# Patient Record
Sex: MALE | Race: WHITE | NOT HISPANIC OR LATINO | Employment: OTHER | URBAN - NONMETROPOLITAN AREA
[De-identification: names, ages, dates, MRNs, and addresses within clinical notes are randomized per-mention and may not be internally consistent; named-entity substitution may affect disease eponyms.]

---

## 2017-01-18 ENCOUNTER — OFFICE VISIT (OUTPATIENT)
Dept: OTOLARYNGOLOGY | Facility: CLINIC | Age: 62
End: 2017-01-18

## 2017-01-18 VITALS
WEIGHT: 236 LBS | HEART RATE: 63 BPM | HEIGHT: 71 IN | SYSTOLIC BLOOD PRESSURE: 141 MMHG | DIASTOLIC BLOOD PRESSURE: 80 MMHG | TEMPERATURE: 98.2 F | BODY MASS INDEX: 33.04 KG/M2

## 2017-01-18 DIAGNOSIS — K21.9 GASTROESOPHAGEAL REFLUX DISEASE WITHOUT ESOPHAGITIS: ICD-10-CM

## 2017-01-18 DIAGNOSIS — E04.2 MULTINODULAR THYROID: Primary | ICD-10-CM

## 2017-01-18 PROCEDURE — 99203 OFFICE O/P NEW LOW 30 MIN: CPT | Performed by: NURSE PRACTITIONER

## 2017-01-18 RX ORDER — LISINOPRIL 40 MG/1
40 TABLET ORAL DAILY
COMMUNITY
Start: 2016-02-10 | End: 2021-01-21

## 2017-01-18 RX ORDER — HYDRALAZINE HYDROCHLORIDE 50 MG/1
50 TABLET, FILM COATED ORAL 3 TIMES DAILY
COMMUNITY
End: 2018-07-25

## 2017-01-18 RX ORDER — MINOXIDIL 2.5 MG/1
2.5 TABLET ORAL 2 TIMES DAILY
COMMUNITY

## 2017-01-18 RX ORDER — POTASSIUM CHLORIDE 750 MG/1
TABLET, EXTENDED RELEASE ORAL
Status: ON HOLD | COMMUNITY
Start: 2014-04-15 | End: 2021-04-30

## 2017-01-18 RX ORDER — CLOPIDOGREL BISULFATE 75 MG/1
TABLET ORAL
COMMUNITY
Start: 2013-12-09 | End: 2021-05-01 | Stop reason: HOSPADM

## 2017-01-18 RX ORDER — ROSUVASTATIN CALCIUM 20 MG/1
20 TABLET, COATED ORAL DAILY
COMMUNITY
End: 2020-12-04 | Stop reason: SDUPTHER

## 2017-01-18 RX ORDER — PANTOPRAZOLE SODIUM 40 MG/1
TABLET, DELAYED RELEASE ORAL
COMMUNITY
Start: 2014-05-06 | End: 2019-01-25

## 2017-01-18 RX ORDER — GABAPENTIN 100 MG/1
CAPSULE ORAL
COMMUNITY
Start: 2016-01-11 | End: 2019-01-25

## 2017-01-18 RX ORDER — FUROSEMIDE 80 MG
TABLET ORAL
COMMUNITY
Start: 2016-02-10 | End: 2021-04-20

## 2017-01-18 RX ORDER — CARVEDILOL 25 MG/1
TABLET ORAL
COMMUNITY
Start: 2016-02-16 | End: 2020-12-04 | Stop reason: SDUPTHER

## 2017-01-18 RX ORDER — ISOSORBIDE MONONITRATE 60 MG/1
TABLET, EXTENDED RELEASE ORAL
COMMUNITY

## 2017-01-18 RX ORDER — AMLODIPINE BESYLATE 10 MG/1
TABLET ORAL
COMMUNITY
Start: 2016-01-11 | End: 2021-01-21

## 2017-01-18 NOTE — PROGRESS NOTES
PRIMARY CARE PROVIDER: Cecille Baumann MD  REFERRING PROVIDER: No ref. provider found    Chief Complaint   Patient presents with   • Thyroid Problem     Thyroid Nodules       Subjective   History of Present Illness:  Fortino Llanes is a  61 y.o.  male who complains of thyroid nodules. The symptoms are not localized to a particular location. The patient has had moderate symptoms. The symptoms have been relatively constant for the last several years . The symptoms are aggravated by  no identifiable factors . The symptoms are improved by no identifieable factors. The patient has been evaluated for these nodules in the past with an ultrasound guided fine needle biopsy with benign results. The patient has not had follow-up in over 3 years and recently was sent by his PCP for re-evaluation of his thyroid. He complains of mild dysphagia and acid reflux symptoms. He states he drinks very little water per day and a large amount of caffeine intake.     Review of Systems:  Review of Systems   Constitutional: Negative for chills and fever.   HENT:        See HPI   Eyes: Negative.    Cardiovascular: Negative for palpitations.   Gastrointestinal: Negative for nausea and vomiting.   Allergic/Immunologic: Negative.    Neurological: Negative for dizziness and headaches.   Psychiatric/Behavioral: Negative.        Past History:  Past Medical History   Diagnosis Date   • Diabetes mellitus    • GERD (gastroesophageal reflux disease)    • Hyperlipidemia    • Hypertension    • Myocardial infarction    • Neuropathy      Past Surgical History   Procedure Laterality Date   • Carotid endarterectomy     • Cardiac catheterization     • Cholecystectomy     • Renal artery stent     • Excision lesion       Face     Family History   Problem Relation Age of Onset   • Diabetes Mother    • Cancer Mother    • Hypertension Mother    • Osteoporosis Mother    • Cancer Father    • Diabetes Father      Social History   Substance Use Topics   • Smoking  status: Former Smoker     Quit date: 1999   • Smokeless tobacco: Never Used   • Alcohol use Yes      Comment: Rare       Current Outpatient Prescriptions:   •  amLODIPine (NORVASC) 10 MG tablet, Take 1 tablet by mouth daily, Disp: , Rfl:   •  aspirin 81 MG tablet, Take 81 mg by mouth daily., Disp: , Rfl:   •  carvedilol (COREG) 25 MG tablet, Take 25 mg by mouth 2 times daily , Disp: , Rfl:   •  clopidogrel (PLAVIX) 75 MG tablet, Take 1 tablet by mouth daily., Disp: , Rfl:   •  furosemide (LASIX) 80 MG tablet, Take 80 mg by mouth daily, Disp: , Rfl:   •  gabapentin (NEURONTIN) 100 MG capsule, Take 1 capsule by mouth 3 times daily, Disp: , Rfl:   •  hydrALAZINE (APRESOLINE) 50 MG tablet, Take 50 mg by mouth 3 (Three) Times a Day., Disp: , Rfl:   •  Insulin Glargine (LANTUS SOLOSTAR) 100 UNIT/ML injection pen, INJECT 30 UNITS IN THE MORNING AND 20 UNITS IN THE EVENING DAILY, Disp: , Rfl:   •  isosorbide mononitrate (IMDUR) 60 MG 24 hr tablet, Take 60 mg by mouth 2 times daily, Disp: , Rfl:   •  lisinopril (PRINIVIL,ZESTRIL) 40 MG tablet, Take 40 mg by mouth 2 times daily, Disp: , Rfl:   •  minoxidil (LONITEN) 2.5 MG tablet, Take 2.5 mg by mouth daily, Disp: , Rfl:   •  pantoprazole (PROTONIX) 40 MG EC tablet, TAKE ONE TABLET BY MOUTH EVERY DAY, Disp: , Rfl:   •  potassium chloride (K-DUR,KLOR-CON) 10 MEQ CR tablet, TAKE ONE TABLET BY MOUTH EVERY DAY, Disp: , Rfl:   •  rosuvastatin (CRESTOR) 20 MG tablet, Take 20 mg by mouth Daily., Disp: , Rfl:   Allergies:  Morphine and related    Objective     Vital Signs:  Temp:  [98.2 °F (36.8 °C)] 98.2 °F (36.8 °C)  Heart Rate:  [63] 63  BP: (141)/(80) 141/80    Physical Exam:  CONSTITUTIONAL: well nourished, well-developed, alert, oriented, in no acute distress   COMMUNICATION AND VOICE: able to communicate normally, normal voice quality  HEAD: normocephalic, no lesions, atraumatic, no tenderness, no masses   FACE: appearance normal, no lesions, no tenderness, no deformities,  facial motion symmetric  SALIVARY GLANDS: parotid glands with no tenderness, no swelling, no masses, submandibular glands with normal size, nontender  EYES: ocular motility normal, eyelids normal, orbits normal, no proptosis, conjunctiva normal , pupils equal, round   EARS:  Hearing: response to conversational voice normal bilaterally   External Ears: auricles without lesions  Otoscopic: tympanic membrane appearance normal, no lesions, no perforation, normal mobility, no fluid  NOSE:  External Nose: structure normal, no tenderness on palpation, no nasal discharge, no lesions, no evidence of trauma, nostrils patent   Intranasal Exam: nasal mucosa normal, vestibule within normal limits, inferior turbinate normal, nasal septum midline   Nasopharynx: nasopharyngeal mucosa, adenoids within normal limits  ORAL:  Lips: upper and lower lips without lesion   Teeth: dentition within normal limits for age   Gums: gingivae healthy   Oral Mucosa: oral mucosa normal, no mucosal lesions   Floor of Mouth: Warthin’s duct patent, mucosa normal  Tongue: lingual mucosa normal without lesions, normal tongue mobility   Palate: soft and hard palates with normal mucosa and structure  Oropharynx: oropharyngeal mucosa normal, tonsils normal in appearance   HYPOPHARYNX: hypopharyngeal mucosa normal  LARYNX: epiglottis and arytenoid cartilage within normal limits, vocal cord mucosa normal with normal mobility   NECK: neck appearance normal, no masses or tenderness  THYROID: mild right-sided thyromegaly, no tenderness, nodules or mass present on palpation, position midline   LYMPH NODES: no lymphadenopathy  CHEST/RESPIRATORY: respiratory effort normal, normal breath sounds   CARDIOVASCULAR: rate and rhythm normal, extremities without cyanosis or edema    NEUROLOGIC/PSYCHIATRIC: oriented to time, place and person, mood normal, affect appropriate, CN II-XII intact grossly    Results Review:   Thyroid ultrasound reviewed: multiple nodules seen;  right- 3.3cm nodule; left- 2cm nodule. This is minimally changed from ultrasound     Assessment   1. Multinodular thyroid        Plan   Continue current management plan.     -------MEDICATIONS:-------  Continue previous medication as prescribed     --------TESTING:--------  TSH (66859)  ultrasound  of the thyroid     -----INSTRUCTIONS-----  Sleep with the head of bed on an incline. No large meals 1-2 hrs prior to sleep. The patient was instructed to use PPI's 30 minutes prior to the last meal of the day. The patient was advised to avoid fatty meals and caffine or alcohol prior to sleep. Increase water/ non caffeinated drink intake to at least 6-8 glasses of  a day. Decrease caffeine intake. Plain Mucinex over the counter as needed to thin out secretions.         -----FOLLOW UP-----  Return in about 1 year (around 1/18/2018) for Recheck thyroid w/ US.        Brina Maki, YARELI  01/18/17  3:06 PM

## 2017-01-18 NOTE — LETTER
January 18, 2017     Cecille Baumann MD  [address] [place] [id number]    Patient: Fortino Llanes   YOB: 1955   Date of Visit: 1/18/2017       Dear Dr. Pastor MD:    Thank you for referring Fortino Llanes to me for evaluation. Below are the relevant portions of my assessment and plan of care.    If you have questions, please do not hesitate to call me. I look forward to following Fortino along with you.         Sincerely,        Gonzalo Hester MD        CC: No Recipients  Brina Maki, APRN  1/18/2017  3:08 PM  Signed  PRIMARY CARE PROVIDER: Cecille Baumann MD  REFERRING PROVIDER: No ref. provider found    Chief Complaint   Patient presents with   • Thyroid Problem     Thyroid Nodules       Subjective   History of Present Illness:  Fortino Llanes is a  61 y.o.  male who complains of thyroid nodules. The symptoms are not localized to a particular location. The patient has had moderate symptoms. The symptoms have been relatively constant for the last several years . The symptoms are aggravated by  no identifiable factors . The symptoms are improved by no identifieable factors. The patient has been evaluated for these nodules in the past with an ultrasound guided fine needle biopsy with benign results. The patient has not had follow-up in over 3 years and recently was sent by his PCP for re-evaluation of his thyroid. He complains of mild dysphagia and acid reflux symptoms. He states he drinks very little water per day and a large amount of caffeine intake.     Review of Systems:  Review of Systems   Constitutional: Negative for chills and fever.   HENT:        See HPI   Eyes: Negative.    Cardiovascular: Negative for palpitations.   Gastrointestinal: Negative for nausea and vomiting.   Allergic/Immunologic: Negative.    Neurological: Negative for dizziness and headaches.   Psychiatric/Behavioral: Negative.        Past History:  Past Medical History   Diagnosis Date   • Diabetes mellitus    •  GERD (gastroesophageal reflux disease)    • Hyperlipidemia    • Hypertension    • Myocardial infarction    • Neuropathy      Past Surgical History   Procedure Laterality Date   • Carotid endarterectomy     • Cardiac catheterization     • Cholecystectomy     • Renal artery stent     • Excision lesion       Face     Family History   Problem Relation Age of Onset   • Diabetes Mother    • Cancer Mother    • Hypertension Mother    • Osteoporosis Mother    • Cancer Father    • Diabetes Father      Social History   Substance Use Topics   • Smoking status: Former Smoker     Quit date: 1999   • Smokeless tobacco: Never Used   • Alcohol use Yes      Comment: Rare       Current Outpatient Prescriptions:   •  amLODIPine (NORVASC) 10 MG tablet, Take 1 tablet by mouth daily, Disp: , Rfl:   •  aspirin 81 MG tablet, Take 81 mg by mouth daily., Disp: , Rfl:   •  carvedilol (COREG) 25 MG tablet, Take 25 mg by mouth 2 times daily , Disp: , Rfl:   •  clopidogrel (PLAVIX) 75 MG tablet, Take 1 tablet by mouth daily., Disp: , Rfl:   •  furosemide (LASIX) 80 MG tablet, Take 80 mg by mouth daily, Disp: , Rfl:   •  gabapentin (NEURONTIN) 100 MG capsule, Take 1 capsule by mouth 3 times daily, Disp: , Rfl:   •  hydrALAZINE (APRESOLINE) 50 MG tablet, Take 50 mg by mouth 3 (Three) Times a Day., Disp: , Rfl:   •  Insulin Glargine (LANTUS SOLOSTAR) 100 UNIT/ML injection pen, INJECT 30 UNITS IN THE MORNING AND 20 UNITS IN THE EVENING DAILY, Disp: , Rfl:   •  isosorbide mononitrate (IMDUR) 60 MG 24 hr tablet, Take 60 mg by mouth 2 times daily, Disp: , Rfl:   •  lisinopril (PRINIVIL,ZESTRIL) 40 MG tablet, Take 40 mg by mouth 2 times daily, Disp: , Rfl:   •  minoxidil (LONITEN) 2.5 MG tablet, Take 2.5 mg by mouth daily, Disp: , Rfl:   •  pantoprazole (PROTONIX) 40 MG EC tablet, TAKE ONE TABLET BY MOUTH EVERY DAY, Disp: , Rfl:   •  potassium chloride (K-DUR,KLOR-CON) 10 MEQ CR tablet, TAKE ONE TABLET BY MOUTH EVERY DAY, Disp: , Rfl:   •  rosuvastatin  (CRESTOR) 20 MG tablet, Take 20 mg by mouth Daily., Disp: , Rfl:   Allergies:  Morphine and related    Objective     Vital Signs:  Temp:  [98.2 °F (36.8 °C)] 98.2 °F (36.8 °C)  Heart Rate:  [63] 63  BP: (141)/(80) 141/80    Physical Exam:  CONSTITUTIONAL: well nourished, well-developed, alert, oriented, in no acute distress   COMMUNICATION AND VOICE: able to communicate normally, normal voice quality  HEAD: normocephalic, no lesions, atraumatic, no tenderness, no masses   FACE: appearance normal, no lesions, no tenderness, no deformities, facial motion symmetric  SALIVARY GLANDS: parotid glands with no tenderness, no swelling, no masses, submandibular glands with normal size, nontender  EYES: ocular motility normal, eyelids normal, orbits normal, no proptosis, conjunctiva normal , pupils equal, round   EARS:  Hearing: response to conversational voice normal bilaterally   External Ears: auricles without lesions  Otoscopic: tympanic membrane appearance normal, no lesions, no perforation, normal mobility, no fluid  NOSE:  External Nose: structure normal, no tenderness on palpation, no nasal discharge, no lesions, no evidence of trauma, nostrils patent   Intranasal Exam: nasal mucosa normal, vestibule within normal limits, inferior turbinate normal, nasal septum midline   Nasopharynx: nasopharyngeal mucosa, adenoids within normal limits  ORAL:  Lips: upper and lower lips without lesion   Teeth: dentition within normal limits for age   Gums: gingivae healthy   Oral Mucosa: oral mucosa normal, no mucosal lesions   Floor of Mouth: Warthin’s duct patent, mucosa normal  Tongue: lingual mucosa normal without lesions, normal tongue mobility   Palate: soft and hard palates with normal mucosa and structure  Oropharynx: oropharyngeal mucosa normal, tonsils normal in appearance   HYPOPHARYNX: hypopharyngeal mucosa normal  LARYNX: epiglottis and arytenoid cartilage within normal limits, vocal cord mucosa normal with normal mobility    NECK: neck appearance normal, no masses or tenderness  THYROID: mild right-sided thyromegaly, no tenderness, nodules or mass present on palpation, position midline   LYMPH NODES: no lymphadenopathy  CHEST/RESPIRATORY: respiratory effort normal, normal breath sounds   CARDIOVASCULAR: rate and rhythm normal, extremities without cyanosis or edema    NEUROLOGIC/PSYCHIATRIC: oriented to time, place and person, mood normal, affect appropriate, CN II-XII intact grossly    Results Review:   Thyroid ultrasound reviewed: multiple nodules seen; right- 3.3cm nodule; left- 2cm nodule. This is minimally changed from ultrasound     Assessment   1. Multinodular thyroid        Plan   Continue current management plan.     -------MEDICATIONS:-------  Continue previous medication as prescribed     --------TESTING:--------  TSH (75944)  ultrasound  of the thyroid     -----INSTRUCTIONS-----  Sleep with the head of bed on an incline. No large meals 1-2 hrs prior to sleep. The patient was instructed to use PPI's 30 minutes prior to the last meal of the day. The patient was advised to avoid fatty meals and caffine or alcohol prior to sleep. Increase water/ non caffeinated drink intake to at least 6-8 glasses of  a day. Decrease caffeine intake. Plain Mucinex over the counter as needed to thin out secretions.         -----FOLLOW UP-----  Return in about 1 year (around 1/18/2018) for Recheck thyroid w/ US.        Brina Maki, YARELI  01/18/17  3:06 PM

## 2017-01-18 NOTE — MR AVS SNAPSHOT
Fortino Shraddha   1/18/2017 1:45 PM   Office Visit    Dept Phone:  [phone]   Encounter #:  [id number]    Provider:  Gonzalo Hester MD   Department:  [facility]                Your Full Care Plan              Your Updated Medication List          This list is accurate as of: 1/18/17  3:12 PM.  Always use your most recent med list.                amLODIPine 10 MG tablet   Commonly known as:  NORVASC       aspirin 81 MG tablet       carvedilol 25 MG tablet   Commonly known as:  COREG       clopidogrel 75 MG tablet   Commonly known as:  PLAVIX       furosemide 80 MG tablet   Commonly known as:  LASIX       gabapentin 100 MG capsule   Commonly known as:  NEURONTIN       hydrALAZINE 50 MG tablet   Commonly known as:  APRESOLINE       isosorbide mononitrate 60 MG 24 hr tablet   Commonly known as:  IMDUR       LANTUS SOLOSTAR 100 UNIT/ML injection pen   Generic drug:  Insulin Glargine       lisinopril 40 MG tablet   Commonly known as:  PRINIVIL,ZESTRIL       minoxidil 2.5 MG tablet   Commonly known as:  LONITEN       pantoprazole 40 MG EC tablet   Commonly known as:  PROTONIX       potassium chloride 10 MEQ CR tablet   Commonly known as:  K-DUR,KLOR-CON       rosuvastatin 20 MG tablet   Commonly known as:  CRESTOR               You Were Diagnosed With        Codes Comments    Multinodular thyroid    -  Primary ICD-10-CM: E04.2  ICD-9-CM: 241.1     Gastroesophageal reflux disease without esophagitis     ICD-10-CM: K21.9  ICD-9-CM: 530.81       Instructions     None    Patient Instructions History      Upcoming Appointments     Visit Type Date Time Department    FOLLOW UP 1/18/2017  1:45 PM [facility] Signup     Our records indicate that [facility] have [facility]. If you would like to sign up for [employer], please email [email or web address] or call [phone] to obtain an activation code.             Other Info from Your Visit    "          Your Appointments     2018  1:15 PM CST   RADIOLOGY with IMAGING CT/US ENT [facility] (--)    [address] [place] [address] Av   [address]  Merged with [facility] [id number] [phone]            2018  1:30 PM CST   Follow Up with YARELI Resendez   [facility] (--)    [address] [place] [address] Av   [address]  [facility] with [facility] [id number] [phone]           Arrive 15 minutes prior to appointment.              Allergies     Morphine And Related        Reason for Visit     Thyroid Problem Thyroid Nodules      Vital Signs     Blood Pressure Pulse Temperature Height Weight Body Mass Index    141/80 63 98.2 °F (36.8 °C) 71\" (180.3 cm) 236 lb (107 kg) 32.92 kg/m2    Smoking Status                   Former Smoker           Problems and Diagnoses Noted     Acid reflux disease    Multinodular thyroid        "

## 2017-06-14 ENCOUNTER — APPOINTMENT (OUTPATIENT)
Dept: GENERAL RADIOLOGY | Age: 62
End: 2017-06-14
Payer: COMMERCIAL

## 2017-06-14 ENCOUNTER — HOSPITAL ENCOUNTER (EMERGENCY)
Age: 62
Discharge: HOME | End: 2017-06-14
Attending: FAMILY MEDICINE
Payer: COMMERCIAL

## 2017-06-14 ENCOUNTER — APPOINTMENT (OUTPATIENT)
Dept: CT IMAGING | Age: 62
End: 2017-06-14
Payer: COMMERCIAL

## 2017-06-14 VITALS
DIASTOLIC BLOOD PRESSURE: 72 MMHG | OXYGEN SATURATION: 96 % | TEMPERATURE: 97.4 F | RESPIRATION RATE: 18 BRPM | HEART RATE: 70 BPM | SYSTOLIC BLOOD PRESSURE: 179 MMHG

## 2017-06-14 DIAGNOSIS — G62.9 SENSORY NEUROPATHY: ICD-10-CM

## 2017-06-14 DIAGNOSIS — S93.422A SPRAIN OF DELTOID LIGAMENT OF LEFT ANKLE, INITIAL ENCOUNTER: Primary | ICD-10-CM

## 2017-06-14 LAB
ALBUMIN SERPL-MCNC: 4 G/DL (ref 3.5–5.2)
ALP BLD-CCNC: 73 U/L (ref 40–130)
ALT SERPL-CCNC: 26 U/L (ref 5–41)
AMPHETAMINE SCREEN, URINE: NEGATIVE
ANION GAP SERPL CALCULATED.3IONS-SCNC: 14 MMOL/L (ref 7–19)
AST SERPL-CCNC: 20 U/L (ref 5–40)
BACTERIA: NEGATIVE /HPF
BARBITURATE SCREEN URINE: NEGATIVE
BASOPHILS ABSOLUTE: 0 K/UL (ref 0–0.2)
BASOPHILS RELATIVE PERCENT: 0.4 % (ref 0–1)
BENZODIAZEPINE SCREEN, URINE: NEGATIVE
BILIRUB SERPL-MCNC: 0.8 MG/DL (ref 0.2–1.2)
BILIRUBIN URINE: NEGATIVE
BLOOD, URINE: ABNORMAL
BUN BLDV-MCNC: 20 MG/DL (ref 8–23)
CALCIUM SERPL-MCNC: 9.1 MG/DL (ref 8.8–10.2)
CANNABINOID SCREEN URINE: NEGATIVE
CHLORIDE BLD-SCNC: 98 MMOL/L (ref 98–111)
CLARITY: CLEAR
CO2: 28 MMOL/L (ref 22–29)
COCAINE METABOLITE SCREEN URINE: NEGATIVE
COLOR: YELLOW
CREAT SERPL-MCNC: 1.3 MG/DL (ref 0.5–1.2)
EOSINOPHILS ABSOLUTE: 0.2 K/UL (ref 0–0.6)
EOSINOPHILS RELATIVE PERCENT: 1.9 % (ref 0–5)
EPITHELIAL CELLS, UA: 0 /HPF (ref 0–5)
GFR NON-AFRICAN AMERICAN: 56
GLUCOSE BLD-MCNC: 167 MG/DL (ref 74–109)
GLUCOSE URINE: NEGATIVE MG/DL
HBA1C MFR BLD: 9.7 %
HCT VFR BLD CALC: 49.1 % (ref 42–52)
HEMOGLOBIN: 16.9 G/DL (ref 14–18)
HYALINE CASTS: 3 /HPF (ref 0–8)
KETONES, URINE: NEGATIVE MG/DL
LEUKOCYTE ESTERASE, URINE: NEGATIVE
LYMPHOCYTES ABSOLUTE: 2.1 K/UL (ref 1.1–4.5)
LYMPHOCYTES RELATIVE PERCENT: 21.7 % (ref 20–40)
Lab: NORMAL
MCH RBC QN AUTO: 32.4 PG (ref 27–31)
MCHC RBC AUTO-ENTMCNC: 34.4 G/DL (ref 33–37)
MCV RBC AUTO: 94.2 FL (ref 80–94)
MONOCYTES ABSOLUTE: 0.7 K/UL (ref 0–0.9)
MONOCYTES RELATIVE PERCENT: 7.3 % (ref 0–10)
NEUTROPHILS ABSOLUTE: 6.7 K/UL (ref 1.5–7.5)
NEUTROPHILS RELATIVE PERCENT: 68.3 % (ref 50–65)
NITRITE, URINE: NEGATIVE
OPIATE SCREEN URINE: NEGATIVE
PDW BLD-RTO: 12.5 % (ref 11.5–14.5)
PH UA: 5
PLATELET # BLD: 146 K/UL (ref 130–400)
PMV BLD AUTO: 11.1 FL (ref 9.4–12.4)
POTASSIUM SERPL-SCNC: 3.5 MMOL/L (ref 3.5–5)
PRO-BNP: 553 PG/ML (ref 0–900)
PROTEIN UA: 30 MG/DL
RBC # BLD: 5.21 M/UL (ref 4.7–6.1)
RBC UA: 0 /HPF (ref 0–4)
SODIUM BLD-SCNC: 140 MMOL/L (ref 136–145)
SPECIFIC GRAVITY UA: 1.01
TOTAL PROTEIN: 7.1 G/DL (ref 6.6–8.7)
TROPONIN: 0.01 NG/ML (ref 0–0.03)
UROBILINOGEN, URINE: 0.2 E.U./DL
WBC # BLD: 9.9 K/UL (ref 4.8–10.8)
WBC UA: 0 /HPF (ref 0–5)

## 2017-06-14 PROCEDURE — 73610 X-RAY EXAM OF ANKLE: CPT

## 2017-06-14 PROCEDURE — 80307 DRUG TEST PRSMV CHEM ANLYZR: CPT

## 2017-06-14 PROCEDURE — 93005 ELECTROCARDIOGRAM TRACING: CPT

## 2017-06-14 PROCEDURE — 80053 COMPREHEN METABOLIC PANEL: CPT

## 2017-06-14 PROCEDURE — 83036 HEMOGLOBIN GLYCOSYLATED A1C: CPT

## 2017-06-14 PROCEDURE — 71010 XR CHEST PORTABLE: CPT

## 2017-06-14 PROCEDURE — 99282 EMERGENCY DEPT VISIT SF MDM: CPT | Performed by: FAMILY MEDICINE

## 2017-06-14 PROCEDURE — 84484 ASSAY OF TROPONIN QUANT: CPT

## 2017-06-14 PROCEDURE — 70450 CT HEAD/BRAIN W/O DYE: CPT

## 2017-06-14 PROCEDURE — 83880 ASSAY OF NATRIURETIC PEPTIDE: CPT

## 2017-06-14 PROCEDURE — 93880 EXTRACRANIAL BILAT STUDY: CPT

## 2017-06-14 PROCEDURE — 85025 COMPLETE CBC W/AUTO DIFF WBC: CPT

## 2017-06-14 PROCEDURE — 99284 EMERGENCY DEPT VISIT MOD MDM: CPT

## 2017-06-14 PROCEDURE — 36415 COLL VENOUS BLD VENIPUNCTURE: CPT

## 2017-06-14 RX ORDER — ROSUVASTATIN CALCIUM 20 MG/1
20 TABLET, COATED ORAL DAILY
Status: ON HOLD | COMMUNITY
End: 2020-10-20 | Stop reason: HOSPADM

## 2017-06-14 ASSESSMENT — ENCOUNTER SYMPTOMS
DIFFICULTY BREATHING: 0
VISUAL CHANGE: 0
RECTAL BLEEDING: 0
NAUSEA: 0
SHORTNESS OF BREATH: 0
VOMITING: 0

## 2017-06-16 LAB
EKG P AXIS: 55 DEGREES
EKG P-R INTERVAL: 172 MS
EKG Q-T INTERVAL: 440 MS
EKG QRS DURATION: 86 MS
EKG QTC CALCULATION (BAZETT): 437 MS
EKG T AXIS: 77 DEGREES

## 2017-09-12 DIAGNOSIS — I65.23 BILATERAL CAROTID ARTERY STENOSIS: Primary | ICD-10-CM

## 2017-09-12 DIAGNOSIS — I73.9 PERIPHERAL VASCULAR DISEASE (HCC): ICD-10-CM

## 2018-01-01 ENCOUNTER — APPOINTMENT (OUTPATIENT)
Dept: GENERAL RADIOLOGY | Age: 63
End: 2018-01-01
Payer: COMMERCIAL

## 2018-01-01 ENCOUNTER — HOSPITAL ENCOUNTER (EMERGENCY)
Age: 63
Discharge: HOME | End: 2018-01-01
Attending: FAMILY MEDICINE
Payer: COMMERCIAL

## 2018-01-01 VITALS
SYSTOLIC BLOOD PRESSURE: 156 MMHG | HEIGHT: 71 IN | DIASTOLIC BLOOD PRESSURE: 78 MMHG | TEMPERATURE: 98.7 F | OXYGEN SATURATION: 94 % | BODY MASS INDEX: 32.9 KG/M2 | RESPIRATION RATE: 18 BRPM | HEART RATE: 71 BPM | WEIGHT: 235 LBS

## 2018-01-01 DIAGNOSIS — R05.9 COUGH: ICD-10-CM

## 2018-01-01 DIAGNOSIS — J40 BRONCHITIS: Primary | ICD-10-CM

## 2018-01-01 LAB
ALBUMIN SERPL-MCNC: 3.9 G/DL (ref 3.5–5.2)
ALP BLD-CCNC: 85 U/L (ref 40–130)
ALT SERPL-CCNC: 17 U/L (ref 5–41)
ANION GAP SERPL CALCULATED.3IONS-SCNC: 11 MMOL/L (ref 7–19)
AST SERPL-CCNC: 18 U/L (ref 5–40)
BASOPHILS ABSOLUTE: 0.1 K/UL (ref 0–0.2)
BASOPHILS RELATIVE PERCENT: 0.5 % (ref 0–1)
BILIRUB SERPL-MCNC: 0.8 MG/DL (ref 0.2–1.2)
BUN BLDV-MCNC: 13 MG/DL (ref 8–23)
CALCIUM SERPL-MCNC: 9 MG/DL (ref 8.8–10.2)
CHLORIDE BLD-SCNC: 98 MMOL/L (ref 98–111)
CO2: 29 MMOL/L (ref 22–29)
CREAT SERPL-MCNC: 1.4 MG/DL (ref 0.5–1.2)
EOSINOPHILS ABSOLUTE: 0.5 K/UL (ref 0–0.6)
EOSINOPHILS RELATIVE PERCENT: 4.3 % (ref 0–5)
GFR NON-AFRICAN AMERICAN: 51
GLUCOSE BLD-MCNC: 335 MG/DL (ref 74–109)
HCT VFR BLD CALC: 48.8 % (ref 42–52)
HEMOGLOBIN: 16.1 G/DL (ref 14–18)
LYMPHOCYTES ABSOLUTE: 1.7 K/UL (ref 1.1–4.5)
LYMPHOCYTES RELATIVE PERCENT: 15.7 % (ref 20–40)
MCH RBC QN AUTO: 30.9 PG (ref 27–31)
MCHC RBC AUTO-ENTMCNC: 33 G/DL (ref 33–37)
MCV RBC AUTO: 93.7 FL (ref 80–94)
MONOCYTES ABSOLUTE: 0.8 K/UL (ref 0–0.9)
MONOCYTES RELATIVE PERCENT: 7.1 % (ref 0–10)
NEUTROPHILS ABSOLUTE: 7.7 K/UL (ref 1.5–7.5)
NEUTROPHILS RELATIVE PERCENT: 72.2 % (ref 50–65)
PDW BLD-RTO: 11.8 % (ref 11.5–14.5)
PERFORMED ON: NORMAL
PLATELET # BLD: 150 K/UL (ref 130–400)
PMV BLD AUTO: 11.3 FL (ref 9.4–12.4)
POC TROPONIN I: 0.03 NG/ML (ref 0–0.08)
POTASSIUM SERPL-SCNC: 3.6 MMOL/L (ref 3.5–5)
PRO-BNP: 771 PG/ML (ref 0–900)
RAPID INFLUENZA  B AGN: NEGATIVE
RAPID INFLUENZA A AGN: NEGATIVE
RBC # BLD: 5.21 M/UL (ref 4.7–6.1)
SODIUM BLD-SCNC: 138 MMOL/L (ref 136–145)
TOTAL PROTEIN: 6.7 G/DL (ref 6.6–8.7)
WBC # BLD: 10.7 K/UL (ref 4.8–10.8)

## 2018-01-01 PROCEDURE — 85025 COMPLETE CBC W/AUTO DIFF WBC: CPT

## 2018-01-01 PROCEDURE — 94640 AIRWAY INHALATION TREATMENT: CPT

## 2018-01-01 PROCEDURE — 71045 X-RAY EXAM CHEST 1 VIEW: CPT

## 2018-01-01 PROCEDURE — 2580000003 HC RX 258: Performed by: FAMILY MEDICINE

## 2018-01-01 PROCEDURE — 93005 ELECTROCARDIOGRAM TRACING: CPT

## 2018-01-01 PROCEDURE — 6360000002 HC RX W HCPCS: Performed by: FAMILY MEDICINE

## 2018-01-01 PROCEDURE — 80053 COMPREHEN METABOLIC PANEL: CPT

## 2018-01-01 PROCEDURE — 99283 EMERGENCY DEPT VISIT LOW MDM: CPT

## 2018-01-01 PROCEDURE — 84484 ASSAY OF TROPONIN QUANT: CPT

## 2018-01-01 PROCEDURE — 83880 ASSAY OF NATRIURETIC PEPTIDE: CPT

## 2018-01-01 PROCEDURE — 87804 INFLUENZA ASSAY W/OPTIC: CPT

## 2018-01-01 PROCEDURE — 36415 COLL VENOUS BLD VENIPUNCTURE: CPT

## 2018-01-01 PROCEDURE — 99283 EMERGENCY DEPT VISIT LOW MDM: CPT | Performed by: FAMILY MEDICINE

## 2018-01-01 RX ORDER — BENZONATATE 200 MG/1
200 CAPSULE ORAL 3 TIMES DAILY PRN
Qty: 21 CAPSULE | Refills: 0 | Status: SHIPPED | OUTPATIENT
Start: 2018-01-01 | End: 2018-01-08

## 2018-01-01 RX ORDER — AZITHROMYCIN 250 MG/1
TABLET, FILM COATED ORAL
Qty: 1 PACKET | Refills: 0 | Status: ON HOLD | OUTPATIENT
Start: 2018-01-01 | End: 2020-02-13 | Stop reason: ALTCHOICE

## 2018-01-01 RX ORDER — ALBUTEROL SULFATE 90 UG/1
2 AEROSOL, METERED RESPIRATORY (INHALATION) EVERY 6 HOURS PRN
Qty: 1 INHALER | Refills: 3 | Status: ON HOLD | OUTPATIENT
Start: 2018-01-01 | End: 2021-05-01

## 2018-01-01 RX ORDER — ESOMEPRAZOLE MAGNESIUM 40 MG/1
40 FOR SUSPENSION ORAL 2 TIMES DAILY
Status: ON HOLD | COMMUNITY
End: 2021-05-14 | Stop reason: HOSPADM

## 2018-01-01 RX ORDER — ALBUTEROL SULFATE 2.5 MG/3ML
2.5 SOLUTION RESPIRATORY (INHALATION) EVERY 4 HOURS PRN
Status: DISCONTINUED | OUTPATIENT
Start: 2018-01-01 | End: 2018-01-01 | Stop reason: HOSPADM

## 2018-01-01 RX ORDER — SODIUM CHLORIDE 9 MG/ML
INJECTION, SOLUTION INTRAVENOUS CONTINUOUS
Status: DISCONTINUED | OUTPATIENT
Start: 2018-01-01 | End: 2018-01-01 | Stop reason: HOSPADM

## 2018-01-01 RX ADMIN — ALBUTEROL SULFATE 2.5 MG: 2.5 SOLUTION RESPIRATORY (INHALATION) at 09:37

## 2018-01-01 RX ADMIN — IPRATROPIUM BROMIDE 0.5 MG: 0.5 SOLUTION RESPIRATORY (INHALATION) at 09:37

## 2018-01-01 RX ADMIN — SODIUM CHLORIDE: 9 INJECTION, SOLUTION INTRAVENOUS at 09:43

## 2018-01-01 ASSESSMENT — ENCOUNTER SYMPTOMS
ABDOMINAL PAIN: 0
DIARRHEA: 0
PHOTOPHOBIA: 0
CONSTIPATION: 0
ABDOMINAL DISTENTION: 0
NAUSEA: 0
COUGH: 1
BACK PAIN: 0
SORE THROAT: 0
WHEEZING: 1
SHORTNESS OF BREATH: 1

## 2018-01-01 NOTE — ED PROVIDER NOTES
PAST MEDICAL HISTORY     Past Medical History:   Diagnosis Date    Acute kidney failure, unspecified     Anxiety state, unspecified     Atrial septal aneurysm 1/9/2016    Body mass index 30.0-30.9, adult     CAD (coronary artery disease) 1/10/2016    1/9/16  lexiscan  Positive for apical MI + myocardial ischemia, EF 42%, intermediate risk findings, AUC indication 16, AUC score 7 1/10/16  Cath  3 lesions in the LAD:  Mid: 70% 2.25x23, mid 90% 2.25 x 28, distal 100% 2.0x28, anterior lateral apical hypokinesis, EF 45%    Calculus of kidney     Carotid artery stenosis 6/26/2013    Cellulitis and abscess of hand, except fingers and thumb     Chronic airway obstruction, not elsewhere classified     Chronic kidney disease, unspecified     Congestive heart failure, unspecified     Diabetes mellitus type II     Diverticulosis of colon (without mention of hemorrhage)     Encounter for long-term (current) use of insulin (HCC)     Esophageal reflux     Family history of ischemic heart disease     Gastric ulcer, unspecified as acute or chronic, without mention of hemorrhage, perforation, or obstruction     Hyperlipemia     Hypertension     Internal hemorrhoids without mention of complication     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, or unspecified(403.00)     Obesity, unspecified     Other specified cardiac dysrhythmias(427.89)     Peripheral vascular disease ([address]     Solitary pulmonary nodule     Surgical or other procedure not carried out because of contraindication     Thoracic aneurysm without mention of rupture     Tobacco use disorder     Type II or unspecified type diabetes mellitus without mention of complication, not stated as uncontrolled          SURGICAL HISTORY       Past Surgical History:   Procedure Laterality Date    CARDIAC CATHETERIZATION      CHOLECYSTECTOMY      CORONARY ANGIOPLASTY WITH STENT PLACEMENT  1-16

## 2018-01-03 LAB
EKG P AXIS: 53 DEGREES
EKG P-R INTERVAL: 166 MS
EKG Q-T INTERVAL: 422 MS
EKG QRS DURATION: 88 MS
EKG QTC CALCULATION (BAZETT): 427 MS
EKG T AXIS: 80 DEGREES

## 2018-01-23 ENCOUNTER — LAB (OUTPATIENT)
Dept: LAB | Facility: HOSPITAL | Age: 63
End: 2018-01-23

## 2018-01-23 ENCOUNTER — OFFICE VISIT (OUTPATIENT)
Dept: OTOLARYNGOLOGY | Facility: CLINIC | Age: 63
End: 2018-01-23

## 2018-01-23 VITALS
WEIGHT: 241 LBS | DIASTOLIC BLOOD PRESSURE: 88 MMHG | SYSTOLIC BLOOD PRESSURE: 160 MMHG | BODY MASS INDEX: 33.74 KG/M2 | HEIGHT: 71 IN | TEMPERATURE: 97.8 F | HEART RATE: 89 BPM

## 2018-01-23 DIAGNOSIS — E04.1 THYROID NODULE: Primary | ICD-10-CM

## 2018-01-23 DIAGNOSIS — E04.1 THYROID NODULE: ICD-10-CM

## 2018-01-23 DIAGNOSIS — E04.2 MULTINODULAR THYROID: ICD-10-CM

## 2018-01-23 DIAGNOSIS — K21.9 GASTROESOPHAGEAL REFLUX DISEASE WITHOUT ESOPHAGITIS: ICD-10-CM

## 2018-01-23 LAB — TSH SERPL DL<=0.05 MIU/L-ACNC: 1.11 MIU/ML (ref 0.47–4.68)

## 2018-01-23 PROCEDURE — 84443 ASSAY THYROID STIM HORMONE: CPT | Performed by: NURSE PRACTITIONER

## 2018-01-23 PROCEDURE — 99213 OFFICE O/P EST LOW 20 MIN: CPT | Performed by: NURSE PRACTITIONER

## 2018-01-23 PROCEDURE — 36415 COLL VENOUS BLD VENIPUNCTURE: CPT

## 2018-01-23 RX ORDER — ESOMEPRAZOLE MAGNESIUM 40 MG/1
40 CAPSULE, DELAYED RELEASE ORAL 2 TIMES DAILY
Refills: 4 | COMMUNITY
Start: 2018-01-08 | End: 2021-07-12 | Stop reason: SDUPTHER

## 2018-01-23 NOTE — PATIENT INSTRUCTIONS
Medical vs surgical options discussed    The patient has a thyroid nodule, which is relatively small, and studies do not suggest a malignancy. I have recommended observation with follow-up with me for repeat ultrasound. I explained the pathology of thyroid nodules including the risks of cancer. The options of surgery were discussed, but we will take an observational course for now. The patient wishes to continue to defer surgery at this time.     Call for problems or worsening symptoms

## 2018-01-23 NOTE — PROGRESS NOTES
YOB: 1955  Location: [facility]  Location Address: [address] [place] [address],  3, [address] [place] [address]  Location Phone: [phone]    Chief Complaint   Patient presents with   • Thyroid Problem       History of Present Illness  Jeison Garcia is a 62 y.o. male.  Jeison Garcia is here for follow up of ENT complaints. The patient has had problems with thyroid nodules  The symptoms are not localized to a particular location. The patient has had insignificant symptoms. The symptoms have been present for the last several years The symptoms are aggravated by  no identifiable factors. The symptoms are improved by no identifiable factors.  He denies worsening symptoms.  He has had slight increase in the size of the nodules.          PRIOR US review from Dr. Hester 17  Results Review:   Thyroid ultrasound reviewed: multiple nodules seen; right- 3.3cm nodule; left- 2cm nodule. This is minimally changed from ultrasound       2013  US Thyroid2013  [place], [place]  Result Narrative                                             Name: JEISON GARCIA                    [facility]                          Phys: LIZBETH CABELLO                    [address] [facility]                        : 1955 Age: 58      Sex: [place] [id number]                         Acct: [id number]      Loc: CLINICAL                                                 Exam Date: 2013 Status: REG REF  [phone]                             Radiology No:                                                        Unit No: [id number]              EXAM#     TYPE/EXAM                       RESULT                                 [id number] US/THYROID                                                                    THYROID                 Aug 8, 2013 9:05:00 AM                History: Thyroid nodules.               Ultrasound evaluation shows overall thyroid lobe measurements of        4.9 x 2.3 x 2.8 cm on the right and         4.9 x 1.8 x 2.1 cm on the left.               There is a single nodule within each lobe.        A nodule in the upper pole of the right lobe measures 3.0 x 1.7 x 2.4       cm.       A nodule within the upper pole of the left lobe measures 1.7 x 0.9 x       1.6 cm.               On comparison with a chest CT from 07/03/2009 low-density foci       compatible with nodules are noted within both thyroid lobes.               Technically the thyroid nodules are indeterminate and since these       represent relatively dominant lesions ultrasound guided fine needle       biopsy should be considered.                                      ** REPORT ELECTRONICALLY SIGNED 08/08/2013 (09:16:00) **                      Reported By: MELBA MOURA MD     TSH [[id number]] (Order [id number])   Lab   Date: 1/23/2018 Department: [facility] [place] Released By: Erin Aguilar Authorizing: YARELI Cortes       TSH   Order: [id number]   Status:  Final result   Visible to patient:  No (Not Released) Dx:  Thyroid nodule      Ref Range & Units 3:27 PM     TSH 0.470 - 4.680 mIU/mL 1.110   Resulting Agency  [facility]                  Past Medical History:   Diagnosis Date   • Diabetes mellitus    • GERD (gastroesophageal reflux disease)    • Hyperlipidemia    • Hypertension    • Multinodular thyroid    • Myocardial infarction    • Neuropathy        Past Surgical History:   Procedure Laterality Date   • CARDIAC CATHETERIZATION     • CAROTID ENDARTERECTOMY     • CHOLECYSTECTOMY     • EXCISION LESION      Face   • RENAL ARTERY STENT           Current Outpatient Prescriptions:   •  amLODIPine (NORVASC) 10 MG tablet, Take 1 tablet by mouth daily, Disp: , Rfl:   •  aspirin 81 MG tablet, Take 81 mg by mouth daily., Disp: , Rfl:   •  carvedilol (COREG) 25 MG tablet, Take 25 mg by mouth 2 times daily , Disp: , Rfl:   •  clopidogrel (PLAVIX) 75 MG tablet, Take 1 tablet by mouth daily., Disp: , Rfl:   •  esomeprazole (nexIUM) 40 MG  capsule, , Disp: , Rfl: 4  •  furosemide (LASIX) 80 MG tablet, Take 80 mg by mouth daily, Disp: , Rfl:   •  gabapentin (NEURONTIN) 100 MG capsule, Take 1 capsule by mouth 3 times daily, Disp: , Rfl:   •  hydrALAZINE (APRESOLINE) 50 MG tablet, Take 50 mg by mouth 3 (Three) Times a Day., Disp: , Rfl:   •  Insulin Glargine (LANTUS SOLOSTAR) 100 UNIT/ML injection pen, INJECT 30 UNITS IN THE MORNING AND 20 UNITS IN THE EVENING DAILY, Disp: , Rfl:   •  isosorbide mononitrate (IMDUR) 60 MG 24 hr tablet, Take 60 mg by mouth 2 times daily, Disp: , Rfl:   •  lisinopril (PRINIVIL,ZESTRIL) 40 MG tablet, Take 40 mg by mouth 2 times daily, Disp: , Rfl:   •  minoxidil (LONITEN) 2.5 MG tablet, Take 2.5 mg by mouth daily, Disp: , Rfl:   •  potassium chloride (K-DUR,KLOR-CON) 10 MEQ CR tablet, TAKE ONE TABLET BY MOUTH EVERY DAY, Disp: , Rfl:   •  rosuvastatin (CRESTOR) 20 MG tablet, Take 20 mg by mouth Daily., Disp: , Rfl:   •  pantoprazole (PROTONIX) 40 MG EC tablet, TAKE ONE TABLET BY MOUTH EVERY DAY, Disp: , Rfl:     Hydralazine; Morphine; and Morphine and related    Family History   Problem Relation Age of Onset   • Diabetes Mother    • Cancer Mother    • Hypertension Mother    • Osteoporosis Mother    • Cancer Father    • Diabetes Father        Social History     Social History   • Marital status:      Spouse name: N/A   • Number of children: N/A   • Years of education: N/A     Occupational History   • Not on file.     Social History Main Topics   • Smoking status: Former Smoker     Quit date: 1999   • Smokeless tobacco: Never Used   • Alcohol use Yes      Comment: Rare   • Drug use: Not on file   • Sexual activity: Not on file     Other Topics Concern   • Not on file     Social History Narrative       Review of Systems   Constitutional: Negative.    HENT:        SEE HPI   Eyes: Negative.    Respiratory: Negative.    Cardiovascular: Negative.    Gastrointestinal: Negative.    Endocrine: Negative.    Genitourinary:  Negative.    Musculoskeletal: Negative.    Skin: Negative.    Allergic/Immunologic: Negative.    Neurological: Negative.    Hematological: Negative.    Psychiatric/Behavioral: Negative.        Vitals:    01/23/18 1338   BP: 160/88   Pulse: 89   Temp: 97.8 °F (36.6 °C)       Objective     Physical Exam  CONSTITUTIONAL: well nourished, alert, oriented, in no acute distress     COMMUNICATION AND VOICE: able to communicate normally, normal voice quality    HEAD: normocephalic, no lesions, atraumatic, no tenderness, no masses     FACE: appearance normal, no lesions, no tenderness, no deformities, facial motion symmetric    SALIVARY GLANDS: parotid glands with no tenderness, no swelling, no masses, submandibular glands with normal size, nontender    EYES: ocular motility normal, eyelids normal, orbits normal, no proptosis, conjunctiva normal , pupils equal, round     EARS:  Hearing: response to conversational voice normal bilaterally   External Ears: auricles without lesions  Otoscopic: tympanic membrane appearance normal, no lesions, no perforation, normal mobility, no fluid    NOSE:  External Nose: structure normal, no tenderness on palpation, no nasal discharge, no lesions, no evidence of trauma, nostrils patent   Intranasal Exam: nasal mucosa normal, vestibule within normal limits, inferior turbinate normal, nasal septum midline     ORAL:  Lips: upper and lower lips without lesion   Teeth: dentition within normal limits for age   Gums: gingivae healthy   Oral Mucosa: oral mucosa normal, no mucosal lesions   Floor of Mouth: Warthin’s duct patent, mucosa normal  Tongue: lingual mucosa normal without lesions, normal tongue mobility   Palate: soft and hard palates with normal mucosa and structure  Oropharynx: oropharyngeal mucosa normal    NECK: neck appearance normal, no mass,  noted without erythema or tenderness    THYROID: bilateral thyroid nodules    LYMPH NODES: no lymphadenopathy    CHEST/RESPIRATORY: respiratory  effort normal    CARDIOVASCULAR:  extremities without cyanosis or edema      NEUROLOGIC/PSYCHIATRIC: oriented to time, place and person, mood normal, affect appropriate, CN II-XII intact grossly    Assessment/Plan   Fortino was seen today for thyroid problem.    Diagnoses and all orders for this visit:    Thyroid nodule  -     TSH; Future  -     US Thyroid; Future    Multinodular thyroid    Gastroesophageal reflux disease without esophagitis      * Surgery not found *  Orders Placed This Encounter   Procedures   • US Thyroid     Standing Status:   Future     Standing Expiration Date:   1/23/2020     Order Specific Question:   Reason for Exam:     Answer:   thyroid nodules   • TSH     Standing Status:   Future     Number of Occurrences:   1     Standing Expiration Date:   1/23/2019     Return in about 6 months (around 7/23/2018).       Patient Instructions   Medical vs surgical options discussed    The patient has a thyroid nodule, which is relatively small, and studies do not suggest a malignancy. I have recommended observation with follow-up with me for repeat ultrasound. I explained the pathology of thyroid nodules including the risks of cancer. The options of surgery were discussed, but we will take an observational course for now. The patient wishes to continue to defer surgery at this time.     Call for problems or worsening symptoms

## 2018-07-25 ENCOUNTER — CLINICAL SUPPORT (OUTPATIENT)
Dept: OTOLARYNGOLOGY | Facility: CLINIC | Age: 63
End: 2018-07-25

## 2018-07-25 ENCOUNTER — OFFICE VISIT (OUTPATIENT)
Dept: OTOLARYNGOLOGY | Facility: CLINIC | Age: 63
End: 2018-07-25

## 2018-07-25 VITALS
HEIGHT: 71 IN | BODY MASS INDEX: 32.06 KG/M2 | HEART RATE: 76 BPM | DIASTOLIC BLOOD PRESSURE: 89 MMHG | SYSTOLIC BLOOD PRESSURE: 144 MMHG | WEIGHT: 229 LBS | TEMPERATURE: 98.1 F

## 2018-07-25 DIAGNOSIS — J04.0 ACUTE LARYNGITIS: ICD-10-CM

## 2018-07-25 DIAGNOSIS — E04.1 THYROID NODULE: ICD-10-CM

## 2018-07-25 DIAGNOSIS — K21.9 GASTROESOPHAGEAL REFLUX DISEASE WITHOUT ESOPHAGITIS: Primary | ICD-10-CM

## 2018-07-25 DIAGNOSIS — E04.2 MULTINODULAR THYROID: ICD-10-CM

## 2018-07-25 PROCEDURE — 31575 DIAGNOSTIC LARYNGOSCOPY: CPT | Performed by: NURSE PRACTITIONER

## 2018-07-25 PROCEDURE — 99214 OFFICE O/P EST MOD 30 MIN: CPT | Performed by: NURSE PRACTITIONER

## 2018-07-25 PROCEDURE — 76536 US EXAM OF HEAD AND NECK: CPT | Performed by: NURSE PRACTITIONER

## 2018-07-25 RX ORDER — ALBUTEROL SULFATE 90 UG/1
1-2 AEROSOL, METERED RESPIRATORY (INHALATION) EVERY 6 HOURS PRN
Qty: 3.7 G | Refills: 3 | Status: SHIPPED | OUTPATIENT
Start: 2018-07-25 | End: 2021-04-20

## 2018-07-25 NOTE — PROGRESS NOTES
OPERATIVE NOTE:    PROCEDURE:   Flexible Fiberoptic Laryngoscopy    ANESTHESIA:  None    REASON FOR PROCEDURE:  Procedure was recommend for suspicious clinical behavior unresponsive to medical management.  Risks, benefits and alternatives were discussed.      DETAILS of OPERATION:  The patient was seated in the exam chair.  A flexible fiberoptic laryngoscopy was performed through the oral cavity.  The scope was introduced into the oral cavity and directed to the level of the glottis, examining the structures of the oropharynx, base of tongue, vallecula, supraglottic larynx, glottic larynx, and hypopharynx.      FINDINGS:  Mucosal surfaces:   The mucosal surfaces demonstrated normal mucosa surfaces with inflammation.    Base of tongue:  The base of tongue was found to have no mass or lesion.    Epiglottis:  The epiglottis was found to have no mass or lesion.    Aryepligottic fold:  The AE folds were found to have no mass or lesion.    False Vocal Fold:  The false cords were found to have no mass or lesion.    True Vocal Cord:  The true vocal cords were found to have no mass or lesion.    Arytenoid:   The arytenoids were found to have erythema    Hypopharynx:  The hypopharynx was found to have no mass or lesion.    The patient tolerated procedure well.

## 2018-07-25 NOTE — PATIENT INSTRUCTIONS
The patient has a thyroid nodule, which is relatively small, and studies do not suggest a malignancy. I have recommended observation with follow-up with me for repeat ultrasound. I explained the pathology of thyroid nodules including the risks of cancer. The options of surgery were discussed, but we will take an observational course for now.     Continue voice rest, increase water intake.     Reflux precautions      Gastroesophageal Reflux Disease, Adult  Normally, food travels down the esophagus and stays in the stomach to be digested. However, when a person has gastroesophageal reflux disease (GERD), food and stomach acid move back up into the esophagus. When this happens, the esophagus becomes sore and inflamed. Over time, GERD can create small holes (ulcers) in the lining of the esophagus.  What are the causes?  This condition is caused by a problem with the muscle between the esophagus and the stomach (lower esophageal sphincter, or LES). Normally, the LES muscle closes after food passes through the esophagus to the stomach. When the LES is weakened or abnormal, it does not close properly, and that allows food and stomach acid to go back up into the esophagus. The LES can be weakened by certain dietary substances, medicines, and medical conditions, including:  · Tobacco use.  · Pregnancy.  · Having a hiatal hernia.  · Heavy alcohol use.  · Certain foods and beverages, such as coffee, chocolate, onions, and peppermint.    What increases the risk?  This condition is more likely to develop in:  · People who have an increased body weight.  · People who have connective tissue disorders.  · People who use NSAID medicines.    What are the signs or symptoms?  Symptoms of this condition include:  · Heartburn.  · Difficult or painful swallowing.  · The feeling of having a lump in the throat.  · A bitter taste in the mouth.  · Bad breath.  · Having a large amount of saliva.  · Having an upset or bloated  stomach.  · Belching.  · Chest pain.  · Shortness of breath or wheezing.  · Ongoing (chronic) cough or a night-time cough.  · Wearing away of tooth enamel.  · Weight loss.    Different conditions can cause chest pain. Make sure to see your health care provider if you experience chest pain.  How is this diagnosed?  Your health care provider will take a medical history and perform a physical exam. To determine if you have mild or severe GERD, your health care provider may also monitor how you respond to treatment. You may also have other tests, including:  · An endoscopy to examine your stomach and esophagus with a small camera.  · A test that measures the acidity level in your esophagus.  · A test that measures how much pressure is on your esophagus.  · A barium swallow or modified barium swallow to show the shape, size, and functioning of your esophagus.    How is this treated?  The goal of treatment is to help relieve your symptoms and to prevent complications. Treatment for this condition may vary depending on how severe your symptoms are. Your health care provider may recommend:  · Changes to your diet.  · Medicine.  · Surgery.    Follow these instructions at home:  Diet  · Follow a diet as recommended by your health care provider. This may involve avoiding foods and drinks such as:  ? Coffee and tea (with or without caffeine).  ? Drinks that contain alcohol.  ? Energy drinks and sports drinks.  ? Carbonated drinks or sodas.  ? Chocolate and cocoa.  ? Peppermint and mint flavorings.  ? Garlic and onions.  ? Horseradish.  ? Spicy and acidic foods, including peppers, chili powder, masterson powder, vinegar, hot sauces, and barbecue sauce.  ? Citrus fruit juices and citrus fruits, such as oranges, ligia, and limes.  ? Tomato-based foods, such as red sauce, chili, salsa, and pizza with red sauce.  ? Fried and fatty foods, such as donuts, french fries, potato chips, and high-fat dressings.  ? High-fat meats, such as hot  dogs and fatty cuts of red and white meats, such as rib eye steak, sausage, ham, and rick.  ? High-fat dairy items, such as whole milk, butter, and cream cheese.  · Eat small, frequent meals instead of large meals.  · Avoid drinking large amounts of liquid with your meals.  · Avoid eating meals during the 2-3 hours before bedtime.  · Avoid lying down right after you eat.  · Do not exercise right after you eat.  General instructions  · Pay attention to any changes in your symptoms.  · Take over-the-counter and prescription medicines only as told by your health care provider. Do not take aspirin, ibuprofen, or other NSAIDs unless your health care provider told you to do so.  · Do not use any tobacco products, including cigarettes, chewing tobacco, and e-cigarettes. If you need help quitting, ask your health care provider.  · Wear loose-fitting clothing. Do not wear anything tight around your waist that causes pressure on your abdomen.  · Raise (elevate) the head of your bed 6 inches (15cm).  · Try to reduce your stress, such as with yoga or meditation. If you need help reducing stress, ask your health care provider.  · If you are overweight, reduce your weight to an amount that is healthy for you. Ask your health care provider for guidance about a safe weight loss goal.  · Keep all follow-up visits as told by your health care provider. This is important.  Contact a health care provider if:  · You have new symptoms.  · You have unexplained weight loss.  · You have difficulty swallowing, or it hurts to swallow.  · You have wheezing or a persistent cough.  · Your symptoms do not improve with treatment.  · You have a hoarse voice.  Get help right away if:  · You have pain in your arms, neck, jaw, teeth, or back.  · You feel sweaty, dizzy, or light-headed.  · You have chest pain or shortness of breath.  · You vomit and your vomit looks like blood or coffee grounds.  · You faint.  · Your stool is bloody or black.  · You  cannot swallow, drink, or eat.  This information is not intended to replace advice given to you by your health care provider. Make sure you discuss any questions you have with your health care provider.  Document Released: 09/27/2006 Document Revised: 05/17/2017 Document Reviewed: 04/13/2016  [employer] Interactive Patient Education © 2018 [employer]

## 2018-07-25 NOTE — PROGRESS NOTES
YOB: 1955  Location: [facility]  Location Address: [address] [place] [address], [facility], [address] [place] [address]  Location Phone: [phone]    Chief Complaint   Patient presents with   • Thyroid Problem       History of Present Illness  Fortino Llanes is a 62 y.o. male.  Fortino Llanes is here for evaluation of ENT complaints. The patient has had problems with hoarseness  The symptoms are not localized to a particular location. The patient has had severe symptoms. The symptoms have been present for the last several weeks The symptoms are aggravated by  coughing. The symptoms are improved by no identifiable factors.  He denies ETOH or smoking.  He has been taking PPI BID.  He has had some coughing which is better, but hoarseness still present.           Past Medical History:   Diagnosis Date   • Diabetes mellitus (CMS/HCC)    • GERD (gastroesophageal reflux disease)    • Hyperlipidemia    • Hypertension    • Multinodular thyroid    • Myocardial infarction    • Neuropathy        Past Surgical History:   Procedure Laterality Date   • CARDIAC CATHETERIZATION     • CAROTID ENDARTERECTOMY     • CHOLECYSTECTOMY     • EXCISION LESION      Face   • RENAL ARTERY STENT         Outpatient Prescriptions Marked as Taking for the 18 encounter (Office Visit) with YARELI Cortes   Medication Sig Dispense Refill   • amLODIPine (NORVASC) 10 MG tablet Take 1 tablet by mouth daily     • aspirin 81 MG tablet Take 81 mg by mouth daily.     • carvedilol (COREG) 25 MG tablet Take 25 mg by mouth 2 times daily      • clopidogrel (PLAVIX) 75 MG tablet Take 1 tablet by mouth daily.     • esomeprazole (nexIUM) 40 MG capsule   4   • furosemide (LASIX) 80 MG tablet Take 80 mg by mouth daily     • gabapentin (NEURONTIN) 100 MG capsule Take 1 capsule by mouth 3 times daily     • Insulin Glargine (LANTUS SOLOSTAR) 100 UNIT/ML injection pen INJECT 30 UNITS IN THE MORNING AND 20 UNITS IN THE EVENING DAILY     • isosorbide mononitrate  (IMDUR) 60 MG 24 hr tablet Take 60 mg by mouth 2 times daily     • lisinopril (PRINIVIL,ZESTRIL) 40 MG tablet Take 40 mg by mouth 2 times daily     • minoxidil (LONITEN) 2.5 MG tablet Take 2.5 mg by mouth daily     • pantoprazole (PROTONIX) 40 MG EC tablet TAKE ONE TABLET BY MOUTH EVERY DAY     • potassium chloride (K-DUR,KLOR-CON) 10 MEQ CR tablet TAKE ONE TABLET BY MOUTH EVERY DAY     • rosuvastatin (CRESTOR) 20 MG tablet Take 20 mg by mouth Daily.         Hydralazine; Morphine; and Morphine and related    Family History   Problem Relation Age of Onset   • Diabetes Mother    • Cancer Mother    • Hypertension Mother    • Osteoporosis Mother    • Cancer Father    • Diabetes Father        Social History     Social History   • Marital status:      Spouse name: N/A   • Number of children: N/A   • Years of education: N/A     Occupational History   • Not on file.     Social History Main Topics   • Smoking status: Former Smoker     Quit date: 1999   • Smokeless tobacco: Never Used   • Alcohol use Yes      Comment: Rare   • Drug use: Unknown   • Sexual activity: Not on file     Other Topics Concern   • Not on file     Social History Narrative   • No narrative on file       Review of Systems   Constitutional: Negative.    HENT:        SEE HPI   Eyes: Negative.    Respiratory: Negative.    Cardiovascular: Negative.    Gastrointestinal: Negative.    Endocrine: Negative.    Genitourinary: Negative.    Musculoskeletal: Negative.    Skin: Negative.    Allergic/Immunologic: Negative.    Neurological: Negative.    Hematological: Negative.    Psychiatric/Behavioral: Negative.        Vitals:    07/25/18 1026   BP: 144/89   Pulse: 76   Temp: 98.1 °F (36.7 °C)       Body mass index is 31.94 kg/m².    Objective     Physical Exam  CONSTITUTIONAL: well nourished, alert, oriented, in no acute distress     COMMUNICATION AND VOICE: able to communicate normally,  voice quality coarse    HEAD: normocephalic, no lesions, atraumatic, no  tenderness, no masses     FACE: appearance normal, no lesions, no tenderness, no deformities, facial motion symmetric    SALIVARY GLANDS: parotid glands with no tenderness, no swelling, no masses, submandibular glands with normal size, nontender    EYES: ocular motility normal, eyelids normal, orbits normal, no proptosis, conjunctiva normal , pupils equal, round     EARS:  Hearing: response to conversational voice normal bilaterally   External Ears: auricles without lesions  Otoscopic: tympanic membrane appearance normal, no lesions, no perforation, normal mobility, no fluid    NOSE:  External Nose: structure normal, no tenderness on palpation, no nasal discharge, no lesions, no evidence of trauma, nostrils patent   Intranasal Exam: nasal mucosa normal, vestibule within normal limits, inferior turbinate normal, nasal septum midline     ORAL:  Lips: upper and lower lips without lesion   Teeth: dentition within normal limits for age   Gums: gingivae healthy   Oral Mucosa: oral mucosa normal, no mucosal lesions   Floor of Mouth: Warthin’s duct patent, mucosa normal  Tongue: lingual mucosa normal without lesions, normal tongue mobility   Palate: soft and hard palates with normal mucosa and structure  Oropharynx: oropharyngeal mucosa normal    HYPOPHARYNX:   LARYNX: see scope    NECK: right neck incision healed noted    THYROID: bilateral thyroid nodules    LYMPH NODES: no lymphadenopathy    CHEST/RESPIRATORY: respiratory effort normal    CARDIOVASCULAR:  extremities without cyanosis or edema      NEUROLOGIC/PSYCHIATRIC: oriented to time, place and person, mood normal, affect appropriate, CN II-XII intact grossly    Assessment/Plan   Fortino was seen today for thyroid problem.    Diagnoses and all orders for this visit:    Gastroesophageal reflux disease without esophagitis    Multinodular thyroid    Acute laryngitis    Other orders  -     albuterol (PROVENTIL HFA;VENTOLIN HFA) 108 (90 Base) MCG/ACT inhaler; Inhale 1-2  puffs Every 6 (Six) Hours As Needed for Wheezing.      * Surgery not found *  No orders of the defined types were placed in this encounter.    Return in about 6 months (around 1/25/2019).       Patient Instructions   The patient has a thyroid nodule, which is relatively small, and studies do not suggest a malignancy. I have recommended observation with follow-up with me for repeat ultrasound. I explained the pathology of thyroid nodules including the risks of cancer. The options of surgery were discussed, but we will take an observational course for now.     Continue voice rest, increase water intake.     Reflux precautions      Gastroesophageal Reflux Disease, Adult  Normally, food travels down the esophagus and stays in the stomach to be digested. However, when a person has gastroesophageal reflux disease (GERD), food and stomach acid move back up into the esophagus. When this happens, the esophagus becomes sore and inflamed. Over time, GERD can create small holes (ulcers) in the lining of the esophagus.  What are the causes?  This condition is caused by a problem with the muscle between the esophagus and the stomach (lower esophageal sphincter, or LES). Normally, the LES muscle closes after food passes through the esophagus to the stomach. When the LES is weakened or abnormal, it does not close properly, and that allows food and stomach acid to go back up into the esophagus. The LES can be weakened by certain dietary substances, medicines, and medical conditions, including:  · Tobacco use.  · Pregnancy.  · Having a hiatal hernia.  · Heavy alcohol use.  · Certain foods and beverages, such as coffee, chocolate, onions, and peppermint.    What increases the risk?  This condition is more likely to develop in:  · People who have an increased body weight.  · People who have connective tissue disorders.  · People who use NSAID medicines.    What are the signs or symptoms?  Symptoms of this condition  include:  · Heartburn.  · Difficult or painful swallowing.  · The feeling of having a lump in the throat.  · A bitter taste in the mouth.  · Bad breath.  · Having a large amount of saliva.  · Having an upset or bloated stomach.  · Belching.  · Chest pain.  · Shortness of breath or wheezing.  · Ongoing (chronic) cough or a night-time cough.  · Wearing away of tooth enamel.  · Weight loss.    Different conditions can cause chest pain. Make sure to see your health care provider if you experience chest pain.  How is this diagnosed?  Your health care provider will take a medical history and perform a physical exam. To determine if you have mild or severe GERD, your health care provider may also monitor how you respond to treatment. You may also have other tests, including:  · An endoscopy to examine your stomach and esophagus with a small camera.  · A test that measures the acidity level in your esophagus.  · A test that measures how much pressure is on your esophagus.  · A barium swallow or modified barium swallow to show the shape, size, and functioning of your esophagus.    How is this treated?  The goal of treatment is to help relieve your symptoms and to prevent complications. Treatment for this condition may vary depending on how severe your symptoms are. Your health care provider may recommend:  · Changes to your diet.  · Medicine.  · Surgery.    Follow these instructions at home:  Diet  · Follow a diet as recommended by your health care provider. This may involve avoiding foods and drinks such as:  ? Coffee and tea (with or without caffeine).  ? Drinks that contain alcohol.  ? Energy drinks and sports drinks.  ? Carbonated drinks or sodas.  ? Chocolate and cocoa.  ? Peppermint and mint flavorings.  ? Garlic and onions.  ? Horseradish.  ? Spicy and acidic foods, including peppers, chili powder, masterson powder, vinegar, hot sauces, and barbecue sauce.  ? Citrus fruit juices and citrus fruits, such as oranges, ligia,  and limes.  ? Tomato-based foods, such as red sauce, chili, salsa, and pizza with red sauce.  ? Fried and fatty foods, such as donuts, french fries, potato chips, and high-fat dressings.  ? High-fat meats, such as hot dogs and fatty cuts of red and white meats, such as rib eye steak, sausage, ham, and rick.  ? High-fat dairy items, such as whole milk, butter, and cream cheese.  · Eat small, frequent meals instead of large meals.  · Avoid drinking large amounts of liquid with your meals.  · Avoid eating meals during the 2-3 hours before bedtime.  · Avoid lying down right after you eat.  · Do not exercise right after you eat.  General instructions  · Pay attention to any changes in your symptoms.  · Take over-the-counter and prescription medicines only as told by your health care provider. Do not take aspirin, ibuprofen, or other NSAIDs unless your health care provider told you to do so.  · Do not use any tobacco products, including cigarettes, chewing tobacco, and e-cigarettes. If you need help quitting, ask your health care provider.  · Wear loose-fitting clothing. Do not wear anything tight around your waist that causes pressure on your abdomen.  · Raise (elevate) the head of your bed 6 inches (15cm).  · Try to reduce your stress, such as with yoga or meditation. If you need help reducing stress, ask your health care provider.  · If you are overweight, reduce your weight to an amount that is healthy for you. Ask your health care provider for guidance about a safe weight loss goal.  · Keep all follow-up visits as told by your health care provider. This is important.  Contact a health care provider if:  · You have new symptoms.  · You have unexplained weight loss.  · You have difficulty swallowing, or it hurts to swallow.  · You have wheezing or a persistent cough.  · Your symptoms do not improve with treatment.  · You have a hoarse voice.  Get help right away if:  · You have pain in your arms, neck, jaw, teeth, or  back.  · You feel sweaty, dizzy, or light-headed.  · You have chest pain or shortness of breath.  · You vomit and your vomit looks like blood or coffee grounds.  · You faint.  · Your stool is bloody or black.  · You cannot swallow, drink, or eat.  This information is not intended to replace advice given to you by your health care provider. Make sure you discuss any questions you have with your health care provider.  Document Released: 09/27/2006 Document Revised: 05/17/2017 Document Reviewed: 04/13/2016  [employer] Interactive Patient Education © 2018 [employer]

## 2019-01-25 ENCOUNTER — OFFICE VISIT (OUTPATIENT)
Dept: OTOLARYNGOLOGY | Facility: CLINIC | Age: 64
End: 2019-01-25

## 2019-01-25 VITALS
WEIGHT: 240.6 LBS | BODY MASS INDEX: 33.68 KG/M2 | HEIGHT: 71 IN | TEMPERATURE: 98.1 F | SYSTOLIC BLOOD PRESSURE: 132 MMHG | DIASTOLIC BLOOD PRESSURE: 86 MMHG

## 2019-01-25 DIAGNOSIS — E04.2 MULTINODULAR THYROID: Primary | ICD-10-CM

## 2019-01-25 PROCEDURE — 99213 OFFICE O/P EST LOW 20 MIN: CPT | Performed by: OTOLARYNGOLOGY

## 2019-01-25 NOTE — PROGRESS NOTES
Carly Jimenez MA   Patient Intake Note    Review of Systems  Review of Systems   Constitutional: Negative for fatigue and fever.   HENT:        See hpi     Respiratory: Positive for choking. Negative for cough, shortness of breath and wheezing.    Gastrointestinal: Negative for nausea and vomiting.   Neurological: Negative for dizziness and headaches.   Hematological: Bruises/bleeds easily.   Psychiatric/Behavioral: Positive for sleep disturbance.   All other systems reviewed and are negative.      QUALITY MEASURES    Body Mass Index Screening and Follow-Up Plan  Body mass index is 33.56 kg/m².  Patient's Body mass index is 33.56 kg/m². BMI is above normal parameters. Recommendations include: referral to primary care.    Tobacco Use: Screening and Cessation Intervention  Social History    Tobacco Use      Smoking status: Former Smoker        Quit date:         Years since quittin.0      Smokeless tobacco: Never Used        Carly Jimenez MA  2019  9:11 AM

## 2019-01-25 NOTE — PROGRESS NOTES
Gonzalo Hester MD   Chief complaint : Follow up thyroid problems    Fortino Llanes is a 63 y.o. male who presents for follow up of thyroid problems. He has had no current complaints.  The patient has not had complaints of: dysphagia, neck tightness, a visable thyroid enlargement or a visable thyroid nodule. He has had a negative bilateral needle biopsy 4-5 years ago. The last ultrasound showed stable nodules 6 months ago.    Review of Systems  Reviewed as per patient intake note.    Past History:  Past medical and surgical history, family history and social history reviewed and updated when appropriate.  Current medications and allergies reviewed and updated when appropriate.  Allergies:  Hydralazine; Morphine; and Morphine and related    Vital Signs  Temp:  [98.1 °F (36.7 °C)] 98.1 °F (36.7 °C)  BP: (132)/(86) 132/86    Physical Exam:  CONSTITUTIONAL: well nourished, well-developed, alert, oriented, in no acute distress   COMMUNICATION AND VOICE: able to communicate normally, normal voice quality  HEAD: normocephalic, no lesions, atraumatic, no tenderness, no masses   FACE: appearance normal, no lesions, no tenderness, no deformities, facial motion symmetric  EYES: ocular motility normal, eyelids normal, orbits normal, no proptosis, conjunctiva normal , pupils equal, round   EARS:  Hearing: hearing to conversational voice intact bilaterally   External Ears: normal bilaterally, no lesions  NOSE:  External Nose: external nasal structure normal, no tenderness on palpation, no nasal discharge, no lesions, no evidence of trauma, nostrils patent   ORAL:  Lips: upper and lower lips without lesion   NECK:  Inspection and Palpation: neck appearance normal, no masses or tenderness  THYROID: non-tender, no mass, no thyromegaly, bilateral nodular texture present,  CHEST/RESPIRATORY: normal respiratory effort   CARDIOVASCULAR: no cyanosis or edema   NEUROLOGICAL/PSYCHIATRIC: oriented to time, place and person, mood normal,  affect appropriate, CN II-XII intact grossly        Assessment   1. Multinodular thyroid        Plan   Medical and surgical options were discussed including observation vs ultrasound guided needle biopsy vs thyroidectomy. Risks, benefits and alternatives were discussed and questions were answered. After considering the options, the patient decided to proceed continued observation.     --------TESTING:--------  TSH (52385) in 6 months  Repeat ultrasound of the thyroid in 6 months    ----INSTRUCTIONS----  Call for sooner evaluation if increasing size of the thyroid or nodules, increasing dysphagia, lymphadenopathy, neck tightness or other thyroid problems.     Return in about 6 months (around 7/25/2019) for follow up with ultrasound.      Gonzalo Hester MD  01/25/19  9:23 AM

## 2020-02-12 ENCOUNTER — APPOINTMENT (OUTPATIENT)
Dept: NUCLEAR MEDICINE | Age: 65
DRG: 308 | End: 2020-02-12
Payer: COMMERCIAL

## 2020-02-12 ENCOUNTER — APPOINTMENT (OUTPATIENT)
Dept: GENERAL RADIOLOGY | Age: 65
DRG: 308 | End: 2020-02-12
Payer: COMMERCIAL

## 2020-02-12 ENCOUNTER — HOSPITAL ENCOUNTER (INPATIENT)
Age: 65
LOS: 7 days | Discharge: HOME | DRG: 308 | End: 2020-02-19
Attending: EMERGENCY MEDICINE | Admitting: INTERNAL MEDICINE
Payer: COMMERCIAL

## 2020-02-12 PROBLEM — I48.0 PAF (PAROXYSMAL ATRIAL FIBRILLATION) (HCC): Status: ACTIVE | Noted: 2020-02-12

## 2020-02-12 LAB
ALBUMIN SERPL-MCNC: 3.3 G/DL (ref 3.5–5.2)
ALP BLD-CCNC: 86 U/L (ref 40–130)
ALT SERPL-CCNC: 16 U/L (ref 5–41)
ANION GAP SERPL CALCULATED.3IONS-SCNC: 11 MMOL/L (ref 7–19)
APTT: 32.6 SEC (ref 26–36.2)
AST SERPL-CCNC: 16 U/L (ref 5–40)
BASE EXCESS ARTERIAL: 3.4 MMOL/L (ref -2–2)
BASOPHILS ABSOLUTE: 0.1 K/UL (ref 0–0.2)
BASOPHILS RELATIVE PERCENT: 0.5 % (ref 0–1)
BILIRUB SERPL-MCNC: 0.4 MG/DL (ref 0.2–1.2)
BUN BLDV-MCNC: 22 MG/DL (ref 8–23)
CALCIUM SERPL-MCNC: 8.6 MG/DL (ref 8.8–10.2)
CARBOXYHEMOGLOBIN ARTERIAL: 1.4 % (ref 0–5)
CHLORIDE BLD-SCNC: 102 MMOL/L (ref 98–111)
CO2: 29 MMOL/L (ref 22–29)
CREAT SERPL-MCNC: 1.7 MG/DL (ref 0.5–1.2)
D DIMER: 1.16 UG/ML FEU (ref 0–0.48)
EOSINOPHILS ABSOLUTE: 0.4 K/UL (ref 0–0.6)
EOSINOPHILS RELATIVE PERCENT: 4.1 % (ref 0–5)
GFR NON-AFRICAN AMERICAN: 41
GLUCOSE BLD-MCNC: 205 MG/DL (ref 70–99)
GLUCOSE BLD-MCNC: 84 MG/DL (ref 74–109)
HBA1C MFR BLD: 7.9 % (ref 4–6)
HCO3 ARTERIAL: 28.5 MMOL/L (ref 22–26)
HCT VFR BLD CALC: 47.8 % (ref 42–52)
HEMOGLOBIN, ART, EXTENDED: 15.7 G/DL (ref 14–18)
HEMOGLOBIN: 15 G/DL (ref 14–18)
IMMATURE GRANULOCYTES #: 0 K/UL
INR BLD: 1.18 (ref 0.88–1.18)
LYMPHOCYTES ABSOLUTE: 1.9 K/UL (ref 1.1–4.5)
LYMPHOCYTES RELATIVE PERCENT: 17.5 % (ref 20–40)
MCH RBC QN AUTO: 30.4 PG (ref 27–31)
MCHC RBC AUTO-ENTMCNC: 31.4 G/DL (ref 33–37)
MCV RBC AUTO: 97 FL (ref 80–94)
METHEMOGLOBIN ARTERIAL: 1 %
MONOCYTES ABSOLUTE: 1 K/UL (ref 0–0.9)
MONOCYTES RELATIVE PERCENT: 9.6 % (ref 0–10)
NEUTROPHILS ABSOLUTE: 7.3 K/UL (ref 1.5–7.5)
NEUTROPHILS RELATIVE PERCENT: 68 % (ref 50–65)
O2 CONTENT ARTERIAL: 19.9 ML/DL
O2 SAT, ARTERIAL: 90.5 %
O2 THERAPY: ABNORMAL
PCO2 ARTERIAL: 44 MMHG (ref 35–45)
PDW BLD-RTO: 13.4 % (ref 11.5–14.5)
PERFORMED ON: ABNORMAL
PH ARTERIAL: 7.42 (ref 7.35–7.45)
PLATELET # BLD: 169 K/UL (ref 130–400)
PMV BLD AUTO: 11.3 FL (ref 9.4–12.4)
PO2 ARTERIAL: 57 MMHG (ref 80–100)
POTASSIUM SERPL-SCNC: 3.9 MMOL/L (ref 3.5–5)
POTASSIUM, WHOLE BLOOD: 3.5
PRO-BNP: 4214 PG/ML (ref 0–900)
PROTHROMBIN TIME: 14.4 SEC (ref 12–14.6)
RBC # BLD: 4.93 M/UL (ref 4.7–6.1)
SODIUM BLD-SCNC: 142 MMOL/L (ref 136–145)
TOTAL PROTEIN: 6.3 G/DL (ref 6.6–8.7)
TROPONIN: 0.03 NG/ML (ref 0–0.03)
TROPONIN: 0.03 NG/ML (ref 0–0.03)
WBC # BLD: 10.7 K/UL (ref 4.8–10.8)

## 2020-02-12 PROCEDURE — 6360000002 HC RX W HCPCS: Performed by: INTERNAL MEDICINE

## 2020-02-12 PROCEDURE — 85730 THROMBOPLASTIN TIME PARTIAL: CPT

## 2020-02-12 PROCEDURE — 85379 FIBRIN DEGRADATION QUANT: CPT

## 2020-02-12 PROCEDURE — 6370000000 HC RX 637 (ALT 250 FOR IP): Performed by: INTERNAL MEDICINE

## 2020-02-12 PROCEDURE — 2580000003 HC RX 258: Performed by: EMERGENCY MEDICINE

## 2020-02-12 PROCEDURE — A9558 XE133 XENON 10MCI: HCPCS | Performed by: EMERGENCY MEDICINE

## 2020-02-12 PROCEDURE — 2580000003 HC RX 258: Performed by: INTERNAL MEDICINE

## 2020-02-12 PROCEDURE — 36600 WITHDRAWAL OF ARTERIAL BLOOD: CPT

## 2020-02-12 PROCEDURE — 93005 ELECTROCARDIOGRAM TRACING: CPT | Performed by: EMERGENCY MEDICINE

## 2020-02-12 PROCEDURE — A9500 TC99M SESTAMIBI: HCPCS | Performed by: EMERGENCY MEDICINE

## 2020-02-12 PROCEDURE — 2500000003 HC RX 250 WO HCPCS: Performed by: EMERGENCY MEDICINE

## 2020-02-12 PROCEDURE — 36415 COLL VENOUS BLD VENIPUNCTURE: CPT

## 2020-02-12 PROCEDURE — 83036 HEMOGLOBIN GLYCOSYLATED A1C: CPT

## 2020-02-12 PROCEDURE — 6360000002 HC RX W HCPCS: Performed by: EMERGENCY MEDICINE

## 2020-02-12 PROCEDURE — 82948 REAGENT STRIP/BLOOD GLUCOSE: CPT

## 2020-02-12 PROCEDURE — A9540 TC99M MAA: HCPCS | Performed by: EMERGENCY MEDICINE

## 2020-02-12 PROCEDURE — 84132 ASSAY OF SERUM POTASSIUM: CPT

## 2020-02-12 PROCEDURE — 82803 BLOOD GASES ANY COMBINATION: CPT

## 2020-02-12 PROCEDURE — 3430000000 HC RX DIAGNOSTIC RADIOPHARMACEUTICAL: Performed by: EMERGENCY MEDICINE

## 2020-02-12 PROCEDURE — 99285 EMERGENCY DEPT VISIT HI MDM: CPT

## 2020-02-12 PROCEDURE — 80053 COMPREHEN METABOLIC PANEL: CPT

## 2020-02-12 PROCEDURE — 78582 LUNG VENTILAT&PERFUS IMAGING: CPT

## 2020-02-12 PROCEDURE — 85610 PROTHROMBIN TIME: CPT

## 2020-02-12 PROCEDURE — 2140000000 HC CCU INTERMEDIATE R&B

## 2020-02-12 PROCEDURE — 71045 X-RAY EXAM CHEST 1 VIEW: CPT

## 2020-02-12 PROCEDURE — 84484 ASSAY OF TROPONIN QUANT: CPT

## 2020-02-12 PROCEDURE — 96374 THER/PROPH/DIAG INJ IV PUSH: CPT

## 2020-02-12 PROCEDURE — 83880 ASSAY OF NATRIURETIC PEPTIDE: CPT

## 2020-02-12 PROCEDURE — 99255 IP/OBS CONSLTJ NEW/EST HI 80: CPT | Performed by: INTERNAL MEDICINE

## 2020-02-12 PROCEDURE — 85025 COMPLETE CBC W/AUTO DIFF WBC: CPT

## 2020-02-12 RX ORDER — DILTIAZEM HYDROCHLORIDE 5 MG/ML
10 INJECTION INTRAVENOUS ONCE
Status: COMPLETED | OUTPATIENT
Start: 2020-02-12 | End: 2020-02-12

## 2020-02-12 RX ORDER — ISOSORBIDE MONONITRATE 60 MG/1
60 TABLET, EXTENDED RELEASE ORAL 2 TIMES DAILY
Status: DISCONTINUED | OUTPATIENT
Start: 2020-02-12 | End: 2020-02-19 | Stop reason: HOSPADM

## 2020-02-12 RX ORDER — ACETAMINOPHEN 325 MG/1
650 TABLET ORAL EVERY 6 HOURS
Status: DISCONTINUED | OUTPATIENT
Start: 2020-02-12 | End: 2020-02-13

## 2020-02-12 RX ORDER — CLOPIDOGREL BISULFATE 75 MG/1
75 TABLET ORAL DAILY
Status: DISCONTINUED | OUTPATIENT
Start: 2020-02-13 | End: 2020-02-19 | Stop reason: HOSPADM

## 2020-02-12 RX ORDER — DEXTROSE MONOHYDRATE 50 MG/ML
100 INJECTION, SOLUTION INTRAVENOUS PRN
Status: DISCONTINUED | OUTPATIENT
Start: 2020-02-12 | End: 2020-02-19 | Stop reason: HOSPADM

## 2020-02-12 RX ORDER — GUAIFENESIN/DEXTROMETHORPHAN 100-10MG/5
5 SYRUP ORAL EVERY 6 HOURS PRN
Status: DISCONTINUED | OUTPATIENT
Start: 2020-02-12 | End: 2020-02-19 | Stop reason: HOSPADM

## 2020-02-12 RX ORDER — GUAIFENESIN 600 MG/1
600 TABLET, EXTENDED RELEASE ORAL 2 TIMES DAILY
Status: DISCONTINUED | OUTPATIENT
Start: 2020-02-12 | End: 2020-02-19 | Stop reason: HOSPADM

## 2020-02-12 RX ORDER — SODIUM CHLORIDE 0.9 % (FLUSH) 0.9 %
10 SYRINGE (ML) INJECTION EVERY 12 HOURS SCHEDULED
Status: DISCONTINUED | OUTPATIENT
Start: 2020-02-12 | End: 2020-02-13 | Stop reason: SDUPTHER

## 2020-02-12 RX ORDER — INSULIN GLARGINE 100 [IU]/ML
25 INJECTION, SOLUTION SUBCUTANEOUS NIGHTLY
Status: DISCONTINUED | OUTPATIENT
Start: 2020-02-12 | End: 2020-02-19 | Stop reason: HOSPADM

## 2020-02-12 RX ORDER — AMLODIPINE BESYLATE 10 MG/1
10 TABLET ORAL DAILY
Status: DISCONTINUED | OUTPATIENT
Start: 2020-02-12 | End: 2020-02-19 | Stop reason: HOSPADM

## 2020-02-12 RX ORDER — ROSUVASTATIN CALCIUM 10 MG/1
20 TABLET, COATED ORAL DAILY
Status: DISCONTINUED | OUTPATIENT
Start: 2020-02-12 | End: 2020-02-19 | Stop reason: HOSPADM

## 2020-02-12 RX ORDER — FUROSEMIDE 10 MG/ML
60 INJECTION INTRAMUSCULAR; INTRAVENOUS ONCE
Status: COMPLETED | OUTPATIENT
Start: 2020-02-12 | End: 2020-02-12

## 2020-02-12 RX ORDER — DEXTROSE MONOHYDRATE 25 G/50ML
12.5 INJECTION, SOLUTION INTRAVENOUS PRN
Status: DISCONTINUED | OUTPATIENT
Start: 2020-02-12 | End: 2020-02-19 | Stop reason: HOSPADM

## 2020-02-12 RX ORDER — CARVEDILOL 25 MG/1
25 TABLET ORAL 2 TIMES DAILY
Status: DISCONTINUED | OUTPATIENT
Start: 2020-02-12 | End: 2020-02-13

## 2020-02-12 RX ORDER — ASPIRIN 81 MG/1
81 TABLET, CHEWABLE ORAL DAILY
Status: DISCONTINUED | OUTPATIENT
Start: 2020-02-13 | End: 2020-02-19 | Stop reason: HOSPADM

## 2020-02-12 RX ORDER — SODIUM CHLORIDE 0.9 % (FLUSH) 0.9 %
10 SYRINGE (ML) INJECTION PRN
Status: DISCONTINUED | OUTPATIENT
Start: 2020-02-12 | End: 2020-02-13 | Stop reason: SDUPTHER

## 2020-02-12 RX ORDER — NICOTINE POLACRILEX 4 MG
15 LOZENGE BUCCAL PRN
Status: DISCONTINUED | OUTPATIENT
Start: 2020-02-12 | End: 2020-02-19 | Stop reason: HOSPADM

## 2020-02-12 RX ORDER — XENON XE-133 10 MCI/1
10 GAS RESPIRATORY (INHALATION)
Status: COMPLETED | OUTPATIENT
Start: 2020-02-12 | End: 2020-02-12

## 2020-02-12 RX ORDER — FUROSEMIDE 10 MG/ML
40 INJECTION INTRAMUSCULAR; INTRAVENOUS 2 TIMES DAILY
Status: DISCONTINUED | OUTPATIENT
Start: 2020-02-12 | End: 2020-02-15

## 2020-02-12 RX ORDER — ONDANSETRON 2 MG/ML
4 INJECTION INTRAMUSCULAR; INTRAVENOUS EVERY 6 HOURS PRN
Status: DISCONTINUED | OUTPATIENT
Start: 2020-02-12 | End: 2020-02-19 | Stop reason: HOSPADM

## 2020-02-12 RX ORDER — LISINOPRIL 20 MG/1
40 TABLET ORAL DAILY
Status: DISCONTINUED | OUTPATIENT
Start: 2020-02-13 | End: 2020-02-19 | Stop reason: HOSPADM

## 2020-02-12 RX ADMIN — DILTIAZEM HYDROCHLORIDE 5 MG/HR: 5 INJECTION INTRAVENOUS at 10:36

## 2020-02-12 RX ADMIN — GUAIFENESIN 600 MG: 600 TABLET, EXTENDED RELEASE ORAL at 20:14

## 2020-02-12 RX ADMIN — SODIUM CHLORIDE, PRESERVATIVE FREE 10 ML: 5 INJECTION INTRAVENOUS at 20:15

## 2020-02-12 RX ADMIN — Medication 5 MILLICURIE: at 12:53

## 2020-02-12 RX ADMIN — APIXABAN 5 MG: 5 TABLET, FILM COATED ORAL at 15:26

## 2020-02-12 RX ADMIN — INSULIN LISPRO 1 UNITS: 100 INJECTION, SOLUTION INTRAVENOUS; SUBCUTANEOUS at 21:52

## 2020-02-12 RX ADMIN — XENON XE-133 10 MILLICURIE: 10 GAS RESPIRATORY (INHALATION) at 12:53

## 2020-02-12 RX ADMIN — GUAIFENESIN AND DEXTROMETHORPHAN 5 ML: 100; 10 SYRUP ORAL at 21:51

## 2020-02-12 RX ADMIN — ACETAMINOPHEN 650 MG: 325 TABLET ORAL at 20:14

## 2020-02-12 RX ADMIN — DILTIAZEM HYDROCHLORIDE 10 MG: 5 INJECTION INTRAVENOUS at 10:34

## 2020-02-12 RX ADMIN — FUROSEMIDE 60 MG: 10 INJECTION, SOLUTION INTRAMUSCULAR; INTRAVENOUS at 10:34

## 2020-02-12 RX ADMIN — CARVEDILOL 25 MG: 25 TABLET, FILM COATED ORAL at 20:13

## 2020-02-12 RX ADMIN — APIXABAN 5 MG: 5 TABLET, FILM COATED ORAL at 20:14

## 2020-02-12 RX ADMIN — ISOSORBIDE MONONITRATE 60 MG: 60 TABLET, EXTENDED RELEASE ORAL at 20:13

## 2020-02-12 RX ADMIN — FUROSEMIDE 40 MG: 10 INJECTION, SOLUTION INTRAMUSCULAR; INTRAVENOUS at 20:15

## 2020-02-12 RX ADMIN — Medication 25 UNITS: at 21:52

## 2020-02-12 ASSESSMENT — ENCOUNTER SYMPTOMS
DIARRHEA: 0
ABDOMINAL PAIN: 0
NAUSEA: 0
RHINORRHEA: 0
BACK PAIN: 0
SHORTNESS OF BREATH: 1
VOMITING: 0
SORE THROAT: 0

## 2020-02-12 ASSESSMENT — PAIN SCALES - GENERAL
PAINLEVEL_OUTOF10: 5
PAINLEVEL_OUTOF10: 2

## 2020-02-12 ASSESSMENT — PAIN DESCRIPTION - LOCATION: LOCATION: HEAD

## 2020-02-12 ASSESSMENT — PAIN DESCRIPTION - PAIN TYPE: TYPE: ACUTE PAIN

## 2020-02-12 NOTE — ED TRIAGE NOTES
Pt sent to ER by PCP PAULO Tipton for SOS and to r/o chf/afib. Pt states he does not have history of AFIB but does have recent diagnosis of pneumonia. Pt was administered breathing treatment en route per EMS.

## 2020-02-12 NOTE — CONSULTS
[facility]       Cardiology Consultation          I personally saw the patient and rounded with:  Miguel Bonilla, on  2/12/20      The observations documented in this note, including the assessment and plan are mine              Date of Admission:  2/12/2020  9:25 AM    Date of Initially Being Seen / Consultation:  2/12/20    Cardiologist:  Chandrakant Desir MD     Cardiology Attending: [facility] Attending: Hospitalist Service      PCP:  Marco Felix MD    Reason for Admission / Chief Complaint or Consultation      1. Reason for Hospital Admission: Atrial flutter        2. Reason for Cardiology Consultation: Atrial flutter       SUBJECTIVE AND HISTORY OF PRESENT ILLNESS:    Source of the history:  Patient, family, previous inpatient and outpatient records in [address] [place] [address] is a 62 y.o. male who presents to [facility] ED # [id number] (admitted on 2/12/2020  9:25 AM) with symptoms / signs / problem or diagnosis of newly discovered atrial fibrillation. He is normal mood and affect, alert and orientated x 3, judgement and insight appear appropriate. Family present:  Yes: wife   At the beginning of the interview he was lying on a stretcher      CONTEXT OF THIS HISTORY OF PRESENT ILLNESS INCLUDE: (IF APPLICABLE):    Presentation:  He  presented with weight gain and feeling poorly. This began about 2 - 3 days ago. Cardiology was asked to see him regarding these symptoms and findings and to consider those in relationship to possible optimal diagnosis and treatment options. FEATURES OF THIS HISTORY OF PRESENT ILLNESS (SYMPTOMS AND FINDINGS) INCLUDE:       1. Location: The weight gain and PND was located in the central to left chest without radiation to the back, throat and left shoulder. 2. Duration: The weight gain and PND began Jun night and lasted since then   3.  Quality: The shortness of air has not been associated with symptoms of chest discomfort, pain with Medical History:    Past Medical History:   Diagnosis Date    Acute kidney failure, unspecified ([address]     Anxiety state, unspecified     Atrial septal aneurysm 1/9/2016    Body mass index 30.0-30.9, adult     CAD (coronary artery disease) 1/10/2016    1/9/16  lexiscan  Positive for apical MI + myocardial ischemia, EF 42%, intermediate risk findings, AUC indication 16, AUC score 7 1/10/16  Cath  3 lesions in the LAD:  Mid: 70% 2.25x23, mid 90% 2.25 x 28, distal 100% 2.0x28, anterior lateral apical hypokinesis, EF 45%    Calculus of kidney     Carotid artery stenosis 6/26/2013    Cellulitis and abscess of hand, except fingers and thumb     Chronic airway obstruction, not elsewhere classified     Chronic kidney disease, unspecified     Congestive heart failure, unspecified     Diabetes mellitus type II     Diverticulosis of colon (without mention of hemorrhage)     Encounter for long-term (current) use of insulin (HCC)     Esophageal reflux     Family history of ischemic heart disease     Gastric ulcer, unspecified as acute or chronic, without mention of hemorrhage, perforation, or obstruction     Hyperlipemia     Hypertension     Internal hemorrhoids without mention of complication     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, or unspecified(403.00)     Obesity, unspecified     Other specified cardiac dysrhythmias(427.89)     Peripheral vascular disease ([address]     Solitary pulmonary nodule     Surgical or other procedure not carried out because of contraindication     Thoracic aneurysm without mention of rupture     Tobacco use disorder     Type II or unspecified type diabetes mellitus without mention of complication, not stated as uncontrolled          Past Surgical History:    Past Surgical History:   Procedure Laterality Date    CARDIAC CATHETERIZATION      CHOLECYSTECTOMY      CORONARY ANGIOPLASTY WITH STENT PLACEMENT  1-16    2 JACQUELINE myocardial necrosis ( 0.03); the creatinine is abnormal 1.7    CBC:   Recent Labs     02/12/20  1010   WBC 10.7   HGB 15.0   HCT 47.8   MCV 97.0*        BMP:   Recent Labs     02/12/20  0943 02/12/20  1010   NA  --  142   K 3.5 3.9   CL  --  102   CO2  --  29   BUN  --  22   CREATININE  --  1.7*     Cardiac Enzymes:   Recent Labs     02/12/20  1010 02/12/20  1235   TROPONINI 0.03 0.03     PT/INR:   Recent Labs     02/12/20  1010   PROTIME 14.4   INR 1.18     APTT:   Recent Labs     02/12/20  1010   APTT 32.6     Liver Profile:  Lab Results   Component Value Date    AST 16 02/12/2020    ALT 16 02/12/2020    BILITOT 0.4 02/12/2020    ALKPHOS 86 02/12/2020     Lab Results   Component Value Date    CHOL 220 06/12/2013    HDL 40 06/12/2013    TRIG 119 06/12/2013     TSH:  Lab Results   Component Value Date    TSH 0.96 05/28/2015     UA:   Lab Results   Component Value Date    COLORU YELLOW 06/14/2017    PHUR 5.0 06/14/2017    WBCUA 0 06/14/2017    RBCUA 0 06/14/2017    BACTERIA NEGATIVE 06/14/2017    CLARITYU Clear 06/14/2017    SPECGRAV 1.009 06/14/2017    LEUKOCYTESUR Negative 06/14/2017    UROBILINOGEN 0.2 06/14/2017    BILIRUBINUR Negative 06/14/2017    BLOODU TRACE 06/14/2017    GLUCOSEU Negative 06/14/2017             ALL THE CARDIOLOGY PROBLEMS ARE LISTED ABOVE; HOWEVER, THE FOLLOWING SPECIFIC CARDIAC PROBLEMS WERE ADDRESSED AND  TREATED DURING THE HOSPITAL     MEDICAL DECISION MAKING               Cardiac Specific Problem / Diagnosis  Discussion and Data Reviewed Diagnostic or Therapeutic Procedures Ordered Management Options Selected           1.  Presenting problem / symptom    Paroxymal atrial fibrillation  are worsening   Has been going on for the last 2 - 3 days and associated with PND and weight gain of about 20 pounds Yes: EVELYN / DCCV Continue current medications:     Yes:            2. CAD Initial presentation during this evaluation   Review and summation of old records:    1/9/16  lexiscan  Positive for apical MI + myocardial ischemia, EF 42%, intermediate risk findings, AUC indication 16, AUC score 7  1/10/16  Cath  3 lesions in the LAD:  Mid: 70% 2.25x23, mid 90% 2.25 x 28, distal 100% 2.0x28, anterior lateral apical hypokinesis   No Continue current medications:    Yes:            3. Concern regarding systemic blood pressure Initial presentation during this evaluation Systolic (61UDB), EPO:095 , Min:122 , KOP:610    Diastolic (91UFR), PPE:69, Min:79, Max:105   No Continue current medications:       yes         DISCUSSION AND PLAN:      1. I had a detailed discussion with the patient and / or family regarding the historical points, physical examination findings, and any diagnostic results supporting the admission / consultation diagnosis. The patient was educated on care and need for admission. Questions were invited and answered. The patient and / or family shows understanding of admission / consultation information and agrees to follow. 2. Continue present medications except for changes as noted above    3. Continue to monitor rhythm    4. Further orders per clinical course. Date of the Proposed Procedure:s   2/12/2020     Proposed Procedures:  Transesophageal Echocardiography (EVELYN) Guided - Direct Current Cardioversion (DDCV)    :  ROBERTO Nielesn MD    Indications:  Paroxymal Atrial Fibrillation    American Society of Anesthesiologists (ASA) Classification III    Plan of Sedation:  Moderate Sedation    Mallampati Classification:  II    I have examined this patient and find no interval changes since the original History and Physical / Consult note written by myself, on 02/12/20     With the constellation of symptoms and these findings, I recommend Transesophageal Echocardiography (EVELYN) Guided - Direct Current Cardioversion (DCCV). I discussed with him  in detail  the risks and benefits of these procedure, the first to be performed is the Transesophageal Echocardiography (EVELYN).   The

## 2020-02-12 NOTE — H&P
[facility]      History & Physical    [id number]  PCP: Petre Gardiner MD    Date of Admission:2/12/2020    Patient:  Juliet Chirinos  MRN: [id number]    Date of Service: Pt seen/examined on 2/12/2020 and Admitted to Inpatient with expected LOS greater than two midnights due to medical therapy. CHIEF COMPLAINT:     Chief Complaint   Patient presents with    Shortness of Breath       History Obtained From:  patient, electronic medical record  Primary Care Physician: Peter Gardiner MD    HISTORY OF PRESENT ILLNESS:    The patient is a 62 y.o. male with a history of history of CAD, DMT2, malignant HTN, CHF, presenting to the ED on account of 3-4 day history of progressively worsening moderate to severe shortness of breath, which worsens with minimal exertion. Associated symptoms reportedly include dizziness and lightheadedness, as well as intermittent cough. Patient also reports worsening bilateral lower extremity edema. Denies any fever or chills. Initial work-up significant for;  -Hypoxemic, with PaO2 of 57 mmHg on initial ABG. - Elevated ProBNP level 4,214   - Troponin level 0.03  Elevated d-dimer however VQ scan with low suspicion for PE  Patient was noted to have a new onset A. fib with RVR. Cardiology consulted from ED. Patient started on diltiazem ggt  Patient is being admitted for further work-up and management.      PAST MEDICAL & SURGICAL HISTORY    Past Medical History:      Diagnosis Date    Acute kidney failure, unspecified ([address]     Anxiety state, unspecified     Atrial septal aneurysm 1/9/2016    Body mass index 30.0-30.9, adult     CAD (coronary artery disease) 1/10/2016    1/9/16  lexiscan  Positive for apical MI + myocardial ischemia, EF 42%, intermediate risk findings, AUC indication 16, AUC score 7 1/10/16  Cath  3 lesions in the LAD:  Mid: 70% 2.25x23, mid 90% 2.25 x 28, distal 100% 2.0x28, anterior lateral apical hypokinesis, EF 45%    Calculus of kidney     Carotid stent.. Completion suprarenal abdominal aortogram and selective right remal arteriogram. Mynx closure, right common femoral artery punctur          SOCIAL & FAMILY HISTORY:    Social History:   TOBACCO:   reports that he has quit smoking. He smoked 0.25 packs per day. He has never used smokeless tobacco.  ETOH:   reports no history of alcohol use. Family History:       Problem Relation Age of Onset    High Blood Pressure Father     Cancer Father     High Blood Pressure Mother     High Blood Pressure Brother         MEDICATIONS:    Medications Prior to Admission:    Prior to Admission medications    Medication Sig Start Date End Date Taking?  Authorizing Provider   esomeprazole Magnesium (NEXIUM) 40 MG PACK Take 40 mg by mouth daily    Historical Provider, MD   azithromycin (ZITHROMAX) 250 MG tablet Z-Bulmaro as directed 1/1/18   Jill Cortez MD   albuterol sulfate HFA (VENTOLIN HFA) 108 (90 Base) MCG/ACT inhaler Inhale 2 puffs into the lungs every 6 hours as needed for Wheezing 1/1/18   Jill Cortez MD   rosuvastatin (CRESTOR) 20 MG tablet Take 20 mg by mouth daily    Historical Provider, MD   lisinopril (PRINIVIL;ZESTRIL) 40 MG tablet Take 40 mg by mouth 2 times daily 2/10/16   Historical Provider, MD   furosemide (LASIX) 80 MG tablet Take 80 mg by mouth daily 2/10/16   Historical Provider, MD   carvedilol (COREG) 25 MG tablet Take 25 mg by mouth 2 times daily  2/16/16   Historical Provider, MD   amLODIPine (NORVASC) 10 MG tablet Take 1 tablet by mouth daily 1/11/16   Stefanie MondayDWAYNE   isosorbide mononitrate (IMDUR) 60 MG CR tablet Take 60 mg by mouth 2 times daily    Historical Provider, MD   minoxidil (LONITEN) 2.5 MG tablet Take 2.5 mg by mouth daily    Historical Provider, MD   ULTICARE MINI PEN NEEDLES 31G X 6 MM MISC FOR USE WITH LANTUS TWO TIMES A DAY 7/11/14   DWAYNE Mcdowell - CNP   LANTUS SOLOSTAR 100 UNIT/ML injection pen INJECT 30 UNITS IN THE MORNING AND 20 UNITS IN THE EVENING DAILY 02/12/2020     INR:   Recent Labs     02/12/20  1010   INR 1.18     TSH:   Lab Results   Component Value Date    TSH 0.96 05/28/2015     URINALYSIS:No results for input(s): NITRITE, COLORU, PHUR, LABCAST, WBCUA, RBCUA, MUCUS, TRICHOMONAS, YEAST, BACTERIA, CLARITYU, SPECGRAV, LEUKOCYTESUR, UROBILINOGEN, BILIRUBINUR, BLOODU, GLUCOSEU, AMORPHOUS in the last 72 hours. Invalid input(s): Edna Riley / IMAGING REPORTS:     Nm Lung Vent/perfusion (vq)    Result Date: 2/12/2020  PROCEDURE: PULMONARY VENTILATION AND PERFUSION IMAGING (V/Q SCAN) (CPT-4 CODE 61038)  INDICATION: Shortness of breath COMPARISON: Chest x-ray dated 2/12/2020 RADIOPHARMACEUTICAL AND TECHNIQUE: 9.6 mCi of 133-Xenon gas was administered via closed system ventilator. Posterior planar images of the lung were obtained during wash-in, equilibrium, and during wash-out. Subsequently 4.8 mCi of Tc-99m MAA (particulate) were administered intravenously and images of regional pulmonary perfusion were obtained in the anterior, posterior, posterior oblique and lateral projections. Images were evaluated for the presence of segmental perfusion abnormalities and pleural-based perfusion defects that had either matched or unmatched ventilation abnormalities. The concurrent chest radiograph demonstrated cardiomegaly with pulmonary congestion and developing pulmonary edema. FINDINGS:  Pulmonary arterial perfusion appears homogeneous, without discrete peripheral wedge-shaped defect. Similarly, the ventilation images appear homogeneous. Low suspicion for pulmonary embolus. Signed by Dr Jay Dalton on 2/12/2020 12:58 PM    Xr Chest Portable    Result Date: 2/12/2020  EXAMINATION: XR CHEST PORTABLE 2/12/2020 9:52 AM HISTORY: Shortness of breath COMPARISON: 1/1/2018 FINDINGS: The heart is enlarged. The aorta is mildly tortuous. The pulmonary vasculature is indistinct. Diffuse increased interstitial prominence is noted.  There is no appreciable pneumothorax or definite pleural effusion. Atherosclerotic calcifications are seen in the aorta. Cardiomegaly with findings suggestive of pulmonary vascular congestion and developing pulmonary edema. Signed by Dr Jeremy Jeffries on 2/12/2020 9:54 AM          ECHOCARDIOGRAM:    Pending        PATIENT SUMMARY      ASSESSMENT / IMPRESSION & PLAN:          Hospital Problems           Last Modified POA    * (Principal) PAF (paroxysmal atrial fibrillation) ([facility] 2/12/2020 Yes    Overview Signed 2/12/2020  2:31 PM by Angela Martinez MD     2/12/20  Newly discovered AF         CAD (coronary artery disease) 2/12/2020 Yes    Overview Addendum 2/12/2020  2:38 PM by Angela Martinez MD     1/9/16  lexiscan  Positive for apical MI + myocardial ischemia, EF 42%, intermediate risk findings, AUC indication 16, AUC score 7  1/10/16  Cath  3 lesions in the LAD:  Mid: 70% 2.25x23, mid 90% 2.25 x 28, distal 100% 2.0x28, anterior lateral apical hypokinesis           Acute on chronic diastolic CHF (congestive heart failure) (HCC) (Chronic) 2/12/2020 Yes    HTN (hypertension) 2/12/2020 Yes    DM (diabetes mellitus) ([facility] 2/12/2020 Yes          Principal Problem:    PAF (paroxysmal atrial fibrillation) ([facility])  Active Problems:    CAD (coronary artery disease)    Acute on chronic diastolic CHF (congestive heart failure) ([facility])    HTN (hypertension)    DM (diabetes mellitus) ([facility]  Resolved Problems:    * No resolved hospital problems. *      New onset Paroxysmal Atrial fibrillation/flutter with RVR  · Continue diltiazem ggt  · Cardiology consulted from ED  · Pending Transesophageal Echocardiogram, Guided Direct Current Cardioversion. · Pending diagnostic cardiac cath  · Apixaban 5 mg p.o. twice daily      HFrEF ,  with acute exacerbation (Acute on chronic)  · -Hypoxemic, with PaO2 of 57 mmHg on initial ABG.   · Elevated d-dimer however VQ scan with low suspicion for PE  · Chest x-ray showing \"Cardiomegaly with findings suggestive of pulmonary vascular congestion and developing pulmonary edema\"  · - Elevated ProBNP level 4,214   · - Troponin level 0.03  · - Furosemide 60 mg IV ×1 dose given in the ED  · - Continue diureses;   · --> Furosemide  40 mg IV twice a day  · - Fluid goal of -1.5-2 L  · - Strict I's and O  · - Fluid restriction  · - Continue optimized medical management. # History of Type 2 Diabetes Mellitus   · - Uncontrolled  · Inpatient Regimens to include;  · - Insulin Glargine (Lantus) 25 units subcu nightly  · - Insulin Lispro (Humalog) 7 units subcu pre-meal 3 times a day  · - Insulin Lispro (Humalog) on a Low dose sliding scale  · - Monitor POC glucose, and adjusts insulin regimen accordingly based on daily insulin requirement. · - Hemoglobin A1C     # History of Essential Hypertension   Uncontrolled   Continue documented home regimen:   Continue amlodipine 10 mg p.o. daily   Continue isosorbide mononitrate (Imdur) 60 mg p.o. twice daily  · Continue carvedilol 25 mg p.o. twice daily  · Lisinopril 40 mg p.o. twice daily (consider holding or decreasing dosage, , given worsening renal function)   Hydralazine 10 mg IV Q6H/PRN  For SBP> 180 and/or DP> 110    Continue to monitor               Continue management of other chronic medical conditions - Please see orders above         CONSULTS:    IP CONSULT TO CARDIOLOGY  IP CONSULT TO HEART FAILURE NURSE/COORDINATOR  IP CONSULT TO DIETITIAN        INPATIENT CHECKLIST:      Nutrition: DIET CARDIAC; Carb Control: 4 carb choices (60 gms)/meal    Prophylaxis Orders:   VTE -apixaban    CODE STATUS: FULL  ISOLATION:       DISCHARGE PLAN: tbd     Total face-to-face time spent with this patient, time spent reviewing medical records, and in coordination of care with the emergency department physician, nursing staff, in the examination, evaluation/assessment, counseling, review of medications and plan, was more than 70 mins .      Electronically signed by   Adalberto Longoria MD, MPH  Internal Medicine Hospitalist   2/12/2020 5:48 PM      EMR Dragon/Transcription disclaimer:   Much of this encounter note is an electronic transcription/translation of spoken language to printed text.  The electronic translation of spoken language may permit erroneous, or at times, nonsensical words or phrases to be inadvertently transcribed; although attempts have made to review the note for such errors, some may still exist.

## 2020-02-12 NOTE — PROGRESS NOTES
Contains abnormal data BLOOD GAS, ARTERIAL   Status:  Final result    Ref Range & Units 02/12/20 0943   pH, Arterial 7.350 - 7.450 7.420    pCO2, Arterial 35.0 - 45.0 mmHg 44.0    pO2, Arterial 80.0 - 100.0 mmHg 57. 0Low     HCO3, Arterial 22.0 - 26.0 mmol/L 28.5High     Base Excess, Arterial -2.0 - 2.0 mmol/L 3.4High     Hemoglobin, Art, Extended 14.0 - 18.0 g/dL 15.7    O2 Sat, Arterial >92 % 90.5    Carboxyhgb, Arterial 0.0 - 5.0 % 1.4    Comment:      0.0-1.5   (Smokers 1.5-5.0)    Methemoglobin, Arterial <1.5 % 1.0    O2 Content, Arterial Not Established mL/dL 19.9    O2 Therapy  Unknown    Resulting Agency  [facility]        Specimen Collected: 02/12/20 0943 Last Resulted: 02/12/20 0943 View Other Order Details        Pt on room air, RR 22 site RR at+

## 2020-02-12 NOTE — ED PROVIDER NOTES
History:   Diagnosis Date    Acute kidney failure, unspecified ([address]     Anxiety state, unspecified     Atrial septal aneurysm 1/9/2016    Body mass index 30.0-30.9, adult     CAD (coronary artery disease) 1/10/2016    1/9/16  lexiscan  Positive for apical MI + myocardial ischemia, EF 42%, intermediate risk findings, AUC indication 16, AUC score 7 1/10/16  Cath  3 lesions in the LAD:  Mid: 70% 2.25x23, mid 90% 2.25 x 28, distal 100% 2.0x28, anterior lateral apical hypokinesis, EF 45%    Calculus of kidney     Carotid artery stenosis 6/26/2013    Cellulitis and abscess of hand, except fingers and thumb     Chronic airway obstruction, not elsewhere classified     Chronic kidney disease, unspecified     Congestive heart failure, unspecified     Diabetes mellitus type II     Diverticulosis of colon (without mention of hemorrhage)     Encounter for long-term (current) use of insulin (HCC)     Esophageal reflux     Family history of ischemic heart disease     Gastric ulcer, unspecified as acute or chronic, without mention of hemorrhage, perforation, or obstruction     Hyperlipemia     Hypertension     Internal hemorrhoids without mention of complication     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, or unspecified(403.00)     Obesity, unspecified     Other specified cardiac dysrhythmias(427.89)     Peripheral vascular disease ([address]     Solitary pulmonary nodule     Surgical or other procedure not carried out because of contraindication     Thoracic aneurysm without mention of rupture     Tobacco use disorder     Type II or unspecified type diabetes mellitus without mention of complication, not stated as uncontrolled          SURGICAL HISTORY       Past Surgical History:   Procedure Laterality Date    CARDIAC CATHETERIZATION      CHOLECYSTECTOMY      CORONARY ANGIOPLASTY WITH STENT PLACEMENT  1-16    2 JACQUELINE to LAD    URETER STENT PLACEMENT reviewed. Constitutional:       General: He is not in acute distress. Appearance: He is well-developed. He is not diaphoretic. HENT:      Head: Normocephalic and atraumatic. Eyes:      Pupils: Pupils are equal, round, and reactive to light. Neck:      Musculoskeletal: Normal range of motion and neck supple. Cardiovascular:      Rate and Rhythm: Tachycardia present. Rhythm irregular. Heart sounds: Normal heart sounds. Pulmonary:      Effort: Pulmonary effort is normal. No respiratory distress. Breath sounds: Rales present. Abdominal:      General: Bowel sounds are normal. There is no distension. Palpations: Abdomen is soft. Tenderness: There is no abdominal tenderness. Musculoskeletal: Normal range of motion. Right lower leg: Edema present. Left lower leg: Edema present. Skin:     General: Skin is warm and dry. Findings: No rash. Neurological:      Mental Status: He is alert and oriented to person, place, and time. Cranial Nerves: No cranial nerve deficit. Motor: No abnormal muscle tone. Coordination: Coordination normal.   Psychiatric:         Behavior: Behavior normal.         DIAGNOSTIC RESULTS     EKG: All EKG's are interpreted by the Emergency Department Physician who either signs or Co-signs this chart in the absence of a cardiologist.    Atrial flutter rate 126 anterior and inferior q waves nonspecific ST waves  Atrial flutter rate 71anterior and inferior q waves     RADIOLOGY:   Non-plain film images such as CT, Ultrasound and MRI are read by the radiologist. Jose Angel Quick images are visualized and preliminarily interpreted by the emergency physician with the below findings:        Interpretation per the Radiologist below, if available at the time of this note:    NM LUNG VENT/PERFUSION (VQ)   Final Result   Low suspicion for pulmonary embolus.    Signed by Dr Humphrey Fountain on 2/12/2020 12:58 PM      XR CHEST PORTABLE   Final Result Cardiomegaly with findings suggestive of pulmonary   vascular congestion and developing pulmonary edema. Signed by Dr Coral Quispe on 2/12/2020 9:54 AM            ED BEDSIDE ULTRASOUND:   Performed by ED Physician - none    LABS:  Labs Reviewed   BRAIN NATRIURETIC PEPTIDE - Abnormal; Notable for the following components:       Result Value    Pro-BNP 4,214 (*)     All other components within normal limits   CBC WITH AUTO DIFFERENTIAL - Abnormal; Notable for the following components:    MCV 97.0 (*)     MCHC 31.4 (*)     Neutrophils % 68.0 (*)     Lymphocytes % 17.5 (*)     Monocytes Absolute 1.00 (*)     All other components within normal limits   COMPREHENSIVE METABOLIC PANEL - Abnormal; Notable for the following components:    CREATININE 1.7 (*)     GFR Non-African American 41 (*)     Calcium 8.6 (*)     Total Protein 6.3 (*)     Alb 3.3 (*)     All other components within normal limits   BLOOD GAS, ARTERIAL - Abnormal; Notable for the following components:    pO2, Arterial 57.0 (*)     HCO3, Arterial 28.5 (*)     Base Excess, Arterial 3.4 (*)     All other components within normal limits   D-DIMER, QUANTITATIVE - Abnormal; Notable for the following components:    D-Dimer, Quant 1.16 (*)     All other components within normal limits   TROPONIN   PROTIME-INR   APTT   POTASSIUM, WHOLE BLOOD   TROPONIN       All other labs were within normal range or not returned as of this dictation. EMERGENCY DEPARTMENT COURSE and DIFFERENTIALDIAGNOSIS/MDM:   Vitals:    Vitals:    02/12/20 1032 02/12/20 1107 02/12/20 1132 02/12/20 1236   BP: (!) 122/90 131/81 (!) 131/96 (!) 130/105   Pulse: 124 102 101 115   Resp: 28 24 20 15   Temp:       SpO2: 92% 95% 94% 94%   Weight:       Height:           MDM  D/w Dr Puja Gage. Will come see in ED. Dr Puja Gage has seen pt in ED, requested hospitalist admit.      D/w Dr Rome Hendrickson for admission    CONSULTS:  [address]:  Unless otherwise notedbelow, none

## 2020-02-13 LAB
ANION GAP SERPL CALCULATED.3IONS-SCNC: 14 MMOL/L (ref 7–19)
BUN BLDV-MCNC: 22 MG/DL (ref 8–23)
CALCIUM SERPL-MCNC: 8.8 MG/DL (ref 8.8–10.2)
CHLORIDE BLD-SCNC: 102 MMOL/L (ref 98–111)
CHOLESTEROL, TOTAL: 96 MG/DL (ref 160–199)
CO2: 27 MMOL/L (ref 22–29)
CREAT SERPL-MCNC: 1.8 MG/DL (ref 0.5–1.2)
EKG P AXIS: 70 DEGREES
EKG P AXIS: NORMAL DEGREES
EKG P AXIS: NORMAL DEGREES
EKG P-R INTERVAL: 196 MS
EKG P-R INTERVAL: NORMAL MS
EKG P-R INTERVAL: NORMAL MS
EKG Q-T INTERVAL: 330 MS
EKG Q-T INTERVAL: 366 MS
EKG Q-T INTERVAL: 368 MS
EKG QRS DURATION: 90 MS
EKG QRS DURATION: 90 MS
EKG QRS DURATION: 96 MS
EKG QTC CALCULATION (BAZETT): 434 MS
EKG QTC CALCULATION (BAZETT): 438 MS
EKG QTC CALCULATION (BAZETT): 441 MS
EKG T AXIS: 69 DEGREES
EKG T AXIS: 76 DEGREES
EKG T AXIS: 86 DEGREES
GFR NON-AFRICAN AMERICAN: 38
GLUCOSE BLD-MCNC: 101 MG/DL (ref 70–99)
GLUCOSE BLD-MCNC: 104 MG/DL (ref 70–99)
GLUCOSE BLD-MCNC: 114 MG/DL (ref 70–99)
GLUCOSE BLD-MCNC: 141 MG/DL (ref 70–99)
GLUCOSE BLD-MCNC: 146 MG/DL (ref 74–109)
HDLC SERPL-MCNC: 35 MG/DL (ref 55–121)
LDL CHOLESTEROL CALCULATED: 41 MG/DL
MAGNESIUM: 2.2 MG/DL (ref 1.6–2.4)
PERFORMED ON: ABNORMAL
POTASSIUM SERPL-SCNC: 3.5 MMOL/L (ref 3.5–5)
SODIUM BLD-SCNC: 143 MMOL/L (ref 136–145)
TRIGL SERPL-MCNC: 99 MG/DL (ref 0–149)

## 2020-02-13 PROCEDURE — 2140000000 HC CCU INTERMEDIATE R&B

## 2020-02-13 PROCEDURE — 36415 COLL VENOUS BLD VENIPUNCTURE: CPT

## 2020-02-13 PROCEDURE — 6360000002 HC RX W HCPCS

## 2020-02-13 PROCEDURE — 93312 ECHO TRANSESOPHAGEAL: CPT | Performed by: INTERNAL MEDICINE

## 2020-02-13 PROCEDURE — 5A2204Z RESTORATION OF CARDIAC RHYTHM, SINGLE: ICD-10-PCS | Performed by: INTERNAL MEDICINE

## 2020-02-13 PROCEDURE — 93005 ELECTROCARDIOGRAM TRACING: CPT | Performed by: INTERNAL MEDICINE

## 2020-02-13 PROCEDURE — 93325 DOPPLER ECHO COLOR FLOW MAPG: CPT

## 2020-02-13 PROCEDURE — 99152 MOD SED SAME PHYS/QHP 5/>YRS: CPT | Performed by: INTERNAL MEDICINE

## 2020-02-13 PROCEDURE — 93321 DOPPLER ECHO F-UP/LMTD STD: CPT

## 2020-02-13 PROCEDURE — 6360000002 HC RX W HCPCS: Performed by: INTERNAL MEDICINE

## 2020-02-13 PROCEDURE — 80061 LIPID PANEL: CPT

## 2020-02-13 PROCEDURE — 92960 CARDIOVERSION ELECTRIC EXT: CPT | Performed by: INTERNAL MEDICINE

## 2020-02-13 PROCEDURE — 93325 DOPPLER ECHO COLOR FLOW MAPG: CPT | Performed by: INTERNAL MEDICINE

## 2020-02-13 PROCEDURE — 6370000000 HC RX 637 (ALT 250 FOR IP)

## 2020-02-13 PROCEDURE — 83735 ASSAY OF MAGNESIUM: CPT

## 2020-02-13 PROCEDURE — 6370000000 HC RX 637 (ALT 250 FOR IP): Performed by: INTERNAL MEDICINE

## 2020-02-13 PROCEDURE — 82948 REAGENT STRIP/BLOOD GLUCOSE: CPT

## 2020-02-13 PROCEDURE — 2580000003 HC RX 258: Performed by: INTERNAL MEDICINE

## 2020-02-13 PROCEDURE — 93320 DOPPLER ECHO COMPLETE: CPT | Performed by: INTERNAL MEDICINE

## 2020-02-13 PROCEDURE — 80048 BASIC METABOLIC PNL TOTAL CA: CPT

## 2020-02-13 RX ORDER — CARVEDILOL 6.25 MG/1
6.25 TABLET ORAL 2 TIMES DAILY
Status: DISCONTINUED | OUTPATIENT
Start: 2020-02-13 | End: 2020-02-19 | Stop reason: HOSPADM

## 2020-02-13 RX ORDER — SODIUM CHLORIDE 0.9 % (FLUSH) 0.9 %
10 SYRINGE (ML) INJECTION EVERY 12 HOURS SCHEDULED
Status: DISCONTINUED | OUTPATIENT
Start: 2020-02-13 | End: 2020-02-19 | Stop reason: HOSPADM

## 2020-02-13 RX ORDER — SODIUM CHLORIDE 0.9 % (FLUSH) 0.9 %
10 SYRINGE (ML) INJECTION PRN
Status: DISCONTINUED | OUTPATIENT
Start: 2020-02-13 | End: 2020-02-19 | Stop reason: HOSPADM

## 2020-02-13 RX ORDER — SODIUM CHLORIDE 0.9 % (FLUSH) 0.9 %
10 SYRINGE (ML) INJECTION EVERY 12 HOURS SCHEDULED
Status: DISCONTINUED | OUTPATIENT
Start: 2020-02-13 | End: 2020-02-13 | Stop reason: SDUPTHER

## 2020-02-13 RX ORDER — ACETAMINOPHEN 325 MG/1
650 TABLET ORAL EVERY 6 HOURS PRN
Status: DISCONTINUED | OUTPATIENT
Start: 2020-02-13 | End: 2020-02-19 | Stop reason: HOSPADM

## 2020-02-13 RX ORDER — SOTALOL HYDROCHLORIDE 80 MG/1
80 TABLET ORAL 2 TIMES DAILY
Status: DISCONTINUED | OUTPATIENT
Start: 2020-02-13 | End: 2020-02-17

## 2020-02-13 RX ORDER — MONTELUKAST SODIUM 10 MG/1
10 TABLET ORAL DAILY
COMMUNITY

## 2020-02-13 RX ORDER — SODIUM CHLORIDE 0.9 % (FLUSH) 0.9 %
10 SYRINGE (ML) INJECTION PRN
Status: DISCONTINUED | OUTPATIENT
Start: 2020-02-13 | End: 2020-02-13 | Stop reason: SDUPTHER

## 2020-02-13 RX ORDER — LEVOCETIRIZINE DIHYDROCHLORIDE 5 MG/1
5 TABLET, FILM COATED ORAL NIGHTLY
COMMUNITY

## 2020-02-13 RX ADMIN — ASPIRIN 81 MG 81 MG: 81 TABLET ORAL at 10:19

## 2020-02-13 RX ADMIN — APIXABAN 5 MG: 5 TABLET, FILM COATED ORAL at 20:07

## 2020-02-13 RX ADMIN — FUROSEMIDE 40 MG: 10 INJECTION, SOLUTION INTRAMUSCULAR; INTRAVENOUS at 08:16

## 2020-02-13 RX ADMIN — ISOSORBIDE MONONITRATE 60 MG: 60 TABLET, EXTENDED RELEASE ORAL at 08:15

## 2020-02-13 RX ADMIN — FUROSEMIDE 40 MG: 10 INJECTION, SOLUTION INTRAMUSCULAR; INTRAVENOUS at 18:06

## 2020-02-13 RX ADMIN — Medication 25 UNITS: at 20:32

## 2020-02-13 RX ADMIN — AMLODIPINE BESYLATE 10 MG: 10 TABLET ORAL at 08:14

## 2020-02-13 RX ADMIN — SODIUM CHLORIDE, PRESERVATIVE FREE 10 ML: 5 INJECTION INTRAVENOUS at 18:07

## 2020-02-13 RX ADMIN — Medication 10 ML: at 20:08

## 2020-02-13 RX ADMIN — SOTALOL HYDROCHLORIDE 80 MG: 80 TABLET ORAL at 20:07

## 2020-02-13 RX ADMIN — GUAIFENESIN AND DEXTROMETHORPHAN 5 ML: 100; 10 SYRUP ORAL at 20:07

## 2020-02-13 RX ADMIN — GUAIFENESIN 600 MG: 600 TABLET, EXTENDED RELEASE ORAL at 20:07

## 2020-02-13 RX ADMIN — CLOPIDOGREL BISULFATE 75 MG: 75 TABLET, FILM COATED ORAL at 10:19

## 2020-02-13 RX ADMIN — ROSUVASTATIN CALCIUM 20 MG: 10 TABLET, FILM COATED ORAL at 08:21

## 2020-02-13 RX ADMIN — ISOSORBIDE MONONITRATE 60 MG: 60 TABLET, EXTENDED RELEASE ORAL at 20:07

## 2020-02-13 RX ADMIN — CARVEDILOL 25 MG: 25 TABLET, FILM COATED ORAL at 08:15

## 2020-02-13 RX ADMIN — LISINOPRIL 40 MG: 20 TABLET ORAL at 08:15

## 2020-02-13 RX ADMIN — SODIUM CHLORIDE, PRESERVATIVE FREE 10 ML: 5 INJECTION INTRAVENOUS at 08:15

## 2020-02-13 RX ADMIN — CARVEDILOL 6.25 MG: 6.25 TABLET, FILM COATED ORAL at 20:07

## 2020-02-13 RX ADMIN — SOTALOL HYDROCHLORIDE 80 MG: 80 TABLET ORAL at 10:19

## 2020-02-13 RX ADMIN — APIXABAN 5 MG: 5 TABLET, FILM COATED ORAL at 10:19

## 2020-02-13 RX ADMIN — GUAIFENESIN 600 MG: 600 TABLET, EXTENDED RELEASE ORAL at 08:14

## 2020-02-13 ASSESSMENT — PAIN SCALES - GENERAL
PAINLEVEL_OUTOF10: 0

## 2020-02-13 NOTE — CONSULTS
Nurse met with patient and wife re: referral to CHF clinic. Discussed with patient diet, activity, medications, definition of heart failure and s/s, daily wt monitoring with reporting of wt gain of >2-3 lbs in 24 hours or > 5 lbs in one week, BP/HR and activity monitoring with use of daily log, f/u appointments with PCP, CHF clinic and cardiologist. Talked about need for routine cardiology visits for follow up especially since pt has not been seen in office since May of 2016. Wife states he was to have a visit but cancelled it and never made a new one. Discussed vaccine use, testing, monitoring of BS. HF packet given. wife stated he does have a wt scale and BP monitor at home. He  is agreeable to participating in the HF program for follow up visit if appropriate. He is having issues with Afib and wife states he was cardioverted this morning and will be having a heart cath today also. He is not aware when he is out of rhythm until he is SOA. Will set up f/u appointments for CHF if appropriate when we know a discharge date. Time spent with patient 30 minutes.

## 2020-02-13 NOTE — PROGRESS NOTES
[facility]    Patient:  Oscar Mo  YOB: 1955  Date of Service: 2/13/2020  MRN: [id number]   Acct: [de-identified]   Primary Care Physician: Jorge De La Torre MD  Advance Directive: Full Code  Admit Date: 2/12/2020       Hospital Day: 1      CHIEF COMPLAINT:     Chief Complaint   Patient presents with    Shortness of Breath       2/13/2020 9:11 AM  Subjective / Interval History:   Patient seen and examined this a.m. No acute overnight event reported. Patient laying comfortably in bed in no acute distress. Appears anxious about pending cardioversion. Reports diaphoresis this a.m. Family at bedside. Denies any other acute complaints or distress at this time. Brief History:   The patient is a 62 y.o. male with a history of history of CAD, DMT2, malignant HTN, CHF, presenting to the ED on account of 3-4 day history of progressively worsening moderate to severe shortness of breath, which worsens with minimal exertion. Associated symptoms reportedly include dizziness and lightheadedness, as well as intermittent cough. Patient also reports worsening bilateral lower extremity edema.     Denies any fever or chills.      Initial work-up significant for;  -Hypoxemic, with PaO2 of 57 mmHg on initial ABG. - Elevated ProBNP level 4,214   - Troponin level 0.03  Elevated d-dimer however VQ scan with low suspicion for PE  Patient was noted to have a new onset A. fib with RVR. Cardiology consulted from ED. Patient started on diltiazem ggt  Patient admitted for further work-up and management. Review of Systems:   Review of Systems  ROS: 14 point review of systems is negative except as specifically addressed above.     Diet NPO, After Midnight    Intake/Output Summary (Last 24 hours) at 2/13/2020 0911  Last data filed at 2/13/2020 0814  Gross per 24 hour   Intake 344.33 ml   Output 1350 ml   Net -1005.67 ml       Medications:   diltiazem (CARDIZEM) 125 mg in dextrose 5% 125 mL infusion 5 mg/hr (02/12/20 1036)    dextrose       Current Facility-Administered Medications   Medication Dose Route Frequency Provider Last Rate Last Dose    acetaminophen (TYLENOL) tablet 650 mg  650 mg Oral Q6H PRN Anjum Arboleda MD        diltiazem 125 mg in dextrose 5 % 125 mL infusion  5 mg/hr Intravenous Continuous Anjum Arboleda MD 5 mL/hr at 02/12/20 1036 5 mg/hr at 02/12/20 1036    technetium sestamibi (CARDIOLITE) injection 10 millicurie  10 millicurie Intravenous ONCE PRN Anjum Arboleda MD        technetium sestamibi (CARDIOLITE) injection 30 millicurie  30 millicurie Intravenous ONCE PRN Anjum Arboleda MD        apixaban (ELIQUIS) tablet 5 mg  5 mg Oral BID Shavon Becerra MD   5 mg at 02/12/20 2014    amLODIPine (NORVASC) tablet 10 mg  10 mg Oral Daily Anjum Arboleda MD   10 mg at 02/13/20 0814    lisinopril (PRINIVIL;ZESTRIL) tablet 40 mg  40 mg Oral Daily Anjum Arboleda MD   40 mg at 02/13/20 0815    sodium chloride flush 0.9 % injection 10 mL  10 mL Intravenous 2 times per day Anjum Arboleda MD   10 mL at 02/13/20 0815    sodium chloride flush 0.9 % injection 10 mL  10 mL Intravenous PRN Anjum Arboleda MD        magnesium hydroxide (MILK OF MAGNESIA) 400 MG/5ML suspension 30 mL  30 mL Oral Daily PRN Anjum Arboleda MD        ondansetron [facility]) injection 4 mg  4 mg Intravenous Q6H PRN Anjum Arboleda MD        glucose (GLUTOSE) 40 % oral gel 15 g  15 g Oral PRN Anjum Arboleda MD        dextrose 50 % IV solution  12.5 g Intravenous PRN Anjum Arboleda MD        glucagon (rDNA) injection 1 mg  1 mg Intramuscular PRN Anjum Arboleda MD        dextrose 5 % solution  100 mL/hr Intravenous PRN Anjum Arboleda MD        insulin glargine (LANTUS) injection vial 25 Units  25 Units Subcutaneous Nightly Anjum Arboleda MD   25 Units at 02/12/20 2152    insulin lispro (HUMALOG) injection vial 7 Units  7 Units Subcutaneous TID  Graciela Herrera MD        insulin lispro (HUMALOG) injection vial 0-6 Units  0-6 Units Subcutaneous TID  Graciela Herrera MD        insulin lispro (HUMALOG) injection vial 0-3 Units  0-3 Units Subcutaneous Nightly Graciela Herrera MD   1 Units at 02/12/20 2152    furosemide (LASIX) injection 40 mg  40 mg Intravenous BID Graciela Herrera MD   40 mg at 02/13/20 9889    aspirin chewable tablet 81 mg  81 mg Oral Daily Graciela Herrera MD        clopidogrel (PLAVIX) tablet 75 mg  75 mg Oral Daily Graciela Herrera MD        isosorbide mononitrate (IMDUR) extended release tablet 60 mg  60 mg Oral BID Graciela Herrera MD   60 mg at 02/13/20 0815    rosuvastatin (CRESTOR) tablet 20 mg  20 mg Oral Daily Graciela Herrera MD   20 mg at 02/13/20 9947    carvedilol (COREG) tablet 25 mg  25 mg Oral BID Graciela Herrera MD   25 mg at 02/13/20 0815    guaiFENesin [facility]) extended release tablet 600 mg  600 mg Oral BID Graciela Herrera MD   600 mg at 02/13/20 0814    guaiFENesin-dextromethorphan (ROBITUSSIN DM) 100-10 MG/5ML syrup 5 mL  5 mL Oral Q6H PRN Graciela Herrera MD   5 mL at 02/12/20 2151         diltiazem (CARDIZEM) 125 mg in dextrose 5% 125 mL infusion 5 mg/hr (02/12/20 1036)    dextrose        apixaban  5 mg Oral BID    amLODIPine  10 mg Oral Daily    lisinopril  40 mg Oral Daily    sodium chloride flush  10 mL Intravenous 2 times per day    insulin glargine  25 Units Subcutaneous Nightly    insulin lispro  7 Units Subcutaneous TID     insulin lispro  0-6 Units Subcutaneous TID     insulin lispro  0-3 Units Subcutaneous Nightly    furosemide  40 mg Intravenous BID    aspirin  81 mg Oral Daily    clopidogrel  75 mg Oral Daily    isosorbide mononitrate  60 mg Oral BID    rosuvastatin  20 mg Oral Daily    carvedilol  25 mg Oral BID    guaiFENesin  600 mg Oral BID     acetaminophen, technetium sestamibi, technetium sestamibi, sodium chloride flush, magnesium hydroxide, ondansetron, glucose, dextrose, glucagon (rDNA), dextrose, guaiFENesin-dextromethorphan  Diet NPO, After Midnight       Labs:   CBC with DIFF:  Recent Labs     02/12/20  1010   WBC 10.7   RBC 4.93   HGB 15.0   HCT 47.8   MCV 97.0*   MCH 30.4   MCHC 31.4*   RDW 13.4      MPV 11.3   NEUTOPHILPCT 68.0*   LYMPHOPCT 17.5*   MONOPCT 9.6   EOSRELPCT 4.1   BASOPCT 0.5   NEUTROABS 7.3   LYMPHSABS 1.9   MONOSABS 1.00*   EOSABS 0.40   BASOSABS 0.10       CMP/BMP:  Recent Labs     02/12/20  0943 02/12/20  1010 02/13/20  0309   NA  --  142 143   K 3.5 3.9 3.5   CL  --  102 102   CO2  --  29 27   ANIONGAP  --  11 14   GLUCOSE  --  84 146*   BUN  --  22 22   CREATININE  --  1.7* 1.8*   LABGLOM  --  41* 38*   CALCIUM  --  8.6* 8.8   PROT  --  6.3*  --    LABALBU  --  3.3*  --    BILITOT  --  0.4  --    ALKPHOS  --  86  --    ALT  --  16  --    AST  --  16  --          CRP:  No results for input(s): CRP in the last 72 hours. Sed Rate:  No results for input(s): SEDRATE in the last 72 hours. HgBA1c:  No components found for: HGBA1C  FLP:    Lab Results   Component Value Date    TRIG 99 02/13/2020    HDL 35 02/13/2020    LDLCALC 41 02/13/2020    LDLDIRECT 153 06/12/2013     TSH:    Lab Results   Component Value Date    TSH 0.96 05/28/2015     Troponin T:   Recent Labs     02/12/20  1010 02/12/20  1235   TROPONINI 0.03 0.03     Pro-BNP: No results for input(s): BNP in the last 72 hours. INR:   Recent Labs     02/12/20  1010   INR 1.18     ABGs:   Lab Results   Component Value Date    PHART 7.420 02/12/2020    PO2ART 57.0 02/12/2020    ITN4BOR 44.0 02/12/2020     UA:No results for input(s): NITRITE, COLORU, PHUR, LABCAST, WBCUA, RBCUA, MUCUS, TRICHOMONAS, YEAST, BACTERIA, CLARITYU, SPECGRAV, LEUKOCYTESUR, UROBILINOGEN, BILIRUBINUR, BLOODU, GLUCOSEU, AMORPHOUS in the last 72 hours. Invalid input(s): Arlyce Glory      Culture Results:    No results for input(s): CXSURG in the last 720 hours.     Blood Culture Recent: No results for input(s): BC in the last 720 hours. Cultures:   No results for input(s): CULTURE in the last 72 hours. No results for input(s): BC, Dillon Clap in the last 72 hours. No results for input(s): CXSURG in the last 72 hours. Recent Labs     02/13/20  0309   MG 2.2     Recent Labs     02/12/20  1010   AST 16   ALT 16   BILITOT 0.4   ALKPHOS 86         RAD:   Nm Lung Vent/perfusion (vq)    Result Date: 2/12/2020  PROCEDURE: PULMONARY VENTILATION AND PERFUSION IMAGING (V/Q SCAN) (CPT-4 CODE 95569)  INDICATION: Shortness of breath COMPARISON: Chest x-ray dated 2/12/2020 RADIOPHARMACEUTICAL AND TECHNIQUE: 9.6 mCi of 133-Xenon gas was administered via closed system ventilator. Posterior planar images of the lung were obtained during wash-in, equilibrium, and during wash-out. Subsequently 4.8 mCi of Tc-99m MAA (particulate) were administered intravenously and images of regional pulmonary perfusion were obtained in the anterior, posterior, posterior oblique and lateral projections. Images were evaluated for the presence of segmental perfusion abnormalities and pleural-based perfusion defects that had either matched or unmatched ventilation abnormalities. The concurrent chest radiograph demonstrated cardiomegaly with pulmonary congestion and developing pulmonary edema. FINDINGS:  Pulmonary arterial perfusion appears homogeneous, without discrete peripheral wedge-shaped defect. Similarly, the ventilation images appear homogeneous. Low suspicion for pulmonary embolus. Signed by Dr Kisha Elena on 2/12/2020 12:58 PM    Xr Chest Portable    Result Date: 2/12/2020  EXAMINATION: XR CHEST PORTABLE 2/12/2020 9:52 AM HISTORY: Shortness of breath COMPARISON: 1/1/2018 FINDINGS: The heart is enlarged. The aorta is mildly tortuous. The pulmonary vasculature is indistinct. Diffuse increased interstitial prominence is noted. There is no appreciable pneumothorax or definite pleural effusion. Atherosclerotic calcifications are seen in the aorta. Cardiomegaly with findings suggestive of pulmonary vascular congestion and developing pulmonary edema. Signed by Dr Faustina Casas on 2/12/2020 9:54 AM      Echo:   Pending      Objective:   Vitals: BP (!) 156/84 Comment: reported to day shift rn manual bp done   Pulse 104   Temp 97 °F (36.1 °C) (Temporal)   Resp 16   Ht 5' 11\" (1.803 m)   Wt 250 lb 9.6 oz (113.7 kg)   SpO2 93%   BMI 34.95 kg/m²   24HR INTAKE/OUTPUT:      Intake/Output Summary (Last 24 hours) at 2/13/2020 0911  Last data filed at 2/13/2020 0814  Gross per 24 hour   Intake 344.33 ml   Output 1350 ml   Net -1005.67 ml       Physical Exam  Vitals signs and nursing note reviewed. Constitutional:       General: He is not in acute distress. Appearance: Normal appearance. He is obese. He is not ill-appearing, toxic-appearing or diaphoretic. HENT:      Head: Normocephalic and atraumatic. Right Ear: External ear normal.      Left Ear: External ear normal.      Nose: No congestion or rhinorrhea. Mouth/Throat:      Mouth: Mucous membranes are moist.      Pharynx: No oropharyngeal exudate or posterior oropharyngeal erythema. Eyes:      General: No scleral icterus. Right eye: No discharge. Left eye: No discharge. Extraocular Movements: Extraocular movements intact. Conjunctiva/sclera: Conjunctivae normal.      Pupils: Pupils are equal, round, and reactive to light. Neck:      Musculoskeletal: Normal range of motion and neck supple. No neck rigidity. Cardiovascular:      Rate and Rhythm: Normal rate. Rhythm irregular. Pulses: Normal pulses. Heart sounds: Normal heart sounds. No murmur. No friction rub. No gallop. Pulmonary:      Effort: Pulmonary effort is normal. No respiratory distress. Breath sounds: No stridor. Rhonchi present. No wheezing or rales. Chest:      Chest wall: No tenderness.    Abdominal:      General: Bowel sounds orders. Advance Directive: Full Code    Diet NPO, After Midnight     DVT prophylaxis:     Consults Made:   IP CONSULT TO CARDIOLOGY  IP CONSULT TO HEART FAILURE NURSE/COORDINATOR  IP CONSULT TO DIETITIAN    Discharge planning: tbd    Time Spent is 25 mins in the examination, evaluation, counseling and review of medications, assessment and plan.      Electronically signed by   Keily Wong MD, MPH,   Internal Medicine Hospitalist   2/13/2020 9:11 AM

## 2020-02-14 ENCOUNTER — APPOINTMENT (OUTPATIENT)
Dept: NUCLEAR MEDICINE | Age: 65
DRG: 308 | End: 2020-02-14
Payer: COMMERCIAL

## 2020-02-14 LAB
ANION GAP SERPL CALCULATED.3IONS-SCNC: 14 MMOL/L (ref 7–19)
BUN BLDV-MCNC: 25 MG/DL (ref 8–23)
CALCIUM SERPL-MCNC: 8.8 MG/DL (ref 8.8–10.2)
CHLORIDE BLD-SCNC: 99 MMOL/L (ref 98–111)
CO2: 27 MMOL/L (ref 22–29)
CREAT SERPL-MCNC: 1.8 MG/DL (ref 0.5–1.2)
GFR NON-AFRICAN AMERICAN: 38
GLUCOSE BLD-MCNC: 131 MG/DL (ref 70–99)
GLUCOSE BLD-MCNC: 178 MG/DL (ref 74–109)
GLUCOSE BLD-MCNC: 205 MG/DL (ref 70–99)
GLUCOSE BLD-MCNC: 210 MG/DL (ref 70–99)
LV EF: 45 %
LVEF MODALITY: NORMAL
MAGNESIUM: 2.2 MG/DL (ref 1.6–2.4)
PERFORMED ON: ABNORMAL
POTASSIUM SERPL-SCNC: 3.4 MMOL/L (ref 3.5–5)
SODIUM BLD-SCNC: 140 MMOL/L (ref 136–145)

## 2020-02-14 PROCEDURE — 36415 COLL VENOUS BLD VENIPUNCTURE: CPT

## 2020-02-14 PROCEDURE — 6370000000 HC RX 637 (ALT 250 FOR IP): Performed by: HOSPITALIST

## 2020-02-14 PROCEDURE — 3430000000 HC RX DIAGNOSTIC RADIOPHARMACEUTICAL: Performed by: INTERNAL MEDICINE

## 2020-02-14 PROCEDURE — 6370000000 HC RX 637 (ALT 250 FOR IP): Performed by: INTERNAL MEDICINE

## 2020-02-14 PROCEDURE — 2140000000 HC CCU INTERMEDIATE R&B

## 2020-02-14 PROCEDURE — A9500 TC99M SESTAMIBI: HCPCS | Performed by: INTERNAL MEDICINE

## 2020-02-14 PROCEDURE — 94640 AIRWAY INHALATION TREATMENT: CPT

## 2020-02-14 PROCEDURE — 6360000002 HC RX W HCPCS: Performed by: INTERNAL MEDICINE

## 2020-02-14 PROCEDURE — 83735 ASSAY OF MAGNESIUM: CPT

## 2020-02-14 PROCEDURE — 93017 CV STRESS TEST TRACING ONLY: CPT

## 2020-02-14 PROCEDURE — 80048 BASIC METABOLIC PNL TOTAL CA: CPT

## 2020-02-14 PROCEDURE — 82948 REAGENT STRIP/BLOOD GLUCOSE: CPT

## 2020-02-14 PROCEDURE — 2580000003 HC RX 258: Performed by: INTERNAL MEDICINE

## 2020-02-14 PROCEDURE — 99233 SBSQ HOSP IP/OBS HIGH 50: CPT | Performed by: INTERNAL MEDICINE

## 2020-02-14 PROCEDURE — 93306 TTE W/DOPPLER COMPLETE: CPT

## 2020-02-14 RX ORDER — IPRATROPIUM BROMIDE AND ALBUTEROL SULFATE 2.5; .5 MG/3ML; MG/3ML
1 SOLUTION RESPIRATORY (INHALATION)
Status: DISCONTINUED | OUTPATIENT
Start: 2020-02-14 | End: 2020-02-19 | Stop reason: HOSPADM

## 2020-02-14 RX ORDER — PANTOPRAZOLE SODIUM 40 MG/1
40 TABLET, DELAYED RELEASE ORAL
Status: DISCONTINUED | OUTPATIENT
Start: 2020-02-14 | End: 2020-02-19 | Stop reason: HOSPADM

## 2020-02-14 RX ADMIN — TETRAKIS(2-METHOXYISOBUTYLISOCYANIDE)COPPER(I) TETRAFLUOROBORATE 30 MILLICURIE: 1 INJECTION, POWDER, LYOPHILIZED, FOR SOLUTION INTRAVENOUS at 09:54

## 2020-02-14 RX ADMIN — INSULIN LISPRO 2 UNITS: 100 INJECTION, SOLUTION INTRAVENOUS; SUBCUTANEOUS at 18:11

## 2020-02-14 RX ADMIN — CARVEDILOL 6.25 MG: 6.25 TABLET, FILM COATED ORAL at 21:57

## 2020-02-14 RX ADMIN — IPRATROPIUM BROMIDE AND ALBUTEROL SULFATE 1 AMPULE: .5; 3 SOLUTION RESPIRATORY (INHALATION) at 18:14

## 2020-02-14 RX ADMIN — Medication 10 ML: at 10:10

## 2020-02-14 RX ADMIN — LISINOPRIL 40 MG: 20 TABLET ORAL at 10:08

## 2020-02-14 RX ADMIN — AMLODIPINE BESYLATE 10 MG: 10 TABLET ORAL at 10:09

## 2020-02-14 RX ADMIN — INSULIN LISPRO 7 UNITS: 100 INJECTION, SOLUTION INTRAVENOUS; SUBCUTANEOUS at 18:15

## 2020-02-14 RX ADMIN — ACETAMINOPHEN 650 MG: 325 TABLET ORAL at 21:57

## 2020-02-14 RX ADMIN — ASPIRIN 81 MG 81 MG: 81 TABLET ORAL at 10:09

## 2020-02-14 RX ADMIN — ROSUVASTATIN CALCIUM 20 MG: 10 TABLET, FILM COATED ORAL at 10:09

## 2020-02-14 RX ADMIN — APIXABAN 5 MG: 5 TABLET, FILM COATED ORAL at 21:57

## 2020-02-14 RX ADMIN — ONDANSETRON 4 MG: 2 INJECTION INTRAMUSCULAR; INTRAVENOUS at 06:28

## 2020-02-14 RX ADMIN — CARVEDILOL 6.25 MG: 6.25 TABLET, FILM COATED ORAL at 10:09

## 2020-02-14 RX ADMIN — GUAIFENESIN 600 MG: 600 TABLET, EXTENDED RELEASE ORAL at 10:08

## 2020-02-14 RX ADMIN — IPRATROPIUM BROMIDE AND ALBUTEROL SULFATE 1 AMPULE: .5; 3 SOLUTION RESPIRATORY (INHALATION) at 06:48

## 2020-02-14 RX ADMIN — IPRATROPIUM BROMIDE AND ALBUTEROL SULFATE 1 AMPULE: .5; 3 SOLUTION RESPIRATORY (INHALATION) at 01:48

## 2020-02-14 RX ADMIN — PANTOPRAZOLE SODIUM 40 MG: 40 TABLET, DELAYED RELEASE ORAL at 01:42

## 2020-02-14 RX ADMIN — GUAIFENESIN 600 MG: 600 TABLET, EXTENDED RELEASE ORAL at 21:57

## 2020-02-14 RX ADMIN — FUROSEMIDE 40 MG: 10 INJECTION, SOLUTION INTRAMUSCULAR; INTRAVENOUS at 10:09

## 2020-02-14 RX ADMIN — Medication 25 UNITS: at 21:58

## 2020-02-14 RX ADMIN — TETRAKIS(2-METHOXYISOBUTYLISOCYANIDE)COPPER(I) TETRAFLUOROBORATE 10 MILLICURIE: 1 INJECTION, POWDER, LYOPHILIZED, FOR SOLUTION INTRAVENOUS at 09:54

## 2020-02-14 RX ADMIN — SODIUM CHLORIDE, PRESERVATIVE FREE 10 ML: 5 INJECTION INTRAVENOUS at 06:28

## 2020-02-14 RX ADMIN — SOTALOL HYDROCHLORIDE 80 MG: 80 TABLET ORAL at 21:57

## 2020-02-14 RX ADMIN — Medication 10 ML: at 21:57

## 2020-02-14 RX ADMIN — ISOSORBIDE MONONITRATE 60 MG: 60 TABLET, EXTENDED RELEASE ORAL at 21:57

## 2020-02-14 RX ADMIN — ISOSORBIDE MONONITRATE 60 MG: 60 TABLET, EXTENDED RELEASE ORAL at 10:08

## 2020-02-14 RX ADMIN — IPRATROPIUM BROMIDE AND ALBUTEROL SULFATE 1 AMPULE: .5; 3 SOLUTION RESPIRATORY (INHALATION) at 10:54

## 2020-02-14 RX ADMIN — APIXABAN 5 MG: 5 TABLET, FILM COATED ORAL at 10:09

## 2020-02-14 RX ADMIN — PANTOPRAZOLE SODIUM 40 MG: 40 TABLET, DELAYED RELEASE ORAL at 18:11

## 2020-02-14 RX ADMIN — CLOPIDOGREL BISULFATE 75 MG: 75 TABLET, FILM COATED ORAL at 10:08

## 2020-02-14 RX ADMIN — FUROSEMIDE 40 MG: 10 INJECTION, SOLUTION INTRAMUSCULAR; INTRAVENOUS at 18:11

## 2020-02-14 RX ADMIN — IPRATROPIUM BROMIDE AND ALBUTEROL SULFATE 1 AMPULE: .5; 3 SOLUTION RESPIRATORY (INHALATION) at 14:57

## 2020-02-14 RX ADMIN — REGADENOSON 0.4 MG: 0.08 INJECTION, SOLUTION INTRAVENOUS at 10:35

## 2020-02-14 RX ADMIN — INSULIN LISPRO 1 UNITS: 100 INJECTION, SOLUTION INTRAVENOUS; SUBCUTANEOUS at 22:01

## 2020-02-14 RX ADMIN — GUAIFENESIN AND DEXTROMETHORPHAN 5 ML: 100; 10 SYRUP ORAL at 01:42

## 2020-02-14 RX ADMIN — GUAIFENESIN AND DEXTROMETHORPHAN 5 ML: 100; 10 SYRUP ORAL at 21:57

## 2020-02-14 RX ADMIN — SOTALOL HYDROCHLORIDE 80 MG: 80 TABLET ORAL at 10:09

## 2020-02-14 ASSESSMENT — PAIN DESCRIPTION - ORIENTATION: ORIENTATION: LEFT

## 2020-02-14 ASSESSMENT — PAIN DESCRIPTION - LOCATION: LOCATION: HEAD

## 2020-02-14 ASSESSMENT — PAIN SCALES - GENERAL
PAINLEVEL_OUTOF10: 0
PAINLEVEL_OUTOF10: 4
PAINLEVEL_OUTOF10: 0
PAINLEVEL_OUTOF10: 3

## 2020-02-14 ASSESSMENT — PAIN DESCRIPTION - PAIN TYPE: TYPE: ACUTE PAIN

## 2020-02-14 NOTE — PROGRESS NOTES
Stopped to check on Mr Kanika Sherwood. His wife reports he has had his testing this morning but they have not heard the results yet. She said he will not be able to return home yet. I discussed depending on results possible CHF clinic, which they are open too.

## 2020-02-14 NOTE — PROGRESS NOTES
[facility] Progress Note    Patient:  Vidal Arroyo  YOB: 1955  Date of Service: 2/14/2020  MRN: [id number]   Acct: [de-identified]   Primary Care Physician: Shima Anaya MD  Advance Directive: Full Code  Admit Date: 2/12/2020       Hospital Day: 2      CHIEF COMPLAINT:     Chief Complaint   Patient presents with    Shortness of Breath       2/14/2020 7:21 AM  Subjective / Interval History:   02/14/2020  No acute overnight event reported. Shortness of breath improved. Status post EVELYN with cardioversion yesterday (02/13/2020). Pending 2-D echo and Lexiscan. Patient endorses overall improvement. Denies any acute complaints or distress at this time. 02/13/2020  Patient seen and examined this a.m. No acute overnight event reported. Patient laying comfortably in bed in no acute distress. Appears anxious about pending cardioversion. Reports diaphoresis this a.m. Family at bedside. Denies any other acute complaints or distress at this time. Brief History:   The patient is a 62 y.o. male with a history of history of CAD, DMT2, malignant HTN, CHF, presenting to the ED on account of 3-4 day history of progressively worsening moderate to severe shortness of breath, which worsens with minimal exertion. Associated symptoms reportedly include dizziness and lightheadedness, as well as intermittent cough. Patient also reports worsening bilateral lower extremity edema.     Denies any fever or chills.      Initial work-up significant for;  -Hypoxemic, with PaO2 of 57 mmHg on initial ABG. - Elevated ProBNP level 4,214   - Troponin level 0.03  Elevated d-dimer however VQ scan with low suspicion for PE  Patient was noted to have a new onset A. fib with RVR. Cardiology consulted from ED. Patient started on diltiazem ggt  Patient admitted for further work-up and management.       Review of Systems:   Review of Systems  ROS: 14 point review of systems is negative except as specifically addressed Ayah Tse MD   4 mg at 02/14/20 0628    glucose (GLUTOSE) 40 % oral gel 15 g  15 g Oral PRN Ayah Tse MD        dextrose 50 % IV solution  12.5 g Intravenous PRN Ayah Tse MD        glucagon (rDNA) injection 1 mg  1 mg Intramuscular PRN Ayah Tse MD        dextrose 5 % solution  100 mL/hr Intravenous PRN Ayah Tse MD        insulin glargine (LANTUS) injection vial 25 Units  25 Units Subcutaneous Nightly Ayah Tse MD   25 Units at 02/13/20 2032    insulin lispro (HUMALOG) injection vial 7 Units  7 Units Subcutaneous TID  Ayah Tse MD        insulin lispro (HUMALOG) injection vial 0-6 Units  0-6 Units Subcutaneous TID  Ayah Tse MD        insulin lispro (HUMALOG) injection vial 0-3 Units  0-3 Units Subcutaneous Nightly Ayah Tse MD   1 Units at 02/12/20 2152    furosemide (LASIX) injection 40 mg  40 mg Intravenous BID Ayah Tse MD   40 mg at 02/13/20 1806    aspirin chewable tablet 81 mg  81 mg Oral Daily Ayah Tse MD   81 mg at 02/13/20 1019    clopidogrel (PLAVIX) tablet 75 mg  75 mg Oral Daily Ayah Tse MD   75 mg at 02/13/20 1019    isosorbide mononitrate (IMDUR) extended release tablet 60 mg  60 mg Oral BID Ayah Tse MD   60 mg at 02/13/20 2007    rosuvastatin (CRESTOR) tablet 20 mg  20 mg Oral Daily Ayah Tse MD   20 mg at 02/13/20 6871    guaiFENesin (MUCINEX) extended release tablet 600 mg  600 mg Oral BID Ayah Tse MD   600 mg at 02/13/20 2007    guaiFENesin-dextromethorphan (ROBITUSSIN DM) 100-10 MG/5ML syrup 5 mL  5 mL Oral Q6H PRN Ayah Tse MD   5 mL at 02/14/20 0142         dextrose        pantoprazole  40 mg Oral BID AC    ipratropium-albuterol  1 ampule Inhalation Q4H WA    sotalol  80 mg Oral BID    carvedilol  6.25 mg Oral BID    sodium chloride flush  10 mL Intravenous 2 times per day    regadenoson  0.4 mg 0. 03 0.03     Pro-BNP: No results for input(s): BNP in the last 72 hours. INR:   Recent Labs     02/12/20  1010   INR 1.18     ABGs:   Lab Results   Component Value Date    PHART 7.420 02/12/2020    PO2ART 57.0 02/12/2020    UZL8IFO 44.0 02/12/2020     UA:No results for input(s): NITRITE, COLORU, PHUR, LABCAST, WBCUA, RBCUA, MUCUS, TRICHOMONAS, YEAST, BACTERIA, CLARITYU, SPECGRAV, LEUKOCYTESUR, UROBILINOGEN, BILIRUBINUR, BLOODU, GLUCOSEU, AMORPHOUS in the last 72 hours. Invalid input(s): Mart Puff      Culture Results:    No results for input(s): CXSURG in the last 720 hours. Blood Culture Recent: No results for input(s): BC in the last 720 hours. Cultures:   No results for input(s): CULTURE in the last 72 hours. No results for input(s): BC, Juanis Post in the last 72 hours. No results for input(s): CXSURG in the last 72 hours. Recent Labs     02/13/20  0309 02/14/20  0442   MG 2.2 2.2     Recent Labs     02/12/20  1010   AST 16   ALT 16   BILITOT 0.4   ALKPHOS 86         RAD:   Nm Lung Vent/perfusion (vq)    Result Date: 2/12/2020  PROCEDURE: PULMONARY VENTILATION AND PERFUSION IMAGING (V/Q SCAN) (CPT-4 CODE 00403)  INDICATION: Shortness of breath COMPARISON: Chest x-ray dated 2/12/2020 RADIOPHARMACEUTICAL AND TECHNIQUE: 9.6 mCi of 133-Xenon gas was administered via closed system ventilator. Posterior planar images of the lung were obtained during wash-in, equilibrium, and during wash-out. Subsequently 4.8 mCi of Tc-99m MAA (particulate) were administered intravenously and images of regional pulmonary perfusion were obtained in the anterior, posterior, posterior oblique and lateral projections. Images were evaluated for the presence of segmental perfusion abnormalities and pleural-based perfusion defects that had either matched or unmatched ventilation abnormalities. The concurrent chest radiograph demonstrated cardiomegaly with pulmonary congestion and developing pulmonary edema.  FINDINGS: Pulmonary arterial perfusion appears homogeneous, without discrete peripheral wedge-shaped defect. Similarly, the ventilation images appear homogeneous. Low suspicion for pulmonary embolus. Signed by Dr Bruce Rodrigues on 2/12/2020 12:58 PM    Xr Chest Portable    Result Date: 2/12/2020  EXAMINATION: XR CHEST PORTABLE 2/12/2020 9:52 AM HISTORY: Shortness of breath COMPARISON: 1/1/2018 FINDINGS: The heart is enlarged. The aorta is mildly tortuous. The pulmonary vasculature is indistinct. Diffuse increased interstitial prominence is noted. There is no appreciable pneumothorax or definite pleural effusion. Atherosclerotic calcifications are seen in the aorta. Cardiomegaly with findings suggestive of pulmonary vascular congestion and developing pulmonary edema. Signed by Dr Markos Caro on 2/12/2020 9:54 AM      Echo: 02/14/2020  Findings   Mitral Valve   Mitral valve leaflets are mildly thickened with preserved leaflet   mobility. Trace mitral regurgitation. Aortic Valve   Mildly thickened aortic valve leaflets with preserved leaflet mobility. Aortic valve appears to be tricuspid. No evidence of aortic stenosis; no evidence of aortic regurgitation. Tricuspid Valve   Trace tricuspid regurgitation . Pulmonic Valve   The pulmonic valve was not well visualized . Left Atrium   Mildly dilated left atrium. Left Ventricle   Left ventricular size is normal .   Moderate concentric left ventricular hypertrophy. Mild to moderately decreased ejection fraction. Left ventricular ejection fraction is estimated at 45%. Right Atrium   Mildly enlarged right atrium size. Right Ventricle   Normal right ventricular size with preserved RV function. Pericardial Effusion   No evidence of significant pericardial effusion is noted. Pleural Effusion   No evidence of pleural effusion. Miscellaneous   Aortic root dimension within normal limits. IVC dilated.      Allergies    - 2.25 x 28, distal 100% 2.0x28, anterior lateral apical hypokinesis           Acute on chronic diastolic CHF (congestive heart failure) ([facility]) (Chronic) 2/12/2020 Yes    HTN (hypertension) 2/12/2020 Yes    DM (diabetes mellitus) ([facility] [address] 2/12/2020 Yes          Principal Problem:    PAF (paroxysmal atrial fibrillation) ([facility])  Active Problems:    CAD (coronary artery disease)    Acute on chronic diastolic CHF (congestive heart failure) ([facility])    HTN (hypertension)    DM (diabetes mellitus) ([facility] [address]  Resolved Problems:    * No resolved hospital problems. *        New onset Paroxysmal Atrial fibrillation/flutter with RVR  · Continue diltiazem ggt as needed to keep HR < 110  · Cardiology following -appreciate recommendations  · S/p Transesophageal Echocardiogram, Guided Direct Current Cardioversion (02/13/2020). · Continue Apixaban 5 mg p.o. twice daily  · NM myocardia regadenoson pharmacologic stress tests (02/14/2020)  Impression: \"There is apical MI with minimal if any  ischemia, with a calculated ejection fraction of 36 % with a -1 % of ischemic burden. Suggest: clinical correlation\"  · Further management as per cardiology           HFrEF ,  with acute exacerbation (Acute on chronic)  · -Hypoxemic, with PaO2 of 57 mmHg on initial ABG.   · Elevated d-dimer however VQ scan with low suspicion for PE  · Chest x-ray showing \"Cardiomegaly with findings suggestive of pulmonary vascular congestion and developing pulmonary edema\"  · - Elevated ProBNP level 4,214   · - Troponin level 0.03 --> 0.03  · - Furosemide 60 mg IV ×1 dose given in the ED  · - Continue diureses;   · --> Furosemide  40 mg IV twice a day  · - Fluid goal of -1.5-2 L  · - Strict I's and O  · - Fluid restriction  · - Continue optimized medical management.        History of Type 2 Diabetes Mellitus   · - Uncontrolled  · - Hemoglobin A1C: 7.9% (02/12/2020)  · Inpatient Regimens to include;  · - Insulin Glargine (Lantus) 25 units subcu nightly  · - Insulin Lispro

## 2020-02-14 NOTE — PROGRESS NOTES
Nutrition Assessment    Type and Reason for Visit: Reassess    Nutrition Recommendations: follow for diet hx and CHF education    Nutrition Assessment: Pt appears  adequately nourished AEB subcutaneous fat and muscle mass. Pt remains NPO today for lexiscan. Will hold on CHF education until diet is advanced. Malnutrition Assessment:  · Malnutrition Status: No malnutrition  · Context: Acute illness or injury  · Findings of the 6 clinical characteristics of malnutrition (Minimum of 2 out of 6 clinical characteristics is required to make the diagnosis of moderate or severe Protein Calorie Malnutrition based on AND/ASPEN Guidelines):  1. Energy Intake- , Unable to assess    2. Weight Loss-No significant weight loss,    3. Fat Loss-No significant subcutaneous fat loss,    4. Muscle Loss-No significant muscle mass loss,    5. Fluid Accumulation-Mild fluid accumulation, Extremities  6.  Strength-Not measured    Nutrition Risk Level: Low    Nutrition Diagnosis:   · Problem: Altered nutrition-related lab values  · Etiology: related to Cardiac dysfunction     Signs and symptoms:  as evidenced by Lab values    Objective Information:  · Nutrition-Focused Physical Findings:  well nourished  · Wound Type: None  · Current Nutrition Therapies:  · Oral Diet Orders: NPO   · Oral Diet intake: NPO, 1-25%  · Oral Nutrition Supplement (ONS) Orders: None  · ONS intake: NPO     · Anthropometric Measures:  · Ht: 5' 11\" (180.3 cm)   · Current Body Wt: 253 lb 3 oz (114.8 kg)  · Admission Body Wt: 260 lb (117.9 kg)  · Usual Body Wt: 229 lb (103.9 kg)(7/2019)  · % Weight Change:  ,  10.6% weight increase in 7 months  · Ideal Body Wt: 172 lb (78 kg), % Ideal Body 147.2%  · BMI Classification: BMI 35.0 - 39.9 Obese Class II    Nutrition Interventions:   Continue NPO  Continued Inpatient Monitoring, Education Needed    Nutrition Evaluation:   · Evaluation: Goals set   · Goals: meet nutritional needs through po intake.   pro-BNP ecreased prior to discharge    · Monitoring: Nutrition Progression, Meal Intake, Skin Integrity, Weight, Pertinent Labs, Patient/Family Education      Electronically signed by Checo Carvajal MS, RD, LD on 2/14/20 at 7:51 AM    Contact Number: [phone]

## 2020-02-14 NOTE — PROGRESS NOTES
Pt's wife called earlier in the shift concerned about pt's cough, decreased urine output and distended abdomen. After looking up his output for the day, I explained to her that he has had approx 1400mL output since 0200, but that I would keep an eye on it tonight and bladder scan him if needed. Pt has had another 650 mL output since that phone call. Pt states that he has had this cough for a little while and his belly has been swollen for a couple months. I have given him the robitussin for his cough and it seems to have calmed down a little. Will continue to monitor his output closely.  Electronically signed by Suzie Rodrigues RN on 2/14/2020 at 12:39 AM

## 2020-02-14 NOTE — PROGRESS NOTES
Renee Goodman MD   5 mg at 02/13/20 1019    amLODIPine (NORVASC) tablet 10 mg  10 mg Oral Daily Keily Wong MD   10 mg at 02/13/20 0814    lisinopril (PRINIVIL;ZESTRIL) tablet 40 mg  40 mg Oral Daily Keily Wong MD   40 mg at 02/13/20 0815    magnesium hydroxide (MILK OF MAGNESIA) 400 MG/5ML suspension 30 mL  30 mL Oral Daily PRN Keily Wong MD        ondansetron [facility]) injection 4 mg  4 mg Intravenous Q6H PRN Keily Wong MD        glucose (GLUTOSE) 40 % oral gel 15 g  15 g Oral PRN Keily Wong MD        dextrose 50 % IV solution  12.5 g Intravenous PRN Keily Wong MD        glucagon (rDNA) injection 1 mg  1 mg Intramuscular PRN Keily Wong MD        dextrose 5 % solution  100 mL/hr Intravenous PRN Keily Wong MD        insulin glargine (LANTUS) injection vial 25 Units  25 Units Subcutaneous Nightly Keily Wong MD   25 Units at 02/12/20 2152    insulin lispro (HUMALOG) injection vial 7 Units  7 Units Subcutaneous TID  Keily Wong MD        insulin lispro (HUMALOG) injection vial 0-6 Units  0-6 Units Subcutaneous TID  Keily Wong MD        insulin lispro (HUMALOG) injection vial 0-3 Units  0-3 Units Subcutaneous Nightly Keily Wong MD   1 Units at 02/12/20 2152    furosemide (LASIX) injection 40 mg  40 mg Intravenous BID Keily Wong MD   40 mg at 02/13/20 1806    aspirin chewable tablet 81 mg  81 mg Oral Daily Keily Wong MD   81 mg at 02/13/20 1019    clopidogrel (PLAVIX) tablet 75 mg  75 mg Oral Daily Keily Wong MD   75 mg at 02/13/20 1019    isosorbide mononitrate (IMDUR) extended release tablet 60 mg  60 mg Oral BID Keily Wong MD   60 mg at 02/13/20 0815    rosuvastatin (CRESTOR) tablet 20 mg  20 mg Oral Daily Keily Wong MD   20 mg at 02/13/20 6914    guaiFENesin (MUCINEX) extended release tablet 600 mg  600 mg Oral BID Keily Wong MD   600 mg at unspecified type diabetes mellitus without mention of complication, not stated as uncontrolled      Past Surgical History:   Procedure Laterality Date    CARDIAC CATHETERIZATION      CHOLECYSTECTOMY      CORONARY ANGIOPLASTY WITH STENT PLACEMENT  1-16    2 JACQUELINE to LAD    URETER STENT PLACEMENT      VASCULAR SURGERY  03- [facility]    Aortogram with bilateral selective renal artery injections    VASCULAR SURGERY  6/14/13 [facility]    Right carotid endarterectomy with Vascu-Guard patch repair. Right cervical carotid arteriograms after endarterectomy.  VASCULAR SURGERY  7/6/15 [facility]    Percutaneous cannulation, right common femoral artery, with a5 Nigerian sheath and later 6 Nigerian [facility] sheath. Suprarenal abdomianl aortagram.  Selective right renal arteriogram.  Right renal artery balloon angioplasty;/stent (Express 6 mm x 18mmballoon-expandable stent. . Completion suprarenal abdominal aortogram and selective right remal arteriogram. Mynx closure, right common femoral artery punctur     Family History   Problem Relation Age of Onset    High Blood Pressure Father     Cancer Father     High Blood Pressure Mother     High Blood Pressure Brother      Social History     Tobacco Use    Smoking status: Former Smoker     Packs/day: 0.25    Smokeless tobacco: Never Used   Substance Use Topics    Alcohol use: No          Review of Systems:    General:      Complaint / Symptom Yes / No / Description if Yes       Fatigue Yes:  chronic   Weight gain NA   Insomnia NA       Respiratory:        Complaint / Symptom Yes / No / Description if Yes       Cough No   Horseness NA       Cardiovascular:    Complaint / Symptom Yes / No / Description if Yes       Chest Pain No   Shortness of Air / Orthopnea Yes: chronic and stable   Presyncope / Syncope No   Palpitations No         Objective:    /86   Pulse 70   Temp 97.6 °F (36.4 °C) (Temporal)   Resp 20   Ht 5' 11\" (1.803 m)   Wt 250 lb 9.6 oz (113.7 kg)   SpO2 92%   BMI 34.95

## 2020-02-14 NOTE — PROGRESS NOTES
Pt concerned about not getting his meds for reflux and breathing txs that he does at home. Notified hospitalist. Telephone order given to restart and duonebs q4 and prn.  Electronically signed by Juan A Solano RN on 2/14/2020 at 1:10 AM

## 2020-02-14 NOTE — PROGRESS NOTES
[address]    Daily HOSPITAL Progress Note                            Date:  2/14/20  Patient: Carlo Mcconnell  Admission:  2/12/2020  9:25 AM  Admit DX: Atrial fibrillation with RVR [facility]) [I48.91]  Age:  62 y.o., 1955        Date of Admission 2/12/2020  9:25 AM   Hospital Length of Stay  LOS: 2 days            I personally saw the patient and rounded with:  Alivia Pérez RN on 2/14/20      The observations documented in this note, including the assessment and plan are mine         Reason for initial evaluation or the patient's initial complaint    1. Reason for Hospital Admission: Atrial flutter        2. Reason for Cardiology Consultation: Atrial flutter         SUBJECTIVE:      Chief Complaint / Reason for the Visit   Follow up of:  Atrial flutter and vol overloaded and systemic arterial hypertension    Family present and in room during examination:  Yes: wife      Specialty Problems        Cardiology Problems    CAD (coronary artery disease)        * (Principal) PAF (paroxysmal atrial fibrillation) (HCC)        HTN (hypertension)        Aneurysm of thoracic aorta ([address]        Renal artery stenosis ([address]        Carotid artery stenosis        Peripheral vascular disease ([address]        Atrial septal aneurysm        Malignant hypertension        Apical myocardial infarction ([address]        Acute on chronic diastolic CHF (congestive heart failure) ([address]        Congestive heart failure ([address]              Current Status Today According to the patient:  \"ok\"    Subjective:  Mr. Carlo Mcconnell is generally feeling unchanged. better    Mr. Carlo Mcconnell has the following cardiac complaints / symptoms today:    1. Atrial flutter, now NsR    2. CAD, lexiscan + for apical MI, not a lot of ischemia but did have stents placed in the LAD in the past (2016)    3.  Hypertension    The blood pressure for the lastr 36 hours has been:  Systolic (97FNV), EIS:452 , Min:96 , VQJ:545    Diastolic (15EIM), INH:11, Min:65, Max:90        Oscar Mo is a 62 y.o. male with the following history as recorded in [facility]:    Patient Active Problem List    Diagnosis Date Noted    PAF (paroxysmal atrial fibrillation) ([facility] 02/12/2020     Priority: High    CAD (coronary artery disease) 01/10/2016     Priority: High    Acute on chronic diastolic CHF (congest[facility]) [facility] 01/11/2016     Priority: Low    Congestive heart failure (HCC)      Priority: Low    Apical myocardial infarction ([facility]      Priority: Low    Atrial septal aneurysm 01/09/2016     Priority: Low    Malignant hypertension      Priority: Low    Abnormal EKG      Priority: Low    Diabetes mellitus type I ([facility] 01/07/2016     Priority: Low    Peripheral vascular disease ([facility]      Priority: Low    Carotid artery stenosis 06/26/2013     Priority: Low    Renal artery stenosis (HCC) 03/08/2013     Priority: Low    Aneurysm of thoracic aorta ([facility] 02/21/2013     Priority: Low    HTN (hypertension) 02/04/2013     Priority: Low    DM (diabetes mellitus) ([facility] 02/04/2013     Priority: Low    GERD (gastroesophageal reflux disease) 02/04/2013     Priority: Low     Current Facility-Administered Medications   Medication Dose Route Frequency Provider Last Rate Last Dose    pantoprazole (PROTONIX) tablet 40 mg  40 mg Oral BID AC Tito Guerrero MD   40 mg at 02/14/20 0142    ipratropium-albuterol (DUONEB) nebulizer solution 1 ampule  1 ampule Inhalation Q4H WA Tito Guerrero MD   1 ampule at 02/14/20 1054    acetaminophen (TYLENOL) tablet 650 mg  650 mg Oral Q6H PRN Keily Wong MD        sotalol (BETAPACE) tablet 80 mg  80 mg Oral BID Lydia Diallo MD   80 mg at 02/14/20 1009    carvedilol (COREG) tablet 6.25 mg  6.25 mg Oral BID Lydia Diallo MD   6.25 mg at 02/14/20 1009    sodium chloride flush 0.9 % injection 10 mL  10 mL Intravenous 2 times per day Lydia Diallo MD   10 mL at 02/14/20 1010    sodium chloride flush 0.9 % injection 10 mL  10 mL Intravenous PRN Rona Dozier MD   10 mL at 02/14/20 2328    regadenoson (LEXISCAN) injection 0.4 mg  0.4 mg Intravenous Once Rona Dozier MD        apixaban [facility]) tablet 5 mg  5 mg Oral BID Rona Dozier MD   5 mg at 02/14/20 1009    amLODIPine (NORVASC) tablet 10 mg  10 mg Oral Daily Liliana Wesley MD   10 mg at 02/14/20 1009    lisinopril (PRINIVIL;ZESTRIL) tablet 40 mg  40 mg Oral Daily Liliana Wesley MD   40 mg at 02/14/20 1008    magnesium hydroxide (MILK OF MAGNESIA) 400 MG/5ML suspension 30 mL  30 mL Oral Daily PRN Liliana Wesley MD        [facility]) injection 4 mg  4 mg Intravenous Q6H PRN Liliana Wesley MD   4 mg at 02/14/20 0628    glucose (GLUTOSE) 40 % oral gel 15 g  15 g Oral PRN Liliana Wesley MD        dextrose 50 % IV solution  12.5 g Intravenous PRN Liliana Wesley MD        glucagon (rDNA) injection 1 mg  1 mg Intramuscular PRN Liliana Wesley MD        dextrose 5 % solution  100 mL/hr Intravenous PRN Liliana Wesley MD        insulin glargine (LANTUS) injection vial 25 Units  25 Units Subcutaneous Nightly Liliana Wesley MD   25 Units at 02/13/20 2032    insulin lispro (HUMALOG) injection vial 7 Units  7 Units Subcutaneous TID JUD Wesley MD        insulin lispro (HUMALOG) injection vial 0-6 Units  0-6 Units Subcutaneous TID JUD Wesley MD        insulin lispro (HUMALOG) injection vial 0-3 Units  0-3 Units Subcutaneous Nightly Liliana Wesley MD   1 Units at 02/12/20 2152    furosemide (LASIX) injection 40 mg  40 mg Intravenous BID Liliana Wesley MD   40 mg at 02/14/20 1009    aspirin chewable tablet 81 mg  81 mg Oral Daily Liliana Wesley MD   81 mg at 02/14/20 1009    clopidogrel (PLAVIX) tablet 75 mg  75 mg Oral Daily Liliana Wesley MD   75 mg at 02/14/20 1008    isosorbide mononitrate (IMDUR) extended release tablet 60 mg  60 mg Oral BID Liliana Wesley MD   60 mg at 02/14/20 1008    rosuvastatin (CRESTOR) tablet 20 mg  20 mg Oral Daily Rosalino Gutierrez MD   20 mg at 02/14/20 1009    guaiFENesin [facility]) extended release tablet 600 mg  600 mg Oral BID Rosalino Gutierrez MD   600 mg at 02/14/20 1008    guaiFENesin-dextromethorphan (ROBITUSSIN DM) 100-10 MG/5ML syrup 5 mL  5 mL Oral Q6H PRN Rosalino Gutierrez MD   5 mL at 02/14/20 0142     Allergies: Hydralazine and Morphine  Past Medical History:   Diagnosis Date    Acute [facility] failure, unspecified ([facility]     Anxiety state, unspecified     Atrial septal aneurysm 1/9/2016    Blood circulation, collateral     Body mass index 30.0-30.9, adult     CAD (coronary artery disease) 1/10/2016    1/9/16  lexiscan  Positive for apical MI + myocardial ischemia, EF 42%, intermediate risk findings, AUC indication 16, AUC score 7 1/10/16  Cath  3 lesions in the LAD:  Mid: 70% 2.25x23, mid 90% 2.25 x 28, distal 100% 2.0x28, anterior lateral apical hypokinesis, EF 45%    Calculus of kidney     Carotid artery stenosis 6/26/2013    Cellulitis and abscess of hand, except fingers and thumb     Cerebral artery occlusion with cerebral infarction ([facility]     15 years ago    Chronic airway obstruction, not elsewhere classified     Chronic kidney disease, unspecified     Congestive heart failure, unspecified     Diabetes mellitus type II     Diverticulosis of colon (without mention of hemorrhage)     Encounter for long-term (current) use of insulin (HCC)     Esophageal reflux     Family history of ischemic heart disease     Gastric ulcer, unspecified as acute or chronic, without mention of hemorrhage, perforation, or obstruction     Hyperlipemia     Hypertension     Internal hemorrhoids without mention of complication     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, or unspecified(403.00)     Obesity, unspecified     Other specified cardiac dysrhythmias(427.89)     Peripheral improved   Presyncope / Syncope No   Palpitations No         Objective:    BP (!) 145/76   Pulse 60   Temp 97.7 °F (36.5 °C) (Temporal)   Resp 16   Ht 5' 11\" (1.803 m)   Wt 253 lb 3.2 oz (114.9 kg)   SpO2 97%   BMI 35.31 kg/m² ,     Intake/Output Summary (Last 24 hours) at 2/14/2020 1208  Last data filed at 2/14/2020 0032  Gross per 24 hour   Intake 1480 ml   Output 650 ml   Net 830 ml       GENERAL - well developed and well nourished, is an active participant in this examination  HEENT -  PERRLA, Hearing appears normal, conjunctiva and lids are normal, ears and nose appear normal  NECK - no thyromegaly, no JVD, trachea is in the midline  CARDIOVASCULAR - PMI is in the left mid line clavicular position, Normal S1 and S2 with a grade 1/6 systolic murmur. No S3 or S4    PULMONARY - No respiratory distress. scattered wheezes and rales. Breath sounds in both  lung fields are Decreased  ABDOMEN  - soft, non tender, no rebound, no hepatomegaly or splenomegaly  MUSCULOSKELETAL  - Prone/Supine, digitals and nails are without clubbing or cyanosis  EXTREMITIES - 1+ edema  NEUROLOGIC - cranial nerves, II-XII, are normal  SKIN - turgor is normal, no rash  PSYCHIATRIC - normal mood and affect, alert and orientated x 3, judgement and insight appear appropriate      ASSESSMENT:    ALL THE CARDIOLOGY PROBLEMS ARE LISTED ABOVE; HOWEVER, THE FOLLOWING SPECIFIC CARDIAC PROBLEMS / CONDITIONS WERE ADDRESSED AND TREATED DURING THE OFFICE VISIT TODAY:                                                                                            MEDICAL DECISION MAKING                   Cardiac Specific Problem / Diagnosis   Discussion and Data Reviewed Diagnostic or Therapeutic Procedures Ordered Management Options Selected                 1.  Presenting problem / symptom     Paroxymal atrial fibrillation  are worsening    Has been going on for the last 2 - 3 days and associated with PND and weight gain of about 20 pounds       Yes: EVELYN / DCCV Continue current medications:      Yes:                  2. CAD Initial presentation during this evaluation    Review and summation of old records:     1/9/16  lexiscan  Positive for apical MI + myocardial ischemia, EF 42%, intermediate risk findings, AUC indication 16, AUC score 7  1/10/16  Cath  3 lesions in the LAD:  Mid: 70% 2.25x23, mid 90% 2.25 x 28, distal 100% 2.0x28, anterior lateral apical hypokinesis    No Continue current medications:     Yes:                  3. Concern regarding systemic blood pressure Initial presentation during this evaluation The blood pressure for the lastr 36 hours has been:  Systolic (50TND), PZJ:225 , Min:96 , NXJ:092    Diastolic (74JFK), ZYI:22, Min:65, Max:90          No Continue current medications:        yes         CONSIDERATIONS, THOUGHTS, AND PLANS:    1. Continue present medications except for changes as noted above  2. Continue to monitor rhythm  3. Further orders per clinical course. 4. Can diuresis as an outpatient  5. Outpatient cath arranged  6. Echo pending           Discussed with patient and spouse and nursing.     Electronically signed by Chandrakant Desir MD on 2/14/20        [facility] [place]

## 2020-02-15 LAB
ANION GAP SERPL CALCULATED.3IONS-SCNC: 12 MMOL/L (ref 7–19)
ANION GAP SERPL CALCULATED.3IONS-SCNC: 15 MMOL/L (ref 7–19)
BUN BLDV-MCNC: 29 MG/DL (ref 8–23)
BUN BLDV-MCNC: 29 MG/DL (ref 8–23)
CALCIUM SERPL-MCNC: 9 MG/DL (ref 8.8–10.2)
CALCIUM SERPL-MCNC: 9.1 MG/DL (ref 8.8–10.2)
CHLORIDE BLD-SCNC: 97 MMOL/L (ref 98–111)
CHLORIDE BLD-SCNC: 98 MMOL/L (ref 98–111)
CO2: 27 MMOL/L (ref 22–29)
CO2: 30 MMOL/L (ref 22–29)
CREAT SERPL-MCNC: 2 MG/DL (ref 0.5–1.2)
CREAT SERPL-MCNC: 2.1 MG/DL (ref 0.5–1.2)
GFR NON-AFRICAN AMERICAN: 32
GFR NON-AFRICAN AMERICAN: 34
GLUCOSE BLD-MCNC: 117 MG/DL (ref 70–99)
GLUCOSE BLD-MCNC: 124 MG/DL (ref 70–99)
GLUCOSE BLD-MCNC: 134 MG/DL (ref 74–109)
GLUCOSE BLD-MCNC: 147 MG/DL (ref 70–99)
GLUCOSE BLD-MCNC: 84 MG/DL (ref 70–99)
GLUCOSE BLD-MCNC: 93 MG/DL (ref 74–109)
MAGNESIUM: 2.2 MG/DL (ref 1.6–2.4)
PERFORMED ON: ABNORMAL
PERFORMED ON: NORMAL
POTASSIUM SERPL-SCNC: 3.6 MMOL/L (ref 3.5–5)
POTASSIUM SERPL-SCNC: 4.1 MMOL/L (ref 3.5–5)
PRO-BNP: 910 PG/ML (ref 0–900)
SODIUM BLD-SCNC: 139 MMOL/L (ref 136–145)
SODIUM BLD-SCNC: 140 MMOL/L (ref 136–145)

## 2020-02-15 PROCEDURE — 6370000000 HC RX 637 (ALT 250 FOR IP): Performed by: INTERNAL MEDICINE

## 2020-02-15 PROCEDURE — 6370000000 HC RX 637 (ALT 250 FOR IP): Performed by: HOSPITALIST

## 2020-02-15 PROCEDURE — 2580000003 HC RX 258: Performed by: INTERNAL MEDICINE

## 2020-02-15 PROCEDURE — 36415 COLL VENOUS BLD VENIPUNCTURE: CPT

## 2020-02-15 PROCEDURE — 2500000003 HC RX 250 WO HCPCS: Performed by: INTERNAL MEDICINE

## 2020-02-15 PROCEDURE — 83735 ASSAY OF MAGNESIUM: CPT

## 2020-02-15 PROCEDURE — 2700000000 HC OXYGEN THERAPY PER DAY

## 2020-02-15 PROCEDURE — 94640 AIRWAY INHALATION TREATMENT: CPT

## 2020-02-15 PROCEDURE — 83880 ASSAY OF NATRIURETIC PEPTIDE: CPT

## 2020-02-15 PROCEDURE — 2140000000 HC CCU INTERMEDIATE R&B

## 2020-02-15 PROCEDURE — 80048 BASIC METABOLIC PNL TOTAL CA: CPT

## 2020-02-15 PROCEDURE — 82948 REAGENT STRIP/BLOOD GLUCOSE: CPT

## 2020-02-15 RX ORDER — BUMETANIDE 0.25 MG/ML
1 INJECTION, SOLUTION INTRAMUSCULAR; INTRAVENOUS 2 TIMES DAILY
Status: DISCONTINUED | OUTPATIENT
Start: 2020-02-15 | End: 2020-02-18

## 2020-02-15 RX ADMIN — BUMETANIDE 1 MG: 0.25 INJECTION INTRAMUSCULAR; INTRAVENOUS at 12:51

## 2020-02-15 RX ADMIN — CARVEDILOL 6.25 MG: 6.25 TABLET, FILM COATED ORAL at 21:20

## 2020-02-15 RX ADMIN — BUMETANIDE 1 MG: 0.25 INJECTION INTRAMUSCULAR; INTRAVENOUS at 22:31

## 2020-02-15 RX ADMIN — ASPIRIN 81 MG 81 MG: 81 TABLET ORAL at 08:26

## 2020-02-15 RX ADMIN — GUAIFENESIN 600 MG: 600 TABLET, EXTENDED RELEASE ORAL at 22:30

## 2020-02-15 RX ADMIN — APIXABAN 5 MG: 5 TABLET, FILM COATED ORAL at 22:30

## 2020-02-15 RX ADMIN — INSULIN LISPRO 7 UNITS: 100 INJECTION, SOLUTION INTRAVENOUS; SUBCUTANEOUS at 08:37

## 2020-02-15 RX ADMIN — IPRATROPIUM BROMIDE AND ALBUTEROL SULFATE 1 AMPULE: .5; 3 SOLUTION RESPIRATORY (INHALATION) at 07:37

## 2020-02-15 RX ADMIN — ROSUVASTATIN CALCIUM 20 MG: 10 TABLET, FILM COATED ORAL at 08:27

## 2020-02-15 RX ADMIN — IPRATROPIUM BROMIDE AND ALBUTEROL SULFATE 1 AMPULE: .5; 3 SOLUTION RESPIRATORY (INHALATION) at 11:39

## 2020-02-15 RX ADMIN — PANTOPRAZOLE SODIUM 40 MG: 40 TABLET, DELAYED RELEASE ORAL at 17:00

## 2020-02-15 RX ADMIN — APIXABAN 5 MG: 5 TABLET, FILM COATED ORAL at 08:30

## 2020-02-15 RX ADMIN — IPRATROPIUM BROMIDE AND ALBUTEROL SULFATE 1 AMPULE: .5; 3 SOLUTION RESPIRATORY (INHALATION) at 18:35

## 2020-02-15 RX ADMIN — GUAIFENESIN AND DEXTROMETHORPHAN 5 ML: 100; 10 SYRUP ORAL at 07:40

## 2020-02-15 RX ADMIN — AMLODIPINE BESYLATE 10 MG: 10 TABLET ORAL at 08:26

## 2020-02-15 RX ADMIN — ISOSORBIDE MONONITRATE 60 MG: 60 TABLET, EXTENDED RELEASE ORAL at 22:30

## 2020-02-15 RX ADMIN — ISOSORBIDE MONONITRATE 60 MG: 60 TABLET, EXTENDED RELEASE ORAL at 08:27

## 2020-02-15 RX ADMIN — LISINOPRIL 40 MG: 20 TABLET ORAL at 08:27

## 2020-02-15 RX ADMIN — INSULIN LISPRO 7 UNITS: 100 INJECTION, SOLUTION INTRAVENOUS; SUBCUTANEOUS at 12:52

## 2020-02-15 RX ADMIN — Medication 25 UNITS: at 22:32

## 2020-02-15 RX ADMIN — SOTALOL HYDROCHLORIDE 80 MG: 80 TABLET ORAL at 08:27

## 2020-02-15 RX ADMIN — PANTOPRAZOLE SODIUM 40 MG: 40 TABLET, DELAYED RELEASE ORAL at 07:40

## 2020-02-15 RX ADMIN — CARVEDILOL 6.25 MG: 6.25 TABLET, FILM COATED ORAL at 08:26

## 2020-02-15 RX ADMIN — CLOPIDOGREL BISULFATE 75 MG: 75 TABLET, FILM COATED ORAL at 08:27

## 2020-02-15 RX ADMIN — GUAIFENESIN AND DEXTROMETHORPHAN 5 ML: 100; 10 SYRUP ORAL at 18:13

## 2020-02-15 RX ADMIN — GUAIFENESIN 600 MG: 600 TABLET, EXTENDED RELEASE ORAL at 08:27

## 2020-02-15 RX ADMIN — INSULIN LISPRO 7 UNITS: 100 INJECTION, SOLUTION INTRAVENOUS; SUBCUTANEOUS at 17:30

## 2020-02-15 RX ADMIN — Medication 10 ML: at 08:27

## 2020-02-15 RX ADMIN — SOTALOL HYDROCHLORIDE 80 MG: 80 TABLET ORAL at 22:30

## 2020-02-15 RX ADMIN — Medication 10 ML: at 21:20

## 2020-02-15 RX ADMIN — IPRATROPIUM BROMIDE AND ALBUTEROL SULFATE 1 AMPULE: .5; 3 SOLUTION RESPIRATORY (INHALATION) at 15:52

## 2020-02-15 ASSESSMENT — PAIN SCALES - GENERAL
PAINLEVEL_OUTOF10: 0

## 2020-02-15 NOTE — PROGRESS NOTES
RVR.  Cardiology consulted from ED. Patient started on diltiazem ggt  Patient admitted for further work-up and management. Review of Systems:   Review of Systems  ROS: 14 point review of systems is negative except as specifically addressed above.     DIET GENERAL; Low Sodium (2 GM)    Intake/Output Summary (Last 24 hours) at 2/15/2020 1019  Last data filed at 2/15/2020 4838  Gross per 24 hour   Intake 860 ml   Output 975 ml   Net -115 ml       Medications:   dextrose       Current Facility-Administered Medications   Medication Dose Route Frequency Provider Last Rate Last Dose    bumetanide (BUMEX) injection 1 mg  1 mg Intravenous BID Pilar Cruz MD        pantoprazole (PROTONIX) tablet 40 mg  40 mg Oral BID AC Jerrell Kerr MD   40 mg at 02/15/20 0740    ipratropium-albuterol (DUONEB) nebulizer solution 1 ampule  1 ampule Inhalation Q4H WA Jerrell Kerr MD   1 ampule at 02/15/20 0737    acetaminophen (TYLENOL) tablet 650 mg  650 mg Oral Q6H PRN Pilar Cruz MD   650 mg at 02/14/20 2157    sotalol (BETAPACE) tablet 80 mg  80 mg Oral BID Gurpreet French MD   80 mg at 02/15/20 0827    carvedilol (COREG) tablet 6.25 mg  6.25 mg Oral BID Gurpreet French MD   6.25 mg at 02/15/20 1747    sodium chloride flush 0.9 % injection 10 mL  10 mL Intravenous 2 times per day Gurpreet French MD   10 mL at 02/15/20 0827    sodium chloride flush 0.9 % injection 10 mL  10 mL Intravenous PRN Gurpreet French MD   10 mL at 02/14/20 7405    regadenoson (LEXISCAN) injection 0.4 mg  0.4 mg Intravenous Once Gurpreet French MD        apixaban (ELIQUIS) tablet 5 mg  5 mg Oral BID Gurpreet French MD   5 mg at 02/15/20 0830    amLODIPine (NORVASC) tablet 10 mg  10 mg Oral Daily Pilar Cruz MD   10 mg at 02/15/20 0826    lisinopril (PRINIVIL;ZESTRIL) tablet 40 mg  40 mg Oral Daily Pilar Cruz MD   40 mg at 02/15/20 0827    magnesium hydroxide (MILK OF MAGNESIA) 400 MG/5ML suspension 30 mL  30 mL Oral Daily PRN Anatoly Carrillo MD        [facility]dansetron [facility]) injection 4 mg  4 mg Intravenous Q6H PRN Anatoly Carrillo MD   4 mg at 02/14/20 0628    glucose (GLUTOSE) 40 % oral gel 15 g  15 g Oral PRN Anatoly Carrillo MD        dextrose 50 % IV solution  12.5 g Intravenous PRN Anatoly Carrillo MD        glucagon (rDNA) injection 1 mg  1 mg Intramuscular PRN Anatoly Carrillo MD        dextrose 5 % solution  100 mL/hr Intravenous PRN Anatoly Carrillo MD        insulin glargine (LANTUS) injection vial 25 Units  25 Units Subcutaneous Nightly Anatoly Carrillo MD   25 Units at 02/14/20 2158    insulin lispro (HUMALOG) injection vial 7 Units  7 Units Subcutaneous TID  Anatoly Carrillo MD   7 Units at 02/15/20 0837    insulin lispro (HUMALOG) injection vial 0-6 Units  0-6 Units Subcutaneous TID  Anatoly Carrillo MD   2 Units at 02/14/20 1811    insulin lispro (HUMALOG) injection vial 0-3 Units  0-3 Units Subcutaneous Nightly Anatoly Carrillo MD   1 Units at 02/14/20 2201    aspirin chewable tablet 81 mg  81 mg Oral Daily Anatoly Carrillo MD   81 mg at 02/15/20 7972    clopidogrel (PLAVIX) tablet 75 mg  75 mg Oral Daily Anatoly Carrillo MD   75 mg at 02/15/20 0827    isosorbide mononitrate (IMDUR) extended release tablet 60 mg  60 mg Oral BID Anatoly Carrillo MD   60 mg at 02/15/20 0827    rosuvastatin (CRESTOR) tablet 20 mg  20 mg Oral Daily Anatoly Carrillo MD   20 mg at 02/15/20 0827    guaiFENesin (MUCINEX) extended release tablet 600 mg  600 mg Oral BID Anatoly Carrillo MD   600 mg at 02/15/20 0827    guaiFENesin-dextromethorphan (ROBITUSSIN DM) 100-10 MG/5ML syrup 5 mL  5 mL Oral Q6H PRN Anatoly Carrillo MD   5 mL at 02/15/20 0740         dextrose        bumetanide  1 mg Intravenous BID    pantoprazole  40 mg Oral BID AC    ipratropium-albuterol  1 ampule Inhalation Q4H WA    sotalol  80 mg Oral BID    carvedilol  6.25 mg Oral BID    sodium chloride flush  10 mL Intravenous 2 times per day    regadenoson  0.4 mg Intravenous Once    apixaban  5 mg Oral BID    amLODIPine  10 mg Oral Daily    lisinopril  40 mg Oral Daily    insulin glargine  25 Units Subcutaneous Nightly    insulin lispro  7 Units Subcutaneous TID WC    insulin lispro  0-6 Units Subcutaneous TID WC    insulin lispro  0-3 Units Subcutaneous Nightly    aspirin  81 mg Oral Daily    clopidogrel  75 mg Oral Daily    isosorbide mononitrate  60 mg Oral BID    rosuvastatin  20 mg Oral Daily    guaiFENesin  600 mg Oral BID     acetaminophen, sodium chloride flush, magnesium hydroxide, ondansetron, glucose, dextrose, glucagon (rDNA), dextrose, guaiFENesin-dextromethorphan  DIET GENERAL; Low Sodium (2 GM)       Labs:   CBC with DIFF:  No results for input(s): WBC, RBC, HGB, HCT, MCV, MCH, MCHC, RDW, PLT, MPV, NEUTOPHILPCT, LYMPHOPCT, MONOPCT, EOSRELPCT, BASOPCT, NEUTROABS, LYMPHSABS, MONOSABS, EOSABS, BASOSABS in the last 72 hours. CMP/BMP:  Recent Labs     02/13/20  0309 02/14/20  0442 02/15/20  0459    140 140   K 3.5 3.4* 4.1    99 98   CO2 27 27 30*   ANIONGAP 14 14 12   GLUCOSE 146* 178* 93   BUN 22 25* 29*   CREATININE 1.8* 1.8* 2.1*   LABGLOM 38* 38* 32*   CALCIUM 8.8 8.8 9.0         CRP:  No results for input(s): CRP in the last 72 hours. Sed Rate:  No results for input(s): SEDRATE in the last 72 hours. HgBA1c:  No components found for: HGBA1C  FLP:    Lab Results   Component Value Date    TRIG 99 02/13/2020    HDL 35 02/13/2020    LDLCALC 41 02/13/2020    LDLDIRECT 153 06/12/2013     TSH:    Lab Results   Component Value Date    TSH 0.96 05/28/2015     Troponin T:   Recent Labs     02/12/20  1235   TROPONINI 0.03     Pro-BNP: No results for input(s): BNP in the last 72 hours. INR:   No results for input(s): INR in the last 72 hours.   ABGs:   Lab Results   Component Value Date    PHART 7.420 02/12/2020    PO2ART 57.0 02/12/2020    YWQ7CCZ 44.0 02/12/2020     UA:No results for input(s): NITRITE, COLORU, PHUR, LABCAST, WBCUA, RBCUA, MUCUS, TRICHOMONAS, YEAST, BACTERIA, CLARITYU, SPECGRAV, LEUKOCYTESUR, UROBILINOGEN, BILIRUBINUR, BLOODU, GLUCOSEU, AMORPHOUS in the last 72 hours. Invalid input(s): Jeison Ross      Culture Results:    No results for input(s): CXSURG in the last 720 hours. Blood Culture Recent: No results for input(s): BC in the last 720 hours. Cultures:   No results for input(s): CULTURE in the last 72 hours. No results for input(s): BC, Kristyn Ego in the last 72 hours. No results for input(s): CXSURG in the last 72 hours. Recent Labs     02/13/20  0309 02/14/20  0442 02/15/20  0459   MG 2.2 2.2 2.2     No results for input(s): AST, ALT, ALB, BILITOT, ALKPHOS, ALB in the last 72 hours. RAD:   Nm Lung Vent/perfusion (vq)    Result Date: 2/12/2020  PROCEDURE: PULMONARY VENTILATION AND PERFUSION IMAGING (V/Q SCAN) (CPT-4 CODE 11518)  INDICATION: Shortness of breath COMPARISON: Chest x-ray dated 2/12/2020 RADIOPHARMACEUTICAL AND TECHNIQUE: 9.6 mCi of 133-Xenon gas was administered via closed system ventilator. Posterior planar images of the lung were obtained during wash-in, equilibrium, and during wash-out. Subsequently 4.8 mCi of Tc-99m MAA (particulate) were administered intravenously and images of regional pulmonary perfusion were obtained in the anterior, posterior, posterior oblique and lateral projections. Images were evaluated for the presence of segmental perfusion abnormalities and pleural-based perfusion defects that had either matched or unmatched ventilation abnormalities. The concurrent chest radiograph demonstrated cardiomegaly with pulmonary congestion and developing pulmonary edema. FINDINGS:  Pulmonary arterial perfusion appears homogeneous, without discrete peripheral wedge-shaped defect. Similarly, the ventilation images appear homogeneous. Low suspicion for pulmonary embolus.  Signed by Dr Verner Crow on 2/12/2020 12:58 PM    Xr Chest Portable    Result Date: 2/12/2020  EXAMINATION: XR CHEST PORTABLE 2/12/2020 9:52 AM HISTORY: Shortness of breath COMPARISON: 1/1/2018 FINDINGS: The heart is enlarged. The aorta is mildly tortuous. The pulmonary vasculature is indistinct. Diffuse increased interstitial prominence is noted. There is no appreciable pneumothorax or definite pleural effusion. Atherosclerotic calcifications are seen in the aorta. Cardiomegaly with findings suggestive of pulmonary vascular congestion and developing pulmonary edema. Signed by Dr Coral Quispe on 2/12/2020 9:54 AM      Echo: 02/14/2020  Findings   Mitral Valve   Mitral valve leaflets are mildly thickened with preserved leaflet   mobility. Trace mitral regurgitation. Aortic Valve   Mildly thickened aortic valve leaflets with preserved leaflet mobility. Aortic valve appears to be tricuspid. No evidence of aortic stenosis; no evidence of aortic regurgitation. Tricuspid Valve   Trace tricuspid regurgitation . Pulmonic Valve   The pulmonic valve was not well visualized . Left Atrium   Mildly dilated left atrium. Left Ventricle   Left ventricular size is normal .   Moderate concentric left ventricular hypertrophy. Mild to moderately decreased ejection fraction. Left ventricular ejection fraction is estimated at 45%. Right Atrium   Mildly enlarged right atrium size. Right Ventricle   Normal right ventricular size with preserved RV function. Pericardial Effusion   No evidence of significant pericardial effusion is noted. Pleural Effusion   No evidence of pleural effusion. Miscellaneous   Aortic root dimension within normal limits. IVC dilated. Allergies    - Hydralazine.     - Morphine.     M-Mode Measurements (cm)      LVIDd: 5.3 cm                          LVIDs: 4.3 cm   IVSd: 1.4 cm   LVPWd: 1.4 cm                          AO Root Dimension: 3 cm   % Ejection Fraction: 35 %              LA: 3.9 Regimens to include;  · - Insulin Glargine (Lantus) 25 units subcu nightly  · - Insulin Lispro (Humalog) 7 units subcu pre-meal 3 times a day  · - Insulin Lispro (Humalog) on a Low dose sliding scale  · - Monitor POC glucose, and adjusts insulin regimen accordingly based on daily insulin requirement.       # History of Essential Hypertension  · Uncontrolled  · Continue documented home regimen:  · Continue Amlodipine 10 mg p.o. daily  · Continue Isosorbide mononitrate (Imdur) 60 mg p.o. twice daily  · Continue Carvedilol 25 mg p.o. twice daily  · Lisinopril 40 mg p.o. twice daily  · Hydralazine 10 mg IV Q6H/PRN  For SBP> 180 and/or DP> 110   · Continue to monitor     Hx of CKD stage 3  · Baseline creatinine of 1.4 -1.5  · Creatinine level on presentation 1.7  · Right knee now slightly trending up  · Creatinine of 2.1 (02/15/2020)  · Likely from aggressive diuresis  · Switch furosemide, to bumetanide for now. · Continue to closely monitor BMP  · Avoid hypotension and nephrotoxins      Continue management of other chronic medical conditions - see above and orders. Advance Directive: Full Code    DIET GENERAL; Low Sodium (2 GM)     DVT prophylaxis:     Consults Made:   IP CONSULT TO CARDIOLOGY  IP CONSULT TO HEART FAILURE NURSE/COORDINATOR  IP CONSULT TO DIETITIAN  IP CONSULT TO SOCIAL WORK    Discharge planning: tbd    Time Spent is 35 mins in the examination, evaluation, counseling and review of medications, assessment and plan.      Electronically signed by   Ayah Tse MD, MPH,   Internal Medicine Hospitalist   2/15/2020 10:19 AM

## 2020-02-16 LAB
ANION GAP SERPL CALCULATED.3IONS-SCNC: 16 MMOL/L (ref 7–19)
BUN BLDV-MCNC: 25 MG/DL (ref 8–23)
CALCIUM SERPL-MCNC: 9.2 MG/DL (ref 8.8–10.2)
CHLORIDE BLD-SCNC: 96 MMOL/L (ref 98–111)
CO2: 28 MMOL/L (ref 22–29)
CREAT SERPL-MCNC: 1.8 MG/DL (ref 0.5–1.2)
GFR NON-AFRICAN AMERICAN: 38
GLUCOSE BLD-MCNC: 159 MG/DL (ref 70–99)
GLUCOSE BLD-MCNC: 165 MG/DL (ref 70–99)
GLUCOSE BLD-MCNC: 168 MG/DL (ref 70–99)
GLUCOSE BLD-MCNC: 202 MG/DL (ref 70–99)
GLUCOSE BLD-MCNC: 207 MG/DL (ref 74–109)
MAGNESIUM: 2.1 MG/DL (ref 1.6–2.4)
PERFORMED ON: ABNORMAL
POTASSIUM SERPL-SCNC: 3.6 MMOL/L (ref 3.5–5)
SODIUM BLD-SCNC: 140 MMOL/L (ref 136–145)

## 2020-02-16 PROCEDURE — 2140000000 HC CCU INTERMEDIATE R&B

## 2020-02-16 PROCEDURE — 2500000003 HC RX 250 WO HCPCS: Performed by: INTERNAL MEDICINE

## 2020-02-16 PROCEDURE — 80048 BASIC METABOLIC PNL TOTAL CA: CPT

## 2020-02-16 PROCEDURE — 6370000000 HC RX 637 (ALT 250 FOR IP): Performed by: INTERNAL MEDICINE

## 2020-02-16 PROCEDURE — 6370000000 HC RX 637 (ALT 250 FOR IP): Performed by: HOSPITALIST

## 2020-02-16 PROCEDURE — 82948 REAGENT STRIP/BLOOD GLUCOSE: CPT

## 2020-02-16 PROCEDURE — 6360000002 HC RX W HCPCS: Performed by: INTERNAL MEDICINE

## 2020-02-16 PROCEDURE — 94640 AIRWAY INHALATION TREATMENT: CPT

## 2020-02-16 PROCEDURE — 2580000003 HC RX 258: Performed by: INTERNAL MEDICINE

## 2020-02-16 PROCEDURE — 36415 COLL VENOUS BLD VENIPUNCTURE: CPT

## 2020-02-16 PROCEDURE — 83735 ASSAY OF MAGNESIUM: CPT

## 2020-02-16 RX ADMIN — INSULIN LISPRO 7 UNITS: 100 INJECTION, SOLUTION INTRAVENOUS; SUBCUTANEOUS at 12:15

## 2020-02-16 RX ADMIN — LISINOPRIL 40 MG: 20 TABLET ORAL at 09:03

## 2020-02-16 RX ADMIN — PANTOPRAZOLE SODIUM 40 MG: 40 TABLET, DELAYED RELEASE ORAL at 16:29

## 2020-02-16 RX ADMIN — APIXABAN 5 MG: 5 TABLET, FILM COATED ORAL at 21:20

## 2020-02-16 RX ADMIN — ASPIRIN 81 MG 81 MG: 81 TABLET ORAL at 09:04

## 2020-02-16 RX ADMIN — ONDANSETRON 4 MG: 2 INJECTION INTRAMUSCULAR; INTRAVENOUS at 10:40

## 2020-02-16 RX ADMIN — GUAIFENESIN 600 MG: 600 TABLET, EXTENDED RELEASE ORAL at 21:20

## 2020-02-16 RX ADMIN — Medication 10 ML: at 09:04

## 2020-02-16 RX ADMIN — SOTALOL HYDROCHLORIDE 80 MG: 80 TABLET ORAL at 21:20

## 2020-02-16 RX ADMIN — IPRATROPIUM BROMIDE AND ALBUTEROL SULFATE 1 AMPULE: .5; 3 SOLUTION RESPIRATORY (INHALATION) at 19:00

## 2020-02-16 RX ADMIN — INSULIN LISPRO 2 UNITS: 100 INJECTION, SOLUTION INTRAVENOUS; SUBCUTANEOUS at 09:07

## 2020-02-16 RX ADMIN — Medication 25 UNITS: at 21:20

## 2020-02-16 RX ADMIN — GUAIFENESIN AND DEXTROMETHORPHAN 5 ML: 100; 10 SYRUP ORAL at 09:16

## 2020-02-16 RX ADMIN — CARVEDILOL 6.25 MG: 6.25 TABLET, FILM COATED ORAL at 09:03

## 2020-02-16 RX ADMIN — IPRATROPIUM BROMIDE AND ALBUTEROL SULFATE 1 AMPULE: .5; 3 SOLUTION RESPIRATORY (INHALATION) at 06:55

## 2020-02-16 RX ADMIN — SOTALOL HYDROCHLORIDE 80 MG: 80 TABLET ORAL at 09:04

## 2020-02-16 RX ADMIN — GUAIFENESIN AND DEXTROMETHORPHAN 5 ML: 100; 10 SYRUP ORAL at 16:30

## 2020-02-16 RX ADMIN — INSULIN LISPRO 1 UNITS: 100 INJECTION, SOLUTION INTRAVENOUS; SUBCUTANEOUS at 12:17

## 2020-02-16 RX ADMIN — CLOPIDOGREL BISULFATE 75 MG: 75 TABLET, FILM COATED ORAL at 09:03

## 2020-02-16 RX ADMIN — APIXABAN 5 MG: 5 TABLET, FILM COATED ORAL at 09:04

## 2020-02-16 RX ADMIN — BUMETANIDE 1 MG: 0.25 INJECTION INTRAMUSCULAR; INTRAVENOUS at 09:03

## 2020-02-16 RX ADMIN — PANTOPRAZOLE SODIUM 40 MG: 40 TABLET, DELAYED RELEASE ORAL at 07:01

## 2020-02-16 RX ADMIN — INSULIN LISPRO 7 UNITS: 100 INJECTION, SOLUTION INTRAVENOUS; SUBCUTANEOUS at 09:13

## 2020-02-16 RX ADMIN — INSULIN LISPRO 1 UNITS: 100 INJECTION, SOLUTION INTRAVENOUS; SUBCUTANEOUS at 17:05

## 2020-02-16 RX ADMIN — ISOSORBIDE MONONITRATE 60 MG: 60 TABLET, EXTENDED RELEASE ORAL at 21:20

## 2020-02-16 RX ADMIN — ROSUVASTATIN CALCIUM 20 MG: 10 TABLET, FILM COATED ORAL at 09:03

## 2020-02-16 RX ADMIN — BUMETANIDE 1 MG: 0.25 INJECTION INTRAMUSCULAR; INTRAVENOUS at 21:37

## 2020-02-16 RX ADMIN — IPRATROPIUM BROMIDE AND ALBUTEROL SULFATE 1 AMPULE: .5; 3 SOLUTION RESPIRATORY (INHALATION) at 15:11

## 2020-02-16 RX ADMIN — GUAIFENESIN 600 MG: 600 TABLET, EXTENDED RELEASE ORAL at 09:03

## 2020-02-16 RX ADMIN — ISOSORBIDE MONONITRATE 60 MG: 60 TABLET, EXTENDED RELEASE ORAL at 09:03

## 2020-02-16 RX ADMIN — INSULIN LISPRO 7 UNITS: 100 INJECTION, SOLUTION INTRAVENOUS; SUBCUTANEOUS at 17:44

## 2020-02-16 RX ADMIN — Medication 10 ML: at 21:24

## 2020-02-16 RX ADMIN — AMLODIPINE BESYLATE 10 MG: 10 TABLET ORAL at 09:03

## 2020-02-16 RX ADMIN — CARVEDILOL 6.25 MG: 6.25 TABLET, FILM COATED ORAL at 21:20

## 2020-02-16 RX ADMIN — IPRATROPIUM BROMIDE AND ALBUTEROL SULFATE 1 AMPULE: .5; 3 SOLUTION RESPIRATORY (INHALATION) at 10:46

## 2020-02-16 ASSESSMENT — PAIN SCALES - GENERAL
PAINLEVEL_OUTOF10: 0

## 2020-02-16 NOTE — PROGRESS NOTES
Initial work-up significant for;  -Hypoxemic, with PaO2 of 57 mmHg on initial ABG. - Elevated ProBNP level 4,214   - Troponin level 0.03  Elevated d-dimer however VQ scan with low suspicion for PE  Patient was noted to have a new onset A. fib with RVR. Cardiology consulted from ED. Patient started on diltiazem ggt  Patient admitted for further work-up and management. Review of Systems:   Review of Systems  ROS: 14 point review of systems is negative except as specifically addressed above.     DIET CARB CONTROL; Low Sodium (2 GM)    Intake/Output Summary (Last 24 hours) at 2/16/2020 3363  Last data filed at 2/16/2020 0630  Gross per 24 hour   Intake 780 ml   Output 2065 ml   Net -1285 ml       Medications:   dextrose       Current Facility-Administered Medications   Medication Dose Route Frequency Provider Last Rate Last Dose    bumetanide (BUMEX) injection 1 mg  1 mg Intravenous BID Alex Barclay MD   1 mg at 02/15/20 2231    pantoprazole (PROTONIX) tablet 40 mg  40 mg Oral BID AC Erica Mendoza MD   40 mg at 02/15/20 1700    ipratropium-albuterol (DUONEB) nebulizer solution 1 ampule  1 ampule Inhalation Q4H WA Erica Mendoza MD   1 ampule at 02/15/20 1835    acetaminophen (TYLENOL) tablet 650 mg  650 mg Oral Q6H PRN Alex Barclay MD   650 mg at 02/14/20 2157    sotalol (BETAPACE) tablet 80 mg  80 mg Oral BID Brianna Kennedy MD   80 mg at 02/15/20 2230    carvedilol (COREG) tablet 6.25 mg  6.25 mg Oral BID Brianna Kennedy MD   6.25 mg at 02/15/20 2120    sodium chloride flush 0.9 % injection 10 mL  10 mL Intravenous 2 times per day Brianna Kennedy MD   10 mL at 02/15/20 2120    sodium chloride flush 0.9 % injection 10 mL  10 mL Intravenous PRN Brianna Kennedy MD   10 mL at 02/14/20 3445    regadenoson (LEXISCAN) injection 0.4 mg  0.4 mg Intravenous Once Brianna Kennedy MD        apixaban May[facility] Crease) tablet 5 mg  5 mg Oral BID Brianna Kennedy MD   5 mg at 02/15/20 2230    amLODIPine (NORVASC) tablet 10 mg  10 mg Oral Daily Liliana Wesley MD   10 mg at 02/15/20 0826    lisinopril (PRINIVIL;ZESTRIL) tablet 40 mg  40 mg Oral Daily Liliana Wesley MD   40 mg at 02/15/20 0827    magnesium hydroxide (MILK OF MAGNESIA) 400 MG/5ML suspension 30 mL  30 mL Oral Daily PRN Liliana Wesley MD        ondansetron [facility]) injection 4 mg  4 mg Intravenous Q6H PRN Liliana Wesley MD   4 mg at 02/14/20 0628    glucose (GLUTOSE) 40 % oral gel 15 g  15 g Oral PRN Liliana Wesley MD        dextrose 50 % IV solution  12.5 g Intravenous PRN Liliana Wesley MD        glucagon (rDNA) injection 1 mg  1 mg Intramuscular PRN Liliana Wesley MD        dextrose 5 % solution  100 mL/hr Intravenous PRN Liliana Wesley MD        insulin glargine (LANTUS) injection vial 25 Units  25 Units Subcutaneous Nightly Liliana Wesley MD   25 Units at 02/15/20 2232    insulin lispro (HUMALOG) injection vial 7 Units  7 Units Subcutaneous TID JUD Wesley MD   7 Units at 02/15/20 1730    insulin lispro (HUMALOG) injection vial 0-6 Units  0-6 Units Subcutaneous TID JUD Wesley MD   2 Units at 02/14/20 1811    insulin lispro (HUMALOG) injection vial 0-3 Units  0-3 Units Subcutaneous Nightly Liliana Wesley MD   1 Units at 02/14/20 2201    aspirin chewable tablet 81 mg  81 mg Oral Daily Liliana Wesley MD   81 mg at 02/15/20 0634    clopidogrel (PLAVIX) tablet 75 mg  75 mg Oral Daily Liliana Wesley MD   75 mg at 02/15/20 0827    isosorbide mononitrate (IMDUR) extended release tablet 60 mg  60 mg Oral BID Liliana Wesley MD   60 mg at 02/15/20 2230    rosuvastatin (CRESTOR) tablet 20 mg  20 mg Oral Daily Liliana Wesley MD   20 mg at 02/15/20 0827    guaiFENesin (MUCINEX) extended release tablet 600 mg  600 mg Oral BID Liliana Wesley MD   600 mg at 02/15/20 2230    guaiFENesin-dextromethorphan (ROBITUSSIN DM) 100-10 MG/5ML syrup 5 mL  5 mL Oral Q6H PRN Farhat Hayes MD   5 mL at 02/15/20 1813         dextrose        bumetanide  1 mg Intravenous BID    pantoprazole  40 mg Oral BID AC    ipratropium-albuterol  1 ampule Inhalation Q4H WA    sotalol  80 mg Oral BID    carvedilol  6.25 mg Oral BID    sodium chloride flush  10 mL Intravenous 2 times per day    regadenoson  0.4 mg Intravenous Once    apixaban  5 mg Oral BID    amLODIPine  10 mg Oral Daily    lisinopril  40 mg Oral Daily    insulin glargine  25 Units Subcutaneous Nightly    insulin lispro  7 Units Subcutaneous TID WC    insulin lispro  0-6 Units Subcutaneous TID WC    insulin lispro  0-3 Units Subcutaneous Nightly    aspirin  81 mg Oral Daily    clopidogrel  75 mg Oral Daily    isosorbide mononitrate  60 mg Oral BID    rosuvastatin  20 mg Oral Daily    guaiFENesin  600 mg Oral BID     acetaminophen, sodium chloride flush, magnesium hydroxide, ondansetron, glucose, dextrose, glucagon (rDNA), dextrose, guaiFENesin-dextromethorphan  DIET CARB CONTROL; Low Sodium (2 GM)       Labs:   CBC with DIFF:  No results for input(s): WBC, RBC, HGB, HCT, MCV, MCH, MCHC, RDW, PLT, MPV, NEUTOPHILPCT, LYMPHOPCT, MONOPCT, EOSRELPCT, BASOPCT, NEUTROABS, LYMPHSABS, MONOSABS, EOSABS, BASOSABS in the last 72 hours. CMP/BMP:  Recent Labs     02/14/20  0442 02/15/20  0459 02/15/20  1510    140 139   K 3.4* 4.1 3.6   CL 99 98 97*   CO2 27 30* 27   ANIONGAP 14 12 15   GLUCOSE 178* 93 134*   BUN 25* 29* 29*   CREATININE 1.8* 2.1* 2.0*   LABGLOM 38* 32* 34*   CALCIUM 8.8 9.0 9.1         CRP:  No results for input(s): CRP in the last 72 hours. Sed Rate:  No results for input(s): SEDRATE in the last 72 hours.       HgBA1c:  No components found for: HGBA1C  FLP:    Lab Results   Component Value Date    TRIG 99 02/13/2020    HDL 35 02/13/2020    LDLCALC 41 02/13/2020    LDLDIRECT 153 06/12/2013     TSH:    Lab Results   Component Value Date    TSH 0.96 05/28/2015     Troponin T:   No results for input(s): TROPONINI in the last 72 hours. Pro-BNP: No results for input(s): BNP in the last 72 hours. INR:   No results for input(s): INR in the last 72 hours. ABGs:   Lab Results   Component Value Date    PHART 7.420 02/12/2020    PO2ART 57.0 02/12/2020    CKY0FCT 44.0 02/12/2020     UA:No results for input(s): NITRITE, COLORU, PHUR, LABCAST, WBCUA, RBCUA, MUCUS, TRICHOMONAS, YEAST, BACTERIA, CLARITYU, SPECGRAV, LEUKOCYTESUR, UROBILINOGEN, BILIRUBINUR, BLOODU, GLUCOSEU, AMORPHOUS in the last 72 hours. Invalid input(s): Patrizia Cooper      Culture Results:    No results for input(s): CXSURG in the last 720 hours. Blood Culture Recent: No results for input(s): BC in the last 720 hours. Cultures:   No results for input(s): CULTURE in the last 72 hours. No results for input(s): BC, [place] in the last 72 hours. No results for input(s): CXSURG in the last 72 hours. Recent Labs     02/14/20  0442 02/15/20  0459   MG 2.2 2.2     No results for input(s): AST, ALT, ALB, BILITOT, ALKPHOS, ALB in the last 72 hours. RAD:   Nm Lung Vent/perfusion (vq)    Result Date: 2/12/2020  PROCEDURE: PULMONARY VENTILATION AND PERFUSION IMAGING (V/Q SCAN) (CPT-4 CODE 40545)  INDICATION: Shortness of breath COMPARISON: Chest x-ray dated 2/12/2020 RADIOPHARMACEUTICAL AND TECHNIQUE: 9.6 mCi of 133-Xenon gas was administered via closed system ventilator. Posterior planar images of the lung were obtained during wash-in, equilibrium, and during wash-out. Subsequently 4.8 mCi of Tc-99m MAA (particulate) were administered intravenously and images of regional pulmonary perfusion were obtained in the anterior, posterior, posterior oblique and lateral projections. Images were evaluated for the presence of segmental perfusion abnormalities and pleural-based perfusion defects that had either matched or unmatched ventilation abnormalities.   The concurrent chest radiograph demonstrated cardiomegaly with pulmonary congestion and developing pulmonary edema. FINDINGS:  Pulmonary arterial perfusion appears homogeneous, without discrete peripheral wedge-shaped defect. Similarly, the ventilation images appear homogeneous. Low suspicion for pulmonary embolus. Signed by Dr Aidan Root on 2/12/2020 12:58 PM    Xr Chest Portable    Result Date: 2/12/2020  EXAMINATION: XR CHEST PORTABLE 2/12/2020 9:52 AM HISTORY: Shortness of breath COMPARISON: 1/1/2018 FINDINGS: The heart is enlarged. The aorta is mildly tortuous. The pulmonary vasculature is indistinct. Diffuse increased interstitial prominence is noted. There is no appreciable pneumothorax or definite pleural effusion. Atherosclerotic calcifications are seen in the aorta. Cardiomegaly with findings suggestive of pulmonary vascular congestion and developing pulmonary edema. Signed by Dr Alice Haley on 2/12/2020 9:54 AM      Echo: 02/14/2020  Findings   Mitral Valve   Mitral valve leaflets are mildly thickened with preserved leaflet   mobility. Trace mitral regurgitation. Aortic Valve   Mildly thickened aortic valve leaflets with preserved leaflet mobility. Aortic valve appears to be tricuspid. No evidence of aortic stenosis; no evidence of aortic regurgitation. Tricuspid Valve   Trace tricuspid regurgitation . Pulmonic Valve   The pulmonic valve was not well visualized . Left Atrium   Mildly dilated left atrium. Left Ventricle   Left ventricular size is normal .   Moderate concentric left ventricular hypertrophy. Mild to moderately decreased ejection fraction. Left ventricular ejection fraction is estimated at 45%. Right Atrium   Mildly enlarged right atrium size. Right Ventricle   Normal right ventricular size with preserved RV function. Pericardial Effusion   No evidence of significant pericardial effusion is noted. Pleural Effusion   No evidence of pleural effusion. Miscellaneous   Aortic root dimension within normal limits. motion and neck supple. No neck rigidity. Cardiovascular:      Rate and Rhythm: Normal rate and regular rhythm. Pulses: Normal pulses. Heart sounds: Normal heart sounds. No murmur. No friction rub. No gallop. Pulmonary:      Effort: Pulmonary effort is normal. No respiratory distress. Breath sounds: Normal breath sounds. No stridor. No wheezing, rhonchi or rales. Chest:      Chest wall: No tenderness. Abdominal:      General: Bowel sounds are normal. There is no distension. Palpations: Abdomen is soft. Tenderness: There is no abdominal tenderness. Musculoskeletal: Normal range of motion. General: No swelling, tenderness, deformity or signs of injury. Comments: Trace bilateral lower extremity edema   Skin:     General: Skin is warm and dry. Capillary Refill: Capillary refill takes less than 2 seconds. Coloration: Skin is not jaundiced or pale. Findings: No bruising, erythema, lesion or rash. Neurological:      General: No focal deficit present. Mental Status: He is alert and oriented to person, place, and time. Cranial Nerves: No cranial nerve deficit. Sensory: No sensory deficit. Motor: No weakness. Coordination: Coordination normal.   Psychiatric:         Mood and Affect: Mood normal.         Behavior: Behavior normal.         Thought Content:  Thought content normal.         Judgment: Judgment normal.         Assessment/plan:         Hospital Problems           Last Modified POA    * (Principal) PAF (paroxysmal atrial fibrillation) ([facility] [address] 2/12/2020 Yes    Overview Signed 2/12/2020  2:31 PM by Harpreet Medel MD     2/12/20  Newly discovered AF         CAD (coronary artery disease) 2/14/2020 Yes    Overview Addendum 2/14/2020 11:58 AM by Harpreet Medel MD     1/9/16  lexiscan  Positive for apical MI + myocardial ischemia, EF 42%, intermediate risk findings, AUC indication 16, AUC score 7  1/10/16  Cath  3 lesions in the LAD: Mid: 70% 2.25x23, mid 90% 2.25 x 28, distal 100% 2.0x28, anterior lateral apical hypokinesis  2/14/20  lexiscan Positive for apical MI with minimal  myocardial ischemia, EF 36%, -1% ischemic myocardium on stress         Acute on chronic diastolic CHF (congestive heart failure) ([facility]) (Chronic) 2/12/2020 Yes    HTN (hypertension) 2/12/2020 Yes    DM (diabetes mellitus) ([facility] [address] 2/12/2020 Yes    Chronic kidney disease, unspecified 2/14/2020 Yes          Principal Problem:    PAF (paroxysmal atrial fibrillation) ([facility])  Active Problems:    CAD (coronary artery disease)    Acute on chronic diastolic CHF (congestive heart failure) ([facility])    HTN (hypertension)    DM (diabetes mellitus) ([facility] [address]    Chronic kidney disease, unspecified  Resolved Problems:    * No resolved hospital problems. *        New onset Paroxysmal Atrial fibrillation/flutter with RVR  · Continue diltiazem ggt as needed to keep HR < 110  · Cardiology following -appreciate recommendations  · S/p Transesophageal Echocardiogram, Guided Direct Current Cardioversion (02/13/2020). · Continue Apixaban 5 mg p.o. twice daily  · NM myocardia regadenoson pharmacologic stress tests (02/14/2020)  Impression: \"There is apical MI with minimal if any  ischemia, with a calculated ejection fraction of 36 % with a -1 % of ischemic burden. Suggest: clinical correlation\"  · Further management as per cardiology       HFrEF ,  with acute exacerbation (Acute on chronic)  · -Hypoxemic, with PaO2 of 57 mmHg on initial ABG.   · Elevated d-dimer however VQ scan with low suspicion for PE  · Chest x-ray showing \"Cardiomegaly with findings suggestive of pulmonary vascular congestion and developing pulmonary edema\"  · - Elevated ProBNP level 4,214 (02/12/2020)  · Repeat proBNP of 910 (02/15/2020)  · - Troponin level 0.03 --> 0.03  · - Furosemide 60 mg IV ×1 dose given in the ED  · - Continue diureses;   · -->  Initially started on Furosemide  40 mg IV twice a day  · Switch to Bumetanide 1 mg IV twice daily  · - Fluid goal of -1.5-2 L  · Not meeting fluid goal  · - Strict I's and O  · - Fluid restriction  · - Continue optimized medical management.        History of Type 2 Diabetes Mellitus   · - Uncontrolled  · - Hemoglobin A1C: 7.9% (02/12/2020)  · Inpatient Regimens to include;  · - Insulin Glargine (Lantus) 25 units subcu nightly  · - Insulin Lispro (Humalog) 7 units subcu pre-meal 3 times a day  · - Insulin Lispro (Humalog) on a Low dose sliding scale  · - Monitor POC glucose, and adjusts insulin regimen accordingly based on daily insulin requirement.       # History of Essential Hypertension  · Uncontrolled  · Continue documented home regimen:  · Continue Amlodipine 10 mg p.o. daily  · Continue Isosorbide mononitrate (Imdur) 60 mg p.o. twice daily  · Continue Carvedilol 25 mg p.o. twice daily  · Lisinopril 40 mg p.o. twice daily  · Hydralazine 10 mg IV Q6H/PRN  For SBP> 180 and/or DP> 110   · Continue to monitor     Hx of CKD stage 3  · Baseline creatinine of 1.4 -1.5  · Creatinine level on presentation 1.7  · Creatinine of 1.8 -->2.1 (02/15/2020)   · Creatinine trending down > 1.8 (02/16/2020)  · Likely from aggressive diuresis  · Switched furosemide, to bumetanide  · Continue to closely monitor BMP  · Avoid hypotension and nephrotoxins      Continue management of other chronic medical conditions - see above and orders. Advance Directive: Full Code    DIET CARB CONTROL; Low Sodium (2 GM)     DVT prophylaxis:     Consults Made:   IP CONSULT TO CARDIOLOGY  IP CONSULT TO HEART FAILURE NURSE/COORDINATOR  IP CONSULT TO DIETITIAN  IP CONSULT TO SOCIAL WORK    Discharge planning: tbd    Time Spent is 25 mins in the examination, evaluation, counseling and review of medications, assessment and plan.      Electronically signed by   Adalberto Longoria MD, MPH,   Internal Medicine Hospitalist   2/16/2020 6:33 AM

## 2020-02-16 NOTE — PLAN OF CARE
Problem: Infection:  Goal: Will remain free from infection  Outcome: Ongoing     Problem: Falls - Risk of:  Goal: Will remain free from falls  Outcome: Ongoing  Goal: Absence of physical injury  Outcome: Ongoing

## 2020-02-17 ENCOUNTER — APPOINTMENT (OUTPATIENT)
Dept: GENERAL RADIOLOGY | Age: 65
DRG: 308 | End: 2020-02-17
Payer: COMMERCIAL

## 2020-02-17 LAB
ANION GAP SERPL CALCULATED.3IONS-SCNC: 15 MMOL/L (ref 7–19)
BUN BLDV-MCNC: 23 MG/DL (ref 8–23)
CALCIUM SERPL-MCNC: 9 MG/DL (ref 8.8–10.2)
CHLORIDE BLD-SCNC: 96 MMOL/L (ref 98–111)
CO2: 30 MMOL/L (ref 22–29)
CREAT SERPL-MCNC: 1.9 MG/DL (ref 0.5–1.2)
GFR NON-AFRICAN AMERICAN: 36
GLUCOSE BLD-MCNC: 101 MG/DL (ref 70–99)
GLUCOSE BLD-MCNC: 119 MG/DL (ref 70–99)
GLUCOSE BLD-MCNC: 120 MG/DL (ref 70–99)
GLUCOSE BLD-MCNC: 131 MG/DL (ref 74–109)
GLUCOSE BLD-MCNC: 178 MG/DL (ref 70–99)
MAGNESIUM: 2.1 MG/DL (ref 1.6–2.4)
PERFORMED ON: ABNORMAL
POTASSIUM SERPL-SCNC: 3.5 MMOL/L (ref 3.5–5)
SODIUM BLD-SCNC: 141 MMOL/L (ref 136–145)

## 2020-02-17 PROCEDURE — 6370000000 HC RX 637 (ALT 250 FOR IP): Performed by: INTERNAL MEDICINE

## 2020-02-17 PROCEDURE — 99233 SBSQ HOSP IP/OBS HIGH 50: CPT | Performed by: INTERNAL MEDICINE

## 2020-02-17 PROCEDURE — 74018 RADEX ABDOMEN 1 VIEW: CPT

## 2020-02-17 PROCEDURE — 82948 REAGENT STRIP/BLOOD GLUCOSE: CPT

## 2020-02-17 PROCEDURE — 94640 AIRWAY INHALATION TREATMENT: CPT

## 2020-02-17 PROCEDURE — 36415 COLL VENOUS BLD VENIPUNCTURE: CPT

## 2020-02-17 PROCEDURE — 6370000000 HC RX 637 (ALT 250 FOR IP): Performed by: HOSPITALIST

## 2020-02-17 PROCEDURE — 2700000000 HC OXYGEN THERAPY PER DAY

## 2020-02-17 PROCEDURE — 80048 BASIC METABOLIC PNL TOTAL CA: CPT

## 2020-02-17 PROCEDURE — 2500000003 HC RX 250 WO HCPCS: Performed by: INTERNAL MEDICINE

## 2020-02-17 PROCEDURE — 83735 ASSAY OF MAGNESIUM: CPT

## 2020-02-17 PROCEDURE — 2580000003 HC RX 258: Performed by: INTERNAL MEDICINE

## 2020-02-17 PROCEDURE — 2140000000 HC CCU INTERMEDIATE R&B

## 2020-02-17 RX ORDER — AMIODARONE HYDROCHLORIDE 200 MG/1
200 TABLET ORAL 2 TIMES DAILY
Status: DISCONTINUED | OUTPATIENT
Start: 2020-02-17 | End: 2020-02-18

## 2020-02-17 RX ADMIN — Medication 10 ML: at 21:57

## 2020-02-17 RX ADMIN — GUAIFENESIN 600 MG: 600 TABLET, EXTENDED RELEASE ORAL at 08:35

## 2020-02-17 RX ADMIN — ASPIRIN 81 MG 81 MG: 81 TABLET ORAL at 08:36

## 2020-02-17 RX ADMIN — GUAIFENESIN 600 MG: 600 TABLET, EXTENDED RELEASE ORAL at 21:56

## 2020-02-17 RX ADMIN — IPRATROPIUM BROMIDE AND ALBUTEROL SULFATE 1 AMPULE: .5; 3 SOLUTION RESPIRATORY (INHALATION) at 10:44

## 2020-02-17 RX ADMIN — BUMETANIDE 1 MG: 0.25 INJECTION INTRAMUSCULAR; INTRAVENOUS at 08:37

## 2020-02-17 RX ADMIN — LISINOPRIL 40 MG: 20 TABLET ORAL at 08:35

## 2020-02-17 RX ADMIN — AMIODARONE HYDROCHLORIDE 200 MG: 200 TABLET ORAL at 12:10

## 2020-02-17 RX ADMIN — INSULIN LISPRO 7 UNITS: 100 INJECTION, SOLUTION INTRAVENOUS; SUBCUTANEOUS at 12:10

## 2020-02-17 RX ADMIN — PANTOPRAZOLE SODIUM 40 MG: 40 TABLET, DELAYED RELEASE ORAL at 05:58

## 2020-02-17 RX ADMIN — SOTALOL HYDROCHLORIDE 80 MG: 80 TABLET ORAL at 08:35

## 2020-02-17 RX ADMIN — AMLODIPINE BESYLATE 10 MG: 10 TABLET ORAL at 08:35

## 2020-02-17 RX ADMIN — APIXABAN 5 MG: 5 TABLET, FILM COATED ORAL at 21:56

## 2020-02-17 RX ADMIN — AMIODARONE HYDROCHLORIDE 200 MG: 200 TABLET ORAL at 21:56

## 2020-02-17 RX ADMIN — BUMETANIDE 1 MG: 0.25 INJECTION INTRAMUSCULAR; INTRAVENOUS at 21:56

## 2020-02-17 RX ADMIN — CLOPIDOGREL BISULFATE 75 MG: 75 TABLET, FILM COATED ORAL at 08:36

## 2020-02-17 RX ADMIN — INSULIN LISPRO 7 UNITS: 100 INJECTION, SOLUTION INTRAVENOUS; SUBCUTANEOUS at 08:40

## 2020-02-17 RX ADMIN — ISOSORBIDE MONONITRATE 60 MG: 60 TABLET, EXTENDED RELEASE ORAL at 21:56

## 2020-02-17 RX ADMIN — ISOSORBIDE MONONITRATE 60 MG: 60 TABLET, EXTENDED RELEASE ORAL at 08:35

## 2020-02-17 RX ADMIN — Medication 25 UNITS: at 21:00

## 2020-02-17 RX ADMIN — INSULIN LISPRO 7 UNITS: 100 INJECTION, SOLUTION INTRAVENOUS; SUBCUTANEOUS at 17:53

## 2020-02-17 RX ADMIN — IPRATROPIUM BROMIDE AND ALBUTEROL SULFATE 1 AMPULE: .5; 3 SOLUTION RESPIRATORY (INHALATION) at 14:52

## 2020-02-17 RX ADMIN — INSULIN LISPRO 1 UNITS: 100 INJECTION, SOLUTION INTRAVENOUS; SUBCUTANEOUS at 12:10

## 2020-02-17 RX ADMIN — IPRATROPIUM BROMIDE AND ALBUTEROL SULFATE 1 AMPULE: .5; 3 SOLUTION RESPIRATORY (INHALATION) at 07:17

## 2020-02-17 RX ADMIN — IPRATROPIUM BROMIDE AND ALBUTEROL SULFATE 1 AMPULE: .5; 3 SOLUTION RESPIRATORY (INHALATION) at 18:47

## 2020-02-17 RX ADMIN — Medication 10 ML: at 08:36

## 2020-02-17 RX ADMIN — APIXABAN 5 MG: 5 TABLET, FILM COATED ORAL at 08:39

## 2020-02-17 RX ADMIN — ROSUVASTATIN CALCIUM 20 MG: 10 TABLET, FILM COATED ORAL at 08:35

## 2020-02-17 RX ADMIN — CARVEDILOL 6.25 MG: 6.25 TABLET, FILM COATED ORAL at 08:36

## 2020-02-17 RX ADMIN — CARVEDILOL 6.25 MG: 6.25 TABLET, FILM COATED ORAL at 21:56

## 2020-02-17 ASSESSMENT — PAIN SCALES - GENERAL
PAINLEVEL_OUTOF10: 0

## 2020-02-17 NOTE — PROGRESS NOTES
Pt. Is doing better today. Nurse met with patient re: referral to CHF clinic. Discussed with patient diet, activity, medications, definition of heart failure and s/s, daily wt monitoring with reporting of wt gain of >2-3 lbs in 24 hours or > 5 lbs in one week, BP/HR and activity monitoring with use of daily log, f/u appointments with PCP, CHF clinic and cardiologist. Discussed vaccine use, testing, monitoring of BS. HF packet given. Patient  does have a wt scale and BP monitor at home. He  is agreeable to participating in the HF program when we know he is discharging. Will set up f/u appointments. Time spent with patient 30 minutes.

## 2020-02-17 NOTE — PROGRESS NOTES
chloride flush 0.9 % injection 10 mL  10 mL Intravenous 2 times per day Lazaro Alford MD   10 mL at 02/17/20 0836    sodium chloride flush 0.9 % injection 10 mL  10 mL Intravenous PRN Lazaro Alford MD   10 mL at 02/14/20 0584    regadenoson (LEXISCAN) injection 0.4 mg  0.4 mg Intravenous Once Lazaro Alford MD        apixaban [facility]) tablet 5 mg  5 mg Oral BID Lazaro Alford MD   5 mg at 02/17/20 0839    amLODIPine (NORVASC) tablet 10 mg  10 mg Oral Daily Melissa Sanchez MD   10 mg at 02/17/20 0835    lisinopril (PRINIVIL;ZESTRIL) tablet 40 mg  40 mg Oral Daily Melissa Sanchez MD   40 mg at 02/17/20 0835    magnesium hydroxide (MILK OF MAGNESIA) 400 MG/5ML suspension 30 mL  30 mL Oral Daily PRN Germanma MD Daniel        [facility]) injection 4 mg  4 mg Intravenous Q6H PRN Melissa Sanchez MD   4 mg at 02/16/20 1040    glucose (GLUTOSE) 40 % oral gel 15 g  15 g Oral PRN Melissa Sanchez MD        dextrose 50 % IV solution  12.5 g Intravenous PRN Melissa Sanchez MD        glucagon (rDNA) injection 1 mg  1 mg Intramuscular PRN Melissa Sanchez MD        dextrose 5 % solution  100 mL/hr Intravenous PRN Melissa Sanchez MD        insulin glargine (LANTUS) injection vial 25 Units  25 Units Subcutaneous Nightly Melissa Sanchez MD   25 Units at 02/16/20 2120    insulin lispro (HUMALOG) injection vial 7 Units  7 Units Subcutaneous TID JUD Sanchez MD   7 Units at 02/17/20 0840    insulin lispro (HUMALOG) injection vial 0-6 Units  0-6 Units Subcutaneous TID JUD Sanchez MD   1 Units at 02/16/20 1705    insulin lispro (HUMALOG) injection vial 0-3 Units  0-3 Units Subcutaneous Nightly Melissa Sanchez MD   1 Units at 02/14/20 2201    aspirin chewable tablet 81 mg  81 mg Oral Daily Melissa Sanchez MD   81 mg at 02/17/20 0836    clopidogrel (PLAVIX) tablet 75 mg  75 mg Oral Daily Melissa Sanchez MD   75 mg at 02/17/20 0836    isosorbide unspecified(403.00)     Obesity, unspecified     Other specified cardiac dysrhythmias(427.89)     Peripheral vascular disease ([address]     Solitary pulmonary nodule     Surgical or other procedure not carried out because of contraindication     Thoracic aneurysm without mention of rupture     Tobacco use disorder     Type II or unspecified type diabetes mellitus without mention of complication, not stated as uncontrolled      Past Surgical History:   Procedure Laterality Date    CARDIAC CATHETERIZATION      CHOLECYSTECTOMY      CORONARY ANGIOPLASTY WITH STENT PLACEMENT  1-16    2 JACQUELINE to LAD    URETER STENT PLACEMENT      VASCULAR SURGERY  03- [facility]    Aortogram with bilateral selective renal artery injections    VASCULAR SURGERY  6/14/13 [facility]    Right carotid endarterectomy with Vascu-Guard patch repair. Right cervical carotid arteriograms after endarterectomy.  VASCULAR SURGERY  7/6/15 [facility]    Percutaneous cannulation, right common femoral artery, with a5 Thai sheath and later 6 Thai [facility] sheath. Suprarenal abdomianl aortagram.  Selective right renal arteriogram.  Right renal artery balloon angioplasty;/stent (Express 6 mm x 18mmballoon-expandable stent. . Completion suprarenal abdominal aortogram and selective right remal arteriogram. Mynx closure, right common femoral artery punctur     Family History   Problem Relation Age of Onset    High Blood Pressure Father     Cancer Father     High Blood Pressure Mother     High Blood Pressure Brother      Social History     Tobacco Use    Smoking status: Former Smoker     Packs/day: 0.25    Smokeless tobacco: Never Used   Substance Use Topics    Alcohol use: No          Review of Systems:    General:      Complaint / Symptom Yes / No / Description if Yes       Fatigue Yes:  chronic   Weight gain NA   Insomnia NA       Respiratory:        Complaint / Symptom Yes / No / Description if Yes       Cough No   Horseness NA Selected                 1. Presenting problem / symptom     Paroxymal atrial fibrillation  are worsening    Now NSR, change to amiodarone       Yes: EVELYN / DCCV Continue current medications:      Yes:                  2. CAD Initial presentation during this evaluation    Review and summation of old records:     1/9/16  lexiscan  Positive for apical MI + myocardial ischemia, EF 42%, intermediate risk findings, AUC indication 16, AUC score 7  1/10/16  Cath  3 lesions in the LAD:  Mid: 70% 2.25x23, mid 90% 2.25 x 28, distal 100% 2.0x28, anterior lateral apical hypokinesis    No Continue current medications:     Yes:                  3. Concern regarding systemic blood pressure Initial presentation during this evaluation The blood pressure for the lastr 36 hours has been:  Systolic (46QOX), YSQ:061 , Min:127 , OAM:683    Diastolic (83ZLQ), ELZA:98, Min:66, Max:94             No Continue current medications:        yes         CONSIDERATIONS, THOUGHTS, AND PLANS:    1. Continue present medications except for changes as noted above  2. Continue to monitor rhythm  3. Further orders per clinical course. 4. Change betapace to amiodarone           Discussed with patient and spouse and nursing.     Electronically signed by Doris Bond MD on 2/17/20        [facility] [place]

## 2020-02-17 NOTE — PROGRESS NOTES
worsens with minimal exertion. Associated symptoms reportedly include dizziness and lightheadedness, as well as intermittent cough. Patient also reports worsening bilateral lower extremity edema.     Denies any fever or chills.      Initial work-up significant for;  -Hypoxemic, with PaO2 of 57 mmHg on initial ABG. - Elevated ProBNP level 4,214   - Troponin level 0.03  Elevated d-dimer however VQ scan with low suspicion for PE  Patient was noted to have a new onset A. fib with RVR. Cardiology consulted from ED. Patient started on diltiazem ggt  Patient admitted for further work-up and management. Review of Systems:   Review of Systems  ROS: 14 point review of systems is negative except as specifically addressed above.     DIET CARB CONTROL; Low Sodium (2 GM)    Intake/Output Summary (Last 24 hours) at 2/17/2020 1212  Last data filed at 2/17/2020 1045  Gross per 24 hour   Intake 1000 ml   Output 1850 ml   Net -850 ml       Medications:   dextrose       Current Facility-Administered Medications   Medication Dose Route Frequency Provider Last Rate Last Dose    amiodarone (CORDARONE) tablet 200 mg  200 mg Oral BID Bandar Patel MD   200 mg at 02/17/20 1210    bumetanide (BUMEX) injection 1 mg  1 mg Intravenous BID Anatoly Carrillo MD   1 mg at 02/17/20 0837    pantoprazole (PROTONIX) tablet 40 mg  40 mg Oral BID AC Cherelle Faye MD   40 mg at 02/17/20 0558    ipratropium-albuterol (DUONEB) nebulizer solution 1 ampule  1 ampule Inhalation Q4H WA Cherelle Faye MD   1 ampule at 02/17/20 1044    acetaminophen (TYLENOL) tablet 650 mg  650 mg Oral Q6H PRN Anatoly Carrillo MD   650 mg at 02/14/20 2157    carvedilol (COREG) tablet 6.25 mg  6.25 mg Oral BID Bandar Patel MD   6.25 mg at 02/17/20 0836    sodium chloride flush 0.9 % injection 10 mL  10 mL Intravenous 2 times per day Bandar Patel MD   10 mL at 02/17/20 0836    sodium chloride flush 0.9 % injection 10 mL  10 mL Intravenous PRN Adalberto Kaiser MD   20 mg at 02/17/20 0835    guaiFENesin (MUCINEX) extended release tablet 600 mg  600 mg Oral BID Humaira Jesus MD   600 mg at 02/17/20 0835    guaiFENesin-dextromethorphan (ROBITUSSIN DM) 100-10 MG/5ML syrup 5 mL  5 mL Oral Q6H PRN Humaira Jesus MD   5 mL at 02/16/20 1630         dextrose        amiodarone  200 mg Oral BID    bumetanide  1 mg Intravenous BID    pantoprazole  40 mg Oral BID AC    ipratropium-albuterol  1 ampule Inhalation Q4H WA    carvedilol  6.25 mg Oral BID    sodium chloride flush  10 mL Intravenous 2 times per day    regadenoson  0.4 mg Intravenous Once    apixaban  5 mg Oral BID    amLODIPine  10 mg Oral Daily    lisinopril  40 mg Oral Daily    insulin glargine  25 Units Subcutaneous Nightly    insulin lispro  7 Units Subcutaneous TID WC    insulin lispro  0-6 Units Subcutaneous TID WC    insulin lispro  0-3 Units Subcutaneous Nightly    aspirin  81 mg Oral Daily    clopidogrel  75 mg Oral Daily    isosorbide mononitrate  60 mg Oral BID    rosuvastatin  20 mg Oral Daily    guaiFENesin  600 mg Oral BID     acetaminophen, sodium chloride flush, magnesium hydroxide, ondansetron, glucose, dextrose, glucagon (rDNA), dextrose, guaiFENesin-dextromethorphan  DIET CARB CONTROL; Low Sodium (2 GM)       Labs:   CBC with DIFF:  No results for input(s): WBC, RBC, HGB, HCT, MCV, MCH, MCHC, RDW, PLT, MPV, NEUTOPHILPCT, LYMPHOPCT, MONOPCT, EOSRELPCT, BASOPCT, NEUTROABS, LYMPHSABS, MONOSABS, EOSABS, BASOSABS in the last 72 hours. CMP/BMP:  Recent Labs     02/15/20  1510 02/16/20  0935 02/17/20  0549    140 141   K 3.6 3.6 3.5   CL 97* 96* 96*   CO2 27 28 30*   ANIONGAP 15 16 15   GLUCOSE 134* 207* 131*   BUN 29* 25* 23   CREATININE 2.0* 1.8* 1.9*   LABGLOM 34* 38* 36*   CALCIUM 9.1 9.2 9.0         CRP:  No results for input(s): CRP in the last 72 hours. Sed Rate:  No results for input(s): SEDRATE in the last 72 hours.       HgBA1c:  No components found for: Images were evaluated for the presence of segmental perfusion abnormalities and pleural-based perfusion defects that had either matched or unmatched ventilation abnormalities. The concurrent chest radiograph demonstrated cardiomegaly with pulmonary congestion and developing pulmonary edema. FINDINGS:  Pulmonary arterial perfusion appears homogeneous, without discrete peripheral wedge-shaped defect. Similarly, the ventilation images appear homogeneous. Low suspicion for pulmonary embolus. Signed by Dr Taylor Pandey on 2/12/2020 12:58 PM    Xr Chest Portable    Result Date: 2/12/2020  EXAMINATION: XR CHEST PORTABLE 2/12/2020 9:52 AM HISTORY: Shortness of breath COMPARISON: 1/1/2018 FINDINGS: The heart is enlarged. The aorta is mildly tortuous. The pulmonary vasculature is indistinct. Diffuse increased interstitial prominence is noted. There is no appreciable pneumothorax or definite pleural effusion. Atherosclerotic calcifications are seen in the aorta. Cardiomegaly with findings suggestive of pulmonary vascular congestion and developing pulmonary edema. Signed by Dr Yeni Pope on 2/12/2020 9:54 AM      Echo: 02/14/2020  Findings   Mitral Valve   Mitral valve leaflets are mildly thickened with preserved leaflet   mobility. Trace mitral regurgitation. Aortic Valve   Mildly thickened aortic valve leaflets with preserved leaflet mobility. Aortic valve appears to be tricuspid. No evidence of aortic stenosis; no evidence of aortic regurgitation. Tricuspid Valve   Trace tricuspid regurgitation . Pulmonic Valve   The pulmonic valve was not well visualized . Left Atrium   Mildly dilated left atrium. Left Ventricle   Left ventricular size is normal .   Moderate concentric left ventricular hypertrophy. Mild to moderately decreased ejection fraction. Left ventricular ejection fraction is estimated at 45%. Right Atrium   Mildly enlarged right atrium size.       Right Ventricle disease) 2/17/2020 Yes    Overview Addendum 2/17/2020 10:38 AM by No Sanchez MD     1/9/16  lexiscan  Positive for apical MI + myocardial ischemia, EF 42%, intermediate risk findings, AUC indication 16, AUC score 7  1/10/16  Cath  3 lesions in the LAD:  Mid: 70% 2.25x23, mid 90% 2.25 x 28, distal 100% 2.0x28, anterior lateral apical hypokinesis  2/14/20  lexiscan Positive for apical MI with minimal  myocardial ischemia, EF 36%, -1% ischemic myocardium on stress  2/14/2020  Echo  EF 45%         Acute on chronic diastolic CHF (congestive heart failure) (HCC) (Chronic) 2/12/2020 Yes    HTN (hypertension) 2/12/2020 Yes    DM (diabetes mellitus) ([facility] 2/12/2020 Yes    Chronic kidney disease, unspecified 2/14/2020 Yes          Principal Problem:    PAF (paroxysmal atrial fibrillation) ([facility]  Active Problems:    CAD (coronary artery disease)    Acute on chronic diastolic CHF (congestive heart failure) (HCC)    HTN (hypertension)    DM (diabetes mellitus) ([facility]    Chronic kidney disease, unspecified  Resolved Problems:    * No resolved hospital problems. *        New onset Paroxysmal Atrial fibrillation/flutter with RVR  · Continue diltiazem ggt as needed to keep HR < 110  · Cardiology following -appreciate recommendations  · S/p Transesophageal Echocardiogram, Guided Direct Current Cardioversion (02/13/2020). · Continue Apixaban 5 mg p.o. twice daily  · NM myocardia regadenoson pharmacologic stress tests (02/14/2020)  Impression: \"There is apical MI with minimal if any  ischemia, with a calculated ejection fraction of 36 % with a -1 % of ischemic burden. Suggest: clinical correlation\"  · Further management as per cardiology    Addendum  · Seen by cardiology  · Sotalol changed to amiodarone  · Continue amiodarone 200 mg p.o. twice daily      HFrEF ,  with acute exacerbation (Acute on chronic)  · -Hypoxemic, with PaO2 of 57 mmHg on initial ABG.   · Elevated d-dimer however VQ scan with low suspicion for PE  · Chest x-ray showing \"Cardiomegaly with findings suggestive of pulmonary vascular congestion and developing pulmonary edema\"  · - Elevated ProBNP level 4,214 (02/12/2020)  · Repeat proBNP of 910 (02/15/2020)  · - Troponin level 0.03 --> 0.03  · - Furosemide 60 mg IV ×1 dose given in the ED  · - Continue diureses;   · -->  Initially started on Furosemide  40 mg IV twice a day  · Switch to Bumetanide 1 mg IV twice daily  · - Fluid goal of -1.5-2 L  · Not meeting fluid goal  · - Strict I's and O  · - Fluid restriction  · - Continue optimized medical management.        History of Type 2 Diabetes Mellitus   · - Uncontrolled  · - Hemoglobin A1C: 7.9% (02/12/2020)  · Inpatient Regimens to include;  · - Insulin Glargine (Lantus) 25 units subcu nightly  · - Insulin Lispro (Humalog) 7 units subcu pre-meal 3 times a day  · - Insulin Lispro (Humalog) on a Low dose sliding scale  · - Monitor POC glucose, and adjusts insulin regimen accordingly based on daily insulin requirement.       # History of Essential Hypertension  · Uncontrolled  · Continue documented home regimen:  · Continue Amlodipine 10 mg p.o. daily  · Continue Isosorbide mononitrate (Imdur) 60 mg p.o. twice daily  · Continue Carvedilol 25 mg p.o. twice daily  · Lisinopril 40 mg p.o. twice daily  · Hydralazine 10 mg IV Q6H/PRN  For SBP> 180 and/or DP> 110   · Continue to monitor     Hx of CKD stage 3  · Baseline creatinine of 1.4 -1.5  · Creatinine level on presentation 1.7  · Creatinine of 1.8 -->2.1 (02/15/2020)   · Creatinine now stable  · Likely from aggressive diuresis  · Switched furosemide, to bumetanide  · Continue to closely monitor BMP  · Avoid hypotension and nephrotoxins  · Consider nephrology consult, if no improvement. Continue management of other chronic medical conditions - see above and orders.             Advance Directive: Full Code    DIET CARB CONTROL; Low Sodium (2 GM)     DVT prophylaxis: Apixaban    Consults Made:   IP CONSULT TO CARDIOLOGY  IP

## 2020-02-18 LAB
ANION GAP SERPL CALCULATED.3IONS-SCNC: 16 MMOL/L (ref 7–19)
BASOPHILS ABSOLUTE: 0.1 K/UL (ref 0–0.2)
BASOPHILS RELATIVE PERCENT: 0.6 % (ref 0–1)
BUN BLDV-MCNC: 21 MG/DL (ref 8–23)
CALCIUM SERPL-MCNC: 9.1 MG/DL (ref 8.8–10.2)
CHLORIDE BLD-SCNC: 95 MMOL/L (ref 98–111)
CO2: 29 MMOL/L (ref 22–29)
CREAT SERPL-MCNC: 1.9 MG/DL (ref 0.5–1.2)
EOSINOPHILS ABSOLUTE: 0.5 K/UL (ref 0–0.6)
EOSINOPHILS RELATIVE PERCENT: 4.6 % (ref 0–5)
GFR NON-AFRICAN AMERICAN: 36
GLUCOSE BLD-MCNC: 133 MG/DL (ref 70–99)
GLUCOSE BLD-MCNC: 136 MG/DL (ref 70–99)
GLUCOSE BLD-MCNC: 180 MG/DL (ref 74–109)
GLUCOSE BLD-MCNC: 184 MG/DL (ref 70–99)
GLUCOSE BLD-MCNC: 221 MG/DL (ref 70–99)
HCT VFR BLD CALC: 48.4 % (ref 42–52)
HEMOGLOBIN: 15.6 G/DL (ref 14–18)
IMMATURE GRANULOCYTES #: 0.1 K/UL
LV EF: 36 %
LVEF MODALITY: NORMAL
LYMPHOCYTES ABSOLUTE: 1.9 K/UL (ref 1.1–4.5)
LYMPHOCYTES RELATIVE PERCENT: 19.1 % (ref 20–40)
MAGNESIUM: 2 MG/DL (ref 1.6–2.4)
MCH RBC QN AUTO: 30.4 PG (ref 27–31)
MCHC RBC AUTO-ENTMCNC: 32.2 G/DL (ref 33–37)
MCV RBC AUTO: 94.2 FL (ref 80–94)
MONOCYTES ABSOLUTE: 0.8 K/UL (ref 0–0.9)
MONOCYTES RELATIVE PERCENT: 7.5 % (ref 0–10)
NEUTROPHILS ABSOLUTE: 6.9 K/UL (ref 1.5–7.5)
NEUTROPHILS RELATIVE PERCENT: 67.7 % (ref 50–65)
PDW BLD-RTO: 12.8 % (ref 11.5–14.5)
PERFORMED ON: ABNORMAL
PHOSPHORUS: 2.9 MG/DL (ref 2.5–4.5)
PLATELET # BLD: 216 K/UL (ref 130–400)
PMV BLD AUTO: 10.6 FL (ref 9.4–12.4)
POTASSIUM REFLEX MAGNESIUM: 3.3 MMOL/L (ref 3.5–5)
PRO-BNP: 1238 PG/ML (ref 0–900)
RBC # BLD: 5.14 M/UL (ref 4.7–6.1)
SODIUM BLD-SCNC: 140 MMOL/L (ref 136–145)
WBC # BLD: 10.2 K/UL (ref 4.8–10.8)

## 2020-02-18 PROCEDURE — 83880 ASSAY OF NATRIURETIC PEPTIDE: CPT

## 2020-02-18 PROCEDURE — 6370000000 HC RX 637 (ALT 250 FOR IP): Performed by: INTERNAL MEDICINE

## 2020-02-18 PROCEDURE — 94640 AIRWAY INHALATION TREATMENT: CPT

## 2020-02-18 PROCEDURE — 36415 COLL VENOUS BLD VENIPUNCTURE: CPT

## 2020-02-18 PROCEDURE — 83735 ASSAY OF MAGNESIUM: CPT

## 2020-02-18 PROCEDURE — 2580000003 HC RX 258: Performed by: INTERNAL MEDICINE

## 2020-02-18 PROCEDURE — 80048 BASIC METABOLIC PNL TOTAL CA: CPT

## 2020-02-18 PROCEDURE — 6370000000 HC RX 637 (ALT 250 FOR IP): Performed by: HOSPITALIST

## 2020-02-18 PROCEDURE — 82948 REAGENT STRIP/BLOOD GLUCOSE: CPT

## 2020-02-18 PROCEDURE — 2700000000 HC OXYGEN THERAPY PER DAY

## 2020-02-18 PROCEDURE — 2140000000 HC CCU INTERMEDIATE R&B

## 2020-02-18 PROCEDURE — 2500000003 HC RX 250 WO HCPCS: Performed by: INTERNAL MEDICINE

## 2020-02-18 PROCEDURE — 84100 ASSAY OF PHOSPHORUS: CPT

## 2020-02-18 PROCEDURE — 85025 COMPLETE CBC W/AUTO DIFF WBC: CPT

## 2020-02-18 PROCEDURE — 99233 SBSQ HOSP IP/OBS HIGH 50: CPT | Performed by: INTERNAL MEDICINE

## 2020-02-18 RX ORDER — FUROSEMIDE 40 MG/1
40 TABLET ORAL 2 TIMES DAILY
Status: DISCONTINUED | OUTPATIENT
Start: 2020-02-18 | End: 2020-02-19 | Stop reason: HOSPADM

## 2020-02-18 RX ORDER — AMIODARONE HYDROCHLORIDE 200 MG/1
200 TABLET ORAL DAILY
Status: DISCONTINUED | OUTPATIENT
Start: 2020-02-19 | End: 2020-02-19 | Stop reason: HOSPADM

## 2020-02-18 RX ADMIN — AMIODARONE HYDROCHLORIDE 200 MG: 200 TABLET ORAL at 08:49

## 2020-02-18 RX ADMIN — IPRATROPIUM BROMIDE AND ALBUTEROL SULFATE 1 AMPULE: .5; 3 SOLUTION RESPIRATORY (INHALATION) at 10:54

## 2020-02-18 RX ADMIN — ASPIRIN 81 MG 81 MG: 81 TABLET ORAL at 08:49

## 2020-02-18 RX ADMIN — CARVEDILOL 6.25 MG: 6.25 TABLET, FILM COATED ORAL at 08:49

## 2020-02-18 RX ADMIN — ISOSORBIDE MONONITRATE 60 MG: 60 TABLET, EXTENDED RELEASE ORAL at 20:24

## 2020-02-18 RX ADMIN — CARVEDILOL 6.25 MG: 6.25 TABLET, FILM COATED ORAL at 20:24

## 2020-02-18 RX ADMIN — IPRATROPIUM BROMIDE AND ALBUTEROL SULFATE 1 AMPULE: .5; 3 SOLUTION RESPIRATORY (INHALATION) at 15:44

## 2020-02-18 RX ADMIN — GUAIFENESIN AND DEXTROMETHORPHAN 5 ML: 100; 10 SYRUP ORAL at 20:27

## 2020-02-18 RX ADMIN — GUAIFENESIN 600 MG: 600 TABLET, EXTENDED RELEASE ORAL at 20:24

## 2020-02-18 RX ADMIN — AMLODIPINE BESYLATE 10 MG: 10 TABLET ORAL at 08:49

## 2020-02-18 RX ADMIN — INSULIN LISPRO 7 UNITS: 100 INJECTION, SOLUTION INTRAVENOUS; SUBCUTANEOUS at 08:56

## 2020-02-18 RX ADMIN — Medication 10 ML: at 08:49

## 2020-02-18 RX ADMIN — PANTOPRAZOLE SODIUM 40 MG: 40 TABLET, DELAYED RELEASE ORAL at 17:59

## 2020-02-18 RX ADMIN — ISOSORBIDE MONONITRATE 60 MG: 60 TABLET, EXTENDED RELEASE ORAL at 08:49

## 2020-02-18 RX ADMIN — INSULIN LISPRO 7 UNITS: 100 INJECTION, SOLUTION INTRAVENOUS; SUBCUTANEOUS at 12:44

## 2020-02-18 RX ADMIN — GUAIFENESIN 600 MG: 600 TABLET, EXTENDED RELEASE ORAL at 08:49

## 2020-02-18 RX ADMIN — IPRATROPIUM BROMIDE AND ALBUTEROL SULFATE 1 AMPULE: .5; 3 SOLUTION RESPIRATORY (INHALATION) at 06:49

## 2020-02-18 RX ADMIN — IPRATROPIUM BROMIDE AND ALBUTEROL SULFATE 1 AMPULE: .5; 3 SOLUTION RESPIRATORY (INHALATION) at 18:30

## 2020-02-18 RX ADMIN — INSULIN LISPRO 7 UNITS: 100 INJECTION, SOLUTION INTRAVENOUS; SUBCUTANEOUS at 18:00

## 2020-02-18 RX ADMIN — APIXABAN 5 MG: 5 TABLET, FILM COATED ORAL at 20:24

## 2020-02-18 RX ADMIN — ROSUVASTATIN CALCIUM 20 MG: 10 TABLET, FILM COATED ORAL at 08:49

## 2020-02-18 RX ADMIN — BUMETANIDE 1 MG: 0.25 INJECTION INTRAMUSCULAR; INTRAVENOUS at 08:48

## 2020-02-18 RX ADMIN — FUROSEMIDE 40 MG: 40 TABLET ORAL at 17:58

## 2020-02-18 RX ADMIN — LISINOPRIL 40 MG: 20 TABLET ORAL at 08:49

## 2020-02-18 RX ADMIN — INSULIN LISPRO 1 UNITS: 100 INJECTION, SOLUTION INTRAVENOUS; SUBCUTANEOUS at 17:59

## 2020-02-18 RX ADMIN — Medication 25 UNITS: at 20:29

## 2020-02-18 RX ADMIN — Medication 10 ML: at 20:25

## 2020-02-18 RX ADMIN — APIXABAN 5 MG: 5 TABLET, FILM COATED ORAL at 08:49

## 2020-02-18 RX ADMIN — CLOPIDOGREL BISULFATE 75 MG: 75 TABLET, FILM COATED ORAL at 08:49

## 2020-02-18 RX ADMIN — PANTOPRAZOLE SODIUM 40 MG: 40 TABLET, DELAYED RELEASE ORAL at 05:56

## 2020-02-18 RX ADMIN — INSULIN LISPRO 1 UNITS: 100 INJECTION, SOLUTION INTRAVENOUS; SUBCUTANEOUS at 20:30

## 2020-02-18 ASSESSMENT — PAIN SCALES - GENERAL
PAINLEVEL_OUTOF10: 0

## 2020-02-18 NOTE — PROGRESS NOTES
Denies any other acute complaints or distress at this time. Brief History:   The patient is a 62 y.o. male with a history of history of CAD, DMT2, malignant HTN, CHF, presenting to the ED on account of 3-4 day history of progressively worsening moderate to severe shortness of breath, which worsens with minimal exertion. Associated symptoms reportedly include dizziness and lightheadedness, as well as intermittent cough. Patient also reports worsening bilateral lower extremity edema.     Denies any fever or chills.      Initial work-up significant for;  -Hypoxemic, with PaO2 of 57 mmHg on initial ABG. - Elevated ProBNP level 4,214   - Troponin level 0.03  Elevated d-dimer however VQ scan with low suspicion for PE  Patient was noted to have a new onset A. fib with RVR. Cardiology consulted from ED. Patient started on diltiazem ggt  Patient admitted for further work-up and management. Review of Systems:   Review of Systems  ROS: 14 point review of systems is negative except as specifically addressed above.     DIET CARB CONTROL; Low Sodium (2 GM)    Intake/Output Summary (Last 24 hours) at 2/18/2020 1013  Last data filed at 2/18/2020 0557  Gross per 24 hour   Intake 980 ml   Output 2300 ml   Net -1320 ml       Medications:   dextrose       Current Facility-Administered Medications   Medication Dose Route Frequency Provider Last Rate Last Dose    amiodarone (CORDARONE) tablet 200 mg  200 mg Oral BID Suzanne Ramirez MD   200 mg at 02/18/20 0849    bumetanide (BUMEX) injection 1 mg  1 mg Intravenous BID Elisa Ordonez MD   1 mg at 02/18/20 0848    pantoprazole (PROTONIX) tablet 40 mg  40 mg Oral BID AC Rosa Denton MD   40 mg at 02/18/20 0556    ipratropium-albuterol (DUONEB) nebulizer solution 1 ampule  1 ampule Inhalation Q4H WA Rosa Denton MD   1 ampule at 02/18/20 0649    acetaminophen (TYLENOL) tablet 650 mg  650 mg Oral Q6H PRN Elisa Ordonez MD   650 mg at 02/14/20 3162    carvedilol (COREG) tablet 6.25 mg  6.25 mg Oral BID Gladystine MD Kiki   6.25 mg at 02/18/20 0849    sodium chloride flush 0.9 % injection 10 mL  10 mL Intravenous 2 times per day Glaqasimstine MD Kiki   10 mL at 02/18/20 0849    sodium chloride flush 0.9 % injection 10 mL  10 mL Intravenous PRN Noris Swanson MD   10 mL at 02/14/20 0601    regadenoson (LEXISCAN) injection 0.4 mg  0.4 mg Intravenous Once Noris Swanson MD        apixaban Jason Rosa) tablet 5 mg  5 mg Oral BID Glaqasimstine MD Kiki   5 mg at 02/18/20 0849    amLODIPine (NORVASC) tablet 10 mg  10 mg Oral Daily Carley Grier MD   10 mg at 02/18/20 0849    lisinopril (PRINIVIL;ZESTRIL) tablet 40 mg  40 mg Oral Daily Carley Grier MD   40 mg at 02/18/20 0849    magnesium hydroxide (MILK OF MAGNESIA) 400 MG/5ML suspension 30 mL  30 mL Oral Daily PRN Carley Grier MD        ondansetron [facility]) injection 4 mg  4 mg Intravenous Q6H PRN Carley Grier MD   4 mg at 02/16/20 1040    glucose (GLUTOSE) 40 % oral gel 15 g  15 g Oral PRN Carley Grier MD        dextrose 50 % IV solution  12.5 g Intravenous PRN Carley Grier MD        glucagon (rDNA) injection 1 mg  1 mg Intramuscular PRN Carley Grier MD        dextrose 5 % solution  100 mL/hr Intravenous PRN Carley Grier MD        insulin glargine (LANTUS) injection vial 25 Units  25 Units Subcutaneous Nightly Carley Grier MD   25 Units at 02/17/20 2100    insulin lispro (HUMALOG) injection vial 7 Units  7 Units Subcutaneous TID  Carley Grier MD   7 Units at 02/18/20 0856    insulin lispro (HUMALOG) injection vial 0-6 Units  0-6 Units Subcutaneous TID  Carley Grier MD   1 Units at 02/17/20 1210    insulin lispro (HUMALOG) injection vial 0-3 Units  0-3 Units Subcutaneous Nightly Carley Grier MD   1 Units at 02/14/20 4559    aspirin chewable tablet 81 mg  81 mg Oral Daily Carley Grier MD   81 mg at 02/18/20 0806    clopidogrel (PLAVIX) tablet 75 mg  75 mg Oral Daily Irma Anaya MD   75 mg at 02/18/20 0849    isosorbide mononitrate (IMDUR) extended release tablet 60 mg  60 mg Oral BID Irma Anaya MD   60 mg at 02/18/20 0849    rosuvastatin (CRESTOR) tablet 20 mg  20 mg Oral Daily Irma Anaya MD   20 mg at 02/18/20 0849    guaiFENesin (MUCINEX) extended release tablet 600 mg  600 mg Oral BID Irma Anaya MD   600 mg at 02/18/20 0849    guaiFENesin-dextromethorphan (ROBITUSSIN DM) 100-10 MG/5ML syrup 5 mL  5 mL Oral Q6H PRN Irma Anaya MD   5 mL at 02/16/20 1630         dextrose        amiodarone  200 mg Oral BID    bumetanide  1 mg Intravenous BID    pantoprazole  40 mg Oral BID AC    ipratropium-albuterol  1 ampule Inhalation Q4H WA    carvedilol  6.25 mg Oral BID    sodium chloride flush  10 mL Intravenous 2 times per day    regadenoson  0.4 mg Intravenous Once    apixaban  5 mg Oral BID    amLODIPine  10 mg Oral Daily    lisinopril  40 mg Oral Daily    insulin glargine  25 Units Subcutaneous Nightly    insulin lispro  7 Units Subcutaneous TID WC    insulin lispro  0-6 Units Subcutaneous TID WC    insulin lispro  0-3 Units Subcutaneous Nightly    aspirin  81 mg Oral Daily    clopidogrel  75 mg Oral Daily    isosorbide mononitrate  60 mg Oral BID    rosuvastatin  20 mg Oral Daily    guaiFENesin  600 mg Oral BID     acetaminophen, sodium chloride flush, magnesium hydroxide, ondansetron, glucose, dextrose, glucagon (rDNA), dextrose, guaiFENesin-dextromethorphan  DIET CARB CONTROL; Low Sodium (2 GM)       Labs:   CBC with DIFF:  No results for input(s): WBC, RBC, HGB, HCT, MCV, MCH, MCHC, RDW, PLT, MPV, NEUTOPHILPCT, LYMPHOPCT, MONOPCT, EOSRELPCT, BASOPCT, NEUTROABS, LYMPHSABS, MONOSABS, EOSABS, BASOSABS in the last 72 hours.     CMP/BMP:  Recent Labs     02/15/20  1510 02/16/20  0935 02/17/20  0549    140 141   K 3.6 3.6 3.5   CL 97* 96* 96*   CO2 27 28 30* ANIONGAP 15 16 15   GLUCOSE 134* 207* 131*   BUN 29* 25* 23   CREATININE 2.0* 1.8* 1.9*   LABGLOM 34* 38* 36*   CALCIUM 9.1 9.2 9.0         CRP:  No results for input(s): CRP in the last 72 hours. Sed Rate:  No results for input(s): SEDRATE in the last 72 hours. HgBA1c:  No components found for: HGBA1C  FLP:    Lab Results   Component Value Date    TRIG 99 02/13/2020    HDL 35 02/13/2020    LDLCALC 41 02/13/2020    LDLDIRECT 153 06/12/2013     TSH:    Lab Results   Component Value Date    TSH 0.96 05/28/2015     Troponin T:   No results for input(s): TROPONINI in the last 72 hours. Pro-BNP: No results for input(s): BNP in the last 72 hours. INR:   No results for input(s): INR in the last 72 hours. ABGs:   Lab Results   Component Value Date    PHART 7.420 02/12/2020    PO2ART 57.0 02/12/2020    XQE6WAU 44.0 02/12/2020     UA:No results for input(s): NITRITE, COLORU, PHUR, LABCAST, WBCUA, RBCUA, MUCUS, TRICHOMONAS, YEAST, BACTERIA, CLARITYU, SPECGRAV, LEUKOCYTESUR, UROBILINOGEN, BILIRUBINUR, BLOODU, GLUCOSEU, AMORPHOUS in the last 72 hours. Invalid input(s): Alexis Weems      Culture Results:    No results for input(s): CXSURG in the last 720 hours. Blood Culture Recent: No results for input(s): BC in the last 720 hours. Cultures:   No results for input(s): CULTURE in the last 72 hours. No results for input(s): BC, Yan Fillers in the last 72 hours. No results for input(s): CXSURG in the last 72 hours. Recent Labs     02/16/20  0935 02/17/20  0549   MG 2.1 2.1     No results for input(s): AST, ALT, ALB, BILITOT, ALKPHOS, ALB in the last 72 hours. RAD:   Nm Lung Vent/perfusion (vq)    Result Date: 2/12/2020  PROCEDURE: PULMONARY VENTILATION AND PERFUSION IMAGING (V/Q SCAN) (CPT-4 CODE 63134)  INDICATION: Shortness of breath COMPARISON: Chest x-ray dated 2/12/2020 RADIOPHARMACEUTICAL AND TECHNIQUE: 9.6 mCi of 133-Xenon gas was administered via closed system ventilator.   Posterior planar images normal.      Left Ear: External ear normal.      Nose: No congestion or rhinorrhea. Mouth/Throat:      Mouth: Mucous membranes are moist.      Pharynx: No oropharyngeal exudate or posterior oropharyngeal erythema. Eyes:      General: No scleral icterus. Right eye: No discharge. Left eye: No discharge. Extraocular Movements: Extraocular movements intact. Conjunctiva/sclera: Conjunctivae normal.      Pupils: Pupils are equal, round, and reactive to light. Neck:      Musculoskeletal: Normal range of motion and neck supple. No neck rigidity. Cardiovascular:      Rate and Rhythm: Normal rate and regular rhythm. Pulses: Normal pulses. Heart sounds: Normal heart sounds. No murmur. No friction rub. No gallop. Pulmonary:      Effort: Pulmonary effort is normal. No respiratory distress. Breath sounds: Normal breath sounds. No stridor. No wheezing, rhonchi or rales. Chest:      Chest wall: No tenderness. Abdominal:      General: Bowel sounds are normal. There is no distension. Palpations: Abdomen is soft. Tenderness: There is no abdominal tenderness. Musculoskeletal: Normal range of motion. General: No swelling, tenderness, deformity or signs of injury. Comments: Trace bilateral lower extremity edema   Skin:     General: Skin is warm and dry. Capillary Refill: Capillary refill takes less than 2 seconds. Coloration: Skin is not jaundiced or pale. Findings: No bruising, erythema, lesion or rash. Neurological:      General: No focal deficit present. Mental Status: He is alert and oriented to person, place, and time. Cranial Nerves: No cranial nerve deficit. Sensory: No sensory deficit. Motor: No weakness. Coordination: Coordination normal.   Psychiatric:         Mood and Affect: Mood normal.         Behavior: Behavior normal.         Thought Content:  Thought content normal.         Judgment: Judgment normal.         Assessment/plan:           Principal Problem:    PAF (paroxysmal atrial fibrillation) ([facility])  Active Problems:    CAD (coronary artery disease)    Acute on chronic diastolic CHF (congestive heart failure) ([facility])    HTN (hypertension)    DM (diabetes mellitus) ([facility]    Chronic kidney disease, unspecified  Resolved Problems:    * No resolved hospital problems. *        New onset Paroxysmal Atrial fibrillation/flutter with RVR  · Continue diltiazem ggt as needed to keep HR < 110  · Cardiology following -appreciate recommendations  · S/p Transesophageal Echocardiogram, DCCV (02/13/2020). · Continue Apixaban 5 mg p.o. twice daily  · NM myocardia regadenoson pharmacologic stress tests (02/14/2020)  Impression: \"There is apical MI with minimal if any  ischemia, with a calculated ejection fraction of 36 % with a -1 % of ischemic burden. Suggest: clinical correlation\"  · Further management as per cardiology  · Seen by cardiology  · Sotalol changed to amiodarone  · Continue amiodarone 200 mg p.o. twice daily      HFrEF ,  with acute exacerbation (Acute on chronic)  · -Hypoxemic, with PaO2 of 57 mmHg on initial ABG.   · Elevated d-dimer however VQ scan with low suspicion for PE  · Chest x-ray showing \"Cardiomegaly with findings suggestive of pulmonary vascular congestion and developing pulmonary edema\"  · - Elevated ProBNP level 4,214 (02/12/2020)  · Repeat proBNP of 910 (02/18/2020): 1,238  · - Troponin level 0.03 --> 0.03  · - Furosemide 60 mg IV ×1 dose given in the ED  · - Continue diureses;   · - Fluid goal of -1.5-2 L  · - Strict I's and O  · - Fluid restriction  · - Continue optimized medical management.        History of Type 2 Diabetes Mellitus   · - Uncontrolled  · - Hemoglobin A1C: 7.9% (02/12/2020)  · Inpatient Regimens to include;  · - Insulin Glargine (Lantus) 25 units subcu nightly  · - Insulin Lispro (Humalog) 7 units subcu pre-meal 3 times a day  · - Insulin Lispro (Humalog) on a Low dose

## 2020-02-19 VITALS
TEMPERATURE: 97.1 F | HEART RATE: 56 BPM | WEIGHT: 245.13 LBS | RESPIRATION RATE: 18 BRPM | DIASTOLIC BLOOD PRESSURE: 78 MMHG | HEIGHT: 71 IN | SYSTOLIC BLOOD PRESSURE: 152 MMHG | BODY MASS INDEX: 34.32 KG/M2 | OXYGEN SATURATION: 88 %

## 2020-02-19 LAB
ANION GAP SERPL CALCULATED.3IONS-SCNC: 12 MMOL/L (ref 7–19)
BASOPHILS ABSOLUTE: 0.1 K/UL (ref 0–0.2)
BASOPHILS RELATIVE PERCENT: 0.6 % (ref 0–1)
BUN BLDV-MCNC: 18 MG/DL (ref 8–23)
CALCIUM SERPL-MCNC: 9.3 MG/DL (ref 8.8–10.2)
CHLORIDE BLD-SCNC: 94 MMOL/L (ref 98–111)
CO2: 34 MMOL/L (ref 22–29)
CREAT SERPL-MCNC: 1.7 MG/DL (ref 0.5–1.2)
EOSINOPHILS ABSOLUTE: 0.5 K/UL (ref 0–0.6)
EOSINOPHILS RELATIVE PERCENT: 4.6 % (ref 0–5)
GFR NON-AFRICAN AMERICAN: 41
GLUCOSE BLD-MCNC: 166 MG/DL (ref 70–99)
GLUCOSE BLD-MCNC: 182 MG/DL (ref 70–99)
GLUCOSE BLD-MCNC: 212 MG/DL (ref 74–109)
HCT VFR BLD CALC: 50.7 % (ref 42–52)
HEMOGLOBIN: 15.9 G/DL (ref 14–18)
IMMATURE GRANULOCYTES #: 0.1 K/UL
LYMPHOCYTES ABSOLUTE: 1.9 K/UL (ref 1.1–4.5)
LYMPHOCYTES RELATIVE PERCENT: 18.7 % (ref 20–40)
MCH RBC QN AUTO: 30.7 PG (ref 27–31)
MCHC RBC AUTO-ENTMCNC: 31.4 G/DL (ref 33–37)
MCV RBC AUTO: 97.9 FL (ref 80–94)
MONOCYTES ABSOLUTE: 0.8 K/UL (ref 0–0.9)
MONOCYTES RELATIVE PERCENT: 7.6 % (ref 0–10)
NEUTROPHILS ABSOLUTE: 6.7 K/UL (ref 1.5–7.5)
NEUTROPHILS RELATIVE PERCENT: 67.9 % (ref 50–65)
PDW BLD-RTO: 12.9 % (ref 11.5–14.5)
PERFORMED ON: ABNORMAL
PERFORMED ON: ABNORMAL
PLATELET # BLD: 233 K/UL (ref 130–400)
PMV BLD AUTO: 10.4 FL (ref 9.4–12.4)
POTASSIUM REFLEX MAGNESIUM: 3.6 MMOL/L (ref 3.5–5)
RBC # BLD: 5.18 M/UL (ref 4.7–6.1)
SODIUM BLD-SCNC: 140 MMOL/L (ref 136–145)
WBC # BLD: 9.9 K/UL (ref 4.8–10.8)

## 2020-02-19 PROCEDURE — 6370000000 HC RX 637 (ALT 250 FOR IP): Performed by: HOSPITALIST

## 2020-02-19 PROCEDURE — 80048 BASIC METABOLIC PNL TOTAL CA: CPT

## 2020-02-19 PROCEDURE — 82948 REAGENT STRIP/BLOOD GLUCOSE: CPT

## 2020-02-19 PROCEDURE — 6370000000 HC RX 637 (ALT 250 FOR IP): Performed by: INTERNAL MEDICINE

## 2020-02-19 PROCEDURE — 2700000000 HC OXYGEN THERAPY PER DAY

## 2020-02-19 PROCEDURE — 36415 COLL VENOUS BLD VENIPUNCTURE: CPT

## 2020-02-19 PROCEDURE — 2580000003 HC RX 258: Performed by: INTERNAL MEDICINE

## 2020-02-19 PROCEDURE — 99232 SBSQ HOSP IP/OBS MODERATE 35: CPT | Performed by: INTERNAL MEDICINE

## 2020-02-19 PROCEDURE — 94640 AIRWAY INHALATION TREATMENT: CPT

## 2020-02-19 PROCEDURE — 85025 COMPLETE CBC W/AUTO DIFF WBC: CPT

## 2020-02-19 RX ORDER — LISINOPRIL 40 MG/1
40 TABLET ORAL DAILY
Qty: 30 TABLET | Refills: 0 | Status: ON HOLD | OUTPATIENT
Start: 2020-02-20 | End: 2020-12-23

## 2020-02-19 RX ORDER — FUROSEMIDE 40 MG/1
40 TABLET ORAL 2 TIMES DAILY
Qty: 60 TABLET | Refills: 0 | Status: SHIPPED | OUTPATIENT
Start: 2020-02-19 | End: 2020-07-09 | Stop reason: ALTCHOICE

## 2020-02-19 RX ORDER — CARVEDILOL 6.25 MG/1
6.25 TABLET ORAL 2 TIMES DAILY
Qty: 60 TABLET | Refills: 0 | Status: ON HOLD | OUTPATIENT
Start: 2020-02-19 | End: 2020-10-20 | Stop reason: HOSPADM

## 2020-02-19 RX ORDER — AMIODARONE HYDROCHLORIDE 200 MG/1
200 TABLET ORAL DAILY
Qty: 30 TABLET | Refills: 0 | Status: ON HOLD | OUTPATIENT
Start: 2020-02-20 | End: 2021-05-14

## 2020-02-19 RX ADMIN — GUAIFENESIN 600 MG: 600 TABLET, EXTENDED RELEASE ORAL at 07:37

## 2020-02-19 RX ADMIN — ACETAMINOPHEN 650 MG: 325 TABLET ORAL at 07:37

## 2020-02-19 RX ADMIN — FUROSEMIDE 40 MG: 40 TABLET ORAL at 17:36

## 2020-02-19 RX ADMIN — Medication 10 ML: at 07:41

## 2020-02-19 RX ADMIN — APIXABAN 5 MG: 5 TABLET, FILM COATED ORAL at 07:40

## 2020-02-19 RX ADMIN — AMIODARONE HYDROCHLORIDE 200 MG: 200 TABLET ORAL at 07:37

## 2020-02-19 RX ADMIN — PANTOPRAZOLE SODIUM 40 MG: 40 TABLET, DELAYED RELEASE ORAL at 06:07

## 2020-02-19 RX ADMIN — ISOSORBIDE MONONITRATE 60 MG: 60 TABLET, EXTENDED RELEASE ORAL at 07:37

## 2020-02-19 RX ADMIN — GUAIFENESIN AND DEXTROMETHORPHAN 5 ML: 100; 10 SYRUP ORAL at 11:28

## 2020-02-19 RX ADMIN — CARVEDILOL 6.25 MG: 6.25 TABLET, FILM COATED ORAL at 07:37

## 2020-02-19 RX ADMIN — CLOPIDOGREL BISULFATE 75 MG: 75 TABLET, FILM COATED ORAL at 07:37

## 2020-02-19 RX ADMIN — LISINOPRIL 40 MG: 20 TABLET ORAL at 07:40

## 2020-02-19 RX ADMIN — PANTOPRAZOLE SODIUM 40 MG: 40 TABLET, DELAYED RELEASE ORAL at 17:36

## 2020-02-19 RX ADMIN — ROSUVASTATIN CALCIUM 20 MG: 10 TABLET, FILM COATED ORAL at 07:40

## 2020-02-19 RX ADMIN — INSULIN LISPRO 7 UNITS: 100 INJECTION, SOLUTION INTRAVENOUS; SUBCUTANEOUS at 07:45

## 2020-02-19 RX ADMIN — FUROSEMIDE 40 MG: 40 TABLET ORAL at 07:37

## 2020-02-19 RX ADMIN — INSULIN LISPRO 1 UNITS: 100 INJECTION, SOLUTION INTRAVENOUS; SUBCUTANEOUS at 07:46

## 2020-02-19 RX ADMIN — AMLODIPINE BESYLATE 10 MG: 10 TABLET ORAL at 07:37

## 2020-02-19 RX ADMIN — IPRATROPIUM BROMIDE AND ALBUTEROL SULFATE 1 AMPULE: .5; 3 SOLUTION RESPIRATORY (INHALATION) at 14:39

## 2020-02-19 RX ADMIN — INSULIN LISPRO 7 UNITS: 100 INJECTION, SOLUTION INTRAVENOUS; SUBCUTANEOUS at 12:33

## 2020-02-19 RX ADMIN — INSULIN LISPRO 1 UNITS: 100 INJECTION, SOLUTION INTRAVENOUS; SUBCUTANEOUS at 12:33

## 2020-02-19 RX ADMIN — IPRATROPIUM BROMIDE AND ALBUTEROL SULFATE 1 AMPULE: .5; 3 SOLUTION RESPIRATORY (INHALATION) at 10:40

## 2020-02-19 RX ADMIN — ASPIRIN 81 MG 81 MG: 81 TABLET ORAL at 07:40

## 2020-02-19 RX ADMIN — IPRATROPIUM BROMIDE AND ALBUTEROL SULFATE 1 AMPULE: .5; 3 SOLUTION RESPIRATORY (INHALATION) at 06:41

## 2020-02-19 ASSESSMENT — PAIN SCALES - GENERAL
PAINLEVEL_OUTOF10: 3
PAINLEVEL_OUTOF10: 6

## 2020-02-19 ASSESSMENT — PAIN DESCRIPTION - LOCATION
LOCATION: HEAD;NECK
LOCATION: HEAD

## 2020-02-19 ASSESSMENT — PAIN DESCRIPTION - PAIN TYPE
TYPE: ACUTE PAIN
TYPE: ACUTE PAIN

## 2020-02-19 ASSESSMENT — PAIN DESCRIPTION - DESCRIPTORS: DESCRIPTORS: THROBBING

## 2020-02-19 ASSESSMENT — PAIN DESCRIPTION - ORIENTATION: ORIENTATION: LEFT

## 2020-02-19 NOTE — PLAN OF CARE
Problem: Infection:  Goal: Will remain free from infection  Description  Will remain free from infection  Outcome: Ongoing     Problem: Falls - Risk of:  Goal: Will remain free from falls  Description  Will remain free from falls  Outcome: Ongoing  Goal: Absence of physical injury  Description  Absence of physical injury  Outcome: Ongoing

## 2020-02-19 NOTE — DISCHARGE SUMMARY
Maritza Odonnell  :  1955  MRN:  [id number]    Admit date:  2020  Discharge date:  2020       Admitting Physician:  Zay Man MD    Advance Directive: Full Code    Consults Made:   IP CONSULT TO CARDIOLOGY  IP CONSULT TO HEART FAILURE NURSE/COORDINATOR  IP CONSULT TO DIETITIAN  IP CONSULT TO SOCIAL WORK      Primary Care Physician:  Archana Martin MD    Discharge Diagnoses:  Principal Problem:    PAF (paroxysmal atrial fibrillation) ([facility]  Active Problems:    CAD (coronary artery disease)    Acute on chronic diastolic CHF (congestive heart failure) ([facility]    HTN (hypertension)    DM (diabetes mellitus) ([facility]    Chronic kidney disease, unspecified  Resolved Problems:    * No resolved hospital problems. *            Pertinent Labs:  CBC with DIFF:  Recent Labs     20  18020  0239   WBC 10.2 9.9   RBC 5.14 5.18   HGB 15.6 15.9   HCT 48.4 50.7   MCV 94.2* 97.9*   MCH 30.4 30.7   MCHC 32.2* 31.4*   RDW 12.8 12.9    233   MPV 10.6 10.4   NEUTOPHILPCT 67.7* 67.9*   LYMPHOPCT 19.1* 18.7*   MONOPCT 7.5 7.6   EOSRELPCT 4.6 4.6   BASOPCT 0.6 0.6   NEUTROABS 6.9 6.7   LYMPHSABS 1.9 1.9   MONOSABS 0.80 0.80   EOSABS 0.50 0.50   BASOSABS 0.10 0.10       CMP/BMP:  Recent Labs     20  0549 20  1806 20  0239    140 140   K 3.5 3.3* 3.6   CL 96* 95* 94*   CO2 30* 29 34*   ANIONGAP 15 16 12   GLUCOSE 131* 180* 212*   BUN 23 21 18   CREATININE 1.9* 1.9* 1.7*   LABGLOM 36* 36* 41*   CALCIUM 9.0 9.1 9.3         CRP:  No results for input(s): CRP in the last 72 hours. Sed Rate:  No results for input(s): SEDRATE in the last 72 hours. HgBA1c:  No components found for: HGBA1C  FLP:    Lab Results   Component Value Date    TRIG 99 2020    HDL 35 2020    LDLCALC 41 2020    LDLDIRECT 153 2013     TSH:    Lab Results   Component Value Date    TSH 0.96 2015     Troponin T: No results for input(s): TROPONINI in the last 72 hours.   Pro-BNP: No results 1.7  · Creatinine of 1.8 -->2.1 (02/15/2020), Likely from aggressive diuresis  · Creatinine now stable of 1.7 (02/09/2020)  · Switched furosemide, to bumetanide  · Continued to closely monitor BMP  · Avoided hypotension and nephrotoxins  · Patient to follow-up with PCP within 1 week, for repeat BMP and monitoring of kidney function.          Continued management of other chronic medical conditions    Physical Exam:  Vital Signs: BP (!) 152/78 Comment: reported to day shift rn   Pulse 56   Temp 97.1 °F (36.2 °C) (Temporal)   Resp 18   Ht 5' 11\" (1.803 m)   Wt 245 lb 2 oz (111.2 kg)   SpO2 (!) 88%   BMI 34.19 kg/m²   Physical Exam  General appearance: alert, appears stated age and cooperative  HEENT: Head: Normocephalic, no lesions, without obvious abnormality. Neck: no carotid bruit, no JVD and supple, symmetrical, trachea midline  Lungs: Patient in no acute respiratory distress, No increased work of breathing clear to auscultation bilaterally  Heart: regular rate and rhythm, S1, S2 normal, no murmur, click, rub or gallop  Abdomen: soft, non-tender; bowel sounds normal; no masses,  no organomegaly  Extremities: extremities normal, atraumatic, no cyanosis or edema   2+ pulses in bilateral lower extremities  Neurologic: Mental status: Alert, oriented, thought content appropriate  Skin: Skin Warm, dry with , color, texture, turgor normal. No rashes or lesions or nodules.      Discharge Medications:       Charly Leobardo   Home Medication Instructions UAK:[id number]    Printed on:02/19/20 151   Medication Information                      albuterol sulfate HFA (VENTOLIN HFA) 108 (90 Base) MCG/ACT inhaler  Inhale 2 puffs into the lungs every 6 hours as needed for Wheezing             amiodarone (CORDARONE) 200 MG tablet  Take 1 tablet by mouth daily             amLODIPine (NORVASC) 10 MG tablet  Take 1 tablet by mouth daily             apixaban (ELIQUIS) 5 MG TABS tablet  Take 1 tablet by mouth 2 times daily aspirin 81 MG tablet  Take 81 mg by mouth daily. carvedilol (COREG) 6.25 MG tablet  Take 1 tablet by mouth 2 times daily             clopidogrel (PLAVIX) 75 MG tablet  Take 1 tablet by mouth daily. esomeprazole Magnesium (NEXIUM) 40 MG PACK  Take 40 mg by mouth 2 times daily              furosemide (LASIX) 40 MG tablet  Take 1 tablet by mouth 2 times daily             insulin aspart (NOVOLOG) 100 UNIT/ML injection vial  Inject 5 Units into the skin 3 times daily (before meals) Inject 5 units before each meal             isosorbide mononitrate (IMDUR) 60 MG CR tablet  Take 60 mg by mouth 2 times daily             LANTUS SOLOSTAR 100 UNIT/ML injection pen  INJECT 30 UNITS IN THE MORNING AND 20 UNITS IN THE EVENING DAILY             levocetirizine (XYZAL) 5 MG tablet  Take 5 mg by mouth nightly             lisinopril (PRINIVIL;ZESTRIL) 40 MG tablet  Take 1 tablet by mouth daily             minoxidil (LONITEN) 2.5 MG tablet  Take 2.5 mg by mouth daily             montelukast (SINGULAIR) 10 MG tablet  Take 10 mg by mouth daily             potassium chloride SA (K-DUR;KLOR-CON M) 10 MEQ tablet  TAKE ONE TABLET BY MOUTH EVERY DAY             rosuvastatin (CRESTOR) 20 MG tablet  Take 20 mg by mouth daily             ULTICARE MINI PEN NEEDLES 31G X 6 MM MISC  FOR USE WITH LANTUS TWO TIMES A DAY                 Discharge Instructions: Follow up with Brianda Rizo MD in 7 days. Take medications as directed. Resume activity as tolerated.     Diet: DIET CARB CONTROL; Low Sodium (2 GM)        DISCHARGE STATUS:    Condition: Good  Disposition: Patient is medically stable and will be discharged home    Extended Emergency Contact Information  Primary Emergency Contact: Perri Espana  Address: [address], [address] [address] Phone: [phone]  Mobile Phone: [phone]  Relation: Spouse  Secondary Emergency Contact: Raymond Espana  Address: [place] [address]

## 2020-02-19 NOTE — PROGRESS NOTES
mg at 02/18/20 0849    isosorbide mononitrate (IMDUR) extended release tablet 60 mg  60 mg Oral BID Humaira Jesus MD   60 mg at 02/18/20 9792    rosuvastatin (CRESTOR) tablet 20 mg  20 mg Oral Daily Humaira Jesus MD   20 mg at 02/18/20 3446    guaiFENesin (MUCINEX) extended release tablet 600 mg  600 mg Oral BID Humaira Jesus MD   600 mg at 02/18/20 0849    guaiFENesin-dextromethorphan (ROBITUSSIN DM) 100-10 MG/5ML syrup 5 mL  5 mL Oral Q6H PRN Humaira Jesus MD   5 mL at 02/16/20 1630     Allergies: Hydralazine and Morphine  Past Medical History:   Diagnosis Date    Acute kidney failure, unspecified ([address]     Anxiety state, unspecified     Atrial septal aneurysm 1/9/2016    Blood circulation, collateral     Body mass index 30.0-30.9, adult     CAD (coronary artery disease) 1/10/2016    1/9/16  lexiscan  Positive for apical MI + myocardial ischemia, EF 42%, intermediate risk findings, AUC indication 16, AUC score 7 1/10/16  Cath  3 lesions in the LAD:  Mid: 70% 2.25x23, mid 90% 2.25 x 28, distal 100% 2.0x28, anterior lateral apical hypokinesis, EF 45%    Calculus of kidney     Carotid artery stenosis 6/26/2013    Cellulitis and abscess of hand, except fingers and thumb     Cerebral artery occlusion with cerebral infarction ([address]     15 years ago    Chronic airway obstruction, not elsewhere classified     Chronic kidney disease, unspecified     Congestive heart failure, unspecified     Diabetes mellitus type II     Diverticulosis of colon (without mention of hemorrhage)     Encounter for long-term (current) use of insulin (HCC)     Esophageal reflux     Family history of ischemic heart disease     Gastric ulcer, unspecified as acute or chronic, without mention of hemorrhage, perforation, or obstruction     Hyperlipemia     Hypertension     Internal hemorrhoids without mention of complication     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, or unspecified(403.00)     Obesity, unspecified     Other specified cardiac dysrhythmias(427.89)     Peripheral vascular disease (HCC)     Solitary pulmonary nodule     Surgical or other procedure not carried out because of contraindication     Thoracic aneurysm without mention of rupture     Tobacco use disorder     Type II or unspecified type diabetes mellitus without mention of complication, not stated as uncontrolled      Past Surgical History:   Procedure Laterality Date    CARDIAC CATHETERIZATION      CHOLECYSTECTOMY      CORONARY ANGIOPLASTY WITH STENT PLACEMENT  1-16    2 JACQUELINE to LAD    URETER STENT PLACEMENT      VASCULAR SURGERY  03- [facility]    Aortogram with bilateral selective renal artery injections    VASCULAR SURGERY  6/14/13 [facility]    Right carotid endarterectomy with Vascu-Guard patch repair. Right cervical carotid arteriograms after endarterectomy.  VASCULAR SURGERY  7/6/15 S    Percutaneous cannulation, right common femoral artery, with a5 Filipino sheath and later 6 Filipino [facility] sheath. Suprarenal abdomianl aortagram.  Selective right renal arteriogram.  Right renal artery balloon angioplasty;/stent (Express 6 mm x 18mmballoon-expandable stent. . Completion suprarenal abdominal aortogram and selective right remal arteriogram. Mynx closure, right common femoral artery punctur     Family History   Problem Relation Age of Onset    High Blood Pressure Father     Cancer Father     High Blood Pressure Mother     High Blood Pressure Brother      Social History     Tobacco Use    Smoking status: Former Smoker     Packs/day: 0.25    Smokeless tobacco: Never Used   Substance Use Topics    Alcohol use: No          Review of Systems:    General:      Complaint / Symptom Yes / No / Description if Yes       Fatigue Yes:  chronic   Weight gain NA   Insomnia NA       Respiratory:        Complaint / Symptom Yes / No / Description if Yes       Cough No   Horseness NA Cardiovascular:    Complaint / Symptom Yes / No / Description if Yes       Chest Pain No   Shortness of Air / Orthopnea Yes: chronic and stable   Presyncope / Syncope No   Palpitations No         Objective:    BP (!) 181/85   Pulse 63   Temp 97.6 °F (36.4 °C) (Temporal)   Resp 18   Ht 5' 11\" (1.803 m)   Wt 251 lb 8 oz (114.1 kg)   SpO2 91%   BMI 35.08 kg/m² ,     Intake/Output Summary (Last 24 hours) at 2/18/2020 1775  Last data filed at 2/18/2020 1439  Gross per 24 hour   Intake 210 ml   Output 2900 ml   Net -2690 ml       GENERAL - well developed and well nourished, is an active participant in this examination  HEENT -  PERRLA, Hearing appears normal, conjunctiva and lids are normal, ears and nose appear normal  NECK - no thyromegaly, no JVD, trachea is in the midline  CARDIOVASCULAR - PMI is in the left mid line clavicular position, Normal S1 and S2 with a grade 1/6 systolic murmur. No S3 or S4    PULMONARY - No respiratory distress. scattered wheezes and rales. Breath sounds in both  lung fields are Decreased  ABDOMEN  - soft, non tender, no rebound, no hepatomegaly or splenomegaly  MUSCULOSKELETAL  - Prone/Supine, digitals and nails are without clubbing or cyanosis  EXTREMITIES - trace edema  NEUROLOGIC - cranial nerves, II-XII, are normal  SKIN - turgor is normal, no rash  PSYCHIATRIC - normal mood and affect, alert and orientated x 3, judgement and insight appear appropriate      ASSESSMENT:    ALL THE CARDIOLOGY PROBLEMS ARE LISTED ABOVE; HOWEVER, THE FOLLOWING SPECIFIC CARDIAC PROBLEMS / CONDITIONS WERE ADDRESSED AND TREATED DURING THE OFFICE VISIT TODAY:                                                                                            MEDICAL DECISION MAKING                   Cardiac Specific Problem / Diagnosis   Discussion and Data Reviewed Diagnostic or Therapeutic Procedures Ordered Management Options Selected                 1.  Presenting problem / symptom     Paroxymal atrial

## 2020-02-20 NOTE — PROGRESS NOTES
[facility]:    Patient Active Problem List    Diagnosis Date Noted    PAF (paroxysmal atrial fibrillation) ([facility] 02/12/2020     Priority: High    CAD (coronary artery disease) 01/10/2016     Priority: High    Chronic kidney disease, unspecified      Priority: Low    Acute on chronic diastolic CHF (congestive heart failure) ([facility] 01/11/2016     Priority: Low    Congestive heart failure (HCC)      Priority: Low    Apical myocardial infarction ([facility]      Priority: Low    Atrial septal aneurysm 01/09/2016     Priority: Low    Malignant hypertension      Priority: Low    Abnormal EKG      Priority: Low    Diabetes mellitus type I ([facility] 01/07/2016     Priority: Low    Peripheral vascular disease ([facility]      Priority: Low    Carotid artery stenosis 06/26/2013     Priority: Low    Renal artery stenosis (HCC) 03/08/2013     Priority: Low    Aneurysm of thoracic aorta ([facility] 02/21/2013     Priority: Low    HTN (hypertension) 02/04/2013     Priority: Low    DM (diabetes mellitus) ([facility] 02/04/2013     Priority: Low    GERD (gastroesophageal reflux disease) 02/04/2013     Priority: Low     Current Facility-Administered Medications   Medication Dose Route Frequency Provider Last Rate Last Dose    amiodarone (CORDARONE) tablet 200 mg  200 mg Oral Daily Herminio Roca MD   200 mg at 02/19/20 0737    furosemide (LASIX) tablet 40 mg  40 mg Oral BID Herminio Roca MD   40 mg at 02/19/20 1736    pantoprazole (PROTONIX) tablet 40 mg  40 mg Oral BID AC Wojciech Bustamante MD   40 mg at 02/19/20 1736    ipratropium-albuterol (DUONEB) nebulizer solution 1 ampule  1 ampule Inhalation Q4H WA Wojciech Bustamante MD   1 ampule at 02/19/20 1439    acetaminophen (TYLENOL) tablet 650 mg  650 mg Oral Q6H PRN Melvin Chavez MD   650 mg at 02/19/20 0737    carvedilol (COREG) tablet 6.25 mg  6.25 mg Oral BID Herminio Roca MD   6.25 mg at 02/19/20 0737    sodium chloride flush 0.9 % injection 10 mL  10 mL Intravenous 2 times per day amiodarone       Yes: EVELYN / DCCV Continue current medications:      Yes:                  2. CAD Initial presentation during this evaluation    Review and summation of old records:     1/9/16  lexiscan  Positive for apical MI + myocardial ischemia, EF 42%, intermediate risk findings, AUC indication 16, AUC score 7  1/10/16  Cath  3 lesions in the LAD:  Mid: 70% 2.25x23, mid 90% 2.25 x 28, distal 100% 2.0x28, anterior lateral apical hypokinesis    No Continue current medications:     Yes:                  3. Concern regarding systemic blood pressure Initial presentation during this evaluation The blood pressure for the lastr 36 hours has been:  Systolic (33XZP), LFY:452 , Min:144 , PQW:714    Diastolic (79VDX), IGM:43, Min:78, Max:85                   No Continue current medications:        yes         CONSIDERATIONS, THOUGHTS, AND PLANS:    1. Continue present medications except for changes as noted above  2. Continue to monitor rhythm  3. Further orders per clinical course. 4. Ok to DC, to be brought back in for outpt cath next week           Discussed with patient and nursing.     Electronically signed by Skyla Dyer MD on 2/19/20        [facility] [place]

## 2020-02-21 ENCOUNTER — TELEPHONE (OUTPATIENT)
Dept: CARDIOLOGY | Age: 65
End: 2020-02-21

## 2020-02-21 NOTE — TELEPHONE ENCOUNTER
Attempted to contact patient for post hosp call for CHF status check. Call went directly to voicemail. Left message to have patient call back.

## 2020-02-24 ENCOUNTER — HOSPITAL ENCOUNTER (OUTPATIENT)
Dept: CARDIAC CATH/INVASIVE PROCEDURES | Age: 65
Discharge: HOME | End: 2020-02-24
Attending: INTERNAL MEDICINE | Admitting: INTERNAL MEDICINE
Payer: COMMERCIAL

## 2020-02-24 VITALS
SYSTOLIC BLOOD PRESSURE: 169 MMHG | BODY MASS INDEX: 34.3 KG/M2 | TEMPERATURE: 98.4 F | DIASTOLIC BLOOD PRESSURE: 89 MMHG | RESPIRATION RATE: 18 BRPM | WEIGHT: 245 LBS | HEART RATE: 67 BPM | OXYGEN SATURATION: 93 % | HEIGHT: 71 IN

## 2020-02-24 LAB
ANION GAP SERPL CALCULATED.3IONS-SCNC: 13 MMOL/L (ref 7–19)
BUN BLDV-MCNC: 15 MG/DL (ref 8–23)
CALCIUM SERPL-MCNC: 9.4 MG/DL (ref 8.8–10.2)
CHLORIDE BLD-SCNC: 99 MMOL/L (ref 98–111)
CO2: 30 MMOL/L (ref 22–29)
CREAT SERPL-MCNC: 1.4 MG/DL (ref 0.5–1.2)
GFR NON-AFRICAN AMERICAN: 51
GLUCOSE BLD-MCNC: 147 MG/DL (ref 74–109)
MAGNESIUM: 2.1 MG/DL (ref 1.6–2.4)
POTASSIUM REFLEX MAGNESIUM: 3.4 MMOL/L (ref 3.5–5)
REASON FOR REJECTION: NORMAL
REJECTED TEST: NORMAL
SODIUM BLD-SCNC: 142 MMOL/L (ref 136–145)

## 2020-02-24 PROCEDURE — 80048 BASIC METABOLIC PNL TOTAL CA: CPT

## 2020-02-24 PROCEDURE — 2709999900 HC NON-CHARGEABLE SUPPLY

## 2020-02-24 PROCEDURE — 6360000002 HC RX W HCPCS

## 2020-02-24 PROCEDURE — 99999 PR OFFICE/OUTPT VISIT,PROCEDURE ONLY: CPT | Performed by: INTERNAL MEDICINE

## 2020-02-24 PROCEDURE — 99152 MOD SED SAME PHYS/QHP 5/>YRS: CPT | Performed by: INTERNAL MEDICINE

## 2020-02-24 PROCEDURE — 93458 L HRT ARTERY/VENTRICLE ANGIO: CPT | Performed by: INTERNAL MEDICINE

## 2020-02-24 PROCEDURE — C1760 CLOSURE DEV, VASC: HCPCS

## 2020-02-24 PROCEDURE — 99024 POSTOP FOLLOW-UP VISIT: CPT | Performed by: INTERNAL MEDICINE

## 2020-02-24 PROCEDURE — C1894 INTRO/SHEATH, NON-LASER: HCPCS

## 2020-02-24 PROCEDURE — 2580000003 HC RX 258: Performed by: INTERNAL MEDICINE

## 2020-02-24 PROCEDURE — 6360000004 HC RX CONTRAST MEDICATION: Performed by: INTERNAL MEDICINE

## 2020-02-24 PROCEDURE — 36415 COLL VENOUS BLD VENIPUNCTURE: CPT

## 2020-02-24 PROCEDURE — 83735 ASSAY OF MAGNESIUM: CPT

## 2020-02-24 RX ORDER — SODIUM CHLORIDE 9 MG/ML
INJECTION, SOLUTION INTRAVENOUS CONTINUOUS
Status: DISCONTINUED | OUTPATIENT
Start: 2020-02-24 | End: 2020-02-24 | Stop reason: HOSPADM

## 2020-02-24 RX ADMIN — SODIUM CHLORIDE: 9 INJECTION, SOLUTION INTRAVENOUS at 11:38

## 2020-02-24 RX ADMIN — IOPAMIDOL 106 ML: 612 INJECTION, SOLUTION INTRAVENOUS at 14:33

## 2020-02-24 NOTE — H&P
discharged with a creatinine of 1.7. He is now admitted for cardiac cath due to the apical MI and ischemia and the depressed LVFX ranging from 36 to 45% by objective evaluation. Cardiology was asked to see him regarding these symptoms and findings and to consider those in relationship to possible optimal diagnosis and treatment options. FEATURES OF THIS HISTORY OF PRESENT ILLNESS (SYMPTOMS AND FINDINGS) INCLUDE:       1. Location: The dyspnea was located in the central to left chest without radiation to the back, throat and left shoulder. 2. Duration: The dyspnea began gradually and lasted days and is now improved   3. Quality: The shortness of air has not been associated with symptoms of chest discomfort, pain with breathing, dizzness, fainting, cough, wheezing, hemoptysis, neck pain, chest injury, fever or sputum production. 4. Severity: It was described as mild but progessive but now after discharge is imporved   5. Timing The symptoms began gradually. 6. Modifying Factors: Modifying features included:   diuresis   7. Associated Signs and Symptoms: There was not  associated manifestations such as nausea, vomiting, diaphoresis, presyncope, syncope, dizzyness, weakness, cold sweats, or shortness of air. 8. Context: As noted above in the context of the presentation. It  was not  suggestive of myocardial ischemia. When being examined this afternoon, in [address] [facility] [address] with Keith Rogel, there was on going or continuous symptoms of exertional chest discomfort, unusual or change in shortness of air, presyncope or syncope.          CARDIAC RISK PROFILE:      Risk Factor Yes / No / Unknown         Gender Male   Cigarette Use No, but did use in the past    Family History of Cardiovascular Disease Yes: high blood pressure   Diabetes Mellitus yes   Hypercholesteremia yes   Hypertension yes        Cardiac Specific Problems:    Specialty Problems        Cardiology Problems    CAD (coronary artery disease)        PAF (paroxysmal atrial fibrillation) (HCC)        HTN (hypertension)        Aneurysm of thoracic aorta (HCC)        Renal artery stenosis (HCC)        Carotid artery stenosis        Peripheral vascular disease (HCC)        Atrial septal aneurysm        Malignant hypertension        Apical myocardial infarction (HCC)        Acute on chronic diastolic CHF (congestive heart failure) (HCC)        Congestive heart failure (HCC)                PRIOR CARDIAC PROBLEM LIST  (IF APPLICABLE):    6/4/75  lexiscan  Positive for apical MI + myocardial ischemia, EF 42%, intermediate risk findings, AUC indication 16, AUC score 7  1/10/16  Cath  3 lesions in the LAD:  Mid: 70% 2.25x23, mid 90% 2.25 x 28, distal 100% 2.0x28, anterior lateral apical hypokinesis  2/14/20  lexiscan Positive for apical MI with minimal  myocardial ischemia, EF 36%, -1% ischemic myocardium on stress  2/14/2020  Echo  EF 45%      Past Medical History:    Past Medical History:   Diagnosis Date    Acute kidney failure, unspecified ([address]     Anxiety state, unspecified     Atrial septal aneurysm 1/9/2016    Blood circulation, collateral     Body mass index 30.0-30.9, adult     CAD (coronary artery disease) 1/10/2016    1/9/16  lexiscan  Positive for apical MI + myocardial ischemia, EF 42%, intermediate risk findings, AUC indication 16, AUC score 7 1/10/16  Cath  3 lesions in the LAD:  Mid: 70% 2.25x23, mid 90% 2.25 x 28, distal 100% 2.0x28, anterior lateral apical hypokinesis, EF 45%    Calculus of kidney     Carotid artery stenosis 6/26/2013    Cellulitis and abscess of hand, except fingers and thumb     Cerebral artery occlusion with cerebral infarction ([address]     15 years ago    Chronic airway obstruction, not elsewhere classified     Chronic kidney disease, unspecified     Congestive heart failure, unspecified     Diabetes mellitus type II     Diverticulosis of colon (without mention of hemorrhage)     Encounter for long-term APRN - CNP   potassium chloride SA (K-DUR;KLOR-CON M) 10 MEQ tablet TAKE ONE TABLET BY MOUTH EVERY DAY  Patient taking differently: TAKE ONE TABLET BY MOUTH TWICE A  DAY 4/15/14  Yes Brianda Rizo MD   clopidogrel (PLAVIX) 75 MG tablet Take 1 tablet by mouth daily. 12/9/13  Yes Brianda Rizo MD   aspirin 81 MG tablet Take 81 mg by mouth daily. Yes Historical Provider, MD        Facility Administered Medications:      Allergies:  Hydralazine and Morphine     Social History:       Social History     Socioeconomic History    Marital status:      Spouse name: Not on file    Number of children: Not on file    Years of education: Not on file    Highest education level: Not on file   Occupational History    Not on file   Social Needs    Financial resource strain: Not on file    Food insecurity:     Worry: Not on file     Inability: Not on file    Transportation needs:     Medical: Not on file     Non-medical: Not on file   Tobacco Use    Smoking status: Former Smoker     Packs/day: 0.25    Smokeless tobacco: Never Used   Substance and Sexual Activity    Alcohol use: No    Drug use: No    Sexual activity: Yes   Lifestyle    Physical activity:     Days per week: Not on file     Minutes per session: Not on file    Stress: Not on file   Relationships    Social connections:     Talks on phone: Not on file     Gets together: Not on file     Attends Yarsanism service: Not on file     Active member of club or organization: Not on file     Attends meetings of clubs or organizations: Not on file     Relationship status: Not on file    Intimate partner violence:     Fear of current or ex partner: Not on file     Emotionally abused: Not on file     Physically abused: Not on file     Forced sexual activity: Not on file   Other Topics Concern    Not on file   Social History Narrative    Not on file       Family History:     Family History   Problem Relation Age of Onset    High Blood Pressure Father     for emergency open heart surgery <1, and death <1% . He appeared to understand, had no questions, and agreed to proceed with this plan. Additionally, there are risks associated with moderate sedation which includes transient drop in blood pressure and need for assisted ventilation. This procedure is scheduled for 02/24/20 .     Shavon Becerra MD

## 2020-02-24 NOTE — DISCHARGE SUMMARY
[facility]    Discharge Summary          I personally saw the patient and rounded with:  Denise Heller, on  2/24/20      The observations documented in this note, including the assessment and plan are mine              Patient ID: Hillary Sanders      Patient's PCP: Kyara Locke MD    Admit Date: 2/24/2020     Discharge Date:  02/24/20     Admitting Physician:  Liliana Ortiz MD       Discharge Physician: Liliana Ortiz MD     Discharge Diagnoses:    1. Coronary artery disease    1/9/16  lexiscan  Positive for apical MI + myocardial ischemia, EF 42%, intermediate risk findings, AUC indication 16, AUC score 7  1/10/16  Cath  3 lesions in the LAD:  Mid: 70% 2.25x23, mid 90% 2.25 x 28, distal 100% 2.0x28, anterior lateral apical hypokinesis  2/14/20  lexiscan Positive for apical MI with minimal  myocardial ischemia, EF 36%, -1% ischemic myocardium on stress, AUC indication 21, AUC score 7   2/14/2020  Echo  EF 45%  2/24/20  Cath  100% distal LAD at the stent, o/w luminals, slight apical and mild diaphragmatic hypokinesis, EF 45%    2. Systemic arterial hypertension  3. Hypercholesteremia  4. Diabetes mellitus  5.  Sinoatrial node dysfunction    Specific details regarding the atrial fibrillation / atrial flutter (if known):             TYPE DURATION NONVALVULAR    VALVULAR PRIOR TREATMENT ANTI-  COAGULATION            Paroxymal     Currently in NSR    days   Nonvalvular       Betapace changed to amiodarone     eliquis for anticoagulation   Eliquis        Cardiac Specific Diagnoses:    Specialty Problems        Cardiology Problems    CAD (coronary artery disease)        PAF (paroxysmal atrial fibrillation) ([facility])        HTN (hypertension)        Aneurysm of thoracic aorta (HCC)        Renal artery stenosis (HCC)        Carotid artery stenosis        Peripheral vascular disease (HCC)        Atrial septal aneurysm        Malignant hypertension        Apical myocardial infarction ([address]        Acute on chronic diastolic CHF (congestive heart failure) ([facility]        Congestive heart failure ([facility]                The patient was seen and examined on day of discharge and this discharge summary is in conjunction with any daily progress note from day of discharge. History of Present Illness:        Anahi Mcmullen is a 62 y.o. male who presents to [address] [place] [address] 3 (admitted on 2/24/2020 10:09 AM) with symptoms / signs / problem or diagnosis of need for cardiac catheterization.      He is normal mood and affect, alert and orientated x 3, judgement and insight appear appropriate.      Family present:  Yes: wife     At the beginning of the interview he was getting ready for cath.        CONTEXT OF THIS HISTORY OF PRESENT ILLNESS INCLUDE: (IF APPLICABLE):     Presentation:  He  presented with need for cardiac catheterization.     He was hospitalized from 2/12/20020 to 2/19/2020 with systemic vol overload and atrial fibrillation.      During that time, on 2/13/2020 he underwent EVELYN / DCCV to normal sinus rhythm. He was begun on amiodarone.     The following day, he underwent the following objective evaluation:     2/14/20  lexiscan Positive for apical MI with minimal  myocardial ischemia, EF 36%, -1% ischemic myocardium on stress  2/14/2020  Echo  EF 45%     He was subsequently diuresed and his weight went from approximately 260 to 245.     The admitting creatinine went from 1.7 to 2.1 and then he was discharged with a creatinine of 1.7.     He is now admitted for cardiac cath due to the apical MI and ischemia and the depressed LVFX ranging from 36 to 45% by objective evaluation.     Cardiology was asked to see him regarding these symptoms and findings and to consider those in relationship to possible optimal diagnosis and treatment options.        FEATURES OF THIS HISTORY OF PRESENT ILLNESS (SYMPTOMS AND FINDINGS) INCLUDE:         1.  Location: The dyspnea was located in the central to left chest stent in the distal LAD  7. Hypertensive blood pressure response      There were no apparent complications. In view of the stable coronary anatomy, I felt that an attempt of intense risk factor modification and medical therapy would be the initial course of therapy. The rest of the patient's hospitalization was uneventful. He was discharged home on medical therapy with outpatient follow up. Consults:     None      Significant Diagnostic Studies:    1. Selective left heart and coronary arteriography (femoral approach), 02/24/20        Significant Therapeutic Endeavors:      1. none      Activity: activity as directed per discharge instructions. Diet:  Cardiac diet    Labs:  For convenience and continuity at follow-up the following most recent labs are provided:    CBC:    Lab Results   Component Value Date    WBC 9.9 02/19/2020    HGB 15.9 02/19/2020    HCT 50.7 02/19/2020     02/19/2020       Renal:    Lab Results   Component Value Date     02/24/2020    K 3.4 02/24/2020    CL 99 02/24/2020    CO2 30 02/24/2020    BUN 15 02/24/2020    CREATININE 1.4 02/24/2020    CALCIUM 9.4 02/24/2020    PHOS 2.9 02/18/2020         Discharge Medications:     Current Discharge Medication List           Details   amiodarone (CORDARONE) 200 MG tablet Take 1 tablet by mouth daily  Qty: 30 tablet, Refills: 0      apixaban (ELIQUIS) 5 MG TABS tablet Take 1 tablet by mouth 2 times daily  Qty: 60 tablet, Refills: 0      lisinopril (PRINIVIL;ZESTRIL) 40 MG tablet Take 1 tablet by mouth daily  Qty: 30 tablet, Refills: 0      carvedilol (COREG) 6.25 MG tablet Take 1 tablet by mouth 2 times daily  Qty: 60 tablet, Refills: 0      furosemide (LASIX) 40 MG tablet Take 1 tablet by mouth 2 times daily  Qty: 60 tablet, Refills: 0      montelukast (SINGULAIR) 10 MG tablet Take 10 mg by mouth daily      levocetirizine (XYZAL) 5 MG tablet Take 5 mg by mouth nightly      insulin aspart (NOVOLOG) 100 UNIT/ML injection against the possibility of cardio embolic events. According to the [facility] [address] of Medicine (2016; [id number]), the combination of a NOAC and a P2Y12 inhibitor reduced the rates of clinically significant bleeding. This is without the use of aspirin. According, I will place the patient on a NOAC 15 mg orally once a day and contnue Plavix 75 mg orally once a day. Again, for coding, this is acceptable practice without the use of aspirin.            Electronically signed by Angela Martinez MD on 2/24/20

## 2020-02-24 NOTE — PROCEDURES
the left circumflex. There is mild diffuse disease throughout the entire length of the vessel. Left Anterior Coronary Artery:  The LAD is a moderate sized vessel with several diagonal branches. There is mild diffuse disease throughout the entire length of the vessel. The stents in the mid LAD are patent, the stent in the distal LAD is 100% occluded. Left Circumflex Coronary Artery:  The LCx is a moderate sized vessel with several marginal branches. There is mild diffuse disease throughout the entire length of the vessel. Right Coronary Artery:  The RCA is a moderate sized dominant vessel which arises from the right sinus of Valsalva. There is mild diffuse disease throughout the entire length of the vessel. Left Ventriculogram:  The left ventriculogram is obtained in the right oblique projection. All regional wall segments move appropriately. The estimated visual ejection fraction is > 60%. There is no mitral regurgitation nor is there a pull back gradient seen across the aortic valve. Summary:      1. Successful femoral artery ultrasound  2. Successful femoral artery arteriogram  3. Supervision of the administration of moderate conscious sedation  4. Mild coronary artery disease involving the circumflex and RCA  5. Left ventricular function is impaired in the apical and diaphragmatic segments with a visually estimated ejection fraction of 45%  6. Patent stents in the mid LAD with 100% occlusion of the stent in the distal LAD      RECOMMENDATIONS:    1.  Reassurance  2. Risk factor modification  3. Evaluation of etiologies of non cardiac chest discomfort should symptoms persist or progress  4.   Follow up with Primary Care Provider as arranged        Electronically signed by Harpreet Medel MD on 2/24/20

## 2020-03-05 ENCOUNTER — OFFICE VISIT (OUTPATIENT)
Dept: CARDIOLOGY | Age: 65
End: 2020-03-05
Payer: COMMERCIAL

## 2020-03-05 VITALS
WEIGHT: 240 LBS | HEART RATE: 67 BPM | HEIGHT: 71 IN | DIASTOLIC BLOOD PRESSURE: 82 MMHG | BODY MASS INDEX: 33.6 KG/M2 | SYSTOLIC BLOOD PRESSURE: 138 MMHG

## 2020-03-05 PROCEDURE — 99214 OFFICE O/P EST MOD 30 MIN: CPT | Performed by: CLINICAL NURSE SPECIALIST

## 2020-03-05 PROCEDURE — 3051F HG A1C>EQUAL 7.0%<8.0%: CPT | Performed by: CLINICAL NURSE SPECIALIST

## 2020-03-05 NOTE — PROGRESS NOTES
states just has labs and HgbA1c 7.0  .   Nancy Sarmiento has the following history as recorded in [facility]:    Patient Active Problem List    Diagnosis Date Noted    Abnormal nuclear cardiac imaging test      Priority: High    CAD (coronary artery disease) 01/10/2016     Priority: High    Chronic kidney disease, unspecified     PAF ([facility]oxysmal atrial fibrillation) [facility] 02/12/2020    Acute on chronic diastolic CHF (congestive heart failure) ([address] 01/11/2016    Congestive heart failure (HCC)     Apical myocardial infarction ([address]     Atrial septal aneurysm 01/09/2016    Malignant hypertension     Abnormal EKG     Diabetes mellitus type I ([address] 01/07/2016    Peripheral vascular disease ([facility]     Carotid artery stenosis 06/26/2013    Renal artery stenosis ([facility] 03/08/2013    Aneurysm of thoracic aorta ([facility] 02/21/2013    HTN (hypertension) 02/04/2013    DM (diabetes mellitus) ([facility] 02/04/2013    GERD (gastroesophageal reflux disease) 02/04/2013     Past Medical History:   Diagnosis Date    Acute kidney failure, unspecified ([address]     Anxiety state, unspecified     Atrial septal aneurysm 1/9/2016    Blood circulation, collateral     Body mass index 30.0-30.9, adult     CAD (coronary artery disease) 1/10/2016    1/9/16  lexiscan  Positive for apical MI + myocardial ischemia, EF 42%, intermediate risk findings, AUC indication 16, AUC score 7 1/10/16  Cath  3 lesions in the LAD:  Mid: 70% 2.25x23, mid 90% 2.25 x 28, distal 100% 2.0x28, anterior lateral apical hypokinesis, EF 45%    Calculus of kidney     Carotid artery stenosis 6/26/2013    Cellulitis and abscess of hand, except fingers and thumb     Cerebral artery occlusion with cerebral infarction ([address]     15 years ago    Chronic airway obstruction, not elsewhere classified     Chronic kidney disease, unspecified     Congestive heart failure, unspecified     Diabetes mellitus type II     Diverticulosis of colon (without mention of hemorrhage)     Encounter for long-term (current) use of insulin (HCC)     Esophageal reflux     Family history of ischemic heart disease     Gastric ulcer, unspecified as acute or chronic, without mention of hemorrhage, perforation, or obstruction     Hyperlipemia     Hypertension     Internal hemorrhoids without mention of complication     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, or unspecified(403.00)     Obesity, unspecified     Other specified cardiac dysrhythmias(427.89)     Paroxysmal atrial fibrillation (HCC)     Peripheral vascular disease ([address]     Solitary pulmonary nodule     Surgical or other procedure not carried out because of contraindication     Thoracic aneurysm without mention of rupture     Tobacco use disorder     Type II or unspecified type diabetes mellitus without mention of complication, not stated as uncontrolled      Past Surgical History:   Procedure Laterality Date    CARDIAC CATHETERIZATION      CHOLECYSTECTOMY      CORONARY ANGIOPLASTY WITH STENT PLACEMENT  1-16    2 JACQUELINE to LAD    URETER STENT PLACEMENT      VASCULAR SURGERY  03- [facility]    Aortogram with bilateral selective renal artery injections    VASCULAR SURGERY  6/14/13 [facility]    Right carotid endarterectomy with Vascu-Guard patch repair. Right cervical carotid arteriograms after endarterectomy.  VASCULAR SURGERY  7/6/15 [facility]    Percutaneous cannulation, right common femoral artery, with a5 Samoan sheath and later 6 Samoan [facility] sheath. Suprarenal abdomianl aortagram.  Selective right renal arteriogram.  Right renal artery balloon angioplasty;/stent (Express 6 mm x 18mmballoon-expandable stent. . Completion suprarenal abdominal aortogram and selective right remal arteriogram. Mynx closure, right common femoral artery punctur     Family History   Problem Relation Age of Onset    High Blood Pressure Father     Cancer Father     High Blood Pressure Mother  High Blood Pressure Brother      Social History     Tobacco Use    Smoking status: Former Smoker     Packs/day: 0.25    Smokeless tobacco: Never Used   Substance Use Topics    Alcohol use: No      Current Outpatient Medications   Medication Sig Dispense Refill    amiodarone (CORDARONE) 200 MG tablet Take 1 tablet by mouth daily 30 tablet 0    apixaban (ELIQUIS) 5 MG TABS tablet Take 1 tablet by mouth 2 times daily 60 tablet 0    lisinopril (PRINIVIL;ZESTRIL) 40 MG tablet Take 1 tablet by mouth daily 30 tablet 0    carvedilol (COREG) 6.25 MG tablet Take 1 tablet by mouth 2 times daily 60 tablet 0    furosemide (LASIX) 40 MG tablet Take 1 tablet by mouth 2 times daily 60 tablet 0    montelukast (SINGULAIR) 10 MG tablet Take 10 mg by mouth daily      levocetirizine (XYZAL) 5 MG tablet Take 5 mg by mouth nightly      insulin aspart (NOVOLOG) 100 UNIT/ML injection vial Inject 5 Units into the skin 3 times daily (before meals) Inject 5 units before each meal      esomeprazole Magnesium (NEXIUM) 40 MG PACK Take 40 mg by mouth 2 times daily       albuterol sulfate HFA (VENTOLIN HFA) 108 (90 Base) MCG/ACT inhaler Inhale 2 puffs into the lungs every 6 hours as needed for Wheezing 1 Inhaler 3    rosuvastatin (CRESTOR) 20 MG tablet Take 20 mg by mouth daily      amLODIPine (NORVASC) 10 MG tablet Take 1 tablet by mouth daily 30 tablet 3    isosorbide mononitrate (IMDUR) 60 MG CR tablet Take 60 mg by mouth 2 times daily      minoxidil (LONITEN) 2.5 MG tablet Take 2.5 mg by mouth daily      LANTUS SOLOSTAR 100 UNIT/ML injection pen INJECT 30 UNITS IN THE MORNING AND 20 UNITS IN THE EVENING DAILY (Patient taking differently: 2 times daily Takes 30 units in AM and 20 units in PM) 15 mL 5    potassium chloride SA (K-DUR;KLOR-CON M) 10 MEQ tablet TAKE ONE TABLET BY MOUTH EVERY DAY (Patient taking differently: TAKE ONE TABLET BY MOUTH TWICE A  DAY) 30 tablet 5    clopidogrel (PLAVIX) 75 MG tablet Take 1 tablet by mouth daily. 30 tablet 5    ULTICARE MINI PEN NEEDLES 31G X 6 MM MISC FOR USE WITH LANTUS TWO TIMES A DAY 50 each 5     No current facility-administered medications for this visit. Allergies: Hydralazine and Morphine    Review of Systems  Constitutional - Activity tolerance has improved. Appetite Good  No fever, chills or diaphoresis. denies fatigue. HEENT - no significant rhinorrhea or epistaxis. No tinnitus or significant hearing loss. Eyes - no sudden vision change or amaurosis. Respiratory - no significant wheezing, stridor, apnea or cough. denies dyspnea on exertion  denies  Resting shortness of breath. Cardiovascular - no exertional chest pain  denies orthopnea or PND. No sensation of arrhythmia or slow heart rate. No claudication or leg edema. Gastrointestinal - no abdominal swelling or pain. No blood in stool. No severe constipation, diarrhea, nausea, or vomiting. Genitourinary - no difficulty urinating, dysuria, frequency, or urgency. No flank pain or hematuria. Musculoskeletal - no back pain, gait disturbance, or myalgia. Skin - no color change or rash. No pallor. No new surgical incision. Neurologic - no speech difficulty, facial asymmetry or lateralizing weakness. No seizures, presyncope, syncope, or significant dizziness. Hematologic - no easy bruising or excessive bleeding. Psychiatric - no severe anxiety or insomnia. No confusion. All other review of systems are negative. Objective  Vital Signs - /82   Pulse 67   Ht 5' 11\" (1.803 m)   Wt 240 lb (108.9 kg)   BMI 33.47 kg/m²   General - Edgar Draft is alert, cooperative, and pleasant. Well groomed. No acute distress. Body habitus is overweight. HEENT - The head is normocephalic. No circumoral cyanosis. Dentition is normal.   EYES -  No Xanthelasma, no arcus senilis, no conjunctival hemorrhages or discharge. Neck - Supple, without increased jugular venous pressures. No carotid bruits. No mass.

## 2020-04-06 ENCOUNTER — TELEPHONE (OUTPATIENT)
Dept: CARDIOLOGY | Age: 65
End: 2020-04-06

## 2020-04-06 NOTE — TELEPHONE ENCOUNTER
Pt called wanting to know if his paperwork for [employer] has been filled out. Pt is coming on next week for apt and wants to make sure to get paperwork.

## 2020-04-09 ENCOUNTER — OFFICE VISIT (OUTPATIENT)
Dept: CARDIOLOGY | Age: 65
End: 2020-04-09
Payer: COMMERCIAL

## 2020-04-09 VITALS
DIASTOLIC BLOOD PRESSURE: 82 MMHG | WEIGHT: 246 LBS | HEIGHT: 71 IN | BODY MASS INDEX: 34.44 KG/M2 | SYSTOLIC BLOOD PRESSURE: 130 MMHG | HEART RATE: 63 BPM

## 2020-04-09 PROCEDURE — 99214 OFFICE O/P EST MOD 30 MIN: CPT | Performed by: CLINICAL NURSE SPECIALIST

## 2020-04-09 NOTE — PATIENT INSTRUCTIONS
Care Instructions. \"     If you do not have an account, please click on the \"Sign Up Now\" link. Current as of: December 15, 2019Content Version: 12.4  © 0544-7218 [employer]. Care instructions adapted under license by [facility] ([facility]). If you have questions about a medical condition or this instruction, always ask your healthcare professional. [address] any warranty or liability for your use of this information.

## 2020-04-09 NOTE — PROGRESS NOTES
[facility]  [facility], [address] [address]  Phone: [phone]  Fax: [phone]    OFFICE VISIT:  2020    Jefferson Verduzco - : 1955    Reason For Visit:  Jamil Calzada is a 62 y.o. male who is here for Follow-up (Patient has soa); Coronary Artery Disease; Congestive Heart Failure; and Atrial Fibrillation  History of type 2 diabetes, hypertension, heart failure and coronary disease with heart catheterization by Dr. Osiris Roy in 2016 -With drug-eluting stents. EF 45%. He was admitted to the hospital 2020 with complaints of shortness of breath. Found to have onset of atrial fibrillation  Underwent EVELYN followed by cardioversion. He was started on amiodarone and anticoagulated on Eliquis 5 mg twice a day.     2D echo 2020 showed moderate concentric LVH with moderately depressed LV function, EF 45%. Mildly dilated atria. Trace MR.   He underwent nuclear stress test showing apical MI with minimal ischemia. EF approximately 35%  Was discharged readmitted for elective heart catheterization 2020  Mild coronary disease of the circumflex and RCA. LV function showing EF 45%. Patent stents in mid LAD with 100% occlusion of the stent in the distal LAD. Continue medical management. At office visit last month rhythm was controlled on amiodarone. He did notice blood pressure increased in the mornings. Suggested taking amlodipine at nighttime  He was doing well on guideline directed medical therapy. He returns today in follow-up    He is riding his stationary bike  Unable to go as long as he used to but still doing it 10 minutes twice a day. Gained a few lbs in the last 2 days- ate some pretzels. Overall he is trying to get some stamina back. He has occasional PND but denies orthopnea. Jamil Calzada denies exertional chest pain,syncope, presyncope, arrhythmia, edema and fatigue.   The patient denies numbness or weakness to suggest cerebrovascular accident or transient ischemic management. Overall he is doing well  States taking medications as prescribed  Stable cardiovascular status. No evidence of overt heart failure, angina or dysrhythmia. Plan  If you gain 2-4 lbs over night then take extra lasix for a day or 2 until your weight is back to normal  Follow up in 3 mos With Dr. Eliezer Saleh   Call with any questions or concerns  Follow up with Tonya Melara MD for non cardiac problems  Report any new problems  Cardiovascular Fitness-Exercise as tolerated. Strive for 30 minutes of exercise most days of the week. Cardiac / Healthy Diet- low salt   Continue current medications as directed  Continue plan of treatment  It is always recommended that you bring your medications bottles with you to each visit - this is for your safety! DWAYNE Colvin dragon/transcription disclaimer: Much of this encounter note is electronic transcription/translation of spoken language to printed tach. Electronic translation of spoken language may be erroneous, or at times, nonsensical words or phrases may be inadvertently transcribed.  Although, I have reviewed the note for such errors, some may still exist.

## 2020-04-15 ENCOUNTER — TELEPHONE (OUTPATIENT)
Dept: CARDIOTHORACIC SURGERY | Age: 65
End: 2020-04-15

## 2020-04-15 NOTE — TELEPHONE ENCOUNTER
Spoke with Rohini Box to check on CHF status. He reports he is doing well. Continues to do daily wts. Todays wt was 245. Discussion r/t foods and salt. Pt recently had a 5 lb wt gain over night due to sugar dropping low and eating crackers and peanut butter. Luckily he had a visit with cardio that morning and they were able to get the fluid off him before it created more issues. He did verbalize now understanding how quickly salt can create an issue for him. Reviewed wt. Gain to be concerned about, and to call cardio for issues or questions.

## 2020-05-08 ENCOUNTER — APPOINTMENT (OUTPATIENT)
Dept: GENERAL RADIOLOGY | Age: 65
End: 2020-05-08
Payer: COMMERCIAL

## 2020-05-08 ENCOUNTER — HOSPITAL ENCOUNTER (EMERGENCY)
Age: 65
Discharge: HOME | End: 2020-05-08
Payer: COMMERCIAL

## 2020-05-08 ENCOUNTER — APPOINTMENT (OUTPATIENT)
Dept: CT IMAGING | Age: 65
End: 2020-05-08
Payer: COMMERCIAL

## 2020-05-08 VITALS
BODY MASS INDEX: 33.88 KG/M2 | RESPIRATION RATE: 17 BRPM | HEART RATE: 71 BPM | WEIGHT: 242 LBS | SYSTOLIC BLOOD PRESSURE: 188 MMHG | HEIGHT: 71 IN | TEMPERATURE: 98.7 F | DIASTOLIC BLOOD PRESSURE: 81 MMHG | OXYGEN SATURATION: 94 %

## 2020-05-08 PROCEDURE — 71045 X-RAY EXAM CHEST 1 VIEW: CPT

## 2020-05-08 PROCEDURE — 90715 TDAP VACCINE 7 YRS/> IM: CPT | Performed by: NURSE PRACTITIONER

## 2020-05-08 PROCEDURE — 73110 X-RAY EXAM OF WRIST: CPT

## 2020-05-08 PROCEDURE — 70450 CT HEAD/BRAIN W/O DYE: CPT

## 2020-05-08 PROCEDURE — 93005 ELECTROCARDIOGRAM TRACING: CPT | Performed by: NURSE PRACTITIONER

## 2020-05-08 PROCEDURE — 6360000002 HC RX W HCPCS: Performed by: NURSE PRACTITIONER

## 2020-05-08 PROCEDURE — 29125 APPL SHORT ARM SPLINT STATIC: CPT

## 2020-05-08 PROCEDURE — 90471 IMMUNIZATION ADMIN: CPT | Performed by: NURSE PRACTITIONER

## 2020-05-08 PROCEDURE — 99284 EMERGENCY DEPT VISIT MOD MDM: CPT

## 2020-05-08 RX ADMIN — TETANUS TOXOID, REDUCED DIPHTHERIA TOXOID AND ACELLULAR PERTUSSIS VACCINE, ADSORBED 0.5 ML: 5; 2.5; 8; 8; 2.5 SUSPENSION INTRAMUSCULAR at 19:58

## 2020-05-09 ENCOUNTER — CARE COORDINATION (OUTPATIENT)
Dept: CARE COORDINATION | Age: 65
End: 2020-05-09

## 2020-05-09 NOTE — ED NOTES
Splinted by BECCA on L arm/wrist (Volar short arm; Cotton padding, Ortho-Glass, elastic bandage and sling)     Emi Fernandez RN  05/08/20 2167

## 2020-05-09 NOTE — ED PROVIDER NOTES
[facility]  eMERGENCY dEPARTMENT eNCOUnter      Pt Name: Devonna Alpers  MRN: [id number]  [place] 1955  Date of evaluation: 5/8/2020  Provider: Suellen Lanes, APRN    CHIEF COMPLAINT       Chief Complaint   Patient presents with    Arm Injury     left; fell into firepit     Fall     pt states hit head when he fell but didn't have any loc         HISTORY OF PRESENT ILLNESS   (Location/Symptom, Timing/Onset,Context/Setting, Quality, Duration, Modifying Factors, Severity)  Note limiting factors. Dianna Dunham a 62 y.o. male who presents to the emergency department for evaluation of fall. Pt tells me that he fell from ground level today striking his head and injuring his left wrist. He tells me that he fell due to chronic problem with left knee reporting left knee giving way tonight. He denies loss of consciousness. He tells me that he has had no chest or abdominal pain. He relates that he has had no shortness of breath today but relates that he has had some increased swelling of lower extremities. He denies left shoulder and left arm pain other than some discomfort to left wrist. He has abrasion to left distal forearm and top of left hand. He denies other extremity injury. He is anticoagulated having history of intermittent afib. HPI    Nursing Notes were reviewed. REVIEW OF SYSTEMS    (2-9 systems for level 4, 10 or more for level 5)     Review of Systems   Constitutional: Negative. Cardiovascular: Negative for chest pain. Musculoskeletal: Positive for arthralgias (left wrist). Skin: Positive for wound (left distal dorsal forearm/left dorsal hand). A complete review of systems was performed and is negative except as noted above in the HPI.        PAST MEDICAL HISTORY     Past Medical History:   Diagnosis Date    Acute kidney failure, unspecified ([facility] [address]     Anxiety state, unspecified     Atrial septal aneurysm 1/9/2016    Blood circulation, collateral     Body mass index 30.0-30.9, adult     CAD (coronary artery disease) 1/10/2016    1/9/16  lexiscan  Positive for apical MI + myocardial ischemia, EF 42%, intermediate risk findings, AUC indication 16, AUC score 7 1/10/16  Cath  3 lesions in the LAD:  Mid: 70% 2.25x23, mid 90% 2.25 x 28, distal 100% 2.0x28, anterior lateral apical hypokinesis, EF 45%    Calculus of kidney     Carotid artery stenosis 6/26/2013    Cellulitis and abscess of hand, except fingers and thumb     Cerebral artery occlusion with cerebral infarction ([address]     15 years ago    Chronic airway obstruction, not elsewhere classified     Chronic kidney disease, unspecified     Congestive heart failure, unspecified     Diabetes mellitus type II     Diverticulosis of colon (without mention of hemorrhage)     Encounter for long-term (current) use of insulin (HCC)     Esophageal reflux     Family history of ischemic heart disease     Gastric ulcer, unspecified as acute or chronic, without mention of hemorrhage, perforation, or obstruction     Hyperlipemia     Hypertension     Internal hemorrhoids without mention of complication     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, or unspecified(403.00)     Obesity, unspecified     Other specified cardiac dysrhythmias(427.89)     Paroxysmal atrial fibrillation (HCC)     Peripheral vascular disease ([address]     Solitary pulmonary nodule     Surgical or other procedure not carried out because of contraindication     Thoracic aneurysm without mention of rupture     Tobacco use disorder     Type II or unspecified type diabetes mellitus without mention of complication, not stated as uncontrolled          SURGICAL HISTORY       Past Surgical History:   Procedure Laterality Date    CARDIAC CATHETERIZATION      CHOLECYSTECTOMY      CORONARY ANGIOPLASTY WITH STENT PLACEMENT  1-16    2 JACQUELINE to LAD    DIAGNOSTIC CARDIAC CATH LAB PROCEDURE      URETER STENT PLACEMENT      mouth daily    MONTELUKAST (SINGULAIR) 10 MG TABLET    Take 10 mg by mouth daily    POTASSIUM CHLORIDE SA (K-DUR;KLOR-CON M) 10 MEQ TABLET    TAKE ONE TABLET BY MOUTH EVERY DAY    ROSUVASTATIN (CRESTOR) 20 MG TABLET    Take 20 mg by mouth daily    ULTICARE MINI PEN NEEDLES 31G X 6 MM MISC    FOR USE WITH LANTUS TWO TIMES A DAY       ALLERGIES     Hydralazine and Morphine    FAMILY HISTORY       Family History   Problem Relation Age of Onset    High Blood Pressure Father     Cancer Father     High Blood Pressure Mother     High Blood Pressure Brother           SOCIAL HISTORY       Social History     Socioeconomic History    Marital status:      Spouse name: None    Number of children: None    Years of education: None    Highest education level: None   Occupational History    None   Social Needs    Financial resource strain: None    Food insecurity     Worry: None     Inability: None    Transportation needs     Medical: None     Non-medical: None   Tobacco Use    Smoking status: Former Smoker     Packs/day: 0.25    Smokeless tobacco: Never Used   Substance and Sexual Activity    Alcohol use: No    Drug use: No    Sexual activity: Yes   Lifestyle    Physical activity     Days per week: None     Minutes per session: None    Stress: None   Relationships    Social connections     Talks on phone: None     Gets together: None     Attends Quaker service: None     Active member of club or organization: None     Attends meetings of clubs or organizations: None     Relationship status: None    Intimate partner violence     Fear of current or ex partner: None     Emotionally abused: None     Physically abused: None     Forced sexual activity: None   Other Topics Concern    None   Social History Narrative    None       SCREENINGS    [place] Coma Scale  Eye Opening: Spontaneous  Best Verbal Response: Oriented  Best Motor Response: Obeys commands  [place] Coma Scale Score: 15        PHYSICAL EXAM findings:        Interpretation per the Radiologist below, if available at the time of this note:    CT Head WO Contrast   Final Result   1. No acute intracranial findings. 2. Global cerebral volume loss and presumed chronic microvascular   ischemic white matter change. 3. LEFT mastoid effusion. 4. Suspected tiny 3 mm colloid cyst at the foramen of [place]. Signed by Dr Abigail Gauthier on 5/8/2020 8:23 PM      XR CHEST PORTABLE   Final Result   No acute findings. Signed by Dr Abigail Gauthier on 5/8/2020 8:25 PM      XR WRIST LEFT (MIN 3 VIEWS)   Final Result   No definite acute fracture. There is a small ossific fragment along   the lateral aspect of the trapezium bone which could represent a small   displaced fracture fragment although no donor site is definitely   visible. This also could represent sequela of a remote injury. Signed by Dr Abigail Gauthier on 5/8/2020 8:15 PM            ED BEDSIDE ULTRASOUND:   Performed by ED Physician - none    LABS:  Labs Reviewed - No data to display    All other labs were within normal range or not returned as of this dictation. RE-ASSESSMENT     Pt declined need for prescription pain medication.        EMERGENCY DEPARTMENT COURSE and DIFFERENTIALDIAGNOSIS/MDM:   Vitals:    Vitals:    05/08/20 1924 05/08/20 1935 05/08/20 2003   BP: (!) 193/93 (!) 166/84 (!) 189/87   Pulse: 71     Resp: 17     Temp: 98.7 °F (37.1 °C)     SpO2: 94%     Weight: 242 lb (109.8 kg)     Height: 5' 11\" (1.803 m)         MDM      CONSULTS:  None    PROCEDURES:  Unless otherwise notedbelow, none     Splint Application  Date/Time: 5/8/2020 9:41 PM  Performed by: DWAYNE Stone  Authorized by: DWAYNE Stone     Consent:     Consent obtained:  Verbal    Consent given by:  Patient    Risks discussed:  Pain    Alternatives discussed:  No treatment  Pre-procedure details:     Sensation:  Normal  Procedure details:     Laterality:  Left    Location:  Wrist    Wrist:  L wrist    Cast type:

## 2020-05-09 NOTE — ED NOTES
Arm dressed with ashley and abx ointment and non adherent dressing     Mali Zendejas RN  05/08/20 [id number]

## 2020-05-11 LAB
EKG P AXIS: 48 DEGREES
EKG P-R INTERVAL: 180 MS
EKG Q-T INTERVAL: 432 MS
EKG QRS DURATION: 102 MS
EKG QTC CALCULATION (BAZETT): 449 MS
EKG T AXIS: 50 DEGREES

## 2020-05-11 PROCEDURE — 93010 ELECTROCARDIOGRAM REPORT: CPT | Performed by: INTERNAL MEDICINE

## 2020-07-09 ENCOUNTER — HOSPITAL ENCOUNTER (OUTPATIENT)
Dept: INFUSION THERAPY | Age: 65
Setting detail: INFUSION SERIES
Discharge: HOME | End: 2020-07-09
Payer: COMMERCIAL

## 2020-07-09 ENCOUNTER — OFFICE VISIT (OUTPATIENT)
Dept: CARDIOLOGY | Age: 65
End: 2020-07-09
Payer: COMMERCIAL

## 2020-07-09 VITALS
SYSTOLIC BLOOD PRESSURE: 120 MMHG | WEIGHT: 251.2 LBS | OXYGEN SATURATION: 91 % | HEIGHT: 71 IN | HEART RATE: 60 BPM | DIASTOLIC BLOOD PRESSURE: 72 MMHG | BODY MASS INDEX: 35.17 KG/M2

## 2020-07-09 DIAGNOSIS — I25.10 CORONARY ARTERY DISEASE INVOLVING NATIVE CORONARY ARTERY OF NATIVE HEART WITHOUT ANGINA PECTORIS: ICD-10-CM

## 2020-07-09 DIAGNOSIS — I10 ESSENTIAL HYPERTENSION: ICD-10-CM

## 2020-07-09 DIAGNOSIS — I48.0 PAF (PAROXYSMAL ATRIAL FIBRILLATION) (HCC): ICD-10-CM

## 2020-07-09 PROBLEM — R40.0 DAYTIME SOMNOLENCE: Status: ACTIVE | Noted: 2020-07-09

## 2020-07-09 PROBLEM — E78.2 MIXED HYPERLIPIDEMIA: Status: ACTIVE | Noted: 2020-07-09

## 2020-07-09 PROBLEM — I51.9 LEFT VENTRICULAR DYSFUNCTION: Status: ACTIVE | Noted: 2020-07-09

## 2020-07-09 PROBLEM — R60.9 EDEMA: Status: ACTIVE | Noted: 2020-07-09

## 2020-07-09 LAB
ANION GAP SERPL CALCULATED.3IONS-SCNC: 17 MMOL/L (ref 7–19)
BUN BLDV-MCNC: 19 MG/DL (ref 8–23)
CALCIUM SERPL-MCNC: 9.3 MG/DL (ref 8.8–10.2)
CHLORIDE BLD-SCNC: 102 MMOL/L (ref 98–111)
CO2: 25 MMOL/L (ref 22–29)
CREAT SERPL-MCNC: 1.9 MG/DL (ref 0.5–1.2)
GFR NON-AFRICAN AMERICAN: 36
GLUCOSE BLD-MCNC: 121 MG/DL (ref 74–109)
HCT VFR BLD CALC: 47.4 % (ref 42–52)
HEMOGLOBIN: 15.4 G/DL (ref 14–18)
MCH RBC QN AUTO: 31.3 PG (ref 27–31)
MCHC RBC AUTO-ENTMCNC: 32.5 G/DL (ref 33–37)
MCV RBC AUTO: 96.3 FL (ref 80–94)
PDW BLD-RTO: 13.2 % (ref 11.5–14.5)
PLATELET # BLD: 169 K/UL (ref 130–400)
PMV BLD AUTO: 11.5 FL (ref 9.4–12.4)
POTASSIUM SERPL-SCNC: 3.7 MMOL/L (ref 3.5–5)
PRO-BNP: 718 PG/ML (ref 0–900)
RBC # BLD: 4.92 M/UL (ref 4.7–6.1)
SODIUM BLD-SCNC: 144 MMOL/L (ref 136–145)
T4 FREE: 1.55 NG/DL (ref 0.93–1.7)
TSH SERPL DL<=0.05 MIU/L-ACNC: 1.63 UIU/ML (ref 0.27–4.2)
WBC # BLD: 7.9 K/UL (ref 4.8–10.8)

## 2020-07-09 PROCEDURE — 93000 ELECTROCARDIOGRAM COMPLETE: CPT | Performed by: NURSE PRACTITIONER

## 2020-07-09 PROCEDURE — 6360000002 HC RX W HCPCS: Performed by: NURSE PRACTITIONER

## 2020-07-09 PROCEDURE — 99214 OFFICE O/P EST MOD 30 MIN: CPT | Performed by: NURSE PRACTITIONER

## 2020-07-09 PROCEDURE — 96374 THER/PROPH/DIAG INJ IV PUSH: CPT

## 2020-07-09 RX ORDER — FUROSEMIDE 10 MG/ML
80 INJECTION INTRAMUSCULAR; INTRAVENOUS ONCE
Status: COMPLETED | OUTPATIENT
Start: 2020-07-09 | End: 2020-07-09

## 2020-07-09 RX ORDER — BUMETANIDE 1 MG/1
1 TABLET ORAL 2 TIMES DAILY
Qty: 60 TABLET | Refills: 5 | Status: SHIPPED | OUTPATIENT
Start: 2020-07-09 | End: 2020-08-24

## 2020-07-09 RX ORDER — INSULIN ASPART 100 [IU]/ML
5 INJECTION, SOLUTION INTRAVENOUS; SUBCUTANEOUS
COMMUNITY
Start: 2020-06-01

## 2020-07-09 RX ADMIN — FUROSEMIDE 80 MG: 10 INJECTION, SOLUTION INTRAMUSCULAR; INTRAVENOUS at 11:09

## 2020-07-09 NOTE — PROGRESS NOTES
[facility], [place]. [address], [address] [facility]  [phone] office  [phone] fax      OFFICE VISIT:  2020    Kristy Appl - : 1955  Reason For Visit:  Juwan Guevara is a 62 y.o. male who is here for 3 Month Follow-Up (pt is having sob, double vision, acid reflux, swelling of feet and hands ); Coronary Artery Disease; and Atrial Fibrillation    History:   Diagnosis Orders   1. Coronary artery disease involving native coronary artery of native heart without angina pectoris  Basic Metabolic Panel    Brain Natriuretic Peptide    CBC    TSH without Reflex    T4, Free   2. PAF (paroxysmal atrial fibrillation) ([facility])  EKG 12 lead    Basic Metabolic Panel    Brain Natriuretic Peptide    CBC    TSH without Reflex    T4, Free    [place], [place], Neurology, [facility]   3. Essential hypertension  Basic Metabolic Panel    Brain Natriuretic Peptide    CBC    TSH without Reflex    T4, Free   4. Mixed hyperlipidemia     5. Daytime somnolence  [place]ar[place] Ross[place], [place], Neurology, [facility]   6. Left ventricular dysfunction      EF 45%   7. Edema, unspecified type       The patient presents today for cardiology follow up. Pain mid-sternal region - feels tight. Worse when lying down. Belching helps some. On Nexium BID. No chest pain during the day. Last endo - 5 or more years ago. Very sedentary. Report increasing fatigue with decreased exercise tolerance and daytime somnolence. He feels as if he is retaining fluid and is more SOA. No acute decline in respiratory function on amiodarone. He had increased dietary sodium last weekend. Weight gain from 242 to 255 pounds. No report of AF. BP is well controlled on current regimen. The patient's PCP monitors cholesterol and DM - Dr. Brandon Dugan. HgA1c 6.8. Has hx of renal insufficiency.     Subjective  Juwan Guevara denies exertional chest pain,  orthopnea, paroxysmal nocturnal dyspnea, syncope, presyncope and sensed arrhythmia. The patient denies numbness or weakness to suggest cerebrovascular accident or transient ischemic attack. + fatigue. SOA. + edema.     Urban Crump has the following history as recorded in [facility]:  Patient Active Problem List   Diagnosis Code    HTN (hypertension) I10    DM (diabetes mellitus) ([facility] [address] E11.9    GERD (gastroesophageal reflux disease) K21.9    Aneurysm of thoracic aorta ([facility]) I71.2    Renal artery stenosis ([facility]) I70.1    Carotid artery stenosis I65.29    Peripheral vascular disease ([facility]) I73.9    Diabetes mellitus type I ([facility]) E10.9    Malignant hypertension I10    Atrial septal aneurysm I25.3    Abnormal EKG R94.31    Apical myocardial infarction ([facility]) I21.29    CAD (coronary artery disease) I25.10    Acute on chronic diastolic CHF (congestive heart failure) ([facility]) I50.33    Congestive heart failure ([facility]) I50.9    PAF (paroxysmal atrial fibrillation) ([facility]) I48.0    Chronic kidney disease, unspecified N18.9    Abnormal nuclear cardiac imaging test R93.1    Mixed hyperlipidemia E78.2     Past Medical History:   Diagnosis Date    Acute kidney failure, unspecified ([facility])     Anxiety state, unspecified     Atrial septal aneurysm 1/9/2016    Blood circulation, collateral     Body mass index 30.0-30.9, adult     CAD (coronary artery disease) 1/10/2016    1/9/16  lexiscan  Positive for apical MI + myocardial ischemia, EF 42%, intermediate risk findings, AUC indication 16, AUC score 7 1/10/16  Cath  3 lesions in the LAD:  Mid: 70% 2.25x23, mid 90% 2.25 x 28, distal 100% 2.0x28, anterior lateral apical hypokinesis, EF 45%    Calculus of kidney     Carotid artery stenosis 6/26/2013    Cellulitis and abscess of hand, except fingers and thumb     Cerebral artery occlusion with cerebral infarction ([facility] [address]     15 years ago    Chronic airway obstruction, not elsewhere classified     Chronic kidney disease, unspecified     Congestive heart failure, unspecified     Diabetes mellitus type II     Diverticulosis of colon (without mention of hemorrhage)     Encounter for long-term (current) use of insulin (HCC)     Esophageal reflux     Family history of ischemic heart disease     Gastric ulcer, unspecified as acute or chronic, without mention of hemorrhage, perforation, or obstruction     Hyperlipemia     Hypertension     Internal hemorrhoids without mention of complication     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, or unspecified(403.00)     Obesity, unspecified     Other specified cardiac dysrhythmias(427.89)     Paroxysmal atrial fibrillation (HCC)     Peripheral vascular disease ([address]     Solitary pulmonary nodule     Surgical or other procedure not carried out because of contraindication     Thoracic aneurysm without mention of rupture     Tobacco use disorder     Type II or unspecified type diabetes mellitus without mention of complication, not stated as uncontrolled      Past Surgical History:   Procedure Laterality Date    CARDIAC CATHETERIZATION      CHOLECYSTECTOMY      CORONARY ANGIOPLASTY WITH STENT PLACEMENT  1-16    2 JACQUELINE to LAD    DIAGNOSTIC CARDIAC CATH LAB PROCEDURE      URETER STENT PLACEMENT      VASCULAR SURGERY  03- [facility]    Aortogram with bilateral selective renal artery injections    VASCULAR SURGERY  6/14/13 [facility]    Right carotid endarterectomy with Vascu-Guard patch repair. Right cervical carotid arteriograms after endarterectomy.  VASCULAR SURGERY  7/6/15 [facility]    Percutaneous cannulation, right common femoral artery, with a5 [place]e sheath and later 6 [place]e [facility] sheath. Suprarenal abdomianl aortagram.  Selective right renal arteriogram.  Right renal artery balloon angioplasty;/stent (Express 6 mm x 18mmballoon-expandable stent. . Completion suprarenal abdominal aortogram and selective right remal arteriogram. Mynx closure, right common femoral artery 1 tablet by mouth daily. 30 tablet 5    NOVOLOG FLEXPEN 100 UNIT/ML injection pen       amiodarone (CORDARONE) 200 MG tablet Take 1 tablet by mouth daily 30 tablet 0    carvedilol (COREG) 6.25 MG tablet Take 1 tablet by mouth 2 times daily 60 tablet 0    furosemide (LASIX) 40 MG tablet Take 1 tablet by mouth 2 times daily 60 tablet 0     No current facility-administered medications for this visit. Allergies: Hydralazine and Morphine    Review of Systems  Constitutional - no appetite change, or unexpected weight change. No fever, chills or diaphoresis. + fatigue. HEENT - no significant rhinorrhea or epistaxis. No tinnitus or significant hearing loss. Eyes - no sudden vision change or amaurosis. No corneal arcus, xantholasma, subconjunctival hemorrhage or discharge. + patched left eye due to issues with EOM. Respiratory - no significant wheezing, stridor, apnea or cough. + SOA. Cardiovascular - no exertional chest pain to suggest myocardial ischemia. No orthopnea or PND. No sensation of sustained arrythmia. No occurrence of slow heart rate. No palpitations. No claudication. Gastrointestinal - no abdominal swelling or pain. No blood in stool. No severe constipation, diarrhea, nausea, or vomiting. Genitourinary - no dysuria, frequency, or urgency. No flank pain or hematuria. Musculoskeletal - no back pain or myalgia. Transported via wheelchair. Extremities - no clubbing or cyanosis. + bilateral lower extremity edema. Skin - no color change or rash. No pallor. No new surgical incision. Neurologic - no speech difficulty, facial asymmetry or lateralizing weakness. No seizures, presyncope or syncope. No significant dizziness. Hematologic - no easy bruising or excessive bleeding. Psychiatric - no severe anxiety or insomnia. No confusion. All other review of systems are negative.     Objective  Vital Signs - /72   Pulse 60   Ht 5' 11\" (1.803 m)   Wt 251 lb 3.2 oz (113.9 kg) SpO2 91%   BMI 35.04 kg/m²   General - Praveena Erazo is alert, cooperative, and pleasant. Well groomed. No acute distress. Body habitus - Body mass index is 35.04 kg/m². HEENT - Head is normocephalic. No circumoral cyanosis. Dentition is normal.  EYES -   Lids normal without ptosis. No discharge, edema or subconjunctival hemorrhage. Neck - Symmetrical without apparent mass or lymphadenopathy. Respiratory - Normal respiratory effort without use of accessory muscles. Ausculatation reveals vesicular breath sounds without crackles, wheezes, rub or rhonchi. Cardiovascular - No jugular venous distention. Auscultation reveals regular rate and rhythm. No audible clicks, gallop or rub. No murmur. No lower extremity varicosities. No carotid bruits. Abdominal -  No visible distention, mass or pulsations. Extremities - No clubbing or cyanosis. No statis dermatitis or ulcers. 2+ pretibial pitting edema - bilaterlo. Musculoskeletal -   No Osler's nodes. No kyphosis or scoliosis. Transported via wheelchair. Skin -  Warm and dry; no rash or pallor. No new surgical wound. Neurological - No focal neurological deficits. Thought processes coherent. No apparent tremor. Oriented to person, place and time. Psychiatric -  Appropriate affect and mood. Assessment:     Diagnosis Orders   1. Coronary artery disease involving native coronary artery of native heart without angina pectoris     2. PAF (paroxysmal atrial fibrillation) ([facility])  EKG 12 lead   3. Essential hypertension     4.  Mixed hyperlipidemia         Data reviewed:  Coronary artery disease   1/9/16  lexiscan  Positive for apical MI + myocardial ischemia, EF 42%, intermediate risk findings, AUC indication 16, AUC score 7  1/10/16  Cath  3 lesions in the LAD:  Mid: 70% 2.25x23, mid 90% 2.25 x 28, distal 100% 2.0x28, anterior lateral apical hypokinesis  2/14/20  lexiscan Positive for apical MI with minimal  myocardial ischemia, EF 36%, -1% ischemic myocardium on stress, AUC indication 21, AUC score 7   2/14/2020  Echo  EF 45%  2/24/20  Cath  100% distal LAD at the stent, o/w luminals, slight apical and mild diaphragmatic hypokinesis, EF 45%    Lab Results   Component Value Date     07/09/2020    K 3.7 07/09/2020     07/09/2020    CO2 25 07/09/2020    BUN 19 07/09/2020    CREATININE 1.9 (H) 07/09/2020    GLUCOSE 121 (H) 07/09/2020    CALCIUM 9.3 07/09/2020    PROT 6.3 (L) 02/12/2020    LABALBU 3.3 (L) 02/12/2020    BILITOT 0.4 02/12/2020    ALKPHOS 86 02/12/2020    AST 16 02/12/2020    ALT 16 02/12/2020    LABGLOM 36 (A) 07/09/2020    GLOB 3.5 01/07/2016     Lab Results   Component Value Date    WBC 7.9 07/09/2020    HGB 15.4 07/09/2020    HCT 47.4 07/09/2020    MCV 96.3 (H) 07/09/2020     07/09/2020     Lab Results   Component Value Date    TSH 1.630 07/09/2020     Lab Results   Component Value Date    CHOL 96 (L) 02/13/2020    CHOL 220 06/12/2013     Lab Results   Component Value Date    TRIG 99 02/13/2020    TRIG 119 06/12/2013     Lab Results   Component Value Date    HDL 35 (L) 02/13/2020    HDL 40 06/12/2013     Lab Results   Component Value Date    LDLCALC 41 02/13/2020    LDLCALC 149 05/06/2014    LDLCALC 161 (A) 12/12/2013     CAD - stable on current medical management. LVD - EF 45% with 13 pound weight gain. Increased dietary sodium lat week. Lasix 80 mg IVP. Stop Lasix and start Bumex 1 mg BID. Check labs today - BMP and BNP. Leg edema in part due to amlodipine and loniten. Advised low sodium diet and daily weight log. PAF - no recurrent AF on amiodarone. No bleeding issue on Eliquis. Fatigue and daytime somnolence - check CBC, TSH and free T4. Refer for sleep apnea testing. HTN - normotensive on current regimen. Hyperlipidemia - on Crestor LDL 41. Patient is compliant with medication regimen.     BP Readings from Last 3 Encounters:   07/09/20 120/72   05/08/20 (!) 188/81   04/09/20 130/82

## 2020-07-09 NOTE — PROGRESS NOTES
20mg IV lasix given 09:55-09:53 60mg unable to be given due to vein blowing. Pt to go to outpt infusion.   Rubin Garza 47 [id number]

## 2020-07-09 NOTE — PATIENT INSTRUCTIONS
New instructions for today:  A referral has been made for sleep apnea testing. [facility] ([facility]) Neurology will be contacting you for an appointment. Go to first floor outpatient lab today for lab work. Stop Lasix now. Tomorrow, start new water pill - Bumex 1 mg (1) tab twice daily. Take second tab around 2 pm.    Talk with Dr. Kristen Lau about the acid reflux and possible change in medication and endoscopy. Weigh yourself daily without clothing at the same time each day. Record a daily weight log. Call the office if you gain 3 pounds or more in 2 to 3 days or 5 pounds in one week. A sudden weight gain may mean that your heart failure is getting worse. Sodium causes your body to hold on to extra water. This may cause your heart failure symptoms to get worse. Limit sodium to 2,000 milligrams (mg) a day or less. That is less than 1 teaspoon of salt a day, including all the salt you eat in cooking or in packaged foods. Fluid restriction of 1500ml per day (about 6 cups of fluid per day). Eliquis can increase your risk of bleeding. If you notice blood in urine or stool, bleeding gums, excessive bruising or cough productive of bloody sputum, notify the office. Information on this blood thinner has been included in your after visit summary. Patient Instructions:  Continue current medications as prescribed. Always keep a current medication list. Bring your medications to every office visit. Continue to follow up with primary care provider for non cardiac medical problems. Call the office with any problems, questions or concerns at [phone]. If you have been asked to keep a blood pressure log, do so for 2 weeks. Call the office to report readings to the triage nurse at [phone]. Follow up with cardiologist as scheduled. The following educational material has been included in this after visit summary for your review: Life simple 7. Avoid heart failure triggers. Eliquis. Amiodarone. Life simple 7  1) Manage blood pressure - high blood pressure is a major risk factor for heart disease and stroke. Keeping blood pressure in health range reduces strain on your heart, arteries and kidneys. Blood pressure goal is less than 130/80. 2) Control cholesterol - contributes to plaque, which can clog arteries and lead to heart disease and stroke. When you control your cholesterol you are giving your arteries their best chance to remain clear. It is recommended that you get cholesterol lab work done once a year. 3) Reduce blood sugar - most of the food we eat is turning into glucose or blood sugar that our body uses for energy. Over time, high levels of blood sugar can damage your heart, kidneys, eyes and nerves. 4) Get active - living an active life is one of the most rewarding gifts you can give yourself and those you love. Simply put, daily physical activity increases your length and quality of life. Strive to exercise 15 minutes most days of the week. 5)  Eat better - A healthy diet is one of your best weapons for fighting cardiovascular disease. When you eat a heart healthy diet, you improve your chances for feeling good and staying healthy for life. 6)  Lose weight - when you shed extra fat an unnecessary pounds, you reduce the burden on your hear, lungs, blood vessels and skeleton. You give yourself the gift of active living, you lower your blood pressure and help yourself feel better. 7) Stop smoking - cigarette smokers have a higher risk of developing cardiovascular disease. If  You smoke, quitting is the best thing you can do for your health. Check American Heart Association on line for more information on Life's Simple 7 and tips for healthy living. Patient Education        Avoiding Triggers With Heart Failure: Care Instructions  Your Care Instructions     Triggers are anything that make your heart failure flare up.  A flare-up is also called \"sudden heart citrate, and sodium bicarbonate (baking soda). MSG is often added to Asian food. You can sometimes ask for food without MSG or salt. · Slowly reducing salt will help you adjust to the taste. Take the salt shaker off the table. · Flavor your food with garlic, lemon juice, onion, vinegar, herbs, and spices instead of salt. Do not use soy sauce, steak sauce, onion salt, garlic salt, mustard, or ketchup on your food, unless it is labeled \"low-sodium\" or \"low-salt. \"  · Make your own salad dressings, sauces, and ketchup without adding salt. · Use fresh or frozen ingredients, instead of canned ones, whenever you can. Choose low-sodium canned goods. · Eat less processed food and food from restaurants, including fast food. Exercise as directed  Moderate, regular exercise is very good for your heart. It improves your blood flow and helps control your weight. But too much exercise can stress your heart and cause a heart failure flare-up. · Check with your doctor before you start an exercise program.  · Walking is an easy way to get exercise. Start out slowly. Gradually increase the length and pace of your walk. Swimming, riding a bike, and using a treadmill are also good forms of exercise. · When you exercise, watch for signs that your heart is working too hard. You are pushing yourself too hard if you cannot talk while you are exercising. If you become short of breath or dizzy or have chest pain, stop, sit down, and rest.  · Do not exercise when you do not feel well. Take medicines correctly  · Take your medicines exactly as prescribed. Call your doctor if you think you are having a problem with your medicine. · Make a list of all the medicines you take. Include those prescribed to you by other doctors and any over-the-counter medicines, vitamins, or supplements you take. Take this list with you when you go to any doctor. · Take your medicines at the same time every day.  It may help you to post a list of all the medicines you take every day and what time of day you take them. · Make taking your medicine as simple as you can. Plan times to take your medicines when you are doing other things, such as eating a meal or getting ready for bed. This will make it easier to remember to take your medicines. · Get organized. Use helpful tools, such as daily or weekly pill containers. When should you call for help? Call 911 if you have symptoms of sudden heart failure such as:  · You have severe trouble breathing. · You cough up pink, foamy mucus. · You have a new irregular or rapid heartbeat. Call your doctor now or seek immediate medical care if:  · You have new or increased shortness of breath. · You are dizzy or lightheaded, or you feel like you may faint. · You have sudden weight gain, such as more than 2 to 3 pounds in a day or 5 pounds in a week. (Your doctor may suggest a different range of weight gain.)  · You have increased swelling in your legs, ankles, or feet. · You are suddenly so tired or weak that you cannot do your usual activities. Watch closely for changes in your health, and be sure to contact your doctor if you develop new symptoms. Where can you learn more? Go to https://[employer]."[employer]. org and sign in to your [employer] account. Enter P138 in the [employer] box to learn more about \"Avoiding Triggers With Heart Failure: Care Instructions. \"     If you do not have an account, please click on the \"Sign Up Now\" link. Current as of: December 16, 2019               Content Version: 12.5  © 3038-5451 Healthwise, Incorporated. Care instructions adapted under license by [facility] [employer] [facility] ([facility]). If you have questions about a medical condition or this instruction, always ask your healthcare professional. Genevieve Purl any warranty or liability for your use of this information.          Patient Education        apixaban  Pronunciation:  a PIX a ban  Brand:  Eliquis  What is the most important information I should know about apixaban? Apixaban increases your risk of severe or fatal bleeding, especially if you take certain medicines at the same time (including some over-the-counter medicines). It is very important to tell your doctor about all medicines you have recently used. Call your doctor at once if you have signs of bleeding such as: swelling, pain, feeling very weak or dizzy, bleeding gums, nosebleeds, heavy menstrual periods or abnormal vaginal bleeding, blood in your urine, bloody or tarry stools, coughing up blood or vomit that looks like coffee grounds, or any bleeding that will not stop. Apixaban can cause a very serious blood clot around your spinal cord if you undergo a spinal tap or receive spinal anesthesia (epidural), especially if you have a genetic spinal defect, if you have a spinal catheter in place, if you have a history of spinal surgery or repeated spinal taps, or if you are also using other drugs that can affect blood clotting. This type of blood clot can lead to long-term or permanent paralysis. Get emergency medical help if you have symptoms of a spinal cord blood clot such as back pain, numbness or muscle weakness in your lower body, or loss of bladder or bowel control. Do not stop taking apixaban unless your doctor tells you to. Stopping suddenly can increase your risk of blood clot or stroke. What is apixaban? Apixaban is used to lower the risk of stroke caused by a blood clot in people with a heart rhythm disorder called atrial fibrillation. Apixaban is also used after hip or knee replacement surgery to prevent a type of blood clot called deep vein thrombosis (DVT), which can lead to blood clots in the lungs (pulmonary embolism). Apixaban is also used to treat DVT or pulmonary embolism (PE), and to lower your risk of having a repeat DVT or PE. Apixaban may also be used for purposes not listed in this medication guide.   What should I discuss with my healthcare provider before taking apixaban? You should not take apixaban if you are allergic to it, or if you have active bleeding from a surgery, injury, or other cause. Apixaban may cause you to bleed more easily, especially if you have a bleeding disorder that is inherited or caused by disease. Tell your doctor if you have an artificial heart valve, or if you have ever had:  · liver or kidney disease;  · if you are older than 80; or  · if you weigh less than 132 pounds (60 kilograms). Apixaban can cause a very serious blood clot around your spinal cord if you undergo a spinal tap or receive spinal anesthesia (epidural). This type of blood clot could cause long-term paralysis, and may be more likely to occur if:    · you have a spinal catheter in place or if a catheter has been recently removed;  · you have a history of spinal surgery or repeated spinal taps;  · you have recently had a spinal tap or epidural anesthesia;  · you are taking an NSAID (nonsteroidal anti-inflammatory drug)--aspirin, ibuprofen (Advil, Motrin), naproxen (Aleve), diclofenac, indomethacin, meloxicam, and others; or  · you are using other medicines to treat or prevent blood clots. Taking apixaban may increase the risk of bleeding while you are pregnant or during your delivery. Tell your doctor if you are pregnant or plan to become pregnant. You should not breast-feed while using this medicine. How should I take apixaban? Follow all directions on your prescription label and read all medication guides or instruction sheets. Your doctor may occasionally change your dose. Use the medicine exactly as directed. You may take apixaban with or without food. If you cannot swallow a tablet whole, crush and mix it with water, apple juice, or a spoonful of applesauce. Swallow the mixture right away without chewing. Do not save it for later use. A crushed tablet mixture may also be given through a nasogastric (NG) feeding tube.  Read and carefully follow any Instructions for Use provided with your medicine. Ask your doctor or pharmacist if you do not understand these instructions. Apixaban can make it easier for you to bleed, even from a minor injury. Seek medical attention if you have bleeding that will not stop. If you need surgery or dental work, tell the doctor or dentist ahead of time if you have taken apixaban within the past 24 hours. You may need to stop taking apixaban for a short time. Do not stop taking apixaban unless your doctor tells you to. Stopping suddenly can increase your risk of blood clot or stroke. If you stop taking apixaban for any reason, your doctor may prescribe another medication to prevent blood clots until you start taking apixaban again. Store at room temperature away from moisture and heat. What happens if I miss a dose? Take the missed dose on the same day you remember it. Take your next dose at the regular time and stay on your twice-daily schedule. Do not take two doses at one time. Get your prescription refilled before you run out of medicine completely. What happens if I overdose? Seek emergency medical attention or call the Poison Help line at 1-273.533.1719. What should I avoid while taking apixaban? Avoid activities that may increase your risk of bleeding or injury. Use extra care to prevent bleeding while shaving or brushing your teeth. What are the possible side effects of apixaban? Get emergency medical help if you have signs of an allergic reaction: hives; chest pain, wheezing, difficult breathing; feeling light-headed; swelling of your face, lips, tongue, or throat. Also seek emergency medical attention if you have symptoms of a spinal blood clot: back pain, numbness or muscle weakness in your lower body, or loss of bladder or bowel control.   Call your doctor at once if you have:  · easy bruising, unusual bleeding (nose, mouth, vagina, or rectum), bleeding from wounds or needle injections, any bleeding that will not stop;  · heavy menstrual periods;  · headache, dizziness, weakness, feeling like you might pass out;  · urine that looks red, pink, or brown; or  · black or bloody stools, coughing up blood or vomit that looks like coffee grounds. This is not a complete list of side effects and others may occur. Call your doctor for medical advice about side effects. You may report side effects to FDA at 5-909-PCX-5909. What other drugs will affect apixaban? Sometimes it is not safe to use certain medications at the same time. Some drugs can affect your blood levels of other drugs you take, which may increase side effects or make the medications less effective. Many other drugs (including some over-the-counter medicines) can increase your risk of bleeding or blood clots, or your risk of developing blood clots around the brain or spinal cord during a spinal tap or epidural. It is very important to tell your doctor about all medicines you have recently used, especially:  · any other medicines to treat or prevent blood clots;  · a blood thinner such as heparin or warfarin (Coumadin, Jantoven);  · an antidepressant; or  · an NSAID (nonsteroidal anti-inflammatory drug) used long term. This list is not complete and many other drugs may affect apixaban. This includes prescription and over-the-counter medicines, vitamins, and herbal products. Not all possible drug interactions are listed here. Where can I get more information? Your pharmacist can provide more information about apixaban. Remember, keep this and all other medicines out of the reach of children, never share your medicines with others, and use this medication only for the indication prescribed. Every effort has been made to ensure that the information provided by [address]ila Driver [address] is accurate, up-to-date, and complete, but no guarantee is made to that effect. Drug information contained herein may be time sensitive. [employer] information has been compiled for use by healthcare practitioners and consumers in the [place] and therefore [facility] does not warrant that uses outside of the [place] are appropriate, unless specifically indicated otherwise. [facility]'s drug information does not endorse drugs, diagnose patients or recommend therapy. [facility] drug information is an informational resource designed to assist licensed healthcare practitioners in caring for their patients and/or to serve consumers viewing this service as a supplement to, and not a substitute for, the expertise, skill, knowledge and judgment of healthcare practitioners. The absence of a warning for a given drug or drug combination in no way should be construed to indicate that the drug or drug combination is safe, effective or appropriate for any given patient. [facility] does not assume any responsibility for any aspect of healthcare administered with the aid of information [facility] provides. The information contained herein is not intended to cover all possible uses, directions, precautions, warnings, drug interactions, allergic reactions, or adverse effects. If you have questions about the drugs you are taking, check with your doctor, nurse or pharmacist.  Copyright 4350-3705 [address] [place] [address] Dr GOLDSMITH. Version: 4.01. Revision date: 6/21/2019. Care instructions adapted under license by [facility] ([facility]). If you have questions about a medical condition or this instruction, always ask your healthcare professional. [address] any warranty or liability for your use of this information. Amiodarone (Cordarone or Pacerone)  You were prescribed this medication for a heart rhythm problem. It is important that you take the medication exactly as prescribed. Like all medications, amiodarone can have side effects. The most common are fatigue, nausea, loss of appetite and constipation. It can affect the eyes, thyroid, liver and lungs.   Because of the

## 2020-07-09 NOTE — PROGRESS NOTES
[facility] called to schedule patient for IV lasix. Patient arrived with order for Lasix 80mg IV.  22g IV placed in R FA. Awaiting order verification from Pharmacy.  Electronically signed by Lele Rodriguez RN on 7/9/2020 at 11:08 AM

## 2020-07-10 ENCOUNTER — TELEPHONE (OUTPATIENT)
Dept: NEUROLOGY | Age: 65
End: 2020-07-10

## 2020-07-10 NOTE — TELEPHONE ENCOUNTER
Called patient about an appointment with Roman Hanks, talked with wife first, wife wanted me to call , could not reach  on phone, left message on his voice mail about the appointment with Roman Hanks.

## 2020-07-23 ENCOUNTER — OFFICE VISIT (OUTPATIENT)
Dept: CARDIOLOGY | Age: 65
End: 2020-07-23
Payer: COMMERCIAL

## 2020-07-23 VITALS
HEIGHT: 71 IN | DIASTOLIC BLOOD PRESSURE: 84 MMHG | HEART RATE: 61 BPM | WEIGHT: 246 LBS | OXYGEN SATURATION: 98 % | SYSTOLIC BLOOD PRESSURE: 134 MMHG | BODY MASS INDEX: 34.44 KG/M2

## 2020-07-23 DIAGNOSIS — R79.89 ELEVATED SERUM CREATININE: ICD-10-CM

## 2020-07-23 LAB
ANION GAP SERPL CALCULATED.3IONS-SCNC: 21 MMOL/L (ref 7–19)
BUN BLDV-MCNC: 18 MG/DL (ref 8–23)
CALCIUM SERPL-MCNC: 10 MG/DL (ref 8.8–10.2)
CHLORIDE BLD-SCNC: 103 MMOL/L (ref 98–111)
CO2: 23 MMOL/L (ref 22–29)
CREAT SERPL-MCNC: 1.8 MG/DL (ref 0.5–1.2)
GFR AFRICAN AMERICAN: 46
GFR NON-AFRICAN AMERICAN: 38
GLUCOSE BLD-MCNC: 133 MG/DL (ref 74–109)
POTASSIUM SERPL-SCNC: 4 MMOL/L (ref 3.5–5)
SODIUM BLD-SCNC: 147 MMOL/L (ref 136–145)

## 2020-07-23 PROCEDURE — 99214 OFFICE O/P EST MOD 30 MIN: CPT | Performed by: NURSE PRACTITIONER

## 2020-07-23 NOTE — PATIENT INSTRUCTIONS
New instructions for today:    Stop by the lab today on first floor. Weigh yourself daily without clothing at the same time each day. Record a daily weight log. Call the office if you gain 3 pounds or more in 2 to 3 days or 5 pounds in one week. A sudden weight gain may mean that your heart failure is getting worse. Sodium causes your body to hold on to extra water. This may cause your heart failure symptoms to get worse. Limit sodium to 2,000 milligrams (mg) a day or less. That is less than 1 teaspoon of salt a day, including all the salt you eat in cooking or in packaged foods. Fluid restriction of 1500ml per day (about 6 cups of fluid per day). Patient Instructions:  Continue current medications as prescribed. Always keep a current medication list. Bring your medications to every office visit. Continue to follow up with primary care provider for non cardiac medical problems. Call the office with any problems, questions or concerns at [phone]. If you have been asked to keep a blood pressure log, do so for 2 weeks. Call the office to report readings to the triage nurse at [phone]. Follow up with cardiologist as scheduled. The following educational material has been included in this after visit summary for your review: Life simple 7. Heart health. Avoid heart failure triggers. Life simple 7  1) Manage blood pressure - high blood pressure is a major risk factor for heart disease and stroke. Keeping blood pressure in health range reduces strain on your heart, arteries and kidneys. Blood pressure goal is less than 130/80. 2) Control cholesterol - contributes to plaque, which can clog arteries and lead to heart disease and stroke. When you control your cholesterol you are giving your arteries their best chance to remain clear. It is recommended that you get cholesterol lab work done once a year.   3) Reduce blood sugar - most of the food we eat is turning into glucose or blood sugar that our body uses for energy. Over time, high levels of blood sugar can damage your heart, kidneys, eyes and nerves. 4) Get active - living an active life is one of the most rewarding gifts you can give yourself and those you love. Simply put, daily physical activity increases your length and quality of life. Strive to exercise 15 minutes most days of the week. 5)  Eat better - A healthy diet is one of your best weapons for fighting cardiovascular disease. When you eat a heart healthy diet, you improve your chances for feeling good and staying healthy for life. 6)  Lose weight - when you shed extra fat an unnecessary pounds, you reduce the burden on your hear, lungs, blood vessels and skeleton. You give yourself the gift of active living, you lower your blood pressure and help yourself feel better. 7) Stop smoking - cigarette smokers have a higher risk of developing cardiovascular disease. If  You smoke, quitting is the best thing you can do for your health. Check American Heart Association on line for more information on Life's Simple 7 and tips for healthy living. A Healthy Heart: Care Instructions  Your Care Instructions     Coronary artery disease, also called heart disease, occurs when a substance called plaque builds up in the vessels that supply oxygen-rich blood to your heart muscle. This can narrow the blood vessels and reduce blood flow. A heart attack happens when blood flow is completely blocked. A high-fat diet, smoking, and other factors increase the risk of heart disease. Your doctor has found that you have a chance of having heart disease. You can do lots of things to keep your heart healthy. It may not be easy, but you can change your diet, exercise more, and quit smoking. These steps really work to lower your chance of heart disease. Follow-up care is a key part of your treatment and safety.  Be sure to make and go to all appointments, and call your doctor if you are having problems. It's also a good idea to know your test results and keep a list of the medicines you take. How can you care for yourself at home? Diet  · Use less salt when you cook and eat. This helps lower your blood pressure. Taste food before salting. Add only a little salt when you think you need it. With time, your taste buds will adjust to less salt. · Eat fewer snack items, fast foods, canned soups, and other high-salt, high-fat, processed foods. · Read food labels and try to avoid saturated and trans fats. They increase your risk of heart disease by raising cholesterol levels. · Limit the amount of solid fat-butter, margarine, and shortening-you eat. Use olive, peanut, or canola oil when you cook. Bake, broil, and steam foods instead of frying them. · Eat a variety of fruit and vegetables every day. Dark green, deep orange, red, or yellow fruits and vegetables are especially good for you. Examples include spinach, carrots, peaches, and berries. · Foods high in fiber can reduce your cholesterol and provide important vitamins and minerals. High-fiber foods include whole-grain cereals and breads, oatmeal, beans, brown rice, citrus fruits, and apples. · Eat lean proteins. Heart-healthy proteins include seafood, lean meats and poultry, eggs, beans, peas, nuts, seeds, and soy products. · Limit drinks and foods with added sugar. These include candy, desserts, and soda pop. Lifestyle changes  · If your doctor recommends it, get more exercise. Walking is a good choice. Bit by bit, increase the amount you walk every day. Try for at least 30 minutes on most days of the week. You also may want to swim, bike, or do other activities. · Do not smoke. If you need help quitting, talk to your doctor about stop-smoking programs and medicines. These can increase your chances of quitting for good. Quitting smoking may be the most important step you can take to protect your heart.  It is never too late to quit.  · Limit alcohol to 2 drinks a day for men and 1 drink a day for women. Too much alcohol can cause health problems. · Manage other health problems such as diabetes, high blood pressure, and high cholesterol. If you think you may have a problem with alcohol or drug use, talk to your doctor. Medicines  · Take your medicines exactly as prescribed. Call your doctor if you think you are having a problem with your medicine. · If your doctor recommends aspirin, take the amount directed each day. Make sure you take aspirin and not another kind of pain reliever, such as acetaminophen (Tylenol). When should you call for help? [id number] if you have symptoms of a heart attack. These may include:  · Chest pain or pressure, or a strange feeling in the chest.  · Sweating. · Shortness of breath. · Pain, pressure, or a strange feeling in the back, neck, jaw, or upper belly or in one or both shoulders or arms. · Lightheadedness or sudden weakness. · A fast or irregular heartbeat. After you call 911, the  may tell you to chew 1 adult-strength or 2 to 4 low-dose aspirin. Wait for an ambulance. Do not try to drive yourself. Watch closely for changes in your health, and be sure to contact your doctor if you have any problems. Where can you learn more? Go to https://Psydex.[employer] Income Systems. org and sign in to your [employer] account. Enter P482 in the [facility] box to learn more about \"A Healthy Heart: Care Instructions. \"     If you do not have an account, please click on the \"Sign Up Now\" link. Current as of: December 16, 2019               Content Version: 12.5  © 9113-0008 Healthwise, Incorporated. Care instructions adapted under license by [facility] ([facility]). If you have questions about a medical condition or this instruction, always ask your healthcare professional. [address]  any warranty or liability for your use of this information.       Patient Education Avoiding Triggers With Heart Failure: Care Instructions  Your Care Instructions     Triggers are anything that make your heart failure flare up. A flare-up is also called \"sudden heart failure\" or \"acute heart failure. \" When you have a flare-up, fluid builds up in your lungs, and you have problems breathing. You might need to go to the hospital. By watching for changes in your condition and avoiding triggers, you can prevent heart failure flare-ups. Follow-up care is a key part of your treatment and safety. Be sure to make and go to all appointments, and call your doctor if you are having problems. It's also a good idea to know your test results and keep a list of the medicines you take. How can you care for yourself at home? Watch for changes in your weight and condition  · Weigh yourself without clothing at the same time each day. Record your weight. Call your doctor if you have sudden weight gain, such as more than 2 to 3 pounds in a day or 5 pounds in a week. (Your doctor may suggest a different range of weight gain.) A sudden weight gain may mean that your heart failure is getting worse. · Keep a daily record of your symptoms. Write down any changes in how you feel, such as new shortness of breath, cough, or problems eating. Also record if your ankles are more swollen than usual and if you feel more tired than usual. Note anything that you ate or did that could have triggered these changes. Limit sodium  Sodium causes your body to hold on to extra water. This may cause your heart failure symptoms to get worse. People get most of their sodium from processed foods. Fast food and restaurant meals also tend to be very high in sodium. · Your doctor may suggest that you limit sodium. Your doctor can tell you how much sodium is right for you. This includes limiting sodium in cooked and packaged foods. · Read food labels on cans and food packages. They tell you how much sodium you get in one serving.  Check the serving size. If you eat more than one serving, you are getting more sodium. · Be aware that sodium can come in forms other than salt, including monosodium glutamate (MSG), sodium citrate, and sodium bicarbonate (baking soda). MSG is often added to Asian food. You can sometimes ask for food without MSG or salt. · Slowly reducing salt will help you adjust to the taste. Take the salt shaker off the table. · Flavor your food with garlic, lemon juice, onion, vinegar, herbs, and spices instead of salt. Do not use soy sauce, steak sauce, onion salt, garlic salt, mustard, or ketchup on your food, unless it is labeled \"low-sodium\" or \"low-salt. \"  · Make your own salad dressings, sauces, and ketchup without adding salt. · Use fresh or frozen ingredients, instead of canned ones, whenever you can. Choose low-sodium canned goods. · Eat less processed food and food from restaurants, including fast food. Exercise as directed  Moderate, regular exercise is very good for your heart. It improves your blood flow and helps control your weight. But too much exercise can stress your heart and cause a heart failure flare-up. · Check with your doctor before you start an exercise program.  · Walking is an easy way to get exercise. Start out slowly. Gradually increase the length and pace of your walk. Swimming, riding a bike, and using a treadmill are also good forms of exercise. · When you exercise, watch for signs that your heart is working too hard. You are pushing yourself too hard if you cannot talk while you are exercising. If you become short of breath or dizzy or have chest pain, stop, sit down, and rest.  · Do not exercise when you do not feel well. Take medicines correctly  · Take your medicines exactly as prescribed. Call your doctor if you think you are having a problem with your medicine. · Make a list of all the medicines you take.  Include those prescribed to you by other doctors and any over-the-counter medicines, vitamins, or supplements you take. Take this list with you when you go to any doctor. · Take your medicines at the same time every day. It may help you to post a list of all the medicines you take every day and what time of day you take them. · Make taking your medicine as simple as you can. Plan times to take your medicines when you are doing other things, such as eating a meal or getting ready for bed. This will make it easier to remember to take your medicines. · Get organized. Use helpful tools, such as daily or weekly pill containers. When should you call for help? Call 911 if you have symptoms of sudden heart failure such as:  · You have severe trouble breathing. · You cough up pink, foamy mucus. · You have a new irregular or rapid heartbeat. Call your doctor now or seek immediate medical care if:  · You have new or increased shortness of breath. · You are dizzy or lightheaded, or you feel like you may faint. · You have sudden weight gain, such as more than 2 to 3 pounds in a day or 5 pounds in a week. (Your doctor may suggest a different range of weight gain.)  · You have increased swelling in your legs, ankles, or feet. · You are suddenly so tired or weak that you cannot do your usual activities. Watch closely for changes in your health, and be sure to contact your doctor if you develop new symptoms. Where can you learn more? Go to https://Hello Chairpe[employer]urep Inc..PhotoRocket. org and sign in to your [employer] account. Enter L745 in the [facility] box to learn more about \"Avoiding Triggers With Heart Failure: Care Instructions. \"     If you do not have an account, please click on the \"Sign Up Now\" link. Current as of: December 16, 2019               Content Version: 12.5  © 2488-2966 Healthwise, Incorporated. Care instructions adapted under license by [facility] ([facility]).  If you have questions about a medical condition or this instruction, always ask your healthcare professional. Asya Scott

## 2020-07-23 NOTE — PROGRESS NOTES
[facility], [place]. [address], [facility] [address] [facility]  [phone] office  [phone] fax      OFFICE VISIT:  2020    Sushma Mcdonald - : 1955  Reason For Visit:  Jadon Poole is a 62 y.o. male who is here for Follow-up (No Cardiac Symptoms )    History:   Diagnosis Orders   1. Chronic combined systolic and diastolic congestive heart failure ([address]     2. Coronary artery disease involving native coronary artery of native heart without angina pectoris     3. PAF (paroxysmal atrial fibrillation) ([address]     4. Essential hypertension     5. Left ventricular dysfunction     6. Mixed hyperlipidemia     7. Edema, unspecified type       The patient presents today for cardiology follow up. He was seen in the office on 20 for SOA and edema. He had gained weight from 242 to 255 pounds. Patient was diuresed with Lasix 80 mg IVP and diuretic was changed to Bumex. A referral has been made already for sleep apnea testing as well as referral to nephrology as creatinine 1.9 - also 1.9 this past February. The patient reports weight is down from 251 to 238 pounds and he feels great. He reports \"I feel so good I even golfed and mowed my yard. \" He eating healthier and maintaining a low sodium diet. The patient weighs daily. He reports no angina. BP is wel controlled on current regimen. The patient's PCP monitors cholesterol. Subjective  Jadon Poole denies exertional chest pain,  orthopnea, paroxysmal nocturnal dyspnea, syncope, presyncope, sensed arrhythmia, edema and fatigue. The patient denies numbness or weakness to suggest cerebrovascular accident or transient ischemic attack. + mild CORTEZ.     Sushma Mcdonald has the following history as recorded in [facility]:  Patient Active Problem List   Diagnosis Code    HTN (hypertension) I10    DM (diabetes mellitus) ([address] E11.9    GERD (gastroesophageal reflux disease) K21.9    Aneurysm of thoracic obstruction     Hyperlipemia     Hypertension     Internal hemorrhoids without mention of complication     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, or unspecified(403.00)     Obesity, unspecified     Other specified cardiac dysrhythmias(427.89)     Paroxysmal atrial fibrillation (HCC)     Peripheral vascular disease (HCC)     Solitary pulmonary nodule     Surgical or other procedure not carried out because of contraindication     Thoracic aneurysm without mention of rupture     Tobacco use disorder     Type II or unspecified type diabetes mellitus without mention of complication, not stated as uncontrolled      Past Surgical History:   Procedure Laterality Date    CARDIAC CATHETERIZATION      CHOLECYSTECTOMY      CORONARY ANGIOPLASTY WITH STENT PLACEMENT  1-16    2 JACQUELINE to LAD    DIAGNOSTIC CARDIAC CATH LAB PROCEDURE      URETER STENT PLACEMENT      VASCULAR SURGERY  03- [facility]    Aortogram with bilateral selective renal artery injections    VASCULAR SURGERY  6/14/13 S    Right carotid endarterectomy with Vascu-Guard patch repair. Right cervical carotid arteriograms after endarterectomy.  VASCULAR SURGERY  7/6/15 [facility]    Percutaneous cannulation, right common femoral artery, with a5 Pashto sheath and later 6 Pashto [facility] sheath. Suprarenal abdomianl aortagram.  Selective right renal arteriogram.  Right renal artery balloon angioplasty;/stent (Express 6 mm x 18mmballoon-expandable stent. . Completion suprarenal abdominal aortogram and selective right remal arteriogram. Mynx closure, right common femoral artery punctur     Family History   Problem Relation Age of Onset    High Blood Pressure Father     Cancer Father     High Blood Pressure Mother     High Blood Pressure Brother      Social History     Tobacco Use    Smoking status: Former Smoker     Packs/day: 0.25    Smokeless tobacco: Never Used   Substance Use Topics    Alcohol use: No      Current Outpatient Medications   Medication Sig Dispense Refill    NOVOLOG FLEXPEN 100 UNIT/ML injection pen       bumetanide (BUMEX) 1 MG tablet Take 1 tablet by mouth 2 times daily 60 tablet 5    apixaban (ELIQUIS) 5 MG TABS tablet Take by mouth 2 times daily      montelukast (SINGULAIR) 10 MG tablet Take 10 mg by mouth daily      levocetirizine (XYZAL) 5 MG tablet Take 5 mg by mouth nightly      insulin aspart (NOVOLOG) 100 UNIT/ML injection vial Inject 5 Units into the skin 3 times daily (before meals) Inject 5 units before each meal      esomeprazole Magnesium (NEXIUM) 40 MG PACK Take 40 mg by mouth 2 times daily       albuterol sulfate HFA (VENTOLIN HFA) 108 (90 Base) MCG/ACT inhaler Inhale 2 puffs into the lungs every 6 hours as needed for Wheezing 1 Inhaler 3    rosuvastatin (CRESTOR) 20 MG tablet Take 20 mg by mouth daily      amLODIPine (NORVASC) 10 MG tablet Take 1 tablet by mouth daily 30 tablet 3    isosorbide mononitrate (IMDUR) 60 MG CR tablet Take 60 mg by mouth 2 times daily      minoxidil (LONITEN) 2.5 MG tablet Take 2.5 mg by mouth daily      ULTICARE MINI PEN NEEDLES 31G X 6 MM MISC FOR USE WITH LANTUS TWO TIMES A DAY 50 each 5    LANTUS SOLOSTAR 100 UNIT/ML injection pen INJECT 30 UNITS IN THE MORNING AND 20 UNITS IN THE EVENING DAILY (Patient taking differently: 2 times daily Takes 30 units in AM and 20 units in PM) 15 mL 5    potassium chloride SA (K-DUR;KLOR-CON M) 10 MEQ tablet TAKE ONE TABLET BY MOUTH EVERY DAY (Patient taking differently: 10 mEq 3 times daily ) 30 tablet 5    clopidogrel (PLAVIX) 75 MG tablet Take 1 tablet by mouth daily.  30 tablet 5    amiodarone (CORDARONE) 200 MG tablet Take 1 tablet by mouth daily 30 tablet 0    lisinopril (PRINIVIL;ZESTRIL) 40 MG tablet Take 1 tablet by mouth daily (Patient taking differently: Take 40 mg by mouth 2 times daily ) 30 tablet 0    carvedilol (COREG) 6.25 MG tablet Take 1 tablet by mouth 2 times daily 60 tablet 0 Symmetrical without apparent mass or lymphadenopathy. Respiratory - Normal respiratory effort without use of accessory muscles. Ausculatation reveals vesicular breath sounds without crackles, wheezes, rub or rhonchi. Cardiovascular - No jugular venous distention. Auscultation reveals regular rate and rhythm. No audible clicks, gallop or rub. No murmur. No lower extremity varicosities. No carotid bruits. Abdominal -  No visible distention, mass or pulsations. Extremities - No clubbing or cyanosis. No statis dermatitis or ulcers. Trace bilateral lower extremity edema. Musculoskeletal -   No Osler's nodes. No kyphosis or scoliosis. Gait is even and regular without limp or shuffle. Ambulates without assistance. Skin -  Warm and dry; no rash or pallor. No new surgical wound. Neurological - No focal neurological deficits. Thought processes coherent. No apparent tremor. Oriented to person, place and time. Psychiatric -  Appropriate affect and mood. Assessment:     Diagnosis Orders   1. Chronic combined systolic and diastolic congestive heart failure [address]     2. Coronary artery disease involving native coronary artery of native heart without angina pectoris     3. PAF (paroxysmal atrial fibrillation) ([facility]     4. Essential hypertension     5. Left ventricular dysfunction     6. Mixed hyperlipidemia     7.  Edema, unspecified type       Data reviewed:  Coronary artery disease   1/9/16  lexiscan  Positive for apical MI + myocardial ischemia, EF 42%, intermediate risk findings, AUC indication 16, AUC score 7  1/10/16  Cath  3 lesions in the LAD:  Mid: 70% 2.25x23, mid 90% 2.25 x 28, distal 100% 2.0x28, anterior lateral apical hypokinesis  2/14/20  lexiscan Positive for apical MI with minimal  myocardial ischemia, EF 36%, -1% ischemic myocardium on stress, AUC indication 21, AUC score 7   2/14/2020  Echo  EF 45%  2/24/20  Cath  100% distal LAD at the stent, o/w luminals, slight apical and mild diaphragmatic hypokinesis, EF 45%    Lab Results   Component Value Date     07/09/2020    K 3.7 07/09/2020    K 3.4 02/24/2020     07/09/2020    CO2 25 07/09/2020    BUN 19 07/09/2020    CREATININE 1.9 07/09/2020    GLUCOSE 121 07/09/2020    CALCIUM 9.3 07/09/2020      Lab Results   Component Value Date    WBC 7.9 07/09/2020    HGB 15.4 07/09/2020    HCT 47.4 07/09/2020    MCV 96.3 (H) 07/09/2020     07/09/2020     Lab Results   Component Value Date    TSH 1.630 07/09/2020 7/9/20 Pro-  (normal range 0-900). Chronic systolic and diastolic heart failure - NYHA class II stage C  GDMT includes Coreg, Lisinopril and Bumex. Home weight down from 251 to 238 pounds after wells from Lasix to Bumex and intravenous Lasix in office last week. Patient is maintaining low sodium diet and daily weight log. Recheck BMP today. Patient has been scheduled for nephrology consult - last creatinine 1.9. Also, referral has been scheduled for sleep apnea evaluation. PAF - no recurrent AF on amiodarone. No bleeding issue on Eliquis. CAD - no recurrent angina. Stable on current medical management. HTN - normotensive on current regimen. Patient is compliant with medication regimen. BP Readings from Last 3 Encounters:   07/23/20 134/84   07/09/20 120/72   05/08/20 (!) 188/81    Pulse Readings from Last 3 Encounters:   07/23/20 61   07/09/20 60   05/08/20 71        Wt Readings from Last 3 Encounters:   07/23/20 246 lb (111.6 kg)   07/09/20 251 lb 3.2 oz (113.9 kg)   05/08/20 242 lb (109.8 kg)     Plan  Previous cardiac history and records reviewed. Continue current medical management. Recheck BMP today. Continue low sodium diet and daily weight log. Appointments have already been scheduled for nephrology and sleep studies. Continue other current medications as directed. Continue to follow up with primary care provider for non cardiac medical problems.   Call the office with any problems, questions or concerns at [phone]. Cardiology follow up: one month. Educational included in patient instructions. Heart health. Avoid heart failure triggers.      DWAYNE Simms

## 2020-08-13 ENCOUNTER — OFFICE VISIT (OUTPATIENT)
Dept: FAMILY MEDICINE CLINIC | Facility: CLINIC | Age: 65
End: 2020-08-13

## 2020-08-13 ENCOUNTER — HOSPITAL ENCOUNTER (OUTPATIENT)
Dept: ULTRASOUND IMAGING | Facility: HOSPITAL | Age: 65
End: 2020-08-13

## 2020-08-13 ENCOUNTER — HOSPITAL ENCOUNTER (OUTPATIENT)
Dept: ULTRASOUND IMAGING | Facility: HOSPITAL | Age: 65
Discharge: HOME | End: 2020-08-13
Admitting: FAMILY MEDICINE

## 2020-08-13 VITALS
SYSTOLIC BLOOD PRESSURE: 138 MMHG | TEMPERATURE: 98.4 F | BODY MASS INDEX: 47.43 KG/M2 | DIASTOLIC BLOOD PRESSURE: 82 MMHG | HEIGHT: 60 IN | OXYGEN SATURATION: 98 % | HEART RATE: 57 BPM | WEIGHT: 241.6 LBS

## 2020-08-13 DIAGNOSIS — L03.114 CELLULITIS OF LEFT UPPER EXTREMITY: Primary | ICD-10-CM

## 2020-08-13 DIAGNOSIS — M79.89 SWELLING OF LEFT UPPER EXTREMITY: ICD-10-CM

## 2020-08-13 PROCEDURE — 99203 OFFICE O/P NEW LOW 30 MIN: CPT | Performed by: FAMILY MEDICINE

## 2020-08-13 PROCEDURE — 93971 EXTREMITY STUDY: CPT

## 2020-08-13 PROCEDURE — 96372 THER/PROPH/DIAG INJ SC/IM: CPT | Performed by: FAMILY MEDICINE

## 2020-08-13 RX ORDER — CEPHALEXIN 500 MG/1
500 CAPSULE ORAL 2 TIMES DAILY
COMMUNITY
End: 2020-08-13

## 2020-08-13 RX ORDER — CEFTRIAXONE 1 G/1
1 INJECTION, POWDER, FOR SOLUTION INTRAMUSCULAR; INTRAVENOUS ONCE
Status: COMPLETED | OUTPATIENT
Start: 2020-08-13 | End: 2020-08-13

## 2020-08-13 RX ORDER — LEVOCETIRIZINE DIHYDROCHLORIDE 5 MG/1
5 TABLET, FILM COATED ORAL EVERY EVENING
COMMUNITY

## 2020-08-13 RX ORDER — AMIODARONE HYDROCHLORIDE 200 MG/1
200 TABLET ORAL DAILY
COMMUNITY

## 2020-08-13 RX ORDER — CLINDAMYCIN HYDROCHLORIDE 300 MG/1
300 CAPSULE ORAL 3 TIMES DAILY
Qty: 21 CAPSULE | Refills: 0 | Status: SHIPPED | OUTPATIENT
Start: 2020-08-13 | End: 2020-08-20

## 2020-08-13 RX ORDER — MONTELUKAST SODIUM 10 MG/1
10 TABLET ORAL DAILY
COMMUNITY

## 2020-08-13 RX ORDER — MOMETASONE FUROATE 50 UG/1
2 SPRAY, METERED NASAL DAILY PRN
COMMUNITY
End: 2021-04-20

## 2020-08-13 RX ADMIN — CEFTRIAXONE 1 G: 1 INJECTION, POWDER, FOR SOLUTION INTRAMUSCULAR; INTRAVENOUS at 10:52

## 2020-08-18 ENCOUNTER — OFFICE VISIT (OUTPATIENT)
Dept: NEUROLOGY | Age: 65
End: 2020-08-18
Payer: COMMERCIAL

## 2020-08-18 VITALS
WEIGHT: 242 LBS | HEIGHT: 68 IN | SYSTOLIC BLOOD PRESSURE: 166 MMHG | DIASTOLIC BLOOD PRESSURE: 81 MMHG | OXYGEN SATURATION: 96 % | BODY MASS INDEX: 36.68 KG/M2 | HEART RATE: 62 BPM

## 2020-08-18 PROBLEM — Z91.89 AT RISK FOR OBSTRUCTIVE SLEEP APNEA: Status: ACTIVE | Noted: 2020-08-18

## 2020-08-18 PROCEDURE — 99203 OFFICE O/P NEW LOW 30 MIN: CPT | Performed by: PHYSICIAN ASSISTANT

## 2020-08-18 RX ORDER — CARVEDILOL 6.25 MG/1
6.25 TABLET ORAL 2 TIMES DAILY WITH MEALS
Status: ON HOLD | COMMUNITY
End: 2020-10-20 | Stop reason: HOSPADM

## 2020-08-18 RX ORDER — AMIODARONE HYDROCHLORIDE 200 MG/1
200 TABLET ORAL DAILY
Status: ON HOLD | COMMUNITY
End: 2020-10-20 | Stop reason: HOSPADM

## 2020-08-18 RX ORDER — LISINOPRIL 40 MG/1
20 TABLET ORAL DAILY
Status: ON HOLD | COMMUNITY
End: 2020-10-20 | Stop reason: HOSPADM

## 2020-08-18 NOTE — PATIENT INSTRUCTIONS
Patient education: Sleep apnea in adults       INTRODUCTION -- Normally during sleep, air moves through the throat and in and out of the lungs at a regular rhythm. In a person with sleep apnea, air movement is periodically diminished or stopped. There are two types of sleep apnea: obstructive sleep apnea and central sleep apnea. In obstructive sleep apnea, breathing is abnormal because of narrowing or closure of the throat. In central sleep apnea, breathing is abnormal because of a change in the breathing control and rhythm. Sleep apnea is a serious condition that can affect a person's ability to safely perform normal daily activities and can affect long term health. Approximately 25 percent of adults are at risk for sleep apnea of some degree. Men are more commonly affected than women. Other risk factors include middle and older age, being overweight or obese, and having a small mouth and throat. This topic review focuses on the most common type of sleep apnea in adults, obstructive sleep apnea (SAMMY). HOW SLEEP APNEA OCCURS -- The throat is surrounded by muscles that control the airway for speaking, swallowing, and breathing. During sleep, these muscles are less active, and this causes the throat to narrow. In most people, this narrowing does not affect breathing. In others, it can cause snoring, sometimes with reduced or completely blocked airflow. A completely blocked airway without airflow is called an obstructive apnea. Partial obstruction with diminished airflow is called a hypopnea. A person may have apnea and hypopnea during sleep. Insufficient breathing due to apnea or hypopnea causes oxygen levels to fall and carbon dioxide to rise. Because the airway is blocked, breathing faster or harder does not help to improve oxygen levels until the airway is reopened. Typically, the obstruction requires the person to awaken to activate the upper airway muscles.  Once the airway is opened, the person then takes several deep breaths to catch up on breathing. As the person awakens, he or she may move briefly, snort or snore, and take a deep breath. Less frequently, a person may awaken completely with a sensation of gasping, smothering, or choking. If the person falls back to sleep quickly, he or she will not remember the event. Many people with sleep apnea are unaware of their abnormal breathing in sleep, and all patients underestimate how often their sleep is interrupted. Awakening from sleep causes sleep to be unrefreshing and causes fatigue and daytime sleepiness. Anatomic causes of obstructive sleep apnea --  Most patients have SAMMY because of a small upper airway. As the bones of the face and skull develop, some people develop a small lower face, a small mouth, and a tongue that seems too large for the mouth. These features are genetically determined, which explains why SAMMY tends to cluster in families. Obesity is another major factor. Tonsil enlargement can be an important cause, especially in children. SLEEP APNEA SYMPTOMS -- The main symptoms of SAMMY are loud snoring, fatigue, and daytime sleepiness. However, some people have no symptoms. For example, if the person does not have a bed partner, he or she may not be aware of the snoring. Fatigue and sleepiness have many causes and are often attributed to overwork and increasing age. As a result, a person may be slow to recognize that they have a problem. A bed partner or spouse often prompts the patient to seek medical care. Other symptoms may include one or more of the following:  ?Restless sleep  ? Awakening with choking, gasping, or smothering  ? Morning headaches, dry mouth, or sore throat  ? Waking frequently to urinate  ? Awakening unrested, groggy  ? Low energy, difficulty concentrating, memory impairment    Risk factors -- Certain factors increase the risk of sleep apnea.   ?Increasing age - SAMMY occurs at all ages, but it is more common in middle and older age adults. ?Male sex - SAMMY is two times more common in men, especially in middle age. ?Obesity - The more obese a person is, the more likely he or she is to have SAMMY. ? Sedation from medication or alcohol - This interferes with the ability to awaken from sleep and can lengthen periods of apnea (no breathing), with potentially dangerous consequences. ? Abnormality of the airway. SLEEP APNEA CONSEQUENCES -- Complications of sleep apnea can include daytime sleepiness and difficulty concentrating. The consequence of this is an increased risk of accidents and errors in daily activities. Studies have shown that people with severe SAMMY are more than twice as likely to be involved in a motor vehicle accident as people without these conditions. People with SAMMY are encouraged to discuss options for driving, working, and performing other high-risk tasks with a healthcare provider. In addition, people with untreated SAMMY may have an increased risk of cardiovascular problems such as high blood pressure, heart attack, abnormal heart rhythms, or stroke. This risk may be due to changes in the heart rate and blood pressure that occur during sleep. SLEEP APNEA DIAGNOSIS -- The diagnosis of SAMMY is best made by a knowledgeable sleep medicine specialist who has an understanding of the individual's health issues. The diagnosis is usually based upon the person's medical history, physical examination, and testing, including:  ? A complaint of snoring and ineffective sleep  ? Neck size (greater than 16 inches in men or 14 inches in women) is associated with an increased risk of sleep apnea  ? A small upper airway: difficulty seeing the throat because of a tongue that is large for the mouth  ? High blood pressure, especially if it is resistant to treatment  ? If a bed partner has observed the patient during episodes of stopped breathing (apnea), choking, or gasping during sleep, there is a strong possibility of sleep apnea. Testing is usually performed in a sleep laboratory. A full sleep study is called a polysomnogram. The polysomnogram measures the breathing effort and airflow, blood oxygen level, heart rate and rhythm, duration of the various stages of sleep, body position, and movement of the arms/legs. Home monitoring devices are available that can perform a sleep study. This is a reasonable alternative to conventional testing in a sleep laboratory if the clinician strongly suspects moderate or severe sleep apnea and the patient does not have other illnesses or sleep disorders that may interfere with the results. SLEEP APNEA TREATMENT -- Sleep apnea is best treated by a knowledgeable sleep medicine specialist. The goal of treatment is to maintain an open airway during sleep. Effective treatment will eliminate the symptoms of sleep disturbance; long-term health consequences are also reduced. Most treatments require nightly use. The challenge for the clinician and the patient is to select an effective therapy that is appropriate for the patient's problem and that is acceptable for long term use. Auto-titrating CPAP delivers an amount of PAP that varies during the night. The variation is dependent on event detection software algorithms, which will increase the pressure gradually in response to flow changes until adequate patency is detected. After a period of sustained upper airway patency, the delivered level of pressure gradually decreases until the algorithm identifies recurrent upper airway obstruction, at which point the delivered pressure again increases. The result is that the delivered pressure varies throughout the night, in an effort to provide the lowest pressure that is necessary to maintain upper airway patency. Continuous positive airway pressure (CPAP) -- The most effective treatment for sleep apnea uses air pressure from a mechanical device to keep the upper airway open during sleep.  A CPAP (continuous positive airway pressure)  device uses an air-tight attachment to the nose, typically a mask, connected to a tube and a blower which generates the pressure. Devices that fit comfortably into the nasal opening, rather than over the nose, are also available. CPAP should be used any time the person sleeps (day or night). The CPAP device is usually used for the first time in the sleep lab, where a technician can adjust the pressure and select the best equipment to keep the airway open. Alternatively, an auto device with a self-adjusting pressure feature, provided with proper education and training, can get treatment started without another sleep test. While the treatment may seem uncomfortable, noisy, or bulky at first, most people accept the treatment after experiencing better sleep. However, difficulty with mask comfort and nasal congestion prevent up to 50 percent of people from using the treatment on a regular basis. Continued follow up with a healthcare provider helps to ensure that the treatment is effective and comfortable. Information from the CPAP machine is often used by physicians, therapists, and insurers to track the success of treatment. CPAP can be delivered with different features to improve comfort and solve problems that may come up during treatment. Changes in treatment may be needed if symptoms do not improve or if the persons condition changes, such as a gain or loss of weight. Adjust sleep position -- Adjusting sleep position (to stay off the back) may help improve sleep quality in people who have SAMMY when sleeping on the back. However, this is difficult to maintain throughout the night and is rarely an adequate solution. Weight loss -- Weight loss may be helpful for obese or overweight patients. Weight loss may be accomplished with dietary changes, exercise, and/or surgical treatment.  However, it can be difficult to maintain weight loss; the five-year success of non-surgical weight loss is only 5 percent, meaning that 95 percent of people regain lost weight. Avoid alcohol and other sedatives -- Alcohol can worsen sleepiness, potentially increasing the risk of accidents or injury. People with SAMMY are often counseled to drink little to no alcohol, even during the daytime. Similarly, people who take anti-anxiety medications or sedatives to sleep should speak with their healthcare provider about the safety of these medications. People with SAMMY must notify all healthcare providers, including surgeons, about their condition and the potential risks of being sedated. People with SAMMY who are given anesthesia and/or pain medications require special management and close monitoring to reduce the risk of a blocked airway. Dental devices -- A dental device, called an oral appliance or mandibular advancement device, can reposition the jaw (mandible), bringing the tongue and soft palate forward as well. This may relieve obstruction in some people. This treatment is excellent for reducing snoring, although the effect on SAMMY is sometimes more limited. As a result, dental devices are best used for mild cases of SAMMY when relief of snoring is the main goal. Failure to tolerate and accept CPAP is another indication for dental devices. While dental devices are not as effective as CPAP for SAMMY, some patients prefer a dental device to CPAP. Side effects of dental devices are generally minor but may include changes to the bite with prolonged use. Surgical treatment -- Surgery is an alternative therapy for patients who cannot tolerate or do not improve with nonsurgical treatments such as CPAP or oral devices. Surgery can also be used in combination with other nonsurgical treatments. Surgical procedures reshape structures in the upper airways or surgically reposition bone or soft tissue. Uvulopalatopharyngoplasty (UPPP) removes the uvula and excessive tissue in the throat, including the tonsils, if present. Other procedures, such as maxillomandibular advancement (MMA), address both the upper and lower pharyngeal airway more globally. UPPP alone has limited success rates (less than 50 percent) and people can relapse (when SAMMY symptoms return after surgery). As a result, this surgery is only recommended in a minority of people and should be considered with caution. MMA may have a higher success rate, particularly in people with abnormal jaw (maxilla and mandible) anatomy, but it is the most complicated procedure. A newer surgical approach, nerve stimulation to protrude the tongue, has promising success rates in very selected people. Tracheostomy creates a permanent opening in the neck. It is reserved for people with severe disease in whom less drastic measures have failed or are inappropriate. Although it is always successful in eliminating obstructive sleep apnea, tracheostomy requires significant lifestyle changes and carries some serious risks (eg, infection, bleeding, blockage). All surgical treatments require discussions about the goals of treatment, the expected outcomes, and potential complications. Hypoglossal nerve stimulator- \"Inspire\" device    PAP treatment failure:  Possible causes of treatment failure include nonadherence or suboptimal adherence, weight gain, an inappropriate level of prescribed positive pressure, or an additional disorder causing sleepiness (eg, narcolepsy) that may require alterations in the therapeutic regimen. A review of medications should also be undertaken since many drugs may lead to sleepiness. Inadequate sleep time may also negate the expected effects from treatment of SAMMY. Also, pt's can have persistent hypersomnolence associated with sleep apnea even in the presence of adequate therapy and at those times Provigil or Nuvigil or other stimulants may be indicated.     Once the patient's positive airway pressure therapy has been optimized and symptoms resolved, a regimen of long-term follow-up should be established. Annual visits are reasonable, with more frequent visits in between if new issues arise. The purpose of long-term follow-up is to assess usage and monitor for recurrent SAMMY, new side effects, air leakage, and fluctuations in body weight. WHERE TO GET MORE INFORMATION -- Your healthcare provider is the best source of information for questions and concerns related to your medical problem. Organizations  American [employer]nea Association  Provides information about sleep apnea to the public, publishes a newsletter, and serves as an advocate for people with the disorder. [place], [address], [address] [place] [address]   [email or web address]. org   AdminParking.. org   Tel: [phone]   Fax: [facility] that works to [employer] and safety by promoting public understanding of sleep and sleep disorders. Supports sleep-related education, research, and advocacy; produces and distributes educational materials to the public and healthcare professionals; and offers postdoctoral fellowships and grants for sleep researchers. [address]   [email or web address]@[email or web address]. [email or web address]   [email or web address]. org   Tel: [phone]   Fax: [phone]    Important information:  Medicare/private insurance CPAP/BiPAP/APAP requirements:  Medicare/private insurance has specific requirements for PAP compliance that must be met during the first 90 days of use to continue coverage for CPAP/BiPAP/APAP  from day 91 and beyond. The policy requires that patients use a PAP device 4 hours per 24 hour period, at least 70% of the time over a 30 day period. This data must be downloaded as a report direct from the PAP devices. This is called a compliance download. Your PAP supplier will assist you in this matter.      Reminder:  Please bring a copy of the compliance download to your next office visit or have your supplier fax it to our office prior to your office visit. Note:  Where applicable, we will utilize PAP device efficiency reports, additional testing, and face-to-face  clinical evaluation subsequent to any treatment, changes in treatment, and continued treatment. Caution:  Please abstain from driving or engaging in other activities which may be hazardous in the presence of diminished alertness or daytime drowsiness. And avoid the use of sedatives or alcohol, which can worsen sleep apnea and daytime drowsiness. Mask suggestions:  -     Resmed Airfit N20 (Nasal) or F20 (Full face mask). They conform to your face, thus decreasing the potential for mask leakage. You might like the [employer] (full face mask). It has a \"memory foam\" like cushion. The AirFit F30 is a smaller style full face mask designed to sit low on and cover less of your face for fewer facial marks. AirFit N30i has a top of the head tube with a nasal mask. AirFit P10 reported to be the most comfortable nasal pillow mask. Resmed Mirage FX reported to be the most comfortable nasal mask. Resmed Mirage [place] reported to be the most comfortable hybrid mask. Respironics: You might also like to try a nasal mask called a Dreamwear nasal mask or the Dreamwear nasal pillow. Another suggestion is the [facility], it is a minimal contact full face mask. The Taylor Primrose incredible under the nose design makes it the only full face mask that won't cause red marks on the bridge of your nose when compared to other full face masks. The Dreamwear full face mask has a  soft feel, unique in-frame air-flow, and innovative air tube connection at the top of the head for the ultimate in sleep comfort. Comfort Gel Blue. Dreamwear gel pillows. Geovanny & Shahid: Brevida nasal pillow mask and Simplus FFM    The use of a memory foam CPAP pillow supports the head and neck throughout the night.         Patient Education Sleep Studies: About This Test  What is it? Sleep studies are tests that watch what happens to your body during sleep. These studies usually are done in a sleep lab. Sleep labs are often located in hospitals. Sleep studies you do at home can be done with portable equipment. But they may not give the same results as a sleep lab. Why is this test done? Sleep studies are done for people who say that sleep isn't restful or that they are tired all day. These studies can help find sleep problems, such as:  · Sleep apnea. This means that an adult regularly stops breathing during sleep for 10 seconds or longer. · Excessive snoring. · Problems staying awake, such as narcolepsy. · Problems with nighttime behaviors. These include sleepwalking, night terrors, bed-wetting, and REM behavior disorders (RBD). · Conditions such as periodic limb movement disorder. This is repeated muscle twitching of the feet, arms, or legs during sleep. · Seizures that occur at night (nocturnal seizures). How do you prepare for the test?  · You may be asked to keep a sleep diary for 1 to 2 weeks before your sleep study. · Don't take any naps for 2 to 3 days before your test.  · You may be asked to avoid food or drinks with caffeine for a day or two before your test.  · Take a shower or bath before your test, but don't use sprays, oils, or gels on your hair. Don't wear makeup, fingernail polish, or fake nails. · Pack and take along a small overnight bag with personal items, such as a toothbrush, a comb, favorite pillows or blankets, and a book. You can wear your own nightclothes. · If you will have portable sleep monitoring, your doctor will explain how to use the equipment at home. How is the test done? · In the sleep lab, you will be in a private room, much like a hotel room. · Small pads or patches called electrodes will be placed on your head and body with a small amount of glue and tape.  These will record things like brain activity, eye movement, oxygen levels, and snoring. · Soft elastic belts will be placed around your chest and belly to measure your breathing. · Your blood oxygen levels will be checked by a small clip (oximeter) placed either on the tip of your index finger or on your earlobe. · If you have sleep apnea, you may wear a mask that is connected to a continuous positive airway pressure (CPAP) machine. · Depending on the type of test, you will be allowed to sleep through the night or you'll be awakened periodically and asked to stay awake for a while. · If you use portable sleep monitoring, follow the instructions your doctor gave you. How long does the test take? · You will stay in the sleep lab overnight. For some tests, you will also stay part of the next day. What happens after the test?  · You will be able to go home right away. · You may not sleep well during the test and may be tired the next day. · You can go back to your usual activities right away. · After your sleep problem has been identified, you may need a second study if your doctor orders treatment such as CPAP. Follow-up care is a key part of your treatment and safety. Be sure to make and go to all appointments, and call your doctor if you are having problems. It's also a good idea to keep a list of the medicines you take. Ask your doctor when you can expect to have your test results. Where can you learn more? Go to [email or web address] [email or web address] Digital [email or web address] [email or web address] org and sign in to your [employer] account. Enter O450 in the Edgeio box to learn more about \"Sleep Studies: About This Test.\"     If you do not have an account, please click on the \"Sign Up Now\" link. Current as of: February 24, 2020               Content Version: 12.5  © 3790-9686 Healthwise, Incorporated. Care instructions adapted under license by [facility] ([facility]).  If you have questions about a medical condition or this instruction, always ask your healthcare professional. [employer], [facility] disclaims any warranty or liability for your use of this information. Patient Education        Learning About CPAP for Sleep Apnea  What is CPAP? CPAP is a small machine that you use at home every night while you sleep. It increases air pressure in your throat to keep your airway open. When you have sleep apnea, this can help you sleep better so you feel much better. CPAP stands for \"continuous positive airway pressure. \"  The CPAP machine will have one of the following:  · A mask that covers your nose and mouth  · Prongs that fit into your nose  · A mask that covers your nose only, the most common type. This type is called NCPAP. The N stands for \"nasal.\"  Why is it done? CPAP is usually the best treatment for obstructive sleep apnea. It is the first treatment choice and the most widely used. Your doctor may suggest CPAP if you have:  · Moderate to severe sleep apnea. · Sleep apnea and coronary artery disease (CAD). · Sleep apnea and heart failure. How does it help? · CPAP can help you have more normal sleep, so you feel less sleepy and more alert during the daytime. · CPAP may help keep heart failure or other heart problems from getting worse. · CPAP may help lower your blood pressure. · If you use CPAP, your bed partner may also sleep better because you are not snoring or restless. What are the side effects? Some people who use CPAP have:  · A dry or stuffy nose and a sore throat. · Irritated skin on the face. · Sore eyes. · Bloating. If you have any of these problems, work with your doctor to fix them. Here are some things you can try:  · Be sure the mask or nasal prongs fit well. · See if your doctor can adjust the pressure of your CPAP. · If your nose is dry, try a humidifier. · If your nose is runny or stuffy, try decongestant medicine or a steroid nasal spray. Be safe with medicines. Read and follow all instructions on the label.  Do not use the medicine longer than the label says. If these things do not help, you might try a different type of machine. Some machines have air pressure that adjusts on its own. Others have air pressures that are different when you breathe in than when you breathe out. This may reduce discomfort caused by too much pressure in your nose. Where can you learn more? Go to https://NeuMoDx Molecularpekaleeweb.[email or web address]. org and sign in to your Ribbon account. Enter R633 in the Track box to learn more about \"Learning About CPAP for Sleep Apnea. \"     If you do not have an account, please click on the \"Sign Up Now\" link. Current as of: February 24, 2020               Content Version: 12.5  © 2006-2020 [employer]. Care instructions adapted under license by [employer]. If you have questions about a medical condition or this instruction, always ask your healthcare professional. [address] any warranty or liability for your use of this information.

## 2020-08-18 NOTE — PROGRESS NOTES
[facility]  [address] [place] [address] Drive, [address]  [place], [address]  Phone [phone]  Fax [phone] SLEEP    Patient: Bailey Ayala  :  1955  Age:  60 y.o. MRN:  [id number]  Today:             20    Provider:  Renetta العراقي PA-C    Chief Complaint:  Chief Complaint   Patient presents with    New Patient    Daytime Sleepiness     pt states he does have some sleepiness but most times he is ok       History Source: History obtained from chart review and the patient. PCP: Leisa Ross MD     Referring Provider: [facility], [address]  [place], [address]    HISTORY OF PRESENT ILLNESS:   Bailey Ayala is a 60y.o. year old male with multiple health problems and daytime somnolence who was referred for a sleep evaluation. About 1 month ago, he c/o excessive daytime somnolence. He was not sleeping well. Due to the dyspnea he was sleeping upright. He saw DWAYNE Aguilar and she addressed the fluid overload. He reports that he was started on Bumex and had IV Lasix. He reports that evening he slept supine and did not have dyspnea. He had c/o excessive daytime somnolence previously but he reports that he no longer has daytime somnolence. He denies snoring and witnessed apneas. He sleeps 8.5 hours. He denies sleep disturbance. There is no difficulty initiating or maintaining sleep. His sleep is restorative. He has nocturia. He denies night sweats. He does not fall asleep when sedentary. About 1 week ago he woke up with edema and ecchymosis of the LUE. He has been evaluated for a DVT in his LUE. He has seen his PCP and Orthopedics. He had a negative ultrasound of the LUE.      PAST MEDICAL HITORY:    Medical History:  Past Medical History:   Diagnosis Date    Acute kidney failure, unspecified ([address]     Anxiety state, unspecified     Atrial septal aneurysm 2016    Blood circulation, collateral     Body mass index 30.0-30.9, adult     CAD (coronary artery disease) 1/10/2016    1/9/16  lexiscan  Positive for apical MI + myocardial ischemia, EF 42%, intermediate risk findings, AUC indication 16, AUC score 7 1/10/16  Cath  3 lesions in the LAD:  Mid: 70% 2.25x23, mid 90% 2.25 x 28, distal 100% 2.0x28, anterior lateral apical hypokinesis, EF 45%    Calculus of kidney     Carotid artery stenosis 6/26/2013    Cellulitis and abscess of hand, except fingers and thumb     Cerebral artery occlusion with cerebral infarction ([address]     15 years ago    Chronic airway obstruction, not elsewhere classified     Chronic kidney disease, unspecified     Congestive heart failure, unspecified     Diabetes mellitus type II     Diverticulosis of colon (without mention of hemorrhage)     Encounter for long-term (current) use of insulin (HCC)     Esophageal reflux     Family history of ischemic heart disease     Gastric ulcer, unspecified as acute or chronic, without mention of hemorrhage, perforation, or obstruction     Hyperlipemia     Hypertension     Internal hemorrhoids without mention of complication     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, or unspecified(403.00)     Obesity, unspecified     Other specified cardiac dysrhythmias(427.89)     Paroxysmal atrial fibrillation (HCC)     Peripheral vascular disease ([address]     Solitary pulmonary nodule     Surgical or other procedure not carried out because of contraindication     Thoracic aneurysm without mention of rupture     Tobacco use disorder     Type II or unspecified type diabetes mellitus without mention of complication, not stated as uncontrolled        Surgical History:  Past Surgical History:   Procedure Laterality Date    CARDIAC CATHETERIZATION      CHOLECYSTECTOMY      CORONARY ANGIOPLASTY WITH STENT PLACEMENT  1-16    2 JACQUELINE to LAD    DIAGNOSTIC CARDIAC CATH LAB PROCEDURE      URETER STENT PLACEMENT      VASCULAR SURGERY 03- [facility]    Aortogram with bilateral selective renal artery injections    VASCULAR SURGERY  6/14/13 [facility]    Right carotid endarterectomy with Vascu-Guard patch repair. Right cervical carotid arteriograms after endarterectomy.  VASCULAR SURGERY  7/6/15 [facility]    Percutaneous cannulation, right common femoral artery, with a5 french sheath and later 6 french [facility] sheath. Suprarenal abdomianl aortagram.  Selective right renal arteriogram.  Right renal artery balloon angioplasty;/stent (Express 6 mm x 18mmballoon-expandable stent. . Completion suprarenal abdominal aortogram and selective right remal arteriogram. Mynx closure, right common femoral artery punctur       Current Medications:  Current Outpatient Medications   Medication Sig Dispense Refill    amiodarone (CORDARONE) 200 MG tablet Take 200 mg by mouth daily      lisinopril (PRINIVIL;ZESTRIL) 40 MG tablet Take 40 mg by mouth daily      carvedilol (COREG) 6.25 MG tablet Take 6.25 mg by mouth 2 times daily (with meals)      NOVOLOG FLEXPEN 100 UNIT/ML injection pen       bumetanide (BUMEX) 1 MG tablet Take 1 tablet by mouth 2 times daily 60 tablet 5    apixaban (ELIQUIS) 5 MG TABS tablet Take by mouth 2 times daily      montelukast (SINGULAIR) 10 MG tablet Take 10 mg by mouth daily      levocetirizine (XYZAL) 5 MG tablet Take 5 mg by mouth nightly      insulin aspart (NOVOLOG) 100 UNIT/ML injection vial Inject 5 Units into the skin 3 times daily (before meals) Inject 5 units before each meal      esomeprazole Magnesium (NEXIUM) 40 MG PACK Take 40 mg by mouth 2 times daily       albuterol sulfate HFA (VENTOLIN HFA) 108 (90 Base) MCG/ACT inhaler Inhale 2 puffs into the lungs every 6 hours as needed for Wheezing 1 Inhaler 3    rosuvastatin (CRESTOR) 20 MG tablet Take 20 mg by mouth daily      amLODIPine (NORVASC) 10 MG tablet Take 1 tablet by mouth daily 30 tablet 3    isosorbide mononitrate (IMDUR) 60 MG CR tablet Take 60 mg by mouth 2 times daily  minoxidil (LONITEN) 2.5 MG tablet Take 2.5 mg by mouth daily      ULTICARE MINI PEN NEEDLES 31G X 6 MM MISC FOR USE WITH LANTUS TWO TIMES A DAY 50 each 5    LANTUS SOLOSTAR 100 UNIT/ML injection pen INJECT 30 UNITS IN THE MORNING AND 20 UNITS IN THE EVENING DAILY (Patient taking differently: 2 times daily Takes 30 units in AM and 20 units in PM) 15 mL 5    potassium chloride SA (K-DUR;KLOR-CON M) 10 MEQ tablet TAKE ONE TABLET BY MOUTH EVERY DAY (Patient taking differently: 10 mEq 3 times daily ) 30 tablet 5    clopidogrel (PLAVIX) 75 MG tablet Take 1 tablet by mouth daily. 30 tablet 5    amiodarone (CORDARONE) 200 MG tablet Take 1 tablet by mouth daily 30 tablet 0    lisinopril (PRINIVIL;ZESTRIL) 40 MG tablet Take 1 tablet by mouth daily (Patient taking differently: Take 40 mg by mouth 2 times daily ) 30 tablet 0    carvedilol (COREG) 6.25 MG tablet Take 1 tablet by mouth 2 times daily 60 tablet 0     No current facility-administered medications for this visit. Allergies:  Hydralazine and Morphine    SOCIAL HISTORY:   Social History     Substance and Sexual Activity   Alcohol Use No     Social History     Substance and Sexual Activity   Drug Use No     Social History     Tobacco Use   Smoking Status Former Smoker    Packs/day: 0.25   Smokeless Tobacco Never Used       FAMILY HISTORY:   Family History   Problem Relation Age of Onset    High Blood Pressure Father     Cancer Father     High Blood Pressure Mother     High Blood Pressure Brother        REVIEW OF SYSTEMS     Constitutional: []? Fever []? Sweats []? Chills []? Recent Injury   [x]? Denies all unless marked  HENT:[]? Headache  []? Head Injury  []? Sore Throat  []? Ear Pain  []? Dizziness []? Hearing Loss   [x]? Denies all unless marked  Spine:  []? Neck pain  []? Back pain  []? Sciaticia  [x]? Denies all unless marked  Cardiovascular:[]? Chest Pain []? Palpitations []? Heart Disease  [x]? Denies all unless marked  Pulmonary: []? Shortness of Breath []? Cough   [x]? Denies all unless marked  Gastrointestinal:  []? Abdominal Pain  []? Blood in Stool  []? Diarrhea []? Constipation []? Nausea  []? Vomiting  [x]? Denies all unless marked  Genitourinary:  []? Dysuria []? Frequency  []? Incontinence []? Urgency   [x]? Denies all unless marked  Musculoskeletal: []? Arthralgia  []? Myalgias []? Muscle cramps  []? Muscle twitches   [x]? Denies all unless marked   Extremities:   []? Pain   []? Swelling   [x]? Denies all unless marked  Skin:[]? Rash  []? Color Change  [x]? Denies all unless marked  Neurological:[]? Visual Disturbance []? Double Vision []? Slurred Speech []? Trouble swallowing  []? Vertigo []? Tingling []? Numbness []? Weakness []? Loss of Balance   []? Loss of Consciousness []? Memory Loss []? Seizures  [x]? Denies all unless marked  Psychiatric/Behavioral:[]? Depression []? Anxiety  [x]? Denies all unless marked  Sleep: []? Insomnia []? Sleep Disturbance []? Snoring []? Restless Legs [x]? Daytime Sleepiness []? Sleep Apnea  [x]? Denies all unless marked      The MA has completed the ROS with the patient. I have reviewed it in its' entirety with the patient and agree with the documentation. PHYSICAL EXAM  BP (!) 166/81   Pulse 62   Ht 5' 8\" (1.727 m)   Wt 242 lb (109.8 kg)   SpO2 96%   BMI 36.80 kg/m²    Neck circumference:  18.75 inches  Mallampati: Type 4   Constitutional -  Alert in NAD, well developed, pleasant and cooperative with exam; body habitus obese as indicated by BMI  HEENT- Conjunctiva normal.  No scars, masses, or lesions over external nose or ears, hearing intact, no neck masses noted, no jugular vein distension, no bruit  Cardiac- Regular rate and rhythm  Pulmonary- Clear to auscultation, good expansion, normal effort without use of accessory muscles  Musculoskeletal - No significant wasting of muscles noted, no bony deformities  Extremities - No clubbing, cyanosis or edema  Skin - Warm, dry, and intact.   No rash, erythema, or pallor  Psychiatric - Mood, affect, and behavior appear normal      NEUROLOGIC EXAMINATION:  Extraocular movements are intact without nystagmus. PERRL. Visual fields are full to confrontation. Facial movements are symmetrical and normal.  Speech is precise. Extremity strength is normal in both uppers and lowers. Deep tendon reflexes are intact and symmetrical.  Rapid alternating movements are unimpaired. Finger-to-nose testing is performed well, without dysmetria. Gait is normal.    I reviewed the following studies:       [x]  :  Clinical laboratory test results     []  :  Radiology reports                    [x]  :  Review and summarization of medical records and/or obtain medical records        []  :  Previous/recent polysomnogram report(s)     [x]  :  [place] Sleepiness Scale: 3      [place] Sleepiness Scale    0 = would never doze  1 = slight chance of dozing  2 = moderate chance of dozing  3 = high chance of dozing    Situation Chance of Dozing (0-3)    Sitting and reading       1    Watching TV        0    Sitting, inactive in a public place (e.g. a theatre or a meeting) 0    As a passenger in a car for an hour without a break   2    Lying down to rest in the afternoon when circumstances permit 0    Sitting and talking to someone     0    Sitting quietly after a lunch without alcohol    0    In a car, while stopped for a few minutes in the traffic  0    Total         3      IMPRESSION:    ICD-10-CM    1. At risk for obstructive sleep apnea  Z91.89 Baseline Diagnostic Sleep Study     [facility]   2. Somnolence, daytime  R40.0 Baseline Diagnostic Sleep Study     [facility]   3. Chronic congestive heart failure, unspecified heart failure type ([facility]  I50.9 Baseline Diagnostic Sleep Study     [facility]   4.  Paroxysmal atrial fibrillation (HCC)  I48.0 Baseline Diagnostic Sleep Study     [facility]      He denies SAMMY symptoms but he does have examination findings of a Type 4 airway, neck circumference >16 inches, and obesity. He has associated conditions of coronary artery disease, atrial fibrillation, and congested heart failure amongst other conditions putting him at risk for SAMMY. PLAN:  1. Orders Placed This Encounter   Procedures   [address] [facility] [place] [address]    Baseline Diagnostic Sleep Study     2. Patient advised of the etiology,  pathophysiology, diagnosis, treatment options, and risks of untreated SAMMY. Risks may include, but are not limited to  hypertension, coronary artery disease, diabetes, stroke, weight gain, impaired cognition, daytime somnolence,  and motor vehicle accidents. Advised to abstain from driving or operating heavy machinery when drowsy and the use of respiratory suppressants. He denies   3. We had a discussion about the clinical issues and went over the various important aspects to consider. Treatment plan discussed. All questions were answered. Pt voiced understanding and agrees with treatment plan. 4. The following educational material has been included in this visit after visit summary for your review:  SAMMY/PAP guidelines/sleep studies/CPAP-discussed with pt.  5.  Will evaluate for clinical benefit and and compliance during a 30 day period within the preceding 90 days. 6.  Order-PSG  7. Monitor B/P and follow up with PMD if it persists to be elevated. 8.  Follow up per protocol    Note:  A total of >50% (>16 minutes) of 30 minutes was spent discussing the pathophysiology and treatment and/or coordination of care of the above diagnoses.

## 2020-08-18 NOTE — PROGRESS NOTES
REVIEW OF SYSTEMS    Constitutional: []Fever []Sweats []Chills [] Recent Injury   [x] Denies all unless marked  HENT:[]Headache  [] Head Injury  [] Sore Throat  [] Ear Pain  [] Dizziness [] Hearing Loss   [x] Denies all unless marked  Spine:  [] Neck pain  [] Back pain  [] Sciaticia  [x] Denies all unless marked  Cardiovascular:[]Chest Pain []Palpitations [] Heart Disease  [x] Denies all unless marked  Pulmonary: []Shortness of Breath []Cough   [x] Denies all unless marked  Gastrointestinal:  []Abdominal Pain  []Blood in Stool  []Diarrhea []Constipation []Nausea  []Vomiting  [x] Denies all unless marked  Genitourinary:  [] Dysuria [] Frequency  [] Incontinence [] Urgency   [x] Denies all unless marked  Musculoskeletal: [] Arthralgia  [] Myalgias [] Muscle cramps  [] Muscle twitches   [x] Denies all unless marked   Extremities:   [] Pain   [] Swelling   [x] Denies all unless marked  Skin:[] Rash  [] Color Change  [x] Denies all unless marked  Neurological:[] Visual Disturbance [] Double Vision [] Slurred Speech [] Trouble swallowing  [] Vertigo [] Tingling [] Numbness [] Weakness [] Loss of Balance   [] Loss of Consciousness [] Memory Loss [] Seizures  [x] Denies all unless marked  Psychiatric/Behavioral:[] Depression [] Anxiety  [x] Denies all unless marked  Sleep: []  Insomnia [] Sleep Disturbance [] Snoring [] Restless Legs [x] Daytime Sleepiness [] Sleep Apnea  [x] Denies all unless marked

## 2020-08-19 NOTE — TELEPHONE ENCOUNTER
Patient called to report that he needs a refill on test strips. Patient reports that he currently has Easy Max strips.

## 2020-08-24 ENCOUNTER — OFFICE VISIT (OUTPATIENT)
Dept: CARDIOLOGY | Age: 65
End: 2020-08-24
Payer: COMMERCIAL

## 2020-08-24 VITALS
BODY MASS INDEX: 33.74 KG/M2 | SYSTOLIC BLOOD PRESSURE: 198 MMHG | HEIGHT: 71 IN | DIASTOLIC BLOOD PRESSURE: 100 MMHG | WEIGHT: 241 LBS | HEART RATE: 58 BPM

## 2020-08-24 PROBLEM — I50.42 CHRONIC COMBINED SYSTOLIC AND DIASTOLIC CONGESTIVE HEART FAILURE (HCC): Status: ACTIVE | Noted: 2020-08-24

## 2020-08-24 PROBLEM — Z79.899 ON AMIODARONE THERAPY: Status: ACTIVE | Noted: 2020-08-24

## 2020-08-24 PROCEDURE — 99214 OFFICE O/P EST MOD 30 MIN: CPT | Performed by: NURSE PRACTITIONER

## 2020-08-24 RX ORDER — MINOXIDIL 2.5 MG/1
2.5 TABLET ORAL 2 TIMES DAILY
Qty: 60 TABLET | Refills: 5 | Status: ON HOLD
Start: 2020-08-24 | End: 2020-12-23 | Stop reason: HOSPADM

## 2020-08-24 RX ORDER — BUMETANIDE 1 MG/1
1 TABLET ORAL DAILY
Status: ON HOLD | COMMUNITY
End: 2020-10-20 | Stop reason: SDUPTHER

## 2020-08-24 NOTE — PATIENT INSTRUCTIONS
New instructions for today:  Start taking Loniten 2.5 mg (1) tab twice daily. Call back blood pressure log in one week. [phone]. Weigh yourself daily without clothing at the same time each day. Record a daily weight log. Call the office if you gain 3 pounds or more in 2 to 3 days or 5 pounds in one week. A sudden weight gain may mean that your heart failure is getting worse. Sodium causes your body to hold on to extra water. This may cause your heart failure symptoms to get worse. Limit sodium to 2,000 milligrams (mg) a day or less. That is less than 1 teaspoon of salt a day, including all the salt you eat in cooking or in packaged foods. Fluid restriction of 1500ml per day (about 6 cups of fluid per day). Eliquis increase your risk of bleeding. If you notice blood in urine or stool, bleeding gums, excessive bruising or cough productive of bloody sputum, notify the office. Information on this blood thinner has been included in your after visit summary. Patient Instructions:  Continue current medications as prescribed. Always keep a current medication list. Bring your medications to every office visit. Continue to follow up with primary care provider for non cardiac medical problems. Call the office with any problems, questions or concerns at [phone]. If you have been asked to keep a blood pressure log, do so for 2 weeks. Call the office to report readings to the triage nurse at [phone]. Follow up with cardiologist as scheduled. The following educational material has been included in this after visit summary for your review: Life simple 7. Heart health. Avoid heart failure triggers. Life simple 7  1) Manage blood pressure - high blood pressure is a major risk factor for heart disease and stroke. Keeping blood pressure in health range reduces strain on your heart, arteries and kidneys. Blood pressure goal is less than 130/80.    2) Control cholesterol - contributes to plaque, which can clog arteries and lead to heart disease and stroke. When you control your cholesterol you are giving your arteries their best chance to remain clear. It is recommended that you get cholesterol lab work done once a year. 3) Reduce blood sugar - most of the food we eat is turning into glucose or blood sugar that our body uses for energy. Over time, high levels of blood sugar can damage your heart, kidneys, eyes and nerves. 4) Get active - living an active life is one of the most rewarding gifts you can give yourself and those you love. Simply put, daily physical activity increases your length and quality of life. Strive to exercise 15 minutes most days of the week. 5)  Eat better - A healthy diet is one of your best weapons for fighting cardiovascular disease. When you eat a heart healthy diet, you improve your chances for feeling good and staying healthy for life. 6)  Lose weight - when you shed extra fat an unnecessary pounds, you reduce the burden on your hear, lungs, blood vessels and skeleton. You give yourself the gift of active living, you lower your blood pressure and help yourself feel better. 7) Stop smoking - cigarette smokers have a higher risk of developing cardiovascular disease. If  You smoke, quitting is the best thing you can do for your health. Check American Heart Association on line for more information on Life's Simple 7 and tips for healthy living. A Healthy Heart: Care Instructions  Your Care Instructions     Coronary artery disease, also called heart disease, occurs when a substance called plaque builds up in the vessels that supply oxygen-rich blood to your heart muscle. This can narrow the blood vessels and reduce blood flow. A heart attack happens when blood flow is completely blocked. A high-fat diet, smoking, and other factors increase the risk of heart disease. Your doctor has found that you have a chance of having heart disease.  You can do lots of things to keep your heart healthy. It may not be easy, but you can change your diet, exercise more, and quit smoking. These steps really work to lower your chance of heart disease. Follow-up care is a key part of your treatment and safety. Be sure to make and go to all appointments, and call your doctor if you are having problems. It's also a good idea to know your test results and keep a list of the medicines you take. How can you care for yourself at home? Diet  · Use less salt when you cook and eat. This helps lower your blood pressure. Taste food before salting. Add only a little salt when you think you need it. With time, your taste buds will adjust to less salt. · Eat fewer snack items, fast foods, canned soups, and other high-salt, high-fat, processed foods. · Read food labels and try to avoid saturated and trans fats. They increase your risk of heart disease by raising cholesterol levels. · Limit the amount of solid fat-butter, margarine, and shortening-you eat. Use olive, peanut, or canola oil when you cook. Bake, broil, and steam foods instead of frying them. · Eat a variety of fruit and vegetables every day. Dark green, deep orange, red, or yellow fruits and vegetables are especially good for you. Examples include spinach, carrots, peaches, and berries. · Foods high in fiber can reduce your cholesterol and provide important vitamins and minerals. High-fiber foods include whole-grain cereals and breads, oatmeal, beans, brown rice, citrus fruits, and apples. · Eat lean proteins. Heart-healthy proteins include seafood, lean meats and poultry, eggs, beans, peas, nuts, seeds, and soy products. · Limit drinks and foods with added sugar. These include candy, desserts, and soda pop. Lifestyle changes  · If your doctor recommends it, get more exercise. Walking is a good choice. Bit by bit, increase the amount you walk every day. Try for at least 30 minutes on most days of the week.  You also may want to swim, bike, or do other activities. · Do not smoke. If you need help quitting, talk to your doctor about stop-smoking programs and medicines. These can increase your chances of quitting for good. Quitting smoking may be the most important step you can take to protect your heart. It is never too late to quit. · Limit alcohol to 2 drinks a day for men and 1 drink a day for women. Too much alcohol can cause health problems. · Manage other health problems such as diabetes, high blood pressure, and high cholesterol. If you think you may have a problem with alcohol or drug use, talk to your doctor. Medicines  · Take your medicines exactly as prescribed. Call your doctor if you think you are having a problem with your medicine. · If your doctor recommends aspirin, take the amount directed each day. Make sure you take aspirin and not another kind of pain reliever, such as acetaminophen (Tylenol). When should you call for help? [id number] if you have symptoms of a heart attack. These may include:  · Chest pain or pressure, or a strange feeling in the chest.  · Sweating. · Shortness of breath. · Pain, pressure, or a strange feeling in the back, neck, jaw, or upper belly or in one or both shoulders or arms. · Lightheadedness or sudden weakness. · A fast or irregular heartbeat. After you call 911, the  may tell you to chew 1 adult-strength or 2 to 4 low-dose aspirin. Wait for an ambulance. Do not try to drive yourself. Watch closely for changes in your health, and be sure to contact your doctor if you have any problems. Where can you learn more? Go to https://[employer]. org and sign in to your [employer] account. Enter H303 in the [employer] box to learn more about \"A Healthy Heart: Care Instructions. \"     If you do not have an account, please click on the \"Sign Up Now\" link.   Current as of: December 16, 2019               Content Version: 12.5  © 8744-7180 [employer], [employer]. Care instructions adapted under license by [facility] [place] ([facility]). If you have questions about a medical condition or this instruction, always ask your healthcare professional. [address] any warranty or liability for your use of this information. Patient Education        Avoiding Triggers With Heart Failure: Care Instructions  Your Care Instructions     Triggers are anything that make your heart failure flare up. A flare-up is also called \"sudden heart failure\" or \"acute heart failure. \" When you have a flare-up, fluid builds up in your lungs, and you have problems breathing. You might need to go to the hospital. By watching for changes in your condition and avoiding triggers, you can prevent heart failure flare-ups. Follow-up care is a key part of your treatment and safety. Be sure to make and go to all appointments, and call your doctor if you are having problems. It's also a good idea to know your test results and keep a list of the medicines you take. How can you care for yourself at home? Watch for changes in your weight and condition  · Weigh yourself without clothing at the same time each day. Record your weight. Call your doctor if you have sudden weight gain, such as more than 2 to 3 pounds in a day or 5 pounds in a week. (Your doctor may suggest a different range of weight gain.) A sudden weight gain may mean that your heart failure is getting worse. · Keep a daily record of your symptoms. Write down any changes in how you feel, such as new shortness of breath, cough, or problems eating. Also record if your ankles are more swollen than usual and if you feel more tired than usual. Note anything that you ate or did that could have triggered these changes. Limit sodium  Sodium causes your body to hold on to extra water. This may cause your heart failure symptoms to get worse. People get most of their sodium from processed foods.  Fast food and restaurant meals also tend to be very high in sodium. · Your doctor may suggest that you limit sodium. Your doctor can tell you how much sodium is right for you. This includes limiting sodium in cooked and packaged foods. · Read food labels on cans and food packages. They tell you how much sodium you get in one serving. Check the serving size. If you eat more than one serving, you are getting more sodium. · Be aware that sodium can come in forms other than salt, including monosodium glutamate (MSG), sodium citrate, and sodium bicarbonate (baking soda). MSG is often added to Asian food. You can sometimes ask for food without MSG or salt. · Slowly reducing salt will help you adjust to the taste. Take the salt shaker off the table. · Flavor your food with garlic, lemon juice, onion, vinegar, herbs, and spices instead of salt. Do not use soy sauce, steak sauce, onion salt, garlic salt, mustard, or ketchup on your food, unless it is labeled \"low-sodium\" or \"low-salt. \"  · Make your own salad dressings, sauces, and ketchup without adding salt. · Use fresh or frozen ingredients, instead of canned ones, whenever you can. Choose low-sodium canned goods. · Eat less processed food and food from restaurants, including fast food. Exercise as directed  Moderate, regular exercise is very good for your heart. It improves your blood flow and helps control your weight. But too much exercise can stress your heart and cause a heart failure flare-up. · Check with your doctor before you start an exercise program.  · Walking is an easy way to get exercise. Start out slowly. Gradually increase the length and pace of your walk. Swimming, riding a bike, and using a treadmill are also good forms of exercise. · When you exercise, watch for signs that your heart is working too hard. You are pushing yourself too hard if you cannot talk while you are exercising.  If you become short of breath or dizzy or have chest pain, stop, sit down, and rest.  · Do not exercise when you do not feel well. Take medicines correctly  · Take your medicines exactly as prescribed. Call your doctor if you think you are having a problem with your medicine. · Make a list of all the medicines you take. Include those prescribed to you by other doctors and any over-the-counter medicines, vitamins, or supplements you take. Take this list with you when you go to any doctor. · Take your medicines at the same time every day. It may help you to post a list of all the medicines you take every day and what time of day you take them. · Make taking your medicine as simple as you can. Plan times to take your medicines when you are doing other things, such as eating a meal or getting ready for bed. This will make it easier to remember to take your medicines. · Get organized. Use helpful tools, such as daily or weekly pill containers. When should you call for help? Call 911 if you have symptoms of sudden heart failure such as:  · You have severe trouble breathing. · You cough up pink, foamy mucus. · You have a new irregular or rapid heartbeat. Call your doctor now or seek immediate medical care if:  · You have new or increased shortness of breath. · You are dizzy or lightheaded, or you feel like you may faint. · You have sudden weight gain, such as more than 2 to 3 pounds in a day or 5 pounds in a week. (Your doctor may suggest a different range of weight gain.)  · You have increased swelling in your legs, ankles, or feet. · You are suddenly so tired or weak that you cannot do your usual activities. Watch closely for changes in your health, and be sure to contact your doctor if you develop new symptoms. Where can you learn more? Go to https://[employer]. org and sign in to your [employer] account. Enter L031 in the [employer] box to learn more about \"Avoiding Triggers With Heart Failure: Care Instructions. \"     If you do not have an account,

## 2020-08-24 NOTE — PROGRESS NOTES
[facility], [place]. [address] [facility]  [phone] office  [phone] fax      OFFICE VISIT:  2020    Porter Diallo - : 1955  Reason For Visit:  Sandee Acosta is a 60 y.o. male who is here for 1 Month Follow-Up (Follow up on med change. BP has been elevated at times. ); Coronary Artery Disease; and Congestive Heart Failure    History:   Diagnosis Orders   1. Coronary artery disease involving native coronary artery of native heart without angina pectoris     2. PAF (paroxysmal atrial fibrillation) ([address]     3. Essential hypertension     4. Mixed hyperlipidemia     5. Chronic combined systolic and diastolic congestive heart failure ([address]     6. On amiodarone therapy       The patient presents today for cardiology follow up. Weight down 4 pounds with home monitoring. Patient is limiting sodium. No arrhythmia. No chest pain. No dyspnea. No blood in urine or stool. Continues on Eliquis. The patient denies symptoms to suggest myocardial ischemia, heart failure or arrhythmia. BP is elevated today. The patient's PCP monitors cholesterol. Patient has established with nephrology and initial evaluation for sleep apnea. Appointment scheduled for sleep apnea study. Subjective  Sandee Acosta denies exertional chest pain, shortness of breath, orthopnea, paroxysmal nocturnal dyspnea, syncope, presyncope, sensed arrhythmia, edema and fatigue. The patient denies numbness or weakness to suggest cerebrovascular accident or transient ischemic attack.       Porter Diallo has the following history as recorded in [facility]:  Patient Active Problem List   Diagnosis Code    HTN (hypertension) I10    DM (diabetes mellitus) ([address] E11.9    GERD (gastroesophageal reflux disease) K21.9    Aneurysm of thoracic aorta (HCC) I71.2    Renal artery stenosis (HCC) I70.1    Carotid artery stenosis I65.29    Peripheral vascular disease ([address] obstruction     Hyperlipemia     Hypertension     Internal hemorrhoids without mention of complication     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, or unspecified(403.00)     Obesity, unspecified     Other specified cardiac dysrhythmias(427.89)     Paroxysmal atrial fibrillation (HCC)     Peripheral vascular disease (HCC)     Solitary pulmonary nodule     Surgical or other procedure not carried out because of contraindication     Thoracic aneurysm without mention of rupture     Tobacco use disorder     Type II or unspecified type diabetes mellitus without mention of complication, not stated as uncontrolled      Past Surgical History:   Procedure Laterality Date    CARDIAC CATHETERIZATION      CHOLECYSTECTOMY      CORONARY ANGIOPLASTY WITH STENT PLACEMENT  1-16    2 JACQUELINE to LAD    DIAGNOSTIC CARDIAC CATH LAB PROCEDURE      URETER STENT PLACEMENT      VASCULAR SURGERY  03- [facility]    Aortogram with bilateral selective renal artery injections    VASCULAR SURGERY  6/14/13 S    Right carotid endarterectomy with Vascu-Guard patch repair. Right cervical carotid arteriograms after endarterectomy.  VASCULAR SURGERY  7/6/15 [facility]    Percutaneous cannulation, right common femoral artery, with a5 German sheath and later 6 German [facility] sheath. Suprarenal abdomianl aortagram.  Selective right renal arteriogram.  Right renal artery balloon angioplasty;/stent (Express 6 mm x 18mmballoon-expandable stent. . Completion suprarenal abdominal aortogram and selective right remal arteriogram. Mynx closure, right common femoral artery punctur     Family History   Problem Relation Age of Onset    High Blood Pressure Father     Cancer Father     High Blood Pressure Mother     High Blood Pressure Brother      Social History     Tobacco Use    Smoking status: Former Smoker     Packs/day: 0.25    Smokeless tobacco: Never Used   Substance Use Topics    Alcohol use: No      Current Outpatient Medications   Medication Sig Dispense Refill    bumetanide (BUMEX) 1 MG tablet Take 1 mg by mouth daily      amiodarone (CORDARONE) 200 MG tablet Take 200 mg by mouth daily      lisinopril (PRINIVIL;ZESTRIL) 40 MG tablet Take 40 mg by mouth daily      carvedilol (COREG) 6.25 MG tablet Take 6.25 mg by mouth 2 times daily (with meals)      NOVOLOG FLEXPEN 100 UNIT/ML injection pen       apixaban (ELIQUIS) 5 MG TABS tablet Take by mouth 2 times daily      montelukast (SINGULAIR) 10 MG tablet Take 10 mg by mouth daily      levocetirizine (XYZAL) 5 MG tablet Take 5 mg by mouth nightly      esomeprazole Magnesium (NEXIUM) 40 MG PACK Take 40 mg by mouth 2 times daily       albuterol sulfate HFA (VENTOLIN HFA) 108 (90 Base) MCG/ACT inhaler Inhale 2 puffs into the lungs every 6 hours as needed for Wheezing 1 Inhaler 3    rosuvastatin (CRESTOR) 20 MG tablet Take 20 mg by mouth daily      amLODIPine (NORVASC) 10 MG tablet Take 1 tablet by mouth daily 30 tablet 3    isosorbide mononitrate (IMDUR) 60 MG CR tablet Take 60 mg by mouth 2 times daily      minoxidil (LONITEN) 2.5 MG tablet Take 2.5 mg by mouth daily      ULTICARE MINI PEN NEEDLES 31G X 6 MM MISC FOR USE WITH LANTUS TWO TIMES A DAY 50 each 5    LANTUS SOLOSTAR 100 UNIT/ML injection pen INJECT 30 UNITS IN THE MORNING AND 20 UNITS IN THE EVENING DAILY (Patient taking differently: 2 times daily Takes 30 units in AM and 20 units in PM) 15 mL 5    potassium chloride SA (K-DUR;KLOR-CON M) 10 MEQ tablet TAKE ONE TABLET BY MOUTH EVERY DAY (Patient taking differently: 10 mEq 3 times daily ) 30 tablet 5    clopidogrel (PLAVIX) 75 MG tablet Take 1 tablet by mouth daily.  30 tablet 5    amiodarone (CORDARONE) 200 MG tablet Take 1 tablet by mouth daily 30 tablet 0    lisinopril (PRINIVIL;ZESTRIL) 40 MG tablet Take 1 tablet by mouth daily (Patient taking differently: Take 40 mg by mouth 2 times daily ) 30 tablet 0    carvedilol (COREG) 6.25 MG tablet Take 1 tablet by mouth 2 times daily 60 tablet 0     No current facility-administered medications for this visit. Allergies: Hydralazine and Morphine    Review of Systems  Constitutional - no appetite change, or unexpected weight change. No fever, chills or diaphoresis. No significant change in activity level or new onset of fatigue. HEENT - no significant rhinorrhea or epistaxis. No tinnitus or significant hearing loss. Eyes - no sudden vision change or amaurosis. No corneal arcus, xantholasma, subconjunctival hemorrhage or discharge. Respiratory - no significant wheezing, stridor, apnea or cough. No dyspnea on exertion or shortness of air. Cardiovascular - no exertional chest pain to suggest myocardial ischemia. No orthopnea or PND. No sensation of sustained arrythmia. No occurrence of slow heart rate. No palpitations. No claudication. Gastrointestinal - no abdominal swelling or pain. No blood in stool. No severe constipation, diarrhea, nausea, or vomiting. Genitourinary - no dysuria, frequency, or urgency. No flank pain or hematuria. Musculoskeletal - no back pain or myalgia. No problems with gait. Extremities - no clubbing, cyanosis or extremity edema. Skin - no color change or rash. No pallor. No new surgical incision. Neurologic - no speech difficulty, facial asymmetry or lateralizing weakness. No seizures, presyncope or syncope. No significant dizziness. Hematologic - no easy bruising or excessive bleeding. Psychiatric - no severe anxiety or insomnia. No confusion. All other review of systems are negative. Objective  Vital Signs - BP (!) 190/100   Pulse 58   Ht 5' 11\" (1.803 m)   Wt 241 lb (109.3 kg)   BMI 33.61 kg/m²   General - Ochoa Martin is alert, cooperative, and pleasant. Well groomed. No acute distress. Body habitus - Body mass index is 33.61 kg/m². HEENT - Head is normocephalic. No circumoral cyanosis.   Dentition is normal.  EYES -   Lids normal without ptosis. No discharge, edema or subconjunctival hemorrhage. Neck - Symmetrical without apparent mass or lymphadenopathy. Respiratory - Normal respiratory effort without use of accessory muscles. Ausculatation reveals vesicular breath sounds without crackles, wheezes, rub or rhonchi. Cardiovascular - No jugular venous distention. Auscultation reveals regular rate and rhythm. No audible clicks, gallop or rub. No murmur. No lower extremity varicosities. No carotid bruits. Abdominal -  No visible distention, mass or pulsations. Extremities - No clubbing or cyanosis. No statis dermatitis or ulcers. No edema. Musculoskeletal -   No Osler's nodes. No kyphosis or scoliosis. Gait is even and regular without limp or shuffle. Ambulates without assistance. Skin -  Warm and dry; no rash or pallor. No new surgical wound. Neurological - No focal neurological deficits. Thought processes coherent. No apparent tremor. Oriented to person, place and time. Psychiatric -  Appropriate affect and mood. Assessment:     Diagnosis Orders   1. Coronary artery disease involving native coronary artery of native heart without angina pectoris     2. PAF (paroxysmal atrial fibrillation) ([facility]     3. Essential hypertension     4. Mixed hyperlipidemia     5. Chronic combined systolic and diastolic congestive heart failure ([address]     6.  On amiodarone therapy       Data reviewed:  Coronary artery disease   1/9/16  lexiscan  Positive for apical MI + myocardial ischemia, EF 42%, intermediate risk findings, AUC indication 16, AUC score 7  1/10/16  Cath  3 lesions in the LAD:  Mid: 70% 2.25x23, mid 90% 2.25 x 28, distal 100% 2.0x28, anterior lateral apical hypokinesis  2/14/20  lexiscan Positive for apical MI with minimal  myocardial ischemia, EF 36%, -1% ischemic myocardium on stress, AUC indication 21, AUC score 7   2/14/2020  Echo  EF 45%  2/24/20  Cath  100% distal LAD at the stent, o/w luminals, slight apical and mild diaphragmatic hypokinesis, EF 45%    7/9/20 Pro-  (normal range 0-900). Lab Results   Component Value Date     07/23/2020    K 4.0 07/23/2020    K 3.4 02/24/2020     07/23/2020    CO2 23 07/23/2020    BUN 18 07/23/2020    CREATININE 1.8 07/23/2020    GLUCOSE 133 07/23/2020    CALCIUM 10.0 07/23/2020      Lab Results   Component Value Date    CHOL 96 (L) 02/13/2020    CHOL 220 06/12/2013     Lab Results   Component Value Date    TRIG 99 02/13/2020    TRIG 119 06/12/2013     Lab Results   Component Value Date    HDL 35 (L) 02/13/2020    HDL 40 06/12/2013     Lab Results   Component Value Date    LDLCALC 41 02/13/2020    LDLCALC 149 05/06/2014    LDLCALC 161 (A) 12/12/2013     Chronic systolic and diastolic heart failure - NYHA class II stage C  GDMT includes Coreg, Lisinopril and Bumex. Home weights are stable. Down one pound from last OV. Patient is maintaining low sodium diet and daily weight log. Patient has seen nephrology since last OV. Sleep apnea testing has been scheduled.     PAF - no recurrent AF on amiodarone. No bleeding issue on Eliquis.     CAD - no recurrent angina. Stable on current medical management.     HTN -  BP elevated. Increase Loniten to 2.5 mg BID. Patient will call back BP log in one week.     Patient is compliant with medication regimen. BP Readings from Last 3 Encounters:   08/24/20 (!) 190/100   08/18/20 (!) 166/81   07/23/20 134/84    Pulse Readings from Last 3 Encounters:   08/24/20 58   08/18/20 62   07/23/20 61        Wt Readings from Last 3 Encounters:   08/24/20 241 lb (109.3 kg)   08/18/20 242 lb (109.8 kg)   07/23/20 246 lb (111.6 kg)     Plan  Previous cardiac history and records reviewed. Continue current medical management. Increase Loniten to 2.5 mg BID. Call back BP log in one week. Continue other current medications as directed.   Continue to follow up with primary care provider for non cardiac medical problems. Call the office with any problems, questions or concerns at [phone]. Cardiology follow up: 3 months Dr. Bea Cuevas. Educational included in patient instructions. Heart health. Avoid heart failure triggers.      Long Chauhan, APRN

## 2020-08-25 NOTE — PROGRESS NOTES
"Subjective cc: left arm swelling   Fortino Llanes is a 65 y.o. male with multiple medical problems presents with complaint of left arm swelling. He reports on Sunday he hit golf balls in his garage - nothing strenous but this is not something he would normally do activity wise. On Monday he noted increase in pain, swelling and bruising of his left arm - he went to PCP and was started on oral abx. He is concerned because it does not seem to be improving. He is currently on blood thinners. He does have an ortho dr that he has seen in the past.      History of Present Illness     The following portions of the patient's history were reviewed and updated as appropriate: allergies, current medications, past family history, past medical history, past social history, past surgical history and problem list.        Review of Systems   Constitutional: Negative for activity change, appetite change, chills and fever.   Musculoskeletal: Positive for arthralgias, joint swelling and myalgias.   Skin: Positive for color change. Negative for wound.   Hematological: Bruises/bleeds easily.   All other systems reviewed and are negative.      Objective   Blood pressure 138/82, pulse 57, temperature 98.4 °F (36.9 °C), temperature source Temporal, height 68.2 cm (26.87\"), weight 110 kg (241 lb 9.6 oz), SpO2 98 %.  Physical Exam   Constitutional: He is oriented to person, place, and time. He appears well-developed and well-nourished. No distress.   HENT:   Head: Normocephalic and atraumatic.   Right Ear: External ear normal.   Left Ear: External ear normal.   Eyes: Conjunctivae and EOM are normal.   Cardiovascular: Normal rate and intact distal pulses.   Pulmonary/Chest: Effort normal and breath sounds normal. No stridor. No respiratory distress.   Musculoskeletal: He exhibits edema and tenderness.        Arms:  Neurological: He is alert and oriented to person, place, and time.   Skin: Skin is warm and dry. He is not diaphoretic. There is " erythema.   Psychiatric: He has a normal mood and affect. His behavior is normal. Judgment and thought content normal.   Nursing note and vitals reviewed.      Assessment/Plan   Problems Addressed this Visit     None      Visit Diagnoses     Cellulitis of left upper extremity    -  Primary    Relevant Medications    cefTRIAXone (ROCEPHIN) injection 1 g (Completed)    Swelling of left upper extremity            PLAN:     #1 left upper ext edema: new, will give rocephin IM and change oral abx to clindamycin to cover for MRSA, will also order a duplex study of his left upper arm to r/o DVT - feel this is unlikely since on blood thinner but will check, will also eval for hematoma, advised to contact his ortho for eval asap to make sure this is not septic arthritis (I do not think it is based on presentation). Return if not improving           This document has been electronically signed by Clara Calzada MD on August 24, 2020 23:19

## 2020-08-26 ENCOUNTER — OFFICE VISIT (OUTPATIENT)
Dept: FAMILY MEDICINE CLINIC | Facility: CLINIC | Age: 65
End: 2020-08-26

## 2020-08-26 VITALS
HEIGHT: 71 IN | SYSTOLIC BLOOD PRESSURE: 156 MMHG | BODY MASS INDEX: 33.8 KG/M2 | RESPIRATION RATE: 18 BRPM | OXYGEN SATURATION: 97 % | HEART RATE: 57 BPM | WEIGHT: 241.4 LBS | DIASTOLIC BLOOD PRESSURE: 80 MMHG | TEMPERATURE: 99.1 F

## 2020-08-26 DIAGNOSIS — N18.4 STAGE 4 CHRONIC KIDNEY DISEASE (HCC): ICD-10-CM

## 2020-08-26 DIAGNOSIS — Z79.01 CHRONIC ANTICOAGULATION: ICD-10-CM

## 2020-08-26 DIAGNOSIS — E11.43 TYPE 2 DIABETES MELLITUS WITH DIABETIC AUTONOMIC NEUROPATHY, WITH LONG-TERM CURRENT USE OF INSULIN (HCC): Primary | ICD-10-CM

## 2020-08-26 DIAGNOSIS — E78.2 MIXED HYPERLIPIDEMIA: ICD-10-CM

## 2020-08-26 DIAGNOSIS — I10 ESSENTIAL HYPERTENSION: ICD-10-CM

## 2020-08-26 DIAGNOSIS — E66.09 CLASS 1 OBESITY DUE TO EXCESS CALORIES WITH SERIOUS COMORBIDITY AND BODY MASS INDEX (BMI) OF 33.0 TO 33.9 IN ADULT: ICD-10-CM

## 2020-08-26 DIAGNOSIS — Z79.4 TYPE 2 DIABETES MELLITUS WITH DIABETIC AUTONOMIC NEUROPATHY, WITH LONG-TERM CURRENT USE OF INSULIN (HCC): Primary | ICD-10-CM

## 2020-08-26 PROCEDURE — 99214 OFFICE O/P EST MOD 30 MIN: CPT | Performed by: FAMILY MEDICINE

## 2020-08-27 LAB
ALBUMIN SERPL-MCNC: 4.5 G/DL (ref 3.5–5.2)
ALBUMIN/GLOB SERPL: 2 G/DL
ALP SERPL-CCNC: 75 U/L (ref 39–117)
ALT SERPL-CCNC: 26 U/L (ref 1–41)
AST SERPL-CCNC: 17 U/L (ref 1–40)
BILIRUB SERPL-MCNC: 0.6 MG/DL (ref 0–1.2)
BUN SERPL-MCNC: 33 MG/DL (ref 8–23)
BUN/CREAT SERPL: 18.8 (ref 7–25)
CALCIUM SERPL-MCNC: 9.2 MG/DL (ref 8.6–10.5)
CHLORIDE SERPL-SCNC: 103 MMOL/L (ref 98–107)
CO2 SERPL-SCNC: 24.5 MMOL/L (ref 22–29)
CREAT SERPL-MCNC: 1.76 MG/DL (ref 0.76–1.27)
ERYTHROCYTE [DISTWIDTH] IN BLOOD BY AUTOMATED COUNT: 12.9 % (ref 12.3–15.4)
GLOBULIN SER CALC-MCNC: 2.2 GM/DL
GLUCOSE SERPL-MCNC: 122 MG/DL (ref 65–99)
HBA1C MFR BLD: 6.9 % (ref 4.8–5.6)
HCT VFR BLD AUTO: 50.6 % (ref 37.5–51)
HGB BLD-MCNC: 15.9 G/DL (ref 13–17.7)
MCH RBC QN AUTO: 30.4 PG (ref 26.6–33)
MCHC RBC AUTO-ENTMCNC: 31.4 G/DL (ref 31.5–35.7)
MCV RBC AUTO: 96.7 FL (ref 79–97)
PLATELET # BLD AUTO: 196 10*3/MM3 (ref 140–450)
POTASSIUM SERPL-SCNC: 4.6 MMOL/L (ref 3.5–5.2)
PROT SERPL-MCNC: 6.7 G/DL (ref 6–8.5)
RBC # BLD AUTO: 5.23 10*6/MM3 (ref 4.14–5.8)
SODIUM SERPL-SCNC: 139 MMOL/L (ref 136–145)
WBC # BLD AUTO: 9.82 10*3/MM3 (ref 3.4–10.8)

## 2020-09-10 PROBLEM — E11.43 TYPE 2 DIABETES MELLITUS WITH DIABETIC AUTONOMIC NEUROPATHY, WITH LONG-TERM CURRENT USE OF INSULIN (HCC): Status: ACTIVE | Noted: 2020-09-10

## 2020-09-10 PROBLEM — I10 ESSENTIAL HYPERTENSION: Status: ACTIVE | Noted: 2020-09-10

## 2020-09-10 PROBLEM — N18.4 STAGE 4 CHRONIC KIDNEY DISEASE: Status: ACTIVE | Noted: 2020-09-10

## 2020-09-10 PROBLEM — E66.811 CLASS 1 OBESITY DUE TO EXCESS CALORIES WITH SERIOUS COMORBIDITY AND BODY MASS INDEX (BMI) OF 33.0 TO 33.9 IN ADULT: Status: ACTIVE | Noted: 2020-09-10

## 2020-09-10 PROBLEM — E66.09 CLASS 1 OBESITY DUE TO EXCESS CALORIES WITH SERIOUS COMORBIDITY AND BODY MASS INDEX (BMI) OF 33.0 TO 33.9 IN ADULT: Status: ACTIVE | Noted: 2020-09-10

## 2020-09-10 PROBLEM — Z79.4 TYPE 2 DIABETES MELLITUS WITH DIABETIC AUTONOMIC NEUROPATHY, WITH LONG-TERM CURRENT USE OF INSULIN: Status: ACTIVE | Noted: 2020-09-10

## 2020-09-10 PROBLEM — Z79.01 CHRONIC ANTICOAGULATION: Status: ACTIVE | Noted: 2020-09-10

## 2020-09-10 PROBLEM — E78.2 MIXED HYPERLIPIDEMIA: Status: ACTIVE | Noted: 2020-09-10

## 2020-10-07 ENCOUNTER — HOSPITAL ENCOUNTER (OUTPATIENT)
Dept: ULTRASOUND IMAGING | Age: 65
Discharge: HOME | End: 2020-10-07
Payer: COMMERCIAL

## 2020-10-07 PROCEDURE — 93975 VASCULAR STUDY: CPT

## 2020-10-18 ENCOUNTER — HOSPITAL ENCOUNTER (INPATIENT)
Age: 65
LOS: 2 days | Discharge: HOME | DRG: 291 | End: 2020-10-20
Attending: EMERGENCY MEDICINE | Admitting: FAMILY MEDICINE
Payer: COMMERCIAL

## 2020-10-18 ENCOUNTER — APPOINTMENT (OUTPATIENT)
Dept: GENERAL RADIOLOGY | Age: 65
DRG: 291 | End: 2020-10-18
Payer: COMMERCIAL

## 2020-10-18 PROBLEM — I50.43 SYSTOLIC AND DIASTOLIC CHF, ACUTE ON CHRONIC (HCC): Status: ACTIVE | Noted: 2020-10-18

## 2020-10-18 PROBLEM — E66.9 OBESITY (BMI 30-39.9): Status: ACTIVE | Noted: 2020-10-18

## 2020-10-18 LAB
ADENOVIRUS BY PCR: NOT DETECTED
ALBUMIN SERPL-MCNC: 4.1 G/DL (ref 3.5–5.2)
ALP BLD-CCNC: 76 U/L (ref 40–130)
ALT SERPL-CCNC: 13 U/L (ref 5–41)
ANION GAP SERPL CALCULATED.3IONS-SCNC: 9 MMOL/L (ref 7–19)
AST SERPL-CCNC: 11 U/L (ref 5–40)
BASE EXCESS ARTERIAL: 4.8 MMOL/L (ref -2–2)
BASOPHILS ABSOLUTE: 0.1 K/UL (ref 0–0.2)
BASOPHILS RELATIVE PERCENT: 0.7 % (ref 0–1)
BILIRUB SERPL-MCNC: 0.7 MG/DL (ref 0.2–1.2)
BORDETELLA PARAPERTUSSIS BY PCR: NOT DETECTED
BORDETELLA PERTUSSIS BY PCR: NOT DETECTED
BUN BLDV-MCNC: 25 MG/DL (ref 8–23)
CALCIUM SERPL-MCNC: 9.1 MG/DL (ref 8.8–10.2)
CARBOXYHEMOGLOBIN ARTERIAL: 2.4 % (ref 0–5)
CHLAMYDOPHILIA PNEUMONIAE BY PCR: NOT DETECTED
CHLORIDE BLD-SCNC: 106 MMOL/L (ref 98–111)
CHOLESTEROL, TOTAL: 104 MG/DL (ref 160–199)
CO2: 29 MMOL/L (ref 22–29)
CORONAVIRUS 229E BY PCR: NOT DETECTED
CORONAVIRUS HKU1 BY PCR: NOT DETECTED
CORONAVIRUS NL63 BY PCR: NOT DETECTED
CORONAVIRUS OC43 BY PCR: NOT DETECTED
CREAT SERPL-MCNC: 1.9 MG/DL (ref 0.5–1.2)
EOSINOPHILS ABSOLUTE: 0.4 K/UL (ref 0–0.6)
EOSINOPHILS RELATIVE PERCENT: 4.6 % (ref 0–5)
FERRITIN: 82.7 NG/ML (ref 30–400)
GFR AFRICAN AMERICAN: 43
GFR NON-AFRICAN AMERICAN: 36
GLUCOSE BLD-MCNC: 117 MG/DL (ref 70–99)
GLUCOSE BLD-MCNC: 120 MG/DL (ref 70–99)
GLUCOSE BLD-MCNC: 138 MG/DL (ref 70–99)
GLUCOSE BLD-MCNC: 147 MG/DL (ref 74–109)
HBA1C MFR BLD: 6.8 % (ref 4–6)
HCO3 ARTERIAL: 29.9 MMOL/L (ref 22–26)
HCT VFR BLD CALC: 45.8 % (ref 42–52)
HDLC SERPL-MCNC: 44 MG/DL (ref 55–121)
HEMOGLOBIN, ART, EXTENDED: 14.3 G/DL (ref 14–18)
HEMOGLOBIN: 14.4 G/DL (ref 14–18)
HUMAN METAPNEUMOVIRUS BY PCR: NOT DETECTED
HUMAN RHINOVIRUS/ENTEROVIRUS BY PCR: NOT DETECTED
IMMATURE GRANULOCYTES #: 0 K/UL
INFLUENZA A BY PCR: NOT DETECTED
INFLUENZA B BY PCR: NOT DETECTED
IRON SATURATION: 19 % (ref 14–50)
IRON: 67 UG/DL (ref 59–158)
LDL CHOLESTEROL CALCULATED: 46 MG/DL
LV EF: 45 %
LVEF MODALITY: NORMAL
LYMPHOCYTES ABSOLUTE: 1.3 K/UL (ref 1.1–4.5)
LYMPHOCYTES RELATIVE PERCENT: 15.2 % (ref 20–40)
MAGNESIUM: 2.3 MG/DL (ref 1.6–2.4)
MCH RBC QN AUTO: 29.4 PG (ref 27–31)
MCHC RBC AUTO-ENTMCNC: 31.4 G/DL (ref 33–37)
MCV RBC AUTO: 93.7 FL (ref 80–94)
METHEMOGLOBIN ARTERIAL: 1 %
MONOCYTES ABSOLUTE: 0.7 K/UL (ref 0–0.9)
MONOCYTES RELATIVE PERCENT: 8.6 % (ref 0–10)
MYCOPLASMA PNEUMONIAE BY PCR: NOT DETECTED
NEUTROPHILS ABSOLUTE: 6 K/UL (ref 1.5–7.5)
NEUTROPHILS RELATIVE PERCENT: 70.5 % (ref 50–65)
O2 CONTENT ARTERIAL: 17.7 ML/DL
O2 SAT, ARTERIAL: 88 %
O2 THERAPY: ABNORMAL
PARAINFLUENZA VIRUS 1 BY PCR: NOT DETECTED
PARAINFLUENZA VIRUS 2 BY PCR: NOT DETECTED
PARAINFLUENZA VIRUS 3 BY PCR: NOT DETECTED
PARAINFLUENZA VIRUS 4 BY PCR: NOT DETECTED
PCO2 ARTERIAL: 45 MMHG (ref 35–45)
PDW BLD-RTO: 14 % (ref 11.5–14.5)
PERFORMED ON: ABNORMAL
PH ARTERIAL: 7.43 (ref 7.35–7.45)
PHOSPHORUS: 3.8 MG/DL (ref 2.5–4.5)
PLATELET # BLD: 170 K/UL (ref 130–400)
PMV BLD AUTO: 11.4 FL (ref 9.4–12.4)
PO2 ARTERIAL: 52 MMHG (ref 80–100)
POTASSIUM REFLEX MAGNESIUM: 4.1 MMOL/L (ref 3.5–5)
POTASSIUM, WHOLE BLOOD: 3.6
PRO-BNP: 1333 PG/ML (ref 0–900)
RBC # BLD: 4.89 M/UL (ref 4.7–6.1)
RESPIRATORY SYNCYTIAL VIRUS BY PCR: NOT DETECTED
SARS-COV-2, PCR: NOT DETECTED
SODIUM BLD-SCNC: 144 MMOL/L (ref 136–145)
TOTAL IRON BINDING CAPACITY: 348 UG/DL (ref 250–400)
TOTAL PROTEIN: 6.9 G/DL (ref 6.6–8.7)
TRIGL SERPL-MCNC: 69 MG/DL (ref 0–149)
TROPONIN: 0.02 NG/ML (ref 0–0.03)
TSH REFLEX FT4: 1.83 UIU/ML (ref 0.35–5.5)
WBC # BLD: 8.5 K/UL (ref 4.8–10.8)

## 2020-10-18 PROCEDURE — 84443 ASSAY THYROID STIM HORMONE: CPT

## 2020-10-18 PROCEDURE — 6370000000 HC RX 637 (ALT 250 FOR IP): Performed by: INTERNAL MEDICINE

## 2020-10-18 PROCEDURE — C8923 2D TTE W OR W/O FOL W/CON,CO: HCPCS

## 2020-10-18 PROCEDURE — 99999 PR OFFICE/OUTPT VISIT,PROCEDURE ONLY: CPT | Performed by: EMERGENCY MEDICINE

## 2020-10-18 PROCEDURE — 80053 COMPREHEN METABOLIC PANEL: CPT

## 2020-10-18 PROCEDURE — 83880 ASSAY OF NATRIURETIC PEPTIDE: CPT

## 2020-10-18 PROCEDURE — 84100 ASSAY OF PHOSPHORUS: CPT

## 2020-10-18 PROCEDURE — 2500000003 HC RX 250 WO HCPCS: Performed by: HOSPITALIST

## 2020-10-18 PROCEDURE — 82803 BLOOD GASES ANY COMBINATION: CPT

## 2020-10-18 PROCEDURE — 2580000003 HC RX 258: Performed by: HOSPITALIST

## 2020-10-18 PROCEDURE — 6360000004 HC RX CONTRAST MEDICATION: Performed by: HOSPITALIST

## 2020-10-18 PROCEDURE — 85025 COMPLETE CBC W/AUTO DIFF WBC: CPT

## 2020-10-18 PROCEDURE — 93005 ELECTROCARDIOGRAM TRACING: CPT | Performed by: EMERGENCY MEDICINE

## 2020-10-18 PROCEDURE — 2140000000 HC CCU INTERMEDIATE R&B

## 2020-10-18 PROCEDURE — 83540 ASSAY OF IRON: CPT

## 2020-10-18 PROCEDURE — 80061 LIPID PANEL: CPT

## 2020-10-18 PROCEDURE — 36600 WITHDRAWAL OF ARTERIAL BLOOD: CPT

## 2020-10-18 PROCEDURE — 83036 HEMOGLOBIN GLYCOSYLATED A1C: CPT

## 2020-10-18 PROCEDURE — 83735 ASSAY OF MAGNESIUM: CPT

## 2020-10-18 PROCEDURE — 99283 EMERGENCY DEPT VISIT LOW MDM: CPT

## 2020-10-18 PROCEDURE — 83550 IRON BINDING TEST: CPT

## 2020-10-18 PROCEDURE — 71045 X-RAY EXAM CHEST 1 VIEW: CPT

## 2020-10-18 PROCEDURE — 96374 THER/PROPH/DIAG INJ IV PUSH: CPT

## 2020-10-18 PROCEDURE — 6370000000 HC RX 637 (ALT 250 FOR IP): Performed by: HOSPITALIST

## 2020-10-18 PROCEDURE — 0202U NFCT DS 22 TRGT SARS-COV-2: CPT

## 2020-10-18 PROCEDURE — 82947 ASSAY GLUCOSE BLOOD QUANT: CPT

## 2020-10-18 PROCEDURE — 36415 COLL VENOUS BLD VENIPUNCTURE: CPT

## 2020-10-18 PROCEDURE — 82728 ASSAY OF FERRITIN: CPT

## 2020-10-18 PROCEDURE — 84132 ASSAY OF SERUM POTASSIUM: CPT

## 2020-10-18 PROCEDURE — 2500000003 HC RX 250 WO HCPCS: Performed by: EMERGENCY MEDICINE

## 2020-10-18 PROCEDURE — 84484 ASSAY OF TROPONIN QUANT: CPT

## 2020-10-18 RX ORDER — ROSUVASTATIN CALCIUM 20 MG/1
40 TABLET, COATED ORAL NIGHTLY
Status: DISCONTINUED | OUTPATIENT
Start: 2020-10-18 | End: 2020-10-20 | Stop reason: HOSPADM

## 2020-10-18 RX ORDER — AMLODIPINE BESYLATE 5 MG/1
10 TABLET ORAL DAILY
Status: DISCONTINUED | OUTPATIENT
Start: 2020-10-18 | End: 2020-10-20 | Stop reason: HOSPADM

## 2020-10-18 RX ORDER — ONDANSETRON 4 MG/1
4 TABLET, ORALLY DISINTEGRATING ORAL EVERY 8 HOURS PRN
Status: DISCONTINUED | OUTPATIENT
Start: 2020-10-18 | End: 2020-10-20 | Stop reason: HOSPADM

## 2020-10-18 RX ORDER — AMIODARONE HYDROCHLORIDE 200 MG/1
200 TABLET ORAL DAILY
Status: DISCONTINUED | OUTPATIENT
Start: 2020-10-18 | End: 2020-10-20 | Stop reason: HOSPADM

## 2020-10-18 RX ORDER — ALBUTEROL SULFATE 2.5 MG/3ML
2.5 SOLUTION RESPIRATORY (INHALATION) EVERY 4 HOURS PRN
Status: DISCONTINUED | OUTPATIENT
Start: 2020-10-18 | End: 2020-10-20 | Stop reason: HOSPADM

## 2020-10-18 RX ORDER — ESOMEPRAZOLE MAGNESIUM 40 MG/1
40 FOR SUSPENSION ORAL 2 TIMES DAILY
Status: DISCONTINUED | OUTPATIENT
Start: 2020-10-18 | End: 2020-10-18 | Stop reason: CLARIF

## 2020-10-18 RX ORDER — MINOXIDIL 2.5 MG/1
2.5 TABLET ORAL 2 TIMES DAILY
Status: DISCONTINUED | OUTPATIENT
Start: 2020-10-18 | End: 2020-10-20 | Stop reason: HOSPADM

## 2020-10-18 RX ORDER — INSULIN GLARGINE 100 [IU]/ML
30 INJECTION, SOLUTION SUBCUTANEOUS EVERY MORNING
Status: DISCONTINUED | OUTPATIENT
Start: 2020-10-18 | End: 2020-10-20 | Stop reason: HOSPADM

## 2020-10-18 RX ORDER — NICOTINE POLACRILEX 4 MG
15 LOZENGE BUCCAL PRN
Status: DISCONTINUED | OUTPATIENT
Start: 2020-10-18 | End: 2020-10-20 | Stop reason: HOSPADM

## 2020-10-18 RX ORDER — SODIUM CHLORIDE 0.9 % (FLUSH) 0.9 %
10 SYRINGE (ML) INJECTION EVERY 12 HOURS SCHEDULED
Status: DISCONTINUED | OUTPATIENT
Start: 2020-10-18 | End: 2020-10-20 | Stop reason: HOSPADM

## 2020-10-18 RX ORDER — LISINOPRIL 10 MG/1
40 TABLET ORAL DAILY
Status: DISCONTINUED | OUTPATIENT
Start: 2020-10-18 | End: 2020-10-20 | Stop reason: HOSPADM

## 2020-10-18 RX ORDER — SODIUM CHLORIDE 0.9 % (FLUSH) 0.9 %
10 SYRINGE (ML) INJECTION PRN
Status: DISCONTINUED | OUTPATIENT
Start: 2020-10-18 | End: 2020-10-20 | Stop reason: HOSPADM

## 2020-10-18 RX ORDER — CARVEDILOL 25 MG/1
25 TABLET ORAL 2 TIMES DAILY
Status: DISCONTINUED | OUTPATIENT
Start: 2020-10-18 | End: 2020-10-20 | Stop reason: HOSPADM

## 2020-10-18 RX ORDER — NITROGLYCERIN 0.4 MG/1
0.4 TABLET SUBLINGUAL EVERY 5 MIN PRN
Status: DISCONTINUED | OUTPATIENT
Start: 2020-10-18 | End: 2020-10-20 | Stop reason: HOSPADM

## 2020-10-18 RX ORDER — CARVEDILOL 6.25 MG/1
6.25 TABLET ORAL 2 TIMES DAILY
Status: DISCONTINUED | OUTPATIENT
Start: 2020-10-18 | End: 2020-10-18

## 2020-10-18 RX ORDER — MONTELUKAST SODIUM 10 MG/1
10 TABLET ORAL NIGHTLY
Status: DISCONTINUED | OUTPATIENT
Start: 2020-10-18 | End: 2020-10-20 | Stop reason: HOSPADM

## 2020-10-18 RX ORDER — PANTOPRAZOLE SODIUM 40 MG/1
40 TABLET, DELAYED RELEASE ORAL EVERY 12 HOURS
Status: DISCONTINUED | OUTPATIENT
Start: 2020-10-18 | End: 2020-10-20 | Stop reason: HOSPADM

## 2020-10-18 RX ORDER — ACETAMINOPHEN 650 MG/1
650 SUPPOSITORY RECTAL EVERY 6 HOURS PRN
Status: DISCONTINUED | OUTPATIENT
Start: 2020-10-18 | End: 2020-10-20 | Stop reason: HOSPADM

## 2020-10-18 RX ORDER — BUMETANIDE 0.25 MG/ML
2 INJECTION, SOLUTION INTRAMUSCULAR; INTRAVENOUS ONCE
Status: COMPLETED | OUTPATIENT
Start: 2020-10-18 | End: 2020-10-18

## 2020-10-18 RX ORDER — INSULIN GLARGINE 100 [IU]/ML
20 INJECTION, SOLUTION SUBCUTANEOUS NIGHTLY
Status: DISCONTINUED | OUTPATIENT
Start: 2020-10-18 | End: 2020-10-20 | Stop reason: HOSPADM

## 2020-10-18 RX ORDER — CLOPIDOGREL BISULFATE 75 MG/1
75 TABLET ORAL DAILY
Status: DISCONTINUED | OUTPATIENT
Start: 2020-10-18 | End: 2020-10-20 | Stop reason: HOSPADM

## 2020-10-18 RX ORDER — ACETAMINOPHEN 325 MG/1
650 TABLET ORAL EVERY 6 HOURS PRN
Status: DISCONTINUED | OUTPATIENT
Start: 2020-10-18 | End: 2020-10-20 | Stop reason: HOSPADM

## 2020-10-18 RX ORDER — BUMETANIDE 0.25 MG/ML
1 INJECTION, SOLUTION INTRAMUSCULAR; INTRAVENOUS 2 TIMES DAILY
Status: DISCONTINUED | OUTPATIENT
Start: 2020-10-18 | End: 2020-10-20 | Stop reason: HOSPADM

## 2020-10-18 RX ORDER — ONDANSETRON 2 MG/ML
4 INJECTION INTRAMUSCULAR; INTRAVENOUS EVERY 6 HOURS PRN
Status: DISCONTINUED | OUTPATIENT
Start: 2020-10-18 | End: 2020-10-20 | Stop reason: HOSPADM

## 2020-10-18 RX ORDER — ISOSORBIDE MONONITRATE 60 MG/1
60 TABLET, EXTENDED RELEASE ORAL 2 TIMES DAILY
Status: DISCONTINUED | OUTPATIENT
Start: 2020-10-18 | End: 2020-10-20 | Stop reason: HOSPADM

## 2020-10-18 RX ORDER — AMIODARONE HYDROCHLORIDE 200 MG/1
200 TABLET ORAL DAILY
Status: DISCONTINUED | OUTPATIENT
Start: 2020-10-18 | End: 2020-10-18 | Stop reason: SDUPTHER

## 2020-10-18 RX ADMIN — SODIUM CHLORIDE, PRESERVATIVE FREE 10 ML: 5 INJECTION INTRAVENOUS at 21:52

## 2020-10-18 RX ADMIN — PANTOPRAZOLE SODIUM 40 MG: 40 TABLET, DELAYED RELEASE ORAL at 21:52

## 2020-10-18 RX ADMIN — ISOSORBIDE MONONITRATE 60 MG: 60 TABLET, EXTENDED RELEASE ORAL at 21:51

## 2020-10-18 RX ADMIN — AMLODIPINE BESYLATE 10 MG: 5 TABLET ORAL at 11:31

## 2020-10-18 RX ADMIN — MONTELUKAST SODIUM 10 MG: 10 TABLET, FILM COATED ORAL at 21:51

## 2020-10-18 RX ADMIN — PERFLUTREN 1.65 MG: 6.52 INJECTION, SUSPENSION INTRAVENOUS at 13:00

## 2020-10-18 RX ADMIN — AMIODARONE HYDROCHLORIDE 200 MG: 200 TABLET ORAL at 11:31

## 2020-10-18 RX ADMIN — BUMETANIDE 1 MG: 0.25 INJECTION INTRAMUSCULAR; INTRAVENOUS at 21:51

## 2020-10-18 RX ADMIN — APIXABAN 5 MG: 5 TABLET, FILM COATED ORAL at 21:51

## 2020-10-18 RX ADMIN — ROSUVASTATIN CALCIUM 40 MG: 20 TABLET, FILM COATED ORAL at 21:51

## 2020-10-18 RX ADMIN — SODIUM CHLORIDE, PRESERVATIVE FREE 10 ML: 5 INJECTION INTRAVENOUS at 11:32

## 2020-10-18 RX ADMIN — CARVEDILOL 25 MG: 25 TABLET, FILM COATED ORAL at 21:51

## 2020-10-18 RX ADMIN — BUMETANIDE 2 MG: 0.25 INJECTION, SOLUTION INTRAMUSCULAR; INTRAVENOUS at 06:34

## 2020-10-18 RX ADMIN — PANTOPRAZOLE SODIUM 40 MG: 40 TABLET, DELAYED RELEASE ORAL at 11:32

## 2020-10-18 RX ADMIN — MINOXIDIL 2.5 MG: 2.5 TABLET ORAL at 21:52

## 2020-10-18 RX ADMIN — INSULIN GLARGINE 30 UNITS: 100 INJECTION, SOLUTION SUBCUTANEOUS at 11:31

## 2020-10-18 ASSESSMENT — ENCOUNTER SYMPTOMS
SHORTNESS OF BREATH: 1
EYES NEGATIVE: 1
ALLERGIC/IMMUNOLOGIC NEGATIVE: 1
GASTROINTESTINAL NEGATIVE: 1
SORE THROAT: 0
WHEEZING: 0
COUGH: 1
VOMITING: 0

## 2020-10-18 ASSESSMENT — PAIN SCALES - GENERAL
PAINLEVEL_OUTOF10: 0

## 2020-10-18 NOTE — ED PROVIDER NOTES
[facility] CARE  eMERGENCY dEPARTMENT eNCOUnter      Pt Name: Lelo Herron  MRN: [id number]  [place] 1955  Date of evaluation: 10/18/2020  Provider: Reese Velasquez MD    CHIEF COMPLAINT       Chief Complaint   Patient presents with    Shortness of Breath    Other     pt c/o fluid overload, states water pills were recently changed         HISTORY OF PRESENT ILLNESS   (Location/Symptom, Timing/Onset,Context/Setting, Quality, Duration, Modifying Factors, Severity)  Note limiting factors. Lelo Herron is a 60 y.o. male who presents to the emergency department      The history is provided by the patient. Shortness of Breath   Severity:  Moderate  Onset quality:  Gradual  Duration:  3 days  Timing:  Constant  Progression:  Worsening  Chronicity:  Recurrent (h/o CHF)  Relieved by:  Rest (partially)  Worsened by: Activity  Associated symptoms: cough    Associated symptoms: no chest pain, no diaphoresis, no fever, no headaches, no sore throat, no vomiting and no wheezing    Risk factors comment:  CAD, CHF, DM, HTN, Hyperlipidemia      NursingNotes were reviewed. REVIEW OF SYSTEMS    (2-9 systems for level 4, 10 or more for level 5)     Review of Systems   Constitutional: Negative for diaphoresis and fever. HENT: Negative for sore throat. Respiratory: Positive for cough and shortness of breath. Negative for wheezing. Cardiovascular: Negative for chest pain. Gastrointestinal: Negative for vomiting. Neurological: Negative for headaches. All other systems reviewed and are negative. Except as noted above the remainder of the review of systems was reviewed and negative.        PAST MEDICAL HISTORY     Past Medical History:   Diagnosis Date    Acute kidney failure, unspecified ([address]     Anxiety state, unspecified     Atrial septal aneurysm 1/9/2016    Blood circulation, collateral     Body mass index 30.0-30.9, adult     CAD (coronary artery disease) 1/10/2016    1/9/16  lexiscan Positive for apical MI + myocardial ischemia, EF 42%, intermediate risk findings, AUC indication 16, AUC score 7 1/10/16  Cath  3 lesions in the LAD:  Mid: 70% 2.25x23, mid 90% 2.25 x 28, distal 100% 2.0x28, anterior lateral apical hypokinesis, EF 45%    Calculus of kidney     Carotid artery stenosis 6/26/2013    Cellulitis and abscess of hand, except fingers and thumb     Cerebral artery occlusion with cerebral infarction ([address]     15 years ago    Chronic airway obstruction, not elsewhere classified     Chronic kidney disease, unspecified     Congestive heart failure, unspecified     Diabetes mellitus type II     Diverticulosis of colon (without mention of hemorrhage)     Encounter for long-term (current) use of insulin (HCC)     Esophageal reflux     Family history of ischemic heart disease     Gastric ulcer, unspecified as acute or chronic, without mention of hemorrhage, perforation, or obstruction     Hyperlipemia     Hypertension     Internal hemorrhoids without mention of complication     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, or unspecified(403.00)     Obesity, unspecified     Other specified cardiac dysrhythmias(427.89)     Paroxysmal atrial fibrillation (HCC)     Peripheral vascular disease ([address]     Solitary pulmonary nodule     Surgical or other procedure not carried out because of contraindication     Thoracic aneurysm without mention of rupture     Tobacco use disorder     Type II or unspecified type diabetes mellitus without mention of complication, not stated as uncontrolled          SURGICALHISTORY       Past Surgical History:   Procedure Laterality Date    CARDIAC CATHETERIZATION      CHOLECYSTECTOMY      CORONARY ANGIOPLASTY WITH STENT PLACEMENT  1-16    2 JACQUELINE to LAD    DIAGNOSTIC CARDIAC CATH LAB PROCEDURE      URETER STENT PLACEMENT      VASCULAR SURGERY  03- [facility]    Aortogram with bilateral selective renal artery injections    VASCULAR SURGERY  6/14/13 [facility]    Right carotid endarterectomy with Vascu-Guard patch repair. Right cervical carotid arteriograms after endarterectomy.  VASCULAR SURGERY  7/6/15 [facility]    Percutaneous cannulation, right common femoral artery, with a5 french sheath and later 6 french [facility] sheath. Suprarenal abdomianl aortagram.  Selective right renal arteriogram.  Right renal artery balloon angioplasty;/stent (Express 6 mm x 18mmballoon-expandable stent. . Completion suprarenal abdominal aortogram and selective right remal arteriogram. Mynx closure, right common femoral artery punctur         CURRENT MEDICATIONS       Current Discharge Medication List      CONTINUE these medications which have NOT CHANGED    Details   bumetanide (BUMEX) 1 MG tablet Take 1 mg by mouth daily      minoxidil (LONITEN) 2.5 MG tablet Take 1 tablet by mouth 2 times daily  Qty: 60 tablet, Refills: 5    Comments: Dose increase      amiodarone (CORDARONE) 200 MG tablet Take 200 mg by mouth daily      lisinopril (PRINIVIL;ZESTRIL) 40 MG tablet Take 20 mg by mouth daily       carvedilol (COREG) 6.25 MG tablet Take 6.25 mg by mouth 2 times daily (with meals)      apixaban (ELIQUIS) 5 MG TABS tablet Take by mouth 2 times daily      montelukast (SINGULAIR) 10 MG tablet Take 10 mg by mouth daily      levocetirizine (XYZAL) 5 MG tablet Take 5 mg by mouth nightly      esomeprazole Magnesium (NEXIUM) 40 MG PACK Take 40 mg by mouth 2 times daily       albuterol sulfate HFA (VENTOLIN HFA) 108 (90 Base) MCG/ACT inhaler Inhale 2 puffs into the lungs every 6 hours as needed for Wheezing  Qty: 1 Inhaler, Refills: 3      rosuvastatin (CRESTOR) 20 MG tablet Take 20 mg by mouth daily      amLODIPine (NORVASC) 10 MG tablet Take 1 tablet by mouth daily  Qty: 30 tablet, Refills: 3      isosorbide mononitrate (IMDUR) 60 MG CR tablet Take 60 mg by mouth 2 times daily      LANTUS SOLOSTAR 100 UNIT/ML injection pen INJECT 30 UNITS IN THE MORNING AND 20 UNITS IN THE EVENING DAILY  Qty: 15 mL, Refills: 5      potassium chloride SA (K-DUR;KLOR-CON M) 10 MEQ tablet TAKE ONE TABLET BY MOUTH EVERY DAY  Qty: 30 tablet, Refills: 5      clopidogrel (PLAVIX) 75 MG tablet Take 1 tablet by mouth daily.   Qty: 30 tablet, Refills: 5      NOVOLOG FLEXPEN 100 UNIT/ML injection pen       ULTIC[employer] MINI PEN NEEDLES 31G X 6 MM MISC FOR USE WITH LANTUS TWO TIMES A DAY  Qty: 50 each, Refills: 5             ALLERGIES     Hydralazine and Morphine    FAMILY HISTORY       Family History   Problem Relation Age of Onset    High Blood Pressure Father     Cancer Father     High Blood Pressure Mother     High Blood Pressure Brother           SOCIAL HISTORY       Social History     Socioeconomic History    Marital status:      Spouse name: None    Number of children: None    Years of education: None    Highest education level: None   Occupational History    None   Social Needs    Financial resource strain: None    Food insecurity     Worry: None     Inability: None    Transportation needs     Medical: None     Non-medical: None   Tobacco Use    Smoking status: Former Smoker     Packs/day: 0.25    Smokeless tobacco: Never Used   Substance and Sexual Activity    Alcohol use: No    Drug use: No    Sexual activity: Yes   Lifestyle    Physical activity     Days per week: None     Minutes per session: None    Stress: None   Relationships    Social connections     Talks on phone: None     Gets together: None     Attends Cheondo[employer] service: None     Active member of club or organization: None     Attends meetings of clubs or organizations: None     Relationship status: None    Intimate partner violence     Fear of current or ex partner: None     Emotionally abused: None     Physically abused: None     Forced sexual activity: None   Other Topics Concern    None   Social History Narrative    None       SCREENINGS    Mariam Coma Scale  Eye Opening: Spontaneous  Best Verbal Response: Oriented  Best Motor Response: Obeys commands  [place] Coma Scale Score: 15 @FLOW([id number])@      PHYSICAL EXAM    (up to 7 for level 4, 8 or more for level 5)     ED Triage Vitals [10/18/20 0513]   BP Temp Temp src Pulse Resp SpO2 Height Weight   (!) 153/68 98.7 °F (37.1 °C) -- 61 21 90 % 5' 11\" (1.803 m) 242 lb (109.8 kg)       Physical Exam  Vitals signs and nursing note reviewed. Constitutional:       General: He is in acute distress (min). Appearance: He is obese. He is not ill-appearing, toxic-appearing or diaphoretic. HENT:      Mouth/Throat:      Mouth: Mucous membranes are moist.      Pharynx: Oropharynx is clear. Neck:      Musculoskeletal: Normal range of motion and neck supple. Cardiovascular:      Rate and Rhythm: Normal rate and regular rhythm. Heart sounds: Normal heart sounds. Pulmonary:      Effort: Accessory muscle usage (min) present. Breath sounds: Examination of the right-upper field reveals decreased breath sounds. Examination of the left-upper field reveals decreased breath sounds. Examination of the right-middle field reveals decreased breath sounds. Examination of the left-middle field reveals decreased breath sounds. Examination of the right-lower field reveals decreased breath sounds. Examination of the left-lower field reveals decreased breath sounds. Decreased breath sounds present. No wheezing, rhonchi or rales. Musculoskeletal:      Right lower leg: Edema present. Left lower leg: Edema present. Skin:     General: Skin is warm and dry. Capillary Refill: Capillary refill takes less than 2 seconds. Neurological:      General: No focal deficit present. Mental Status: He is alert and oriented to person, place, and time.    Psychiatric:         Mood and Affect: Mood normal.         DIAGNOSTIC RESULTS     EKG: All EKG's are interpreted by the Emergency Department Physician who either signs or Co-signsthis chart in the absence of a cardiologist.    EKG: sinus, VR 59, 1st degree AV block, ? Old anterior, no change from 7/9/20    RADIOLOGY:   Non-plain filmimages such as CT, Ultrasound and MRI are read by the radiologist. Plain radiographic images are visualized and preliminarily interpreted by the emergency physician with the below findings:        Interpretation per the Radiologist below, if available at the time ofthis note:    XR CHEST PORTABLE   Final Result   Cardiomegaly with findings suggestive of pulmonary   vascular congestion and pulmonary edema. A preliminary report was provided by stat rad. Signed by Dr Ramone Tirado on 10/18/2020 6:46 AM            ED BEDSIDE ULTRASOUND:   Performed by ED Physician - none    LABS:  Labs Reviewed   CBC WITH AUTO DIFFERENTIAL - Abnormal; Notable for the following components:       Result Value    MCHC 31.4 (*)     Neutrophils % 70.5 (*)     Lymphocytes % 15.2 (*)     All other components within normal limits   COMPREHENSIVE METABOLIC PANEL W/ REFLEX TO MG FOR LOW K - Abnormal; Notable for the following components:    Glucose 147 (*)     BUN 25 (*)     CREATININE 1.9 (*)     GFR Non- 36 (*)     GFR  43 (*)     All other components within normal limits   BRAIN NATRIURETIC PEPTIDE - Abnormal; Notable for the following components:    Pro-BNP 1,333 (*)     All other components within normal limits   BLOOD GAS, ARTERIAL - Abnormal; Notable for the following components:    pO2, Arterial 52.0 (*)     HCO3, Arterial 29.9 (*)     Base Excess, Arterial 4.8 (*)     O2 Sat, Arterial 88.0 (*)     All other components within normal limits    Narrative:     CALL  [facility] tel. ,  Dr. Ofelia Baker.  , 10/18/2020 06:02, by Tamia Crook   LIPID PANEL - Abnormal; Notable for the following components:    Cholesterol, Total 104 (*)     HDL 44 (*)     All other components within normal limits   HEMOGLOBIN A1C - Abnormal; Notable for the following components:    Hemoglobin A1C 6.8 (*)     All other components within normal limits   POCT GLUCOSE - Abnormal; Notable for the following components:    POC Glucose 138 (*)     All other components within normal limits   POCT GLUCOSE - Abnormal; Notable for the following components:    POC Glucose 120 (*)     All other components within normal limits   POCT GLUCOSE - Abnormal; Notable for the following components:    POC Glucose 117 (*)     All other components within normal limits   RESPIRATORY PANEL, MOLECULAR, WITH COVID-19   TROPONIN   POTASSIUM, WHOLE BLOOD   MAGNESIUM   PHOSPHORUS   TSH WITH REFLEX TO FT4   IRON AND TIBC    Narrative:     Collection has been rescheduled by Amilcar Molina at 10/18/2020 09:18 Reason:   Collected   FERRITIN    Narrative:     Collection has been rescheduled by Amilcar Molina at 10/18/2020 09:18 Reason:   Collected   BASIC METABOLIC PANEL   MAGNESIUM   CBC WITH AUTO DIFFERENTIAL   POCT GLUCOSE   POCT GLUCOSE   POCT GLUCOSE   POCT GLUCOSE       All other labs were within normal range or not returned as of this dictation. EMERGENCY DEPARTMENT COURSE and DIFFERENTIAL DIAGNOSIS/MDM:   Vitals:    Vitals:    10/18/20 0845 10/18/20 0857 10/18/20 1450 10/18/20 1740   BP: (!) 170/88  (!) 160/84 (!) 176/86   Pulse: 55  60 65   Resp: 18  18 16   Temp: 97.8 °F (36.6 °C)  97.5 °F (36.4 °C) 98.4 °F (36.9 °C)   TempSrc: Temporal  Temporal Temporal   SpO2: 95%  91% 90%   Weight:  240 lb 3.2 oz (109 kg)     Height:  5' 11\" (1.803 m)             MDM  Number of Diagnoses or Management Options  Acute congestive heart failure, unspecified heart failure type [facility]): Hypoxia:   Diagnosis management comments: Discussed with Dr. Miguel Angel Garcia- admit. CRITICAL CARE TIME   Total Critical Care time was 0 minutes, excluding separately reportable procedures. There was a high probability of clinically significant/lifethreatening deterioration in the patient's condition which required my urgent intervention.       CONSULTS:  IP CONSULT TO HEART FAILURE NURSE/COORDINATOR  IP CONSULT TO DIETITIAN  IP CONSULT TO CARDIOLOGY    PROCEDURES:  Unless otherwise noted below, none     Procedures    FINAL IMPRESSION      1. Acute congestive heart failure, unspecified heart failure type ([address]    2. Hypoxia          DISPOSITION/PLAN   DISPOSITION        PATIENT REFERRED TO:  No follow-up provider specified.     DISCHARGE MEDICATIONS:  Current Discharge Medication List             (Please note that portions of this note were completed with a voice recognition program.  Efforts were made to edit the dictations but occasionally words are mis-transcribed.)    Rojelio Jernigan MD (electronically signed)  Attending Emergency Physician          Rojelio Jernigan MD  10/18/20 2368

## 2020-10-18 NOTE — PROGRESS NOTES
pH, Arterial  7.430  7.350 - 7.450  Final  10/18/2020  6:02 AM  [facility]    pCO2, Arterial  45.0  35.0 - 45.0 mmHg  Final  10/18/2020  6:02 AM  [facility]    pO2, Arterial  52. 0Low    80.0 - 100.0 mmHg  Final  10/18/2020  6:02 AM  [facility]    HCO3, Arterial  29. 9High    22.0 - 26.0 mmol/L  Final  10/18/2020  6:02 AM  [facility] Excess, Arterial  4.8High    -2.0 - 2.0 mmol/L  Final  10/18/2020  6:02 AM  [facility]    Hemoglobin, Art, Extended  14.3  14.0 - 18.0 g/dL  Final  10/18/2020  6:02 AM  [facility]    O2 Sat, Arterial  88. 0Low Panic    >92 %  Final  10/18/2020  6:02 AM  [facility]    Carboxyhgb, Arterial  2.4  0.0 - 5.0 %  Final  10/18/2020  6:02 AM  [facility]         0.0-1.5   (Smokers 1.5-5.0)    Methemoglobin, Arterial  1.0  <1.5 %  Final  10/18/2020  6:02 AM  [facility]    O2 Content, Arterial  17.7  Not Established mL/dL  Final  10/18/2020  6:02 AM  [facility]      Patient on room air. ABG's drawn from RR, AT+.

## 2020-10-18 NOTE — PLAN OF CARE
Problem: Pain:  Description: Pain management should include both nonpharmacologic and pharmacologic interventions.   Goal: Pain level will decrease  Outcome: Ongoing  Goal: Control of acute pain  Outcome: Ongoing  Goal: Control of chronic pain  Outcome: Ongoing  Goal: Patient's pain/discomfort is manageable  Outcome: Ongoing     Problem: Falls - Risk of:  Goal: Will remain free from falls  Outcome: Ongoing  Goal: Absence of physical injury  Outcome: Ongoing     Problem: Infection:  Goal: Will remain free from infection  Outcome: Ongoing     Problem: Safety:  Goal: Free from accidental physical injury  Outcome: Ongoing  Goal: Free from intentional harm  Outcome: Ongoing     Problem: Daily Care:  Goal: Daily care needs are met  Outcome: Ongoing     Problem: Skin Integrity:  Goal: Skin integrity will stabilize  Outcome: Ongoing     Problem: Discharge Planning:  Goal: Patients continuum of care needs are met  Outcome: Ongoing     Problem: Fluid Volume - Imbalance:  Goal: Absence of imbalanced fluid volume signs and symptoms  Outcome: Ongoing

## 2020-10-18 NOTE — PROGRESS NOTES
Sarahi Minaya arrived to room # [phone]. Presented with: CHF  Mental Status: Patient is oriented, alert, coherent, logical, thought processes intact and able to concentrate and follow conversation. Vitals:    10/18/20 0845   BP: (!) 170/88   Pulse: 55   Resp: 18   Temp: 97.8 °F (36.6 °C)   SpO2: 95%     Patient safety contract and falls prevention contract reviewed with patient Yes. Oriented Patient to room. Call light within reach. Yes.   Needs, issues or concerns expressed at this time: no.      Electronically signed by Erin Quinones RN on 10/18/2020 at 12:31 PM

## 2020-10-18 NOTE — H&P
Hospital Medicine  History and Physical    Patient:  Jacob Barron  MRN: [id number]    CHIEF COMPLAINT:  orthopnea    History Obtained From: patient, chart    PCP: Katie Marquis MD    HISTORY OF PRESENT ILLNESS:  60year old white male presents to the emergency department with complaint of orthopnea and dyspnea on exertion progressing for a few weeks since his bumetanide dose was decreased from 1 mg BID to 1 mg qAM due to his chronic kidney disease. He was instructed to check his weight and seek care if he developed edema or gained more than 5 pounds. He recently discovered a 10 pound weight gain and noticed edema of the lower extremities. He chose to wait to come to the hospital as yesterday was his anniversary. He complains of decreased exercise intolerance due to dyspnea on exertion, unable to walk a block without resting. He took an extra bumetanide tablet on the advice of his PCP, no other treatment prior to presentation. No exacerbating factors. Denies chest pain, palpitations, fever, chills, cough, or sick contacts. He was given bumetanide in the ED and has urinated a large amount but remains symptomatic. REVIEW OF SYSTEMS:  14 systems reviewed and negative except as noted above.     Past Medical History:      Diagnosis Date    Acute kidney failure, unspecified ([address]     Anxiety state, unspecified     Atrial septal aneurysm 1/9/2016    Blood circulation, collateral     Body mass index 30.0-30.9, adult     CAD (coronary artery disease) 1/10/2016    1/9/16  lexiscan  Positive for apical MI + myocardial ischemia, EF 42%, intermediate risk findings, AUC indication 16, AUC score 7 1/10/16  Cath  3 lesions in the LAD:  Mid: 70% 2.25x23, mid 90% 2.25 x 28, distal 100% 2.0x28, anterior lateral apical hypokinesis, EF 45%    Calculus of kidney     Carotid artery stenosis 6/26/2013    Cellulitis and abscess of hand, except fingers and thumb     Cerebral artery occlusion with cerebral infarction ([address] 15 years ago    Chronic airway obstruction, not elsewhere classified     Chronic kidney disease, unspecified     Congestive heart failure, unspecified     Diabetes mellitus type II     Diverticulosis of colon (without mention of hemorrhage)     Encounter for long-term (current) use of insulin (HCC)     Esophageal reflux     Family history of ischemic heart disease     Gastric ulcer, unspecified as acute or chronic, without mention of hemorrhage, perforation, or obstruction     Hyperlipemia     Hypertension     Internal hemorrhoids without mention of complication     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, or unspecified(403.00)     Obesity, unspecified     Other specified cardiac dysrhythmias(427.89)     Paroxysmal atrial fibrillation (HCC)     Peripheral vascular disease ([facility]     Solitary pulmonary nodule     Surgical or other procedure not carried out because of contraindication     Thoracic aneurysm without mention of rupture     Tobacco use disorder     Type II or unspecified type diabetes mellitus without mention of complication, not stated as uncontrolled        Past Surgical History:      Procedure Laterality Date    CARDIAC CATHETERIZATION      CHOLECYSTECTOMY      CORONARY ANGIOPLASTY WITH STENT PLACEMENT  1-16    2 JACQUELINE to LAD    DIAGNOSTIC CARDIAC CATH LAB PROCEDURE      URETER STENT PLACEMENT      VASCULAR SURGERY  03- [facility]    Aortogram with bilateral selective renal artery injections    VASCULAR SURGERY  6/14/13 [facility]    Right carotid endarterectomy with Vascu-Guard patch repair. Right cervical carotid arteriograms after endarterectomy.  VASCULAR SURGERY  7/6/15 [facility]    Percutaneous cannulation, right common femoral artery, with a5 Armenian sheath and later 6 Armenian [facility] sheath.  Suprarenal abdomianl aortagram.  Selective right renal arteriogram.  Right renal artery balloon angioplasty;/stent (Express 6 mm x 18mmballoon-expandable stent. . Completion suprarenal abdominal aortogram and selective right remal arteriogram. Mynx closure, right common femoral artery punctur       Medications Prior to Admission:    Prior to Admission medications    Medication Sig Start Date End Date Taking?  Authorizing Provider   bumetanide (BUMEX) 1 MG tablet Take 1 mg by mouth daily   Yes Historical Provider, MD   minoxidil (LONITEN) 2.5 MG tablet Take 1 tablet by mouth 2 times daily 8/24/20  Yes DWAYNE Parker   amiodarone (CORDARONE) 200 MG tablet Take 200 mg by mouth daily   Yes Historical Provider, MD   lisinopril (PRINIVIL;ZESTRIL) 40 MG tablet Take 20 mg by mouth daily    Yes Historical Provider, MD   carvedilol (COREG) 6.25 MG tablet Take 6.25 mg by mouth 2 times daily (with meals)   Yes Historical Provider, MD   apixaban (ELIQUIS) 5 MG TABS tablet Take by mouth 2 times daily   Yes Historical Provider, MD   montelukast (SINGULAIR) 10 MG tablet Take 10 mg by mouth daily   Yes Historical Provider, MD   levocetirizine (XYZAL) 5 MG tablet Take 5 mg by mouth nightly   Yes Historical Provider, MD   esomeprazole Magnesium (NEXIUM) 40 MG PACK Take 40 mg by mouth 2 times daily    Yes Historical Provider, MD   albuterol sulfate HFA (VENTOLIN HFA) 108 (90 Base) MCG/ACT inhaler Inhale 2 puffs into the lungs every 6 hours as needed for Wheezing 1/1/18  Yes Compa Rebolledo MD   rosuvastatin (CRESTOR) 20 MG tablet Take 20 mg by mouth daily   Yes Historical Provider, MD   amLODIPine (NORVASC) 10 MG tablet Take 1 tablet by mouth daily 1/11/16  Yes DWAYNE Walsh   isosorbide mononitrate (IMDUR) 60 MG CR tablet Take 60 mg by mouth 2 times daily   Yes Historical Provider, MD   LANTUS SOLOSTAR 100 UNIT/ML injection pen INJECT 30 UNITS IN THE MORNING AND 20 UNITS IN THE EVENING DAILY  Patient taking differently: 2 times daily Takes 30 units in AM and 20 units in PM 7/1/14  Yes DWAYNE Bishop - CNP   potassium chloride SA (K-DUR;KLOR-CON M) 10 MEQ tablet TAKE ONE TABLET BY MOUTH EVERY DAY  Patient taking differently: 10 mEq 3 times daily  4/15/14  Yes Renata Smith MD   clopidogrel (PLAVIX) 75 MG tablet Take 1 tablet by mouth daily. 12/9/13  Yes Renata Smith MD   NOVOLOG FLEXPEN 100 UNIT/ML injection pen  6/1/20   Historical Provider, MD   amiodarone (CORDARONE) 200 MG tablet Take 1 tablet by mouth daily 2/20/20 5/8/20  Eddy eVga MD   lisinopril (PRINIVIL;ZESTRIL) 40 MG tablet Take 1 tablet by mouth daily  Patient taking differently: Take 40 mg by mouth 2 times daily  2/20/20 7/10/20  Eddy Vega MD   carvedilol (COREG) 6.25 MG tablet Take 1 tablet by mouth 2 times daily 2/19/20 5/8/20  Eddy Vega MD   ULTICARE MINI PEN NEEDLES 31G X 6 MM MISC FOR USE WITH LANTUS TWO TIMES A DAY 7/11/14   DWAYNE Andrea - CNP       Allergies:  Hydralazine and Morphine    Social History:   TOBACCO:   reports that he has quit smoking. He smoked 0.25 packs per day. He has never used smokeless tobacco.  ETOH:   reports no history of alcohol use.     Family History:       Problem Relation Age of Onset    High Blood Pressure Father     Cancer Father     High Blood Pressure Mother     High Blood Pressure Brother            Physical Exam:    Vitals: BP (!) 170/88   Pulse 55   Temp 97.8 °F (36.6 °C) (Temporal)   Resp 18   Ht 5' 11\" (1.803 m)   Wt 240 lb 3.2 oz (109 kg)   SpO2 95%   BMI 33.50 kg/m²   24HR INTAKE/OUTPUT:  No intake or output data in the 24 hours ending 10/18/20 0930  General appearance: alert and cooperative with exam  HEENT: atraumatic, eyes with clear conjunctiva and normal lids, pupils and irises normal, external ears and nose are normal, lips normal. Neck without masses, lympadenopathy, bruit, thyroid normal  Lungs: no increased work of breathing, diminished breath sounds bibasilar  Heart: regular rate and rhythm, S1, S2 normal, no murmur, click, rub or gallop  Abdomen: soft, non-tender; bowel sounds normal; no masses,  no organomegaly and obese  Extremities: edema 1+ bilaterally, modified Dona's negative  Neurologic: No focal neurologic deficits, normal sensation, alert and oriented, affect and mood appropriate. Skin: no rashes, nodules. CBC:   Recent Labs     10/18/20  0516   WBC 8.5   HGB 14.4        BMP:    Recent Labs     10/18/20  0516 10/18/20  0602     --    K 4.1 3.6     --    CO2 29  --    BUN 25*  --    CREATININE 1.9*  --    GLUCOSE 147*  --      Hepatic:   Recent Labs     10/18/20  0516   AST 11   ALT 13   BILITOT 0.7   ALKPHOS 76     Troponin T:   Recent Labs     10/18/20  0516   TROPONINI 0.02     ABGs: No results for input(s): PH, PCO2, PO2, HCO3, BE, O2SAT in the last 72 hours. INR: No results for input(s): INR in the last 72 hours. Lactic Acid: No results for input(s): LACTA in the last 72 hours. -----------------------------------------------------------------  PA/lat CXR: pulmonary vascular congestion/edema    EKG: NSR    Assessment and Plan   Principal Problem:    Systolic and diastolic CHF, acute on chronic (HCC)  Active Problems:    CAD (coronary artery disease)    DM (diabetes mellitus) ([facility])    Stage 3 chronic kidney disease    Obesity (BMI 30-39. 9)  Resolved Problems:    * No resolved hospital problems. *      Admit to PCU. Bumetanide 1 mg IV BID. Cardiology consult, appreciate assistance. Follow renal function closely but it seems to have been stable for some time. Home medications reconciled. Insulin per scale.     Romi November, DO  Admitting Hospitalist

## 2020-10-18 NOTE — CONSULTS
[facility]  Cardiology Consult      Requesting MD:  Lashell Parkinson MD   Admit Status:  Inpatient [101]       History obtained from:   [] Patient  [] Other (specify):     Patient:  Freddie Rojo  [id number]     Chief Complaint:   Chief Complaint   Patient presents with    Shortness of Breath    Other     pt c/o fluid overload, states water pills were recently changed         HPI:   60year old white male presents to the emergency department with complaint of orthopnea and dyspnea on exertion progressing for a few weeks since his bumetanide dose was decreased from 1 mg BID to 1 mg qAM due to his chronic kidney disease. He was instructed to check his weight and seek care if he developed edema or gained more than 5 pounds. He recently discovered a 10 pound weight gain and noticed edema of the lower extremities. LAST EF 45%  Pt seen by dr Cindy Jasso in past.      PRIOR CARDIAC WORK UP:    1. Coronary artery disease     1/9/16  lexiscan  Positive for apical MI + myocardial ischemia, EF 42%, intermediate risk findings, AUC indication 16, AUC score 7  1/10/16  Cath  3 lesions in the LAD:  Mid: 70% 2.25x23, mid 90% 2.25 x 28, distal 100% 2.0x28, anterior lateral apical hypokinesis  2/14/20  lexiscan Positive for apical MI with minimal  myocardial ischemia, EF 36%, -1% ischemic myocardium on stress, AUC indication 21, AUC score 7   2/14/2020  Echo  EF 45%  2/24/20  Cath  100% distal LAD at the stent, o/w luminals, slight apical and mild diaphragmatic hypokinesis, EF 45%     2. Systemic arterial hypertension  3. Hypercholesteremia  4. Diabetes mellitus  5. Sinoatrial node dysfunction    Review of Systems:  Review of Systems   Constitutional: Negative. HENT: Negative. Eyes: Negative. Respiratory: Positive for shortness of breath. Cardiovascular: Positive for leg swelling. Gastrointestinal: Negative. Endocrine: Negative. Genitourinary: Negative. Musculoskeletal: Negative. Allergic/Immunologic: Negative. Neurological: Negative. Hematological: Negative. Psychiatric/Behavioral: Negative. All other systems reviewed and are negative.       Cardiac Specific Data:  Specialty Problems        Cardiology Problems    CAD (coronary artery disease)        HTN (hypertension)        Aneurysm of thoracic aorta (HCC)        Renal artery stenosis (HCC)        Carotid artery stenosis        Peripheral vascular disease (HCC)        Atrial septal aneurysm        Malignant hypertension        Apical myocardial infarction (HCC)        Acute on chronic diastolic CHF (congestive heart failure) (HCC)        Chronic congestive heart failure (HCC)        PAF (paroxysmal atrial fibrillation) ([facility])        Left ventricular dysfunction        Mixed hyperlipidemia        Chronic combined systolic and diastolic congestive heart failure (HCC)        * (Principal) Systolic and diastolic CHF, acute on chronic (HCC)              Past Medical History:  Past Medical History:   Diagnosis Date    Acute kidney failure, unspecified (HCC)     Anxiety state, unspecified     Atrial septal aneurysm 1/9/2016    Blood circulation, collateral     Body mass index 30.0-30.9, adult     CAD (coronary artery disease) 1/10/2016    1/9/16  lexiscan  Positive for apical MI + myocardial ischemia, EF 42%, intermediate risk findings, AUC indication 16, AUC score 7 1/10/16  Cath  3 lesions in the LAD:  Mid: 70% 2.25x23, mid 90% 2.25 x 28, distal 100% 2.0x28, anterior lateral apical hypokinesis, EF 45%    Calculus of kidney     Carotid artery stenosis 6/26/2013    Cellulitis and abscess of hand, except fingers and thumb     Cerebral artery occlusion with cerebral infarction ([facility] [address]     15 years ago    Chronic airway obstruction, not elsewhere classified     Chronic kidney disease, unspecified     Congestive heart failure, unspecified     Diabetes mellitus type II     Diverticulosis of colon (without mention of hemorrhage)     Encounter for long-term (current) use of insulin ([facility] [address]     Esophageal reflux     Family history of ischemic heart disease     Gastric ulcer, unspecified as acute or chronic, without mention of hemorrhage, perforation, or obstruction     Hyperlipemia     Hypertension     Internal hemorrhoids without mention of complication     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, or unspecified(403.00)     Obesity, unspecified     Other specified cardiac dysrhythmias(427.89)     Paroxysmal atrial fibrillation (HCC)     Peripheral vascular disease ([address]     Solitary pulmonary nodule     Surgical or other procedure not carried out because of contraindication     Thoracic aneurysm without mention of rupture     Tobacco use disorder     Type II or unspecified type diabetes mellitus without mention of complication, not stated as uncontrolled         Past Surgical History:  Past Surgical History:   Procedure Laterality Date    CARDIAC CATHETERIZATION      CHOLECYSTECTOMY      CORONARY ANGIOPLASTY WITH STENT PLACEMENT  1-16    2 JACQUELINE to LAD    DIAGNOSTIC CARDIAC CATH LAB PROCEDURE      URETER STENT PLACEMENT      VASCULAR SURGERY  03- [facility]    Aortogram with bilateral selective renal artery injections    VASCULAR SURGERY  6/14/13 [facility]    Right carotid endarterectomy with Vascu-Guard patch repair. Right cervical carotid arteriograms after endarterectomy.  VASCULAR SURGERY  7/6/15 [facility]    Percutaneous cannulation, right common femoral artery, with a5 Vietnamese sheath and later 6 Vietnamese [facility] sheath. Suprarenal abdomianl aortagram.  Selective right renal arteriogram.  Right renal artery balloon angioplasty;/stent (Express 6 mm x 18mmballoon-expandable stent. . Completion suprarenal abdominal aortogram and selective right remal arteriogram. Mynx closure, right common femoral artery punctur       Past Family History:  Family History Problem Relation Age of Onset    High Blood Pressure Father     Cancer Father     High Blood Pressure Mother     High Blood Pressure Brother        Past Social History:  Social History     Socioeconomic History    Marital status:      Spouse name: Not on file    Number of children: Not on file    Years of education: Not on file    Highest education level: Not on file   Occupational History    Not on file   Social Needs    Financial resource strain: Not on file    Food insecurity     Worry: Not on file     Inability: Not on file    Transportation needs     Medical: Not on file     Non-medical: Not on file   Tobacco Use    Smoking status: Former Smoker     Packs/day: 0.25    Smokeless tobacco: Never Used   Substance and Sexual Activity    Alcohol use: No    Drug use: No    Sexual activity: Yes   Lifestyle    Physical activity     Days per week: Not on file     Minutes per session: Not on file    Stress: Not on file   Relationships    Social connections     Talks on phone: Not on file     Gets together: Not on file     Attends Latter day service: Not on file     Active member of club or organization: Not on file     Attends meetings of clubs or organizations: Not on file     Relationship status: Not on file    Intimate partner violence     Fear of current or ex partner: Not on file     Emotionally abused: Not on file     Physically abused: Not on file     Forced sexual activity: Not on file   Other Topics Concern    Not on file   Social History Narrative    Not on file       Allergies: Allergies   Allergen Reactions    Hydralazine     Morphine        Home Meds:  Prior to Admission medications    Medication Sig Start Date End Date Taking?  Authorizing Provider   bumetanide (BUMEX) 1 MG tablet Take 1 mg by mouth daily   Yes Historical Provider, MD   minoxidil (LONITEN) 2.5 MG tablet Take 1 tablet by mouth 2 times daily 8/24/20  Yes DWAYNE Leach   amiodarone (CORDARONE) 200 MG tablet Take 200 mg by mouth daily   Yes Historical Provider, MD   lisinopril (PRINIVIL;ZESTRIL) 40 MG tablet Take 20 mg by mouth daily    Yes Historical Provider, MD   carvedilol (COREG) 6.25 MG tablet Take 6.25 mg by mouth 2 times daily (with meals)   Yes Historical Provider, MD   apixaban (ELIQUIS) 5 MG TABS tablet Take by mouth 2 times daily   Yes Historical Provider, MD   montelukast (SINGULAIR) 10 MG tablet Take 10 mg by mouth daily   Yes Historical Provider, MD   levocetirizine (XYZAL) 5 MG tablet Take 5 mg by mouth nightly   Yes Historical Provider, MD   esomeprazole Magnesium (NEXIUM) 40 MG PACK Take 40 mg by mouth 2 times daily    Yes Historical Provider, MD   albuterol sulfate HFA (VENTOLIN HFA) 108 (90 Base) MCG/ACT inhaler Inhale 2 puffs into the lungs every 6 hours as needed for Wheezing 1/1/18  Yes Brent Duong MD   rosuvastatin (CRESTOR) 20 MG tablet Take 20 mg by mouth daily   Yes Historical Provider, MD   amLODIPine (NORVASC) 10 MG tablet Take 1 tablet by mouth daily 1/11/16  Yes Annetta Hodgkin, APRN   isosorbide mononitrate (IMDUR) 60 MG CR tablet Take 60 mg by mouth 2 times daily   Yes Historical Provider, MD   LANTUS SOLOSTAR 100 UNIT/ML injection pen INJECT 30 UNITS IN THE MORNING AND 20 UNITS IN THE EVENING DAILY  Patient taking differently: 2 times daily Takes 30 units in AM and 20 units in PM 7/1/14  Yes DWAYNE Krishnan - CNP   potassium chloride SA (K-DUR;KLOR-CON M) 10 MEQ tablet TAKE ONE TABLET BY MOUTH EVERY DAY  Patient taking differently: 10 mEq 3 times daily  4/15/14  Yes Silvana Morgan MD   clopidogrel (PLAVIX) 75 MG tablet Take 1 tablet by mouth daily.  12/9/13  Yes Silvana Morgan MD   NOVOLOG FLEXPEN 100 UNIT/ML injection pen  6/1/20   Historical Provider, MD   amiodarone (CORDARONE) 200 MG tablet Take 1 tablet by mouth daily 2/20/20 5/8/20  Drinda Romberg, MD   lisinopril (PRINIVIL;ZESTRIL) 40 MG tablet Take 1 tablet by mouth daily  Patient taking differently: Take 40 mg by mouth 2 times daily  2/20/20 7/10/20  Castro Alcantar MD   carvedilol (COREG) 6.25 MG tablet Take 1 tablet by mouth 2 times daily 2/19/20 5/8/20  Castro Alcantar MD   ULTICARE MINI PEN NEEDLES 31G X 6 MM MISC FOR USE WITH LANTUS TWO TIMES A DAY 7/11/14   Lizette Batres, APRN - CNP       Current Meds:   amiodarone  200 mg Oral Daily    amLODIPine  10 mg Oral Daily    apixaban  5 mg Oral BID    bumetanide  1 mg Intravenous BID    clopidogrel  75 mg Oral Daily    isosorbide mononitrate  60 mg Oral BID    insulin glargine  30 Units Subcutaneous QAM    insulin glargine  20 Units Subcutaneous Nightly    sodium chloride flush  10 mL Intravenous 2 times per day    lisinopril  40 mg Oral Daily    minoxidil  2.5 mg Oral BID    montelukast  10 mg Oral Nightly    rosuvastatin  40 mg Oral Nightly    insulin lispro  0-12 Units Subcutaneous TID WC    insulin lispro  0-6 Units Subcutaneous Nightly    pantoprazole  40 mg Oral Q12H    carvedilol  25 mg Oral BID       Current Infused Meds:      Physical Exam:  Vitals:    10/18/20 0845   BP: (!) 170/88   Pulse: 55   Resp: 18   Temp: 97.8 °F (36.6 °C)   SpO2: 95%     No intake or output data in the 24 hours ending 10/18/20 1036  Estimated body mass index is 33.5 kg/m² as calculated from the following:    Height as of this encounter: 5' 11\" (1.803 m). Weight as of this encounter: 240 lb 3.2 oz (109 kg). Physical Exam  Vitals signs reviewed. HENT:      Head: Normocephalic. Right Ear: Tympanic membrane normal.      Nose: Nose normal.   Eyes:      Pupils: Pupils are equal, round, and reactive to light. Neck:      Musculoskeletal: Normal range of motion. Cardiovascular:      Rate and Rhythm: Normal rate and regular rhythm. Pulses: Normal pulses. Heart sounds: Normal heart sounds. Pulmonary:      Effort: Pulmonary effort is normal.      Breath sounds: Normal breath sounds. Abdominal:      General: Abdomen is flat. Musculoskeletal: Normal range of motion. Skin:     General: Skin is warm. Capillary Refill: Capillary refill takes less than 2 seconds. Neurological:      General: No focal deficit present. Mental Status: He is alert. Labs:  Recent Labs     10/18/20  0516   WBC 8.5   HGB 14.4          Recent Labs     10/18/20  0333 10/18/20  0516 10/18/20  0602   NA  --  144  --    K  --  4.1 3.6   CL  --  106  --    CO2  --  29  --    BUN  --  25*  --    CREATININE  --  1.9*  --    LABGLOM  --  36*  --    MG 2.3  --   --    CALCIUM  --  9.1  --    PHOS 3.8  --   --        CK, CKMB, Troponin: @LABRCNT (CKTOTAL:3, CKMB:3, TROPONINI:3)@    Last 3 BNP:  No results for input(s): BNP in the last 72 hours. IMAGING:  Xr Chest Portable    Result Date: 10/18/2020  EXAMINATION: XR CHEST PORTABLE 10/18/2020 6:44 AM HISTORY: Dyspnea and cough COMPARISON: 5/8/2020 FINDINGS: The heart is felt to be mildly enlarged. Aorta is tortuous with atherosclerotic calcifications. The pulmonary vasculature is indistinct. Diffuse increased interstitial pulmonary opacities are noted bilaterally. There is no focal consolidation. No pleural effusion is identified. Cardiomegaly with findings suggestive of pulmonary vascular congestion and pulmonary edema. A preliminary report was provided by stat rad. Signed by Dr Doni Jesus on 10/18/2020 6:46 AM    Us Renal Arteries Duplex    Result Date: 10/7/2020  EXAM: US RENAL ARTERIES DUPLEX -- 10/7/2020 7:14 AM HISTORY: 60 years, Male, N18.30, stage III chronic kidney disease COMPARISON: No existing relevant imaging studies available TECHNIQUE: Grayscale, spectral and color Doppler ultrasound images obtained. Representative images saved to PACS. FINDINGS: Aorta: Peak systolic velocity: 69.0 cm/sec. No aneurysm demonstrated. RIGHT KIDNEY: Measures 10.5 x 4.8 x 4.1 cm. Increased renal cortical echogenicity. Normal cortical thickness. No hydronephrosis demonstrated.  RIGHT RENAL DOPPLER: Right renal artery maximum peak systolic velocity: 22.9 cm/sec at the origin, RI 0.70. Right Renal aortic ratio: 1.21 Right arcuate/intrarenal artery: 0.20 cm/sec, RI 0.72. No tardus parvus waveform. LEFT KIDNEY: Measures 11.8 x 5.3 x 4.8 cm. There is an indeterminate 1.5 cm exophytic cortical lesion, not definitely a cyst. A corollary lesion is not definitely appreciated on noncontrast CT 10/5/2015. LEFT RENAL DOPPLER: Left renal artery maximum Peak systolic velocity: 00.5 cm/sec, RI 0.71. Left Renal aortic ratio: 0.72 Left arcuate/intrarenal artery: 16.2 cm/sec, RI 0.73. No tardus parvus waveform. Urinary Bladder: Within normal limits. 1. Left kidney with a 1.7 cm exophytic hypoechoic lesion, not definitely a cyst. Renal cell carcinoma not excluded. If patient's renal function permits, recommend CT abdomen without and with contrast (renal protocol) for further evaluation. If renal function, follow-up ultrasound could be considered or comparison with outside prior to determine chronicity. 2. Increased renal echogenicity bilaterally, most commonly due to medical renal disease. 3. Patent renal vasculature without evidence of stenosis. Result and recommendation communicated by Epic message to DWAYNE Beck at 2:17 PM on 10/7/2020. Signed by Dr Sole Chan on 10/7/2020 2:19 PM      Assessment:  1. A/c on c/c CHF  2. Recent decrease in bumex. 3. CAD occluded distal LAD 2020 cath. 4. DM  5. C/c renal insufficiency  6. NO renal artery stenosis by doppler        Recommendations:    Extra iv bumex todat  Fu cardiology   SAMMY work up. Weight loss      D/w family    Pt to see dr Alphonza Phalen as Op.

## 2020-10-18 NOTE — ED NOTES
Patient placed on cardiac monitor, continuous pulse oximeter, and NIBP monitor. Monitor alarms on.        Karan Carrion RN  10/18/20 9219

## 2020-10-18 NOTE — PROGRESS NOTES
4 Eyes Skin Assessment    Malu Slade is being assessed upon: Admission    I agree that Fide Michael, along with *** (either 2 RN's or 1 LPN and 1 RN) have performed a thorough Head to Toe Skin Assessment on the patient. ALL assessment sites listed below have been assessed. Areas assessed by both nurses:     [x]   Head, Face, and Ears   [x]   Shoulders, Back, and Chest  [x]   Arms, Elbows, and Hands   [x]   Coccyx, Sacrum, and Ischium  [x]   Legs, Feet, and Heels    Does the Patient have Skin Breakdown?  No    Jose C Prevention initiated: No  Wound Care Orders initiated: NA    [facility] nurse consulted for Pressure Injury (Stage 3,4, Unstageable, DTI, NWPT, and Complex wounds) and New or Established Ostomies: NA        Primary Nurse eSignature: Jadon Velazquez RN on 10/18/2020 at 12:32 PM      Co-Signer eSignature: {Esignature:[id number]}

## 2020-10-18 NOTE — SIGNIFICANT EVENT
EP Sign Out:  Shortness of breath , progressive x 3 days  Cardiomegaly with no effusion  vascular congestion, no pulmonary edema  Lowest SpO2 was 88% in ED, PO2 55  97% on 2LPM  -1,333  Not on home O2  No crackles in lungs      Preliminary Assessments & Plans: (ordered)    CHF Exacerbation so needs ACS R/O as well as one possible cause:  Tele  Admit to cardiac walker as OBSERVATION status patient  Cardio consult in AM  Trend TnI  Mag, Phos, TSH, Lipid Panel, HbA1c - will run as add ons to ED labs if able  CBC and BMP with Reflex for following AM  ASA as per protocol (324-325mg chewed STAT unless on 81ASA daily in which case 162mg chewed STAT if not yet given, THEN from following AM 81mg Daily.)  Statin as per protocol (High intensity Statin, if already on high intensity Stain of Simvasttain 80 continue it, if Lipitor 40+ then Lipitor 80 (if less than 40 just double so long as >=40), if Rosuvastatin 20mg+ then Rosuvastatin 40mg PO QHS (if less than 20 just double so long as >=20mg)  NG SL PRN CP  TTE in AM to be deferred to cardio  Bumex 2mg push IV in ED, 1mg IV BID  From after  Intakes and Outputs  Lisinopril 40mg PO QDay as per home  Continue plavix as per home    Chronic Medical Problems:  Continue current Regimen as indicated  Will use ISS in house along with LA insulins    DVT PPx: on [facility] with NOAC

## 2020-10-19 LAB
ANION GAP SERPL CALCULATED.3IONS-SCNC: 9 MMOL/L (ref 7–19)
BASOPHILS ABSOLUTE: 0.1 K/UL (ref 0–0.2)
BASOPHILS RELATIVE PERCENT: 0.7 % (ref 0–1)
BUN BLDV-MCNC: 20 MG/DL (ref 8–23)
CALCIUM SERPL-MCNC: 9 MG/DL (ref 8.8–10.2)
CHLORIDE BLD-SCNC: 102 MMOL/L (ref 98–111)
CO2: 30 MMOL/L (ref 22–29)
CREAT SERPL-MCNC: 1.9 MG/DL (ref 0.5–1.2)
EOSINOPHILS ABSOLUTE: 0.4 K/UL (ref 0–0.6)
EOSINOPHILS RELATIVE PERCENT: 5.3 % (ref 0–5)
GFR AFRICAN AMERICAN: 43
GFR NON-AFRICAN AMERICAN: 36
GLUCOSE BLD-MCNC: 110 MG/DL (ref 74–109)
GLUCOSE BLD-MCNC: 116 MG/DL (ref 70–99)
GLUCOSE BLD-MCNC: 146 MG/DL (ref 70–99)
GLUCOSE BLD-MCNC: 162 MG/DL (ref 70–99)
GLUCOSE BLD-MCNC: 229 MG/DL (ref 70–99)
HCT VFR BLD CALC: 43.2 % (ref 42–52)
HEMOGLOBIN: 13.4 G/DL (ref 14–18)
IMMATURE GRANULOCYTES #: 0 K/UL
LYMPHOCYTES ABSOLUTE: 1.5 K/UL (ref 1.1–4.5)
LYMPHOCYTES RELATIVE PERCENT: 19.8 % (ref 20–40)
MAGNESIUM: 2.1 MG/DL (ref 1.6–2.4)
MCH RBC QN AUTO: 29.5 PG (ref 27–31)
MCHC RBC AUTO-ENTMCNC: 31 G/DL (ref 33–37)
MCV RBC AUTO: 95.2 FL (ref 80–94)
MONOCYTES ABSOLUTE: 0.7 K/UL (ref 0–0.9)
MONOCYTES RELATIVE PERCENT: 9 % (ref 0–10)
NEUTROPHILS ABSOLUTE: 4.9 K/UL (ref 1.5–7.5)
NEUTROPHILS RELATIVE PERCENT: 65.1 % (ref 50–65)
PDW BLD-RTO: 13.8 % (ref 11.5–14.5)
PERFORMED ON: ABNORMAL
PLATELET # BLD: 160 K/UL (ref 130–400)
PMV BLD AUTO: 11.3 FL (ref 9.4–12.4)
POTASSIUM SERPL-SCNC: 3.2 MMOL/L (ref 3.5–5)
RBC # BLD: 4.54 M/UL (ref 4.7–6.1)
SODIUM BLD-SCNC: 141 MMOL/L (ref 136–145)
WBC # BLD: 7.6 K/UL (ref 4.8–10.8)

## 2020-10-19 PROCEDURE — 85025 COMPLETE CBC W/AUTO DIFF WBC: CPT

## 2020-10-19 PROCEDURE — 2500000003 HC RX 250 WO HCPCS: Performed by: HOSPITALIST

## 2020-10-19 PROCEDURE — 80048 BASIC METABOLIC PNL TOTAL CA: CPT

## 2020-10-19 PROCEDURE — 6370000000 HC RX 637 (ALT 250 FOR IP): Performed by: HOSPITALIST

## 2020-10-19 PROCEDURE — 99232 SBSQ HOSP IP/OBS MODERATE 35: CPT | Performed by: INTERNAL MEDICINE

## 2020-10-19 PROCEDURE — 2140000000 HC CCU INTERMEDIATE R&B

## 2020-10-19 PROCEDURE — 83735 ASSAY OF MAGNESIUM: CPT

## 2020-10-19 PROCEDURE — 36415 COLL VENOUS BLD VENIPUNCTURE: CPT

## 2020-10-19 PROCEDURE — 94761 N-INVAS EAR/PLS OXIMETRY MLT: CPT

## 2020-10-19 PROCEDURE — 82947 ASSAY GLUCOSE BLOOD QUANT: CPT

## 2020-10-19 PROCEDURE — 6370000000 HC RX 637 (ALT 250 FOR IP): Performed by: INTERNAL MEDICINE

## 2020-10-19 PROCEDURE — 2580000003 HC RX 258: Performed by: HOSPITALIST

## 2020-10-19 RX ORDER — POTASSIUM CHLORIDE 7.45 MG/ML
10 INJECTION INTRAVENOUS PRN
Status: DISCONTINUED | OUTPATIENT
Start: 2020-10-19 | End: 2020-10-20 | Stop reason: HOSPADM

## 2020-10-19 RX ORDER — POTASSIUM CHLORIDE 20 MEQ/1
40 TABLET, EXTENDED RELEASE ORAL PRN
Status: DISCONTINUED | OUTPATIENT
Start: 2020-10-19 | End: 2020-10-20 | Stop reason: HOSPADM

## 2020-10-19 RX ORDER — MAGNESIUM SULFATE 1 G/100ML
1 INJECTION INTRAVENOUS PRN
Status: DISCONTINUED | OUTPATIENT
Start: 2020-10-19 | End: 2020-10-20 | Stop reason: HOSPADM

## 2020-10-19 RX ORDER — SODIUM CHLORIDE 0.9 % (FLUSH) 0.9 %
10 SYRINGE (ML) INJECTION PRN
Status: ACTIVE | OUTPATIENT
Start: 2020-10-19 | End: 2020-10-20

## 2020-10-19 RX ADMIN — MONTELUKAST SODIUM 10 MG: 10 TABLET, FILM COATED ORAL at 20:13

## 2020-10-19 RX ADMIN — CARVEDILOL 25 MG: 25 TABLET, FILM COATED ORAL at 20:13

## 2020-10-19 RX ADMIN — MINOXIDIL 2.5 MG: 2.5 TABLET ORAL at 20:13

## 2020-10-19 RX ADMIN — PANTOPRAZOLE SODIUM 40 MG: 40 TABLET, DELAYED RELEASE ORAL at 08:30

## 2020-10-19 RX ADMIN — SODIUM CHLORIDE, PRESERVATIVE FREE 10 ML: 5 INJECTION INTRAVENOUS at 08:30

## 2020-10-19 RX ADMIN — AMIODARONE HYDROCHLORIDE 200 MG: 200 TABLET ORAL at 08:30

## 2020-10-19 RX ADMIN — ISOSORBIDE MONONITRATE 60 MG: 60 TABLET, EXTENDED RELEASE ORAL at 08:29

## 2020-10-19 RX ADMIN — ISOSORBIDE MONONITRATE 60 MG: 60 TABLET, EXTENDED RELEASE ORAL at 20:13

## 2020-10-19 RX ADMIN — ROSUVASTATIN CALCIUM 40 MG: 20 TABLET, FILM COATED ORAL at 20:13

## 2020-10-19 RX ADMIN — PANTOPRAZOLE SODIUM 40 MG: 40 TABLET, DELAYED RELEASE ORAL at 20:13

## 2020-10-19 RX ADMIN — MINOXIDIL 2.5 MG: 2.5 TABLET ORAL at 08:30

## 2020-10-19 RX ADMIN — Medication 20 UNITS: at 20:13

## 2020-10-19 RX ADMIN — APIXABAN 5 MG: 5 TABLET, FILM COATED ORAL at 20:13

## 2020-10-19 RX ADMIN — CLOPIDOGREL BISULFATE 75 MG: 75 TABLET ORAL at 08:29

## 2020-10-19 RX ADMIN — BUMETANIDE 1 MG: 0.25 INJECTION INTRAMUSCULAR; INTRAVENOUS at 20:23

## 2020-10-19 RX ADMIN — SODIUM CHLORIDE, PRESERVATIVE FREE 10 ML: 5 INJECTION INTRAVENOUS at 20:13

## 2020-10-19 RX ADMIN — AMLODIPINE BESYLATE 10 MG: 5 TABLET ORAL at 08:29

## 2020-10-19 RX ADMIN — BUMETANIDE 1 MG: 0.25 INJECTION INTRAMUSCULAR; INTRAVENOUS at 10:22

## 2020-10-19 RX ADMIN — INSULIN LISPRO 4 UNITS: 100 INJECTION, SOLUTION INTRAVENOUS; SUBCUTANEOUS at 17:58

## 2020-10-19 RX ADMIN — CARVEDILOL 25 MG: 25 TABLET, FILM COATED ORAL at 08:30

## 2020-10-19 RX ADMIN — LISINOPRIL 40 MG: 10 TABLET ORAL at 08:30

## 2020-10-19 RX ADMIN — APIXABAN 5 MG: 5 TABLET, FILM COATED ORAL at 08:29

## 2020-10-19 ASSESSMENT — PAIN SCALES - GENERAL
PAINLEVEL_OUTOF10: 0
PAINLEVEL_OUTOF10: 0

## 2020-10-19 NOTE — PROGRESS NOTES
Cardiology Daily Note Rich Shannon MD      Patient:  Juju Israel  [birth year]    Patient Active Problem List    Diagnosis Date Noted    Abnormal nuclear cardiac imaging test      Priority: High    CAD (coronary artery disease) 01/10/2016     Priority: High    Systolic and diastolic CHF, acute on chronic ([facility] 10/18/2020     Priority: Low    Obesity (BMI 30-39.9) 10/18/2020     Priority: Low    Chronic combined systolic and diastolic congestive heart failure ([facility] 08/24/2020     Priority: Low    On amiodarone therapy 08/24/2020     Priority: Low    At risk for obstructive sleep apnea 08/18/2020     Priority: Low    Mixed hyperlipidemia 07/09/2020     Priority: Low    Left ventricular dysfunction 07/09/2020     Priority: Low    Somnolence, daytime 07/09/2020     Priority: Low    Edema 07/09/2020     Priority: Low    Stage 3 chronic kidney disease      Priority: Low    PAF (paroxysmal atrial fibrillation) ([facility] [address] 02/12/2020     Priority: Low    Acute on chronic diastolic CHF (congestive heart failure) ([facility] [address] 01/11/2016     Priority: Low    Chronic congestive heart failure (HCC)      Priority: Low    Apical myocardial infarction ([facility] [address]      Priority: Low    Atrial septal aneurysm 01/09/2016     Priority: Low    Malignant hypertension      Priority: Low    Abnormal EKG      Priority: Low    Diabetes mellitus type I ([facility] [address] 01/07/2016     Priority: Low    Acute respiratory failure with hypoxia (HCC)      Priority: Low    Peripheral vascular disease (HCC)      Priority: Low    Carotid artery stenosis 06/26/2013     Priority: Low    Renal artery stenosis ([facility] [address] 03/08/2013     Priority: Low    [facility]neur[facility] 02/21/2013     Priority: Low    HTN (hypertension) 02/04/2013     Priority: Low    DM (diabetes mellitus) ([facility] 02/04/2013     Priority: Low    GERD (gastroesophageal reflux disease) 02/04/2013     Priority: Low       Admit Date:  10/18/2020    Admission Problem List: Present on DWAYNE Franklin   amiodarone (CORDARONE) 200 MG tablet Take 200 mg by mouth daily   Yes Historical Provider, MD   lisinopril (PRINIVIL;ZESTRIL) 40 MG tablet Take 20 mg by mouth daily    Yes Historical Provider, MD   carvedilol (COREG) 6.25 MG tablet Take 6.25 mg by mouth 2 times daily (with meals)   Yes Historical Provider, MD   apixaban (ELIQUIS) 5 MG TABS tablet Take by mouth 2 times daily   Yes Historical Provider, MD   montelukast (SINGULAIR) 10 MG tablet Take 10 mg by mouth daily   Yes Historical Provider, MD   levocetirizine (XYZAL) 5 MG tablet Take 5 mg by mouth nightly   Yes Historical Provider, MD   esomeprazole Magnesium (NEXIUM) 40 MG PACK Take 40 mg by mouth 2 times daily    Yes Historical Provider, MD   albuterol sulfate HFA (VENTOLIN HFA) 108 (90 Base) MCG/ACT inhaler Inhale 2 puffs into the lungs every 6 hours as needed for Wheezing 1/1/18  Yes Star Watson MD   rosuvastatin (CRESTOR) 20 MG tablet Take 20 mg by mouth daily   Yes Historical Provider, MD   amLODIPine (NORVASC) 10 MG tablet Take 1 tablet by mouth daily 1/11/16  Yes DWAYNE Chandler   isosorbide mononitrate (IMDUR) 60 MG CR tablet Take 60 mg by mouth 2 times daily   Yes Historical Provider, MD   LANTUS SOLOSTAR 100 UNIT/ML injection pen INJECT 30 UNITS IN THE MORNING AND 20 UNITS IN THE EVENING DAILY  Patient taking differently: 2 times daily Takes 30 units in AM and 20 units in PM 7/1/14  Yes DWAYNE Stanford - CNP   potassium chloride SA (K-DUR;KLOR-CON M) 10 MEQ tablet TAKE ONE TABLET BY MOUTH EVERY DAY  Patient taking differently: 10 mEq 3 times daily  4/15/14  Yes Attila Briones MD   clopidogrel (PLAVIX) 75 MG tablet Take 1 tablet by mouth daily.  12/9/13  Yes Attila Briones MD   NOVOLOG FLEXPEN 100 UNIT/ML injection pen  6/1/20   Historical Provider, MD   amiodarone (CORDARONE) 200 MG tablet Take 1 tablet by mouth daily 2/20/20 5/8/20  Niki Ingram MD   lisinopril (PRINIVIL;ZESTRIL) 40 MG tablet Take 1 tablet by mouth daily  Patient taking differently: Take 40 mg by mouth 2 times daily  2/20/20 7/10/20  Manuela Shine MD   carvedilol (COREG) 6.25 MG tablet Take 1 tablet by mouth 2 times daily 2/19/20 5/8/20  Manuela Shine MD   ULTICARE MINI PEN NEEDLES 31G X 6 MM MISC FOR USE WITH LANTUS TWO TIMES A DAY 7/11/14   DWAYNE Michele - JAMES        amiodarone  200 mg Oral Daily    amLODIPine  10 mg Oral Daily    apixaban  5 mg Oral BID    bumetanide  1 mg Intravenous BID    clopidogrel  75 mg Oral Daily    isosorbide mononitrate  60 mg Oral BID    insulin glargine  30 Units Subcutaneous QAM    insulin glargine  20 Units Subcutaneous Nightly    sodium chloride flush  10 mL Intravenous 2 times per day    lisinopril  40 mg Oral Daily    minoxidil  2.5 mg Oral BID    montelukast  10 mg Oral Nightly    rosuvastatin  40 mg Oral Nightly    insulin lispro  0-12 Units Subcutaneous TID WC    insulin lispro  0-6 Units Subcutaneous Nightly    pantoprazole  40 mg Oral Q12H    carvedilol  25 mg Oral BID       TELEMETRY: Sinus     Physical Exam:      Physical Exam      General:  Awake, alert, NAD  Skin:  Warm and dry  Neck:  no jvd , no carotid bruits  Chest:  Clear to auscultation, no wheezing or rales  Cardiovascular:  RRR [id number] no murmurs, clicks, gallups, or rubs  Abdomen:  Soft nontender, nondistended, bowel sounds present  Extremities:  Edema: Trace       Lab Data:  CBC:   Recent Labs     10/18/20  0516 10/19/20  0149   WBC 8.5 7.6   HGB 14.4 13.4*   HCT 45.8 43.2   MCV 93.7 95.2*    160     BMP:   Recent Labs     10/18/20  0333 10/18/20  0516 10/18/20  0602 10/19/20  0149   NA  --  144  --  141   K  --  4.1 3.6 3.2*   CL  --  106  --  102   CO2  --  29  --  30*   PHOS 3.8  --   --   --    BUN  --  25*  --  20   CREATININE  --  1.9*  --  1.9*     LIVER PROFILE:   Recent Labs     10/18/20  0516   AST 11   ALT 13   BILITOT 0.7   ALKPHOS 76     PT/INR: No results for input(s): PROTIME, INR in the last 72 hours. APTT: No results for input(s): APTT in the last 72 hours. BNP:  No results for input(s): BNP in the last 72 hours. CK, CKMB, Troponin: @LABRCNT (CKTOTAL:3, CKMB:3, TROPONINI:3)@    IMAGING:  Xr Chest Portable    Result Date: 10/18/2020  EXAMINATION: XR CHEST PORTABLE 10/18/2020 6:44 AM HISTORY: Dyspnea and cough COMPARISON: 5/8/2020 FINDINGS: The heart is felt to be mildly enlarged. Aorta is tortuous with atherosclerotic calcifications. The pulmonary vasculature is indistinct. Diffuse increased interstitial pulmonary opacities are noted bilaterally. There is no focal consolidation. No pleural effusion is identified. Cardiomegaly with findings suggestive of pulmonary vascular congestion and pulmonary edema. A preliminary report was provided by stat rad. Signed by Dr Clyde Underwood on 10/18/2020 6:46 AM    Us Renal Arteries Duplex    Result Date: 10/7/2020  EXAM: US RENAL ARTERIES DUPLEX -- 10/7/2020 7:14 AM HISTORY: 60 years, Male, N18.30, stage III chronic kidney disease COMPARISON: No existing relevant imaging studies available TECHNIQUE: Grayscale, spectral and color Doppler ultrasound images obtained. Representative images saved to PACS. FINDINGS: Aorta: Peak systolic velocity: 61.4 cm/sec. No aneurysm demonstrated. RIGHT KIDNEY: Measures 10.5 x 4.8 x 4.1 cm. Increased renal cortical echogenicity. Normal cortical thickness. No hydronephrosis demonstrated. RIGHT RENAL DOPPLER: Right renal artery maximum peak systolic velocity: 25.9 cm/sec at the origin, RI 0.70. Right Renal aortic ratio: 1.21 Right arcuate/intrarenal artery: 0.20 cm/sec, RI 0.72. No tardus parvus waveform. LEFT KIDNEY: Measures 11.8 x 5.3 x 4.8 cm. There is an indeterminate 1.5 cm exophytic cortical lesion, not definitely a cyst. A corollary lesion is not definitely appreciated on noncontrast CT 10/5/2015. LEFT RENAL DOPPLER: Left renal artery maximum Peak systolic velocity: 21.0 cm/sec, RI 0.71.  Left Renal aortic ratio: 0.72 fraction 45% no significant valvular issues       Plan:  1.  Continue current medical therapy reassess tomorrow further comments to follow    Sharon Malcolm MD 10/19/2020 4:48 PM

## 2020-10-19 NOTE — CONSULTS
Pt 2D Echo showing   EF of 45% and not having any diastolic dysfunction. Not appropriate for CHF clinc/teaching.

## 2020-10-19 NOTE — CARE COORDINATION
Faxed order to [facility]     [phone]  Fa: [phone]

## 2020-10-19 NOTE — PLAN OF CARE
Problem: Pain:  Goal: Pain level will decrease  Description: Pain level will decrease  10/19/2020 1151 by Almaz Arroyo RN  Outcome: Ongoing     Problem: Pain:  Goal: Control of acute pain  Description: Control of acute pain  10/19/2020 1151 by Almaz Arroyo RN  Outcome: Ongoing     Problem: Pain:  Goal: Control of chronic pain  Description: Control of chronic pain  10/19/2020 1151 by Almaz Arroyo RN  Outcome: Ongoing     Problem: Pain:  Goal: Patient's pain/discomfort is manageable  Description: Patient's pain/discomfort is manageable  10/19/2020 1151 by Almaz Arroyo RN  Outcome: Ongoing     Problem: Falls - Risk of:  Goal: Will remain free from falls  Description: Will remain free from falls  10/19/2020 1151 by Almaz Arroyo RN  Outcome: Ongoing     Problem: Falls - Risk of:  Goal: Absence of physical injury  Description: Absence of physical injury  10/19/2020 1151 by Almaz Arroyo RN  Outcome: Ongoing     Problem: Infection:  Goal: Will remain free from infection  Description: Will remain free from infection  10/19/2020 1151 by Almaz Arroyo RN  Outcome: Ongoing     Problem: Safety:  Goal: Free from accidental physical injury  Description: Free from accidental physical injury  10/19/2020 1151 by Almaz Arroyo RN  Outcome: Ongoing     Problem: Safety:  Goal: Free from intentional harm  Description: Free from intentional harm  10/19/2020 1151 by Almaz Arroyo RN  Outcome: Ongoing     Problem: Daily Care:  Goal: Daily care needs are met  Description: Daily care needs are met  10/19/2020 1151 by Almaz Arroyo RN  Outcome: Ongoing     Problem: Skin Integrity:  Goal: Skin integrity will stabilize  Description: Skin integrity will stabilize  10/19/2020 1151 by Almaz Arroyo RN  Outcome: Ongoing     Problem: Discharge Planning:  Goal: Patients continuum of care needs are met  Description: Patients continuum of care needs are met  10/19/2020 1151 by John Baldwin RN  Outcome: Ongoing     Problem: Fluid Volume - Imbalance:  Goal: Absence of imbalanced fluid volume signs and symptoms  Description: Absence of imbalanced fluid volume signs and symptoms  10/19/2020 1151 by John Baldwin RN  Outcome: Ongoing

## 2020-10-19 NOTE — PROGRESS NOTES
8636 P.O. on R/A @ Rest Sat was 88%  0856 P.O. on NC @ 1 LPM @ Rest Sat was 91%  0858 P. O. on NC @ 2 LPM Sat was 90% pt walk about 150 ft

## 2020-10-19 NOTE — CARE COORDINATION
Completed dc assessment with pt who reports resides at home with his spouse and intends to dc to the same location with family supports. Pt denies any in home services pta. Pt denies any use/need of DME pta and currently qualifies for home O2 needs upon dc. Pt is agreeable to home O2 and denies a supplier preference. Pt is agreeable to using [facility] as the supplier and SW faxed the referral. Still AWAITING home O2 order from attending Dr Gustabo Kearns and expected dc date. [facility] Ph: [phone]  Fa: [phone]    Pt reports having commercial insurance and affordable copays for services and meds. Pt denies further dc needs at this time. SW will continue to follow and assist with further dc needs as identified.

## 2020-10-20 VITALS
OXYGEN SATURATION: 91 % | RESPIRATION RATE: 18 BRPM | DIASTOLIC BLOOD PRESSURE: 78 MMHG | TEMPERATURE: 97.5 F | HEIGHT: 71 IN | WEIGHT: 233.4 LBS | HEART RATE: 50 BPM | SYSTOLIC BLOOD PRESSURE: 148 MMHG | BODY MASS INDEX: 32.68 KG/M2

## 2020-10-20 LAB
ANION GAP SERPL CALCULATED.3IONS-SCNC: 14 MMOL/L (ref 7–19)
BUN BLDV-MCNC: 20 MG/DL (ref 8–23)
CALCIUM SERPL-MCNC: 8.8 MG/DL (ref 8.8–10.2)
CHLORIDE BLD-SCNC: 103 MMOL/L (ref 98–111)
CO2: 27 MMOL/L (ref 22–29)
CREAT SERPL-MCNC: 2.1 MG/DL (ref 0.5–1.2)
EKG P AXIS: 51 DEGREES
EKG P-R INTERVAL: 260 MS
EKG Q-T INTERVAL: 474 MS
EKG QRS DURATION: 100 MS
EKG QTC CALCULATION (BAZETT): 473 MS
EKG T AXIS: 34 DEGREES
GFR AFRICAN AMERICAN: 39
GFR NON-AFRICAN AMERICAN: 32
GLUCOSE BLD-MCNC: 128 MG/DL (ref 70–99)
GLUCOSE BLD-MCNC: 148 MG/DL (ref 74–109)
GLUCOSE BLD-MCNC: 177 MG/DL (ref 70–99)
MAGNESIUM: 2.2 MG/DL (ref 1.6–2.4)
PERFORMED ON: ABNORMAL
PERFORMED ON: ABNORMAL
POTASSIUM SERPL-SCNC: 3.6 MMOL/L (ref 3.5–5)
SODIUM BLD-SCNC: 144 MMOL/L (ref 136–145)

## 2020-10-20 PROCEDURE — 80048 BASIC METABOLIC PNL TOTAL CA: CPT

## 2020-10-20 PROCEDURE — 93010 ELECTROCARDIOGRAM REPORT: CPT | Performed by: INTERNAL MEDICINE

## 2020-10-20 PROCEDURE — 94761 N-INVAS EAR/PLS OXIMETRY MLT: CPT

## 2020-10-20 PROCEDURE — 83735 ASSAY OF MAGNESIUM: CPT

## 2020-10-20 PROCEDURE — 99231 SBSQ HOSP IP/OBS SF/LOW 25: CPT | Performed by: INTERNAL MEDICINE

## 2020-10-20 PROCEDURE — 6370000000 HC RX 637 (ALT 250 FOR IP): Performed by: INTERNAL MEDICINE

## 2020-10-20 PROCEDURE — 2500000003 HC RX 250 WO HCPCS: Performed by: HOSPITALIST

## 2020-10-20 PROCEDURE — 2580000003 HC RX 258: Performed by: HOSPITALIST

## 2020-10-20 PROCEDURE — 82947 ASSAY GLUCOSE BLOOD QUANT: CPT

## 2020-10-20 PROCEDURE — 36415 COLL VENOUS BLD VENIPUNCTURE: CPT

## 2020-10-20 PROCEDURE — 6370000000 HC RX 637 (ALT 250 FOR IP): Performed by: HOSPITALIST

## 2020-10-20 RX ORDER — ROSUVASTATIN CALCIUM 40 MG/1
40 TABLET, COATED ORAL NIGHTLY
Qty: 30 TABLET | Refills: 0 | Status: ON HOLD | OUTPATIENT
Start: 2020-10-20 | End: 2021-05-14 | Stop reason: SDUPTHER

## 2020-10-20 RX ORDER — BUMETANIDE 1 MG/1
1 TABLET ORAL 2 TIMES DAILY
Qty: 60 TABLET | Refills: 0 | Status: SHIPPED | OUTPATIENT
Start: 2020-10-20 | End: 2021-02-01 | Stop reason: SDUPTHER

## 2020-10-20 RX ORDER — NITROGLYCERIN 0.4 MG/1
TABLET SUBLINGUAL
Qty: 25 TABLET | Refills: 0 | Status: SHIPPED | OUTPATIENT
Start: 2020-10-20

## 2020-10-20 RX ORDER — CARVEDILOL 25 MG/1
25 TABLET ORAL 2 TIMES DAILY
Qty: 60 TABLET | Refills: 0 | Status: ON HOLD
Start: 2020-10-20 | End: 2021-05-14 | Stop reason: HOSPADM

## 2020-10-20 RX ADMIN — MINOXIDIL 2.5 MG: 2.5 TABLET ORAL at 08:53

## 2020-10-20 RX ADMIN — APIXABAN 5 MG: 5 TABLET, FILM COATED ORAL at 08:53

## 2020-10-20 RX ADMIN — AMIODARONE HYDROCHLORIDE 200 MG: 200 TABLET ORAL at 08:53

## 2020-10-20 RX ADMIN — CARVEDILOL 25 MG: 25 TABLET, FILM COATED ORAL at 08:53

## 2020-10-20 RX ADMIN — BUMETANIDE 1 MG: 0.25 INJECTION INTRAMUSCULAR; INTRAVENOUS at 11:28

## 2020-10-20 RX ADMIN — INSULIN GLARGINE 30 UNITS: 100 INJECTION, SOLUTION SUBCUTANEOUS at 08:55

## 2020-10-20 RX ADMIN — PANTOPRAZOLE SODIUM 40 MG: 40 TABLET, DELAYED RELEASE ORAL at 08:53

## 2020-10-20 RX ADMIN — LISINOPRIL 40 MG: 10 TABLET ORAL at 08:53

## 2020-10-20 RX ADMIN — CLOPIDOGREL BISULFATE 75 MG: 75 TABLET ORAL at 08:53

## 2020-10-20 RX ADMIN — SODIUM CHLORIDE, PRESERVATIVE FREE 10 ML: 5 INJECTION INTRAVENOUS at 08:54

## 2020-10-20 RX ADMIN — ISOSORBIDE MONONITRATE 60 MG: 60 TABLET, EXTENDED RELEASE ORAL at 08:53

## 2020-10-20 RX ADMIN — AMLODIPINE BESYLATE 10 MG: 5 TABLET ORAL at 08:52

## 2020-10-20 ASSESSMENT — PAIN SCALES - GENERAL
PAINLEVEL_OUTOF10: 0
PAINLEVEL_OUTOF10: 0

## 2020-10-20 NOTE — DISCHARGE SUMMARY
Santi Ricardo  :  1955  MRN:  [id number]    Admit date:  10/18/2020  Discharge date:      Admitting Physician:  [facility], DO    Advance Directive: Full Code    Consults: cardiology    Primary Care Physician:  Maryam Harry MD    Discharge Diagnoses:  Principal Problem:    Systolic and diastolic CHF, acute on chronic [facility])  Active Problems:    CAD (coronary artery disease)    DM (diabetes mellitus) ([address]    Stage 3 chronic kidney disease    Obesity (BMI 30-39. 9)  Resolved Problems:    Acute respiratory failure with hypoxia (HCC)      Significant Diagnostic Studies:   Xr Chest Portable    Result Date: 10/18/2020  EXAMINATION: XR CHEST PORTABLE 10/18/2020 6:44 AM HISTORY: Dyspnea and cough COMPARISON: 2020 FINDINGS: The heart is felt to be mildly enlarged. Aorta is tortuous with atherosclerotic calcifications. The pulmonary vasculature is indistinct. Diffuse increased interstitial pulmonary opacities are noted bilaterally. There is no focal consolidation. No pleural effusion is identified. Cardiomegaly with findings suggestive of pulmonary vascular congestion and pulmonary edema. A preliminary report was provided by stat rad. Signed by Dr Elisha Landry on 10/18/2020 6:46 AM      Pertinent Labs:   CBC:   Recent Labs     10/18/20  0516 10/19/20  0149   WBC 8.5 7.6   HGB 14.4 13.4*    160     BMP:    Recent Labs     10/18/20  0516 10/18/20  0602 10/19/20  0149 10/20/20  0134     --  141 144   K 4.1 3.6 3.2* 3.6     --  102 103   CO2 29  --  30* 27   BUN 25*  --  20 20   CREATININE 1.9*  --  1.9* 2.1*   GLUCOSE 147*  --  110* 148*     INR: No results for input(s): INR in the last 72 hours. ABGs:No results for input(s): PH, PO2, PCO2, HCO3, BE, O2SAT in the last 72 hours. Lactic Acid:No results for input(s): LACTA in the last 72 hours. Procedures: None performed.     Hospital Course:   10-18: Presents to ED with orthopnea and dyspnea on exertion for a few weeks with 10 pound weight gain. History of CHF. Bumetanide decreased by PCP to 1 mg daily from BID due to CKD. Fluid overloaded on imaging, admitted to PCU on IV bumetanide with cardiology consult. Echo ordered but canceled by cardiology. 10-19: Diuresing acceptably, feeling much better. Still with some exertional dyspnea. Continue diuresis today, possibly home tomorrow. 10-20: Patient is feeling well. Weight down nearly 9 pounds from admission and he ambulated without desaturation. He qualified for home supplemental oxygen yesterday but no longer requires it. Discharge to home on bumetanide 1 mg BID and other medications as adjusted by cardiology.     Physical Exam:  Vitals: BP (!) 148/78 Comment: REPORTED TO DAY SHIFT RN  Pulse 50   Temp 97.5 °F (36.4 °C) (Temporal)   Resp 18   Ht 5' 11\" (1.803 m)   Wt 233 lb 6.4 oz (105.9 kg)   SpO2 91%   BMI 32.55 kg/m²   24HR INTAKE/OUTPUT:      Intake/Output Summary (Last 24 hours) at 10/20/2020 1251  Last data filed at 10/20/2020 2001  Gross per 24 hour   Intake 670 ml   Output 1775 ml   Net -1105 ml     General appearance: alert and cooperative with exam  HEENT: atraumatic, eyes with clear conjunctiva and normal lids, pupils and irises normal, external ears and nose are normal, lips normal. Neck without masses, lympadenopathy, bruit, thyroid normal  Lungs: no increased work of breathing, clear to auscultation bilaterally without rales, rhonchi or wheezes  Heart: regular rate and rhythm, S1, S2 normal, no murmur, click, rub or gallop  Abdomen: soft, non-tender; bowel sounds normal; no masses,  no organomegaly and obese  Extremities: edema trace bilaterally, modified Dona's negative  Neurologic: no focal neurologic deficits, normal sensation, alert and oriented, affect and mood appropriate  Skin: no jaundice, rashes, or nodules    Discharge Medications:       Maverick Lambert   Home Medication Instructions [id number]    Printed on:10/20/20 1251   Medication Information albuterol sulfate HFA (VENTOLIN HFA) 108 (90 Base) MCG/ACT inhaler  Inhale 2 puffs into the lungs every 6 hours as needed for Wheezing             amiodarone (CORDARONE) 200 MG tablet  Take 1 tablet by mouth daily             amLODIPine (NORVASC) 10 MG tablet  Take 1 tablet by mouth daily             apixaban (ELIQUIS) 5 MG TABS tablet  Take by mouth 2 times daily             bumetanide (BUMEX) 1 MG tablet  Take 1 tablet by mouth 2 times daily             carvedilol (COREG) 25 MG tablet  Take 1 tablet by mouth 2 times daily             clopidogrel (PLAVIX) 75 MG tablet  Take 1 tablet by mouth daily. esomeprazole Magnesium (NEXIUM) 40 MG PACK  Take 40 mg by mouth 2 times daily              isosorbide mononitrate (IMDUR) 60 MG CR tablet  Take 60 mg by mouth 2 times daily             LANTUS SOLOSTAR 100 UNIT/ML injection pen  INJECT 30 UNITS IN THE MORNING AND 20 UNITS IN THE EVENING DAILY             levocetirizine (XYZAL) 5 MG tablet  Take 5 mg by mouth nightly             lisinopril (PRINIVIL;ZESTRIL) 40 MG tablet  Take 1 tablet by mouth daily             minoxidil (LONITEN) 2.5 MG tablet  Take 1 tablet by mouth 2 times daily             montelukast (SINGULAIR) 10 MG tablet  Take 10 mg by mouth daily             nitroGLYCERIN (NITROSTAT) 0.4 MG SL tablet  up to max of 3 total doses. If no relief after 1 dose, call 911. NOVOLOG FLEXPEN 100 UNIT/ML injection pen               potassium chloride SA (K-DUR;KLOR-CON M) 10 MEQ tablet  TAKE ONE TABLET BY MOUTH EVERY DAY             rosuvastatin (CRESTOR) 40 MG tablet  Take 1 tablet by mouth nightly             ULTICARE MINI PEN NEEDLES 31G X 6 MM MISC  FOR USE WITH LANTUS TWO TIMES A DAY                 Discharge Instructions: Follow up with Nyla Tiwari MD in 7 days. Take medications as directed. Resume activity as tolerated. Diet: DIET CARDIAC; Carb Control: 4 carb choices (60 gms)/meal; Low Sodium (2 GM);  Daily Fluid Restriction: 1500 ml; Low Cholesterol, No Caffeine     Disposition: Patient is medically stable and will be discharged Home. Time spent on discharge less than 30 minutes.     Signed:  Tracy Valladares DO

## 2020-10-20 NOTE — PLAN OF CARE
Problem: Pain:  Goal: Pain level will decrease  Description: Pain level will decrease  10/20/2020 0001 by Nick Pierson RN  Outcome: Ongoing  10/19/2020 1151 by Shiela Martin RN  Outcome: Ongoing  Goal: Control of acute pain  Description: Control of acute pain  10/20/2020 0001 by Nick Pierson RN  Outcome: Ongoing  10/19/2020 1151 by Shiela Martin RN  Outcome: Ongoing  Goal: Control of chronic pain  Description: Control of chronic pain  10/20/2020 0001 by Nick Pierson RN  Outcome: Ongoing  10/19/2020 1151 by Shiela Martin RN  Outcome: Ongoing  Goal: Patient's pain/discomfort is manageable  Description: Patient's pain/discomfort is manageable  10/20/2020 0001 by Nick Pierson RN  Outcome: Ongoing  10/19/2020 1151 by Shiela Martin RN  Outcome: Ongoing     Problem: Pain:  Goal: Control of acute pain  Description: Control of acute pain  10/19/2020 1151 by Shiela Martin RN  Outcome: Ongoing     Problem: Pain:  Goal: Control of acute pain  Description: Control of acute pain  10/20/2020 0001 by Nick Pierson RN  Outcome: Ongoing     Problem: Pain:  Goal: Control of chronic pain  Description: Control of chronic pain  10/20/2020 0001 by Nick Pierson RN  Outcome: Ongoing     Problem: Pain:  Goal: Patient's pain/discomfort is manageable  Description: Patient's pain/discomfort is manageable  10/20/2020 0001 by Nick Pierson RN  Outcome: Ongoing     Problem: Falls - Risk of:  Goal: Will remain free from falls  Description: Will remain free from falls  10/20/2020 0001 by Nick Pierson RN  Outcome: Ongoing

## 2020-10-20 NOTE — CONSULTS
Comprehensive Nutrition Assessment    Type and Reason for Visit:  Initial, Consult    Nutrition Recommendations/Plan: continue encouragement to adhere to salt recommendations    Nutrition Assessment:  Pt is well nourished AEB fat and muscle mass. REceived consult for CHF education. \"I know what I did wrong. I don't use the salt shaker, but we had butter/salted popcorn last week, a chili dog and I've been drinking electrolyte water. \"  Pt very aware of what foods are high in salt. Education not needed at this time. Malnutrition Assessment:  Malnutrition Status:  No malnutrition    Context:  Acute Illness     Findings of the 6 clinical characteristics of malnutrition:  Energy Intake:  No significant decrease in energy intake  Weight Loss:  1 - 5% over 1 month     Body Fat Loss:  No significant body fat loss     Muscle Mass Loss:  No significant muscle mass loss    Fluid Accumulation:  No significant fluid accumulation Extremities   Strength:  Not Performed    Nutrition Related Findings:  well nourished      Wounds:  None       Current Nutrition Therapies:    DIET CARDIAC; Carb Control: 4 carb choices (60 gms)/meal; Low Sodium (2 GM); Daily Fluid Restriction: 1500 ml; Low Cholesterol, No Caffeine    Anthropometric Measures:  · Height: 5' 11\" (180.3 cm)  · Current Body Weight: 233 lb 6 oz (105.9 kg)   · Admission Body Weight: 242 lb (109.8 kg)(stated)    · Usual Body Weight: 246 lb (111.6 kg)(7/2020)     · Ideal Body Weight: 172 lbs; % Ideal Body Weight 135.7 %   · BMI: 32.6  · Adjusted Body Weight:  ; No Adjustment   · BMI Categories: Obese Class 1 (BMI 30.0-34. 9)       Nutrition Diagnosis:   · Altered nutrition-related lab values related to cardiac dysfunction as evidenced by lab values      Nutrition Interventions:   Food and/or Nutrient Delivery:  Continue Current Diet  Nutrition Education/Counseling:  No recommendation at this time, Education not indicated   Coordination of Nutrition Care:  Continued Inpatient Monitoring    Goals:  po intake 75% or greater.   proBNP continues to decrease       Nutrition Monitoring and Evaluation:   Behavioral-Environmental Outcomes:      Food/Nutrient Intake Outcomes:  Food and Nutrient Intake  Physical Signs/Symptoms Outcomes:  Biochemical Data, Skin, Weight, Nutrition Focused Physical Findings     Discharge Planning:    Continue current diet     Electronically signed by Kumar Hillman MS, RD, LD on 10/20/20 at 2:46 PM CDT    Contact: [phone]

## 2020-10-20 NOTE — PROGRESS NOTES
Cardiology Daily Note Von Goetz MD      Patient:  Herb Hernández  [id number]    Patient Active Problem List    Diagnosis Date Noted    Abnormal nuclear cardiac imaging test      Priority: High    CAD (coronary artery disease) 01/10/2016     Priority: High    Systolic and diastolic CHF, acute on chronic ([facility] 10/18/2020     Priority: Low    Obesity (BMI 30-39.9) 10/18/2020     Priority: Low    Chronic combined systolic and diastolic congestive heart failure ([facility] 08/24/2020     Priority: Low    On amiodarone therapy 08/24/2020     Priority: Low    At risk for obstructive sleep apnea 08/18/2020     Priority: Low    Mixed hyperlipidemia 07/09/2020     Priority: Low    Left ventricular dysfunction 07/09/2020     Priority: Low    Somnolence, daytime 07/09/2020     Priority: Low    Edema 07/09/2020     Priority: Low    Stage 3 chronic kidney disease      Priority: Low    PAF (paroxysmal atrial fibrillation) ([facility] 02/12/2020     Priority: Low    Acute on chronic diastolic CHF (congestive heart failure) ([facility] 01/11/2016     Priority: Low    Chronic congestive heart failure (HCC)      Priority: Low    Apical myocardial infarction ([facility]      Priority: Low    Atrial septal aneurysm 01/09/2016     Priority: Low    Malignant hypertension      Priority: Low    Abnormal EKG      Priority: Low    Diabetes mellitus type I ([facility] 01/07/2016     Priority: Low    Peripheral vascular disease (HCC)      Priority: Low    Carotid artery stenosis 06/26/2013     Priority: Low    Renal artery stenosis ([facility] 03/08/2013     Priority: Low    Aneurysm of thoracic aorta ([facility] 02/21/2013     Priority: Low    HTN (hypertension) 02/04/2013     Priority: Low    DM (diabetes mellitus) ([facility] 02/04/2013     Priority: Low    GERD (gastroesophageal reflux disease) 02/04/2013     Priority: Low       Admit Date:  10/18/2020    Admission Problem List: Present on Admission:   Systolic and diastolic CHF, acute on chronic ([facility]   CAD (coronary artery disease)   DM (diabetes mellitus) (HCC)   Stage 3 chronic kidney disease   Obesity (BMI 30-39. 9)   (Resolved) Acute respiratory failure with hypoxia [facility])      Cardiac Specific Data:  Specialty Problems        Cardiology Problems    CAD (coronary artery disease)        HTN (hypertension)        Aneurysm of thoracic aorta (HCC)        Renal artery stenosis (HCC)        Carotid artery stenosis        Peripheral vascular disease (HCC)        Atrial septal aneurysm        Malignant hypertension        Apical myocardial infarction (HCC)        Acute on chronic diastolic CHF (congestive heart failure) (HCC)        Chronic congestive heart failure (HCC)        PAF (paroxysmal atrial fibrillation) (HCC)        Left ventricular dysfunction        Mixed hyperlipidemia        Chronic combined systolic and diastolic congestive heart failure (HCC)        * (Principal) Systolic and diastolic CHF, acute on chronic (HCC)              Subjective:  Mr. Lavern Apley seen today dyspnea stable back to normal denies chest pain no other complaints blood pressure 148 or 78 heart rate 50. Objective:   BP (!) 148/78 Comment: REPORTED TO DAY SHIFT RN  Pulse 50   Temp 97.5 °F (36.4 °C) (Temporal)   Resp 18   Ht 5' 11\" (1.803 m)   Wt 233 lb 6.4 oz (105.9 kg)   SpO2 91%   BMI 32.55 kg/m²       Intake/Output Summary (Last 24 hours) at 10/20/2020 1347  Last data filed at 10/20/2020 1323  Gross per 24 hour   Intake 790 ml   Output 1775 ml   Net -985 ml       Prior to Admission medications    Medication Sig Start Date End Date Taking? Authorizing Provider   nitroGLYCERIN (NITROSTAT) 0.4 MG SL tablet up to max of 3 total doses.  If no relief after 1 dose, call 911. 10/20/20  Yes [facility], DO   rosuvastatin (CRESTOR) 40 MG tablet Take 1 tablet by mouth nightly 10/20/20  Yes [facility], DO   carvedilol (COREG) 25 MG tablet Take 1 tablet by mouth 2 times daily 10/20/20  Yes [facility], DO   bumetanide (BUMEX) 1 MG tablet Take 1 tablet by mouth 2 times daily 10/20/20  Yes Girish Patrizia, DO   minoxidil (LONITEN) 2.5 MG tablet Take 1 tablet by mouth 2 times daily 8/24/20  Yes DWAYNE Machado   apixaban (ELIQUIS) 5 MG TABS tablet Take by mouth 2 times daily   Yes Historical Provider, MD   montelukast (SINGULAIR) 10 MG tablet Take 10 mg by mouth daily   Yes Historical Provider, MD   levocetirizine (XYZAL) 5 MG tablet Take 5 mg by mouth nightly   Yes Historical Provider, MD   esomeprazole Magnesium (NEXIUM) 40 MG PACK Take 40 mg by mouth 2 times daily    Yes Historical Provider, MD   albuterol sulfate HFA (VENTOLIN HFA) 108 (90 Base) MCG/ACT inhaler Inhale 2 puffs into the lungs every 6 hours as needed for Wheezing 1/1/18  Yes Karn Landau, MD   amLODIPine (NORVASC) 10 MG tablet Take 1 tablet by mouth daily 1/11/16  Yes DWAYNE Valdes   isosorbide mononitrate (IMDUR) 60 MG CR tablet Take 60 mg by mouth 2 times daily   Yes Historical Provider, MD   LANTUS SOLOSTAR 100 UNIT/ML injection pen INJECT 30 UNITS IN THE MORNING AND 20 UNITS IN THE EVENING DAILY  Patient taking differently: 2 times daily Takes 30 units in AM and 20 units in PM 7/1/14  Yes DWAYNE Liu - CNP   potassium chloride SA (K-DUR;KLOR-CON M) 10 MEQ tablet [facility]  Patient taking differently: 10 mEq 3 times daily  4/15/14  Yes Sergio Hancock MD   clopidogrel (PLAVIX) 75 MG tablet Take 1 tablet by mouth daily.  12/9/13  Yes Sergio Hancock MD   NOVOLOG FLEXPEN 100 UNIT/ML injection pen  6/1/20   Historical Provider, MD   amiodarone (CORDARONE) 200 MG tablet Take 1 tablet by mouth daily 2/20/20 5/8/20  Trudy Ray MD   lisinopril (PRINIVIL;ZESTRIL) 40 MG tablet Take 1 tablet by mouth daily  Patient taking differently: Take 40 mg by mouth 2 times daily  2/20/20 7/10/20  Trudy Ray MD   ULTICARE MINI PEN NEEDLES 31G X 6 MM MISC FOR USE WITH LANTUS TWO TIMES A DAY 7/11/14   Blayne Carey, APRN - CNP        amiodarone  200 COMPARISON: 5/8/2020 FINDINGS: The heart is felt to be mildly enlarged. Aorta is tortuous with atherosclerotic calcifications. The pulmonary vasculature is indistinct. Diffuse increased interstitial pulmonary opacities are noted bilaterally. There is no focal consolidation. No pleural effusion is identified. Cardiomegaly with findings suggestive of pulmonary vascular congestion and pulmonary edema. A preliminary report was provided by stat rad. Signed by Dr Jesse Escobedo on 10/18/2020 6:46 AM    Us Renal Arteries Duplex    Result Date: 10/7/2020  EXAM: US RENAL ARTERIES DUPLEX -- 10/7/2020 7:14 AM HISTORY: 60 years, Male, N18.30, stage III chronic kidney disease COMPARISON: No existing relevant imaging studies available TECHNIQUE: Grayscale, spectral and color Doppler ultrasound images obtained. Representative images saved to PACS. FINDINGS: Aorta: Peak systolic velocity: 82.3 cm/sec. No aneurysm demonstrated. RIGHT KIDNEY: Measures 10.5 x 4.8 x 4.1 cm. Increased renal cortical echogenicity. Normal cortical thickness. No hydronephrosis demonstrated. RIGHT RENAL DOPPLER: Right renal artery maximum peak systolic velocity: 85.7 cm/sec at the origin, RI 0.70. Right Renal aortic ratio: 1.21 Right arcuate/intrarenal artery: 0.20 cm/sec, RI 0.72. No tardus parvus waveform. LEFT KIDNEY: Measures 11.8 x 5.3 x 4.8 cm. There is an indeterminate 1.5 cm exophytic cortical lesion, not definitely a cyst. A corollary lesion is not definitely appreciated on noncontrast CT 10/5/2015. LEFT RENAL DOPPLER: Left renal artery maximum Peak systolic velocity: 67.6 cm/sec, RI 0.71. Left Renal aortic ratio: 0.72 Left arcuate/intrarenal artery: 16.2 cm/sec, RI 0.73. No tardus parvus waveform. Urinary Bladder: Within normal limits. 1. Left kidney with a 1.7 cm exophytic hypoechoic lesion, not definitely a cyst. Renal cell carcinoma not excluded.  If patient's renal function permits, recommend CT abdomen without and with contrast (renal protocol) for further evaluation. If renal function, follow-up ultrasound could be considered or comparison with outside prior to determine chronicity. 2. Increased renal echogenicity bilaterally, most commonly due to medical renal disease. 3. Patent renal vasculature without evidence of stenosis. Result and recommendation communicated by Epic message to DWAYNE Portillo at 2:17 PM on 10/7/2020. Signed by Dr Frantz Stafford on 10/7/2020 2:19 PM        Assessment:  1. Complaints of orthopnea and dyspnea on exertion for several weeks since Bumex decreased from 1 mg twice daily to 1 mg every morning due to chronic kidney disease proBNP 1333  2. Chronic kidney disease  3. Coronary artery disease  4. Diabetes  5. Acute respiratory failure with hypoxia  6. Obesity  7. Hypertension  8. Gastroesophageal reflux disease  9. Thoracic aortic aneurysm  10. Renal artery stenosis  11. Carotid artery stenosis  12. Peripheral arterial disease  13. Atrial septal aneurysm  14. History of apical myocardial infarction  15. Paroxysmal atrial fibrillation  16. Hyperlipidemia  17. Chronic systolic and diastolic congestive heart failure  18. CT of the head 5/8/2020 no acute findings global cerebral volume loss and presumed chronic microvascular ischemic white matter changes left mastoid effusion suspected tiny 3 mm colloid cyst at the foramen of Monro  19. Lexiscan stress test 2/14/2020 apical scar minimal ischemia EF 36%  20. Troponins x3 are negative  21. Chronic anticoagulation therapy with apixaban  22. Long-term antiarrhythmic usage with amiodarone  23. Echocardiogram 10/18/2020 left ventricular perjury ejection fraction 45% no significant valvular issues    Plan:  1.  Continue current therapy ambulate could be potentially discharged follow-up in the office    Justyna Mosley MD 10/20/2020 1:47 PM

## 2020-10-20 NOTE — CARE COORDINATION
Upon dc home O2 eval the pt no longer required O2. SW notified [facility] to p/u the portable tank delivered previously.    [facility] Ph: [phone]  Fa: [phone]

## 2020-10-21 ENCOUNTER — TRANSITIONAL CARE MANAGEMENT TELEPHONE ENCOUNTER (OUTPATIENT)
Dept: FAMILY MEDICINE CLINIC | Facility: CLINIC | Age: 65
End: 2020-10-21

## 2020-10-21 ENCOUNTER — CARE COORDINATION (OUTPATIENT)
Dept: CASE MANAGEMENT | Age: 65
End: 2020-10-21

## 2020-10-21 NOTE — CARE COORDINATION
[facility]    Call within 2 business days of discharge: Yes    Patient: Roman Hermosillo Patient : 1955   MRN: [id number]  Reason for Admission: CHF, CAD, DM, CKD, Acute Respiratory Failure with Hypoxia  Discharge Date: 10/20/20 RARS: Readmission Risk Score: 19      Last Discharge [employer]       Complaint Diagnosis Description Type Department Provider    10/18/20 Shortness of Breath; Other Acute congestive heart failure, unspecified heart failure type ([facility] . .. ED to Hosp-Admission (Discharged) (ADMITTED) L CHAY Bales DO; Jakob Davey. .. Spoke with: N/A    Facility: [address] [place] [address]    Non-face-to-face services provided:  Reviewed encounter information for continuity of care prior to follow up Care Transitions phone call - chart notes, consults, progress notes, test results, med list, appointments, AVS, other information. Care Transitions 24 Hour Call    Care Transitions Interventions       Attempted to make contact with patient for an initial follow up call post discharge without success. Unable to leave a message regarding intent of call and call back information. CTN to try again at a later time.      Follow Up  Future Appointments   Date Time Provider Lizy Walton   10/28/2020  9:00 PM [facility] SLEEP BED 4 [facility] SLEEP [facility]   2020  2:00 PM Shantelle Aldridge MD [facility] Cardio [id number] Carranza, RN

## 2020-10-22 ENCOUNTER — CARE COORDINATION (OUTPATIENT)
Dept: CASE MANAGEMENT | Age: 65
End: 2020-10-22

## 2020-10-22 NOTE — CARE COORDINATION
[facility] 45 Transitions Initial Follow Up Call    Call within 2 business days of discharge: Yes    Patient: Brenda Gonzalez Patient : 1955   MRN: [id number]  Reason for Admission: Acute Respiratory Failure, CHF, CKD  Discharge Date: 10/20/20 RARS: Readmission Risk Score: 19      Last Discharge [facility]       Complaint Diagnosis Description Type Department Provider    10/18/20 Shortness of Breath; Other Acute congestive heart failure, unspecified heart failure type ([facility] . .. ED to Hosp-Admission (Discharged) (ADMITTED) ROSSANA Grajeda DO; Evangelist Tran. .. Spoke with: N/A    Facility: [address] [place] [address]    Non-face-to-face services provided:  Reviewed encounter information for continuity of care prior to follow up Care Transitions phone call - chart notes, consults, progress notes, test results, med list, appointments, AVS, other information. Care Transitions 24 Hour Call    Care Transitions Interventions       Attempted to make contact with patient for an initial follow up call post discharge without success. Unable to leave a message regarding intent of call and call back information as call went to an inidentifiable voice mail. As this repeated attempt to make contact was unsuccessful, will disengage at this time.        Follow Up  Future Appointments   Date Time Provider Lizy Walton   10/28/2020  9:00 PM [facility] SLEEP BED 4 [facility] SLEEP [facility]   2020  2:00 PM Johny Cartwright MD [facility] Cardio [id number] Santo RN

## 2020-10-22 NOTE — CARE COORDINATION
Placed a call to the number listed as primary for patient, [phone], and a woman answered and said [facility] was not available at this number. She gave me [phone] and said I should be able to reach him at that number. Will call it.

## 2020-10-27 ENCOUNTER — OFFICE VISIT (OUTPATIENT)
Dept: FAMILY MEDICINE CLINIC | Facility: CLINIC | Age: 65
End: 2020-10-27

## 2020-10-27 VITALS
RESPIRATION RATE: 20 BRPM | HEIGHT: 71 IN | DIASTOLIC BLOOD PRESSURE: 84 MMHG | TEMPERATURE: 97.7 F | WEIGHT: 241 LBS | HEART RATE: 50 BPM | BODY MASS INDEX: 33.74 KG/M2 | SYSTOLIC BLOOD PRESSURE: 156 MMHG | OXYGEN SATURATION: 92 %

## 2020-10-27 DIAGNOSIS — N18.4 STAGE 4 CHRONIC KIDNEY DISEASE (HCC): ICD-10-CM

## 2020-10-27 DIAGNOSIS — E66.09 CLASS 1 OBESITY DUE TO EXCESS CALORIES WITH SERIOUS COMORBIDITY AND BODY MASS INDEX (BMI) OF 33.0 TO 33.9 IN ADULT: ICD-10-CM

## 2020-10-27 DIAGNOSIS — Z23 FLU VACCINE NEED: ICD-10-CM

## 2020-10-27 DIAGNOSIS — Z79.01 CHRONIC ANTICOAGULATION: ICD-10-CM

## 2020-10-27 DIAGNOSIS — I50.42 CHRONIC COMBINED SYSTOLIC AND DIASTOLIC CONGESTIVE HEART FAILURE (HCC): Primary | ICD-10-CM

## 2020-10-27 PROCEDURE — 99214 OFFICE O/P EST MOD 30 MIN: CPT | Performed by: FAMILY MEDICINE

## 2020-10-27 PROCEDURE — 90686 IIV4 VACC NO PRSV 0.5 ML IM: CPT | Performed by: FAMILY MEDICINE

## 2020-10-27 PROCEDURE — 90471 IMMUNIZATION ADMIN: CPT | Performed by: FAMILY MEDICINE

## 2020-10-27 RX ORDER — NITROGLYCERIN 0.4 MG/1
TABLET SUBLINGUAL AS NEEDED
COMMUNITY
Start: 2020-10-20

## 2020-10-27 RX ORDER — ERGOCALCIFEROL 1.25 MG/1
CAPSULE ORAL WEEKLY
COMMUNITY
Start: 2020-09-21

## 2020-10-27 RX ORDER — BUMETANIDE 1 MG/1
TABLET ORAL 3 TIMES DAILY
COMMUNITY
Start: 2020-10-26 | End: 2020-12-04 | Stop reason: SDUPTHER

## 2020-10-28 LAB
ALBUMIN SERPL-MCNC: 4.1 G/DL (ref 3.5–5.2)
ALBUMIN/GLOB SERPL: 2 G/DL
ALP SERPL-CCNC: 80 U/L (ref 39–117)
ALT SERPL-CCNC: 13 U/L (ref 1–41)
AST SERPL-CCNC: 12 U/L (ref 1–40)
BILIRUB SERPL-MCNC: 0.7 MG/DL (ref 0–1.2)
BUN SERPL-MCNC: 18 MG/DL (ref 8–23)
BUN/CREAT SERPL: 8.7 (ref 7–25)
CALCIUM SERPL-MCNC: 9 MG/DL (ref 8.6–10.5)
CHLORIDE SERPL-SCNC: 104 MMOL/L (ref 98–107)
CO2 SERPL-SCNC: 29.3 MMOL/L (ref 22–29)
CREAT SERPL-MCNC: 2.07 MG/DL (ref 0.76–1.27)
GLOBULIN SER CALC-MCNC: 2.1 GM/DL
GLUCOSE SERPL-MCNC: 154 MG/DL (ref 65–99)
POTASSIUM SERPL-SCNC: 4.1 MMOL/L (ref 3.5–5.2)
PROT SERPL-MCNC: 6.2 G/DL (ref 6–8.5)
SODIUM SERPL-SCNC: 144 MMOL/L (ref 136–145)

## 2020-11-16 ENCOUNTER — OFFICE VISIT (OUTPATIENT)
Dept: CARDIOLOGY | Age: 65
End: 2020-11-16
Payer: COMMERCIAL

## 2020-11-16 VITALS
HEART RATE: 55 BPM | SYSTOLIC BLOOD PRESSURE: 130 MMHG | WEIGHT: 243 LBS | HEIGHT: 71 IN | BODY MASS INDEX: 34.02 KG/M2 | DIASTOLIC BLOOD PRESSURE: 74 MMHG

## 2020-11-16 DIAGNOSIS — E78.2 MIXED HYPERLIPIDEMIA: ICD-10-CM

## 2020-11-16 DIAGNOSIS — E66.9 OBESITY (BMI 30-39.9): ICD-10-CM

## 2020-11-16 DIAGNOSIS — I25.10 CORONARY ARTERY DISEASE INVOLVING NATIVE CORONARY ARTERY OF NATIVE HEART WITHOUT ANGINA PECTORIS: ICD-10-CM

## 2020-11-16 DIAGNOSIS — I50.42 CHRONIC COMBINED SYSTOLIC AND DIASTOLIC CONGESTIVE HEART FAILURE (HCC): ICD-10-CM

## 2020-11-16 DIAGNOSIS — I48.0 PAF (PAROXYSMAL ATRIAL FIBRILLATION) (HCC): ICD-10-CM

## 2020-11-16 LAB
ANION GAP SERPL CALCULATED.3IONS-SCNC: 12 MMOL/L (ref 7–19)
BUN BLDV-MCNC: 20 MG/DL (ref 8–23)
CALCIUM SERPL-MCNC: 9.4 MG/DL (ref 8.8–10.2)
CHLORIDE BLD-SCNC: 105 MMOL/L (ref 98–111)
CO2: 26 MMOL/L (ref 22–29)
CREAT SERPL-MCNC: 2.1 MG/DL (ref 0.5–1.2)
GFR AFRICAN AMERICAN: 39
GFR NON-AFRICAN AMERICAN: 32
GLUCOSE BLD-MCNC: 99 MG/DL (ref 74–109)
POTASSIUM SERPL-SCNC: 4 MMOL/L (ref 3.5–5)
SODIUM BLD-SCNC: 143 MMOL/L (ref 136–145)

## 2020-11-16 PROCEDURE — 99214 OFFICE O/P EST MOD 30 MIN: CPT | Performed by: INTERNAL MEDICINE

## 2020-11-16 ASSESSMENT — ENCOUNTER SYMPTOMS
SHORTNESS OF BREATH: 0
EYES NEGATIVE: 1
RESPIRATORY NEGATIVE: 1
VOMITING: 0
DIARRHEA: 0
NAUSEA: 0
GASTROINTESTINAL NEGATIVE: 1

## 2020-11-16 NOTE — PROGRESS NOTES
CHOLECYSTECTOMY      CORONARY ANGIOPLASTY WITH STENT PLACEMENT  1-16    2 JACQUELINE to LAD    DIAGNOSTIC CARDIAC CATH LAB PROCEDURE      URETER STENT PLACEMENT      VASCULAR SURGERY  03- [facility]    Aortogram with bilateral selective renal artery injections    VASCULAR SURGERY  6/14/13 [facility]    Right carotid endarterectomy with Vascu-Guard patch repair. Right cervical carotid arteriograms after endarterectomy.  VASCULAR SURGERY  7/6/15 [facility] [place]    Percutaneous cannulation, right common femoral artery, with a5 Thai sheath and later 6 Thai [facility] sheath. Suprarenal abdomianl aortagram.  Selective right renal arteriogram.  Right renal artery balloon angioplasty;/stent (Express 6 mm x 18mmballoon-expandable stent. . Completion suprarenal abdominal aortogram and selective right remal arteriogram. Mynx closure, right common femoral artery punctur     Family History   Problem Relation Age of Onset    High Blood Pressure Father     Cancer Father     High Blood Pressure Mother     High Blood Pressure Brother      Allergies   Allergen Reactions    Hydralazine     Morphine      Current Outpatient Medications   Medication Sig Dispense Refill    nitroGLYCERIN (NITROSTAT) 0.4 MG SL tablet up to max of 3 total doses.  If no relief after 1 dose, call 911. 25 tablet 0    rosuvastatin (CRESTOR) 40 MG tablet Take 1 tablet by mouth nightly 30 tablet 0    carvedilol (COREG) 25 MG tablet Take 1 tablet by mouth 2 times daily 60 tablet 0    bumetanide (BUMEX) 1 MG tablet Take 1 tablet by mouth 2 times daily 60 tablet 0    minoxidil (LONITEN) 2.5 MG tablet Take 1 tablet by mouth 2 times daily 60 tablet 5    NOVOLOG FLEXPEN 100 UNIT/ML injection pen       apixaban (ELIQUIS) 5 MG TABS tablet Take by mouth 2 times daily      montelukast (SINGULAIR) 10 MG tablet Take 10 mg by mouth daily      levocetirizine (XYZAL) 5 MG tablet Take 5 mg by mouth nightly      esomeprazole Magnesium (NEXIUM) 40 MG PACK Take 40 mg by mouth 2 times daily       albuterol sulfate HFA (VENTOLIN HFA) 108 (90 Base) MCG/ACT inhaler Inhale 2 puffs into the lungs every 6 hours as needed for Wheezing 1 Inhaler 3    amLODIPine (NORVASC) 10 MG tablet Take 1 tablet by mouth daily 30 tablet 3    isosorbide mononitrate (IMDUR) 60 MG CR tablet Take 60 mg by mouth 2 times daily      ULTICARE MINI PEN NEEDLES 31G X 6 MM MISC FOR USE WITH LANTUS TWO TIMES A DAY 50 each 5    LANTUS SOLOSTAR 100 UNIT/ML injection pen INJECT 30 UNITS IN THE MORNING AND 20 UNITS IN THE EVENING DAILY (Patient taking differently: 2 times daily Takes 30 units in AM and 20 units in PM) 15 mL 5    potassium chloride SA (K-DUR;KLOR-CON M) 10 MEQ tablet TAKE ONE TABLET BY MOUTH EVERY DAY (Patient taking differently: 10 mEq 3 times daily ) 30 tablet 5    clopidogrel (PLAVIX) 75 MG tablet Take 1 tablet by mouth daily. 30 tablet 5    amiodarone (CORDARONE) 200 MG tablet Take 1 tablet by mouth daily 30 tablet 0    lisinopril (PRINIVIL;ZESTRIL) 40 MG tablet Take 1 tablet by mouth daily (Patient taking differently: Take 40 mg by mouth 2 times daily ) 30 tablet 0     No current facility-administered medications for this visit.       Social History     Socioeconomic History    Marital status:      Spouse name: Not on file    Number of children: Not on file    Years of education: Not on file    Highest education level: Not on file   Occupational History    Not on file   Social Needs    Financial resource strain: Not on file    Food insecurity     Worry: Not on file     Inability: Not on file    Transportation needs     Medical: Not on file     Non-medical: Not on file   Tobacco Use    Smoking status: Former Smoker     Packs/day: 0.25    Smokeless tobacco: Never Used   Substance and Sexual Activity    Alcohol use: No    Drug use: No    Sexual activity: Yes   Lifestyle    Physical activity     Days per week: Not on file     Minutes per session: Not on file    Stress: Not on file Relationships    Social connections     Talks on phone: Not on file     Gets together: Not on file     Attends Voodoo service: Not on file     Active member of club or organization: Not on file     Attends meetings of clubs or organizations: Not on file     Relationship status: Not on file    Intimate partner violence     Fear of current or ex partner: Not on file     Emotionally abused: Not on file     Physically abused: Not on file     Forced sexual activity: Not on file   Other Topics Concern    Not on file   Social History Narrative    Not on file       Physical Examination:  /74   Pulse 55   Ht 5' 11\" (1.803 m)   Wt 243 lb (110.2 kg)   BMI 33.89 kg/m²   Physical Exam  Vitals signs reviewed. Constitutional:       Appearance: He is well-developed. Neck:      Vascular: No carotid bruit or JVD. Cardiovascular:      Rate and Rhythm: Normal rate and regular rhythm. Heart sounds: Normal heart sounds. No murmur. No friction rub. No gallop. Pulmonary:      Effort: Pulmonary effort is normal. No respiratory distress. Breath sounds: Normal breath sounds. No wheezing or rales. Abdominal:      General: There is no distension. Tenderness: There is no abdominal tenderness. Lymphadenopathy:      Cervical: No cervical adenopathy. Skin:     General: Skin is warm and dry. ASSESSMENT:     Diagnosis Orders   1. Coronary artery disease involving native coronary artery of native heart without angina pectoris  Basic Metabolic Panel   2. Chronic combined systolic and diastolic congestive heart failure (HCC)  Basic Metabolic Panel   3. Obesity (BMI 30-39. 9)  Basic Metabolic Panel   4. Mixed hyperlipidemia  Basic Metabolic Panel   5. PAF (paroxysmal atrial fibrillation) (HCC)  Basic Metabolic Panel       PLAN:  Orders Placed This Encounter   Procedures    Basic Metabolic Panel     No orders of the defined types were placed in this encounter.         1. Continue present medications  2. May take an extra Bumex tablet when he has weight gain as discussed  3. Recommend follow-up assessment in 3 months    Return in about 4 weeks (around 12/14/2020) for return to Dr. Danny Viramontes only. Luna Shirley MD 11/16/2020 2:02 PM [facility]      Thisdictation was generated by voice recognition computer software. Although all attempts are made to edit the dictation for accuracy, there may be errors in the transcription that are not intended.

## 2020-12-01 ENCOUNTER — OFFICE VISIT (OUTPATIENT)
Dept: FAMILY MEDICINE CLINIC | Facility: CLINIC | Age: 65
End: 2020-12-01

## 2020-12-01 VITALS
BODY MASS INDEX: 34.44 KG/M2 | SYSTOLIC BLOOD PRESSURE: 148 MMHG | OXYGEN SATURATION: 95 % | TEMPERATURE: 98.2 F | RESPIRATION RATE: 16 BRPM | WEIGHT: 246 LBS | HEART RATE: 47 BPM | DIASTOLIC BLOOD PRESSURE: 91 MMHG | HEIGHT: 71 IN

## 2020-12-01 DIAGNOSIS — I50.42 CHRONIC COMBINED SYSTOLIC AND DIASTOLIC CONGESTIVE HEART FAILURE (HCC): Primary | ICD-10-CM

## 2020-12-01 DIAGNOSIS — Z79.4 TYPE 2 DIABETES MELLITUS WITH DIABETIC AUTONOMIC NEUROPATHY, WITH LONG-TERM CURRENT USE OF INSULIN (HCC): ICD-10-CM

## 2020-12-01 DIAGNOSIS — I10 ESSENTIAL HYPERTENSION: ICD-10-CM

## 2020-12-01 DIAGNOSIS — N18.4 STAGE 4 CHRONIC KIDNEY DISEASE (HCC): ICD-10-CM

## 2020-12-01 DIAGNOSIS — E11.43 TYPE 2 DIABETES MELLITUS WITH DIABETIC AUTONOMIC NEUROPATHY, WITH LONG-TERM CURRENT USE OF INSULIN (HCC): ICD-10-CM

## 2020-12-01 PROCEDURE — 99214 OFFICE O/P EST MOD 30 MIN: CPT | Performed by: FAMILY MEDICINE

## 2020-12-01 NOTE — PROGRESS NOTES
Subjective cc: CHF   Fortino Llanes is a 65 y.o. male with CHF, HTN, HLD, obesity, CKD who presents for follow up.  He has noted weight gain since thanksgiving - he ate regular food that day instead of his normal low sodium diet.    He has been taking 3 bumex daily.   He followed up with cardio - follows up again in 2 weeks - advised he may take him off of higher dose of diuretics.   He follows with nephrology on Friday. Will get labs today   He is not drinking any soda, drinking juice in AM, water in day, no added salt, restricted Na intake to 2500 mg daily   He had been losing weight but back up today.   He noticed weight increase after thanksgiving. About 3 lbs every day.   He has noted some swelling in his abd and legs.   He is having some increased SOA.   He did not sleep last night because of his breathing.     Congestive Heart Failure  Presents for follow-up visit. Associated symptoms include edema, shortness of breath and unexpected weight change. Pertinent negatives include no abdominal pain, chest pain, chest pressure, claudication, fatigue, muscle weakness, near-syncope, nocturia, orthopnea, palpitations or paroxysmal nocturnal dyspnea. The symptoms have been worsening. Compliance with total regimen is %. Compliance problems include adherence to diet.  Compliance with diet is 51-75%. Compliance with exercise is 51-75%. Compliance with medications is %. Treatment side effects: renal         The following portions of the patient's history were reviewed and updated as appropriate: allergies, current medications, past family history, past medical history, past social history, past surgical history and problem list.        Review of Systems   Constitutional: Positive for unexpected weight change. Negative for activity change, appetite change and fatigue.   Respiratory: Positive for cough and shortness of breath.    Cardiovascular: Negative for chest pain, palpitations, claudication and near-syncope.  "  Gastrointestinal: Negative for abdominal pain.   Genitourinary: Negative for nocturia.   Musculoskeletal: Negative for muscle weakness.   Psychiatric/Behavioral: Positive for sleep disturbance.   All other systems reviewed and are negative.      Objective   Blood pressure 148/91, pulse (!) 47, temperature 98.2 °F (36.8 °C), resp. rate 16, height 180.3 cm (71\"), weight 112 kg (246 lb), SpO2 95 %.  Physical Exam  Vitals signs and nursing note reviewed.   Constitutional:       General: He is not in acute distress.     Appearance: Normal appearance. He is obese. He is not toxic-appearing.   HENT:      Head: Normocephalic and atraumatic.      Right Ear: External ear normal.      Left Ear: External ear normal.   Eyes:      General:         Right eye: No discharge.         Left eye: No discharge.      Extraocular Movements: Extraocular movements intact.   Neck:      Musculoskeletal: Normal range of motion. No neck rigidity.   Cardiovascular:      Rate and Rhythm: Regular rhythm. Bradycardia present.      Pulses: Normal pulses.      Heart sounds: Normal heart sounds.   Pulmonary:      Effort: Pulmonary effort is normal.      Breath sounds: Normal breath sounds. No wheezing or rales.   Abdominal:      General: There is no distension.      Palpations: Abdomen is soft.      Tenderness: There is no abdominal tenderness.   Musculoskeletal:      Right lower le+ Pitting Edema present.      Left lower le+ Pitting Edema present.   Lymphadenopathy:      Cervical: No cervical adenopathy.   Skin:     General: Skin is warm and dry.   Neurological:      Mental Status: He is alert and oriented to person, place, and time. Mental status is at baseline.   Psychiatric:         Mood and Affect: Mood normal.         Behavior: Behavior normal.         Thought Content: Thought content normal.         Judgment: Judgment normal.         Assessment/Plan   Problems Addressed this Visit        Cardiovascular and Mediastinum    Essential " hypertension       Endocrine    Type 2 diabetes mellitus with diabetic autonomic neuropathy, with long-term current use of insulin (CMS/[facility])       Genitourinary    Stage 4 chronic kidney disease (CMS/[facility])      Other Visit Diagnoses     Chronic combined systolic and diastolic congestive heart failure (CMS/[facility])    -  Primary      Diagnoses       Codes Comments    Chronic combined systolic and diastolic congestive heart failure (CMS/[facility])    -  Primary ICD-10-CM: I50.42  ICD-9-CM: 428.42, 428.0     Essential hypertension     ICD-10-CM: I10  ICD-9-CM: 401.9     Stage 4 chronic kidney disease (CMS/[facility])     ICD-10-CM: N18.4  ICD-9-CM: 585.4     Type 2 diabetes mellitus with diabetic autonomic neuropathy, with long-term current use of insulin (CMS/[facility])     ICD-10-CM: E11.43, Z79.4  ICD-9-CM: 250.60, 337.1, V58.67           PLAN:     #1 CHF: chronic, uncontrolled, pt noting weight gain, edema and increased WOB, he is on higher dose of PO diuretics currently, likely an acute flare from higher sodium foods on thanksgiving. Recommend lowering daily Na intake to 2000 mg daily and fluid restriction for a few days until weight is trending down. Advised if weight continues to increase over the next few days he should contact cardio for further diuresis to try to avoid admission, continue on current medications     #2 HTN: chronic, borderline control, low sodium diet, continue on current medications     #3 DM: chronic, controlled, reviewed labs, continue on current medications     #4 CKD: chronic, worsening, labs today, appt with nephro on Friday, advised on nephrotoxic medications           This document has been electronically signed by Clara Calzada MD on December 1, 2020 08:32 CST

## 2020-12-07 RX ORDER — ROSUVASTATIN CALCIUM 20 MG/1
20 TABLET, COATED ORAL DAILY
Qty: 90 TABLET | Refills: 1 | Status: SHIPPED | OUTPATIENT
Start: 2020-12-07 | End: 2021-07-08

## 2020-12-07 RX ORDER — CARVEDILOL 25 MG/1
25 TABLET ORAL 2 TIMES DAILY WITH MEALS
Qty: 180 TABLET | Refills: 1 | Status: SHIPPED | OUTPATIENT
Start: 2020-12-07 | End: 2021-01-21

## 2020-12-07 RX ORDER — BUMETANIDE 1 MG/1
1 TABLET ORAL 3 TIMES DAILY
Qty: 270 TABLET | Refills: 1 | Status: ON HOLD | OUTPATIENT
Start: 2020-12-07 | End: 2021-04-30

## 2020-12-18 ENCOUNTER — HOSPITAL ENCOUNTER (INPATIENT)
Age: 65
LOS: 5 days | Discharge: HOME | DRG: 177 | End: 2020-12-23
Attending: INTERNAL MEDICINE | Admitting: HOSPITALIST
Payer: COMMERCIAL

## 2020-12-18 PROBLEM — I21.4 NSTEMI (NON-ST ELEVATED MYOCARDIAL INFARCTION) (HCC): Status: ACTIVE | Noted: 2020-12-18

## 2020-12-18 LAB
GLUCOSE BLD-MCNC: 202 MG/DL (ref 70–99)
PERFORMED ON: ABNORMAL

## 2020-12-18 PROCEDURE — 1210000000 HC MED SURG R&B

## 2020-12-18 PROCEDURE — 82947 ASSAY GLUCOSE BLOOD QUANT: CPT

## 2020-12-18 RX ORDER — ONDANSETRON 2 MG/ML
4 INJECTION INTRAMUSCULAR; INTRAVENOUS EVERY 6 HOURS PRN
Status: DISCONTINUED | OUTPATIENT
Start: 2020-12-18 | End: 2020-12-23 | Stop reason: HOSPADM

## 2020-12-18 RX ORDER — ACETAMINOPHEN 650 MG/1
650 SUPPOSITORY RECTAL EVERY 6 HOURS PRN
Status: DISCONTINUED | OUTPATIENT
Start: 2020-12-18 | End: 2020-12-23 | Stop reason: HOSPADM

## 2020-12-18 RX ORDER — PROMETHAZINE HYDROCHLORIDE 12.5 MG/1
12.5 TABLET ORAL EVERY 6 HOURS PRN
Status: DISCONTINUED | OUTPATIENT
Start: 2020-12-18 | End: 2020-12-23 | Stop reason: HOSPADM

## 2020-12-18 RX ORDER — SODIUM CHLORIDE 0.9 % (FLUSH) 0.9 %
10 SYRINGE (ML) INJECTION PRN
Status: DISCONTINUED | OUTPATIENT
Start: 2020-12-18 | End: 2020-12-23 | Stop reason: HOSPADM

## 2020-12-18 RX ORDER — ZINC SULFATE 50(220)MG
50 CAPSULE ORAL DAILY
Status: DISCONTINUED | OUTPATIENT
Start: 2020-12-19 | End: 2020-12-23 | Stop reason: HOSPADM

## 2020-12-18 RX ORDER — NITROGLYCERIN 0.4 MG/1
0.4 TABLET SUBLINGUAL EVERY 5 MIN PRN
Status: DISCONTINUED | OUTPATIENT
Start: 2020-12-18 | End: 2020-12-23 | Stop reason: HOSPADM

## 2020-12-18 RX ORDER — ASPIRIN 81 MG/1
81 TABLET, CHEWABLE ORAL DAILY
Status: DISCONTINUED | OUTPATIENT
Start: 2020-12-19 | End: 2020-12-23 | Stop reason: HOSPADM

## 2020-12-18 RX ORDER — VITAMIN B COMPLEX
2000 TABLET ORAL DAILY
Status: DISCONTINUED | OUTPATIENT
Start: 2020-12-19 | End: 2020-12-23 | Stop reason: HOSPADM

## 2020-12-18 RX ORDER — METOLAZONE 5 MG/1
5 TABLET ORAL
Status: ON HOLD | COMMUNITY
End: 2021-11-07 | Stop reason: SDUPTHER

## 2020-12-18 RX ORDER — SODIUM CHLORIDE 0.9 % (FLUSH) 0.9 %
10 SYRINGE (ML) INJECTION EVERY 12 HOURS SCHEDULED
Status: DISCONTINUED | OUTPATIENT
Start: 2020-12-18 | End: 2020-12-23 | Stop reason: HOSPADM

## 2020-12-18 RX ORDER — POLYETHYLENE GLYCOL 3350 17 G/17G
17 POWDER, FOR SOLUTION ORAL DAILY PRN
Status: DISCONTINUED | OUTPATIENT
Start: 2020-12-18 | End: 2020-12-23 | Stop reason: HOSPADM

## 2020-12-18 RX ORDER — ASCORBIC ACID 500 MG
1000 TABLET ORAL DAILY
Status: DISCONTINUED | OUTPATIENT
Start: 2020-12-19 | End: 2020-12-23 | Stop reason: HOSPADM

## 2020-12-18 RX ORDER — BUDESONIDE AND FORMOTEROL FUMARATE DIHYDRATE 80; 4.5 UG/1; UG/1
2 AEROSOL RESPIRATORY (INHALATION) 2 TIMES DAILY
Status: DISCONTINUED | OUTPATIENT
Start: 2020-12-18 | End: 2020-12-23 | Stop reason: HOSPADM

## 2020-12-18 RX ORDER — ACETAMINOPHEN 325 MG/1
650 TABLET ORAL EVERY 6 HOURS PRN
Status: DISCONTINUED | OUTPATIENT
Start: 2020-12-18 | End: 2020-12-23 | Stop reason: HOSPADM

## 2020-12-18 RX ORDER — ERGOCALCIFEROL 1.25 MG/1
50000 CAPSULE ORAL WEEKLY
COMMUNITY
Start: 2020-12-21 | End: 2022-10-21 | Stop reason: DRUGHIGH

## 2020-12-18 RX ORDER — ATORVASTATIN CALCIUM 40 MG/1
40 TABLET, FILM COATED ORAL NIGHTLY
Status: DISCONTINUED | OUTPATIENT
Start: 2020-12-18 | End: 2020-12-23 | Stop reason: HOSPADM

## 2020-12-19 ENCOUNTER — APPOINTMENT (OUTPATIENT)
Dept: GENERAL RADIOLOGY | Age: 65
DRG: 177 | End: 2020-12-19
Attending: INTERNAL MEDICINE
Payer: COMMERCIAL

## 2020-12-19 ENCOUNTER — APPOINTMENT (OUTPATIENT)
Dept: ULTRASOUND IMAGING | Age: 65
DRG: 177 | End: 2020-12-19
Attending: INTERNAL MEDICINE
Payer: COMMERCIAL

## 2020-12-19 PROBLEM — U07.1 PNEUMONIA DUE TO COVID-19 VIRUS: Status: ACTIVE | Noted: 2020-12-19

## 2020-12-19 PROBLEM — J12.82 PNEUMONIA DUE TO COVID-19 VIRUS: Status: ACTIVE | Noted: 2020-12-19

## 2020-12-19 LAB
ABO/RH: NORMAL
ALBUMIN SERPL-MCNC: 4.3 G/DL (ref 3.5–5.2)
ALP BLD-CCNC: 85 U/L (ref 40–130)
ALT SERPL-CCNC: 22 U/L (ref 5–41)
ANION GAP SERPL CALCULATED.3IONS-SCNC: 13 MMOL/L (ref 7–19)
ANTIBODY SCREEN: NORMAL
APTT: 34.8 SEC (ref 26–36.2)
AST SERPL-CCNC: 29 U/L (ref 5–40)
BACTERIA: NEGATIVE /HPF
BASE EXCESS ARTERIAL: 22.8 MMOL/L (ref -2–2)
BASOPHILS ABSOLUTE: 0.1 K/UL (ref 0–0.2)
BASOPHILS RELATIVE PERCENT: 0.7 % (ref 0–1)
BILIRUB SERPL-MCNC: 1 MG/DL (ref 0.2–1.2)
BILIRUBIN URINE: NEGATIVE
BLOOD BANK DISPENSE STATUS: NORMAL
BLOOD BANK PRODUCT CODE: NORMAL
BLOOD, URINE: ABNORMAL
BPU ID: NORMAL
BUN BLDV-MCNC: 36 MG/DL (ref 8–23)
C-REACTIVE PROTEIN: 0.27 MG/DL (ref 0–0.5)
CALCIUM SERPL-MCNC: 9.5 MG/DL (ref 8.8–10.2)
CARBOXYHEMOGLOBIN ARTERIAL: 2.6 % (ref 0–5)
CHLORIDE BLD-SCNC: 87 MMOL/L (ref 98–111)
CHOLESTEROL, TOTAL: 103 MG/DL (ref 160–199)
CLARITY: CLEAR
CO2: 42 MMOL/L (ref 22–29)
COLOR: YELLOW
CREAT SERPL-MCNC: 2.9 MG/DL (ref 0.5–1.2)
CREATININE URINE: 71.7 MG/DL (ref 4.2–622)
CREATININE URINE: 72.2 MG/DL (ref 4.2–622)
CRYSTALS, UA: ABNORMAL /HPF
D DIMER: 0.37 UG/ML FEU (ref 0–0.48)
DESCRIPTION BLOOD BANK: NORMAL
EOSINOPHIL,URINE: NORMAL
EOSINOPHILS ABSOLUTE: 0.1 K/UL (ref 0–0.6)
EOSINOPHILS RELATIVE PERCENT: 0.8 % (ref 0–5)
EPITHELIAL CELLS, UA: 1 /HPF (ref 0–5)
FIBRINOGEN: 363 MG/DL (ref 238–505)
GFR AFRICAN AMERICAN: 27
GFR NON-AFRICAN AMERICAN: 22
GLUCOSE BLD-MCNC: 201 MG/DL (ref 70–99)
GLUCOSE BLD-MCNC: 220 MG/DL (ref 74–109)
GLUCOSE BLD-MCNC: 233 MG/DL (ref 70–99)
GLUCOSE BLD-MCNC: 237 MG/DL (ref 70–99)
GLUCOSE BLD-MCNC: 238 MG/DL (ref 70–99)
GLUCOSE URINE: NEGATIVE MG/DL
HBA1C MFR BLD: 6.7 % (ref 4–6)
HCO3 ARTERIAL: 50.7 MMOL/L (ref 22–26)
HCT VFR BLD CALC: 46.5 % (ref 42–52)
HDLC SERPL-MCNC: 32 MG/DL (ref 55–121)
HEMOGLOBIN, ART, EXTENDED: 14.9 G/DL (ref 14–18)
HEMOGLOBIN: 14.3 G/DL (ref 14–18)
HYALINE CASTS: 1 /HPF (ref 0–8)
IMMATURE GRANULOCYTES #: 0 K/UL
INR BLD: 1.42 (ref 0.88–1.18)
KETONES, URINE: NEGATIVE MG/DL
L. PNEUMOPHILA SEROGP 1 UR AG: NORMAL
LDL CHOLESTEROL CALCULATED: 43 MG/DL
LEUKOCYTE ESTERASE, URINE: NEGATIVE
LYMPHOCYTES ABSOLUTE: 1.1 K/UL (ref 1.1–4.5)
LYMPHOCYTES RELATIVE PERCENT: 15.4 % (ref 20–40)
MAGNESIUM: 1.9 MG/DL (ref 1.6–2.4)
MAGNESIUM: 1.9 MG/DL (ref 1.6–2.4)
MCH RBC QN AUTO: 29.5 PG (ref 27–31)
MCHC RBC AUTO-ENTMCNC: 30.8 G/DL (ref 33–37)
MCV RBC AUTO: 95.9 FL (ref 80–94)
METHEMOGLOBIN ARTERIAL: 1.1 %
MICROALBUMIN UR-MCNC: 86.2 MG/DL (ref 0–19)
MICROALBUMIN/CREAT UR-RTO: 1202.2 MG/G
MONOCYTES ABSOLUTE: 1 K/UL (ref 0–0.9)
MONOCYTES RELATIVE PERCENT: 14.2 % (ref 0–10)
NEUTROPHILS ABSOLUTE: 5.1 K/UL (ref 1.5–7.5)
NEUTROPHILS RELATIVE PERCENT: 68.8 % (ref 50–65)
NITRITE, URINE: NEGATIVE
O2 CONTENT ARTERIAL: 18.2 ML/DL
O2 SAT, ARTERIAL: 87 %
O2 THERAPY: ABNORMAL
OSMOLALITY URINE: 395 MOSM/KG (ref 250–1200)
PARATHYROID HORMONE INTACT: 115.7 PG/ML (ref 15–65)
PCO2 ARTERIAL: 65 MMHG (ref 35–45)
PDW BLD-RTO: 14.4 % (ref 11.5–14.5)
PERFORMED ON: ABNORMAL
PH ARTERIAL: 7.5 (ref 7.35–7.45)
PH UA: 7.5 (ref 5–8)
PHOSPHORUS: 3.5 MG/DL (ref 2.5–4.5)
PLATELET # BLD: 149 K/UL (ref 130–400)
PMV BLD AUTO: 11.6 FL (ref 9.4–12.4)
PO2 ARTERIAL: 51 MMHG (ref 80–100)
POTASSIUM REFLEX MAGNESIUM: 2.5 MMOL/L (ref 3.5–5)
POTASSIUM, WHOLE BLOOD: 2.2
PRO-BNP: 4061 PG/ML (ref 0–900)
PROCALCITONIN: 0.07 NG/ML (ref 0–0.09)
PROTEIN UA: 300 MG/DL
PROTHROMBIN TIME: 17.5 SEC (ref 12–14.6)
RBC # BLD: 4.85 M/UL (ref 4.7–6.1)
RBC UA: 1 /HPF (ref 0–4)
SODIUM BLD-SCNC: 142 MMOL/L (ref 136–145)
SODIUM URINE: 113 MMOL/L
SPECIFIC GRAVITY UA: 1.01 (ref 1–1.03)
STREP PNEUMONIAE ANTIGEN, URINE: NORMAL
TOTAL PROTEIN: 6.8 G/DL (ref 6.6–8.7)
TRIGL SERPL-MCNC: 142 MG/DL (ref 0–149)
TROPONIN: 0.07 NG/ML (ref 0–0.03)
TSH REFLEX FT4: 0.55 UIU/ML (ref 0.35–5.5)
UROBILINOGEN, URINE: 1 E.U./DL
VITAMIN D 25-HYDROXY: 32.2 NG/ML
WBC # BLD: 7.3 K/UL (ref 4.8–10.8)
WBC UA: 0 /HPF (ref 0–5)

## 2020-12-19 PROCEDURE — 6370000000 HC RX 637 (ALT 250 FOR IP): Performed by: INTERNAL MEDICINE

## 2020-12-19 PROCEDURE — 84100 ASSAY OF PHOSPHORUS: CPT

## 2020-12-19 PROCEDURE — 6360000002 HC RX W HCPCS: Performed by: INTERNAL MEDICINE

## 2020-12-19 PROCEDURE — 86900 BLOOD TYPING SEROLOGIC ABO: CPT

## 2020-12-19 PROCEDURE — 2580000003 HC RX 258: Performed by: INTERNAL MEDICINE

## 2020-12-19 PROCEDURE — 2500000003 HC RX 250 WO HCPCS: Performed by: INTERNAL MEDICINE

## 2020-12-19 PROCEDURE — 86901 BLOOD TYPING SEROLOGIC RH(D): CPT

## 2020-12-19 PROCEDURE — 83880 ASSAY OF NATRIURETIC PEPTIDE: CPT

## 2020-12-19 PROCEDURE — 1210000000 HC MED SURG R&B

## 2020-12-19 PROCEDURE — 76770 US EXAM ABDO BACK WALL COMP: CPT

## 2020-12-19 PROCEDURE — 85610 PROTHROMBIN TIME: CPT

## 2020-12-19 PROCEDURE — 84484 ASSAY OF TROPONIN QUANT: CPT

## 2020-12-19 PROCEDURE — 82570 ASSAY OF URINE CREATININE: CPT

## 2020-12-19 PROCEDURE — 82306 VITAMIN D 25 HYDROXY: CPT

## 2020-12-19 PROCEDURE — 71045 X-RAY EXAM CHEST 1 VIEW: CPT

## 2020-12-19 PROCEDURE — 82947 ASSAY GLUCOSE BLOOD QUANT: CPT

## 2020-12-19 PROCEDURE — 83935 ASSAY OF URINE OSMOLALITY: CPT

## 2020-12-19 PROCEDURE — 85384 FIBRINOGEN ACTIVITY: CPT

## 2020-12-19 PROCEDURE — 81001 URINALYSIS AUTO W/SCOPE: CPT

## 2020-12-19 PROCEDURE — 87205 SMEAR GRAM STAIN: CPT

## 2020-12-19 PROCEDURE — 80053 COMPREHEN METABOLIC PANEL: CPT

## 2020-12-19 PROCEDURE — XW033E5 INTRODUCTION OF REMDESIVIR ANTI-INFECTIVE INTO PERIPHERAL VEIN, PERCUTANEOUS APPROACH, NEW TECHNOLOGY GROUP 5: ICD-10-PCS | Performed by: INTERNAL MEDICINE

## 2020-12-19 PROCEDURE — 85730 THROMBOPLASTIN TIME PARTIAL: CPT

## 2020-12-19 PROCEDURE — 83735 ASSAY OF MAGNESIUM: CPT

## 2020-12-19 PROCEDURE — 84145 PROCALCITONIN (PCT): CPT

## 2020-12-19 PROCEDURE — 36600 WITHDRAWAL OF ARTERIAL BLOOD: CPT

## 2020-12-19 PROCEDURE — 82803 BLOOD GASES ANY COMBINATION: CPT

## 2020-12-19 PROCEDURE — 86140 C-REACTIVE PROTEIN: CPT

## 2020-12-19 PROCEDURE — 80061 LIPID PANEL: CPT

## 2020-12-19 PROCEDURE — 85025 COMPLETE CBC W/AUTO DIFF WBC: CPT

## 2020-12-19 PROCEDURE — 83970 ASSAY OF PARATHORMONE: CPT

## 2020-12-19 PROCEDURE — 85379 FIBRIN DEGRADATION QUANT: CPT

## 2020-12-19 PROCEDURE — 84443 ASSAY THYROID STIM HORMONE: CPT

## 2020-12-19 PROCEDURE — 87449 NOS EACH ORGANISM AG IA: CPT

## 2020-12-19 PROCEDURE — 2700000000 HC OXYGEN THERAPY PER DAY

## 2020-12-19 PROCEDURE — XW13325 TRANSFUSION OF CONVALESCENT PLASMA (NONAUTOLOGOUS) INTO PERIPHERAL VEIN, PERCUTANEOUS APPROACH, NEW TECHNOLOGY GROUP 5: ICD-10-PCS | Performed by: INTERNAL MEDICINE

## 2020-12-19 PROCEDURE — 82043 UR ALBUMIN QUANTITATIVE: CPT

## 2020-12-19 PROCEDURE — 86850 RBC ANTIBODY SCREEN: CPT

## 2020-12-19 PROCEDURE — 84132 ASSAY OF SERUM POTASSIUM: CPT

## 2020-12-19 PROCEDURE — 83036 HEMOGLOBIN GLYCOSYLATED A1C: CPT

## 2020-12-19 PROCEDURE — 84300 ASSAY OF URINE SODIUM: CPT

## 2020-12-19 RX ORDER — INSULIN GLARGINE 100 [IU]/ML
15 INJECTION, SOLUTION SUBCUTANEOUS 2 TIMES DAILY
Status: DISCONTINUED | OUTPATIENT
Start: 2020-12-19 | End: 2020-12-20

## 2020-12-19 RX ORDER — POTASSIUM CHLORIDE 20 MEQ/1
40 TABLET, EXTENDED RELEASE ORAL PRN
Status: DISCONTINUED | OUTPATIENT
Start: 2020-12-19 | End: 2020-12-23 | Stop reason: HOSPADM

## 2020-12-19 RX ORDER — DEXTROSE MONOHYDRATE 25 G/50ML
12.5 INJECTION, SOLUTION INTRAVENOUS PRN
Status: DISCONTINUED | OUTPATIENT
Start: 2020-12-19 | End: 2020-12-19 | Stop reason: SDUPTHER

## 2020-12-19 RX ORDER — ALBUTEROL SULFATE 90 UG/1
2 AEROSOL, METERED RESPIRATORY (INHALATION) EVERY 4 HOURS PRN
Status: DISCONTINUED | OUTPATIENT
Start: 2020-12-19 | End: 2020-12-21

## 2020-12-19 RX ORDER — DEXTROSE MONOHYDRATE 50 MG/ML
100 INJECTION, SOLUTION INTRAVENOUS PRN
Status: DISCONTINUED | OUTPATIENT
Start: 2020-12-19 | End: 2020-12-19 | Stop reason: SDUPTHER

## 2020-12-19 RX ORDER — CARVEDILOL 25 MG/1
25 TABLET ORAL 2 TIMES DAILY
Status: DISCONTINUED | OUTPATIENT
Start: 2020-12-19 | End: 2020-12-23 | Stop reason: HOSPADM

## 2020-12-19 RX ORDER — HEPARIN SODIUM 10000 [USP'U]/100ML
9.79 INJECTION, SOLUTION INTRAVENOUS CONTINUOUS
Status: DISCONTINUED | OUTPATIENT
Start: 2020-12-19 | End: 2020-12-19

## 2020-12-19 RX ORDER — POTASSIUM CHLORIDE 7.45 MG/ML
10 INJECTION INTRAVENOUS PRN
Status: DISCONTINUED | OUTPATIENT
Start: 2020-12-19 | End: 2020-12-23 | Stop reason: HOSPADM

## 2020-12-19 RX ORDER — ESOMEPRAZOLE MAGNESIUM 40 MG/1
40 FOR SUSPENSION ORAL 2 TIMES DAILY
Status: DISCONTINUED | OUTPATIENT
Start: 2020-12-19 | End: 2020-12-19 | Stop reason: CLARIF

## 2020-12-19 RX ORDER — 0.9 % SODIUM CHLORIDE 0.9 %
30 INTRAVENOUS SOLUTION INTRAVENOUS PRN
Status: DISCONTINUED | OUTPATIENT
Start: 2020-12-19 | End: 2020-12-23 | Stop reason: HOSPADM

## 2020-12-19 RX ORDER — HEPARIN SODIUM 1000 [USP'U]/ML
2000 INJECTION, SOLUTION INTRAVENOUS; SUBCUTANEOUS PRN
Status: DISCONTINUED | OUTPATIENT
Start: 2020-12-19 | End: 2020-12-19

## 2020-12-19 RX ORDER — AMLODIPINE BESYLATE 10 MG/1
10 TABLET ORAL DAILY
Status: DISCONTINUED | OUTPATIENT
Start: 2020-12-19 | End: 2020-12-23 | Stop reason: HOSPADM

## 2020-12-19 RX ORDER — DEXTROSE MONOHYDRATE 25 G/50ML
12.5 INJECTION, SOLUTION INTRAVENOUS PRN
Status: DISCONTINUED | OUTPATIENT
Start: 2020-12-19 | End: 2020-12-23 | Stop reason: HOSPADM

## 2020-12-19 RX ORDER — ISOSORBIDE MONONITRATE 60 MG/1
60 TABLET, EXTENDED RELEASE ORAL 2 TIMES DAILY
Status: DISCONTINUED | OUTPATIENT
Start: 2020-12-19 | End: 2020-12-23 | Stop reason: HOSPADM

## 2020-12-19 RX ORDER — NICOTINE POLACRILEX 4 MG
15 LOZENGE BUCCAL PRN
Status: DISCONTINUED | OUTPATIENT
Start: 2020-12-19 | End: 2020-12-23 | Stop reason: HOSPADM

## 2020-12-19 RX ORDER — HEPARIN SODIUM 1000 [USP'U]/ML
4000 INJECTION, SOLUTION INTRAVENOUS; SUBCUTANEOUS ONCE
Status: COMPLETED | OUTPATIENT
Start: 2020-12-19 | End: 2020-12-19

## 2020-12-19 RX ORDER — 0.9 % SODIUM CHLORIDE 0.9 %
20 INTRAVENOUS SOLUTION INTRAVENOUS ONCE
Status: COMPLETED | OUTPATIENT
Start: 2020-12-19 | End: 2020-12-19

## 2020-12-19 RX ORDER — AMIODARONE HYDROCHLORIDE 200 MG/1
200 TABLET ORAL DAILY
Status: DISCONTINUED | OUTPATIENT
Start: 2020-12-19 | End: 2020-12-23 | Stop reason: HOSPADM

## 2020-12-19 RX ORDER — PANTOPRAZOLE SODIUM 40 MG/1
40 TABLET, DELAYED RELEASE ORAL 2 TIMES DAILY
Status: DISCONTINUED | OUTPATIENT
Start: 2020-12-19 | End: 2020-12-23 | Stop reason: HOSPADM

## 2020-12-19 RX ORDER — CLOPIDOGREL BISULFATE 75 MG/1
75 TABLET ORAL DAILY
Status: DISCONTINUED | OUTPATIENT
Start: 2020-12-19 | End: 2020-12-23 | Stop reason: HOSPADM

## 2020-12-19 RX ORDER — HEPARIN SODIUM 1000 [USP'U]/ML
4000 INJECTION, SOLUTION INTRAVENOUS; SUBCUTANEOUS PRN
Status: DISCONTINUED | OUTPATIENT
Start: 2020-12-19 | End: 2020-12-19

## 2020-12-19 RX ORDER — NICOTINE POLACRILEX 4 MG
15 LOZENGE BUCCAL PRN
Status: DISCONTINUED | OUTPATIENT
Start: 2020-12-19 | End: 2020-12-19 | Stop reason: SDUPTHER

## 2020-12-19 RX ORDER — DEXTROSE MONOHYDRATE 50 MG/ML
100 INJECTION, SOLUTION INTRAVENOUS PRN
Status: DISCONTINUED | OUTPATIENT
Start: 2020-12-19 | End: 2020-12-23 | Stop reason: HOSPADM

## 2020-12-19 RX ADMIN — PANTOPRAZOLE SODIUM 40 MG: 40 TABLET, DELAYED RELEASE ORAL at 20:52

## 2020-12-19 RX ADMIN — AMLODIPINE BESYLATE 10 MG: 10 TABLET ORAL at 14:55

## 2020-12-19 RX ADMIN — FAMOTIDINE 20 MG: 10 INJECTION INTRAVENOUS at 20:52

## 2020-12-19 RX ADMIN — CHOLECALCIFEROL (VITAMIN D3) 25 MCG (1,000 UNIT) TABLET 2000 UNITS: TABLET at 15:08

## 2020-12-19 RX ADMIN — FAMOTIDINE 20 MG: 10 INJECTION INTRAVENOUS at 14:55

## 2020-12-19 RX ADMIN — HEPARIN SODIUM 4000 UNITS: 1000 INJECTION INTRAVENOUS; SUBCUTANEOUS at 14:54

## 2020-12-19 RX ADMIN — CLOPIDOGREL BISULFATE 75 MG: 75 TABLET ORAL at 14:54

## 2020-12-19 RX ADMIN — ZINC SULFATE 220 MG (50 MG) CAPSULE 50 MG: CAPSULE at 14:54

## 2020-12-19 RX ADMIN — INSULIN LISPRO 4 UNITS: 100 INJECTION, SOLUTION INTRAVENOUS; SUBCUTANEOUS at 12:00

## 2020-12-19 RX ADMIN — Medication 15 UNITS: at 14:55

## 2020-12-19 RX ADMIN — Medication 15 UNITS: at 20:55

## 2020-12-19 RX ADMIN — DEXAMETHASONE 6 MG: 2 TABLET ORAL at 15:07

## 2020-12-19 RX ADMIN — CARVEDILOL 25 MG: 25 TABLET, FILM COATED ORAL at 15:00

## 2020-12-19 RX ADMIN — SODIUM CHLORIDE AND POTASSIUM CHLORIDE: 9; 1.49 INJECTION, SOLUTION INTRAVENOUS at 14:53

## 2020-12-19 RX ADMIN — BUDESONIDE AND FORMOTEROL FUMARATE DIHYDRATE 2 PUFF: 80; 4.5 AEROSOL RESPIRATORY (INHALATION) at 14:53

## 2020-12-19 RX ADMIN — CARVEDILOL 25 MG: 25 TABLET, FILM COATED ORAL at 20:55

## 2020-12-19 RX ADMIN — AMIODARONE HYDROCHLORIDE 200 MG: 200 TABLET ORAL at 15:00

## 2020-12-19 RX ADMIN — ATORVASTATIN CALCIUM 40 MG: 40 TABLET, FILM COATED ORAL at 20:52

## 2020-12-19 RX ADMIN — BUDESONIDE AND FORMOTEROL FUMARATE DIHYDRATE 2 PUFF: 80; 4.5 AEROSOL RESPIRATORY (INHALATION) at 20:52

## 2020-12-19 RX ADMIN — ASPIRIN 81 MG: 81 TABLET, CHEWABLE ORAL at 15:08

## 2020-12-19 RX ADMIN — SODIUM CHLORIDE 20 ML: 9 INJECTION, SOLUTION INTRAVENOUS at 09:45

## 2020-12-19 RX ADMIN — HEPARIN SODIUM 9.79 UNITS/KG/HR: 10000 INJECTION, SOLUTION INTRAVENOUS at 12:29

## 2020-12-19 RX ADMIN — ISOSORBIDE MONONITRATE 60 MG: 60 TABLET, EXTENDED RELEASE ORAL at 20:52

## 2020-12-19 RX ADMIN — SODIUM CHLORIDE, PRESERVATIVE FREE 10 ML: 5 INJECTION INTRAVENOUS at 15:04

## 2020-12-19 RX ADMIN — REMDESIVIR 200 MG: 100 INJECTION, POWDER, LYOPHILIZED, FOR SOLUTION INTRAVENOUS at 15:00

## 2020-12-19 RX ADMIN — OXYCODONE HYDROCHLORIDE AND ACETAMINOPHEN 1000 MG: 500 TABLET ORAL at 14:55

## 2020-12-19 RX ADMIN — ALBUTEROL SULFATE 2 PUFF: 90 AEROSOL, METERED RESPIRATORY (INHALATION) at 14:54

## 2020-12-19 RX ADMIN — ISOSORBIDE MONONITRATE 60 MG: 60 TABLET, EXTENDED RELEASE ORAL at 15:00

## 2020-12-19 RX ADMIN — PANTOPRAZOLE SODIUM 40 MG: 40 TABLET, DELAYED RELEASE ORAL at 15:00

## 2020-12-19 ASSESSMENT — ENCOUNTER SYMPTOMS: SHORTNESS OF BREATH: 1

## 2020-12-19 NOTE — PROGRESS NOTES
(DECADRON) tablet 6 mg  6 mg Oral Daily Marya Osorio MD        0.9 % sodium chloride bolus  20 mL Intravenous Once Marya Osorio MD        sodium chloride flush 0.9 % injection 10 mL  10 mL Intravenous 2 times per day Drinda Romberg, MD        sodium chloride flush 0.9 % injection 10 mL  10 mL Intravenous PRN Drinda Romberg, MD        famotidine (PEPCID) injection 20 mg  20 mg Intravenous BID Drinda Romberg, MD        promethazine (PHENERGAN) tablet 12.5 mg  12.5 mg Oral Q6H PRN Drinda Romberg, MD        Or    ondansetron [facility]) injection 4 mg  4 mg Intravenous Q6H PRN Drinda Romberg, MD        acetaminophen (TYLENOL) tablet 650 mg  650 mg Oral Q6H PRN Drinda Romberg, MD        Or    acetaminophen (TYLENOL) suppository 650 mg  650 mg Rectal Q6H PRN Drinda Romberg, MD        polyethylene glycol (GLYCOLAX) packet 17 g  17 g Oral Daily PRN Drinda Romberg, MD        aspirin chewable tablet 81 mg  81 mg Oral Daily Drinda Romberg, MD        atorvastatin (LIPITOR) tablet 40 mg  40 mg Oral Nightly Drinda Romberg, MD        nitroGLYCERIN (NITROSTAT) SL tablet 0.4 mg  0.4 mg Sublingual Q5 Min PRN Drinda Romberg, MD        Vitamin D (CHOLECALCIFEROL) tablet 2,000 Units  2,000 Units Oral Daily Drinda Romberg, MD        zinc sulfate (ZINCATE) capsule 50 mg  50 mg Oral Daily Drinda Romberg, MD        ascorbic acid (VITAMIN C) tablet 1,000 mg  1,000 mg Oral Daily Drinda Romberg, MD        budesonide-formoterol (SYMBICORT) 80-4.5 MCG/ACT inhaler 2 puff  2 puff Inhalation BID Drinda Romberg, MD            Labs:     Recent Labs     12/19/20  0815   WBC 7.3   RBC 4.85   HGB 14.3   HCT 46.5   MCV 95.9*   MCH 29.5   MCHC 30.8*        No results for input(s): NA, K, ANIONGAP, CL, CO2, BUN, CREATININE, GLUCOSE, CALCIUM, GFR in the last 72 hours. No results for input(s): MG, PHOS in the last 72 hours.   No results for input(s): AST, ALT, ALB, BILITOT, heparin gtt. HLD  HTN  Obesity - BMI 34    Advance Directive: Full Code    DVT prophylaxis: heparin     Discharge planning: TBD    >35 spent in direct patient care including bedside evaluation and discussion with other providers        Signed:   Genaro Puga MD 12/19/2020 9:26 AM  Hospitalist

## 2020-12-19 NOTE — PROGRESS NOTES
pH, Arterial 7.500High   7.350 - 7.450 Final 12/19/2020 11:34 AM [facility]   pCO2, Arterial 65.0High   35.0 - 45.0 mmHg Final 12/19/2020 11:34 AM [facility]   pO2, Arterial 51. 0Low   80.0 - 100.0 mmHg Final 12/19/2020 11:34 AM [facility]   HCO3, Arterial 50. 7High   22.0 - 26.0 mmol/L Final 12/19/2020 11:34 AM [facility] Excess, Arterial 22. 8High   -2.0 - 2.0 mmol/L Final 12/19/2020 11:34 AM [facility]   Hemoglobin, Art, Extended 14.9  14.0 - 18.0 g/dL Final 12/19/2020 11:34 AM [facility]   O2 Sat, Arterial 87. 0Low Panic   >92 % Final 12/19/2020 11:34 AM [facility]   Carboxyhgb, Arterial 2.6  0.0 - 5.0 % Final 12/19/2020 11:34 AM [facility]        0.0-1.5   (Smokers 1.5-5.0)    Methemoglobin, Arterial 1.1  <1.5 % Final 12/19/2020 11:34 AM [facility]   O2 Content, Arterial 18.2  Not Established mL/dL Final 12/19/2020 11:34 AM [facility]     Patient on 4 l/m nasal cannula. ABG's drawn from RR, AT+.

## 2020-12-19 NOTE — CONSULTS
Nephrology ([facility]) Consult Note      Patient:  Vignesh Quinones  YOB: 1955  Date of Service: 12/19/2020  MRN: [id number]   Acct: [de-identified]   Primary Care Physician: Herminio Lafleur MD  Advance Directive: Full Code  Admit Date: 12/18/2020       Hospital Day: 1  Referring Provider: Tessie Duenas MD    Patient independently seen and examined, Chart, Consults, Notes, Operative notes, Labs, Cardiology, and Radiology studies reviewed as available. Chief complaint: Abnormal labs. Subjective:  Vignesh Quinones is a 60 y.o. male  whom we were consulted for acute kidney injury/chronic kidney disease. Patient has stage IV chronic kidney disease baseline and has seen Dr. Pato Leroy recently on December 11, his serum creatinine at that time was 2.3 mg with estimated GFR of 29 mL. He was clinically volume overload at that time and Dr. Pato Leroy has suggested to start Zaroxolyn 2.5 mg p.o. daily as he was already on Bumex. Presented to outside hospital with non-STEMI. Patient was also tested positive for Covid 19 with pneumonia. He is now admitted to [address] [place] [address] for further work-up. Incidental finding in the lab shows serum creatinine of 2.9 mg. This morning he is more alert and awake, denies any shortness of breath or chest pain. He also denies any swelling of legs. He had one episode of vomiting but no further vomiting at this time.     Allergies:  Hydralazine and Morphine    Medicines:  Current Facility-Administered Medications   Medication Dose Route Frequency Provider Last Rate Last Admin    heparin (porcine) injection 4,000 Units  4,000 Units Intravenous Once Tessie Duenas MD        heparin (porcine) injection 4,000 Units  4,000 Units Intravenous PRN Tessie Duenas MD        heparin (porcine) injection 2,000 Units  2,000 Units Intravenous PRN Tessie Duenas MD        heparin 25,000 units in dextrose 5% 250 mL infusion  9.79 Units/kg/hr Intravenous Continuous Stevenson Hernandez MD        dexamethasone (DECADRON) tablet 6 mg  6 mg Oral Daily Stevenson Hernandez MD        0.9 % sodium chloride bolus  20 mL Intravenous Once Stevenson Hernandez MD        amiodarone (CORDARONE) tablet 200 mg  200 mg Oral Daily Stevenson Hernandez MD        albuterol sulfate  (90 Base) MCG/ACT inhaler 2 puff  2 puff Inhalation Q4H PRN Stevenson Hernandez MD        carvedilol (COREG) tablet 25 mg  25 mg Oral BID Stevenson Hernandez MD        clopidogrel (PLAVIX) tablet 75 mg  75 mg Oral Daily Stevenson Hernandez MD        isosorbide mononitrate (IMDUR) extended release tablet 60 mg  60 mg Oral BID Stevenson Hernandez MD        amLODIPine (NORVASC) tablet 10 mg  10 mg Oral Daily Stevenson Hernandez MD        insulin glargine (LANTUS) injection vial 15 Units  15 Units Subcutaneous BID Stevenson Hernandez MD        glucose (GLUTOSE) 40 % oral gel 15 g  15 g Oral PRN Stevenson Hernandez MD        dextrose 50 % IV solution  12.5 g Intravenous PRN Stevenson Hernandez MD        glucagon (rDNA) injection 1 mg  1 mg Intramuscular PRN Stevenson Hernandez MD        dextrose 5 % solution  100 mL/hr Intravenous PRN Stveenson Hernandez MD        insulin lispro (HUMALOG) injection vial 0-12 Units  0-12 Units Subcutaneous TID WC Stevenson Hernandez MD        insulin lispro (HUMALOG) injection vial 0-6 Units  0-6 Units Subcutaneous Nightly Stevenson Hernandez MD        potassium chloride (KLOR-CON M) extended release tablet 40 mEq  40 mEq Oral PRN Stevenson Hernandez MD        Or    potassium bicarb-citric acid (EFFER-K) effervescent tablet 40 mEq  40 mEq Oral PRN Stevenson Hernandez MD        Or    potassium chloride 10 mEq/100 mL IVPB (Peripheral Line)  10 mEq Intravenous PRN Stevenson Hernandez MD        pantoprazole (PROTONIX) tablet 40 mg  40 mg Oral BID Stevenson Hernandez MD        remdesivir 200 mg in sodium chloride 0.9 % 250 mL IVPB  200 mg Intravenous Once Stevenson Hernandez MD        Followed by   Chaya Henry ON 12/20/2020] remdesivir 100 mg in sodium chloride 0.9 % 250 mL IVPB  100 mg Intravenous Q24H Codie Hendrickson MD        0.9 % sodium chloride bolus  30 mL Intravenous PRN Codie Hendrickson MD        sodium chloride flush 0.9 % injection 10 mL  10 mL Intravenous 2 times per day Niki Ingram MD        sodium chloride flush 0.9 % injection 10 mL  10 mL Intravenous PRN Niki Ingram MD        famotidine (PEPCID) injection 20 mg  20 mg Intravenous BID Niki Ingram MD        promethazine (PHENERGAN) tablet 12.5 mg  12.5 mg Oral Q6H PRN Niki Ingram MD        Or    ondansetron [facility]) injection 4 mg  4 mg Intravenous Q6H PRN Niki Ingram MD        acetaminophen (TYLENOL) tablet 650 mg  650 mg Oral Q6H PRN Niki Ingram MD        Or    acetaminophen (TYLENOL) suppository 650 mg  650 mg Rectal Q6H PRN Niki Ingram MD        polyethylene glycol (GLYCOLAX) packet 17 g  17 g Oral Daily PRN Niki Ingram MD        aspirin chewable tablet 81 mg  81 mg Oral Daily Niki Ingram MD        atorvastatin (LIPITOR) tablet 40 mg  40 mg Oral Nightly Niki Ingram MD        nitroGLYCERIN (NITROSTAT) SL tablet 0.4 mg  0.4 mg Sublingual Q5 Min PRN Niki Ingram MD        Vitamin D (CHOLECALCIFEROL) tablet 2,000 Units  2,000 Units Oral Daily Niki Ingram MD        zinc sulfate (ZINCATE) capsule 50 mg  50 mg Oral Daily Niki Ingram MD        ascorbic acid (VITAMIN C) tablet 1,000 mg  1,000 mg Oral Daily Niki Ingram MD        budesonide-formoterol (SYMBICORT) 80-4.5 MCG/ACT inhaler 2 puff  2 puff Inhalation BID Niki Ingram MD           Past Medical History:  Past Medical History:   Diagnosis Date    Acute kidney failure, unspecified ([facility]     Anxiety state, unspecified     Atrial septal aneurysm 1/9/2016    Blood circulation, collateral     Body mass index 30.0-30.9, adult     CAD (coronary artery disease) 1/10/2016    1/9/16  lexiscan Positive for apical MI + myocardial ischemia, EF 42%, intermediate risk findings, AUC indication 16, AUC score 7 1/10/16  Cath  3 lesions in the LAD:  Mid: 70% 2.25x23, mid 90% 2.25 x 28, distal 100% 2.0x28, anterior lateral apical hypokinesis, EF 45%    Calculus of kidney     Carotid artery stenosis 6/26/2013    Cellulitis and abscess of hand, except fingers and thumb     Cerebral artery occlusion with cerebral infarction ([address]     15 years ago    Chronic airway obstruction, not elsewhere classified     Chronic kidney disease, unspecified     Congestive heart failure, unspecified     Diabetes mellitus type II     Diverticulosis of colon (without mention of hemorrhage)     Encounter for long-term (current) use of insulin (HCC)     Esophageal reflux     Family history of ischemic heart disease     Gastric ulcer, unspecified as acute or chronic, without mention of hemorrhage, perforation, or obstruction     Hyperlipemia     Hypertension     Internal hemorrhoids without mention of complication     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, or unspecified(403.00)     Obesity, unspecified     Other specified cardiac dysrhythmias(427.89)     Paroxysmal atrial fibrillation (HCC)     Peripheral vascular disease ([address]     Solitary pulmonary nodule     Surgical or other procedure not carried out because of contraindication     Thoracic aneurysm without mention of rupture     Tobacco use disorder     Type II or unspecified type diabetes mellitus without mention of complication, not stated as uncontrolled        Past Surgical History:  Past Surgical History:   Procedure Laterality Date    CARDIAC CATHETERIZATION      CHOLECYSTECTOMY      CORONARY ANGIOPLASTY WITH STENT PLACEMENT  1-16    2 JACQUELINE to LAD    DIAGNOSTIC CARDIAC CATH LAB PROCEDURE      URETER STENT PLACEMENT      VASCULAR SURGERY  03- [facility]    Aortogram with bilateral selective renal artery injections    VASCULAR SURGERY  6/14/13 [facility]    Right carotid endarterectomy with Vascu-Guard patch repair. Right cervical carotid arteriograms after endarterectomy.  VASCULAR SURGERY  7/6/15 [facility]    Percutaneous cannulation, right common femoral artery, with a5 [place] sheath and later 6 [place] [facility] sheath. Suprarenal abdomianl aortagram.  Selective right renal arteriogram.  Right renal artery balloon angioplasty;/stent (Express 6 mm x 18mmballoon-expandable stent. . Completion suprarenal abdominal aortogram and selective right remal arteriogram. Mynx closure, right common femoral artery punctur       Family History  Family History   Problem Relation Age of Onset    High Blood Pressure Father     Cancer Father     High Blood Pressure Mother     High Blood Pressure Brother        Social History  Social History     Socioeconomic History    Marital status:      Spouse name: Not on file    Number of children: Not on file    Years of education: Not on file    Highest education level: Not on file   Occupational History    Not on file   Social Needs    Financial resource strain: Not on file    Food insecurity     Worry: Not on file     Inability: Not on file    Transportation needs     Medical: Not on file     Non-medical: Not on file   Tobacco Use    Smoking status: Former Smoker     Packs/day: 0.25    Smokeless tobacco: Never Used   Substance and Sexual Activity    Alcohol use: No    Drug use: No    Sexual activity: Yes   Lifestyle    Physical activity     Days per week: Not on file     Minutes per session: Not on file    Stress: Not on file   Relationships    Social connections     Talks on phone: Not on file     Gets together: Not on file     Attends Christianity service: Not on file     Active member of club or organization: Not on file     Attends meetings of clubs or organizations: Not on file     Relationship status: Not on file    Intimate partner violence     Fear of current or ex partner: Not on file     Emotionally abused: Not on file     Physically abused: Not on file     Forced sexual activity: Not on file   Other Topics Concern    Not on file   Social History Narrative    Not on file         Review of Systems:  History obtained from chart review and the patient  General ROS: No fever or chills  Respiratory ROS: No cough, shortness of breath, wheezing  Cardiovascular ROS: positive for - chest pain  Gastrointestinal ROS: No abdominal pain or melena  Genito-Urinary ROS: No dysuria or hematuria  Musculoskeletal ROS: No joint pain or swelling   14 point ROS reviewed with the patient and negative except as noted above and in the HPI unless unable to obtain. Objective:  Patient Vitals for the past 24 hrs:   BP Temp Temp src Pulse Resp SpO2 Weight   12/19/20 0915 -- -- -- -- -- -- 225 lb (102.1 kg)   12/19/20 0802 -- -- -- 60 -- -- --   12/19/20 0802 (!) 144/70 99.7 °F (37.6 °C) Temporal 60 22 90 % --   12/19/20 0138 136/70 98.8 °F (37.1 °C) -- 60 18 96 % --   12/18/20 2119 (!) 142/75 99.1 °F (37.3 °C) -- 61 18 95 % --       Intake/Output Summary (Last 24 hours) at 12/19/2020 1115  Last data filed at 12/19/2020 1001  Gross per 24 hour   Intake --   Output 1000 ml   Net -1000 ml   In person clinical exam was not done as he is in isolation with COVID-19 pneumonia. This was done to preserve PPE and unnecessary exposure to COVID-19 infection. However patient was interviewed at the door. General: awake/alert  x3  HEENT: Normocephalic atraumatic head. Chest:  Normal chest wall movements. CVS: Regular rate. Abdominal: No abdominal distention seen. Extremities: No visible pedal edema.         Labs:  BMP:   Recent Labs     12/19/20  0815      K 2.5*   CL 87*   CO2 42*   PHOS 3.5   BUN 36*   CREATININE 2.9*   CALCIUM 9.5     CBC:   Recent Labs     12/19/20 0815   WBC 7.3   HGB 14.3   HCT 46.5   MCV 95.9*        LIVER PROFILE:   Recent Labs     12/19/20  0815   AST 29   ALT 22 BILITOT 1.0   ALKPHOS 85     PT/INR:   Recent Labs     12/19/20 0815   PROTIME 17.5*   INR 1.42*     APTT:   Recent Labs     12/19/20 0815   APTT 34.8     BNP:  No results for input(s): BNP in the last 72 hours. Ionized Calcium:No results for input(s): IONCA in the last 72 hours. Magnesium:No results for input(s): MG in the last 72 hours. Phosphorus:  Recent Labs     12/19/20 0815   PHOS 3.5     HgbA1C:   Recent Labs     12/19/20  0815   LABA1C 6.7*     Hepatic:   Recent Labs     12/19/20 0815   ALKPHOS 85   ALT 22   AST 29   PROT 6.8   BILITOT 1.0   LABALBU 4.3     Lactic Acid: No results for input(s): LACTA in the last 72 hours. Troponin: No results for input(s): CKTOTAL, CKMB, TROPONINT in the last 72 hours. ABGs: No results for input(s): PH, PCO2, PO2, HCO3, O2SAT in the last 72 hours. CRP:    Recent Labs     12/19/20 0815   CRP 0.27     Sed Rate:  No results for input(s): SEDRATE in the last 72 hours. Cultures:   No results for input(s): CULTURE in the last 72 hours. No results for input(s): BC, Sharonda Reading in the last 72 hours. No results for input(s): CXSURG in the last 72 hours. Radiology reports as per the Radiologist  Radiology: No results found. Assessment   1. Acute kidney injury/stage II. 2.  Intravascular volume depletion. 3.  Stage IV chronic kidney disease baseline. 4.  Hypokalemia. 5.  Type II diabetic nephropathy. 6.  Non-STEMI. 7.  Metabolic alkalosis/contraction alkalosis  8. COVID-19 with bilateral pneumonia. Plan:  1. IV fluid with potassium chloride. 2.  Hold Zaroxolyn. 3.  Urinary electrolyte. 4.  Monitor renal recovery. 5.  Agree with IV steroids/remdesivir. Thank you for the consult, we appreciate the opportunity to provide care to your patients. Feel free to contact me if I can be of any further assistance.       William Liu MD  12/19/20  11:15 AM

## 2020-12-19 NOTE — H&P
and got it rechecked the next day and was positive. He states that he has been feeling fine and is not dyspneic at all (when seen he had the NC on the side of his face.) He states that they put O2 on him when he came in as he had a saturation of 88%. He was seen and assessed in the ED, and had a elevated TnI, and a positive COVID test. The remainder of his workup was negative. He was transferred to us from OSH as he needs cardiac evaluation following them having reached out to cardiology as per the patient's elevated TnI. He had not yet had his labs drawn when first checked in on. When wrapping things up it was seen that Dr. Rhiannon Whipple already had a note penned prior to my signing, and the labs that had somehow still not been collected have since been reordered and reportedly collected this time. The Heparin GGT that was ordered by Dr Eliza Fontanez as per Cardio was not started, and as I  the room here at about 11 am there is still no Heparin GGT, we will ensure that this is begun ASAP. The convalescent plasma was hung at 11:15, and the Heparin at 11:55am was being started. He staunchly denies any chest pain. The TnI increased minimally to 0.07 this AM. He has had some dry heaves. He has not had any associated: chest pressure, confusion, near syncope, nausea, heart burn worse than normal, palpitations, dyspnea, pleuritic pain, and diaphoresis. He does endorses weakness and fatigue for the last 5 months and unchanged over that interim.      (following subjective sections as per chart review)    Past Medical History:   Diagnosis Date    Acute kidney failure, unspecified ([address]     Anxiety state, unspecified     Atrial septal aneurysm 1/9/2016    Blood circulation, collateral     Body mass index 30.0-30.9, adult     CAD (coronary artery disease) 1/10/2016    1/9/16  lexiscan  Positive for apical MI + myocardial ischemia, EF 42%, intermediate risk findings, AUC indication 16, AUC score 7 1/10/16  Cath  3 lesions in cervical carotid arteriograms after endarterectomy.  VASCULAR SURGERY  7/6/15 [facility]    Percutaneous cannulation, right common femoral artery, with a5 [place] sheath and later 6 [place] [facility] sheath. Suprarenal abdomianl aortagram.  Selective right renal arteriogram.  Right renal artery balloon angioplasty;/stent (Express 6 mm x 18mmballoon-expandable stent. . Completion suprarenal abdominal aortogram and selective right remal arteriogram. Mynx closure, right common femoral artery punctur     Social History     Tobacco History     Smoking Status  Former Smoker Smoking Frequency  0.25 packs/day    Smokeless Tobacco Use  Never Used          Alcohol History     Alcohol Use Status  No          Drug Use     Drug Use Status  No          Sexual Activity     Sexually Active  Yes           Family History   Problem Relation Age of Onset    High Blood Pressure Father     Cancer Father     High Blood Pressure Mother     High Blood Pressure Brother      Allergies   Allergen Reactions    Hydralazine     Morphine      Current Facility-Administered Medications   Medication Dose Route Frequency Provider Last Rate Last Admin    heparin (porcine) injection 4,000 Units  4,000 Units Intravenous Once Harriet Lynch MD        heparin (porcine) injection 4,000 Units  4,000 Units Intravenous PRN Harriet Lynch MD        heparin (porcine) injection 2,000 Units  2,000 Units Intravenous PRN Harriet Lynch MD        heparin 25,000 units in dextrose 5% 250 mL infusion  9.79 Units/kg/hr Intravenous Continuous Harriet Lynch MD        dexamethasone (DECADRON) tablet 6 mg  6 mg Oral Daily Harriet Lynch MD        0.9 % sodium chloride bolus  20 mL Intravenous Once Harriet Lynch MD        amiodarone (CORDARONE) tablet 200 mg  200 mg Oral Daily Harriet Lynch MD        albuterol sulfate  (90 Base) MCG/ACT inhaler 2 puff  2 puff Inhalation Q4H PRN Harriet Lynch MD        carvedilol (COREG) tablet 25 mg  25 mg Oral BID [place] Veto Thomas MD        clopidogrel (PLAVIX) tablet 75 mg  75 mg Oral Daily Dominique Hancock MD        isosorbide mononitrate (IMDUR) extended release tablet 60 mg  60 mg Oral BID Dominique Hancock MD        amLODIPine (NORVASC) tablet 10 mg  10 mg Oral Daily Dominique Hancock MD        insulin glargine (LANTUS) injection vial 15 Units  15 Units Subcutaneous BID Dominique Hancock MD        glucose (GLUTOSE) 40 % oral gel 15 g  15 g Oral PRN Dominique Hancock MD        dextrose 50 % IV solution  12.5 g Intravenous PRN Dominique Hancock MD        glucagon (rDNA) injection 1 mg  1 mg Intramuscular PRN Dominique Hancock MD        dextrose 5 % solution  100 mL/hr Intravenous PRN Dominique Hancock MD        insulin lispro (HUMALOG) injection vial 0-12 Units  0-12 Units Subcutaneous TID WC Dominique Hancock MD        insulin lispro (HUMALOG) injection vial 0-6 Units  0-6 Units Subcutaneous Nightly Dominique Hancock MD        potassium chloride (KLOR-CON M) extended release tablet 40 mEq  40 mEq Oral PRN Dominique Hancock MD        Or    potassium bicarb-citric acid (EFFER-K) effervescent tablet 40 mEq  40 mEq Oral PRN Dominique Hancock MD        Or    potassium chloride 10 mEq/100 mL IVPB (Peripheral Line)  10 mEq Intravenous PRN Dominique Hancock MD        pantoprazole (PROTONIX) tablet 40 mg  40 mg Oral BID Dominique Hancock MD        sodium chloride flush 0.9 % injection 10 mL  10 mL Intravenous 2 times per day Ciro Isbell MD        sodium chloride flush 0.9 % injection 10 mL  10 mL Intravenous PRN Ciro Isbell MD        famotidine (PEPCID) injection 20 mg  20 mg Intravenous BID Ciro Isbell MD        promethazine (PHENERGAN) tablet 12.5 mg  12.5 mg Oral Q6H PRN Ciro Isbell MD        Or    ondansetron [facility]) injection 4 mg  4 mg Intravenous Q6H PRN Ciro Isbell MD        acetaminophen (TYLENOL) tablet 650 mg  650 mg Oral Q6H PRN Ciro Isbell MD        Or    acetaminophen (TYLENOL) suppository 650 mg  650 mg Rectal Q6H PRN Pearl Castro MD        polyethylene glycol (GLYCOLAX) packet 17 g  17 g Oral Daily PRN Pearl Castro MD        aspirin chewable tablet 81 mg  81 mg Oral Daily Pearl Castro MD        atorvastatin (LIPITOR) tablet 40 mg  40 mg Oral Nightly Pearl Castro MD        nitroGLYCERIN (NITROSTAT) SL tablet 0.4 mg  0.4 mg Sublingual Q5 Min PRN Pearl Castro MD        Vitamin D (CHOLECALCIFEROL) tablet 2,000 Units  2,000 Units Oral Daily Pearl Castro MD        zinc sulfate (ZINCATE) capsule 50 mg  50 mg Oral Daily Pearl Castro MD        ascorbic acid (VITAMIN C) tablet 1,000 mg  1,000 mg Oral Daily Pearl Castro MD        budesonide-formoterol (SYMBICORT) 80-4.5 MCG/ACT inhaler 2 puff  2 puff Inhalation BID Pearl Castro MD         Home Medications:  Prior to Admission medications    Medication Sig Start Date End Date Taking? Authorizing Provider   vitamin D (ERGOCALCIFEROL) 1.25 MG (60455 UT) CAPS capsule Take 50,000 Units by mouth once a week 12/21/20  Yes Historical Provider, MD   metOLazone (ZAROXOLYN) 5 MG tablet Take 5 mg by mouth three times a week Monday wed and fri   Yes Historical Provider, MD   fluticasone (VERAMYST) 27.5 MCG/SPRAY nasal spray 2 sprays by Each Nostril route daily   Yes Historical Provider, MD   nitroGLYCERIN (NITROSTAT) 0.4 MG SL tablet up to max of 3 total doses.  If no relief after 1 dose, call 911. 10/20/20   Leta Or, DO   rosuvastatin (CRESTOR) 40 MG tablet Take 1 tablet by mouth nightly 10/20/20   Leta Or, DO   carvedilol (COREG) 25 MG tablet Take 1 tablet by mouth 2 times daily 10/20/20   [facility], DO   bumetanide (BUMEX) 1 MG tablet Take 1 tablet by mouth 2 times daily 10/20/20   [facility], DO   minoxidil (LONITEN) 2.5 MG tablet Take 1 tablet by mouth 2 times daily 8/24/20   DWAYNE Shin   NOVOLOG FLEXPEN 100 UNIT/ML injection pen  6/1/20   Historical Provider, MD   apixaban (ELIQUIS) 5 MG TABS tablet Take by mouth 2 times daily    Historical Provider, MD   amiodarone (CORDARONE) 200 MG tablet Take 1 tablet by mouth daily 2/20/20 5/8/20  Sheree Steven MD   lisinopril (PRINIVIL;ZESTRIL) 40 MG tablet Take 1 tablet by mouth daily  Patient taking differently: Take 40 mg by mouth 2 times daily  2/20/20 7/10/20  Sheree Steven MD   montelukast (SINGULAIR) 10 MG tablet Take 10 mg by mouth daily    Historical Provider, MD   levocetirizine (XYZAL) 5 MG tablet Take 5 mg by mouth nightly    Historical Provider, MD   esomeprazole Magnesium (NEXIUM) 40 MG PACK Take 40 mg by mouth 2 times daily     Historical Provider, MD   albuterol sulfate HFA (VENTOLIN HFA) 108 (90 Base) MCG/ACT inhaler Inhale 2 puffs into the lungs every 6 hours as needed for Wheezing 1/1/18   Marjan Vaca MD   amLODIPine (NORVASC) 10 MG tablet Take 1 tablet by mouth daily 1/11/16   DWAYNE Albert   isosorbide mononitrate (IMDUR) 60 MG CR tablet Take 60 mg by mouth 2 times daily    Historical Provider, MD   ULTICARE MINI PEN NEEDLES 31G X 6 MM MISC FOR USE WITH LANTUS TWO TIMES A DAY 7/11/14   DWAYNE Bennett CNP   LANTUS SOLOSTAR 100 UNIT/ML injection pen INJECT 30 UNITS IN THE MORNING AND 20 UNITS IN THE EVENING DAILY  Patient taking differently: 2 times daily Takes 30 units in AM and 20 units in PM 7/1/14   DWAYNE Bennett CNP   potassium chloride SA (K-DUR;KLOR-CON M) 10 MEQ tablet TAKE ONE TABLET BY MOUTH EVERY DAY  Patient taking differently: 10 mEq 3 times daily  4/15/14   Daniella Acevedo MD   clopidogrel (PLAVIX) 75 MG tablet Take 1 tablet by mouth daily.  12/9/13   Daniella Acevedo MD         OBJECTIVE:    Vitals:    12/19/20 0802   BP:    Pulse: 60   Resp:    Temp:    SpO2:    breathing on RA with NC on cheek and then on NC    BP (!) 144/70   Pulse 60   Temp 99.7 °F (37.6 °C) (Temporal)   Resp 22   Wt 225 lb (102.1 kg)   SpO2 90%   BMI 31.38 kg/m²       Intake/Output Summary (Last 24 hours) at 12/19/2020 1049  Last data filed at 12/19/2020 1001  Gross per 24 hour   Intake --   Output 1000 ml   Net -1000 ml     Physical Exam  Vitals signs reviewed. Exam conducted with a chaperone present. Constitutional:       General: He is not in acute distress. Appearance: Normal appearance. He is obese. He is not ill-appearing or toxic-appearing. HENT:      Head: Normocephalic and atraumatic. Nose: No congestion or rhinorrhea. Eyes:      General:         Right eye: No discharge. Left eye: No discharge. Neck:      Musculoskeletal: Neck supple. Comments: trachea appears midline  Cardiovascular:      Rate and Rhythm: Normal rate and regular rhythm. Heart sounds: No murmur. No friction rub. No gallop. Pulmonary:      Effort: Pulmonary effort is normal. No respiratory distress. Breath sounds: No stridor. No wheezing, rhonchi or rales. Chest:      Chest wall: No tenderness. Abdominal:      General: Bowel sounds are normal.      Palpations: Abdomen is soft. Tenderness: There is no abdominal tenderness. There is no guarding or rebound. Musculoskeletal:      Right lower leg: No edema. Left lower leg: No edema. Skin:     General: Skin is warm. Comments: nondiaphoretic   Neurological:      Mental Status: He is alert. Cranial Nerves: No dysarthria. Motor: No tremor or seizure activity.    Psychiatric:         Mood and Affect: Mood normal.         Behavior: Behavior normal.         LABORATORY DATA:    CBC:   Recent Labs     12/19/20  0815   WBC 7.3   HGB 14.3   HCT 46.5        BMP:   Recent Labs     12/19/20  0815      K 2.5*   CL 87*   CO2 42*   BUN 36*   CREATININE 2.9*   CALCIUM 9.5   PHOS 3.5     Hepatic Profile:   Recent Labs     12/19/20  0815   AST 29   ALT 22   BILITOT 1.0   ALKPHOS 85     Coag Panel:   Recent Labs     12/19/20  0815   INR 1.42*   PROTIME 17.5*   APTT 34.8     Cardiac Enzymes:   Recent Labs 12/19/20  0815   TROPONINI 0.07*     Pro-BNP:   Recent Labs     12/19/20  0815   PROBNP 4,061*     A1C:   Recent Labs     12/19/20  0815   LABA1C 6.7*     TSH: Invalid input(s): LABTS    Lipid Panel: pending    Urinalysis:   Lab Results   Component Value Date    NITRU Negative 06/14/2017    WBCUA 0 06/14/2017    BACTERIA NEGATIVE 06/14/2017    RBCUA 0 06/14/2017    BLOODU TRACE 06/14/2017    SPECGRAV 1.009 06/14/2017    GLUCOSEU Negative 06/14/2017       IMAGING:  No results found.         ASESSMENTS & PLANS:    COVID causing Acute Hypoxemic Respiratory Failure:  Admit to the medical COVID walker / unit under hopsitalist team  Labs as per COVID protocols  Decadron 6mg PO QDay as per COVID proptocol - first dose in the ED, will get ten total doses  \"PPE Instructions\" and \"Droplet +\" Precautions as per COVID protocol  AC with Heparin GGT as per below  Will provide Famotidine  Oxygen as per protocol    NSTEMI likely due to global perfusion demand ischemia secondary to: COVID Hypoxemic Resp Failure (above)  Tele  Cardio consult in AM  Trend TnI  Mag, Phos, TSH, Lipid Panel, HbA1c - will run as add ons to ED labs if able  CBC and BMP with Reflex for following AM  ASA as per protocol (324-325mg chewed STAT unless on 81ASA daily in which case 162mg chewed STAT if not yet given, THEN from following AM 81mg Daily.)  Statin as per protocol (High intensity Statin, if already on high intensity Stain of Simvasttain 80 continue it, if Lipitor 40+ then Lipitor 80 (if less than 40 just double so long as >=40), if Rosuvastatin 20mg+ then Rosuvastatin 40mg PO QHS (if less than 20 just double so long as >=20mg)  NG SL PRN CP  TTE in AM  Heparin GGT    Chronic Medical Problems:  Continue home regimen as indicated  Puffers will NOT be subbed to NEBS, rather the opposite if indicated as per COVID protocols    Supoportive and Prophylactic Txx:  DVT Prophylaxis: heparin GGT  GI (PUD) Ppx: not indicated  PT consult for evalutation and Txx as indicated: not indicated at this time as per the chest pain  Diet NPO, After Midnight Exceptions are: Sips with Meds  heparin (porcine), heparin (porcine), albuterol sulfate HFA, glucose, dextrose, glucagon (rDNA), dextrose, potassium chloride **OR** potassium alternative oral replacement **OR** potassium chloride, sodium chloride flush, promethazine **OR** ondansetron, acetaminophen **OR** acetaminophen, polyethylene glycol, nitroGLYCERIN    ANGELO noted by Dr. Tiffanie Gallagher when labs finally obtained and Tx for same begun by her already    Care time of >45 minutes  Pt seen/examined and admitted to inpatient status. Inpatient status is used for patients with an expected LOS extending past two midnights due to medical therapy and or critical care needs, otherwise patients are placed to OBServation status. Signed:  Electronically signed by Marin Houston MD on 12/19/20 at 11:55 AM CST.

## 2020-12-19 NOTE — CONSULTS
[id number] [facility]  Cardiology Consult      Requesting MD:  Jessica Menezes MD   Admit Status:  Inpatient [101]       History obtained from:   [] Patient  [] Other (specify):     Patient:  Lelo Herron  [id number]     Chief Complaint: No chief complaint on file. HPI:   Presented to outside hospital last night with NSTEMI with chest pain and troponin 0.12 along with acute hypoxic respiratory failure secondary to Covid pneumonia. Patient is not currently having any chest pain. Sat 90% on NC. He says that he had last had chest pain prior to presentation at the outside hospital.  He had no idea he was Covid positive, but his wife was recently positive as well. He has been coughing and has shortness of breath. Chart reviewed. Discharge Diagnoses:     1. Coronary artery disease     1/9/16  lexiscan  Positive for apical MI + myocardial ischemia, EF 42%, intermediate risk findings, AUC indication 16, AUC score 7  1/10/16  Cath  3 lesions in the LAD:  Mid: 70% 2.25x23, mid 90% 2.25 x 28, distal 100% 2.0x28, anterior lateral apical hypokinesis  2/14/20  lexiscan Positive for apical MI with minimal  myocardial ischemia, EF 36%, -1% ischemic myocardium on stress, AUC indication 21, AUC score 7   2/14/2020  Echo  EF 45%  2/24/20  Cath  100% distal LAD at the stent, o/w luminals, slight apical and mild diaphragmatic hypokinesis, EF 45%     2. Systemic arterial hypertension  3. Hypercholesteremia  4. Diabetes mellitus  5. Sinoatrial node dysfunction    Review of Systems:  Review of Systems   Respiratory: Positive for shortness of breath.         Cardiac Specific Data:  Specialty Problems        Cardiology Problems    CAD (coronary artery disease)        Aneurysm of thoracic aorta [facility])        Renal artery stenosis (HCC)        Carotid artery stenosis        Peripheral vascular disease (HCC)        Atrial septal aneurysm        Malignant hypertension Apical myocardial infarction ([address]        Acute on chronic diastolic CHF (congestive heart failure) (HCC)        Chronic congestive heart failure (HCC)        PAF (paroxysmal atrial fibrillation) (HCC)        Left ventricular dysfunction        Mixed hyperlipidemia        Chronic combined systolic and diastolic congestive heart failure (HCC)        Systolic and diastolic CHF, acute on chronic (HCC)        * (Principal) NSTEMI (non-ST elevated myocardial infarction) [facility])            Physical exam limited due to covid isolation    Chart reviewed.   Past Medical History:  Past Medical History:   Diagnosis Date    Acute kidney failure, unspecified ([address]     Anxiety state, unspecified     Atrial septal aneurysm 1/9/2016    Blood circulation, collateral     Body mass index 30.0-30.9, adult     CAD (coronary artery disease) 1/10/2016    1/9/16  lexiscan  Positive for apical MI + myocardial ischemia, EF 42%, intermediate risk findings, AUC indication 16, AUC score 7 1/10/16  Cath  3 lesions in the LAD:  Mid: 70% 2.25x23, mid 90% 2.25 x 28, distal 100% 2.0x28, anterior lateral apical hypokinesis, EF 45%    Calculus of kidney     Carotid artery stenosis 6/26/2013    Cellulitis and abscess of hand, except fingers and thumb     Cerebral artery occlusion with cerebral infarction ([address]     15 years ago    Chronic airway obstruction, not elsewhere classified     Chronic kidney disease, unspecified     Congestive heart failure, unspecified     Diabetes mellitus type II     Diverticulosis of colon (without mention of hemorrhage)     Encounter for long-term (current) use of insulin (HCC)     Esophageal reflux     Family history of ischemic heart disease     Gastric ulcer, unspecified as acute or chronic, without mention of hemorrhage, perforation, or obstruction     Hyperlipemia     Hypertension     Internal hemorrhoids without mention of complication     Malignant hypertensive kidney disease with chronic kidney Social Needs    Financial resource strain: Not on file    Food insecurity     Worry: Not on file     Inability: Not on file    Transportation needs     Medical: Not on file     Non-medical: Not on file   Tobacco Use    Smoking status: Former Smoker     Packs/day: 0.25    Smokeless tobacco: Never Used   Substance and Sexual Activity    Alcohol use: No    Drug use: No    Sexual activity: Yes   Lifestyle    Physical activity     Days per week: Not on file     Minutes per session: Not on file    Stress: Not on file   Relationships    Social connections     Talks on phone: Not on file     Gets together: Not on file     Attends Denominational service: Not on file     Active member of club or organization: Not on file     Attends meetings of clubs or organizations: Not on file     Relationship status: Not on file    Intimate partner violence     Fear of current or ex partner: Not on file     Emotionally abused: Not on file     Physically abused: Not on file     Forced sexual activity: Not on file   Other Topics Concern    Not on file   Social History Narrative    Not on file       Allergies: Allergies   Allergen Reactions    Hydralazine     Morphine        Home Meds:  Prior to Admission medications    Medication Sig Start Date End Date Taking? Authorizing Provider   vitamin D (ERGOCALCIFEROL) 1.25 MG (71201 UT) CAPS capsule Take 50,000 Units by mouth once a week 12/21/20  Yes Historical Provider, MD   metOLazone (ZAROXOLYN) 5 MG tablet Take 5 mg by mouth three times a week Monday wed and fri   Yes Historical Provider, MD   fluticasone (VERAMYST) 27.5 MCG/SPRAY nasal spray 2 sprays by Each Nostril route daily   Yes Historical Provider, MD   nitroGLYCERIN (NITROSTAT) 0.4 MG SL tablet up to max of 3 total doses.  If no relief after 1 dose, call 911. 10/20/20   Marco A Rivero, DO   rosuvastatin (CRESTOR) 40 MG tablet Take 1 tablet by mouth nightly 10/20/20   Geanie Old Kemp, DO   carvedilol (COREG) 25 MG tablet Take 1 tablet by mouth 2 times daily 10/20/20   Tres Grajeda,    bumetanide (BUMEX) 1 MG tablet Take 1 tablet by mouth 2 times daily 10/20/20   Tres Grajeda DO   minoxidil (LONITEN) 2.5 MG tablet Take 1 tablet by mouth 2 times daily 8/24/20   DWAYNE Toure   NOVOLOG FLEXPEN 100 UNIT/ML injection pen  6/1/20   Historical Provider, MD   apixaban (ELIQUIS) 5 MG TABS tablet Take by mouth 2 times daily    Historical Provider, MD   amiodarone (CORDARONE) 200 MG tablet Take 1 tablet by mouth daily 2/20/20 5/8/20  Manuela Shine MD   lisinopril (PRINIVIL;ZESTRIL) 40 MG tablet Take 1 tablet by mouth daily  Patient taking differently: Take 40 mg by mouth 2 times daily  2/20/20 7/10/20  Manuela Shine MD   montelukast (SINGULAIR) 10 MG tablet Take 10 mg by mouth daily    Historical Provider, MD   levocetirizine (XYZAL) 5 MG tablet Take 5 mg by mouth nightly    Historical Provider, MD   esomeprazole Magnesium (NEXIUM) 40 MG PACK Take 40 mg by mouth 2 times daily     Historical Provider, MD   albuterol sulfate HFA (VENTOLIN HFA) 108 (90 Base) MCG/ACT inhaler Inhale 2 puffs into the lungs every 6 hours as needed for Wheezing 1/1/18   Leilani Wallace MD   amLODIPine (NORVASC) 10 MG tablet Take 1 tablet by mouth daily 1/11/16   DWAYNE Foster   isosorbide mononitrate (IMDUR) 60 MG CR tablet Take 60 mg by mouth 2 times daily    Historical Provider, MD   ULTICARE MINI PEN NEEDLES 31G X 6 MM MISC FOR USE WITH LANTUS TWO TIMES A DAY 7/11/14   DWAYNE Michele CNP   LANTUS SOLOSTAR 100 UNIT/ML injection pen INJECT 30 UNITS IN THE MORNING AND 20 UNITS IN THE EVENING DAILY  Patient taking differently: 2 times daily Takes 30 units in AM and 20 units in PM 7/1/14   DWAYNE Michele CNP   potassium chloride SA (K-DUR;KLOR-CON M) 10 MEQ tablet TAKE ONE TABLET BY MOUTH EVERY DAY  Patient taking differently: 10 mEq 3 times daily  4/15/14   Ayad Wallace MD   clopidogrel (PLAVIX) 75 MG tablet Take 1 tablet by mouth daily. 12/9/13   Melissa Gracia MD       Current Meds:   heparin (porcine)  4,000 Units Intravenous Once    dexamethasone  6 mg Oral Daily    amiodarone  200 mg Oral Daily    carvedilol  25 mg Oral BID    clopidogrel  75 mg Oral Daily    isosorbide mononitrate  60 mg Oral BID    amLODIPine  10 mg Oral Daily    insulin glargine  15 Units Subcutaneous BID    insulin lispro  0-12 Units Subcutaneous TID WC    insulin lispro  0-6 Units Subcutaneous Nightly    pantoprazole  40 mg Oral BID    remdesivir IVPB  200 mg Intravenous Once    Followed by   Alexander Estrada ON 12/20/2020] remdesivir IVPB  100 mg Intravenous Q24H    sodium chloride flush  10 mL Intravenous 2 times per day    famotidine (PEPCID) injection  20 mg Intravenous BID    aspirin  81 mg Oral Daily    atorvastatin  40 mg Oral Nightly    Vitamin D  2,000 Units Oral Daily    zinc sulfate  50 mg Oral Daily    vitamin C  1,000 mg Oral Daily    budesonide-formoterol  2 puff Inhalation BID       Current Infused Meds:   heparin (PORCINE) Infusion 9.79 Units/kg/hr (12/19/20 1229)    dextrose      IV infusion builder         Physical Exam:  Vitals:    12/19/20 1123   BP: (!) 142/68   Pulse: 60   Resp: 18   Temp: 99.7 °F (37.6 °C)   SpO2:        Intake/Output Summary (Last 24 hours) at 12/19/2020 1238  Last data filed at 12/19/2020 1001  Gross per 24 hour   Intake --   Output 1000 ml   Net -1000 ml     Estimated body mass index is 31.38 kg/m² as calculated from the following:    Height as of 11/16/20: 5' 11\" (1.803 m). Weight as of this encounter: 225 lb (102.1 kg).         Physical Exam    Labs:  Recent Labs     12/19/20  0815   WBC 7.3   HGB 14.3          Recent Labs     12/19/20  0815 12/19/20  1134     --    K 2.5* 2.2   CL 87*  --    CO2 42*  --    BUN 36*  --    CREATININE 2.9*  --    LABGLOM 22*  --    MG 1.9  --    CALCIUM 9.5  --    PHOS 3.5  --        CK, CKMB, Troponin: @LABRCNT (CKTOTAL:3, CKMB:3, TROPONINI:3)@    Last 3 BNP:  No results for input(s): BNP in the last 72 hours. IMAGING:  No results found. Assessment:  1. covid pneumonia  2. NSTEMI  3. H/o CAD stents  4. Acute on c/c renal failure      Recommendations:  Medical mgmt of nstemi  No indication of cath leesa due to covid and risk of dialysis with cath  Ischemia eval once covid resolved. Pt had cath 2/20. Cath  100% distal LAD at the stent, o/w luminals, slight apical and mild diaphragmatic hypokinesis, EF 45%   medical mgmt . D/w primary team.  Needs cardiology fu.

## 2020-12-20 LAB
ALBUMIN SERPL-MCNC: 3.6 G/DL (ref 3.5–5.2)
ALP BLD-CCNC: 71 U/L (ref 40–130)
ALT SERPL-CCNC: 21 U/L (ref 5–41)
ANION GAP SERPL CALCULATED.3IONS-SCNC: 12 MMOL/L (ref 7–19)
APTT: 35.7 SEC (ref 26–36.2)
AST SERPL-CCNC: 33 U/L (ref 5–40)
BASOPHILS ABSOLUTE: 0 K/UL (ref 0–0.2)
BASOPHILS RELATIVE PERCENT: 0.3 % (ref 0–1)
BILIRUB SERPL-MCNC: 0.9 MG/DL (ref 0.2–1.2)
BUN BLDV-MCNC: 34 MG/DL (ref 8–23)
C-REACTIVE PROTEIN: 1.17 MG/DL (ref 0–0.5)
CALCIUM SERPL-MCNC: 8.5 MG/DL (ref 8.8–10.2)
CHLORIDE BLD-SCNC: 91 MMOL/L (ref 98–111)
CO2: 39 MMOL/L (ref 22–29)
CREAT SERPL-MCNC: 2.7 MG/DL (ref 0.5–1.2)
D DIMER: 1.04 UG/ML FEU (ref 0–0.48)
EOSINOPHILS ABSOLUTE: 0 K/UL (ref 0–0.6)
EOSINOPHILS RELATIVE PERCENT: 0 % (ref 0–5)
FERRITIN: 112.7 NG/ML (ref 30–400)
GFR AFRICAN AMERICAN: 29
GFR NON-AFRICAN AMERICAN: 24
GLUCOSE BLD-MCNC: 215 MG/DL (ref 74–109)
GLUCOSE BLD-MCNC: 242 MG/DL (ref 70–99)
GLUCOSE BLD-MCNC: 249 MG/DL (ref 70–99)
GLUCOSE BLD-MCNC: 382 MG/DL (ref 70–99)
GLUCOSE BLD-MCNC: 408 MG/DL (ref 70–99)
HCT VFR BLD CALC: 47.2 % (ref 42–52)
HEMOGLOBIN: 14 G/DL (ref 14–18)
IMMATURE GRANULOCYTES #: 0 K/UL
INR BLD: 1.31 (ref 0.88–1.18)
LYMPHOCYTES ABSOLUTE: 0.7 K/UL (ref 1.1–4.5)
LYMPHOCYTES RELATIVE PERCENT: 10.5 % (ref 20–40)
MAGNESIUM: 2 MG/DL (ref 1.6–2.4)
MCH RBC QN AUTO: 29.1 PG (ref 27–31)
MCHC RBC AUTO-ENTMCNC: 29.7 G/DL (ref 33–37)
MCV RBC AUTO: 98.1 FL (ref 80–94)
MONOCYTES ABSOLUTE: 0.1 K/UL (ref 0–0.9)
MONOCYTES RELATIVE PERCENT: 1.7 % (ref 0–10)
NEUTROPHILS ABSOLUTE: 5.5 K/UL (ref 1.5–7.5)
NEUTROPHILS RELATIVE PERCENT: 87.3 % (ref 50–65)
PDW BLD-RTO: 14.2 % (ref 11.5–14.5)
PERFORMED ON: ABNORMAL
PLATELET # BLD: 134 K/UL (ref 130–400)
PMV BLD AUTO: 12.2 FL (ref 9.4–12.4)
POTASSIUM REFLEX MAGNESIUM: 3.4 MMOL/L (ref 3.5–5)
PROTHROMBIN TIME: 16.3 SEC (ref 12–14.6)
RBC # BLD: 4.81 M/UL (ref 4.7–6.1)
SODIUM BLD-SCNC: 142 MMOL/L (ref 136–145)
TOTAL PROTEIN: 6.4 G/DL (ref 6.6–8.7)
WBC # BLD: 6.3 K/UL (ref 4.8–10.8)

## 2020-12-20 PROCEDURE — 82728 ASSAY OF FERRITIN: CPT

## 2020-12-20 PROCEDURE — 85025 COMPLETE CBC W/AUTO DIFF WBC: CPT

## 2020-12-20 PROCEDURE — 83735 ASSAY OF MAGNESIUM: CPT

## 2020-12-20 PROCEDURE — 6370000000 HC RX 637 (ALT 250 FOR IP): Performed by: INTERNAL MEDICINE

## 2020-12-20 PROCEDURE — 2500000003 HC RX 250 WO HCPCS: Performed by: INTERNAL MEDICINE

## 2020-12-20 PROCEDURE — 85730 THROMBOPLASTIN TIME PARTIAL: CPT

## 2020-12-20 PROCEDURE — 1210000000 HC MED SURG R&B

## 2020-12-20 PROCEDURE — 80053 COMPREHEN METABOLIC PANEL: CPT

## 2020-12-20 PROCEDURE — 2580000003 HC RX 258: Performed by: INTERNAL MEDICINE

## 2020-12-20 PROCEDURE — 86140 C-REACTIVE PROTEIN: CPT

## 2020-12-20 PROCEDURE — 85610 PROTHROMBIN TIME: CPT

## 2020-12-20 PROCEDURE — 2700000000 HC OXYGEN THERAPY PER DAY

## 2020-12-20 PROCEDURE — 6360000002 HC RX W HCPCS: Performed by: INTERNAL MEDICINE

## 2020-12-20 PROCEDURE — 85379 FIBRIN DEGRADATION QUANT: CPT

## 2020-12-20 PROCEDURE — 82947 ASSAY GLUCOSE BLOOD QUANT: CPT

## 2020-12-20 RX ORDER — POTASSIUM CHLORIDE 20 MEQ/1
20 TABLET, EXTENDED RELEASE ORAL 2 TIMES DAILY
Status: DISCONTINUED | OUTPATIENT
Start: 2020-12-20 | End: 2020-12-23 | Stop reason: HOSPADM

## 2020-12-20 RX ORDER — INSULIN GLARGINE 100 [IU]/ML
20 INJECTION, SOLUTION SUBCUTANEOUS 2 TIMES DAILY
Status: DISCONTINUED | OUTPATIENT
Start: 2020-12-20 | End: 2020-12-21

## 2020-12-20 RX ADMIN — FAMOTIDINE 20 MG: 10 INJECTION INTRAVENOUS at 09:47

## 2020-12-20 RX ADMIN — ISOSORBIDE MONONITRATE 60 MG: 60 TABLET, EXTENDED RELEASE ORAL at 09:48

## 2020-12-20 RX ADMIN — CLOPIDOGREL BISULFATE 75 MG: 75 TABLET ORAL at 09:48

## 2020-12-20 RX ADMIN — OXYCODONE HYDROCHLORIDE AND ACETAMINOPHEN 1000 MG: 500 TABLET ORAL at 09:48

## 2020-12-20 RX ADMIN — CARVEDILOL 25 MG: 25 TABLET, FILM COATED ORAL at 21:05

## 2020-12-20 RX ADMIN — ZINC SULFATE 220 MG (50 MG) CAPSULE 50 MG: CAPSULE at 09:48

## 2020-12-20 RX ADMIN — SODIUM CHLORIDE, PRESERVATIVE FREE 10 ML: 5 INJECTION INTRAVENOUS at 21:05

## 2020-12-20 RX ADMIN — PANTOPRAZOLE SODIUM 40 MG: 40 TABLET, DELAYED RELEASE ORAL at 21:05

## 2020-12-20 RX ADMIN — CHOLECALCIFEROL (VITAMIN D3) 25 MCG (1,000 UNIT) TABLET 2000 UNITS: TABLET at 09:48

## 2020-12-20 RX ADMIN — ATORVASTATIN CALCIUM 40 MG: 40 TABLET, FILM COATED ORAL at 21:04

## 2020-12-20 RX ADMIN — AMIODARONE HYDROCHLORIDE 200 MG: 200 TABLET ORAL at 09:47

## 2020-12-20 RX ADMIN — ASPIRIN 81 MG: 81 TABLET, CHEWABLE ORAL at 09:48

## 2020-12-20 RX ADMIN — SODIUM CHLORIDE AND POTASSIUM CHLORIDE: 9; 1.49 INJECTION, SOLUTION INTRAVENOUS at 06:07

## 2020-12-20 RX ADMIN — BUDESONIDE AND FORMOTEROL FUMARATE DIHYDRATE 2 PUFF: 80; 4.5 AEROSOL RESPIRATORY (INHALATION) at 21:06

## 2020-12-20 RX ADMIN — FAMOTIDINE 20 MG: 10 INJECTION INTRAVENOUS at 21:05

## 2020-12-20 RX ADMIN — POTASSIUM CHLORIDE 20 MEQ: 1500 TABLET, EXTENDED RELEASE ORAL at 21:05

## 2020-12-20 RX ADMIN — POTASSIUM CHLORIDE 20 MEQ: 1500 TABLET, EXTENDED RELEASE ORAL at 12:47

## 2020-12-20 RX ADMIN — CARVEDILOL 25 MG: 25 TABLET, FILM COATED ORAL at 09:47

## 2020-12-20 RX ADMIN — Medication 20 UNITS: at 21:24

## 2020-12-20 RX ADMIN — DEXAMETHASONE 6 MG: 2 TABLET ORAL at 09:48

## 2020-12-20 RX ADMIN — REMDESIVIR 100 MG: 100 INJECTION, POWDER, LYOPHILIZED, FOR SOLUTION INTRAVENOUS at 12:42

## 2020-12-20 RX ADMIN — INSULIN LISPRO 6 UNITS: 100 INJECTION, SOLUTION INTRAVENOUS; SUBCUTANEOUS at 21:24

## 2020-12-20 RX ADMIN — BUDESONIDE AND FORMOTEROL FUMARATE DIHYDRATE 2 PUFF: 80; 4.5 AEROSOL RESPIRATORY (INHALATION) at 08:00

## 2020-12-20 RX ADMIN — AMLODIPINE BESYLATE 10 MG: 10 TABLET ORAL at 09:47

## 2020-12-20 RX ADMIN — APIXABAN 5 MG: 5 TABLET, FILM COATED ORAL at 21:04

## 2020-12-20 RX ADMIN — Medication 15 UNITS: at 09:51

## 2020-12-20 RX ADMIN — SODIUM CHLORIDE AND POTASSIUM CHLORIDE: 9; 1.49 INJECTION, SOLUTION INTRAVENOUS at 12:46

## 2020-12-20 RX ADMIN — INSULIN LISPRO 10 UNITS: 100 INJECTION, SOLUTION INTRAVENOUS; SUBCUTANEOUS at 17:39

## 2020-12-20 RX ADMIN — PANTOPRAZOLE SODIUM 40 MG: 40 TABLET, DELAYED RELEASE ORAL at 09:47

## 2020-12-20 RX ADMIN — ISOSORBIDE MONONITRATE 60 MG: 60 TABLET, EXTENDED RELEASE ORAL at 21:04

## 2020-12-20 NOTE — PROGRESS NOTES
Nephrology ([facility]) progress Note      Patient:  Vignesh Quinones  YOB: 1955  Date of Service: 12/20/2020  MRN: [id number]   Acct: [de-identified]   Primary Care Physician: Herminio Lafleur MD  Advance Directive: Full Code  Admit Date: 12/18/2020       Hospital Day: 2  Referring Provider: Tessie Duenas MD    Patient independently seen and examined, Chart, Consults, Notes, Operative notes, Labs, Cardiology, and Radiology studies reviewed as available. Chief complaint: Abnormal labs. Subjective:  Vignesh Quinones is a 60 y.o. male  whom we were consulted for acute kidney injury/chronic kidney disease. Patient has stage IV chronic kidney disease baseline and has seen Dr. Pato Leroy recently on December 11, his serum creatinine at that time was 2.3 mg with estimated GFR of 29 mL. He was clinically volume overload at that time and Dr. Pato Leroy has suggested to start Zaroxolyn 2.5 mg p.o. daily as he was already on Bumex. Presented to outside hospital with non-STEMI. Patient was also tested positive for Covid 19 with pneumonia. He is now admitted to [address] [place] [address] for further work-up. Incidental finding in the lab shows serum creatinine of 2.9 mg. On arrival here he denies any chest pain or shortness of breath. He also denies any swelling of legs. He had one episode of vomiting but no further vomiting at this time. Patient is currently receiving IV fluid and renal function has improved. This morning he feels much better.     Allergies:  Hydralazine and Morphine    Medicines:  Current Facility-Administered Medications   Medication Dose Route Frequency Provider Last Rate Last Admin    dexamethasone (DECADRON) tablet 6 mg  6 mg Oral Daily Tessie Duenas MD   6 mg at 12/20/20 6171    amiodarone (CORDARONE) tablet 200 mg  200 mg Oral Daily Tessie Duenas MD   200 mg at 12/20/20 0993    albuterol sulfate  (90 Base) MCG/ACT inhaler 2 puff  2 puff Inhalation Q4H PRN Dominique Hancock MD   2 puff at 12/19/20 1454    carvedilol (COREG) tablet 25 mg  25 mg Oral BID Dominique Hancock MD   25 mg at 12/20/20 0759    clopidogrel (PLAVIX) tablet 75 mg  75 mg Oral Daily Dominique Hancock MD   75 mg at 12/20/20 6929    isosorbide mononitrate (IMDUR) extended release tablet 60 mg  60 mg Oral BID Dominique Hancock MD   60 mg at 12/20/20 0948    amLODIPine (NORVASC) tablet 10 mg  10 mg Oral Daily Dominique Hancock MD   10 mg at 12/20/20 0947    insulin glargine (LANTUS) injection vial 15 Units  15 Units Subcutaneous BID Dominique Hancock MD   15 Units at 12/20/20 0951    glucose (GLUTOSE) 40 % oral gel 15 g  15 g Oral PRN Dominique Hancock MD        dextrose 50 % IV solution  12.5 g Intravenous PRN Dominique Hancock MD        glucagon (rDNA) injection 1 mg  1 mg Intramuscular PRN Dominique Hancock MD        dextrose 5 % solution  100 mL/hr Intravenous PRN Dominique Hancock MD        insulin lispro (HUMALOG) injection vial 0-12 Units  0-12 Units Subcutaneous TID WC Dominique Hancock MD   4 Units at 12/19/20 1200    insulin lispro (HUMALOG) injection vial 0-6 Units  0-6 Units Subcutaneous Nightly Dominique Hancock MD        potassium chloride (KLOR-CON M) extended release tablet 40 mEq  40 mEq Oral PRN Dominique Hacnock MD        Or    potassium bicarb-citric acid (EFFER-K) effervescent tablet 40 mEq  40 mEq Oral PRN Dominique Hancock MD        Or    potassium chloride 10 mEq/100 mL IVPB (Peripheral Line)  10 mEq Intravenous PRN Dominique Hancock MD        pantoprazole (PROTONIX) tablet 40 mg  40 mg Oral BID Dominique Hancock MD   40 mg at 12/20/20 0947    remdesivir 100 mg in sodium chloride 0.9 % 250 mL IVPB  100 mg Intravenous Q24H Dominique Hancock MD        0.9 % sodium chloride bolus  30 mL Intravenous PRN Dominique Hancock MD        0.9% NaCl with KCl 20 mEq 1,000 mL infusion   Intravenous Continuous Fito Martin  mL/hr at 12/20/20 0607 New Bag at 12/20/20 0607    sodium partner: Not on file     Emotionally abused: Not on file     Physically abused: Not on file     Forced sexual activity: Not on file   Other Topics Concern    Not on file   Social History Narrative    Not on file         Review of Systems:  History obtained from chart review and the patient  General ROS: No fever or chills  Respiratory ROS: No cough, shortness of breath, wheezing  Cardiovascular ROS: positive for - chest pain  Gastrointestinal ROS: No abdominal pain or melena  Genito-Urinary ROS: No dysuria or hematuria  Musculoskeletal ROS: No joint pain or swelling   14 point ROS reviewed with the patient and negative except as noted above and in the HPI unless unable to obtain. Objective:  Patient Vitals for the past 24 hrs:   BP Temp Temp src Pulse Resp SpO2 Weight   12/20/20 1022 118/60 98 °F (36.7 °C) -- 56 18 91 % --   12/20/20 0129 123/61 98.4 °F (36.9 °C) Temporal 55 20 90 % --   12/19/20 2103 -- -- -- 53 -- -- --   12/19/20 1927 137/68 99.4 °F (37.4 °C) Temporal 56 14 92 % 224 lb (101.6 kg)   12/19/20 1700 -- -- -- -- -- 96 % --   12/19/20 1615 (!) 177/78 99.7 °F (37.6 °C) -- 66 18 91 % --   12/19/20 1345 (!) 153/69 98.4 °F (36.9 °C) Temporal 58 22 94 % --       Intake/Output Summary (Last 24 hours) at 12/20/2020 1151  Last data filed at 12/20/2020 0127  Gross per 24 hour   Intake --   Output 250 ml   Net -250 ml   In person clinical exam was not done as he is in isolation with COVID-19 pneumonia. This was done to preserve PPE and unnecessary exposure to COVID-19 infection. However patient was interviewed at the door. General: awake/alert  x3  HEENT: Normocephalic atraumatic head. Chest:  Normal chest wall movements. CVS: Regular rate. Abdominal: No abdominal distention seen. Extremities: No visible pedal edema.         Labs:  BMP:   Recent Labs     12/19/20  0815 12/19/20  1134 12/20/20  0152     --  142   K 2.5* 2.2 3.4*   CL 87*  --  91*   CO2 42*  --  39*   PHOS 3.5  --   --    BUN 36* fluid with potassium chloride. 2.  Potassium supplement. 4.  Monitor renal recovery. 5.  Agree with IV steroids/remdesivir. 6.  May need cardiac cath once stable.     Althea Stratton MD  12/20/20  11:51 AM

## 2020-12-20 NOTE — FLOWSHEET NOTE
12/19/20 2012   Encounter Summary   Services provided to: Family  (talked with pt's wife on the phone)   Referral/Consult From: [address] Spouse   Complexity of Encounter Moderate   Length of Encounter 15 minutes   Advance Care Planning Yes   Spiritual/Worship   Type Spiritual support   Assessment Calm; Approachable; Anxious   Intervention Active listening;Explored feelings, thoughts, concerns   Outcome Expressed gratitude     Attempted to establish ACP, called the pt's without success. Talked with pt's wife on the phone and identified her a the primary decision. There is no other decision maker at this time. Wife deferred making decisions on care preferences till discussed with pt at this time.

## 2020-12-20 NOTE — FLOWSHEET NOTE
12/19/20 2012   Encounter Summary   Services provided to: Family  (talked with pt's wife on the phone)   Referral/Consult From: [address] Spouse   Complexity of Encounter Moderate   Length of Encounter 15 minutes   Advance Care Planning Yes   Spiritual/Baptism   Type Spiritual support   Assessment Calm; Approachable; Anxious   Intervention Active listening;Explored feelings, thoughts, concerns   Outcome Expressed gratitude       Attempted to establish ACP. Called the pt without success. Talked with pt's wife and identified her primary decision maker and the only decision maker at this time. Wife defer making care preferences till she discussed with pt.    Electronically signed by Mirna Valerio on 12/19/2020 at 8:33 PM

## 2020-12-20 NOTE — PROGRESS NOTES
Hospitalist Progress Note  12/20/2020 7:47 AM  Subjective:   Admit Date: 12/18/2020  PCP: Ly Orozco MD    Chief Complaint: chest pain    Subjective: Possible transfusion reaction about 15 minutes after convalescent plasma infusion yesterday with rash and nausea. This has resolved and proper work-up was sent to blood bank. 02 needs have improved, sat low 90s on 2 L NC. He tells me he feels better overall today. No chest pain or dyspnea. No GI symptoms. ROS: Six point review of systems is negative except as specifically addressed above.     Diet NPO, After Midnight Exceptions are: Sips with Meds    Intake/Output Summary (Last 24 hours) at 12/20/2020 0747  Last data filed at 12/20/2020 0127  Gross per 24 hour   Intake --   Output 1250 ml   Net -1250 ml     Medications:   dextrose      IV infusion builder 100 mL/hr at 12/20/20 2195     Current Facility-Administered Medications   Medication Dose Route Frequency Provider Last Rate Last Admin    dexamethasone (DECADRON) tablet 6 mg  6 mg Oral Daily Gisel Rasmussen MD   6 mg at 12/19/20 1507    amiodarone (CORDARONE) tablet 200 mg  200 mg Oral Daily Gisel Rasmussen MD   200 mg at 12/19/20 1500    albuterol sulfate  (90 Base) MCG/ACT inhaler 2 puff  2 puff Inhalation Q4H PRN Gisel Rasmussen MD   2 puff at 12/19/20 1454    carvedilol (COREG) tablet 25 mg  25 mg Oral BID Gisel Rasmussen MD   25 mg at 12/19/20 2055    clopidogrel (PLAVIX) tablet 75 mg  75 mg Oral Daily Gisel Rasmussen MD   75 mg at 12/19/20 1454    isosorbide mononitrate (IMDUR) extended release tablet 60 mg  60 mg Oral BID Gisel Rasmussen MD   60 mg at 12/19/20 2052    amLODIPine (NORVASC) tablet 10 mg  10 mg Oral Daily Gisel Rasmussen MD   10 mg at 12/19/20 1455    insulin glargine (LANTUS) injection vial 15 Units  15 Units Subcutaneous BID Gisle Rasmussen MD   15 Units at 12/19/20 2055    glucose (GLUTOSE) 40 % oral gel 15 g  15 g Oral PRN Gisel Rasmussen MD       [facility] dextrose 50 % IV solution  12.5 g Intravenous PRN Narendra Baker MD        glucagon (rDNA) injection 1 mg  1 mg Intramuscular PRN Narendra Baker MD        dextrose 5 % solution  100 mL/hr Intravenous PRN Narendra Baker MD        insulin lispro (HUMALOG) injection vial 0-12 Units  0-12 Units Subcutaneous TID WC Narendra Baker MD   4 Units at 12/19/20 1200    insulin lispro (HUMALOG) injection vial 0-6 Units  0-6 Units Subcutaneous Nightly Narendra Baker MD        potassium chloride (KLOR-CON M) extended release tablet 40 mEq  40 mEq Oral PRN Narendra Baker MD        Or    potassium bicarb-citric acid (EFFER-K) effervescent tablet 40 mEq  40 mEq Oral PRN Narendra Baker MD        Or    potassium chloride 10 mEq/100 mL IVPB (Peripheral Line)  10 mEq Intravenous PRN Narendra Baker MD        pantoprazole (PROTONIX) tablet 40 mg  40 mg Oral BID Narendra Baker MD   40 mg at 12/19/20 2052    remdesivir 100 mg in sodium chloride 0.9 % 250 mL IVPB  100 mg Intravenous Q24H Narendra Baker MD        0.9 % sodium chloride bolus  30 mL Intravenous PRN Narendra Baker MD        0.9% NaCl with KCl 20 mEq 1,000 mL infusion   Intravenous Continuous Opal Angel  mL/hr at 12/20/20 0607 New Bag at 12/20/20 0607    sodium chloride flush 0.9 % injection 10 mL  10 mL Intravenous 2 times per day Sheree Steven MD   10 mL at 12/19/20 1504    sodium chloride flush 0.9 % injection 10 mL  10 mL Intravenous PRN Sheree Steven MD        famotidine (PEPCID) injection 20 mg  20 mg Intravenous BID Sheree Steven MD   20 mg at 12/19/20 2052    promethazine (PHENERGAN) tablet 12.5 mg  12.5 mg Oral Q6H PRN Sheree Steven MD        Or    ondansetron [facility]) injection 4 mg  4 mg Intravenous Q6H PRN Sheree Steven MD        acetaminophen (TYLENOL) tablet 650 mg  650 mg Oral Q6H PRN Sheree Steven MD        Or    acetaminophen (TYLENOL) suppository 650 mg  650 mg Rectal Q6H PRN Deandre Turner Golria Maravilla MD        polyethylene glycol (GLYCOLAX) packet 17 g  17 g Oral Daily PRN Khurram Eddy MD        aspirin chewable tablet 81 mg  81 mg Oral Daily Khurram Eddy MD   81 mg at 12/19/20 1508    atorvastatin (LIPITOR) tablet 40 mg  40 mg Oral Nightly Khurram Eddy MD   40 mg at 12/19/20 2052    nitroGLYCERIN (NITROSTAT) SL tablet 0.4 mg  0.4 mg Sublingual Q5 Min PRN Khurram Eddy MD        Vitamin D (CHOLECALCIFEROL) tablet 2,000 Units  2,000 Units Oral Daily Khurram Eddy MD   2,000 Units at 12/19/20 1508    zinc sulfate (ZINCATE) capsule 50 mg  50 mg Oral Daily Khurram Eddy MD   50 mg at 12/19/20 1454    ascorbic acid (VITAMIN C) tablet 1,000 mg  1,000 mg Oral Daily Khurram Eddy MD   1,000 mg at 12/19/20 1455    budesonide-formoterol (SYMBICORT) 80-4.5 MCG/ACT inhaler 2 puff  2 puff Inhalation BID Khurram Eddy MD   2 puff at 12/19/20 2052        Labs:     Recent Labs     12/19/20  0815 12/20/20  0152   WBC 7.3 6.3   RBC 4.85 4.81   HGB 14.3 14.0   HCT 46.5 47.2   MCV 95.9* 98.1*   MCH 29.5 29.1   MCHC 30.8* 29.7*    134     Recent Labs     12/19/20  0815 12/19/20  1134     --    K 2.5* 2.2   ANIONGAP 13  --    CL 87*  --    CO2 42*  --    BUN 36*  --    CREATININE 2.9*  --    GLUCOSE 220*  --    CALCIUM 9.5  --      Recent Labs     12/19/20  0815 12/19/20  1600   MG 1.9 1.9   PHOS 3.5  --      Recent Labs     12/19/20  0815   AST 29   ALT 22   BILITOT 1.0   ALKPHOS 85     ABGs:No results for input(s): PH, PO2, PCO2, HCO3, BE, O2SAT in the last 72 hours. Troponin T:   Recent Labs     12/19/20 0815   TROPONINI 0.07*     INR:   Recent Labs     12/19/20  0815   INR 1.42*     Lactic Acid: No results for input(s): LACTA in the last 72 hours.     Objective:   Vitals: /61   Pulse 55   Temp 98.4 °F (36.9 °C) (Temporal)   Resp 20   Wt 224 lb (101.6 kg)   SpO2 90%   BMI 31.24 kg/m²   24HR INTAKE/OUTPUT:      Intake/Output Summary (Last 24 hours) at 12/20/2020 0747  Last data filed at 12/20/2020 0127  Gross per 24 hour   Intake --   Output 1250 ml   Net -1250 ml     General appearance: laying flat in bed on phone in NAD. Head: NC/AT   Eyes: normal sclera  Lungs: non labored  Heart: RRR  Abdomen: non distended  Neurologic:  alert and oriented  Psych: affect and mood appropriate  Skin: no visible rashes    Assessment and Plan:   Principal Problem:    NSTEMI (non-ST elevated myocardial infarction) ([facility] [address]  Active Problems:    Acute respiratory failure with hypoxia ([facility] [address]    Pneumonia due to COVID-19 virus  Resolved Problems:    * No resolved hospital problems. *    NSTEMI  - No recurrence of chest pain and trops have trended down  - Cardiology consulted, rec medical management and ischemic eval once covid resolved. Okay to stop heparin gtt  - Telemetry  - aspirin, statin, plavix and other cardiac meds     Acute hypoxic respiratory failure secondary to Covid pneumonia  Saturating 90% on nasal cannula  - dexamethasone 6 mg daily (day 2/10)  - remdesivir (day 2/5)  - s/p Convalescent plasma  - trend inflammatory markers   - wean 02 as above  - AC with Eliquis    Type I DM  Home lantus is 30 units QAM and 20 units QPM  - Higher blood sugars overall, especially in setting of steroids. Will increase twice daily Lantus to 20 units. Chronic problems, continue home meds where appropriate:  PVD  Carotid artery stenosis  Thoracic aortic aneurysm  Chronic systolic and diastolic HF, EF 66% - check BNP  CAD  GERD  CKD III  pAF - on chronic AC with Eliquis. HLD  HTN  Obesity - BMI 34    Advance Directive: Full Code    DVT prophylaxis: Eliquis    Discharge planning: TBD          Signed:   Eddie Green MD 12/20/2020 7:47 AM  Hospitalist

## 2020-12-20 NOTE — FLOWSHEET NOTE
12/19/20 2012   Encounter Summary   Services provided to: Family  (talked with pt's wife on the phone)   Referral/Consult From: [facility] Spouse   Complexity of Encounter Moderate   Length of Encounter 15 minutes   Advance Care Planning Yes   Spiritual/Protestant   Type Spiritual support   Assessment Calm; Approachable; Anxious   Intervention Active listening;Explored feelings, thoughts, concerns   Outcome Expressed gratitude   Electronically signed by Kvng Avitia on 12/19/2020 at 8:31 PM

## 2020-12-21 PROBLEM — Z51.5 PALLIATIVE CARE PATIENT: Status: ACTIVE | Noted: 2020-12-21

## 2020-12-21 LAB
ALBUMIN SERPL-MCNC: 3.9 G/DL (ref 3.5–5.2)
ALP BLD-CCNC: 70 U/L (ref 40–130)
ALT SERPL-CCNC: 24 U/L (ref 5–41)
ANION GAP SERPL CALCULATED.3IONS-SCNC: 11 MMOL/L (ref 7–19)
APTT: 34.6 SEC (ref 26–36.2)
AST SERPL-CCNC: 41 U/L (ref 5–40)
BASOPHILS ABSOLUTE: 0 K/UL (ref 0–0.2)
BASOPHILS RELATIVE PERCENT: 0 % (ref 0–1)
BILIRUB SERPL-MCNC: 0.7 MG/DL (ref 0.2–1.2)
BUN BLDV-MCNC: 47 MG/DL (ref 8–23)
C-REACTIVE PROTEIN: 1.64 MG/DL (ref 0–0.5)
CALCIUM SERPL-MCNC: 9 MG/DL (ref 8.8–10.2)
CHLORIDE BLD-SCNC: 92 MMOL/L (ref 98–111)
CO2: 36 MMOL/L (ref 22–29)
CREAT SERPL-MCNC: 2.6 MG/DL (ref 0.5–1.2)
EOSINOPHILS ABSOLUTE: 0 K/UL (ref 0–0.6)
EOSINOPHILS RELATIVE PERCENT: 0 % (ref 0–5)
FERRITIN: 152.3 NG/ML (ref 30–400)
GFR AFRICAN AMERICAN: 30
GFR NON-AFRICAN AMERICAN: 25
GLUCOSE BLD-MCNC: 266 MG/DL (ref 70–99)
GLUCOSE BLD-MCNC: 292 MG/DL (ref 74–109)
GLUCOSE BLD-MCNC: 310 MG/DL (ref 70–99)
GLUCOSE BLD-MCNC: 328 MG/DL (ref 70–99)
GLUCOSE BLD-MCNC: 345 MG/DL (ref 70–99)
GLUCOSE BLD-MCNC: 492 MG/DL (ref 70–99)
HCT VFR BLD CALC: 43.9 % (ref 42–52)
HEMOGLOBIN: 13.9 G/DL (ref 14–18)
IMMATURE GRANULOCYTES #: 0 K/UL
INR BLD: 1.45 (ref 0.88–1.18)
LYMPHOCYTES ABSOLUTE: 0.7 K/UL (ref 1.1–4.5)
LYMPHOCYTES RELATIVE PERCENT: 8.9 % (ref 20–40)
MAGNESIUM: 2.1 MG/DL (ref 1.6–2.4)
MCH RBC QN AUTO: 29.8 PG (ref 27–31)
MCHC RBC AUTO-ENTMCNC: 31.7 G/DL (ref 33–37)
MCV RBC AUTO: 94 FL (ref 80–94)
MONOCYTES ABSOLUTE: 0.3 K/UL (ref 0–0.9)
MONOCYTES RELATIVE PERCENT: 3.5 % (ref 0–10)
NEUTROPHILS ABSOLUTE: 7.3 K/UL (ref 1.5–7.5)
NEUTROPHILS RELATIVE PERCENT: 87.2 % (ref 50–65)
PATHOLOGIST REPORT, TRANSFUSION REACTION: NORMAL
PDW BLD-RTO: 14.3 % (ref 11.5–14.5)
PERFORMED ON: ABNORMAL
PLATELET # BLD: 133 K/UL (ref 130–400)
PMV BLD AUTO: 12 FL (ref 9.4–12.4)
POTASSIUM REFLEX MAGNESIUM: 3.4 MMOL/L (ref 3.5–5)
PRO-BNP: 513 PG/ML (ref 0–900)
PROTHROMBIN TIME: 17.7 SEC (ref 12–14.6)
RBC # BLD: 4.67 M/UL (ref 4.7–6.1)
SODIUM BLD-SCNC: 139 MMOL/L (ref 136–145)
TOTAL PROTEIN: 6.6 G/DL (ref 6.6–8.7)
TROPONIN: 0.02 NG/ML (ref 0–0.03)
WBC # BLD: 8.3 K/UL (ref 4.8–10.8)

## 2020-12-21 PROCEDURE — 82728 ASSAY OF FERRITIN: CPT

## 2020-12-21 PROCEDURE — 85730 THROMBOPLASTIN TIME PARTIAL: CPT

## 2020-12-21 PROCEDURE — 6370000000 HC RX 637 (ALT 250 FOR IP): Performed by: INTERNAL MEDICINE

## 2020-12-21 PROCEDURE — 85610 PROTHROMBIN TIME: CPT

## 2020-12-21 PROCEDURE — 2580000003 HC RX 258: Performed by: INTERNAL MEDICINE

## 2020-12-21 PROCEDURE — 2500000003 HC RX 250 WO HCPCS: Performed by: INTERNAL MEDICINE

## 2020-12-21 PROCEDURE — 85025 COMPLETE CBC W/AUTO DIFF WBC: CPT

## 2020-12-21 PROCEDURE — 86140 C-REACTIVE PROTEIN: CPT

## 2020-12-21 PROCEDURE — 1210000000 HC MED SURG R&B

## 2020-12-21 PROCEDURE — 6360000002 HC RX W HCPCS: Performed by: INTERNAL MEDICINE

## 2020-12-21 PROCEDURE — 84484 ASSAY OF TROPONIN QUANT: CPT

## 2020-12-21 PROCEDURE — 99232 SBSQ HOSP IP/OBS MODERATE 35: CPT | Performed by: INTERNAL MEDICINE

## 2020-12-21 PROCEDURE — 82947 ASSAY GLUCOSE BLOOD QUANT: CPT

## 2020-12-21 PROCEDURE — 80053 COMPREHEN METABOLIC PANEL: CPT

## 2020-12-21 PROCEDURE — 93005 ELECTROCARDIOGRAM TRACING: CPT | Performed by: INTERNAL MEDICINE

## 2020-12-21 PROCEDURE — 2700000000 HC OXYGEN THERAPY PER DAY

## 2020-12-21 PROCEDURE — 83880 ASSAY OF NATRIURETIC PEPTIDE: CPT

## 2020-12-21 PROCEDURE — 83735 ASSAY OF MAGNESIUM: CPT

## 2020-12-21 RX ORDER — ALBUTEROL SULFATE 90 UG/1
2 AEROSOL, METERED RESPIRATORY (INHALATION) 4 TIMES DAILY
Status: DISCONTINUED | OUTPATIENT
Start: 2020-12-21 | End: 2020-12-23 | Stop reason: HOSPADM

## 2020-12-21 RX ORDER — CALCITRIOL 0.25 UG/1
0.25 CAPSULE, LIQUID FILLED ORAL
Status: DISCONTINUED | OUTPATIENT
Start: 2020-12-21 | End: 2020-12-23 | Stop reason: HOSPADM

## 2020-12-21 RX ORDER — INSULIN GLARGINE 100 [IU]/ML
30 INJECTION, SOLUTION SUBCUTANEOUS 2 TIMES DAILY
Status: DISCONTINUED | OUTPATIENT
Start: 2020-12-21 | End: 2020-12-22

## 2020-12-21 RX ORDER — SODIUM CHLORIDE AND POTASSIUM CHLORIDE .9; .15 G/100ML; G/100ML
SOLUTION INTRAVENOUS CONTINUOUS
Status: DISCONTINUED | OUTPATIENT
Start: 2020-12-21 | End: 2020-12-23 | Stop reason: HOSPADM

## 2020-12-21 RX ADMIN — OXYCODONE HYDROCHLORIDE AND ACETAMINOPHEN 1000 MG: 500 TABLET ORAL at 08:47

## 2020-12-21 RX ADMIN — INSULIN LISPRO 6 UNITS: 100 INJECTION, SOLUTION INTRAVENOUS; SUBCUTANEOUS at 08:48

## 2020-12-21 RX ADMIN — SODIUM CHLORIDE AND POTASSIUM CHLORIDE: 9; 1.49 INJECTION, SOLUTION INTRAVENOUS at 04:10

## 2020-12-21 RX ADMIN — SODIUM CHLORIDE, PRESERVATIVE FREE 10 ML: 5 INJECTION INTRAVENOUS at 21:00

## 2020-12-21 RX ADMIN — DEXAMETHASONE 6 MG: 2 TABLET ORAL at 08:48

## 2020-12-21 RX ADMIN — POTASSIUM CHLORIDE 20 MEQ: 1500 TABLET, EXTENDED RELEASE ORAL at 21:00

## 2020-12-21 RX ADMIN — REMDESIVIR 100 MG: 100 INJECTION, POWDER, LYOPHILIZED, FOR SOLUTION INTRAVENOUS at 12:37

## 2020-12-21 RX ADMIN — CARVEDILOL 25 MG: 25 TABLET, FILM COATED ORAL at 08:48

## 2020-12-21 RX ADMIN — ALBUTEROL SULFATE 2 PUFF: 90 AEROSOL, METERED RESPIRATORY (INHALATION) at 08:50

## 2020-12-21 RX ADMIN — POTASSIUM CHLORIDE AND SODIUM CHLORIDE: 900; 150 INJECTION, SOLUTION INTRAVENOUS at 15:54

## 2020-12-21 RX ADMIN — APIXABAN 5 MG: 5 TABLET, FILM COATED ORAL at 08:48

## 2020-12-21 RX ADMIN — ISOSORBIDE MONONITRATE 60 MG: 60 TABLET, EXTENDED RELEASE ORAL at 08:48

## 2020-12-21 RX ADMIN — Medication 30 UNITS: at 21:03

## 2020-12-21 RX ADMIN — ASPIRIN 81 MG: 81 TABLET, CHEWABLE ORAL at 08:48

## 2020-12-21 RX ADMIN — CALCITRIOL CAPSULES 0.25 MCG 0.25 MCG: 0.25 CAPSULE ORAL at 13:47

## 2020-12-21 RX ADMIN — CLOPIDOGREL BISULFATE 75 MG: 75 TABLET ORAL at 08:48

## 2020-12-21 RX ADMIN — ISOSORBIDE MONONITRATE 60 MG: 60 TABLET, EXTENDED RELEASE ORAL at 20:59

## 2020-12-21 RX ADMIN — CHOLECALCIFEROL (VITAMIN D3) 25 MCG (1,000 UNIT) TABLET 2000 UNITS: TABLET at 08:48

## 2020-12-21 RX ADMIN — CARVEDILOL 25 MG: 25 TABLET, FILM COATED ORAL at 20:59

## 2020-12-21 RX ADMIN — PANTOPRAZOLE SODIUM 40 MG: 40 TABLET, DELAYED RELEASE ORAL at 08:48

## 2020-12-21 RX ADMIN — AMIODARONE HYDROCHLORIDE 200 MG: 200 TABLET ORAL at 08:48

## 2020-12-21 RX ADMIN — APIXABAN 5 MG: 5 TABLET, FILM COATED ORAL at 20:59

## 2020-12-21 RX ADMIN — FAMOTIDINE 20 MG: 10 INJECTION INTRAVENOUS at 08:49

## 2020-12-21 RX ADMIN — PANTOPRAZOLE SODIUM 40 MG: 40 TABLET, DELAYED RELEASE ORAL at 21:00

## 2020-12-21 RX ADMIN — AMLODIPINE BESYLATE 10 MG: 10 TABLET ORAL at 08:49

## 2020-12-21 RX ADMIN — ATORVASTATIN CALCIUM 40 MG: 40 TABLET, FILM COATED ORAL at 20:59

## 2020-12-21 RX ADMIN — POTASSIUM CHLORIDE 20 MEQ: 1500 TABLET, EXTENDED RELEASE ORAL at 08:48

## 2020-12-21 RX ADMIN — ZINC SULFATE 220 MG (50 MG) CAPSULE 50 MG: CAPSULE at 08:49

## 2020-12-21 RX ADMIN — INSULIN LISPRO 8 UNITS: 100 INJECTION, SOLUTION INTRAVENOUS; SUBCUTANEOUS at 18:14

## 2020-12-21 RX ADMIN — BUDESONIDE AND FORMOTEROL FUMARATE DIHYDRATE 2 PUFF: 80; 4.5 AEROSOL RESPIRATORY (INHALATION) at 08:50

## 2020-12-21 RX ADMIN — Medication 20 UNITS: at 08:45

## 2020-12-21 RX ADMIN — INSULIN LISPRO 12 UNITS: 100 INJECTION, SOLUTION INTRAVENOUS; SUBCUTANEOUS at 12:50

## 2020-12-21 NOTE — PROGRESS NOTES
Hospitalist Progress Note  12/21/2020 7:54 AM  Subjective:   Admit Date: 12/18/2020  PCP: Michael Torres MD    Chief Complaint: chest pain    Subjective: Says he is feeling pretty well today. Was walking around his room yesterday cleaning up spilled water on the floor without dyspnea. No chest pain or palpitations. Hospital course: Presented to [facility] from outside hospital on 12/19 with NSTEMI with chest pain and elevated troponin 0.12 along with acute hypoxic respiratory failure secondary to Covid pneumonia. Cardiology was consulted prior to transfer and recommended that patient be started on heparin drip. He was started on treatment for pneumonia including dexamethasone, remdesivir, convalescent plasma. He may have had an allergic reaction to convalescent plasma, which resolved rapidly. His oxygen saturation is stable at 2 L nasal cannula. Cardiology evaluated him and felt comfortable stopping heparin drip. They are recommending medical management for NSTEMI with outpatient evaluation (stress test vs cath) once COVID has resolved. Nephrology consulted for ANGELO on CKD IV, which is improving with IVFs. ROS: Six point review of systems is negative except as specifically addressed above.     DIET CARDIAC; No Caffeine    Intake/Output Summary (Last 24 hours) at 12/21/2020 0754  Last data filed at 12/21/2020 0139  Gross per 24 hour   Intake 2543 ml   Output 800 ml   Net 1743 ml     Medications:   dextrose      IV infusion builder 100 mL/hr at 12/21/20 0410     Current Facility-Administered Medications   Medication Dose Route Frequency Provider Last Rate Last Admin    albuterol sulfate  (90 Base) MCG/ACT inhaler 2 puff  2 puff Inhalation 4x daily Sonia Moon MD        potassium chloride (KLOR-CON M) extended release tablet 20 mEq  20 mEq Oral BID Patricio Peters MD   20 mEq at 12/20/20 7488    insulin glargine (LANTUS) injection vial 20 Units  20 Units Subcutaneous BID [place] Demarco Gonzales MD   20 Units at 12/20/20 2124    apixaban (ELIQUIS) tablet 5 mg  5 mg Oral BID Florencia Griffiths MD   5 mg at 12/20/20 2104    dexamethasone (DECADRON) tablet 6 mg  6 mg Oral Daily Florencia Griffiths MD   6 mg at 12/20/20 4121    amiodarone (CORDARONE) tablet 200 mg  200 mg Oral Daily Florencia Griffiths MD   200 mg at 12/20/20 2090    carvedilol (COREG) tablet 25 mg  25 mg Oral BID Florencia Griffiths MD   25 mg at 12/20/20 2105    clopidogrel (PLAVIX) tablet 75 mg  75 mg Oral Daily Florencia Griffiths MD   75 mg at 12/20/20 9496    isosorbide mononitrate (IMDUR) extended release tablet 60 mg  60 mg Oral BID Florencia Griffiths MD   60 mg at 12/20/20 2104    amLODIPine (NORVASC) tablet 10 mg  10 mg Oral Daily Florencia Griffiths MD   10 mg at 12/20/20 0947    glucose (GLUTOSE) 40 % oral gel 15 g  15 g Oral PRN Florencia Griffiths MD        dextrose 50 % IV solution  12.5 g Intravenous PRN Florencia Griffiths MD        glucagon (rDNA) injection 1 mg  1 mg Intramuscular PRN Florencia Griffiths MD        dextrose 5 % solution  100 mL/hr Intravenous PRN Florencia Griffiths MD        insulin lispro (HUMALOG) injection vial 0-12 Units  0-12 Units Subcutaneous TID WC Florencia Griffiths MD   10 Units at 12/20/20 1739    insulin lispro (HUMALOG) injection vial 0-6 Units  0-6 Units Subcutaneous Nightly Florencia Griffiths MD   6 Units at 12/20/20 2124    potassium chloride (KLOR-CON M) extended release tablet 40 mEq  40 mEq Oral PRN Florencia Griffiths MD        Or    potassium bicarb-citric acid (EFFER-K) effervescent tablet 40 mEq  40 mEq Oral PRN Florencia Griffiths MD        Or    potassium chloride 10 mEq/100 mL IVPB (Peripheral Line)  10 mEq Intravenous PRN Florencia Griffiths MD        pantoprazole (PROTONIX) tablet 40 mg  40 mg Oral BID Florencia Griffiths MD   40 mg at 12/20/20 2105    remdesivir 100 mg in sodium chloride 0.9 % 250 mL IVPB  100 mg Intravenous Q24H Florencia Griffiths MD   Stopped at 12/20/20 1400    0.9 % sodium chloride bolus  30 mL Intravenous PRN Fredy Rodrigues MD        0.9% NaCl with KCl 20 mEq 1,000 mL infusion   Intravenous Continuous Marguerite Roper  mL/hr at 12/21/20 0410 New Bag at 12/21/20 0410    sodium chloride flush 0.9 % injection 10 mL  10 mL Intravenous 2 times per day Bladimir Grimaldo MD   10 mL at 12/20/20 2105    sodium chloride flush 0.9 % injection 10 mL  10 mL Intravenous PRN Bladimir Grimaldo MD        famotidine (PEPCID) injection 20 mg  20 mg Intravenous BID Bladimir Grimaldo MD   20 mg at 12/20/20 2105    promethazine (PHENERGAN) tablet 12.5 mg  12.5 mg Oral Q6H PRN Bladimir Grimaldo MD        Or    [facility]dansetron [facility]) injection 4 mg  4 mg Intravenous Q6H PRN Bladimir Grimaldo MD        acetaminophen (TYLENOL) tablet 650 mg  650 mg Oral Q6H PRN Bladimir Grimaldo MD        Or    acetaminophen (TYLENOL) suppository 650 mg  650 mg Rectal Q6H PRN Bladimir Grimaldo MD        polyethylene glycol (GLYCOLAX) packet 17 g  17 g Oral Daily PRN Bladimir Grimaldo MD        aspirin chewable tablet 81 mg  81 mg Oral Daily Bladimir Grimaldo MD   81 mg at 12/20/20 0948    atorvastatin (LIPITOR) tablet 40 mg  40 mg Oral Nightly Bladimir Grimaldo MD   40 mg at 12/20/20 2104    nitroGLYCERIN (NITROSTAT) SL tablet 0.4 mg  0.4 mg Sublingual Q5 Min PRN Bladimir Grimaldo MD        Vitamin D (CHOLECALCIFEROL) tablet 2,000 Units  2,000 Units Oral Daily Bladimir Grimaldo MD   2,000 Units at 12/20/20 0948    zinc sulfate (ZINCATE) capsule 50 mg  50 mg Oral Daily Bladimir Grimaldo MD   50 mg at 12/20/20 1826    ascorbic acid (VITAMIN C) tablet 1,000 mg  1,000 mg Oral Daily Bladimir Grimaldo MD   1,000 mg at 12/20/20 0948    budesonide-formoterol (SYMBICORT) 80-4.5 MCG/ACT inhaler 2 puff  2 puff Inhalation BID Bladimir Grimaldo MD   2 puff at 12/20/20 2106        Labs:     Recent Labs     12/19/20  0815 12/20/20  0152 12/21/20  0415   WBC 7.3 6.3 8.3   RBC 4.85 4.81 4.67*   HGB 14.3 14.0 13.9* HCT 46.5 47.2 43.9   MCV 95.9* 98.1* 94.0   MCH 29.5 29.1 29.8   MCHC 30.8* 29.7* 31.7*    134 133     Recent Labs     12/19/20  0815 12/19/20  1134 12/20/20  0152 12/21/20  0415     --  142 139   K 2.5* 2.2 3.4* 3.4*   ANIONGAP 13  --  12 11   CL 87*  --  91* 92*   CO2 42*  --  39* 36*   BUN 36*  --  34* 47*   CREATININE 2.9*  --  2.7* 2.6*   GLUCOSE 220*  --  215* 292*   CALCIUM 9.5  --  8.5* 9.0     Recent Labs     12/19/20  0815 12/19/20  1600 12/20/20  0152 12/21/20  0415   MG 1.9 1.9 2.0 2.1   PHOS 3.5  --   --   --      Recent Labs     12/19/20  0815 12/20/20  0152 12/21/20  0415   AST 29 33 41*   ALT 22 21 24   BILITOT 1.0 0.9 0.7   ALKPHOS 85 71 70     ABGs:No results for input(s): PH, PO2, PCO2, HCO3, BE, O2SAT in the last 72 hours. Troponin T:   Recent Labs     12/19/20  0815   TROPONINI 0.07*     INR:   Recent Labs     12/19/20  0815 12/20/20  0152 12/21/20  0415   INR 1.42* 1.31* 1.45*     Lactic Acid: No results for input(s): LACTA in the last 72 hours. Objective:   Vitals: BP (!) 147/75   Pulse 50   Temp 97 °F (36.1 °C) (Temporal)   Resp 18   Wt 232 lb 6.4 oz (105.4 kg)   SpO2 91%   BMI 32.41 kg/m²   24HR INTAKE/OUTPUT:      Intake/Output Summary (Last 24 hours) at 12/21/2020 0750  Last data filed at 12/21/2020 0139  Gross per 24 hour   Intake 2543 ml   Output 800 ml   Net 1743 ml     General appearance: Sitting up in bed. Acute distress and able to have quite a long conversation with me without any dyspnea. Head: NC/AT   Eyes: normal sclera  Lungs: non labored  Heart: bradycardic   Abdomen: non distended  Neurologic:  alert and oriented  Psych: affect and mood appropriate  Skin: no visible rashes    Assessment and Plan:   Principal Problem:    NSTEMI (non-ST elevated myocardial infarction) ([address]  Active Problems:    Acute respiratory failure with hypoxia ([address]    Pneumonia due to COVID-19 virus  Resolved Problems:    * No resolved hospital problems.  *    NSTEMI  - No recurrence of chest pain and trops have trended down  - Cardiology consulted, rec medical management and ischemic eval once covid resolved. Okay to stop heparin gtt  - Telemetry  - aspirin, statin, plavix and other cardiac meds     Acute hypoxic respiratory failure secondary to Covid pneumonia  Saturating 90% on nasal cannula  - dexamethasone 6 mg daily (day 3/10)  - remdesivir (day 3/5)  - s/p Convalescent plasma  - trend inflammatory markers   - wean 02 as above  - AC with Eliquis    ANGELO on CKD IV  - appreciate nephrology assistance, defer IVFs to them     Type II DM w/ hyperglycemia   Home lantus is 30 units QAM and 20 units QPM  - Higher blood sugars overall, especially in setting of steroids. Will increase twice daily Lantus to 30 units and add mealtime dosing. Chronic problems, continue home meds where appropriate:  PVD  Carotid artery stenosis  Thoracic aortic aneurysm  Chronic systolic and diastolic HF, EF 91% - check BNP  CAD  GERD  CKD III  pAF - on chronic AC with Eliquis. HLD  HTN  Obesity - BMI 34    Advance Directive: Full Code    DVT prophylaxis: Eliquis    Discharge planning: TBD. Hopefully home in next day or so pending clinical course. May need home 02, but I see per SW note that he is refusing. Signed:   Lai Estrada MD 12/21/2020 7:54 AM  Hospitalist

## 2020-12-21 NOTE — PROGRESS NOTES
Cardiology Daily Note Vlad Cervantes MD      Patient:  Jed Dean  [id number]    Patient Active Problem List    Diagnosis Date Noted    Abnormal nuclear cardiac imaging test      Priority: High    CAD (coronary artery disease) 01/10/2016     Priority: High    Palliative care patient 12/21/2020     Priority: Low    Pneumonia due to COVID-19 virus 12/19/2020     Priority: Low    NSTEMI (non-ST elevated myocardial infarction) ([facility] 12/18/2020     Priority: Low    Systolic and diastolic CHF, acute on chronic ([facility] 10/18/2020     Priority: Low    Obesity (BMI 30-39.9) 10/18/2020     Priority: Low    Chronic combined systolic and diastolic congestive heart failure ([facility] 08/24/2020     Priority: Low    On amiodarone therapy 08/24/2020     Priority: Low    At risk for obstructive sleep apnea 08/18/2020     Priority: Low    Mixed hyperlipidemia 07/09/2020     Priority: Low    Left ventricular dysfunction 07/09/2020     Priority: Low    Somnolence, daytime 07/09/2020     Priority: Low    Edema 07/09/2020     Priority: Low    Stage 3 chronic kidney disease      Priority: Low    PAF (paroxysmal atrial fibrillation) ([facility] 02/12/2020     Priority: Low    Acute on chronic diastolic CHF (congestive heart failure) ([facility] 01/11/2016     Priority: Low    Chronic congestive heart failure (HCC)      Priority: Low    Apical myocardial infarction ([facility]      Priority: Low    Atrial septal aneurysm 01/09/2016     Priority: Low    Malignant hypertension      Priority: Low    Abnormal EKG      Priority: Low    Diabetes mellitus type I ([facility] 01/07/2016     Priority: Low    Acute respiratory failure with hypoxia (HCC)      Priority: Low    Peripheral vascular disease (HCC)      Priority: Low    Carotid artery stenosis 06/26/2013     Priority: Low    Renal artery stenosis ([facility] 03/08/2013     Priority: Low    Aneurysm of thoracic aorta ([facility] 02/21/2013     Priority: Low    DM (diabetes mellitus) ([facility] 02/04/2013 Priority: Low    GERD (gastroesophageal reflux disease) 02/04/2013     Priority: Low       Admit Date:  12/18/2020    Admission Problem List: Present on Admission:   NSTEMI (non-ST elevated myocardial infarction) [facility]   Acute respiratory failure with hypoxia ([facility]   Pneumonia due to COVID-19 virus   Palliative care patient      Cardiac Specific Data:  Specialty Problems        Cardiology Problems    CAD (coronary artery disease)        Aneurysm of thoracic aorta (HCC)        Renal artery stenosis (HCC)        Carotid artery stenosis        Peripheral vascular disease (HCC)        Atrial septal aneurysm        Malignant hypertension        Apical myocardial infarction (HCC)        Acute on chronic diastolic CHF (congestive heart failure) (HCC)        Chronic congestive heart failure ([facility])        PAF (paroxysmal atrial fibrillation) ([facility]        Left ventricular dysfunction        Mixed hyperlipidemia        Chronic combined systolic and diastolic congestive heart failure (HCC)        Systolic and diastolic CHF, acute on chronic ([facility])        * (Principal) NSTEMI (non-ST elevated myocardial infarction) ([facility]              Subjective:  Mr. Boston Jacobs history of coronary artery disease several stents previously placed admitted 12/18/2020 wife reportedly has positive Covid status he had complained of some nausea vomiting mild diarrhea went to be tested admitted. proBNP level elevated 4061 troponins minimally elevated 0.07. The patient denied any chest discomfort whatsoever either with activities or not. He has been more dyspneic recently in the past few weeks denied fever chills or cough at home. Chest x-ray showed no significant interval change since previous exam 10/18/2020 similar coarsening of the interstitium.     Objective:   BP (!) 174/84   Pulse 50   Temp 97 °F (36.1 °C) (Temporal)   Resp 18   Wt 232 lb 6.4 oz (105.4 kg)   SpO2 91%   BMI 32.41 kg/m²       Intake/Output Summary (Last 24 hours) at 12/21/2020 1038  Last data filed at 12/21/2020 0139  Gross per 24 hour   Intake 2543 ml   Output 800 ml   Net 1743 ml       Prior to Admission medications    Medication Sig Start Date End Date Taking? Authorizing Provider   vitamin D (ERGOCALCIFEROL) 1.25 MG (70279 UT) CAPS capsule Take 50,000 Units by mouth once a week 12/21/20  Yes Historical Provider, MD   metOLazone (ZAROXOLYN) 5 MG tablet Take 5 mg by mouth three times a week Monday wed and fri   Yes Historical Provider, MD   fluticasone (VERAMYST) 27.5 MCG/SPRAY nasal spray 2 sprays by Each Nostril route daily   Yes Historical Provider, MD   nitroGLYCERIN (NITROSTAT) 0.4 MG SL tablet up to max of 3 total doses.  If no relief after 1 dose, call 911. 10/20/20   Tres Grajeda, DO   rosuvastatin (CRESTOR) 40 MG tablet Take 1 tablet by mouth nightly 10/20/20   Tres Grajeda, DO   carvedilol (COREG) 25 MG tablet Take 1 tablet by mouth 2 times daily 10/20/20   Scot Batres Kemp, DO   bumetanide (BUMEX) 1 MG tablet Take 1 tablet by mouth 2 times daily 10/20/20   Tres Grajeda, DO   minoxidil (LONITEN) 2.5 MG tablet Take 1 tablet by mouth 2 times daily 8/24/20   [facility]   NOVOLOG FLEXPEN 100 UNIT/ML injection pen  6/1/20   Historical Provider, MD   apixaban (ELIQUIS) 5 MG TABS tablet Take by mouth 2 times daily    Historical Provider, MD   amiodarone (CORDARONE) 200 MG tablet Take 1 tablet by mouth daily 2/20/20 5/8/20  Manuela Shine MD   lisinopril (PRINIVIL;ZESTRIL) 40 MG tablet Take 1 tablet by mouth daily  Patient taking differently: Take 40 mg by mouth 2 times daily  2/20/20 7/10/20  Manuela Shine MD   montelukast (SINGULAIR) 10 MG tablet Take 10 mg by mouth daily    Historical Provider, MD   levocetirizine (XYZAL) 5 MG tablet Take 5 mg by mouth nightly    Historical Provider, MD   esomeprazole Magnesium (NEXIUM) 40 MG PACK Take 40 mg by mouth 2 times daily     Historical Provider, MD   albuterol sulfate HFA (VENTOLIN HFA) 108 (90 Base) MCG/ACT inhaler Inhale 2 echogenic stone. Bladder: The bladder is unremarkable without evidence of mass or internal debris. 1.  1.6 cm incompletely characterized mass in the lower pole of the left kidney. MRI or CT abdomen pelvis with and without contrast renal mass protocol recommended. 2.  Bilateral renal cysts. Signed by Dr Saleem Mcdonald on 12/19/2020 1:26 PM    Xr Chest Portable    Result Date: 12/19/2020  Frontal portable upright radiograph of the chest 12/19/2020 11:03 AM History: Chest x-ray dated October 18, 2020 Comparison: Chest x-ray dated October 18, 2020 Findings: Similar coarsening of the interstitium. . No new opacities or pneumothoraces are visualized in the chest. The cardiomediastinal silhouette and pulmonary vascularity are unchanged. No acute osseous or soft tissue abnormality is noted. Impression: 1. No significant interval change since previous exam. Signed by Dr Saleem Mcdonald on 12/19/2020 1:34 PM        Assessment:  1. Covid-19+ status  2. Elevated D-dimer 1.04  3. Elevated proBNP 4061  4. Minimally elevated troponin level 0.07  5. Coronary artery disease  6. History of multiple stents in the past last about 3 years ago  7. Denies chest discomfort  8. Echocardiogram 10/18/2020 ejection fraction 45% no significant valvular disease  9. Lexiscan stress test 2/12/2020 apical scar minimal or none ischemia ejection fraction 36%  10. Echocardiogram 2/12/2020 ejection fraction 45% enlarged right atrium enlarged left atrium moderate concentric LVH trace TR  11. Chronic anticoagulation with apixaban  12. Long-term antiarrhythmic therapy with amiodarone  13. Cardiac catheterization 2/2020 findings as previously noted 100% distal LAD otherwise luminal irregularities    Plan:  1. Continue current therapy  2.  Continue treatment as needed for Covid-19 status we will arrange office follow-up and consideration for outpatient stress test at a later date unless he develops interval cardiac symptoms in the meantime    [facility] Vito Coles MD 12/21/2020 10:38 AM

## 2020-12-21 NOTE — CARE COORDINATION
Called patient to discuss discharge needs. Current Readmission Risk score is 28%. Patient lives at home with his spouse. She is also Covid-19 positive. Patient's PCP, Dr. Verena Davidson, lives next door to him and gave him an Pulse Ox meter. He is not interested in home oxygen and does not believe he will need any at discharge. He is also not interested in [address] at discharge. This CM recommended both HH and home oxygen, but he continued to decline. Patient reports that he is able to afford his medications at this time. At discharge, another neighbor will provide transportation. He states that the [employer] informed his spouse that she is not allowed to drive anywhere while she is quarantined. CM will continue to follow.   Electronically signed by Gabriel Calvo RN on 12/21/2020 at 1:55 PM

## 2020-12-21 NOTE — ACP (ADVANCE CARE PLANNING)
Advance Care Planning     Advance Care Planning Activator (Inpatient)  Conversation Note      Date of ACP Conversation: 12/21/20    [employer] with: Pt with decision making capacity. ACP Activator: [id number] [place] Decision Maker:     Current Designated Health Care Decision Maker:   Primary Decision Maker: [place] - [phone]                Care Preferences  Ventilation: \"If you were in your present state of health and suddenly became very ill and were unable to breathe on your own, what would your preference be about the use of a ventilator (breathing machine) if it were available to you? \"      Would the patient desire the use of ventilator (breathing machine)?: No        Resuscitation  \"In the event your heart stopped as a result of an underlying serious health condition, would you want attempts to be made to restart your heart (answer \"yes\" for attempt to resuscitate) or would you prefer a natural death (answer \"no\" for do not attempt to resuscitate)? \" No        Conversation Outcomes:  [x] ACP discussion completed  [] Existing advance directive reviewed with patient; no changes to patient's previously recorded wishes  [] New Advance Directive completed  [] Portable Do Not Rescitate prepared for Provider review and signature  [] POLST/POST/MOLST/MOST prepared for Provider review and signature           Palliative care will provide pt with paperwork to complete his AD.     Electronically signed by Estuardo Iraheta RN on 12/21/2020 at 11:42 AM

## 2020-12-21 NOTE — CONSULTS
Palliative Care:   Pt transferred to [facility] from [facility] for higher level of care. Pt spouse was COVID pos. Pt decided to get tested and found to have some heart concerns, thus transferred.       Past Medical History:        Past Medical History:   Diagnosis Date    Acute kidney failure, unspecified ([address]     Anxiety state, unspecified     Atrial septal aneurysm 01/09/2016    Blood circulation, collateral     Body mass index 30.0-30.9, adult     CAD (coronary artery disease) 01/10/2016    1/9/16  lexiscan  Positive for apical MI + myocardial ischemia, EF 42%, intermediate risk findings, AUC indication 16, AUC score 7 1/10/16  Cath  3 lesions in the LAD:  Mid: 70% 2.25x23, mid 90% 2.25 x 28, distal 100% 2.0x28, anterior lateral apical hypokinesis, EF 45%    Calculus of kidney     Carotid artery stenosis 06/26/2013    Cellulitis and abscess of hand, except fingers and thumb     Cerebral artery occlusion with cerebral infarction ([address]     15 years ago    Chronic airway obstruction, not elsewhere classified     Chronic kidney disease, unspecified     Congestive heart failure, unspecified     Diabetes mellitus type II     Diverticulosis of colon (without mention of hemorrhage)     Encounter for long-term (current) use of insulin (HCC)     Esophageal reflux     Family history of ischemic heart disease     Gastric ulcer, unspecified as acute or chronic, without mention of hemorrhage, perforation, or obstruction     Hyperlipemia     Hypertension     Internal hemorrhoids without mention of complication     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, or unspecified(403.00)     Obesity, unspecified     Other specified cardiac dysrhythmias(427.89)     Palliative care patient 12/21/2020    Paroxysmal atrial fibrillation (HCC)     Peripheral vascular disease ([address]     Solitary pulmonary nodule     Surgical or other procedure not carried out because of contraindication     Thoracic aneurysm without mention of rupture     Tobacco use disorder     Type II or unspecified type diabetes mellitus without mention of complication, not stated as uncontrolled        Advance Directives:  No AD on file. Will provide paperwork per pt request.  Pt asked to be DNR-CC. See ACP note. Pain/Other Symptoms:   Denies     Activity:    As eva         Psychological/Spiritual: Pt states he is non denomination with good spiritual support. Lives in the home with his spouse. Plan:  Cardiology consult(seen and plan for future stress test and cath per pt). Supplemental O2. Med management    Patient/family discussion r/t goals:  Pt reviews his health hx with me. He tells me that his wife tested pos. He decided to get tested and was found to have elevated enzymes. Pt also had c/o nausea, diarrhea. He tells me he feels better today. States Dr. Ute Osei has talked with him today and discussed a plan for testing after he is over [facility]. Palliative will follow for support.     Electronically signed by Estuardo Iraheta RN on 12/21/2020 at 11:52 AM                  Electronically signed by Estuardo Iraheta RN on 12/21/2020 at 11:43 AM

## 2020-12-21 NOTE — CARE COORDINATION
Eliquis has been provided for pt at [facility].    Electronically signed by Nilton Coon on 12/21/2020 at 11:54 AM

## 2020-12-21 NOTE — PROGRESS NOTES
Gave AD/LW paperwork to [id number] [facility] for pt's nurse Monica Sellers to give to him. Pt had requested the paperwork during a conversation with Ketty Cox. This  informed Alba Jacinto to have the pt call me tomorrow if he needs assistance in completing and I would help him over the phone.     Electronically signed by Jay Quispe on 12/21/2020 at 4:22 PM

## 2020-12-21 NOTE — PROGRESS NOTES
Nephrology ([facility]) progress Note      Patient:  Patt Davis  YOB: 1955  Date of Service: 12/21/2020  MRN: [id number]   Acct: [de-identified]   Primary Care Physician: Darryl Cárdenas MD  Advance Directive: [facility]  Admit Date: 12/18/2020       Hospital Day: 3  Referring Provider: Dano Ordonez MD    Patient independently seen and examined, Chart, Consults, Notes, Operative notes, Labs, Cardiology, and Radiology studies reviewed as available. Chief complaint: Abnormal labs. Subjective:  Patt Davis is a 60 y.o. male  whom we were consulted for acute kidney injury/chronic kidney disease. Patient has stage IV chronic kidney disease baseline and was recently on December 11, his serum creatinine at that time was 2.3 mg with estimated GFR of 29 mL. He was clinically volume overload at that time and we have started Zaroxolyn 2.5 mg p.o. daily as he was already on Bumex. Presented is now a transfer from an outside hospital with non-STEMI. Patient was also tested positive for Covid 19 with pneumonia. He is now admitted to [address] [place] [address] for further work-up. Incidental finding in the lab shows serum creatinine of 2.9 mg. On arrival here he denies any chest pain or shortness of breath. He also denies any swelling of legs. He had one episode of vomiting but no further vomiting at this time. Patient was given IV fluids and renal function has improved. This morning he was doing better.     Allergies:  Hydralazine and Morphine    Medicines:  Current Facility-Administered Medications   Medication Dose Route Frequency Provider Last Rate Last Admin    albuterol sulfate  (90 Base) MCG/ACT inhaler 2 puff  2 puff Inhalation 4x daily Juan M Gonzalez MD   2 puff at 12/21/20 0850    [START ON 12/22/2020] famotidine (PEPCID) injection 20 mg  20 mg Intravenous Daily Gena Presley MD        potassium chloride (KLOR-CON M) extended release tablet 20 mEq  20 mEq Oral BID Delores Dela Cruz MD   20 mEq at 12/21/20 0848    insulin glargine (LANTUS) injection vial 20 Units  20 Units Subcutaneous BID Brenda Beauchamp MD   20 Units at 12/21/20 0845    apixaban (ELIQUIS) tablet 5 mg  5 mg Oral BID Brenda Beauchamp MD   5 mg at 12/21/20 0848    dexamethasone (DECADRON) tablet 6 mg  6 mg Oral Daily Brenda Beauchamp MD   6 mg at 12/21/20 0848    amiodarone (CORDARONE) tablet 200 mg  200 mg Oral Daily Brenda Beauchamp MD   200 mg at 12/21/20 0848    carvedilol (COREG) tablet 25 mg  25 mg Oral BID Brenda Beauchamp MD   25 mg at 12/21/20 0848    clopidogrel (PLAVIX) tablet 75 mg  75 mg Oral Daily Brenda Beauchamp MD   75 mg at 12/21/20 0848    isosorbide mononitrate (IMDUR) extended release tablet 60 mg  60 mg Oral BID Brenda Beauchamp MD   60 mg at 12/21/20 0848    amLODIPine (NORVASC) tablet 10 mg  10 mg Oral Daily Brenda Beauchamp MD   10 mg at 12/21/20 0849    glucose (GLUTOSE) 40 % oral gel 15 g  15 g Oral PRN Brenda Beauchamp MD        dextrose 50 % IV solution  12.5 g Intravenous PRN Brenda Beauchamp MD        glucagon (rDNA) injection 1 mg  1 mg Intramuscular PRN Brenda Beauchamp MD        dextrose 5 % solution  100 mL/hr Intravenous PRN Brenda Beauchamp MD        insulin lispro (HUMALOG) injection vial 0-12 Units  0-12 Units Subcutaneous TID WC Brenda Beauchamp MD   6 Units at 12/21/20 0848    insulin lispro (HUMALOG) injection vial 0-6 Units  0-6 Units Subcutaneous Nightly Brenda Beauchamp MD   6 Units at 12/20/20 2124    potassium chloride (KLOR-CON M) extended release tablet 40 mEq  40 mEq Oral PRN Brenda Beauchamp MD        Or    potassium bicarb-citric acid (EFFER-K) effervescent tablet 40 mEq  40 mEq Oral PRN Brenda Beauchamp MD        Or    potassium chloride 10 mEq/100 mL IVPB (Peripheral Line)  10 mEq Intravenous PRN Brenda Beauchamp MD        pantoprazole (PROTONIX) tablet 40 mg  40 mg Oral BID Brenda Beauchamp MD   40 mg at 12/21/20 0898    remdesivir 100 mg in sodium chloride 0.9 % 250 mL IVPB  100 mg Intravenous Q24H Sukhi Ham MD   Stopped at 12/20/20 1400    0.9 % sodium chloride bolus  30 mL Intravenous PRN Sukhi Ham MD        0.9% NaCl with KCl 20 mEq 1,000 mL infusion   Intravenous Continuous Mendel Provost,  mL/hr at 12/21/20 0410 New Bag at 12/21/20 0410    sodium chloride flush 0.9 % injection 10 mL  10 mL Intravenous 2 times per day Marilee Yanez MD   10 mL at 12/20/20 2105    sodium chloride flush 0.9 % injection 10 mL  10 mL Intravenous PRN Marilee Yanez MD        promethazine (PHENERGAN) tablet 12.5 mg  12.5 mg Oral Q6H PRN Marilee Yanez MD        Or    ondansetron [facility]) injection 4 mg  4 mg Intravenous Q6H PRN Marilee Yanez MD        acetaminophen (TYLENOL) tablet 650 mg  650 mg Oral Q6H PRN Marilee Yanez MD        Or    acetaminophen (TYLENOL) suppository 650 mg  650 mg Rectal Q6H PRN Marilee Yanez MD        polyethylene glycol (GLYCOLAX) packet 17 g  17 g Oral Daily PRN Marilee Yanez MD        aspirin chewable tablet 81 mg  81 mg Oral Daily Marilee Yanez MD   81 mg at 12/21/20 0848    atorvastatin (LIPITOR) tablet 40 mg  40 mg Oral Nightly Marilee Yanez MD   40 mg at 12/20/20 2104    nitroGLYCERIN (NITROSTAT) SL tablet 0.4 mg  0.4 mg Sublingual Q5 Min PRN Marilee Yanez MD        Vitamin D (CHOLECALCIFEROL) tablet 2,000 Units  2,000 Units Oral Daily Marilee Yanez MD   2,000 Units at 12/21/20 0848    zinc sulfate (ZINCATE) capsule 50 mg  50 mg Oral Daily Marilee Yanez MD   50 mg at 12/21/20 0849    ascorbic acid (VITAMIN C) tablet 1,000 mg  1,000 mg Oral Daily Marilee Yanez MD   1,000 mg at 12/21/20 0847    budesonide-formoterol (SYMBICORT) 80-4.5 MCG/ACT inhaler 2 puff  2 puff Inhalation BID Marilee Yanez MD   2 puff at 12/21/20 3870       Past Medical History:  Past Medical History:   Diagnosis Date    Acute kidney failure, unspecified ([facility]     Anxiety state, unspecified     Atrial septal aneurysm 01/09/2016    Blood circulation, collateral     Body mass index 30.0-30.9, adult     CAD (coronary artery disease) 01/10/2016    1/9/16  lexiscan  Positive for apical MI + myocardial ischemia, EF 42%, intermediate risk findings, AUC indication 16, AUC score 7 1/10/16  Cath  3 lesions in the LAD:  Mid: 70% 2.25x23, mid 90% 2.25 x 28, distal 100% 2.0x28, anterior lateral apical hypokinesis, EF 45%    Calculus of kidney     Carotid artery stenosis 06/26/2013    Cellulitis and abscess of hand, except fingers and thumb     Cerebral artery occlusion with cerebral infarction ([address]     15 years ago    Chronic airway obstruction, not elsewhere classified     Chronic kidney disease, unspecified     Congestive heart failure, unspecified     Diabetes mellitus type II     Diverticulosis of colon (without mention of hemorrhage)     Encounter for long-term (current) use of insulin (HCC)     Esophageal reflux     Family history of ischemic heart disease     Gastric ulcer, unspecified as acute or chronic, without mention of hemorrhage, perforation, or obstruction     Hyperlipemia     Hypertension     Internal hemorrhoids without mention of complication     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, or unspecified(403.00)     Obesity, unspecified     Other specified cardiac dysrhythmias(427.89)     Palliative care patient 12/21/2020    Paroxysmal atrial fibrillation (HCC)     Peripheral vascular disease ([facility] [address]     Solitary pulmonary nodule     Surgical or other procedure not carried out because of contraindication     Thoracic aneurysm without mention of rupture     Tobacco use disorder     Type II or unspecified type diabetes mellitus without mention of complication, not stated as uncontrolled        Past Surgical History:  Past Surgical History:   Procedure Laterality Gets together: Not on file     Attends Voodoo service: Not on file     Active member of club or organization: Not on file     Attends meetings of clubs or organizations: Not on file     Relationship status: Not on file    Intimate partner violence     Fear of current or ex partner: Not on file     Emotionally abused: Not on file     Physically abused: Not on file     Forced sexual activity: Not on file   Other Topics Concern    Not on file   Social History Narrative    Not on file         Review of Systems:  History obtained from chart review and the patient  General ROS: No fever or chills  Respiratory ROS: No cough, shortness of breath, wheezing  Cardiovascular ROS: no chest pain, no palpitations  Gastrointestinal ROS: No abdominal pain or melena  Genito-Urinary ROS: No dysuria or hematuria  Musculoskeletal ROS: No joint pain or swelling   14 point ROS reviewed with the patient and negative except as noted above and in the HPI unless unable to obtain. Objective:  Patient Vitals for the past 24 hrs:   BP Temp Temp src Pulse Resp SpO2 Weight   12/21/20 1131 (!) 147/72 96.5 °F (35.8 °C) Temporal (!) 47 22 91 % --   12/21/20 0845 (!) 174/84 -- -- -- -- -- --   12/21/20 0737 (!) 147/75 97 °F (36.1 °C) Temporal 50 18 91 % --   12/21/20 0137 (!) 114/56 97.4 °F (36.3 °C) Temporal (!) 48 16 90 % --   12/20/20 1930 (!) 142/75 98.2 °F (36.8 °C) Temporal 53 16 92 % 232 lb 6.4 oz (105.4 kg)       Intake/Output Summary (Last 24 hours) at 12/21/2020 1148  Last data filed at 12/21/2020 0139  Gross per 24 hour   Intake 2543 ml   Output 800 ml   Net 1743 ml   In person clinical exam was not done as he is in isolation with COVID-19 pneumonia. This was done to preserve PPE and unnecessary exposure to COVID-19 infection. However assessment was obaitned with nurse's assistance  General: awake,alert  HEENT: Normocephalic atraumatic head. Chest:  Normal chest wall movements. CVS: Regular rate.   Abdominal: No abdominal distention seen. Extremities: No visible pedal edema. Labs:  BMP:   Recent Labs     12/19/20  0815 12/19/20  1134 12/20/20  0152 12/21/20  0415     --  142 139   K 2.5* 2.2 3.4* 3.4*   CL 87*  --  91* 92*   CO2 42*  --  39* 36*   PHOS 3.5  --   --   --    BUN 36*  --  34* 47*   CREATININE 2.9*  --  2.7* 2.6*   CALCIUM 9.5  --  8.5* 9.0     CBC:   Recent Labs     12/19/20  0815 12/20/20  0152 12/21/20  0415   WBC 7.3 6.3 8.3   HGB 14.3 14.0 13.9*   HCT 46.5 47.2 43.9   MCV 95.9* 98.1* 94.0    134 133     LIVER PROFILE:   Recent Labs     12/19/20  0815 12/20/20  0152 12/21/20  0415   AST 29 33 41*   ALT 22 21 24   BILITOT 1.0 0.9 0.7   ALKPHOS 85 71 70     PT/INR:   Recent Labs     12/19/20  0815 12/20/20  0152 12/21/20  0415   PROTIME 17.5* 16.3* 17.7*   INR 1.42* 1.31* 1.45*     APTT:   Recent Labs     12/19/20  0815 12/20/20  0152 12/21/20  0415   APTT 34.8 35.7 34.6     BNP:  No results for input(s): BNP in the last 72 hours. Ionized Calcium:No results for input(s): IONCA in the last 72 hours. Magnesium:  Recent Labs     12/19/20  1600 12/20/20  0152 12/21/20  0415   MG 1.9 2.0 2.1     Phosphorus:  Recent Labs     12/19/20  0815   PHOS 3.5     HgbA1C:   Recent Labs     12/19/20  0815   LABA1C 6.7*     Hepatic:   Recent Labs     12/19/20  0815 12/20/20  0152 12/21/20  0415   ALKPHOS 85 71 70   ALT 22 21 24   AST 29 33 41*   PROT 6.8 6.4* 6.6   BILITOT 1.0 0.9 0.7   LABALBU 4.3 3.6 3.9     Lactic Acid: No results for input(s): LACTA in the last 72 hours. Troponin: No results for input(s): CKTOTAL, CKMB, TROPONINT in the last 72 hours. ABGs: No results for input(s): PH, PCO2, PO2, HCO3, O2SAT in the last 72 hours. CRP:    Recent Labs     12/19/20  0815 12/20/20  0152 12/21/20  0415   CRP 0.27 1.17* 1.64*     Sed Rate:  No results for input(s): SEDRATE in the last 72 hours. Cultures:   No results for input(s): CULTURE in the last 72 hours.   No results for input(s): Abelardo CHACKO in the last 72 hours. No results for input(s): CXSURG in the last 72 hours. Radiology reports as per the Radiologist  Radiology: No results found. Assessment   1. Acute kidney injury/stage II/improved. .  2.  Intravascular volume depletion. 3.  Stage IV chronic kidney disease baseline. 4.  Hypokalemia. 5.  Type II diabetes with nephropathy. 6.  Non-STEMI. 7.  Metabolic alkalosis  8. COVID-19 with bilateral pneumonia. Plan:  1. Continue IV fluids for now  2. Potassium supplement. 3.  Monitor renal function  4. May need cardiac cath once stable.     Bronson Favre, MD  12/21/20  11:48 AM

## 2020-12-22 ENCOUNTER — READMISSION MANAGEMENT (OUTPATIENT)
Dept: CALL CENTER | Facility: HOSPITAL | Age: 65
End: 2020-12-22

## 2020-12-22 ENCOUNTER — APPOINTMENT (OUTPATIENT)
Dept: CT IMAGING | Age: 65
DRG: 177 | End: 2020-12-22
Attending: INTERNAL MEDICINE
Payer: COMMERCIAL

## 2020-12-22 ENCOUNTER — TELEPHONE (OUTPATIENT)
Dept: FAMILY MEDICINE CLINIC | Facility: CLINIC | Age: 65
End: 2020-12-22

## 2020-12-22 LAB
ALBUMIN SERPL-MCNC: 3.9 G/DL (ref 3.5–5.2)
ALP BLD-CCNC: 67 U/L (ref 40–130)
ALT SERPL-CCNC: 27 U/L (ref 5–41)
ANION GAP SERPL CALCULATED.3IONS-SCNC: 12 MMOL/L (ref 7–19)
APTT: 32.4 SEC (ref 26–36.2)
AST SERPL-CCNC: 37 U/L (ref 5–40)
BASOPHILS ABSOLUTE: 0 K/UL (ref 0–0.2)
BASOPHILS RELATIVE PERCENT: 0.1 % (ref 0–1)
BILIRUB SERPL-MCNC: 0.5 MG/DL (ref 0.2–1.2)
BUN BLDV-MCNC: 48 MG/DL (ref 8–23)
C-REACTIVE PROTEIN: 0.96 MG/DL (ref 0–0.5)
CALCIUM SERPL-MCNC: 9.5 MG/DL (ref 8.8–10.2)
CHLORIDE BLD-SCNC: 95 MMOL/L (ref 98–111)
CO2: 33 MMOL/L (ref 22–29)
CREAT SERPL-MCNC: 2.3 MG/DL (ref 0.5–1.2)
EKG P AXIS: 12 DEGREES
EKG P AXIS: 56 DEGREES
EKG P-R INTERVAL: 174 MS
EKG P-R INTERVAL: 186 MS
EKG Q-T INTERVAL: 564 MS
EKG Q-T INTERVAL: 574 MS
EKG QRS DURATION: 112 MS
EKG QRS DURATION: 114 MS
EKG QTC CALCULATION (BAZETT): 533 MS
EKG QTC CALCULATION (BAZETT): 548 MS
EKG T AXIS: 52 DEGREES
EKG T AXIS: 53 DEGREES
EOSINOPHILS ABSOLUTE: 0 K/UL (ref 0–0.6)
EOSINOPHILS RELATIVE PERCENT: 0 % (ref 0–5)
FERRITIN: 162.6 NG/ML (ref 30–400)
GFR AFRICAN AMERICAN: 35
GFR NON-AFRICAN AMERICAN: 29
GLUCOSE BLD-MCNC: 256 MG/DL (ref 70–99)
GLUCOSE BLD-MCNC: 268 MG/DL (ref 70–99)
GLUCOSE BLD-MCNC: 283 MG/DL (ref 70–99)
GLUCOSE BLD-MCNC: 286 MG/DL (ref 74–109)
GLUCOSE BLD-MCNC: 307 MG/DL (ref 70–99)
HCT VFR BLD CALC: 44 % (ref 42–52)
HEMOGLOBIN: 13.7 G/DL (ref 14–18)
IMMATURE GRANULOCYTES #: 0.1 K/UL
LYMPHOCYTES ABSOLUTE: 0.7 K/UL (ref 1.1–4.5)
LYMPHOCYTES RELATIVE PERCENT: 7.1 % (ref 20–40)
MAGNESIUM: 2.1 MG/DL (ref 1.6–2.4)
MCH RBC QN AUTO: 29.5 PG (ref 27–31)
MCHC RBC AUTO-ENTMCNC: 31.1 G/DL (ref 33–37)
MCV RBC AUTO: 94.6 FL (ref 80–94)
MONOCYTES ABSOLUTE: 0.4 K/UL (ref 0–0.9)
MONOCYTES RELATIVE PERCENT: 3.4 % (ref 0–10)
NEUTROPHILS ABSOLUTE: 9.1 K/UL (ref 1.5–7.5)
NEUTROPHILS RELATIVE PERCENT: 88.9 % (ref 50–65)
PDW BLD-RTO: 14.4 % (ref 11.5–14.5)
PERFORMED ON: ABNORMAL
PLATELET # BLD: 127 K/UL (ref 130–400)
PMV BLD AUTO: 12 FL (ref 9.4–12.4)
POTASSIUM REFLEX MAGNESIUM: 3.5 MMOL/L (ref 3.5–5)
RBC # BLD: 4.65 M/UL (ref 4.7–6.1)
SODIUM BLD-SCNC: 140 MMOL/L (ref 136–145)
TOTAL PROTEIN: 6.8 G/DL (ref 6.6–8.7)
TROPONIN: <0.01 NG/ML (ref 0–0.03)
TROPONIN: <0.01 NG/ML (ref 0–0.03)
URIC ACID, SERUM: 9.9 MG/DL (ref 3.4–7)
WBC # BLD: 10.3 K/UL (ref 4.8–10.8)

## 2020-12-22 PROCEDURE — 6370000000 HC RX 637 (ALT 250 FOR IP): Performed by: INTERNAL MEDICINE

## 2020-12-22 PROCEDURE — 85025 COMPLETE CBC W/AUTO DIFF WBC: CPT

## 2020-12-22 PROCEDURE — 6360000004 HC RX CONTRAST MEDICATION: Performed by: INTERNAL MEDICINE

## 2020-12-22 PROCEDURE — 82947 ASSAY GLUCOSE BLOOD QUANT: CPT

## 2020-12-22 PROCEDURE — 84550 ASSAY OF BLOOD/URIC ACID: CPT

## 2020-12-22 PROCEDURE — 2500000003 HC RX 250 WO HCPCS: Performed by: INTERNAL MEDICINE

## 2020-12-22 PROCEDURE — 1210000000 HC MED SURG R&B

## 2020-12-22 PROCEDURE — 83735 ASSAY OF MAGNESIUM: CPT

## 2020-12-22 PROCEDURE — 93010 ELECTROCARDIOGRAM REPORT: CPT | Performed by: INTERNAL MEDICINE

## 2020-12-22 PROCEDURE — 84484 ASSAY OF TROPONIN QUANT: CPT

## 2020-12-22 PROCEDURE — 2580000003 HC RX 258: Performed by: INTERNAL MEDICINE

## 2020-12-22 PROCEDURE — 2700000000 HC OXYGEN THERAPY PER DAY

## 2020-12-22 PROCEDURE — 93005 ELECTROCARDIOGRAM TRACING: CPT | Performed by: INTERNAL MEDICINE

## 2020-12-22 PROCEDURE — 80053 COMPREHEN METABOLIC PANEL: CPT

## 2020-12-22 PROCEDURE — 74170 CT ABD WO CNTRST FLWD CNTRST: CPT

## 2020-12-22 PROCEDURE — 85730 THROMBOPLASTIN TIME PARTIAL: CPT

## 2020-12-22 PROCEDURE — 86140 C-REACTIVE PROTEIN: CPT

## 2020-12-22 PROCEDURE — 6360000002 HC RX W HCPCS: Performed by: INTERNAL MEDICINE

## 2020-12-22 PROCEDURE — 82728 ASSAY OF FERRITIN: CPT

## 2020-12-22 RX ORDER — INSULIN GLARGINE 100 [IU]/ML
36 INJECTION, SOLUTION SUBCUTANEOUS 2 TIMES DAILY
Status: DISCONTINUED | OUTPATIENT
Start: 2020-12-22 | End: 2020-12-23 | Stop reason: HOSPADM

## 2020-12-22 RX ADMIN — ISOSORBIDE MONONITRATE 60 MG: 60 TABLET, EXTENDED RELEASE ORAL at 08:59

## 2020-12-22 RX ADMIN — CHOLECALCIFEROL (VITAMIN D3) 25 MCG (1,000 UNIT) TABLET 2000 UNITS: TABLET at 08:58

## 2020-12-22 RX ADMIN — POTASSIUM CHLORIDE 20 MEQ: 1500 TABLET, EXTENDED RELEASE ORAL at 21:02

## 2020-12-22 RX ADMIN — INSULIN LISPRO 8 UNITS: 100 INJECTION, SOLUTION INTRAVENOUS; SUBCUTANEOUS at 12:30

## 2020-12-22 RX ADMIN — AMIODARONE HYDROCHLORIDE 200 MG: 200 TABLET ORAL at 08:59

## 2020-12-22 RX ADMIN — INSULIN LISPRO 8 UNITS: 100 INJECTION, SOLUTION INTRAVENOUS; SUBCUTANEOUS at 17:53

## 2020-12-22 RX ADMIN — ZINC SULFATE 220 MG (50 MG) CAPSULE 50 MG: CAPSULE at 08:58

## 2020-12-22 RX ADMIN — OXYCODONE HYDROCHLORIDE AND ACETAMINOPHEN 1000 MG: 500 TABLET ORAL at 08:58

## 2020-12-22 RX ADMIN — ISOSORBIDE MONONITRATE 60 MG: 60 TABLET, EXTENDED RELEASE ORAL at 21:02

## 2020-12-22 RX ADMIN — ATORVASTATIN CALCIUM 40 MG: 40 TABLET, FILM COATED ORAL at 21:02

## 2020-12-22 RX ADMIN — CLOPIDOGREL BISULFATE 75 MG: 75 TABLET ORAL at 08:59

## 2020-12-22 RX ADMIN — PANTOPRAZOLE SODIUM 40 MG: 40 TABLET, DELAYED RELEASE ORAL at 21:02

## 2020-12-22 RX ADMIN — POTASSIUM CHLORIDE 20 MEQ: 1500 TABLET, EXTENDED RELEASE ORAL at 08:58

## 2020-12-22 RX ADMIN — PANTOPRAZOLE SODIUM 40 MG: 40 TABLET, DELAYED RELEASE ORAL at 08:59

## 2020-12-22 RX ADMIN — APIXABAN 5 MG: 5 TABLET, FILM COATED ORAL at 21:02

## 2020-12-22 RX ADMIN — CARVEDILOL 25 MG: 25 TABLET, FILM COATED ORAL at 21:02

## 2020-12-22 RX ADMIN — REMDESIVIR 100 MG: 100 INJECTION, POWDER, LYOPHILIZED, FOR SOLUTION INTRAVENOUS at 12:24

## 2020-12-22 RX ADMIN — CARVEDILOL 25 MG: 25 TABLET, FILM COATED ORAL at 08:59

## 2020-12-22 RX ADMIN — Medication 30 UNITS: at 08:59

## 2020-12-22 RX ADMIN — SODIUM CHLORIDE, PRESERVATIVE FREE 10 ML: 5 INJECTION INTRAVENOUS at 21:01

## 2020-12-22 RX ADMIN — INSULIN LISPRO 8 UNITS: 100 INJECTION, SOLUTION INTRAVENOUS; SUBCUTANEOUS at 08:59

## 2020-12-22 RX ADMIN — Medication 36 UNITS: at 21:05

## 2020-12-22 RX ADMIN — APIXABAN 5 MG: 5 TABLET, FILM COATED ORAL at 08:59

## 2020-12-22 RX ADMIN — FAMOTIDINE 20 MG: 10 INJECTION, SOLUTION INTRAVENOUS at 08:59

## 2020-12-22 RX ADMIN — AMLODIPINE BESYLATE 10 MG: 10 TABLET ORAL at 08:59

## 2020-12-22 RX ADMIN — ASPIRIN 81 MG: 81 TABLET, CHEWABLE ORAL at 08:58

## 2020-12-22 RX ADMIN — IOPAMIDOL 90 ML: 755 INJECTION, SOLUTION INTRAVENOUS at 14:15

## 2020-12-22 RX ADMIN — DEXAMETHASONE 6 MG: 2 TABLET ORAL at 08:59

## 2020-12-22 NOTE — PROGRESS NOTES
Spoke with pt on the phone about AD/LW paperwork. This  dropped off paperwork for pt to review. Pt had completed the document. Since visitors are not allowed in the pt's room, this  made an appointment with the pt to face time in the morning and watch him sign the document. The documents will go into a plastic sleeve for 24 hours and then I will complete by notarizing.      Electronically signed by Enrrique Jones on 12/22/2020 at 3:13 PM

## 2020-12-22 NOTE — OUTREACH NOTE
Prep Survey      Responses   [facility] facility patient discharged from?  Non-BH   Is LACE score < 7 ?  Non-BH Discharge   Emergency Room discharge w/ pulse ox?  No   Eligibility  [facility]   Date of Discharge  12/22/20   Discharge diagnosis  heart issues   Does the patient have one of the following disease processes/diagnoses(primary or secondary)?  Other   Prep survey completed?  Yes          Jess Murguia RN

## 2020-12-22 NOTE — PROGRESS NOTES
Update given to patient's wife, Sherly Ruiz.   Electronically signed by Elizabeth Florian RN on 12/22/2020 at 2:31 PM

## 2020-12-22 NOTE — PROGRESS NOTES
12/21/20 0850    famotidine (PEPCID) injection 20 mg  20 mg Intravenous Daily Gena Presley MD   20 mg at 12/22/20 2011    calcitRIOL (ROCALTROL) capsule 0.25 mcg  0.25 mcg Oral Q MWF Mariana Michele MD   0.25 mcg at 12/21/20 1347    0.9% NaCl with KCl 20 mEq infusion   Intravenous Continuous Mariana Michele MD 75 mL/hr at 12/22/20 1047 Rate Change at 12/22/20 1047    insulin glargine (LANTUS) injection vial 30 Units  30 Units Subcutaneous BID Dano Ordonez MD   30 Units at 12/22/20 0859    insulin lispro (HUMALOG) injection vial 8 Units  0.08 Units/kg Subcutaneous TID WC Dano Ordonez MD   8 Units at 12/22/20 1230    potassium chloride (KLOR-CON M) extended release tablet 20 mEq  20 mEq Oral BID Breonna Jiang MD   20 mEq at 12/22/20 0858    apixaban (ELIQUIS) tablet 5 mg  5 mg Oral BID Dano Ordonez MD   5 mg at 12/22/20 0077    dexamethasone (DECADRON) tablet 6 mg  6 mg Oral Daily Dano Ordonez MD   6 mg at 12/22/20 1083    amiodarone (CORDARONE) tablet 200 mg  200 mg Oral Daily Dano Ordonez MD   200 mg at 12/22/20 0859    carvedilol (COREG) tablet 25 mg  25 mg Oral BID Dano Ordonez MD   25 mg at 12/22/20 0859    clopidogrel (PLAVIX) tablet 75 mg  75 mg Oral Daily Dano Ordonez MD   75 mg at 12/22/20 6462    isosorbide mononitrate (IMDUR) extended release tablet 60 mg  60 mg Oral BID Dano Ordonez MD   60 mg at 12/22/20 0859    amLODIPine (NORVASC) tablet 10 mg  10 mg Oral Daily Dano Ordonez MD   10 mg at 12/22/20 0859    glucose (GLUTOSE) 40 % oral gel 15 g  15 g Oral PRN Dano Ordonez MD        dextrose 50 % IV solution  12.5 g Intravenous PRN Dano Ordonez MD        glucagon (rDNA) injection 1 mg  1 mg Intramuscular PRN Dano Ordonez MD        dextrose 5 % solution  100 mL/hr Intravenous PRN Dano Ordonez MD        potassium chloride (KLOR-CON M) extended release tablet 40 mEq  40 mEq Oral PRN Dano Orodnez MD        Or    potassium bicarb-citric acid (EFFER-K) effervescent tablet 40 mEq  40 mEq Oral PRN Inga Castillo MD        Or    potassium chloride 10 mEq/100 mL IVPB (Peripheral Line)  10 mEq Intravenous PRN Inga Castillo MD        pantoprazole (PROTONIX) tablet 40 mg  40 mg Oral BID Inga Castillo MD   40 mg at 12/22/20 0859    remdesivir 100 mg in sodium chloride 0.9 % 250 mL IVPB  100 mg Intravenous Q24H Inga Castillo MD   Stopped at 12/22/20 1254    0.9 % sodium chloride bolus  30 mL Intravenous PRN Inga Castillo MD        sodium chloride flush 0.9 % injection 10 mL  10 mL Intravenous 2 times per day Apolinar Tavera MD   10 mL at 12/21/20 2100    sodium chloride flush 0.9 % injection 10 mL  10 mL Intravenous PRN Apolinar Tavera MD        promethazine (PHENERGAN) tablet 12.5 mg  12.5 mg Oral Q6H PRN Apolinar Tavera MD        Or    [facility]ron [facility] 4 mg  4 mg Intravenous Q6H PRN Apolinar Tavera MD        acetaminophen (TYLENOL) tablet 650 mg  650 mg Oral Q6H PRN Apolinar Tavera MD        Or    acetaminophen (TYLENOL) suppository 650 mg  650 mg Rectal Q6H PRN Apolinar Tavera MD        polyethylene glycol (GLYCOLAX) packet 17 g  17 g Oral Daily PRN Apolinar Tavera MD        aspirin chewable tablet 81 mg  81 mg Oral Daily Apolinar Tavera MD   81 mg at 12/22/20 0858    atorvastatin (LIPITOR) tablet 40 mg  40 mg Oral Nightly Apolinar Tavera MD   40 mg at 12/21/20 2059    nitroGLYCERIN (NITROSTAT) SL tablet 0.4 mg  0.4 mg Sublingual Q5 Min PRN Apolinar Tavera MD        Vitamin D (CHOLECALCIFEROL) tablet 2,000 Units  2,000 Units Oral Daily Apolinar Tavera MD   2,000 Units at 12/22/20 0858    zinc sulfate (ZINCATE) capsule 50 mg  50 mg Oral Daily Apolinar Tavera MD   50 mg at 12/22/20 0858    ascorbic acid (VITAMIN C) tablet 1,000 mg  1,000 mg Oral Daily Apolinar Taevra MD   1,000 mg at 12/22/20 0858    budesonide-formoterol (SYMBICORT) 80-4.5 MCG/ACT inhaler 2 puff  2 puff Inhalation BID Malik Chowdhury MD   2 puff at 12/21/20 0850        Labs:     Recent Labs     12/20/20  0152 12/21/20  0415 12/22/20  0440   WBC 6.3 8.3 10.3   RBC 4.81 4.67* 4.65*   HGB 14.0 13.9* 13.7*   HCT 47.2 43.9 44.0   MCV 98.1* 94.0 94.6*   MCH 29.1 29.8 29.5   MCHC 29.7* 31.7* 31.1*    133 127*     Recent Labs     12/20/20  0152 12/21/20  0415 12/22/20  0440    139 140   K 3.4* 3.4* 3.5   ANIONGAP 12 11 12   CL 91* 92* 95*   CO2 39* 36* 33*   BUN 34* 47* 48*   CREATININE 2.7* 2.6* 2.3*   GLUCOSE 215* 292* 286*   CALCIUM 8.5* 9.0 9.5     Recent Labs     12/20/20  0152 12/21/20  0415 12/22/20  0440   MG 2.0 2.1 2.1     Recent Labs     12/20/20  0152 12/21/20  0415 12/22/20  0440   AST 33 41* 37   ALT 21 24 27   BILITOT 0.9 0.7 0.5   ALKPHOS 71 70 67     ABGs:No results for input(s): PH, PO2, PCO2, HCO3, BE, O2SAT in the last 72 hours. Troponin T:   Recent Labs     12/21/20  1221 12/22/20  0720   TROPONINI 0.02 <0.01     INR:   Recent Labs     12/20/20  0152 12/21/20  0415   INR 1.31* 1.45*     Lactic Acid: No results for input(s): LACTA in the last 72 hours. Objective:   Vitals: BP (!) 159/78   Pulse 62   Temp 97.4 °F (36.3 °C)   Resp 16   Wt 232 lb 6.4 oz (105.4 kg)   SpO2 94%   BMI 32.41 kg/m²   24HR INTAKE/OUTPUT:      Intake/Output Summary (Last 24 hours) at 12/22/2020 1324  Last data filed at 12/21/2020 2059  Gross per 24 hour   Intake 10 ml   Output --   Net 10 ml     General appearance: Sitting up in bed. Acute distress and able to have quite a long conversation with me without any dyspnea.   Head: NC/AT   Eyes: normal sclera  Lungs: non labored  Heart: bradycardic   Abdomen: non distended  Neurologic:  alert and oriented  Psych: affect and mood appropriate  Skin: no visible rashes    Assessment and Plan:   Principal Problem:    NSTEMI (non-ST elevated myocardial infarction) ([address]  Active Problems:    Acute respiratory failure with hypoxia ([address]    Pneumonia due to COVID-19 virus    Palliative care patient  Resolved Problems:    * No resolved hospital problems. *    NSTEMI  - No recurrence of chest pain and trops have trended down  - Cardiology consulted, rec medical management and ischemic eval once covid resolved. - Telemetry  - aspirin, statin, plavix and other cardiac meds     Acute hypoxic respiratory failure secondary to Covid pneumonia  Saturating 90% on nasal cannula  - dexamethasone 6 mg daily (day 4/10)  - remdesivir (day 4/5)  - s/p Convalescent plasma  - trend inflammatory markers   - wean 02 as above  - AC with Eliquis    ANGELO on CKD IV  - appreciate nephrology assistance, defer IVFs to them     Type II DM w/ hyperglycemia   Home lantus is 30 units QAM and 20 units QPM  - Higher blood sugars overall, especially in setting of steroids. Will increase twice daily Lantus to 36 units. Left renal mass - Renal CT today. Chronic problems, continue home meds where appropriate:  PVD  Carotid artery stenosis  Thoracic aortic aneurysm  Chronic systolic and diastolic HF, EF 90% - check BNP  CAD  GERD  CKD III  pAF - on chronic AC with Eliquis. HLD  HTN  Obesity - BMI 34    Advance Directive: DNR-CC    DVT prophylaxis: Eliquis    Discharge planning: Hopefully home in AM pending renal CT. Home 02 eval ordered for AM.        Signed:   Dia De La Cruz MD 12/22/2020 1:24 PM  Hospitalist

## 2020-12-22 NOTE — PROGRESS NOTES
Ciro Isbell MD   20 mg at 12/22/20 0489    calcitRIOL (ROCALTROL) capsule 0.25 mcg  0.25 mcg Oral Q MWF Gissell Ray MD   0.25 mcg at 12/21/20 1347    0.9% NaCl with KCl 20 mEq infusion   Intravenous Continuous Fito Martin  mL/hr at 12/21/20 1554 New Bag at 12/21/20 1554    insulin glargine (LANTUS) injection vial 30 Units  30 Units Subcutaneous BID Dominique Hancock MD   30 Units at 12/22/20 0859    insulin lispro (HUMALOG) injection vial 8 Units  0.08 Units/kg Subcutaneous TID WC Dominique Hancock MD   8 Units at 12/22/20 0859    potassium chloride (KLOR-CON M) extended release tablet 20 mEq  20 mEq Oral BID Fito Martin MD   20 mEq at 12/22/20 0858    apixaban (ELIQUIS) tablet 5 mg  5 mg Oral BID Dominique Hancock MD   5 mg at 12/22/20 3042    dexamethasone (DECADRON) tablet 6 mg  6 mg Oral Daily Dominique Hancock MD   6 mg at 12/22/20 3792    amiodarone (CORDARONE) tablet 200 mg  200 mg Oral Daily Dominique Hancock MD   200 mg at 12/22/20 0859    carvedilol (COREG) tablet 25 mg  25 mg Oral BID Dominique Hancock MD   25 mg at 12/22/20 0859    clopidogrel (PLAVIX) tablet 75 mg  75 mg Oral Daily Dominique Hancock MD   75 mg at 12/22/20 0859    isosorbide mononitrate (IMDUR) extended release tablet 60 mg  60 mg Oral BID Dominique Hancock MD   60 mg at 12/22/20 0859    amLODIPine (NORVASC) tablet 10 mg  10 mg Oral Daily Dominique Hancock MD   10 mg at 12/22/20 0859    glucose (GLUTOSE) 40 % oral gel 15 g  15 g Oral PRN Dominique Hancock MD        dextrose 50 % IV solution  12.5 g Intravenous PRN Dominique Hancock MD        glucagon (rDNA) injection 1 mg  1 mg Intramuscular PRN Dominique Hancock MD        dextrose 5 % solution  100 mL/hr Intravenous PRN Dominique Hancock MD        potassium chloride (KLOR-CON M) extended release tablet 40 mEq  40 mEq Oral PRN Dominique Hancock MD        Or    potassium bicarb-citric acid (EFFER-K) effervescent tablet 40 mEq  40 mEq Oral PRN Dominique Hancock MD        Or   Jan Wolfe potassium chloride 10 mEq/100 mL IVPB (Peripheral Line)  10 mEq Intravenous PRN Sukhi Ham MD        pantoprazole (PROTONIX) tablet 40 mg  40 mg Oral BID Sukhi Ham MD   40 mg at 12/22/20 0859    remdesivir 100 mg in sodium chloride 0.9 % 250 mL IVPB  100 mg Intravenous Q24H Sukhi Ham MD   Stopped at 12/21/20 1307    0.9 % sodium chloride bolus  30 mL Intravenous PRN Sukhi Ham MD        sodium chloride flush 0.9 % injection 10 mL  10 mL Intravenous 2 times per day Marilee Yanez MD   10 mL at 12/21/20 2100    sodium chloride flush 0.9 % injection 10 mL  10 mL Intravenous PRN Marilee Yanez MD        promethazine (PHENERGAN) tablet 12.5 mg  12.5 mg Oral Q6H PRN Marilee Yanez MD        Or    ondansetron [facility]) injection 4 mg  4 mg Intravenous Q6H PRN Marilee Yanez MD        acetaminophen (TYLENOL) tablet 650 mg  650 mg Oral Q6H PRN Marilee Yanez MD        Or    acetaminophen (TYLENOL) suppository 650 mg  650 mg Rectal Q6H PRN Marilee Yanez MD        polyethylene glycol (GLYCOLAX) packet 17 g  17 g Oral Daily PRN Marilee Yanez MD        aspirin chewable tablet 81 mg  81 mg Oral Daily Marilee Yanez MD   81 mg at 12/22/20 0858    atorvastatin (LIPITOR) tablet 40 mg  40 mg Oral Nightly Marilee Yanez MD   40 mg at 12/21/20 2059    nitroGLYCERIN (NITROSTAT) SL tablet 0.4 mg  0.4 mg Sublingual Q5 Min PRN Marilee Yanez MD        Vitamin D (CHOLECALCIFEROL) tablet 2,000 Units  2,000 Units Oral Daily Marilee Yanez MD   2,000 Units at 12/22/20 0858    zinc sulfate (ZINCATE) capsule 50 mg  50 mg Oral Daily Marilee Yanez MD   50 mg at 12/22/20 0858    ascorbic acid (VITAMIN C) tablet 1,000 mg  1,000 mg Oral Daily Marilee Yanez MD   1,000 mg at 12/22/20 0858    budesonide-formoterol (SYMBICORT) 80-4.5 MCG/ACT inhaler 2 puff  2 puff Inhalation BID Marilee Yanez MD   2 puff at 12/21/20 9350       Past Medical History:  Past Medical History:   Diagnosis Date    Acute kidney failure, unspecified ([address]     Anxiety state, unspecified     Atrial septal aneurysm 01/09/2016    Blood circulation, collateral     Body mass index 30.0-30.9, adult     CAD (coronary artery disease) 01/10/2016    1/9/16  lexiscan  Positive for apical MI + myocardial ischemia, EF 42%, intermediate risk findings, AUC indication 16, AUC score 7 1/10/16  Cath  3 lesions in the LAD:  Mid: 70% 2.25x23, mid 90% 2.25 x 28, distal 100% 2.0x28, anterior lateral apical hypokinesis, EF 45%    Calculus of kidney     Carotid artery stenosis 06/26/2013    Cellulitis and abscess of hand, except fingers and thumb     Cerebral artery occlusion with cerebral infarction ([address]     15 years ago    Chronic airway obstruction, not elsewhere classified     Chronic kidney disease, unspecified     Congestive heart failure, unspecified     Diabetes mellitus type II     Diverticulosis of colon (without mention of hemorrhage)     Encounter for long-term (current) use of insulin (HCC)     Esophageal reflux     Family history of ischemic heart disease     Gastric ulcer, unspecified as acute or chronic, without mention of hemorrhage, perforation, or obstruction     Hyperlipemia     Hypertension     Internal hemorrhoids without mention of complication     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, or unspecified(403.00)     Obesity, unspecified     Other specified cardiac dysrhythmias(427.89)     Palliative care patient 12/21/2020    Paroxysmal atrial fibrillation (HCC)     Peripheral vascular disease ([address]     Solitary pulmonary nodule     Surgical or other procedure not carried out because of contraindication     Thoracic aneurysm without mention of rupture     Tobacco use disorder     Type II or unspecified type diabetes mellitus without mention of complication, not stated as uncontrolled        Past Surgical History:  Past Surgical History:   Procedure Laterality Date    CARDIAC CATHETERIZATION      CHOLECYSTECTOMY      CORONARY ANGIOPLASTY WITH STENT PLACEMENT  1-16    2 JACQUELINE to LAD    DIAGNOSTIC CARDIAC CATH LAB PROCEDURE      URETER STENT PLACEMENT      VASCULAR SURGERY  03- [facility]    Aortogram with bilateral selective renal artery injections    VASCULAR SURGERY  6/14/13 S    Right carotid endarterectomy with Vascu-Guard patch repair. Right cervical carotid arteriograms after endarterectomy.  VASCULAR SURGERY  7/6/15 S    Percutaneous cannulation, right common femoral artery, with a5 [place] sheath and later 6 [place] [facility] sheath. Suprarenal abdomianl aortagram.  Selective right renal arteriogram.  Right renal artery balloon angioplasty;/stent (Express 6 mm x 18mmballoon-expandable stent. . Completion suprarenal abdominal aortogram and selective right remal arteriogram. Mynx closure, right common femoral artery punctur       Family History  Family History   Problem Relation Age of Onset    High Blood Pressure Father     Cancer Father     High Blood Pressure Mother     High Blood Pressure Brother        Social History  Social History     Socioeconomic History    Marital status:      Spouse name: Not on file    Number of children: Not on file    Years of education: Not on file    Highest education level: Not on file   Occupational History    Not on file   Social Needs    Financial resource strain: Not on file    Food insecurity     Worry: Not on file     Inability: Not on file    Transportation needs     Medical: Not on file     Non-medical: Not on file   Tobacco Use    Smoking status: Former Smoker     Packs/day: 0.25    Smokeless tobacco: Never Used   Substance and Sexual Activity    Alcohol use: No    Drug use: No    Sexual activity: Yes   Lifestyle    Physical activity     Days per week: Not on file     Minutes per session: Not on file    Stress: Not on file Relationships    Social connections     Talks on phone: Not on file     Gets together: Not on file     Attends Congregational service: Not on file     Active member of club or organization: Not on file     Attends meetings of clubs or organizations: Not on file     Relationship status: Not on file    Intimate partner violence     Fear of current or ex partner: Not on file     Emotionally abused: Not on file     Physically abused: Not on file     Forced sexual activity: Not on file   Other Topics Concern    Not on file   Social History Narrative    Not on file         Review of Systems:  History obtained from chart review and the patient  General ROS: No fever or chills  Respiratory ROS: No cough, shortness of breath, wheezing  Cardiovascular ROS: no chest pain, no palpitations  Gastrointestinal ROS: No abdominal pain or melena  Genito-Urinary ROS: No dysuria or hematuria  Musculoskeletal ROS: No joint pain or swelling   14 point ROS reviewed with the patient and negative except as noted above and in the HPI unless unable to obtain. Objective:  Patient Vitals for the past 24 hrs:   BP Temp Temp src Pulse Resp SpO2   12/22/20 0845 (!) 159/78 -- -- -- -- --   12/22/20 0742 (!) 142/70 97.4 °F (36.3 °C) -- 62 16 94 %   12/22/20 0056 (!) 159/78 97.2 °F (36.2 °C) Temporal 51 14 95 %   12/21/20 2121 -- -- -- (!) 48 -- --   12/21/20 1853 126/62 96.3 °F (35.7 °C) -- 52 18 95 %   12/21/20 1131 (!) 147/72 96.5 °F (35.8 °C) Temporal (!) 47 22 91 %       Intake/Output Summary (Last 24 hours) at 12/22/2020 1043  Last data filed at 12/21/2020 2059  Gross per 24 hour   Intake 10 ml   Output --   Net 10 ml   In person clinical exam was not done as he is in isolation with COVID-19 pneumonia. This was done to preserve PPE and unnecessary exposure to COVID-19 infection. However assessment was obaitned with nurse's assistance  General: awake,alert  HEENT: Normocephalic atraumatic head. Chest:  Normal chest wall movements.   CVS: Regular rate. Abdominal: No abdominal distention seen. Extremities: No visible pedal edema. Labs:  BMP:   Recent Labs     12/20/20  0152 12/21/20 0415 12/22/20 0440    139 140   K 3.4* 3.4* 3.5   CL 91* 92* 95*   CO2 39* 36* 33*   BUN 34* 47* 48*   CREATININE 2.7* 2.6* 2.3*   CALCIUM 8.5* 9.0 9.5     CBC:   Recent Labs     12/20/20  0152 12/21/20 0415 12/22/20  0440   WBC 6.3 8.3 10.3   HGB 14.0 13.9* 13.7*   HCT 47.2 43.9 44.0   MCV 98.1* 94.0 94.6*    133 127*     LIVER PROFILE:   Recent Labs     12/20/20  0152 12/21/20 0415 12/22/20 0440   AST 33 41* 37   ALT 21 24 27   BILITOT 0.9 0.7 0.5   ALKPHOS 71 70 67     PT/INR:   Recent Labs     12/20/20  0152 12/21/20 0415   PROTIME 16.3* 17.7*   INR 1.31* 1.45*     APTT:   Recent Labs     12/20/20  0152 12/21/20 0415 12/22/20 0440   APTT 35.7 34.6 32.4     BNP:  No results for input(s): BNP in the last 72 hours. Ionized Calcium:No results for input(s): IONCA in the last 72 hours. Magnesium:  Recent Labs     12/20/20  0152 12/21/20 0415 12/22/20 0440   MG 2.0 2.1 2.1     Phosphorus:  No results for input(s): PHOS in the last 72 hours. HgbA1C:   No results for input(s): LABA1C in the last 72 hours. Hepatic:   Recent Labs     12/20/20  0152 12/21/20 0415 12/22/20  0440   ALKPHOS 71 70 67   ALT 21 24 27   AST 33 41* 37   PROT 6.4* 6.6 6.8   BILITOT 0.9 0.7 0.5   LABALBU 3.6 3.9 3.9     Lactic Acid: No results for input(s): LACTA in the last 72 hours. Troponin: No results for input(s): CKTOTAL, CKMB, TROPONINT in the last 72 hours. ABGs: No results for input(s): PH, PCO2, PO2, HCO3, O2SAT in the last 72 hours. CRP:    Recent Labs     12/20/20  0152 12/21/20  0415 12/22/20  0440   CRP 1.17* 1.64* 0.96*     Sed Rate:  No results for input(s): SEDRATE in the last 72 hours. Cultures:   No results for input(s): CULTURE in the last 72 hours. No results for input(s): BC, Joey Holes in the last 72 hours.   No results for input(s): CXSURG in the last 72 hours. Radiology reports as per the Radiologist  Radiology: No results found. Assessment   1. Acute kidney injury/stage II/improved. .  2.  Intravascular volume depletion. 3.  Stage IV chronic kidney disease baseline. 4.  Hypokalemia. 5.  Type II diabetes with nephropathy. 6.  Non-STEMI. 7.  Metabolic alkalosis  8. COVID-19 with bilateral pneumonia. Plan:  1. Continue hydration by mouth  2. Potassium supplement. 3.  Monitor renal function  4. Follow up on renal CT (\benfit of contrast outweigh risks). 5.  I patient is discharged, he is to follow with nephrology within 2 weeks.      Gissell Ray MD  12/22/20  10:43 AM

## 2020-12-22 NOTE — TELEPHONE ENCOUNTER
Caller: [email or web address]    Relationship to patient: nurse    Best call back number:     New or established patient?  [] New  [x] Established    Date of discharge: 12-22-20    Facility [facility] from: [facility]    Diagnosis/Symptoms:heart issues    Length of stay (If applicable): 4 days     She will fax records

## 2020-12-23 ENCOUNTER — TRANSITIONAL CARE MANAGEMENT TELEPHONE ENCOUNTER (OUTPATIENT)
Dept: CALL CENTER | Facility: HOSPITAL | Age: 65
End: 2020-12-23

## 2020-12-23 VITALS
RESPIRATION RATE: 16 BRPM | DIASTOLIC BLOOD PRESSURE: 79 MMHG | HEART RATE: 51 BPM | OXYGEN SATURATION: 92 % | WEIGHT: 232.4 LBS | TEMPERATURE: 96.5 F | SYSTOLIC BLOOD PRESSURE: 167 MMHG | BODY MASS INDEX: 32.41 KG/M2

## 2020-12-23 LAB
ALBUMIN SERPL-MCNC: 3.9 G/DL (ref 3.5–5.2)
ALP BLD-CCNC: 65 U/L (ref 40–130)
ALT SERPL-CCNC: 27 U/L (ref 5–41)
ANION GAP SERPL CALCULATED.3IONS-SCNC: 11 MMOL/L (ref 7–19)
APTT: 34 SEC (ref 26–36.2)
AST SERPL-CCNC: 27 U/L (ref 5–40)
BASOPHILS ABSOLUTE: 0 K/UL (ref 0–0.2)
BASOPHILS RELATIVE PERCENT: 0 % (ref 0–1)
BILIRUB SERPL-MCNC: 0.6 MG/DL (ref 0.2–1.2)
BUN BLDV-MCNC: 45 MG/DL (ref 8–23)
C-REACTIVE PROTEIN: 0.53 MG/DL (ref 0–0.5)
CALCIUM SERPL-MCNC: 9.6 MG/DL (ref 8.8–10.2)
CHLORIDE BLD-SCNC: 95 MMOL/L (ref 98–111)
CO2: 34 MMOL/L (ref 22–29)
CREAT SERPL-MCNC: 1.8 MG/DL (ref 0.5–1.2)
EOSINOPHILS ABSOLUTE: 0 K/UL (ref 0–0.6)
EOSINOPHILS RELATIVE PERCENT: 0 % (ref 0–5)
FERRITIN: 146.2 NG/ML (ref 30–400)
GFR AFRICAN AMERICAN: 46
GFR NON-AFRICAN AMERICAN: 38
GLUCOSE BLD-MCNC: 186 MG/DL (ref 70–99)
GLUCOSE BLD-MCNC: 205 MG/DL (ref 74–109)
GLUCOSE BLD-MCNC: 222 MG/DL (ref 70–99)
HCT VFR BLD CALC: 45.9 % (ref 42–52)
HEMOGLOBIN: 14.4 G/DL (ref 14–18)
IMMATURE GRANULOCYTES #: 0 K/UL
LYMPHOCYTES ABSOLUTE: 0.8 K/UL (ref 1.1–4.5)
LYMPHOCYTES RELATIVE PERCENT: 9.4 % (ref 20–40)
MCH RBC QN AUTO: 29.8 PG (ref 27–31)
MCHC RBC AUTO-ENTMCNC: 31.4 G/DL (ref 33–37)
MCV RBC AUTO: 94.8 FL (ref 80–94)
MONOCYTES ABSOLUTE: 0.4 K/UL (ref 0–0.9)
MONOCYTES RELATIVE PERCENT: 4.4 % (ref 0–10)
NEUTROPHILS ABSOLUTE: 7.3 K/UL (ref 1.5–7.5)
NEUTROPHILS RELATIVE PERCENT: 86 % (ref 50–65)
PDW BLD-RTO: 14.5 % (ref 11.5–14.5)
PERFORMED ON: ABNORMAL
PERFORMED ON: ABNORMAL
PLATELET # BLD: 130 K/UL (ref 130–400)
PMV BLD AUTO: 12.6 FL (ref 9.4–12.4)
POTASSIUM REFLEX MAGNESIUM: 3.6 MMOL/L (ref 3.5–5)
RBC # BLD: 4.84 M/UL (ref 4.7–6.1)
SODIUM BLD-SCNC: 140 MMOL/L (ref 136–145)
TOTAL PROTEIN: 6.6 G/DL (ref 6.6–8.7)
WBC # BLD: 8.5 K/UL (ref 4.8–10.8)

## 2020-12-23 PROCEDURE — 80053 COMPREHEN METABOLIC PANEL: CPT

## 2020-12-23 PROCEDURE — 82728 ASSAY OF FERRITIN: CPT

## 2020-12-23 PROCEDURE — 82947 ASSAY GLUCOSE BLOOD QUANT: CPT

## 2020-12-23 PROCEDURE — 2500000003 HC RX 250 WO HCPCS: Performed by: INTERNAL MEDICINE

## 2020-12-23 PROCEDURE — 6370000000 HC RX 637 (ALT 250 FOR IP): Performed by: INTERNAL MEDICINE

## 2020-12-23 PROCEDURE — 85730 THROMBOPLASTIN TIME PARTIAL: CPT

## 2020-12-23 PROCEDURE — 6360000002 HC RX W HCPCS: Performed by: INTERNAL MEDICINE

## 2020-12-23 PROCEDURE — 2580000003 HC RX 258: Performed by: INTERNAL MEDICINE

## 2020-12-23 PROCEDURE — 2700000000 HC OXYGEN THERAPY PER DAY

## 2020-12-23 PROCEDURE — 86140 C-REACTIVE PROTEIN: CPT

## 2020-12-23 PROCEDURE — 94761 N-INVAS EAR/PLS OXIMETRY MLT: CPT

## 2020-12-23 PROCEDURE — 85025 COMPLETE CBC W/AUTO DIFF WBC: CPT

## 2020-12-23 RX ORDER — BUDESONIDE AND FORMOTEROL FUMARATE DIHYDRATE 80; 4.5 UG/1; UG/1
2 AEROSOL RESPIRATORY (INHALATION) 2 TIMES DAILY
Qty: 1 INHALER | Refills: 3 | Status: ON HOLD | OUTPATIENT
Start: 2020-12-23 | End: 2021-05-01

## 2020-12-23 RX ORDER — ZINC SULFATE 50(220)MG
50 CAPSULE ORAL DAILY
Qty: 30 CAPSULE | Refills: 0 | Status: ON HOLD | OUTPATIENT
Start: 2020-12-24 | End: 2021-05-01

## 2020-12-23 RX ORDER — DEXAMETHASONE 6 MG/1
6 TABLET ORAL DAILY
Qty: 5 TABLET | Refills: 0 | Status: SHIPPED | OUTPATIENT
Start: 2020-12-24 | End: 2020-12-29

## 2020-12-23 RX ORDER — LISINOPRIL 40 MG/1
20 TABLET ORAL DAILY
Qty: 15 TABLET | Refills: 0
Start: 2020-12-23 | End: 2021-02-01 | Stop reason: DRUGHIGH

## 2020-12-23 RX ORDER — ASPIRIN 81 MG/1
81 TABLET, CHEWABLE ORAL DAILY
Qty: 30 TABLET | Refills: 3 | Status: SHIPPED | OUTPATIENT
Start: 2020-12-24

## 2020-12-23 RX ADMIN — FAMOTIDINE 20 MG: 10 INJECTION, SOLUTION INTRAVENOUS at 07:43

## 2020-12-23 RX ADMIN — PANTOPRAZOLE SODIUM 40 MG: 40 TABLET, DELAYED RELEASE ORAL at 07:43

## 2020-12-23 RX ADMIN — ASPIRIN 81 MG: 81 TABLET, CHEWABLE ORAL at 07:43

## 2020-12-23 RX ADMIN — REMDESIVIR 100 MG: 100 INJECTION, POWDER, LYOPHILIZED, FOR SOLUTION INTRAVENOUS at 12:09

## 2020-12-23 RX ADMIN — POTASSIUM CHLORIDE 20 MEQ: 1500 TABLET, EXTENDED RELEASE ORAL at 07:43

## 2020-12-23 RX ADMIN — CALCITRIOL CAPSULES 0.25 MCG 0.25 MCG: 0.25 CAPSULE ORAL at 12:09

## 2020-12-23 RX ADMIN — DEXAMETHASONE 6 MG: 2 TABLET ORAL at 07:42

## 2020-12-23 RX ADMIN — INSULIN LISPRO 8 UNITS: 100 INJECTION, SOLUTION INTRAVENOUS; SUBCUTANEOUS at 13:06

## 2020-12-23 RX ADMIN — Medication 36 UNITS: at 07:47

## 2020-12-23 RX ADMIN — CLOPIDOGREL BISULFATE 75 MG: 75 TABLET ORAL at 07:42

## 2020-12-23 RX ADMIN — CHOLECALCIFEROL (VITAMIN D3) 25 MCG (1,000 UNIT) TABLET 2000 UNITS: TABLET at 07:43

## 2020-12-23 RX ADMIN — ZINC SULFATE 220 MG (50 MG) CAPSULE 50 MG: CAPSULE at 07:43

## 2020-12-23 RX ADMIN — APIXABAN 5 MG: 5 TABLET, FILM COATED ORAL at 07:44

## 2020-12-23 RX ADMIN — ALBUTEROL SULFATE 2 PUFF: 90 AEROSOL, METERED RESPIRATORY (INHALATION) at 12:09

## 2020-12-23 RX ADMIN — AMIODARONE HYDROCHLORIDE 200 MG: 200 TABLET ORAL at 07:42

## 2020-12-23 RX ADMIN — ISOSORBIDE MONONITRATE 60 MG: 60 TABLET, EXTENDED RELEASE ORAL at 07:43

## 2020-12-23 RX ADMIN — INSULIN LISPRO 8 UNITS: 100 INJECTION, SOLUTION INTRAVENOUS; SUBCUTANEOUS at 07:47

## 2020-12-23 RX ADMIN — CARVEDILOL 25 MG: 25 TABLET, FILM COATED ORAL at 07:42

## 2020-12-23 RX ADMIN — OXYCODONE HYDROCHLORIDE AND ACETAMINOPHEN 1000 MG: 500 TABLET ORAL at 07:44

## 2020-12-23 RX ADMIN — AMLODIPINE BESYLATE 10 MG: 10 TABLET ORAL at 07:42

## 2020-12-23 NOTE — PROGRESS NOTES
Pt evaluated for Home O2     At rest on 1 lpm nc Spo2 96%  On room air at rest 93%  On room air with ambulation Spo2 87%  On 2 lpm nc with exertion Spo2 95%

## 2020-12-23 NOTE — DISCHARGE SUMMARY
Vito Real  :  1955  MRN:  [id number]    Admit date:  2020  Discharge date:  2020       Admitting Physician: Obdulia Shahid MD    Advance Directive: DNR-CC    Consults Made:   IP CONSULT TO CARDIOLOGY  IP CONSULT TO PHARMACY  IP CONSULT TO NEPHROLOGY  PALLIATIVE CARE EVAL      Primary Care Physician:  Maciej Gupta MD    Discharge Diagnoses:  Principal Problem:    NSTEMI (non-ST elevated myocardial infarction) ([facility]  Active Problems:    Acute respiratory failure with hypoxia ([facility] [address]    Pneumonia due to COVID-19 virus    Palliative care patient  Resolved Problems:    * No resolved hospital problems. *      Pertinent Labs:  CBC with DIFF:  Recent Labs     20  0600   WBC 8.3 10.3 8.5   RBC 4.67* 4.65* 4.84   HGB 13.9* 13.7* 14.4   HCT 43.9 44.0 45.9   MCV 94.0 94.6* 94.8*   MCH 29.8 29.5 29.8   MCHC 31.7* 31.1* 31.4*   RDW 14.3 14.4 14.5    127* 130   MPV 12.0 12.0 12.6*   NEUTOPHILPCT 87.2* 88.9* 86.0*   LYMPHOPCT 8.9* 7.1* 9.4*   MONOPCT 3.5 3.4 4.4   EOSRELPCT 0.0 0.0 0.0   BASOPCT 0.0 0.1 0.0   NEUTROABS 7.3 9.1* 7.3   LYMPHSABS 0.7* 0.7* 0.8*   MONOSABS 0.30 0.40 0.40   EOSABS 0.00 0.00 0.00   BASOSABS 0.00 0.00 0.00       CMP/BMP:  Recent Labs     20  0600    140 140   K 3.4* 3.5 3.6   CL 92* 95* 95*   CO2 36* 33* 34*   ANIONGAP 11 12 11   GLUCOSE 292* 286* 205*   BUN 47* 48* 45*   CREATININE 2.6* 2.3* 1.8*   LABGLOM 25* 29* 38*   CALCIUM 9.0 9.5 9.6   PROT 6.6 6.8 6.6   LABALBU 3.9 3.9 3.9   BILITOT 0.7 0.5 0.6   ALKPHOS 70 67 65   ALT 24 27 27   AST 41* 37 27         CRP:    Recent Labs     20  0415 20  0440 20  0600   CRP 1.64* 0.96* 0.53*     Sed Rate:  No results for input(s): SEDRATE in the last 72 hours.       HgBA1c:  No components found for: HGBA1C  FLP:    Lab Results   Component Value Date    TRIG 142 2020    HDL 32 2020    LDLCALC 43 2020    LDLDIRECT 153 06/12/2013     TSH:    Lab Results   Component Value Date    TSH 1.630 07/09/2020     Troponin T:   Recent Labs     12/21/20  1221 12/22/20  0720 12/22/20  1641   TROPONINI 0.02 <0.01 <0.01     Pro-BNP: No results for input(s): BNP in the last 72 hours. INR:   Recent Labs     12/21/20  0415   INR 1.45*     ABGs:   Lab Results   Component Value Date    PHART 7.500 12/19/2020    PO2ART 51.0 12/19/2020    IPK7HUF 65.0 12/19/2020     UA:No results for input(s): NITRITE, COLORU, PHUR, LABCAST, WBCUA, RBCUA, MUCUS, TRICHOMONAS, YEAST, BACTERIA, CLARITYU, SPECGRAV, LEUKOCYTESUR, UROBILINOGEN, BILIRUBINUR, BLOODU, GLUCOSEU, AMORPHOUS in the last 72 hours. Invalid input(s): Euel Sinks      Culture Results:    No results for input(s): CXSURG in the last 720 hours. Blood Culture Recent: No results for input(s): BC in the last 720 hours. Cultures:   No results for input(s): CULTURE in the last 72 hours. No results for input(s): BC, April  in the last 72 hours. No results for input(s): CXSURG in the last 72 hours. Recent Labs     12/21/20  0415 12/22/20  0440   MG 2.1 2.1     Recent Labs     12/21/20  0415 12/22/20  0440 12/23/20  0600   AST 41* 37 27   ALT 24 27 27   BILITOT 0.7 0.5 0.6   ALKPHOS 70 67 65           Significant Diagnostic Studies:   Us Renal Complete    Result Date: 12/19/2020  EXAM:  US RENAL COMPLETE INDICATION:  Kidney disease. COMPARISON:  None TECHNIQUE:  Routine grayscale and color Doppler images were obtained through the bilateral renal fossae. FINDINGS:  Right Kidney: The right kidney measures 11.2 cm in longitudinal dimension. There is good corticomedullary differentiation. There is no evidence of hydronephrosis. A 1.4 x 1.1 x 1.2 cm cyst in the lower pole is present. No echogenic stone. Left Kidney: The left kidney measures 11.6 cm in longitudinal dimension. There is good corticomedullary differentiation. There is no evidence of hydronephrosis.  A 3.8 x 2.9 x 3.8 cm upper pole cyst and a 1.6 x 1.2 x 1.3 cm hypoechoic mass in the lower pole, incompletely characterized. . No echogenic stone. Bladder: The bladder is unremarkable without evidence of mass or internal debris. 1.  1.6 cm incompletely characterized mass in the lower pole of the left kidney. MRI or CT abdomen pelvis with and without contrast renal mass protocol recommended. 2.  Bilateral renal cysts. Signed by Dr Saleem Mcdonald on 12/19/2020 1:26 PM    Xr Chest Portable    Result Date: 12/19/2020  Frontal portable upright radiograph of the chest 12/19/2020 11:03 AM History: Chest x-ray dated October 18, 2020 Comparison: Chest x-ray dated October 18, 2020 Findings: Similar coarsening of the interstitium. . No new opacities or pneumothoraces are visualized in the chest. The cardiomediastinal silhouette and pulmonary vascularity are unchanged. No acute osseous or soft tissue abnormality is noted. Impression: 1. No significant interval change since previous exam. Signed by Dr Saleem Mcdonald on 12/19/2020 1:34 PM      Hospital Course:    Mr Miesha Majano, a 59 yo male, presented to [facility] from outside hospital on 12/19 with NSTEMI with chest pain and elevated troponin 0.12 along with acute hypoxic respiratory failure secondary to Covid pneumonia.  Cardiology was consulted prior to transfer and recommended that patient be started on heparin drip. David Paez was started on treatment for pneumonia including dexamethasone, remdesivir, convalescent plasma.  He may have had an allergic reaction to convalescent plasma, which resolved rapidly.  His oxygen saturation is stable at 2 L nasal cannula.  Cardiology evaluated him and felt comfortable stopping heparin drip.  They are recommending medical management for NSTEMI with outpatient evaluation (stress test vs cath) once COVID has resolved. Nephrology consulted for ANGELO on CKD IV, which is improving with IVFs.  Renal US showed left kidney mass,            NSTEMI  · - No recurrence of chest pain and trops have trended down  · - Cardiology consulted, rec medical management and ischemic eval once covid resolved. · - Telemetry  · - aspirin, statin, plavix and other cardiac meds      Acute hypoxic respiratory failure secondary to Covid pneumonia  · Oxygen supplementation to keep SPO2 over 90%  · Patient being discharged on home O2. · Dexamethasone 6 mg daily (day 5/10)  · Discharge with 5-day course of dexamethasone, to complete a 10-day course. .  · Remdesivir IV (day 5/5)  · - s/p Convalescent plasma  · - AC with Eliquis     ANGELO on CKD IV  · -Followed by nephrology  · Reported nephrotoxic regimen  · Okay for discharge from nephrology standpoint  · Follow-up with nephrology outpatient. Type II DM w/ hyperglycemia   · Home lantus is 30 units QAM and 20 units QPM  · - Higher blood sugars overall, especially in setting of steroids.    · Insulin dosing adjusted accordingly      Renal Cyst   · CT Kidney W WO Con 12/23/22020: Impression: 1.4 cm exophytic lateral inferior left renal lesion most consistent with a nonenhancing hyperdense cyst. Simple nonenhancing superior right renal cyst with an exophytic anterior inferior left renal cyst. No enhancing renal mass or hydronephrosis. Mild left colonic diverticulosis no acute diverticulitis. . Fatty containing umbilical and left inguinal hernias. Dense vascular calcification with cardiomegaly. Trace perihepatic ascites. · Follow-up with PCP and nephrology outpatient    Chronic systolic and diastolic HF,   · Stable  · EF 36% -BNP within lab reference range (513 (12/21/2020)  · Continue diuresis     History of paroxysmal atrial fibrillation  · Currently rate controlled  · Apixaban           Continued management of other chronic medical conditions - see above and orders.               Physical Exam:  Vital Signs: BP (!) 167/79   Pulse 51   Temp 96.5 °F (35.8 °C) (Temporal)   Resp 16   Wt 232 lb 6.4 oz (105.4 kg)   SpO2 92%   BMI 32.41 kg/m²   Physical Exam  Vitals signs and nursing note reviewed. Constitutional:       General: He is not in acute distress. Appearance: Normal appearance. He is not ill-appearing, toxic-appearing or diaphoretic. HENT:      Head: Normocephalic and atraumatic. Nose: Nose normal.   Neck:      Musculoskeletal: Normal range of motion and neck supple. Pulmonary:      Effort: Pulmonary effort is normal. No respiratory distress. Comments: Patient in no acute respiratory distress. Speaking in full sentences and answering all questions appropriately. Musculoskeletal: Normal range of motion. Neurological:      Mental Status: He is alert and oriented to person, place, and time. Psychiatric:         Mood and Affect: Mood normal.         Behavior: Behavior normal.         Thought Content: Thought content normal.         Judgment: Judgment normal.           Discharge Medications:       Radha Hauser   Home Medication Instructions X:[id number]    Printed on:12/23/20 2943   Medication Information                      albuterol sulfate HFA (VENTOLIN HFA) 108 (90 Base) MCG/ACT inhaler  Inhale 2 puffs into the lungs every 6 hours as needed for Wheezing             amiodarone (CORDARONE) 200 MG tablet  Take 1 tablet by mouth daily             amLODIPine (NORVASC) 10 MG tablet  Take 1 tablet by mouth daily             apixaban (ELIQUIS) 5 MG TABS tablet  Take by mouth 2 times daily             ascorbic acid (VITAMIN C) 1000 MG tablet  Take 1 tablet by mouth daily             aspirin 81 MG chewable tablet  Take 1 tablet by mouth daily             budesonide-formoterol (SYMBICORT) 80-4.5 MCG/ACT AERO  Inhale 2 puffs into the lungs 2 times daily             bumetanide (BUMEX) 1 MG tablet  Take 1 tablet by mouth 2 times daily             carvedilol (COREG) 25 MG tablet  Take 1 tablet by mouth 2 times daily             clopidogrel (PLAVIX) 75 MG tablet  Take 1 tablet by mouth daily.              dexamethasone (DECADRON) 6 MG tablet  Take 1 tablet by

## 2020-12-23 NOTE — PROGRESS NOTES
Nephrology ([facility]) progress Note      Patient:  Maryann Bo  YOB: 1955  Date of Service: 12/23/2020  MRN: [id number]   Acct: [de-identified]   Primary Care Physician: Vika Mcknight MD  Advance Directive: [facility]  Admit Date: 12/18/2020       Hospital Day: 5  Referring Provider: Wilfrid Collins MD    Patient independently seen and examined, Chart, Consults, Notes, Operative notes, Labs, Cardiology, and Radiology studies reviewed as available. Chief complaint: Abnormal labs. Subjective:  Maryann Bo is a 60 y.o. male  whom we were consulted for acute kidney injury/chronic kidney disease. Patient has stage IV chronic kidney disease baseline and was recently on December 11, his serum creatinine at that time was 2.3 mg with estimated GFR of 29 mL. He was clinically volume overload at that time and we have started Zaroxolyn 2.5 mg p.o. daily as he was already on Bumex. Presented is now a transfer from an outside hospital with non-STEMI. Patient was also tested positive for Covid 19 with pneumonia. He is now admitted to [address] [place] [address] for further work-up. Incidental finding in the lab shows serum creatinine of 2.9 mg. On arrival here he denies any chest pain or shortness of breath. He also denies any swelling of legs. He had one episode of vomiting but no further vomiting at this time. Patient was given IV fluids and renal function has improved. We found him with left renal tumor and ordered a CT scan that showed complex cyst and no tumor. Patient is on low dose O2. He is doing well otherwise and is being discharged.        Allergies:  Hydralazine and Morphine    Medicines:  Current Facility-Administered Medications   Medication Dose Route Frequency Provider Last Rate Last Admin    insulin glargine (LANTUS) injection vial 36 Units  36 Units Subcutaneous BID Nico Locke MD   36 Units at 12/23/20 0747    albuterol sulfate  (90 Base) MCG/ACT inhaler 2 puff  2 puff Inhalation 4x daily Storm Kang MD   Stopped at 12/23/20 0744    famotidine (PEPCID) injection 20 mg  20 mg Intravenous Daily Azra Pickens MD   20 mg at 12/23/20 1360    calcitRIOL (ROCALTROL) capsule 0.25 mcg  0.25 mcg Oral Q MWF Doneta Boas, MD   0.25 mcg at 12/21/20 1347    0.9% NaCl with KCl 20 mEq infusion   Intravenous Continuous Doneta Boas, MD 75 mL/hr at 12/22/20 1047 Rate Change at 12/22/20 1047    insulin lispro (HUMALOG) injection vial 8 Units  0.08 Units/kg Subcutaneous TID WC Glenroy Jara MD   8 Units at 12/23/20 0747    potassium chloride (KLOR-CON M) extended release tablet 20 mEq  20 mEq Oral BID David Mary MD   20 mEq at 12/23/20 0743    apixaban (ELIQUIS) tablet 5 mg  5 mg Oral BID Glenroy Jara MD   5 mg at 12/23/20 0744    dexamethasone (DECADRON) tablet 6 mg  6 mg Oral Daily Glenroy Jara MD   6 mg at 12/23/20 8349    amiodarone (CORDARONE) tablet 200 mg  200 mg Oral Daily Glenroy Jara MD   200 mg at 12/23/20 7700    carvedilol (COREG) tablet 25 mg  25 mg Oral BID Glenroy Jara MD   25 mg at 12/23/20 5495    clopidogrel (PLAVIX) tablet 75 mg  75 mg Oral Daily Glenroy Jara MD   75 mg at 12/23/20 2617    isosorbide mononitrate (IMDUR) extended release tablet 60 mg  60 mg Oral BID Glenroy Jara MD   60 mg at 12/23/20 0743    amLODIPine (NORVASC) tablet 10 mg  10 mg Oral Daily Glenroy Jara MD   10 mg at 12/23/20 0742    glucose (GLUTOSE) 40 % oral gel 15 g  15 g Oral PRN Glenroy Jara MD        dextrose 50 % IV solution  12.5 g Intravenous PRN Glenroy Jara MD        glucagon (rDNA) injection 1 mg  1 mg Intramuscular PRN Glenroy Jara MD        dextrose 5 % solution  100 mL/hr Intravenous PRN Glenroy Jara MD        potassium chloride (KLOR-CON M) extended release tablet 40 mEq  40 mEq Oral PRN Glenroy Jara MD        Or    potassium bicarb-citric acid (EFFER-K) effervescent tablet 40 mEq 40 mEq Oral PRN Riaz Dubois MD        Or    potassium chloride 10 mEq/100 mL IVPB (Peripheral Line)  10 mEq Intravenous PRN Riaz Dubois MD        pantoprazole (PROTONIX) tablet 40 mg  40 mg Oral BID Riaz Dubois MD   40 mg at 12/23/20 0743    remdesivir 100 mg in sodium chloride 0.9 % 250 mL IVPB  100 mg Intravenous Q24H Riaz Dubois MD   Stopped at 12/22/20 1254    0.9 % sodium chloride bolus  30 mL Intravenous PRN Riaz Dubois MD        sodium chloride flush 0.9 % injection 10 mL  10 mL Intravenous 2 times per day Emeterio Ruiz MD   Stopped at 12/23/20 1003    sodium chloride flush 0.9 % injection 10 mL  10 mL Intravenous PRN Emeterio Ruiz MD        promethazine (PHENERGAN) tablet 12.5 mg  12.5 mg Oral Q6H PRN Emeterio Ruiz MD        Or    ondansetron [facility]) injection 4 mg  4 mg Intravenous Q6H PRN Emeterio Ruiz MD        acetaminophen (TYLENOL) tablet 650 mg  650 mg Oral Q6H PRN Emeterio Ruiz MD        Or    acetaminophen (TYLENOL) suppository 650 mg  650 mg Rectal Q6H PRN Emeterio Ruiz MD        polyethylene glycol (GLYCOLAX) packet 17 g  17 g Oral Daily PRN Emeterio Ruiz MD        aspirin chewable tablet 81 mg  81 mg Oral Daily Emeterio Ruiz MD   81 mg at 12/23/20 0743    atorvastatin (LIPITOR) tablet 40 mg  40 mg Oral Nightly Emeterio Ruiz MD   40 mg at 12/22/20 2102    nitroGLYCERIN (NITROSTAT) SL tablet 0.4 mg  0.4 mg Sublingual Q5 Min PRN Emeterio Ruiz MD        Vitamin D (CHOLECALCIFEROL) tablet 2,000 Units  2,000 Units Oral Daily Emeterio Ruiz MD   2,000 Units at 12/23/20 0743    zinc sulfate (ZINCATE) capsule 50 mg  50 mg Oral Daily Emeterio Ruiz MD   50 mg at 12/23/20 2199    ascorbic acid (VITAMIN C) tablet 1,000 mg  1,000 mg Oral Daily Emeterio Ruiz MD   1,000 mg at 12/23/20 0744    budesonide-formoterol (SYMBICORT) 80-4.5 MCG/ACT inhaler 2 puff  2 puff Inhalation BID Emeterio Ruiz MD   Stopped at 12/23/20 0745       Past Medical History:  Past Medical History:   Diagnosis Date    Acute kidney failure, unspecified ([facility] [address]     Anxiety state, unspecified     Atrial septal aneurysm 01/09/2016    Blood circulation, collateral     Body mass index 30.0-30.9, adult     CAD (coronary artery disease) 01/10/2016    1/9/16  lexiscan  Positive for apical MI + myocardial ischemia, EF 42%, intermediate risk findings, AUC indication 16, AUC score 7 1/10/16  Cath  3 lesions in the LAD:  Mid: 70% 2.25x23, mid 90% 2.25 x 28, distal 100% 2.0x28, anterior lateral apical hypokinesis, EF 45%    Calculus of kidney     Carotid artery stenosis 06/26/2013    Cellulitis and abscess of hand, except fingers and thumb     Cerebral artery occlusion with cerebral infarction ([facility] [address]     15 years ago    Chronic airway obstruction, not elsewhere classified     Chronic kidney disease, unspecified     Congestive heart failure, unspecified     Diabetes mellitus type II     Diverticulosis of colon (without mention of hemorrhage)     Encounter for long-term (current) use of insulin (HCC)     Esophageal reflux     Family history of ischemic heart disease     Gastric ulcer, unspecified as acute or chronic, without mention of hemorrhage, perforation, or obstruction     Hyperlipemia     Hypertension     Internal hemorrhoids without mention of complication     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, or unspecified(403.00)     Obesity, unspecified     Other specified cardiac dysrhythmias(427.89)     Palliative care patient 12/21/2020    Paroxysmal atrial fibrillation (HCC)     Peripheral vascular disease ([address]     Solitary pulmonary nodule     Surgical or other procedure not carried out because of contraindication     Thoracic aneurysm without mention of rupture     Tobacco use disorder     Type II or unspecified type diabetes mellitus without mention of complication, not stated as uncontrolled        Past Surgical History:  Past Surgical History:   Procedure Laterality Date    CARDIAC CATHETERIZATION      CHOLECYSTECTOMY      CORONARY ANGIOPLASTY WITH STENT PLACEMENT  1-16    2 JACQUELINE to LAD    DIAGNOSTIC CARDIAC CATH LAB PROCEDURE      URETER STENT PLACEMENT      VASCULAR SURGERY  03- [facility]    Aortogram with bilateral selective renal artery injections    VASCULAR SURGERY  6/14/13 [facility]    Right carotid endarterectomy with Vascu-Guard patch repair. Right cervical carotid arteriograms after endarterectomy.  VASCULAR SURGERY  7/6/15 [facility]    Percutaneous cannulation, right common femoral artery, with a5 Ni[place]an sheath and later 6 Niuean [facility] sheath. Suprarenal abdomianl aortagram.  Selective right renal arteriogram.  Right renal artery balloon angioplasty;/stent (Express 6 mm x 18mmballoon-expandable stent. . Completion suprarenal abdominal aortogram and selective right remal arteriogram. Mynx closure, right common femoral artery punctur       Family History  Family History   Problem Relation Age of Onset    High Blood Pressure Father     Cancer Father     High Blood Pressure Mother     High Blood Pressure Brother        Social History  Social History     Socioeconomic History    Marital status:      Spouse name: Not on file    Number of children: Not on file    Years of education: Not on file    Highest education level: Not on file   Occupational History    Not on file   Social Needs    Financial resource strain: Not on file    Food insecurity     Worry: Not on file     Inability: Not on file    Transportation needs     Medical: Not on file     Non-medical: Not on file   Tobacco Use    Smoking status: Former Smoker     Packs/day: 0.25    Smokeless tobacco: Never Used   Substance and Sexual Activity    Alcohol use: No    Drug use: No    Sexual activity: Yes   Lifestyle    Physical activity     Days per week: Not on file     Minutes per session: Not on file    Stress: Not on file   Relationships    Social connections     Talks on phone: Not on file     Gets together: Not on file     Attends Christian service: Not on file     Active member of club or organization: Not on file     Attends meetings of clubs or organizations: Not on file     Relationship status: Not on file    Intimate partner violence     Fear of current or ex partner: Not on file     Emotionally abused: Not on file     Physically abused: Not on file     Forced sexual activity: Not on file   Other Topics Concern    Not on file   Social History Narrative    Not on file         Review of Systems:  History obtained from chart review and the patient  General ROS: No fever or chills  Respiratory ROS: No cough, shortness of breath, wheezing  Cardiovascular ROS: no chest pain, no palpitations  Gastrointestinal ROS: No abdominal pain or melena  Genito-Urinary ROS: No dysuria or hematuria  Musculoskeletal ROS: No joint pain or swelling   14 point ROS reviewed with the patient and negative except as noted above and in the HPI unless unable to obtain. Objective:  Patient Vitals for the past 24 hrs:   BP Temp Temp src Pulse Resp SpO2   12/23/20 0700 (!) 167/79 96.5 °F (35.8 °C) Temporal 51 16 92 %   12/23/20 0040 (!) 185/90 96.4 °F (35.8 °C) Temporal 55 18 94 %   12/22/20 2111 -- -- -- 80 -- --   12/22/20 1855 (!) 161/79 96.1 °F (35.6 °C) Temporal (!) 43 18 --       Intake/Output Summary (Last 24 hours) at 12/23/2020 1106  Last data filed at 12/23/2020 0850  Gross per 24 hour   Intake 700 ml   Output 1000 ml   Net -300 ml   In person clinical exam was not done as he is in isolation with COVID-19 pneumonia. This was done to preserve PPE and unnecessary exposure to COVID-19 infection. However assessment was obaitned with nurse's assistance  General: awake,alert  HEENT: Normocephalic atraumatic head. Chest:  Normal chest wall movements. CVS: Regular rate.   Abdominal: No abdominal distention seen. Extremities: No visible pedal edema. Labs:  BMP:   Recent Labs     12/21/20 0415 12/22/20  0440 12/23/20  0600    140 140   K 3.4* 3.5 3.6   CL 92* 95* 95*   CO2 36* 33* 34*   BUN 47* 48* 45*   CREATININE 2.6* 2.3* 1.8*   CALCIUM 9.0 9.5 9.6     CBC:   Recent Labs     12/21/20 0415 12/22/20 0440 12/23/20  0600   WBC 8.3 10.3 8.5   HGB 13.9* 13.7* 14.4   HCT 43.9 44.0 45.9   MCV 94.0 94.6* 94.8*    127* 130     LIVER PROFILE:   Recent Labs     12/21/20 0415 12/22/20 0440 12/23/20  0600   AST 41* 37 27   ALT 24 27 27   BILITOT 0.7 0.5 0.6   ALKPHOS 70 67 65     PT/INR:   Recent Labs     12/21/20 0415   PROTIME 17.7*   INR 1.45*     APTT:   Recent Labs     12/21/20 0415 12/22/20  0440 12/23/20  0600   APTT 34.6 32.4 34.0     BNP:  No results for input(s): BNP in the last 72 hours. Ionized Calcium:No results for input(s): IONCA in the last 72 hours. Magnesium:  Recent Labs     12/21/20 0415 12/22/20  0440   MG 2.1 2.1     Phosphorus:  No results for input(s): PHOS in the last 72 hours. HgbA1C:   No results for input(s): LABA1C in the last 72 hours. Hepatic:   Recent Labs     12/21/20 0415 12/22/20  0440 12/23/20  0600   ALKPHOS 70 67 65   ALT 24 27 27   AST 41* 37 27   PROT 6.6 6.8 6.6   BILITOT 0.7 0.5 0.6   LABALBU 3.9 3.9 3.9     Lactic Acid: No results for input(s): LACTA in the last 72 hours. Troponin: No results for input(s): CKTOTAL, CKMB, TROPONINT in the last 72 hours. ABGs: No results for input(s): PH, PCO2, PO2, HCO3, O2SAT in the last 72 hours. CRP:    Recent Labs     12/21/20  0415 12/22/20  0440 12/23/20  0600   CRP 1.64* 0.96* 0.53*     Sed Rate:  No results for input(s): SEDRATE in the last 72 hours. Cultures:   No results for input(s): CULTURE in the last 72 hours. No results for input(s): BC, Arturo Carrascott in the last 72 hours. No results for input(s): CXSURG in the last 72 hours.     Radiology reports as per the Radiologist  Radiology: No results found. Assessment   1. Acute kidney injury/stage II/improved. .  2.  Intravascular volume depletion. 3.  Stage IV chronic kidney disease baseline. 4.  Hypokalemia. 5.  Type II diabetes with nephropathy. 6.  Non-STEMI. 7.  Metabolic alkalosis  8. COVID-19 with bilateral pneumonia. Plan:  1. Continue hydration by mouth  2. Potassium supplement. 3.  Monitor renal function  4. Follow up with renal as outpatient within 2 weeks.      Ellis De León MD  12/23/20  11:06 AM

## 2020-12-23 NOTE — OUTREACH NOTE
Call Center TCM Note      Responses   [facility] patient discharged from?  Non- [[facility]]   Does the patient have one of the following disease processes/diagnoses(primary or secondary)?  Other   TCM attempt successful?  Yes   Call start time  1234   Revoked Reason  Other [Wife reports that patient has not been discharged yet from [facility]. She reports should get discharged today. ]   Call end time  1235   Person spoke with today (if not patient) and relationship  spouse, [place] Shraddha Echeverria RN    12/23/2020, 12:35 EST

## 2020-12-24 ENCOUNTER — CARE COORDINATION (OUTPATIENT)
Dept: CASE MANAGEMENT | Age: 65
End: 2020-12-24

## 2020-12-24 ENCOUNTER — NURSE TRIAGE (OUTPATIENT)
Dept: CALL CENTER | Facility: HOSPITAL | Age: 65
End: 2020-12-24

## 2020-12-24 NOTE — TELEPHONE ENCOUNTER
"I called WILLA Nicole at [facility] transition back to let her know have reached Dr. Calzada and received orders and called patient , no answer x2 but left him  Voice mail to call me back.  Dr. Calzada ordered to verified he is taking novalog 5 units with meals and increase to 15 units with meals.  If blood sugar is not coming down by this evening then will need to go back to ER.  I left my direct line for patient to call me.  Has attempted to conference Dr. Calzada to the patient but he did not answer.   I called patient back since he did not answer earlier. He has spoken with Dr. Calzada since I tried to reach him. She gave him orders to adjust his insulin.   He understands orders.     Reason for Disposition  • Blood glucose > 500 mg/dL (27.8 mmol/L)    Additional Information  • Negative: Unconscious or difficult to awaken  • Negative: Acting confused (e.g., disoriented, slurred speech)  • Negative: Very weak (e.g., can't stand)  • Negative: Sounds like a life-threatening emergency to the triager  • Negative: [1] Vomiting AND [2] signs of dehydration (e.g., very dry mouth, lightheaded, dark urine)  • Negative: [1] Blood glucose > 240 mg/dL (13.3 mmol/L) AND [2] rapid breathing    Answer Assessment - Initial Assessment Questions  1. BLOOD GLUCOSE: \"What is your blood glucose level?\"       450 to too high to read  2. ONSET: \"When did you check the blood glucose?\"     Last night  3. USUAL RANGE: \"What is your glucose level usually?\" (e.g., usual fasting morning value, usual evening value)      Has been elevated since hospitalized  4. KETONES: \"Do you check for ketones (urine or blood test strips)?\" If yes, ask: \"What does the test show now?\"       unknown  5. TYPE 1 or 2:  \"Do you know what type of diabetes you have?\"  (e.g., Type 1, Type 2, Gestational; doesn't know)       NA  6. INSULIN: \"Do you take insulin?\" \"What type of insulin(s) do you use? What is the mode of delivery? (syringe, pen; injection or pump)?\"       " "Lantus 30 units in am and 20 units in pm also on novalog 5 units with meals three times daily  7. DIABETES PILLS: \"Do you take any pills for your diabetes?\" If yes, ask: \"Have you missed taking any pills recently?\"      no  8. OTHER SYMPTOMS: \"Do you have any symptoms?\" (e.g., fever, frequent urination, difficulty breathing, dizziness, weakness, vomiting)      none  9. PREGNANCY: \"Is there any chance you are pregnant?\" \"When was your last menstrual period?\"      NA    Protocols used: DIABETES - HIGH BLOOD SUGAR-ADULT-AH      "

## 2020-12-24 NOTE — CARE COORDINATION
Placed a call to the number listed and spoke with wife again. Informed her that I had to leave a message, but did report patient's concerns about his glucometer readings, etc.  Voiced understanding.

## 2020-12-24 NOTE — CARE COORDINATION
Received a call back from the nurse with PCP office reporting that she had talked with the doctor and did call and leave a message for the patient regarding his hyperglycemia.

## 2020-12-24 NOTE — CARE COORDINATION
Daisy 45 Transitions Initial Follow Up Call    Call within 2 business days of discharge: Yes    Patient: Katerina West Patient : 1955   MRN: [id number]  Reason for Admission: Pneumonia, COVID 19  Discharge Date: 20 RARS: Readmission Risk Score: 26      Last Discharge [facility]       Complaint Diagnosis Description Type Department Provider    20   Admission (Discharged) Caryl Easley MD           Spoke with: Mrs. Espana, patient in the background responding    Facility: [address] [place] [address]    Non-face-to-face services provided:  Reviewed encounter information for continuity of care prior to follow up Care Transitions phone call - chart notes, consults, progress notes, test results, med list, appointments, AVS, other information. Care Transitions 24 Hour Call    Schedule Follow Up Appointment with PCP: Completed  Do you have a copy of your discharge instructions?: Yes  Do you have all of your prescriptions and are they filled?: Yes  Have you been contacted by a [address]?: No  Have you scheduled your follow up appointment?: Yes  How are you going to get to your appointment?: Other  Were you discharged with any Home Care or Post Acute Services: No  Do you feel like you have everything you need to keep you well at home?: Yes  Care Transitions Interventions       Placed a call to the number listed for patient and spoke withhis wife for an initial follow up call for COVID 19 Monitoring. She reported that he was eating breakfast at the time and could not really talk. She did have me on speaker phone and I could hear him responding in the background. She said he had a rather good night, is feeling better. She said that he got up at his normal time this am and is having breakfast after doing his personal care routine. She said he was able to take care of that on his own. She said that he is wearing his oxygen most times.   His pulse ox is running in the high 90s most times. She said if he takes the oxygen off, it drops down into the upper 80s at times, so he is wearing it unless he is just sitting at rest.  It is at 2L/min. His appetite is good, eating well. He is drinking fluids as well. He is concerned that his  Blood sugar is running really high. It was 450 and above last night. He checked it about 8 times she said. WE did discuss there steroids that he was on during his hospital stay as well as what he was sent home on. Informed her that it may run high for a few days after they are stopped. Encouraged her to have him decrease the frequency that he is checking it to maybe only 3-4 times a day, before meals and bedtime. Also, to increase activity as tolerated and ensure that he is drinking plenty of water. She said he has been taking Novolog 5 units, but is not clear if that is enough. I will place a call to his PCP to clarify. She said they think he will be ok until his HFU with PCP on Tuesday. I will attempt to contact today. She did ask about the Metolazone also. I did verify the correct dosage and educated on purpose and how to use. Voiced understanding. We discussed all meds. She said she is off quarantine herself on Monday and he was told that should be good for him as well. They have not heard from the health dept. He is not having any abnormal symptoms from COVID 19 other than the shortness of breath as noted and weakness in general.  All other issues have resolved. Informed of CTC process, purpose, future calls, etc.  Ensured to have CTN contact information to be used as needed. Encouraged to call as needed for new issues, questions, etc.  Given the opportunity to ask questions and answered appropriately. CTN to follow up as indicated. Challenges to be reviewed by the provider   Additional needs identified to be addressed with provider Yes  Hyperglycemia to be addressed.      Discussed COVID-19 related testing which was available at this time. Test results were positive. Patient informed of results, if available? Already aware. Method of communication with provider : phone    Advance Care Planning:   Does patient have an Advance Directive:  reviewed and current. Was this a readmission? No  Patient stated reason for admission: \"He was having shortness of breath and also upset at his stomach, nausea and some diarrhea. .\"  Patients top risk factors for readmission: functional physical ability, medical condition and medication management    Care Transition Nurse (CTN) contacted the caregiver by telephone to perform post hospital discharge assessment. Verified name and  with caregiver as identifiers. Provided introduction to self, and explanation of the CTN role. CTN reviewed discharge instructions, medical action plan and red flags with caregiver who verbalized understanding. Caregiver given an opportunity to ask questions and does not have any further questions or concerns at this time. Were discharge instructions available to patient? Yes. Reviewed appropriate site of care based on symptoms and resources available to patient including: PCP, Specialist, Urgent care clinics and When to call 911. The caregiver agrees to contact the PCP office for questions related to their healthcare. Medication reconciliation was performed with caregiver, who verbalizes understanding of administration of home medications. Advised obtaining a 90-day supply of all daily and as-needed medications. Covid Risk Education    Patient has following risk factors of: pneumonia, diabetes and COVID 19 Positive diagnosis. Education provided regarding infection prevention, and signs and symptoms of COVID-19 and when to seek medical attention with caregiver who verbalized understanding. Discussed exposure protocols and quarantine From CDC: Are you at higher risk for severe illness?   and given an opportunity for questions and concerns.  The caregiver agrees to contact the COVID-19 hotline [phone] or PCP office for questions related to COVID-19. Discussed follow-up appointments. Is follow up appointment scheduled within 7 days of discharge? Yes  Non-[facility] follow up appointment(s): Pola Hogan MD 12/29/2020, 1:00 pm, telehealth    Plan for follow-up call in 3-5 days based on severity of symptoms and risk factors. Plan for next call: - disease process mgmt, symptom mgmt, diet/hydration, pain control needs, medication mgmt, activity level, home safety needs, infection control, fall precautions, seeking medical attention, who/when to call prn any needs, etc.    CTN provided contact information for future needs.         Follow Up  Future Appointments   Date Time Provider Lizy Walton   2/1/2021  1:00 PM Manda Wolfe MD [facility] [place]       Amira Lopez RN

## 2020-12-28 ENCOUNTER — CARE COORDINATION (OUTPATIENT)
Dept: CASE MANAGEMENT | Age: 65
End: 2020-12-28

## 2020-12-28 NOTE — CARE COORDINATION
[facility] Transitions Follow Up Call    2020    Patient: Freddie Rojo  Patient : 1955   MRN: [id number]  Reason for Admission:   Discharge Date: 20 RARS: Readmission Risk Score: 26         Spoke with: Wife     Care Transitions Subsequent and Final Call    Subsequent and Final Calls  Care Transitions Interventions  Other Interventions: Follow Up : Placed call to number in chart, wife says that pt is doing much better, but she is at work to contact other number. CTN did call and got VM, will try at a later time.   Future Appointments   Date Time Provider Lizy Walton   2021  1:00 PM Malissa Min MD N [facility] [place]       Cherylene Amend, RN

## 2020-12-29 ENCOUNTER — TELEPHONE (OUTPATIENT)
Dept: FAMILY MEDICINE CLINIC | Facility: CLINIC | Age: 65
End: 2020-12-29

## 2020-12-29 ENCOUNTER — OFFICE VISIT (OUTPATIENT)
Dept: FAMILY MEDICINE CLINIC | Facility: CLINIC | Age: 65
End: 2020-12-29

## 2020-12-29 DIAGNOSIS — E11.65 TYPE 2 DIABETES MELLITUS WITH HYPERGLYCEMIA, WITH LONG-TERM CURRENT USE OF INSULIN (HCC): ICD-10-CM

## 2020-12-29 DIAGNOSIS — N28.89 RENAL MASS: ICD-10-CM

## 2020-12-29 DIAGNOSIS — J96.01 ACUTE HYPOXEMIC RESPIRATORY FAILURE DUE TO COVID-19 (HCC): ICD-10-CM

## 2020-12-29 DIAGNOSIS — I10 ESSENTIAL HYPERTENSION: ICD-10-CM

## 2020-12-29 DIAGNOSIS — I50.42 CHRONIC COMBINED SYSTOLIC AND DIASTOLIC CONGESTIVE HEART FAILURE (HCC): ICD-10-CM

## 2020-12-29 DIAGNOSIS — U07.1 PNEUMONIA DUE TO COVID-19 VIRUS: Primary | ICD-10-CM

## 2020-12-29 DIAGNOSIS — J12.82 PNEUMONIA DUE TO COVID-19 VIRUS: Primary | ICD-10-CM

## 2020-12-29 DIAGNOSIS — Z79.01 CHRONIC ANTICOAGULATION: ICD-10-CM

## 2020-12-29 DIAGNOSIS — U07.1 ACUTE HYPOXEMIC RESPIRATORY FAILURE DUE TO COVID-19 (HCC): ICD-10-CM

## 2020-12-29 DIAGNOSIS — N18.4 STAGE 4 CHRONIC KIDNEY DISEASE (HCC): ICD-10-CM

## 2020-12-29 DIAGNOSIS — Z79.4 TYPE 2 DIABETES MELLITUS WITH HYPERGLYCEMIA, WITH LONG-TERM CURRENT USE OF INSULIN (HCC): ICD-10-CM

## 2020-12-29 PROCEDURE — 99496 TRANSJ CARE MGMT HIGH F2F 7D: CPT | Performed by: FAMILY MEDICINE

## 2020-12-29 NOTE — TELEPHONE ENCOUNTER
----- Message from Clara Calzada MD sent at 12/29/2020  1:36 PM CST -----  Please call [employer] and advise pt is getting dried out and getting nosebleeds with current oxygen - I would like to see if they can make his humidified to help with this

## 2020-12-29 NOTE — PROGRESS NOTES
Transitional Care Follow Up Visit  Subjective   Cc: NICKY Llanes is a 65 y.o. male who presents for a transitional care management visit via phone visit.  He consents to phone visit.     Within 48 business hours after discharge our office contacted him via telephone to coordinate his care and needs.      I reviewed and discussed the details of that call along with the discharge summary, hospital problems, inpatient lab results, inpatient diagnostic studies, and consultation reports with [place].     Current outpatient and discharge medications have been reconciled for the patient.  Reviewed by: Clara Calzada MD      Date of TCM Phone Call 12/22/2020   [facility]   Date of Admission -   Date of Discharge 12/22/2020   Discharge Disposition -     Risk for Readmission (LACE) No data recorded    History of Present Illness   Course During Hospital Stay:  Copied from [place] note:     Hospital Course:   Mr Llanes, a 64 yo male, presented to [facility] from outside hospital on 12/19 with NSTEMI with chest pain and elevated troponin 0.12 along with acute hypoxic respiratory failure secondary to Covid pneumonia.  Cardiology was consulted prior to transfer and recommended that patient be started on heparin drip.  He was started on treatment for pneumonia including dexamethasone, remdesivir, convalescent plasma.  He may have had an allergic reaction to convalescent plasma, which resolved rapidly.  His oxygen saturation is stable at 2 L nasal cannula.  Cardiology evaluated him and felt comfortable stopping heparin drip.  They are recommending medical management for NSTEMI with outpatient evaluation (stress test vs cath) once COVID has resolved. Nephrology consulted for OLU on CKD IV, which is improving with IVFs. Renal US showed left kidney mass,            NSTEMI  · - No recurrence of chest pain and trops have trended down  · - Cardiology consulted, rec medical management and ischemic eval once covid resolved.   · -  "Telemetry  · - aspirin, statin, plavix and other cardiac meds      Acute hypoxic respiratory failure secondary to Covid pneumonia  · Oxygen supplementation to keep SPO2 over 90%  · Patient being discharged on home O2.  · Dexamethasone 6 mg daily (day 5/10)  · Discharge with 5-day course of dexamethasone, to complete a 10-day course..  · Remdesivir IV (day 5/5)  · - s/p Convalescent plasma  · - AC with Eliquis     OLU on CKD IV  · -Followed by nephrology  · Reported nephrotoxic regimen  · Okay for discharge from nephrology standpoint  · Follow-up with nephrology outpatient.     Type II DM w/ hyperglycemia   · Home lantus is 30 units QAM and 20 units QPM  · - Higher blood sugars overall, especially in setting of steroids.    · Insulin dosing adjusted accordingly      Renal Cyst   · CT Kidney W WO Con 12/23/22020: Impression: 1.4 cm exophytic lateral inferior left renal lesion most consistent with a nonenhancing hyperdense cyst. Simple nonenhancing superior right renal cyst with an exophytic anterior inferior left renal cyst. No enhancing renal mass or hydronephrosis. Mild left colonic diverticulosis no acute diverticulitis.. Fatty containing umbilical and left inguinal hernias. Dense vascular calcification with cardiomegaly. Trace perihepatic ascites.  · Follow-up with PCP and nephrology outpatient    Chronic systolic and diastolic HF,   · Stable  · EF 36% -BNP within lab reference range (513 (12/21/2020)  · Continue diuresis     History of paroxysmal atrial fibrillation  · Currently rate controlled  · Apixaban        =====================================    His breathing is at baseline.   Pt reports that he is very weak when his glucose gets elevated.  He has not been taking his lantus because he was taking novolog and he was worried about hypoglycemia.    Will eat supper, takes oral meds - usually btw 150-175 - doesn't take anything   About 11PM he will break out in a sweat and it is reading high. if it \"high\" takes " 20 units, will help to bring it down into the 200s.   The Am at 4 AM it was 298  When he wakes up in AM - it is about 175  Noon about 250  He is not consistently taking novolog with meals either.   Supposed to take 10U in AM and 20U in PM of lantus - hasnt been.   Yesterday was last day of steroids   He has home oxygen sent home from hospital - not really using it because it was making his nose bleed. Getting it from [employer].   He doesn't feel like he is retaining fluid - weight is down to 217 - he is weighing daily and remains consistent with DC weight.   He has follow up with cardiology - on anticoag with eliquis, on antiplatelet with plavix   Renal mass - has follow up with nephrology outpt     The following portions of the patient's history were reviewed and updated as appropriate: allergies, current medications, past family history, past medical history, past social history, past surgical history and problem list.    Review of Systems   Constitutional: Positive for activity change, appetite change and fatigue. Negative for chills and fever.   Respiratory: Negative for cough and shortness of breath.    Cardiovascular: Negative for chest pain, palpitations and leg swelling.   All other systems reviewed and are negative.      Objective   Physical Exam  Neurological:      Mental Status: He is oriented to person, place, and time.   Psychiatric:         Mood and Affect: Mood normal.         Behavior: Behavior normal.         Thought Content: Thought content normal.         Judgment: Judgment normal.         Assessment/Plan   Problems Addressed this Visit        Cardiac and Vasculature    Essential hypertension       Coag and Thromboembolic    Chronic anticoagulation       Genitourinary and Reproductive     Stage 4 chronic kidney disease (CMS/HCC)       Other    Pneumonia due to COVID-19 virus - Primary      Other Visit Diagnoses     Acute hypoxemic respiratory failure due to COVID-19 (CMS/HCC)        Type 2 diabetes  mellitus with hyperglycemia, with long-term current use of insulin (CMS/[facility])        Renal mass        Chronic combined systolic and diastolic congestive heart failure (CMS/[facility])          Diagnoses       Codes Comments    Pneumonia due to COVID-19 virus    -  Primary ICD-10-CM: U07.1, J12.89  ICD-9-CM: 480.8     Essential hypertension     ICD-10-CM: I10  ICD-9-CM: 401.9     Chronic anticoagulation     ICD-10-CM: Z79.01  ICD-9-CM: V58.61     Stage 4 chronic kidney disease (CMS/[facility])     ICD-10-CM: N18.4  ICD-9-CM: 585.4     Acute hypoxemic respiratory failure due to COVID-19 (CMS/[facility])     ICD-10-CM: U07.1, J96.01  ICD-9-CM: 518.81, 079.89, 799.02     Type 2 diabetes mellitus with hyperglycemia, with long-term current use of insulin (CMS/[facility])     ICD-10-CM: E11.65, Z79.4  ICD-9-CM: 250.00, 790.29, V58.67     Renal mass     ICD-10-CM: N28.89  ICD-9-CM: 593.9     Chronic combined systolic and diastolic congestive heart failure (CMS/[facility])     ICD-10-CM: I50.42  ICD-9-CM: 428.42, 428.0         PLAN:     #1 COVID PNA: completed treatment, appears to be improving     #2 DM: chronic, uncontrolled with hyperglycemia, pt has poor compliance with insulin, advised he needs to be taking his lantus consistently daily, advised to take 5-10 untis of novolog with each meal. Advised if glucose remains high 1-2 hours after taking first dose of novolog with his meal he may need additional insulin. Advised on SSI with novolog to help better control glucose, advised on risks of dehydration with hyperglycemia, advised on risks of hypoglycemia, now that he is done with steroids suspect it will start to trend down     #3 acute hypoxic resp failure 2/2 #1: new, pt not consistently using oxygen - advised this can place increased strain on his heart - recommended using consistently, will send order to [employer] to add moisture to cut down on nosebleeds     #4 renal mass: new, needs further eval, keep appt with nephrology     #5 NSTEMI: resolved,  chronic medical management, contineu with cardiolgoy     #6 CHF: chronic, stable, medical management, monitor weight daily     25 minutes         This document has been electronically signed by Clara Calzada MD on December 29, 2020 13:36 CST

## 2020-12-30 ENCOUNTER — CARE COORDINATION (OUTPATIENT)
Dept: CASE MANAGEMENT | Age: 65
End: 2020-12-30

## 2020-12-30 NOTE — CARE COORDINATION
[facility] 45 Transitions Follow Up Call    2020    Patient: Vito Nash  Patient : 1955   MRN: [id number]  Reason for Admission:   Discharge Date: 20 RARS: Readmission Risk Score: 26         Spoke with: N/A    Care Transitions Subsequent and Final Call    Subsequent and Final Calls  Care Transitions Interventions  Other Interventions: Follow Up ; Attempted to make contact with Mejia Arreguin for [facility] f/u call without success. Unable to leave a message regarding intent of call and call back information. Will try again at a later time.     Future Appointments   Date Time Provider Lizy Walton   2021  1:00 PM Amy Ramirez MD [facility]       Alyson Gauthier RN

## 2021-01-03 ENCOUNTER — APPOINTMENT (OUTPATIENT)
Dept: GENERAL RADIOLOGY | Age: 66
DRG: 682 | End: 2021-01-03
Payer: COMMERCIAL

## 2021-01-03 ENCOUNTER — HOSPITAL ENCOUNTER (INPATIENT)
Age: 66
LOS: 3 days | Discharge: HOME HEALTH | DRG: 682 | End: 2021-01-06
Attending: EMERGENCY MEDICINE | Admitting: HOSPITALIST
Payer: COMMERCIAL

## 2021-01-03 DIAGNOSIS — R19.7 NAUSEA VOMITING AND DIARRHEA: ICD-10-CM

## 2021-01-03 DIAGNOSIS — R11.2 NAUSEA VOMITING AND DIARRHEA: ICD-10-CM

## 2021-01-03 DIAGNOSIS — N17.9 ACUTE KIDNEY INJURY (HCC): ICD-10-CM

## 2021-01-03 DIAGNOSIS — R09.02 HYPOXIA: Primary | ICD-10-CM

## 2021-01-03 DIAGNOSIS — Z91.199 COMPLIANCE POOR: ICD-10-CM

## 2021-01-03 LAB
ALBUMIN SERPL-MCNC: 3.4 G/DL (ref 3.5–5.2)
ALP BLD-CCNC: 84 U/L (ref 40–130)
ALT SERPL-CCNC: 31 U/L (ref 5–41)
ANION GAP SERPL CALCULATED.3IONS-SCNC: 7 MMOL/L (ref 7–19)
APTT: 33.4 SEC (ref 26–36.2)
AST SERPL-CCNC: 20 U/L (ref 5–40)
BACTERIA: NEGATIVE /HPF
BASE EXCESS ARTERIAL: 26.7 MMOL/L (ref -2–2)
BASOPHILS ABSOLUTE: 0 K/UL (ref 0–0.2)
BASOPHILS RELATIVE PERCENT: 0.1 % (ref 0–1)
BILIRUB SERPL-MCNC: 0.9 MG/DL (ref 0.2–1.2)
BILIRUBIN URINE: NEGATIVE
BLOOD, URINE: NEGATIVE
BUN BLDV-MCNC: 107 MG/DL (ref 8–23)
CALCIUM SERPL-MCNC: 9 MG/DL (ref 8.8–10.2)
CARBOXYHEMOGLOBIN ARTERIAL: 2.5 % (ref 0–5)
CHLORIDE BLD-SCNC: 82 MMOL/L (ref 98–111)
CLARITY: CLEAR
CO2: 47 MMOL/L (ref 22–29)
COLOR: YELLOW
CREAT SERPL-MCNC: 3.2 MG/DL (ref 0.5–1.2)
CRYSTALS, UA: ABNORMAL /HPF
EOSINOPHILS ABSOLUTE: 0.2 K/UL (ref 0–0.6)
EOSINOPHILS RELATIVE PERCENT: 1.6 % (ref 0–5)
EPITHELIAL CELLS, UA: 0 /HPF (ref 0–5)
GFR AFRICAN AMERICAN: 24
GFR NON-AFRICAN AMERICAN: 20
GLUCOSE BLD-MCNC: 202 MG/DL (ref 74–109)
GLUCOSE BLD-MCNC: 260 MG/DL (ref 70–99)
GLUCOSE URINE: NEGATIVE MG/DL
HCO3 ARTERIAL: 55.1 MMOL/L (ref 22–26)
HCT VFR BLD CALC: 49.9 % (ref 42–52)
HEMOGLOBIN, ART, EXTENDED: 15.6 G/DL (ref 14–18)
HEMOGLOBIN: 16.1 G/DL (ref 14–18)
HYALINE CASTS: 2 /HPF (ref 0–8)
IMMATURE GRANULOCYTES #: 0.1 K/UL
INR BLD: 1.53 (ref 0.88–1.18)
KETONES, URINE: NEGATIVE MG/DL
LACTIC ACID, SEPSIS: 1.8 MG/DL (ref 0.5–1.9)
LEUKOCYTE ESTERASE, URINE: ABNORMAL
LYMPHOCYTES ABSOLUTE: 1.5 K/UL (ref 1.1–4.5)
LYMPHOCYTES RELATIVE PERCENT: 14 % (ref 20–40)
MAGNESIUM: 2.5 MG/DL (ref 1.6–2.4)
MCH RBC QN AUTO: 29.5 PG (ref 27–31)
MCHC RBC AUTO-ENTMCNC: 32.3 G/DL (ref 33–37)
MCV RBC AUTO: 91.4 FL (ref 80–94)
METHEMOGLOBIN ARTERIAL: 1 %
MONOCYTES ABSOLUTE: 1.1 K/UL (ref 0–0.9)
MONOCYTES RELATIVE PERCENT: 10 % (ref 0–10)
NEUTROPHILS ABSOLUTE: 8.1 K/UL (ref 1.5–7.5)
NEUTROPHILS RELATIVE PERCENT: 73.8 % (ref 50–65)
NITRITE, URINE: NEGATIVE
O2 CONTENT ARTERIAL: 14.3 ML/DL
O2 SAT, ARTERIAL: 65.3 %
O2 THERAPY: ABNORMAL
PCO2 ARTERIAL: 66 MMHG (ref 35–45)
PDW BLD-RTO: 14.5 % (ref 11.5–14.5)
PERFORMED ON: ABNORMAL
PH ARTERIAL: 7.53 (ref 7.35–7.45)
PH UA: 6 (ref 5–8)
PLATELET # BLD: 169 K/UL (ref 130–400)
PMV BLD AUTO: 11.4 FL (ref 9.4–12.4)
PO2 ARTERIAL: 33 MMHG (ref 80–100)
POTASSIUM REFLEX MAGNESIUM: 3 MMOL/L (ref 3.5–5)
POTASSIUM, WHOLE BLOOD: 2.8
PROTEIN UA: 100 MG/DL
PROTHROMBIN TIME: 18.5 SEC (ref 12–14.6)
RBC # BLD: 5.46 M/UL (ref 4.7–6.1)
RBC UA: 1 /HPF (ref 0–4)
SODIUM BLD-SCNC: 136 MMOL/L (ref 136–145)
SPECIFIC GRAVITY UA: 1.01 (ref 1–1.03)
TOTAL PROTEIN: 6.5 G/DL (ref 6.6–8.7)
UROBILINOGEN, URINE: 1 E.U./DL
WBC # BLD: 11 K/UL (ref 4.8–10.8)
WBC UA: 1 /HPF (ref 0–5)

## 2021-01-03 PROCEDURE — 81001 URINALYSIS AUTO W/SCOPE: CPT

## 2021-01-03 PROCEDURE — 1210000000 HC MED SURG R&B

## 2021-01-03 PROCEDURE — 6370000000 HC RX 637 (ALT 250 FOR IP): Performed by: HOSPITALIST

## 2021-01-03 PROCEDURE — 71045 X-RAY EXAM CHEST 1 VIEW: CPT

## 2021-01-03 PROCEDURE — 36600 WITHDRAWAL OF ARTERIAL BLOOD: CPT

## 2021-01-03 PROCEDURE — 84132 ASSAY OF SERUM POTASSIUM: CPT

## 2021-01-03 PROCEDURE — 2580000003 HC RX 258: Performed by: PHYSICIAN ASSISTANT

## 2021-01-03 PROCEDURE — 85025 COMPLETE CBC W/AUTO DIFF WBC: CPT

## 2021-01-03 PROCEDURE — 96365 THER/PROPH/DIAG IV INF INIT: CPT

## 2021-01-03 PROCEDURE — 36415 COLL VENOUS BLD VENIPUNCTURE: CPT

## 2021-01-03 PROCEDURE — 96374 THER/PROPH/DIAG INJ IV PUSH: CPT

## 2021-01-03 PROCEDURE — 87040 BLOOD CULTURE FOR BACTERIA: CPT

## 2021-01-03 PROCEDURE — 85730 THROMBOPLASTIN TIME PARTIAL: CPT

## 2021-01-03 PROCEDURE — 85610 PROTHROMBIN TIME: CPT

## 2021-01-03 PROCEDURE — 6360000002 HC RX W HCPCS: Performed by: PHYSICIAN ASSISTANT

## 2021-01-03 PROCEDURE — 83605 ASSAY OF LACTIC ACID: CPT

## 2021-01-03 PROCEDURE — 96375 TX/PRO/DX INJ NEW DRUG ADDON: CPT

## 2021-01-03 PROCEDURE — 80053 COMPREHEN METABOLIC PANEL: CPT

## 2021-01-03 PROCEDURE — 82803 BLOOD GASES ANY COMBINATION: CPT

## 2021-01-03 PROCEDURE — 2700000000 HC OXYGEN THERAPY PER DAY

## 2021-01-03 PROCEDURE — 82947 ASSAY GLUCOSE BLOOD QUANT: CPT

## 2021-01-03 PROCEDURE — 2580000003 HC RX 258: Performed by: HOSPITALIST

## 2021-01-03 PROCEDURE — 96361 HYDRATE IV INFUSION ADD-ON: CPT

## 2021-01-03 PROCEDURE — 93005 ELECTROCARDIOGRAM TRACING: CPT | Performed by: PHYSICIAN ASSISTANT

## 2021-01-03 PROCEDURE — 83735 ASSAY OF MAGNESIUM: CPT

## 2021-01-03 PROCEDURE — 99284 EMERGENCY DEPT VISIT MOD MDM: CPT

## 2021-01-03 RX ORDER — POTASSIUM CHLORIDE 7.45 MG/ML
INJECTION INTRAVENOUS
Status: DISPENSED
Start: 2021-01-03 | End: 2021-01-04

## 2021-01-03 RX ORDER — POLYETHYLENE GLYCOL 3350 17 G/17G
17 POWDER, FOR SOLUTION ORAL DAILY PRN
Status: DISCONTINUED | OUTPATIENT
Start: 2021-01-03 | End: 2021-01-06 | Stop reason: HOSPADM

## 2021-01-03 RX ORDER — ACETAMINOPHEN 650 MG/1
650 SUPPOSITORY RECTAL EVERY 6 HOURS PRN
Status: DISCONTINUED | OUTPATIENT
Start: 2021-01-03 | End: 2021-01-06 | Stop reason: HOSPADM

## 2021-01-03 RX ORDER — ONDANSETRON 2 MG/ML
4 INJECTION INTRAMUSCULAR; INTRAVENOUS ONCE
Status: COMPLETED | OUTPATIENT
Start: 2021-01-03 | End: 2021-01-03

## 2021-01-03 RX ORDER — MINOXIDIL 2.5 MG/1
2.5 TABLET ORAL 2 TIMES DAILY
Status: ON HOLD | COMMUNITY
End: 2021-01-06 | Stop reason: HOSPADM

## 2021-01-03 RX ORDER — ASPIRIN 81 MG/1
81 TABLET, CHEWABLE ORAL DAILY
Status: DISCONTINUED | OUTPATIENT
Start: 2021-01-04 | End: 2021-01-06 | Stop reason: HOSPADM

## 2021-01-03 RX ORDER — SODIUM CHLORIDE, SODIUM LACTATE, POTASSIUM CHLORIDE, CALCIUM CHLORIDE 600; 310; 30; 20 MG/100ML; MG/100ML; MG/100ML; MG/100ML
INJECTION, SOLUTION INTRAVENOUS CONTINUOUS
Status: DISCONTINUED | OUTPATIENT
Start: 2021-01-03 | End: 2021-01-06 | Stop reason: HOSPADM

## 2021-01-03 RX ORDER — BUDESONIDE AND FORMOTEROL FUMARATE DIHYDRATE 80; 4.5 UG/1; UG/1
2 AEROSOL RESPIRATORY (INHALATION) 2 TIMES DAILY
Status: DISCONTINUED | OUTPATIENT
Start: 2021-01-03 | End: 2021-01-06 | Stop reason: HOSPADM

## 2021-01-03 RX ORDER — ZINC SULFATE 50(220)MG
50 CAPSULE ORAL DAILY
Status: DISCONTINUED | OUTPATIENT
Start: 2021-01-03 | End: 2021-01-06 | Stop reason: HOSPADM

## 2021-01-03 RX ORDER — POTASSIUM CHLORIDE 20 MEQ/1
40 TABLET, EXTENDED RELEASE ORAL ONCE
Status: COMPLETED | OUTPATIENT
Start: 2021-01-03 | End: 2021-01-03

## 2021-01-03 RX ORDER — CETIRIZINE HYDROCHLORIDE 10 MG/1
10 TABLET ORAL DAILY
Status: DISCONTINUED | OUTPATIENT
Start: 2021-01-04 | End: 2021-01-06 | Stop reason: HOSPADM

## 2021-01-03 RX ORDER — MONTELUKAST SODIUM 10 MG/1
10 TABLET ORAL DAILY
Status: DISCONTINUED | OUTPATIENT
Start: 2021-01-04 | End: 2021-01-06 | Stop reason: HOSPADM

## 2021-01-03 RX ORDER — POTASSIUM CHLORIDE 7.45 MG/ML
10 INJECTION INTRAVENOUS ONCE
Status: COMPLETED | OUTPATIENT
Start: 2021-01-03 | End: 2021-01-03

## 2021-01-03 RX ORDER — AMIODARONE HYDROCHLORIDE 200 MG/1
200 TABLET ORAL DAILY
Status: DISCONTINUED | OUTPATIENT
Start: 2021-01-03 | End: 2021-01-06 | Stop reason: HOSPADM

## 2021-01-03 RX ORDER — ALBUTEROL SULFATE 90 UG/1
2 AEROSOL, METERED RESPIRATORY (INHALATION) EVERY 6 HOURS PRN
Status: DISCONTINUED | OUTPATIENT
Start: 2021-01-03 | End: 2021-01-06 | Stop reason: HOSPADM

## 2021-01-03 RX ORDER — PROMETHAZINE HYDROCHLORIDE 12.5 MG/1
12.5 TABLET ORAL EVERY 6 HOURS PRN
Status: DISCONTINUED | OUTPATIENT
Start: 2021-01-03 | End: 2021-01-06 | Stop reason: HOSPADM

## 2021-01-03 RX ORDER — ACETAMINOPHEN 325 MG/1
650 TABLET ORAL EVERY 6 HOURS PRN
Status: DISCONTINUED | OUTPATIENT
Start: 2021-01-03 | End: 2021-01-06 | Stop reason: HOSPADM

## 2021-01-03 RX ORDER — SODIUM CHLORIDE 0.9 % (FLUSH) 0.9 %
10 SYRINGE (ML) INJECTION EVERY 12 HOURS SCHEDULED
Status: DISCONTINUED | OUTPATIENT
Start: 2021-01-03 | End: 2021-01-06 | Stop reason: HOSPADM

## 2021-01-03 RX ORDER — ROSUVASTATIN CALCIUM 20 MG/1
40 TABLET, COATED ORAL NIGHTLY
Status: DISCONTINUED | OUTPATIENT
Start: 2021-01-03 | End: 2021-01-06 | Stop reason: HOSPADM

## 2021-01-03 RX ORDER — SODIUM CHLORIDE 0.9 % (FLUSH) 0.9 %
10 SYRINGE (ML) INJECTION PRN
Status: DISCONTINUED | OUTPATIENT
Start: 2021-01-03 | End: 2021-01-06 | Stop reason: HOSPADM

## 2021-01-03 RX ORDER — MAGNESIUM SULFATE IN WATER 40 MG/ML
2 INJECTION, SOLUTION INTRAVENOUS PRN
Status: DISCONTINUED | OUTPATIENT
Start: 2021-01-03 | End: 2021-01-06 | Stop reason: HOSPADM

## 2021-01-03 RX ORDER — ASCORBIC ACID 500 MG
1000 TABLET ORAL DAILY
Status: DISCONTINUED | OUTPATIENT
Start: 2021-01-04 | End: 2021-01-06 | Stop reason: HOSPADM

## 2021-01-03 RX ORDER — FLUTICASONE PROPIONATE 50 MCG
2 SPRAY, SUSPENSION (ML) NASAL DAILY
Status: DISCONTINUED | OUTPATIENT
Start: 2021-01-03 | End: 2021-01-06 | Stop reason: HOSPADM

## 2021-01-03 RX ORDER — POTASSIUM CHLORIDE 20 MEQ/1
40 TABLET, EXTENDED RELEASE ORAL PRN
Status: DISCONTINUED | OUTPATIENT
Start: 2021-01-03 | End: 2021-01-06 | Stop reason: HOSPADM

## 2021-01-03 RX ORDER — 0.9 % SODIUM CHLORIDE 0.9 %
500 INTRAVENOUS SOLUTION INTRAVENOUS ONCE
Status: COMPLETED | OUTPATIENT
Start: 2021-01-03 | End: 2021-01-03

## 2021-01-03 RX ORDER — NITROGLYCERIN 0.4 MG/1
0.4 TABLET SUBLINGUAL EVERY 5 MIN PRN
Status: DISCONTINUED | OUTPATIENT
Start: 2021-01-03 | End: 2021-01-06 | Stop reason: HOSPADM

## 2021-01-03 RX ORDER — ONDANSETRON 2 MG/ML
4 INJECTION INTRAMUSCULAR; INTRAVENOUS EVERY 6 HOURS PRN
Status: DISCONTINUED | OUTPATIENT
Start: 2021-01-03 | End: 2021-01-06 | Stop reason: HOSPADM

## 2021-01-03 RX ORDER — INSULIN GLARGINE 100 [IU]/ML
10 INJECTION, SOLUTION SUBCUTANEOUS 2 TIMES DAILY
Status: DISCONTINUED | OUTPATIENT
Start: 2021-01-03 | End: 2021-01-06 | Stop reason: HOSPADM

## 2021-01-03 RX ORDER — DEXTROSE MONOHYDRATE 50 MG/ML
100 INJECTION, SOLUTION INTRAVENOUS PRN
Status: DISCONTINUED | OUTPATIENT
Start: 2021-01-03 | End: 2021-01-06 | Stop reason: HOSPADM

## 2021-01-03 RX ORDER — DEXTROSE MONOHYDRATE 25 G/50ML
12.5 INJECTION, SOLUTION INTRAVENOUS PRN
Status: DISCONTINUED | OUTPATIENT
Start: 2021-01-03 | End: 2021-01-06 | Stop reason: HOSPADM

## 2021-01-03 RX ORDER — PANTOPRAZOLE SODIUM 20 MG/1
40 TABLET, DELAYED RELEASE ORAL 2 TIMES DAILY
Status: DISCONTINUED | OUTPATIENT
Start: 2021-01-03 | End: 2021-01-06 | Stop reason: HOSPADM

## 2021-01-03 RX ORDER — CLOPIDOGREL BISULFATE 75 MG/1
75 TABLET ORAL DAILY
Status: DISCONTINUED | OUTPATIENT
Start: 2021-01-03 | End: 2021-01-06 | Stop reason: HOSPADM

## 2021-01-03 RX ORDER — NICOTINE POLACRILEX 4 MG
15 LOZENGE BUCCAL PRN
Status: DISCONTINUED | OUTPATIENT
Start: 2021-01-03 | End: 2021-01-06 | Stop reason: HOSPADM

## 2021-01-03 RX ORDER — CARVEDILOL 25 MG/1
25 TABLET ORAL 2 TIMES DAILY
Status: DISCONTINUED | OUTPATIENT
Start: 2021-01-03 | End: 2021-01-05

## 2021-01-03 RX ORDER — POTASSIUM CHLORIDE 7.45 MG/ML
10 INJECTION INTRAVENOUS PRN
Status: DISCONTINUED | OUTPATIENT
Start: 2021-01-03 | End: 2021-01-06 | Stop reason: HOSPADM

## 2021-01-03 RX ADMIN — POTASSIUM CHLORIDE 10 MEQ: 7.46 INJECTION, SOLUTION INTRAVENOUS at 12:53

## 2021-01-03 RX ADMIN — Medication 10 UNITS: at 22:42

## 2021-01-03 RX ADMIN — ROSUVASTATIN CALCIUM 40 MG: 20 TABLET, FILM COATED ORAL at 20:38

## 2021-01-03 RX ADMIN — SODIUM CHLORIDE 500 ML: 9 INJECTION, SOLUTION INTRAVENOUS at 12:52

## 2021-01-03 RX ADMIN — PANTOPRAZOLE SODIUM 40 MG: 20 TABLET, DELAYED RELEASE ORAL at 20:39

## 2021-01-03 RX ADMIN — BUDESONIDE AND FORMOTEROL FUMARATE DIHYDRATE 2 PUFF: 80; 4.5 AEROSOL RESPIRATORY (INHALATION) at 20:39

## 2021-01-03 RX ADMIN — ALBUTEROL SULFATE 2 PUFF: 90 AEROSOL, METERED RESPIRATORY (INHALATION) at 20:39

## 2021-01-03 RX ADMIN — CARVEDILOL 25 MG: 25 TABLET, FILM COATED ORAL at 20:39

## 2021-01-03 RX ADMIN — SODIUM CHLORIDE, PRESERVATIVE FREE 10 ML: 5 INJECTION INTRAVENOUS at 20:39

## 2021-01-03 RX ADMIN — ONDANSETRON HYDROCHLORIDE 4 MG: 2 SOLUTION INTRAMUSCULAR; INTRAVENOUS at 12:51

## 2021-01-03 RX ADMIN — INSULIN LISPRO 3 UNITS: 100 INJECTION, SOLUTION INTRAVENOUS; SUBCUTANEOUS at 22:42

## 2021-01-03 RX ADMIN — APIXABAN 5 MG: 5 TABLET, FILM COATED ORAL at 20:38

## 2021-01-03 RX ADMIN — FLUTICASONE PROPIONATE 2 SPRAY: 50 SPRAY, METERED NASAL at 20:39

## 2021-01-03 RX ADMIN — POTASSIUM CHLORIDE 40 MEQ: 1500 TABLET, EXTENDED RELEASE ORAL at 17:36

## 2021-01-03 RX ADMIN — Medication 50 MG: at 22:42

## 2021-01-03 RX ADMIN — SODIUM CHLORIDE, SODIUM LACTATE, POTASSIUM CHLORIDE, AND CALCIUM CHLORIDE: 600; 310; 30; 20 INJECTION, SOLUTION INTRAVENOUS at 20:47

## 2021-01-03 ASSESSMENT — PAIN SCALES - GENERAL: PAINLEVEL_OUTOF10: 4

## 2021-01-03 NOTE — ED PROVIDER NOTES
[facility] 4 ONCOLOGY UNIT  eMERGENCYdEPARTMENT eNCOUnter      Pt Name: Renato Merino  MRN: [id number]  [place] 1955  Date of evaluation: 1/3/2021  PAIGE Marcelino    CHIEF COMPLAINT       Chief Complaint   Patient presents with    Positive For Covid-19     on the 18th has been getting worse since         HISTORY OF PRESENT ILLNESS  (Location/Symptom, Timing/Onset, Context/Setting, Quality, Duration, Modifying Factors, Severity.)   Renato Merino is a 60 y.o. male who presents to the emergency department with complaints of nausea vomiting today feels like he is getting worse. Has a history of COPD. He is a diabetic. Patient feels like he is losing fluids too quickly still having some loose stools. Denies any abdominal pain. Denies any fever. He sleeps his oxygen but is on room air baseline he is anywhere from 87-89 when talking to me with a good Plath I have ordered ABG. He did not take any of his medicines today due to nausea vomiting. Denies any productive cough. No chest pain or tightness no pleurisy. Patient was swabbed and Covid positive on 18 December. He has a history of stage III kidney disease paroxysmal atrial fibrillation. Takes Eliquis baseline is a thinner. Denies abdominal pain to me today. No gu complaints. Hx of stage 3 kidney disease. 1309 I have spoken with nurse he has told nurse that he actually wears 2 L at home as needed. Differs from what he told me initially. He is now at 96-97% initial CO2 is concerning he tells me he has been trying to ween off the O2 at home. This may be culprit. Clinically I think he dry based on 12 hr minimum onset of NV concern for hx providing at this point. HPI    Nursing Notes were reviewed and I agree. REVIEW OF SYSTEMS    (2-9 systems for level 4, 10 or more for level 5)     Review of Systems   Constitutional: Positive for fatigue. Negative for activity change, appetite change, chills and fever.    HENT: Negative for congestion and dental problem. Eyes: Negative for photophobia, discharge and itching. Respiratory: Negative for apnea, cough and shortness of breath. Cardiovascular: Negative for chest pain. Gastrointestinal: Positive for nausea and vomiting. Musculoskeletal: Positive for myalgias. Negative for arthralgias, back pain, gait problem and neck pain. Skin: Negative for color change, pallor and rash. Neurological: Negative for dizziness, seizures and syncope. Psychiatric/Behavioral: Negative for agitation. The patient is not nervous/anxious. Except as noted above the remainder of the review of systems was reviewed and negative.        PAST MEDICAL HISTORY     Past Medical History:   Diagnosis Date    Acute kidney failure, unspecified ([address]     Anxiety state, unspecified     Atrial septal aneurysm 01/09/2016    Blood circulation, collateral     Body mass index 30.0-30.9, adult     CAD (coronary artery disease) 01/10/2016    1/9/16  lexiscan  Positive for apical MI + myocardial ischemia, EF 42%, intermediate risk findings, AUC indication 16, AUC score 7 1/10/16  Cath  3 lesions in the LAD:  Mid: 70% 2.25x23, mid 90% 2.25 x 28, distal 100% 2.0x28, anterior lateral apical hypokinesis, EF 45%    Calculus of kidney     Carotid artery stenosis 06/26/2013    Cellulitis and abscess of hand, except fingers and thumb     Cerebral artery occlusion with cerebral infarction ([address]     15 years ago    Chronic airway obstruction, not elsewhere classified     Chronic kidney disease, unspecified     Congestive heart failure, unspecified     Diabetes mellitus type II     Diverticulosis of colon (without mention of hemorrhage)     Encounter for long-term (current) use of insulin (HCC)     Esophageal reflux     Family history of ischemic heart disease     Gastric ulcer, unspecified as acute or chronic, without mention of hemorrhage, perforation, or obstruction     Hyperlipemia     Hypertension     Internal hemorrhoids without mention of complication     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, or unspecified(403.00)     Obesity, unspecified     Other specified cardiac dysrhythmias(427.89)     Palliative care patient 12/21/2020    Paroxysmal atrial fibrillation (HCC)     Peripheral vascular disease (HCC)     Solitary pulmonary nodule     Surgical or other procedure not carried out because of contraindication     Thoracic aneurysm without mention of rupture     Tobacco use disorder     Type II or unspecified type diabetes mellitus without mention of complication, not stated as uncontrolled          SURGICAL HISTORY       Past Surgical History:   Procedure Laterality Date    CARDIAC CATHETERIZATION      CHOLECYSTECTOMY      CORONARY ANGIOPLASTY WITH STENT PLACEMENT  1-16    2 JACQUELINE to LAD    DIAGNOSTIC CARDIAC CATH LAB PROCEDURE      URETER STENT PLACEMENT      VASCULAR SURGERY  03- [facility]    Aortogram with bilateral selective renal artery injections    VASCULAR SURGERY  6/14/13 [facility]    Right carotid endarterectomy with Vascu-Guard patch repair. Right cervical carotid arteriograms after endarterectomy.  VASCULAR SURGERY  7/6/15 [facility]    Percutaneous cannulation, right common femoral artery, with a5 Sinhala sheath and later 6 Sinhala [facility] sheath. Suprarenal abdomianl aortagram.  Selective right renal arteriogram.  Right renal artery balloon angioplasty;/stent (Express 6 mm x 18mmballoon-expandable stent. . Completion suprarenal abdominal aortogram and selective right remal arteriogram. Mynx closure, right common femoral artery punctur         CURRENT MEDICATIONS       Current Discharge Medication List      CONTINUE these medications which have NOT CHANGED    Details   aspirin 81 MG chewable tablet Take 1 tablet by mouth daily  Qty: 30 tablet, Refills: 3      zinc sulfate (ZINCATE) 220 (50 Zn) MG capsule Take 1 capsule by mouth daily  Qty: 30 capsule, Refills: 0 ascorbic acid (VITAMIN C) 1000 MG tablet Take 1 tablet by mouth daily  Qty: 30 tablet, Refills: 3      lisinopril (PRINIVIL;ZESTRIL) 40 MG tablet Take 0.5 tablets by mouth daily  Qty: 15 tablet, Refills: 0      vitamin D (ERGOCALCIFEROL) 1.25 MG (44587 UT) CAPS capsule Take 50,000 Units by mouth once a week      metOLazone (ZAROXOLYN) 5 MG tablet Take 5 mg by mouth three times a week Monday wed and fri      rosuvastatin (CRESTOR) 40 MG tablet Take 1 tablet by mouth nightly  Qty: 30 tablet, Refills: 0      carvedilol (COREG) 25 MG tablet Take 1 tablet by mouth 2 times daily  Qty: 60 tablet, Refills: 0      bumetanide (BUMEX) 1 MG tablet Take 1 tablet by mouth 2 times daily  Qty: 60 tablet, Refills: 0      apixaban (ELIQUIS) 5 MG TABS tablet Take by mouth 2 times daily      montelukast (SINGULAIR) 10 MG tablet Take 10 mg by mouth daily      levocetirizine (XYZAL) 5 MG tablet Take 5 mg by mouth nightly      esomeprazole Magnesium (NEXIUM) 40 MG PACK Take 40 mg by mouth 2 times daily       amLODIPine (NORVASC) 10 MG tablet Take 1 tablet by mouth daily  Qty: 30 tablet, Refills: 3      isosorbide mononitrate (IMDUR) 60 MG CR tablet Take 60 mg by mouth 2 times daily      LANTUS SOLOSTAR 100 UNIT/ML injection pen INJECT 30 UNITS IN THE MORNING AND 20 UNITS IN THE EVENING DAILY  Qty: 15 mL, Refills: 5      potassium chloride SA (K-DUR;KLOR-CON M) 10 MEQ tablet TAKE ONE TABLET BY MOUTH EVERY DAY  Qty: 30 tablet, Refills: 5      insulin regular (HUMULIN R;NOVOLIN R) 100 UNIT/ML injection Inject 5 Units into the skin 3 times daily (before meals)      minoxidil (LONITEN) 2.5 MG tablet 2.5 mg 2 times daily      budesonide-formoterol (SYMBICORT) 80-4.5 MCG/ACT AERO Inhale 2 puffs into the lungs 2 times daily  Qty: 1 Inhaler, Refills: 3      fluticasone (VERAMYST) 27.5 MCG/SPRAY nasal spray 2 sprays by Each Nostril route daily      nitroGLYCERIN (NITROSTAT) 0.4 MG SL tablet up to max of 3 total doses.  If no relief after 1 dose, RADIOLOGY:   Non-plain film images such as CT, Ultrasound and MRI are read by the radiologist. Plain radiographic images are visualized and preliminarilyinterpreted by Michael Rubio MD with the below findings:      Interpretation per the Radiologist below, if available at the time of this note:    XR CHEST PORTABLE   Final Result   Impression:   No acute cardiopulmonary disease.    Signed by Dr Jennifer Culver on 1/3/2021 12:34 PM          LABS:  Labs Reviewed   CBC WITH AUTO DIFFERENTIAL - Abnormal; Notable for the following components:       Result Value    WBC 11.0 (*)     MCHC 32.3 (*)     Neutrophils % 73.8 (*)     Lymphocytes % 14.0 (*)     Neutrophils Absolute 8.1 (*)     Monocytes Absolute 1.10 (*)     All other components within normal limits   COMPREHENSIVE METABOLIC PANEL W/ REFLEX TO MG FOR LOW K - Abnormal; Notable for the following components:    Potassium reflex Magnesium 3.0 (*)     Chloride 82 (*)     CO2 47 (*)     Glucose 202 (*)      (*)     CREATININE 3.2 (*)     GFR Non- 20 (*)     GFR  24 (*)     Total Protein 6.5 (*)     Alb 3.4 (*)     All other components within normal limits    Narrative:     CALL  Justo MILLER tel. ,  Chemistry results called to and read back by Brielle Lei RN in ED, 01/03/2021  12:24, by ELIJAH   URINE RT REFLEX TO CULTURE - Abnormal; Notable for the following components:    Protein,  (*)     Leukocyte Esterase, Urine TRACE (*)     All other components within normal limits   BLOOD GAS, ARTERIAL - Abnormal; Notable for the following components:    pH, Arterial 7.530 (*)     pCO2, Arterial 66.0 (*)     pO2, Arterial 33.0 (*)     HCO3, Arterial 55.1 (*)     Base Excess, Arterial 26.7 (*)     O2 Sat, Arterial 65.3 (*)     All other components within normal limits    Narrative:     CALL  Justo Mcguire RN, 01/03/2021 13:09, by Aleksandr Blair - Abnormal; Notable for the following components:    Protime 18.5 (*) INR 1.53 (*)     All other components within normal limits   MAGNESIUM - Abnormal; Notable for the following components:    Magnesium 2.5 (*)     All other components within normal limits   MICROSCOPIC URINALYSIS - Abnormal; Notable for the following components:    Bacteria, UA NEGATIVE (*)     Crystals, UA NEG (*)     All other components within normal limits   CBC - Abnormal; Notable for the following components:    RBC 3.35 (*)     Hemoglobin 10.0 (*)     Hematocrit 31.8 (*)     MCV 94.9 (*)     MCHC 31.4 (*)     Platelets 96 (*)     All other components within normal limits   COMPREHENSIVE METABOLIC PANEL W/ REFLEX TO MG FOR LOW K - Abnormal; Notable for the following components:    Potassium reflex Magnesium 2.9 (*)     Chloride 92 (*)     CO2 40 (*)     Glucose 184 (*)     BUN 89 (*)     CREATININE 2.4 (*)     GFR Non- 27 (*)     GFR  33 (*)     Calcium 7.7 (*)     Total Protein 5.4 (*)     Alb 2.7 (*)     All other components within normal limits   POCT GLUCOSE - Abnormal; Notable for the following components:    POC Glucose 260 (*)     All other components within normal limits   POCT GLUCOSE - Abnormal; Notable for the following components:    POC Glucose 193 (*)     All other components within normal limits   CULTURE, BLOOD 1   CULTURE, BLOOD 2   LACTATE, SEPSIS   APTT   POTASSIUM, WHOLE BLOOD   MAGNESIUM   LACTATE, SEPSIS       All other labs were within normal range or notreturned as of this dictation.     RE-ASSESSMENT        EMERGENCY DEPARTMENT COURSE and DIFFERENTIAL DIAGNOSIS/MDM:   Vitals:    Vitals:    01/03/21 1703 01/03/21 1852 01/04/21 0110 01/04/21 0632   BP: 112/71 (!) 148/78 (!) 102/55 (!) 151/87   Pulse: 53 59 (!) 48 52   Resp: 16 18 17 18   Temp:  96.2 °F (35.7 °C) 96.6 °F (35.9 °C) 97.6 °F (36.4 °C)   TempSrc:  Temporal Temporal Temporal   SpO2: 94% 96% 90% 92%   Weight:       Height:             MDM  Patient makes the nurse aware that he is supposed to be on

## 2021-01-03 NOTE — ED NOTES
Updated patient's spouse by phone. Answered all questions to the best of my ability and explained plan of care. This nurse will update family with any changes to patient's status/disposition.      Marcy Mayes RN  01/03/21 [id number]

## 2021-01-03 NOTE — ED PROVIDER NOTES
Attending Supervisory Note/Shared Visit   I have personally performed a face to face diagnostic evaluation on this patient. I have reviewed the mid-levels findings and agree. Manny Slaughter is a 61-year-old male presents emergency department for nausea and vomiting as well as some diarrhea. He is known Covid positive since December 18. He has a known history of COPD and wears 2 L of oxygen at baseline that he wears as needed. I did not reinterview or reexamine the patient in attempt to reduce exposure but Junior Gabriel and I did discuss the case in detail. Patient will be admitted for ongoing treatment. 49 sinus bradycardia prolonged QTC no obvious ST changes nondiagnostic EKG    FINAL IMPRESSION      1. Hypoxia    2. Compliance poor    3. Nausea vomiting and diarrhea    4.  Acute kidney injury ([facility] [address]          Sam Romo MD  Attending Emergency Physician       Jake Oro MD  01/03/21 1428

## 2021-01-03 NOTE — PROGRESS NOTES
Results for Kaylyn Ibrahim (MRN [id number]) as of 1/3/2021 13:10   Ref.  Range 1/3/2021 13:03   Hemoglobin, Art, Extended Latest Ref Range: 14.0 - 18.0 g/dL 15.6   pH, Arterial Latest Ref Range: 7.350 - 7.450  7.530 (HH)   pCO2, Arterial Latest Ref Range: 35.0 - 45.0 mmHg 66.0 (H)   pO2, Arterial Latest Ref Range: 80.0 - 100.0 mmHg 33.0 (LL)   HCO3, Arterial Latest Ref Range: 22.0 - 26.0 mmol/L 55.1 (H)   Base Excess, Arterial Latest Ref Range: -2.0 - 2.0 mmol/L 26.7 (H)   O2 Sat, Arterial Latest Ref Range: >92 % 65.3 (LL)   O2 Content, Arterial Latest Ref Range: Not Established mL/dL 14.3   Methemoglobin, Arterial Latest Ref Range: <1.5 % 1.0   Carboxyhgb, Arterial Latest Ref Range: 0.0 - 5.0 % 2.5     Room air  Site RR  AT +  Pt was placed on 3 lpm/nc after ABG was drawn

## 2021-01-03 NOTE — ED NOTES
Respiratory at bedside for ABGs. Patient's O2 sats 77%-83% at this time on room air. Patient denies any distress or SOA. Patient gray in appearance.       Kacy Wilson RN  01/03/21 9135

## 2021-01-03 NOTE — ED NOTES
Once ABG collected, patient was placed on O2 @ 2.5L NC and O2 sat improved to 92%.      Marcy Sena RN  01/03/21 [id number]

## 2021-01-03 NOTE — H&P
HISTORY AND PHYSICAL             Date: 1/3/2021        Patient Name: Robyn Graham     YOB: 1955      Age:  60 y.o. Chief Complaint     Chief Complaint   Patient presents with    Positive For Covid-19     on the 18th has been getting worse since        History Obtained From   patient    History of Present Illness     50-year-old male with a history of coronary artery disease, CKD stage IV, carotid artery stenosis, systolic and diastolic heart failure, proximal mitral fibrillation, peripheral vascular disease, type 2 diabetes, tobacco use disorder, thoracic aneurysm, presented to the hospital with concerns of nausea and vomiting. Patient was recently discharged from the hospital on 12/23 after being treated for NSTEMI and also noted to be Covid positive at that time. Patient completed course of remdesivir as well as received a dose of convalescent plasma. Patient was discharged home on 2 L of oxygen, however patient has not been compliant with his oxygen requirement and has been trying to wean himself off oxygen. Patient stated he continues to have loose stool, unable to keep down oral intake due to persistent nausea vomiting, hence presented to the ED for further evaluation. In the emergency room on presentation he was noted to be hypotensive however responsive to fluid BUN and creatinine were above baseline, potassium of 3.0 was given 10 mEq IV. Patient lactic acid noted to be normal, was admitted for further evaluation.       Past Medical History     Past Medical History:   Diagnosis Date    Acute kidney failure, unspecified ([address]     Anxiety state, unspecified     Atrial septal aneurysm 01/09/2016    Blood circulation, collateral     Body mass index 30.0-30.9, adult     CAD (coronary artery disease) 01/10/2016    1/9/16  lexiscan  Positive for apical MI + myocardial ischemia, EF 42%, intermediate risk findings, AUC indication 16, AUC score 7 1/10/16  Cath  3 lesions in the LAD:  Mid: 70% 2.25x23, mid 90% 2.25 x 28, distal 100% 2.0x28, anterior lateral apical hypokinesis, EF 45%    Calculus of kidney     Carotid artery stenosis 06/26/2013    Cellulitis and abscess of hand, except fingers and thumb     Cerebral artery occlusion with cerebral infarction [address] [address]     15 years ago    Chronic airway obstruction, not elsewhere classified     Chronic kidney disease, unspecified     Congestive heart failure, unspecified     Diabetes mellitus type II     Diverticulosis of colon (without mention of hemorrhage)     Encounter for long-term (current) use of insulin (HCC)     Esophageal reflux     Family history of ischemic heart disease     Gastric ulcer, unspecified as acute or chronic, without mention of hemorrhage, perforation, or obstruction     Hyperlipemia     Hypertension     Internal hemorrhoids without mention of complication     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, or unspecified(403.00)     Obesity, unspecified     Other specified cardiac dysrhythmias(427.89)     Palliative care patient 12/21/2020    Paroxysmal atrial fibrillation (HCC)     Peripheral vascular disease ([facility] [address]     Solitary pulmonary nodule     Surgical or other procedure not carried out because of contraindication     Thoracic aneurysm without mention of rupture     Tobacco use disorder     Type II or unspecified type diabetes mellitus without mention of complication, not stated as uncontrolled         Past Surgical History     Past Surgical History:   Procedure Laterality Date    CARDIAC CATHETERIZATION      CHOLECYSTECTOMY      CORONARY ANGIOPLASTY WITH STENT PLACEMENT  1-16    2 JACQUELINE to LAD    DIAGNOSTIC CARDIAC CATH LAB PROCEDURE      URETER STENT PLACEMENT      VASCULAR SURGERY  03- [facility]    Aortogram with bilateral selective renal artery injections    VASCULAR SURGERY  6/14/13 [facility]    Right carotid endarterectomy with Vascu-Guard patch repair. Right cervical carotid arteriograms after endarterectomy.  VASCULAR SURGERY  7/6/15 [facility]    Percutaneous cannulation, right common femoral artery, with a5 Burkinan sheath and later 6 Burkinan [facility] sheath. Suprarenal abdomianl aortagram.  Selective right renal arteriogram.  Right renal artery balloon angioplasty;/stent (Express 6 mm x 18mmballoon-expandable stent. . Completion suprarenal abdominal aortogram and selective right remal arteriogram. Mynx closure, right common femoral artery punctur        Medications Prior to Admission     Prior to Admission medications    Medication Sig Start Date End Date Taking? Authorizing Provider   aspirin 81 MG chewable tablet Take 1 tablet by mouth daily 12/24/20   Chico Ruiz MD   budesonide-formoterol [facility]) 80-4.5 MCG/ACT AERO Inhale 2 puffs into the lungs 2 times daily 12/23/20   Chico Ruiz MD   zinc sulfate (ZINCATE) 220 (50 Zn) MG capsule Take 1 capsule by mouth daily 12/24/20 1/23/21  Chico Ruiz MD   ascorbic acid (VITAMIN C) 1000 MG tablet Take 1 tablet by mouth daily 12/24/20   Chico Ruiz MD   lisinopril (PRINIVIL;ZESTRIL) 40 MG tablet Take 0.5 tablets by mouth daily 12/23/20 1/22/21  Chico Ruiz MD   vitamin D (ERGOCALCIFEROL) 1.25 MG (45399 UT) CAPS capsule Take 50,000 Units by mouth once a week 12/21/20   Historical Provider, MD   metOLazone (ZAROXOLYN) 5 MG tablet Take 5 mg by mouth three times a week Monday wed and fri    Historical Provider, MD   fluticasone (VERAMYST) 27.5 MCG/SPRAY nasal spray 2 sprays by Each Nostril route daily    Historical Provider, MD   nitroGLYCERIN (NITROSTAT) 0.4 MG SL tablet up to max of 3 total doses.  If no relief after 1 dose, call 911. 10/20/20   Breonna Sherman,    rosuvastatin (CRESTOR) 40 MG tablet Take 1 tablet by mouth nightly 10/20/20   Breonna Shermna DO   carvedilol (COREG) 25 MG tablet Take 1 tablet by mouth 2 times daily 10/20/20   [facility], DO   bumetanide (BUMEX) 1 MG tablet Take 1 tablet by mouth 2 times daily 10/20/20   [facility], DO   NOVOLOG FLEXPEN 100 UNIT/ML injection pen  6/1/20   Historical Provider, MD   apixaban (ELIQUIS) 5 MG TABS tablet Take by mouth 2 times daily    Historical Provider, MD   amiodarone (CORDARONE) 200 MG tablet Take 1 tablet by mouth daily 2/20/20 5/8/20  Sunny Padilla MD   montelukast (SINGULAIR) 10 MG tablet Take 10 mg by mouth daily    Historical Provider, MD   levocetirizine (XYZAL) 5 MG tablet Take 5 mg by mouth nightly    Historical Provider, MD   esomeprazole Magnesium (NEXIUM) 40 MG PACK Take 40 mg by mouth 2 times daily     Historical Provider, MD   albuterol sulfate HFA (VENTOLIN HFA) 108 (90 Base) MCG/ACT inhaler Inhale 2 puffs into the lungs every 6 hours as needed for Wheezing 1/1/18   Karen Corrigan MD   amLODIPine (NORVASC) 10 MG tablet Take 1 tablet by mouth daily 1/11/16   DWAYNE Duong   isosorbide mononitrate (IMDUR) 60 MG CR tablet Take 60 mg by mouth 2 times daily    Historical Provider, MD   ULTICARE MINI PEN NEEDLES 31G X 6 MM MISC FOR USE WITH LANTUS TWO TIMES A DAY 7/11/14   DWAYNE Chong CNP   LANTUS SOLOSTAR 100 UNIT/ML injection pen INJECT 30 UNITS IN THE MORNING AND 20 UNITS IN THE EVENING DAILY  Patient taking differently: 2 times daily Takes 30 units in AM and 20 units in PM 7/1/14   DWAYNE Chong CNP   potassium chloride SA (K-DUR;KLOR-CON M) 10 MEQ tablet TAKE ONE TABLET BY MOUTH EVERY DAY  Patient taking differently: 10 mEq daily  4/15/14   Sigifredo Duong MD   clopidogrel (PLAVIX) 75 MG tablet Take 1 tablet by mouth daily.  12/9/13   Sigifredo Duong MD        Allergies   Hydralazine and Morphine    Social History     Social History     Tobacco History     Smoking Status  Former Smoker Smoking Frequency  0.25 packs/day    Smokeless Tobacco Use  Never Used          Alcohol History     Alcohol Use Status  No          Drug Use     Drug Use Status  No          Sexual Activity     Sexually Active  Yes Family History     Family History   Problem Relation Age of Onset    High Blood Pressure Father     Cancer Father     High Blood Pressure Mother     High Blood Pressure Brother        Review of Systems   Review of Systems: 16 point system reviewed and negative except as stated above. Physical Exam   BP (!) 110/51   Pulse 53   Temp 98.5 °F (36.9 °C)   Resp 13   Ht 5' 11\" (1.803 m)   Wt 210 lb (95.3 kg)   SpO2 95%   BMI 29.29 kg/m²     Physical Exam  General: Alert, well-developed, no acute distress lying comfortably in bed. HEENT: Atraumatic normocephalic, range of motion normal, no JVD, no tracheal deviation noted. Cardiac: Normal S1-S2   Respiratory: Effort normal, breath sounds normal, clear To auscultation bilaterally, no rhonchi or rales  Abdomen: Soft, positive bowel sounds in all quadrants, no distention, nontender to palpation, no organomegaly noted  MSK/extremities: no tenderness, no edema, no deformity, moves all extremities  Skin: Warm, no erythema noted  Psych: Affect normal and good eye contact, behavioral normal, thought content normal and judgment normal  Neurological: No focal deficits, alert and conversant, no formal disorientation noted. No sensory deficits, no abnormal coordination.         Labs      Recent Results (from the past 24 hour(s))   Protime-INR    Collection Time: 01/03/21 11:29 AM   Result Value Ref Range    Protime 18.5 (H) 12.0 - 14.6 sec    INR 1.53 (H) 0.88 - 1.18   APTT    Collection Time: 01/03/21 11:29 AM   Result Value Ref Range    aPTT 33.4 26.0 - 36.2 sec   CBC Auto Differential    Collection Time: 01/03/21 11:49 AM   Result Value Ref Range    WBC 11.0 (H) 4.8 - 10.8 K/uL    RBC 5.46 4.70 - 6.10 M/uL    Hemoglobin 16.1 14.0 - 18.0 g/dL    Hematocrit 49.9 42.0 - 52.0 %    MCV 91.4 80.0 - 94.0 fL    MCH 29.5 27.0 - 31.0 pg    MCHC 32.3 (L) 33.0 - 37.0 g/dL    RDW 14.5 11.5 - 14.5 %    Platelets 732 293 - 125 K/uL    MPV 11.4 9.4 - 12.4 fL Neutrophils % 73.8 (H) 50.0 - 65.0 %    Lymphocytes % 14.0 (L) 20.0 - 40.0 %    Monocytes % 10.0 0.0 - 10.0 %    Eosinophils % 1.6 0.0 - 5.0 %    Basophils % 0.1 0.0 - 1.0 %    Neutrophils Absolute 8.1 (H) 1.5 - 7.5 K/uL    Immature Granulocytes # 0.1 K/uL    Lymphocytes Absolute 1.5 1.1 - 4.5 K/uL    Monocytes Absolute 1.10 (H) 0.00 - 0.90 K/uL    Eosinophils Absolute 0.20 0.00 - 0.60 K/uL    Basophils Absolute 0.00 0.00 - 0.20 K/uL   Comprehensive Metabolic Panel w/ Reflex to MG    Collection Time: 01/03/21 11:49 AM   Result Value Ref Range    Sodium 136 136 - 145 mmol/L    Potassium reflex Magnesium 3.0 (L) 3.5 - 5.0 mmol/L    Chloride 82 (L) 98 - 111 mmol/L    CO2 47 (HH) 22 - 29 mmol/L    Anion Gap 7 7 - 19 mmol/L    Glucose 202 (H) 74 - 109 mg/dL     (H) 8 - 23 mg/dL    CREATININE 3.2 (H) 0.5 - 1.2 mg/dL    GFR Non-African American 20 (A) >60    GFR  24 (L) >59    Calcium 9.0 8.8 - 10.2 mg/dL    Total Protein 6.5 (L) 6.6 - 8.7 g/dL    Alb 3.4 (L) 3.5 - 5.2 g/dL    Total Bilirubin 0.9 0.2 - 1.2 mg/dL    Alkaline Phosphatase 84 40 - 130 U/L    ALT 31 5 - 41 U/L    AST 20 5 - 40 U/L   Magnesium    Collection Time: 01/03/21 11:49 AM   Result Value Ref Range    Magnesium 2.5 (H) 1.6 - 2.4 mg/dL   Lactate, Sepsis    Collection Time: 01/03/21 12:50 PM   Result Value Ref Range    Lactic Acid, Sepsis 1.8 0.5 - 1.9 mg/dL   BLOOD GAS, ARTERIAL    Collection Time: 01/03/21  1:03 PM   Result Value Ref Range    pH, Arterial 7.530 (HH) 7.350 - 7.450    pCO2, Arterial 66.0 (H) 35.0 - 45.0 mmHg    pO2, Arterial 33.0 (LL) 80.0 - 100.0 mmHg    HCO3, Arterial 55.1 (H) 22.0 - 26.0 mmol/L    Base Excess, Arterial 26.7 (H) -2.0 - 2.0 mmol/L    Hemoglobin, Art, Extended 15.6 14.0 - 18.0 g/dL    O2 Sat, Arterial 65.3 (LL) >92 %    Carboxyhgb, Arterial 2.5 0.0 - 5.0 %    Methemoglobin, Arterial 1.0 <1.5 %    O2 Content, Arterial 14.3 Not Established mL/dL    O2 Therapy Unknown    Potassium, Whole Blood Collection Time: 01/03/21  1:03 PM   Result Value Ref Range    Potassium, Whole Blood 2.8         Imaging/Diagnostics Last 24 Hours   Xr Chest Portable    Result Date: 1/3/2021  Exam:   XR CHEST PORTABLE  Date:  1/3/2021 History:  Male, age  60 years; worsening respiratory symptoms. COMPARISON:  Chest x-ray dated December 19, 2020. Findings : Borderline low lung volumes. The heart and mediastinum are normal in size. Lungs are without focal infiltrate, mass or effusions. The bones show no acute pathology. Impression: No acute cardiopulmonary disease. Signed by Dr Leena Daniels on 1/3/2021 12:34 PM      Assessment      Hospital Problems           Last Modified POA    Acute renal failure (ARF) ([facility] [address] 1/3/2021 Yes          Plan       ANGELO on CKD IV likely prerenal from dehydration in the setting of nausea and vomiting and decreased oral intake. Lactated Ringer at 100 cc an hour  Avoid nephrotoxic agent  If no significant improvement to baseline we will involve nephrology.     Type II DM w/ hyperglycemia   Lantus 10 units twice daily  Lispro 4 units with meals and bedtime  Hypoglycemia protocol    History of hypertension  Patient currently hypotensive we will hold hypertensive medication at this time and monitor closely. Paroxysmal atrial fibrillation  Continue amiodarone and Eliquis. Covid positive  Currently on baseline 2 L of oxygen, goal saturations greater than 90%. Continue to monitor closely for now.   Droplet plus precautions  Completed dexamethasone and remdesivir last admission.        Chronic problems, continue home meds where appropriate:  PVD  Carotid artery stenosis  Thoracic aortic aneurysm  Chronic systolic and diastolic HF, EF 16%  CAD  GERD  HLD  HTN  Obesity - BMI 34    We will continue order chronic home medications    Consultations Ordered:  None    Electronically signed by Ge Alarcon MD on 1/3/21 at 4:34 PM CST

## 2021-01-04 PROBLEM — E43 SEVERE MALNUTRITION (HCC): Status: ACTIVE | Noted: 2021-01-04

## 2021-01-04 LAB
ALBUMIN SERPL-MCNC: 2.7 G/DL (ref 3.5–5.2)
ALP BLD-CCNC: 69 U/L (ref 40–130)
ALT SERPL-CCNC: 25 U/L (ref 5–41)
ANION GAP SERPL CALCULATED.3IONS-SCNC: 7 MMOL/L (ref 7–19)
AST SERPL-CCNC: 18 U/L (ref 5–40)
BILIRUB SERPL-MCNC: 0.8 MG/DL (ref 0.2–1.2)
BUN BLDV-MCNC: 89 MG/DL (ref 8–23)
CALCIUM SERPL-MCNC: 7.7 MG/DL (ref 8.8–10.2)
CHLORIDE BLD-SCNC: 92 MMOL/L (ref 98–111)
CO2: 40 MMOL/L (ref 22–29)
CREAT SERPL-MCNC: 2.4 MG/DL (ref 0.5–1.2)
EKG P AXIS: 45 DEGREES
EKG P-R INTERVAL: 194 MS
EKG Q-T INTERVAL: 550 MS
EKG QRS DURATION: 100 MS
EKG QTC CALCULATION (BAZETT): 531 MS
EKG T AXIS: 61 DEGREES
GFR AFRICAN AMERICAN: 33
GFR NON-AFRICAN AMERICAN: 27
GLUCOSE BLD-MCNC: 184 MG/DL (ref 74–109)
GLUCOSE BLD-MCNC: 193 MG/DL (ref 70–99)
GLUCOSE BLD-MCNC: 212 MG/DL (ref 70–99)
GLUCOSE BLD-MCNC: 254 MG/DL (ref 70–99)
GLUCOSE BLD-MCNC: 260 MG/DL (ref 70–99)
HCT VFR BLD CALC: 31.8 % (ref 42–52)
HEMOGLOBIN: 10 G/DL (ref 14–18)
MAGNESIUM: 2.1 MG/DL (ref 1.6–2.4)
MCH RBC QN AUTO: 29.9 PG (ref 27–31)
MCHC RBC AUTO-ENTMCNC: 31.4 G/DL (ref 33–37)
MCV RBC AUTO: 94.9 FL (ref 80–94)
PDW BLD-RTO: 14.2 % (ref 11.5–14.5)
PERFORMED ON: ABNORMAL
PLATELET # BLD: 96 K/UL (ref 130–400)
PMV BLD AUTO: 11 FL (ref 9.4–12.4)
POTASSIUM REFLEX MAGNESIUM: 2.9 MMOL/L (ref 3.5–5)
POTASSIUM SERPL-SCNC: 3 MMOL/L (ref 3.5–5)
RBC # BLD: 3.35 M/UL (ref 4.7–6.1)
SODIUM BLD-SCNC: 139 MMOL/L (ref 136–145)
TOTAL PROTEIN: 5.4 G/DL (ref 6.6–8.7)
WBC # BLD: 6.1 K/UL (ref 4.8–10.8)

## 2021-01-04 PROCEDURE — 2580000003 HC RX 258: Performed by: HOSPITALIST

## 2021-01-04 PROCEDURE — 80053 COMPREHEN METABOLIC PANEL: CPT

## 2021-01-04 PROCEDURE — 82947 ASSAY GLUCOSE BLOOD QUANT: CPT

## 2021-01-04 PROCEDURE — 84132 ASSAY OF SERUM POTASSIUM: CPT

## 2021-01-04 PROCEDURE — 93010 ELECTROCARDIOGRAM REPORT: CPT | Performed by: INTERNAL MEDICINE

## 2021-01-04 PROCEDURE — 6370000000 HC RX 637 (ALT 250 FOR IP): Performed by: HOSPITALIST

## 2021-01-04 PROCEDURE — 1210000000 HC MED SURG R&B

## 2021-01-04 PROCEDURE — 85027 COMPLETE CBC AUTOMATED: CPT

## 2021-01-04 PROCEDURE — 6360000002 HC RX W HCPCS: Performed by: HOSPITALIST

## 2021-01-04 PROCEDURE — 83735 ASSAY OF MAGNESIUM: CPT

## 2021-01-04 PROCEDURE — 2700000000 HC OXYGEN THERAPY PER DAY

## 2021-01-04 RX ORDER — POTASSIUM CHLORIDE 20 MEQ/1
40 TABLET, EXTENDED RELEASE ORAL ONCE
Status: COMPLETED | OUTPATIENT
Start: 2021-01-04 | End: 2021-01-04

## 2021-01-04 RX ADMIN — APIXABAN 5 MG: 5 TABLET, FILM COATED ORAL at 09:00

## 2021-01-04 RX ADMIN — SODIUM CHLORIDE, PRESERVATIVE FREE 10 ML: 5 INJECTION INTRAVENOUS at 20:31

## 2021-01-04 RX ADMIN — INSULIN LISPRO 6 UNITS: 100 INJECTION, SOLUTION INTRAVENOUS; SUBCUTANEOUS at 13:00

## 2021-01-04 RX ADMIN — POTASSIUM CHLORIDE 40 MEQ: 1500 TABLET, EXTENDED RELEASE ORAL at 09:22

## 2021-01-04 RX ADMIN — Medication 10 UNITS: at 09:30

## 2021-01-04 RX ADMIN — ROSUVASTATIN CALCIUM 40 MG: 20 TABLET, FILM COATED ORAL at 20:30

## 2021-01-04 RX ADMIN — POTASSIUM CHLORIDE 10 MEQ: 7.46 INJECTION, SOLUTION INTRAVENOUS at 22:46

## 2021-01-04 RX ADMIN — INSULIN LISPRO 4 UNITS: 100 INJECTION, SOLUTION INTRAVENOUS; SUBCUTANEOUS at 17:45

## 2021-01-04 RX ADMIN — MONTELUKAST SODIUM 10 MG: 10 TABLET, FILM COATED ORAL at 09:01

## 2021-01-04 RX ADMIN — Medication 10 UNITS: at 20:51

## 2021-01-04 RX ADMIN — Medication 50 MG: at 09:00

## 2021-01-04 RX ADMIN — APIXABAN 5 MG: 5 TABLET, FILM COATED ORAL at 20:30

## 2021-01-04 RX ADMIN — POTASSIUM CHLORIDE 10 MEQ: 7.46 INJECTION, SOLUTION INTRAVENOUS at 15:56

## 2021-01-04 RX ADMIN — CLOPIDOGREL BISULFATE 75 MG: 75 TABLET ORAL at 09:08

## 2021-01-04 RX ADMIN — POTASSIUM CHLORIDE 10 MEQ: 7.46 INJECTION, SOLUTION INTRAVENOUS at 18:01

## 2021-01-04 RX ADMIN — POTASSIUM CHLORIDE 10 MEQ: 7.46 INJECTION, SOLUTION INTRAVENOUS at 14:17

## 2021-01-04 RX ADMIN — FLUTICASONE PROPIONATE 2 SPRAY: 50 SPRAY, METERED NASAL at 08:58

## 2021-01-04 RX ADMIN — ONDANSETRON 4 MG: 2 INJECTION INTRAMUSCULAR; INTRAVENOUS at 11:58

## 2021-01-04 RX ADMIN — POTASSIUM CHLORIDE 10 MEQ: 7.46 INJECTION, SOLUTION INTRAVENOUS at 09:05

## 2021-01-04 RX ADMIN — BUDESONIDE AND FORMOTEROL FUMARATE DIHYDRATE 2 PUFF: 80; 4.5 AEROSOL RESPIRATORY (INHALATION) at 08:58

## 2021-01-04 RX ADMIN — INSULIN LISPRO 3 UNITS: 100 INJECTION, SOLUTION INTRAVENOUS; SUBCUTANEOUS at 20:52

## 2021-01-04 RX ADMIN — PANTOPRAZOLE SODIUM 40 MG: 20 TABLET, DELAYED RELEASE ORAL at 09:00

## 2021-01-04 RX ADMIN — INSULIN LISPRO 2 UNITS: 100 INJECTION, SOLUTION INTRAVENOUS; SUBCUTANEOUS at 09:30

## 2021-01-04 RX ADMIN — CARVEDILOL 25 MG: 25 TABLET, FILM COATED ORAL at 20:30

## 2021-01-04 RX ADMIN — INSULIN LISPRO 4 UNITS: 100 INJECTION, SOLUTION INTRAVENOUS; SUBCUTANEOUS at 13:00

## 2021-01-04 RX ADMIN — POTASSIUM CHLORIDE 10 MEQ: 7.46 INJECTION, SOLUTION INTRAVENOUS at 11:13

## 2021-01-04 RX ADMIN — CARVEDILOL 25 MG: 25 TABLET, FILM COATED ORAL at 09:01

## 2021-01-04 RX ADMIN — POTASSIUM CHLORIDE 10 MEQ: 7.46 INJECTION, SOLUTION INTRAVENOUS at 12:04

## 2021-01-04 RX ADMIN — CETIRIZINE HYDROCHLORIDE 10 MG: 10 TABLET, FILM COATED ORAL at 09:01

## 2021-01-04 RX ADMIN — BUDESONIDE AND FORMOTEROL FUMARATE DIHYDRATE 2 PUFF: 80; 4.5 AEROSOL RESPIRATORY (INHALATION) at 20:31

## 2021-01-04 RX ADMIN — OXYCODONE HYDROCHLORIDE AND ACETAMINOPHEN 1000 MG: 500 TABLET ORAL at 09:00

## 2021-01-04 RX ADMIN — SODIUM CHLORIDE, PRESERVATIVE FREE 10 ML: 5 INJECTION INTRAVENOUS at 09:02

## 2021-01-04 RX ADMIN — AMIODARONE HYDROCHLORIDE 200 MG: 200 TABLET ORAL at 09:01

## 2021-01-04 RX ADMIN — ASPIRIN 81 MG: 81 TABLET, CHEWABLE ORAL at 09:01

## 2021-01-04 RX ADMIN — PANTOPRAZOLE SODIUM 40 MG: 20 TABLET, DELAYED RELEASE ORAL at 20:30

## 2021-01-04 ASSESSMENT — ENCOUNTER SYMPTOMS
VOMITING: 1
EYE ITCHING: 0
COUGH: 0
SHORTNESS OF BREATH: 0
BACK PAIN: 0
PHOTOPHOBIA: 0
NAUSEA: 1
APNEA: 0
COLOR CHANGE: 0
EYE DISCHARGE: 0

## 2021-01-04 NOTE — ACP (ADVANCE CARE PLANNING)
Advance Care Planning     Advance Care Planning Activator (Inpatient)  Conversation Note      Date of ACP Conversation: 1/4/21    Conversation Conducted with: Patient    ACP Activator: Deena Chun      Current Designated Health Care Decision Maker:   Primary Decision Maker: Larry Salcido - Spouse - [phone]      Care Preferences    Ventilation: \"If you were in your present state of health and suddenly became very ill and were unable to breathe on your own, what would your preference be about the use of a ventilator (breathing machine) if it were available to you? \"      Would the patient desire the use of ventilator (breathing machine)?: No    \"If your health worsens and it becomes clear that your chance of recovery is unlikely, what would your preference be about the use of a ventilator (breathing machine) if it were available to you? \"     Would the patient desire the use of ventilator (breathing machine)?: No      Resuscitation  \"CPR works best to restart the heart when there is a sudden event, like a heart attack, in someone who is otherwise healthy. Unfortunately, CPR does not typically restart the heart for people who have serious health conditions or who are very sick. \"    \"In the event your heart stopped as a result of an underlying serious health condition, would you want attempts to be made to restart your heart (answer \"yes\" for attempt to resuscitate) or would you prefer a natural death (answer \"no\" for do not attempt to resuscitate)? \" No         [] Yes   [x] No   Educated Patient / Amaryllis Prey regarding differences between Advance Directives and portable DNR orders.         Conversation Outcomes:  [x] ACP discussion completed      Electronically signed by Deena Chun on 1/4/2021 at 10:45 AM

## 2021-01-04 NOTE — CARE COORDINATION
Called patient to discuss discharge needs. Patient lives at home with his spouse. He was discharged home previous admission with oxygen, supplied by [facility]. [facility]   P [phone]  F [phone]    He has a Pulse Ox meter and was using it at home. He continues to not be interested in [facility]. He wants to use [facility]. Pharmacy informed. Patient reports that he is able to afford his medications. At discharge, his spouse will provide transportation.   Electronically signed by Nixon Collins RN on 1/4/2021 at 1:55 PM

## 2021-01-04 NOTE — PROGRESS NOTES
Progress Note  Date:2021       Room:0424/424-02  Patient Brandon Alonzo     YOB: 1955     Age:65 y.o. Subjective    Subjective: 76-year-old male with a history of coronary artery disease, CKD stage IV, carotid artery stenosis, systolic and diastolic heart failure, proximal mitral fibrillation, peripheral vascular disease, type 2 diabetes, tobacco use disorder, thoracic aneurysm, presented to the hospital with concerns of nausea and vomiting. Patient was recently discharged from the hospital on  after being treated for NSTEMI and also noted to be Covid positive at that time. Patient completed course of remdesivir as well as received a dose of convalescent plasma. Patient was discharged home on 2 L of oxygen, however patient has not been compliant with his oxygen requirement and has been trying to wean himself off oxygen. Patient stated he continues to have loose stool, unable to keep down oral intake due to persistent nausea vomiting, hence presented to the ED for further evaluation. In the emergency room on presentation he was noted to be hypotensive however responsive to fluid BUN and creatinine were above baseline, potassium of 3.0 was given 10 mEq IV. Patient lactic acid noted to be normal.     Patient was seen today in his room, no acute overnight event, renal function seems to be improved with fluids. Plans to monitor in-house today repeat bmp tomorrow. Denies any nausea vomiting or diarrhea. Review of Systems: 12 point system reviewed and negative except as stated above.     Objective         Vitals Last 24 Hours:  TEMPERATURE:  Temp  Av.8 °F (36 °C)  Min: 96.2 °F (35.7 °C)  Max: 97.6 °F (36.4 °C)  RESPIRATIONS RANGE: Resp  Av.9  Min: 10  Max: 18  PULSE OXIMETRY RANGE: SpO2  Av.8 %  Min: 90 %  Max: 97 %  PULSE RANGE: Pulse  Av.8  Min: 48  Max: 59  BLOOD PRESSURE RANGE: Systolic (46HIK), MBV:331 , Min:91 , JWP:021   ; Diastolic (35MRJ), BCM:06, Min:50, Max:87    I/O (24Hr): Intake/Output Summary (Last 24 hours) at 1/4/2021 1514  Last data filed at 1/4/2021 1089  Gross per 24 hour   Intake --   Output 800 ml   Net -800 ml         Physical Exam  General: Alert, well-developed, no acute distress lying comfortably in bed. HEENT: Atraumatic normocephalic, range of motion normal  Cardiac: Normal S1-S2   Respiratory: Effort normal, breath sounds normal, clear To auscultation bilaterally, no rhonchi or rales  Abdomen: Soft, positive bowel sounds in all quadrants, no distention, nontender to palpation  MSK/extremities: no tenderness, no edema, no deformity, moves all extremities  Skin: Warm, no erythema noted  Psych: Affect normal and good eye contact, behavioral normal, thought content normal and judgment normal  Neurological: No focal deficits, alert and conversant, no formal disorientation noted. No sensory deficits, no abnormal coordination. Labs/Imaging/Diagnostics    Labs:  CBC:  Recent Labs     01/03/21  1149 01/04/21  0600   WBC 11.0* 6.1   RBC 5.46 3.35*   HGB 16.1 10.0*   HCT 49.9 31.8*   MCV 91.4 94.9*   RDW 14.5 14.2    96*     CHEMISTRIES:  Recent Labs     01/03/21  1149 01/03/21  1303 01/04/21  0600     --  139   K 3.0* 2.8 2.9*   CL 82*  --  92*   CO2 47*  --  40*   *  --  89*   CREATININE 3.2*  --  2.4*   GLUCOSE 202*  --  184*   MG 2.5*  --  2.1     PT/INR:  Recent Labs     01/03/21  1129   PROTIME 18.5*   INR 1.53*     APTT:  Recent Labs     01/03/21  1129   APTT 33.4     LIVER PROFILE:  Recent Labs     01/03/21  1149 01/04/21  0600   AST 20 18   ALT 31 25   BILITOT 0.9 0.8   ALKPHOS 84 69       Imaging Last 24 Hours:  Xr Chest Portable    Result Date: 1/3/2021  Exam:   XR CHEST PORTABLE  Date:  1/3/2021 History:  Male, age  60 years; worsening respiratory symptoms. COMPARISON:  Chest x-ray dated December 19, 2020. Findings : Borderline low lung volumes. The heart and mediastinum are normal in size.  Lungs are without focal infiltrate, mass or effusions. The bones show no acute pathology. Impression: No acute cardiopulmonary disease. Signed by Dr Tariq Lares on 1/3/2021 12:34 PM    Assessment//Plan           Hospital Problems           Last Modified POA    Acute renal failure (ARF) ([facility] [address] 1/3/2021 Yes    Severe malnutrition ([facility] [address] 1/4/2021 Yes        Assessment & Plan      ANGELO on CKD IV likely prerenal from dehydration in the setting of nausea and vomiting and decreased oral intake. Lactated Ringer at 100 cc an hour  Avoid nephrotoxic agent  If no significant improvement to baseline we will involve nephrology.     Type II DM w/ hyperglycemia   Lantus 10 units twice daily  Lispro 4 units with meals and bedtime  Hypoglycemia protocol     History of hypertension  Patient currently hypotensive we will hold hypertensive medication at this time and monitor closely.     Paroxysmal atrial fibrillation  Continue amiodarone and Eliquis.     Covid positive  Currently on baseline 2 L of oxygen, goal saturations greater than 90%. Continue to monitor closely for now. Droplet plus precautions  Completed dexamethasone and remdesivir last admission.        Chronic problems, continue home meds where appropriate:  PVD  Carotid artery stenosis  Thoracic aortic aneurysm  Chronic systolic and diastolic HF, EF 97%  CAD  GERD  HLD  HTN  Obesity - BMI 34     We will continue order chronic home medications      Electronically signed by   Oniel Fan   Internal Medicine Hospitalist  On 1/4/2021  At 3:22 PM    EMR Dragon/Transcription disclaimer:   Much of this encounter note is an electronic transcription/translation of spoken language to printed text.  The electronic translation of spoken language may permit erroneous, or at times, nonsensical words or phrases to be inadvertently transcribed; although attempts have made to review the note for such errors, some may still exist.

## 2021-01-04 NOTE — PLAN OF CARE
Problem: Falls - Risk of:  Goal: Will remain free from falls  Description: Will remain free from falls  Outcome: Ongoing  Goal: Absence of physical injury  Description: Absence of physical injury  Outcome: Ongoing     Problem: Nutrition  Goal: Optimal nutrition therapy  1/4/2021 1515 by Brendan Viramontes RN  Outcome: Ongoing  1/4/2021 0957 by Keanu Deluna RD, LD  Outcome: Ongoing

## 2021-01-04 NOTE — PROGRESS NOTES
Palliative Care/Spiritual Care: Spoke with pt to initiate Palliative care. Pt tested positive for Covid and says \"they told me I was getting ready to have a heart attack. \" Pt is diagnosed with CAD, CKD stage IV, and CHF. Pt is known to palliative care. Advance Directives: Pt does not have an AD/LW, says his wife will make medical decisions. Pt is a DNR. SEE ACP NOTE. Pain/other symptoms: Pt did not complain of pain. Social/Spiritual: Unable to ask; pt was done with the conversation and said goodbye. Pt/family discussion r/t goals: Pt  Has a wife, two children, and grandchildren. Pt lives at home with his wife, drives, ambulates without assistance, and cares for himself. Pt says he will return home without assistance to previous quality of life. Provided spiritual care with support. Pt expressed gratitude for conversation.     Electronically signed by Chanel Gibbs on 1/4/2021 at 10:43 AM

## 2021-01-04 NOTE — PROGRESS NOTES
Comprehensive Nutrition Assessment    Type and Reason for Visit:  Initial, Positive Nutrition Screen    Nutrition Recommendations/Plan: Start Ensure Compact BID. Dx Severe Malnutrition. Nutrition Assessment:  Positive nutrition screen for wt loss and decreased appetite. Pt presents with acute severe malnutrition AEB wt loss and decreased PO intake. No intake documented since admit. Pt remains at risk for further nutritional decline r/t nausea/vomiting and poor intake. Will start low volume supplement and continue to monitor nutrition progression. Malnutrition Assessment:  Malnutrition Status:  Severe malnutrition    Context:  Acute Illness     Findings of the 6 clinical characteristics of malnutrition:  Energy Intake:  7 - 50% or less of estimated energy requirements for 5 or more days  Weight Loss:  7 - Greater than 7.5% over 3 months     Body Fat Loss:  Unable to assess     Muscle Mass Loss:  Unable to assess    Fluid Accumulation:  No significant fluid accumulation     Strength:  Not Performed    Estimated Daily Nutrient Needs:  Energy (kcal):  7991-3884; Weight Used for Energy Requirements:  Current     Protein (g):  102-156; Weight Used for Protein Requirements:  Ideal(1.3-2.0)        Fluid (ml/day):  1800; Method Used for Fluid Requirements:  Other (Comment)(fluid restriction)      Wounds:  None       Current Nutrition Therapies:    DIET RENAL; Carb Control: 4 carb choices (60 gms)/meal; Daily Fluid Restriction: 1800 ml  Dietary Nutrition Supplements: Low Volume Supplement    Anthropometric Measures:  · Height: 5' 11\" (180.3 cm)  · Current Body Weight: 210 lb (95.3 kg)   · Usual Body Weight: 233 lb (105.7 kg)(10/2020)     · Ideal Body Weight: 172 lbs; % Ideal Body Weight 122.1 %   · BMI: 29.3  · BMI Categories: Overweight (BMI 25.0-29. 9)       Nutrition Diagnosis:   · Severe malnutrition, In context of acute illness or injury related to acute injury/trauma, inadequate protein-energy intake as evidenced by weight loss 7.5% in 3 months, poor intake prior to admission, nausea, vomiting      Nutrition Interventions:   Food and/or Nutrient Delivery:  Continue Current Diet, Start Oral Nutrition Supplement  Nutrition Education/Counseling:  No recommendation at this time   Coordination of Nutrition Care:  Continue to monitor while inpatient    Goals:  PO intake >50%, wt stable       Nutrition Monitoring and Evaluation:   Behavioral-Environmental Outcomes:  None Identified   Food/Nutrient Intake Outcomes:  Food and Nutrient Intake, Supplement Intake  Physical Signs/Symptoms Outcomes:  Biochemical Data, Skin, Weight     Discharge Planning:    Continue current diet     Electronically signed by Cyndie Renteria RD, LD on 1/4/21 at 9:56 AM CST    Contact: [phone]

## 2021-01-05 LAB
ALBUMIN SERPL-MCNC: 3.7 G/DL (ref 3.5–5.2)
ALP BLD-CCNC: 84 U/L (ref 40–130)
ALT SERPL-CCNC: 28 U/L (ref 5–41)
ANION GAP SERPL CALCULATED.3IONS-SCNC: 9 MMOL/L (ref 7–19)
AST SERPL-CCNC: 20 U/L (ref 5–40)
BILIRUB SERPL-MCNC: 0.9 MG/DL (ref 0.2–1.2)
BUN BLDV-MCNC: 62 MG/DL (ref 8–23)
CALCIUM SERPL-MCNC: 9.3 MG/DL (ref 8.8–10.2)
CHLORIDE BLD-SCNC: 93 MMOL/L (ref 98–111)
CO2: 38 MMOL/L (ref 22–29)
CREAT SERPL-MCNC: 2.1 MG/DL (ref 0.5–1.2)
GFR AFRICAN AMERICAN: 39
GFR NON-AFRICAN AMERICAN: 32
GLUCOSE BLD-MCNC: 158 MG/DL (ref 70–99)
GLUCOSE BLD-MCNC: 158 MG/DL (ref 70–99)
GLUCOSE BLD-MCNC: 165 MG/DL (ref 74–109)
GLUCOSE BLD-MCNC: 218 MG/DL (ref 70–99)
GLUCOSE BLD-MCNC: 270 MG/DL (ref 70–99)
HCT VFR BLD CALC: 48.8 % (ref 42–52)
HEMOGLOBIN: 15.5 G/DL (ref 14–18)
MCH RBC QN AUTO: 29.7 PG (ref 27–31)
MCHC RBC AUTO-ENTMCNC: 31.8 G/DL (ref 33–37)
MCV RBC AUTO: 93.5 FL (ref 80–94)
PDW BLD-RTO: 14.1 % (ref 11.5–14.5)
PERFORMED ON: ABNORMAL
PLATELET # BLD: 138 K/UL (ref 130–400)
PMV BLD AUTO: 12 FL (ref 9.4–12.4)
POTASSIUM REFLEX MAGNESIUM: 3.6 MMOL/L (ref 3.5–5)
RBC # BLD: 5.22 M/UL (ref 4.7–6.1)
SODIUM BLD-SCNC: 140 MMOL/L (ref 136–145)
TOTAL PROTEIN: 6.9 G/DL (ref 6.6–8.7)
WBC # BLD: 8.6 K/UL (ref 4.8–10.8)

## 2021-01-05 PROCEDURE — 6370000000 HC RX 637 (ALT 250 FOR IP): Performed by: HOSPITALIST

## 2021-01-05 PROCEDURE — 82947 ASSAY GLUCOSE BLOOD QUANT: CPT

## 2021-01-05 PROCEDURE — 80053 COMPREHEN METABOLIC PANEL: CPT

## 2021-01-05 PROCEDURE — 2700000000 HC OXYGEN THERAPY PER DAY

## 2021-01-05 PROCEDURE — 6360000002 HC RX W HCPCS: Performed by: HOSPITALIST

## 2021-01-05 PROCEDURE — 1210000000 HC MED SURG R&B

## 2021-01-05 PROCEDURE — 85027 COMPLETE CBC AUTOMATED: CPT

## 2021-01-05 PROCEDURE — 2580000003 HC RX 258: Performed by: HOSPITALIST

## 2021-01-05 RX ORDER — CARVEDILOL 6.25 MG/1
6.25 TABLET ORAL 2 TIMES DAILY
Status: DISCONTINUED | OUTPATIENT
Start: 2021-01-05 | End: 2021-01-06 | Stop reason: HOSPADM

## 2021-01-05 RX ADMIN — OXYCODONE HYDROCHLORIDE AND ACETAMINOPHEN 1000 MG: 500 TABLET ORAL at 09:12

## 2021-01-05 RX ADMIN — INSULIN LISPRO 4 UNITS: 100 INJECTION, SOLUTION INTRAVENOUS; SUBCUTANEOUS at 12:55

## 2021-01-05 RX ADMIN — APIXABAN 5 MG: 5 TABLET, FILM COATED ORAL at 20:53

## 2021-01-05 RX ADMIN — SODIUM CHLORIDE, SODIUM LACTATE, POTASSIUM CHLORIDE, AND CALCIUM CHLORIDE: 600; 310; 30; 20 INJECTION, SOLUTION INTRAVENOUS at 20:53

## 2021-01-05 RX ADMIN — MONTELUKAST SODIUM 10 MG: 10 TABLET, FILM COATED ORAL at 09:12

## 2021-01-05 RX ADMIN — AMIODARONE HYDROCHLORIDE 200 MG: 200 TABLET ORAL at 09:12

## 2021-01-05 RX ADMIN — ASPIRIN 81 MG: 81 TABLET, CHEWABLE ORAL at 09:12

## 2021-01-05 RX ADMIN — CARVEDILOL 6.25 MG: 6.25 TABLET, FILM COATED ORAL at 20:53

## 2021-01-05 RX ADMIN — POTASSIUM CHLORIDE 10 MEQ: 7.46 INJECTION, SOLUTION INTRAVENOUS at 01:39

## 2021-01-05 RX ADMIN — Medication 10 UNITS: at 20:55

## 2021-01-05 RX ADMIN — PANTOPRAZOLE SODIUM 40 MG: 20 TABLET, DELAYED RELEASE ORAL at 09:12

## 2021-01-05 RX ADMIN — POTASSIUM CHLORIDE 10 MEQ: 7.46 INJECTION, SOLUTION INTRAVENOUS at 00:01

## 2021-01-05 RX ADMIN — BUDESONIDE AND FORMOTEROL FUMARATE DIHYDRATE 2 PUFF: 80; 4.5 AEROSOL RESPIRATORY (INHALATION) at 09:13

## 2021-01-05 RX ADMIN — Medication 10 UNITS: at 09:30

## 2021-01-05 RX ADMIN — APIXABAN 5 MG: 5 TABLET, FILM COATED ORAL at 09:12

## 2021-01-05 RX ADMIN — Medication 50 MG: at 09:12

## 2021-01-05 RX ADMIN — PANTOPRAZOLE SODIUM 40 MG: 20 TABLET, DELAYED RELEASE ORAL at 20:53

## 2021-01-05 RX ADMIN — SODIUM CHLORIDE, SODIUM LACTATE, POTASSIUM CHLORIDE, AND CALCIUM CHLORIDE: 600; 310; 30; 20 INJECTION, SOLUTION INTRAVENOUS at 12:57

## 2021-01-05 RX ADMIN — CETIRIZINE HYDROCHLORIDE 10 MG: 10 TABLET, FILM COATED ORAL at 09:12

## 2021-01-05 RX ADMIN — BUDESONIDE AND FORMOTEROL FUMARATE DIHYDRATE 2 PUFF: 80; 4.5 AEROSOL RESPIRATORY (INHALATION) at 20:52

## 2021-01-05 RX ADMIN — INSULIN LISPRO 4 UNITS: 100 INJECTION, SOLUTION INTRAVENOUS; SUBCUTANEOUS at 17:49

## 2021-01-05 RX ADMIN — POTASSIUM CHLORIDE 10 MEQ: 7.46 INJECTION, SOLUTION INTRAVENOUS at 06:19

## 2021-01-05 RX ADMIN — ROSUVASTATIN CALCIUM 40 MG: 20 TABLET, FILM COATED ORAL at 20:53

## 2021-01-05 RX ADMIN — CARVEDILOL 6.25 MG: 6.25 TABLET, FILM COATED ORAL at 09:12

## 2021-01-05 RX ADMIN — POTASSIUM CHLORIDE 10 MEQ: 7.46 INJECTION, SOLUTION INTRAVENOUS at 04:49

## 2021-01-05 RX ADMIN — CLOPIDOGREL BISULFATE 75 MG: 75 TABLET ORAL at 09:12

## 2021-01-05 RX ADMIN — POTASSIUM CHLORIDE 10 MEQ: 7.46 INJECTION, SOLUTION INTRAVENOUS at 03:23

## 2021-01-05 RX ADMIN — INSULIN LISPRO 4 UNITS: 100 INJECTION, SOLUTION INTRAVENOUS; SUBCUTANEOUS at 17:50

## 2021-01-05 RX ADMIN — INSULIN LISPRO 6 UNITS: 100 INJECTION, SOLUTION INTRAVENOUS; SUBCUTANEOUS at 12:55

## 2021-01-05 RX ADMIN — SODIUM CHLORIDE, PRESERVATIVE FREE 10 ML: 5 INJECTION INTRAVENOUS at 09:13

## 2021-01-05 NOTE — PROGRESS NOTES
Progress Note  Date:2021       Room:0424/424-02  Patient Devorah Souza     YOB: 1955     Age:65 y.o. Subjective    Subjective: 71-year-old male with a history of coronary artery disease, CKD stage IV, carotid artery stenosis, systolic and diastolic heart failure, proximal mitral fibrillation, peripheral vascular disease, type 2 diabetes, tobacco use disorder, thoracic aneurysm, presented to the hospital with concerns of nausea and vomiting. Patient was recently discharged from the hospital on  after being treated for NSTEMI and also noted to be Covid positive at that time. Patient completed course of remdesivir as well as received a dose of convalescent plasma. Patient was discharged home on 2 L of oxygen, however patient has not been compliant with his oxygen requirement and has been trying to wean himself off oxygen. Patient stated he continues to have loose stool, unable to keep down oral intake due to persistent nausea vomiting, hence presented to the ED for further evaluation. In the emergency room on presentation he was noted to be hypotensive however responsive to fluid BUN and creatinine were above baseline, potassium of 3.0 was given 10 mEq IV. Patient lactic acid noted to be normal.     Patient was seen today in his room, no acute overnight event, renal function seems to be improved with fluids. Plans to monitor in-house today repeat bmp tomorrow. Denies any nausea vomiting or diarrhea. Review of Systems: 12 point system reviewed and negative except as stated above.     Objective         Vitals Last 24 Hours:  TEMPERATURE:  Temp  Av.6 °F (36.4 °C)  Min: 97.4 °F (36.3 °C)  Max: 97.8 °F (36.6 °C)  RESPIRATIONS RANGE: Resp  Av  Min: 16  Max: 16  PULSE OXIMETRY RANGE: SpO2  Av.7 %  Min: 96 %  Max: 97 %  PULSE RANGE: Pulse  Av.3  Min: 50  Max: 55  BLOOD PRESSURE RANGE: Systolic (87AXJ), EKW:966 , Min:115 , IFP:635   ; Diastolic (50EPA), HKY:51, Min:60, Max:73    I/O (24Hr): Intake/Output Summary (Last 24 hours) at 1/5/2021 1223  Last data filed at 1/5/2021 9624  Gross per 24 hour   Intake 897 ml   Output 2450 ml   Net -1553 ml         Physical Exam  General: Alert, well-developed, no acute distress lying comfortably in bed. HEENT: Atraumatic normocephalic, range of motion normal  Cardiac: Normal S1-S2   Respiratory: Effort normal, breath sounds normal, clear To auscultation bilaterally, no rhonchi or rales  Abdomen: Soft, positive bowel sounds in all quadrants, no distention, nontender to palpation  MSK/extremities: no tenderness, no edema, no deformity, moves all extremities  Skin: Warm, no erythema noted  Psych: Affect normal and good eye contact, behavioral normal, thought content normal and judgment normal  Neurological: No focal deficits, alert and conversant, no formal disorientation noted. No sensory deficits, no abnormal coordination. Labs/Imaging/Diagnostics    Labs:  CBC:  Recent Labs     01/03/21  1149 01/04/21  0600 01/05/21  0927   WBC 11.0* 6.1 8.6   RBC 5.46 3.35* 5.22   HGB 16.1 10.0* 15.5   HCT 49.9 31.8* 48.8   MCV 91.4 94.9* 93.5   RDW 14.5 14.2 14.1    96* 138     CHEMISTRIES:  Recent Labs     01/03/21  1149 01/03/21  1149 01/04/21  0600 01/04/21  2150 01/05/21  0927     --  139  --  140   K 3.0*   < > 2.9* 3.0* 3.6   CL 82*  --  92*  --  93*   CO2 47*  --  40*  --  38*   *  --  89*  --  62*   CREATININE 3.2*  --  2.4*  --  2.1*   GLUCOSE 202*  --  184*  --  165*   MG 2.5*  --  2.1  --   --     < > = values in this interval not displayed.      PT/INR:  Recent Labs     01/03/21  1129   PROTIME 18.5*   INR 1.53*     APTT:  Recent Labs     01/03/21  1129   APTT 33.4     LIVER PROFILE:  Recent Labs     01/03/21  1149 01/04/21  0600 01/05/21  0927   AST 20 18 20   ALT 31 25 28   BILITOT 0.9 0.8 0.9   ALKPHOS 84 69 84       Imaging Last 24 Hours:  Xr Chest Portable    Result Date: 1/3/2021  Exam:   XR CHEST PORTABLE  Date:  1/3/2021 History:  Male, age  60 years; worsening respiratory symptoms. COMPARISON:  Chest x-ray dated December 19, 2020. Findings : Borderline low lung volumes. The heart and mediastinum are normal in size. Lungs are without focal infiltrate, mass or effusions. The bones show no acute pathology. Impression: No acute cardiopulmonary disease. Signed by Dr Agnieszka Lindquist on 1/3/2021 12:34 PM    Assessment//Plan           Hospital Problems           Last Modified POA    Acute renal failure (ARF) ([facility] 1/3/2021 Yes    Severe malnutrition ([facility] [address] [facility] 1/4/2021 Yes        Assessment & Plan      ANGELO on CKD IV likely prerenal from dehydration in the setting of nausea and vomiting and decreased oral intake. Lactated Ringer at 100 cc an hour  Avoid nephrotoxic agent  If no significant improvement to baseline we will involve nephrology.     Type II DM w/ hyperglycemia   Lantus 10 units twice daily  Lispro 4 units with meals and bedtime  Hypoglycemia protocol     History of hypertension  Patient currently hypotensive we will hold hypertensive medication at this time and monitor closely.     Paroxysmal atrial fibrillation  Continue amiodarone and Eliquis.     Covid positive  Currently on baseline 2 L of oxygen, goal saturations greater than 90%. Continue to monitor closely for now. Droplet plus precautions  Completed dexamethasone and remdesivir last admission.        Chronic problems, continue home meds where appropriate:  PVD  Carotid artery stenosis  Thoracic aortic aneurysm  Chronic systolic and diastolic HF, EF 75%  CAD  GERD  HLD  HTN  Obesity - BMI 34     We will continue order chronic home medications    Disposition: Home tomorrow. Electronically signed by   Vera Patel   Internal Medicine Hospitalist  On 1/5/2021  At 12:23 PM    EMR Dragon/Transcription disclaimer:   Much of this encounter note is an electronic transcription/translation of spoken language to printed text.  The electronic translation of spoken language may permit erroneous, or at times, nonsensical words or phrases to be inadvertently transcribed; although attempts have made to review the note for such errors, some may still exist.

## 2021-01-05 NOTE — PLAN OF CARE
Problem: Falls - Risk of:  Goal: Will remain free from falls  Description: Will remain free from falls  Outcome: Ongoing  Goal: Absence of physical injury  Description: Absence of physical injury  Outcome: Ongoing     Problem: Nutrition  Goal: Optimal nutrition therapy  Outcome: Ongoing

## 2021-01-06 ENCOUNTER — TELEPHONE (OUTPATIENT)
Dept: FAMILY MEDICINE CLINIC | Facility: CLINIC | Age: 66
End: 2021-01-06

## 2021-01-06 VITALS
DIASTOLIC BLOOD PRESSURE: 81 MMHG | WEIGHT: 213.9 LBS | OXYGEN SATURATION: 95 % | SYSTOLIC BLOOD PRESSURE: 168 MMHG | TEMPERATURE: 97.8 F | BODY MASS INDEX: 29.94 KG/M2 | HEART RATE: 56 BPM | RESPIRATION RATE: 19 BRPM | HEIGHT: 71 IN

## 2021-01-06 LAB
ALBUMIN SERPL-MCNC: 3.3 G/DL (ref 3.5–5.2)
ALP BLD-CCNC: 84 U/L (ref 40–130)
ALT SERPL-CCNC: 28 U/L (ref 5–41)
ANION GAP SERPL CALCULATED.3IONS-SCNC: 9 MMOL/L (ref 7–19)
AST SERPL-CCNC: 22 U/L (ref 5–40)
BILIRUB SERPL-MCNC: 0.7 MG/DL (ref 0.2–1.2)
BUN BLDV-MCNC: 47 MG/DL (ref 8–23)
CALCIUM SERPL-MCNC: 8.8 MG/DL (ref 8.8–10.2)
CHLORIDE BLD-SCNC: 94 MMOL/L (ref 98–111)
CO2: 34 MMOL/L (ref 22–29)
CREAT SERPL-MCNC: 1.9 MG/DL (ref 0.5–1.2)
GFR AFRICAN AMERICAN: 43
GFR NON-AFRICAN AMERICAN: 36
GLUCOSE BLD-MCNC: 186 MG/DL (ref 70–99)
GLUCOSE BLD-MCNC: 242 MG/DL (ref 74–109)
GLUCOSE BLD-MCNC: 304 MG/DL (ref 70–99)
HCT VFR BLD CALC: 45.2 % (ref 42–52)
HEMOGLOBIN: 14.5 G/DL (ref 14–18)
MCH RBC QN AUTO: 29.8 PG (ref 27–31)
MCHC RBC AUTO-ENTMCNC: 32.1 G/DL (ref 33–37)
MCV RBC AUTO: 93 FL (ref 80–94)
PDW BLD-RTO: 14 % (ref 11.5–14.5)
PERFORMED ON: ABNORMAL
PERFORMED ON: ABNORMAL
PLATELET # BLD: 130 K/UL (ref 130–400)
PMV BLD AUTO: 12 FL (ref 9.4–12.4)
POTASSIUM REFLEX MAGNESIUM: 3.6 MMOL/L (ref 3.5–5)
RBC # BLD: 4.86 M/UL (ref 4.7–6.1)
SODIUM BLD-SCNC: 137 MMOL/L (ref 136–145)
TOTAL PROTEIN: 6.2 G/DL (ref 6.6–8.7)
WBC # BLD: 7.8 K/UL (ref 4.8–10.8)

## 2021-01-06 PROCEDURE — 82947 ASSAY GLUCOSE BLOOD QUANT: CPT

## 2021-01-06 PROCEDURE — 2580000003 HC RX 258: Performed by: HOSPITALIST

## 2021-01-06 PROCEDURE — 85027 COMPLETE CBC AUTOMATED: CPT

## 2021-01-06 PROCEDURE — 80053 COMPREHEN METABOLIC PANEL: CPT

## 2021-01-06 PROCEDURE — 94761 N-INVAS EAR/PLS OXIMETRY MLT: CPT

## 2021-01-06 PROCEDURE — 6370000000 HC RX 637 (ALT 250 FOR IP): Performed by: HOSPITALIST

## 2021-01-06 RX ORDER — ISOSORBIDE MONONITRATE 30 MG/1
30 TABLET, EXTENDED RELEASE ORAL DAILY
Status: DISCONTINUED | OUTPATIENT
Start: 2021-01-06 | End: 2021-01-06 | Stop reason: HOSPADM

## 2021-01-06 RX ORDER — AMLODIPINE BESYLATE 10 MG/1
10 TABLET ORAL DAILY
Status: DISCONTINUED | OUTPATIENT
Start: 2021-01-06 | End: 2021-01-06 | Stop reason: HOSPADM

## 2021-01-06 RX ADMIN — AMLODIPINE BESYLATE 10 MG: 10 TABLET ORAL at 12:12

## 2021-01-06 RX ADMIN — AMIODARONE HYDROCHLORIDE 200 MG: 200 TABLET ORAL at 09:24

## 2021-01-06 RX ADMIN — CETIRIZINE HYDROCHLORIDE 10 MG: 10 TABLET, FILM COATED ORAL at 09:23

## 2021-01-06 RX ADMIN — SODIUM CHLORIDE, SODIUM LACTATE, POTASSIUM CHLORIDE, AND CALCIUM CHLORIDE: 600; 310; 30; 20 INJECTION, SOLUTION INTRAVENOUS at 07:23

## 2021-01-06 RX ADMIN — INSULIN LISPRO 4 UNITS: 100 INJECTION, SOLUTION INTRAVENOUS; SUBCUTANEOUS at 12:12

## 2021-01-06 RX ADMIN — PANTOPRAZOLE SODIUM 40 MG: 20 TABLET, DELAYED RELEASE ORAL at 09:24

## 2021-01-06 RX ADMIN — INSULIN LISPRO 8 UNITS: 100 INJECTION, SOLUTION INTRAVENOUS; SUBCUTANEOUS at 12:16

## 2021-01-06 RX ADMIN — CLOPIDOGREL BISULFATE 75 MG: 75 TABLET ORAL at 09:23

## 2021-01-06 RX ADMIN — ASPIRIN 81 MG: 81 TABLET, CHEWABLE ORAL at 09:23

## 2021-01-06 RX ADMIN — ALBUTEROL SULFATE 2 PUFF: 90 AEROSOL, METERED RESPIRATORY (INHALATION) at 09:23

## 2021-01-06 RX ADMIN — BUDESONIDE AND FORMOTEROL FUMARATE DIHYDRATE 2 PUFF: 80; 4.5 AEROSOL RESPIRATORY (INHALATION) at 09:23

## 2021-01-06 RX ADMIN — MONTELUKAST SODIUM 10 MG: 10 TABLET, FILM COATED ORAL at 09:23

## 2021-01-06 RX ADMIN — INSULIN LISPRO 4 UNITS: 100 INJECTION, SOLUTION INTRAVENOUS; SUBCUTANEOUS at 09:23

## 2021-01-06 RX ADMIN — OXYCODONE HYDROCHLORIDE AND ACETAMINOPHEN 1000 MG: 500 TABLET ORAL at 09:23

## 2021-01-06 RX ADMIN — CARVEDILOL 6.25 MG: 6.25 TABLET, FILM COATED ORAL at 09:23

## 2021-01-06 RX ADMIN — APIXABAN 5 MG: 5 TABLET, FILM COATED ORAL at 09:23

## 2021-01-06 RX ADMIN — FLUTICASONE PROPIONATE 2 SPRAY: 50 SPRAY, METERED NASAL at 09:24

## 2021-01-06 RX ADMIN — Medication 10 UNITS: at 09:25

## 2021-01-06 RX ADMIN — Medication 50 MG: at 09:23

## 2021-01-06 RX ADMIN — ISOSORBIDE MONONITRATE 30 MG: 30 TABLET, EXTENDED RELEASE ORAL at 12:12

## 2021-01-06 RX ADMIN — INSULIN LISPRO 2 UNITS: 100 INJECTION, SOLUTION INTRAVENOUS; SUBCUTANEOUS at 09:26

## 2021-01-06 NOTE — PROGRESS NOTES
Pt O2 sat on room air 94-95%. Pt ambulated around room; O2 sat decreased to 88%. Pt placed on 2L NC- sat remained above 92% on 2L with activity.       Electronically signed by Breanna Marquez RN on 1/6/2021 at 12:25 PM

## 2021-01-06 NOTE — TELEPHONE ENCOUNTER
PATIENT IS GETTING RELEASED FROM [facility] TODAY. HE WAS IN THERE FOR ACUTE ARENAL FAILURE  . MADE VIDEO APPOINTMENT FOR 01/12/21. PLEASE CALL AND ADVISE

## 2021-01-06 NOTE — PLAN OF CARE
Problem: Nutrition  Goal: Optimal nutrition therapy  Outcome: Ongoing   Nutrition Problem #1: Severe malnutrition, In context of acute illness or injury  Intervention: Food and/or Nutrient Delivery: Continue Current Diet, Continue Oral Nutrition Supplement  Nutritional Goals: PO intake >50%, wt stable

## 2021-01-06 NOTE — CARE COORDINATION
Attempted to call patient's room with no answer. Called patient's wife Lavelle Quigley and spoke with her. She states that patient wants to decline HH at this time. [facility] at a later date if needed.   Electronically signed by Mara Goetz on 1/6/21 at 10:43 AM CST

## 2021-01-06 NOTE — PROGRESS NOTES
Comprehensive Nutrition Assessment    Type and Reason for Visit:  Reassess    Nutrition Recommendations/Plan: Continue current ONS. Nutrition Assessment:  Pt improving from a nutritional standpoint AEB PO intake >50%. Small wt increase documented. Improvement in nausea and vomiting as well. Possible discharge home today. Will continue to follow. Malnutrition Assessment:  Malnutrition Status:  Severe malnutrition    Context:  Acute Illness       Estimated Daily Nutrient Needs:  Energy (kcal):  2621-6127; Weight Used for Energy Requirements:  Current     Protein (g):  102-156; Weight Used for Protein Requirements:  Ideal(1.3-2.0)        Fluid (ml/day):  1800; Method Used for Fluid Requirements:  Other (Comment)(fluid restriction)      Wounds:  None       Current Nutrition Therapies:    DIET RENAL; Carb Control: 4 carb choices (60 gms)/meal; Daily Fluid Restriction: 1800 ml  Dietary Nutrition Supplements: Low Volume Supplement    Anthropometric Measures:  · Height: 5' 11\" (180.3 cm)  · Current Body Weight: 213 lb 14.4 oz (97 kg)   · Usual Body Weight: 233 lb (105.7 kg)(10/2020)     · Ideal Body Weight: 172 lbs; % Ideal Body Weight 122.1 %   · BMI: 29.8   · BMI Categories: Overweight (BMI 25.0-29. 9)       Nutrition Diagnosis:   · Severe malnutrition, In context of acute illness or injury related to acute injury/trauma, inadequate protein-energy intake as evidenced by weight loss 7.5% in 3 months, poor intake prior to admission, nausea, vomiting    Nutrition Interventions:   Food and/or Nutrient Delivery:  Continue Current Diet, Continue Oral Nutrition Supplement  Nutrition Education/Counseling:  No recommendation at this time   Coordination of Nutrition Care:  Continue to monitor while inpatient    Goals:  PO intake >50%, wt stable       Nutrition Monitoring and Evaluation:   Behavioral-Environmental Outcomes:  None Identified   Food/Nutrient Intake Outcomes:  Food and Nutrient Intake, Supplement Intake  Physical Signs/Symptoms Outcomes:  Biochemical Data, Skin, Weight     Discharge Planning:    Continue current diet     Electronically signed by Queenie Sherman RD, LD on 1/6/21 at 9:45 AM CST    Contact: [phone]

## 2021-01-06 NOTE — DISCHARGE SUMMARY
Discharge Summary      Date:1/6/2021        Patient Laurie Valadez     YOB: 1955     Age:65 y.o. Admit Date:1/3/2021   Admission Condition:fair   Discharged Condition:stable  Discharge Date: 01/06/21       Discharge Diagnoses   Active Problems:    Acute renal failure (ARF) (HCC)    Severe malnutrition (HCC)  Resolved Problems:    * No resolved hospital problems. [facility] Stay   Narrative of Hospital Course:       61-year-old male with a history of coronary artery disease, CKD stage IV, carotid artery stenosis, systolic and diastolic heart failure, proximal mitral fibrillation, peripheral vascular disease, type 2 diabetes, tobacco use disorder, thoracic aneurysm, presented to the hospital with concerns of nausea and vomiting.  Patient was recently discharged from the hospital on 12/23 after being treated for NSTEMI and also noted to be Covid positive at that time. Patient completed course of remdesivir as well as received a dose of convalescent plasma.  Patient was discharged home on 2 L of oxygen, however patient has not been compliant with his oxygen requirement and has been trying to wean himself off oxygen. Patient stated he continues to have loose stool, unable to keep down oral intake due to persistent nausea vomiting, hence presented to the ED for further evaluation. In the emergency room on presentation he was noted to be hypotensive however responsive to fluid BUN and creatinine were above baseline, potassium of 3.0 was given 10 mEq IV.  Patient was monitored in-house was given IV fluids with renal function back to baseline. Patient was actually weaned off oxygen in-house, which was completed prior to discharge however patient already has home oxygen at home. Patient already on Eliquis hence no new prescription given. Was encouraged to follow-up outpatient with PCP for continued management of his chronic medical problems.       Physical Exam  General: Alert, well-developed, no acute distress lying comfortably in bed. HEENT: Atraumatic normocephalic, range of motion normal  Cardiac: Normal S1-S2   Respiratory: Effort normal, breath sounds normal, clear To auscultation bilaterally, no rhonchi or rales  Abdomen: Soft, positive bowel sounds in all quadrants, no distention, nontender to palpation  MSK/extremities: no tenderness, no edema, no deformity, moves all extremities  Skin: Warm, no erythema noted  Psych: Affect normal and good eye contact, behavioral normal, thought content normal and judgment normal  Neurological: No focal deficits, alert and conversant, no formal disorientation noted.  No sensory deficits, no abnormal coordination. Consultants:   PALLIATIVE CARE EVAL  IP CONSULT TO HOME CARE NEEDS    Time Spent on Discharge:  >30 minutes were spent in patient examination, evaluation, counseling as well as medication reconciliation, prescriptions for required medications, discharge plan and follow up. Surgeries/Procedures Performed:  NONE      Significant Diagnostic Studies:   Recent Labs:  CBC:   Lab Results   Component Value Date    WBC 7.8 01/06/2021    RBC 4.86 01/06/2021    HGB 14.5 01/06/2021    HCT 45.2 01/06/2021    MCV 93.0 01/06/2021    MCH 29.8 01/06/2021    MCHC 32.1 01/06/2021    RDW 14.0 01/06/2021     01/06/2021     BMP:    Lab Results   Component Value Date    GLUCOSE 242 01/06/2021     01/06/2021    K 3.6 01/06/2021    CL 94 01/06/2021    CO2 34 01/06/2021    ANIONGAP 9 01/06/2021    BUN 47 01/06/2021    CREATININE 1.9 01/06/2021    CALCIUM 8.8 01/06/2021    LABGLOM 36 01/06/2021    GFRAA 43 01/06/2021       Radiology Last 7 Days:  Xr Chest Portable    Result Date: 1/3/2021  Impression: No acute cardiopulmonary disease.  Signed by Dr Juan A Tran on 1/3/2021 12:34 PM      Discharge Plan   Disposition: Home Health    Provider Follow-Up:   Sandi Galeano MD  [address] [place] [address] [address]   [phone]    On 1/12/2021  AT  11:15 Virtual Visit         Patient Instructions   Diet: cardiac diet, diabetic diet and renal diet    Activity: activity as tolerated      Discharge Medications         Medication List      CHANGE how you take these medications    amiodarone 200 MG tablet  Commonly known as: CORDARONE  Take 1 tablet by mouth daily  What changed: additional instructions     Lantus SoloStar 100 UNIT/ML injection pen  Generic drug: insulin glargine  INJECT 30 UNITS IN THE MORNING AND 20 UNITS IN THE EVENING DAILY  What changed: See the new instructions. potassium chloride 10 MEQ extended release tablet  Commonly known as: KLOR-CON M  TAKE ONE TABLET BY MOUTH EVERY DAY  What changed: See the new instructions.         CONTINUE taking these medications    albuterol sulfate  (90 Base) MCG/ACT inhaler  Commonly known as: Ventolin HFA  Inhale 2 puffs into the lungs every 6 hours as needed for Wheezing     amLODIPine 10 MG tablet  Commonly known as: NORVASC  Take 1 tablet by mouth daily     ascorbic acid 1000 MG tablet  Commonly known as: VITAMIN C  Take 1 tablet by mouth daily     aspirin 81 MG chewable tablet  Take 1 tablet by mouth daily     budesonide-formoterol 80-4.5 MCG/ACT Aero  Commonly known as: SYMBICORT  Inhale 2 puffs into the lungs 2 times daily     bumetanide 1 MG tablet  Commonly known as: BUMEX  Take 1 tablet by mouth 2 times daily     carvedilol 25 MG tablet  Commonly known as: COREG  Take 1 tablet by mouth 2 times daily     Eliquis 5 MG Tabs tablet  Generic drug: apixaban     esomeprazole Magnesium 40 MG Pack  Commonly known as: NEXIUM     fluticasone 27.5 MCG/SPRAY nasal spray  Commonly known as: VERAMYST     insulin regular 100 UNIT/ML injection  Commonly known as: HUMULIN R;NOVOLIN R     isosorbide mononitrate 60 MG extended release tablet  Commonly known as: IMDUR     levocetirizine 5 MG tablet  Commonly known as: XYZAL     lisinopril 40 MG tablet  Commonly known as: PRINIVIL;ZESTRIL  Take 0.5 tablets by mouth daily     metOLazone 5 MG tablet  Commonly known as: ZAROXOLYN     montelukast 10 MG tablet  Commonly known as: SINGULAIR     nitroGLYCERIN 0.4 MG SL tablet  Commonly known as: NITROSTAT  up to max of 3 total doses. If no relief after 1 dose, call 911.      NovoLOG FlexPen 100 UNIT/ML injection pen  Generic drug: insulin aspart     rosuvastatin 40 MG tablet  Commonly known as: CRESTOR  Take 1 tablet by mouth nightly     UltiCare Mini Pen Needles 31G X 6 MM Misc  Generic drug: Insulin Pen Needle  FOR USE WITH LANTUS TWO TIMES A DAY     vitamin D 1.25 MG (90948 UT) Caps capsule  Commonly known as: ERGOCALCIFEROL     zinc sulfate 220 (50 Zn) MG capsule  Commonly known as: ZINCATE  Take 1 capsule by mouth daily        STOP taking these medications    minoxidil 2.5 MG tablet  Commonly known as: LONITEN            Electronically signed by Bandar Lacy MD on 1/6/21 at 10:51 AM CST

## 2021-01-07 ENCOUNTER — CARE COORDINATION (OUTPATIENT)
Dept: CASE MANAGEMENT | Age: 66
End: 2021-01-07

## 2021-01-07 ENCOUNTER — TRANSITIONAL CARE MANAGEMENT TELEPHONE ENCOUNTER (OUTPATIENT)
Dept: FAMILY MEDICINE CLINIC | Facility: CLINIC | Age: 66
End: 2021-01-07

## 2021-01-07 NOTE — CARE COORDINATION
[facility] Transitions Initial Follow Up Call    Call within 2 business days of discharge: Yes    Patient: Salina Quesada Patient : 1955   MRN: [id number]  Reason for Admission:   Discharge Date: 21 RARS: Readmission Risk Score: 24      Last Discharge [address] [address]       Complaint Diagnosis Description Type Department Provider    1/3/21 Positive For Covid-19 Hypoxia . .. ED to Hosp-Admission (Discharged) (ADMITTED) Tosha Smart MD; Ifrah Ortiz MD           Spoke with: N/A    Facility: [place] [address]    Non-face-to-face services provided:  Chart review for COVID    Care Transitions 24 Hour Call    Do you have all of your prescriptions and are they filled?: Yes  Care Transitions Interventions         Follow Up  : Attempted to make contact with Zac Marte initial follow up call post discharge from the hospital without success. Unable to leave a message regarding intent of call and call back information. Will try again my next business day.      Future Appointments   Date Time Provider Lizy Walton   2021  1:00 PM Barron Walton MD [facility]       Dayron Rahman RN

## 2021-01-08 ENCOUNTER — CARE COORDINATION (OUTPATIENT)
Dept: CASE MANAGEMENT | Age: 66
End: 2021-01-08

## 2021-01-08 LAB
BLOOD CULTURE, ROUTINE: NORMAL
CULTURE, BLOOD 2: NORMAL

## 2021-01-08 NOTE — CARE COORDINATION
[facility] Transitions Initial Follow Up Call    Call within 2 business days of discharge: Yes    Patient: Ilsa Schwab Patient : 1955   MRN: [id number]    Discharge Date: 21 RARS: Readmission Risk Score: 24      Last Discharge [address] [address]       Complaint Diagnosis Description Type Department Provider    1/3/21 Positive For Covid-19 Hypoxia . .. ED to Hosp-Admission (Discharged) ([facility]) Miguel Angel Banerjee MD; Alecia Mckee MD           Spoke with: Mrs. Alyse Vigil 112: [facility]    Non-face-to-face services provided:  Reviewed encounter information for continuity of care prior to follow up Care Transitions phone call - chart notes, consults, progress notes, test results, med list, appointments, AVS, other information. Care Transitions 24 Hour Call    Do you have any ongoing symptoms?: No  Do you have a copy of your discharge instructions?: Yes  Do you have all of your prescriptions and are they filled?: Yes  Have you been contacted by a [facility] Pharmacist?: No  Have you scheduled your follow up appointment?: Yes  How are you going to get to your appointment?: Car - family or friend to transport  Were you discharged with any Home Care or Post Acute Services: No  Care Transitions Interventions       Placed a call to the number listed for patient and spoke with his wife. She reported that he is doing well. She said he is much better than he has been in well over a year. She said that he was up at 4 am this morning, drinking coffee. She said that he is eating well, drinking. She said he slept well last night. He is tolerating activity better, walking about a bit. She said that he is able to do more already than he has the past couple of times he has gotten back home. She said he has all of his meds and is taking them as ordered. He has a hospital follow up appointment scheduled with his PCP for  she said. No abnormal S&S reported.  Discussed COVID 19 risk and other

## 2021-01-12 ENCOUNTER — OFFICE VISIT (OUTPATIENT)
Dept: FAMILY MEDICINE CLINIC | Facility: CLINIC | Age: 66
End: 2021-01-12

## 2021-01-12 VITALS
DIASTOLIC BLOOD PRESSURE: 60 MMHG | HEIGHT: 71 IN | SYSTOLIC BLOOD PRESSURE: 106 MMHG | BODY MASS INDEX: 29.54 KG/M2 | WEIGHT: 211 LBS

## 2021-01-12 DIAGNOSIS — Z79.4 TYPE 2 DIABETES MELLITUS WITH DIABETIC AUTONOMIC NEUROPATHY, WITH LONG-TERM CURRENT USE OF INSULIN (HCC): ICD-10-CM

## 2021-01-12 DIAGNOSIS — I10 ESSENTIAL HYPERTENSION: ICD-10-CM

## 2021-01-12 DIAGNOSIS — J12.82 PNEUMONIA DUE TO COVID-19 VIRUS: Primary | ICD-10-CM

## 2021-01-12 DIAGNOSIS — E11.43 TYPE 2 DIABETES MELLITUS WITH DIABETIC AUTONOMIC NEUROPATHY, WITH LONG-TERM CURRENT USE OF INSULIN (HCC): ICD-10-CM

## 2021-01-12 DIAGNOSIS — N18.4 STAGE 4 CHRONIC KIDNEY DISEASE (HCC): ICD-10-CM

## 2021-01-12 DIAGNOSIS — I50.42 CHRONIC COMBINED SYSTOLIC AND DIASTOLIC CONGESTIVE HEART FAILURE (HCC): ICD-10-CM

## 2021-01-12 DIAGNOSIS — U07.1 PNEUMONIA DUE TO COVID-19 VIRUS: Primary | ICD-10-CM

## 2021-01-12 PROCEDURE — 99495 TRANSJ CARE MGMT MOD F2F 14D: CPT | Performed by: FAMILY MEDICINE

## 2021-01-12 NOTE — PROGRESS NOTES
Transitional Care Follow Up Visit  Subjective   Cc: CHF/DM  Fortino Llanes is a 65 y.o. male who presents for a transitional care management visit via phone call.   he consents to phone visit.   Within 48 business hours after discharge our office contacted him via telephone to coordinate his care and needs.      I reviewed and discussed the details of that call along with the discharge summary, hospital problems, inpatient lab results, inpatient diagnostic studies, and consultation reports with Fortino.     Current outpatient and discharge medications have been reconciled for the patient.  Reviewed by: Clara Calzada MD      Date of TCM Phone Call 1/8/2021   [facility]   Date of Admission 1/3/2021   Date of Discharge 1/6/2021   Discharge Disposition -     Risk for Readmission (LACE) No data recorded    History of Present Illness   Course During Hospital Stay:  Copied from DC note:      65-year-old male with a history of coronary artery disease, CKD stage IV, carotid artery stenosis, systolic and diastolic heart failure, proximal mitral fibrillation, peripheral vascular disease, type 2 diabetes, tobacco use disorder, thoracic aneurysm, presented to the hospital with concerns of nausea and vomiting.  Patient was recently discharged from the hospital on 12/23 after being treated for NSTEMI and also noted to be Covid positive at that time. Patient completed course of remdesivir as well as received a dose of convalescent plasma.  Patient was discharged home on 2 L of oxygen, however patient has not been compliant with his oxygen requirement and has been trying to wean himself off oxygen. Patient stated he continues to have loose stool, unable to keep down oral intake due to persistent nausea vomiting, hence presented to the ED for further evaluation. In the emergency room on presentation he was noted to be hypotensive however responsive to fluid BUN and creatinine were above baseline, potassium of 3.0 was given 10  "mEq IV.  Patient was monitored in-house was given IV fluids with renal function back to baseline. Patient was actually weaned off oxygen in-house, which was completed prior to discharge however patient already has home oxygen at home. Patient already on Eliquis hence no new prescription given. Was encouraged to follow-up outpatient with PCP for continued management of his chronic medical problems.    =========================================  He is tolerating PO intake.   Reports that his glucose is running well now. 110 this AM. He is back to taking long acting insulin 30U in AM and 20U in PM. Not needing short acting insulin.   Reports that his BP is 106/60, 110/70 - he thinks this is low.  He initially felt lightheaded and dizzy, but every day he is starting to feel better, walking more, he is doing things he hasnt done in a while.   He saw nephrology and they advised to hold bumex and lisinopril but of decreased GFR over the weekend - he had labs drawn yesterday ad they allowed him to restart it.   He has been monitoring his weight and it is remaining steady.      The following portions of the patient's history were reviewed and updated as appropriate: allergies, current medications, past family history, past medical history, past social history, past surgical history and problem list.    Review of Systems   Constitutional: Positive for activity change. Negative for appetite change and unexpected weight change.   Respiratory: Negative for cough and shortness of breath.    Cardiovascular: Negative for chest pain, palpitations and leg swelling.   Gastrointestinal: Negative for diarrhea, nausea and vomiting.   All other systems reviewed and are negative.      Objective    /60 Comment: Per patient  Ht 180.3 cm (71\") Comment: Per patient  Wt 95.7 kg (211 lb) Comment: Per patient  BMI 29.43 kg/m²     Physical Exam  Neurological:      Mental Status: He is oriented to person, place, and time.   Psychiatric:     "      Behavior: Behavior normal.         Thought Content: Thought content normal.         Judgment: Judgment normal.         Assessment/Plan   Problems Addressed this Visit        Cardiac and Vasculature    Essential hypertension       Endocrine and Metabolic    Type 2 diabetes mellitus with diabetic autonomic neuropathy, with long-term current use of insulin (CMS/[facility])       Genitourinary and Reproductive     Stage 4 chronic kidney disease (CMS/[facility])       Other    Pneumonia due to COVID-19 virus - Primary      Other Visit Diagnoses     Chronic combined systolic and diastolic congestive heart failure (CMS/[facility])          Diagnoses       Codes Comments    Pneumonia due to COVID-19 virus    -  Primary ICD-10-CM: U07.1, J12.82  ICD-9-CM: 480.8     Essential hypertension     ICD-10-CM: I10  ICD-9-CM: 401.9     Type 2 diabetes mellitus with diabetic autonomic neuropathy, with long-term current use of insulin (CMS/[facility])     ICD-10-CM: E11.43, Z79.4  ICD-9-CM: 250.60, 337.1, V58.67     Stage 4 chronic kidney disease (CMS/[facility])     ICD-10-CM: N18.4  ICD-9-CM: 585.4     Chronic combined systolic and diastolic congestive heart failure (CMS/[facility])     ICD-10-CM: I50.42  ICD-9-CM: 428.42, 428.0         Pt sounds to be doing better  DM: glucose controlled   CHF: compliant with medications, diet change, weight stable   HTN: controlled   CKD: monitored by nephro - they repeated labs     15 minutes         This document has been electronically signed by Clara Calzada MD on January 12, 2021 14:57 CST

## 2021-01-13 ENCOUNTER — CARE COORDINATION (OUTPATIENT)
Dept: CASE MANAGEMENT | Age: 66
End: 2021-01-13

## 2021-01-13 NOTE — CARE COORDINATION
[facility] Transitions Follow Up Call    2021    Patient: Porfirio June  Patient : 1955   MRN: [id number]  Reason for Admission:   Discharge Date: 21 RARS: Readmission Risk Score: 24         Spoke with: N/A    Care Transitions Subsequent and Final Call    Subsequent and Final Calls  Care Transitions Interventions  Other Interventions: Follow Up ; Attempted to make contact with Brenda silver Jeramylizy initial follow up call post discharge from the hospital without success. Unable to leave a message regarding intent of call and call back information. Will try again at a later time.     Future Appointments   Date Time Provider Lizy Walton   2021  1:00 PM Lucian Lr MD [id number]       Parker Martínez RN

## 2021-01-19 ENCOUNTER — CARE COORDINATION (OUTPATIENT)
Dept: CASE MANAGEMENT | Age: 66
End: 2021-01-19

## 2021-01-19 NOTE — CARE COORDINATION
[facility] Transitions Follow Up Call    2021    Patient: Mari Yadav  Patient : 1955   MRN: [id number]   Discharge Date: 21 RARS: Readmission Risk Score: 24         Spoke with: N/A    Care Transitions Subsequent and Final Call    Subsequent and Final Calls  Care Transitions Interventions  Other Interventions:         Attempted to make contact with patient/caregiver for a routine follow up call without success. Unable to leave a message regarding intent of call and call back information. Call went to an unidentifiable voice mail. As this repeated attempt to make contact was unsuccessful, will disengage at this time.         Follow Up  Future Appointments   Date Time Provider Lizy Walton   2021  1:00 PM Stephanie Contreras MD [facility]       Farzana Taylor RN

## 2021-01-21 ENCOUNTER — OFFICE VISIT (OUTPATIENT)
Dept: FAMILY MEDICINE CLINIC | Facility: CLINIC | Age: 66
End: 2021-01-21

## 2021-01-21 VITALS
RESPIRATION RATE: 20 BRPM | WEIGHT: 209 LBS | BODY MASS INDEX: 29.26 KG/M2 | HEIGHT: 71 IN | OXYGEN SATURATION: 99 % | SYSTOLIC BLOOD PRESSURE: 94 MMHG | DIASTOLIC BLOOD PRESSURE: 56 MMHG | HEART RATE: 55 BPM

## 2021-01-21 DIAGNOSIS — I25.10 CORONARY ARTERY DISEASE INVOLVING NATIVE HEART WITHOUT ANGINA PECTORIS, UNSPECIFIED VESSEL OR LESION TYPE: ICD-10-CM

## 2021-01-21 DIAGNOSIS — N18.4 STAGE 4 CHRONIC KIDNEY DISEASE (HCC): ICD-10-CM

## 2021-01-21 DIAGNOSIS — Z79.4 TYPE 2 DIABETES MELLITUS WITH DIABETIC AUTONOMIC NEUROPATHY, WITH LONG-TERM CURRENT USE OF INSULIN (HCC): ICD-10-CM

## 2021-01-21 DIAGNOSIS — I95.2 HYPOTENSION DUE TO DRUGS: Primary | ICD-10-CM

## 2021-01-21 DIAGNOSIS — R42 DIZZY: ICD-10-CM

## 2021-01-21 DIAGNOSIS — E11.43 TYPE 2 DIABETES MELLITUS WITH DIABETIC AUTONOMIC NEUROPATHY, WITH LONG-TERM CURRENT USE OF INSULIN (HCC): ICD-10-CM

## 2021-01-21 DIAGNOSIS — Z79.01 CHRONIC ANTICOAGULATION: ICD-10-CM

## 2021-01-21 DIAGNOSIS — I10 ESSENTIAL HYPERTENSION: ICD-10-CM

## 2021-01-21 LAB — HGB BLDA-MCNC: 13.7 G/DL (ref 12–17)

## 2021-01-21 PROCEDURE — 99214 OFFICE O/P EST MOD 30 MIN: CPT | Performed by: FAMILY MEDICINE

## 2021-01-21 PROCEDURE — 85018 HEMOGLOBIN: CPT | Performed by: FAMILY MEDICINE

## 2021-01-21 RX ORDER — ASCORBIC ACID 500 MG
1000 TABLET ORAL DAILY
COMMUNITY
End: 2021-05-01 | Stop reason: HOSPADM

## 2021-01-21 RX ORDER — CARVEDILOL 25 MG/1
12.5 TABLET ORAL 2 TIMES DAILY WITH MEALS
Qty: 180 TABLET | Refills: 0 | Status: ON HOLD | OUTPATIENT
Start: 2021-01-21 | End: 2021-04-30

## 2021-01-21 RX ORDER — METOLAZONE 5 MG/1
5 TABLET ORAL 3 TIMES WEEKLY
COMMUNITY

## 2021-01-21 RX ORDER — DILTIAZEM HYDROCHLORIDE 60 MG/1
TABLET, FILM COATED ORAL
COMMUNITY
Start: 2021-01-15 | End: 2021-04-20 | Stop reason: SDDI

## 2021-01-21 RX ORDER — ASPIRIN 81 MG/1
81 TABLET, CHEWABLE ORAL DAILY
COMMUNITY

## 2021-01-21 RX ORDER — LISINOPRIL 2.5 MG/1
2.5 TABLET ORAL DAILY
Qty: 30 TABLET | Refills: 0 | Status: SHIPPED | OUTPATIENT
Start: 2021-01-21 | End: 2021-01-26

## 2021-01-21 RX ORDER — ZINC GLUCONATE 50 MG
1 TABLET ORAL DAILY
COMMUNITY
End: 2021-05-01 | Stop reason: HOSPADM

## 2021-01-21 NOTE — PROGRESS NOTES
"Subjective cc: dizzy   Fortino Llanes is a 65 y.o. male who presents with complaint of feeling dizzy. Reports he feels well when he wakes up in the morning, took his BP was 120/80s, took his morning medications with food and then starts feeling really bad, his BP and HR are now low.  He is complaining of feeling dizzy. He has been eating and drinking well. He had lost a lot of weight recently after his most recent hospitalization. He is not retaining fluid.  We had lowered the dose of his lisinopril and norvasc over the weekend - he takes both of these in the AM.     History of Present Illness     The following portions of the patient's history were reviewed and updated as appropriate: allergies, current medications, past family history, past medical history, past social history, past surgical history and problem list.        Review of Systems   Constitutional: Positive for activity change and fatigue. Negative for appetite change and unexpected weight change.   Respiratory: Negative for cough and shortness of breath.    Cardiovascular: Negative for chest pain, palpitations and leg swelling.   Neurological: Positive for dizziness, weakness and light-headedness. Negative for syncope and speech difficulty.   Hematological: Bruises/bleeds easily.   All other systems reviewed and are negative.      Objective   Blood pressure 94/56, pulse 55, resp. rate 20, height 180.3 cm (71\"), weight 94.8 kg (209 lb), SpO2 99 %.  Physical Exam  Vitals signs and nursing note reviewed.   Constitutional:       General: He is not in acute distress.     Appearance: He is ill-appearing. He is not toxic-appearing or diaphoretic.   HENT:      Head: Normocephalic and atraumatic.      Right Ear: Ear canal and external ear normal. A middle ear effusion is present. There is no impacted cerumen.      Left Ear: Tympanic membrane, ear canal and external ear normal. There is no impacted cerumen.      Nose: Nose normal.   Eyes:      General:         " Right eye: No discharge.         Left eye: No discharge.      Extraocular Movements: Extraocular movements intact.      Conjunctiva/sclera: Conjunctivae normal.   Neck:      Musculoskeletal: Normal range of motion. No neck rigidity.   Cardiovascular:      Rate and Rhythm: Normal rate and regular rhythm.      Pulses: Normal pulses.      Heart sounds: Normal heart sounds.   Pulmonary:      Effort: Pulmonary effort is normal. No respiratory distress.      Breath sounds: Normal breath sounds. No wheezing.   Abdominal:      General: There is no distension.      Palpations: Abdomen is soft.      Tenderness: There is no abdominal tenderness.   Musculoskeletal:      Right lower leg: No edema.      Left lower leg: No edema.   Lymphadenopathy:      Cervical: No cervical adenopathy.   Skin:     General: Skin is warm and dry.      Coloration: Skin is pale.      Findings: Bruising present.   Neurological:      Mental Status: He is alert and oriented to person, place, and time.      Motor: Weakness present.      Coordination: Coordination normal.   Psychiatric:         Mood and Affect: Mood normal.         Behavior: Behavior normal.         Thought Content: Thought content normal.         Judgment: Judgment normal.       Lab Results (last 24 hours)     Procedure Component Value Units Date/Time    POCT hemoglobin [[id number]]  (Normal) Collected: 01/21/21 0836    Specimen: Blood Updated: 01/21/21 0836     Hemoglobin 13.7 g/dL             Assessment/Plan   Problems Addressed this Visit        Cardiac and Vasculature    Essential hypertension    Relevant Medications    metOLazone (ZAROXOLYN) 5 MG tablet    carvedilol (COREG) 25 MG tablet    lisinopril (Zestril) 2.5 MG tablet    Other Relevant Orders    Comprehensive Metabolic Panel    CBC (No Diff)    Coronary artery disease involving native heart without angina pectoris    Relevant Medications    carvedilol (COREG) 25 MG tablet       Coag and Thromboembolic    Chronic  anticoagulation       Endocrine and Metabolic    Type 2 diabetes mellitus with diabetic autonomic neuropathy, with long-term current use of insulin (CMS/[facility])       Genitourinary and Reproductive     Stage 4 chronic kidney disease (CMS/[facility])    Relevant Medications    metOLazone (ZAROXOLYN) 5 MG tablet      Other Visit Diagnoses     Hypotension due to drugs    -  Primary    Dizzy        Relevant Orders    POCT hemoglobin (Completed)    Comprehensive Metabolic Panel    CBC (No Diff)      Diagnoses       Codes Comments    Hypotension due to drugs    -  Primary ICD-10-CM: I95.2  ICD-9-CM: 458.8, E947.9     Dizzy     ICD-10-CM: R42  ICD-9-CM: 780.4     Essential hypertension     ICD-10-CM: I10  ICD-9-CM: 401.9     Chronic anticoagulation     ICD-10-CM: Z79.01  ICD-9-CM: V58.61     Stage 4 chronic kidney disease (CMS/[facility])     ICD-10-CM: N18.4  ICD-9-CM: 585.4     Type 2 diabetes mellitus with diabetic autonomic neuropathy, with long-term current use of insulin (CMS/[facility])     ICD-10-CM: E11.43, Z79.4  ICD-9-CM: 250.60, 337.1, V58.67     Coronary artery disease involving native heart without angina pectoris, unspecified vessel or lesion type     ICD-10-CM: I25.10  ICD-9-CM: 414.01         Pt appears pale, fatigued and weak, BP and HR low, suspect over medication gabriella with his recent weight loss. Will DC amlodipine, decrease lisinopril to 2.5MG daily, and decrease coreg to 1/2 tab BID. Advised to change positions slowly, prop his legs up above the level of his heart, and stay hydrated. POC hgb WNL in office, will get labs. If symptoms get too bad advised to go to ED for IVF.     25 minutes          This document has been electronically signed by Clara Calzada MD on January 21, 2021 08:46 CST

## 2021-01-22 LAB
ALBUMIN SERPL-MCNC: 3.7 G/DL (ref 3.5–5.2)
ALBUMIN/GLOB SERPL: 1.5 G/DL
ALP SERPL-CCNC: 65 U/L (ref 39–117)
ALT SERPL-CCNC: 33 U/L (ref 1–41)
AST SERPL-CCNC: 27 U/L (ref 1–40)
BILIRUB SERPL-MCNC: 0.4 MG/DL (ref 0–1.2)
BUN SERPL-MCNC: 69 MG/DL (ref 8–23)
BUN/CREAT SERPL: 25.1 (ref 7–25)
CALCIUM SERPL-MCNC: 9 MG/DL (ref 8.6–10.5)
CHLORIDE SERPL-SCNC: 90 MMOL/L (ref 98–107)
CO2 SERPL-SCNC: 35.9 MMOL/L (ref 22–29)
CREAT SERPL-MCNC: 2.75 MG/DL (ref 0.76–1.27)
ERYTHROCYTE [DISTWIDTH] IN BLOOD BY AUTOMATED COUNT: 14 % (ref 12.3–15.4)
GLOBULIN SER CALC-MCNC: 2.4 GM/DL
GLUCOSE SERPL-MCNC: 143 MG/DL (ref 65–99)
HCT VFR BLD AUTO: 41.5 % (ref 37.5–51)
HGB BLD-MCNC: 13.3 G/DL (ref 13–17.7)
MCH RBC QN AUTO: 29.6 PG (ref 26.6–33)
MCHC RBC AUTO-ENTMCNC: 32 G/DL (ref 31.5–35.7)
MCV RBC AUTO: 92.2 FL (ref 79–97)
PLATELET # BLD AUTO: 205 10*3/MM3 (ref 140–450)
POTASSIUM SERPL-SCNC: 3.5 MMOL/L (ref 3.5–5.2)
PROT SERPL-MCNC: 6.1 G/DL (ref 6–8.5)
RBC # BLD AUTO: 4.5 10*6/MM3 (ref 4.14–5.8)
SODIUM SERPL-SCNC: 140 MMOL/L (ref 136–145)
WBC # BLD AUTO: 6.22 10*3/MM3 (ref 3.4–10.8)

## 2021-01-26 ENCOUNTER — OFFICE VISIT (OUTPATIENT)
Dept: FAMILY MEDICINE CLINIC | Facility: CLINIC | Age: 66
End: 2021-01-26

## 2021-01-26 VITALS
WEIGHT: 221 LBS | HEART RATE: 61 BPM | HEIGHT: 71 IN | RESPIRATION RATE: 16 BRPM | DIASTOLIC BLOOD PRESSURE: 61 MMHG | SYSTOLIC BLOOD PRESSURE: 91 MMHG | BODY MASS INDEX: 30.94 KG/M2 | OXYGEN SATURATION: 99 % | TEMPERATURE: 96.9 F

## 2021-01-26 DIAGNOSIS — Z79.4 TYPE 2 DIABETES MELLITUS WITH DIABETIC AUTONOMIC NEUROPATHY, WITH LONG-TERM CURRENT USE OF INSULIN (HCC): ICD-10-CM

## 2021-01-26 DIAGNOSIS — N18.4 STAGE 4 CHRONIC KIDNEY DISEASE (HCC): ICD-10-CM

## 2021-01-26 DIAGNOSIS — I25.10 CORONARY ARTERY DISEASE INVOLVING NATIVE HEART WITHOUT ANGINA PECTORIS, UNSPECIFIED VESSEL OR LESION TYPE: ICD-10-CM

## 2021-01-26 DIAGNOSIS — E11.43 TYPE 2 DIABETES MELLITUS WITH DIABETIC AUTONOMIC NEUROPATHY, WITH LONG-TERM CURRENT USE OF INSULIN (HCC): ICD-10-CM

## 2021-01-26 DIAGNOSIS — Z79.01 CHRONIC ANTICOAGULATION: ICD-10-CM

## 2021-01-26 DIAGNOSIS — I10 ESSENTIAL HYPERTENSION: Primary | ICD-10-CM

## 2021-01-26 PROCEDURE — 99214 OFFICE O/P EST MOD 30 MIN: CPT | Performed by: FAMILY MEDICINE

## 2021-01-26 NOTE — PROGRESS NOTES
"Subjective cc: hypotension   Fortino Llanes is a 65 y.o. male with CAD, CKD, PVD, HTN, HLD, DM who presents for follow up on hypotension.  He was seen last week and coreg, lisinopril decreased and norvasc stopped. He had labs - showed decreased GFR from his baseline - was advised to stop his lisinopril.    He stopped taking lisinopril on sat/sun - felt a lot better - was not dizzy.   Monday he took lisinopril, today he took it as well - he felt fine before taking it and then bad once he took it.   He is not retaining fluid.   He is weighing every day at home and it is remaining consistent.     History of Present Illness     The following portions of the patient's history were reviewed and updated as appropriate: allergies, current medications, past family history, past medical history, past social history, past surgical history and problem list.        Review of Systems   Constitutional: Positive for activity change and fatigue. Negative for appetite change and unexpected weight change.   Respiratory: Negative for cough and shortness of breath.    Cardiovascular: Negative for chest pain and leg swelling.   All other systems reviewed and are negative.      Objective   Blood pressure 91/61, pulse 61, temperature 96.9 °F (36.1 °C), temperature source Infrared, resp. rate 16, height 180.3 cm (71\"), weight 100 kg (221 lb), SpO2 99 %.  Physical Exam  Vitals signs and nursing note reviewed.   Constitutional:       General: He is not in acute distress.     Appearance: He is not ill-appearing, toxic-appearing or diaphoretic.   HENT:      Head: Normocephalic and atraumatic.      Right Ear: Ear canal and external ear normal. A middle ear effusion is present. There is no impacted cerumen.      Left Ear: Tympanic membrane, ear canal and external ear normal. There is no impacted cerumen.      Nose: Nose normal.   Eyes:      General:         Right eye: No discharge.         Left eye: No discharge.      Extraocular Movements: " Extraocular movements intact.      Conjunctiva/sclera: Conjunctivae normal.   Neck:      Musculoskeletal: Normal range of motion. No neck rigidity.   Cardiovascular:      Rate and Rhythm: Normal rate and regular rhythm.      Pulses: Normal pulses.      Heart sounds: Normal heart sounds.   Pulmonary:      Effort: Pulmonary effort is normal. No respiratory distress.      Breath sounds: Normal breath sounds. No wheezing.   Abdominal:      General: There is no distension.      Palpations: Abdomen is soft.      Tenderness: There is no abdominal tenderness.   Musculoskeletal:      Right lower leg: No edema.      Left lower leg: No edema.   Lymphadenopathy:      Cervical: No cervical adenopathy.   Skin:     General: Skin is warm and dry.      Findings: Bruising present.   Neurological:      Mental Status: He is alert and oriented to person, place, and time.      Motor: Weakness (imprvoed) present.      Coordination: Coordination normal.   Psychiatric:         Mood and Affect: Mood normal.         Behavior: Behavior normal.         Thought Content: Thought content normal.         Judgment: Judgment normal.         Assessment/Plan   Problems Addressed this Visit        Cardiac and Vasculature    Essential hypertension - Primary    Coronary artery disease involving native heart without angina pectoris       Coag and Thromboembolic    Chronic anticoagulation       Endocrine and Metabolic    Type 2 diabetes mellitus with diabetic autonomic neuropathy, with long-term current use of insulin (CMS/[facility])       Genitourinary and Reproductive     Stage 4 chronic kidney disease (CMS/[facility])    Relevant Orders    Basic metabolic panel      Diagnoses       Codes Comments    Essential hypertension    -  Primary ICD-10-CM: I10  ICD-9-CM: 401.9     Coronary artery disease involving native heart without angina pectoris, unspecified vessel or lesion type     ICD-10-CM: I25.10  ICD-9-CM: 414.01     Chronic anticoagulation     ICD-10-CM:  Z79.01  ICD-9-CM: V58.61     Type 2 diabetes mellitus with diabetic autonomic neuropathy, with long-term current use of insulin (CMS/[facility])     ICD-10-CM: E11.43, Z79.4  ICD-9-CM: 250.60, 337.1, V58.67     Stage 4 chronic kidney disease (CMS/[facility])     ICD-10-CM: N18.4  ICD-9-CM: 585.4         PLAN:     #1 HTN: chronic, stable, pt was having hypotension from overmedication - will stop lisinopril because of hypotension and decreased GFR, will recheck labs, monitor     #2 acute on chronic CKD: lisinopril was held, will DC at this time, will recheck labs and determine if back to baseline, pt will have labs done tomorrow with his orders from nephrology, keep follow up with nephro     #3 CHF: chronic, stable, appears euvolemic, continue on medication management, daily weight checks     #4 DM: chronic, uncontrolled with hyperglycemia, continue on current medication regimen           This document has been electronically signed by Clara Calzada MD on January 26, 2021 08:35 CST

## 2021-02-01 ENCOUNTER — OFFICE VISIT (OUTPATIENT)
Dept: CARDIOLOGY CLINIC | Age: 66
End: 2021-02-01
Payer: COMMERCIAL

## 2021-02-01 VITALS
HEIGHT: 71 IN | HEART RATE: 68 BPM | SYSTOLIC BLOOD PRESSURE: 128 MMHG | WEIGHT: 222.6 LBS | DIASTOLIC BLOOD PRESSURE: 80 MMHG | BODY MASS INDEX: 31.16 KG/M2

## 2021-02-01 DIAGNOSIS — I48.0 PAF (PAROXYSMAL ATRIAL FIBRILLATION) (HCC): ICD-10-CM

## 2021-02-01 DIAGNOSIS — N18.4 STAGE 4 CHRONIC KIDNEY DISEASE (HCC): ICD-10-CM

## 2021-02-01 DIAGNOSIS — I21.4 NSTEMI (NON-ST ELEVATED MYOCARDIAL INFARCTION) (HCC): ICD-10-CM

## 2021-02-01 DIAGNOSIS — I25.10 CORONARY ARTERY DISEASE INVOLVING NATIVE CORONARY ARTERY OF NATIVE HEART WITHOUT ANGINA PECTORIS: Primary | ICD-10-CM

## 2021-02-01 DIAGNOSIS — E78.2 MIXED HYPERLIPIDEMIA: ICD-10-CM

## 2021-02-01 DIAGNOSIS — I50.42 CHRONIC COMBINED SYSTOLIC AND DIASTOLIC CONGESTIVE HEART FAILURE (HCC): ICD-10-CM

## 2021-02-01 PROCEDURE — 99204 OFFICE O/P NEW MOD 45 MIN: CPT | Performed by: INTERNAL MEDICINE

## 2021-02-01 RX ORDER — BUMETANIDE 1 MG/1
1 TABLET ORAL 2 TIMES DAILY
Qty: 60 TABLET | Refills: 5 | Status: ON HOLD | OUTPATIENT
Start: 2021-02-01 | End: 2021-11-07 | Stop reason: SDUPTHER

## 2021-02-01 RX ORDER — ISOSORBIDE MONONITRATE 60 MG/1
60 TABLET, EXTENDED RELEASE ORAL 2 TIMES DAILY
Qty: 60 TABLET | Refills: 5 | Status: SHIPPED | OUTPATIENT
Start: 2021-02-01 | End: 2021-08-30

## 2021-02-01 ASSESSMENT — ENCOUNTER SYMPTOMS
SHORTNESS OF BREATH: 0
DIARRHEA: 0
VOMITING: 0
GASTROINTESTINAL NEGATIVE: 1
EYES NEGATIVE: 1
NAUSEA: 0
RESPIRATORY NEGATIVE: 1

## 2021-03-15 RX ORDER — MINOXIDIL 2.5 MG/1
TABLET ORAL
Qty: 60 TABLET | Refills: 5 | Status: SHIPPED | OUTPATIENT
Start: 2021-03-15 | End: 2021-03-16

## 2021-03-16 RX ORDER — MINOXIDIL 2.5 MG/1
TABLET ORAL
Qty: 60 TABLET | Refills: 1 | Status: SHIPPED | OUTPATIENT
Start: 2021-03-16 | End: 2021-10-05

## 2021-04-14 ENCOUNTER — OFFICE VISIT (OUTPATIENT)
Dept: WOUND CARE | Facility: HOSPITAL | Age: 66
End: 2021-04-14

## 2021-04-14 ENCOUNTER — LAB REQUISITION (OUTPATIENT)
Dept: LAB | Facility: HOSPITAL | Age: 66
End: 2021-04-14

## 2021-04-14 ENCOUNTER — OFFICE VISIT (OUTPATIENT)
Dept: FAMILY MEDICINE CLINIC | Facility: CLINIC | Age: 66
End: 2021-04-14

## 2021-04-14 VITALS
WEIGHT: 219 LBS | HEART RATE: 56 BPM | HEIGHT: 71 IN | BODY MASS INDEX: 30.66 KG/M2 | TEMPERATURE: 97 F | DIASTOLIC BLOOD PRESSURE: 68 MMHG | RESPIRATION RATE: 18 BRPM | SYSTOLIC BLOOD PRESSURE: 130 MMHG

## 2021-04-14 DIAGNOSIS — Z00.00 ENCOUNTER FOR GENERAL ADULT MEDICAL EXAMINATION WITHOUT ABNORMAL FINDINGS: ICD-10-CM

## 2021-04-14 DIAGNOSIS — S91.302A OPEN WOUND OF LEFT FOOT, INITIAL ENCOUNTER: ICD-10-CM

## 2021-04-14 DIAGNOSIS — S61.401A UNSPECIFIED OPEN WOUND OF RIGHT HAND, INITIAL ENCOUNTER: ICD-10-CM

## 2021-04-14 DIAGNOSIS — E11.621 TYPE 2 DIABETES MELLITUS WITH FOOT ULCER, UNSPECIFIED WHETHER LONG TERM INSULIN USE (HCC): ICD-10-CM

## 2021-04-14 DIAGNOSIS — L97.509 TYPE 2 DIABETES MELLITUS WITH FOOT ULCER, UNSPECIFIED WHETHER LONG TERM INSULIN USE (HCC): ICD-10-CM

## 2021-04-14 DIAGNOSIS — S81.001A UNSPECIFIED OPEN WOUND, RIGHT KNEE, INITIAL ENCOUNTER: ICD-10-CM

## 2021-04-14 DIAGNOSIS — E11.621 DIABETIC ULCER OF OTHER PART OF LEFT FOOT ASSOCIATED WITH TYPE 2 DIABETES MELLITUS, UNSPECIFIED ULCER STAGE (HCC): Primary | ICD-10-CM

## 2021-04-14 DIAGNOSIS — L97.529 DIABETIC ULCER OF OTHER PART OF LEFT FOOT ASSOCIATED WITH TYPE 2 DIABETES MELLITUS, UNSPECIFIED ULCER STAGE (HCC): Primary | ICD-10-CM

## 2021-04-14 DIAGNOSIS — L97.522 NON-PRESSURE CHRONIC ULCER OF OTHER PART OF LEFT FOOT WITH FAT LAYER EXPOSED (HCC): ICD-10-CM

## 2021-04-14 PROCEDURE — 11042 DBRDMT SUBQ TIS 1ST 20SQCM/<: CPT | Performed by: NURSE PRACTITIONER

## 2021-04-14 PROCEDURE — 99213 OFFICE O/P EST LOW 20 MIN: CPT | Performed by: NURSE PRACTITIONER

## 2021-04-14 PROCEDURE — 87186 SC STD MICRODIL/AGAR DIL: CPT | Performed by: NURSE PRACTITIONER

## 2021-04-14 PROCEDURE — 99214 OFFICE O/P EST MOD 30 MIN: CPT | Performed by: NURSE PRACTITIONER

## 2021-04-14 PROCEDURE — 87075 CULTR BACTERIA EXCEPT BLOOD: CPT | Performed by: NURSE PRACTITIONER

## 2021-04-14 PROCEDURE — 87176 TISSUE HOMOGENIZATION CULTR: CPT | Performed by: NURSE PRACTITIONER

## 2021-04-14 PROCEDURE — 87205 SMEAR GRAM STAIN: CPT | Performed by: NURSE PRACTITIONER

## 2021-04-14 PROCEDURE — G0463 HOSPITAL OUTPT CLINIC VISIT: HCPCS

## 2021-04-14 PROCEDURE — 87070 CULTURE OTHR SPECIMN AEROBIC: CPT | Performed by: NURSE PRACTITIONER

## 2021-04-14 PROCEDURE — 87147 CULTURE TYPE IMMUNOLOGIC: CPT | Performed by: NURSE PRACTITIONER

## 2021-04-14 NOTE — PROGRESS NOTES
"Chief Complaint  Foot Injury (wound that will not heal on bottom on foot x 1 month)    Subjective          Fortino Llanes presents to [facility] for foot wound.   History of Present Illness  Foot wound  New. Began 1 month ago. Sole of left foot. He reports he was on porch and must have stepped on rock or acorn, because he shortly noticed the sole of his sock was saturated with blood. Has been trying australian salve, antibiotic ointment, cleaning with hydrogen peroxide and hibiclens. Reports still bleeding. Not healing and appears to be getting larger and deeper. He is here today because he showed foot to an RN friend who advised him to go to the doctor immediately.  He denies pain, fever, body aches, n/v.  He is a diabetic. Reports glucose usually runs around 150's. However last night it was 300. Last a1c was 5 months ago at 6.8.     Objective   Vital Signs:   /68 (BP Location: Left arm, Patient Position: Sitting, Cuff Size: Adult)   Pulse 56   Temp 97 °F (36.1 °C) (Infrared)   Resp 18   Ht 180.3 cm (71\") Comment: per patient  Wt 99.3 kg (219 lb)   BMI 30.54 kg/m²     Physical Exam  Vitals and nursing note reviewed.   Constitutional:       Appearance: He is well-developed.   HENT:      Head: Normocephalic and atraumatic.   Eyes:      Conjunctiva/sclera: Conjunctivae normal.   Cardiovascular:      Rate and Rhythm: Normal rate and regular rhythm.   Pulmonary:      Effort: Pulmonary effort is normal.   Musculoskeletal:      Cervical back: Neck supple.   Lymphadenopathy:      Cervical: No cervical adenopathy.   Skin:     Findings: Wound present.             Comments: Open foot wound on plantar aspect left foot. Malodorous. Approximately .5cm tunneling noted. Minimal drainage.    Neurological:      Mental Status: He is alert and oriented to person, place, and time.         Result Review :                 Assessment and Plan    Diagnoses and all orders for this visit:    1. 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 Open wound of left foot, initial encounter  -     XR Foot 3+ View Left  -     Ambulatory Referral to Wound Clinic      Plan:  Suggested ED but patient would like to attempt to treat outpatient since he has no pain.  We have got him an appt with wound care specialist today and will obtain imaging for an idea of depth of wound and possible osteomyelitis  I advised pt that wound care may still recommend he go to ED for further evaluation/imaging, which he voiced understanding        Follow Up   Return go to wound care today.  Patient was given instructions and counseling regarding his condition or for health maintenance advice. Please see specific information pulled into the AVS if appropriate.

## 2021-04-16 LAB
BACTERIA SPEC AEROBE CULT: ABNORMAL
BACTERIA SPEC AEROBE CULT: ABNORMAL
GRAM STN SPEC: ABNORMAL

## 2021-04-19 LAB — BACTERIA SPEC ANAEROBE CULT: NORMAL

## 2021-04-20 ENCOUNTER — OFFICE VISIT (OUTPATIENT)
Dept: FAMILY MEDICINE CLINIC | Facility: CLINIC | Age: 66
End: 2021-04-20

## 2021-04-20 VITALS
SYSTOLIC BLOOD PRESSURE: 126 MMHG | HEIGHT: 71 IN | DIASTOLIC BLOOD PRESSURE: 64 MMHG | WEIGHT: 221.6 LBS | BODY MASS INDEX: 31.02 KG/M2 | HEART RATE: 64 BPM | OXYGEN SATURATION: 97 % | RESPIRATION RATE: 16 BRPM | TEMPERATURE: 97.8 F

## 2021-04-20 DIAGNOSIS — I25.10 CORONARY ARTERY DISEASE INVOLVING NATIVE HEART WITHOUT ANGINA PECTORIS, UNSPECIFIED VESSEL OR LESION TYPE: ICD-10-CM

## 2021-04-20 DIAGNOSIS — Z79.01 CHRONIC ANTICOAGULATION: ICD-10-CM

## 2021-04-20 DIAGNOSIS — E11.43 TYPE 2 DIABETES MELLITUS WITH DIABETIC AUTONOMIC NEUROPATHY, WITH LONG-TERM CURRENT USE OF INSULIN (HCC): Primary | ICD-10-CM

## 2021-04-20 DIAGNOSIS — I10 ESSENTIAL HYPERTENSION: ICD-10-CM

## 2021-04-20 DIAGNOSIS — Z79.4 TYPE 2 DIABETES MELLITUS WITH DIABETIC AUTONOMIC NEUROPATHY, WITH LONG-TERM CURRENT USE OF INSULIN (HCC): Primary | ICD-10-CM

## 2021-04-20 DIAGNOSIS — L97.521 ULCER OF LEFT FOOT, LIMITED TO BREAKDOWN OF SKIN (HCC): ICD-10-CM

## 2021-04-20 DIAGNOSIS — E78.2 MIXED HYPERLIPIDEMIA: ICD-10-CM

## 2021-04-20 PROCEDURE — 99214 OFFICE O/P EST MOD 30 MIN: CPT | Performed by: FAMILY MEDICINE

## 2021-04-20 RX ORDER — ALLOPURINOL 100 MG/1
200 TABLET ORAL 2 TIMES DAILY
COMMUNITY
Start: 2021-03-26

## 2021-04-20 NOTE — PROGRESS NOTES
Subjective cc: medication review/DM  Fortino Llanes is a 65 y.o. male with CAD, DM, obesity, HTN, HLD, and CKD presents to discuss his medications.   He reports the wound on his foot is getting better - he was walking outside in his socks and stepped on something, he was sent to wound care and they treated with abx and topical medications. He feels like it is improving.     He is changing his insurance.   He is concerned about his medications - thought he might not need his plavix and eliquis.     He is not currently using his short acting insulin, he does use his long acting 20U BID.   He is still feeling very fatigued when he tried to get out and be active.     Hypertension  This is a chronic problem. The current episode started more than 1 year ago. The problem is unchanged. The problem is controlled. Pertinent negatives include no chest pain or palpitations. Risk factors for coronary artery disease include diabetes mellitus, dyslipidemia, male gender, obesity and sedentary lifestyle. Current antihypertension treatment includes diuretics, beta blockers and ACE inhibitors. The current treatment provides significant improvement. Compliance problems include exercise and diet.  Hypertensive end-organ damage includes kidney disease and CAD/MI.   Diabetes  He presents for his follow-up diabetic visit. He has type 2 diabetes mellitus. His disease course has been fluctuating. Hypoglycemia symptoms include dizziness and hunger. Associated symptoms include fatigue, foot ulcerations and weakness. Pertinent negatives for diabetes include no chest pain. There are no hypoglycemic complications. Symptoms are stable. Risk factors for coronary artery disease include diabetes mellitus, dyslipidemia, male sex, hypertension, obesity and sedentary lifestyle. Current diabetic treatment includes insulin injections. He is compliant with treatment most of the time. Meal planning includes avoidance of concentrated sweets and carbohydrate  "counting. He has had a previous visit with a dietitian. He participates in exercise intermittently. An ACE inhibitor/angiotensin II receptor blocker is being taken.   Wound Check  He was originally treated 5 to 10 days ago. Previous treatment included oral antibiotics and wound cleansing or irrigation. There has been clear discharge from the wound. There is no redness present. There is no swelling present. There is no pain present. He has no difficulty moving the affected extremity or digit.        The following portions of the patient's history were reviewed and updated as appropriate: allergies, current medications, past family history, past medical history, past social history, past surgical history and problem list.        Review of Systems   Constitutional: Positive for activity change and fatigue. Negative for unexpected weight change.   Cardiovascular: Negative for chest pain, palpitations and leg swelling.   Skin: Positive for wound.   Neurological: Positive for dizziness and weakness.   Hematological: Bruises/bleeds easily.   All other systems reviewed and are negative.      Objective   Blood pressure 126/64, pulse 64, temperature 97.8 °F (36.6 °C), temperature source Infrared, resp. rate 16, height 180.3 cm (71\"), weight 101 kg (221 lb 9.6 oz), SpO2 97 %.  Physical Exam  Vitals and nursing note reviewed.   Constitutional:       General: He is not in acute distress.     Appearance: Normal appearance. He is obese. He is not toxic-appearing.   HENT:      Head: Normocephalic and atraumatic.      Right Ear: External ear normal.      Left Ear: External ear normal.      Nose: Nose normal.   Eyes:      General:         Right eye: No discharge.         Left eye: No discharge.      Extraocular Movements: Extraocular movements intact.      Conjunctiva/sclera: Conjunctivae normal.   Cardiovascular:      Rate and Rhythm: Normal rate.      Pulses: Normal pulses.   Pulmonary:      Effort: Pulmonary effort is normal. No " respiratory distress.      Breath sounds: Normal breath sounds. No wheezing.   Abdominal:      General: Bowel sounds are normal. There is no distension.      Palpations: Abdomen is soft.      Tenderness: There is no abdominal tenderness.   Musculoskeletal:      Cervical back: Normal range of motion. No tenderness.      Right lower leg: No edema.      Left lower leg: No edema.        Feet:    Lymphadenopathy:      Cervical: No cervical adenopathy.   Skin:     General: Skin is warm and dry.   Neurological:      Mental Status: He is alert and oriented to person, place, and time. Mental status is at baseline.   Psychiatric:         Mood and Affect: Mood normal.         Behavior: Behavior normal.         Thought Content: Thought content normal.         Judgment: Judgment normal.         Assessment/Plan   Problems Addressed this Visit        Cardiac and Vasculature    Essential hypertension    Mixed hyperlipidemia    Coronary artery disease involving native heart without angina pectoris       Coag and Thromboembolic    Chronic anticoagulation       Endocrine and Metabolic    Type 2 diabetes mellitus with diabetic autonomic neuropathy, with long-term current use of insulin (CMS/[facility]) - Primary    Relevant Orders    Hemoglobin A1c      Other Visit Diagnoses     Ulcer of left foot, limited to breakdown of skin (CMS/[facility])          Diagnoses       Codes Comments    Type 2 diabetes mellitus with diabetic autonomic neuropathy, with long-term current use of insulin (CMS/[facility])    -  Primary ICD-10-CM: E11.43, Z79.4  ICD-9-CM: 250.60, 337.1, V58.67     Essential hypertension     ICD-10-CM: I10  ICD-9-CM: 401.9     Mixed hyperlipidemia     ICD-10-CM: E78.2  ICD-9-CM: 272.2     Coronary artery disease involving native heart without angina pectoris, unspecified vessel or lesion type     ICD-10-CM: I25.10  ICD-9-CM: 414.01     Chronic anticoagulation     ICD-10-CM: Z79.01  ICD-9-CM: V58.61     Ulcer of left foot, limited to breakdown of  skin (CMS/HCC)     ICD-10-CM: L97.521  ICD-9-CM: 707.15         PLAN:     #1 DM foot wound: chronic, left foot, healing, advised on local wound care and follow up as directed     #2 DM: chronic, uncontrolled with hyperglycemia, will check labs today and adjust insulin as needed     #3 CAD: chronic, stable, advised he does still need both plavix and eliquis     #4 HTN: chronic, controlled, continue on current medications           This document has been electronically signed by Clara Calzada MD on April 20, 2021 11:14 CDT

## 2021-04-21 ENCOUNTER — OFFICE VISIT (OUTPATIENT)
Dept: WOUND CARE | Facility: HOSPITAL | Age: 66
End: 2021-04-21

## 2021-04-21 DIAGNOSIS — Z79.01 LONG TERM (CURRENT) USE OF ANTICOAGULANTS: ICD-10-CM

## 2021-04-21 DIAGNOSIS — E11.621 TYPE 2 DIABETES MELLITUS WITH FOOT ULCER, UNSPECIFIED WHETHER LONG TERM INSULIN USE (HCC): ICD-10-CM

## 2021-04-21 DIAGNOSIS — A49.9 BACTERIAL INFECTION, UNSPECIFIED: ICD-10-CM

## 2021-04-21 DIAGNOSIS — L97.509 TYPE 2 DIABETES MELLITUS WITH FOOT ULCER, UNSPECIFIED WHETHER LONG TERM INSULIN USE (HCC): ICD-10-CM

## 2021-04-21 DIAGNOSIS — L97.522 NON-PRESSURE CHRONIC ULCER OF OTHER PART OF LEFT FOOT WITH FAT LAYER EXPOSED (HCC): ICD-10-CM

## 2021-04-21 LAB — HBA1C MFR BLD: 9.4 % (ref 4.8–5.6)

## 2021-04-21 PROCEDURE — 11042 DBRDMT SUBQ TIS 1ST 20SQCM/<: CPT | Performed by: NURSE PRACTITIONER

## 2021-04-28 ENCOUNTER — APPOINTMENT (OUTPATIENT)
Dept: GENERAL RADIOLOGY | Facility: HOSPITAL | Age: 66
End: 2021-04-28

## 2021-04-28 ENCOUNTER — HOSPITAL ENCOUNTER (INPATIENT)
Facility: HOSPITAL | Age: 66
LOS: 3 days | Discharge: NURSING, REHAB OR LONG-TERM CARE | End: 2021-05-01
Attending: EMERGENCY MEDICINE | Admitting: INTERNAL MEDICINE

## 2021-04-28 ENCOUNTER — ANESTHESIA (OUTPATIENT)
Dept: PERIOP | Facility: HOSPITAL | Age: 66
End: 2021-04-28

## 2021-04-28 ENCOUNTER — ANESTHESIA EVENT (OUTPATIENT)
Dept: PERIOP | Facility: HOSPITAL | Age: 66
End: 2021-04-28

## 2021-04-28 ENCOUNTER — OFFICE VISIT (OUTPATIENT)
Dept: WOUND CARE | Facility: HOSPITAL | Age: 66
End: 2021-04-28

## 2021-04-28 DIAGNOSIS — S72.002A CLOSED FRACTURE OF LEFT HIP, INITIAL ENCOUNTER (HCC): Primary | ICD-10-CM

## 2021-04-28 DIAGNOSIS — L97.509 TYPE 2 DIABETES MELLITUS WITH FOOT ULCER, UNSPECIFIED WHETHER LONG TERM INSULIN USE (HCC): ICD-10-CM

## 2021-04-28 DIAGNOSIS — Z78.9 DECREASED ACTIVITIES OF DAILY LIVING (ADL): ICD-10-CM

## 2021-04-28 DIAGNOSIS — A49.9 BACTERIAL INFECTION, UNSPECIFIED: ICD-10-CM

## 2021-04-28 DIAGNOSIS — Z74.09 IMPAIRED MOBILITY: ICD-10-CM

## 2021-04-28 DIAGNOSIS — E87.6 HYPOKALEMIA: ICD-10-CM

## 2021-04-28 DIAGNOSIS — N18.9 CHRONIC KIDNEY DISEASE, UNSPECIFIED CKD STAGE: ICD-10-CM

## 2021-04-28 DIAGNOSIS — E11.621 TYPE 2 DIABETES MELLITUS WITH FOOT ULCER, UNSPECIFIED WHETHER LONG TERM INSULIN USE (HCC): ICD-10-CM

## 2021-04-28 DIAGNOSIS — Z79.01 LONG TERM (CURRENT) USE OF ANTICOAGULANTS: ICD-10-CM

## 2021-04-28 DIAGNOSIS — L97.522 NON-PRESSURE CHRONIC ULCER OF OTHER PART OF LEFT FOOT WITH FAT LAYER EXPOSED (HCC): ICD-10-CM

## 2021-04-28 LAB
ABO GROUP BLD: NORMAL
ALBUMIN SERPL-MCNC: 3.8 G/DL (ref 3.5–5.2)
ALBUMIN/GLOB SERPL: 1.2 G/DL
ALP SERPL-CCNC: 78 U/L (ref 39–117)
ALT SERPL W P-5'-P-CCNC: 18 U/L (ref 1–41)
ANION GAP SERPL CALCULATED.3IONS-SCNC: 13 MMOL/L (ref 5–15)
APTT PPP: 32 SECONDS (ref 24.1–35)
AST SERPL-CCNC: 23 U/L (ref 1–40)
BASOPHILS # BLD AUTO: 0.08 10*3/MM3 (ref 0–0.2)
BASOPHILS NFR BLD AUTO: 0.8 % (ref 0–1.5)
BILIRUB SERPL-MCNC: 0.6 MG/DL (ref 0–1.2)
BLD GP AB SCN SERPL QL: NEGATIVE
BUN SERPL-MCNC: 66 MG/DL (ref 8–23)
BUN/CREAT SERPL: 23.3 (ref 7–25)
CALCIUM SPEC-SCNC: 9.4 MG/DL (ref 8.6–10.5)
CHLORIDE SERPL-SCNC: 84 MMOL/L (ref 98–107)
CO2 SERPL-SCNC: 41 MMOL/L (ref 22–29)
CREAT SERPL-MCNC: 2.83 MG/DL (ref 0.76–1.27)
DEPRECATED RDW RBC AUTO: 42.5 FL (ref 37–54)
EOSINOPHIL # BLD AUTO: 0.22 10*3/MM3 (ref 0–0.4)
EOSINOPHIL NFR BLD AUTO: 2.3 % (ref 0.3–6.2)
ERYTHROCYTE [DISTWIDTH] IN BLOOD BY AUTOMATED COUNT: 12.7 % (ref 12.3–15.4)
GFR SERPL CREATININE-BSD FRML MDRD: 23 ML/MIN/1.73
GLOBULIN UR ELPH-MCNC: 3.2 GM/DL
GLUCOSE BLDC GLUCOMTR-MCNC: 296 MG/DL (ref 70–130)
GLUCOSE BLDC GLUCOMTR-MCNC: 392 MG/DL (ref 70–130)
GLUCOSE SERPL-MCNC: 334 MG/DL (ref 65–99)
HCT VFR BLD AUTO: 45.8 % (ref 37.5–51)
HGB BLD-MCNC: 15.4 G/DL (ref 13–17.7)
IMM GRANULOCYTES # BLD AUTO: 0.04 10*3/MM3 (ref 0–0.05)
IMM GRANULOCYTES NFR BLD AUTO: 0.4 % (ref 0–0.5)
INR PPP: 1.42 (ref 0.91–1.09)
LYMPHOCYTES # BLD AUTO: 1.86 10*3/MM3 (ref 0.7–3.1)
LYMPHOCYTES NFR BLD AUTO: 19.4 % (ref 19.6–45.3)
MAGNESIUM SERPL-MCNC: 1.9 MG/DL (ref 1.6–2.4)
MCH RBC QN AUTO: 30.7 PG (ref 26.6–33)
MCHC RBC AUTO-ENTMCNC: 33.6 G/DL (ref 31.5–35.7)
MCV RBC AUTO: 91.4 FL (ref 79–97)
MONOCYTES # BLD AUTO: 0.69 10*3/MM3 (ref 0.1–0.9)
MONOCYTES NFR BLD AUTO: 7.2 % (ref 5–12)
NEUTROPHILS NFR BLD AUTO: 6.7 10*3/MM3 (ref 1.7–7)
NEUTROPHILS NFR BLD AUTO: 69.9 % (ref 42.7–76)
NRBC BLD AUTO-RTO: 0 /100 WBC (ref 0–0.2)
PLATELET # BLD AUTO: 151 10*3/MM3 (ref 140–450)
PMV BLD AUTO: 11.5 FL (ref 6–12)
POTASSIUM SERPL-SCNC: 2.8 MMOL/L (ref 3.5–5.2)
PROT SERPL-MCNC: 7 G/DL (ref 6–8.5)
PROTHROMBIN TIME: 16.3 SECONDS (ref 11.5–13.4)
RBC # BLD AUTO: 5.01 10*6/MM3 (ref 4.14–5.8)
RH BLD: POSITIVE
SARS-COV-2 RDRP RESP QL NAA+PROBE: NORMAL
SODIUM SERPL-SCNC: 138 MMOL/L (ref 136–145)
T&S EXPIRATION DATE: NORMAL
WBC # BLD AUTO: 9.59 10*3/MM3 (ref 3.4–10.8)

## 2021-04-28 PROCEDURE — 85025 COMPLETE CBC W/AUTO DIFF WBC: CPT | Performed by: EMERGENCY MEDICINE

## 2021-04-28 PROCEDURE — 82962 GLUCOSE BLOOD TEST: CPT

## 2021-04-28 PROCEDURE — 25010000002 POTASSIUM CHLORIDE PER 2 MEQ: Performed by: EMERGENCY MEDICINE

## 2021-04-28 PROCEDURE — 85730 THROMBOPLASTIN TIME PARTIAL: CPT | Performed by: EMERGENCY MEDICINE

## 2021-04-28 PROCEDURE — 93010 ELECTROCARDIOGRAM REPORT: CPT | Performed by: INTERNAL MEDICINE

## 2021-04-28 PROCEDURE — 93005 ELECTROCARDIOGRAM TRACING: CPT | Performed by: EMERGENCY MEDICINE

## 2021-04-28 PROCEDURE — 86901 BLOOD TYPING SEROLOGIC RH(D): CPT | Performed by: EMERGENCY MEDICINE

## 2021-04-28 PROCEDURE — C1713 ANCHOR/SCREW BN/BN,TIS/BN: HCPCS | Performed by: ORTHOPAEDIC SURGERY

## 2021-04-28 PROCEDURE — 80053 COMPREHEN METABOLIC PANEL: CPT | Performed by: EMERGENCY MEDICINE

## 2021-04-28 PROCEDURE — 85610 PROTHROMBIN TIME: CPT | Performed by: EMERGENCY MEDICINE

## 2021-04-28 PROCEDURE — 11042 DBRDMT SUBQ TIS 1ST 20SQCM/<: CPT | Performed by: NURSE PRACTITIONER

## 2021-04-28 PROCEDURE — 0QS736Z REPOSITION LEFT UPPER FEMUR WITH INTRAMEDULLARY INTERNAL FIXATION DEVICE, PERCUTANEOUS APPROACH: ICD-10-PCS | Performed by: ORTHOPAEDIC SURGERY

## 2021-04-28 PROCEDURE — 25010000002 ONDANSETRON PER 1 MG: Performed by: ANESTHESIOLOGY

## 2021-04-28 PROCEDURE — 25010000002 FENTANYL CITRATE (PF) 250 MCG/5ML SOLUTION: Performed by: NURSE ANESTHETIST, CERTIFIED REGISTERED

## 2021-04-28 PROCEDURE — 73502 X-RAY EXAM HIP UNI 2-3 VIEWS: CPT

## 2021-04-28 PROCEDURE — 25010000002 MORPHINE PER 10 MG: Performed by: INTERNAL MEDICINE

## 2021-04-28 PROCEDURE — 99284 EMERGENCY DEPT VISIT MOD MDM: CPT

## 2021-04-28 PROCEDURE — 25010000002 CEFAZOLIN PER 500 MG: Performed by: ORTHOPAEDIC SURGERY

## 2021-04-28 PROCEDURE — 73560 X-RAY EXAM OF KNEE 1 OR 2: CPT

## 2021-04-28 PROCEDURE — 73552 X-RAY EXAM OF FEMUR 2/>: CPT

## 2021-04-28 PROCEDURE — 83735 ASSAY OF MAGNESIUM: CPT | Performed by: EMERGENCY MEDICINE

## 2021-04-28 PROCEDURE — 71045 X-RAY EXAM CHEST 1 VIEW: CPT

## 2021-04-28 PROCEDURE — 25010000002 DEXAMETHASONE PER 1 MG: Performed by: NURSE ANESTHETIST, CERTIFIED REGISTERED

## 2021-04-28 PROCEDURE — 76000 FLUOROSCOPY <1 HR PHYS/QHP: CPT

## 2021-04-28 PROCEDURE — 25010000002 ONDANSETRON PER 1 MG: Performed by: NURSE ANESTHETIST, CERTIFIED REGISTERED

## 2021-04-28 PROCEDURE — 94799 UNLISTED PULMONARY SVC/PX: CPT

## 2021-04-28 PROCEDURE — 63710000001 INSULIN LISPRO (HUMAN) PER 5 UNITS: Performed by: INTERNAL MEDICINE

## 2021-04-28 PROCEDURE — C1769 GUIDE WIRE: HCPCS | Performed by: ORTHOPAEDIC SURGERY

## 2021-04-28 PROCEDURE — 87635 SARS-COV-2 COVID-19 AMP PRB: CPT | Performed by: EMERGENCY MEDICINE

## 2021-04-28 PROCEDURE — 86850 RBC ANTIBODY SCREEN: CPT | Performed by: EMERGENCY MEDICINE

## 2021-04-28 PROCEDURE — 86900 BLOOD TYPING SEROLOGIC ABO: CPT | Performed by: EMERGENCY MEDICINE

## 2021-04-28 PROCEDURE — 25010000002 PROPOFOL 10 MG/ML EMULSION: Performed by: NURSE ANESTHETIST, CERTIFIED REGISTERED

## 2021-04-28 DEVICE — SCRW LK STRDRV TI 5X44MM STRL: Type: IMPLANTABLE DEVICE | Site: HIP | Status: FUNCTIONAL

## 2021-04-28 DEVICE — BLD FEM FIX HELI TFN ADV PERF 110MM STRL: Type: IMPLANTABLE DEVICE | Site: HIP | Status: FUNCTIONAL

## 2021-04-28 DEVICE — NAIL FEM TFN ADV PROX 130D SHT 11X170MM STRL: Type: IMPLANTABLE DEVICE | Site: HIP | Status: FUNCTIONAL

## 2021-04-28 RX ORDER — ROSUVASTATIN CALCIUM 20 MG/1
20 TABLET, COATED ORAL NIGHTLY
Status: DISCONTINUED | OUTPATIENT
Start: 2021-04-28 | End: 2021-05-01 | Stop reason: HOSPADM

## 2021-04-28 RX ORDER — ALLOPURINOL 100 MG/1
100 TABLET ORAL DAILY
Status: DISCONTINUED | OUTPATIENT
Start: 2021-04-28 | End: 2021-05-01

## 2021-04-28 RX ORDER — BUPIVACAINE HCL/0.9 % NACL/PF 0.1 %
2 PLASTIC BAG, INJECTION (ML) EPIDURAL ONCE
Status: COMPLETED | OUTPATIENT
Start: 2021-04-28 | End: 2021-04-28

## 2021-04-28 RX ORDER — ONDANSETRON 2 MG/ML
INJECTION INTRAMUSCULAR; INTRAVENOUS AS NEEDED
Status: DISCONTINUED | OUTPATIENT
Start: 2021-04-28 | End: 2021-04-28 | Stop reason: SURG

## 2021-04-28 RX ORDER — SODIUM CHLORIDE 0.9 % (FLUSH) 0.9 %
10 SYRINGE (ML) INJECTION EVERY 12 HOURS SCHEDULED
Status: DISCONTINUED | OUTPATIENT
Start: 2021-04-28 | End: 2021-05-01 | Stop reason: HOSPADM

## 2021-04-28 RX ORDER — FLUMAZENIL 0.1 MG/ML
0.2 INJECTION INTRAVENOUS AS NEEDED
Status: DISCONTINUED | OUTPATIENT
Start: 2021-04-28 | End: 2021-04-28 | Stop reason: HOSPADM

## 2021-04-28 RX ORDER — METOLAZONE 5 MG/1
5 TABLET ORAL 3 TIMES WEEKLY
Status: DISCONTINUED | OUTPATIENT
Start: 2021-04-28 | End: 2021-04-29

## 2021-04-28 RX ORDER — PANTOPRAZOLE SODIUM 40 MG/1
40 TABLET, DELAYED RELEASE ORAL
Status: DISCONTINUED | OUTPATIENT
Start: 2021-04-29 | End: 2021-05-01 | Stop reason: HOSPADM

## 2021-04-28 RX ORDER — DEXAMETHASONE SODIUM PHOSPHATE 4 MG/ML
INJECTION, SOLUTION INTRA-ARTICULAR; INTRALESIONAL; INTRAMUSCULAR; INTRAVENOUS; SOFT TISSUE AS NEEDED
Status: DISCONTINUED | OUTPATIENT
Start: 2021-04-28 | End: 2021-04-28 | Stop reason: SURG

## 2021-04-28 RX ORDER — ROCURONIUM BROMIDE 10 MG/ML
INJECTION, SOLUTION INTRAVENOUS AS NEEDED
Status: DISCONTINUED | OUTPATIENT
Start: 2021-04-28 | End: 2021-04-28 | Stop reason: SURG

## 2021-04-28 RX ORDER — FENTANYL CITRATE 50 UG/ML
25 INJECTION, SOLUTION INTRAMUSCULAR; INTRAVENOUS
Status: DISCONTINUED | OUTPATIENT
Start: 2021-04-28 | End: 2021-04-28 | Stop reason: HOSPADM

## 2021-04-28 RX ORDER — PROPOFOL 10 MG/ML
VIAL (ML) INTRAVENOUS AS NEEDED
Status: DISCONTINUED | OUTPATIENT
Start: 2021-04-28 | End: 2021-04-28 | Stop reason: SURG

## 2021-04-28 RX ORDER — CETIRIZINE HYDROCHLORIDE 10 MG/1
10 TABLET ORAL DAILY
Status: DISCONTINUED | OUTPATIENT
Start: 2021-04-28 | End: 2021-05-01 | Stop reason: HOSPADM

## 2021-04-28 RX ORDER — NEOSTIGMINE METHYLSULFATE 5 MG/5 ML
SYRINGE (ML) INTRAVENOUS AS NEEDED
Status: DISCONTINUED | OUTPATIENT
Start: 2021-04-28 | End: 2021-04-28 | Stop reason: SURG

## 2021-04-28 RX ORDER — SODIUM CHLORIDE, SODIUM LACTATE, POTASSIUM CHLORIDE, CALCIUM CHLORIDE 600; 310; 30; 20 MG/100ML; MG/100ML; MG/100ML; MG/100ML
50 INJECTION, SOLUTION INTRAVENOUS CONTINUOUS
Status: DISCONTINUED | OUTPATIENT
Start: 2021-04-28 | End: 2021-05-01 | Stop reason: HOSPADM

## 2021-04-28 RX ORDER — MINOXIDIL 2.5 MG/1
2.5 TABLET ORAL 2 TIMES DAILY
Status: DISCONTINUED | OUTPATIENT
Start: 2021-04-28 | End: 2021-05-01 | Stop reason: HOSPADM

## 2021-04-28 RX ORDER — MONTELUKAST SODIUM 10 MG/1
10 TABLET ORAL NIGHTLY
Status: DISCONTINUED | OUTPATIENT
Start: 2021-04-28 | End: 2021-05-01 | Stop reason: HOSPADM

## 2021-04-28 RX ORDER — AMOXICILLIN 250 MG
2 CAPSULE ORAL NIGHTLY
Status: DISCONTINUED | OUTPATIENT
Start: 2021-04-28 | End: 2021-05-01 | Stop reason: HOSPADM

## 2021-04-28 RX ORDER — NITROGLYCERIN 0.4 MG/1
0.4 TABLET SUBLINGUAL
Status: DISCONTINUED | OUTPATIENT
Start: 2021-04-28 | End: 2021-05-01 | Stop reason: HOSPADM

## 2021-04-28 RX ORDER — CARVEDILOL 6.25 MG/1
12.5 TABLET ORAL 2 TIMES DAILY WITH MEALS
Status: DISCONTINUED | OUTPATIENT
Start: 2021-04-28 | End: 2021-05-01 | Stop reason: HOSPADM

## 2021-04-28 RX ORDER — HYDROMORPHONE HYDROCHLORIDE 1 MG/ML
0.5 INJECTION, SOLUTION INTRAMUSCULAR; INTRAVENOUS; SUBCUTANEOUS
Status: DISCONTINUED | OUTPATIENT
Start: 2021-04-28 | End: 2021-05-01 | Stop reason: ALTCHOICE

## 2021-04-28 RX ORDER — MAGNESIUM HYDROXIDE 1200 MG/15ML
LIQUID ORAL AS NEEDED
Status: DISCONTINUED | OUTPATIENT
Start: 2021-04-28 | End: 2021-04-28 | Stop reason: HOSPADM

## 2021-04-28 RX ORDER — LABETALOL HYDROCHLORIDE 5 MG/ML
5 INJECTION, SOLUTION INTRAVENOUS
Status: DISCONTINUED | OUTPATIENT
Start: 2021-04-28 | End: 2021-04-28 | Stop reason: HOSPADM

## 2021-04-28 RX ORDER — LIDOCAINE HYDROCHLORIDE 10 MG/ML
0.5 INJECTION, SOLUTION EPIDURAL; INFILTRATION; INTRACAUDAL; PERINEURAL ONCE AS NEEDED
Status: DISCONTINUED | OUTPATIENT
Start: 2021-04-28 | End: 2021-04-28 | Stop reason: HOSPADM

## 2021-04-28 RX ORDER — HYDROMORPHONE HYDROCHLORIDE 1 MG/ML
0.2 INJECTION, SOLUTION INTRAMUSCULAR; INTRAVENOUS; SUBCUTANEOUS
Status: DISCONTINUED | OUTPATIENT
Start: 2021-04-28 | End: 2021-04-28 | Stop reason: HOSPADM

## 2021-04-28 RX ORDER — POTASSIUM CHLORIDE 14.9 MG/ML
20 INJECTION INTRAVENOUS ONCE
Status: COMPLETED | OUTPATIENT
Start: 2021-04-28 | End: 2021-04-28

## 2021-04-28 RX ORDER — OXYCODONE AND ACETAMINOPHEN 10; 325 MG/1; MG/1
1 TABLET ORAL EVERY 4 HOURS PRN
Status: DISCONTINUED | OUTPATIENT
Start: 2021-04-28 | End: 2021-05-01 | Stop reason: HOSPADM

## 2021-04-28 RX ORDER — FENTANYL CITRATE 50 UG/ML
50 INJECTION, SOLUTION INTRAMUSCULAR; INTRAVENOUS ONCE
Status: DISCONTINUED | OUTPATIENT
Start: 2021-04-28 | End: 2021-04-28

## 2021-04-28 RX ORDER — NALOXONE HCL 0.4 MG/ML
0.04 VIAL (ML) INJECTION AS NEEDED
Status: DISCONTINUED | OUTPATIENT
Start: 2021-04-28 | End: 2021-04-28 | Stop reason: HOSPADM

## 2021-04-28 RX ORDER — OXYCODONE HYDROCHLORIDE AND ACETAMINOPHEN 5; 325 MG/1; MG/1
1 TABLET ORAL EVERY 4 HOURS PRN
Status: DISCONTINUED | OUTPATIENT
Start: 2021-04-28 | End: 2021-05-01 | Stop reason: HOSPADM

## 2021-04-28 RX ORDER — BUPIVACAINE HCL/0.9 % NACL/PF 0.1 %
2 PLASTIC BAG, INJECTION (ML) EPIDURAL EVERY 8 HOURS
Status: COMPLETED | OUTPATIENT
Start: 2021-04-29 | End: 2021-04-30

## 2021-04-28 RX ORDER — DEXTROSE MONOHYDRATE 25 G/50ML
25 INJECTION, SOLUTION INTRAVENOUS
Status: DISCONTINUED | OUTPATIENT
Start: 2021-04-28 | End: 2021-04-28 | Stop reason: SDUPTHER

## 2021-04-28 RX ORDER — ASCORBIC ACID 500 MG
1000 TABLET ORAL DAILY
Status: DISCONTINUED | OUTPATIENT
Start: 2021-04-28 | End: 2021-05-01 | Stop reason: HOSPADM

## 2021-04-28 RX ORDER — NICOTINE POLACRILEX 4 MG
15 LOZENGE BUCCAL
Status: DISCONTINUED | OUTPATIENT
Start: 2021-04-28 | End: 2021-05-01 | Stop reason: HOSPADM

## 2021-04-28 RX ORDER — LIDOCAINE HYDROCHLORIDE 20 MG/ML
INJECTION, SOLUTION EPIDURAL; INFILTRATION; INTRACAUDAL; PERINEURAL AS NEEDED
Status: DISCONTINUED | OUTPATIENT
Start: 2021-04-28 | End: 2021-04-28 | Stop reason: SURG

## 2021-04-28 RX ORDER — FENTANYL CITRATE 50 UG/ML
INJECTION, SOLUTION INTRAMUSCULAR; INTRAVENOUS AS NEEDED
Status: DISCONTINUED | OUTPATIENT
Start: 2021-04-28 | End: 2021-04-28 | Stop reason: SURG

## 2021-04-28 RX ORDER — IBUPROFEN 600 MG/1
600 TABLET ORAL ONCE AS NEEDED
Status: DISCONTINUED | OUTPATIENT
Start: 2021-04-28 | End: 2021-04-28 | Stop reason: HOSPADM

## 2021-04-28 RX ORDER — SODIUM CHLORIDE 0.9 % (FLUSH) 0.9 %
3-10 SYRINGE (ML) INJECTION AS NEEDED
Status: DISCONTINUED | OUTPATIENT
Start: 2021-04-28 | End: 2021-04-28 | Stop reason: HOSPADM

## 2021-04-28 RX ORDER — ONDANSETRON 2 MG/ML
4 INJECTION INTRAMUSCULAR; INTRAVENOUS AS NEEDED
Status: DISCONTINUED | OUTPATIENT
Start: 2021-04-28 | End: 2021-04-28 | Stop reason: HOSPADM

## 2021-04-28 RX ORDER — NICOTINE POLACRILEX 4 MG
15 LOZENGE BUCCAL
Status: DISCONTINUED | OUTPATIENT
Start: 2021-04-28 | End: 2021-04-28 | Stop reason: SDUPTHER

## 2021-04-28 RX ORDER — ONDANSETRON 2 MG/ML
4 INJECTION INTRAMUSCULAR; INTRAVENOUS ONCE
Status: DISCONTINUED | OUTPATIENT
Start: 2021-04-28 | End: 2021-04-28

## 2021-04-28 RX ORDER — NALOXONE HCL 0.4 MG/ML
0.1 VIAL (ML) INJECTION
Status: DISCONTINUED | OUTPATIENT
Start: 2021-04-28 | End: 2021-05-01 | Stop reason: ALTCHOICE

## 2021-04-28 RX ORDER — OXYCODONE AND ACETAMINOPHEN 10; 325 MG/1; MG/1
1 TABLET ORAL ONCE AS NEEDED
Status: COMPLETED | OUTPATIENT
Start: 2021-04-28 | End: 2021-04-28

## 2021-04-28 RX ORDER — AMIODARONE HYDROCHLORIDE 200 MG/1
200 TABLET ORAL DAILY
Status: DISCONTINUED | OUTPATIENT
Start: 2021-04-28 | End: 2021-05-01 | Stop reason: HOSPADM

## 2021-04-28 RX ORDER — LIDOCAINE HYDROCHLORIDE 40 MG/ML
SOLUTION TOPICAL AS NEEDED
Status: DISCONTINUED | OUTPATIENT
Start: 2021-04-28 | End: 2021-04-28 | Stop reason: SURG

## 2021-04-28 RX ORDER — ISOSORBIDE MONONITRATE 60 MG/1
60 TABLET, EXTENDED RELEASE ORAL 2 TIMES DAILY
Status: DISCONTINUED | OUTPATIENT
Start: 2021-04-28 | End: 2021-05-01 | Stop reason: HOSPADM

## 2021-04-28 RX ORDER — SODIUM CHLORIDE 0.9 % (FLUSH) 0.9 %
10 SYRINGE (ML) INJECTION AS NEEDED
Status: DISCONTINUED | OUTPATIENT
Start: 2021-04-28 | End: 2021-05-01 | Stop reason: HOSPADM

## 2021-04-28 RX ORDER — DEXTROSE MONOHYDRATE 25 G/50ML
25 INJECTION, SOLUTION INTRAVENOUS
Status: DISCONTINUED | OUTPATIENT
Start: 2021-04-28 | End: 2021-05-01 | Stop reason: HOSPADM

## 2021-04-28 RX ORDER — SODIUM CHLORIDE 0.9 % (FLUSH) 0.9 %
3 SYRINGE (ML) INJECTION EVERY 12 HOURS SCHEDULED
Status: DISCONTINUED | OUTPATIENT
Start: 2021-04-28 | End: 2021-04-28 | Stop reason: HOSPADM

## 2021-04-28 RX ADMIN — Medication 4 MG: at 17:33

## 2021-04-28 RX ADMIN — MONTELUKAST SODIUM 10 MG: 10 TABLET, FILM COATED ORAL at 21:12

## 2021-04-28 RX ADMIN — ONDANSETRON HYDROCHLORIDE 4 MG: 2 SOLUTION INTRAMUSCULAR; INTRAVENOUS at 18:15

## 2021-04-28 RX ADMIN — GLYCOPYRROLATE 0.4 MG: 0.2 INJECTION, SOLUTION INTRAMUSCULAR; INTRAVENOUS at 17:32

## 2021-04-28 RX ADMIN — DOCUSATE SODIUM 50 MG AND SENNOSIDES 8.6 MG 2 TABLET: 8.6; 5 TABLET, FILM COATED ORAL at 21:12

## 2021-04-28 RX ADMIN — POTASSIUM CHLORIDE 20 MEQ: 200 INJECTION, SOLUTION INTRAVENOUS at 14:04

## 2021-04-28 RX ADMIN — CARVEDILOL 12.5 MG: 6.25 TABLET, FILM COATED ORAL at 21:12

## 2021-04-28 RX ADMIN — Medication 2 G: at 16:45

## 2021-04-28 RX ADMIN — ROSUVASTATIN CALCIUM 20 MG: 20 TABLET, FILM COATED ORAL at 21:12

## 2021-04-28 RX ADMIN — INSULIN LISPRO 8 UNITS: 100 INJECTION, SOLUTION INTRAVENOUS; SUBCUTANEOUS at 22:22

## 2021-04-28 RX ADMIN — LIDOCAINE HYDROCHLORIDE 1 EACH: 40 SOLUTION TOPICAL at 16:31

## 2021-04-28 RX ADMIN — LIDOCAINE HYDROCHLORIDE 100 MG: 20 INJECTION, SOLUTION EPIDURAL; INFILTRATION; INTRACAUDAL; PERINEURAL at 16:31

## 2021-04-28 RX ADMIN — PROPOFOL 150 MG: 10 INJECTION, EMULSION INTRAVENOUS at 16:31

## 2021-04-28 RX ADMIN — DEXAMETHASONE SODIUM PHOSPHATE 4 MG: 4 INJECTION, SOLUTION INTRA-ARTICULAR; INTRALESIONAL; INTRAMUSCULAR; INTRAVENOUS; SOFT TISSUE at 17:27

## 2021-04-28 RX ADMIN — Medication 10 MCG: at 17:19

## 2021-04-28 RX ADMIN — Medication 10 MCG: at 17:12

## 2021-04-28 RX ADMIN — MORPHINE SULFATE 4 MG: 4 INJECTION, SOLUTION INTRAMUSCULAR; INTRAVENOUS at 12:27

## 2021-04-28 RX ADMIN — ISOSORBIDE MONONITRATE 60 MG: 60 TABLET, EXTENDED RELEASE ORAL at 21:12

## 2021-04-28 RX ADMIN — FENTANYL CITRATE 150 MCG: 50 INJECTION, SOLUTION INTRAMUSCULAR; INTRAVENOUS at 16:31

## 2021-04-28 RX ADMIN — MINOXIDIL 2.5 MG: 2.5 TABLET ORAL at 21:13

## 2021-04-28 RX ADMIN — ROCURONIUM BROMIDE 50 MG: 10 INJECTION INTRAVENOUS at 16:31

## 2021-04-28 RX ADMIN — ONDANSETRON 4 MG: 2 INJECTION INTRAMUSCULAR; INTRAVENOUS at 17:27

## 2021-04-28 RX ADMIN — SODIUM CHLORIDE, PRESERVATIVE FREE 10 ML: 5 INJECTION INTRAVENOUS at 21:13

## 2021-04-28 RX ADMIN — OXYCODONE HYDROCHLORIDE AND ACETAMINOPHEN 1 TABLET: 10; 325 TABLET ORAL at 18:14

## 2021-04-28 RX ADMIN — SODIUM CHLORIDE, POTASSIUM CHLORIDE, SODIUM LACTATE AND CALCIUM CHLORIDE: 600; 310; 30; 20 INJECTION, SOLUTION INTRAVENOUS at 15:57

## 2021-04-28 NOTE — ANESTHESIA PREPROCEDURE EVALUATION
Anesthesia Evaluation     Patient summary reviewed and Nursing notes reviewed   no history of anesthetic complications:  NPO Solid Status: > 8 hours  NPO Liquid Status: > 8 hours           Airway   Mallampati: II  TM distance: >3 FB  Neck ROM: full  No difficulty expected  Dental      Pulmonary    (+) pneumonia resolved ,   (-) sleep apnea    ROS comment: 11/2020  Pt reports he was hospitalized in ICU  Refused ventilator, was d/c on home o2  Reports he is feeling better now and not requiring oxygen at home  Cardiovascular   Exercise tolerance: poor (<4 METS)    ECG reviewed  PT is on anticoagulation therapy    (+) hypertension, past MI , CAD, dysrhythmias Atrial Fib, hyperlipidemia,     ROS comment: [facility] [place], records obtained from cardiology office visit 2/2021  EF45%    Cath 2016 with multiple stents to LAD  Unable to view cath report  Pt denies chest pain   He does report dyspnea  He is on eliqus and plavix    Neuro/Psych  (-) seizures, TIA, CVA  GI/Hepatic/Renal/Endo    (+)  GERD,  renal disease CRI, diabetes mellitus type 2 using insulin,     Musculoskeletal     Abdominal    Substance History      OB/GYN          Other                      Anesthesia Plan    ASA 3 - emergent       Rapid sequence(Fall at 8:45 this am  Reports he ate full breakfast prior to this)  intravenous induction     Anesthetic plan, all risks, benefits, and alternatives have been provided, discussed and informed consent has been obtained with: patient.

## 2021-04-28 NOTE — ANESTHESIA PROCEDURE NOTES
Airway  Urgency: elective    Date/Time: 4/28/2021 4:31 PM  Airway not difficult    General Information and Staff    Patient location during procedure: OR  CRNA: Davion Coronado CRNA    Indications and Patient Condition  Indications for airway management: airway protection    Preoxygenated: yes  MILS maintained throughout  Mask difficulty assessment: 1 - vent by mask    Final Airway Details  Final airway type: endotracheal airway      Successful airway: ETT  Cuffed: yes   Successful intubation technique: direct laryngoscopy  Endotracheal tube insertion site: oral  Blade: Contreras  Blade size: 2  ETT size (mm): 8.0  Cormack-Lehane Classification: grade IIa - partial view of glottis  Placement verified by: chest auscultation   Cuff volume (mL): 10  Measured from: lips  Number of attempts at approach: 1  Assessment: lips, teeth, and gum same as pre-op and atraumatic intubation

## 2021-04-29 LAB
ANION GAP SERPL CALCULATED.3IONS-SCNC: 12 MMOL/L (ref 5–15)
BUN SERPL-MCNC: 67 MG/DL (ref 8–23)
BUN/CREAT SERPL: 21.8 (ref 7–25)
CALCIUM SPEC-SCNC: 8.9 MG/DL (ref 8.6–10.5)
CHLORIDE SERPL-SCNC: 82 MMOL/L (ref 98–107)
CO2 SERPL-SCNC: 39 MMOL/L (ref 22–29)
CREAT SERPL-MCNC: 3.07 MG/DL (ref 0.76–1.27)
DEPRECATED RDW RBC AUTO: 41.9 FL (ref 37–54)
ERYTHROCYTE [DISTWIDTH] IN BLOOD BY AUTOMATED COUNT: 12.7 % (ref 12.3–15.4)
GFR SERPL CREATININE-BSD FRML MDRD: 21 ML/MIN/1.73
GLUCOSE BLDC GLUCOMTR-MCNC: 234 MG/DL (ref 70–130)
GLUCOSE BLDC GLUCOMTR-MCNC: 247 MG/DL (ref 70–130)
GLUCOSE BLDC GLUCOMTR-MCNC: 251 MG/DL (ref 70–130)
GLUCOSE BLDC GLUCOMTR-MCNC: 382 MG/DL (ref 70–130)
GLUCOSE SERPL-MCNC: 477 MG/DL (ref 65–99)
HCT VFR BLD AUTO: 38.9 % (ref 37.5–51)
HGB BLD-MCNC: 13 G/DL (ref 13–17.7)
MCH RBC QN AUTO: 30.7 PG (ref 26.6–33)
MCHC RBC AUTO-ENTMCNC: 33.4 G/DL (ref 31.5–35.7)
MCV RBC AUTO: 91.7 FL (ref 79–97)
PLATELET # BLD AUTO: 137 10*3/MM3 (ref 140–450)
PMV BLD AUTO: 12.3 FL (ref 6–12)
POTASSIUM SERPL-SCNC: 3.4 MMOL/L (ref 3.5–5.2)
QT INTERVAL: 520 MS
QTC INTERVAL: 520 MS
RBC # BLD AUTO: 4.24 10*6/MM3 (ref 4.14–5.8)
SODIUM SERPL-SCNC: 133 MMOL/L (ref 136–145)
WBC # BLD AUTO: 12.49 10*3/MM3 (ref 3.4–10.8)

## 2021-04-29 PROCEDURE — 80048 BASIC METABOLIC PNL TOTAL CA: CPT | Performed by: INTERNAL MEDICINE

## 2021-04-29 PROCEDURE — 97166 OT EVAL MOD COMPLEX 45 MIN: CPT | Performed by: OCCUPATIONAL THERAPIST

## 2021-04-29 PROCEDURE — 63710000001 DIPHENHYDRAMINE PER 50 MG: Performed by: INTERNAL MEDICINE

## 2021-04-29 PROCEDURE — 82962 GLUCOSE BLOOD TEST: CPT

## 2021-04-29 PROCEDURE — 97530 THERAPEUTIC ACTIVITIES: CPT | Performed by: PHYSICAL THERAPIST

## 2021-04-29 PROCEDURE — 25010000002 CEFAZOLIN PER 500 MG: Performed by: ORTHOPAEDIC SURGERY

## 2021-04-29 PROCEDURE — 97162 PT EVAL MOD COMPLEX 30 MIN: CPT | Performed by: PHYSICAL THERAPIST

## 2021-04-29 PROCEDURE — 63710000001 INSULIN LISPRO (HUMAN) PER 5 UNITS: Performed by: INTERNAL MEDICINE

## 2021-04-29 PROCEDURE — 63710000001 INSULIN DETEMIR PER 5 UNITS: Performed by: INTERNAL MEDICINE

## 2021-04-29 PROCEDURE — 85027 COMPLETE CBC AUTOMATED: CPT | Performed by: INTERNAL MEDICINE

## 2021-04-29 RX ORDER — POTASSIUM CHLORIDE 750 MG/1
40 CAPSULE, EXTENDED RELEASE ORAL ONCE
Status: COMPLETED | OUTPATIENT
Start: 2021-04-29 | End: 2021-04-29

## 2021-04-29 RX ORDER — DIPHENHYDRAMINE HCL 25 MG
25 CAPSULE ORAL EVERY 6 HOURS PRN
Status: DISCONTINUED | OUTPATIENT
Start: 2021-04-29 | End: 2021-05-01 | Stop reason: HOSPADM

## 2021-04-29 RX ORDER — ONDANSETRON 2 MG/ML
4 INJECTION INTRAMUSCULAR; INTRAVENOUS EVERY 6 HOURS PRN
Status: DISCONTINUED | OUTPATIENT
Start: 2021-04-29 | End: 2021-05-01 | Stop reason: HOSPADM

## 2021-04-29 RX ORDER — METOLAZONE 2.5 MG/1
2.5 TABLET ORAL 3 TIMES WEEKLY
Status: DISCONTINUED | OUTPATIENT
Start: 2021-04-30 | End: 2021-04-30

## 2021-04-29 RX ADMIN — OXYCODONE HYDROCHLORIDE AND ACETAMINOPHEN 1 TABLET: 5; 325 TABLET ORAL at 01:13

## 2021-04-29 RX ADMIN — INSULIN LISPRO 20 UNITS: 100 INJECTION, SOLUTION INTRAVENOUS; SUBCUTANEOUS at 06:41

## 2021-04-29 RX ADMIN — ISOSORBIDE MONONITRATE 60 MG: 60 TABLET, EXTENDED RELEASE ORAL at 09:28

## 2021-04-29 RX ADMIN — MINOXIDIL 2.5 MG: 2.5 TABLET ORAL at 09:29

## 2021-04-29 RX ADMIN — INSULIN LISPRO 8 UNITS: 100 INJECTION, SOLUTION INTRAVENOUS; SUBCUTANEOUS at 09:30

## 2021-04-29 RX ADMIN — DOCUSATE SODIUM 50 MG AND SENNOSIDES 8.6 MG 2 TABLET: 8.6; 5 TABLET, FILM COATED ORAL at 20:50

## 2021-04-29 RX ADMIN — SODIUM CHLORIDE, PRESERVATIVE FREE 10 ML: 5 INJECTION INTRAVENOUS at 09:31

## 2021-04-29 RX ADMIN — CETIRIZINE HYDROCHLORIDE 10 MG: 10 TABLET, FILM COATED ORAL at 09:27

## 2021-04-29 RX ADMIN — CEFAZOLIN SODIUM 2 G: 10 INJECTION, POWDER, FOR SOLUTION INTRAVENOUS at 20:51

## 2021-04-29 RX ADMIN — AMIODARONE HYDROCHLORIDE 200 MG: 200 TABLET ORAL at 09:27

## 2021-04-29 RX ADMIN — POTASSIUM CHLORIDE 40 MEQ: 750 CAPSULE, EXTENDED RELEASE ORAL at 20:50

## 2021-04-29 RX ADMIN — OXYCODONE HYDROCHLORIDE AND ACETAMINOPHEN 1000 MG: 500 TABLET ORAL at 09:27

## 2021-04-29 RX ADMIN — SODIUM CHLORIDE, POTASSIUM CHLORIDE, SODIUM LACTATE AND CALCIUM CHLORIDE 100 ML/HR: 600; 310; 30; 20 INJECTION, SOLUTION INTRAVENOUS at 04:25

## 2021-04-29 RX ADMIN — INSULIN LISPRO 4 UNITS: 100 INJECTION, SOLUTION INTRAVENOUS; SUBCUTANEOUS at 12:20

## 2021-04-29 RX ADMIN — MONTELUKAST SODIUM 10 MG: 10 TABLET, FILM COATED ORAL at 20:51

## 2021-04-29 RX ADMIN — PANTOPRAZOLE SODIUM 40 MG: 40 TABLET, DELAYED RELEASE ORAL at 05:25

## 2021-04-29 RX ADMIN — ROSUVASTATIN CALCIUM 20 MG: 20 TABLET, FILM COATED ORAL at 20:51

## 2021-04-29 RX ADMIN — SODIUM CHLORIDE, POTASSIUM CHLORIDE, SODIUM LACTATE AND CALCIUM CHLORIDE 100 ML/HR: 600; 310; 30; 20 INJECTION, SOLUTION INTRAVENOUS at 14:47

## 2021-04-29 RX ADMIN — MINOXIDIL 2.5 MG: 2.5 TABLET ORAL at 20:51

## 2021-04-29 RX ADMIN — INSULIN DETEMIR 30 UNITS: 100 INJECTION, SOLUTION SUBCUTANEOUS at 06:41

## 2021-04-29 RX ADMIN — CARVEDILOL 12.5 MG: 6.25 TABLET, FILM COATED ORAL at 17:09

## 2021-04-29 RX ADMIN — DIPHENHYDRAMINE HYDROCHLORIDE 25 MG: 25 CAPSULE ORAL at 06:14

## 2021-04-29 RX ADMIN — INSULIN LISPRO 4 UNITS: 100 INJECTION, SOLUTION INTRAVENOUS; SUBCUTANEOUS at 17:22

## 2021-04-29 RX ADMIN — CARVEDILOL 12.5 MG: 6.25 TABLET, FILM COATED ORAL at 09:27

## 2021-04-29 RX ADMIN — ALLOPURINOL 100 MG: 100 TABLET ORAL at 09:27

## 2021-04-29 RX ADMIN — ISOSORBIDE MONONITRATE 60 MG: 60 TABLET, EXTENDED RELEASE ORAL at 20:50

## 2021-04-29 RX ADMIN — INSULIN LISPRO 6 UNITS: 100 INJECTION, SOLUTION INTRAVENOUS; SUBCUTANEOUS at 20:52

## 2021-04-29 NOTE — ANESTHESIA POSTPROCEDURE EVALUATION
Patient: Fortino Llanes    Procedure Summary     Date: 04/28/21 Room / Location:  [facility] OR  /  [facility] OR    Anesthesia Start: 1628 Anesthesia Stop: 1749    Procedure: HIP TROCHANTERIC NAILING SHORT WITH INTRAMEDULLARY HIP SCREW (Left Hip) Diagnosis:       Closed fracture of left hip, initial encounter (CMS/[facility])      (Closed fracture of left hip, initial encounter (CMS/[facility]) [S72.002A])    Surgeons: Cain Vasquez MD Provider: Davion Coronado CRNA    Anesthesia Type: Not recorded ASA Status: 3 - Emergent          Anesthesia Type: No value filed.    Vitals  Vitals Value Taken Time   /78 04/28/21 1822   Temp 98.9 °F (37.2 °C) 04/28/21 1827   Pulse 61 04/28/21 1827   Resp 15 04/28/21 1827   SpO2 97 % 04/28/21 1827           Post Anesthesia Care and Evaluation    Patient location during evaluation: PACU  Patient participation: complete - patient participated  Level of consciousness: awake and alert  Pain management: adequate  Airway patency: patent  Anesthetic complications: No anesthetic complications    Cardiovascular status: acceptable  Respiratory status: acceptable  Hydration status: acceptable

## 2021-04-30 LAB
ANION GAP SERPL CALCULATED.3IONS-SCNC: 9 MMOL/L (ref 5–15)
BUN SERPL-MCNC: 63 MG/DL (ref 8–23)
BUN/CREAT SERPL: 23.1 (ref 7–25)
CALCIUM SPEC-SCNC: 8.7 MG/DL (ref 8.6–10.5)
CHLORIDE SERPL-SCNC: 86 MMOL/L (ref 98–107)
CO2 SERPL-SCNC: 40 MMOL/L (ref 22–29)
CREAT SERPL-MCNC: 2.73 MG/DL (ref 0.76–1.27)
DEPRECATED RDW RBC AUTO: 43.2 FL (ref 37–54)
ERYTHROCYTE [DISTWIDTH] IN BLOOD BY AUTOMATED COUNT: 13 % (ref 12.3–15.4)
GFR SERPL CREATININE-BSD FRML MDRD: 24 ML/MIN/1.73
GLUCOSE BLDC GLUCOMTR-MCNC: 275 MG/DL (ref 70–130)
GLUCOSE BLDC GLUCOMTR-MCNC: 299 MG/DL (ref 70–130)
GLUCOSE BLDC GLUCOMTR-MCNC: 311 MG/DL (ref 70–130)
GLUCOSE BLDC GLUCOMTR-MCNC: 329 MG/DL (ref 70–130)
GLUCOSE SERPL-MCNC: 325 MG/DL (ref 65–99)
HCT VFR BLD AUTO: 32 % (ref 37.5–51)
HGB BLD-MCNC: 10.7 G/DL (ref 13–17.7)
MCH RBC QN AUTO: 31 PG (ref 26.6–33)
MCHC RBC AUTO-ENTMCNC: 33.4 G/DL (ref 31.5–35.7)
MCV RBC AUTO: 92.8 FL (ref 79–97)
PLATELET # BLD AUTO: 115 10*3/MM3 (ref 140–450)
PMV BLD AUTO: 11.6 FL (ref 6–12)
POTASSIUM SERPL-SCNC: 3.1 MMOL/L (ref 3.5–5.2)
PTH-INTACT SERPL-MCNC: 98.6 PG/ML (ref 15–65)
RBC # BLD AUTO: 3.45 10*6/MM3 (ref 4.14–5.8)
SODIUM SERPL-SCNC: 135 MMOL/L (ref 136–145)
WBC # BLD AUTO: 10.6 10*3/MM3 (ref 3.4–10.8)

## 2021-04-30 PROCEDURE — 63710000001 INSULIN LISPRO (HUMAN) PER 5 UNITS: Performed by: INTERNAL MEDICINE

## 2021-04-30 PROCEDURE — 82962 GLUCOSE BLOOD TEST: CPT

## 2021-04-30 PROCEDURE — 25010000002 ONDANSETRON PER 1 MG: Performed by: INTERNAL MEDICINE

## 2021-04-30 PROCEDURE — 97116 GAIT TRAINING THERAPY: CPT

## 2021-04-30 PROCEDURE — 63710000001 INSULIN DETEMIR PER 5 UNITS: Performed by: INTERNAL MEDICINE

## 2021-04-30 PROCEDURE — 85027 COMPLETE CBC AUTOMATED: CPT | Performed by: INTERNAL MEDICINE

## 2021-04-30 PROCEDURE — 80048 BASIC METABOLIC PNL TOTAL CA: CPT | Performed by: INTERNAL MEDICINE

## 2021-04-30 PROCEDURE — 97530 THERAPEUTIC ACTIVITIES: CPT

## 2021-04-30 PROCEDURE — 63710000001 DIPHENHYDRAMINE PER 50 MG: Performed by: INTERNAL MEDICINE

## 2021-04-30 PROCEDURE — 97110 THERAPEUTIC EXERCISES: CPT

## 2021-04-30 PROCEDURE — 25010000002 CEFAZOLIN PER 500 MG: Performed by: ORTHOPAEDIC SURGERY

## 2021-04-30 PROCEDURE — 25010000002 ENOXAPARIN PER 10 MG: Performed by: ORTHOPAEDIC SURGERY

## 2021-04-30 PROCEDURE — 83970 ASSAY OF PARATHORMONE: CPT | Performed by: INTERNAL MEDICINE

## 2021-04-30 PROCEDURE — 97535 SELF CARE MNGMENT TRAINING: CPT

## 2021-04-30 RX ORDER — BUMETANIDE 1 MG/1
1 TABLET ORAL 2 TIMES DAILY
Status: ON HOLD | COMMUNITY
End: 2021-05-01 | Stop reason: SDUPTHER

## 2021-04-30 RX ORDER — CARVEDILOL 25 MG/1
25 TABLET ORAL 2 TIMES DAILY WITH MEALS
COMMUNITY
End: 2021-10-28 | Stop reason: SDUPTHER

## 2021-04-30 RX ORDER — POTASSIUM CHLORIDE 20 MEQ/1
20 TABLET, EXTENDED RELEASE ORAL 2 TIMES DAILY
COMMUNITY
End: 2021-05-01 | Stop reason: HOSPADM

## 2021-04-30 RX ORDER — POTASSIUM CHLORIDE 750 MG/1
40 CAPSULE, EXTENDED RELEASE ORAL ONCE
Status: COMPLETED | OUTPATIENT
Start: 2021-04-30 | End: 2021-04-30

## 2021-04-30 RX ADMIN — SODIUM CHLORIDE, POTASSIUM CHLORIDE, SODIUM LACTATE AND CALCIUM CHLORIDE 50 ML/HR: 600; 310; 30; 20 INJECTION, SOLUTION INTRAVENOUS at 20:59

## 2021-04-30 RX ADMIN — CARVEDILOL 12.5 MG: 6.25 TABLET, FILM COATED ORAL at 08:16

## 2021-04-30 RX ADMIN — MINOXIDIL 2.5 MG: 2.5 TABLET ORAL at 20:59

## 2021-04-30 RX ADMIN — CEFAZOLIN SODIUM 2 G: 10 INJECTION, POWDER, FOR SOLUTION INTRAVENOUS at 05:30

## 2021-04-30 RX ADMIN — PANTOPRAZOLE SODIUM 40 MG: 40 TABLET, DELAYED RELEASE ORAL at 05:30

## 2021-04-30 RX ADMIN — INSULIN LISPRO 7 UNITS: 100 INJECTION, SOLUTION INTRAVENOUS; SUBCUTANEOUS at 11:29

## 2021-04-30 RX ADMIN — DOCUSATE SODIUM 50 MG AND SENNOSIDES 8.6 MG 2 TABLET: 8.6; 5 TABLET, FILM COATED ORAL at 20:59

## 2021-04-30 RX ADMIN — ONDANSETRON HYDROCHLORIDE 4 MG: 2 SOLUTION INTRAMUSCULAR; INTRAVENOUS at 09:45

## 2021-04-30 RX ADMIN — ISOSORBIDE MONONITRATE 60 MG: 60 TABLET, EXTENDED RELEASE ORAL at 08:15

## 2021-04-30 RX ADMIN — MINOXIDIL 2.5 MG: 2.5 TABLET ORAL at 08:15

## 2021-04-30 RX ADMIN — SODIUM CHLORIDE, PRESERVATIVE FREE 10 ML: 5 INJECTION INTRAVENOUS at 08:20

## 2021-04-30 RX ADMIN — CETIRIZINE HYDROCHLORIDE 10 MG: 10 TABLET, FILM COATED ORAL at 08:15

## 2021-04-30 RX ADMIN — INSULIN LISPRO 7 UNITS: 100 INJECTION, SOLUTION INTRAVENOUS; SUBCUTANEOUS at 16:57

## 2021-04-30 RX ADMIN — ISOSORBIDE MONONITRATE 60 MG: 60 TABLET, EXTENDED RELEASE ORAL at 20:59

## 2021-04-30 RX ADMIN — ROSUVASTATIN CALCIUM 20 MG: 20 TABLET, FILM COATED ORAL at 20:59

## 2021-04-30 RX ADMIN — DIPHENHYDRAMINE HYDROCHLORIDE 25 MG: 25 CAPSULE ORAL at 20:59

## 2021-04-30 RX ADMIN — MONTELUKAST SODIUM 10 MG: 10 TABLET, FILM COATED ORAL at 20:59

## 2021-04-30 RX ADMIN — ENOXAPARIN SODIUM 30 MG: 30 INJECTION SUBCUTANEOUS at 08:17

## 2021-04-30 RX ADMIN — ONDANSETRON HYDROCHLORIDE 4 MG: 2 SOLUTION INTRAMUSCULAR; INTRAVENOUS at 00:06

## 2021-04-30 RX ADMIN — OXYCODONE HYDROCHLORIDE AND ACETAMINOPHEN 1000 MG: 500 TABLET ORAL at 08:16

## 2021-04-30 RX ADMIN — INSULIN LISPRO 5 UNITS: 100 INJECTION, SOLUTION INTRAVENOUS; SUBCUTANEOUS at 16:58

## 2021-04-30 RX ADMIN — OXYCODONE HYDROCHLORIDE AND ACETAMINOPHEN 1 TABLET: 5; 325 TABLET ORAL at 20:59

## 2021-04-30 RX ADMIN — INSULIN LISPRO 6 UNITS: 100 INJECTION, SOLUTION INTRAVENOUS; SUBCUTANEOUS at 20:59

## 2021-04-30 RX ADMIN — CARVEDILOL 12.5 MG: 6.25 TABLET, FILM COATED ORAL at 18:21

## 2021-04-30 RX ADMIN — INSULIN LISPRO 6 UNITS: 100 INJECTION, SOLUTION INTRAVENOUS; SUBCUTANEOUS at 08:17

## 2021-04-30 RX ADMIN — AMIODARONE HYDROCHLORIDE 200 MG: 200 TABLET ORAL at 08:16

## 2021-04-30 RX ADMIN — METOLAZONE 2.5 MG: 2.5 TABLET ORAL at 08:15

## 2021-04-30 RX ADMIN — POTASSIUM CHLORIDE 40 MEQ: 750 CAPSULE, EXTENDED RELEASE ORAL at 11:25

## 2021-04-30 RX ADMIN — ALLOPURINOL 100 MG: 100 TABLET ORAL at 08:16

## 2021-04-30 RX ADMIN — INSULIN DETEMIR 30 UNITS: 100 INJECTION, SOLUTION SUBCUTANEOUS at 08:17

## 2021-04-30 RX ADMIN — SODIUM CHLORIDE, POTASSIUM CHLORIDE, SODIUM LACTATE AND CALCIUM CHLORIDE 100 ML/HR: 600; 310; 30; 20 INJECTION, SOLUTION INTRAVENOUS at 05:32

## 2021-05-01 ENCOUNTER — HOSPITAL ENCOUNTER (INPATIENT)
Age: 66
LOS: 13 days | Discharge: HOME HEALTH | DRG: 560 | End: 2021-05-14
Attending: PSYCHIATRY & NEUROLOGY | Admitting: PSYCHIATRY & NEUROLOGY
Payer: COMMERCIAL

## 2021-05-01 VITALS
DIASTOLIC BLOOD PRESSURE: 59 MMHG | SYSTOLIC BLOOD PRESSURE: 134 MMHG | WEIGHT: 213 LBS | HEIGHT: 71 IN | RESPIRATION RATE: 18 BRPM | BODY MASS INDEX: 29.82 KG/M2 | OXYGEN SATURATION: 98 % | TEMPERATURE: 98 F | HEART RATE: 60 BPM

## 2021-05-01 DIAGNOSIS — S72.002A CLOSED LEFT HIP FRACTURE, INITIAL ENCOUNTER (HCC): Primary | ICD-10-CM

## 2021-05-01 PROBLEM — I48.0 PAROXYSMAL ATRIAL FIBRILLATION (HCC): Status: ACTIVE | Noted: 2021-05-01

## 2021-05-01 PROBLEM — E87.6 HYPOKALEMIA: Status: ACTIVE | Noted: 2021-05-01

## 2021-05-01 PROBLEM — N18.9 ACUTE KIDNEY INJURY SUPERIMPOSED ON CHRONIC KIDNEY DISEASE (HCC): Status: ACTIVE | Noted: 2021-05-01

## 2021-05-01 PROBLEM — Z79.01 CHRONIC ANTICOAGULATION: Status: ACTIVE | Noted: 2021-05-01

## 2021-05-01 PROBLEM — Z86.16 HISTORY OF COVID-19: Status: ACTIVE | Noted: 2021-05-01
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
## 2021-05-01 PROBLEM — E11.65 TYPE 2 DIABETES MELLITUS WITH HYPERGLYCEMIA (HCC): Status: ACTIVE | Noted: 2021-05-01

## 2021-05-01 PROBLEM — N17.9 ACUTE KIDNEY INJURY SUPERIMPOSED ON CHRONIC KIDNEY DISEASE: Status: ACTIVE | Noted: 2021-05-01

## 2021-05-01 PROBLEM — D62 POSTOPERATIVE ANEMIA DUE TO ACUTE BLOOD LOSS: Status: ACTIVE | Noted: 2021-05-01

## 2021-05-01 PROBLEM — Z86.79 HISTORY OF HEART FAILURE: Status: ACTIVE | Noted: 2021-05-01

## 2021-05-01 LAB
ANION GAP SERPL CALCULATED.3IONS-SCNC: 8 MMOL/L (ref 5–15)
BACTERIA: NEGATIVE /HPF
BILIRUBIN URINE: NEGATIVE
BLOOD, URINE: NEGATIVE
BUN SERPL-MCNC: 51 MG/DL (ref 8–23)
BUN/CREAT SERPL: 21 (ref 7–25)
CALCIUM SPEC-SCNC: 8.8 MG/DL (ref 8.6–10.5)
CHLORIDE SERPL-SCNC: 94 MMOL/L (ref 98–107)
CK SERPL-CCNC: 143 U/L (ref 20–200)
CLARITY: CLEAR
CO2 SERPL-SCNC: 39 MMOL/L (ref 22–29)
COLOR: YELLOW
CREAT SERPL-MCNC: 2.43 MG/DL (ref 0.76–1.27)
CRYSTALS, UA: ABNORMAL /HPF
DEPRECATED RDW RBC AUTO: 45.6 FL (ref 37–54)
EPITHELIAL CELLS, UA: 0 /HPF (ref 0–5)
ERYTHROCYTE [DISTWIDTH] IN BLOOD BY AUTOMATED COUNT: 13.1 % (ref 12.3–15.4)
GFR SERPL CREATININE-BSD FRML MDRD: 27 ML/MIN/1.73
GLUCOSE BLD-MCNC: 104 MG/DL (ref 70–99)
GLUCOSE BLDC GLUCOMTR-MCNC: 222 MG/DL (ref 70–130)
GLUCOSE BLDC GLUCOMTR-MCNC: 239 MG/DL (ref 70–130)
GLUCOSE BLDC GLUCOMTR-MCNC: 295 MG/DL (ref 70–130)
GLUCOSE SERPL-MCNC: 192 MG/DL (ref 65–99)
GLUCOSE URINE: NEGATIVE MG/DL
HCT VFR BLD AUTO: 29.2 % (ref 37.5–51)
HGB BLD-MCNC: 9.5 G/DL (ref 13–17.7)
HYALINE CASTS: 2 /HPF (ref 0–8)
KETONES, URINE: NEGATIVE MG/DL
LEUKOCYTE ESTERASE, URINE: NEGATIVE
MCH RBC QN AUTO: 31.3 PG (ref 26.6–33)
MCHC RBC AUTO-ENTMCNC: 32.5 G/DL (ref 31.5–35.7)
MCV RBC AUTO: 96.1 FL (ref 79–97)
NITRITE, URINE: NEGATIVE
PERFORMED ON: ABNORMAL
PH UA: 6 (ref 5–8)
PLATELET # BLD AUTO: 106 10*3/MM3 (ref 140–450)
PMV BLD AUTO: 12.2 FL (ref 6–12)
POTASSIUM SERPL-SCNC: 3.1 MMOL/L (ref 3.5–5.2)
PROTEIN UA: 100 MG/DL
RBC # BLD AUTO: 3.04 10*6/MM3 (ref 4.14–5.8)
RBC UA: 1 /HPF (ref 0–4)
SARS-COV-2 RNA PNL SPEC NAA+PROBE: NOT DETECTED
SODIUM SERPL-SCNC: 141 MMOL/L (ref 136–145)
SPECIFIC GRAVITY UA: 1.01 (ref 1–1.03)
URATE SERPL-MCNC: 8.6 MG/DL (ref 3.4–7)
UROBILINOGEN, URINE: 1 E.U./DL
WBC # BLD AUTO: 9.77 10*3/MM3 (ref 3.4–10.8)
WBC UA: 0 /HPF (ref 0–5)
WHOLE BLOOD HOLD SPECIMEN: NORMAL

## 2021-05-01 PROCEDURE — 87635 SARS-COV-2 COVID-19 AMP PRB: CPT | Performed by: INTERNAL MEDICINE

## 2021-05-01 PROCEDURE — 80048 BASIC METABOLIC PNL TOTAL CA: CPT | Performed by: INTERNAL MEDICINE

## 2021-05-01 PROCEDURE — 82550 ASSAY OF CK (CPK): CPT | Performed by: INTERNAL MEDICINE

## 2021-05-01 PROCEDURE — 82947 ASSAY GLUCOSE BLOOD QUANT: CPT

## 2021-05-01 PROCEDURE — 85027 COMPLETE CBC AUTOMATED: CPT | Performed by: INTERNAL MEDICINE

## 2021-05-01 PROCEDURE — 63710000001 INSULIN DETEMIR PER 5 UNITS: Performed by: INTERNAL MEDICINE

## 2021-05-01 PROCEDURE — 97116 GAIT TRAINING THERAPY: CPT

## 2021-05-01 PROCEDURE — 97530 THERAPEUTIC ACTIVITIES: CPT

## 2021-05-01 PROCEDURE — 63710000001 DIPHENHYDRAMINE PER 50 MG: Performed by: INTERNAL MEDICINE

## 2021-05-01 PROCEDURE — 87086 URINE CULTURE/COLONY COUNT: CPT

## 2021-05-01 PROCEDURE — 1180000000 HC REHAB R&B

## 2021-05-01 PROCEDURE — 82962 GLUCOSE BLOOD TEST: CPT

## 2021-05-01 PROCEDURE — 63710000001 INSULIN LISPRO (HUMAN) PER 5 UNITS: Performed by: INTERNAL MEDICINE

## 2021-05-01 PROCEDURE — 25010000002 ENOXAPARIN PER 10 MG: Performed by: ORTHOPAEDIC SURGERY

## 2021-05-01 PROCEDURE — 84550 ASSAY OF BLOOD/URIC ACID: CPT | Performed by: INTERNAL MEDICINE

## 2021-05-01 PROCEDURE — 81001 URINALYSIS AUTO W/SCOPE: CPT

## 2021-05-01 PROCEDURE — 6370000000 HC RX 637 (ALT 250 FOR IP): Performed by: PSYCHIATRY & NEUROLOGY

## 2021-05-01 RX ORDER — INSULIN GLARGINE 100 [IU]/ML
40 INJECTION, SOLUTION SUBCUTANEOUS NIGHTLY
Start: 2021-05-01

## 2021-05-01 RX ORDER — AMOXICILLIN 250 MG
2 CAPSULE ORAL NIGHTLY
Start: 2021-05-01 | End: 2021-10-28 | Stop reason: SDUPTHER

## 2021-05-01 RX ORDER — DIPHENHYDRAMINE HCL 25 MG
25 CAPSULE ORAL EVERY 6 HOURS PRN
COMMUNITY
End: 2022-10-21 | Stop reason: ALTCHOICE

## 2021-05-01 RX ORDER — OXYCODONE AND ACETAMINOPHEN 10; 325 MG/1; MG/1
1 TABLET ORAL EVERY 4 HOURS PRN
Status: ON HOLD | COMMUNITY
End: 2021-05-14 | Stop reason: HOSPADM

## 2021-05-01 RX ORDER — OXYCODONE AND ACETAMINOPHEN 10; 325 MG/1; MG/1
1 TABLET ORAL EVERY 4 HOURS PRN
Status: DISCONTINUED | OUTPATIENT
Start: 2021-05-01 | End: 2021-05-02

## 2021-05-01 RX ORDER — DIPHENHYDRAMINE HCL 25 MG
25 CAPSULE ORAL EVERY 6 HOURS PRN
Start: 2021-05-01 | End: 2021-05-21

## 2021-05-01 RX ORDER — CALCITRIOL 0.25 UG/1
0.25 CAPSULE, LIQUID FILLED ORAL 3 TIMES WEEKLY
Status: DISCONTINUED | OUTPATIENT
Start: 2021-05-03 | End: 2021-05-01 | Stop reason: HOSPADM

## 2021-05-01 RX ORDER — CALCITRIOL 0.25 UG/1
0.25 CAPSULE, LIQUID FILLED ORAL 3 TIMES WEEKLY
Start: 2021-05-03 | End: 2021-06-02

## 2021-05-01 RX ORDER — OXYCODONE AND ACETAMINOPHEN 10; 325 MG/1; MG/1
1 TABLET ORAL EVERY 4 HOURS PRN
Qty: 12 TABLET | Refills: 0 | Status: SHIPPED | OUTPATIENT
Start: 2021-05-01 | End: 2021-10-28

## 2021-05-01 RX ORDER — DIPHENHYDRAMINE HCL 25 MG
25 TABLET ORAL EVERY 6 HOURS PRN
Status: DISCONTINUED | OUTPATIENT
Start: 2021-05-01 | End: 2021-05-02

## 2021-05-01 RX ORDER — AMOXICILLIN 250 MG
1 CAPSULE ORAL NIGHTLY
COMMUNITY

## 2021-05-01 RX ORDER — CALCITRIOL 0.25 UG/1
0.25 CAPSULE, LIQUID FILLED ORAL DAILY
COMMUNITY
Start: 2021-05-03

## 2021-05-01 RX ORDER — ALLOPURINOL 100 MG/1
200 TABLET ORAL DAILY
Status: DISCONTINUED | OUTPATIENT
Start: 2021-05-02 | End: 2021-05-01 | Stop reason: HOSPADM

## 2021-05-01 RX ORDER — ALLOPURINOL 100 MG/1
200 TABLET ORAL DAILY
COMMUNITY

## 2021-05-01 RX ORDER — BUMETANIDE 1 MG/1
1 TABLET ORAL DAILY PRN
Start: 2021-05-01

## 2021-05-01 RX ORDER — POTASSIUM CHLORIDE 750 MG/1
20 CAPSULE, EXTENDED RELEASE ORAL ONCE
Status: COMPLETED | OUTPATIENT
Start: 2021-05-01 | End: 2021-05-01

## 2021-05-01 RX ADMIN — AMIODARONE HYDROCHLORIDE 200 MG: 200 TABLET ORAL at 08:26

## 2021-05-01 RX ADMIN — CARVEDILOL 12.5 MG: 6.25 TABLET, FILM COATED ORAL at 17:44

## 2021-05-01 RX ADMIN — ISOSORBIDE MONONITRATE 60 MG: 60 TABLET, EXTENDED RELEASE ORAL at 08:26

## 2021-05-01 RX ADMIN — DIPHENHYDRAMINE HYDROCHLORIDE 25 MG: 25 CAPSULE ORAL at 06:01

## 2021-05-01 RX ADMIN — OXYCODONE HYDROCHLORIDE AND ACETAMINOPHEN 1000 MG: 500 TABLET ORAL at 08:26

## 2021-05-01 RX ADMIN — OXYCODONE HYDROCHLORIDE AND ACETAMINOPHEN 1 TABLET: 10; 325 TABLET ORAL at 08:22

## 2021-05-01 RX ADMIN — INSULIN LISPRO 5 UNITS: 100 INJECTION, SOLUTION INTRAVENOUS; SUBCUTANEOUS at 08:21

## 2021-05-01 RX ADMIN — PANTOPRAZOLE SODIUM 40 MG: 40 TABLET, DELAYED RELEASE ORAL at 06:01

## 2021-05-01 RX ADMIN — MINOXIDIL 2.5 MG: 2.5 TABLET ORAL at 08:26

## 2021-05-01 RX ADMIN — INSULIN LISPRO 4 UNITS: 100 INJECTION, SOLUTION INTRAVENOUS; SUBCUTANEOUS at 17:42

## 2021-05-01 RX ADMIN — DIPHENHYDRAMINE HCL 25 MG: 25 TABLET ORAL at 22:52

## 2021-05-01 RX ADMIN — POTASSIUM CHLORIDE 20 MEQ: 750 CAPSULE, EXTENDED RELEASE ORAL at 15:01

## 2021-05-01 RX ADMIN — ALLOPURINOL 100 MG: 100 TABLET ORAL at 08:26

## 2021-05-01 RX ADMIN — INSULIN LISPRO 6 UNITS: 100 INJECTION, SOLUTION INTRAVENOUS; SUBCUTANEOUS at 12:57

## 2021-05-01 RX ADMIN — INSULIN LISPRO 5 UNITS: 100 INJECTION, SOLUTION INTRAVENOUS; SUBCUTANEOUS at 17:43

## 2021-05-01 RX ADMIN — CETIRIZINE HYDROCHLORIDE 10 MG: 10 TABLET, FILM COATED ORAL at 08:26

## 2021-05-01 RX ADMIN — INSULIN LISPRO 5 UNITS: 100 INJECTION, SOLUTION INTRAVENOUS; SUBCUTANEOUS at 12:58

## 2021-05-01 RX ADMIN — CARVEDILOL 12.5 MG: 6.25 TABLET, FILM COATED ORAL at 08:26

## 2021-05-01 RX ADMIN — SODIUM CHLORIDE, PRESERVATIVE FREE 10 ML: 5 INJECTION INTRAVENOUS at 08:26

## 2021-05-01 RX ADMIN — INSULIN LISPRO 4 UNITS: 100 INJECTION, SOLUTION INTRAVENOUS; SUBCUTANEOUS at 08:21

## 2021-05-01 RX ADMIN — INSULIN DETEMIR 30 UNITS: 100 INJECTION, SOLUTION SUBCUTANEOUS at 08:02

## 2021-05-01 RX ADMIN — ENOXAPARIN SODIUM 30 MG: 30 INJECTION SUBCUTANEOUS at 11:43

## 2021-05-01 RX ADMIN — DIPHENHYDRAMINE HYDROCHLORIDE 25 MG: 25 CAPSULE ORAL at 12:56

## 2021-05-02 PROBLEM — S72.002A CLOSED LEFT HIP FRACTURE, INITIAL ENCOUNTER (HCC): Status: ACTIVE | Noted: 2021-05-02

## 2021-05-02 LAB
BASOPHILS ABSOLUTE: 0.1 K/UL (ref 0–0.2)
BASOPHILS RELATIVE PERCENT: 0.8 % (ref 0–1)
EOSINOPHILS ABSOLUTE: 0.3 K/UL (ref 0–0.6)
EOSINOPHILS RELATIVE PERCENT: 3.9 % (ref 0–5)
GLUCOSE BLD-MCNC: 205 MG/DL (ref 70–99)
GLUCOSE BLD-MCNC: 217 MG/DL (ref 70–99)
GLUCOSE BLD-MCNC: 267 MG/DL (ref 70–99)
GLUCOSE BLD-MCNC: 285 MG/DL (ref 70–99)
HCT VFR BLD CALC: 32.3 % (ref 42–52)
HEMOGLOBIN: 10.1 G/DL (ref 14–18)
IMMATURE GRANULOCYTES #: 0 K/UL
LYMPHOCYTES ABSOLUTE: 1.5 K/UL (ref 1.1–4.5)
LYMPHOCYTES RELATIVE PERCENT: 18.9 % (ref 20–40)
MCH RBC QN AUTO: 30.9 PG (ref 27–31)
MCHC RBC AUTO-ENTMCNC: 31.3 G/DL (ref 33–37)
MCV RBC AUTO: 98.8 FL (ref 80–94)
MONOCYTES ABSOLUTE: 0.7 K/UL (ref 0–0.9)
MONOCYTES RELATIVE PERCENT: 8.4 % (ref 0–10)
NEUTROPHILS ABSOLUTE: 5.3 K/UL (ref 1.5–7.5)
NEUTROPHILS RELATIVE PERCENT: 67.5 % (ref 50–65)
PDW BLD-RTO: 13.1 % (ref 11.5–14.5)
PERFORMED ON: ABNORMAL
PLATELET # BLD: 125 K/UL (ref 130–400)
PMV BLD AUTO: 11.6 FL (ref 9.4–12.4)
PREALBUMIN: 19 MG/DL (ref 20–40)
RBC # BLD: 3.27 M/UL (ref 4.7–6.1)
WBC # BLD: 7.9 K/UL (ref 4.8–10.8)

## 2021-05-02 PROCEDURE — 82947 ASSAY GLUCOSE BLOOD QUANT: CPT

## 2021-05-02 PROCEDURE — 6370000000 HC RX 637 (ALT 250 FOR IP): Performed by: PSYCHIATRY & NEUROLOGY

## 2021-05-02 PROCEDURE — 36415 COLL VENOUS BLD VENIPUNCTURE: CPT

## 2021-05-02 PROCEDURE — 1180000000 HC REHAB R&B

## 2021-05-02 PROCEDURE — 84134 ASSAY OF PREALBUMIN: CPT

## 2021-05-02 PROCEDURE — 85025 COMPLETE CBC W/AUTO DIFF WBC: CPT

## 2021-05-02 RX ORDER — DEXTROSE MONOHYDRATE 25 G/50ML
12.5 INJECTION, SOLUTION INTRAVENOUS PRN
Status: DISCONTINUED | OUTPATIENT
Start: 2021-05-02 | End: 2021-05-14 | Stop reason: HOSPADM

## 2021-05-02 RX ORDER — CARVEDILOL 12.5 MG/1
12.5 TABLET ORAL 2 TIMES DAILY
Status: DISCONTINUED | OUTPATIENT
Start: 2021-05-02 | End: 2021-05-14 | Stop reason: HOSPADM

## 2021-05-02 RX ORDER — BUMETANIDE 1 MG/1
1 TABLET ORAL DAILY PRN
Status: DISCONTINUED | OUTPATIENT
Start: 2021-05-02 | End: 2021-05-14 | Stop reason: HOSPADM

## 2021-05-02 RX ORDER — METOLAZONE 5 MG/1
5 TABLET ORAL
Status: DISCONTINUED | OUTPATIENT
Start: 2021-05-03 | End: 2021-05-14 | Stop reason: HOSPADM

## 2021-05-02 RX ORDER — DEXTROSE MONOHYDRATE 50 MG/ML
100 INJECTION, SOLUTION INTRAVENOUS PRN
Status: DISCONTINUED | OUTPATIENT
Start: 2021-05-02 | End: 2021-05-14 | Stop reason: HOSPADM

## 2021-05-02 RX ORDER — ERGOCALCIFEROL 1.25 MG/1
50000 CAPSULE ORAL WEEKLY
Status: DISCONTINUED | OUTPATIENT
Start: 2021-05-02 | End: 2021-05-14 | Stop reason: HOSPADM

## 2021-05-02 RX ORDER — DIPHENHYDRAMINE HCL 25 MG
25 TABLET ORAL EVERY 6 HOURS PRN
Status: DISCONTINUED | OUTPATIENT
Start: 2021-05-02 | End: 2021-05-14 | Stop reason: HOSPADM

## 2021-05-02 RX ORDER — AMIODARONE HYDROCHLORIDE 200 MG/1
200 TABLET ORAL DAILY
Status: DISCONTINUED | OUTPATIENT
Start: 2021-05-02 | End: 2021-05-14 | Stop reason: HOSPADM

## 2021-05-02 RX ORDER — SENNA AND DOCUSATE SODIUM 50; 8.6 MG/1; MG/1
1 TABLET, FILM COATED ORAL NIGHTLY
Status: DISCONTINUED | OUTPATIENT
Start: 2021-05-02 | End: 2021-05-04

## 2021-05-02 RX ORDER — POLYETHYLENE GLYCOL 3350 17 G/17G
17 POWDER, FOR SOLUTION ORAL DAILY
Status: DISCONTINUED | OUTPATIENT
Start: 2021-05-02 | End: 2021-05-14 | Stop reason: HOSPADM

## 2021-05-02 RX ORDER — ACETAMINOPHEN 325 MG/1
650 TABLET ORAL EVERY 4 HOURS PRN
Status: DISCONTINUED | OUTPATIENT
Start: 2021-05-02 | End: 2021-05-14 | Stop reason: HOSPADM

## 2021-05-02 RX ORDER — ASPIRIN 81 MG/1
81 TABLET, CHEWABLE ORAL DAILY
Status: DISCONTINUED | OUTPATIENT
Start: 2021-05-02 | End: 2021-05-14 | Stop reason: HOSPADM

## 2021-05-02 RX ORDER — NITROGLYCERIN 0.4 MG/1
0.4 TABLET SUBLINGUAL EVERY 5 MIN PRN
Status: DISCONTINUED | OUTPATIENT
Start: 2021-05-02 | End: 2021-05-14 | Stop reason: HOSPADM

## 2021-05-02 RX ORDER — ISOSORBIDE MONONITRATE 60 MG/1
60 TABLET, EXTENDED RELEASE ORAL 2 TIMES DAILY
Status: DISCONTINUED | OUTPATIENT
Start: 2021-05-02 | End: 2021-05-14 | Stop reason: HOSPADM

## 2021-05-02 RX ORDER — NICOTINE POLACRILEX 4 MG
15 LOZENGE BUCCAL PRN
Status: DISCONTINUED | OUTPATIENT
Start: 2021-05-02 | End: 2021-05-14 | Stop reason: HOSPADM

## 2021-05-02 RX ORDER — BISACODYL 10 MG
10 SUPPOSITORY, RECTAL RECTAL DAILY PRN
Status: DISCONTINUED | OUTPATIENT
Start: 2021-05-02 | End: 2021-05-14 | Stop reason: HOSPADM

## 2021-05-02 RX ORDER — MONTELUKAST SODIUM 10 MG/1
10 TABLET ORAL DAILY
Status: DISCONTINUED | OUTPATIENT
Start: 2021-05-02 | End: 2021-05-14 | Stop reason: HOSPADM

## 2021-05-02 RX ORDER — PANTOPRAZOLE SODIUM 40 MG/1
40 TABLET, DELAYED RELEASE ORAL
Status: DISCONTINUED | OUTPATIENT
Start: 2021-05-02 | End: 2021-05-14 | Stop reason: HOSPADM

## 2021-05-02 RX ORDER — CALCITRIOL 0.25 UG/1
0.25 CAPSULE, LIQUID FILLED ORAL
Status: DISCONTINUED | OUTPATIENT
Start: 2021-05-03 | End: 2021-05-14 | Stop reason: HOSPADM

## 2021-05-02 RX ORDER — ALLOPURINOL 100 MG/1
200 TABLET ORAL DAILY
Status: DISCONTINUED | OUTPATIENT
Start: 2021-05-02 | End: 2021-05-14 | Stop reason: HOSPADM

## 2021-05-02 RX ORDER — INSULIN GLARGINE 100 [IU]/ML
40 INJECTION, SOLUTION SUBCUTANEOUS NIGHTLY
Status: DISCONTINUED | OUTPATIENT
Start: 2021-05-02 | End: 2021-05-14 | Stop reason: HOSPADM

## 2021-05-02 RX ORDER — CETIRIZINE HYDROCHLORIDE 10 MG/1
10 TABLET ORAL DAILY
Status: DISCONTINUED | OUTPATIENT
Start: 2021-05-02 | End: 2021-05-14 | Stop reason: HOSPADM

## 2021-05-02 RX ORDER — ESOMEPRAZOLE MAGNESIUM 40 MG/1
40 FOR SUSPENSION ORAL 2 TIMES DAILY
Status: DISCONTINUED | OUTPATIENT
Start: 2021-05-02 | End: 2021-05-02

## 2021-05-02 RX ORDER — ROSUVASTATIN CALCIUM 20 MG/1
20 TABLET, COATED ORAL DAILY
Status: DISCONTINUED | OUTPATIENT
Start: 2021-05-02 | End: 2021-05-14 | Stop reason: HOSPADM

## 2021-05-02 RX ORDER — OXYCODONE AND ACETAMINOPHEN 10; 325 MG/1; MG/1
1 TABLET ORAL EVERY 4 HOURS PRN
Status: DISCONTINUED | OUTPATIENT
Start: 2021-05-02 | End: 2021-05-12

## 2021-05-02 RX ORDER — MINOXIDIL 2.5 MG/1
2.5 TABLET ORAL 2 TIMES DAILY
Status: DISCONTINUED | OUTPATIENT
Start: 2021-05-02 | End: 2021-05-14 | Stop reason: HOSPADM

## 2021-05-02 RX ADMIN — DOCUSATE SODIUM 50 MG AND SENNOSIDES 8.6 MG 1 TABLET: 8.6; 5 TABLET, FILM COATED ORAL at 20:14

## 2021-05-02 RX ADMIN — MINOXIDIL 2.5 MG: 2.5 TABLET ORAL at 10:24

## 2021-05-02 RX ADMIN — ISOSORBIDE MONONITRATE 60 MG: 60 TABLET, EXTENDED RELEASE ORAL at 20:14

## 2021-05-02 RX ADMIN — Medication 40 UNITS: at 20:15

## 2021-05-02 RX ADMIN — MONTELUKAST SODIUM 10 MG: 10 TABLET, FILM COATED ORAL at 08:32

## 2021-05-02 RX ADMIN — CARVEDILOL 12.5 MG: 12.5 TABLET, FILM COATED ORAL at 20:14

## 2021-05-02 RX ADMIN — ALLOPURINOL 200 MG: 100 TABLET ORAL at 08:31

## 2021-05-02 RX ADMIN — ISOSORBIDE MONONITRATE 60 MG: 60 TABLET, EXTENDED RELEASE ORAL at 08:31

## 2021-05-02 RX ADMIN — PANTOPRAZOLE SODIUM 40 MG: 40 TABLET, DELAYED RELEASE ORAL at 16:31

## 2021-05-02 RX ADMIN — APIXABAN 5 MG: 5 TABLET, FILM COATED ORAL at 10:23

## 2021-05-02 RX ADMIN — BISACODYL 5 MG: 5 TABLET, COATED ORAL at 20:32

## 2021-05-02 RX ADMIN — MUPIROCIN: 20 OINTMENT TOPICAL at 10:24

## 2021-05-02 RX ADMIN — ERGOCALCIFEROL 50000 UNITS: 1.25 CAPSULE ORAL at 08:32

## 2021-05-02 RX ADMIN — CARVEDILOL 12.5 MG: 12.5 TABLET, FILM COATED ORAL at 10:24

## 2021-05-02 RX ADMIN — MUPIROCIN: 20 OINTMENT TOPICAL at 20:48

## 2021-05-02 RX ADMIN — ROSUVASTATIN CALCIUM 20 MG: 20 TABLET, FILM COATED ORAL at 08:31

## 2021-05-02 RX ADMIN — APIXABAN 5 MG: 5 TABLET, FILM COATED ORAL at 20:14

## 2021-05-02 RX ADMIN — ASPIRIN 81 MG: 81 TABLET, CHEWABLE ORAL at 08:31

## 2021-05-02 RX ADMIN — MINOXIDIL 2.5 MG: 2.5 TABLET ORAL at 20:14

## 2021-05-02 RX ADMIN — AMIODARONE HYDROCHLORIDE 200 MG: 200 TABLET ORAL at 08:32

## 2021-05-02 RX ADMIN — CETIRIZINE HYDROCHLORIDE 10 MG: 10 TABLET, FILM COATED ORAL at 08:32

## 2021-05-02 NOTE — PROGRESS NOTES
4 Eyes Skin Assessment    Praful Pollard is being assessed upon: Admission    I agree that I, ALEXANDRA Mares, along with Nata Duarte LPN (either 2 RN's or 1 LPN and 1 RN) have performed a thorough Head to Toe Skin Assessment on the patient. ALL assessment sites listed below have been assessed. Areas assessed by both nurses:     [x]   Head, Face, and Ears   [x]   Shoulders, Back, and Chest  [x]   Arms, Elbows, and Hands   [x]   Coccyx, Sacrum, and Ischium  [x]   Legs, Feet, and Heels    Does the Patient have Skin Breakdown?  No    Jose C Prevention initiated: Yes  Wound Care Orders initiated: No    WOC nurse consulted for Pressure Injury (Stage 3,4, Unstageable, DTI, NWPT, and Complex wounds) and New or Established Ostomies: No        Primary Nurse eSignature: ALEXANDRA Mares RN on 5/1/2021 at 10:27 PM      Co-Signer eSignature: Electronically signed by Karyna Zepeda LPN on 7/9/7853 at 9:84 AM

## 2021-05-02 NOTE — PLAN OF CARE
[address] TREATMENT PLAN      Ardell No    : 1955  [facility] #: [de-identified]  MRN: [id number]   PHYSICIAN:  Fransisco Avina MD  Primary Problem    Patient Active Problem List   Diagnosis    DM (diabetes mellitus) ([facility]    GERD (gastroesophageal reflux disease)    Aneurysm of thoracic aorta ([facility])    Renal artery stenosis ([facility])    Carotid artery stenosis    Peripheral vascular disease ([facility]    Diabetes mellitus type I ([facility]    Acute respiratory failure with hypoxia ([facility])    Malignant hypertension    Atrial septal aneurysm    Abnormal EKG    Apical myocardial infarction ([facility]    CAD (coronary artery disease)    Acute on chronic diastolic CHF (congestive heart failure) ([facility])    Chronic congestive heart failure ([facility])    PAF (paroxysmal atrial fibrillation) ([facility])    Stage 3 chronic kidney disease    Abnormal nuclear cardiac imaging test    Mixed hyperlipidemia    Left ventricular dysfunction    Somnolence, daytime    Edema    At risk for obstructive sleep apnea    Chronic combined systolic and diastolic congestive heart failure ([facility])    On amiodarone therapy    Systolic and diastolic CHF, acute on chronic ([facility])    Obesity (BMI 30-39. 9)    NSTEMI (non-ST elevated myocardial infarction) ([facility]    Pneumonia due to COVID-19 virus    Palliative care patient    Acute renal failure (ARF) ([facility]    Severe malnutrition ([facility])    Closed left hip fracture, initial encounter [facility]       Rehabilitation Diagnosis:     Closed left hip fracture, initial encounter ([place] [address].) [S72.002A]       ADMIT DATE:2021   CARE PLAN     NURSING:  Tammi Wilcox while on this unit will:     [x] Be continent of bowel and bladder      [x] Have an adequate number of bowel movements   [] Urinate with no urinary retention >300ml in bladder   [] Complete bladder protocol with otero removal   [x] Maintain O2 SATs at ___%   [x] Have pain managed while on ARU        [x] Be pain free by discharge    [x] Have no skin breakdown while on ARU   [x] Have improved skin integrity via wound measurements   [x] Have no signs/symptoms of infection at the wound site  [x] Be free from injury during hospitalization   [x] Complete education with patient/family with understanding demonstrated for:  [] Adjustment   [] Other:   Nursing interventions may include bowel/bladder training, education for medical assistive devices, medication education, O2 saturation management, energy conservation, stress management techniques, fall prevention, alarms protocol, seating and positioning, skin/wound care, pressure relief instruction,dressing changes,  infection protection, DVT prophylaxis, and/or assistance with in room safety with transfers to bed, toilet, wheelchair, shower as well as bathroom activities and hygiene. Patient/caregiver education for:   [] Disease/sustained injury/management      [] Medication Use   [] Surgical intervention   [] Safety   [] Body mechanics and or joint protection   [] Health maintenance       IRF-SRINIVAS  Bladder and Bowel Continence  Bladder Continence: Always continent  Bowel Continence: Always continent       PHYSICAL THERAPY:  Goals:                  Short term goals  Time Frame for Short term goals: 1 week  Short term goal 1: SBA with all bed mobility. Short term goal 2: SBA with transfers. Short term goal 3: CGA with car transfer. Short term goal 4: Indep with w/c propulsion x50' and two turns. Short term goal 5: CGA with gait x50' and two turns. Long term goals  Time Frame for Long term goals : 2 weeks  Long term goal 1: Indep with bed mobility. Long term goal 2: Indep with transfers. Long term goal 3: Indep with car transfer. Long term goal 4: Indep with gait x50' and two turns. Long term goal 5: Indep with one stair. These goals were reviewed with this patient at the time of assessment and Deisy Devlin is in agreement.      Plan of Care: Frequency:  [x] 5 days per week, 90 minutes per day []  5 days per week, 60 minutes per day               Current Treatment Recommendations: Strengthening, Balance Training, Functional Mobility Training, Transfer Training, ADL/Self-care Training, Endurance Training, Wheelchair Mobility Training, Gait Training, Stair training, Neuromuscular Re-education, Home Exercise Program, Safety Education & Training, Patient/Caregiver Education & Training    IRF-SRINIVAS  Roll Left and Right  Assistance Needed: Independent  CARE Score: 6  Discharge Goal: Independent  Sit to Lying  Assistance Needed: Partial/moderate assistance  CARE Score: 3  Discharge Goal: Independent  Lying to Sitting on Side of Bed  Assistance Needed: Supervision or touching assistance  CARE Score: 4  Discharge Goal: Independent  Sit to Stand  Assistance Needed: Supervision or touching assistance  CARE Score: 4  Discharge Goal: Independent  Chair/Bed-to-Chair Transfer  Assistance Needed: Supervision or touching assistance  CARE Score: 4  Discharge Goal: Independent  Car Transfer  Reason if not Attempted: Not attempted due to medical condition or safety concerns  CARE Score: 88  Discharge Goal: Independent  Walk 10 Feet  Assistance Needed: Partial/moderate assistance  CARE Score: 3  Discharge Goal: Independent  Walk 50 Feet with Two Turns  Reason if not Attempted: Not attempted due to medical condition or safety concerns  CARE Score: 88  Discharge Goal: Independent  Walk 150 Feet  Reason if not Attempted: Not attempted due to medical condition or safety concerns  CARE Score: 88  Discharge Goal: Not Attempted  Walking 10 Feet on Uneven Surfaces  Reason if not Attempted: Not attempted due to medical condition or safety concerns  CARE Score: 88  Discharge Goal: Not Attempted  1 Step (Curb)  Reason if not Attempted: Not attempted due to medical condition or safety concerns  CARE Score: 88  Discharge Goal: Independent  4 Steps  Reason if not Attempted: Not attempted due to medical condition or safety concerns  CARE Score: 88  Discharge Goal: Not Attempted  12 Steps  Reason if not Attempted: Not attempted due to medical condition or safety concerns  CARE Score: 88  Discharge Goal: Not Attempted  Wheel 50 Feet with Two Turns  Assistance Needed: Supervision or touching assistance  CARE Score: 4  Discharge Goal: Independent  Wheel 150 Feet  Assistance Needed: Supervision or touching assistance  CARE Score: 4  Discharge Goal: Independent    OCCUPATIONAL THERAPY:  Goals:             Short term goals  Time Frame for Short term goals: 1 week  Short term goal 1: complete LB dressing with AE with CGA without cues for WB protocol  Short term goal 2: complete overall toileting with CGA  Short term goal 3: complete overall bathing with min A  Short term goal 4: complete simple w/c level home making task with CGA  Short term goal 5: complete 1 handed static standing task for 2 mins with CGA :  Long term goals  Time Frame for Long term goals : 2 weeks  Long term goal 1: complete overall toileting with modified independence  Long term goal 2: complete overall bathing with modified independence  Long term goal 3: complete overall dressing with modified independence  Long term goal 4: complete simple home making task using recommended mobility with modified independence  Long term goal 5: complete HEP with independence :     These goals were reviewed with this patient at the time of assessment and Praful Pollard is in agreement    Plan of Care: Frequency:   [x] 5 days per week, 90 minutes per day     [] 5 days per week, 60 minutes per day                Plan  Specific instructions for Next Treatment: AE training  Current Treatment Recommendations: Self-Care / ADL, Safety Education & Training, Wheelchair Mobility Training, Endurance Training, Strengthening, Patient/Caregiver Education & Training, Home Management Training, Equipment Evaluation, Education, & procurement, Balance Training, Functional Mobility Training, required  Able to recall \"blue\": Yes, no cue required  Able to recall \"bed\": Yes, no cue required  BIMS Summary Score: 15          Plan of Care:  Frequency:   [] 5 days per week, 60 minutes per day                            [x] Not appropriate for Speech Therapy  Treatments may include speech/language/communication therapy, cognitive training, group therapy, education, and/or dysphagia therapy based on the above goals. These goals were reviewed with this patient at the time of assessment and Nini Mansfield is in agreement. CASE MANAGEMENT:  Goals:   Assist patient/family with discharge planning, patient/family counseling,  and coordination with insurance during ARU stay. Patient Goals: get stronger, heal, be able to get around following the dr instructions               Activities Prior to Admit:   Homemaking Responsibilities: Yes  Active : Yes                  Nini Mansfield will be seen a minimum of 3 hours of therapy per day/a minimum of 5 out of 7 days per week. [] In this rare instance due to the nature of this patient's medical involvement, this patient will be seen 15 hours per week (900 minutes within a 7 day period). Treatments may include therapeutic exercises, gait training, neuromuscular re-ed, transfer training, community reintegration, bed mobility, w/c mobility and training, self care, home mgmt, cognitive training, energy conservation,dysphagia tx, speech/language/communication therapy, group therapy, and patient/family education. In addition, dietician/nutritionist may monitor calorie count as well as intake and collaboratively work with SLP on dietary upgrades. Neuropsychology/Psychology may evaluate and provide necessary support.     Medical issues being managed closely and that require 24 hour availability of a physician:   [] Swallowing Precautions  [] Bowel/Bladder Fx  [] Weight bearing precautions   [] Wound Care    [x] Pain Mgmt   [] Infection Protection   [x] DVT Prophylaxis   [] Fall Precautions  [x] Fluid/Electrolyte/Nutrition Balance   [] Voice Protection   [] Respiratory  [] Other:    Medical Prognosis: [] Good  [x] Fair    [] Guarded   Total expected IRF days:  14  Anticipated discharge destination:    [] Home Independently   [x] Home with supervision    []SNF     [] Other                                           Physician anticipated functional outcomes: Independent household ambulation with assistive device  IPOC brief synthesis: Acute inpatient rehabilitation with occupational   physical therapy, 180 minutes, 5 every 7   days will address basic and advancing mobility with self-care   instruction and adaptive equipment training. Caregiver education will   be offered. Expected length of stay prior to the supervised level of   functional discharge to home with home health is 14 days. Assessment and Plan:  1. Left hip fracture status post nailing-flatfoot weightbearing-pain control/PT/OT  2. Coronary artery disease-continue Imdur and Plavix  3. Paroxysmal atrial fibrillation/DVT prophylaxis-Eliquis, amiodarone  4. Diabetes-continue insulin and monitoring  5. Essential hypertension-continue current medications  6. Chronic kidney disease-continue current medications and monitoring  7. GI-bowel regimen.   suppository later on today             Case Mgmt: Electronically signed by GAUDENCIO Yoder on 5/4/2021 at 11:06 AM      OT: Electronically signed by Esperanza Avila OT on 5/3/2021 at 4:23 PM    PT:Electronically signed by Virginia Motley PT on 5/3/21 at 2:39 PM CDT    RN: Electronically signed by Dylon Disla RN on 5/1/2021 at 10:26 PM    ST:   Electronically Signed By:  Broderick Reyes  5/3/2021,12:10 PM.

## 2021-05-02 NOTE — PROGRESS NOTES
Steve Franklin arrived to room # [id number]. Presented with: left hip fx  Mental Status: Patient is oriented, alert, coherent and logical.   Vitals:    05/01/21 2141   BP: (!) 144/68   Pulse: 69   Resp: 20   Temp: 98.7 °F (37.1 °C)   SpO2: 98%     Patient safety contract and falls prevention contract reviewed with patient Yes. Oriented Patient to room. Call light within reach. Yes.   Needs, issues or concerns expressed at this time: no.      Electronically signed by Anahi Mendez RN on 5/1/2021 at 10:28 PM

## 2021-05-03 LAB
ANION GAP SERPL CALCULATED.3IONS-SCNC: 8 MMOL/L (ref 7–19)
BASOPHILS ABSOLUTE: 0 K/UL (ref 0–0.2)
BASOPHILS RELATIVE PERCENT: 0.5 % (ref 0–1)
BUN BLDV-MCNC: 40 MG/DL (ref 8–23)
CALCIUM SERPL-MCNC: 8.7 MG/DL (ref 8.8–10.2)
CHLORIDE BLD-SCNC: 92 MMOL/L (ref 98–111)
CO2: 37 MMOL/L (ref 22–29)
CREAT SERPL-MCNC: 2 MG/DL (ref 0.5–1.2)
EOSINOPHILS ABSOLUTE: 0.3 K/UL (ref 0–0.6)
EOSINOPHILS RELATIVE PERCENT: 4.4 % (ref 0–5)
GFR AFRICAN AMERICAN: 41
GFR NON-AFRICAN AMERICAN: 34
GLUCOSE BLD-MCNC: 235 MG/DL (ref 70–99)
GLUCOSE BLD-MCNC: 237 MG/DL (ref 74–109)
GLUCOSE BLD-MCNC: 254 MG/DL (ref 70–99)
GLUCOSE BLD-MCNC: 276 MG/DL (ref 70–99)
GLUCOSE BLD-MCNC: 293 MG/DL (ref 70–99)
HCT VFR BLD CALC: 29.9 % (ref 42–52)
HEMOGLOBIN: 9.5 G/DL (ref 14–18)
IMMATURE GRANULOCYTES #: 0 K/UL
LYMPHOCYTES ABSOLUTE: 1.6 K/UL (ref 1.1–4.5)
LYMPHOCYTES RELATIVE PERCENT: 19.9 % (ref 20–40)
MAGNESIUM: 2.1 MG/DL (ref 1.6–2.4)
MCH RBC QN AUTO: 31.5 PG (ref 27–31)
MCHC RBC AUTO-ENTMCNC: 31.8 G/DL (ref 33–37)
MCV RBC AUTO: 99 FL (ref 80–94)
MONOCYTES ABSOLUTE: 0.7 K/UL (ref 0–0.9)
MONOCYTES RELATIVE PERCENT: 9.1 % (ref 0–10)
NEUTROPHILS ABSOLUTE: 5.1 K/UL (ref 1.5–7.5)
NEUTROPHILS RELATIVE PERCENT: 65.7 % (ref 50–65)
PDW BLD-RTO: 13.2 % (ref 11.5–14.5)
PERFORMED ON: ABNORMAL
PLATELET # BLD: 142 K/UL (ref 130–400)
PMV BLD AUTO: 11.5 FL (ref 9.4–12.4)
POTASSIUM REFLEX MAGNESIUM: 3.3 MMOL/L (ref 3.5–5)
RBC # BLD: 3.02 M/UL (ref 4.7–6.1)
SODIUM BLD-SCNC: 137 MMOL/L (ref 136–145)
WBC # BLD: 7.8 K/UL (ref 4.8–10.8)

## 2021-05-03 PROCEDURE — 36415 COLL VENOUS BLD VENIPUNCTURE: CPT

## 2021-05-03 PROCEDURE — 97165 OT EVAL LOW COMPLEX 30 MIN: CPT

## 2021-05-03 PROCEDURE — 82947 ASSAY GLUCOSE BLOOD QUANT: CPT

## 2021-05-03 PROCEDURE — 99223 1ST HOSP IP/OBS HIGH 75: CPT | Performed by: PSYCHIATRY & NEUROLOGY

## 2021-05-03 PROCEDURE — 83735 ASSAY OF MAGNESIUM: CPT

## 2021-05-03 PROCEDURE — 97535 SELF CARE MNGMENT TRAINING: CPT

## 2021-05-03 PROCEDURE — 97110 THERAPEUTIC EXERCISES: CPT

## 2021-05-03 PROCEDURE — 6370000000 HC RX 637 (ALT 250 FOR IP): Performed by: PSYCHIATRY & NEUROLOGY

## 2021-05-03 PROCEDURE — 85025 COMPLETE CBC W/AUTO DIFF WBC: CPT

## 2021-05-03 PROCEDURE — 80048 BASIC METABOLIC PNL TOTAL CA: CPT

## 2021-05-03 PROCEDURE — 1180000000 HC REHAB R&B

## 2021-05-03 PROCEDURE — 97116 GAIT TRAINING THERAPY: CPT

## 2021-05-03 PROCEDURE — 2700000000 HC OXYGEN THERAPY PER DAY

## 2021-05-03 PROCEDURE — 97161 PT EVAL LOW COMPLEX 20 MIN: CPT

## 2021-05-03 PROCEDURE — 97530 THERAPEUTIC ACTIVITIES: CPT

## 2021-05-03 RX ORDER — CLOPIDOGREL BISULFATE 75 MG/1
75 TABLET ORAL DAILY
Status: DISCONTINUED | OUTPATIENT
Start: 2021-05-03 | End: 2021-05-14 | Stop reason: HOSPADM

## 2021-05-03 RX ADMIN — APIXABAN 5 MG: 5 TABLET, FILM COATED ORAL at 20:12

## 2021-05-03 RX ADMIN — DIPHENHYDRAMINE HCL 25 MG: 25 TABLET ORAL at 01:48

## 2021-05-03 RX ADMIN — BISACODYL 5 MG: 5 TABLET, COATED ORAL at 20:11

## 2021-05-03 RX ADMIN — CALCITRIOL CAPSULES 0.25 MCG 0.25 MCG: 0.25 CAPSULE ORAL at 08:52

## 2021-05-03 RX ADMIN — METOLAZONE 5 MG: 5 TABLET ORAL at 08:05

## 2021-05-03 RX ADMIN — DOCUSATE SODIUM 50 MG AND SENNOSIDES 8.6 MG 1 TABLET: 8.6; 5 TABLET, FILM COATED ORAL at 20:11

## 2021-05-03 RX ADMIN — CETIRIZINE HYDROCHLORIDE 10 MG: 10 TABLET, FILM COATED ORAL at 08:05

## 2021-05-03 RX ADMIN — APIXABAN 5 MG: 5 TABLET, FILM COATED ORAL at 08:05

## 2021-05-03 RX ADMIN — ISOSORBIDE MONONITRATE 60 MG: 60 TABLET, EXTENDED RELEASE ORAL at 20:12

## 2021-05-03 RX ADMIN — ASPIRIN 81 MG: 81 TABLET, CHEWABLE ORAL at 08:05

## 2021-05-03 RX ADMIN — MUPIROCIN: 20 OINTMENT TOPICAL at 11:46

## 2021-05-03 RX ADMIN — OXYCODONE HYDROCHLORIDE AND ACETAMINOPHEN 1 TABLET: 10; 325 TABLET ORAL at 20:11

## 2021-05-03 RX ADMIN — MAGNESIUM HYDROXIDE 30 ML: 400 SUSPENSION ORAL at 05:31

## 2021-05-03 RX ADMIN — AMIODARONE HYDROCHLORIDE 200 MG: 200 TABLET ORAL at 08:05

## 2021-05-03 RX ADMIN — MONTELUKAST SODIUM 10 MG: 10 TABLET, FILM COATED ORAL at 08:05

## 2021-05-03 RX ADMIN — ROSUVASTATIN CALCIUM 20 MG: 20 TABLET, FILM COATED ORAL at 08:05

## 2021-05-03 RX ADMIN — POLYETHYLENE GLYCOL 3350 17 G: 17 POWDER, FOR SOLUTION ORAL at 08:05

## 2021-05-03 RX ADMIN — MINOXIDIL 2.5 MG: 2.5 TABLET ORAL at 08:05

## 2021-05-03 RX ADMIN — PANTOPRAZOLE SODIUM 40 MG: 40 TABLET, DELAYED RELEASE ORAL at 15:15

## 2021-05-03 RX ADMIN — CLOPIDOGREL BISULFATE 75 MG: 75 TABLET ORAL at 08:05

## 2021-05-03 RX ADMIN — Medication 40 UNITS: at 20:13

## 2021-05-03 RX ADMIN — MUPIROCIN: 20 OINTMENT TOPICAL at 20:13

## 2021-05-03 RX ADMIN — OXYCODONE HYDROCHLORIDE AND ACETAMINOPHEN 1 TABLET: 10; 325 TABLET ORAL at 15:15

## 2021-05-03 RX ADMIN — OXYCODONE HYDROCHLORIDE AND ACETAMINOPHEN 1 TABLET: 10; 325 TABLET ORAL at 01:48

## 2021-05-03 RX ADMIN — ISOSORBIDE MONONITRATE 60 MG: 60 TABLET, EXTENDED RELEASE ORAL at 08:05

## 2021-05-03 RX ADMIN — MINOXIDIL 2.5 MG: 2.5 TABLET ORAL at 20:11

## 2021-05-03 RX ADMIN — PANTOPRAZOLE SODIUM 40 MG: 40 TABLET, DELAYED RELEASE ORAL at 05:30

## 2021-05-03 RX ADMIN — CARVEDILOL 12.5 MG: 12.5 TABLET, FILM COATED ORAL at 08:05

## 2021-05-03 RX ADMIN — BISACODYL 10 MG: 10 SUPPOSITORY RECTAL at 15:51

## 2021-05-03 RX ADMIN — CARVEDILOL 12.5 MG: 12.5 TABLET, FILM COATED ORAL at 20:12

## 2021-05-03 RX ADMIN — ALLOPURINOL 200 MG: 100 TABLET ORAL at 08:05

## 2021-05-03 ASSESSMENT — PAIN SCALES - GENERAL
PAINLEVEL_OUTOF10: 5
PAINLEVEL_OUTOF10: 5
PAINLEVEL_OUTOF10: 0

## 2021-05-03 ASSESSMENT — PAIN DESCRIPTION - DIRECTION: RADIATING_TOWARDS: LEG

## 2021-05-03 ASSESSMENT — PAIN DESCRIPTION - ORIENTATION
ORIENTATION: LEFT

## 2021-05-03 ASSESSMENT — PAIN DESCRIPTION - PAIN TYPE
TYPE: ACUTE PAIN
TYPE: ACUTE PAIN

## 2021-05-03 ASSESSMENT — PAIN DESCRIPTION - ONSET
ONSET: SUDDEN
ONSET: SUDDEN

## 2021-05-03 ASSESSMENT — PAIN - FUNCTIONAL ASSESSMENT: PAIN_FUNCTIONAL_ASSESSMENT: ACTIVITIES ARE NOT PREVENTED

## 2021-05-03 ASSESSMENT — PAIN DESCRIPTION - DESCRIPTORS
DESCRIPTORS: SHARP
DESCRIPTORS: ACHING

## 2021-05-03 NOTE — H&P
[facility]   History and Physical        Patient:   Milagros Manley  MR#:    [id number]  Account Number:                   [id number]      Room:    [facility]   YOB: 1955  Date of Progress Note: 5/3/2021  Time of Note                           6:59 AM  Attending Physician:  Filiberto Campbell MD        Admitting diagnosis: Left femur fracture status post cephalomedullary nailing with flatfoot weightbearing    Secondary diagnoses: History of atrial fibrillation on chronic anticoagulation, coronary artery disease, chronic kidney disease stage III/IV, diabetes, hypertension, history of renal stent    CHIEF COMPLAINT: Left hip pain      HISTORY OF PRESENT ILLNESS:   This 60 y.o. male   admitted to [facility] on 4/28/2021 after sustaining a mechanical graound level fall earlier in the day. He landed on his L hip and had sudden onset of L hip pain w/inability to bear weight. Imaging revealed a L intertrochanteric femur fx. Dr Kendra Reno w/ortho was consulted. Pt reports prior hx of L knee pain from a meniscus tear. He has a diabetic ulcer to his L first metatarsal head and is currenty in wound care. On 4/28 he was taken to OR to undergo a L trochanteric nailing of his L hip fx. He will be foot flat weightbearing to the LLE. DVT prophylaxis plan is to resume home dose of Plavix an Eliquis. Cardiology & renal were consulted. He is now medically stable and is participating w/therapy. He is ready to begin rehab w/plan of returning home after rehab dc w/support from his wife. REVIEW OF SYSTEMS:  Constitutional - No fever or chills. No diaphoresis or significant fatigue. HENT -  No tinnitus or significant hearing loss. Eyes - no sudden vision change or eye pain  Respiratory - no significant shortness of breath or cough  Cardiovascular - no chest pain No palpitations or significant leg swelling  Gastrointestinal - no abdominal swelling or pain.     Genitourinary - No difficulty urinating, dysuria  Musculoskeletal - no back pain or myalgia. Skin - no color change or rash  Neurologic - No seizures. No lateralizing weakness. Hematologic - no easy bruising or excessive bleeding. Psychiatric - no severe anxiety or nervousness. All other review of systems are negative.       Past Medical History:      Diagnosis Date    Acute kidney failure, unspecified ([address]     Anxiety state, unspecified     Atrial septal aneurysm 01/09/2016    Blood circulation, collateral     Body mass index 30.0-30.9, adult     CAD (coronary artery disease) 01/10/2016    1/9/16  lexiscan  Positive for apical MI + myocardial ischemia, EF 42%, intermediate risk findings, AUC indication 16, AUC score 7 1/10/16  Cath  3 lesions in the LAD:  Mid: 70% 2.25x23, mid 90% 2.25 x 28, distal 100% 2.0x28, anterior lateral apical hypokinesis, EF 45%    Calculus of kidney     Carotid artery stenosis 06/26/2013    Cellulitis and abscess of hand, except fingers and thumb     Cerebral artery occlusion with cerebral infarction ([address]     15 years ago    Chronic airway obstruction, not elsewhere classified     Chronic kidney disease, unspecified     Congestive heart failure, unspecified     Diabetes mellitus type II     Diverticulosis of colon (without mention of hemorrhage)     Encounter for long-term (current) use of insulin (HCC)     Esophageal reflux     Family history of ischemic heart disease     Gastric ulcer, unspecified as acute or chronic, without mention of hemorrhage, perforation, or obstruction     Hyperlipemia     Hypertension     Internal hemorrhoids without mention of complication     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, or unspecified(403.00)     Obesity, unspecified     Other specified cardiac dysrhythmias(427.89)     Palliative care patient 12/21/2020    Paroxysmal atrial fibrillation (HCC)     Peripheral vascular disease ([address]     Solitary pulmonary nodule     Surgical or other procedure not carried out because of contraindication     Thoracic aneurysm without mention of rupture     Tobacco use disorder     Type II or unspecified type diabetes mellitus without mention of complication, not stated as uncontrolled        Past Surgical History:      Procedure Laterality Date    CARDIAC CATHETERIZATION      CHOLECYSTECTOMY      CORONARY ANGIOPLASTY WITH STENT PLACEMENT  1-16    2 JACQUELINE to LAD    DIAGNOSTIC CARDIAC CATH LAB PROCEDURE      URETER STENT PLACEMENT      VASCULAR SURGERY  03- [facility]    Aortogram with bilateral selective renal artery injections    VASCULAR SURGERY  6/14/13 S    Right carotid endarterectomy with Vascu-Guard patch repair. Right cervical carotid arteriograms after endarterectomy.  VASCULAR SURGERY  7/6/15 S    Percutaneous cannulation, right common femoral artery, with a5 [place] sheath and later 6 [place] [facility] sheath. Suprarenal abdomianl aortagram.  Selective right renal arteriogram.  Right renal artery balloon angioplasty;/stent (Express 6 mm x 18mmballoon-expandable stent. . Completion suprarenal abdominal aortogram and selective right remal arteriogram. Mynx closure, right common femoral artery punctur       Medications in Hospital:      Current Facility-Administered Medications:     magnesium hydroxide (MILK OF MAGNESIA) 400 MG/5ML suspension 30 mL, 30 mL, Oral, Daily PRN, Lee Teague MD, 30 mL at 05/03/21 0531    insulin glargine (LANTUS) injection vial 40 Units, 40 Units, Subcutaneous, Nightly, Lee Teague MD, 40 Units at 05/02/21 2015    montelukast (SINGULAIR) tablet 10 mg, 10 mg, Oral, Daily, Lee Teague MD, 10 mg at 05/02/21 4952    cetirizine (ZYRTEC) tablet 10 mg, 10 mg, Oral, Daily, Lee Teague MD, 10 mg at 05/02/21 6344    amiodarone (CORDARONE) tablet 200 mg, 200 mg, Oral, Daily, Lee Teague MD, 200 mg at 05/02/21 0832    insulin lispro (HUMALOG) injection vial 5 Units, 5 Units, Subcutaneous, TID WC, Lee Teague MD, 5 Units at 05/02/21 1631    nitroGLYCERIN (NITROSTAT) SL tablet 0.4 mg, 0.4 mg, Sublingual, Q5 Min PRN, Anila Jain MD    rosuvastatin (CRESTOR) tablet 20 mg, 20 mg, Oral, Daily, Anila Jain MD, 20 mg at 05/02/21 0831    carvedilol (COREG) tablet 12.5 mg, 12.5 mg, Oral, BID, Anila Jain MD, 12.5 mg at 05/02/21 2014    vitamin D (ERGOCALCIFEROL) capsule 50,000 Units, 50,000 Units, Oral, Weekly, Anila Jain MD, 50,000 Units at 05/02/21 6860    metOLazone (ZAROXOLYN) tablet 5 mg, 5 mg, Oral, Once per day on Mon Wed Fri, Anila Jain MD    aspirin chewable tablet 81 mg, 81 mg, Oral, Daily, Anila Jain MD, 81 mg at 05/02/21 0831    apixaban (ELIQUIS) tablet 5 mg, 5 mg, Oral, BID, Anila Jain MD, 5 mg at 05/02/21 2014    bumetanide (BUMEX) tablet 1 mg, 1 mg, Oral, Daily PRN, Anila Jain MD    isosorbide mononitrate (IMDUR) extended release tablet 60 mg, 60 mg, Oral, BID, Anila Jain MD, 60 mg at 05/02/21 2014    minoxidil (LONITEN) tablet 2.5 mg, 2.5 mg, Oral, BID, Anila Jain MD, 2.5 mg at 05/02/21 2014    calcitRIOL (ROCALTROL) capsule 0.25 mcg, 0.25 mcg, Oral, Once per day on Mon Wed Fri, Anila Jain MD    diphenhydrAMINE (BENADRYL) tablet 25 mg, 25 mg, Oral, Q6H PRN, Anila Jain MD, 25 mg at 05/03/21 0148    oxyCODONE-acetaminophen (PERCOCET)  MG per tablet 1 tablet, 1 tablet, Oral, Q4H PRN, Anila Jain MD, 1 tablet at 05/03/21 0148    sennosides-docusate sodium (SENOKOT-S) 8.6-50 MG tablet 1 tablet, 1 tablet, Oral, Nightly, Anila Jain MD, 1 tablet at 05/02/21 2014    allopurinol (ZYLOPRIM) tablet 200 mg, 200 mg, Oral, Daily, Anila Jain MD, 200 mg at 05/02/21 0831    mupirocin (BACTROBAN) 2 % ointment, , Topical, BID, Anila Jain MD, Given at 05/02/21 2048    acetaminophen (TYLENOL) tablet 650 mg, 650 mg, Oral, Q4H PRN, Anila Jain MD    bisacodyl (DULCOLAX) suppository 10 mg, 10 mg, Rectal, Daily PRN, Anila Jain MD    polyethylene glycol [facility]) packet 17 g, 17 g, Oral, Daily, Gemma Morrow MD    bisacodyl (DULCOLAX) EC tablet 5 mg, 5 mg, Oral, Daily PRN, Gemma Morrow MD, 5 mg at 05/02/21 2032    glucose (GLUTOSE) 40 % oral gel 15 g, 15 g, Oral, PRN, Gemma Morrow MD    dextrose 50 % IV solution, 12.5 g, Intravenous, PRN, Gemma Morrow MD    glucagon (rDNA) injection 1 mg, 1 mg, Intramuscular, PRN, Gemma Morrow MD    dextrose 5 % solution, 100 mL/hr, Intravenous, PRN, Gemma Morrow MD    pantoprazole (PROTONIX) tablet 40 mg, 40 mg, Oral, BID AC, Gemma Morrow MD, 40 mg at 05/03/21 0530    insulin lispro (HUMALOG) injection vial 0-6 Units, 0-6 Units, Subcutaneous, Nightly, Gemma Morrow MD, 3 Units at 05/02/21 2050    Allergies:  Hydralazine and Morphine    Social History:   TOBACCO:   reports that he has quit smoking. He smoked 0.25 packs per day. He has never used smokeless tobacco.  ETOH:   reports no history of alcohol use. Family History:       Problem Relation Age of Onset    High Blood Pressure Father     Cancer Father     High Blood Pressure Mother     High Blood Pressure Brother            Physical Exam:    Vitals: /69   Pulse 58   Temp 97.1 °F (36.2 °C) (Temporal)   Resp 20   Ht 5' 11\" (1.803 m)   Wt 226 lb 6.4 oz (102.7 kg)   SpO2 97%   BMI 31.58 kg/m²     Constitutional - well developed, well nourished. Eyes - conjunctiva normal.  Pupils react to light  Ear, nose, throat -hearing intact to finger rub No scars, masses, or lesions over external nose or ears, no atrophy of tongue  Neck-symmetric, no masses noted, no jugular vein distension  Respiration- chest wall appears symmetric, good expansion,   normal effort without use of accessory muscles  Cardiovascular- RRR without m,r,g  Musculoskeletal - no significant wasting of muscles noted, no bony deformities  Extremities-no clubbing, cyanosis or edema  Skin - warm, dry, and intact. No rash, erythema, or pallor.   Psychiatric - mood, affect, and behavior appear normal. assessment  All notes from reehab data were reviewed regarding the patient's functional status. Current therapy  Requires PT, OT and/or speech therapy    Anticipated discharge approximately 14 days    Anticipated discharge setting  Home with home care    No clear barriers to discharge    The patient appears to be an appropriate candidate for inpatient rehabilitation    Sufficiently stable: Patient's condition is sufficiently stable at the time of admission to allow the patient to actively participate in an intensive rehabilitation program    Close medical supervision: A rehabilitation physician, or other licensed treating physician with specialized training and experience in inpatient rehabilitation, will conduct face-to-face visits with the patient a minimum of at least 3 days per week throughout the patient's stay    This patient requires close medical supervision for the active management of the ongoing conditions and potential complications stated in the admission note    Intensive rehabilitation nursing: The patient demonstrates the need for 24-hour rehabilitation nursing care for active management of the multiple medical issues documented in the admission note    Appropriate therapy needed: The patient requires the active and ongoing therapeutic intervention of at least 2 therapeutic disciplines, one of which must be physical or occupational therapy and/or speech therapy    Intensive therapy: The patient requires and is reasonably expected to actively participate in at least 3 hours of therapy per day at least 5 days per week, and expected to make measurable improvements that will be of practical value to improve the patient's functional capacity or adaptation to impairments.  In addition, therapy treatments will begin within 36 hours from midnight of the day of the patient's admission to the inpatient rehabilitation facility    Expected duration and frequency therapy: 180 minutes per day, 5 days per

## 2021-05-03 NOTE — PROGRESS NOTES
05/03/21 1135   Restrictions/Precautions   Restrictions/Precautions Weight Bearing; Fall Risk   Required Braces or Orthoses? No   Lower Extremity Weight Bearing Restrictions   Right Lower Extremity Weight Bearing Weight Bearing As Tolerated   Left Lower Extremity Weight Bearing Foot Flat Weight Bearing   Pain Screening   Patient Currently in Pain Yes   Pain Assessment   Pain Assessment 0-10   Pain Level 5   Pain Type Acute pain   Pain Location Hip   Pain Orientation Left   Pain Descriptors Aching   Oxygen Therapy   SpO2 90 %   Pulse Oximeter Device Mode Intermittent   O2 Device Nasal cannula   O2 Flow Rate (L/min) 1 L/min   Transfers   Sit to Stand Contact guard assistance   Stand to sit Contact guard assistance   Bed to Chair Contact guard assistance   Comment Goes sit to stand slowly. Requires cuing to keep LLE in a position of only foot flat weight bearing. Wheelchair Activities   Propulsion Yes   Propulsion 1   Propulsion Manual   Level Level Tile   Method RUE;LUE   Level of Assistance Stand by assistance   Description/ Details Left and right turns incorporated. Distance 200'   Exercises   Comments Ankle pumps 2x20; LAQ 2x10 (AAROM for LLE); hip ABD/ADD 2x10; LAQ stretch x1 min. Conditions Requiring Skilled Therapeutic Intervention   Assessment CGA with transfers. Has more of an ache this afternoon. Activity Tolerance   Activity Tolerance Patient Tolerated treatment well   Safety Devices   Type of devices All fall risk precautions in place;Call light within reach; Chair alarm in place; Patient at risk for falls; Left in chair

## 2021-05-03 NOTE — PROGRESS NOTES
Comprehensive Nutrition Assessment    Type and Reason for Visit:  Initial, Positive Nutrition Screen, Wound    Nutrition Recommendations/Plan: Continue current diet. Nutrition Assessment:  Following for new admission to inpatient rehab; +NS for wounds. Pt at risk for nutrition compromise AEB increased needs r/t wound. Wt gain noted X 5 months per cart review. Pt reports good appetite, po intake avg >50%. Accuchecks running 104-285. Continue current diet. Malnutrition Assessment:  Malnutrition Status:  No malnutrition    Context:  Acute Illness     Findings of the 6 clinical characteristics of malnutrition:  Energy Intake:  No significant decrease in energy intake  Weight Loss:  No significant weight loss     Body Fat Loss:  No significant body fat loss     Muscle Mass Loss:  No significant muscle mass loss    Fluid Accumulation:  1 - Mild Extremities   Strength:  Not Performed    Estimated Daily Nutrient Needs:  Energy (kcal):  2360-1594 kcals/day; Weight Used for Energy Requirements:  Current     Protein (g):   g/pro/day; Weight Used for Protein Requirements:  Ideal(1.2-1.5 g/kg)        Fluid (ml/day):  4025-0028 mL/fluid/day; Method Used for Fluid Requirements:  1 ml/kcal      Nutrition Related Findings:  +1 LLE edema      Wounds:  Diabetic Ulcer, Surgical Incision       Current Nutrition Therapies:    DIET CARB CONTROL; Anthropometric Measures:  · Height: 5' 11\" (180.3 cm)  · Current Body Weight: 226 lb (102.5 kg)   · Admission Body Weight: 226 lb (102.5 kg)    · Usual Body Weight: 213 lb (96.6 kg)(1/3/2021)     · Ideal Body Weight: 172 lbs  · BMI: 31.5  · BMI Categories: Obese Class 1 (BMI 30.0-34. 9)       Nutrition Diagnosis:   · Increased nutrient needs related to increase demand for energy/nutrients as evidenced by wounds    Nutrition Interventions:   Food and/or Nutrient Delivery:  Continue Current Diet  Nutrition Education/Counseling:  No recommendation at this time   Coordination of Nutrition Care:  Continue to monitor while inpatient    Goals:  Pt will consume 50% or greater of meals. wt stable.        Nutrition Monitoring and Evaluation:   Food/Nutrient Intake Outcomes:  Food and Nutrient Intake  Physical Signs/Symptoms Outcomes:  Biochemical Data, Nutrition Focused Physical Findings, Weight, Skin     Discharge Planning:    Continue current diet     Electronically signed by Ronna Arteaga MS, RD, LD on 5/3/21 at 2:13 PM CDT    Contact: [phone]

## 2021-05-03 NOTE — PATIENT CARE CONFERENCE
[facility] ACUTE INPATIENT REHABILITATION  TEAM CONFERENCE NOTE    Date: 2021  Patient Name: Milagros Manley        MRN: [id number]    : 1955  (60 y.o.)  Gender: male      Diagnosis: displaced intertrochanteric fx of left femur on 21. PHYSICAL THERAPY  STRENGTH  Strength RLE  Comment: Grossly antigravity. Strength LLE  Comment: Grossly antigravity. ROM  AROM RLE (degrees)  RLE AROM: WFL  AROM LLE (degrees)  LLE AROM : WFL  BED MOBILITY  Bed Mobility  Rolling: Independent  Supine to Sit: Independent  Sit to Supine: Minimal assistance  Scooting: Stand by assistance  Comment: Log roll going sit to supine and triplanar method supine to sit. TRANSFERS  Transfers  Sit to Stand: Contact guard assistance, Stand by assistance  Stand to sit: Contact guard assistance, Stand by assistance  Bed to Chair: Contact guard assistance  Comment: Goes sit to stand slowly. Requires cuing to keep LLE in a position of only foot flat weight bearing. WHEELCHAIR PROPULSION  Propulsion 1  Propulsion: Manual  Level: Level Tile  Method: FERNANDA TEJEDA  Level of Assistance: Stand by assistance  Description/ Details: Left and right turns incorporated. Distance: 200'  AMBULATION  Ambulation 1  Surface: level tile  Device: Rolling Walker  Other Apparatus: Wheelchair follow  Assistance: Contact guard assistance  Quality of Gait: Pt was able to maintaing WB status on LLE. Pt continues to fatigue quickly. Gait Deviations: (Cuing for decreased RLE swing.)  Distance: 15'  Comments: VCS for RLE and walker placement. STAIRS     GOALS:  Short term goals  Time Frame for Short term goals: 1 week  Short term goal 1: SBA with all bed mobility. Short term goal 2: SBA with transfers. Short term goal 3: CGA with car transfer. Short term goal 4: Indep with w/c propulsion x50' and two turns. Short term goal 5: CGA with gait x50' and two turns.      Long term goals  Time Frame for Long term goals : 2 weeks  Long term goal 1: Indep with bed mobility. Long term goal 2: Indep with transfers. Long term goal 3: Indep with car transfer. Long term goal 4: Indep with gait x50' and two turns. Long term goal 5: Indep with one stair. ASSESSMENT:  Assessment: Pt reports that he thinks his L wrist is fractured from his fall. Pt reports that he is going to speak w/ his dr about it in the morning. Pt tolerated sitting ther ex w/ rest breaks prn. Pt fatigued quickly and had an increase in LLE pain during amb. SPEECH THERAPY      OCCUPATIONAL THERAPY    CURRENT IRF-SRINIVAS SCORES  Eating: CARE Score: 5       Oral Hygiene: CARE Score: 6        Toileting: CARE Score: 4  Comment: CGA      Shower/Bathe: CARE Score: 3  Comment: shower        Upper Body Dressing: CARE Score: 5         Lower Body Dressing: CARE Score: 3  Comment: Using AE PRN. Footwear: CARE Score: 3  Comment: Using AE PRN.        Toilet Transfers: CARE Score: 4  Comment: w/c to toilet       Picking Up Object:  CARE Score: 88          UE Functioning:  WFLs    Pain Assessment:  Pain Level: 4  Pain Location: Wrist, Elbow    STGs:  Short term goals  Time Frame for Short term goals: 1 week  Short term goal 1: complete LB dressing with AE with CGA without cues for WB protocol  Short term goal 2: complete overall toileting with CGA  Short term goal 3: complete overall bathing with min A  Short term goal 4: complete simple w/c level home making task with CGA  Short term goal 5: complete 1 handed static standing task for 2 mins with CGA    LTGs:  Long term goals  Time Frame for Long term goals : 2 weeks  Long term goal 1: complete overall toileting with modified independence  Long term goal 2: complete overall bathing with modified independence  Long term goal 3: complete overall dressing with modified independence  Long term goal 4: complete simple home making task using recommended mobility with modified independence  Long term goal 5: complete HEP with independence    Assessment:  Performance deficits / Impairments: Decreased functional mobility , Decreased endurance, Decreased ADL status, Decreased high-level IADLs, Decreased strength, Decreased safe awareness, Decreased balance                  NUTRITION  Current Wt: Weight: 226 lb 6.4 oz (102.7 kg) / Body mass index is 31.58 kg/m². Admission Wt:226 lb  Oral Diet Orders: Carb Control    Pt at risk for nutrition compromise AEB increased needs r/t wound. Wt gain noted X 5 months per cart review. Pt reports good appetite, po intake avg >50%. Accuchecks running 104-285. Continue current diet. Please see nutrition note for details. NURSING    Wounds/Incisions/Ulcers: Left hip incision x2 staples/Mepilex. Left ball of great toe OA Bacterban/Mepilex. Jose C Scale Score: 20    Pain: Patient's pain is currently controlled with Percocet prn. Consultations/Labs/X-rays: Ortho    Family Education: Family available and participating in education     Fall Risk:  Ahuja Score: 72    Fall in the last week?yes, with dx injury       Other Nursing Issues: Accuchek qid. Pills whole. PWB toe touch LLE. BM 30. SOCIAL WORK/CASE MANAGEMENT  Assessment:Mr. Jeison Hanna is very pleasant and motivated. His wife works and also overseeing her mother who is in nursing facility. He needs to be independent    Discharge Plan   Estimated Length of Stay: recommended 1 week    Destination: home health    Pass: No    Services at Discharge: [address] [facility], Occupational Therapy and Nursing per evaluations    Equipment at Discharge: to be determined    Progress made in the prior week:  1. No prior staffing  2.  3.  4.  5.      Goals for following week:  1.  CGA for toileting  2.   3.   4.   5.     Factors facilitating achievement of predicted outcomes: pleasant and cooperative    Barriers to the achievement of predicted outcomes: Lower extremity weakness and balance, weight bearing restriction    Team Members Present at Conference:  : Collins Pan Ge[facility]   Occupational Therapist: Milena Cooper, OTR/L  Physical Therapist: Tammy Valero PT  Speech Therapist: N/A  Nurse: Rajendra Vincent, RN,BSN   Nurse Manager:  Rajendra Vincent, RN, BSN  Dietitian:  Cara Jacobson, MS, RD, LD  Rehab Director:  Nate Cruz approve the established interdisciplinary plan of care as documented within the medical record of Deisy Devlin.

## 2021-05-03 NOTE — PROGRESS NOTES
Physical Therapy Evaluation Note  DATE:  5/3/2021  NAME:  Steve Franklin  :  1955  (65 y.o.,male)  MRN:  [id number]    HEIGHT:  Height: 5' 11\" (180.3 cm)  WEIGHT:  Weight: 226 lb 6.4 oz (102.7 kg)    PATIENT DIAGNOSIS(ES):    Diagnosis: displaced intertrochanteric fx of left femur on 21. 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 Strength LLE  Comment: Grossly antigravity. ROM  AROM RLE (degrees)  RLE AROM: WFL  AROM LLE (degrees)  LLE AROM : WFL  POSTURE/BALANCE  Balance  Sitting - Dynamic: Fair  Standing - Dynamic: Fair       ACTIVITY TOLERANCE  Activity Tolerance  Activity Tolerance: Patient Tolerated treatment well      BED MOBILITY  Bed Mobility  Rolling: Modified independent  Supine to Sit: Stand by assistance  Sit to Supine: Moderate assistance  Scooting: Stand by assistance  Comment: Log roll going sit to supine and triplanar method supine to sit. TRANSFERS  Sit to Stand: Contact guard assistance      Bed to Chair: Contact guard assistance        WHEELCHAIR PROPULSION 1  Propulsion 1  Propulsion: Manual  Level: Level Tile  Method: FERNANDA TEJEDA  Level of Assistance: Stand by assistance  Description/ Details: Two left turns.    Distance: 48'  WHEELCHAIR PROPULSION 2  Propulsion 2  Propulsion: Manual  Level: Level Tile  Method: FERNANDA TEJEDA  Level of Assistance: Stand by assistance  Description/ Details: Left turn incorporated. Distance: 150'  AMBULATION 1  Ambulation 1  Surface: level tile  Device: Rolling Walker  Other Apparatus: Wheelchair follow  Assistance: Minimal assistance  Quality of Gait: hop to pattern  Gait Deviations: (Cuing for decreased RLE swing.)  Distance: 15'  Comments: VCS for RLE and walker placement. AMBULATION 2     STAIRS       GOALS:  Short term goals  Time Frame for Short term goals: 1 week  Short term goal 1: SBA with all bed mobility. Short term goal 2: SBA with transfers. Short term goal 3: CGA with car transfer. Short term goal 4: Indep with w/c propulsion x50' and two turns. Short term goal 5: CGA with gait x50' and two turns. Long term goals  Time Frame for Long term goals : 2 weeks  Long term goal 1: Indep with bed mobility. Long term goal 2: Indep with transfers. Long term goal 3: Indep with car transfer. Long term goal 4: Indep with gait x50' and two turns. Long term goal 5: Indep with one stair. HOME LIVING:     Type of Home: House  Home Layout: Multi-level(Can live on just main level.)  Home Access: Stairs to enter without rails  Entrance Stairs - Number of Steps: 1     ASSESSMENT (IMPAIRMENTS/BARRIERS): Body structures, Functions, Activity limitations: Decreased functional mobility , Decreased ADL status, Decreased strength, Decreased endurance, Decreased balance, Decreased coordination, Decreased posture  Assessment: Pt. mod I to SBA with bed mobility, CGA with transfers, min A with ambulation, and SBA with w/c propulsion. Skilled physical therapy for strength, mobility, balance, and activity tolerance.    Activity Tolerance: Patient Tolerated treatment well     PLAN:  Plan  Times per week: 5-6  Times per day: (1-2 times a day)  Plan weeks: 1-2  Current Treatment Recommendations: Strengthening, Balance Training, Functional Mobility Training, Transfer Training, ADL/Self-care Training, Endurance Training, Wheelchair Mobility Training, Gait Training, Stair training, Neuromuscular Re-education, Home Exercise Program, Safety Education & Training, Patient/Caregiver Education & Training  Discharge Recommendations: Home with Home health PT  PATIENT REQUIRES AND IS REASONABLY EXPECTED TO ACTIVELY PARTICIPATE IN AT LEAST 3 HOURS OF INTENSIVE THERAPY PER DAY AT LEAST 5 DAYS PER WEEK, AND BE EXPECTED TO MAKE MEASURABLE IMPROVEMENT THAT WILL BE OF PRACTICAL VALUE TO IMPROVE THE PATIENT'S FUNCTIONAL CAPACITY OR ADAPTATION TO IMPAIRMENTS.    PATIENT GOAL FOR REHAB:  RETURN TO PRIOR LEVEL OF FUNCTION       IRF/SRINIVAS  Roll Left and Right  Assistance Needed: Independent  CARE Score: 6  Discharge Goal: Independent    Sit to Lying  Assistance Needed: Partial/moderate assistance  CARE Score: 3  Discharge Goal: Independent    Lying to Sitting on Side of Bed  Assistance Needed: Supervision or touching assistance  CARE Score: 4  Discharge Goal: Independent    Sit to Stand  Assistance Needed: Supervision or touching assistance  CARE Score: 4  Discharge Goal: Independent    Chair/Bed-to-Chair Transfer  Assistance Needed: Supervision or touching assistance  CARE Score: 4  Discharge Goal: Independent    Car Transfer  Reason if not Attempted: Not attempted due to medical condition or safety concerns  CARE Score: 88  Discharge Goal: Independent    Walk 10 Feet  Assistance Needed: Partial/moderate assistance  CARE Score: 3  Discharge Goal: Independent    Walk 50 Feet with Two Turns  Reason if not Attempted: Not attempted due to medical condition or safety concerns  CARE Score: 88  Discharge Goal: Independent    Walk 150 Feet  Reason if not Attempted: Not attempted due to medical condition or safety concerns  CARE Score: 88  Discharge Goal: Not Attempted    Walking 10 Feet on Uneven Surfaces  Reason if not Attempted: Not attempted due to medical condition or safety concerns  CARE Score: 88  Discharge Goal: Not Attempted    1 Step (Curb)  Reason if not Attempted: Not attempted due to medical condition or safety concerns  CARE Score: 88  Discharge Goal: Independent    4 Steps  Reason if not Attempted: Not attempted due to medical condition or safety concerns  CARE Score: 88  Discharge Goal: Not Attempted    12 Steps  Reason if not Attempted: Not attempted due to medical condition or safety concerns  CARE Score: 88  Discharge Goal: Not Attempted    Wheel 50 Feet with Two Turns  Assistance Needed: Supervision or touching assistance  CARE Score: 4  Discharge Goal: Independent    Wheel 150 Feet  Assistance Needed: Supervision or touching assistance  CARE Score: 4  Discharge Goal: Independent      LAST TREATMENT TIME  PT Individual Minutes  Time In: 0900  Time Out: 1000  Minutes: 60

## 2021-05-03 NOTE — PLAN OF CARE
Nutrition Problem #1: Increased nutrient needs  Intervention: Food and/or Nutrient Delivery: Continue Current Diet  Nutritional Goals: Pt will consume 50% or greater of meals. wt stable.

## 2021-05-03 NOTE — PROGRESS NOTES
disease with chronic kidney disease stage I through stage IV, or unspecified(403.00), Obesity, unspecified, Other specified cardiac dysrhythmias(427.89), Palliative care patient, Paroxysmal atrial fibrillation ([facility] [address] Peripheral vascular disease ([facility] [address] Solitary pulmonary nodule, Surgical or other procedure not carried out because of contraindication, Thoracic aneurysm without mention of rupture, Tobacco use disorder, and Type II or unspecified type diabetes mellitus without mention of complication, not stated as uncontrolled. has a past surgical history that includes Cholecystectomy; vascular surgery (03- [facility]); vascular surgery (6/14/13 [facility]); vascular surgery (7/6/15 [facility]); Ureter stent placement; Coronary angioplasty with stent (1-16); Cardiac catheterization; and Diagnostic Cardiac Cath Lab Procedure. Treatment Diagnosis: L femur fx with nailing      Restrictions  Restrictions/Precautions  Restrictions/Precautions: Weight Bearing, Fall Risk  Required Braces or Orthoses?: No  Lower Extremity Weight Bearing Restrictions  Right Lower Extremity Weight Bearing: Weight Bearing As Tolerated  Left Lower Extremity Weight Bearing:  Foot Flat Weight Bearing  Position Activity Restriction  Other position/activity restrictions: Left 1st metatarsal head foot ulcer---currently seeing wound care    Subjective   General  Patient assessed for rehabilitation services?: Yes  Pain Assessment  Pain Level: 10  Oxygen Therapy  O2 Device: Nasal cannula  O2 Flow Rate (L/min): 1 L/min       Objective       05/03/21 1000   Eating   Assistance Needed Setup or clean-up assistance   CARE Score 5   Discharge Goal 6   Oral Hygiene   Assistance Needed Setup or clean-up assistance   CARE Score 5   Discharge Goal 6   [address] Needed Partial/moderate assistance   CARE Score 3   Discharge Goal 6   Shower/Bathe Self   Assistance Needed Partial/moderate assistance   Comment shower   CARE Score 3   Discharge Goal 6   Upper Body Dressing   Assistance Needed Setup or clean-up assistance   CARE Score 5   Discharge Goal 6   Lower Body Dressing   Assistance Needed Substantial/maximal assistance   CARE Score 2   Discharge Goal 6   Putting On/Taking Off Footwear   Assistance Needed Substantial/maximal assistance   CARE Score 2   Discharge Goal 6      05/03/21 1000   Toilet Transfer   Assistance Needed Partial/moderate assistance   CARE Score 3   Discharge Goal 6     Plan   Plan  Specific instructions for Next Treatment: AE training  Current Treatment Recommendations: Self-Care / ADL, Safety Education & Training, Wheelchair Mobility Training, Endurance Training, Strengthening, Patient/Caregiver Education & Training, Home Management Training, Equipment Evaluation, Education, & procurement, Balance Training, Functional Mobility Training, Positioning      Goals  Short term goals  Time Frame for Short term goals: 1 week  Short term goal 1: complete LB dressing with AE with CGA without cues for WB protocol  Short term goal 2: complete overall toileting with CGA  Short term goal 3: complete overall bathing with min A  Short term goal 4: complete simple w/c level home making task with CGA  Short term goal 5: complete 1 handed static standing task for 2 mins with CGA  Long term goals  Time Frame for Long term goals : 2 weeks  Long term goal 1: complete overall toileting with modified independence  Long term goal 2: complete overall bathing with modified independence  Long term goal 3: complete overall dressing with modified independence  Long term goal 4: complete simple home making task using recommended mobility with modified independence  Long term goal 5: complete HEP with independence  Patient Goals   Patient goals : return home        05/03/21 1000   OT Individual Minutes   Time In 1000   Time Out 1100   Minutes 60   Time Code Minutes    Timed Code Treatment Minutes 45 Minutes     Electronically signed by Geraldine Dan OT on 5/3/2021 at 4:15 PM

## 2021-05-03 NOTE — PROGRESS NOTES
Occupational Therapy  Facility/Department: [facility] 8 REHAB UNIT  Daily Treatment Note  NAME: Shira Simon  : 1955  MRN: [id number]    Date of Service: 5/3/2021    Discharge Recommendations:  Home with Home health OT       Assessment   Performance deficits / Impairments: Decreased functional mobility ; Decreased endurance;Decreased ADL status; Decreased high-level IADLs;Decreased balance;Decreased strength;Decreased safe awareness  Assessment: Patient benefits from further skilled therapy to address independence with ADLs. Patient has the potential to make good gains with continued therapy  Treatment Diagnosis: L femur fx with nailing  Prognosis: Good  Decision Making: Low Complexity  OT Education: OT Role;Plan of Care;Precautions  Activity Tolerance  Activity Tolerance: Patient Tolerated treatment well  Safety Devices  Safety Devices in place: Yes  Type of devices: Left in bed;Bed alarm in place;Call light within reach         Patient Diagnosis(es): There were no encounter diagnoses.       has a past medical history of Acute kidney failure, unspecified ([address] Anxiety state, unspecified, Atrial septal aneurysm, Blood circulation, collateral, Body mass index 30.0-30.9, adult, CAD (coronary artery disease), Calculus of kidney, Carotid artery stenosis, Cellulitis and abscess of hand, except fingers and thumb, Cerebral artery occlusion with cerebral infarction ([address] Chronic airway obstruction, not elsewhere classified, Chronic kidney disease, unspecified, Congestive heart failure, unspecified, Diabetes mellitus type II, Diverticulosis of colon (without mention of hemorrhage), Encounter for long-term (current) use of insulin ([address] Esophageal reflux, Family history of ischemic heart disease, Gastric ulcer, unspecified as acute or chronic, without mention of hemorrhage, perforation, or obstruction, Hyperlipemia, Hypertension, Internal hemorrhoids without mention of complication, Malignant hypertensive kidney disease with Entrance Stairs - Number of Steps 1   Bathroom Shower/Tub Tub/Shower unit; Walk-in shower   [address]   (none)   ADL Assistance Independent   Homemaking Assistance Independent   Homemaking Responsibilities Yes   Ambulation Assistance Independent   Transfer Assistance Independent   Active  Yes      69/90/78 1510   Cognitive Patterns   Cognitive Pattern Assessment Used BIMS   Repetition of Three Words (First Attempt) 3   Temporal Orientation: Year Correct   Temporal Orientation: Month Accurate within 5 days   Temporal Orientation: Day Correct   Able to recall \"sock Yes, no cue required   Able to recall \"blue\" Yes, no cue required   Able to recall \"bed\" Yes, no cue required   BIMS Summary Score 15     Plan   Plan  Specific instructions for Next Treatment: AE training  Current Treatment Recommendations: Self-Care / ADL, Safety Education & Training, Wheelchair Mobility Training, Endurance Training, Strengthening, Patient/Caregiver Education & Training, Home Management Training, Equipment Evaluation, Education, & procurement, Balance Training, Functional Mobility Training, Positioning    Goals  Short term goals  Time Frame for Short term goals: 1 week  Short term goal 1: complete LB dressing with AE with CGA without cues for WB protocol  Short term goal 2: complete overall toileting with CGA  Short term goal 3: complete overall bathing with min A  Short term goal 4: complete simple w/c level home making task with CGA  Short term goal 5: complete 1 handed static standing task for 2 mins with CGA  Long term goals  Time Frame for Long term goals : 2 weeks  Long term goal 1: complete overall toileting with modified independence  Long term goal 2: complete overall bathing with modified independence  Long term goal 3: complete overall dressing with modified independence  Long term goal 4: complete simple home making task using recommended mobility with modified independence  Long term goal 5: complete HEP with independence  Patient Goals   Patient goals : return home       Therapy Time   Individual Concurrent Group Co-treatment   Time In 1510         Time Out 1555         Minutes 45         Timed Code Treatment Minutes: 45 Minutes       Electronically signed by Maria Del Carmen Morrell OT on 5/3/2021 at 4:22 PM

## 2021-05-04 LAB
GLUCOSE BLD-MCNC: 187 MG/DL (ref 70–99)
GLUCOSE BLD-MCNC: 197 MG/DL (ref 70–99)
GLUCOSE BLD-MCNC: 249 MG/DL (ref 70–99)
GLUCOSE BLD-MCNC: 264 MG/DL (ref 70–99)
PERFORMED ON: ABNORMAL
URINE CULTURE, ROUTINE: NORMAL

## 2021-05-04 PROCEDURE — 82947 ASSAY GLUCOSE BLOOD QUANT: CPT

## 2021-05-04 PROCEDURE — 97110 THERAPEUTIC EXERCISES: CPT

## 2021-05-04 PROCEDURE — 97116 GAIT TRAINING THERAPY: CPT

## 2021-05-04 PROCEDURE — 99232 SBSQ HOSP IP/OBS MODERATE 35: CPT | Performed by: PSYCHIATRY & NEUROLOGY

## 2021-05-04 PROCEDURE — 97530 THERAPEUTIC ACTIVITIES: CPT

## 2021-05-04 PROCEDURE — 6370000000 HC RX 637 (ALT 250 FOR IP): Performed by: PSYCHIATRY & NEUROLOGY

## 2021-05-04 PROCEDURE — 2700000000 HC OXYGEN THERAPY PER DAY

## 2021-05-04 PROCEDURE — 1180000000 HC REHAB R&B

## 2021-05-04 PROCEDURE — 97535 SELF CARE MNGMENT TRAINING: CPT

## 2021-05-04 RX ORDER — ASCORBIC ACID 500 MG
500 TABLET ORAL DAILY
Status: DISCONTINUED | OUTPATIENT
Start: 2021-05-04 | End: 2021-05-14 | Stop reason: HOSPADM

## 2021-05-04 RX ORDER — SENNA AND DOCUSATE SODIUM 50; 8.6 MG/1; MG/1
1 TABLET, FILM COATED ORAL 2 TIMES DAILY
Status: DISCONTINUED | OUTPATIENT
Start: 2021-05-04 | End: 2021-05-14 | Stop reason: HOSPADM

## 2021-05-04 RX ORDER — POTASSIUM CHLORIDE 750 MG/1
10 TABLET, EXTENDED RELEASE ORAL DAILY
Status: DISCONTINUED | OUTPATIENT
Start: 2021-05-04 | End: 2021-05-10

## 2021-05-04 RX ADMIN — OXYCODONE HYDROCHLORIDE AND ACETAMINOPHEN 500 MG: 500 TABLET ORAL at 09:36

## 2021-05-04 RX ADMIN — MONTELUKAST SODIUM 10 MG: 10 TABLET, FILM COATED ORAL at 09:36

## 2021-05-04 RX ADMIN — OXYCODONE HYDROCHLORIDE AND ACETAMINOPHEN 1 TABLET: 10; 325 TABLET ORAL at 05:29

## 2021-05-04 RX ADMIN — CARVEDILOL 12.5 MG: 12.5 TABLET, FILM COATED ORAL at 07:52

## 2021-05-04 RX ADMIN — PANTOPRAZOLE SODIUM 40 MG: 40 TABLET, DELAYED RELEASE ORAL at 15:22

## 2021-05-04 RX ADMIN — APIXABAN 5 MG: 5 TABLET, FILM COATED ORAL at 07:52

## 2021-05-04 RX ADMIN — ISOSORBIDE MONONITRATE 60 MG: 60 TABLET, EXTENDED RELEASE ORAL at 20:20

## 2021-05-04 RX ADMIN — POLYETHYLENE GLYCOL 3350 17 G: 17 POWDER, FOR SOLUTION ORAL at 09:36

## 2021-05-04 RX ADMIN — ISOSORBIDE MONONITRATE 60 MG: 60 TABLET, EXTENDED RELEASE ORAL at 09:36

## 2021-05-04 RX ADMIN — MINOXIDIL 2.5 MG: 2.5 TABLET ORAL at 20:20

## 2021-05-04 RX ADMIN — CARVEDILOL 12.5 MG: 12.5 TABLET, FILM COATED ORAL at 20:20

## 2021-05-04 RX ADMIN — DIPHENHYDRAMINE HCL 25 MG: 25 TABLET ORAL at 15:22

## 2021-05-04 RX ADMIN — CETIRIZINE HYDROCHLORIDE 10 MG: 10 TABLET, FILM COATED ORAL at 07:53

## 2021-05-04 RX ADMIN — POTASSIUM CHLORIDE 10 MEQ: 750 TABLET, EXTENDED RELEASE ORAL at 09:36

## 2021-05-04 RX ADMIN — APIXABAN 5 MG: 5 TABLET, FILM COATED ORAL at 20:20

## 2021-05-04 RX ADMIN — DOCUSATE SODIUM 50 MG AND SENNOSIDES 8.6 MG 1 TABLET: 8.6; 5 TABLET, FILM COATED ORAL at 20:20

## 2021-05-04 RX ADMIN — MUPIROCIN: 20 OINTMENT TOPICAL at 20:20

## 2021-05-04 RX ADMIN — ROSUVASTATIN CALCIUM 20 MG: 20 TABLET, FILM COATED ORAL at 09:36

## 2021-05-04 RX ADMIN — MINOXIDIL 2.5 MG: 2.5 TABLET ORAL at 09:36

## 2021-05-04 RX ADMIN — ASPIRIN 81 MG: 81 TABLET, CHEWABLE ORAL at 07:52

## 2021-05-04 RX ADMIN — PANTOPRAZOLE SODIUM 40 MG: 40 TABLET, DELAYED RELEASE ORAL at 05:29

## 2021-05-04 RX ADMIN — CLOPIDOGREL BISULFATE 75 MG: 75 TABLET ORAL at 07:53

## 2021-05-04 RX ADMIN — AMIODARONE HYDROCHLORIDE 200 MG: 200 TABLET ORAL at 07:52

## 2021-05-04 RX ADMIN — OXYCODONE HYDROCHLORIDE AND ACETAMINOPHEN 1 TABLET: 10; 325 TABLET ORAL at 16:27

## 2021-05-04 RX ADMIN — ALLOPURINOL 200 MG: 100 TABLET ORAL at 07:52

## 2021-05-04 RX ADMIN — MUPIROCIN: 20 OINTMENT TOPICAL at 07:48

## 2021-05-04 RX ADMIN — Medication 40 UNITS: at 20:21

## 2021-05-04 ASSESSMENT — PAIN DESCRIPTION - LOCATION
LOCATION: HIP
LOCATION: WRIST;ELBOW

## 2021-05-04 ASSESSMENT — PAIN DESCRIPTION - PAIN TYPE: TYPE: SURGICAL PAIN

## 2021-05-04 ASSESSMENT — PAIN DESCRIPTION - DESCRIPTORS
DESCRIPTORS: ACHING
DESCRIPTORS: DISCOMFORT

## 2021-05-04 ASSESSMENT — PAIN DESCRIPTION - ONSET
ONSET: GRADUAL
ONSET: GRADUAL

## 2021-05-04 ASSESSMENT — PAIN SCALES - GENERAL
PAINLEVEL_OUTOF10: 4
PAINLEVEL_OUTOF10: 3
PAINLEVEL_OUTOF10: 0

## 2021-05-04 ASSESSMENT — PAIN - FUNCTIONAL ASSESSMENT: PAIN_FUNCTIONAL_ASSESSMENT: ACTIVITIES ARE NOT PREVENTED

## 2021-05-04 ASSESSMENT — PAIN DESCRIPTION - ORIENTATION: ORIENTATION: LEFT

## 2021-05-04 ASSESSMENT — PAIN DESCRIPTION - PROGRESSION
CLINICAL_PROGRESSION: GRADUALLY IMPROVING
CLINICAL_PROGRESSION: GRADUALLY IMPROVING

## 2021-05-04 ASSESSMENT — PAIN DESCRIPTION - FREQUENCY
FREQUENCY: INTERMITTENT
FREQUENCY: INTERMITTENT

## 2021-05-04 NOTE — PROGRESS NOTES
edema  Skin - warm, dry, and intact. No rash, erythema, or pallor. Psychiatric - mood, affect, and behavior appear normal.      Neurology  NEUROLOGICAL EXAM:      Mental status   Awake, alert, fluent oriented x 3 appropriate affect  Attention and concentration appear appropriate  Recent and remote memory appears unremarkable  Speech normal without dysarthria  No clear issues with language       Cranial Nerves   CN II- Visual fields grossly unremarkable  CN III, IV,VI-EOMI, No nystagmus, conjugate eye movements, no ptosis  CN VII-no facial asymmetry  CN VIII-Hearing intact   CN IX and X- Palate elevates in midline  CN XI-good shoulder shrug  CN XII-Tongue midline with no fasciculations or fibrillations          Motor function  Antigravity x 4     Sensory function Intact to light touch     Cerebellar F-N intact     Tremor None present     Gait                  Not tested           Nursing/pcp notes, imaging,labs and vitals reviewed. PT,OT and/or speech notes reviewed    Lab Results   Component Value Date    WBC 7.8 05/03/2021    HGB 9.5 (L) 05/03/2021    HCT 29.9 (L) 05/03/2021    MCV 99.0 (H) 05/03/2021     05/03/2021     Lab Results   Component Value Date     05/03/2021    K 3.3 (L) 05/03/2021    CL 92 (L) 05/03/2021    CO2 37 (H) 05/03/2021    BUN 40 (H) 05/03/2021    CREATININE 2.0 (H) 05/03/2021    GLUCOSE 237 (H) 05/03/2021    CALCIUM 8.7 (L) 05/03/2021    PROT 6.2 (L) 01/06/2021    LABALBU 3.3 (L) 01/06/2021    BILITOT 0.7 01/06/2021    ALKPHOS 84 01/06/2021    AST 22 01/06/2021    ALT 28 01/06/2021    LABGLOM 34 (A) 05/03/2021    GFRAA 41 (L) 05/03/2021    GLOB 3.5 01/07/2016     Lab Results   Component Value Date    INR 1.53 (H) 01/03/2021    INR 1.45 (H) 12/21/2020    INR 1.31 (H) 12/20/2020     Lab Results   Component Value Date    CRP 0.53 (H) 12/23/2020 05/03/21 6828   Restrictions/Precautions   Restrictions/Precautions Weight Bearing; Fall Risk   Required Braces or Orthoses?  No Lower Extremity Weight Bearing Restrictions   Right Lower Extremity Weight Bearing Weight Bearing As Tolerated   Left Lower Extremity Weight Bearing Foot Flat Weight Bearing   Pain Screening   Patient Currently in Pain Yes   Pain Assessment   Pain Assessment 0-10   Pain Level 5   Pain Type Acute pain   Pain Location Hip   Pain Orientation Left   Pain Descriptors Aching   Oxygen Therapy   SpO2 90 %   Pulse Oximeter Device Mode Intermittent   O2 Device Nasal cannula   O2 Flow Rate (L/min) 1 L/min   Transfers   Sit to Stand Contact guard assistance   Stand to sit Contact guard assistance   Bed to Chair Contact guard assistance   Comment Goes sit to stand slowly. Requires cuing to keep LLE in a position of only foot flat weight bearing. Wheelchair Activities   Propulsion Yes   Propulsion 1   Propulsion Manual   Level Level Tile   Method RUE;LUE   Level of Assistance Stand by assistance   Description/ Details Left and right turns incorporated. Distance 200'   Exercises   Comments Ankle pumps 2x20; LAQ 2x10 (AAROM for LLE); hip ABD/ADD 2x10; LAQ stretch x1 min. Conditions Requiring Skilled Therapeutic Intervention   Assessment CGA with transfers. Has more of an ache this afternoon. Activity Tolerance   Activity Tolerance Patient Tolerated treatment well   Safety Devices   Type of devices All fall risk precautions in place;Call light within reach; Chair alarm in place; Patient at risk for falls; Left in chair      Social/Functional History   Lives With Spouse   Type of [address] [facility] Multi-level; Able to Live on Main level with bedroom/bathroom   Home Access Stairs to enter without rails   Entrance Stairs - Number of Steps 1   Bathroom Shower/Tub Tub/Shower unit; Walk-in shower   Bathroom Toilet Standard   Home Equipment    (none)   ADL Assistance Independent   Homemaking Assistance Independent   Homemaking Responsibilities Yes   Ambulation Assistance Independent   Transfer Assistance Independent   Active  Yes              RECORD REVIEW: Previous medical records, medications were reviewed at today's visit    IMPRESSION:   1. Left hip fracture status post nailing-flatfoot weightbearing-pain control/PT/OT  2. Coronary artery disease-continue Imdur and Plavix  3. Paroxysmal atrial fibrillation/DVT prophylaxis-Eliquis, amiodarone  4. Diabetes-continue insulin and monitoring  5. Essential hypertension-continue current medications  6. Chronic kidney disease-continue current medications and monitoring  7. GI-bowel regimen. Mag citrate given yesterday. Staff in a.m.

## 2021-05-04 NOTE — PROGRESS NOTES
Occupational Therapy     05/04/21 0815   Pain Assessment   Patient Currently in Pain No   Pain Assessment 0-10   Pain Level 0   Balance   Sitting Balance Independent   Standing Balance Minimal assistance   Transfers   Stand Step Transfers Minimal assistance  (RW.)   Sit to stand Minimal assistance   Stand to sit Contact guard assistance   Assessment   Performance deficits / Impairments Decreased functional mobility ; Decreased endurance;Decreased ADL status; Decreased high-level IADLs;Decreased balance;Decreased strength;Decreased safe awareness   Treatment Diagnosis L femur fx with nailing   Prognosis Good   Timed Code Treatment Minutes 45 Minutes   Activity Tolerance   Activity Tolerance Patient Tolerated treatment well   Safety Devices   Safety Devices in place Yes   Type of devices Gait belt  (Given to PT.)   Plan   Current Treatment Recommendations Self-Care / ADL; Safety Education & Training; Wheelchair Mobility Training; Endurance Training;Strengthening;Patient/Caregiver Education & Training;Home Management Training;Equipment Evaluation, Education, & procurement;Balance Training;Functional Mobility Training;Positioning      05/04/21 0815   Oral Hygiene   Assistance Needed Independent   CARE Score 6   Shower/Bathe Self   Assistance Needed Partial/moderate assistance   CARE Score 3   Upper Body Dressing   Assistance Needed Setup or clean-up assistance   CARE Score 5   Lower Body Dressing   Assistance Needed Partial/moderate assistance   Comment Using AE PRN. CARE Score 3   Putting On/Taking Off Footwear   Assistance Needed Partial/moderate assistance   Comment Using AE PRN.    CARE Score 3

## 2021-05-04 NOTE — PROGRESS NOTES
Occupational Therapy  Facility/Department: [facility] 8 REHAB UNIT  Daily Treatment Note  NAME: Saint Lipoma  : 1955  MRN: [id number]    Date of Service: 2021    Discharge Recommendations:  Home with Home health OT       Assessment   Performance deficits / Impairments: Decreased functional mobility ; Decreased endurance;Decreased ADL status; Decreased high-level IADLs;Decreased strength;Decreased safe awareness;Decreased balance  OT Education: ADL Adaptive Strategies  Activity Tolerance  Activity Tolerance: Patient Tolerated treatment well  Safety Devices  Safety Devices in place: Yes  Type of devices: Left in chair;Chair alarm in place;Call light within reach         Patient Diagnosis(es): There were no encounter diagnoses.       has a past medical history of Acute kidney failure, unspecified ([address] Anxiety state, unspecified, Atrial septal aneurysm, Blood circulation, collateral, Body mass index 30.0-30.9, adult, CAD (coronary artery disease), Calculus of kidney, Carotid artery stenosis, Cellulitis and abscess of hand, except fingers and thumb, Cerebral artery occlusion with cerebral infarction ([address] Chronic airway obstruction, not elsewhere classified, Chronic kidney disease, unspecified, Congestive heart failure, unspecified, Diabetes mellitus type II, Diverticulosis of colon (without mention of hemorrhage), Encounter for long-term (current) use of insulin ([address] Esophageal reflux, Family history of ischemic heart disease, Gastric ulcer, unspecified as acute or chronic, without mention of hemorrhage, perforation, or obstruction, Hyperlipemia, Hypertension, Internal hemorrhoids without mention of complication, 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, or unspecified(403.00), Obesity, unspecified, Other specified cardiac dysrhythmias(427.89), Palliative care patient, Paroxysmal atrial fibrillation ([address] Peripheral vascular disease ([address] Solitary pulmonary nodule,

## 2021-05-04 NOTE — PROGRESS NOTES
Daina Espino  [id number]     05/04/21 0915 05/04/21 0916 05/04/21 0959   Subjective   Subjective Pt agreed to therapy this morning. --   --    Pain Screening   Patient Currently in Pain No  --   --    Bed Mobility   Rolling  --   --  Independent   Supine to Sit  --   --  Independent   Sit to Supine  --   --  Minimal assistance   Transfers   Sit to Stand  --  Contact guard assistance;Stand by assistance  --    Stand to sit  --  Contact guard assistance;Stand by assistance  --    Bed to Chair  --  Contact guard assistance  --    Ambulation   Ambulation?  --  Yes  --    WB Status  --  foot flat weight bearing  --    Ambulation 1   Surface  --  level tile  --    Device  --  Rolling Walker  --    Assistance  --  Contact guard assistance  --    Quality of Gait  --  Pt was able to maintaing WB status on LLE. Pt continues to fatigue quickly. --    Distance  --  15'x2  --    Exercises   Comments  --   --   --    Conditions Requiring Skilled Therapeutic Intervention   Body structures, Functions, Activity limitations  --   --   --    Assessment  --   --   --    Prognosis  --   --   --    Activity Tolerance   Activity Tolerance  --   --   --       05/04/21 1000   Subjective   Subjective  --    Pain Screening   Patient Currently in Pain  --    Bed Mobility   Rolling  --    Supine to Sit  --    Sit to Supine  --    Transfers   Sit to Stand  --    Stand to sit  --    Bed to Chair  --    Ambulation   Ambulation?  --    WB Status  --    Ambulation 1   Surface  --    Device  --    Assistance  --    Quality of Gait  --    Distance  --    Exercises   Comments Supine Pawel LE ther ex Hip protocol on mat table. Conditions Requiring Skilled Therapeutic Intervention   Body structures, Functions, Activity limitations Decreased functional mobility ; Decreased ADL status; Decreased strength;Decreased endurance;Decreased balance;Decreased coordination;Decreased posture   Assessment Pt has improved to indpendent w/ bed mobility, but does need Min A getting LLE on bed from Sit-Supine. Pt also improving w/ TF's from CGA-SBA. Pt tolerated Supine ther ex hip protocol well w/ minimal increase in pain or fatigue. Pt continues to fatigue quickly during amb.    Prognosis Good   Activity Tolerance   Activity Tolerance Patient Tolerated treatment well   Electronically signed by Luis Bird PTA on 5/4/2021 at 10:03 AM

## 2021-05-04 NOTE — PROGRESS NOTES
Jose Luis Lopez  [id number]     05/04/21 1536 05/04/21 1537   Subjective   Subjective Pt reports that his L wrist feels like its fractured from his fall. Pt reports that he is going to speak with the dr in the morning about it. --    Transfers   Sit to Stand Contact guard assistance;Stand by assistance  --    Stand to sit Contact guard assistance;Stand by assistance  --    Ambulation   Ambulation?  --  Yes   WB Status  --  foot flat weight bearing   Ambulation 1   Surface  --  level tile   Device  --  Rolling Walker   Other Apparatus  --  Wheelchair follow   Assistance  --  Contact guard assistance   Quality of Gait  --  Pt was able to maintaing WB status on LLE. Pt continues to fatigue quickly. Distance  --  13'   Exercises   Comments  --  Sitting albaro LE ther ex x 15-20 reps. Conditions Requiring Skilled Therapeutic Intervention   Body structures, Functions, Activity limitations  --  Decreased functional mobility ; Decreased ADL status; Decreased strength;Decreased endurance;Decreased balance;Decreased coordination;Decreased posture   Assessment  --  Pt reports that he thinks his L wrist is fractured from his fall. Pt reports that he is going to speak w/ his dr about it in the morning. Pt tolerated sitting ther ex w/ rest breaks prn. Pt fatigued quickly and had an increase in LLE pain during amb.    Prognosis  --  Good   Activity Tolerance   Activity Tolerance  --  Patient Tolerated treatment well   Electronically signed by Oracio Fagan PTA on 5/4/2021 at 4:02 PM

## 2021-05-05 ENCOUNTER — APPOINTMENT (OUTPATIENT)
Dept: GENERAL RADIOLOGY | Age: 66
DRG: 560 | End: 2021-05-05
Attending: PSYCHIATRY & NEUROLOGY
Payer: COMMERCIAL

## 2021-05-05 LAB
GLUCOSE BLD-MCNC: 160 MG/DL (ref 70–99)
GLUCOSE BLD-MCNC: 188 MG/DL (ref 70–99)
GLUCOSE BLD-MCNC: 191 MG/DL (ref 70–99)
GLUCOSE BLD-MCNC: 273 MG/DL (ref 70–99)
PERFORMED ON: ABNORMAL

## 2021-05-05 PROCEDURE — 2700000000 HC OXYGEN THERAPY PER DAY

## 2021-05-05 PROCEDURE — 97110 THERAPEUTIC EXERCISES: CPT

## 2021-05-05 PROCEDURE — 6370000000 HC RX 637 (ALT 250 FOR IP): Performed by: PSYCHIATRY & NEUROLOGY

## 2021-05-05 PROCEDURE — 82947 ASSAY GLUCOSE BLOOD QUANT: CPT

## 2021-05-05 PROCEDURE — 97530 THERAPEUTIC ACTIVITIES: CPT

## 2021-05-05 PROCEDURE — 73110 X-RAY EXAM OF WRIST: CPT

## 2021-05-05 PROCEDURE — 97535 SELF CARE MNGMENT TRAINING: CPT

## 2021-05-05 PROCEDURE — 99233 SBSQ HOSP IP/OBS HIGH 50: CPT | Performed by: PSYCHIATRY & NEUROLOGY

## 2021-05-05 PROCEDURE — 97116 GAIT TRAINING THERAPY: CPT

## 2021-05-05 PROCEDURE — 1180000000 HC REHAB R&B

## 2021-05-05 RX ADMIN — METOLAZONE 5 MG: 5 TABLET ORAL at 09:38

## 2021-05-05 RX ADMIN — CLOPIDOGREL BISULFATE 75 MG: 75 TABLET ORAL at 09:38

## 2021-05-05 RX ADMIN — ISOSORBIDE MONONITRATE 60 MG: 60 TABLET, EXTENDED RELEASE ORAL at 09:38

## 2021-05-05 RX ADMIN — APIXABAN 5 MG: 5 TABLET, FILM COATED ORAL at 20:42

## 2021-05-05 RX ADMIN — ROSUVASTATIN CALCIUM 20 MG: 20 TABLET, FILM COATED ORAL at 09:39

## 2021-05-05 RX ADMIN — CARVEDILOL 12.5 MG: 12.5 TABLET, FILM COATED ORAL at 09:38

## 2021-05-05 RX ADMIN — MINOXIDIL 2.5 MG: 2.5 TABLET ORAL at 20:42

## 2021-05-05 RX ADMIN — MUPIROCIN: 20 OINTMENT TOPICAL at 20:59

## 2021-05-05 RX ADMIN — ALLOPURINOL 200 MG: 100 TABLET ORAL at 09:38

## 2021-05-05 RX ADMIN — MUPIROCIN: 20 OINTMENT TOPICAL at 10:13

## 2021-05-05 RX ADMIN — POLYETHYLENE GLYCOL 3350 17 G: 17 POWDER, FOR SOLUTION ORAL at 09:39

## 2021-05-05 RX ADMIN — Medication 40 UNITS: at 20:43

## 2021-05-05 RX ADMIN — AMIODARONE HYDROCHLORIDE 200 MG: 200 TABLET ORAL at 09:39

## 2021-05-05 RX ADMIN — OXYCODONE HYDROCHLORIDE AND ACETAMINOPHEN 1 TABLET: 10; 325 TABLET ORAL at 05:35

## 2021-05-05 RX ADMIN — CARVEDILOL 12.5 MG: 12.5 TABLET, FILM COATED ORAL at 20:42

## 2021-05-05 RX ADMIN — DOCUSATE SODIUM 50 MG AND SENNOSIDES 8.6 MG 1 TABLET: 8.6; 5 TABLET, FILM COATED ORAL at 09:38

## 2021-05-05 RX ADMIN — OXYCODONE HYDROCHLORIDE AND ACETAMINOPHEN 1 TABLET: 10; 325 TABLET ORAL at 10:13

## 2021-05-05 RX ADMIN — ISOSORBIDE MONONITRATE 60 MG: 60 TABLET, EXTENDED RELEASE ORAL at 20:42

## 2021-05-05 RX ADMIN — CETIRIZINE HYDROCHLORIDE 10 MG: 10 TABLET, FILM COATED ORAL at 09:39

## 2021-05-05 RX ADMIN — PANTOPRAZOLE SODIUM 40 MG: 40 TABLET, DELAYED RELEASE ORAL at 16:38

## 2021-05-05 RX ADMIN — CALCITRIOL CAPSULES 0.25 MCG 0.25 MCG: 0.25 CAPSULE ORAL at 09:38

## 2021-05-05 RX ADMIN — OXYCODONE HYDROCHLORIDE AND ACETAMINOPHEN 500 MG: 500 TABLET ORAL at 09:39

## 2021-05-05 RX ADMIN — APIXABAN 5 MG: 5 TABLET, FILM COATED ORAL at 09:38

## 2021-05-05 RX ADMIN — POTASSIUM CHLORIDE 10 MEQ: 750 TABLET, EXTENDED RELEASE ORAL at 09:39

## 2021-05-05 RX ADMIN — PANTOPRAZOLE SODIUM 40 MG: 40 TABLET, DELAYED RELEASE ORAL at 05:35

## 2021-05-05 RX ADMIN — ASPIRIN 81 MG: 81 TABLET, CHEWABLE ORAL at 09:38

## 2021-05-05 RX ADMIN — MINOXIDIL 2.5 MG: 2.5 TABLET ORAL at 09:39

## 2021-05-05 RX ADMIN — MONTELUKAST SODIUM 10 MG: 10 TABLET, FILM COATED ORAL at 09:39

## 2021-05-05 RX ADMIN — OXYCODONE HYDROCHLORIDE AND ACETAMINOPHEN 1 TABLET: 10; 325 TABLET ORAL at 18:24

## 2021-05-05 RX ADMIN — DOCUSATE SODIUM 50 MG AND SENNOSIDES 8.6 MG 1 TABLET: 8.6; 5 TABLET, FILM COATED ORAL at 20:42

## 2021-05-05 ASSESSMENT — PAIN DESCRIPTION - PAIN TYPE
TYPE: SURGICAL PAIN
TYPE: ACUTE PAIN
TYPE: SURGICAL PAIN;ACUTE PAIN

## 2021-05-05 ASSESSMENT — PAIN DESCRIPTION - LOCATION
LOCATION: HIP;WRIST
LOCATION: HIP

## 2021-05-05 ASSESSMENT — PAIN DESCRIPTION - PROGRESSION
CLINICAL_PROGRESSION: NOT CHANGED
CLINICAL_PROGRESSION: NOT CHANGED

## 2021-05-05 ASSESSMENT — PAIN DESCRIPTION - FREQUENCY
FREQUENCY: CONTINUOUS
FREQUENCY: INTERMITTENT

## 2021-05-05 ASSESSMENT — PAIN DESCRIPTION - ONSET
ONSET: ON-GOING
ONSET: ON-GOING

## 2021-05-05 ASSESSMENT — PAIN SCALES - GENERAL
PAINLEVEL_OUTOF10: 2
PAINLEVEL_OUTOF10: 3
PAINLEVEL_OUTOF10: 4
PAINLEVEL_OUTOF10: 5

## 2021-05-05 ASSESSMENT — PAIN DESCRIPTION - DESCRIPTORS
DESCRIPTORS: DISCOMFORT
DESCRIPTORS: NAGGING;DISCOMFORT

## 2021-05-05 ASSESSMENT — PAIN DESCRIPTION - DIRECTION: RADIATING_TOWARDS: LEG

## 2021-05-05 ASSESSMENT — PAIN DESCRIPTION - ORIENTATION
ORIENTATION: LEFT

## 2021-05-05 ASSESSMENT — PAIN - FUNCTIONAL ASSESSMENT
PAIN_FUNCTIONAL_ASSESSMENT: ACTIVITIES ARE NOT PREVENTED
PAIN_FUNCTIONAL_ASSESSMENT: PREVENTS OR INTERFERES WITH MANY ACTIVE NOT PASSIVE ACTIVITIES

## 2021-05-05 NOTE — PROGRESS NOTES
Faizan Fernández  [id number]     05/05/21 5563 05/05/21 0922 05/05/21 0923   Subjective   Subjective Pt agreed to therapy this morning. --   --    Pain Screening   Patient Currently in Pain Yes  --   --    Pain Assessment   Pain Assessment 0-10  --   --    Pain Level 3  --   --    Patient's Stated Pain Goal No pain  --   --    Pain Type Acute pain  --   --    Pain Location Hip; Wrist  --   --    Pain Orientation Left  --   --    Pain Descriptors Aching  --   --    Pain Frequency Continuous  --   --    Pain Onset On-going  --   --    Clinical Progression Not changed  --   --    Functional Pain Assessment Prevents or interferes some active activities and ADLs  --   --    Response to Pain Intervention Patient Satisfied  --   --    Multiple Pain Sites Yes  --   --    Pre Treatment Pain Screening   Pain at present 3  --   --    Scale Used Numeric Score  --   --    Intervention List Patient able to continue with treatment  --   --    Bed Mobility   Rolling  --  Independent  --    Supine to Sit  --  Independent  --    Sit to Supine  --  Minimal assistance  --    Transfers   Sit to Stand  --  Contact guard assistance;Stand by assistance  --    Stand to sit  --  Contact guard assistance;Stand by assistance  --    Bed to Chair  --  Contact guard assistance  --    Ambulation   Ambulation?  --   --  Yes   WB Status  --   --  foot flat weight bearing   Ambulation 1   Surface  --   --  level tile   Device  --   --  Rolling Walker   Other Apparatus  --   --  Wheelchair follow   Assistance  --   --  Contact guard assistance   Quality of Gait  --   --  Pt was able to maintaing WB status on LLE. Pt continues to fatigue quickly. Distance  --   --  21'   Exercises   Comments  --   --  Supine Pawel LE ther ex Hip Protocol on mat table. Conditions Requiring Skilled Therapeutic Intervention   Body structures, Functions, Activity limitations  --   --  Decreased functional mobility ; Decreased ADL status; Decreased strength;Decreased endurance;Decreased balance;Decreased coordination;Decreased posture   Assessment  --   --  Pt continues to need Min A getting LLE onto mat table. Pt tolerated supine ther ex and amb well w/ minimal increase in fatigue or pain.    Prognosis  --   --  Good   Activity Tolerance   Activity Tolerance  --   --  Patient Tolerated treatment well   Electronically signed by Chris Patino PTA on 5/5/2021 at 9:58 AM

## 2021-05-05 NOTE — PROGRESS NOTES
Deisy Devlin  [id number]     05/05/21 1525 05/05/21 1526 05/05/21 1527   Subjective   Subjective Pt agreed to therapy this afternoon. --   --    Pain Screening   Patient Currently in Pain No  --   --    Transfers   Sit to Stand  --  Contact guard assistance;Stand by assistance  --    Stand to sit  --  Contact guard assistance;Stand by assistance  --    Ambulation   Ambulation?  --  Yes  --    Ambulation 1   Surface  --  level tile  --    Device  --  Rolling Walker  --    Other Apparatus  --  Wheelchair follow  --    Assistance  --  Contact guard assistance  --    Quality of Gait  --  Pt was able to maintaing WB status on LLE. Pt continues to fatigue quickly. --    Distance  --  20'  --    Wheelchair Activities   Propulsion  --  Yes  --    Propulsion 1   Propulsion  --  Manual  --    Level  --  Level Tile  --    Method  --  RUE;LUE  --    Level of Assistance  --  Independent  --    Description/ Details  --  Left and right turns incorporated. --    Distance  --  200'  --    Exercises   Comments  --  Sitting Pawel LE ther ex x 20 reps. --    Conditions Requiring Skilled Therapeutic Intervention   Body structures, Functions, Activity limitations  --   --  Decreased functional mobility ; Decreased ADL status; Decreased strength;Decreased endurance;Decreased balance;Decreased coordination;Decreased posture   Assessment  --   --  Pt tolerated amb and sitting ther ex well w/ minimal fatigue or pain.    Prognosis  --   --  Good   Activity Tolerance   Activity Tolerance  --   --  Patient Tolerated treatment well   Electronically signed by Luis Bird PTA on 5/5/2021 at 3:46 PM

## 2021-05-05 NOTE — PROGRESS NOTES
Patient:   Manuel Celis  MR#:    [id number]   Room:    [facility]-   YOB: 1955  Date of Progress Note: 5/5/2021  Time of Note                           8:14 AM  Consulting Physician:   Brett Kim M.D. Attending Physician:  Brett iKm MD     CHIEF COMPLAINT: Left hip pain     Subjective  This 60 y.o. male   admitted to [facility] on 4/28/2021 after sustaining a mechanical graound level fall earlier in the day. He landed on his L hip and had sudden onset of L hip pain w/inability to bear weight. Imaging revealed a L intertrochanteric femur fx. Dr Radhames Woo w/ortho was consulted. Pt reports prior hx of L knee pain from a meniscus tear. He has a diabetic ulcer to his L first metatarsal head and is currenty in wound care. On 4/28 he was taken to OR to undergo a L trochanteric nailing of his L hip fx. He is foot flat weightbearing to the E. No c/o today  REVIEW OF SYSTEMS:  Constitutional: No fevers No chills  Neck:No stiffness  Respiratory: No shortness of breath  Cardiovascular: No chest pain No palpitations  Gastrointestinal: No abdominal pain    Genitourinary: No Dysuria  Neurological: No headache, no confusion      PHYSICAL EXAM:  /76   Pulse 60   Temp 96.9 °F (36.1 °C) (Temporal)   Resp 18   Ht 5' 11\" (1.803 m)   Wt 226 lb 6.4 oz (102.7 kg)   SpO2 94%   BMI 31.58 kg/m²     Constitutional: he appears well-developed and well-nourished. Eyes - conjunctiva normal.  Pupils react to light  Ear, nose, throat -hearing intact to voice. No scars, masses, or lesions over external nose or ears, no atrophy of tongue  Neck-symmetric, no masses noted, no jugular vein distension  Respiration- chest wall appears symmetric, good expansion,   normal effort without use of accessory muscles  Cardiovascular- RRR  Musculoskeletal - no significant wasting of muscles noted, no bony deformities, gait no gross ataxia  Extremities-no clubbing, cyanosis or edema  Skin - warm, dry, and intact.   No rash, erythema, or pallor. Psychiatric - mood, affect, and behavior appear normal.      Neurology  NEUROLOGICAL EXAM:      Mental status   Awake, alert, fluent oriented x 3 appropriate affect  Attention and concentration appear appropriate  Recent and remote memory appears unremarkable  Speech normal without dysarthria  No clear issues with language       Cranial Nerves   CN II- Visual fields grossly unremarkable  CN III, IV,VI-EOMI, No nystagmus, conjugate eye movements, no ptosis  CN VII-no facial asymmetry  CN VIII-Hearing intact      Motor function  Antigravity x 4     Sensory function Intact to light touch     Cerebellar F-N intact     Tremor None present     Gait                  Not tested           Nursing/pcp notes, imaging,labs and vitals reviewed. PT,OT and/or speech notes reviewed    Lab Results   Component Value Date    WBC 7.8 05/03/2021    HGB 9.5 (L) 05/03/2021    HCT 29.9 (L) 05/03/2021    MCV 99.0 (H) 05/03/2021     05/03/2021     Lab Results   Component Value Date     05/03/2021    K 3.3 (L) 05/03/2021    CL 92 (L) 05/03/2021    CO2 37 (H) 05/03/2021    BUN 40 (H) 05/03/2021    CREATININE 2.0 (H) 05/03/2021    GLUCOSE 237 (H) 05/03/2021    CALCIUM 8.7 (L) 05/03/2021    PROT 6.2 (L) 01/06/2021    LABALBU 3.3 (L) 01/06/2021    BILITOT 0.7 01/06/2021    ALKPHOS 84 01/06/2021    AST 22 01/06/2021    ALT 28 01/06/2021    LABGLOM 34 (A) 05/03/2021    GFRAA 41 (L) 05/03/2021    GLOB 3.5 01/07/2016     Lab Results   Component Value Date    INR 1.53 (H) 01/03/2021    INR 1.45 (H) 12/21/2020    INR 1.31 (H) 12/20/2020     Lab Results   Component Value Date    CRP 0.53 (H) 12/23/2020        PHYSICAL THERAPY  STRENGTH  Strength RLE  Comment: Grossly antigravity. Strength LLE  Comment: Grossly antigravity.   ROM  AROM RLE (degrees)  RLE AROM: WFL  AROM LLE (degrees)  LLE AROM : WFL  BED MOBILITY  Bed Mobility  Rolling: Independent  Supine to Sit: Independent  Sit to Supine: Minimal assistance  Scooting: Stand by assistance  Comment: Log roll going sit to supine and triplanar method supine to sit. TRANSFERS  Transfers  Sit to Stand: Contact guard assistance, Stand by assistance  Stand to sit: Contact guard assistance, Stand by assistance  Bed to Chair: Contact guard assistance  Comment: Goes sit to stand slowly. Requires cuing to keep LLE in a position of only foot flat weight bearing. WHEELCHAIR PROPULSION  Propulsion 1  Propulsion: Manual  Level: Level Tile  Method: FERNANDA TEJEDA  Level of Assistance: Stand by assistance  Description/ Details: Left and right turns incorporated. Distance: 200'  AMBULATION  Ambulation 1  Surface: level tile  Device: Rolling Walker  Other Apparatus: Wheelchair follow  Assistance: Contact guard assistance  Quality of Gait: Pt was able to maintaing WB status on LLE. Pt continues to fatigue quickly. Gait Deviations: (Cuing for decreased RLE swing.)  Distance: 15'  Comments: VCS for RLE and walker placement. STAIRS  GOALS:  Short term goals  Time Frame for Short term goals: 1 week  Short term goal 1: SBA with all bed mobility. Short term goal 2: SBA with transfers. Short term goal 3: CGA with car transfer. Short term goal 4: Indep with w/c propulsion x50' and two turns. Short term goal 5: CGA with gait x50' and two turns.      Long term goals  Time Frame for Long term goals : 2 weeks  Long term goal 1: Indep with bed mobility. Long term goal 2: Indep with transfers. Long term goal 3: Indep with car transfer. Long term goal 4: Indep with gait x50' and two turns. Long term goal 5: Indep with one stair.      ASSESSMENT:  Assessment: Pt reports that he thinks his L wrist is fractured from his fall. Pt reports that he is going to speak w/ his dr about it in the morning. Pt tolerated sitting ther ex w/ rest breaks prn.   Pt fatigued quickly and had an increase in LLE pain during amb.     SPEECH THERAPY        OCCUPATIONAL THERAPY     CURRENT IRF-SRINIVAS [phone]. Continue current diet. Please see nutrition note for details.         RECORD REVIEW: Previous medical records, medications were reviewed at today's visit    IMPRESSION:   1. Left hip fracture status post nailing-flatfoot weightbearing-pain control/PT/OT  2. Coronary artery disease-continue Imdur and Plavix  3. Paroxysmal atrial fibrillation/DVT prophylaxis-Eliquis, amiodarone  4. Diabetes-continue insulin and monitoring  5. Essential hypertension-continue current medications  6. Chronic kidney disease-continue current medications and monitoring  7. GI-bowel regimen. Bowels moving.     Staffing this date , mutlidisciplinary with entire team with complex decision making and planning for discharge  ELOS:ZAC

## 2021-05-05 NOTE — PROGRESS NOTES
Occupational Therapy     05/05/21 0815   Pre Treatment Pain Screening   Intervention List Patient able to continue with treatment   Pain Assessment   Patient Currently in Pain Yes   Pain Assessment 0-10   Pain Level 3   Pain Type Surgical pain;Acute pain   Pain Location Hip; Wrist   Pain Orientation Left   Pain Descriptors Nagging; Discomfort   Pain Frequency Continuous   Clinical Progression Not changed   Response to Pain Intervention Patient Satisfied   Balance   Sitting Balance Independent   Standing Balance Contact guard assistance   Functional Mobility   Functional - Mobility Device Wheelchair   Activity Retrieve items;Transport items   Assist Level Supervision   Functional Mobility Comments Picked up from various surfaces using reacher PRN. Transfers   Stand Pivot Transfers Minimal assistance   Sit to stand Minimal assistance   Stand to sit Contact guard assistance   Type of ROM/Therapeutic Exercise   Type of ROM/Therapeutic Exercise Deidra   Comment 18# RUE only, 4 sets x 20 reps. Assessment   Performance deficits / Impairments Decreased functional mobility ; Decreased endurance;Decreased ADL status; Decreased high-level IADLs;Decreased strength;Decreased safe awareness;Decreased balance   Treatment Diagnosis L femur fx with nailing   Prognosis Good   Timed Code Treatment Minutes 45 Minutes   Activity Tolerance   Activity Tolerance Patient Tolerated treatment well   Safety Devices   Safety Devices in place Yes   Type of devices Gait belt  (Given to PT.)   Plan   Current Treatment Recommendations Self-Care / ADL; Safety Education & Training; Wheelchair Mobility Training; Endurance Training;Strengthening;Patient/Caregiver Education & Training;Home Management Training;Equipment Evaluation, Education, & procurement;Balance Training;Functional Mobility Training;Positioning      05/05/21 0815   Oral Hygiene   Assistance Needed Independent   CARE Score 6

## 2021-05-05 NOTE — PROGRESS NOTES
Occupational Therapy  Facility/Department: [facility] 8 REHAB UNIT  Daily Treatment Note  NAME: Rachelle Hills  : 1955  MRN: [id number]    Date of Service: 2021    Discharge Recommendations:  Home with Home health OT       Assessment   Performance deficits / Impairments: Decreased functional mobility ; Decreased endurance;Decreased ADL status; Decreased high-level IADLs;Decreased strength;Decreased safe awareness;Decreased balance  Activity Tolerance  Activity Tolerance: Patient Tolerated treatment well  Safety Devices  Safety Devices in place: Yes  Type of devices: Call light within reach; Chair alarm in place; Left in chair         Patient Diagnosis(es): There were no encounter diagnoses.       has a past medical history of Acute kidney failure, unspecified ([address] Anxiety state, unspecified, Atrial septal aneurysm, Blood circulation, collateral, Body mass index 30.0-30.9, adult, CAD (coronary artery disease), Calculus of kidney, Carotid artery stenosis, Cellulitis and abscess of hand, except fingers and thumb, Cerebral artery occlusion with cerebral infarction ([address] Chronic airway obstruction, not elsewhere classified, Chronic kidney disease, unspecified, Congestive heart failure, unspecified, Diabetes mellitus type II, Diverticulosis of colon (without mention of hemorrhage), Encounter for long-term (current) use of insulin ([address] Esophageal reflux, Family history of ischemic heart disease, Gastric ulcer, unspecified as acute or chronic, without mention of hemorrhage, perforation, or obstruction, Hyperlipemia, Hypertension, Internal hemorrhoids without mention of complication, 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 has a past surgical history that includes Cholecystectomy; vascular surgery (03- [facility]); vascular surgery (6/14/13 [facility]); vascular surgery (7/6/15 [facility]); Ureter stent placement; Coronary angioplasty with stent (1-16); Cardiac catheterization; and Diagnostic Cardiac Cath Lab Procedure. Restrictions  Restrictions/Precautions  Restrictions/Precautions: Weight Bearing, Fall Risk  Required Braces or Orthoses?: No  Lower Extremity Weight Bearing Restrictions  Right Lower Extremity Weight Bearing: Weight Bearing As Tolerated  Left Lower Extremity Weight Bearing:  Foot Flat Weight Bearing  Position Activity Restriction  Other position/activity restrictions: Left 1st metatarsal head foot ulcer---currently seeing wound care  Subjective   General  Patient assessed for rehabilitation services?: Yes  Pre Treatment Pain Screening  Pain at present: 5  Scale Used: Numeric Score  Intervention List: Patient able to continue with treatment   Objective    Balance  Standing Balance: Contact guard assistance  Standing Balance  Time: 1 min  Activity: ADL task     Transfers  Stand Pivot Transfers: Minimal assistance(recliner to w/c with RW)  Sit to stand: Minimal assistance  Stand to sit: Contact guard assistance     Type of ROM/Therapeutic Exercise  Type of ROM/Therapeutic Exercise: Free weights(2# all planesx15reps--level of comfort for L UE)         05/05/21 1787 Eric Suárezy Needed Supervision or touching assistance   Comment CGA   CARE Score 4     Plan   Plan  Specific instructions for Next Treatment: AE training  Current Treatment Recommendations: Self-Care / ADL, Safety Education & Training, Wheelchair Mobility Training, Endurance Training, Strengthening, Patient/Caregiver Education & Training, Home Management Training, Equipment Evaluation, Education, & procurement, Balance Training, Functional Mobility Training, Positioning    Goals  Short term goals  Time Frame for Short term goals: 1 week  Short term goal 1: complete LB dressing with AE with CGA without cues for WB protocol  Short term goal 2: complete overall toileting with CGA  Short term goal 3: complete overall bathing with min A  Short term goal 4: complete simple w/c level home making task with CGA  Short term goal 5: complete 1 handed static standing task for 2 mins with CGA  Long term goals  Time Frame for Long term goals : 2 weeks  Long term goal 1: complete overall toileting with modified independence  Long term goal 2: complete overall bathing with modified independence  Long term goal 3: complete overall dressing with modified independence  Long term goal 4: complete simple home making task using recommended mobility with modified independence  Long term goal 5: complete HEP with independence  Patient Goals   Patient goals : return home       Therapy Time   Individual Concurrent Group Co-treatment   Time In 1400         Time Out 1445         Minutes 45         Timed Code Treatment Minutes: 45 Minutes     Electronically signed by Indira Vaughn OT on 5/5/2021 at 4:00 PM

## 2021-05-06 LAB
ANION GAP SERPL CALCULATED.3IONS-SCNC: 11 MMOL/L (ref 7–19)
BASOPHILS ABSOLUTE: 0.1 K/UL (ref 0–0.2)
BASOPHILS RELATIVE PERCENT: 0.7 % (ref 0–1)
BUN BLDV-MCNC: 40 MG/DL (ref 8–23)
CALCIUM SERPL-MCNC: 8.6 MG/DL (ref 8.8–10.2)
CHLORIDE BLD-SCNC: 93 MMOL/L (ref 98–111)
CO2: 35 MMOL/L (ref 22–29)
CREAT SERPL-MCNC: 1.9 MG/DL (ref 0.5–1.2)
EOSINOPHILS ABSOLUTE: 0.3 K/UL (ref 0–0.6)
EOSINOPHILS RELATIVE PERCENT: 3.7 % (ref 0–5)
GFR AFRICAN AMERICAN: 43
GFR NON-AFRICAN AMERICAN: 36
GLUCOSE BLD-MCNC: 118 MG/DL (ref 70–99)
GLUCOSE BLD-MCNC: 170 MG/DL (ref 70–99)
GLUCOSE BLD-MCNC: 172 MG/DL (ref 70–99)
GLUCOSE BLD-MCNC: 175 MG/DL (ref 74–109)
GLUCOSE BLD-MCNC: 203 MG/DL (ref 70–99)
HCT VFR BLD CALC: 32.2 % (ref 42–52)
HEMOGLOBIN: 9.9 G/DL (ref 14–18)
IMMATURE GRANULOCYTES #: 0 K/UL
LYMPHOCYTES ABSOLUTE: 1.4 K/UL (ref 1.1–4.5)
LYMPHOCYTES RELATIVE PERCENT: 16 % (ref 20–40)
MAGNESIUM: 2.3 MG/DL (ref 1.6–2.4)
MCH RBC QN AUTO: 30.8 PG (ref 27–31)
MCHC RBC AUTO-ENTMCNC: 30.7 G/DL (ref 33–37)
MCV RBC AUTO: 100.3 FL (ref 80–94)
MONOCYTES ABSOLUTE: 0.9 K/UL (ref 0–0.9)
MONOCYTES RELATIVE PERCENT: 9.6 % (ref 0–10)
NEUTROPHILS ABSOLUTE: 6.2 K/UL (ref 1.5–7.5)
NEUTROPHILS RELATIVE PERCENT: 69.6 % (ref 50–65)
PDW BLD-RTO: 14.1 % (ref 11.5–14.5)
PERFORMED ON: ABNORMAL
PLATELET # BLD: 192 K/UL (ref 130–400)
PMV BLD AUTO: 11.2 FL (ref 9.4–12.4)
POTASSIUM REFLEX MAGNESIUM: 3.4 MMOL/L (ref 3.5–5)
RBC # BLD: 3.21 M/UL (ref 4.7–6.1)
SODIUM BLD-SCNC: 139 MMOL/L (ref 136–145)
WBC # BLD: 8.9 K/UL (ref 4.8–10.8)

## 2021-05-06 PROCEDURE — 94761 N-INVAS EAR/PLS OXIMETRY MLT: CPT

## 2021-05-06 PROCEDURE — 6370000000 HC RX 637 (ALT 250 FOR IP): Performed by: PSYCHIATRY & NEUROLOGY

## 2021-05-06 PROCEDURE — 97535 SELF CARE MNGMENT TRAINING: CPT

## 2021-05-06 PROCEDURE — 1180000000 HC REHAB R&B

## 2021-05-06 PROCEDURE — 97110 THERAPEUTIC EXERCISES: CPT

## 2021-05-06 PROCEDURE — 85025 COMPLETE CBC W/AUTO DIFF WBC: CPT

## 2021-05-06 PROCEDURE — 80048 BASIC METABOLIC PNL TOTAL CA: CPT

## 2021-05-06 PROCEDURE — 36415 COLL VENOUS BLD VENIPUNCTURE: CPT

## 2021-05-06 PROCEDURE — 83735 ASSAY OF MAGNESIUM: CPT

## 2021-05-06 PROCEDURE — 99232 SBSQ HOSP IP/OBS MODERATE 35: CPT | Performed by: PSYCHIATRY & NEUROLOGY

## 2021-05-06 PROCEDURE — 82947 ASSAY GLUCOSE BLOOD QUANT: CPT

## 2021-05-06 PROCEDURE — 97116 GAIT TRAINING THERAPY: CPT

## 2021-05-06 PROCEDURE — 2700000000 HC OXYGEN THERAPY PER DAY

## 2021-05-06 PROCEDURE — 97530 THERAPEUTIC ACTIVITIES: CPT

## 2021-05-06 RX ORDER — METHOCARBAMOL 750 MG/1
750 TABLET, FILM COATED ORAL 3 TIMES DAILY PRN
Status: DISCONTINUED | OUTPATIENT
Start: 2021-05-06 | End: 2021-05-12

## 2021-05-06 RX ADMIN — MINOXIDIL 2.5 MG: 2.5 TABLET ORAL at 20:25

## 2021-05-06 RX ADMIN — APIXABAN 5 MG: 5 TABLET, FILM COATED ORAL at 20:25

## 2021-05-06 RX ADMIN — MUPIROCIN: 20 OINTMENT TOPICAL at 09:11

## 2021-05-06 RX ADMIN — APIXABAN 5 MG: 5 TABLET, FILM COATED ORAL at 09:10

## 2021-05-06 RX ADMIN — CLOPIDOGREL BISULFATE 75 MG: 75 TABLET ORAL at 09:10

## 2021-05-06 RX ADMIN — METHOCARBAMOL 750 MG: 750 TABLET ORAL at 12:18

## 2021-05-06 RX ADMIN — METHOCARBAMOL 750 MG: 750 TABLET ORAL at 21:11

## 2021-05-06 RX ADMIN — MONTELUKAST SODIUM 10 MG: 10 TABLET, FILM COATED ORAL at 09:10

## 2021-05-06 RX ADMIN — MINOXIDIL 2.5 MG: 2.5 TABLET ORAL at 09:10

## 2021-05-06 RX ADMIN — ISOSORBIDE MONONITRATE 60 MG: 60 TABLET, EXTENDED RELEASE ORAL at 20:25

## 2021-05-06 RX ADMIN — CETIRIZINE HYDROCHLORIDE 10 MG: 10 TABLET, FILM COATED ORAL at 09:11

## 2021-05-06 RX ADMIN — DIPHENHYDRAMINE HCL 25 MG: 25 TABLET ORAL at 21:11

## 2021-05-06 RX ADMIN — OXYCODONE HYDROCHLORIDE AND ACETAMINOPHEN 1 TABLET: 10; 325 TABLET ORAL at 02:32

## 2021-05-06 RX ADMIN — DOCUSATE SODIUM 50 MG AND SENNOSIDES 8.6 MG 1 TABLET: 8.6; 5 TABLET, FILM COATED ORAL at 09:10

## 2021-05-06 RX ADMIN — ROSUVASTATIN CALCIUM 20 MG: 20 TABLET, FILM COATED ORAL at 09:10

## 2021-05-06 RX ADMIN — DIPHENHYDRAMINE HCL 25 MG: 25 TABLET ORAL at 09:18

## 2021-05-06 RX ADMIN — CARVEDILOL 12.5 MG: 12.5 TABLET, FILM COATED ORAL at 09:10

## 2021-05-06 RX ADMIN — AMIODARONE HYDROCHLORIDE 200 MG: 200 TABLET ORAL at 09:10

## 2021-05-06 RX ADMIN — ASPIRIN 81 MG: 81 TABLET, CHEWABLE ORAL at 09:10

## 2021-05-06 RX ADMIN — OXYCODONE HYDROCHLORIDE AND ACETAMINOPHEN 1 TABLET: 10; 325 TABLET ORAL at 17:40

## 2021-05-06 RX ADMIN — POTASSIUM CHLORIDE 10 MEQ: 750 TABLET, EXTENDED RELEASE ORAL at 09:11

## 2021-05-06 RX ADMIN — ISOSORBIDE MONONITRATE 60 MG: 60 TABLET, EXTENDED RELEASE ORAL at 09:10

## 2021-05-06 RX ADMIN — CARVEDILOL 12.5 MG: 12.5 TABLET, FILM COATED ORAL at 20:25

## 2021-05-06 RX ADMIN — PANTOPRAZOLE SODIUM 40 MG: 40 TABLET, DELAYED RELEASE ORAL at 05:45

## 2021-05-06 RX ADMIN — OXYCODONE HYDROCHLORIDE AND ACETAMINOPHEN 500 MG: 500 TABLET ORAL at 09:10

## 2021-05-06 RX ADMIN — ALLOPURINOL 200 MG: 100 TABLET ORAL at 09:09

## 2021-05-06 RX ADMIN — DOCUSATE SODIUM 50 MG AND SENNOSIDES 8.6 MG 1 TABLET: 8.6; 5 TABLET, FILM COATED ORAL at 20:25

## 2021-05-06 RX ADMIN — OXYCODONE HYDROCHLORIDE AND ACETAMINOPHEN 1 TABLET: 10; 325 TABLET ORAL at 09:56

## 2021-05-06 RX ADMIN — MUPIROCIN: 20 OINTMENT TOPICAL at 21:12

## 2021-05-06 RX ADMIN — Medication 40 UNITS: at 20:26

## 2021-05-06 RX ADMIN — PANTOPRAZOLE SODIUM 40 MG: 40 TABLET, DELAYED RELEASE ORAL at 16:30

## 2021-05-06 ASSESSMENT — PAIN DESCRIPTION - DESCRIPTORS
DESCRIPTORS: ACHING
DESCRIPTORS: ACHING

## 2021-05-06 ASSESSMENT — PAIN DESCRIPTION - PAIN TYPE
TYPE: ACUTE PAIN
TYPE: SURGICAL PAIN
TYPE: ACUTE PAIN

## 2021-05-06 ASSESSMENT — PAIN - FUNCTIONAL ASSESSMENT
PAIN_FUNCTIONAL_ASSESSMENT: PREVENTS OR INTERFERES SOME ACTIVE ACTIVITIES AND ADLS
PAIN_FUNCTIONAL_ASSESSMENT: PREVENTS OR INTERFERES SOME ACTIVE ACTIVITIES AND ADLS

## 2021-05-06 ASSESSMENT — PAIN DESCRIPTION - LOCATION
LOCATION: HIP
LOCATION: HIP

## 2021-05-06 ASSESSMENT — PAIN SCALES - GENERAL
PAINLEVEL_OUTOF10: 0
PAINLEVEL_OUTOF10: 3
PAINLEVEL_OUTOF10: 5
PAINLEVEL_OUTOF10: 10
PAINLEVEL_OUTOF10: 2

## 2021-05-06 ASSESSMENT — PAIN DESCRIPTION - PROGRESSION
CLINICAL_PROGRESSION: NOT CHANGED
CLINICAL_PROGRESSION: GRADUALLY IMPROVING

## 2021-05-06 ASSESSMENT — PAIN DESCRIPTION - ONSET
ONSET: ON-GOING

## 2021-05-06 ASSESSMENT — PAIN DESCRIPTION - ORIENTATION
ORIENTATION: LEFT
ORIENTATION: LEFT

## 2021-05-06 ASSESSMENT — PAIN DESCRIPTION - FREQUENCY
FREQUENCY: CONTINUOUS
FREQUENCY: CONTINUOUS

## 2021-05-06 NOTE — PROGRESS NOTES
without cues for WB protocol  Short term goal 2: complete overall toileting with CGA  Short term goal 3: complete overall bathing with min A  Short term goal 4: complete simple w/c level home making task with CGA  Short term goal 5: complete 1 handed static standing task for 2 mins with CGA  Long term goals  Time Frame for Long term goals : 2 weeks  Long term goal 1: complete overall toileting with modified independence  Long term goal 2: complete overall bathing with modified independence  Long term goal 3: complete overall dressing with modified independence  Long term goal 4: complete simple home making task using recommended mobility with modified independence  Long term goal 5: complete HEP with independence  Patient Goals   Patient goals : return home       Therapy Time   Individual Concurrent Group Co-treatment   Time In 1100         Time Out 1130         Minutes 30         Timed Code Treatment Minutes: 30 Minutes    Electronically signed by Ramana Dodson OT on 5/6/2021 at 5:11 PM

## 2021-05-06 NOTE — PLAN OF CARE
Problem: Falls - Risk of:  Goal: Will remain free from falls  Description: Will remain free from falls  5/6/2021 0952 by Tra Jamil RN  Outcome: Ongoing  5/6/2021 0012 by Gloria Henry RN  Outcome: Ongoing  Goal: Absence of physical injury  Description: Absence of physical injury  5/6/2021 0952 by Tra Jamil RN  Outcome: Ongoing  5/6/2021 0012 by Gloria Henry RN  Outcome: Ongoing     Problem: Nutrition  Goal: Optimal nutrition therapy  5/6/2021 0952 by Tra Jamil RN  Outcome: Ongoing  5/6/2021 0012 by Gloria Henry RN  Outcome: Ongoing     Problem: Pain:  Goal: Pain level will decrease  Description: Pain level will decrease  5/6/2021 0952 by Tra Jamil RN  Outcome: Ongoing  5/6/2021 0012 by Gloria Henry RN  Outcome: Ongoing  Goal: Control of acute pain  Description: Control of acute pain  5/6/2021 0952 by Tra Jamil RN  Outcome: Ongoing  5/6/2021 0012 by Gloria Henry RN  Outcome: Ongoing  Goal: Control of chronic pain  Description: Control of chronic pain  5/6/2021 0952 by Tra Jamil RN  Outcome: Ongoing  5/6/2021 0012 by Gloria Henry RN  Outcome: Ongoing     Problem: Skin Integrity:  Goal: Will show no infection signs and symptoms  Description: Will show no infection signs and symptoms  Outcome: Ongoing  Goal: Absence of new skin breakdown  Description: Absence of new skin breakdown  Outcome: Ongoing

## 2021-05-06 NOTE — PROGRESS NOTES
Occupational Therapy     05/06/21 0815   Pain Assessment   Patient Currently in Pain No   Pain Assessment 0-10   Pain Level 0   Balance   Sitting Balance Independent   Standing Balance Contact guard assistance   Functional Mobility   Functional - Mobility Device Wheelchair   Activity To/from bathroom; Retrieve items   Assist Level Supervision   Transfers   Stand Step Transfers Minimal assistance   Sit to stand Minimal assistance;Contact guard assistance   Stand to sit Contact guard assistance   Toilet Transfers   Toilet - Technique Stand pivot   Equipment Used Grab bars   Toilet Transfer Contact guard assistance   Assessment   Performance deficits / Impairments Decreased functional mobility ; Decreased endurance;Decreased ADL status; Decreased high-level IADLs;Decreased strength;Decreased safe awareness;Decreased balance   Treatment Diagnosis L femur fx with nailing   Prognosis Good   Timed Code Treatment Minutes 45 Minutes   Activity Tolerance   Activity Tolerance Patient Tolerated treatment well   Safety Devices   Safety Devices in place Yes   Type of devices Gait belt  (Given to PT.)   Plan   Current Treatment Recommendations Self-Care / ADL; Safety Education & Training; Wheelchair Mobility Training; Endurance Training;Strengthening;Patient/Caregiver Education & Training;Home Management Training;Equipment Evaluation, Education, & procurement;Balance Training;Functional Mobility Training;Positioning      05/06/21 0815   Oral Hygiene   Assistance Needed Independent   CARE Score 6   [address] Needed Supervision or touching assistance   Comment CGA. CARE Score 4   Shower/Bathe Self   Assistance Needed Partial/moderate assistance   Comment Shower, Min A.    CARE Score 3   Upper Body Dressing   Assistance Needed Independent   CARE Score 6   Lower Body Dressing   Assistance Needed Partial/moderate assistance   CARE Score 3      05/06/21 0815   Toilet Transfer   Assistance Needed Supervision or touching

## 2021-05-06 NOTE — PROGRESS NOTES
rash, erythema, or pallor. Psychiatric - mood, affect, and behavior appear normal.      Neurology  NEUROLOGICAL EXAM:      Mental status   Awake, alert, fluent oriented x 3 appropriate affect  Attention and concentration appear appropriate  Recent and remote memory appears unremarkable  Speech normal without dysarthria  No clear issues with language       Cranial Nerves   CN II- Visual fields grossly unremarkable  CN III, IV,VI-EOMI, No nystagmus, conjugate eye movements, no ptosis  CN VII-no facial asymmetry  CN VIII-Hearing intact      Motor function  Antigravity x 4     Sensory function Intact to light touch     Cerebellar F-N intact     Tremor None present     Gait                  Not tested           Nursing/pcp notes, imaging,labs and vitals reviewed. PT,OT and/or speech notes reviewed    Lab Results   Component Value Date    WBC 8.9 05/06/2021    HGB 9.9 (L) 05/06/2021    HCT 32.2 (L) 05/06/2021    .3 (H) 05/06/2021     05/06/2021     Lab Results   Component Value Date     05/06/2021    K 3.4 (L) 05/06/2021    CL 93 (L) 05/06/2021    CO2 35 (H) 05/06/2021    BUN 40 (H) 05/06/2021    CREATININE 1.9 (H) 05/06/2021    GLUCOSE 175 (H) 05/06/2021    CALCIUM 8.6 (L) 05/06/2021    PROT 6.2 (L) 01/06/2021    LABALBU 3.3 (L) 01/06/2021    BILITOT 0.7 01/06/2021    ALKPHOS 84 01/06/2021    AST 22 01/06/2021    ALT 28 01/06/2021    LABGLOM 36 (A) 05/06/2021    GFRAA 43 (L) 05/06/2021    GLOB 3.5 01/07/2016     Lab Results   Component Value Date    INR 1.53 (H) 01/03/2021    INR 1.45 (H) 12/21/2020    INR 1.31 (H) 12/20/2020     Lab Results   Component Value Date    CRP 0.53 (H) 12/23/2020        PHYSICAL THERAPY  STRENGTH  Strength RLE  Comment: Grossly antigravity. Strength LLE  Comment: Grossly antigravity.   ROM  AROM RLE (degrees)  RLE AROM: WFL  AROM LLE (degrees)  LLE AROM : WFL  BED MOBILITY  Bed Mobility  Rolling: Independent  Supine to Sit: Independent  Sit to Supine: Minimal assistance  Scooting: Stand by assistance  Comment: Log roll going sit to supine and triplanar method supine to sit. TRANSFERS  Transfers  Sit to Stand: Contact guard assistance, Stand by assistance  Stand to sit: Contact guard assistance, Stand by assistance  Bed to Chair: Contact guard assistance  Comment: Goes sit to stand slowly. Requires cuing to keep LLE in a position of only foot flat weight bearing. WHEELCHAIR PROPULSION  Propulsion 1  Propulsion: Manual  Level: Level Tile  Method: FERNANDA TEJEDA  Level of Assistance: Stand by assistance  Description/ Details: Left and right turns incorporated. Distance: 200'  AMBULATION  Ambulation 1  Surface: level tile  Device: Rolling Walker  Other Apparatus: Wheelchair follow  Assistance: Contact guard assistance  Quality of Gait: Pt was able to maintaing WB status on LLE. Pt continues to fatigue quickly. Gait Deviations: (Cuing for decreased RLE swing.)  Distance: 15'  Comments: VCS for RLE and walker placement. STAIRS  GOALS:  Short term goals  Time Frame for Short term goals: 1 week  Short term goal 1: SBA with all bed mobility. Short term goal 2: SBA with transfers. Short term goal 3: CGA with car transfer. Short term goal 4: Indep with w/c propulsion x50' and two turns. Short term goal 5: CGA with gait x50' and two turns.      Long term goals  Time Frame for Long term goals : 2 weeks  Long term goal 1: Indep with bed mobility. Long term goal 2: Indep with transfers. Long term goal 3: Indep with car transfer. Long term goal 4: Indep with gait x50' and two turns. Long term goal 5: Indep with one stair.      ASSESSMENT:  Assessment: Pt reports that he thinks his L wrist is fractured from his fall. Pt reports that he is going to speak w/ his dr about it in the morning. Pt tolerated sitting ther ex w/ rest breaks prn.   Pt fatigued quickly and had an increase in LLE pain during amb.     SPEECH THERAPY        OCCUPATIONAL THERAPY     CURRENT IRF-SRINIVAS SCORES  Eating: CARE Score: 5     Oral Hygiene: CARE Score: 6         Toileting: CARE Score: 4  Comment: CGA      Shower/Bathe: CARE Score: 3  Comment: shower        Upper Body Dressing: CARE Score: 5          Lower Body Dressing: CARE Score: 3  Comment: Using AE PRN.        Footwear: CARE Score: 3  Comment: Using AE PRN.        Toilet Transfers: CARE Score: 4  Comment: w/c to toilet        Picking Up Object:  CARE Score: 88           UE Functioning:  WFLs     Pain Assessment:  Pain Level: 4  Pain Location: Wrist, Elbow     STGs:  Short term goals  Time Frame for Short term goals: 1 week  Short term goal 1: complete LB dressing with AE with CGA without cues for WB protocol  Short term goal 2: complete overall toileting with CGA  Short term goal 3: complete overall bathing with min A  Short term goal 4: complete simple w/c level home making task with CGA  Short term goal 5: complete 1 handed static standing task for 2 mins with CGA     LTGs:  Long term goals  Time Frame for Long term goals : 2 weeks  Long term goal 1: complete overall toileting with modified independence  Long term goal 2: complete overall bathing with modified independence  Long term goal 3: complete overall dressing with modified independence  Long term goal 4: complete simple home making task using recommended mobility with modified independence  Long term goal 5: complete HEP with independence     Assessment:  Performance deficits / Impairments: Decreased functional mobility , Decreased endurance, Decreased ADL status, Decreased high-level IADLs, Decreased strength, Decreased safe awareness, Decreased balance                        NUTRITION  Current Wt: Weight: 226 lb 6.4 oz (102.7 kg) / Body mass index is 31.58 kg/m². Admission Wt:226 lb  Oral Diet Orders: Carb Control     Pt at risk for nutrition compromise AEB increased needs r/t wound. Wt gain noted X 5 months per cart review. Pt reports good appetite, po intake avg >50%.  Accuchecks running 182-299. Continue current diet. Please see nutrition note for details.         RECORD REVIEW: Previous medical records, medications were reviewed at today's visit    IMPRESSION:   1. Left hip fracture status post nailing-flatfoot weightbearing-pain control/PT/OT  2. Coronary artery disease-continue Imdur and Plavix  3. Paroxysmal atrial fibrillation/DVT prophylaxis-Eliquis, amiodarone  4. Diabetes-continue insulin and monitoring  5. Essential hypertension-continue current medications  6. Chronic kidney disease-continue current medications and monitoring. Stable  7. GI-bowel regimen. Bowels moving.     Continue current treatment  ELOS:TBD

## 2021-05-06 NOTE — PROGRESS NOTES
Vergie Rubinstein  [id number]     05/06/21 1404 05/06/21 1455 05/06/21 1454   Subjective   Subjective  --  Pt reports that his L hip is feeling better this afternoon and agreed to therapy. --    Pain Screening   Patient Currently in Pain  --  Yes  --    Pain Assessment   Pain Assessment  --  0-10  --    Pain Level  --  1  --    Patient's Stated Pain Goal  --  No pain  --    Pain Type  --  Acute pain  --    Pain Location  --  Hip  --    Pain Orientation  --  Left  --    Pain Descriptors  --  Aching  --    Pain Frequency  --  Continuous  --    Pain Onset  --  On-going  --    Clinical Progression  --  Gradually improving  --    Functional Pain Assessment  --  Prevents or interferes some active activities and ADLs  --    Response to Pain Intervention  --  Patient Satisfied  --    Multiple Pain Sites  --  No  --    Oxygen Therapy   O2 Device Nasal cannula  --   --    O2 Flow Rate (L/min) 1 L/min  --   --    Pre Treatment Pain Screening   Pain at present  --  1  --    Scale Used  --  Numeric Score  --    Intervention List  --  Patient able to continue with treatment  --    Transfers   Sit to Stand  --   --  Contact guard assistance;Stand by assistance   Stand to sit  --   --  Contact guard assistance;Stand by assistance   Ambulation   Ambulation?  --   --  Yes   WB Status  --   --  foot flat weight bearing   Ambulation 1   Surface  --   --  level tile   Device  --   --  Rolling Walker   Assistance  --   --  Contact guard assistance   Quality of Gait  --   --  Pt was able to maintaing WB status on LLE. Pt continues to fatigue quickly.    Distance  --   --  21'   Exercises   Comments  --   --   --    Conditions Requiring Skilled Therapeutic Intervention   Body structures, Functions, Activity limitations  --   --   --    Assessment  --   --   --    Prognosis  --   --   --    Activity Tolerance   Activity Tolerance  --   --   --       05/06/21 145   Subjective   Subjective  --    Pain Screening   Patient Currently in Pain  -- Pain Assessment   Pain Assessment  --    Pain Level  --    Patient's Stated Pain Goal  --    Pain Type  --    Pain Location  --    Pain Orientation  --    Pain Descriptors  --    Pain Frequency  --    Pain Onset  --    Clinical Progression  --    Functional Pain Assessment  --    Response to Pain Intervention  --    Multiple Pain Sites  --    Oxygen Therapy   O2 Device  --    O2 Flow Rate (L/min)  --    Pre Treatment Pain Screening   Pain at present  --    Scale Used  --    Intervention List  --    Transfers   Sit to Stand  --    Stand to sit  --    Ambulation   Ambulation?  --    WB Status  --    Ambulation 1   Surface  --    Device  --    Assistance  --    Quality of Gait  --    Distance  --    Exercises   Comments Sitting Pawel LE ther ex x 20 reps. Conditions Requiring Skilled Therapeutic Intervention   Body structures, Functions, Activity limitations Decreased functional mobility ; Decreased ADL status; Decreased strength;Decreased endurance;Decreased balance;Decreased coordination;Decreased posture   Assessment Pt was feeling better w/ decreased pain in L hip this afternoon. Pt tolerated amb and sitting ther ex well w/ minimal fatigue or pain.    Prognosis Good   Activity Tolerance   Activity Tolerance Patient Tolerated treatment well   Electronically signed by Silvana Okeefe PTA on 5/6/2021 at 3:50 PM

## 2021-05-06 NOTE — PROGRESS NOTES
Nutrition Assessment     Type and Reason for Visit: Reassess    Nutrition Recommendations/Plan: Update diet preferences; Continue current CHO controlled diet. Nutrition Assessment:  Pt stable nutritionally AEB adequate po intake and pt reported good appetite. Accuchecks running 175-273. Continue current CHO controlled diet and update diet preferences. Malnutrition Assessment:  Malnutrition Status: No malnutrition    Nutrition Related Findings: +1 LLE edema      Current Nutrition Therapies:    DIET CARB CONTROL; Anthropometric Measures:  · Height: 5' 11\" (180.3 cm)  · Current Body Wt: 226 lb (102.5 kg)   · BMI: 31.5    Nutrition Interventions:   Food and/or Nutrient Delivery:  Continue Current Diet  Coordination of Nutrition Care:  Continue to monitor while inpatient    Goals:  Pt will consume 50% or greater of meals. wt stable.        Nutrition Monitoring and Evaluation:   Food/Nutrient Intake Outcomes:  Food and Nutrient Intake  Physical Signs/Symptoms Outcomes:  Biochemical Data, Nutrition Focused Physical Findings, Skin, Weight, Fluid Status or Edema     Discharge Planning:    Continue current diet     Electronically signed by Shona Trejo MS, RD, LD on 5/6/21 at 10:53 AM CDT    Contact: [phone]

## 2021-05-06 NOTE — PROGRESS NOTES
Rachelle Hills  [id number]     05/06/21 0955 05/06/21 0956 05/06/21 0958   Subjective   Subjective Pt reports that his L hip seems to be hurting more this morning, but agreed to therapy. --   --    Pain Screening   Patient Currently in Pain Yes  --   --    Pain Assessment   Pain Assessment 0-10  --   --    Pain Level  --  6  --    Patient's Stated Pain Goal 7  --   --    Pain Type Acute pain  --   --    Pain Location Hip  --   --    Pain Orientation Left  --   --    Pain Descriptors Aching  --   --    Pain Frequency Continuous  --   --    Pain Onset On-going  --   --    Clinical Progression Gradually worsening  --   --    Functional Pain Assessment Activities are not prevented  --   --    Response to Pain Intervention None  --   --    Multiple Pain Sites No  --   --    Pre Treatment Pain Screening   Pain at present 7  --   --    Scale Used Numeric Score  --   --    Intervention List Patient able to continue with treatment  --   --    Bed Mobility   Rolling  --  Independent  --    Supine to Sit  --  Independent  --    Sit to Supine  --  Minimal assistance  --    Transfers   Sit to Stand  --  Contact guard assistance;Stand by assistance  --    Stand to sit  --  Contact guard assistance;Stand by assistance  --    Bed to Chair  --  Contact guard assistance  --    Ambulation   Ambulation?  --  No  --    WB Status  --  foot flat weight bearing  --    Wheelchair Activities   Propulsion  --  Yes  --    Propulsion 1   Propulsion  --  Manual  --    Level  --  Level Tile  --    Method  --  RUE;LUE  --    Level of Assistance  --  Independent  --    Description/ Details  --  Pt propelled W/C well. --    Distance  --  200'  --    Exercises   Comments  --   --  Supine Pawel LE ther ex Hip protocol on mat table. Conditions Requiring Skilled Therapeutic Intervention   Body structures, Functions, Activity limitations  --   --  Decreased functional mobility ; Decreased ADL status; Decreased strength;Decreased endurance;Decreased balance;Decreased coordination;Decreased posture   Assessment  --   --  Pt continues to have difficutly getting LLE on mat table despite instructions and using Leg . Pt continues to need Min A for LLE Sit-Supine. Pt was having an increase in L hip pain this morning needing several rest breaks during Supine ther ex. Pt was unable to amb this morning due to the pain.    Prognosis  --   --  Good   Activity Tolerance   Activity Tolerance  --   --  Patient limited by pain   Electronically signed by Elisabeth Vance PTA on 5/6/2021 at 10:00 AM

## 2021-05-07 LAB
GLUCOSE BLD-MCNC: 138 MG/DL (ref 70–99)
GLUCOSE BLD-MCNC: 144 MG/DL (ref 70–99)
GLUCOSE BLD-MCNC: 145 MG/DL (ref 70–99)
GLUCOSE BLD-MCNC: 197 MG/DL (ref 70–99)
PERFORMED ON: ABNORMAL

## 2021-05-07 PROCEDURE — 82947 ASSAY GLUCOSE BLOOD QUANT: CPT

## 2021-05-07 PROCEDURE — 6370000000 HC RX 637 (ALT 250 FOR IP): Performed by: PSYCHIATRY & NEUROLOGY

## 2021-05-07 PROCEDURE — 97116 GAIT TRAINING THERAPY: CPT

## 2021-05-07 PROCEDURE — 97110 THERAPEUTIC EXERCISES: CPT

## 2021-05-07 PROCEDURE — 97530 THERAPEUTIC ACTIVITIES: CPT

## 2021-05-07 PROCEDURE — 97535 SELF CARE MNGMENT TRAINING: CPT

## 2021-05-07 PROCEDURE — 99232 SBSQ HOSP IP/OBS MODERATE 35: CPT | Performed by: PSYCHIATRY & NEUROLOGY

## 2021-05-07 PROCEDURE — 1180000000 HC REHAB R&B

## 2021-05-07 PROCEDURE — 2700000000 HC OXYGEN THERAPY PER DAY

## 2021-05-07 RX ADMIN — CLOPIDOGREL BISULFATE 75 MG: 75 TABLET ORAL at 08:13

## 2021-05-07 RX ADMIN — BISACODYL 5 MG: 5 TABLET, COATED ORAL at 14:20

## 2021-05-07 RX ADMIN — OXYCODONE HYDROCHLORIDE AND ACETAMINOPHEN 500 MG: 500 TABLET ORAL at 08:13

## 2021-05-07 RX ADMIN — MONTELUKAST SODIUM 10 MG: 10 TABLET, FILM COATED ORAL at 08:13

## 2021-05-07 RX ADMIN — METOLAZONE 5 MG: 5 TABLET ORAL at 08:12

## 2021-05-07 RX ADMIN — CARVEDILOL 12.5 MG: 12.5 TABLET, FILM COATED ORAL at 08:25

## 2021-05-07 RX ADMIN — APIXABAN 5 MG: 5 TABLET, FILM COATED ORAL at 08:13

## 2021-05-07 RX ADMIN — OXYCODONE HYDROCHLORIDE AND ACETAMINOPHEN 1 TABLET: 10; 325 TABLET ORAL at 10:05

## 2021-05-07 RX ADMIN — CETIRIZINE HYDROCHLORIDE 10 MG: 10 TABLET, FILM COATED ORAL at 08:13

## 2021-05-07 RX ADMIN — MINOXIDIL 2.5 MG: 2.5 TABLET ORAL at 20:18

## 2021-05-07 RX ADMIN — CARVEDILOL 12.5 MG: 12.5 TABLET, FILM COATED ORAL at 20:19

## 2021-05-07 RX ADMIN — POTASSIUM CHLORIDE 10 MEQ: 750 TABLET, EXTENDED RELEASE ORAL at 08:13

## 2021-05-07 RX ADMIN — AMIODARONE HYDROCHLORIDE 200 MG: 200 TABLET ORAL at 08:12

## 2021-05-07 RX ADMIN — MINOXIDIL 2.5 MG: 2.5 TABLET ORAL at 08:12

## 2021-05-07 RX ADMIN — DOCUSATE SODIUM 50 MG AND SENNOSIDES 8.6 MG 1 TABLET: 8.6; 5 TABLET, FILM COATED ORAL at 20:19

## 2021-05-07 RX ADMIN — ASPIRIN 81 MG: 81 TABLET, CHEWABLE ORAL at 08:13

## 2021-05-07 RX ADMIN — OXYCODONE HYDROCHLORIDE AND ACETAMINOPHEN 1 TABLET: 10; 325 TABLET ORAL at 17:20

## 2021-05-07 RX ADMIN — PANTOPRAZOLE SODIUM 40 MG: 40 TABLET, DELAYED RELEASE ORAL at 16:30

## 2021-05-07 RX ADMIN — PANTOPRAZOLE SODIUM 40 MG: 40 TABLET, DELAYED RELEASE ORAL at 05:48

## 2021-05-07 RX ADMIN — ROSUVASTATIN CALCIUM 20 MG: 20 TABLET, FILM COATED ORAL at 08:12

## 2021-05-07 RX ADMIN — ALLOPURINOL 200 MG: 100 TABLET ORAL at 08:14

## 2021-05-07 RX ADMIN — DIPHENHYDRAMINE HCL 25 MG: 25 TABLET ORAL at 08:25

## 2021-05-07 RX ADMIN — OXYCODONE HYDROCHLORIDE AND ACETAMINOPHEN 1 TABLET: 10; 325 TABLET ORAL at 05:51

## 2021-05-07 RX ADMIN — MUPIROCIN: 20 OINTMENT TOPICAL at 08:15

## 2021-05-07 RX ADMIN — Medication 40 UNITS: at 20:19

## 2021-05-07 RX ADMIN — ISOSORBIDE MONONITRATE 60 MG: 60 TABLET, EXTENDED RELEASE ORAL at 08:12

## 2021-05-07 RX ADMIN — ISOSORBIDE MONONITRATE 60 MG: 60 TABLET, EXTENDED RELEASE ORAL at 20:19

## 2021-05-07 RX ADMIN — METHOCARBAMOL 750 MG: 750 TABLET ORAL at 17:20

## 2021-05-07 RX ADMIN — APIXABAN 5 MG: 5 TABLET, FILM COATED ORAL at 20:24

## 2021-05-07 RX ADMIN — MUPIROCIN 1 EACH: 20 OINTMENT TOPICAL at 20:21

## 2021-05-07 RX ADMIN — CALCITRIOL CAPSULES 0.25 MCG 0.25 MCG: 0.25 CAPSULE ORAL at 08:12

## 2021-05-07 RX ADMIN — DOCUSATE SODIUM 50 MG AND SENNOSIDES 8.6 MG 1 TABLET: 8.6; 5 TABLET, FILM COATED ORAL at 08:13

## 2021-05-07 ASSESSMENT — PAIN DESCRIPTION - PAIN TYPE
TYPE: ACUTE PAIN
TYPE: ACUTE PAIN

## 2021-05-07 ASSESSMENT — PAIN SCALES - GENERAL
PAINLEVEL_OUTOF10: 4
PAINLEVEL_OUTOF10: 2
PAINLEVEL_OUTOF10: 6
PAINLEVEL_OUTOF10: 5

## 2021-05-07 ASSESSMENT — PAIN DESCRIPTION - ORIENTATION: ORIENTATION: LEFT

## 2021-05-07 ASSESSMENT — PAIN DESCRIPTION - DESCRIPTORS
DESCRIPTORS: ACHING
DESCRIPTORS: ACHING

## 2021-05-07 ASSESSMENT — PAIN DESCRIPTION - LOCATION: LOCATION: HIP

## 2021-05-07 ASSESSMENT — PAIN DESCRIPTION - FREQUENCY: FREQUENCY: CONTINUOUS

## 2021-05-07 ASSESSMENT — PAIN - FUNCTIONAL ASSESSMENT
PAIN_FUNCTIONAL_ASSESSMENT: ACTIVITIES ARE NOT PREVENTED
PAIN_FUNCTIONAL_ASSESSMENT: PREVENTS OR INTERFERES SOME ACTIVE ACTIVITIES AND ADLS

## 2021-05-07 ASSESSMENT — PAIN DESCRIPTION - ONSET: ONSET: ON-GOING

## 2021-05-07 NOTE — PROGRESS NOTES
Patient:   Jonathan Anton  MR#:    [id number]   Room:    [facility]-   YOB: 1955  Date of Progress Note: 5/7/2021  Time of Note                           9:31 AM  Consulting Physician:   Louis Sevilla M.D. Attending Physician:  Louis Sevilla MD     CHIEF COMPLAINT: Left hip pain     Subjective  This 60 y.o. male   admitted to [facility] on 4/28/2021 after sustaining a mechanical graound level fall earlier in the day. He landed on his L hip and had sudden onset of L hip pain w/inability to bear weight. Imaging revealed a L intertrochanteric femur fx. Dr Rishabh Lazar w/ortho was consulted. Pt reports prior hx of L knee pain from a meniscus tear. He has a diabetic ulcer to his L first metatarsal head and is currenty in wound care. On 4/28 he was taken to OR to undergo a L trochanteric nailing of his L hip fx. He is foot flat weightbearing to the E. No c/o this morning except for trouble moving his bowels  REVIEW OF SYSTEMS:  Constitutional: No fevers No chills  Neck:No stiffness  Respiratory: No shortness of breath  Cardiovascular: No chest pain No palpitations  Gastrointestinal: No abdominal pain    Genitourinary: No Dysuria  Neurological: No headache, no confusion      PHYSICAL EXAM:  /64   Pulse 58   Temp 97.6 °F (36.4 °C) (Temporal)   Resp 16   Ht 5' 11\" (1.803 m)   Wt 226 lb 6.4 oz (102.7 kg)   SpO2 96%   BMI 31.58 kg/m²     Constitutional: he appears well-developed and well-nourished. Eyes - conjunctiva normal.  Pupils react to light  Ear, nose, throat -hearing intact to voice.  No scars, masses, or lesions over external nose or ears, no atrophy of tongue  Neck-symmetric, no masses noted, no jugular vein distension  Respiration- chest wall appears symmetric, good expansion,   normal effort without use of accessory muscles  Cardiovascular- RRR  Musculoskeletal - no significant wasting of muscles noted, no bony deformities, gait no gross ataxia  Extremities-no clubbing, cyanosis or edema  Skin - warm, dry, and intact. No rash, erythema, or pallor. Psychiatric - mood, affect, and behavior appear normal.      Neurology  NEUROLOGICAL EXAM:      Mental status   Awake, alert, fluent oriented x 3 appropriate affect  Attention and concentration appear appropriate  Recent and remote memory appears unremarkable  Speech normal without dysarthria  No clear issues with language       Cranial Nerves   CN II- Visual fields grossly unremarkable  CN III, IV,VI-EOMI, No nystagmus, conjugate eye movements, no ptosis  CN VII-no facial asymmetry  CN VIII-Hearing intact      Motor function  Antigravity x 4     Sensory function Intact to light touch     Cerebellar F-N intact     Tremor None present     Gait                  Not tested           Nursing/pcp notes, imaging,labs and vitals reviewed. PT,OT and/or speech notes reviewed    Lab Results   Component Value Date    WBC 8.9 05/06/2021    HGB 9.9 (L) 05/06/2021    HCT 32.2 (L) 05/06/2021    .3 (H) 05/06/2021     05/06/2021     Lab Results   Component Value Date     05/06/2021    K 3.4 (L) 05/06/2021    CL 93 (L) 05/06/2021    CO2 35 (H) 05/06/2021    BUN 40 (H) 05/06/2021    CREATININE 1.9 (H) 05/06/2021    GLUCOSE 175 (H) 05/06/2021    CALCIUM 8.6 (L) 05/06/2021    PROT 6.2 (L) 01/06/2021    LABALBU 3.3 (L) 01/06/2021    BILITOT 0.7 01/06/2021    ALKPHOS 84 01/06/2021    AST 22 01/06/2021    ALT 28 01/06/2021    LABGLOM 36 (A) 05/06/2021    GFRAA 43 (L) 05/06/2021    GLOB 3.5 01/07/2016     Lab Results   Component Value Date    INR 1.53 (H) 01/03/2021    INR 1.45 (H) 12/21/2020    INR 1.31 (H) 12/20/2020     Lab Results   Component Value Date    CRP 0.53 (H) 12/23/2020 05/06/21 1404 05/06/21 1455 05/06/21 1456   Subjective   Subjective  --  Pt reports that his L hip is feeling better this afternoon and agreed to therapy.   --    Pain Screening   Patient Currently in Pain  --  Yes  --    Pain Assessment   Pain Assessment  -- 0-10  --    Pain Level  --  1  --    Patient's Stated Pain Goal  --  No pain  --    Pain Type  --  Acute pain  --    Pain Location  --  Hip  --    Pain Orientation  --  Left  --    Pain Descriptors  --  Aching  --    Pain Frequency  --  Continuous  --    Pain Onset  --  On-going  --    Clinical Progression  --  Gradually improving  --    Functional Pain Assessment  --  Prevents or interferes some active activities and ADLs  --    Response to Pain Intervention  --  Patient Satisfied  --    Multiple Pain Sites  --  No  --    Oxygen Therapy   O2 Device Nasal cannula  --   --    O2 Flow Rate (L/min) 1 L/min  --   --    Pre Treatment Pain Screening   Pain at present  --  1  --    Scale Used  --  Numeric Score  --    Intervention List  --  Patient able to continue with treatment  --    Transfers   Sit to Stand  --   --  Contact guard assistance;Stand by assistance   Stand to sit  --   --  Contact guard assistance;Stand by assistance   Ambulation   Ambulation?  --   --  Yes   WB Status  --   --  foot flat weight bearing   Ambulation 1   Surface  --   --  level tile   Device  --   --  Rolling Walker   Assistance  --   --  Contact guard assistance   Quality of Gait  --   --  Pt was able to maintaing WB status on LLE. Pt continues to fatigue quickly. Distance  --   --  21'   Exercises   Comments  --   --   --    Conditions Requiring Skilled Therapeutic Intervention   Body structures, Functions, Activity limitations  --   --   --    Assessment  --   --   --    Prognosis  --   --   --    Activity Tolerance   Activity Tolerance  --   --   --        05/06/21 0815   Pain Assessment   Patient Currently in Pain No   Pain Assessment 0-10   Pain Level 0   Balance   Sitting Balance Independent   Standing Balance Contact guard assistance   Functional Mobility   Functional - Mobility Device Wheelchair   Activity To/from bathroom; Retrieve items   Assist Level Supervision   Transfers   Stand Step Transfers Minimal assistance   Sit to insulin and monitoring  5. Essential hypertension-continue current medications  6. Chronic kidney disease-continue current medications and monitoring. Stable  7. GI-bowel regimen.   Linzess added    Continue current treatment  ELOS:ZAC

## 2021-05-07 NOTE — PROGRESS NOTES
Occupational Therapy  Facility/Department: [facility] 8 REHAB UNIT  Daily Treatment Note  NAME: Milagros Manley  : 1955  MRN: [id number]    Date of Service: 2021    Discharge Recommendations:  Home with Home health OT       Assessment   Performance deficits / Impairments: Decreased functional mobility ; Decreased endurance;Decreased ADL status; Decreased high-level IADLs;Decreased strength;Decreased safe awareness;Decreased balance  OT Education: ADL Adaptive Strategies;Transfer Training  Activity Tolerance  Activity Tolerance: Patient Tolerated treatment well  Safety Devices  Safety Devices in place: Yes  Type of devices: Call light within reach; Left in chair;Chair alarm in place         Patient Diagnosis(es): There were no encounter diagnoses.       has a past medical history of Acute kidney failure, unspecified ([address] Anxiety state, unspecified, Atrial septal aneurysm, Blood circulation, collateral, Body mass index 30.0-30.9, adult, CAD (coronary artery disease), Calculus of kidney, Carotid artery stenosis, Cellulitis and abscess of hand, except fingers and thumb, Cerebral artery occlusion with cerebral infarction ([address] Chronic airway obstruction, not elsewhere classified, Chronic kidney disease, unspecified, Congestive heart failure, unspecified, Diabetes mellitus type II, Diverticulosis of colon (without mention of hemorrhage), Encounter for long-term (current) use of insulin ([address] Esophageal reflux, Family history of ischemic heart disease, Gastric ulcer, unspecified as acute or chronic, without mention of hemorrhage, perforation, or obstruction, Hyperlipemia, Hypertension, Internal hemorrhoids without mention of complication, 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 has a past surgical history that includes Cholecystectomy; vascular surgery (03- [facility]); vascular surgery (6/14/13 [facility]); vascular surgery (7/6/15 [facility]); Ureter stent placement; Coronary angioplasty with stent (1-16); Cardiac catheterization; and Diagnostic Cardiac Cath Lab Procedure. Restrictions  Restrictions/Precautions  Restrictions/Precautions: Weight Bearing, Fall Risk  Required Braces or Orthoses?: No  Lower Extremity Weight Bearing Restrictions  Right Lower Extremity Weight Bearing: Weight Bearing As Tolerated  Left Lower Extremity Weight Bearing: Foot Flat Weight Bearing  Position Activity Restriction  Other position/activity restrictions: Left 1st metatarsal head foot ulcer---currently seeing wound care  Subjective   General  Patient assessed for rehabilitation services?: Yes  Pain Assessment  Pain Assessment: 0-10  Pain Level: 5  Pain Type: Acute pain  Pain Location: Hip  Pain Orientation: Left  Pain Descriptors: Aching  Pain Frequency: Continuous  Pain Onset: Gradual  Clinical Progression: Not changed  Functional Pain Assessment: Activities are not prevented  Non-Pharmaceutical Pain Intervention(s): Repositioned;Cold applied  Response to Pain Intervention: Patient Satisfied  Multiple Pain Sites: No  Pre Treatment Pain Screening  Pain at present: 5  Scale Used:  Faces  Intervention List: Patient able to continue with treatment  Vital Signs  Patient Currently in Pain: Yes   Objective    Transfers  Stand Pivot Transfers: Contact guard assistance  Sit to stand: Contact guard assistance      Type of ROM/Therapeutic Exercise  Type of ROM/Therapeutic Exercise: Deidra(10#L UE, 18#R)        05/07/21 [address] Needed Supervision or touching assistance   CARE Score 4      05/07/21 1355   Toilet Transfer   Assistance Needed Supervision or touching assistance   Comment w/c to toilet   CARE Score 4     Plan   Plan  Specific instructions for Next Treatment: AE training  Current Treatment Recommendations: Self-Care / ADL, Safety Education & Training, Wheelchair Mobility Training, Endurance Training, Strengthening, Patient/Caregiver Education & Training, Home Management Training, Equipment Evaluation, Education, & procurement, Balance Training, Functional Mobility Training, Positioning    Goals  Short term goals  Time Frame for Short term goals: 1 week  Short term goal 1: complete LB dressing with AE with CGA without cues for WB protocol  Short term goal 2: complete overall toileting with CGA  Short term goal 3: complete overall bathing with min A  Short term goal 4: complete simple w/c level home making task with CGA  Short term goal 5: complete 1 handed static standing task for 2 mins with CGA  Long term goals  Time Frame for Long term goals : 2 weeks  Long term goal 1: complete overall toileting with modified independence  Long term goal 2: complete overall bathing with modified independence  Long term goal 3: complete overall dressing with modified independence  Long term goal 4: complete simple home making task using recommended mobility with modified independence  Long term goal 5: complete HEP with independence  Patient Goals   Patient goals : return home       Therapy Time   Individual Concurrent Group Co-treatment   Time In 1355         Time Out 1430         Minutes 35         Timed Code Treatment Minutes: 35 Minutes     Electronically signed by Eriberto Mcdonough OT on 5/7/2021 at 3:04 PM

## 2021-05-07 NOTE — PROGRESS NOTES
Bruce Mcardle  [id number]     05/07/21 1358 05/07/21 1445 05/07/21 1447   Subjective   Subjective  --  Pt reports that he is showing more swelling in LLE today. Pt reports that he told nursing about the increase in swelling. Pt agreed to therapy. --    Pain Screening   Patient Currently in Pain  --  Yes  --    Pain Assessment   Pain Assessment  --  0-10  --    Pain Level  --  2  --    Patient's Stated Pain Goal  --  No pain  --    Pain Type  --  Acute pain  --    Pain Location  --  Hip  --    Pain Orientation  --  Left  --    Pain Descriptors  --  Aching  --    Pain Frequency  --  Continuous  --    Pain Onset  --  On-going  --    Clinical Progression  --  Gradually worsening  --    Functional Pain Assessment  --  Prevents or interferes some active activities and ADLs  --    Response to Pain Intervention  --  Patient Satisfied  --    Multiple Pain Sites  --  No  --    Vital Signs   Resp 15  --   --    Oxygen Therapy   SpO2 95 %  --   --    O2 Device Nasal cannula  --   --    O2 Flow Rate (L/min) 1 L/min  --   --    Pre Treatment Pain Screening   Pain at present  --  2  --    Scale Used  --  Numeric Score  --    Intervention List  --  Patient able to continue with treatment  --    Ambulation   Ambulation?  --   --  No   Wheelchair Activities   Propulsion  --   --  Yes   Propulsion 1   Propulsion  --   --  Manual   Level  --   --  Level Tile   Method  --   --  RUE;LUE   Level of Assistance  --   --  Independent   Description/ Details  --   --  Pt propelled W/C well. Distance  --   --  250'   Exercises   Comments  --   --  Sitting [place] LE ther ex x 20 reps.    Conditions Requiring Skilled Therapeutic Intervention   Body structures, Functions, Activity limitations  --   --   --    Assessment  --   --   --    Prognosis  --   --   --    Activity Tolerance   Activity Tolerance  --   --   --       05/07/21 1448   Subjective   Subjective  --    Pain Screening   Patient Currently in Pain  --    Pain Assessment   Pain Assessment --    Pain Level  --    Patient's Stated Pain Goal  --    Pain Type  --    Pain Location  --    Pain Orientation  --    Pain Descriptors  --    Pain Frequency  --    Pain Onset  --    Clinical Progression  --    Functional Pain Assessment  --    Response to Pain Intervention  --    Multiple Pain Sites  --    Vital Signs   Resp  --    Oxygen Therapy   SpO2  --    O2 Device  --    O2 Flow Rate (L/min)  --    Pre Treatment Pain Screening   Pain at present  --    Scale Used  --    Intervention List  --    Ambulation   Ambulation?  --    Wheelchair Activities   Propulsion  --    Propulsion 1   Propulsion  --    Level  --    Method  --    Level of Assistance  --    Description/ Details  --    Distance  --    Exercises   Comments  --    Conditions Requiring Skilled Therapeutic Intervention   Body structures, Functions, Activity limitations Decreased functional mobility ; Decreased ADL status; Decreased strength;Decreased endurance;Decreased balance;Decreased coordination;Decreased posture   Assessment Pt was having an increase in swelling and pain in LLE. Nursing notified of increase in LLE swelling. Pt was unable to amb due to the increase in swelling and pain in LLE. Pt tolerated sitting ther ex w/ rest breaks prn.    Prognosis Good   Activity Tolerance   Activity Tolerance Patient limited by pain   Electronically signed by Mimi Lala PTA on 5/7/2021 at 2:54 PM

## 2021-05-07 NOTE — PLAN OF CARE
Problem: Falls - Risk of:  Goal: Will remain free from falls  Description: Will remain free from falls  5/7/2021 0914 by Yuriy Owusu RN  Outcome: Ongoing  5/6/2021 2316 by Alvaro Dominique LPN  Outcome: Ongoing  Goal: Absence of physical injury  Description: Absence of physical injury  5/7/2021 0914 by Yuriy Owusu RN  Outcome: Ongoing  5/6/2021 2316 by Alvaro Dominique LPN  Outcome: Ongoing     Problem: Nutrition  Goal: Optimal nutrition therapy  5/7/2021 0914 by Yuriy Owusu RN  Outcome: Ongoing  5/6/2021 2316 by Alvaro Dominique LPN  Outcome: Ongoing     Problem: Pain:  Goal: Pain level will decrease  Description: Pain level will decrease  5/7/2021 0914 by Yuriy Owusu RN  Outcome: Ongoing  5/6/2021 2316 by Alvaro Dominique LPN  Outcome: Ongoing  Goal: Control of acute pain  Description: Control of acute pain  5/7/2021 0914 by Yuriy Owusu RN  Outcome: Ongoing  5/6/2021 2316 by Alvaro Dominique LPN  Outcome: Ongoing  Goal: Control of chronic pain  Description: Control of chronic pain  5/7/2021 0914 by Yuriy Owusu RN  Outcome: Ongoing  5/6/2021 2316 by Alvaro Dominique LPN  Outcome: Ongoing     Problem: Skin Integrity:  Goal: Will show no infection signs and symptoms  Description: Will show no infection signs and symptoms  5/7/2021 0914 by Yuriy Owusu RN  Outcome: Ongoing  5/6/2021 2316 by Alvaro Dominique LPN  Outcome: Ongoing  Goal: Absence of new skin breakdown  Description: Absence of new skin breakdown  5/7/2021 0914 by Yuriy Owusu RN  Outcome: Ongoing  5/6/2021 2316 by Alvaro Dominique LPN  Outcome: Ongoing

## 2021-05-07 NOTE — PROGRESS NOTES
Occupational Therapy  Facility/Department: [facility] 8 REHAB UNIT  Daily Treatment Note  NAME: Daina Espino  : 1955  MRN: [id number]    Date of Service: 2021    Discharge Recommendations:  Home with Home health OT       Assessment   Performance deficits / Impairments: Decreased functional mobility ; Decreased endurance;Decreased ADL status; Decreased high-level IADLs;Decreased strength;Decreased safe awareness;Decreased balance  OT Education: ADL Adaptive Strategies;Transfer Training  Activity Tolerance  Activity Tolerance: Patient Tolerated treatment well  Safety Devices  Safety Devices in place: Yes  Type of devices: Call light within reach; Left in chair;Chair alarm in place         Patient Diagnosis(es): There were no encounter diagnoses.       has a past medical history of Acute kidney failure, unspecified ([address] Anxiety state, unspecified, Atrial septal aneurysm, Blood circulation, collateral, Body mass index 30.0-30.9, adult, CAD (coronary artery disease), Calculus of kidney, Carotid artery stenosis, Cellulitis and abscess of hand, except fingers and thumb, Cerebral artery occlusion with cerebral infarction ([address] Chronic airway obstruction, not elsewhere classified, Chronic kidney disease, unspecified, Congestive heart failure, unspecified, Diabetes mellitus type II, Diverticulosis of colon (without mention of hemorrhage), Encounter for long-term (current) use of insulin ([address] Esophageal reflux, Family history of ischemic heart disease, Gastric ulcer, unspecified as acute or chronic, without mention of hemorrhage, perforation, or obstruction, Hyperlipemia, Hypertension, Internal hemorrhoids without mention of complication, 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 has a past surgical history that includes Cholecystectomy; vascular surgery (03- [facility]); vascular surgery (6/14/13 [facility]); vascular surgery (7/6/15 [facility]); Ureter stent placement; Coronary angioplasty with stent (1-16); Cardiac catheterization; and Diagnostic Cardiac Cath Lab Procedure. Restrictions  Restrictions/Precautions  Restrictions/Precautions: Weight Bearing, Fall Risk  Required Braces or Orthoses?: No  Lower Extremity Weight Bearing Restrictions  Right Lower Extremity Weight Bearing: Weight Bearing As Tolerated  Left Lower Extremity Weight Bearing: Foot Flat Weight Bearing  Position Activity Restriction  Other position/activity restrictions: Left 1st metatarsal head foot ulcer---currently seeing wound care  Subjective   General  Patient assessed for rehabilitation services?: Yes  Pain Assessment  Pain Assessment: 0-10  Pain Level: 5  Pain Type: Acute pain  Pain Location: Hip  Pain Orientation: Left  Pain Descriptors: Aching  Pain Frequency: Continuous  Pain Onset: Gradual  Clinical Progression: Not changed  Functional Pain Assessment: Activities are not prevented  Non-Pharmaceutical Pain Intervention(s): Repositioned;Cold applied  Response to Pain Intervention: Patient Satisfied  Multiple Pain Sites: No  Pre Treatment Pain Screening  Pain at present: 5  Scale Used:  Faces  Intervention List: Patient able to continue with treatment  Vital Signs  Patient Currently in Pain: Yes   Objective    Transfers  Stand Pivot Transfers: Contact guard assistance  Sit to stand: Contact guard assistance       05/07/21 1100   Lower Body Dressing   Assistance Needed Partial/moderate assistance   Comment min A with AE   CARE Score 3       Plan Plan  Specific instructions for Next Treatment: AE training  Current Treatment Recommendations: Self-Care / ADL, Safety Education & Training, Wheelchair Mobility Training, Endurance Training, Strengthening, Patient/Caregiver Education & Training, Home Management Training, Equipment Evaluation, Education, & procurement, Balance Training, Functional Mobility Training, Positioning    Goals  Short term goals  Time Frame for Short term goals: 1 week  Short term goal 1: complete LB dressing with AE with CGA without cues for WB protocol  Short term goal 2: complete overall toileting with CGA  Short term goal 3: complete overall bathing with min A  Short term goal 4: complete simple w/c level home making task with CGA  Short term goal 5: complete 1 handed static standing task for 2 mins with CGA  Long term goals  Time Frame for Long term goals : 2 weeks  Long term goal 1: complete overall toileting with modified independence  Long term goal 2: complete overall bathing with modified independence  Long term goal 3: complete overall dressing with modified independence  Long term goal 4: complete simple home making task using recommended mobility with modified independence  Long term goal 5: complete HEP with independence  Patient Goals   Patient goals : return home       Therapy Time   Individual Concurrent Group Co-treatment   Time In 1100         Time Out 1150         Minutes 50         Timed Code Treatment Minutes: 50 Minutes       Electronically signed by Alexandria Barreto OT on 5/7/2021 at 1:44 PM

## 2021-05-07 NOTE — PLAN OF CARE
Problem: Falls - Risk of:  Goal: Will remain free from falls  Description: Will remain free from falls  5/6/2021 2316 by Dat Baldwin LPN  Outcome: Ongoing  5/6/2021 0952 by Tamra Gipson RN  Outcome: Ongoing  Goal: Absence of physical injury  Description: Absence of physical injury  5/6/2021 2316 by Dat Baldwin LPN  Outcome: Ongoing  5/6/2021 0952 by Tamra Gipson RN  Outcome: Ongoing     Problem: Nutrition  Goal: Optimal nutrition therapy  5/6/2021 2316 by Dat Baldwin LPN  Outcome: Ongoing  5/6/2021 0952 by Tamra Gipson RN  Outcome: Ongoing     Problem: Pain:  Goal: Pain level will decrease  Description: Pain level will decrease  5/6/2021 2316 by Dat Baldwin LPN  Outcome: Ongoing  5/6/2021 0952 by Tamra Gipson RN  Outcome: Ongoing  Goal: Control of acute pain  Description: Control of acute pain  5/6/2021 2316 by Dat Baldwin LPN  Outcome: Ongoing  5/6/2021 0952 by Tamra Gipson RN  Outcome: Ongoing  Goal: Control of chronic pain  Description: Control of chronic pain  5/6/2021 2316 by Dat Baldwin LPN  Outcome: Ongoing  5/6/2021 0952 by Tamra Gipson RN  Outcome: Ongoing     Problem: Skin Integrity:  Goal: Will show no infection signs and symptoms  Description: Will show no infection signs and symptoms  5/6/2021 2316 by Dat Baldwin LPN  Outcome: Ongoing  5/6/2021 0952 by Tamra Gipson RN  Outcome: Ongoing  Goal: Absence of new skin breakdown  Description: Absence of new skin breakdown  5/6/2021 2316 by Dat Baldwin LPN  Outcome: Ongoing  5/6/2021 0952 by Tamra Gipson RN  Outcome: Ongoing

## 2021-05-07 NOTE — PROGRESS NOTES
Tee Vera  [id number]     05/07/21 0932 05/07/21 0933 05/07/21 0934   Subjective   Subjective Pt agreed to therapy this morning. --   --    Pain Screening   Patient Currently in Pain No  --   --    Transfers   Sit to Stand Contact guard assistance;Stand by assistance  --   --    Stand to sit Contact guard assistance;Stand by assistance  --   --    Car Transfer Contact guard assistance  --   --    Ambulation   Ambulation?  --  Yes  --    WB Status  --  foot flat weight bearing  --    Ambulation 1   Surface  --  level tile  --    Device  --  Rolling Walker  --    Assistance  --  Contact guard assistance  --    Quality of Gait  --  Incorporated turns during amb this morning.  --    Distance  --  20'x2  --    Stairs/Curb   Stairs? --  No  --    Wheelchair Activities   Wheelchair Parts Management  --  No  --    Propulsion  --  Yes  --    Propulsion 1   Propulsion  --  Manual  --    Level  --  Level Tile  --    Method  --  RUE;LUE  --    Level of Assistance  --  Independent  --    Description/ Details  --  Pt propelled W/C well. --    Distance  --  200'  --    Exercises   Comments  --   --  Practiced Car Tf.   Conditions Requiring Skilled Therapeutic Intervention   Body structures, Functions, Activity limitations  --   --  Decreased functional mobility ; Decreased ADL status; Decreased strength;Decreased endurance;Decreased balance;Decreased coordination;Decreased posture   Assessment  --   --  Attempted therapy on RA w/o O2 this morning. Pt was at 90% RA sitting still, but would drop when performing activities. Pt needed to be placed back on 1L. Pt remained around 94% on 1L during session. Pt practiced Car TF this morning. Pt needed assistance getting LLE in and out of car, but performed well overall. Pt incorporated turns during amb. Pt continues to fatigue quickly needing to sit during amb.    Prognosis  --   --  Good   Activity Tolerance   Activity Tolerance  --   --  Patient Tolerated treatment well

## 2021-05-07 NOTE — PROGRESS NOTES
Rt [id number] Sp02 on room air at rest 97%               1940-pt Sp02 on room air at rest 95%  Left 02 nasal cannula at 1 lpm at bedside within reach for pt comfort

## 2021-05-08 LAB
GLUCOSE BLD-MCNC: 162 MG/DL (ref 70–99)
GLUCOSE BLD-MCNC: 164 MG/DL (ref 70–99)
GLUCOSE BLD-MCNC: 167 MG/DL (ref 70–99)
GLUCOSE BLD-MCNC: 256 MG/DL (ref 70–99)
PERFORMED ON: ABNORMAL

## 2021-05-08 PROCEDURE — 93971 EXTREMITY STUDY: CPT

## 2021-05-08 PROCEDURE — 1180000000 HC REHAB R&B

## 2021-05-08 PROCEDURE — 6370000000 HC RX 637 (ALT 250 FOR IP): Performed by: PSYCHIATRY & NEUROLOGY

## 2021-05-08 PROCEDURE — 2700000000 HC OXYGEN THERAPY PER DAY

## 2021-05-08 PROCEDURE — 82947 ASSAY GLUCOSE BLOOD QUANT: CPT

## 2021-05-08 PROCEDURE — 99232 SBSQ HOSP IP/OBS MODERATE 35: CPT | Performed by: PSYCHIATRY & NEUROLOGY

## 2021-05-08 RX ORDER — METOLAZONE 5 MG/1
10 TABLET ORAL ONCE
Status: COMPLETED | OUTPATIENT
Start: 2021-05-08 | End: 2021-05-08

## 2021-05-08 RX ADMIN — AMIODARONE HYDROCHLORIDE 200 MG: 200 TABLET ORAL at 09:02

## 2021-05-08 RX ADMIN — ISOSORBIDE MONONITRATE 60 MG: 60 TABLET, EXTENDED RELEASE ORAL at 09:02

## 2021-05-08 RX ADMIN — MONTELUKAST SODIUM 10 MG: 10 TABLET, FILM COATED ORAL at 09:02

## 2021-05-08 RX ADMIN — LINACLOTIDE 145 MCG: 145 CAPSULE, GELATIN COATED ORAL at 05:48

## 2021-05-08 RX ADMIN — MINOXIDIL 2.5 MG: 2.5 TABLET ORAL at 20:56

## 2021-05-08 RX ADMIN — DOCUSATE SODIUM 50 MG AND SENNOSIDES 8.6 MG 1 TABLET: 8.6; 5 TABLET, FILM COATED ORAL at 09:02

## 2021-05-08 RX ADMIN — POLYETHYLENE GLYCOL 3350 17 G: 17 POWDER, FOR SOLUTION ORAL at 09:02

## 2021-05-08 RX ADMIN — APIXABAN 5 MG: 5 TABLET, FILM COATED ORAL at 09:02

## 2021-05-08 RX ADMIN — OXYCODONE HYDROCHLORIDE AND ACETAMINOPHEN 1 TABLET: 10; 325 TABLET ORAL at 09:13

## 2021-05-08 RX ADMIN — CLOPIDOGREL BISULFATE 75 MG: 75 TABLET ORAL at 09:02

## 2021-05-08 RX ADMIN — ROSUVASTATIN CALCIUM 20 MG: 20 TABLET, FILM COATED ORAL at 09:02

## 2021-05-08 RX ADMIN — MUPIROCIN: 20 OINTMENT TOPICAL at 20:56

## 2021-05-08 RX ADMIN — METOLAZONE 10 MG: 5 TABLET ORAL at 12:42

## 2021-05-08 RX ADMIN — Medication 40 UNITS: at 20:56

## 2021-05-08 RX ADMIN — ASPIRIN 81 MG: 81 TABLET, CHEWABLE ORAL at 09:02

## 2021-05-08 RX ADMIN — MUPIROCIN: 20 OINTMENT TOPICAL at 12:43

## 2021-05-08 RX ADMIN — CARVEDILOL 12.5 MG: 12.5 TABLET, FILM COATED ORAL at 20:56

## 2021-05-08 RX ADMIN — OXYCODONE HYDROCHLORIDE AND ACETAMINOPHEN 500 MG: 500 TABLET ORAL at 09:02

## 2021-05-08 RX ADMIN — CETIRIZINE HYDROCHLORIDE 10 MG: 10 TABLET, FILM COATED ORAL at 09:02

## 2021-05-08 RX ADMIN — DOCUSATE SODIUM 50 MG AND SENNOSIDES 8.6 MG 1 TABLET: 8.6; 5 TABLET, FILM COATED ORAL at 20:56

## 2021-05-08 RX ADMIN — PANTOPRAZOLE SODIUM 40 MG: 40 TABLET, DELAYED RELEASE ORAL at 05:48

## 2021-05-08 RX ADMIN — ISOSORBIDE MONONITRATE 60 MG: 60 TABLET, EXTENDED RELEASE ORAL at 20:56

## 2021-05-08 RX ADMIN — BISACODYL 10 MG: 10 SUPPOSITORY RECTAL at 14:52

## 2021-05-08 RX ADMIN — CARVEDILOL 12.5 MG: 12.5 TABLET, FILM COATED ORAL at 09:38

## 2021-05-08 RX ADMIN — ALLOPURINOL 200 MG: 100 TABLET ORAL at 09:02

## 2021-05-08 RX ADMIN — APIXABAN 5 MG: 5 TABLET, FILM COATED ORAL at 20:56

## 2021-05-08 RX ADMIN — PANTOPRAZOLE SODIUM 40 MG: 40 TABLET, DELAYED RELEASE ORAL at 17:34

## 2021-05-08 RX ADMIN — POTASSIUM CHLORIDE 10 MEQ: 750 TABLET, EXTENDED RELEASE ORAL at 09:02

## 2021-05-08 RX ADMIN — MAGNESIUM HYDROXIDE 30 ML: 400 SUSPENSION ORAL at 17:34

## 2021-05-08 RX ADMIN — MINOXIDIL 2.5 MG: 2.5 TABLET ORAL at 09:02

## 2021-05-08 ASSESSMENT — PAIN SCALES - GENERAL
PAINLEVEL_OUTOF10: 0
PAINLEVEL_OUTOF10: 5
PAINLEVEL_OUTOF10: 0

## 2021-05-08 NOTE — PROGRESS NOTES
Asked Dr. Sonali Caruso if ok for patient to have PT this afternoon and received a yes to have PT attempted to notify PT but was unable to.

## 2021-05-08 NOTE — PROGRESS NOTES
Vascular Lab Prelim  Duplex venous scan of LLE shows +DVT in one Peroneal vein in the left mid calf. No svt, reflux noted at this time. Final report pending.

## 2021-05-08 NOTE — PROGRESS NOTES
muscles noted, no bony deformities, gait no gross ataxia  Extremities-no clubbing, cyanosis or edema  Skin - warm, dry, and intact. No rash, erythema, or pallor. Psychiatric - mood, affect, and behavior appear normal.      Neurology  NEUROLOGICAL EXAM:      Mental status   Awake, alert, fluent oriented x 3 appropriate affect  Attention and concentration appear appropriate  Recent and remote memory appears unremarkable  Speech normal without dysarthria  No clear issues with language       Cranial Nerves   CN II- Visual fields grossly unremarkable  CN III, IV,VI-EOMI, No nystagmus, conjugate eye movements, no ptosis  CN VII-no facial asymmetry  CN VIII-Hearing intact      Motor function  Antigravity x 4     Sensory function Intact to light touch     Cerebellar F-N intact     Tremor None present     Gait                  Not tested           Nursing/pcp notes, imaging,labs and vitals reviewed. PT,OT and/or speech notes reviewed    Lab Results   Component Value Date    WBC 8.9 05/06/2021    HGB 9.9 (L) 05/06/2021    HCT 32.2 (L) 05/06/2021    .3 (H) 05/06/2021     05/06/2021     Lab Results   Component Value Date     05/06/2021    K 3.4 (L) 05/06/2021    CL 93 (L) 05/06/2021    CO2 35 (H) 05/06/2021    BUN 40 (H) 05/06/2021    CREATININE 1.9 (H) 05/06/2021    GLUCOSE 175 (H) 05/06/2021    CALCIUM 8.6 (L) 05/06/2021    PROT 6.2 (L) 01/06/2021    LABALBU 3.3 (L) 01/06/2021    BILITOT 0.7 01/06/2021    ALKPHOS 84 01/06/2021    AST 22 01/06/2021    ALT 28 01/06/2021    LABGLOM 36 (A) 05/06/2021    GFRAA 43 (L) 05/06/2021    GLOB 3.5 01/07/2016     Lab Results   Component Value Date    INR 1.53 (H) 01/03/2021    INR 1.45 (H) 12/21/2020    INR 1.31 (H) 12/20/2020     Lab Results   Component Value Date    CRP 0.53 (H) 12/23/2020 05/06/21 1404 05/06/21 1455 05/06/21 1456   Subjective   Subjective  --  Pt reports that his L hip is feeling better this afternoon and agreed to therapy.   --    Pain Assist Level Supervision   Transfers   Stand Step Transfers Minimal assistance   Sit to stand Minimal assistance;Contact guard assistance   Stand to sit Contact guard assistance   Toilet Transfers   Toilet - Technique Stand pivot   Equipment Used Grab bars   Toilet Transfer Contact guard assistance   Assessment   Performance deficits / Impairments Decreased functional mobility ; Decreased endurance;Decreased ADL status; Decreased high-level IADLs;Decreased strength;Decreased safe awareness;Decreased balance   Treatment Diagnosis L femur fx with nailing   Prognosis Good   Timed Code Treatment Minutes 45 Minutes   Activity Tolerance   Activity Tolerance Patient Tolerated treatment well   Safety Devices   Safety Devices in place Yes   Type of devices Gait belt  (Given to PT.)   Plan   Current Treatment Recommendations Self-Care / ADL; Safety Education & Training; Wheelchair Mobility Training; Endurance Training;Strengthening;Patient/Caregiver Education & Training;Home Management Training;Equipment Evaluation, Education, & procurement;Balance Training;Functional Mobility Training;Positioning        05/06/21 0815   Oral Hygiene   Assistance Needed Independent   CARE Score 6   [address] Needed Supervision or touching assistance   Comment CGA. CARE Score 4   Shower/Bathe Self   Assistance Needed Partial/moderate assistance   Comment Shower Min A. CARE Score 3   Upper Body Dressing   Assistance Needed Independent   CARE Score 6   Lower Body Dressing   Assistance Needed Partial/moderate assistance   CARE Score 3        05/06/21 0815   Toilet Transfer   Assistance Needed Supervision or touching assistance   Comment CGA, utilized GB. CARE Score 4           RECORD REVIEW: Previous medical records, medications were reviewed at today's visit    IMPRESSION:   1. Left hip fracture status post nailing-flatfoot weightbearing-pain control/PT/OT  2. Coronary artery disease-continue Imdur and Plavix  3.

## 2021-05-08 NOTE — PROGRESS NOTES
Reported to Dr. Enrrique Dyson regarding +DVT in left mid calf and informed Elicia Hurt from physical therapy.

## 2021-05-09 LAB
GLUCOSE BLD-MCNC: 145 MG/DL (ref 70–99)
GLUCOSE BLD-MCNC: 169 MG/DL (ref 70–99)
GLUCOSE BLD-MCNC: 188 MG/DL (ref 70–99)
GLUCOSE BLD-MCNC: 224 MG/DL (ref 70–99)
PERFORMED ON: ABNORMAL

## 2021-05-09 PROCEDURE — 6370000000 HC RX 637 (ALT 250 FOR IP): Performed by: PSYCHIATRY & NEUROLOGY

## 2021-05-09 PROCEDURE — 1180000000 HC REHAB R&B

## 2021-05-09 PROCEDURE — 82947 ASSAY GLUCOSE BLOOD QUANT: CPT

## 2021-05-09 PROCEDURE — 2700000000 HC OXYGEN THERAPY PER DAY

## 2021-05-09 RX ORDER — DOCUSATE SODIUM 100 MG/1
100 CAPSULE, LIQUID FILLED ORAL 2 TIMES DAILY
Status: DISCONTINUED | OUTPATIENT
Start: 2021-05-09 | End: 2021-05-14 | Stop reason: HOSPADM

## 2021-05-09 RX ADMIN — ROSUVASTATIN CALCIUM 20 MG: 20 TABLET, FILM COATED ORAL at 08:37

## 2021-05-09 RX ADMIN — PANTOPRAZOLE SODIUM 40 MG: 40 TABLET, DELAYED RELEASE ORAL at 17:03

## 2021-05-09 RX ADMIN — ISOSORBIDE MONONITRATE 60 MG: 60 TABLET, EXTENDED RELEASE ORAL at 20:36

## 2021-05-09 RX ADMIN — DOCUSATE SODIUM 50 MG AND SENNOSIDES 8.6 MG 1 TABLET: 8.6; 5 TABLET, FILM COATED ORAL at 20:36

## 2021-05-09 RX ADMIN — MINOXIDIL 2.5 MG: 2.5 TABLET ORAL at 08:37

## 2021-05-09 RX ADMIN — MINOXIDIL 2.5 MG: 2.5 TABLET ORAL at 20:37

## 2021-05-09 RX ADMIN — CETIRIZINE HYDROCHLORIDE 10 MG: 10 TABLET, FILM COATED ORAL at 08:37

## 2021-05-09 RX ADMIN — MAGNESIUM CITRATE 148 ML: 1.75 LIQUID ORAL at 09:23

## 2021-05-09 RX ADMIN — ISOSORBIDE MONONITRATE 60 MG: 60 TABLET, EXTENDED RELEASE ORAL at 08:37

## 2021-05-09 RX ADMIN — POTASSIUM CHLORIDE 10 MEQ: 750 TABLET, EXTENDED RELEASE ORAL at 08:37

## 2021-05-09 RX ADMIN — MUPIROCIN: 20 OINTMENT TOPICAL at 20:37

## 2021-05-09 RX ADMIN — CARVEDILOL 12.5 MG: 12.5 TABLET, FILM COATED ORAL at 08:37

## 2021-05-09 RX ADMIN — ASPIRIN 81 MG: 81 TABLET, CHEWABLE ORAL at 08:37

## 2021-05-09 RX ADMIN — CLOPIDOGREL BISULFATE 75 MG: 75 TABLET ORAL at 08:37

## 2021-05-09 RX ADMIN — CARVEDILOL 12.5 MG: 12.5 TABLET, FILM COATED ORAL at 20:37

## 2021-05-09 RX ADMIN — Medication 40 UNITS: at 20:37

## 2021-05-09 RX ADMIN — DOCUSATE SODIUM 50 MG AND SENNOSIDES 8.6 MG 1 TABLET: 8.6; 5 TABLET, FILM COATED ORAL at 08:38

## 2021-05-09 RX ADMIN — DOCUSATE SODIUM 100 MG: 100 CAPSULE, LIQUID FILLED ORAL at 20:37

## 2021-05-09 RX ADMIN — APIXABAN 5 MG: 5 TABLET, FILM COATED ORAL at 20:37

## 2021-05-09 RX ADMIN — MONTELUKAST SODIUM 10 MG: 10 TABLET, FILM COATED ORAL at 08:38

## 2021-05-09 RX ADMIN — DOCUSATE SODIUM 100 MG: 100 CAPSULE, LIQUID FILLED ORAL at 08:37

## 2021-05-09 RX ADMIN — LINACLOTIDE 145 MCG: 145 CAPSULE, GELATIN COATED ORAL at 05:52

## 2021-05-09 RX ADMIN — ALLOPURINOL 200 MG: 100 TABLET ORAL at 08:38

## 2021-05-09 RX ADMIN — MUPIROCIN: 20 OINTMENT TOPICAL at 08:39

## 2021-05-09 RX ADMIN — ERGOCALCIFEROL 50000 UNITS: 1.25 CAPSULE ORAL at 08:37

## 2021-05-09 RX ADMIN — APIXABAN 5 MG: 5 TABLET, FILM COATED ORAL at 08:37

## 2021-05-09 RX ADMIN — PANTOPRAZOLE SODIUM 40 MG: 40 TABLET, DELAYED RELEASE ORAL at 05:52

## 2021-05-09 RX ADMIN — AMIODARONE HYDROCHLORIDE 200 MG: 200 TABLET ORAL at 08:37

## 2021-05-09 RX ADMIN — OXYCODONE HYDROCHLORIDE AND ACETAMINOPHEN 500 MG: 500 TABLET ORAL at 08:37

## 2021-05-09 NOTE — PLAN OF CARE
Problem: Falls - Risk of:  Goal: Will remain free from falls  Description: Will remain free from falls  5/8/2021 2321 by Lula Jenkins LPN  Outcome: Ongoing  5/8/2021 1617 by Lizett Horton RN  Outcome: Ongoing  Goal: Absence of physical injury  Description: Absence of physical injury  5/8/2021 2321 by Lula Jenkins LPN  Outcome: Ongoing  5/8/2021 1617 by Lizett Horton RN  Outcome: Ongoing     Problem: Nutrition  Goal: Optimal nutrition therapy  5/8/2021 2321 by Lula Jenkins LPN  Outcome: Ongoing  5/8/2021 1617 by Lizett Horton RN  Outcome: Ongoing     Problem: Pain:  Goal: Pain level will decrease  Description: Pain level will decrease  5/8/2021 2321 by Lula Jenkins LPN  Outcome: Ongoing  5/8/2021 1617 by Lizett Horton RN  Outcome: Ongoing  Goal: Control of acute pain  Description: Control of acute pain  5/8/2021 2321 by Lula Jenkins LPN  Outcome: Ongoing  5/8/2021 1617 by Lizett Horton RN  Outcome: Ongoing  Goal: Control of chronic pain  Description: Control of chronic pain  5/8/2021 2321 by Lula Jenkins LPN  Outcome: Ongoing  5/8/2021 1617 by Lizett Horton RN  Outcome: Ongoing     Problem: Skin Integrity:  Goal: Will show no infection signs and symptoms  Description: Will show no infection signs and symptoms  5/8/2021 2321 by Lula Jenkins LPN  Outcome: Ongoing  5/8/2021 1617 by Lizett Horton RN  Outcome: Ongoing  Goal: Absence of new skin breakdown  Description: Absence of new skin breakdown  5/8/2021 2321 by Lula Jenkins LPN  Outcome: Ongoing  5/8/2021 1617 by Lizett Horton RN  Outcome: Ongoing

## 2021-05-10 LAB
ANION GAP SERPL CALCULATED.3IONS-SCNC: 8 MMOL/L (ref 7–19)
BASOPHILS ABSOLUTE: 0 K/UL (ref 0–0.2)
BASOPHILS RELATIVE PERCENT: 0.4 % (ref 0–1)
BUN BLDV-MCNC: 34 MG/DL (ref 8–23)
CALCIUM SERPL-MCNC: 8.6 MG/DL (ref 8.8–10.2)
CHLORIDE BLD-SCNC: 91 MMOL/L (ref 98–111)
CO2: 36 MMOL/L (ref 22–29)
CREAT SERPL-MCNC: 1.9 MG/DL (ref 0.5–1.2)
EOSINOPHILS ABSOLUTE: 0.3 K/UL (ref 0–0.6)
EOSINOPHILS RELATIVE PERCENT: 3.8 % (ref 0–5)
GFR AFRICAN AMERICAN: 43
GFR NON-AFRICAN AMERICAN: 36
GLUCOSE BLD-MCNC: 127 MG/DL (ref 74–109)
GLUCOSE BLD-MCNC: 132 MG/DL (ref 70–99)
GLUCOSE BLD-MCNC: 147 MG/DL (ref 70–99)
GLUCOSE BLD-MCNC: 191 MG/DL (ref 70–99)
GLUCOSE BLD-MCNC: 250 MG/DL (ref 70–99)
HCT VFR BLD CALC: 28.9 % (ref 42–52)
HEMOGLOBIN: 9.1 G/DL (ref 14–18)
IMMATURE GRANULOCYTES #: 0 K/UL
LYMPHOCYTES ABSOLUTE: 1.4 K/UL (ref 1.1–4.5)
LYMPHOCYTES RELATIVE PERCENT: 16.6 % (ref 20–40)
MAGNESIUM: 2.6 MG/DL (ref 1.6–2.4)
MCH RBC QN AUTO: 31.4 PG (ref 27–31)
MCHC RBC AUTO-ENTMCNC: 31.5 G/DL (ref 33–37)
MCV RBC AUTO: 99.7 FL (ref 80–94)
MONOCYTES ABSOLUTE: 0.8 K/UL (ref 0–0.9)
MONOCYTES RELATIVE PERCENT: 9 % (ref 0–10)
NEUTROPHILS ABSOLUTE: 5.8 K/UL (ref 1.5–7.5)
NEUTROPHILS RELATIVE PERCENT: 69.7 % (ref 50–65)
PDW BLD-RTO: 14.8 % (ref 11.5–14.5)
PERFORMED ON: ABNORMAL
PLATELET # BLD: 259 K/UL (ref 130–400)
PMV BLD AUTO: 10.4 FL (ref 9.4–12.4)
POTASSIUM REFLEX MAGNESIUM: 3.2 MMOL/L (ref 3.5–5)
RBC # BLD: 2.9 M/UL (ref 4.7–6.1)
SODIUM BLD-SCNC: 135 MMOL/L (ref 136–145)
WBC # BLD: 8.3 K/UL (ref 4.8–10.8)

## 2021-05-10 PROCEDURE — 82947 ASSAY GLUCOSE BLOOD QUANT: CPT

## 2021-05-10 PROCEDURE — 6370000000 HC RX 637 (ALT 250 FOR IP): Performed by: PSYCHIATRY & NEUROLOGY

## 2021-05-10 PROCEDURE — 97530 THERAPEUTIC ACTIVITIES: CPT

## 2021-05-10 PROCEDURE — 1180000000 HC REHAB R&B

## 2021-05-10 PROCEDURE — 80048 BASIC METABOLIC PNL TOTAL CA: CPT

## 2021-05-10 PROCEDURE — 83735 ASSAY OF MAGNESIUM: CPT

## 2021-05-10 PROCEDURE — 85025 COMPLETE CBC W/AUTO DIFF WBC: CPT

## 2021-05-10 PROCEDURE — 36415 COLL VENOUS BLD VENIPUNCTURE: CPT

## 2021-05-10 PROCEDURE — 99232 SBSQ HOSP IP/OBS MODERATE 35: CPT | Performed by: PSYCHIATRY & NEUROLOGY

## 2021-05-10 PROCEDURE — 97535 SELF CARE MNGMENT TRAINING: CPT

## 2021-05-10 PROCEDURE — 2700000000 HC OXYGEN THERAPY PER DAY

## 2021-05-10 PROCEDURE — 97110 THERAPEUTIC EXERCISES: CPT

## 2021-05-10 RX ORDER — POTASSIUM CHLORIDE 20 MEQ/1
20 TABLET, EXTENDED RELEASE ORAL
Status: DISCONTINUED | OUTPATIENT
Start: 2021-05-10 | End: 2021-05-14 | Stop reason: HOSPADM

## 2021-05-10 RX ADMIN — DOCUSATE SODIUM 50 MG AND SENNOSIDES 8.6 MG 1 TABLET: 8.6; 5 TABLET, FILM COATED ORAL at 20:56

## 2021-05-10 RX ADMIN — CARVEDILOL 12.5 MG: 12.5 TABLET, FILM COATED ORAL at 07:43

## 2021-05-10 RX ADMIN — LINACLOTIDE 145 MCG: 145 CAPSULE, GELATIN COATED ORAL at 05:56

## 2021-05-10 RX ADMIN — ROSUVASTATIN CALCIUM 20 MG: 20 TABLET, FILM COATED ORAL at 07:42

## 2021-05-10 RX ADMIN — APIXABAN 5 MG: 5 TABLET, FILM COATED ORAL at 07:42

## 2021-05-10 RX ADMIN — ISOSORBIDE MONONITRATE 60 MG: 60 TABLET, EXTENDED RELEASE ORAL at 07:44

## 2021-05-10 RX ADMIN — APIXABAN 5 MG: 5 TABLET, FILM COATED ORAL at 20:56

## 2021-05-10 RX ADMIN — ASPIRIN 81 MG: 81 TABLET, CHEWABLE ORAL at 07:43

## 2021-05-10 RX ADMIN — DOCUSATE SODIUM 50 MG AND SENNOSIDES 8.6 MG 1 TABLET: 8.6; 5 TABLET, FILM COATED ORAL at 07:43

## 2021-05-10 RX ADMIN — MINOXIDIL 2.5 MG: 2.5 TABLET ORAL at 07:43

## 2021-05-10 RX ADMIN — CARVEDILOL 12.5 MG: 12.5 TABLET, FILM COATED ORAL at 20:56

## 2021-05-10 RX ADMIN — CLOPIDOGREL BISULFATE 75 MG: 75 TABLET ORAL at 07:43

## 2021-05-10 RX ADMIN — DOCUSATE SODIUM 100 MG: 100 CAPSULE, LIQUID FILLED ORAL at 07:43

## 2021-05-10 RX ADMIN — DOCUSATE SODIUM 100 MG: 100 CAPSULE, LIQUID FILLED ORAL at 20:56

## 2021-05-10 RX ADMIN — ALLOPURINOL 200 MG: 100 TABLET ORAL at 07:42

## 2021-05-10 RX ADMIN — PANTOPRAZOLE SODIUM 40 MG: 40 TABLET, DELAYED RELEASE ORAL at 05:56

## 2021-05-10 RX ADMIN — ISOSORBIDE MONONITRATE 60 MG: 60 TABLET, EXTENDED RELEASE ORAL at 20:56

## 2021-05-10 RX ADMIN — MONTELUKAST SODIUM 10 MG: 10 TABLET, FILM COATED ORAL at 07:44

## 2021-05-10 RX ADMIN — POTASSIUM CHLORIDE 20 MEQ: 1500 TABLET, EXTENDED RELEASE ORAL at 10:04

## 2021-05-10 RX ADMIN — MUPIROCIN: 20 OINTMENT TOPICAL at 20:57

## 2021-05-10 RX ADMIN — POLYETHYLENE GLYCOL 3350 17 G: 17 POWDER, FOR SOLUTION ORAL at 07:41

## 2021-05-10 RX ADMIN — AMIODARONE HYDROCHLORIDE 200 MG: 200 TABLET ORAL at 07:43

## 2021-05-10 RX ADMIN — CALCITRIOL CAPSULES 0.25 MCG 0.25 MCG: 0.25 CAPSULE ORAL at 07:42

## 2021-05-10 RX ADMIN — POTASSIUM CHLORIDE 10 MEQ: 750 TABLET, EXTENDED RELEASE ORAL at 07:43

## 2021-05-10 RX ADMIN — CETIRIZINE HYDROCHLORIDE 10 MG: 10 TABLET, FILM COATED ORAL at 07:43

## 2021-05-10 RX ADMIN — Medication 40 UNITS: at 20:57

## 2021-05-10 RX ADMIN — PANTOPRAZOLE SODIUM 40 MG: 40 TABLET, DELAYED RELEASE ORAL at 16:56

## 2021-05-10 RX ADMIN — MINOXIDIL 2.5 MG: 2.5 TABLET ORAL at 20:56

## 2021-05-10 RX ADMIN — OXYCODONE HYDROCHLORIDE AND ACETAMINOPHEN 500 MG: 500 TABLET ORAL at 07:44

## 2021-05-10 RX ADMIN — METOLAZONE 5 MG: 5 TABLET ORAL at 07:42

## 2021-05-10 ASSESSMENT — PAIN SCALES - GENERAL
PAINLEVEL_OUTOF10: 0

## 2021-05-10 NOTE — PROGRESS NOTES
Occupational Therapy  Facility/Department: [facility] 8 REHAB UNIT  Daily Treatment Note  NAME: Daina Espino  : 1955  MRN: [id number]    Date of Service: 5/10/2021    Discharge Recommendations:  Home with Home health OT       Assessment   Performance deficits / Impairments: Decreased functional mobility ; Decreased endurance;Decreased ADL status; Decreased high-level IADLs;Decreased strength;Decreased safe awareness;Decreased balance  Activity Tolerance  Activity Tolerance: Patient Tolerated treatment well  Safety Devices  Safety Devices in place: Yes  Type of devices: Left in chair;Call light within reach; Chair alarm in place         Patient Diagnosis(es): There were no encounter diagnoses.       has a past medical history of Acute kidney failure, unspecified ([address] Anxiety state, unspecified, Atrial septal aneurysm, Blood circulation, collateral, Body mass index 30.0-30.9, adult, CAD (coronary artery disease), Calculus of kidney, Carotid artery stenosis, Cellulitis and abscess of hand, except fingers and thumb, Cerebral artery occlusion with cerebral infarction ([address] Chronic airway obstruction, not elsewhere classified, Chronic kidney disease, unspecified, Congestive heart failure, unspecified, Diabetes mellitus type II, Diverticulosis of colon (without mention of hemorrhage), Encounter for long-term (current) use of insulin ([address] Esophageal reflux, Family history of ischemic heart disease, Gastric ulcer, unspecified as acute or chronic, without mention of hemorrhage, perforation, or obstruction, Hyperlipemia, Hypertension, Internal hemorrhoids without mention of complication, 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 has a past surgical history that includes Cholecystectomy; vascular surgery (03- [facility]); vascular surgery (6/14/13 [facility]); vascular surgery (7/6/15 [facility]); Ureter stent placement; Coronary angioplasty with stent (1-16); Cardiac catheterization; and Diagnostic Cardiac Cath Lab Procedure. Restrictions  Restrictions/Precautions  Restrictions/Precautions: Weight Bearing, Fall Risk  Required Braces or Orthoses?: No  Lower Extremity Weight Bearing Restrictions  Right Lower Extremity Weight Bearing: Weight Bearing As Tolerated  Left Lower Extremity Weight Bearing:  Foot Flat Weight Bearing  Position Activity Restriction  Other position/activity restrictions: Left 1st metatarsal head foot ulcer---currently seeing wound care  Subjective   General  Patient assessed for rehabilitation services?: Yes  Family / Caregiver Present: No  Pain Assessment  Pain Assessment: 0-10  Pain Level: 0  Pre Treatment Pain Screening  Pain at present: 0  Scale Used: Numeric Score  Vital Signs  Patient Currently in Pain: No   Objective    Transfers  Stand Pivot Transfers: Stand by assistance(recliner to w/c)  Sit to stand: Stand by assistance  Stand to sit: Stand by assistance   Type of ROM/Therapeutic Exercise  Type of ROM/Therapeutic Exercise: Cane/Corrie;Deidra(3# dowel, all planesx20reps, 15# R,10#L)        Plan   Plan  Specific instructions for Next Treatment: AE training  Current Treatment Recommendations: Self-Care / ADL, Safety Education & Training, Wheelchair Mobility Training, Endurance Training, Strengthening, Patient/Caregiver Education & Training, Home Management Training, Equipment Evaluation, Education, & procurement, Balance Training, Functional Mobility Training, Positioning    Goals  Short term goals  Time Frame for Short term goals: 1 week  Short term goal 1: complete LB dressing with AE with CGA without cues for WB protocol  Short term goal 2: complete overall toileting with CGA  Short term goal 3: complete overall bathing with min A  Short term goal 4: complete simple w/c level home making task with CGA  Short term goal 5: complete 1 handed static standing task for 2 mins with CGA  Long term goals  Time Frame for Long term goals : 2 weeks  Long term goal 1: complete overall toileting with modified independence  Long term goal 2: complete overall bathing with modified independence  Long term goal 3: complete overall dressing with modified independence  Long term goal 4: complete simple home making task using recommended mobility with modified independence  Long term goal 5: complete HEP with independence  Patient Goals   Patient goals : return home       Therapy Time   Individual Concurrent Group Co-treatment   Time In 0820         Time Out 0900         Minutes 40         Timed Code Treatment Minutes: 40 Minutes     Electronically signed by Felicia Copeland OT on 5/10/2021 at 10:09 AM

## 2021-05-10 NOTE — PROGRESS NOTES
Occupational Therapy  Facility/Department: [facility] 8 REHAB UNIT  Daily Treatment Note  NAME: Faizan Fernández  : 1955  MRN: [id number]    Date of Service: 5/10/2021    Discharge Recommendations:  Home with Home health OT  OT Equipment Recommendations  Equipment Needed: Yes  Mobility Devices: [employer] Assistive Devices  Wheelchair: Standard; Wheelchair Cushion Standard  ADL Assistive Devices: Toileting - 3-in-1 Commode;Transfer Tub Bench    Assessment      OT Education: ADL Adaptive Strategies;Transfer Training;Equipment  Safety Devices  Safety Devices in place: Yes  Type of devices: Left in chair;Call light within reach; Chair alarm in place         Patient Diagnosis(es): There were no encounter diagnoses.       has a past medical history of Acute kidney failure, unspecified ([address] Anxiety state, unspecified, Atrial septal aneurysm, Blood circulation, collateral, Body mass index 30.0-30.9, adult, CAD (coronary artery disease), Calculus of kidney, Carotid artery stenosis, Cellulitis and abscess of hand, except fingers and thumb, Cerebral artery occlusion with cerebral infarction ([address] Chronic airway obstruction, not elsewhere classified, Chronic kidney disease, unspecified, Congestive heart failure, unspecified, Diabetes mellitus type II, Diverticulosis of colon (without mention of hemorrhage), Encounter for long-term (current) use of insulin ([address] Esophageal reflux, Family history of ischemic heart disease, Gastric ulcer, unspecified as acute or chronic, without mention of hemorrhage, perforation, or obstruction, Hyperlipemia, Hypertension, Internal hemorrhoids without mention of complication, 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 has a past surgical history that includes Cholecystectomy; vascular surgery (03- [facility]); vascular surgery (6/14/13 [facility]); vascular surgery (7/6/15 [facility]); Ureter stent placement; Coronary angioplasty with stent (1-16); Cardiac catheterization; and Diagnostic Cardiac Cath Lab Procedure. Restrictions  Restrictions/Precautions  Restrictions/Precautions: Weight Bearing, Fall Risk  Required Braces or Orthoses?: No  Lower Extremity Weight Bearing Restrictions  Right Lower Extremity Weight Bearing: Weight Bearing As Tolerated  Left Lower Extremity Weight Bearing:  Foot Flat Weight Bearing  Position Activity Restriction  Other position/activity restrictions: Left 1st metatarsal head foot ulcer---currently seeing wound care  Subjective   General  Patient assessed for rehabilitation services?: Yes  Family / Caregiver Present: No  Pain Assessment  Pain Assessment: 0-10  Pain Level: 0  Pre Treatment Pain Screening  Pain at present: 0  Scale Used: Numeric Score  Vital Signs  Patient Currently in Pain: No     Objective        Transfers  Stand Pivot Transfers: Stand by assistance(recliner to w/c)  Sit to stand: Stand by assistance  Stand to sit: Stand by assistance         Type of ROM/Therapeutic Exercise  Type of ROM/Therapeutic Exercise: Cane/Corrie;Deidra(3# dowel, all planesx20reps, 15# R,10#L)        05/10/21 [address] or touching assistance   CARE Score 4   Shower/Bathe Self   Assistance Needed Supervision or touching assistance   Comment TTB, with leg , vc for tech   CARE Score 4      05/10/21 1300   Toilet Transfer   Assistance Needed Setup or clean-up assistance   Comment w/c to toilet   CARE Score 5     Plan   Plan  Specific instructions for Next Treatment: AE training  Current Treatment Recommendations: Self-Care / ADL, Safety Education & Training, Wheelchair Mobility Training, Endurance Training, Strengthening, Patient/Caregiver Education & Training, Home Management Training, Equipment Evaluation, Education, & procurement, Balance Training, Functional Mobility Training, Positioning    Goals  Short term goals  Time Frame for Short term goals: 1 week  Short term goal 1: complete LB dressing with AE with CGA without cues for WB protocol  Short term goal 2: complete overall toileting with CGA  Short term goal 3: complete overall bathing with min A  Short term goal 4: complete simple w/c level home making task with CGA  Short term goal 5: complete 1 handed static standing task for 2 mins with CGA  Long term goals  Time Frame for Long term goals : 2 weeks  Long term goal 1: complete overall toileting with modified independence  Long term goal 2: complete overall bathing with modified independence  Long term goal 3: complete overall dressing with modified independence  Long term goal 4: complete simple home making task using recommended mobility with modified independence  Long term goal 5: complete HEP with independence  Patient Goals   Patient goals : return home       Therapy Time   Individual Concurrent Group Co-treatment   Time In 1300         Time Out 1345         Minutes 45         Timed Code Treatment Minutes: 45 Minutes     Electronically signed by Alexandria Barreto OT on 5/10/2021 at 3:23 PM

## 2021-05-10 NOTE — PATIENT CARE CONFERENCE
5: CGA with gait x50' and two turns. Long term goals  Time Frame for Long term goals : 2 weeks  Long term goal 1: Indep with bed mobility. Long term goal 2: Indep with transfers. Long term goal 3: Indep with car transfer. Long term goal 4: Indep with gait x50' and two turns. Long term goal 5: Indep with one stair. ASSESSMENT:  Assessment: Pt continues to have an increase in pain in LLE when standing or amb. Pt was only able to amb a few feet, SEE AMB NOTE, stopped early due to increase in pain. Pt's amb distance has been steadily decreasing due to an increase in pain. Pt improved w/ Car TF only needing minor cues and Leg  getting LLE in and out of car.     SPEECH THERAPY      OCCUPATIONAL THERAPY    CURRENT IRF-SRINIVAS SCORES  Eating: CARE Score: 6       Oral Hygiene: CARE Score: 6        Toileting: CARE Score: 4  Supervision      Shower/Bathe: CARE Score: 4  Comment: supervision at [address] for standing level,  Bath sponge for L LE        Upper Body Dressing: CARE Score: 6         Lower Body Dressing: CARE Score: 4    Supervision for standing level    Footwear: CARE Score: 4    Toilet Transfers: CARE Score: 4  W/c to toilet w/ GB    Picking Up Object:  CARE Score: 88          UE Functioning:  WFLs though states he feels like his left side is weaker    Pain Assessment:  Pain Level: 0  Pain Location: Hip    STGs:  Short term goals  Time Frame for Short term goals: 1 week  Short term goal 1: MET  Short term goal 2: MET  Short term goal 3: MET  Short term goal 4: MET  Short term goal 5: MET    LTGs:  Long term goals  Time Frame for Long term goals : 2 weeks  Long term goal 1: complete overall toileting with modified independence  Long term goal 2: complete overall bathing with modified independence  Long term goal 3: complete overall dressing with modified independence  Long term goal 4: complete simple home making task using recommended mobility with modified independence  Long term goal 5: complete HEP mobility. 3.  4.  5.      Goals for following week:  1. Mod I for wheelchair level ADLs  2. Increase amb distance. 3.   4.   5.     Factors facilitating achievement of predicted outcomes: Family support, Motivated, Cooperative and Pleasant    Barriers to the achievement of predicted outcomes: Pain    Team Members Present at Conference:  : Modesta Hanna   Occupational Therapist: Jose Royal, OTR/L  Physical Therapist: Yesenia Balderrama PT,DPT  Speech Therapist: N/A  Nurse: Keny Romeo RN,BSN   Nurse Manager:  Keny Romeo RN, BSN  Dietitian:  Yaz Cordero, MS, RD, LD  Rehab Director:  Wilman Boone approve the established interdisciplinary plan of care as documented within the medical record of Neo Manley.

## 2021-05-10 NOTE — PROGRESS NOTES
Patient:   Bruce Mcardle  MR#:    [id number]   Room:    [facility]946-   YOB: 1955  Date of Progress Note: 5/10/2021  Time of Note                           8:26 AM  Consulting Physician:   Naomi Solitario M.D. Attending Physician:  Naomi Solitario MD     CHIEF COMPLAINT: Left hip pain     Subjective  This 60 y.o. male   admitted to [facility] on 4/28/2021 after sustaining a mechanical graound level fall earlier in the day. He landed on his L hip and had sudden onset of L hip pain w/inability to bear weight. Imaging revealed a L intertrochanteric femur fx. Dr Nataliya Montano w/ortho was consulted. Pt reports prior hx of L knee pain from a meniscus tear. He has a diabetic ulcer to his L first metatarsal head and is currenty in wound care. On 4/28 he was taken to OR to undergo a L trochanteric nailing of his L hip fx. He is foot flat weightbearing to the E. No complaints this morning. Edema in left leg has improved  REVIEW OF SYSTEMS:  Constitutional: No fevers No chills  Neck:No stiffness  Respiratory: No shortness of breath  Cardiovascular: No chest pain No palpitations  Gastrointestinal: No abdominal pain    Genitourinary: No Dysuria  Neurological: No headache, no confusion      PHYSICAL EXAM:  BP (!) 114/59   Pulse 50   Temp 98.1 °F (36.7 °C) (Temporal)   Resp 16   Ht 5' 11\" (1.803 m)   Wt 237 lb 6.4 oz (107.7 kg)   SpO2 95%   BMI 33.11 kg/m²     Constitutional: he appears well-developed and well-nourished. Eyes - conjunctiva normal.  Pupils react to light  Ear, nose, throat -hearing intact to voice.  No scars, masses, or lesions over external nose or ears, no atrophy of tongue  Neck-symmetric, no masses noted, no jugular vein distension  Respiration- chest wall appears symmetric, good expansion,   normal effort without use of accessory muscles  Cardiovascular- RRR  Musculoskeletal - no significant wasting of muscles noted, no bony deformities, gait no gross ataxia  Extremities-no clubbing, cyanosis or edema  Skin - warm, dry, and intact. No rash, erythema, or pallor. Psychiatric - mood, affect, and behavior appear normal.      Neurology  NEUROLOGICAL EXAM:      Mental status   Awake, alert, fluent oriented x 3 appropriate affect  Attention and concentration appear appropriate  Recent and remote memory appears unremarkable  Speech normal without dysarthria  No clear issues with language       Cranial Nerves   CN II- Visual fields grossly unremarkable  CN III, IV,VI-EOMI, No nystagmus, conjugate eye movements, no ptosis  CN VII-no facial asymmetry  CN VIII-Hearing intact      Motor function  Antigravity x 4     Sensory function Intact to light touch     Cerebellar F-N intact     Tremor None present     Gait                  Not tested           Nursing/pcp notes, imaging,labs and vitals reviewed. PT,OT and/or speech notes reviewed    Lab Results   Component Value Date    WBC 8.3 05/10/2021    HGB 9.1 (L) 05/10/2021    HCT 28.9 (L) 05/10/2021    MCV 99.7 (H) 05/10/2021     05/10/2021     Lab Results   Component Value Date     (L) 05/10/2021    K 3.2 (L) 05/10/2021    CL 91 (L) 05/10/2021    CO2 36 (H) 05/10/2021    BUN 34 (H) 05/10/2021    CREATININE 1.9 (H) 05/10/2021    GLUCOSE 127 (H) 05/10/2021    CALCIUM 8.6 (L) 05/10/2021    PROT 6.2 (L) 01/06/2021    LABALBU 3.3 (L) 01/06/2021    BILITOT 0.7 01/06/2021    ALKPHOS 84 01/06/2021    AST 22 01/06/2021    ALT 28 01/06/2021    LABGLOM 36 (A) 05/10/2021    GFRAA 43 (L) 05/10/2021    GLOB 3.5 01/07/2016     Lab Results   Component Value Date    INR 1.53 (H) 01/03/2021    INR 1.45 (H) 12/21/2020    INR 1.31 (H) 12/20/2020     Lab Results   Component Value Date    CRP 0.53 (H) 12/23/2020 05/07/21 1358 05/07/21 1445 05/07/21 1447   Subjective   Subjective  --  Pt reports that he is showing more swelling in LLE today. Pt reports that he told nursing about the increase in swelling. Pt agreed to therapy.   --    Pain Screening   Patient Currently in Pain  --  Yes  --    Pain Assessment   Pain Assessment  --  0-10  --    Pain Level  --  2  --    Patient's Stated Pain Goal  --  No pain  --    Pain Type  --  Acute pain  --    Pain Location  --  Hip  --    Pain Orientation  --  Left  --    Pain Descriptors  --  Aching  --    Pain Frequency  --  Continuous  --    Pain Onset  --  On-going  --    Clinical Progression  --  Gradually worsening  --    Functional Pain Assessment  --  Prevents or interferes some active activities and ADLs  --    Response to Pain Intervention  --  Patient Satisfied  --    Multiple Pain Sites  --  No  --    Vital Signs   Resp 15  --   --    Oxygen Therapy   SpO2 95 %  --   --    O2 Device Nasal cannula  --   --    O2 Flow Rate (L/min) 1 L/min  --   --    Pre Treatment Pain Screening   Pain at present  --  2  --    Scale Used  --  Numeric Score  --    Intervention List  --  Patient able to continue with treatment  --    Ambulation   Ambulation?  --   --  No   Wheelchair Activities   Propulsion  --   --  Yes   Propulsion 1   Propulsion  --   --  Manual   Level  --   --  Level Tile   Method  --   --  RUE;LUE   Level of Assistance  --   --  Independent   Description/ Details  --   --  Pt propelled W/C well. Distance  --   --  250'   Exercises   Comments  --   --  Sitting Pawel LE ther ex x 20 reps.    Conditions Requiring Skilled Therapeutic Intervention   Body structures, Functions, Activity limitations  --   --   --    Assessment  --   --   --    Prognosis  --   --   --    Activity Tolerance   Activity Tolerance  --   --   --       Objective    Transfers  Stand Pivot Transfers: Contact guard assistance  Sit to stand: Contact guard assistance   Type of ROM/Therapeutic Exercise  Type of ROM/Therapeutic Exercise: Deidra(10#L UE, 18#R)       05/07/21 [address] Needed Supervision or touching assistance   CARE Score 4        05/07/21 1355   Toilet Transfer   Assistance Needed Supervision or touching assistance   Comment w/c to toilet   CARE Score 4                Veins:Lower Extremities DVT Study, VL EXTREMITY VENOUS DUPLEX LEFT.     Indications for Study:Edema, left lower extremity.       Allergies     - Hydralazine.         - Morphine.         - Results were reported to:Nurse Rusty Clemens @1:30pm..        Impression        There is evidence of subacute deep vein thrombosis in the left lower    extremity involving the peroneal vein.             RECORD REVIEW: Previous medical records, medications were reviewed at today's visit    IMPRESSION:   1. Left hip fracture status post nailing-flatfoot weightbearing-pain control/PT/OT  2. Coronary artery disease-continue Imdur and Plavix  3. Paroxysmal atrial fibrillation/DVT prophylaxis-Eliquis, amiodarone  4. Diabetes-continue insulin and monitoring  5. Essential hypertension-continue current medications  6. Chronic kidney disease-continue current medications and monitoring. Stable  7. GI-bowel regimen. Linzess increased  8. DVT left leg. Edema improved. Already on maximum anticoagulation as well as aspirin and Plavix. No further treatment at this time deemed necessary. 9.  Hypokalemia-increase supplement  ELOS:TBD.   Staff this week

## 2021-05-10 NOTE — PROGRESS NOTES
Seth Oskar  [id number]     05/10/21 1137 05/10/21 1138 05/10/21 1139   Subjective   Subjective Pt reports that he is feeling better this morning and has been on RA. Pt agreed to therapy. --   --    Pain Screening   Patient Currently in Pain No  --   --    Transfers   Sit to Stand  --  Contact guard assistance;Stand by assistance  --    Stand to sit  --  Contact guard assistance;Stand by assistance  --    Bed to Chair  --  Contact guard assistance  --    Ambulation   Ambulation?  --  No  --    Stairs/Curb   Stairs? --  No  --    Wheelchair Activities   Propulsion  --  Yes  --    Propulsion 1   Propulsion  --  Manual  --    Level  --  Level Tile  --    Method  --  RUE;LUE  --    Level of Assistance  --  Independent  --    Description/ Details  --  Pt propelled W/C well. --    Distance  --  250'  --    Exercises   Comments  --   --  Sitting Pawel LE ther ex x 20 reps. Conditions Requiring Skilled Therapeutic Intervention   Body structures, Functions, Activity limitations  --   --  Decreased functional mobility ; Decreased ADL status; Decreased strength;Decreased endurance;Decreased balance;Decreased coordination;Decreased posture   Assessment  --   --  Pt was on RA at beginning of session. Ck'd Pt's O2:  94-95% RA. Pt remained around 95% RA during session. Pt continues to need CGA for TF's due to unsteadiness and weakness in LLE. Pt doing well w/ W/C propulsion. Pt tolerated sitting ther ex w/ rest break prn.    Prognosis  --   --  Good   Activity Tolerance   Activity Tolerance  --   --  Patient Tolerated treatment well   Electronically signed by Russell Mercado PTA on 5/10/2021 at 11:48 AM

## 2021-05-10 NOTE — PROGRESS NOTES
Mark Constanza  [id number]     05/10/21 1414 05/10/21 1415 05/10/21 1416   Subjective   Subjective Pt agreed to therapy this afternoon. --   --    Pain Screening   Patient Currently in Pain No  --   --    Bed Mobility   Rolling  --  Independent  --    Supine to Sit  --  Independent  --    Sit to Supine  --  Stand by assistance; Independent  --    Transfers   Sit to Stand  --  Stand by assistance; Independent  --    Stand to sit  --  Stand by assistance; Independent  --    Bed to Chair  --  Stand by assistance  --    Ambulation   Ambulation?  --   --  No   Stairs/Curb   Stairs?  --   --  No   Wheelchair Activities   Propulsion  --   --  Yes   Propulsion 1   Propulsion  --   --  Manual   Level  --   --  Level Tile   Method  --   --  RUE;RADHAE   Level of Assistance  --   --  Independent   Description/ Details  --   --  Pt propelled W/C well. Distance  --   --  250'   Exercises   Comments  --   --   --    Conditions Requiring Skilled Therapeutic Intervention   Body structures, Functions, Activity limitations  --   --   --    Assessment  --   --   --    Prognosis  --   --   --    Activity Tolerance   Activity Tolerance  --   --   --       05/10/21 1417   Subjective   Subjective  --    Pain Screening   Patient Currently in Pain  --    Bed Mobility   Rolling  --    Supine to Sit  --    Sit to Supine  --    Transfers   Sit to Stand  --    Stand to sit  --    Bed to Chair  --    Ambulation   Ambulation?  --    Stairs/Curb   Stairs?  --    Wheelchair Activities   Propulsion  --    Propulsion 1   Propulsion  --    Level  --    Method  --    Level of Assistance  --    Description/ Details  --    Distance  --    Exercises   Comments Practiced Bed mobility on mat table set @ Pt's own bed height. Conditions Requiring Skilled Therapeutic Intervention   Body structures, Functions, Activity limitations Decreased functional mobility ; Decreased ADL status; Decreased strength;Decreased endurance;Decreased balance;Decreased coordination;Decreased posture   Assessment Pt is improving to SBA-independent for TF's. Practiced bed mobility on mat table set @ Pt's own bed height. Pt was able to get LLE on and off mat table using Leg , but struggled w/ it set at his own height. Pt was able to perform bed mobility much better at lower height.    Prognosis Good   Activity Tolerance   Activity Tolerance Patient Tolerated treatment well   Electronically signed by Kirill Vega PTA on 5/10/2021 at 2:21 PM

## 2021-05-10 NOTE — PLAN OF CARE
Problem: Falls - Risk of:  Goal: Will remain free from falls  Description: Will remain free from falls  5/10/2021 0010 by Sharmila Peguero RN  Outcome: Ongoing  5/9/2021 1341 by Aurea Leger RN  Outcome: Ongoing  Goal: Absence of physical injury  Description: Absence of physical injury  5/10/2021 0010 by Sharmila Peguero RN  Outcome: Ongoing  5/9/2021 1341 by Aurea Leger RN  Outcome: Ongoing     Problem: Pain:  Goal: Pain level will decrease  Description: Pain level will decrease  5/10/2021 0010 by Sharmila Peguero RN  Outcome: Ongoing  5/9/2021 1341 by Aurea Leger RN  Outcome: Ongoing  Goal: Control of acute pain  Description: Control of acute pain  5/10/2021 0010 by Sharmila Peguero RN  Outcome: Ongoing  5/9/2021 1341 by Aurea Leger RN  Outcome: Ongoing  Goal: Control of chronic pain  Description: Control of chronic pain  5/10/2021 0010 by Sharmila Peguero RN  Outcome: Ongoing  5/9/2021 1341 by Aurea Leger RN  Outcome: Ongoing     Problem: Skin Integrity:  Goal: Will show no infection signs and symptoms  Description: Will show no infection signs and symptoms  5/10/2021 0010 by Sharmila Peguero RN  Outcome: Ongoing  5/9/2021 1341 by Aurea Leger RN  Outcome: Ongoing  Goal: Absence of new skin breakdown  Description: Absence of new skin breakdown  5/10/2021 0010 by Sharmila Peguero RN  Outcome: Ongoing  5/9/2021 1341 by Aurea Leger RN  Outcome: Ongoing

## 2021-05-11 LAB
GLUCOSE BLD-MCNC: 154 MG/DL (ref 70–99)
GLUCOSE BLD-MCNC: 166 MG/DL (ref 70–99)
GLUCOSE BLD-MCNC: 196 MG/DL (ref 70–99)
GLUCOSE BLD-MCNC: 274 MG/DL (ref 70–99)
GLUCOSE BLD-MCNC: 300 MG/DL (ref 70–99)
GLUCOSE BLD-MCNC: 342 MG/DL (ref 70–99)
PERFORMED ON: ABNORMAL

## 2021-05-11 PROCEDURE — 97535 SELF CARE MNGMENT TRAINING: CPT

## 2021-05-11 PROCEDURE — 6370000000 HC RX 637 (ALT 250 FOR IP): Performed by: PSYCHIATRY & NEUROLOGY

## 2021-05-11 PROCEDURE — 99232 SBSQ HOSP IP/OBS MODERATE 35: CPT | Performed by: PSYCHIATRY & NEUROLOGY

## 2021-05-11 PROCEDURE — 97116 GAIT TRAINING THERAPY: CPT

## 2021-05-11 PROCEDURE — 97530 THERAPEUTIC ACTIVITIES: CPT

## 2021-05-11 PROCEDURE — 82947 ASSAY GLUCOSE BLOOD QUANT: CPT

## 2021-05-11 PROCEDURE — 97110 THERAPEUTIC EXERCISES: CPT

## 2021-05-11 PROCEDURE — 1180000000 HC REHAB R&B

## 2021-05-11 RX ADMIN — PANTOPRAZOLE SODIUM 40 MG: 40 TABLET, DELAYED RELEASE ORAL at 05:43

## 2021-05-11 RX ADMIN — Medication 40 UNITS: at 21:47

## 2021-05-11 RX ADMIN — PANTOPRAZOLE SODIUM 40 MG: 40 TABLET, DELAYED RELEASE ORAL at 17:25

## 2021-05-11 RX ADMIN — DOCUSATE SODIUM 100 MG: 100 CAPSULE, LIQUID FILLED ORAL at 21:46

## 2021-05-11 RX ADMIN — CARVEDILOL 12.5 MG: 12.5 TABLET, FILM COATED ORAL at 09:23

## 2021-05-11 RX ADMIN — MONTELUKAST SODIUM 10 MG: 10 TABLET, FILM COATED ORAL at 09:23

## 2021-05-11 RX ADMIN — CARVEDILOL 12.5 MG: 12.5 TABLET, FILM COATED ORAL at 21:46

## 2021-05-11 RX ADMIN — ACETAMINOPHEN 650 MG: 325 TABLET ORAL at 22:02

## 2021-05-11 RX ADMIN — LINACLOTIDE 290 MCG: 145 CAPSULE, GELATIN COATED ORAL at 05:43

## 2021-05-11 RX ADMIN — MINOXIDIL 2.5 MG: 2.5 TABLET ORAL at 09:23

## 2021-05-11 RX ADMIN — ACETAMINOPHEN 650 MG: 325 TABLET ORAL at 14:29

## 2021-05-11 RX ADMIN — ISOSORBIDE MONONITRATE 60 MG: 60 TABLET, EXTENDED RELEASE ORAL at 09:23

## 2021-05-11 RX ADMIN — ALLOPURINOL 200 MG: 100 TABLET ORAL at 09:23

## 2021-05-11 RX ADMIN — ISOSORBIDE MONONITRATE 60 MG: 60 TABLET, EXTENDED RELEASE ORAL at 21:46

## 2021-05-11 RX ADMIN — MINOXIDIL 2.5 MG: 2.5 TABLET ORAL at 21:46

## 2021-05-11 RX ADMIN — OXYCODONE HYDROCHLORIDE AND ACETAMINOPHEN 500 MG: 500 TABLET ORAL at 09:23

## 2021-05-11 RX ADMIN — DOCUSATE SODIUM 50 MG AND SENNOSIDES 8.6 MG 1 TABLET: 8.6; 5 TABLET, FILM COATED ORAL at 09:23

## 2021-05-11 RX ADMIN — POTASSIUM CHLORIDE 20 MEQ: 1500 TABLET, EXTENDED RELEASE ORAL at 09:23

## 2021-05-11 RX ADMIN — DOCUSATE SODIUM 100 MG: 100 CAPSULE, LIQUID FILLED ORAL at 09:23

## 2021-05-11 RX ADMIN — CLOPIDOGREL BISULFATE 75 MG: 75 TABLET ORAL at 09:23

## 2021-05-11 RX ADMIN — MUPIROCIN: 20 OINTMENT TOPICAL at 09:26

## 2021-05-11 RX ADMIN — ACETAMINOPHEN 650 MG: 325 TABLET ORAL at 02:11

## 2021-05-11 RX ADMIN — APIXABAN 5 MG: 5 TABLET, FILM COATED ORAL at 21:45

## 2021-05-11 RX ADMIN — APIXABAN 5 MG: 5 TABLET, FILM COATED ORAL at 09:23

## 2021-05-11 RX ADMIN — DOCUSATE SODIUM 50 MG AND SENNOSIDES 8.6 MG 1 TABLET: 8.6; 5 TABLET, FILM COATED ORAL at 21:46

## 2021-05-11 RX ADMIN — CETIRIZINE HYDROCHLORIDE 10 MG: 10 TABLET, FILM COATED ORAL at 09:23

## 2021-05-11 RX ADMIN — ROSUVASTATIN CALCIUM 20 MG: 20 TABLET, FILM COATED ORAL at 09:23

## 2021-05-11 RX ADMIN — ASPIRIN 81 MG: 81 TABLET, CHEWABLE ORAL at 09:23

## 2021-05-11 RX ADMIN — AMIODARONE HYDROCHLORIDE 200 MG: 200 TABLET ORAL at 09:23

## 2021-05-11 ASSESSMENT — PAIN DESCRIPTION - ORIENTATION
ORIENTATION: LEFT

## 2021-05-11 ASSESSMENT — PAIN SCALES - GENERAL
PAINLEVEL_OUTOF10: 10
PAINLEVEL_OUTOF10: 0
PAINLEVEL_OUTOF10: 5
PAINLEVEL_OUTOF10: 0
PAINLEVEL_OUTOF10: 8

## 2021-05-11 ASSESSMENT — PAIN DESCRIPTION - PAIN TYPE
TYPE: ACUTE PAIN
TYPE: ACUTE PAIN

## 2021-05-11 ASSESSMENT — PAIN DESCRIPTION - FREQUENCY
FREQUENCY: INTERMITTENT

## 2021-05-11 ASSESSMENT — PAIN DESCRIPTION - DESCRIPTORS
DESCRIPTORS: ACHING

## 2021-05-11 ASSESSMENT — PAIN DESCRIPTION - LOCATION
LOCATION: HIP
LOCATION: HIP

## 2021-05-11 ASSESSMENT — PAIN DESCRIPTION - PROGRESSION: CLINICAL_PROGRESSION: GRADUALLY IMPROVING

## 2021-05-11 ASSESSMENT — PAIN - FUNCTIONAL ASSESSMENT: PAIN_FUNCTIONAL_ASSESSMENT: PREVENTS OR INTERFERES SOME ACTIVE ACTIVITIES AND ADLS

## 2021-05-11 NOTE — PROGRESS NOTES
Patient:   Jose Luis Lopez  MR#:    [id number]   Room:    [address] [place], [place] [address]   YOB: 1955  Date of Progress Note: 5/11/2021  Time of Note                           10:06 AM  Consulting Physician:   Chano Ahumada M.D. Attending Physician:  Chano Ahumada MD     CHIEF COMPLAINT: Left hip pain     Subjective  This 60 y.o. male   admitted to [facility] on 4/28/2021 after sustaining a mechanical graound level fall earlier in the day. He landed on his L hip and had sudden onset of L hip pain w/inability to bear weight. Imaging revealed a L intertrochanteric femur fx. Dr Cathie Flower w/ortho was consulted. Pt reports prior hx of L knee pain from a meniscus tear. He has a diabetic ulcer to his L first metatarsal head and is currenty in wound care. On 4/28 he was taken to OR to undergo a L trochanteric nailing of his L hip fx. He is foot flat weightbearing to the E. No complaints today  REVIEW OF SYSTEMS:  Constitutional: No fevers No chills  Neck:No stiffness  Respiratory: No shortness of breath  Cardiovascular: No chest pain No palpitations  Gastrointestinal: No abdominal pain    Genitourinary: No Dysuria  Neurological: No headache, no confusion      PHYSICAL EXAM:  /60   Pulse 54   Temp 96.9 °F (36.1 °C) (Temporal)   Resp 16   Ht 5' 11\" (1.803 m)   Wt 237 lb 6.4 oz (107.7 kg)   SpO2 90%   BMI 33.11 kg/m²     Constitutional: he appears well-developed and well-nourished. Eyes - conjunctiva normal.  Pupils react to light  Ear, nose, throat -hearing intact to voice. No scars, masses, or lesions over external nose or ears, no atrophy of tongue  Neck-symmetric, no masses noted, no jugular vein distension  Respiration- chest wall appears symmetric, good expansion,   normal effort without use of accessory muscles  Cardiovascular- RRR  Musculoskeletal - no significant wasting of muscles noted, no bony deformities, gait no gross ataxia  Extremities-no clubbing, cyanosis or edema  Skin - warm, dry, and intact.   No rash, erythema, or pallor. Psychiatric - mood, affect, and behavior appear normal.      Neurology  NEUROLOGICAL EXAM:      Mental status   Awake, alert, fluent oriented x 3 appropriate affect  Attention and concentration appear appropriate  Recent and remote memory appears unremarkable  Speech normal without dysarthria  No clear issues with language       Cranial Nerves   CN II- Visual fields grossly unremarkable  CN III, IV,VI-EOMI, No nystagmus, conjugate eye movements, no ptosis  CN VII-no facial asymmetry  CN VIII-Hearing intact      Motor function  Antigravity x 4     Sensory function Intact to light touch     Cerebellar F-N intact     Tremor None present     Gait                  Not tested           Nursing/pcp notes, imaging,labs and vitals reviewed. PT,OT and/or speech notes reviewed    Lab Results   Component Value Date    WBC 8.3 05/10/2021    HGB 9.1 (L) 05/10/2021    HCT 28.9 (L) 05/10/2021    MCV 99.7 (H) 05/10/2021     05/10/2021     Lab Results   Component Value Date     (L) 05/10/2021    K 3.2 (L) 05/10/2021    CL 91 (L) 05/10/2021    CO2 36 (H) 05/10/2021    BUN 34 (H) 05/10/2021    CREATININE 1.9 (H) 05/10/2021    GLUCOSE 127 (H) 05/10/2021    CALCIUM 8.6 (L) 05/10/2021    PROT 6.2 (L) 01/06/2021    LABALBU 3.3 (L) 01/06/2021    BILITOT 0.7 01/06/2021    ALKPHOS 84 01/06/2021    AST 22 01/06/2021    ALT 28 01/06/2021    LABGLOM 36 (A) 05/10/2021    GFRAA 43 (L) 05/10/2021    GLOB 3.5 01/07/2016     Lab Results   Component Value Date    INR 1.53 (H) 01/03/2021    INR 1.45 (H) 12/21/2020    INR 1.31 (H) 12/20/2020     Lab Results   Component Value Date    CRP 0.53 (H) 12/23/2020          05/10/21 1414 05/10/21 1415 05/10/21 1416   Subjective   Subjective Pt agreed to therapy this afternoon.   --   --    Pain Screening   Patient Currently in Pain No  --   --    Bed Mobility   Rolling  --  Independent  --    Supine to Sit  --  Independent  --    Sit to Supine  --  Stand by assistance; Independent  --    Transfers   Sit to Stand  --  Stand by assistance; Independent  --    Stand to sit  --  Stand by assistance; Independent  --    Bed to Chair  --  Stand by assistance  --    Ambulation   Ambulation?  --   --  No   Stairs/Curb   Stairs?  --   --  No   Wheelchair Activities   Propulsion  --   --  Yes   Propulsion 1   Propulsion  --   --  Manual   Level  --   --  Level Tile   Method  --   --  RUE;LUE   Level of Assistance  --   --  Independent   Description/ Details  --   --  Pt propelled W/C well. Distance  --   --  250'   Exercises   Comments  --   --   --    Conditions Requiring Skilled Therapeutic Intervention   Body structures, Functions, Activity limitations  --   --   --    Assessment  --   --   --    Prognosis  --   --   --    Activity Tolerance   Activity Tolerance  --   --   --         05/10/21 1417   Subjective   Subjective  --    Pain Screening   Patient Currently in Pain  --    Bed Mobility   Rolling  --    Supine to Sit  --    Sit to Supine  --    Transfers   Sit to Stand  --    Stand to sit  --    Bed to Chair  --    Ambulation   Ambulation?  --    Stairs/Curb   Stairs?  --    Wheelchair Activities   Propulsion  --    Propulsion 1   Propulsion  --    Level  --    Method  --    Level of Assistance  --    Description/ Details  --    Distance  --    Exercises   Comments Practiced Bed mobility on mat table set @ Pt's own bed height. Conditions Requiring Skilled Therapeutic Intervention   Body structures, Functions, Activity limitations Decreased functional mobility ; Decreased ADL status; Decreased strength;Decreased endurance;Decreased balance;Decreased coordination;Decreased posture   Assessment Pt is improving to SBA-independent for TF's. Practiced bed mobility on mat table set @ Pt's own bed height. Pt was able to get LLE on and off mat table using Leg , but struggled w/ it set at his own height. Pt was able to perform bed mobility much better at lower height. Prognosis Good   Activity Tolerance   Activity Tolerance Patient Tolerated treatment well       05/10/21 [address] Needed Supervision or touching assistance   CARE Score 4   Shower/Bathe Self   Assistance Needed Supervision or touching assistance   Comment TTB, with leg , vc for tech   CARE Score 4        05/10/21 1300   Toilet Transfer   Assistance Needed Setup or clean-up assistance   Comment w/c to toilet   CARE Score 5             Veins:Lower Extremities DVT Study, VL EXTREMITY VENOUS DUPLEX LEFT.     Indications for Study:Edema, left lower extremity.       Allergies     - Hydralazine.         - Morphine.         - Results were reported to:Nurse Galilea Rivera @1:30pm..        Impression        There is evidence of subacute deep vein thrombosis in the left lower    extremity involving the peroneal vein.             RECORD REVIEW: Previous medical records, medications were reviewed at today's visit    IMPRESSION:   1. Left hip fracture status post nailing-flatfoot weightbearing-pain control/PT/OT  2. Coronary artery disease-continue Imdur and Plavix  3. Paroxysmal atrial fibrillation/DVT prophylaxis-Eliquis, amiodarone  4. Diabetes-continue insulin and monitoring  5. Essential hypertension-continue current medications  6. Chronic kidney disease-continue current medications and monitoring. Stable  7. GI-bowel regimen. Linzess increased  8. DVT left leg. Edema improved. Already on Eliquis 5 mg twice a day as well as aspirin and Plavix. Given the fact that he needs to be on aspirin and Plavix I feel it is too risky to double the dose of Eliquis for 7 days. He will continue on current treatment. 9.  Hypokalemia-increase supplement  ELOS:TBD.   Staff tomorrow

## 2021-05-11 NOTE — PROGRESS NOTES
Nutrition Assessment     Type and Reason for Visit: Reassess    Nutrition Recommendations/Plan: Continue current POC. Nutrition Assessment:  Pt remains adequately nourished at this time AEB adequate po intakes and wt gain X 1 week. Accuchecks running 132-274; last A1C 12/2020: 6.7%. Pt reports following a carb consistent diet at home with his wife who follows weigh watchers. Pt aware and able to accurately describe foods containing carbs and what foods affect his blood sugar levels. Continue current POC. Malnutrition Assessment:  Malnutrition Status: No malnutrition    Nutrition Related Findings: +1 LLE edema      Current Nutrition Therapies:    DIET CARB CONTROL; Anthropometric Measures:  · Height: 5' 11\" (180.3 cm)  · Current Body Wt: 237 lb (107.5 kg)   · BMI: 33.1   ·   Nutrition Interventions:   Food and/or Nutrient Delivery:  Continue Current Diet  Nutrition Education/Counseling:  Education not indicated(Pt well aware of how his diet affects his BS levels.)   Coordination of Nutrition Care:  Continue to monitor while inpatient    Goals:  Pt will consume 50% or greater of meals. wt stable.        Nutrition Monitoring and Evaluation:   Food/Nutrient Intake Outcomes:  Food and Nutrient Intake  Physical Signs/Symptoms Outcomes:  Biochemical Data, Nutrition Focused Physical Findings, Skin, Weight, Fluid Status or Edema     Discharge Planning:    Continue current diet     Electronically signed by Lauren Felix, MS, RD, LD on 5/11/21 at 10:56 AM CDT    Contact: [phone]

## 2021-05-11 NOTE — PROGRESS NOTES
Patient states he can not bend his left knee after therapy today. Reported this to Dr. Navi Treadwell.

## 2021-05-11 NOTE — PROGRESS NOTES
Occupational Therapy     05/11/21 0830   Lower Extremity Weight Bearing Restrictions   Left Lower Extremity Weight Bearing Foot Flat Weight Bearing   Position Activity Restriction   Other position/activity restrictions Left 1st metatarsal head foot ulcer---currently seeing wound care   Subjective   Subjective Tx focused on t/fs and self care tasks from wheelchair level   ADL   Additional Comments shower, see CARE scores   Balance   Standing Balance Stand by assistance  (at GB)   Standing Balance   Activity ADLs    Functional Mobility   Functional - Mobility Device Wheelchair   Activity To/from bathroom   Assist Level Supervision   Functional Mobility Comments retrieved self care items from closet   Transfers   Sit to stand Stand by assistance   Stand to sit Stand by assistance   Transfer Comments initial cues for hand placement for transitioning while keeping weight shifted to R side    Short term goals   Short term goal 1 MET   Short term goal 2 MET   Short term goal 3 MET   Short term goal 4 MET   Short term goal 5 MET   Assessment   Performance deficits / Impairments Decreased functional mobility ; Decreased endurance;Decreased ADL status; Decreased high-level IADLs;Decreased strength;Decreased safe awareness;Decreased balance   Assessment Rec switching w/c as the one patient has obstructs view of leg rest removal/placement pins   Treatment Diagnosis L femur fx with nailing   Timed Code Treatment Minutes 90 Minutes     05/11/21 0830    Eating   Assistance Needed Independent   CARE Score 6   Oral Hygiene   Assistance Needed Independent   CARE Score 6   [address] Needed Supervision or touching assistance   CARE Score 4   Shower/Bathe Self   Assistance Needed Setup or clean-up assistance;Supervision or touching assistance   Comment supervision at [address] for standing level,  Bath sponge for L LE   CARE Score 4   Upper Body Dressing   Assistance Needed Independent   CARE Score 6   Lower Body Dressing

## 2021-05-11 NOTE — PROGRESS NOTES
--   --   --    Prognosis  --   --   --    Activity Tolerance   Activity Tolerance  --   --   --       05/11/21 1414 05/11/21 1429   Subjective   Subjective  --   --    Pain Screening   Patient Currently in Pain  --   --    Pain Assessment   Pain Assessment  --   --    Pain Level  --  8   Patient's Stated Pain Goal  --   --    Pain Type  --   --    Pain Location  --   --    Pain Orientation  --   --    Pain Descriptors  --   --    Pain Frequency  --   --    Pain Onset  --   --    Clinical Progression  --   --    Functional Pain Assessment  --   --    Response to Pain Intervention  --   --    Multiple Pain Sites  --   --    Pre Treatment Pain Screening   Pain at present  --   --    Scale Used  --   --    Intervention List  --   --    Transfers   Sit to Stand  --   --    Stand to sit  --   --    Bed to Chair  --   --    Car Transfer  --   --    Ambulation   Ambulation?  --   --    WB Status  --   --    Ambulation 1   Surface  --   --    Device  --   --    Other Apparatus  --   --    Assistance  --   --    Quality of Gait  --   --    Distance  --   --    Exercises   Comments Practiced Car TF using leg . --    Conditions Requiring Skilled Therapeutic Intervention   Body structures, Functions, Activity limitations Decreased functional mobility ; Decreased ADL status; Decreased strength;Decreased endurance;Decreased balance;Decreased coordination;Decreased posture  --    Assessment Pt continues to have an increase in pain in LLE when standing or amb. Pt was only able to amb a few feet, SEE AMB NOTE, stopped early due to increase in pain. Pt's amb distance has been steadily decreasing due to an increase in pain. Pt improved w/ Car TF only needing minor cues and Leg  getting LLE in and out of car.   --    Prognosis Good  --    Activity Tolerance   Activity Tolerance Patient limited by pain  --    Electronically signed by Bebeto Jung PTA on 5/11/2021 at 2:34 PM

## 2021-05-11 NOTE — PLAN OF CARE
Problem: Falls - Risk of:  Goal: Will remain free from falls  Description: Will remain free from falls  5/10/2021 2319 by Chau Quiles RN  Outcome: Ongoing  5/10/2021 1519 by Gayathri Prescott RN  Outcome: Ongoing  Goal: Absence of physical injury  Description: Absence of physical injury  5/10/2021 2319 by Chau Quiles RN  Outcome: Ongoing  5/10/2021 1519 by Gayathri Prescott RN  Outcome: Ongoing     Problem: Pain:  Goal: Pain level will decrease  Description: Pain level will decrease  5/10/2021 2319 by Chau Quiles RN  Outcome: Ongoing  5/10/2021 1519 by Gayathri Prescott RN  Outcome: Ongoing  Goal: Control of acute pain  Description: Control of acute pain  5/10/2021 2319 by Chau Quiles RN  Outcome: Ongoing  5/10/2021 1519 by Gayathri Prescott RN  Outcome: Ongoing  Goal: Control of chronic pain  Description: Control of chronic pain  5/10/2021 2319 by Chau Quiles RN  Outcome: Ongoing  5/10/2021 1519 by Gayathri Prescott RN  Outcome: Ongoing     Problem: Skin Integrity:  Goal: Will show no infection signs and symptoms  Description: Will show no infection signs and symptoms  5/10/2021 2319 by Chau Quiles RN  Outcome: Ongoing  5/10/2021 1519 by Gayathri Prescott RN  Outcome: Ongoing  Goal: Absence of new skin breakdown  Description: Absence of new skin breakdown  5/10/2021 2319 by Chau Quiles RN  Outcome: Ongoing  5/10/2021 1519 by Gayathri Prescott RN  Outcome: Ongoing

## 2021-05-11 NOTE — PROGRESS NOTES
Glo Dee  [id number]     05/11/21 1146 05/11/21 1147   Subjective   Subjective Pt agreed to therapy this morning.  --    Pain Screening   Patient Currently in Pain No  --    Bed Mobility   Rolling Independent  --    Supine to Sit Independent  --    Sit to Supine Independent  --    Transfers   Sit to Stand Independent  --    Stand to sit Independent  --    Bed to Chair Independent  --    Ambulation   Ambulation? No  --    Stairs/Curb   Stairs? No  --    Wheelchair Activities   Propulsion Yes  --    Propulsion 1   Propulsion Manual  --    Level Level Tile  --    Method RUE;LUE  --    Level of Assistance Independent  --    Description/ Details Pt propelled W/C well. --    Distance 250'  --    Exercises   Comments  --  Supine Pawel LE ther ex Hip Protocol on mat table x 20 reps. Conditions Requiring Skilled Therapeutic Intervention   Body structures, Functions, Activity limitations  --  Decreased functional mobility ; Decreased ADL status; Decreased strength;Decreased endurance;Decreased balance;Decreased coordination;Decreased posture   Assessment  --  Pt is doing well and Independent w/ bed mobility and Tf's. Pt is ready for UP AD LAMONTE W/C level. Pt tolerated Supine ther ex Hip protocol well w/ minimal increase in fatigue or pain.    Prognosis  --  Good   Activity Tolerance   Activity Tolerance  --  Patient Tolerated treatment well   Electronically signed by Thiago Ruiz PTA on 5/11/2021 at 11:49 AM

## 2021-05-12 ENCOUNTER — APPOINTMENT (OUTPATIENT)
Dept: GENERAL RADIOLOGY | Age: 66
DRG: 560 | End: 2021-05-12
Attending: PSYCHIATRY & NEUROLOGY
Payer: COMMERCIAL

## 2021-05-12 LAB
GLUCOSE BLD-MCNC: 153 MG/DL (ref 70–99)
GLUCOSE BLD-MCNC: 162 MG/DL (ref 70–99)
GLUCOSE BLD-MCNC: 170 MG/DL (ref 70–99)
GLUCOSE BLD-MCNC: 297 MG/DL (ref 70–99)
PERFORMED ON: ABNORMAL

## 2021-05-12 PROCEDURE — 73502 X-RAY EXAM HIP UNI 2-3 VIEWS: CPT

## 2021-05-12 PROCEDURE — 97530 THERAPEUTIC ACTIVITIES: CPT

## 2021-05-12 PROCEDURE — 97116 GAIT TRAINING THERAPY: CPT

## 2021-05-12 PROCEDURE — 99233 SBSQ HOSP IP/OBS HIGH 50: CPT | Performed by: PSYCHIATRY & NEUROLOGY

## 2021-05-12 PROCEDURE — 97535 SELF CARE MNGMENT TRAINING: CPT

## 2021-05-12 PROCEDURE — 1180000000 HC REHAB R&B

## 2021-05-12 PROCEDURE — 97110 THERAPEUTIC EXERCISES: CPT

## 2021-05-12 PROCEDURE — 6370000000 HC RX 637 (ALT 250 FOR IP): Performed by: PSYCHIATRY & NEUROLOGY

## 2021-05-12 PROCEDURE — 82947 ASSAY GLUCOSE BLOOD QUANT: CPT

## 2021-05-12 RX ORDER — HYDROCODONE BITARTRATE AND ACETAMINOPHEN 7.5; 325 MG/1; MG/1
1 TABLET ORAL EVERY 6 HOURS PRN
Status: DISCONTINUED | OUTPATIENT
Start: 2021-05-12 | End: 2021-05-14 | Stop reason: HOSPADM

## 2021-05-12 RX ORDER — METHOCARBAMOL 750 MG/1
750 TABLET, FILM COATED ORAL 3 TIMES DAILY
Status: DISCONTINUED | OUTPATIENT
Start: 2021-05-12 | End: 2021-05-14 | Stop reason: HOSPADM

## 2021-05-12 RX ORDER — CYCLOBENZAPRINE HCL 10 MG
5 TABLET ORAL 3 TIMES DAILY PRN
Status: DISCONTINUED | OUTPATIENT
Start: 2021-05-12 | End: 2021-05-12

## 2021-05-12 RX ADMIN — MINOXIDIL 2.5 MG: 2.5 TABLET ORAL at 08:43

## 2021-05-12 RX ADMIN — METOLAZONE 5 MG: 5 TABLET ORAL at 08:43

## 2021-05-12 RX ADMIN — CALCITRIOL CAPSULES 0.25 MCG 0.25 MCG: 0.25 CAPSULE ORAL at 08:43

## 2021-05-12 RX ADMIN — CARVEDILOL 12.5 MG: 12.5 TABLET, FILM COATED ORAL at 22:28

## 2021-05-12 RX ADMIN — ISOSORBIDE MONONITRATE 60 MG: 60 TABLET, EXTENDED RELEASE ORAL at 21:16

## 2021-05-12 RX ADMIN — APIXABAN 5 MG: 5 TABLET, FILM COATED ORAL at 08:43

## 2021-05-12 RX ADMIN — METHOCARBAMOL 750 MG: 750 TABLET ORAL at 09:00

## 2021-05-12 RX ADMIN — METHOCARBAMOL 750 MG: 750 TABLET ORAL at 13:45

## 2021-05-12 RX ADMIN — ASPIRIN 81 MG: 81 TABLET, CHEWABLE ORAL at 08:43

## 2021-05-12 RX ADMIN — Medication 40 UNITS: at 21:16

## 2021-05-12 RX ADMIN — OXYCODONE HYDROCHLORIDE AND ACETAMINOPHEN 500 MG: 500 TABLET ORAL at 08:43

## 2021-05-12 RX ADMIN — CETIRIZINE HYDROCHLORIDE 10 MG: 10 TABLET, FILM COATED ORAL at 08:43

## 2021-05-12 RX ADMIN — DOCUSATE SODIUM 100 MG: 100 CAPSULE, LIQUID FILLED ORAL at 08:43

## 2021-05-12 RX ADMIN — APIXABAN 5 MG: 5 TABLET, FILM COATED ORAL at 21:42

## 2021-05-12 RX ADMIN — CLOPIDOGREL BISULFATE 75 MG: 75 TABLET ORAL at 08:44

## 2021-05-12 RX ADMIN — METHOCARBAMOL 750 MG: 750 TABLET ORAL at 21:16

## 2021-05-12 RX ADMIN — ISOSORBIDE MONONITRATE 60 MG: 60 TABLET, EXTENDED RELEASE ORAL at 08:43

## 2021-05-12 RX ADMIN — PANTOPRAZOLE SODIUM 40 MG: 40 TABLET, DELAYED RELEASE ORAL at 06:02

## 2021-05-12 RX ADMIN — PANTOPRAZOLE SODIUM 40 MG: 40 TABLET, DELAYED RELEASE ORAL at 17:26

## 2021-05-12 RX ADMIN — POTASSIUM CHLORIDE 20 MEQ: 1500 TABLET, EXTENDED RELEASE ORAL at 08:43

## 2021-05-12 RX ADMIN — CARVEDILOL 12.5 MG: 12.5 TABLET, FILM COATED ORAL at 08:43

## 2021-05-12 RX ADMIN — LINACLOTIDE 290 MCG: 145 CAPSULE, GELATIN COATED ORAL at 06:02

## 2021-05-12 RX ADMIN — ROSUVASTATIN CALCIUM 20 MG: 20 TABLET, FILM COATED ORAL at 08:43

## 2021-05-12 RX ADMIN — MUPIROCIN: 20 OINTMENT TOPICAL at 04:18

## 2021-05-12 RX ADMIN — AMIODARONE HYDROCHLORIDE 200 MG: 200 TABLET ORAL at 08:43

## 2021-05-12 RX ADMIN — OXYCODONE HYDROCHLORIDE AND ACETAMINOPHEN 1 TABLET: 10; 325 TABLET ORAL at 04:17

## 2021-05-12 RX ADMIN — MUPIROCIN: 20 OINTMENT TOPICAL at 08:45

## 2021-05-12 RX ADMIN — DOCUSATE SODIUM 50 MG AND SENNOSIDES 8.6 MG 1 TABLET: 8.6; 5 TABLET, FILM COATED ORAL at 08:43

## 2021-05-12 RX ADMIN — ALLOPURINOL 200 MG: 100 TABLET ORAL at 08:43

## 2021-05-12 RX ADMIN — MONTELUKAST SODIUM 10 MG: 10 TABLET, FILM COATED ORAL at 08:43

## 2021-05-12 RX ADMIN — DOCUSATE SODIUM 50 MG AND SENNOSIDES 8.6 MG 1 TABLET: 8.6; 5 TABLET, FILM COATED ORAL at 21:16

## 2021-05-12 RX ADMIN — DOCUSATE SODIUM 100 MG: 100 CAPSULE, LIQUID FILLED ORAL at 21:16

## 2021-05-12 RX ADMIN — MINOXIDIL 2.5 MG: 2.5 TABLET ORAL at 21:16

## 2021-05-12 ASSESSMENT — PAIN SCALES - GENERAL
PAINLEVEL_OUTOF10: 0
PAINLEVEL_OUTOF10: 5

## 2021-05-12 ASSESSMENT — PAIN DESCRIPTION - PROGRESSION
CLINICAL_PROGRESSION: GRADUALLY IMPROVING
CLINICAL_PROGRESSION: GRADUALLY IMPROVING

## 2021-05-12 ASSESSMENT — PAIN DESCRIPTION - ORIENTATION
ORIENTATION: LEFT
ORIENTATION: LEFT

## 2021-05-12 ASSESSMENT — PAIN DESCRIPTION - FREQUENCY: FREQUENCY: INTERMITTENT

## 2021-05-12 ASSESSMENT — PAIN DESCRIPTION - PAIN TYPE: TYPE: ACUTE PAIN

## 2021-05-12 NOTE — PROGRESS NOTES
Occupational Therapy  Facility/Department: [facility] 8 REHAB UNIT  Daily Treatment Note  NAME: Mark Apodaca  : 1955  MRN: [id number]    Date of Service: 2021    Discharge Recommendations:  Home with Home health OT  OT Equipment Recommendations  Other: ORDERED DME --FAXED TO [facility]    Assessment   Performance deficits / Impairments: Decreased functional mobility ; Decreased endurance;Decreased high-level IADLs  Safety Devices  Safety Devices in place: Not Applicable  Type of devices: Call light within reach; Left in chair         Patient Diagnosis(es): There were no encounter diagnoses.       has a past medical history of Acute kidney failure, unspecified ([address] Anxiety state, unspecified, Atrial septal aneurysm, Blood circulation, collateral, Body mass index 30.0-30.9, adult, CAD (coronary artery disease), Calculus of kidney, Carotid artery stenosis, Cellulitis and abscess of hand, except fingers and thumb, Cerebral artery occlusion with cerebral infarction ([address] Chronic airway obstruction, not elsewhere classified, Chronic kidney disease, unspecified, Congestive heart failure, unspecified, Diabetes mellitus type II, Diverticulosis of colon (without mention of hemorrhage), Encounter for long-term (current) use of insulin ([address] Esophageal reflux, Family history of ischemic heart disease, Gastric ulcer, unspecified as acute or chronic, without mention of hemorrhage, perforation, or obstruction, Hyperlipemia, Hypertension, Internal hemorrhoids without mention of complication, 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 has a past surgical history that includes Cholecystectomy; vascular surgery (03- [facility]); vascular surgery (6/14/13 [facility]); vascular surgery (7/6/15 [facility]); Ureter stent placement; Coronary angioplasty with stent (1-16); Cardiac catheterization; and Diagnostic Cardiac Cath Lab Procedure. Restrictions  Restrictions/Precautions  Restrictions/Precautions: Weight Bearing, Fall Risk  Required Braces or Orthoses?: No  Lower Extremity Weight Bearing Restrictions  Right Lower Extremity Weight Bearing: Weight Bearing As Tolerated  Left Lower Extremity Weight Bearing:  Foot Flat Weight Bearing  Position Activity Restriction  Other position/activity restrictions: Left 1st metatarsal head foot ulcer---currently seeing wound care  Subjective   General  Patient assessed for rehabilitation services?: Yes  Family / Caregiver Present: No    Pain Assessment  Pain Assessment: 0-10  Pain Level: 0  Pre Treatment Pain Screening  Pain at present: 0  Scale Used: Numeric Score  Vital Signs  Patient Currently in Pain: No   Objective    Transfers  Stand Pivot Transfers: Modified independent  Sit to stand: Modified independent  Stand to sit: Modified independent     Type of ROM/Therapeutic Exercise  Type of ROM/Therapeutic Exercise: Cane/Wand(2# DOWEL ALL PLANES)          Plan   Plan  Specific instructions for Next Treatment: AE training  Current Treatment Recommendations: Wheelchair Mobility Training, Endurance Training, Home Management Training, Equipment Evaluation, Education, & procurement, Functional Mobility Training, Patient/Caregiver Education & Training    Goals  Short term goals  Time Frame for Short term goals: 1 week  Short term goal 1: MET  Short term goal 2: MET  Short term goal 3: MET  Short term goal 4: MET  Short term goal 5: MET  Long term goals  Time Frame for Long term goals : 2 weeks  Long term goal 1: MET  Long term goal 2: MET  Long term goal 3: MET  Long term goal 4: complete simple home making task using recommended mobility with modified independence  Long term goal 5: complete HEP with independence  Patient Goals   Patient goals : return home       Therapy Time   Individual Concurrent Group Co-treatment   Time In 1100         Time Out 1200         Minutes 60         Timed Code Treatment Minutes: 60 Minutes     Electronically signed by Eloise Harper OT on 5/12/2021 at 4:07 PM

## 2021-05-12 NOTE — PROGRESS NOTES
Nyla Izaguirre  [id number]     05/12/21 1025 05/12/21 1026 05/12/21 1027   Subjective   Subjective Pt reports that he is feeling some better this morning and agreed to therapy. --   --    Pain Screening   Patient Currently in Pain No  --   --    Transfers   Sit to Stand  --  Independent  --    Stand to sit  --  Independent  --    Ambulation   Ambulation?  --   --  Yes   WB Status  --   --  foot flat weight bearing   Ambulation 1   Surface  --   --  level tile   Device  --   --  Rolling Walker   Other Apparatus  --   --  Wheelchair follow   Assistance  --   --  Contact guard assistance   Quality of Gait  --   --  Pt was able to take a few steps this morning. Pt showed decreased L hip and knee flexion during swing phase. Pt circumducts it when advancing. Distance  --   --  10'x2   Wheelchair Activities   Propulsion  --   --  Yes   Propulsion 1   Propulsion  --   --  Manual   Level  --   --  Level Tile   Method  --   --  RUE;LUE   Level of Assistance  --   --  Independent   Description/ Details  --   --  Pt propelled W/C well.    Distance  --   --  250'   Exercises   Comments  --   --   --    Conditions Requiring Skilled Therapeutic Intervention   Body structures, Functions, Activity limitations  --   --   --    Assessment  --   --   --    Prognosis  --   --   --    Activity Tolerance   Activity Tolerance  --   --   --       05/12/21 1029 05/12/21 1030   Subjective   Subjective  --   --    Pain Screening   Patient Currently in Pain  --   --    Transfers   Sit to Stand  --   --    Stand to sit  --   --    Ambulation   Ambulation?  --   --    WB Status  --   --    Ambulation 1   Surface  --   --    Device  --   --    Other Apparatus  --   --    Assistance  --   --    Quality of Gait  --   --    Distance  --   --    Wheelchair Activities   Propulsion  --   --    Propulsion 1   Propulsion  --   --    Level  --   --    Method  --   --    Level of Assistance  --   --    Description/ Details  --   --    Distance  --   -- Exercises   Comments Sitting Pawel LE ther ex x 20 reps. --    Conditions Requiring Skilled Therapeutic Intervention   Body structures, Functions, Activity limitations  --  Decreased functional mobility ; Decreased ADL status; Decreased strength;Decreased endurance;Decreased balance;Decreased coordination;Decreased posture   Assessment  --  Pt was feeling better this morning and able to amb. Pt struggled w/ advancing LLE, SEE AMB note. Pt tolerated amb w/ an increase in fatigue and slight increase in L knee pain. Pt tolerated sitting ther ex well.    Prognosis  --  Good   Activity Tolerance   Activity Tolerance  --  Patient Tolerated treatment well   Electronically signed by Silvana Okeefe PTA on 5/12/2021 at 10:58 AM

## 2021-05-12 NOTE — PROGRESS NOTES
Durable Medical Equipment   Physician Order     Patient Name Faizan Fernández  Patient Phone:   [phone] (Mobile) *Preferred*    Patient Address:  [address] [address]    Patient Height Height: 5' 11\" (180.3 cm)  Patient Weight 237 lb 6.4 oz (107.7 kg)   1955           DME NEEDED:        **STANDARD WIDTH, STANDARD HEIGHT WHEELCHAIR WITH FULL LENGTH ARMRESTS, SWING AWAY FOOTRESTS, AND ANTI TIPPPERS    **STANDARD WHEELCHAIR CUSHION    **BEDSIDE COMMODE    **ROLLING WALKER                Coverage Information (for Hospital Account [de-identified])    F/O Payor/Plan Precert #   BCBS/[place]BS - [place] PPO    Subscriber Subscriber #   Cassandra Davies [id number]   Address Phone   [address]   [address] [phone]             Medical Problems  Comment     Last edited by  on [facility] Problem List  Date Reviewed: 2020     ICD-10-CM Priority Class Noted POA   Closed left hip fracture, initial encounter [facility]) S72.002A   2021 Yes      Non-Hospital Problem List  Date Reviewed: 2020     ICD-10-CM Priority Class Noted   CAD (coronary artery disease) I25.10 High  1/10/2016   Overview Addendum 2020  2:58 PM by Rob Katz MD   16  lexiscan  Positive for apical MI + myocardial ischemia, EF 42%, intermediate risk findings, AUC indication 16, AUC score 7  1/10/16  Cath  3 lesions in the LAD:  Mid: 70% 2.25x23, mid 90% 2.25 x 28, distal 100% 2.0x28, anterior lateral apical hypokinesis  20  lexiscan Positive for apical MI with minimal  myocardial ischemia, EF 36%, -1% ischemic myocardium on stress, AUC indication 21, AUC score 7   2020  Echo  EF 45%  20  Cath  100% distal LAD at the stent, o/w luminals, slight apical and mild diaphragmatic hypokinesis, EF 45%   Abnormal nuclear cardiac imaging test R93.1 High  Unknown   DM (diabetes mellitus) ([facility] E11.9   2013   GERD (gastroesophageal reflux disease) K21.9   2013   Aneurysm of thoracic aorta (HCC) I71.2 2/21/2013   Overview Signed 9/7/2015  4:38 AM by Tiny Chi   replace inactive diagnosis   Renal artery stenosis ([facility] I70.1   3/8/2013   Carotid artery stenosis I65.29   6/26/2013   Peripheral vascular disease ([facility] I73.9   Unknown   Diabetes mellitus type I ([facility] E10.9   1/7/2016   Acute respiratory failure with hypoxia (HCC) J96.01   Unknown   Malignant hypertension I10   Unknown   Atrial septal aneurysm I25.3   1/9/2016   Abnormal EKG R94.31   Unknown   Overview Signed 1/9/2016  3:06 PM by Courtney Newell MD   1/9/16  lexiscan  Positive for apical MI + myocardial ischemia, EF 42%, intermediate risk findings, AUC indication 16, AUC score 7      Apical myocardial infarction ([facility] I21.29   Unknown   Acute on chronic diastolic CHF (congestive heart failure) (HCC) (Chronic) I50.33   1/11/2016   Chronic congestive heart failure (HCC) I50.9   Unknown   PAF (paroxysmal atrial fibrillation) ([facility] I48.0   2/12/2020   Overview Signed 2/12/2020  2:31 PM by Courtney Newell MD   2/12/20  Newly discovered AF   Stage 3 chronic kidney disease N18.30   Unknown   Mixed hyperlipidemia E78.2   7/9/2020   Left ventricular dysfunction I51.9   7/9/2020   Overview Signed 7/9/2020  5:13 PM by DWAYNE Hernandez   EF 45%   Somnolence, daytime R40.0   7/9/2020   Edema R60.9   7/9/2020   At risk for obstructive sleep apnea Z91.89   8/18/2020   Chronic combined systolic and diastolic congestive heart failure ([facility] [facility] I50.42   8/24/2020   On amiodarone therapy Z79.899   2/28/7974   Systolic and diastolic CHF, acute on chronic ([facility] I50.43   10/18/2020   Obesity (BMI 30-39. 9) E66.9   10/18/2020   NSTEMI (non-ST elevated myocardial infarction) ([facility] I21.4   12/18/2020   Pneumonia due to COVID-19 virus U07.1, J12.82   12/19/2020   Palliative care patient Z51.5   12/21/2020   Acute renal failure (ARF) ([facility] N17.9   1/3/2021   Severe malnutrition ([facility] E43   1/4/2021              [facility]   History and Physical           Patient: Barron Reynoso  MR#:                                        [id number]  Account Number:                   [id number]              Room:                                      [address] [place] [address]      YOB: 1955  Date of Progress Note:           5/3/2021  Time of Note                           6:59 AM  Attending Physician:               Alvina Canas MD           Admitting diagnosis: Left femur fracture status post cephalomedullary nailing with flatfoot weightbearing     Secondary diagnoses: History of atrial fibrillation on chronic anticoagulation, coronary artery disease, chronic kidney disease stage III/IV, diabetes, hypertension, history of renal stent     CHIEF COMPLAINT: Left hip pain        HISTORY OF PRESENT ILLNESS:   This 60 y.o. male   admitted to [facility] on 4/28/2021 after sustaining a mechanical graound level fall earlier in the day. He landed on his L hip and had sudden onset of L hip pain w/inability to bear weight. Imaging revealed a L intertrochanteric femur fx. Dr Cindy Beyer w/ortho was consulted. Pt reports prior hx of L knee pain from a meniscus tear. He has a diabetic ulcer to his L first metatarsal head and is currenty in wound care. On 4/28 he was taken to OR to undergo a L trochanteric nailing of his L hip fx. He will be foot flat weightbearing to the E. DVT prophylaxis plan is to resume home dose of Plavix an Eliquis. Cardiology & renal were consulted. He is now medically stable and is participating w/therapy.  He is ready to begin rehab w/plan of returning home after rehab dc w/support from his wife.      ____________________ _____________________ ________________   Physician Signature      Physician Name (print)   Physician NPI          Date

## 2021-05-12 NOTE — PROGRESS NOTES
Patient:   Steve Franklin  MR#:    [id number]   Room:    9231/352-51   YOB: 1955  Date of Progress Note: 5/12/2021  Time of Note                           8:08 AM  Consulting Physician:   Lee Teague M.D. Attending Physician:  Lee Teague MD     CHIEF COMPLAINT: Left hip pain     Subjective  This 60 y.o. male   admitted to [facility] on 4/28/2021 after sustaining a mechanical graound level fall earlier in the day. He landed on his L hip and had sudden onset of L hip pain w/inability to bear weight. Imaging revealed a L intertrochanteric femur fx. Dr Leah Ann w/ortho was consulted. Pt reports prior hx of L knee pain from a meniscus tear. He has a diabetic ulcer to his L first metatarsal head and is currenty in wound care. On 4/28 he was taken to OR to undergo a L trochanteric nailing of his L hip fx. He is foot flat weightbearing to the E. Had muscle spasms in leg yesterday. Had not been taking any pain meds the past few days. REVIEW OF SYSTEMS:  Constitutional: No fevers No chills  Neck:No stiffness  Respiratory: No shortness of breath  Cardiovascular: No chest pain No palpitations  Gastrointestinal: No abdominal pain    Genitourinary: No Dysuria  Neurological: No headache, no confusion      PHYSICAL EXAM:  /69   Pulse 54   Temp 97.1 °F (36.2 °C) (Temporal)   Resp 16   Ht 5' 11\" (1.803 m)   Wt 237 lb 6.4 oz (107.7 kg)   SpO2 90%   BMI 33.11 kg/m²     Constitutional: he appears well-developed and well-nourished. Eyes - conjunctiva normal.  Pupils react to light  Ear, nose, throat -hearing intact to voice.  No scars, masses, or lesions over external nose or ears, no atrophy of tongue  Neck-symmetric, no masses noted, no jugular vein distension  Respiration- chest wall appears symmetric, good expansion,   normal effort without use of accessory muscles  Cardiovascular- RRR  Musculoskeletal - no significant wasting of muscles noted, no bony deformities, gait no gross ataxia  Extremities-no clubbing, cyanosis or edema  Skin - warm, dry, and intact. No rash, erythema, or pallor. Psychiatric - mood, affect, and behavior appear normal.      Neurology  NEUROLOGICAL EXAM:      Mental status   Awake, alert, fluent oriented x 3 appropriate affect  Attention and concentration appear appropriate  Recent and remote memory appears unremarkable  Speech normal without dysarthria  No clear issues with language       Cranial Nerves   CN II- Visual fields grossly unremarkable  CN III, IV,VI-EOMI, No nystagmus, conjugate eye movements, no ptosis  CN VII-no facial asymmetry  CN VIII-Hearing intact      Motor function  Antigravity x 4     Sensory function Intact to light touch     Cerebellar F-N intact     Tremor None present     Gait                  Not tested           Nursing/pcp notes, imaging,labs and vitals reviewed. PT,OT and/or speech notes reviewed    Lab Results   Component Value Date    WBC 8.3 05/10/2021    HGB 9.1 (L) 05/10/2021    HCT 28.9 (L) 05/10/2021    MCV 99.7 (H) 05/10/2021     05/10/2021     Lab Results   Component Value Date     (L) 05/10/2021    K 3.2 (L) 05/10/2021    CL 91 (L) 05/10/2021    CO2 36 (H) 05/10/2021    BUN 34 (H) 05/10/2021    CREATININE 1.9 (H) 05/10/2021    GLUCOSE 127 (H) 05/10/2021    CALCIUM 8.6 (L) 05/10/2021    PROT 6.2 (L) 01/06/2021    LABALBU 3.3 (L) 01/06/2021    BILITOT 0.7 01/06/2021    ALKPHOS 84 01/06/2021    AST 22 01/06/2021    ALT 28 01/06/2021    LABGLOM 36 (A) 05/10/2021    GFRAA 43 (L) 05/10/2021    GLOB 3.5 01/07/2016     Lab Results   Component Value Date    INR 1.53 (H) 01/03/2021    INR 1.45 (H) 12/21/2020    INR 1.31 (H) 12/20/2020     Lab Results   Component Value Date    CRP 0.53 (H) 12/23/2020         PHYSICAL THERAPY  STRENGTH  Strength RLE  Comment: Grossly antigravity. Strength LLE  Comment: Grossly antigravity.   ROM  AROM RLE (degrees)  RLE AROM: WFL  AROM LLE (degrees)  LLE AROM : WFL  BED MOBILITY  Bed Mobility  Rolling: Independent  Supine to Sit: Independent  Sit to Supine: Independent  Scooting: Stand by assistance  Comment: Log roll going sit to supine and triplanar method supine to sit. TRANSFERS  Transfers  Sit to Stand: Independent  Stand to sit: Independent  Bed to Chair: Independent(Recliner to W/C.)  Car Transfer: Stand by assistance  Comment: Goes sit to stand slowly. Requires cuing to keep LLE in a position of only foot flat weight bearing. WHEELCHAIR PROPULSION  Propulsion 1  Propulsion: Manual  Level: Level Tile  Method: FERNANDA TEJEDA  Level of Assistance: Independent  Description/ Details: Pt propelled W/C well. Distance: 250'  AMBULATION  Ambulation 1  Surface: level tile  Device: Rolling Walker  Other Apparatus: Wheelchair follow  Assistance: Contact guard assistance  Quality of Gait: Pt attempted amb this afternoon. Pt tried to scoot LLE forward when advancing it needing cues to flex it. Pt was only able to take a few steps before the pain in L hip became too much and Pt had to sit. Pt unable to amb any more due to increase in LLE pain. Gait Deviations: (Cuing for decreased RLE swing.)  Distance: 5'  Comments: VCS for RLE and walker placement. STAIRS  GOALS:  Short term goals  Time Frame for Short term goals: 1 week  Short term goal 1: SBA with all bed mobility. Short term goal 2: SBA with transfers. Short term goal 3: CGA with car transfer. Short term goal 4: Indep with w/c propulsion x50' and two turns. Short term goal 5: CGA with gait x50' and two turns.      Long term goals  Time Frame for Long term goals : 2 weeks  Long term goal 1: Indep with bed mobility. Long term goal 2: Indep with transfers. Long term goal 3: Indep with car transfer. Long term goal 4: Indep with gait x50' and two turns. Long term goal 5: Indep with one stair.      ASSESSMENT:  Assessment: Pt continues to have an increase in pain in LLE when standing or amb.   Pt was only able to amb a few feet, SEE AMB NOTE, stopped early due to increase in pain. Pt's amb distance has been steadily decreasing due to an increase in pain. Pt improved w/ Car TF only needing minor cues and Leg  getting LLE in and out of car.     SPEECH THERAPY        OCCUPATIONAL THERAPY     CURRENT IRF-SRINIVAS SCORES  Eating: CARE Score: 6     Oral Hygiene: CARE Score: 6         Toileting: CARE Score: 4  Supervision      Shower/Bathe: CARE Score: 4  Comment: supervision at GB for standing level, LH Bath sponge for L LE        Upper Body Dressing: CARE Score: 6          Lower Body Dressing: CARE Score: 4    Supervision for standing level     Footwear: CARE Score: 4     Toilet Transfers: CARE Score: 4  W/c to toilet w/ GB     Picking Up Object:  CARE Score: 88           UE Functioning:  WFLs though states he feels like his left side is weaker     Pain Assessment:  Pain Level: 0  Pain Location: Hip     STGs:  Short term goals  Time Frame for Short term goals: 1 week  Short term goal 1: MET  Short term goal 2: MET  Short term goal 3: MET  Short term goal 4: MET  Short term goal 5: MET     LTGs:  Long term goals  Time Frame for Long term goals : 2 weeks  Long term goal 1: complete overall toileting with modified independence  Long term goal 2: complete overall bathing with modified independence  Long term goal 3: complete overall dressing with modified independence  Long term goal 4: complete simple home making task using recommended mobility with modified independence  Long term goal 5: complete HEP with independence     Assessment:  Performance deficits / Impairments: Decreased functional mobility , Decreased endurance, Decreased ADL status, Decreased high-level IADLs, Decreased strength, Decreased safe awareness, Decreased balance                        NUTRITION  Current Wt: Weight: 237 lb 6.4 oz (107.7 kg) / Body mass index is 33.11 kg/m².   Admission Wt: Admission Body Weight: 226 lb (102.5 kg)  Oral Diet Orders: Carb Control     Pt remains adequately nourished at this time AEB adequate po intakes and wt gain X 1 week. Accuchecks running 132-274; last A1C 12/2020: 6.7%. Pt reports following a carb consistent diet at home with his wife who follows weigh watchers. Pt aware and able to accurately describe foods containing carbs and what foods affect his blood sugar levels. Continue current POC. Please see nutrition note for details.              RECORD REVIEW: Previous medical records, medications were reviewed at today's visit    IMPRESSION:   1. Left hip fracture status post nailing-flatfoot weightbearing-pain control/PT/OT  2. Coronary artery disease-continue Imdur and Plavix  3. Paroxysmal atrial fibrillation/DVT prophylaxis-Eliquis, amiodarone  4. Diabetes-continue insulin and monitoring  5. Essential hypertension-continue current medications  6. Chronic kidney disease-continue current medications and monitoring. Stable  7. GI-bowel regimen. Linzess increased  8. DVT left leg. Edema improved. Already on Eliquis 5 mg twice a day as well as aspirin and Plavix. Given the fact that he needs to be on aspirin and Plavix I feel it is too risky to double the dose of Eliquis for 7 days. He will continue on current treatment. 9.  Hypokalemia-increased supplement    Hip xrays ordered, per ortho. Changed percocet to norco. D/t itching. Scheduled muscle relaxer. Ortho to see today.  Appreciate their opinion on meds, DVT ppx, etc.    Repeat labs in am  Staffing this date , mutlidisciplinary with entire team with complex decision making and planning for discharge  ELOS:5/14, friday

## 2021-05-12 NOTE — PROGRESS NOTES
Faizan Fernández  [id number]     05/12/21 1432 05/12/21 1433 05/12/21 1436   Subjective   Subjective Pt agreed to therapy this afternoon. --   --    Pain Screening   Patient Currently in Pain No  --   --    Transfers   Sit to Stand  --  Independent  --    Stand to sit  --  Independent  --    Bed to Chair  --  Independent  --    Ambulation   Ambulation?  --  Yes  --    WB Status  --  foot flat weight bearing  --    Ambulation 1   Surface  --  level tile  --    Device  --  Rolling Walker  --    Assistance  --  Contact guard assistance  --    Quality of Gait  --  Pt improved w/ amb this afternoon. Cued Pt to straighten posture and look up during amb which seemed to help LLE advancement and flexion. Pt was able to flex LLE during swing phase and improved w/ advacement. Pt tolerated increase in amb distance well. Pt to practice amb chair to chair next session again. --    Distance  --  20', 25'  --    Exercises   Comments  --   --  Sitting Pawel LE ther ex x 20 reps. Conditions Requiring Skilled Therapeutic Intervention   Body structures, Functions, Activity limitations  --   --   --    Assessment  --   --   --    Prognosis  --   --   --    Activity Tolerance   Activity Tolerance  --   --   --       05/12/21 1437   Subjective   Subjective  --    Pain Screening   Patient Currently in Pain  --    Transfers   Sit to Stand  --    Stand to sit  --    Bed to Chair  --    Ambulation   Ambulation?  --    WB Status  --    Ambulation 1   Surface  --    Device  --    Assistance  --    Quality of Gait  --    Distance  --    Exercises   Comments  --    Conditions Requiring Skilled Therapeutic Intervention   Body structures, Functions, Activity limitations Decreased functional mobility ; Decreased strength;Decreased endurance;Decreased ROM   Assessment Pt improved w/ amb this afternoon. Cued Pt to straighten posture and look up during amb which seemed to help LLE advancement and flexion.   Pt was able to flex LLE during swing phase and improved w/ advacement. Pt tolerated increase in amb distance well. Pt to practice amb chair to chair next session again. Pt tolerated sitting ther ex well.    Prognosis Good   Activity Tolerance   Activity Tolerance Patient Tolerated treatment well   Electronically signed by Traci Jefferson PTA on 5/12/2021 at 3:01 PM

## 2021-05-12 NOTE — PROGRESS NOTES
Occupational Therapy     05/12/21 0815   Pre Treatment Pain Screening   Intervention List Patient able to continue with treatment   Pain Assessment   Patient Currently in Pain Yes   Pain Assessment 0-10   Pain Level 3   Pain Type Acute pain   Pain Location Leg;Hip   Pain Orientation Left   Pain Descriptors Aching;Discomfort   Pain Frequency Intermittent   Clinical Progression Gradually improving   Response to Pain Intervention Patient Satisfied   Balance   Sitting Balance Independent   Standing Balance Modified independent   (At RW.)   Functional Mobility   Functional - Mobility Device Wheelchair   Activity To/from bathroom; Retrieve items;Transport items   Assist Level Modified independent    Transfers   Stand Pivot Transfers Modified independent   Sit to stand Modified independent   Stand to sit Modified independent   Long term goals   Long term goal 1 MET   Long term goal 2 MET   Long term goal 3 MET   Assessment   Performance deficits / Impairments Decreased functional mobility ; Decreased endurance;Decreased high-level IADLs   Treatment Diagnosis L femur fx with nailing   Prognosis Good   Timed Code Treatment Minutes 45 Minutes   Activity Tolerance   Activity Tolerance Patient Tolerated treatment well   Safety Devices   Safety Devices in place Yes   Type of devices Call light within reach; Chair alarm in place   Plan   Current Treatment Recommendations Wheelchair Mobility Training; Endurance Training;Home Management Training;Equipment Evaluation, Education, & procurement; Functional Mobility Training;Patient/Caregiver Education & Training      05/12/21 0815   Toileting Hygiene   Assistance Needed Independent   CARE Score 6   Shower/Bathe Self   Assistance Needed Independent   CARE Score 6   Lower Body Dressing   Assistance Needed Independent   Comment Utilized AE.    CARE Score 6   Putting On/Taking Off Footwear   Assistance Needed Independent   Comment Unable to don shoe on L foot d/t bandage and edema, able to don/doff  sock using AE.    CARE Score 6      05/12/21 0815   Toilet Transfer   Assistance Needed Independent   CARE Score 6

## 2021-05-13 LAB
ANION GAP SERPL CALCULATED.3IONS-SCNC: 8 MMOL/L (ref 7–19)
BASOPHILS ABSOLUTE: 0 K/UL (ref 0–0.2)
BASOPHILS RELATIVE PERCENT: 0.5 % (ref 0–1)
BUN BLDV-MCNC: 27 MG/DL (ref 8–23)
CALCIUM SERPL-MCNC: 8.8 MG/DL (ref 8.8–10.2)
CHLORIDE BLD-SCNC: 96 MMOL/L (ref 98–111)
CO2: 36 MMOL/L (ref 22–29)
CREAT SERPL-MCNC: 1.9 MG/DL (ref 0.5–1.2)
EOSINOPHILS ABSOLUTE: 0.3 K/UL (ref 0–0.6)
EOSINOPHILS RELATIVE PERCENT: 3.8 % (ref 0–5)
GFR AFRICAN AMERICAN: 43
GFR NON-AFRICAN AMERICAN: 36
GLUCOSE BLD-MCNC: 108 MG/DL (ref 70–99)
GLUCOSE BLD-MCNC: 109 MG/DL (ref 74–109)
GLUCOSE BLD-MCNC: 185 MG/DL (ref 70–99)
GLUCOSE BLD-MCNC: 217 MG/DL (ref 70–99)
GLUCOSE BLD-MCNC: 96 MG/DL (ref 70–99)
HCT VFR BLD CALC: 28.1 % (ref 42–52)
HEMOGLOBIN: 8.9 G/DL (ref 14–18)
IMMATURE GRANULOCYTES #: 0 K/UL
LYMPHOCYTES ABSOLUTE: 1.5 K/UL (ref 1.1–4.5)
LYMPHOCYTES RELATIVE PERCENT: 19 % (ref 20–40)
MAGNESIUM: 2.2 MG/DL (ref 1.6–2.4)
MCH RBC QN AUTO: 31.2 PG (ref 27–31)
MCHC RBC AUTO-ENTMCNC: 31.7 G/DL (ref 33–37)
MCV RBC AUTO: 98.6 FL (ref 80–94)
MONOCYTES ABSOLUTE: 0.7 K/UL (ref 0–0.9)
MONOCYTES RELATIVE PERCENT: 8.9 % (ref 0–10)
NEUTROPHILS ABSOLUTE: 5.3 K/UL (ref 1.5–7.5)
NEUTROPHILS RELATIVE PERCENT: 67.4 % (ref 50–65)
PDW BLD-RTO: 15.4 % (ref 11.5–14.5)
PERFORMED ON: ABNORMAL
PERFORMED ON: NORMAL
PLATELET # BLD: 304 K/UL (ref 130–400)
PMV BLD AUTO: 10.2 FL (ref 9.4–12.4)
POTASSIUM REFLEX MAGNESIUM: 3.4 MMOL/L (ref 3.5–5)
RBC # BLD: 2.85 M/UL (ref 4.7–6.1)
SODIUM BLD-SCNC: 140 MMOL/L (ref 136–145)
WBC # BLD: 7.9 K/UL (ref 4.8–10.8)

## 2021-05-13 PROCEDURE — 97116 GAIT TRAINING THERAPY: CPT

## 2021-05-13 PROCEDURE — 82947 ASSAY GLUCOSE BLOOD QUANT: CPT

## 2021-05-13 PROCEDURE — 36415 COLL VENOUS BLD VENIPUNCTURE: CPT

## 2021-05-13 PROCEDURE — 83735 ASSAY OF MAGNESIUM: CPT

## 2021-05-13 PROCEDURE — 97530 THERAPEUTIC ACTIVITIES: CPT

## 2021-05-13 PROCEDURE — 97110 THERAPEUTIC EXERCISES: CPT

## 2021-05-13 PROCEDURE — 1180000000 HC REHAB R&B

## 2021-05-13 PROCEDURE — 99232 SBSQ HOSP IP/OBS MODERATE 35: CPT | Performed by: PSYCHIATRY & NEUROLOGY

## 2021-05-13 PROCEDURE — 6370000000 HC RX 637 (ALT 250 FOR IP): Performed by: PSYCHIATRY & NEUROLOGY

## 2021-05-13 PROCEDURE — 85025 COMPLETE CBC W/AUTO DIFF WBC: CPT

## 2021-05-13 PROCEDURE — 80048 BASIC METABOLIC PNL TOTAL CA: CPT

## 2021-05-13 RX ADMIN — METHOCARBAMOL 750 MG: 750 TABLET ORAL at 21:09

## 2021-05-13 RX ADMIN — ROSUVASTATIN CALCIUM 20 MG: 20 TABLET, FILM COATED ORAL at 07:54

## 2021-05-13 RX ADMIN — MUPIROCIN: 20 OINTMENT TOPICAL at 07:59

## 2021-05-13 RX ADMIN — AMIODARONE HYDROCHLORIDE 200 MG: 200 TABLET ORAL at 07:54

## 2021-05-13 RX ADMIN — ISOSORBIDE MONONITRATE 60 MG: 60 TABLET, EXTENDED RELEASE ORAL at 21:10

## 2021-05-13 RX ADMIN — Medication 40 UNITS: at 21:10

## 2021-05-13 RX ADMIN — DOCUSATE SODIUM 50 MG AND SENNOSIDES 8.6 MG 1 TABLET: 8.6; 5 TABLET, FILM COATED ORAL at 07:53

## 2021-05-13 RX ADMIN — OXYCODONE HYDROCHLORIDE AND ACETAMINOPHEN 500 MG: 500 TABLET ORAL at 07:54

## 2021-05-13 RX ADMIN — ASPIRIN 81 MG: 81 TABLET, CHEWABLE ORAL at 07:54

## 2021-05-13 RX ADMIN — PANTOPRAZOLE SODIUM 40 MG: 40 TABLET, DELAYED RELEASE ORAL at 05:59

## 2021-05-13 RX ADMIN — LINACLOTIDE 290 MCG: 145 CAPSULE, GELATIN COATED ORAL at 06:12

## 2021-05-13 RX ADMIN — ISOSORBIDE MONONITRATE 60 MG: 60 TABLET, EXTENDED RELEASE ORAL at 07:54

## 2021-05-13 RX ADMIN — ALLOPURINOL 200 MG: 100 TABLET ORAL at 07:54

## 2021-05-13 RX ADMIN — CARVEDILOL 12.5 MG: 12.5 TABLET, FILM COATED ORAL at 21:10

## 2021-05-13 RX ADMIN — DOCUSATE SODIUM 50 MG AND SENNOSIDES 8.6 MG 1 TABLET: 8.6; 5 TABLET, FILM COATED ORAL at 21:10

## 2021-05-13 RX ADMIN — CETIRIZINE HYDROCHLORIDE 10 MG: 10 TABLET, FILM COATED ORAL at 07:54

## 2021-05-13 RX ADMIN — CARVEDILOL 12.5 MG: 12.5 TABLET, FILM COATED ORAL at 07:55

## 2021-05-13 RX ADMIN — APIXABAN 5 MG: 5 TABLET, FILM COATED ORAL at 07:55

## 2021-05-13 RX ADMIN — ACETAMINOPHEN 650 MG: 325 TABLET ORAL at 12:28

## 2021-05-13 RX ADMIN — CLOPIDOGREL BISULFATE 75 MG: 75 TABLET ORAL at 07:54

## 2021-05-13 RX ADMIN — APIXABAN 5 MG: 5 TABLET, FILM COATED ORAL at 21:09

## 2021-05-13 RX ADMIN — PANTOPRAZOLE SODIUM 40 MG: 40 TABLET, DELAYED RELEASE ORAL at 16:01

## 2021-05-13 RX ADMIN — POTASSIUM CHLORIDE 20 MEQ: 1500 TABLET, EXTENDED RELEASE ORAL at 07:54

## 2021-05-13 RX ADMIN — MINOXIDIL 2.5 MG: 2.5 TABLET ORAL at 07:55

## 2021-05-13 RX ADMIN — MONTELUKAST SODIUM 10 MG: 10 TABLET, FILM COATED ORAL at 07:55

## 2021-05-13 RX ADMIN — MUPIROCIN: 20 OINTMENT TOPICAL at 21:00

## 2021-05-13 RX ADMIN — METHOCARBAMOL 750 MG: 750 TABLET ORAL at 13:46

## 2021-05-13 RX ADMIN — MINOXIDIL 2.5 MG: 2.5 TABLET ORAL at 21:10

## 2021-05-13 RX ADMIN — METHOCARBAMOL 750 MG: 750 TABLET ORAL at 07:54

## 2021-05-13 ASSESSMENT — PAIN SCALES - GENERAL
PAINLEVEL_OUTOF10: 0
PAINLEVEL_OUTOF10: 0

## 2021-05-13 ASSESSMENT — PAIN DESCRIPTION - PAIN TYPE: TYPE: ACUTE PAIN

## 2021-05-13 ASSESSMENT — PAIN DESCRIPTION - DESCRIPTORS: DESCRIPTORS: ACHING;DISCOMFORT

## 2021-05-13 NOTE — PLAN OF CARE
Problem: Falls - Risk of:  Goal: Will remain free from falls  Description: Will remain free from falls  5/13/2021 1045 by Kishore Recinos LPN  Outcome: Ongoing  5/13/2021 0353 by Blanca Strong RN  Outcome: Ongoing  Goal: Absence of physical injury  Description: Absence of physical injury  5/13/2021 1045 by Kishore Recinos LPN  Outcome: Ongoing  5/13/2021 0353 by Blanca Strong RN  Outcome: Ongoing     Problem: Nutrition  Goal: Optimal nutrition therapy  5/13/2021 0353 by Blanca Strong RN  Outcome: Ongoing     Problem: Pain:  Goal: Pain level will decrease  Description: Pain level will decrease  5/13/2021 1045 by Kishore Recinos LPN  Outcome: Ongoing  5/13/2021 0353 by Blanca Strong RN  Outcome: Ongoing  Goal: Control of acute pain  Description: Control of acute pain  5/13/2021 1045 by Kishore Recinos LPN  Outcome: Ongoing  5/13/2021 0353 by Blanca Strong RN  Outcome: Ongoing  Goal: Control of chronic pain  Description: Control of chronic pain  5/13/2021 1045 by Kishore Recinos LPN  Outcome: Ongoing  5/13/2021 0353 by Blanca Strong RN  Outcome: Ongoing     Problem: Skin Integrity:  Goal: Will show no infection signs and symptoms  Description: Will show no infection signs and symptoms  5/13/2021 1045 by Kishore Recinos LPN  Outcome: Ongoing  5/13/2021 0353 by Blanca Strong RN  Outcome: Ongoing  Goal: Absence of new skin breakdown  Description: Absence of new skin breakdown  5/13/2021 1045 by Kishore Recinos LPN  Outcome: Ongoing  5/13/2021 0353 by Blanca Strong RN  Outcome: Ongoing     Problem: Bleeding:  Goal: Will show no signs and symptoms of excessive bleeding  Description: Will show no signs and symptoms of excessive bleeding  Outcome: Ongoing     Problem: Serum Glucose Level - Abnormal:  Goal: Ability to maintain appropriate glucose levels has stabilized  Description: Ability to maintain appropriate glucose levels has stabilized  Outcome: Ongoing     Problem: Infection - Surgical Site:  Goal: Will show no infection signs and symptoms  Description: Will show no infection signs and symptoms  5/13/2021 1045 by Elizabeth Kiser LPN  Outcome: Ongoing  5/13/2021 0353 by Radha Hickman RN  Outcome: Ongoing

## 2021-05-13 NOTE — PROGRESS NOTES
Occupational Therapy     05/13/21 0815   Pain Assessment   Patient Currently in Pain No   Pain Assessment 0-10   Pain Level 0   Balance   Sitting Balance Independent   Standing Balance Modified independent    Functional Mobility   Functional - Mobility Device Wheelchair   Activity To/From therapy gym;Retrieve items;Transport items   Assist Level Modified independent    Functional Mobility Comments Light homemaking task in OT kitchen. Transfers   Sit to stand Modified independent   Stand to sit Modified independent   Type of ROM/Therapeutic Exercise   Type of ROM/Therapeutic Exercise Deidra   Comment 15# BUE 3 sets x 20 reps. Long term goals   Long term goal 4 MET   Assessment   Performance deficits / Impairments Decreased functional mobility ; Decreased endurance;Decreased high-level IADLs   Treatment Diagnosis L femur fx with nailing   Prognosis Good   Timed Code Treatment Minutes 45 Minutes   Activity Tolerance   Activity Tolerance Patient Tolerated treatment well   Safety Devices   Safety Devices in place N/A   Type of devices Call light within reach   Plan   Current Treatment Recommendations Wheelchair Mobility Training; Endurance Training;Functional Mobility Training;Home Management Training

## 2021-05-13 NOTE — CARE COORDINATION
Mr Bruce Devlin, expecting discharge Friday, referral made to [facility], who has checked and are participating with his [address] provider. They will admit with nurse on Saturday, PT on Sunday to help in transition as his wife returns to work on Monday.

## 2021-05-13 NOTE — PROGRESS NOTES
Occupational Therapy     05/13/21 1300   Pain Assessment   Patient Currently in Pain No   Pain Assessment 0-10   Pain Level 0   Balance   Sitting Balance Independent   Standing Balance Modified independent    Functional Mobility   Functional - Mobility Device Wheelchair   Activity To/From therapy gym   Assist Level Modified independent    Transfers   Stand Step Transfers Modified independent  (Using RW.)   Sit to stand Modified independent   Stand to sit Modified independent   Type of ROM/Therapeutic Exercise   Type of ROM/Therapeutic Exercise Free weights   Comment 2# BUE 1 set x 15 reps, all planes, limited ROM L shldr. Independent with HEP. Assessment   Performance deficits / Impairments Decreased functional mobility ; Decreased endurance;Decreased high-level IADLs   Treatment Diagnosis L femur fx with nailing   Prognosis Good   Timed Code Treatment Minutes 45 Minutes   Activity Tolerance   Activity Tolerance Patient Tolerated treatment well   Safety Devices   Safety Devices in place Yes   Type of devices Gait belt  (Given to PT.)   Plan   Current Treatment Recommendations Wheelchair Mobility Training; Endurance Training;Functional Mobility Training;Home Management Training

## 2021-05-13 NOTE — PROGRESS NOTES
Patient:   Nyla Izaguirre  MR#:    [id number]   Room:    [facility]   YOB: 1955  Date of Progress Note: 5/13/2021  Time of Note                           8:04 AM  Consulting Physician:   Pepe Tolliver M.D. Attending Physician:  Pepe Tolliver MD     CHIEF COMPLAINT: Left hip pain     Subjective  This 60 y.o. male   admitted to [facility] on 4/28/2021 after sustaining a mechanical graound level fall earlier in the day. He landed on his L hip and had sudden onset of L hip pain w/inability to bear weight. Imaging revealed a L intertrochanteric femur fx. Dr Paul Levin w/ortho was consulted. Pt reports prior hx of L knee pain from a meniscus tear. He has a diabetic ulcer to his L first metatarsal head and is currenty in wound care. On 4/28 he was taken to OR to undergo a L trochanteric nailing of his L hip fx. He is foot flat weightbearing to the E. No complaints today. Leg spasms better with muscle relaxer. has moved his bowels  REVIEW OF SYSTEMS:  Constitutional: No fevers No chills  Neck:No stiffness  Respiratory: No shortness of breath  Cardiovascular: No chest pain No palpitations  Gastrointestinal: No abdominal pain    Genitourinary: No Dysuria  Neurological: No headache, no confusion      PHYSICAL EXAM:  /78   Pulse 81   Temp 97.9 °F (36.6 °C) (Temporal)   Resp 18   Ht 5' 11\" (1.803 m)   Wt 237 lb 6.4 oz (107.7 kg)   SpO2 93%   BMI 33.11 kg/m²     Constitutional: he appears well-developed and well-nourished. Eyes - conjunctiva normal.  Pupils react to light  Ear, nose, throat -hearing intact to voice.  No scars, masses, or lesions over external nose or ears, no atrophy of tongue  Neck-symmetric, no masses noted, no jugular vein distension  Respiration- chest wall appears symmetric, good expansion,   normal effort without use of accessory muscles  Cardiovascular- RRR  Musculoskeletal - no significant wasting of muscles noted, no bony deformities, gait no gross ataxia  Extremities-no clubbing, cyanosis or edema  Skin - warm, dry, and intact. No rash, erythema, or pallor. Psychiatric - mood, affect, and behavior appear normal.      Neurology  NEUROLOGICAL EXAM:      Mental status   Awake, alert, fluent oriented x 3 appropriate affect  Attention and concentration appear appropriate  Recent and remote memory appears unremarkable  Speech normal without dysarthria  No clear issues with language       Cranial Nerves   CN II- Visual fields grossly unremarkable  CN III, IV,VI-EOMI, No nystagmus, conjugate eye movements, no ptosis  CN VII-no facial asymmetry  CN VIII-Hearing intact      Motor function  Antigravity x 4     Sensory function Intact to light touch     Cerebellar F-N intact     Tremor None present     Gait                  Not tested           Nursing/pcp notes, imaging,labs and vitals reviewed. PT,OT and/or speech notes reviewed    Lab Results   Component Value Date    WBC 7.9 05/13/2021    HGB 8.9 (L) 05/13/2021    HCT 28.1 (L) 05/13/2021    MCV 98.6 (H) 05/13/2021     05/13/2021     Lab Results   Component Value Date     05/13/2021    K 3.4 (L) 05/13/2021    CL 96 (L) 05/13/2021    CO2 36 (H) 05/13/2021    BUN 27 (H) 05/13/2021    CREATININE 1.9 (H) 05/13/2021    GLUCOSE 109 05/13/2021    CALCIUM 8.8 05/13/2021    PROT 6.2 (L) 01/06/2021    LABALBU 3.3 (L) 01/06/2021    BILITOT 0.7 01/06/2021    ALKPHOS 84 01/06/2021    AST 22 01/06/2021    ALT 28 01/06/2021    LABGLOM 36 (A) 05/13/2021    GFRAA 43 (L) 05/13/2021    GLOB 3.5 01/07/2016     Lab Results   Component Value Date    INR 1.53 (H) 01/03/2021    INR 1.45 (H) 12/21/2020    INR 1.31 (H) 12/20/2020     Lab Results   Component Value Date    CRP 0.53 (H) 12/23/2020         PHYSICAL THERAPY  STRENGTH  Strength RLE  Comment: Grossly antigravity. Strength LLE  Comment: Grossly antigravity.   ROM  AROM RLE (degrees)  RLE AROM: WFL  AROM LLE (degrees)  LLE AROM : WFL  BED MOBILITY  Bed Mobility  Rolling: due to increase in pain. Pt's amb distance has been steadily decreasing due to an increase in pain. Pt improved w/ Car TF only needing minor cues and Leg  getting LLE in and out of car.     SPEECH THERAPY        OCCUPATIONAL THERAPY     CURRENT IRF-SRINIVAS SCORES  Eating: CARE Score: 6     Oral Hygiene: CARE Score: 6         Toileting: CARE Score: 4  Supervision      Shower/Bathe: CARE Score: 4  Comment: supervision at GB for standing level, LH Bath sponge for L LE        Upper Body Dressing: CARE Score: 6          Lower Body Dressing: CARE Score: 4    Supervision for standing level     Footwear: CARE Score: 4     Toilet Transfers: CARE Score: 4  W/c to toilet w/ GB     Picking Up Object:  CARE Score: 88           UE Functioning:  WFLs though states he feels like his left side is weaker     Pain Assessment:  Pain Level: 0  Pain Location: Hip     STGs:  Short term goals  Time Frame for Short term goals: 1 week  Short term goal 1: MET  Short term goal 2: MET  Short term goal 3: MET  Short term goal 4: MET  Short term goal 5: MET     LTGs:  Long term goals  Time Frame for Long term goals : 2 weeks  Long term goal 1: complete overall toileting with modified independence  Long term goal 2: complete overall bathing with modified independence  Long term goal 3: complete overall dressing with modified independence  Long term goal 4: complete simple home making task using recommended mobility with modified independence  Long term goal 5: complete HEP with independence     Assessment:  Performance deficits / Impairments: Decreased functional mobility , Decreased endurance, Decreased ADL status, Decreased high-level IADLs, Decreased strength, Decreased safe awareness, Decreased balance                        NUTRITION  Current Wt: Weight: 237 lb 6.4 oz (107.7 kg) / Body mass index is 33.11 kg/m².   Admission Wt: Admission Body Weight: 226 lb (102.5 kg)  Oral Diet Orders: Carb Control     Pt remains adequately nourished at this time AEB adequate po intakes and wt gain X 1 week. Accuchecks running 132-274; last A1C 12/2020: 6.7%. Pt reports following a carb consistent diet at home with his wife who follows weigh watchers. Pt aware and able to accurately describe foods containing carbs and what foods affect his blood sugar levels. Continue current POC. Please see nutrition note for details.              RECORD REVIEW: Previous medical records, medications were reviewed at today's visit    IMPRESSION:   1. Left hip fracture status post nailing-flatfoot weightbearing-pain control/PT/OT  2. Coronary artery disease-continue Imdur and Plavix  3. Paroxysmal atrial fibrillation/DVT prophylaxis-Eliquis, amiodarone  4. Diabetes-continue insulin and monitoring  5. Essential hypertension-continue current medications  6. Chronic kidney disease-continue current medications and monitoring. Stable  7. GI-bowel regimen. Linzess increased  8. DVT left leg. Edema improved. Already on Eliquis 5 mg twice a day as well as aspirin and Plavix. Given the fact that he needs to be on aspirin and Plavix I feel it is too risky to double the dose of Eliquis for 7 days. He will continue on current treatment. 9.  Hypokalemia-improved. Continue supplement. Looks stable. Anticipate discharge tomorrow.   ELOS:5/14, friday

## 2021-05-13 NOTE — PROGRESS NOTES
05/13/21 1019 05/13/21 1031 05/13/21 1048   Pain Screening   Patient Currently in Pain No  --   --    Transfers   Sit to Stand Independent  --   --    Stand to sit Independent  --   --    Lateral Transfers Independent  (Recliner to/from W/C.)  --   --    Car Transfer Stand by assistance  --   --    Ambulation   Ambulation?  --  Yes  --    WB Status  --  foot flat weight bearing  --    Ambulation 1   Surface  --  level tile  --    Device  --  Rolling Walker  --    Assistance  --  Contact guard assistance  --    Quality of Gait  --  Good posture. Able to flex LLE during swing phase and improved with LE advancement. --    Distance  --  20' x2  --    Comments  --  Amb chair to chair; incorporated turns. --    Propulsion 2   Propulsion  --  Manual  --    Level  --  Level Tile  --    Method  --  RUE;LUE  --    Level of Assistance  --  Independent  --    Description/ Details  --  Incorporated turns. --    Distance  --  200', 175'  --    Exercises   Comments  --   --  Sitting BLE ex  x15 reps. Conditions Requiring Skilled Therapeutic Intervention   Assessment  --   --   --    Activity Tolerance   Activity Tolerance  --   --   --       05/13/21 1049   Pain Screening   Patient Currently in Pain  --    Transfers   Sit to Stand  --    Stand to sit  --    Lateral Transfers  --    Car Transfer  --    Ambulation   Ambulation?  --    WB Status  --    Ambulation 1   Surface  --    Device  --    Assistance  --    Quality of Gait  --    Distance  --    Comments  --    Propulsion 2   Propulsion  --    Level  --    Method  --    Level of Assistance  --    Description/ Details  --    Distance  --    Exercises   Comments  --    Conditions Requiring Skilled Therapeutic Intervention   Assessment Sowmya session well. SBA with Car Transfer.    Activity Tolerance   Activity Tolerance Patient Tolerated treatment well   Electronically signed by Rosetta Castellon PTA on 5/13/2021 at 11:04 AM

## 2021-05-14 VITALS
DIASTOLIC BLOOD PRESSURE: 67 MMHG | HEIGHT: 71 IN | WEIGHT: 237.4 LBS | HEART RATE: 70 BPM | TEMPERATURE: 97.4 F | SYSTOLIC BLOOD PRESSURE: 131 MMHG | OXYGEN SATURATION: 96 % | RESPIRATION RATE: 16 BRPM | BODY MASS INDEX: 33.23 KG/M2

## 2021-05-14 LAB
GLUCOSE BLD-MCNC: 126 MG/DL (ref 70–99)
GLUCOSE BLD-MCNC: 170 MG/DL (ref 70–99)
GLUCOSE BLD-MCNC: 66 MG/DL (ref 70–99)
PERFORMED ON: ABNORMAL

## 2021-05-14 PROCEDURE — 6370000000 HC RX 637 (ALT 250 FOR IP): Performed by: PSYCHIATRY & NEUROLOGY

## 2021-05-14 PROCEDURE — 99239 HOSP IP/OBS DSCHRG MGMT >30: CPT | Performed by: PSYCHIATRY & NEUROLOGY

## 2021-05-14 PROCEDURE — 82947 ASSAY GLUCOSE BLOOD QUANT: CPT

## 2021-05-14 RX ORDER — CLOPIDOGREL BISULFATE 75 MG/1
75 TABLET ORAL DAILY
Qty: 30 TABLET | Refills: 3 | Status: SHIPPED | OUTPATIENT
Start: 2021-05-14 | End: 2021-10-13

## 2021-05-14 RX ORDER — ROSUVASTATIN CALCIUM 40 MG/1
20 TABLET, COATED ORAL DAILY
Qty: 30 TABLET | Refills: 0 | Status: ON HOLD | OUTPATIENT
Start: 2021-05-14 | End: 2022-08-19 | Stop reason: SDUPTHER

## 2021-05-14 RX ORDER — PANTOPRAZOLE SODIUM 40 MG/1
40 TABLET, DELAYED RELEASE ORAL
Qty: 60 TABLET | Refills: 3 | Status: SHIPPED | OUTPATIENT
Start: 2021-05-14

## 2021-05-14 RX ORDER — POTASSIUM CHLORIDE 20 MEQ/1
20 TABLET, EXTENDED RELEASE ORAL
Qty: 60 TABLET | Refills: 3 | Status: SHIPPED | OUTPATIENT
Start: 2021-05-14

## 2021-05-14 RX ORDER — METHOCARBAMOL 750 MG/1
750 TABLET, FILM COATED ORAL 3 TIMES DAILY
Qty: 30 TABLET | Refills: 0 | Status: SHIPPED | OUTPATIENT
Start: 2021-05-14 | End: 2021-05-24

## 2021-05-14 RX ORDER — ASCORBIC ACID 500 MG
500 TABLET ORAL DAILY
Qty: 30 TABLET | Refills: 3 | Status: SHIPPED | OUTPATIENT
Start: 2021-05-14

## 2021-05-14 RX ORDER — POLYETHYLENE GLYCOL 3350 17 G/17G
17 POWDER, FOR SOLUTION ORAL DAILY
Qty: 527 G | Refills: 1 | Status: SHIPPED | OUTPATIENT
Start: 2021-05-14 | End: 2021-06-13

## 2021-05-14 RX ORDER — CARVEDILOL 12.5 MG/1
12.5 TABLET ORAL 2 TIMES DAILY
Qty: 60 TABLET | Refills: 3 | Status: SHIPPED | OUTPATIENT
Start: 2021-05-14

## 2021-05-14 RX ORDER — AMIODARONE HYDROCHLORIDE 200 MG/1
200 TABLET ORAL DAILY
Qty: 30 TABLET | Refills: 0 | Status: SHIPPED | OUTPATIENT
Start: 2021-05-14 | End: 2022-10-21 | Stop reason: SDUPTHER

## 2021-05-14 RX ADMIN — ASPIRIN 81 MG: 81 TABLET, CHEWABLE ORAL at 08:18

## 2021-05-14 RX ADMIN — CETIRIZINE HYDROCHLORIDE 10 MG: 10 TABLET, FILM COATED ORAL at 08:20

## 2021-05-14 RX ADMIN — POTASSIUM CHLORIDE 20 MEQ: 1500 TABLET, EXTENDED RELEASE ORAL at 08:20

## 2021-05-14 RX ADMIN — ISOSORBIDE MONONITRATE 60 MG: 60 TABLET, EXTENDED RELEASE ORAL at 08:19

## 2021-05-14 RX ADMIN — METHOCARBAMOL 750 MG: 750 TABLET ORAL at 13:44

## 2021-05-14 RX ADMIN — METOLAZONE 5 MG: 5 TABLET ORAL at 08:19

## 2021-05-14 RX ADMIN — APIXABAN 5 MG: 5 TABLET, FILM COATED ORAL at 08:19

## 2021-05-14 RX ADMIN — AMIODARONE HYDROCHLORIDE 200 MG: 200 TABLET ORAL at 08:19

## 2021-05-14 RX ADMIN — ALLOPURINOL 200 MG: 100 TABLET ORAL at 08:20

## 2021-05-14 RX ADMIN — PANTOPRAZOLE SODIUM 40 MG: 40 TABLET, DELAYED RELEASE ORAL at 05:57

## 2021-05-14 RX ADMIN — CALCITRIOL CAPSULES 0.25 MCG 0.25 MCG: 0.25 CAPSULE ORAL at 08:19

## 2021-05-14 RX ADMIN — CARVEDILOL 12.5 MG: 12.5 TABLET, FILM COATED ORAL at 08:20

## 2021-05-14 RX ADMIN — MONTELUKAST SODIUM 10 MG: 10 TABLET, FILM COATED ORAL at 08:18

## 2021-05-14 RX ADMIN — ROSUVASTATIN CALCIUM 20 MG: 20 TABLET, FILM COATED ORAL at 08:18

## 2021-05-14 RX ADMIN — MINOXIDIL 2.5 MG: 2.5 TABLET ORAL at 08:19

## 2021-05-14 RX ADMIN — METHOCARBAMOL 750 MG: 750 TABLET ORAL at 08:20

## 2021-05-14 RX ADMIN — OXYCODONE HYDROCHLORIDE AND ACETAMINOPHEN 500 MG: 500 TABLET ORAL at 08:19

## 2021-05-14 RX ADMIN — ACETAMINOPHEN 650 MG: 325 TABLET ORAL at 00:15

## 2021-05-14 RX ADMIN — MUPIROCIN: 20 OINTMENT TOPICAL at 08:20

## 2021-05-14 RX ADMIN — CLOPIDOGREL BISULFATE 75 MG: 75 TABLET ORAL at 08:20

## 2021-05-14 ASSESSMENT — PAIN SCALES - GENERAL: PAINLEVEL_OUTOF10: 3

## 2021-05-14 NOTE — PLAN OF CARE
Problem: Falls - Risk of:  Goal: Will remain free from falls  Description: Will remain free from falls  5/13/2021 2246 by Raul Gonzalez RN  Outcome: Ongoing  5/13/2021 1045 by Kumar Membreno LPN  Outcome: Ongoing  Goal: Absence of physical injury  Description: Absence of physical injury  5/13/2021 2246 by Raul Gonzalez RN  Outcome: Ongoing  5/13/2021 1045 by Kumar Membreno LPN  Outcome: Ongoing     Problem: Pain:  Goal: Pain level will decrease  Description: Pain level will decrease  5/13/2021 2246 by Raul Gonzalez RN  Outcome: Ongoing  5/13/2021 1045 by Kumar Membreno LPN  Outcome: Ongoing  Goal: Control of acute pain  Description: Control of acute pain  5/13/2021 2246 by Raul Gonzalez RN  Outcome: Ongoing  5/13/2021 1045 by Kumar Membreno LPN  Outcome: Ongoing  Goal: Control of chronic pain  Description: Control of chronic pain  5/13/2021 2246 by Raul Gonzalez RN  Outcome: Ongoing  5/13/2021 1045 by Kumar Membreno LPN  Outcome: Ongoing

## 2021-05-14 NOTE — PLAN OF CARE
Problem: Falls - Risk of:  Goal: Will remain free from falls  Description: Will remain free from falls  5/14/2021 1153 by Castro Ku LPN  Outcome: Completed  5/13/2021 2246 by Génesis Rainey RN  Outcome: Ongoing  Goal: Absence of physical injury  Description: Absence of physical injury  5/14/2021 1153 by Castro Ku LPN  Outcome: Completed  5/13/2021 2246 by Génesis Rainey RN  Outcome: Ongoing     Problem: Nutrition  Goal: Optimal nutrition therapy  5/14/2021 1153 by Castro Ku LPN  Outcome: Completed  5/13/2021 2246 by Génesis Rainey RN  Outcome: Ongoing     Problem: Pain:  Goal: Pain level will decrease  Description: Pain level will decrease  5/14/2021 1153 by Castro Ku LPN  Outcome: Completed  5/13/2021 2246 by Génesis Rainey RN  Outcome: Ongoing  Goal: Control of acute pain  Description: Control of acute pain  5/14/2021 1153 by Castro Ku LPN  Outcome: Completed  5/13/2021 2246 by Génesis Rainey RN  Outcome: Ongoing  Goal: Control of chronic pain  Description: Control of chronic pain  5/14/2021 1153 by Castro Ku LPN  Outcome: Completed  5/13/2021 2246 by Génesis Rainey RN  Outcome: Ongoing     Problem: Skin Integrity:  Goal: Will show no infection signs and symptoms  Description: Will show no infection signs and symptoms  5/14/2021 1153 by Castro Ku LPN  Outcome: Completed  5/13/2021 2246 by Génesis Rainey RN  Outcome: Ongoing  Goal: Absence of new skin breakdown  Description: Absence of new skin breakdown  5/14/2021 1153 by Castro Ku LPN  Outcome: Completed  5/13/2021 2246 by Génesis Rainey RN  Outcome: Ongoing     Problem: Bleeding:  Goal: Will show no signs and symptoms of excessive bleeding  Description: Will show no signs and symptoms of excessive bleeding  5/14/2021 1153 by Castro Ku LPN  Outcome: Completed  5/13/2021 2246 by Génesis Rainey RN  Outcome: Ongoing     Problem: Serum Glucose Level - Abnormal:  Goal: Ability to maintain appropriate glucose levels has stabilized  Description: Ability to maintain appropriate glucose levels has stabilized  5/14/2021 1153 by Kia Carvajal LPN  Outcome: Completed  5/13/2021 2246 by Germain Pedersen RN  Outcome: Ongoing     Problem: Infection - Surgical Site:  Goal: Will show no infection signs and symptoms  Description: Will show no infection signs and symptoms  5/14/2021 1153 by Kia Carvajal LPN  Outcome: Completed  5/13/2021 2246 by Germain Pedersen RN  Outcome: Ongoing

## 2021-05-14 NOTE — DISCHARGE SUMMARY
by mouth nightly      allopurinol (ZYLOPRIM) 100 MG tablet Take 200 mg by mouth daily      minoxidil (LONITEN) 2.5 MG tablet TAKE ONE TABLET BY MOUTH TWICE A DAY  Qty: 60 tablet, Refills: 1      apixaban (ELIQUIS) 5 MG TABS tablet Take 1 tablet by mouth 2 times daily  Qty: 60 tablet, Refills: 3      bumetanide (BUMEX) 1 MG tablet Take 1 tablet by mouth 2 times daily  Qty: 60 tablet, Refills: 5      isosorbide mononitrate (IMDUR) 60 MG extended release tablet Take 1 tablet by mouth 2 times daily  Qty: 60 tablet, Refills: 5      aspirin 81 MG chewable tablet Take 1 tablet by mouth daily  Qty: 30 tablet, Refills: 3      vitamin D (ERGOCALCIFEROL) 1.25 MG (26955 UT) CAPS capsule Take 50,000 Units by mouth once a week      metOLazone (ZAROXOLYN) 5 MG tablet Take 5 mg by mouth three times a week Monday wed and fri      nitroGLYCERIN (NITROSTAT) 0.4 MG SL tablet up to max of 3 total doses. If no relief after 1 dose, call 911.   Qty: 25 tablet, Refills: 0      NOVOLOG FLEXPEN 100 UNIT/ML injection pen 5 Units 3 times daily (before meals)       montelukast (SINGULAIR) 10 MG tablet Take 10 mg by mouth daily      levocetirizine (XYZAL) 5 MG tablet Take 5 mg by mouth nightly      [employer] MINI PEN NEEDLES 31G X 6 MM MISC FOR USE WITH LANTUS TWO TIMES A DAY  Qty: 50 each, Refills: 5      LANTUS SOLOSTAR 100 UNIT/ML injection pen INJECT 30 UNITS IN THE MORNING AND 20 UNITS IN THE EVENING DAILY  Qty: 15 mL, Refills: 5           Current Discharge Medication List      STOP taking these medications       oxyCODONE-acetaminophen (PERCOCET)  MG per tablet Comments:   Reason for Stopping:         insulin regular (HUMULIN R;NOVOLIN R) 100 UNIT/ML injection Comments:   Reason for Stopping:         budesonide-formoterol (SYMBICORT) 80-4.5 MCG/ACT AERO Comments:   Reason for Stopping:         zinc sulfate (ZINCATE) 220 (50 Zn) MG capsule Comments:   Reason for Stopping:         fluticasone (VERAMYST) 27.5 MCG/SPRAY nasal spray Comments:   Reason for Stopping:         esomeprazole Magnesium (NEXIUM) 40 MG PACK Comments:   Reason for Stopping:         albuterol sulfate HFA (VENTOLIN HFA) 108 (90 Base) MCG/ACT inhaler Comments:   Reason for Stopping:                 Consults:   none    Hospital Course: The patient did well during her stay in the rehab unit. He did have difficulty with constipation requiring mag citrate on at least 2 occasions. He had some mild edema in the left leg which was persistent. Ultrasound showed 1 DVT. He was already on Eliquis 5 mg twice a day along with aspirin and Plavix. A decision was made not to increase his dose of Eliquis due to risk of bleeding. Would recommend follow-up venous ultrasound and about 2 weeks. Hypokalemia was resolved with supplementation. Disposition at discharge-improved        Discharge Instructions     Patient Instructions:   Home  Therapy orders: PT and OT   Discharge lab work: none  Code status: Full Code   Activity: activity as tolerated  Diet: DIET CARB CONTROL;     Wound Care: as directed  Equipment: as per therapy      Saul Davalos MD    At least 35 minutes were spent in discharging the patient

## 2021-05-17 ENCOUNTER — TELEPHONE (OUTPATIENT)
Dept: FAMILY MEDICINE CLINIC | Facility: CLINIC | Age: 66
End: 2021-05-17

## 2021-05-17 NOTE — TELEPHONE ENCOUNTER
Susy with [facility] reports patient was admitted on 05/15/2021. Patients  Discharge summary shows to takebumex 1mg bid. Patient has 2mg tablets in the home. Are you okay with patient taking 2mg in am vs 1mg bid?      Discharge Summary shows Potassium 20meq daily. Patient is wanting to take Potassium 20meq bid. Please review for change.     Patient continues to have a wound on left wound. HH is doing mupricin ointment and foam dressing.     Please review and advise for medication clarification.

## 2021-05-17 NOTE — DISCHARGE SUMMARY
Physical Therapy DISCHARGE Note  DATE:  2021  NAME:  Neo Manley  :  1955  (65 y.o.,male)  MRN:  [id number]    HEIGHT:  Height: 5' 11\" (180.3 cm)  WEIGHT:  Weight: 237 lb 6.4 oz (107.7 kg)    PATIENT DIAGNOSIS(ES):    Diagnosis: displaced intertrochanteric fx of left femur on 21. 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 Strength LLE  Comment: Grossly antigravity. ROM  AROM RLE (degrees)  RLE AROM: WFL  AROM LLE (degrees)  LLE AROM : WFL  POSTURE/BALANCE  Balance  Sitting - Dynamic: Fair  Standing - Dynamic: Fair       ACTIVITY TOLERANCE  Activity Tolerance  Activity Tolerance: Patient Tolerated treatment well      BED MOBILITY  Bed Mobility  Rolling: Independent  Supine to Sit: Independent  Sit to Supine: Independent  Scooting: Stand by assistance  Comment: Log roll going sit to supine and triplanar method supine to sit. TRANSFERS  Sit to Stand: Independent      Bed to Chair: Independent  Car Transfer: Stand by assistance     WHEELCHAIR PROPULSION 1  Propulsion 1  Propulsion: Manual  Level: Level Tile  Method: FERNANDA TEJEDA  Level of Assistance: Independent  Description/ Details: Pt propelled W/C well.   Distance: 200'  WHEELCHAIR PROPULSION 2  Propulsion 2  Propulsion: Manual  Level: Level Tile  Method: FERNANDA TEJEDA  Level of Assistance: Independent  Description/ Details: Incorporated turns. Distance: 200', 175'  AMBULATION 1  Ambulation 1  Surface: level tile  Device: Rolling Walker  Other Apparatus: Wheelchair follow  Assistance: Contact guard assistance  Quality of Gait: Good posture. Able to flex LLE during swing phase and improved with LE advancement. Gait Deviations:  (Cuing for decreased RLE swing.)  Distance: 20' x2  Comments: Amb chair to chair; incorporated turns. AMBULATION 2     STAIRS       GOALS:  Short term goals  Time Frame for Short term goals: 1 week  Short term goal 1: SBA with all bed mobility. Short term goal 2: SBA with transfers. Short term goal 3: CGA with car transfer. Short term goal 4: Indep with w/c propulsion x50' and two turns. Short term goal 5: CGA with gait x50' and two turns. Long term goals  Time Frame for Long term goals : 2 weeks  Long term goal 1: Indep with bed mobility. Long term goal 2: Indep with transfers. Long term goal 3: Indep with car transfer. Long term goal 4: Indep with gait x50' and two turns. Long term goal 5: Indep with one stair. HOME LIVING:     Type of Home: House  Home Layout: Multi-level, Able to Live on Main level with bedroom/bathroom  Home Access: Stairs to enter without rails  Entrance Stairs - Number of Steps: 1     ASSESSMENT (IMPAIRMENTS/BARRIERS): Body structures, Functions, Activity limitations: Decreased functional mobility , Decreased strength, Decreased endurance, Decreased ROM  Assessment: Sowmya session well.   Activity Tolerance: Patient Tolerated treatment well     PLAN:  Plan  Times per week: 5-6  Times per day:  (1-2 times a day)  Plan weeks: 1-2  Current Treatment Recommendations: Strengthening, Balance Training, Functional Mobility Training, Transfer Training, ADL/Self-care Training, Endurance Training, Wheelchair Mobility Training, Gait Training, Stair training, Neuromuscular Re-education, Home Exercise Program, Safety Education & Training, Patient/Caregiver Education & Training  Discharge Recommendations: Home with Home health PT  PATIENT REQUIRES AND IS REASONABLY EXPECTED TO ACTIVELY PARTICIPATE IN AT LEAST 3 HOURS OF INTENSIVE THERAPY PER DAY AT LEAST 5 DAYS PER WEEK, AND BE EXPECTED TO MAKE MEASURABLE IMPROVEMENT THAT WILL BE OF PRACTICAL VALUE TO IMPROVE THE PATIENT'S FUNCTIONAL CAPACITY OR ADAPTATION TO IMPAIRMENTS.    PATIENT GOAL FOR REHAB:  RETURN TO PRIOR LEVEL OF FUNCTION       IRF/SRINIVAS  Roll Left and Right  Assistance Needed: Independent  CARE Score: 6  Discharge Goal: Independent    Sit to Lying  Assistance Needed: Independent  CARE Score: 6  Discharge Goal: Independent    Lying to Sitting on Side of Bed  Assistance Needed: Independent  CARE Score: 6  Discharge Goal: Independent    Sit to Stand  Assistance Needed: Independent  CARE Score: 6  Discharge Goal: Independent    Chair/Bed-to-Chair Transfer  Assistance Needed: Independent  CARE Score: 6  Discharge Goal: Independent    Car Transfer  Assistance Needed: Supervision or touching assistance  Reason if not Attempted: Not attempted due to medical condition or safety concerns  CARE Score: 4  Discharge Goal: Independent    Walk 10 Feet  Assistance Needed: Supervision or touching assistance  CARE Score: 4  Discharge Goal: Independent    Walk 50 Feet with Two Turns  Reason if not Attempted: Not attempted due to medical condition or safety concerns  CARE Score: 88  Discharge Goal: Independent    Walk 150 Feet  Reason if not Attempted: Not attempted due to medical condition or safety concerns  CARE Score: 88  Discharge Goal: Not Attempted    Walking 10 Feet on Uneven Surfaces  Reason if not Attempted: Not attempted due to medical condition or safety concerns  CARE Score: 88  Discharge Goal: Not Attempted    1 Step (Curb)  Reason if not Attempted: Not attempted due to medical condition or safety concerns  CARE Score: 88  Discharge Goal: Independent    4 Steps  Reason if not Attempted: Not attempted due to medical condition or safety concerns  CARE Score: 88  Discharge Goal: Not Attempted    12 Steps  Reason if not Attempted: Not attempted due to medical condition or safety concerns  CARE Score: 88  Discharge Goal: Not Attempted    Wheel 50 Feet with Two Turns  Assistance Needed: Independent  CARE Score: 6  Discharge Goal: Independent    Wheel 150 Feet  Assistance Needed: Independent  CARE Score: 6  Discharge Goal: Independent      LAST TREATMENT TIME  PT Individual Minutes  Time In: 1400  Time Out: 1440  Minutes: 36

## 2021-05-18 NOTE — DISCHARGE SUMMARY
Occupational Therapy Discharge Summary         Date: 2021  Patient Name: Mark Apodaca        MRN: [id number]    : 1955  (60 y.o.)  Gender: male      Diagnosis: displaced intertrochanteric fx of left femur on 21. Restrictions/Precautions  Restrictions/Precautions: Weight Bearing, Fall Risk  Required Braces or Orthoses?: No      Discharge Date: 21      UE Functioning:  WFLs    Home Management:  Functional Mobility  Functional - Mobility Device: Wheelchair  Activity: To/From therapy gym  Assist Level: Modified independent   Functional Mobility Comments: Light homemaking task in OT kitchen. Adaptive Equipment/DME Status:     Home Equipment:  (none)  Ordered w/c, w/c cushion, BSC, and RW. . recommended TTB for home    Pain Assessment:  Pain Level: 0  Pain Location: Leg, Hip    Remaining Problems:  Decreased functional mobility ;  Decreased endurance; Decreased high-level IADLs    STGs:  Short term goals  Time Frame for Short term goals: 1 week  Short term goal 1: complete LB dressing with AE with CGA without cues for WB protocol  Short term goal 2: complete overall toileting with CGA  Short term goal 3: complete overall bathing with min A  Short term goal 4: complete simple w/c level home making task with CGA  Short term goal 5: complete 1 handed static standing task for 2 mins with CGA    LTGs:  Long term goals  Time Frame for Long term goals : 2 weeks  Long term goal 1: complete overall toileting with modified independence  Long term goal 2: complete overall bathing with modified independence  Long term goal 3: complete overall dressing with modified independence  Long term goal 4: complete simple home making task using recommended mobility with modified independence  Long term goal 5: complete HEP with independence      Discharge Setting and Recommendations:  Home with spouse and homecare therapy order    Discharge Care Scores  Eating: CARE Score: 6  Oral Hygiene: CARE Score: 6  Toileting: CARE Score: 6  Shower/Bathe: CARE Score: 6  Upper Body Dressing: CARE Score: 6  Lower Body Dressing: CARE Score: 6  Footwear: CARE Score: 6  Toilet Transfers: CARE Score: 6  Picking Up Object:  CARE Score: 88    Electronically signed by Eloise Harper OT on 5/18/21 at 9:15 AM CDT

## 2021-05-21 ENCOUNTER — OUTSIDE FACILITY SERVICE (OUTPATIENT)
Dept: FAMILY MEDICINE CLINIC | Facility: CLINIC | Age: 66
End: 2021-05-21

## 2021-05-21 ENCOUNTER — OFFICE VISIT (OUTPATIENT)
Dept: FAMILY MEDICINE CLINIC | Facility: CLINIC | Age: 66
End: 2021-05-21

## 2021-05-21 VITALS
HEIGHT: 71 IN | DIASTOLIC BLOOD PRESSURE: 64 MMHG | HEART RATE: 86 BPM | BODY MASS INDEX: 33.04 KG/M2 | RESPIRATION RATE: 18 BRPM | TEMPERATURE: 98.2 F | SYSTOLIC BLOOD PRESSURE: 122 MMHG | OXYGEN SATURATION: 98 % | WEIGHT: 236 LBS

## 2021-05-21 DIAGNOSIS — Z86.718 ENCOUNTER FOR FOLLOW-UP OF ACUTE DEEP VEIN THROMBOSIS (DVT) OF LEFT LOWER EXTREMITY: ICD-10-CM

## 2021-05-21 DIAGNOSIS — Z09 ENCOUNTER FOR FOLLOW-UP OF ACUTE DEEP VEIN THROMBOSIS (DVT) OF LEFT LOWER EXTREMITY: ICD-10-CM

## 2021-05-21 DIAGNOSIS — E87.6 HYPOKALEMIA: Primary | ICD-10-CM

## 2021-05-21 DIAGNOSIS — S72.002D CLOSED FRACTURE OF LEFT HIP WITH ROUTINE HEALING, SUBSEQUENT ENCOUNTER: ICD-10-CM

## 2021-05-21 PROCEDURE — G0180 MD CERTIFICATION HHA PATIENT: HCPCS | Performed by: FAMILY MEDICINE

## 2021-05-21 PROCEDURE — 99213 OFFICE O/P EST LOW 20 MIN: CPT | Performed by: FAMILY MEDICINE

## 2021-05-21 RX ORDER — CLOPIDOGREL BISULFATE 75 MG/1
75 TABLET ORAL
COMMUNITY
Start: 2021-05-14

## 2021-05-21 RX ORDER — POTASSIUM CHLORIDE 20 MEQ/1
60 TABLET, EXTENDED RELEASE ORAL DAILY
COMMUNITY
Start: 2021-05-14

## 2021-05-22 LAB
BUN SERPL-MCNC: 40 MG/DL (ref 8–23)
BUN/CREAT SERPL: 18 (ref 7–25)
CALCIUM SERPL-MCNC: 9.7 MG/DL (ref 8.6–10.5)
CHLORIDE SERPL-SCNC: 92 MMOL/L (ref 98–107)
CO2 SERPL-SCNC: 38.4 MMOL/L (ref 22–29)
CREAT SERPL-MCNC: 2.22 MG/DL (ref 0.76–1.27)
GLUCOSE SERPL-MCNC: 159 MG/DL (ref 65–99)
POTASSIUM SERPL-SCNC: 3.3 MMOL/L (ref 3.5–5.2)
SODIUM SERPL-SCNC: 139 MMOL/L (ref 136–145)

## 2021-06-02 RX ORDER — CALCITRIOL 0.25 UG/1
CAPSULE, LIQUID FILLED ORAL
Qty: 90 CAPSULE | Refills: 0 | Status: SHIPPED | OUTPATIENT
Start: 2021-06-02

## 2021-06-09 ENCOUNTER — OFFICE VISIT (OUTPATIENT)
Dept: WOUND CARE | Facility: HOSPITAL | Age: 66
End: 2021-06-09

## 2021-06-09 DIAGNOSIS — Z79.01 LONG TERM (CURRENT) USE OF ANTICOAGULANTS: ICD-10-CM

## 2021-06-09 DIAGNOSIS — L84 CORNS AND CALLOSITIES: ICD-10-CM

## 2021-06-09 DIAGNOSIS — E11.621 TYPE 2 DIABETES MELLITUS WITH FOOT ULCER, UNSPECIFIED WHETHER LONG TERM INSULIN USE (HCC): ICD-10-CM

## 2021-06-09 DIAGNOSIS — L97.522 NON-PRESSURE CHRONIC ULCER OF OTHER PART OF LEFT FOOT WITH FAT LAYER EXPOSED (HCC): ICD-10-CM

## 2021-06-09 DIAGNOSIS — L97.509 TYPE 2 DIABETES MELLITUS WITH FOOT ULCER, UNSPECIFIED WHETHER LONG TERM INSULIN USE (HCC): ICD-10-CM

## 2021-06-09 PROCEDURE — G0463 HOSPITAL OUTPT CLINIC VISIT: HCPCS

## 2021-06-09 PROCEDURE — 97597 DBRDMT OPN WND 1ST 20 CM/<: CPT | Performed by: NURSE PRACTITIONER

## 2021-06-09 PROCEDURE — 99213 OFFICE O/P EST LOW 20 MIN: CPT | Performed by: NURSE PRACTITIONER

## 2021-06-16 ENCOUNTER — OFFICE VISIT (OUTPATIENT)
Dept: WOUND CARE | Facility: HOSPITAL | Age: 66
End: 2021-06-16

## 2021-06-16 DIAGNOSIS — Z79.01 LONG TERM (CURRENT) USE OF ANTICOAGULANTS: ICD-10-CM

## 2021-06-16 DIAGNOSIS — L97.522 NON-PRESSURE CHRONIC ULCER OF OTHER PART OF LEFT FOOT WITH FAT LAYER EXPOSED (HCC): ICD-10-CM

## 2021-06-16 DIAGNOSIS — E11.621 TYPE 2 DIABETES MELLITUS WITH FOOT ULCER, UNSPECIFIED WHETHER LONG TERM INSULIN USE (HCC): ICD-10-CM

## 2021-06-16 DIAGNOSIS — L97.509 TYPE 2 DIABETES MELLITUS WITH FOOT ULCER, UNSPECIFIED WHETHER LONG TERM INSULIN USE (HCC): ICD-10-CM

## 2021-06-16 PROCEDURE — G0463 HOSPITAL OUTPT CLINIC VISIT: HCPCS

## 2021-06-16 PROCEDURE — 99213 OFFICE O/P EST LOW 20 MIN: CPT | Performed by: PODIATRIST

## 2021-06-22 ENCOUNTER — TELEPHONE (OUTPATIENT)
Dept: FAMILY MEDICINE CLINIC | Facility: CLINIC | Age: 66
End: 2021-06-22

## 2021-06-22 DIAGNOSIS — E87.6 LOW SERUM POTASSIUM LEVEL: Primary | ICD-10-CM

## 2021-06-22 NOTE — TELEPHONE ENCOUNTER
Patient has called back regarding a letter he received about recent labs.   Lab results have been discussed with patient he has been taking 2 pills of his potassium patient will report to have potassium rechecked 06/23/21.

## 2021-06-24 LAB
BUN SERPL-MCNC: 54 MG/DL (ref 8–23)
BUN/CREAT SERPL: 25 (ref 7–25)
CALCIUM SERPL-MCNC: 9.6 MG/DL (ref 8.6–10.5)
CHLORIDE SERPL-SCNC: 90 MMOL/L (ref 98–107)
CO2 SERPL-SCNC: 33.2 MMOL/L (ref 22–29)
CREAT SERPL-MCNC: 2.16 MG/DL (ref 0.76–1.27)
GLUCOSE SERPL-MCNC: 413 MG/DL (ref 65–99)
POTASSIUM SERPL-SCNC: 3.2 MMOL/L (ref 3.5–5.2)
SODIUM SERPL-SCNC: 137 MMOL/L (ref 136–145)

## 2021-07-07 RX ORDER — APIXABAN 5 MG/1
TABLET, FILM COATED ORAL
Qty: 60 TABLET | Refills: 5 | Status: SHIPPED | OUTPATIENT
Start: 2021-07-07 | End: 2022-01-03

## 2021-07-08 RX ORDER — ROSUVASTATIN CALCIUM 20 MG/1
TABLET, COATED ORAL
Qty: 90 TABLET | Refills: 1 | Status: SHIPPED | OUTPATIENT
Start: 2021-07-08

## 2021-07-12 NOTE — TELEPHONE ENCOUNTER
Last RF: 6/1/2021 #60     Last OV: 5/21/2021    Next OV: 8/2/2021    Patient requesting  take over Nexium refills.

## 2021-07-13 RX ORDER — ESOMEPRAZOLE MAGNESIUM 40 MG/1
40 CAPSULE, DELAYED RELEASE ORAL 2 TIMES DAILY
Qty: 60 CAPSULE | Refills: 5 | Status: SHIPPED | OUTPATIENT
Start: 2021-07-13 | End: 2022-01-12

## 2021-07-21 ENCOUNTER — TELEPHONE (OUTPATIENT)
Dept: FAMILY MEDICINE CLINIC | Facility: CLINIC | Age: 66
End: 2021-07-21

## 2021-07-21 NOTE — TELEPHONE ENCOUNTER
PATIENT IS NEEDING TO HAVE HIS WORK ORDER FAXED TO ANOTHER OFFICE NUMBER [phone]    GOOD CALL BACK [phone]

## 2021-07-22 NOTE — TELEPHONE ENCOUNTER
Attempted to call patient. Paperwork is complete. Wanting to verify to fax and if patient will pickup originals.

## 2021-07-26 NOTE — TELEPHONE ENCOUNTER
Patients [facility] papers faxed to [phone]. Attempted to call patient to notify.

## 2021-08-30 RX ORDER — ISOSORBIDE MONONITRATE 60 MG/1
TABLET, EXTENDED RELEASE ORAL
Qty: 60 TABLET | Refills: 0 | Status: SHIPPED | OUTPATIENT
Start: 2021-08-30 | End: 2021-10-05 | Stop reason: SDUPTHER

## 2021-09-20 NOTE — TELEPHONE ENCOUNTER
Rx Refill Note  Requested Prescriptions     Pending Prescriptions Disp Refills   • Contour Test test strip [Pharmacy Med Name: CONTOUR TEST  STRP]  12     Sig: TEST BLOOD SUGAR TWICE DAILY      Last office visit with prescribing clinician: 5/21/2021      Next office visit with prescribing clinician: 8/2/2022   Last refill: 8/20/2020    Gail Lal MA  09/20/21, 08:38 CDT

## 2021-09-21 RX ORDER — CARVEDILOL 25 MG/1
TABLET, FILM COATED ORAL
Qty: 100 EACH | Refills: 12 | Status: SHIPPED | OUTPATIENT
Start: 2021-09-21

## 2021-10-05 RX ORDER — MINOXIDIL 2.5 MG/1
TABLET ORAL
Qty: 60 TABLET | Refills: 5 | Status: SHIPPED | OUTPATIENT
Start: 2021-10-05 | End: 2022-04-07

## 2021-10-05 RX ORDER — ISOSORBIDE MONONITRATE 60 MG/1
TABLET, EXTENDED RELEASE ORAL
Qty: 60 TABLET | Refills: 5 | Status: SHIPPED | OUTPATIENT
Start: 2021-10-05 | End: 2022-04-14

## 2021-10-12 NOTE — TELEPHONE ENCOUNTER
Requested Prescriptions     Pending Prescriptions Disp Refills    clopidogrel (PLAVIX) 75 MG tablet [Pharmacy Med Name: CLOPIDOGREL BISULFATE 75MG TABS] 30 tablet 3     Sig: TAKE ONE TABLET BY MOUTH EVERY DAY       Last Office Visit: Visit date not found  Next Office Visit: Visit date not found  Last Medication Refill:      Was a hospital patient only 05/14/2021

## 2021-10-13 RX ORDER — CLOPIDOGREL BISULFATE 75 MG/1
TABLET ORAL
Qty: 30 TABLET | Refills: 3 | Status: SHIPPED | OUTPATIENT
Start: 2021-10-13

## 2021-10-27 RX ORDER — CARVEDILOL 25 MG/1
25 TABLET ORAL 2 TIMES DAILY WITH MEALS
Qty: 180 TABLET | Refills: 1 | Status: CANCELLED | OUTPATIENT
Start: 2021-10-27

## 2021-10-27 NOTE — TELEPHONE ENCOUNTER
Rx Refill Note  Requested Prescriptions     Pending Prescriptions Disp Refills   • carvedilol (COREG) 25 MG tablet 180 tablet 1     Sig: Take 1 tablet by mouth 2 (Two) Times a Day With Meals.      Last office visit with prescribing clinician: 5/21/2021      Next office visit with prescribing clinician: 10/28/2021            Salina Hines LPN  10/27/21, 09:23 CDT

## 2021-10-28 ENCOUNTER — OFFICE VISIT (OUTPATIENT)
Dept: FAMILY MEDICINE CLINIC | Facility: CLINIC | Age: 66
End: 2021-10-28

## 2021-10-28 VITALS
HEART RATE: 63 BPM | WEIGHT: 246 LBS | HEIGHT: 71 IN | DIASTOLIC BLOOD PRESSURE: 71 MMHG | BODY MASS INDEX: 34.44 KG/M2 | TEMPERATURE: 97.3 F | RESPIRATION RATE: 18 BRPM | OXYGEN SATURATION: 99 % | SYSTOLIC BLOOD PRESSURE: 143 MMHG

## 2021-10-28 DIAGNOSIS — E87.6 HYPOKALEMIA: Primary | ICD-10-CM

## 2021-10-28 DIAGNOSIS — Z79.4 TYPE 2 DIABETES MELLITUS WITH DIABETIC AUTONOMIC NEUROPATHY, WITH LONG-TERM CURRENT USE OF INSULIN (HCC): ICD-10-CM

## 2021-10-28 DIAGNOSIS — E78.2 MIXED HYPERLIPIDEMIA: ICD-10-CM

## 2021-10-28 DIAGNOSIS — Z11.59 NEED FOR HEPATITIS C SCREENING TEST: ICD-10-CM

## 2021-10-28 DIAGNOSIS — K59.04 CHRONIC IDIOPATHIC CONSTIPATION: ICD-10-CM

## 2021-10-28 DIAGNOSIS — I10 ESSENTIAL HYPERTENSION: ICD-10-CM

## 2021-10-28 DIAGNOSIS — Z79.01 CHRONIC ANTICOAGULATION: ICD-10-CM

## 2021-10-28 DIAGNOSIS — I25.10 CORONARY ARTERY DISEASE INVOLVING NATIVE HEART WITHOUT ANGINA PECTORIS, UNSPECIFIED VESSEL OR LESION TYPE: ICD-10-CM

## 2021-10-28 DIAGNOSIS — E11.43 TYPE 2 DIABETES MELLITUS WITH DIABETIC AUTONOMIC NEUROPATHY, WITH LONG-TERM CURRENT USE OF INSULIN (HCC): ICD-10-CM

## 2021-10-28 PROCEDURE — 99214 OFFICE O/P EST MOD 30 MIN: CPT | Performed by: FAMILY MEDICINE

## 2021-10-28 RX ORDER — AMOXICILLIN 250 MG
2 CAPSULE ORAL DAILY PRN
Qty: 90 TABLET | Refills: 1 | Status: SHIPPED | OUTPATIENT
Start: 2021-10-28

## 2021-10-28 RX ORDER — CARVEDILOL 12.5 MG/1
12.5 TABLET ORAL 2 TIMES DAILY WITH MEALS
Qty: 60 TABLET | Refills: 5 | Status: SHIPPED | OUTPATIENT
Start: 2021-10-28 | End: 2022-05-09

## 2021-10-29 LAB
ALBUMIN SERPL-MCNC: 4 G/DL (ref 3.5–5.2)
ALBUMIN/GLOB SERPL: 1.6 G/DL
ALP SERPL-CCNC: 76 U/L (ref 39–117)
ALT SERPL-CCNC: 12 U/L (ref 1–41)
AST SERPL-CCNC: 21 U/L (ref 1–40)
BILIRUB SERPL-MCNC: 0.6 MG/DL (ref 0–1.2)
BUN SERPL-MCNC: 26 MG/DL (ref 8–23)
BUN/CREAT SERPL: 11.3 (ref 7–25)
CALCIUM SERPL-MCNC: 9.4 MG/DL (ref 8.6–10.5)
CHLORIDE SERPL-SCNC: 95 MMOL/L (ref 98–107)
CHOLEST SERPL-MCNC: 125 MG/DL (ref 0–200)
CO2 SERPL-SCNC: 29.5 MMOL/L (ref 22–29)
CREAT SERPL-MCNC: 2.31 MG/DL (ref 0.76–1.27)
ERYTHROCYTE [DISTWIDTH] IN BLOOD BY AUTOMATED COUNT: 14.6 % (ref 12.3–15.4)
GLOBULIN SER CALC-MCNC: 2.5 GM/DL
GLUCOSE SERPL-MCNC: 295 MG/DL (ref 65–99)
HBA1C MFR BLD: 9.8 % (ref 4.8–5.6)
HCT VFR BLD AUTO: 44.3 % (ref 37.5–51)
HCV AB S/CO SERPL IA: <0.1 S/CO RATIO (ref 0–0.9)
HDLC SERPL-MCNC: 40 MG/DL (ref 40–60)
HGB BLD-MCNC: 13.5 G/DL (ref 13–17.7)
LDLC SERPL CALC-MCNC: 58 MG/DL (ref 0–100)
MCH RBC QN AUTO: 28.6 PG (ref 26.6–33)
MCHC RBC AUTO-ENTMCNC: 30.5 G/DL (ref 31.5–35.7)
MCV RBC AUTO: 93.9 FL (ref 79–97)
PLATELET # BLD AUTO: 169 10*3/MM3 (ref 140–450)
POTASSIUM SERPL-SCNC: 4.6 MMOL/L (ref 3.5–5.2)
PROT SERPL-MCNC: 6.5 G/DL (ref 6–8.5)
RBC # BLD AUTO: 4.72 10*6/MM3 (ref 4.14–5.8)
SODIUM SERPL-SCNC: 139 MMOL/L (ref 136–145)
TRIGL SERPL-MCNC: 160 MG/DL (ref 0–150)
VLDLC SERPL CALC-MCNC: 27 MG/DL (ref 5–40)
WBC # BLD AUTO: 8.2 10*3/MM3 (ref 3.4–10.8)

## 2021-11-04 ENCOUNTER — APPOINTMENT (OUTPATIENT)
Dept: GENERAL RADIOLOGY | Age: 66
DRG: 291 | End: 2021-11-04
Payer: COMMERCIAL

## 2021-11-04 ENCOUNTER — HOSPITAL ENCOUNTER (INPATIENT)
Age: 66
LOS: 3 days | Discharge: HOME | DRG: 291 | End: 2021-11-07
Attending: EMERGENCY MEDICINE | Admitting: INTERNAL MEDICINE
Payer: COMMERCIAL

## 2021-11-04 DIAGNOSIS — Z79.01 CHRONIC ANTICOAGULATION: ICD-10-CM

## 2021-11-04 DIAGNOSIS — I50.23 ACUTE ON CHRONIC SYSTOLIC CONGESTIVE HEART FAILURE (HCC): Primary | ICD-10-CM

## 2021-11-04 DIAGNOSIS — I10 ESSENTIAL HYPERTENSION: ICD-10-CM

## 2021-11-04 DIAGNOSIS — I48.0 PAROXYSMAL A-FIB (HCC): ICD-10-CM

## 2021-11-04 PROBLEM — I50.9 ACUTE DECOMPENSATED HEART FAILURE (HCC): Status: ACTIVE | Noted: 2021-11-04

## 2021-11-04 LAB
ALBUMIN SERPL-MCNC: 4.1 G/DL (ref 3.5–5.2)
ALP BLD-CCNC: 111 U/L (ref 40–130)
ALT SERPL-CCNC: 20 U/L (ref 5–41)
ANION GAP SERPL CALCULATED.3IONS-SCNC: 9 MMOL/L (ref 7–19)
AST SERPL-CCNC: 15 U/L (ref 5–40)
BACTERIA: NEGATIVE /HPF
BASOPHILS ABSOLUTE: 0.1 K/UL (ref 0–0.2)
BASOPHILS RELATIVE PERCENT: 0.9 % (ref 0–1)
BILIRUB SERPL-MCNC: 0.5 MG/DL (ref 0.2–1.2)
BILIRUBIN URINE: NEGATIVE
BLOOD, URINE: ABNORMAL
BUN BLDV-MCNC: 27 MG/DL (ref 8–23)
CALCIUM SERPL-MCNC: 9.3 MG/DL (ref 8.8–10.2)
CHLORIDE BLD-SCNC: 99 MMOL/L (ref 98–111)
CLARITY: CLEAR
CO2: 31 MMOL/L (ref 22–29)
COLOR: YELLOW
CREAT SERPL-MCNC: 2 MG/DL (ref 0.5–1.2)
CRYSTALS, UA: ABNORMAL /HPF
EOSINOPHILS ABSOLUTE: 0.3 K/UL (ref 0–0.6)
EOSINOPHILS RELATIVE PERCENT: 3.5 % (ref 0–5)
EPITHELIAL CELLS, UA: 0 /HPF (ref 0–5)
GFR AFRICAN AMERICAN: 41
GFR NON-AFRICAN AMERICAN: 34
GLUCOSE BLD-MCNC: 227 MG/DL (ref 74–109)
GLUCOSE BLD-MCNC: 229 MG/DL (ref 70–99)
GLUCOSE URINE: 100 MG/DL
HCT VFR BLD CALC: 44.1 % (ref 42–52)
HEMOGLOBIN: 13.4 G/DL (ref 14–18)
HYALINE CASTS: 0 /HPF (ref 0–8)
IMMATURE GRANULOCYTES #: 0 K/UL
INR BLD: 1.11 (ref 0.88–1.18)
KETONES, URINE: NEGATIVE MG/DL
LEUKOCYTE ESTERASE, URINE: NEGATIVE
LYMPHOCYTES ABSOLUTE: 1.9 K/UL (ref 1.1–4.5)
LYMPHOCYTES RELATIVE PERCENT: 24.5 % (ref 20–40)
MCH RBC QN AUTO: 28.9 PG (ref 27–31)
MCHC RBC AUTO-ENTMCNC: 30.4 G/DL (ref 33–37)
MCV RBC AUTO: 95 FL (ref 80–94)
MONOCYTES ABSOLUTE: 0.6 K/UL (ref 0–0.9)
MONOCYTES RELATIVE PERCENT: 7.7 % (ref 0–10)
NEUTROPHILS ABSOLUTE: 4.9 K/UL (ref 1.5–7.5)
NEUTROPHILS RELATIVE PERCENT: 63.1 % (ref 50–65)
NITRITE, URINE: NEGATIVE
PDW BLD-RTO: 15.3 % (ref 11.5–14.5)
PERFORMED ON: ABNORMAL
PH UA: 5.5 (ref 5–8)
PLATELET # BLD: 207 K/UL (ref 130–400)
PMV BLD AUTO: 10.7 FL (ref 9.4–12.4)
POTASSIUM SERPL-SCNC: 4 MMOL/L (ref 3.5–5)
PRO-BNP: 2871 PG/ML (ref 0–900)
PROTEIN UA: 100 MG/DL
PROTHROMBIN TIME: 14.5 SEC (ref 12–14.6)
RBC # BLD: 4.64 M/UL (ref 4.7–6.1)
RBC UA: 1 /HPF (ref 0–4)
SARS-COV-2, NAAT: NOT DETECTED
SODIUM BLD-SCNC: 139 MMOL/L (ref 136–145)
SPECIFIC GRAVITY UA: 1.01 (ref 1–1.03)
TOTAL PROTEIN: 7.9 G/DL (ref 6.6–8.7)
TROPONIN: 0.03 NG/ML (ref 0–0.03)
TSH REFLEX FT4: 1.75 UIU/ML (ref 0.35–5.5)
UROBILINOGEN, URINE: 0.2 E.U./DL
WBC # BLD: 7.8 K/UL (ref 4.8–10.8)
WBC UA: 0 /HPF (ref 0–5)

## 2021-11-04 PROCEDURE — 84484 ASSAY OF TROPONIN QUANT: CPT

## 2021-11-04 PROCEDURE — 6370000000 HC RX 637 (ALT 250 FOR IP): Performed by: INTERNAL MEDICINE

## 2021-11-04 PROCEDURE — 2060000000 HC ICU INTERMEDIATE R&B

## 2021-11-04 PROCEDURE — 36415 COLL VENOUS BLD VENIPUNCTURE: CPT

## 2021-11-04 PROCEDURE — 84443 ASSAY THYROID STIM HORMONE: CPT

## 2021-11-04 PROCEDURE — 99283 EMERGENCY DEPT VISIT LOW MDM: CPT

## 2021-11-04 PROCEDURE — 85610 PROTHROMBIN TIME: CPT

## 2021-11-04 PROCEDURE — 81001 URINALYSIS AUTO W/SCOPE: CPT

## 2021-11-04 PROCEDURE — 93005 ELECTROCARDIOGRAM TRACING: CPT | Performed by: EMERGENCY MEDICINE

## 2021-11-04 PROCEDURE — 6360000002 HC RX W HCPCS: Performed by: INTERNAL MEDICINE

## 2021-11-04 PROCEDURE — 2500000003 HC RX 250 WO HCPCS: Performed by: EMERGENCY MEDICINE

## 2021-11-04 PROCEDURE — 96374 THER/PROPH/DIAG INJ IV PUSH: CPT

## 2021-11-04 PROCEDURE — 82947 ASSAY GLUCOSE BLOOD QUANT: CPT

## 2021-11-04 PROCEDURE — 85025 COMPLETE CBC W/AUTO DIFF WBC: CPT

## 2021-11-04 PROCEDURE — 83880 ASSAY OF NATRIURETIC PEPTIDE: CPT

## 2021-11-04 PROCEDURE — 1210000000 HC MED SURG R&B

## 2021-11-04 PROCEDURE — 87635 SARS-COV-2 COVID-19 AMP PRB: CPT

## 2021-11-04 PROCEDURE — 71045 X-RAY EXAM CHEST 1 VIEW: CPT

## 2021-11-04 PROCEDURE — 80053 COMPREHEN METABOLIC PANEL: CPT

## 2021-11-04 RX ORDER — METOLAZONE 5 MG/1
5 TABLET ORAL
Status: DISCONTINUED | OUTPATIENT
Start: 2021-11-05 | End: 2021-11-05

## 2021-11-04 RX ORDER — MONTELUKAST SODIUM 10 MG/1
10 TABLET ORAL DAILY
Status: DISCONTINUED | OUTPATIENT
Start: 2021-11-05 | End: 2021-11-07 | Stop reason: HOSPADM

## 2021-11-04 RX ORDER — ASPIRIN 81 MG/1
81 TABLET, CHEWABLE ORAL DAILY
Status: DISCONTINUED | OUTPATIENT
Start: 2021-11-05 | End: 2021-11-07 | Stop reason: HOSPADM

## 2021-11-04 RX ORDER — ASCORBIC ACID 500 MG
500 TABLET ORAL DAILY
Status: DISCONTINUED | OUTPATIENT
Start: 2021-11-05 | End: 2021-11-07 | Stop reason: HOSPADM

## 2021-11-04 RX ORDER — ROSUVASTATIN CALCIUM 20 MG/1
20 TABLET, COATED ORAL DAILY
Status: DISCONTINUED | OUTPATIENT
Start: 2021-11-05 | End: 2021-11-07 | Stop reason: HOSPADM

## 2021-11-04 RX ORDER — SENNA AND DOCUSATE SODIUM 50; 8.6 MG/1; MG/1
1 TABLET, FILM COATED ORAL NIGHTLY
Status: DISCONTINUED | OUTPATIENT
Start: 2021-11-04 | End: 2021-11-07 | Stop reason: HOSPADM

## 2021-11-04 RX ORDER — CALCITRIOL 0.25 UG/1
0.25 CAPSULE, LIQUID FILLED ORAL
Status: DISCONTINUED | OUTPATIENT
Start: 2021-11-05 | End: 2021-11-07 | Stop reason: HOSPADM

## 2021-11-04 RX ORDER — ISOSORBIDE MONONITRATE 60 MG/1
60 TABLET, EXTENDED RELEASE ORAL DAILY
Status: DISCONTINUED | OUTPATIENT
Start: 2021-11-05 | End: 2021-11-07 | Stop reason: HOSPADM

## 2021-11-04 RX ORDER — MINOXIDIL 2.5 MG/1
2.5 TABLET ORAL 2 TIMES DAILY
Status: DISCONTINUED | OUTPATIENT
Start: 2021-11-04 | End: 2021-11-07 | Stop reason: HOSPADM

## 2021-11-04 RX ORDER — NITROGLYCERIN 0.4 MG/1
0.4 TABLET SUBLINGUAL EVERY 5 MIN PRN
Status: DISCONTINUED | OUTPATIENT
Start: 2021-11-04 | End: 2021-11-07 | Stop reason: HOSPADM

## 2021-11-04 RX ORDER — POTASSIUM CHLORIDE 20 MEQ/1
20 TABLET, EXTENDED RELEASE ORAL
Status: DISCONTINUED | OUTPATIENT
Start: 2021-11-05 | End: 2021-11-05

## 2021-11-04 RX ORDER — ACETAMINOPHEN 325 MG/1
650 TABLET ORAL EVERY 6 HOURS PRN
Status: DISCONTINUED | OUTPATIENT
Start: 2021-11-04 | End: 2021-11-07 | Stop reason: HOSPADM

## 2021-11-04 RX ORDER — ACETAMINOPHEN 650 MG/1
650 SUPPOSITORY RECTAL EVERY 6 HOURS PRN
Status: DISCONTINUED | OUTPATIENT
Start: 2021-11-04 | End: 2021-11-07 | Stop reason: HOSPADM

## 2021-11-04 RX ORDER — PANTOPRAZOLE SODIUM 40 MG/1
40 TABLET, DELAYED RELEASE ORAL
Status: DISCONTINUED | OUTPATIENT
Start: 2021-11-05 | End: 2021-11-07 | Stop reason: HOSPADM

## 2021-11-04 RX ORDER — FUROSEMIDE 10 MG/ML
40 INJECTION INTRAMUSCULAR; INTRAVENOUS 2 TIMES DAILY
Status: DISCONTINUED | OUTPATIENT
Start: 2021-11-04 | End: 2021-11-05

## 2021-11-04 RX ORDER — CETIRIZINE HYDROCHLORIDE 10 MG/1
10 TABLET ORAL DAILY
Status: DISCONTINUED | OUTPATIENT
Start: 2021-11-05 | End: 2021-11-07 | Stop reason: HOSPADM

## 2021-11-04 RX ORDER — CLOPIDOGREL BISULFATE 75 MG/1
75 TABLET ORAL DAILY
Status: DISCONTINUED | OUTPATIENT
Start: 2021-11-05 | End: 2021-11-07 | Stop reason: HOSPADM

## 2021-11-04 RX ORDER — LABETALOL HYDROCHLORIDE 5 MG/ML
20 INJECTION, SOLUTION INTRAVENOUS EVERY 4 HOURS PRN
Status: DISCONTINUED | OUTPATIENT
Start: 2021-11-04 | End: 2021-11-07 | Stop reason: HOSPADM

## 2021-11-04 RX ORDER — BUMETANIDE 0.25 MG/ML
1 INJECTION, SOLUTION INTRAMUSCULAR; INTRAVENOUS ONCE
Status: COMPLETED | OUTPATIENT
Start: 2021-11-04 | End: 2021-11-04

## 2021-11-04 RX ORDER — INSULIN GLARGINE 100 [IU]/ML
20 INJECTION, SOLUTION SUBCUTANEOUS NIGHTLY
Status: DISCONTINUED | OUTPATIENT
Start: 2021-11-04 | End: 2021-11-07 | Stop reason: HOSPADM

## 2021-11-04 RX ORDER — CARVEDILOL 12.5 MG/1
12.5 TABLET ORAL 2 TIMES DAILY
Status: DISCONTINUED | OUTPATIENT
Start: 2021-11-04 | End: 2021-11-07 | Stop reason: HOSPADM

## 2021-11-04 RX ORDER — ALLOPURINOL 100 MG/1
200 TABLET ORAL DAILY
Status: DISCONTINUED | OUTPATIENT
Start: 2021-11-05 | End: 2021-11-07 | Stop reason: HOSPADM

## 2021-11-04 RX ORDER — AMIODARONE HYDROCHLORIDE 200 MG/1
200 TABLET ORAL DAILY
Status: DISCONTINUED | OUTPATIENT
Start: 2021-11-05 | End: 2021-11-07 | Stop reason: HOSPADM

## 2021-11-04 RX ADMIN — DOCUSATE SODIUM 50 MG AND SENNOSIDES 8.6 MG 1 TABLET: 8.6; 5 TABLET, FILM COATED ORAL at 23:32

## 2021-11-04 RX ADMIN — BUMETANIDE 1 MG: 0.25 INJECTION, SOLUTION INTRAMUSCULAR; INTRAVENOUS at 19:41

## 2021-11-04 RX ADMIN — APIXABAN 5 MG: 5 TABLET, FILM COATED ORAL at 23:32

## 2021-11-04 RX ADMIN — INSULIN GLARGINE 20 UNITS: 100 INJECTION, SOLUTION SUBCUTANEOUS at 23:33

## 2021-11-04 RX ADMIN — CARVEDILOL 12.5 MG: 12.5 TABLET, FILM COATED ORAL at 23:32

## 2021-11-04 ASSESSMENT — ENCOUNTER SYMPTOMS
ABDOMINAL PAIN: 0
SHORTNESS OF BREATH: 1

## 2021-11-04 NOTE — ED PROVIDER NOTES
[facility]  eMERGENCY dEPARTMENT eNCOUnter      Pt Name: Di Haque  MRN: [id number]  [place] 1955  Date of evaluation: 11/4/2021  Provider: Dani Zhu MD    CHIEF COMPLAINT       Chief Complaint   Patient presents with    Shortness of Breath         HISTORY OF PRESENT ILLNESS   (Location/Symptom, Timing/Onset,Context/Setting, Quality, Duration, Modifying Factors, Severity)  Note limiting factors. Di Haque is a 73 y.o. male who presents to the emergency department with shortness of breath x2 weeks. He states last Saturday he played golf he could not even walk the course he had to be driven around to hit his ball by his buddies. Patient went home he states he had to put a fan on himself he could not breathe. Patient states that earlier today around 2:00 he felt weak. His watch told him he had gone in and out of A. fib. He began to be more short of breath. The patient tells me he is on anticoagulation he does have a history of cardiac stents he also has history of some blood clots status post hip surgery remotely. The patient also has a history of kidney disease. The patient states he feels like he is swelling up more his legs more swollen his belly feels like he has fluid. He cannot breathe as well over the last couple weeks and today he felt like there was an issue and he went in and out of afib. Dr. Kai Blackman is his doctor. The history is provided by the patient and medical records. NursingNotes were reviewed. REVIEW OF SYSTEMS    (2-9 systems for level 4, 10 or more for level 5)     Review of Systems   Constitutional: Negative for fever. Respiratory: Positive for shortness of breath. Cardiovascular: Positive for chest pain, palpitations and leg swelling. Gastrointestinal: Negative for abdominal pain. Neurological: Negative for seizures and syncope. Psychiatric/Behavioral: Negative for confusion.        A complete review of systems was performed and is negative except as noted above in the HPI.        PAST MEDICAL HISTORY     Past Medical History:   Diagnosis Date    Acute kidney failure, unspecified ([address]     Anxiety state, unspecified     Atrial septal aneurysm 01/09/2016    Blood circulation, collateral     Body mass index 30.0-30.9, adult     CAD (coronary artery disease) 01/10/2016    1/9/16  lexiscan  Positive for apical MI + myocardial ischemia, EF 42%, intermediate risk findings, AUC indication 16, AUC score 7 1/10/16  Cath  3 lesions in the LAD:  Mid: 70% 2.25x23, mid 90% 2.25 x 28, distal 100% 2.0x28, anterior lateral apical hypokinesis, EF 45%    Calculus of kidney     Carotid artery stenosis 06/26/2013    Cellulitis and abscess of hand, except fingers and thumb     Cerebral artery occlusion with cerebral infarction ([address]     15 years ago    Chronic airway obstruction, not elsewhere classified     Chronic kidney disease, unspecified     Congestive heart failure, unspecified     Diabetes mellitus type II     Diverticulosis of colon (without mention of hemorrhage)     Encounter for long-term (current) use of insulin (HCC)     Esophageal reflux     Family history of ischemic heart disease     Gastric ulcer, unspecified as acute or chronic, without mention of hemorrhage, perforation, or obstruction     Hyperlipemia     Hypertension     Internal hemorrhoids without mention of complication     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, or unspecified(403.00)     Obesity, unspecified     Other specified cardiac dysrhythmias(427.89)     Palliative care patient 12/21/2020    Paroxysmal atrial fibrillation (HCC)     Peripheral vascular disease ([address]     Solitary pulmonary nodule     Surgical or other procedure not carried out because of contraindication     Thoracic aneurysm without mention of rupture     Tobacco use disorder     Type II or unspecified type diabetes mellitus without Concern    None   Social History Narrative    None     Social Determinants of Health     Financial Resource Strain:     Difficulty of Paying Living Expenses:    Food Insecurity:     Worried About Running Out of Food in the Last Year:     [address] in the Last Year:    Transportation Needs:     Lack of Transportation (Medical):  Lack of Transportation (Non-Medical):    Physical Activity:     Days of Exercise per Week:     Minutes of Exercise per Session:    Stress:     Feeling of Stress :    Social Connections:     Frequency of Communication with Friends and Family:     Frequency of Social Gatherings with Friends and Family:     Attends Alevism Services:     Active Member of Clubs or Organizations:     Attends Club or Organization Meetings:     Marital Status:    Intimate Partner Violence:     Fear of Current or Ex-Partner:     Emotionally Abused:     Physically Abused:     Sexually Abused:        SCREENINGS             PHYSICAL EXAM    (up to 7 for level 4, 8 or more for level 5)     ED Triage Vitals [11/04/21 1737]   BP Temp Temp src Pulse Resp SpO2 Height Weight   (!) 160/91 98.5 °F (36.9 °C) -- 63 17 92 % 5' 11\" (1.803 m) 246 lb (111.6 kg)       Physical Exam  Vitals and nursing note reviewed. Constitutional:       Appearance: He is well-developed. HENT:      Head: Normocephalic and atraumatic. Eyes:      Extraocular Movements: Extraocular movements intact. Pupils: Pupils are equal, round, and reactive to light. Cardiovascular:      Rate and Rhythm: Normal rate and regular rhythm. Pulmonary:      Effort: Pulmonary effort is normal. No respiratory distress. Abdominal:      Tenderness: There is no abdominal tenderness. There is no guarding. Comments: Mild distention with fluid shift   Musculoskeletal:      Cervical back: Normal range of motion and neck supple. Right lower leg: Edema present. Left lower leg: Edema present.    Skin:     General: Skin is warm.      Capillary Refill: Capillary refill takes less than 2 seconds. Neurological:      General: No focal deficit present. Mental Status: He is alert and oriented to person, place, and time. Psychiatric:         Mood and Affect: Mood normal.         Behavior: Behavior normal.         DIAGNOSTIC RESULTS     EKG: All EKG's are interpreted by the Emergency Department Physician who either signs or Co-signs this chart in the absence of a cardiologist.    EKG sinus 62 no obvious acute ischemia there is artifact. QTC 4 6 8 ms. RADIOLOGY:   Non-plain film images such as CT, Ultrasound and MRI are read by the radiologist. Carron Emelyn images are visualized and preliminarily interpreted by the emergency physician with the below findings:        Interpretation per the Radiologist below, if available at the time of this note:    XR CHEST PORTABLE   Final Result   Mild pulmonary vascular congestion developing pulmonary   edema suspected.    Signed by Dr Bhupendra Zurita            ED BEDSIDE ULTRASOUND:   Performed by ED Physician - no ascities seen     LABS:  Labs Reviewed   COMPREHENSIVE METABOLIC PANEL - Abnormal; Notable for the following components:       Result Value    CO2 31 (*)     Glucose 227 (*)     BUN 27 (*)     CREATININE 2.0 (*)     GFR Non- 34 (*)     GFR  41 (*)     All other components within normal limits   CBC WITH AUTO DIFFERENTIAL - Abnormal; Notable for the following components:    RBC 4.64 (*)     Hemoglobin 13.4 (*)     MCV 95.0 (*)     MCHC 30.4 (*)     RDW 15.3 (*)     All other components within normal limits   URINE RT REFLEX TO CULTURE - Abnormal; Notable for the following components:    Glucose, Ur 100 (*)     Blood, Urine TRACE (*)     Protein,  (*)     All other components within normal limits   BRAIN NATRIURETIC PEPTIDE - Abnormal; Notable for the following components:    Pro-BNP 2,871 (*)     All other components within normal limits MICROSCOPIC URINALYSIS - Abnormal; Notable for the following components:    Bacteria, UA NEGATIVE (*)     Crystals, UA NEG (*)     All other components within normal limits   COVID-19, RAPID   PROTIME-INR   TROPONIN   TSH WITH REFLEX TO FT4       All other labs were within normal range or not returned as of this dictation. EMERGENCY DEPARTMENT COURSE and DIFFERENTIALDIAGNOSIS/MDM:   Vitals:    Vitals:    11/04/21 1926 11/04/21 2019 11/04/21 2034 11/04/21 2055   BP: (!) 175/76 (!) 180/96 (!) 200/100 (!) 199/86   Pulse: 64   62   Resp: 22   18   Temp:       SpO2: 91%   91%   Weight:       Height:           MDM  Number of Diagnoses or Management Options  Acute on chronic systolic congestive heart failure ([address]): new and requires workup  Chronic anticoagulation  Essential hypertension: new and requires workup  Paroxysmal A-fib [facility]): new and requires workup  Diagnosis management comments: Patient presents with shortness of breath this seems to be more indolent than acute today. However he had episode of paroxysmal A. fib which threw him into worsening shortness of breath. He is chronically anticoagulated. I do not think this is a PE. The patient has risk factors for ACS. However I really think he is more volume overloaded I did a bedside ultrasound there is no ascites. He has pitting edema of his lower extremities. The patient is short of breath positionally. And with exertion. Start diuresis more acutely. The patient has some obvious underlying CKD based on his labs. Admit to hospitalist service. Discussed with Dr. Juan Mcmillan.        Amount and/or Complexity of Data Reviewed  Clinical lab tests: ordered and reviewed  Tests in the radiology section of CPT®: reviewed and ordered  Decide to obtain previous medical records or to obtain history from someone other than the patient: yes  Discuss the patient with other providers: yes  Independent visualization of images, tracings, or specimens: yes    Risk of Complications, Morbidity, and/or Mortality  Presenting problems: high    Patient Progress  Patient progress: stable        CONSULTS:  None    PROCEDURES:  Unless otherwise notedbelow, none     Procedures    FINAL IMPRESSION     1. Acute on chronic systolic congestive heart failure ([facility]    2. Essential hypertension    3. Paroxysmal A-fib ([facility] [address]    4. Chronic anticoagulation          DISPOSITION/PLAN   DISPOSITION  Admit       PATIENT REFERRED TO:  No follow-up provider specified.     DISCHARGE MEDICATIONS:  New Prescriptions    No medications on file          (Please note that portions of this note were completed with a voice recognition program.  Efforts were made to edit the dictations butoccasionally words are mis-transcribed.)    Lewis Cool MD (electronically signed)  AttendingEmergency Physician         Lewis Cool MD  11/04/21 5920

## 2021-11-05 PROBLEM — I50.43 ACUTE ON CHRONIC COMBINED SYSTOLIC AND DIASTOLIC CONGESTIVE HEART FAILURE (HCC): Status: ACTIVE | Noted: 2020-08-24

## 2021-11-05 LAB
ALBUMIN SERPL-MCNC: 3.6 G/DL (ref 3.5–5.2)
ALP BLD-CCNC: 97 U/L (ref 40–130)
ALT SERPL-CCNC: 17 U/L (ref 5–41)
ANION GAP SERPL CALCULATED.3IONS-SCNC: 10 MMOL/L (ref 7–19)
AST SERPL-CCNC: 12 U/L (ref 5–40)
BASOPHILS ABSOLUTE: 0.1 K/UL (ref 0–0.2)
BASOPHILS RELATIVE PERCENT: 0.9 % (ref 0–1)
BILIRUB SERPL-MCNC: 0.5 MG/DL (ref 0.2–1.2)
BUN BLDV-MCNC: 26 MG/DL (ref 8–23)
CALCIUM SERPL-MCNC: 8.8 MG/DL (ref 8.8–10.2)
CHLORIDE BLD-SCNC: 99 MMOL/L (ref 98–111)
CO2: 31 MMOL/L (ref 22–29)
CREAT SERPL-MCNC: 2 MG/DL (ref 0.5–1.2)
EOSINOPHILS ABSOLUTE: 0.3 K/UL (ref 0–0.6)
EOSINOPHILS RELATIVE PERCENT: 3.3 % (ref 0–5)
GFR AFRICAN AMERICAN: 41
GFR NON-AFRICAN AMERICAN: 34
GLUCOSE BLD-MCNC: 146 MG/DL (ref 70–99)
GLUCOSE BLD-MCNC: 170 MG/DL (ref 74–109)
GLUCOSE BLD-MCNC: 186 MG/DL (ref 70–99)
GLUCOSE BLD-MCNC: 259 MG/DL (ref 70–99)
HCT VFR BLD CALC: 40.4 % (ref 42–52)
HEMOGLOBIN: 12.5 G/DL (ref 14–18)
IMMATURE GRANULOCYTES #: 0 K/UL
LV EF: 58 %
LVEF MODALITY: NORMAL
LYMPHOCYTES ABSOLUTE: 1.7 K/UL (ref 1.1–4.5)
LYMPHOCYTES RELATIVE PERCENT: 21.5 % (ref 20–40)
MAGNESIUM: 2.2 MG/DL (ref 1.6–2.4)
MCH RBC QN AUTO: 29.1 PG (ref 27–31)
MCHC RBC AUTO-ENTMCNC: 30.9 G/DL (ref 33–37)
MCV RBC AUTO: 94.2 FL (ref 80–94)
MONOCYTES ABSOLUTE: 0.7 K/UL (ref 0–0.9)
MONOCYTES RELATIVE PERCENT: 8.5 % (ref 0–10)
NEUTROPHILS ABSOLUTE: 5 K/UL (ref 1.5–7.5)
NEUTROPHILS RELATIVE PERCENT: 65.7 % (ref 50–65)
PDW BLD-RTO: 15.2 % (ref 11.5–14.5)
PERFORMED ON: ABNORMAL
PLATELET # BLD: 195 K/UL (ref 130–400)
PMV BLD AUTO: 10.8 FL (ref 9.4–12.4)
POTASSIUM SERPL-SCNC: 3.3 MMOL/L (ref 3.5–5)
RBC # BLD: 4.29 M/UL (ref 4.7–6.1)
SODIUM BLD-SCNC: 140 MMOL/L (ref 136–145)
TOTAL PROTEIN: 6 G/DL (ref 6.6–8.7)
TROPONIN: 0.04 NG/ML (ref 0–0.03)
WBC # BLD: 7.7 K/UL (ref 4.8–10.8)

## 2021-11-05 PROCEDURE — 36415 COLL VENOUS BLD VENIPUNCTURE: CPT

## 2021-11-05 PROCEDURE — 2060000000 HC ICU INTERMEDIATE R&B

## 2021-11-05 PROCEDURE — 97116 GAIT TRAINING THERAPY: CPT

## 2021-11-05 PROCEDURE — 6360000002 HC RX W HCPCS: Performed by: STUDENT IN AN ORGANIZED HEALTH CARE EDUCATION/TRAINING PROGRAM

## 2021-11-05 PROCEDURE — 2500000003 HC RX 250 WO HCPCS: Performed by: STUDENT IN AN ORGANIZED HEALTH CARE EDUCATION/TRAINING PROGRAM

## 2021-11-05 PROCEDURE — C8929 TTE W OR WO FOL WCON,DOPPLER: HCPCS

## 2021-11-05 PROCEDURE — 6370000000 HC RX 637 (ALT 250 FOR IP): Performed by: INTERNAL MEDICINE

## 2021-11-05 PROCEDURE — 97530 THERAPEUTIC ACTIVITIES: CPT

## 2021-11-05 PROCEDURE — 6370000000 HC RX 637 (ALT 250 FOR IP): Performed by: STUDENT IN AN ORGANIZED HEALTH CARE EDUCATION/TRAINING PROGRAM

## 2021-11-05 PROCEDURE — 82947 ASSAY GLUCOSE BLOOD QUANT: CPT

## 2021-11-05 PROCEDURE — 97165 OT EVAL LOW COMPLEX 30 MIN: CPT

## 2021-11-05 PROCEDURE — 83735 ASSAY OF MAGNESIUM: CPT

## 2021-11-05 PROCEDURE — 80053 COMPREHEN METABOLIC PANEL: CPT

## 2021-11-05 PROCEDURE — 99223 1ST HOSP IP/OBS HIGH 75: CPT | Performed by: INTERNAL MEDICINE

## 2021-11-05 PROCEDURE — 97161 PT EVAL LOW COMPLEX 20 MIN: CPT

## 2021-11-05 PROCEDURE — 84484 ASSAY OF TROPONIN QUANT: CPT

## 2021-11-05 PROCEDURE — 6360000004 HC RX CONTRAST MEDICATION: Performed by: INTERNAL MEDICINE

## 2021-11-05 PROCEDURE — 85025 COMPLETE CBC W/AUTO DIFF WBC: CPT

## 2021-11-05 RX ORDER — DEXTROSE MONOHYDRATE 50 MG/ML
100 INJECTION, SOLUTION INTRAVENOUS PRN
Status: DISCONTINUED | OUTPATIENT
Start: 2021-11-05 | End: 2021-11-07 | Stop reason: HOSPADM

## 2021-11-05 RX ORDER — METOLAZONE 5 MG/1
2.5 TABLET ORAL
Status: DISCONTINUED | OUTPATIENT
Start: 2021-11-08 | End: 2021-11-06

## 2021-11-05 RX ORDER — POTASSIUM CHLORIDE 20 MEQ/1
40 TABLET, EXTENDED RELEASE ORAL ONCE
Status: COMPLETED | OUTPATIENT
Start: 2021-11-05 | End: 2021-11-05

## 2021-11-05 RX ORDER — BUMETANIDE 0.25 MG/ML
1 INJECTION, SOLUTION INTRAMUSCULAR; INTRAVENOUS 2 TIMES DAILY
Status: COMPLETED | OUTPATIENT
Start: 2021-11-05 | End: 2021-11-06

## 2021-11-05 RX ORDER — ONDANSETRON 4 MG/1
4 TABLET, FILM COATED ORAL EVERY 6 HOURS PRN
Status: DISCONTINUED | OUTPATIENT
Start: 2021-11-05 | End: 2021-11-07 | Stop reason: HOSPADM

## 2021-11-05 RX ORDER — ONDANSETRON 2 MG/ML
4 INJECTION INTRAMUSCULAR; INTRAVENOUS EVERY 6 HOURS PRN
Status: DISCONTINUED | OUTPATIENT
Start: 2021-11-05 | End: 2021-11-07 | Stop reason: HOSPADM

## 2021-11-05 RX ORDER — DIPHENHYDRAMINE HCL 25 MG
25 TABLET ORAL EVERY 6 HOURS PRN
Status: DISCONTINUED | OUTPATIENT
Start: 2021-11-05 | End: 2021-11-07 | Stop reason: HOSPADM

## 2021-11-05 RX ORDER — NICOTINE POLACRILEX 4 MG
15 LOZENGE BUCCAL PRN
Status: DISCONTINUED | OUTPATIENT
Start: 2021-11-05 | End: 2021-11-07 | Stop reason: HOSPADM

## 2021-11-05 RX ORDER — DEXTROSE MONOHYDRATE 25 G/50ML
12.5 INJECTION, SOLUTION INTRAVENOUS PRN
Status: DISCONTINUED | OUTPATIENT
Start: 2021-11-05 | End: 2021-11-07 | Stop reason: HOSPADM

## 2021-11-05 RX ADMIN — INSULIN LISPRO 2 UNITS: 100 INJECTION, SOLUTION INTRAVENOUS; SUBCUTANEOUS at 17:08

## 2021-11-05 RX ADMIN — BUMETANIDE 1 MG: 0.25 INJECTION, SOLUTION INTRAMUSCULAR; INTRAVENOUS at 21:39

## 2021-11-05 RX ADMIN — ALLOPURINOL 200 MG: 100 TABLET ORAL at 08:12

## 2021-11-05 RX ADMIN — CALCITRIOL CAPSULES 0.25 MCG 0.25 MCG: 0.25 CAPSULE ORAL at 08:12

## 2021-11-05 RX ADMIN — MINOXIDIL 2.5 MG: 2.5 TABLET ORAL at 08:11

## 2021-11-05 RX ADMIN — INSULIN GLARGINE 20 UNITS: 100 INJECTION, SOLUTION SUBCUTANEOUS at 21:40

## 2021-11-05 RX ADMIN — PANTOPRAZOLE SODIUM 40 MG: 40 TABLET, DELAYED RELEASE ORAL at 17:08

## 2021-11-05 RX ADMIN — DOCUSATE SODIUM 50 MG AND SENNOSIDES 8.6 MG 1 TABLET: 8.6; 5 TABLET, FILM COATED ORAL at 21:39

## 2021-11-05 RX ADMIN — ASPIRIN 81 MG: 81 TABLET, CHEWABLE ORAL at 08:11

## 2021-11-05 RX ADMIN — MINOXIDIL 2.5 MG: 2.5 TABLET ORAL at 21:39

## 2021-11-05 RX ADMIN — CLOPIDOGREL BISULFATE 75 MG: 75 TABLET ORAL at 08:12

## 2021-11-05 RX ADMIN — CETIRIZINE HYDROCHLORIDE 10 MG: 10 TABLET, FILM COATED ORAL at 08:12

## 2021-11-05 RX ADMIN — OXYCODONE HYDROCHLORIDE AND ACETAMINOPHEN 500 MG: 500 TABLET ORAL at 08:12

## 2021-11-05 RX ADMIN — AMIODARONE HYDROCHLORIDE 200 MG: 200 TABLET ORAL at 08:11

## 2021-11-05 RX ADMIN — ROSUVASTATIN CALCIUM 20 MG: 20 TABLET, FILM COATED ORAL at 08:11

## 2021-11-05 RX ADMIN — CARVEDILOL 12.5 MG: 12.5 TABLET, FILM COATED ORAL at 21:39

## 2021-11-05 RX ADMIN — BUMETANIDE 1 MG: 0.25 INJECTION, SOLUTION INTRAMUSCULAR; INTRAVENOUS at 08:12

## 2021-11-05 RX ADMIN — MONTELUKAST 10 MG: 10 TABLET, FILM COATED ORAL at 08:11

## 2021-11-05 RX ADMIN — ISOSORBIDE MONONITRATE 60 MG: 60 TABLET, EXTENDED RELEASE ORAL at 08:11

## 2021-11-05 RX ADMIN — CARVEDILOL 12.5 MG: 12.5 TABLET, FILM COATED ORAL at 08:11

## 2021-11-05 RX ADMIN — APIXABAN 5 MG: 5 TABLET, FILM COATED ORAL at 21:39

## 2021-11-05 RX ADMIN — INSULIN LISPRO 3 UNITS: 100 INJECTION, SOLUTION INTRAVENOUS; SUBCUTANEOUS at 21:40

## 2021-11-05 RX ADMIN — APIXABAN 5 MG: 5 TABLET, FILM COATED ORAL at 08:10

## 2021-11-05 ASSESSMENT — ENCOUNTER SYMPTOMS
NAUSEA: 0
RESPIRATORY NEGATIVE: 1
GASTROINTESTINAL NEGATIVE: 1
VOMITING: 0
SHORTNESS OF BREATH: 0
DIARRHEA: 0
EYES NEGATIVE: 1

## 2021-11-05 NOTE — PROGRESS NOTES
Palliative Care/Spiritual Care: Attempted to visit with pt but he had gone for testing. Pt came to the hospital with shortness of breath. Spoke with pt's wife Nirmala Li and pt's nurse Samantha to aid in initiating palliative care. Pt is in echo. Pt is known to palliative care. Advance Directives:  Pt's wife says pt does not have an AD/LW but she would be primary decision maker. Pt's wife says pt would want CPR but should ask him about ventilator. Pain/other symptoms: Pt's nurse Samantha says pt has not been complaining of pain. Social/Spiritual: Pt's wife says they attend a [employer]. Pt/family discussion r/t goals: Pt lives at home with his wife and ambulates without assistance and is able to perform daily living skills to care for himself at home. Pt's goal is to return home with previous quality of life and previous daily living skills. Pt is undergoing tests at this time.     Electronically signed by Rafa Tsai on 11/5/2021 at 11:56 AM

## 2021-11-05 NOTE — PROGRESS NOTES
4 Eyes Skin Assessment    Batool Strickland is being assessed upon: Admission    I agree that I, Adrienne French RN, along with Tho Sanchez RN (either 2 RN's or 1 LPN and 1 RN) have performed a thorough Head to Toe Skin Assessment on the patient. ALL assessment sites listed below have been assessed. Areas assessed by both nurses:     [x]   Head, Face, and Ears   [x]   Shoulders, Back, and Chest  [x]   Arms, Elbows, and Hands   [x]   Coccyx, Sacrum, and Ischium  [x]   Legs, Feet, and Heels    Does the Patient have Skin Breakdown?  No    Jose C Prevention initiated: Yes  Wound Care Orders initiated: No    WOC nurse consulted for Pressure Injury (Stage 3,4, Unstageable, DTI, NWPT, and Complex wounds) and New or Established Ostomies: No        Primary Nurse eSignature: Adrienne French RN on 11/5/2021 at 12:14 AM      Electronically signed by Berto Alford RN on 11/5/2021 at 12:16 AM

## 2021-11-05 NOTE — CONSULTS
Pt who is current with Dr Leopold Reading who does not use CHF clinic but manages his pts himself. Pt was compliant until recent life situations that threw him off. I have spoke to him on several occasions.

## 2021-11-05 NOTE — PROGRESS NOTES
Met with pt to provide spiritual care and discuss code status per wife's request. Pt was very pleasant and discussed his condition and the testing he was having. Pt was very clear about his code status and says he is okay with CPR but does not want to be put on a ventilator. This  provided spiritual care with sustaining presence, nurtured hope, and prayer. Also informed Lima Bird, floor charge nurse regarding pt's code status. Pt expressed gratitude for spiritual care.     Electronically signed by Calvin Cantu on 11/5/2021 at 1:29 PM

## 2021-11-05 NOTE — PROGRESS NOTES
Daily Progress Note    Date:2021  Patient: Blake Sidhu  : 1955  ALK:[id number]  CODE:Full Code No additional code details  Kuldeep Ferguson MD    Admit Date: 2021  5:34 PM   LOS: 1 day       Subjective:     45-year-old male with chronic systolic and diastolic heart failure, CAD with previous stents, CKD, diabetes, presented with worsening dyspnea on exertion noting progressive worsening over about 1-2 weeks with more significant shortness of breath recently while playing golf, admitted with acute decompensated heart failure with initial work-up showing elevated proBNP and signs of pulmonary edema on chest x-ray. No chest pain. He noted compliance with home Bumex but has had some recent dietary indiscretion. He was also previously on metolazone 3 times a week however this was discontinued due to some worsening of renal function previously. Improved clinically on IV Bumex and restarted on metolazone at lower dose 2.5 mg to be taken 3 times a week. This a.m. he notes less shortness of breath and has had good urine output with diuresis. No chest pain or palpitations.         Review of Systems    Comprehensive ROS completed and is negative except as otherwise noted        Objective:      Vital signs in last 24 hours:  Patient Vitals for the past 24 hrs:   BP Temp Temp src Pulse Resp SpO2 Height Weight   21 1805 (!) 181/73 97.6 °F (36.4 °C) Temporal 59 18 93 % -- --   21 1154 (!) 169/71 97.7 °F (36.5 °C) Temporal 50 20 91 % -- --   21 0542 (!) 159/72 97.2 °F (36.2 °C) -- 98 18 (!) 89 % -- --   219 (!) 160/100 97.3 °F (36.3 °C) Temporal 60 18 91 % 5' 11\" (1.803 m) 244 lb (110.7 kg)   21 (!) 182/90 -- -- -- -- -- -- --   21 (!) 199/86 -- -- 62 18 91 % -- --   11/04/21 2034 (!) 200/100 -- -- -- -- -- -- --   21 (!) 180/96 -- -- -- -- -- -- --   21 (!) 175/76 -- -- 64 22 91 % -- --     Physical exam    General: Well-developed, resting in no acute distress  HEENT: NC/AT, no scleral icterus  Cardiovascular: Normal rate, pulses 2+ and equal  Respiratory: Equal and symmetric chest rise, no increased work of breathing, no wheezes or rales  Abdomen: Soft, nontender, bowel sounds present  Neurological no focal lateralizing weakness, normal speech  Extremities: 1+ BLE edema present, no clubbing or cyanosis  Skin: Warm and dry      Lab Review   Recent Results (from the past 24 hour(s))   Comprehensive Metabolic Panel    Collection Time: 11/04/21  7:19 PM   Result Value Ref Range    Sodium 139 136 - 145 mmol/L    Potassium 4.0 3.5 - 5.0 mmol/L    Chloride 99 98 - 111 mmol/L    CO2 31 (H) 22 - 29 mmol/L    Anion Gap 9 7 - 19 mmol/L    Glucose 227 (H) 74 - 109 mg/dL    BUN 27 (H) 8 - 23 mg/dL    CREATININE 2.0 (H) 0.5 - 1.2 mg/dL    GFR Non- 34 (A) >60    GFR  41 (L) >59    Calcium 9.3 8.8 - 10.2 mg/dL    Total Protein 7.9 6.6 - 8.7 g/dL    Albumin 4.1 3.5 - 5.2 g/dL    Total Bilirubin 0.5 0.2 - 1.2 mg/dL    Alkaline Phosphatase 111 40 - 130 U/L    ALT 20 5 - 41 U/L    AST 15 5 - 40 U/L   CBC Auto Differential    Collection Time: 11/04/21  7:19 PM   Result Value Ref Range    WBC 7.8 4.8 - 10.8 K/uL    RBC 4.64 (L) 4.70 - 6.10 M/uL    Hemoglobin 13.4 (L) 14.0 - 18.0 g/dL    Hematocrit 44.1 42.0 - 52.0 %    MCV 95.0 (H) 80.0 - 94.0 fL    MCH 28.9 27.0 - 31.0 pg    MCHC 30.4 (L) 33.0 - 37.0 g/dL    RDW 15.3 (H) 11.5 - 14.5 %    Platelets 562 576 - 697 K/uL    MPV 10.7 9.4 - 12.4 fL    Neutrophils % 63.1 50.0 - 65.0 %    Lymphocytes % 24.5 20.0 - 40.0 %    Monocytes % 7.7 0.0 - 10.0 %    Eosinophils % 3.5 0.0 - 5.0 %    Basophils % 0.9 0.0 - 1.0 %    Neutrophils Absolute 4.9 1.5 - 7.5 K/uL    Immature Granulocytes # 0.0 K/uL    Lymphocytes Absolute 1.9 1.1 - 4.5 K/uL    Monocytes Absolute 0.60 0.00 - 0.90 K/uL    Eosinophils Absolute 0.30 0.00 - 0.60 K/uL    Basophils Absolute 0.10 0.00 - 0.20 K/uL Protime-INR    Collection Time: 11/04/21  7:19 PM   Result Value Ref Range    Protime 14.5 12.0 - 14.6 sec    INR 1.11 0.88 - 1.18   Troponin    Collection Time: 11/04/21  7:19 PM   Result Value Ref Range    Troponin 0.03 0.00 - 0.03 ng/mL   Brain Natriuretic Peptide    Collection Time: 11/04/21  7:19 PM   Result Value Ref Range    Pro-BNP 2,871 (H) 0 - 900 pg/mL   COVID-19, Rapid    Collection Time: 11/04/21  7:19 PM    Specimen: Nasopharyngeal Swab   Result Value Ref Range    SARS-CoV-2, NAAT Not Detected Not Detected   TSH WITH REFLEX TO FT4    Collection Time: 11/04/21  7:19 PM   Result Value Ref Range    TSH Reflex FT4 1.75 0.35 - 5.50 uIU/mL   Urinalysis Reflex to Culture    Collection Time: 11/04/21  7:55 PM    Specimen: Urine, clean catch   Result Value Ref Range    Color, UA YELLOW Straw/Yellow    Clarity, UA Clear Clear    Glucose, Ur 100 (A) Negative mg/dL    Bilirubin Urine Negative Negative    Ketones, Urine Negative Negative mg/dL    Specific Gravity, UA 1.014 1.005 - 1.030    Blood, Urine TRACE (A) Negative    pH, UA 5.5 5.0 - 8.0    Protein,  (A) Negative mg/dL    Urobilinogen, Urine 0.2 <2.0 E.U./dL    Nitrite, Urine Negative Negative    Leukocyte Esterase, Urine Negative Negative   Microscopic Urinalysis    Collection Time: 11/04/21  7:55 PM   Result Value Ref Range    Bacteria, UA NEGATIVE (A) None Seen /HPF    Crystals, UA NEG (A) None Seen /HPF    Hyaline Casts, UA 0 0 - 8 /HPF    WBC, UA 0 0 - 5 /HPF    RBC, UA 1 0 - 4 /HPF    Epithelial Cells, UA 0 0 - 5 /HPF   POCT Glucose    Collection Time: 11/04/21 10:58 PM   Result Value Ref Range    POC Glucose 229 (H) 70 - 99 mg/dl    Performed on Accu[employer]k    Troponin    Collection Time: 11/05/21  1:25 AM   Result Value Ref Range    Troponin 0.04 (H) 0.00 - 0.03 ng/mL   CBC Auto Differential    Collection Time: 11/05/21  1:25 AM   Result Value Ref Range    WBC 7.7 4.8 - 10.8 K/uL    RBC 4.29 (L) 4.70 - 6.10 M/uL    Hemoglobin 12.5 (L) 14.0 - 18.0 g/dL    Hematocrit 40.4 (L) 42.0 - 52.0 %    MCV 94.2 (H) 80.0 - 94.0 fL    MCH 29.1 27.0 - 31.0 pg    MCHC 30.9 (L) 33.0 - 37.0 g/dL    RDW 15.2 (H) 11.5 - 14.5 %    Platelets 717 316 - 155 K/uL    MPV 10.8 9.4 - 12.4 fL    Neutrophils % 65.7 (H) 50.0 - 65.0 %    Lymphocytes % 21.5 20.0 - 40.0 %    Monocytes % 8.5 0.0 - 10.0 %    Eosinophils % 3.3 0.0 - 5.0 %    Basophils % 0.9 0.0 - 1.0 %    Neutrophils Absolute 5.0 1.5 - 7.5 K/uL    Immature Granulocytes # 0.0 K/uL    Lymphocytes Absolute 1.7 1.1 - 4.5 K/uL    Monocytes Absolute 0.70 0.00 - 0.90 K/uL    Eosinophils Absolute 0.30 0.00 - 0.60 K/uL    Basophils Absolute 0.10 0.00 - 0.20 K/uL   Comprehensive Metabolic Panel    Collection Time: 11/05/21  1:25 AM   Result Value Ref Range    Sodium 140 136 - 145 mmol/L    Potassium 3.3 (L) 3.5 - 5.0 mmol/L    Chloride 99 98 - 111 mmol/L    CO2 31 (H) 22 - 29 mmol/L    Anion Gap 10 7 - 19 mmol/L    Glucose 170 (H) 74 - 109 mg/dL    BUN 26 (H) 8 - 23 mg/dL    CREATININE 2.0 (H) 0.5 - 1.2 mg/dL    GFR Non- 34 (A) >60    GFR  41 (L) >59    Calcium 8.8 8.8 - 10.2 mg/dL    Total Protein 6.0 (L) 6.6 - 8.7 g/dL    Albumin 3.6 3.5 - 5.2 g/dL    Total Bilirubin 0.5 0.2 - 1.2 mg/dL    Alkaline Phosphatase 97 40 - 130 U/L    ALT 17 5 - 41 U/L    AST 12 5 - 40 U/L   Magnesium    Collection Time: 11/05/21  1:25 AM   Result Value Ref Range    Magnesium 2.2 1.6 - 2.4 mg/dL   POCT Glucose    Collection Time: 11/05/21 11:53 AM   Result Value Ref Range    POC Glucose 146 (H) 70 - 99 mg/dl    Performed on AccuChek    POCT Glucose    Collection Time: 11/05/21  4:41 PM   Result Value Ref Range    POC Glucose 186 (H) 70 - 99 mg/dl    Performed on AccuChek        I/O last 3 completed shifts:   In: 460 [P.O.:460]  Out: 1900 [Urine:1900]  I/O this shift:  In: 360 [P.O.:360]  Out: -       Current Facility-Administered Medications:     bumetanide (BUMEX) injection 1 mg, 1 mg, IntraVENous, BID, Melissa Contreras, MD, 1 mg at 11/05/21 4467    diphenhydrAMINE (BENADRYL) tablet 25 mg, 25 mg, Oral, Q6H PRN, Joseph Adam MD    insulin lispro (HUMALOG) injection vial 0-12 Units, 0-12 Units, SubCUTAneous, TID WC, Joseph Adam MD, 2 Units at 11/05/21 1708    insulin lispro (HUMALOG) injection vial 0-6 Units, 0-6 Units, SubCUTAneous, Nightly, Joseph Adam MD    glucose (GLUTOSE) 40 % oral gel 15 g, 15 g, Oral, PRN, Joseph Adam MD    dextrose 50 % IV solution, 12.5 g, IntraVENous, PRN, Joseph Adam MD    glucagon (rDNA) injection 1 mg, 1 mg, IntraMUSCular, PRN, Joseph Adam MD    dextrose 5 % solution, 100 mL/hr, IntraVENous, PRN, Joseph Adam MD    ondansetron [facility]) injection 4 mg, 4 mg, IntraVENous, Q6H PRN, Joseph Adam MD, 4 mg at 11/05/21 0809    ondansetron (ZOFRAN) tablet 4 mg, 4 mg, Oral, Q6H PRN, Joseph Adam MD  Michael Bark  [START ON 11/8/2021] metOLazone (ZAROXOLYN) tablet 2.5 mg, 2.5 mg, Oral, Once per day on Mon Thu, Rosangela Perez MD    perflutren lipid microspheres (DEFINITY) injection 1.65 mg, 1.5 mL, IntraVENous, ONCE PRN, Rosangela Perez MD, 1.65 mg at 11/05/21 1115    allopurinol (ZYLOPRIM) tablet 200 mg, 200 mg, Oral, Daily, Anahy Davis MD, 200 mg at 11/05/21 1261    amiodarone (CORDARONE) tablet 200 mg, 200 mg, Oral, Daily, Anahy Davis MD, 200 mg at 11/05/21 5171    ascorbic acid (VITAMIN C) tablet 500 mg, 500 mg, Oral, Daily, Anahy Davis MD, 500 mg at 11/05/21 4665    aspirin chewable tablet 81 mg, 81 mg, Oral, Daily, Anahy Davis MD, 81 mg at 11/05/21 3173    calcitRIOL (ROCALTROL) capsule 0.25 mcg, 0.25 mcg, Oral, Once per day on Mon Wed Fri, Anahy Davis MD, 0.25 mcg at 11/05/21 3921    carvedilol (COREG) tablet 12.5 mg, 12.5 mg, Oral, BID, Anahy Davis MD, 12.5 mg at 11/05/21 1279    clopidogrel (PLAVIX) tablet 75 mg, 75 mg, Oral, Daily, Anahy Davis MD, 75 mg at 11/05/21 0065    apixaban (ELIQUIS) tablet 5 mg, 5 mg, Oral, BID, Chester Parsons Leola Williamson MD, 5 mg at 11/05/21 0810    isosorbide mononitrate (IMDUR) extended release tablet 60 mg, 60 mg, Oral, Daily, Silvia Lower, MD, 60 mg at 11/05/21 0811    insulin glargine (LANTUS) injection vial 20 Units, 20 Units, SubCUTAneous, Nightly, Silvia Lower, MD, 20 Units at 11/04/21 2333    cetirizine (ZYRTEC) tablet 10 mg, 10 mg, Oral, Daily, Silvia Lower, MD, 10 mg at 11/05/21 3182    sennosides-docusate sodium (SENOKOT-S) 8.6-50 MG tablet 1 tablet, 1 tablet, Oral, Nightly, Silvia Jose G, MD, 1 tablet at 11/04/21 2332    rosuvastatin (CRESTOR) tablet 20 mg, 20 mg, Oral, Daily, Silvia Lower, MD, 20 mg at 11/05/21 0811    pantoprazole (PROTONIX) tablet 40 mg, 40 mg, Oral, BID AC, Silvia Lower, MD, 40 mg at 11/05/21 1708    insulin lispro (HUMALOG) injection vial 5 Units, 5 Units, SubCUTAneous, TID WC, Silviaruddy Araiza MD    nitroGLYCERIN (NITROSTAT) SL tablet 0.4 mg, 0.4 mg, SubLINGual, Q5 Min PRN, Silvia Araiza MD    montelukast (SINGULAIR) tablet 10 mg, 10 mg, Oral, Daily, Silvia Lower, MD, 10 mg at 11/05/21 8936    minoxidil (LONITEN) tablet 2.5 mg, 2.5 mg, Oral, BID, Silvia Jose G, MD, 2.5 mg at 11/05/21 6099    acetaminophen (TYLENOL) tablet 650 mg, 650 mg, Oral, Q6H PRN **OR** acetaminophen (TYLENOL) suppository 650 mg, 650 mg, Rectal, Q6H PRN, Silvia Araiza MD    labetalol (NORMODYNE;TRANDATE) injection 20 mg, 20 mg, IntraVENous, Q4H PRN, Silviaruddy Araiza MD      Assessment/plan  Principal Problem:    Acute on chronic combined systolic and diastolic congestive heart failure (HCC)  Active Problems:    CAD (coronary artery disease)    DM (diabetes mellitus) (HCC)    GERD (gastroesophageal reflux disease)    Aneurysm of thoracic aorta (HCC)    Renal artery stenosis (HCC)    Peripheral vascular disease (HCC)    PAF (paroxysmal atrial fibrillation) ([facility] [address]    Stage [facility]    Mixed hyperlipidemia    Left ventricular dysfunction    On amiodarone therapy  Resolved Problems:

## 2021-11-05 NOTE — CONSULTS
vascular disease ([address]        Atrial septal aneurysm        Malignant hypertension        Apical myocardial infarction (HCC)        Acute on chronic diastolic CHF (congestive heart failure) (HCC)        Chronic congestive heart failure (HCC)        PAF (paroxysmal atrial fibrillation) (HCC)        Left ventricular dysfunction        Mixed hyperlipidemia        * (Principal) Acute on chronic combined systolic and diastolic congestive heart failure (HCC)        Systolic and diastolic CHF, acute on chronic (HCC)        NSTEMI (non-ST elevated myocardial infarction) ([address]              Past Medical History:  Past Medical History:   Diagnosis Date    Acute kidney failure, unspecified ([address]     Anxiety state, unspecified     Atrial septal aneurysm 01/09/2016    Blood circulation, collateral     Body mass index 30.0-30.9, adult     CAD (coronary artery disease) 01/10/2016    1/9/16  lexiscan  Positive for apical MI + myocardial ischemia, EF 42%, intermediate risk findings, AUC indication 16, AUC score 7 1/10/16  Cath  3 lesions in the LAD:  Mid: 70% 2.25x23, mid 90% 2.25 x 28, distal 100% 2.0x28, anterior lateral apical hypokinesis, EF 45%    Calculus of kidney     Carotid artery stenosis 06/26/2013    Cellulitis and abscess of hand, except fingers and thumb     Cerebral artery occlusion with cerebral infarction ([address]     15 years ago    Chronic airway obstruction, not elsewhere classified     Chronic kidney disease, unspecified     Congestive heart failure, unspecified     Diabetes mellitus type II     Diverticulosis of colon (without mention of hemorrhage)     Encounter for long-term (current) use of insulin ([address]     Esophageal reflux     Family history of ischemic heart disease     Gastric ulcer, unspecified as acute or chronic, without mention of hemorrhage, perforation, or obstruction     Hyperlipemia     Hypertension     Internal hemorrhoids without mention of complication     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, or unspecified(403.00)     Obesity, unspecified     Other specified cardiac dysrhythmias(427.89)     Palliative care patient 12/21/2020    Paroxysmal atrial fibrillation (HCC)     Peripheral vascular disease (HCC)     Solitary pulmonary nodule     Surgical or other procedure not carried out because of contraindication     Thoracic aneurysm without mention of rupture     Tobacco use disorder     Type II or unspecified type diabetes mellitus without mention of complication, not stated as uncontrolled         Past Surgical History:  Past Surgical History:   Procedure Laterality Date    CARDIAC CATHETERIZATION      CHOLECYSTECTOMY      CORONARY ANGIOPLASTY WITH STENT PLACEMENT  1-16    2 JACQUELINE to LAD    DIAGNOSTIC CARDIAC CATH LAB PROCEDURE      URETER STENT PLACEMENT      VASCULAR SURGERY  03- [facility]    Aortogram with bilateral selective renal artery injections    VASCULAR SURGERY  6/14/13 S    Right carotid endarterectomy with Vascu-Guard patch repair. Right cervical carotid arteriograms after endarterectomy.  VASCULAR SURGERY  7/6/15 [facility]    Percutaneous cannulation, right common femoral artery, with a5 [place] sheath and later 6 [place] [facility] sheath. Suprarenal abdomianl aortagram.  Selective right renal arteriogram.  Right renal artery balloon angioplasty;/stent (Express 6 mm x 18mmballoon-expandable stent. . Completion suprarenal abdominal aortogram and selective right remal arteriogram. Mynx closure, right common femoral artery punctur       Past Family History:  Family History   Problem Relation Age of Onset    High Blood Pressure Father     Cancer Father     High Blood Pressure Mother     High Blood Pressure Brother        Past Social History:  Social History     Socioeconomic History    Marital status:      Spouse name: Not on file    Number of children: Not on file    Years of education: Not on file    Highest education level: Not on file   Occupational History    Not on file   Tobacco Use    Smoking status: Former Smoker     Packs/day: 0.25    Smokeless tobacco: Never Used   Vaping Use    Vaping Use: Never used   Substance and Sexual Activity    Alcohol use: No    Drug use: No    Sexual activity: Yes   Other Topics Concern    Not on file   Social History Narrative    Not on file     Social Determinants of Health     Financial Resource Strain:     Difficulty of Paying Living Expenses:    Food Insecurity:     Worried About Running Out of Food in the Last Year:     [address] in the Last Year:    Transportation Needs:     Lack of Transportation (Medical):  Lack of Transportation (Non-Medical):    Physical Activity:     Days of Exercise per Week:     Minutes of Exercise per Session:    Stress:     Feeling of Stress :    Social Connections:     Frequency of Communication with Friends and Family:     Frequency of Social Gatherings with Friends and Family:     Attends Sabianism Services:     Active Member of Clubs or Organizations:     Attends Club or Organization Meetings:     Marital Status:    Intimate Partner Violence:     Fear of Current or Ex-Partner:     Emotionally Abused:     Physically Abused:     Sexually Abused: Allergies: Allergies   Allergen Reactions    Hydralazine     Morphine        Home Meds:  Prior to Admission medications    Medication Sig Start Date End Date Taking?  Authorizing Provider   clopidogrel (PLAVIX) 75 MG tablet TAKE ONE TABLET BY MOUTH EVERY DAY 10/13/21   Laurie Bruce MD   minoxidil (LONITEN) 2.5 MG tablet TAKE ONE TABLET BY MOUTH TWICE A DAY 10/5/21   DWAYNE Mcgregor   isosorbide mononitrate (IMDUR) 60 MG extended release tablet Take one tablet by mouth twice daily (needs appt for further refills) 10/5/21   DWAYNE Mcgregor   ELIQUIS 5 MG TABS tablet TAKE ONE TABLET BY MOUTH TWICE A DAY 7/7/21   DWAYNE Arzola amiodarone (CORDARONE) 200 MG tablet Take 1 tablet by mouth daily 5/14/21 6/13/21  Mendez Rey MD   rosuvastatin (CRESTOR) 40 MG tablet Take 0.5 tablets by mouth daily 5/14/21   Mendez Rey MD   carvedilol (COREG) 12.5 MG tablet Take 1 tablet by mouth 2 times daily 5/14/21   Mendez Rey MD   potassium chloride (KLOR-CON M) 20 MEQ extended release tablet Take 1 tablet by mouth daily (with breakfast) 5/14/21   Mendez Rey MD   pantoprazole (PROTONIX) 40 MG tablet Take 1 tablet by mouth 2 times daily (before meals) 5/14/21   Mendez Rey MD   ascorbic acid (VITAMIN C) 500 MG tablet Take 1 tablet by mouth daily 5/14/21   Mendez Rey MD   calcitRIOL (ROCALTROL) 0.25 MCG capsule Take 0.25 mcg by mouth three times a week 5/3/21   Historical Provider, MD   diphenhydrAMINE (BENADRYL) 25 MG capsule Take 25 mg by mouth every 6 hours as needed for Itching    Historical Provider, MD   senna-docusate ([place] Blazer) 8.6-50 MG per tablet Take 1 tablet by mouth nightly    Historical Provider, MD   allopurinol (ZYLOPRIM) 100 MG tablet Take 200 mg by mouth daily    Historical Provider, MD   bumetanide (BUMEX) 1 MG tablet Take 1 tablet by mouth 2 times daily  Patient taking differently: Take 1 mg by mouth daily as needed  2/1/21   Orlaia Jerez MD   aspirin 81 MG chewable tablet Take 1 tablet by mouth daily 12/24/20   Hortensia Silveira MD   vitamin D (ERGOCALCIFEROL) 1.25 MG (78934 UT) CAPS capsule Take 50,000 Units by mouth once a week 12/21/20   Historical Provider, MD   metOLazone (ZAROXOLYN) 5 MG tablet Take 5 mg by mouth three times a week Monday wed and fri    Historical Provider, MD   nitroGLYCERIN (NITROSTAT) 0.4 MG SL tablet up to max of 3 total doses.  If no relief after 1 dose, call 911. 10/20/20   [facility], DO   NOVOLOG FLEXPEN 100 UNIT/ML injection pen 5 Units 3 times daily (before meals)  6/1/20   Historical Provider, MD   montelukast (SINGULAIR) 10 MG tablet Take 10 mg by mouth daily    Historical Provider, MD   levocetirizine (XYZAL) 5 MG tablet Take 5 mg by mouth nightly    Historical Provider, MD   ULTICARE MINI PEN NEEDLES 31G X 6 MM MISC FOR USE WITH LANTUS TWO TIMES A DAY 7/11/14   DWAYNE Woodall CNP   LANTUS SOLOSTAR 100 UNIT/ML injection pen INJECT 30 UNITS IN THE MORNING AND 20 UNITS IN THE EVENING DAILY  Patient taking differently: 40 Units nightly  7/1/14   DWAYNE Woodall CNP       Current Meds:   bumetanide  1 mg IntraVENous BID    insulin lispro  0-12 Units SubCUTAneous TID WC    insulin lispro  0-6 Units SubCUTAneous Nightly    [START ON 11/8/2021] metOLazone  2.5 mg Oral Once per day on Mon Thu    allopurinol  200 mg Oral Daily    amiodarone  200 mg Oral Daily    ascorbic acid  500 mg Oral Daily    aspirin  81 mg Oral Daily    calcitRIOL  0.25 mcg Oral Once per day on Mon Wed Fri    carvedilol  12.5 mg Oral BID    clopidogrel  75 mg Oral Daily    apixaban  5 mg Oral BID    isosorbide mononitrate  60 mg Oral Daily    insulin glargine  20 Units SubCUTAneous Nightly    cetirizine  10 mg Oral Daily    sennosides-docusate sodium  1 tablet Oral Nightly    rosuvastatin  20 mg Oral Daily    pantoprazole  40 mg Oral BID AC    insulin lispro  5 Units SubCUTAneous TID WC    montelukast  10 mg Oral Daily    minoxidil  2.5 mg Oral BID       Current Infused Meds:   dextrose         Physical Exam:  Vitals:    11/05/21 0542   BP: (!) 159/72   Pulse: 98   Resp: 18   Temp: 97.2 °F (36.2 °C)   SpO2: (!) 89%       Intake/Output Summary (Last 24 hours) at 11/5/2021 0930  Last data filed at 11/5/2021 0542  Gross per 24 hour   Intake 200 ml   Output 1900 ml   Net -1700 ml     Estimated body mass index is 34.03 kg/m² as calculated from the following:    Height as of this encounter: 5' 11\" (1.803 m). Weight as of this encounter: 244 lb (110.7 kg). Physical Exam  Vitals reviewed. Constitutional:       General: He is not in acute distress.      Appearance: Normal appearance. He is well-developed. He is obese. He is not ill-appearing, toxic-appearing or diaphoretic. HENT:      Head: Normocephalic and atraumatic. Nose: Nose normal.      Mouth/Throat:      Mouth: Mucous membranes are moist.      Pharynx: Oropharynx is clear. Eyes:      General: No scleral icterus. Extraocular Movements: Extraocular movements intact. Pupils: Pupils are equal, round, and reactive to light. Neck:      Vascular: No carotid bruit or JVD. Cardiovascular:      Rate and Rhythm: Normal rate and regular rhythm. Heart sounds: Normal heart sounds. No murmur heard. No friction rub. No gallop. Pulmonary:      Effort: Pulmonary effort is normal. No respiratory distress. Breath sounds: Normal breath sounds. No stridor. No wheezing, rhonchi or rales. Chest:      Chest wall: No tenderness. Abdominal:      General: Abdomen is flat. Bowel sounds are normal. There is no distension. Palpations: Abdomen is soft. There is no mass. Tenderness: There is no abdominal tenderness. There is no right CVA tenderness, left CVA tenderness, guarding or rebound. Hernia: No hernia is present. Musculoskeletal:      Cervical back: Normal range of motion and neck supple. No rigidity or tenderness. Lymphadenopathy:      Cervical: No cervical adenopathy. Skin:     General: Skin is warm and dry. Neurological:      General: No focal deficit present. Mental Status: He is alert and oriented to person, place, and time. Mental status is at baseline. Cranial Nerves: No cranial nerve deficit. Sensory: No sensory deficit. Motor: No weakness. Coordination: Coordination normal.   Psychiatric:         Mood and Affect: Mood normal.         Behavior: Behavior normal.         Thought Content:  Thought content normal.         Judgment: Judgment normal.         Labs:  Recent Labs     11/04/21 1919 11/05/21  0125   WBC 7.8 7.7   HGB 13.4* 12.5*    195 Recent Labs     11/04/21  1919 11/05/21  0125    140   K 4.0 3.3*   CL 99 99   CO2 31* 31*   BUN 27* 26*   CREATININE 2.0* 2.0*   LABGLOM 34* 34*   MG  --  2.2   CALCIUM 9.3 8.8       CK, CKMB, Troponin: @LABRCNT (CKTOTAL:3, CKMB:3, TROPONINI:3)@    Last 3 BNP:  No results for input(s): BNP in the last 72 hours. IMAGING:  XR CHEST PORTABLE    Result Date: 11/4/2021  EXAMINATION: XR CHEST PORTABLE 11/4/2021 7:43 PM HISTORY: Shortness of breath COMPARISON: 1/3/2021 FINDINGS: Heart and mediastinal contours are stable. Aorta is tortuous with atherosclerotic calcifications. Pulmonary vasculature is somewhat indistinct. There is no pneumothorax or definite pleural effusion. Mild pulmonary vascular congestion developing pulmonary edema suspected. Signed by Dr Au :  1. Complaints of shortness of breath  2. Elevated proBNP 2871 suggestive of decompensated congestive heart failure  3. Coronary artery disease  4. Several stents previously placed  5. Diabetes mellitus type 2  6. Gastroesophageal reflux disease  7. Thoracic aortic aneurysm  8. Peripheral vascular disease  9. Renal artery stenosis  10. Paroxysmal atrial fibrillation  11. Chronic kidney disease  12. Hyperlipidemia  13. Chronic anticoagulation with Eliquis  14. Long-term antiarrhythmic therapy with amiodarone  15. CT of the head 5/8/2020 no acute findings global cerebral volume loss and presumed chronic microvascular ischemic white matter changes also suspected tiny 3 mm colloid cyst at the foramen of [place]  16. Renal artery duplex 10/7/2020 1.7 cm exophytic hypoechoic lesion left kidney not definitely a cyst renal cell carcinoma not excluded increased renal echogenicity bilaterally most commonly due to medical renal disease patent vasculature with no evidence of stenosis  17. CT kidney 1222 2021. 4 cm exophytic lateral inferior left renal lesion most consistent with nonenhancing hyperdense cyst simple nonenhancing superior right renal cyst with exophytic anterior inferior left renal cyst no enhancing renal mass or hydronephrosis mild diverticular disease fatty containing umbilical and left inguinal hernias dense vascular calcification with cardiomegaly   18. Lexiscan 2/14/2020 apical scar with minimal if any ischemia EF 36%  19. Echocardiogram 10/18/2020 EF 45% mitral annular calcification mild MR         Recommendations:  1. Continue current medical therapy intravenous diuresis intravenous Bumex 1 mg every 12 hours  2. Suggest restarting metolazone at a lower dose 2.5 mg p.o. twice weekly  3. I will be gone over the weekend coverage available if clinically stable and improved he could be discharged over the weekend at the discretion of the primary service.

## 2021-11-05 NOTE — PROGRESS NOTES
Angely Schafer arrived to room # [id number]. Presented with: Shortness of Breath  Mental Status: Patient is oriented, alert, coherent, logical, thought processes intact and able to concentrate and follow conversation. Vitals:    11/04/21 2229   BP: (!) 160/100   Pulse: 60   Resp: 18   Temp: 97.3 °F (36.3 °C)   SpO2: 91%     Patient safety contract and falls prevention contract reviewed with patient Yes. Oriented Patient to room. Call light within reach. Yes.   Needs, issues or concerns expressed at this time: no.      Electronically signed by Keegan Nolan RN on 11/5/2021 at 12:15 AM

## 2021-11-05 NOTE — PROGRESS NOTES
Occupational Therapy               RE: Rossy Duran           MRN: [id number]      OT attempted therapy evaluation this morning. Patient was found to be out of room for ECHO. Will attempt at a later time.      Earl Butt OTR/ROBERTO CARLOS  Electronically signed by Jad Humphries OTR/L on 11/5/2021 at 10:49 AM.

## 2021-11-05 NOTE — H&P
[address]    Patient: Sharona Linder   : 1955   MRN: [id number]  Code Status: Full Code  PCP: Jaimee Irvin MD  Date of Service: 2021    Chief Complaint:   Shortness of breath    History of Present Illness:   27-year-old male with past medical history as listed below presented with progressively worsening shortness of breath over the past 2 weeks. While playing golf in the recent past, patient was unable to walk the course as he usually does. He has also noticed swelling of his feet and weight gain of over 20 lbs during this timeframe. Denies chest pain. Patient of Dr. Noé Sarmiento. No further history provided.     Review of Systems:   A comprehensive review of systems was negative except for: Respiratory: positive for shortness of breath    Past Medical History:     Past Medical History:   Diagnosis Date    Acute kidney failure, unspecified ([address]     Anxiety state, unspecified     Atrial septal aneurysm 2016    Blood circulation, collateral     Body mass index 30.0-30.9, adult     CAD (coronary artery disease) 01/10/2016    1/9/16  lexiscan  Positive for apical MI + myocardial ischemia, EF 42%, intermediate risk findings, AUC indication 16, AUC score 7 1/10/16  Cath  3 lesions in the LAD:  Mid: 70% 2.25x23, mid 90% 2.25 x 28, distal 100% 2.0x28, anterior lateral apical hypokinesis, EF 45%    Calculus of kidney     Carotid artery stenosis 2013    Cellulitis and abscess of hand, except fingers and thumb     Cerebral artery occlusion with cerebral infarction ([address]     15 years ago    Chronic airway obstruction, not elsewhere classified     Chronic kidney disease, unspecified     Congestive heart failure, unspecified     Diabetes mellitus type II     Diverticulosis of colon (without mention of hemorrhage)     Encounter for long-term (current) use of insulin (HCC)     Esophageal reflux     Family history of ischemic heart disease     Gastric ulcer, unspecified as acute or chronic, without mention of hemorrhage, perforation, or obstruction     Hyperlipemia     Hypertension     Internal hemorrhoids without mention of complication     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, or unspecified(403.00)     Obesity, unspecified     Other specified cardiac dysrhythmias(427.89)     Palliative care patient 12/21/2020    Paroxysmal atrial fibrillation (HCC)     Peripheral vascular disease (HCC)     Solitary pulmonary nodule     Surgical or other procedure not carried out because of contraindication     Thoracic aneurysm without mention of rupture     Tobacco use disorder     Type II or unspecified type diabetes mellitus without mention of complication, not stated as uncontrolled          Past Surgical History:     Past Surgical History:   Procedure Laterality Date    CARDIAC CATHETERIZATION      CHOLECYSTECTOMY      CORONARY ANGIOPLASTY WITH STENT PLACEMENT  1-16    2 JACQUELINE to LAD    DIAGNOSTIC CARDIAC CATH LAB PROCEDURE      URETER STENT PLACEMENT      VASCULAR SURGERY  03- [facility]    Aortogram with bilateral selective renal artery injections    VASCULAR SURGERY  6/14/13 [facility]    Right carotid endarterectomy with Vascu-Guard patch repair. Right cervical carotid arteriograms after endarterectomy.  VASCULAR SURGERY  7/6/15 [facility]    Percutaneous cannulation, right common femoral artery, with a5 English sheath and later 6 English [facility] sheath. Suprarenal abdomianl aortagram.  Selective right renal arteriogram.  Right renal artery balloon angioplasty;/stent (Express 6 mm x 18mmballoon-expandable stent. . Completion suprarenal abdominal aortogram and selective right remal arteriogram. Mynx closure, right common femoral artery punctur        Family History:     Family History   Problem Relation Age of Onset    High Blood Pressure Father     Cancer Father     High Blood mouth daily  rosuvastatin (CRESTOR) 40 MG tablet, Take 0.5 tablets by mouth daily  carvedilol (COREG) 12.5 MG tablet, Take 1 tablet by mouth 2 times daily  potassium chloride (KLOR-CON M) 20 MEQ extended release tablet, Take 1 tablet by mouth daily (with breakfast)  pantoprazole (PROTONIX) 40 MG tablet, Take 1 tablet by mouth 2 times daily (before meals)  ascorbic acid (VITAMIN C) 500 MG tablet, Take 1 tablet by mouth daily  calcitRIOL (ROCALTROL) 0.25 MCG capsule, Take 0.25 mcg by mouth three times a week  diphenhydrAMINE (BENADRYL) 25 MG capsule, Take 25 mg by mouth every 6 hours as needed for Itching  senna-docusate (PERICOLACE) 8.6-50 MG per tablet, Take 1 tablet by mouth nightly  allopurinol (ZYLOPRIM) 100 MG tablet, Take 200 mg by mouth daily  bumetanide (BUMEX) 1 MG tablet, Take 1 tablet by mouth 2 times daily (Patient taking differently: Take 1 mg by mouth daily as needed )  aspirin 81 MG chewable tablet, Take 1 tablet by mouth daily  vitamin D (ERGOCALCIFEROL) 1.25 MG (34591 UT) CAPS capsule, Take 50,000 Units by mouth once a week  metOLazone (ZAROXOLYN) 5 MG tablet, Take 5 mg by mouth three times a week Monday wed and fri  nitroGLYCERIN (NITROSTAT) 0.4 MG SL tablet, up to max of 3 total doses. If no relief after 1 dose, call 911. NOVOLOG FLEXPEN 100 UNIT/ML injection pen, 5 Units 3 times daily (before meals)   montelukast (SINGULAIR) 10 MG tablet, Take 10 mg by mouth daily  levocetirizine (XYZAL) 5 MG tablet, Take 5 mg by mouth nightly  ULTICARE MINI PEN NEEDLES 31G X 6 MM MISC, FOR USE WITH LANTUS TWO TIMES A DAY  LANTUS SOLOSTAR 100 UNIT/ML injection pen, INJECT 30 UNITS IN THE MORNING AND 20 UNITS IN THE EVENING DAILY (Patient taking differently: 40 Units nightly )     Allergies:      Allergies   Allergen Reactions    Hydralazine     Morphine          Physical Exam:   BP (!) 160/100   Pulse 60   Temp 97.3 °F (36.3 °C) (Temporal)   Resp 18   Ht 5' 11\" (1.803 m)   Wt 246 lb (111.6 kg)   SpO2 91% BMI 34.31 kg/m²     General: no acute distress  HEENT: normocephalic, atraumatic  Neck: supple, symmetrical, trachea midline   Lungs: bilateral crackles  Cardiovascular: s1 and s2 normal  Abdomen: soft, positive bowel sounds, distended  Extremities: 1-2+ pitting edema in lower extremities bilaterally  Neuro: aaox3, no focal deficits   Skin: normal color and texture     Recent Results (from the past 72 hour(s))   Comprehensive Metabolic Panel    Collection Time: 11/04/21  7:19 PM   Result Value Ref Range    Sodium 139 136 - 145 mmol/L    Potassium 4.0 3.5 - 5.0 mmol/L    Chloride 99 98 - 111 mmol/L    CO2 31 (H) 22 - 29 mmol/L    Anion Gap 9 7 - 19 mmol/L    Glucose 227 (H) 74 - 109 mg/dL    BUN 27 (H) 8 - 23 mg/dL    CREATININE 2.0 (H) 0.5 - 1.2 mg/dL    GFR Non- 34 (A) >60    GFR  41 (L) >59    Calcium 9.3 8.8 - 10.2 mg/dL    Total Protein 7.9 6.6 - 8.7 g/dL    Albumin 4.1 3.5 - 5.2 g/dL    Total Bilirubin 0.5 0.2 - 1.2 mg/dL    Alkaline Phosphatase 111 40 - 130 U/L    ALT 20 5 - 41 U/L    AST 15 5 - 40 U/L   CBC Auto Differential    Collection Time: 11/04/21  7:19 PM   Result Value Ref Range    WBC 7.8 4.8 - 10.8 K/uL    RBC 4.64 (L) 4.70 - 6.10 M/uL    Hemoglobin 13.4 (L) 14.0 - 18.0 g/dL    Hematocrit 44.1 42.0 - 52.0 %    MCV 95.0 (H) 80.0 - 94.0 fL    MCH 28.9 27.0 - 31.0 pg    MCHC 30.4 (L) 33.0 - 37.0 g/dL    RDW 15.3 (H) 11.5 - 14.5 %    Platelets 492 290 - 148 K/uL    MPV 10.7 9.4 - 12.4 fL    Neutrophils % 63.1 50.0 - 65.0 %    Lymphocytes % 24.5 20.0 - 40.0 %    Monocytes % 7.7 0.0 - 10.0 %    Eosinophils % 3.5 0.0 - 5.0 %    Basophils % 0.9 0.0 - 1.0 %    Neutrophils Absolute 4.9 1.5 - 7.5 K/uL    Immature Granulocytes # 0.0 K/uL    Lymphocytes Absolute 1.9 1.1 - 4.5 K/uL    Monocytes Absolute 0.60 0.00 - 0.90 K/uL    Eosinophils Absolute 0.30 0.00 - 0.60 K/uL    Basophils Absolute 0.10 0.00 - 0.20 K/uL   Protime-INR    Collection Time: 11/04/21  7:19 PM   Result Value Ref Range    Protime 14.5 12.0 - 14.6 sec    INR 1.11 0.88 - 1.18   Troponin    Collection Time: 11/04/21  7:19 PM   Result Value Ref Range    Troponin 0.03 0.00 - 0.03 ng/mL   Brain Natriuretic Peptide    Collection Time: 11/04/21  7:19 PM   Result Value Ref Range    Pro-BNP 2,871 (H) 0 - 900 pg/mL   COVID-19, Rapid    Collection Time: 11/04/21  7:19 PM    Specimen: Nasopharyngeal Swab   Result Value Ref Range    SARS-CoV-2, NAAT Not Detected Not Detected   TSH WITH REFLEX TO FT4    Collection Time: 11/04/21  7:19 PM   Result Value Ref Range    TSH Reflex FT4 1.75 0.35 - 5.50 uIU/mL   Urinalysis Reflex to Culture    Collection Time: 11/04/21  7:55 PM    Specimen: Urine, clean catch   Result Value Ref Range    Color, UA YELLOW Straw/Yellow    Clarity, UA Clear Clear    Glucose, Ur 100 (A) Negative mg/dL    Bilirubin Urine Negative Negative    Ketones, Urine Negative Negative mg/dL    Specific Gravity, UA 1.014 1.005 - 1.030    Blood, Urine TRACE (A) Negative    pH, UA 5.5 5.0 - 8.0    Protein,  (A) Negative mg/dL    Urobilinogen, Urine 0.2 <2.0 E.U./dL    Nitrite, Urine Negative Negative    Leukocyte Esterase, Urine Negative Negative   Microscopic Urinalysis    Collection Time: 11/04/21  7:55 PM   Result Value Ref Range    Bacteria, UA NEGATIVE (A) None Seen /HPF    Crystals, UA NEG (A) None Seen /HPF    Hyaline Casts, UA 0 0 - 8 /HPF    WBC, UA 0 0 - 5 /HPF    RBC, UA 1 0 - 4 /HPF    Epithelial Cells, UA 0 0 - 5 /HPF       I/O this shift:  In: -   Out: 900 [Urine:900]    XR CHEST PORTABLE    Result Date: 11/4/2021  Mild pulmonary vascular congestion developing pulmonary edema suspected.  Signed by Dr Sheba Fair and Plan:   Acute on chronic decompensated systolic congestive heart failure  IV Lasix  Continue home Zaroxolyn  Patient states he only takes Bumex as needed  Strict I's and O's  Daily weights  Serial troponin  TTE  History of CAD s/p stenting  Patient of Dr. Danny Ngo  Cardiology consulted    Paroxysmal atrial fibrillation  Amiodarone  Coreg  Eliquis    IDDM  Meds on board    CKD 3  Follow renal function/urine output/electrolytes  Avoid offending agents    Obesity  Counseled    DVT prophylaxis  Alfredo Cleary MD  11/4/2021 10:39 PM

## 2021-11-05 NOTE — ACP (ADVANCE CARE PLANNING)
Advance Care Planning     Advance Care Planning Activator (Inpatient)  Conversation Note      Date of ACP Conversation: 11/5/2021     Conversation Conducted with: Patient's wife and Patient    ACP Activator: Kira Burnham    Current Designated Health Care Decision Maker:     Primary Decision Maker: Giuliano Staci - Spouse - [phone]      Care Preferences    Ventilation: \"If you were in your present state of health and suddenly became very ill and were unable to breathe on your own, what would your preference be about the use of a ventilator (breathing machine) if it were available to you? \"      Would the patient desire the use of ventilator (breathing machine)?: No    \"If your health worsens and it becomes clear that your chance of recovery is unlikely, what would your preference be about the use of a ventilator (breathing machine) if it were available to you? \"     Would the patient desire the use of ventilator (breathing machine)?: No      Resuscitation  \"CPR works best to restart the heart when there is a sudden event, like a heart attack, in someone who is otherwise healthy. Unfortunately, CPR does not typically restart the heart for people who have serious health conditions or who are very sick. \"    \"In the event your heart stopped as a result of an underlying serious health condition, would you want attempts to be made to restart your heart (answer \"yes\" for attempt to resuscitate) or would you prefer a natural death (answer \"no\" for do not attempt to resuscitate)? \" Yes       [] Yes   [x] No   Educated Patient / Stevan Lilly regarding differences between Advance Directives and portable DNR orders.        Conversation Outcomes:  [x] ACP discussion completed  [] Existing advance directive reviewed with patient; no changes to patient's previously recorded wishes  [] New Advance Directive completed  [] Portable Do Not Rescitate prepared for Provider review and signature  [] POLST/POST/MOLST/MOST prepared for Provider review and signature      Follow-up plan:    [x] Schedule follow-up conversation to continue planning       Pt and pt's wife both agreed pt wants CPR; pt's wife asked this  to speak with pt about a ventilator and pt says he does not want a ventilator.       Electronically signed by Cynthia Bass on 11/5/2021 at 1:32 PM

## 2021-11-05 NOTE — CARE COORDINATION
Initial CM Assessment    Initial Assessment Completed at bedside with:    [x]   Patient  []   Family/Caregiver/Guardian   []   Other:      Patient Contact Information:  [address] [address] (home)   Above information verified? [x]   Yes  []   No    ADLS:    Independent   Support System:   Spouse/Significant Other, Children, Family Members, Friends/Neighbors  Plan to return to current housing:   [x]   Yes  []   No    Transportation plan for Discharge:  Spouse     Do you have any unmet social needs that would keep you from returning home safely:  []   Yes  [x]   No              Unmet Social Needs Notes:       Had [facility] prior to admission:    []   Yes  [x]   No      Currently ACTIVE with Home Health CARE:    []   Yes  [x]   No  []   Interested at discharge  [facility]:      Current PCP:  Sara Vidal MD  PCP verified? [x]   Yes  []   No    Have you been vaccinated for COVID-19 (SARS-CoV-2)? [x]   Yes  []   No                   If so, when? Which :  []   [employer]  []   Moderna  [x]   [employer]  []   Other:       Pharmacy:    [address] [facility] [place] [facility], [address] [facility] [address] [address]  Phone: [phone] Fax: [address] [facility] [address] Lesley Valerio [address] [address]  Phone: [phone] Fax: [phone]    Prefer to use Meds to Bed? []   Yes  [x]   No  Potential assistance purchasing medications?      []   Yes  [x]   No    Active with HD/PD prior to admission:           []   Yes  [x]   No  HD Center:       Financial:  Payor: [employer] / Plan: [employer] - [place] PPO / Product Type: *No Product type* /     Pre-Cert required for SNF:   [x]   Yes  []   No    Patient Deficits:  []   Yes   [x]   No    If yes:  []   Confusion/Memory  []   Visual  []   Motor/Sensory         []   Right arm         []   Right leg         []   Left arm         []   Left leg  []   Language/Speech         []   Aphasia         []   Dysarthria         []   Swallow         [place] Coma Scale  Eye Opening: Spontaneous  Best Verbal Response: Oriented  Best Motor Response: Obeys commands  Mariam Coma Scale Score: 15    Patient Deficit Notes: Additional CM/SW Notes:   Met with pt to discuss dc plan/needs. Pt explained how he was at the [email or web address] course when he began feeling unwell. He is usually independent but he does have a walker for use at home if needed. He stated that the only medication that causes his trouble is his Eliquis which is $700/month but he \"manages\". He plans to return back home with his spouse at dc. He denies any further needs at this time.       Zayra Kapoor and/or his family were provided with choice of provider:  [x]   Yes   []   No      Patient Admission Status:   Inpatient [101]    [address]  Care Management  Electronically signed by Helen Ochoa on 11/5/2021 at 10:59 AM

## 2021-11-05 NOTE — PROGRESS NOTES
[facility]      Henry Valenzuela    : 1955  MRN: [id number]   PHYSICIAN:  Arabella Glass MD  Primary Problem    Patient Active Problem List   Diagnosis    DM [facility]diabetes mellitus) [facility]    GERD (gastroesophageal reflux disease)    Aneurysm of thoracic aorta ([address]    Renal artery stenosis (HCC)    Carotid artery stenosis    Peripheral vascular disease ([address]    Diabetes mellitus type I ([address]    Acute respiratory failure with hypoxia ([address]    Malignant hypertension    Atrial septal aneurysm    Abnormal EKG    Apical myocardial infarction ([address]    CAD (coronary artery disease)    Acute on chronic diastolic CHF (congestive heart failure) ([facility])    Chronic congestive heart failure ([facility])    PAF (paroxysmal atrial fibrillation) ([facility])    Stage 3b chronic kidney disease ([facility])    Abnormal nuclear cardiac imaging test    Mixed hyperlipidemia    Left ventricular dysfunction    Somnolence, daytime    Edema    At risk for obstructive sleep apnea    Acute on chronic combined systolic and diastolic congestive heart failure (HCC)    On amiodarone therapy    Systolic and diastolic CHF, acute on chronic ([facility])    Obesity (BMI 30-39. 9)    NSTEMI (non-ST elevated myocardial infarction) ([facility]    Pneumonia due to COVID-19 virus    Palliative care patient    Acute renal failure (ARF) ([facility]    Severe malnutrition ([facility])    Closed left hip fracture, initial encounter [facility])       Rehabilitation Diagnosis:     Essential hypertension [I10]  Chronic anticoagulation [Z79.01]  Acute on chronic systolic congestive heart failure (HCC) [I50.23]  Paroxysmal A-fib ([address] [I48.0]  Acute decompensated heart failure ([facility] [I50.9]       SERVICE DATE: 2021        SUBJECTIVE: Patient agrees that they are safe with mobility and do not require physical therapy services.     OBJECTIVE:    Orientation is Within normal limits           ACTIVE ROM:       All major lower extremity joints are within functional limits      STRENGTH     Both lower extremities are grossly within functional limits. TRANSFERS   Sit to stand   Independent      Bed to chair   Independent     Bed to mobility   Supine to sit   Independent       Roll     Independent     Scoot Side to side / Up and down   Independent    AMBULATION   Distance: 15'     Device: NONE     Assistance: CGA       Comment:     BALANCE   Sitting    Good     Standing    Good    Tx Initiated: Amb 100' no ad SBA    ASSESSMENT      Independent and safe with all functional mobility. Encouraged to ask nsg for supervision with ambulation in hallway.        PLAN: Discharge from skilled physical therapy      GOALS   Amb 100' no ad SBA (MET)            Electronically signed by David Avalos PT on 11/5/2021 at 12:57 PM

## 2021-11-05 NOTE — CONSULTS
Nutrition Assessment     Type and Reason for Visit: Initial, Consult, Patient Education    Nutrition Assessment:  Consult for CHF education recieved. Pt out of room at attempted visit. Pt previously educated on CHF/DM diet guidelines and pt reported prevously well aware and compliant with diet guidelines. Noted recent life events that changed his diet habits. Will f/u for questions. Malnutrition Assessment:  Malnutrition Status: No malnutrition    Nutrition Related Findings: +2 generalized edema      Current Nutrition Therapies:    ADULT DIET; Regular; 3 carb choices (45 gm/meal); Low Fat/Low Chol/High Fiber/2 gm Na    Anthropometric Measures:  · Height: 5' 11\" (180.3 cm)  · Current Body Wt: 244 lb (110.7 kg)   · BMI: 34    Nutrition Diagnosis:   · Altered nutrition-related lab values related to cardiac dysfunction, endocrine dysfuntion as evidenced by lab values, localized or generalized fluid accumulation    Nutrition Interventions:   Food and/or Nutrient Delivery:  Continue Current Diet  Nutrition Education/Counseling:  Education not indicated   Coordination of Nutrition Care:  Continue to monitor while inpatient    Goals:  Po intake 50% or greater of meals. Pt continues with dietary compliance following d/c.        Nutrition Monitoring and Evaluation:   Food/Nutrient Intake Outcomes:  Diet Advancement/Tolerance  Physical Signs/Symptoms Outcomes:  Biochemical Data, Nutrition Focused Physical Findings, Weight, Fluid Status or Edema     Discharge Planning:    Continue current diet     Electronically signed by Marek Coy, MS, RD, LD on 11/5/21 at 1:34 PM CDT    Contact: [phone]

## 2021-11-05 NOTE — ED NOTES
Patient placed on cardiac monitor, continuous pulse oximeter, and NIBP monitor. Monitor alarms on.          Joanne Parker RN  11/04/21 [id number]

## 2021-11-05 NOTE — PROGRESS NOTES
Occupational Therapy   Occupational Therapy Initial Assessment  Date: 2021   Patient Name: Verna Burt  MRN: [id number]     : 1955    Date of Service: 2021    Discharge Recommendations:  Home independently  OT Equipment Recommendations  Equipment Needed: No    Assessment   Assessment: Evaluation completed and tx initiated. No overt functional deficits identified. No barriers to home identified. Recommendations made this visit. Treatment Diagnosis: SOB, CHF  REQUIRES OT FOLLOW UP: No  Activity Tolerance  Activity Tolerance: Patient Tolerated treatment well  Activity Tolerance: States he is not SOB like he was upon admit while completing ambulatory ADL  Safety Devices  Safety Devices in place: Yes  Type of devices: Call light within reach; Chair alarm in place           Patient Diagnosis(es): The primary encounter diagnosis was Acute on chronic systolic congestive heart failure ([address] Diagnoses of Essential hypertension, Paroxysmal A-fib ([address] and Chronic anticoagulation were also pertinent to this visit.      has a past medical history of Acute kidney failure, unspecified ([address] Anxiety state, unspecified, Atrial septal aneurysm, Blood circulation, collateral, Body mass index 30.0-30.9, adult, CAD (coronary artery disease), Calculus of kidney, Carotid artery stenosis, Cellulitis and abscess of hand, except fingers and thumb, Cerebral artery occlusion with cerebral infarction ([address] Chronic airway obstruction, not elsewhere classified, Chronic kidney disease, unspecified, Congestive heart failure, unspecified, Diabetes mellitus type II, Diverticulosis of colon (without mention of hemorrhage), Encounter for long-term (current) use of insulin ([address] Esophageal reflux, Family history of ischemic heart disease, Gastric ulcer, unspecified as acute or chronic, without mention of hemorrhage, perforation, or obstruction, Hyperlipemia, Hypertension, Internal hemorrhoids without mention of complication, 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, or unspecified(403.00), Obesity, unspecified, Other specified cardiac dysrhythmias(427.89), Palliative care patient, Paroxysmal atrial fibrillation ([facility] [address] Peripheral vascular disease ([facility] [address] Solitary pulmonary nodule, Surgical or other procedure not carried out because of contraindication, Thoracic aneurysm without mention of rupture, Tobacco use disorder, and Type II or unspecified type diabetes mellitus without mention of complication, not stated as uncontrolled. has a past surgical history that includes Cholecystectomy; vascular surgery (03- [facility]); vascular surgery (6/14/13 [facility]); vascular surgery (7/6/15 [facility]); Ureter stent placement; Coronary angioplasty with stent (1-16); Cardiac catheterization; and Diagnostic Cardiac Cath Lab Procedure. Treatment Diagnosis: SOB, CHF      Restrictions       Subjective   General  Chart Reviewed: Yes  Patient assessed for rehabilitation services?: Yes  Family / Caregiver Present: No  Patient Currently in Pain: No  Vital Signs  Temp: 97.7 °F (36.5 °C)  Temp Source: Temporal  Pulse: 50  Heart Rate Source: Monitor  Resp: 20  BP: (!) 169/71  Patient Currently in Pain: No  Oxygen Therapy  SpO2: 91 %  O2 Device: None (Room air)  Social/Functional History  Social/Functional History  Lives With: Spouse  ADL Assistance: Independent  Ambulation Assistance: Independent  Transfer Assistance: Independent  Leisure & Hobbies: Had been out golfing prior       Objective              Balance  Sitting Balance: Independent  Standing Balance: Independent  Functional Mobility  Functional - Mobility Device: No device  Assist Level:  Independent  Toilet Transfers  Toilet Transfer: Independent  ADL  Feeding: Independent  Grooming: Independent  UE Bathing: Independent  LE Bathing: Independent  UE Dressing: Independent  LE Dressing: Independent  Toileting: Independent  Coordination  Movements Are Fluid And Coordinated: Yes     Bed mobility  Supine to Sit: Independent  Transfers  Stand Step Transfers: Independent     Cognition  Overall Cognitive Status: WNL                 LUE PROM (degrees)  LUE PROM: WNL  LUE AROM (degrees)  LUE AROM : WNL  RUE PROM (degrees)  RUE PROM: WNL  RUE AROM (degrees)  RUE AROM : WNL                    Tx initiated: Therapeutic activity recommendations.  (15 mins)  Plan   Plan  Plan Comment: No further visits planned    G-Code     OutComes Score                                                  AM-PAC Score             Goals  Short term goals  Short term goal 1: Patient will be independent with therapeutic exercise program (met)       Therapy Time   Individual Concurrent Group Co-treatment   Time In           Time Out           Minutes                   Keith Frost OT Electronically signed by Keith Frost OT on 11/5/2021 at 12:57 PM

## 2021-11-06 LAB
ANION GAP SERPL CALCULATED.3IONS-SCNC: 14 MMOL/L (ref 7–19)
BUN BLDV-MCNC: 27 MG/DL (ref 8–23)
CALCIUM SERPL-MCNC: 9.2 MG/DL (ref 8.8–10.2)
CHLORIDE BLD-SCNC: 96 MMOL/L (ref 98–111)
CO2: 32 MMOL/L (ref 22–29)
CREAT SERPL-MCNC: 2.1 MG/DL (ref 0.5–1.2)
EKG P AXIS: 18 DEGREES
EKG P-R INTERVAL: 204 MS
EKG Q-T INTERVAL: 470 MS
EKG QRS DURATION: 102 MS
EKG QTC CALCULATION (BAZETT): 473 MS
EKG T AXIS: 32 DEGREES
GFR AFRICAN AMERICAN: 39
GFR NON-AFRICAN AMERICAN: 32
GLUCOSE BLD-MCNC: 128 MG/DL (ref 74–109)
GLUCOSE BLD-MCNC: 133 MG/DL (ref 70–99)
GLUCOSE BLD-MCNC: 188 MG/DL (ref 70–99)
GLUCOSE BLD-MCNC: 230 MG/DL (ref 70–99)
GLUCOSE BLD-MCNC: 250 MG/DL (ref 70–99)
HCT VFR BLD CALC: 44.2 % (ref 42–52)
HEMOGLOBIN: 13.2 G/DL (ref 14–18)
MAGNESIUM: 2.1 MG/DL (ref 1.6–2.4)
MCH RBC QN AUTO: 28.4 PG (ref 27–31)
MCHC RBC AUTO-ENTMCNC: 29.9 G/DL (ref 33–37)
MCV RBC AUTO: 95.3 FL (ref 80–94)
PDW BLD-RTO: 15.1 % (ref 11.5–14.5)
PERFORMED ON: ABNORMAL
PLATELET # BLD: 220 K/UL (ref 130–400)
PMV BLD AUTO: 10.8 FL (ref 9.4–12.4)
POTASSIUM SERPL-SCNC: 3.2 MMOL/L (ref 3.5–5)
RBC # BLD: 4.64 M/UL (ref 4.7–6.1)
SODIUM BLD-SCNC: 142 MMOL/L (ref 136–145)
WBC # BLD: 7.3 K/UL (ref 4.8–10.8)

## 2021-11-06 PROCEDURE — 2500000003 HC RX 250 WO HCPCS: Performed by: STUDENT IN AN ORGANIZED HEALTH CARE EDUCATION/TRAINING PROGRAM

## 2021-11-06 PROCEDURE — 6370000000 HC RX 637 (ALT 250 FOR IP): Performed by: STUDENT IN AN ORGANIZED HEALTH CARE EDUCATION/TRAINING PROGRAM

## 2021-11-06 PROCEDURE — 36415 COLL VENOUS BLD VENIPUNCTURE: CPT

## 2021-11-06 PROCEDURE — 6370000000 HC RX 637 (ALT 250 FOR IP): Performed by: INTERNAL MEDICINE

## 2021-11-06 PROCEDURE — 80048 BASIC METABOLIC PNL TOTAL CA: CPT

## 2021-11-06 PROCEDURE — 85027 COMPLETE CBC AUTOMATED: CPT

## 2021-11-06 PROCEDURE — 93010 ELECTROCARDIOGRAM REPORT: CPT | Performed by: INTERNAL MEDICINE

## 2021-11-06 PROCEDURE — 2060000000 HC ICU INTERMEDIATE R&B

## 2021-11-06 PROCEDURE — 82947 ASSAY GLUCOSE BLOOD QUANT: CPT

## 2021-11-06 PROCEDURE — 83735 ASSAY OF MAGNESIUM: CPT

## 2021-11-06 RX ORDER — METOLAZONE 5 MG/1
2.5 TABLET ORAL
Status: DISCONTINUED | OUTPATIENT
Start: 2021-11-08 | End: 2021-11-07 | Stop reason: HOSPADM

## 2021-11-06 RX ORDER — POTASSIUM CHLORIDE 20 MEQ/1
20 TABLET, EXTENDED RELEASE ORAL ONCE
Status: COMPLETED | OUTPATIENT
Start: 2021-11-06 | End: 2021-11-06

## 2021-11-06 RX ORDER — BUMETANIDE 1 MG/1
1 TABLET ORAL 2 TIMES DAILY
Status: DISCONTINUED | OUTPATIENT
Start: 2021-11-07 | End: 2021-11-07 | Stop reason: HOSPADM

## 2021-11-06 RX ADMIN — INSULIN LISPRO 3 UNITS: 100 INJECTION, SOLUTION INTRAVENOUS; SUBCUTANEOUS at 22:15

## 2021-11-06 RX ADMIN — BUMETANIDE 1 MG: 0.25 INJECTION, SOLUTION INTRAMUSCULAR; INTRAVENOUS at 08:30

## 2021-11-06 RX ADMIN — ASPIRIN 81 MG: 81 TABLET, CHEWABLE ORAL at 08:32

## 2021-11-06 RX ADMIN — BUMETANIDE 1 MG: 0.25 INJECTION, SOLUTION INTRAMUSCULAR; INTRAVENOUS at 22:14

## 2021-11-06 RX ADMIN — PANTOPRAZOLE SODIUM 40 MG: 40 TABLET, DELAYED RELEASE ORAL at 15:39

## 2021-11-06 RX ADMIN — ISOSORBIDE MONONITRATE 60 MG: 60 TABLET, EXTENDED RELEASE ORAL at 08:32

## 2021-11-06 RX ADMIN — MINOXIDIL 2.5 MG: 2.5 TABLET ORAL at 22:15

## 2021-11-06 RX ADMIN — PANTOPRAZOLE SODIUM 40 MG: 40 TABLET, DELAYED RELEASE ORAL at 05:38

## 2021-11-06 RX ADMIN — INSULIN LISPRO 5 UNITS: 100 INJECTION, SOLUTION INTRAVENOUS; SUBCUTANEOUS at 17:41

## 2021-11-06 RX ADMIN — CETIRIZINE HYDROCHLORIDE 10 MG: 10 TABLET, FILM COATED ORAL at 08:31

## 2021-11-06 RX ADMIN — APIXABAN 5 MG: 5 TABLET, FILM COATED ORAL at 22:15

## 2021-11-06 RX ADMIN — MONTELUKAST 10 MG: 10 TABLET, FILM COATED ORAL at 08:32

## 2021-11-06 RX ADMIN — INSULIN GLARGINE 20 UNITS: 100 INJECTION, SOLUTION SUBCUTANEOUS at 22:15

## 2021-11-06 RX ADMIN — INSULIN LISPRO 4 UNITS: 100 INJECTION, SOLUTION INTRAVENOUS; SUBCUTANEOUS at 12:00

## 2021-11-06 RX ADMIN — AMIODARONE HYDROCHLORIDE 200 MG: 200 TABLET ORAL at 08:31

## 2021-11-06 RX ADMIN — DOCUSATE SODIUM 50 MG AND SENNOSIDES 8.6 MG 1 TABLET: 8.6; 5 TABLET, FILM COATED ORAL at 22:14

## 2021-11-06 RX ADMIN — MINOXIDIL 2.5 MG: 2.5 TABLET ORAL at 08:32

## 2021-11-06 RX ADMIN — INSULIN LISPRO 2 UNITS: 100 INJECTION, SOLUTION INTRAVENOUS; SUBCUTANEOUS at 17:42

## 2021-11-06 RX ADMIN — OXYCODONE HYDROCHLORIDE AND ACETAMINOPHEN 500 MG: 500 TABLET ORAL at 08:32

## 2021-11-06 RX ADMIN — INSULIN LISPRO 5 UNITS: 100 INJECTION, SOLUTION INTRAVENOUS; SUBCUTANEOUS at 12:01

## 2021-11-06 RX ADMIN — APIXABAN 5 MG: 5 TABLET, FILM COATED ORAL at 08:31

## 2021-11-06 RX ADMIN — POTASSIUM CHLORIDE 20 MEQ: 1500 TABLET, EXTENDED RELEASE ORAL at 17:41

## 2021-11-06 RX ADMIN — ROSUVASTATIN CALCIUM 20 MG: 20 TABLET, FILM COATED ORAL at 08:31

## 2021-11-06 RX ADMIN — CARVEDILOL 12.5 MG: 12.5 TABLET, FILM COATED ORAL at 22:14

## 2021-11-06 RX ADMIN — CLOPIDOGREL BISULFATE 75 MG: 75 TABLET ORAL at 08:32

## 2021-11-06 RX ADMIN — ALLOPURINOL 200 MG: 100 TABLET ORAL at 08:31

## 2021-11-06 RX ADMIN — CARVEDILOL 12.5 MG: 12.5 TABLET, FILM COATED ORAL at 08:31

## 2021-11-06 NOTE — PROGRESS NOTES
Daily Progress Note    Date:2021  Patient: Verna Burt  : 1955  VLX:[id number]  CODE:Full Code No additional code details  Leta Jack MD    Admit Date: 2021  5:34 PM   LOS: 2 days       Subjective:     71-year-old male with chronic systolic and diastolic heart failure, CAD with previous stents, CKD, diabetes, presented with worsening dyspnea on exertion noting progressive worsening over about 1-2 weeks with more significant shortness of breath recently while playing golf, admitted with acute decompensated heart failure with initial work-up showing elevated proBNP and signs of pulmonary edema on chest x-ray. No chest pain. He noted compliance with home Bumex but has had some recent dietary indiscretion. He was also previously on metolazone 3 times a week however this was discontinued due to some worsening of renal function previously. Improved clinically on IV Bumex and restarted on metolazone at lower dose 2.5 mg to be taken 3 times a week. Dyspnea improved, but not yet back to baseline. No chest pain or palpitations. Has had excellent urine output with diuresis.         Review of Systems    Comprehensive ROS completed and is negative except as otherwise noted        Objective:      Vital signs in last 24 hours:  Patient Vitals for the past 24 hrs:   BP Temp Temp src Pulse Resp SpO2   21 1219 128/69 96.9 °F (36.1 °C) Temporal 52 18 91 %   21 0834 -- -- -- 55 -- --   21 0625 128/74 97.9 °F (36.6 °C) Temporal (!) 47 16 92 %   21 0017 (!) 126/58 98.1 °F (36.7 °C) Temporal (!) 49 16 91 %   21 1805 (!) 181/73 97.6 °F (36.4 °C) Temporal 59 18 93 %     Physical exam    General: Well-developed, resting in no acute distress  HEENT: NC/AT, no scleral icterus  Cardiovascular: Normal rate, pulses 2+ and equal  Respiratory: Equal and symmetric chest rise, no increased work of breathing, no wheezes or rales  Abdomen: Soft, nontender, bowel sounds present  Neurological no focal lateralizing weakness, normal speech  Extremities: Trace bilateral lower extremity edema, no clubbing or cyanosis  Skin: Warm and dry      Lab Review   Recent Results (from the past 24 hour(s))   POCT Glucose    Collection Time: 11/05/21  4:41 PM   Result Value Ref Range    POC Glucose 186 (H) 70 - 99 mg/dl    Performed on AccuChek    POCT Glucose    Collection Time: 11/05/21  8:09 PM   Result Value Ref Range    POC Glucose 259 (H) 70 - 99 mg/dl    Performed on AccuChek    Basic Metabolic Panel    Collection Time: 11/06/21  4:24 AM   Result Value Ref Range    Sodium 142 136 - 145 mmol/L    Potassium 3.2 (L) 3.5 - 5.0 mmol/L    Chloride 96 (L) 98 - 111 mmol/L    CO2 32 (H) 22 - 29 mmol/L    Anion Gap 14 7 - 19 mmol/L    Glucose 128 (H) 74 - 109 mg/dL    BUN 27 (H) 8 - 23 mg/dL    CREATININE 2.1 (H) 0.5 - 1.2 mg/dL    GFR Non- 32 (A) >60    GFR  39 (L) >59    Calcium 9.2 8.8 - 10.2 mg/dL   Magnesium    Collection Time: 11/06/21  4:24 AM   Result Value Ref Range    Magnesium 2.1 1.6 - 2.4 mg/dL   CBC    Collection Time: 11/06/21  4:24 AM   Result Value Ref Range    WBC 7.3 4.8 - 10.8 K/uL    RBC 4.64 (L) 4.70 - 6.10 M/uL    Hemoglobin 13.2 (L) 14.0 - 18.0 g/dL    Hematocrit 44.2 42.0 - 52.0 %    MCV 95.3 (H) 80.0 - 94.0 fL    MCH 28.4 27.0 - 31.0 pg    MCHC 29.9 (L) 33.0 - 37.0 g/dL    RDW 15.1 (H) 11.5 - 14.5 %    Platelets 358 073 - 925 K/uL    MPV 10.8 9.4 - 12.4 fL   POCT Glucose    Collection Time: 11/06/21  7:53 AM   Result Value Ref Range    POC Glucose 133 (H) 70 - 99 mg/dl    Performed on AccuChek    POCT Glucose    Collection Time: 11/06/21 11:39 AM   Result Value Ref Range    POC Glucose 230 (H) 70 - 99 mg/dl    Performed on AccuChek        I/O last 3 completed shifts:   In: 960 [P.O.:960]  Out: 2400 [Urine:2400]  I/O this shift:  In: 360 [P.O.:360]  Out: 200 [Urine:200]      Current Facility-Administered Medications:     [START ON 11/7/2021] bumetanide (BUMEX) tablet 1 mg, 1 mg, Oral, BID, Rigoberto Soni MD    potassium chloride (KLOR-CON M) extended release tablet 20 mEq, 20 mEq, Oral, Once, Rigoberto Soni MD    bumetanide [facility]) injection 1 mg, 1 mg, IntraVENous, BID, Rigoberto Soni MD, 1 mg at 11/06/21 0830    diphenhydrAMINE (BENADRYL) tablet 25 mg, 25 mg, Oral, Q6H PRN, Rigoberto Soni MD    insulin lispro (HUMALOG) injection vial 0-12 Units, 0-12 Units, SubCUTAneous, TID WC, Rigoberto Soni MD, 4 Units at 11/06/21 1200    insulin lispro (HUMALOG) injection vial 0-6 Units, 0-6 Units, SubCUTAneous, Nightly, Rigoberto Soni MD, 3 Units at 11/05/21 2140    glucose (GLUTOSE) 40 % oral gel 15 g, 15 g, Oral, PRN, Rigoberto Soni MD    dextrose 50 % IV solution, 12.5 g, IntraVENous, PRN, Rigoberto Soni MD    glucagon (rDNA) injection 1 mg, 1 mg, IntraMUSCular, PRN, Rigoberto Soni MD    dextrose 5 % solution, 100 mL/hr, IntraVENous, PRN, Rigoberto Soni MD    ondansetron [facility]) injection 4 mg, 4 mg, IntraVENous, Q6H PRN, Rigoberto Soni MD, 4 mg at 11/05/21 0809    ondansetron (ZOFRAN) tablet 4 mg, 4 mg, Oral, Q6H PRN, MD Isak Trianain  [START ON 11/8/2021] metOLazone (ZAROXOLYN) tablet 2.5 mg, 2.5 mg, Oral, Once per day on Mon Thu, Frieda Faye MD    allopurinol (ZYLOPRIM) tablet 200 mg, 200 mg, Oral, Daily, Sukhi Cleary MD, 200 mg at 11/06/21 0831    amiodarone (CORDARONE) tablet 200 mg, 200 mg, Oral, Daily, Sukhi Cleary MD, 200 mg at 11/06/21 0831    ascorbic acid (VITAMIN C) tablet 500 mg, 500 mg, Oral, Daily, Sukhi Cleary MD, 500 mg at 11/06/21 0880    aspirin chewable tablet 81 mg, 81 mg, Oral, Daily, Sukhi Cleary MD, 81 mg at 11/06/21 5904    calcitRIOL (ROCALTROL) capsule 0.25 mcg, 0.25 mcg, Oral, Once per day on Mon Wed Fri, Sukhi Cleary MD, 0.25 mcg at 11/05/21 4114    carvedilol (COREG) tablet 12.5 mg, 12.5 mg, Oral, BID, Sukhi Cleary MD, 12.5 mg at 11/06/21 0831    clopidogrel (PLAVIX) tablet 75 mg, 75 mg, Oral, Daily, Sari Peña MD, 75 mg at 11/06/21 2614    apixaban (ELIQUIS) tablet 5 mg, 5 mg, Oral, BID, Sari Peña MD, 5 mg at 11/06/21 0831    isosorbide mononitrate (IMDUR) extended release tablet 60 mg, 60 mg, Oral, Daily, Sari Peña MD, 60 mg at 11/06/21 0832    insulin glargine (LANTUS) injection vial 20 Units, 20 Units, SubCUTAneous, Nightly, Sari Peña MD, 20 Units at 11/05/21 2140    cetirizine (ZYRTEC) tablet 10 mg, 10 mg, Oral, Daily, Sari Peña MD, 10 mg at 11/06/21 0831    sennosides-docusate sodium (SENOKOT-S) 8.6-50 MG tablet 1 tablet, 1 tablet, Oral, Nightly, Sari Peña MD, 1 tablet at 11/05/21 2139    rosuvastatin (CRESTOR) tablet 20 mg, 20 mg, Oral, Daily, Sari Peña MD, 20 mg at 11/06/21 0831    pantoprazole (PROTONIX) tablet 40 mg, 40 mg, Oral, BID AC, Sari Peña MD, 40 mg at 11/06/21 1539    insulin lispro (HUMALOG) injection vial 5 Units, 5 Units, SubCUTAneous, TID WC, Sari Peña MD, 5 Units at 11/06/21 1201    nitroGLYCERIN (NITROSTAT) SL tablet 0.4 mg, 0.4 mg, SubLINGual, Q5 Min PRN, Sari Peña MD    montelukast (SINGULAIR) tablet 10 mg, 10 mg, Oral, Daily, Sari Peña MD, 10 mg at 11/06/21 8408    minoxidil (LONITEN) tablet 2.5 mg, 2.5 mg, Oral, BID, Sari Peña MD, 2.5 mg at 11/06/21 4881    acetaminophen (TYLENOL) tablet 650 mg, 650 mg, Oral, Q6H PRN **OR** acetaminophen (TYLENOL) suppository 650 mg, 650 mg, Rectal, Q6H PRN, Sari Peña MD    labetalol (NORMODYNE;TRANDATE) injection 20 mg, 20 mg, IntraVENous, Q4H PRN, Sari Peña MD      Assessment/plan  Principal Problem:    Acute on chronic combined systolic and diastolic congestive heart failure (HCC)  Active Problems:    CAD (coronary artery disease)    DM (diabetes mellitus) (HCC)    GERD (gastroesophageal reflux disease)    Aneurysm of thoracic aorta (HCC)    Renal artery stenosis (HCC)    Peripheral vascular disease (HCC)    PAF (paroxysmal atrial fibrillation) (HCC)    Stage 3b chronic kidney disease ([facility] [address]    Mixed hyperlipidemia    Left ventricular dysfunction    On amiodarone therapy  Resolved Problems:    * No resolved hospital problems.  *      Acute on chronic HFpEF  Echocardiogram 11/5 reviewed-preserved EF  Continue IV Bumex twice daily with metolazone 3 times weekly, transition to oral Bumex tomorrow  Continue carvedilol  Monitor urine output and renal function  I's and O's, daily weights    Paroxysmal A. fib  Monitor telemetry  Rate control on Coreg  Rhythm control on amiodarone  Anticoagulation on Eliquis    CAD  Aspirin, statin, BB, anticoagulation    DM  POC glucose monitoring with basal, prandial and sliding scale insulin correction, adjust regimen as needed    Hypertension  Monitor BP, medical management, home antihypertensive regimen on Coreg, Imdur, minoxidil and diuretics    CKD stage IIIb  Monitor          Han Christensen MD 11/6/2021 4:15 PM

## 2021-11-07 VITALS
RESPIRATION RATE: 16 BRPM | TEMPERATURE: 98 F | BODY MASS INDEX: 32.81 KG/M2 | DIASTOLIC BLOOD PRESSURE: 58 MMHG | HEIGHT: 71 IN | HEART RATE: 50 BPM | SYSTOLIC BLOOD PRESSURE: 135 MMHG | WEIGHT: 234.38 LBS | OXYGEN SATURATION: 90 %

## 2021-11-07 LAB
ANION GAP SERPL CALCULATED.3IONS-SCNC: 12 MMOL/L (ref 7–19)
BUN BLDV-MCNC: 32 MG/DL (ref 8–23)
CALCIUM SERPL-MCNC: 9.1 MG/DL (ref 8.8–10.2)
CHLORIDE BLD-SCNC: 90 MMOL/L (ref 98–111)
CO2: 36 MMOL/L (ref 22–29)
CREAT SERPL-MCNC: 2.5 MG/DL (ref 0.5–1.2)
GFR AFRICAN AMERICAN: 31
GFR NON-AFRICAN AMERICAN: 26
GLUCOSE BLD-MCNC: 190 MG/DL (ref 74–109)
GLUCOSE BLD-MCNC: 192 MG/DL (ref 70–99)
GLUCOSE BLD-MCNC: 232 MG/DL (ref 70–99)
HCT VFR BLD CALC: 46.1 % (ref 42–52)
HEMOGLOBIN: 14.3 G/DL (ref 14–18)
MAGNESIUM: 2 MG/DL (ref 1.6–2.4)
MCH RBC QN AUTO: 28.7 PG (ref 27–31)
MCHC RBC AUTO-ENTMCNC: 31 G/DL (ref 33–37)
MCV RBC AUTO: 92.6 FL (ref 80–94)
PDW BLD-RTO: 14.7 % (ref 11.5–14.5)
PERFORMED ON: ABNORMAL
PERFORMED ON: ABNORMAL
PLATELET # BLD: 247 K/UL (ref 130–400)
PMV BLD AUTO: 10.7 FL (ref 9.4–12.4)
POTASSIUM SERPL-SCNC: 3.1 MMOL/L (ref 3.5–5)
RBC # BLD: 4.98 M/UL (ref 4.7–6.1)
SODIUM BLD-SCNC: 138 MMOL/L (ref 136–145)
WBC # BLD: 8.1 K/UL (ref 4.8–10.8)

## 2021-11-07 PROCEDURE — 6370000000 HC RX 637 (ALT 250 FOR IP): Performed by: INTERNAL MEDICINE

## 2021-11-07 PROCEDURE — 82947 ASSAY GLUCOSE BLOOD QUANT: CPT

## 2021-11-07 PROCEDURE — 6370000000 HC RX 637 (ALT 250 FOR IP): Performed by: STUDENT IN AN ORGANIZED HEALTH CARE EDUCATION/TRAINING PROGRAM

## 2021-11-07 PROCEDURE — 83735 ASSAY OF MAGNESIUM: CPT

## 2021-11-07 PROCEDURE — 85027 COMPLETE CBC AUTOMATED: CPT

## 2021-11-07 PROCEDURE — 80048 BASIC METABOLIC PNL TOTAL CA: CPT

## 2021-11-07 PROCEDURE — 36415 COLL VENOUS BLD VENIPUNCTURE: CPT

## 2021-11-07 RX ORDER — METOLAZONE 2.5 MG/1
2.5 TABLET ORAL
Qty: 30 TABLET | Refills: 0 | Status: SHIPPED | OUTPATIENT
Start: 2021-11-08 | End: 2021-11-07 | Stop reason: SDUPTHER

## 2021-11-07 RX ORDER — BUMETANIDE 1 MG/1
1 TABLET ORAL 2 TIMES DAILY
Qty: 60 TABLET | Refills: 5 | Status: SHIPPED | OUTPATIENT
Start: 2021-11-07

## 2021-11-07 RX ORDER — POTASSIUM CHLORIDE 20 MEQ/1
20 TABLET, EXTENDED RELEASE ORAL ONCE
Status: COMPLETED | OUTPATIENT
Start: 2021-11-07 | End: 2021-11-07

## 2021-11-07 RX ORDER — POTASSIUM CHLORIDE 20 MEQ/1
40 TABLET, EXTENDED RELEASE ORAL ONCE
Status: COMPLETED | OUTPATIENT
Start: 2021-11-07 | End: 2021-11-07

## 2021-11-07 RX ORDER — METOLAZONE 2.5 MG/1
2.5 TABLET ORAL WEEKLY
Qty: 30 TABLET | Refills: 0 | Status: SHIPPED
Start: 2021-11-07 | End: 2022-10-21 | Stop reason: SINTOL

## 2021-11-07 RX ADMIN — INSULIN LISPRO 5 UNITS: 100 INJECTION, SOLUTION INTRAVENOUS; SUBCUTANEOUS at 08:57

## 2021-11-07 RX ADMIN — POTASSIUM CHLORIDE 20 MEQ: 1500 TABLET, EXTENDED RELEASE ORAL at 11:15

## 2021-11-07 RX ADMIN — MONTELUKAST 10 MG: 10 TABLET, FILM COATED ORAL at 08:17

## 2021-11-07 RX ADMIN — ISOSORBIDE MONONITRATE 60 MG: 60 TABLET, EXTENDED RELEASE ORAL at 08:18

## 2021-11-07 RX ADMIN — ASPIRIN 81 MG: 81 TABLET, CHEWABLE ORAL at 08:17

## 2021-11-07 RX ADMIN — ALLOPURINOL 200 MG: 100 TABLET ORAL at 08:17

## 2021-11-07 RX ADMIN — PANTOPRAZOLE SODIUM 40 MG: 40 TABLET, DELAYED RELEASE ORAL at 06:00

## 2021-11-07 RX ADMIN — BUMETANIDE 1 MG: 1 TABLET ORAL at 08:18

## 2021-11-07 RX ADMIN — APIXABAN 5 MG: 5 TABLET, FILM COATED ORAL at 08:23

## 2021-11-07 RX ADMIN — POTASSIUM CHLORIDE 40 MEQ: 1500 TABLET, EXTENDED RELEASE ORAL at 08:17

## 2021-11-07 RX ADMIN — OXYCODONE HYDROCHLORIDE AND ACETAMINOPHEN 500 MG: 500 TABLET ORAL at 08:18

## 2021-11-07 RX ADMIN — CARVEDILOL 12.5 MG: 12.5 TABLET, FILM COATED ORAL at 08:17

## 2021-11-07 RX ADMIN — CLOPIDOGREL BISULFATE 75 MG: 75 TABLET ORAL at 08:17

## 2021-11-07 RX ADMIN — INSULIN LISPRO 2 UNITS: 100 INJECTION, SOLUTION INTRAVENOUS; SUBCUTANEOUS at 08:57

## 2021-11-07 RX ADMIN — ROSUVASTATIN CALCIUM 20 MG: 20 TABLET, FILM COATED ORAL at 08:17

## 2021-11-07 RX ADMIN — AMIODARONE HYDROCHLORIDE 200 MG: 200 TABLET ORAL at 08:18

## 2021-11-07 RX ADMIN — CETIRIZINE HYDROCHLORIDE 10 MG: 10 TABLET, FILM COATED ORAL at 08:17

## 2021-11-07 RX ADMIN — MINOXIDIL 2.5 MG: 2.5 TABLET ORAL at 08:27

## 2021-11-07 ASSESSMENT — PAIN SCALES - GENERAL: PAINLEVEL_OUTOF10: 0

## 2021-11-07 NOTE — PLAN OF CARE
Problem: Falls - Risk of:  Goal: Will remain free from falls  Description: Will remain free from falls  11/7/2021 0159 by Margarito Lebron RN  Outcome: Ongoing  11/6/2021 1231 by Deedee Farias RN  Outcome: Ongoing  Goal: Absence of physical injury  Description: Absence of physical injury  11/7/2021 0159 by Margarito Lebron RN  Outcome: Ongoing  11/6/2021 1231 by Deedee Farias RN  Outcome: Ongoing     Problem: OXYGENATION/RESPIRATORY FUNCTION  Goal: Patient will maintain patent airway  11/7/2021 0159 by Margarito Lebron RN  Outcome: Ongoing  11/6/2021 1231 by Deedee Farias RN  Outcome: Ongoing  Goal: Patient will achieve/maintain normal respiratory rate/effort  Description: Respiratory rate and effort will be within normal limits for the patient  11/7/2021 0159 by Margarito Lebron RN  Outcome: Ongoing  11/6/2021 1231 by Deedee Farias RN  Outcome: Ongoing     Problem: HEMODYNAMIC STATUS  Goal: Patient has stable vital signs and fluid balance  11/7/2021 0159 by Margarito Lebron RN  Outcome: Ongoing  11/6/2021 1231 by Deedee Farias RN  Outcome: Ongoing  Electronically signed by Margarito Lebron RN on 11/7/2021 at 1:59 AM

## 2021-11-07 NOTE — DISCHARGE SUMMARY
Discharge Summary    NAME: Agustina Rollins  :  1955  MRN:  [id number]    Admit date:  2021  Discharge date:  2021    Advance Directive: Full Code    Consults: cardiology    Primary Care Physician:  Yesica Villeda MD    Discharge Diagnoses:      Acute on chronic HFpEF     ANGELO on Stage 3b chronic kidney disease ([facility]    CAD (coronary artery disease)    DM (diabetes mellitus) ([facility]    GERD (gastroesophageal reflux disease)    Aneurysm of thoracic aorta (HCC)    Renal artery stenosis (HCC)    Peripheral vascular disease ([facility]    PAF (paroxysmal atrial fibrillation) [facility]    Mixed hyperlipidemia    On amiodarone therapy      Significant Diagnostic Studies:   ECHO Complete 2D W Doppler W Color    Result Date: 2021  Transthoracic Echocardiography Report (TTE)  Demographics   Patient Name  Roseline Ware  Date of Study          2021   MRN           [id number]        Gender                 Male   Date of Birth 1955    Room Number            [facility]   Age           73 year(s)   Height:       71 inches     Referring Physician    Barb Chong   Weight:       246.01 pounds Sonographer            Leatha Kendall [facility]   BSA:          2.3 m^2       Interpreting Physician Shawn Li   BMI:          34.31 kg/m^2  Procedure Type of Study   TTE procedure:ECHO 2D W/DOPPLER/COLOR/CONTRAST. Study Location: Echo Lab Technical Quality: Adequate visualization Patient Status: Inpatient Contrast Medium: Definity. Amount - 8 ml BP: 159/72 mmHg Indications:Congestive heart failure. Conclusions   Summary  Normal left ventricular size with preserved LV function and an estimated  ejection fraction of approximately 55-60%. Apical wall thinning with  associated severe hypokinesis. Mild-moderate concentric left ventricular  hypertrophy. Indeterminate diastolic function. Normal right ventricular size with globally preserved RV function (TAPSE  20 mm). No significant valvular abnormalities.   No evidence of significant pericardial effusion is noted. The rhythm is sinus bradycardia. Signature   ----------------------------------------------------------------  Electronically signed by Mela PooleInterpreting physician)  on 11/05/2021 02:19 PM  ----------------------------------------------------------------   Findings   Mitral Valve  Structurally normal mitral valve with normal leaflet mobility. No evidence  of mitral valve stenosis or mitral regurgitation. Aortic Valve  Aortic valve is not well visualized. No significant aortic regurgitation or stenosis is noted. Tricuspid Valve  Tricuspid valve is structurally normal.  No evidence of tricuspid regurgitation. Pulmonic Valve  The pulmonic valve was not well visualized. Left Atrium  Normal size left atrium. Left Ventricle  Normal left ventricular size with preserved LV function and an estimated  ejection fraction of approximately 55-60%. ALTON 125 ml, ESV 55 ml. Apical  wall thinning with associated severe hypokinesis. Mild-moderate concentric left ventricular hypertrophy. No evidence of left ventricular mass or thrombus noted. Indeterminate diastolic function. Right Atrium  Normal right atrial dimension with no evidence of thrombus or mass noted. Right Ventricle  Normal right ventricular size with globally preserved RV function (TAPSE  20 mm). Pericardial Effusion  No evidence of significant pericardial effusion is noted. Pleural Effusion  No evidence of pleural effusion. Miscellaneous  Aortic root and ascending aorta are within normal limits. Dilated IVC with poor inspiration collapse consistent with elevated right  atrial pressure. Allergies   - Hydralazine. - Morphine. M-Mode Measurements (cm)   LVIDd: 5.47 cm                         LVIDs: 4.02 cm  IVSd: 1.36 cm  LVPWd: 1.19 cm                         AO Root Dimension: 2.3 cm  Rt. Vent.  Dimension: 2.64 cm           LA: 4.3 cm  % Ejection Fraction: 45 %              LVOT: 2.2 cm Doppler Measurements:   AV Peak Velocity:142 cm/s            MV Peak E-Wave: 123 cm/s  AV Peak Gradient: 8.07 mmHg          MV Peak A-Wave: 114 cm/s  AV Mean Gradient: 4 mmHg             MV E/A Ratio: 1.08 %  AV Area (Continuity):2.76 cm^2       MV Peak Gradient: 6.05 mmHg      XR CHEST PORTABLE    Result Date: 11/4/2021  EXAMINATION: XR CHEST PORTABLE 11/4/2021 7:43 PM HISTORY: Shortness of breath COMPARISON: 1/3/2021 FINDINGS: Heart and mediastinal contours are stable. Aorta is tortuous with atherosclerotic calcifications. Pulmonary vasculature is somewhat indistinct. There is no pneumothorax or definite pleural effusion. Mild pulmonary vascular congestion developing pulmonary edema suspected. Signed by Dr Carmen Bartlett      Pertinent Labs:   CBC:   Recent Labs     11/05/21 0125 11/06/21 0424 11/07/21  0249   WBC 7.7 7.3 8.1   HGB 12.5* 13.2* 14.3    220 247     BMP:    Recent Labs     11/05/21 0125 11/06/21 0424 11/07/21  0249    142 138   K 3.3* 3.2* 3.1*   CL 99 96* 90*   CO2 31* 32* 36*   BUN 26* 27* 32*   CREATININE 2.0* 2.1* 2.5*   GLUCOSE 170* 128* 190*     INR:   Recent Labs     11/04/21 1919   INR 1.11           Hospital Course:    81-year-old male with chronic systolic and diastolic heart failure, CAD with previous stents, CKD, diabetes, presented with worsening dyspnea on exertion noting progressive worsening over about 1-2 weeks with more significant shortness of breath recently while playing golf, admitted with acute decompensated heart failure with initial work-up showing elevated proBNP and signs of pulmonary edema on chest x-ray. No chest pain. He noted compliance with home Bumex but has had some recent dietary indiscretion. He was also previously on metolazone 3 times a week however this was discontinued due to some worsening of renal function previously.   Improved clinically on IV Bumex 1 mg twice daily with significant net negative fluid balance, dyspnea resolved and back to baseline. Did develop mild ANGELO with diuresis. Stable for discharge home, resume Bumex 1mg twice daily. Would cautiously resume Metolazone later this week at reduced dose of 2.5mg twice weekly if creatinine remains relatively stable on repeat outpatient BMP with PCP as he has had better diuresis in the past on both Bumex and Metolazone. Will follow up with Cardiology and Nephrology. Physical Exam:  Vital Signs: BP (!) 135/58   Pulse 50   Temp 98 °F (36.7 °C) (Temporal)   Resp 16   Ht 5' 11\" (1.803 m)   Wt 234 lb 6 oz (106.3 kg)   SpO2 90%   BMI 32.69 kg/m²   General appearance:. Alert and Cooperative   HEENT: Normocephalic.  atraumatic  Chest: clear to auscultation bilaterally without wheezes or rhonchi. Cardiac: Normal heart tones with regular rate and rhythm, S1, S2 normal.   Abdomen:soft, non-tender; normal bowel sounds  Extremities: No clubbing or cyanosis. Trace LE edema. Peripheral pulses palpable. Neurologic: Grossly intact. Discharge Medications:         Medication List      CHANGE how you take these medications    bumetanide 1 MG tablet  Commonly known as: BUMEX  Take 1 tablet by mouth 2 times daily  What changed:   · when to take this  · reasons to take this     Lantus SoloStar 100 UNIT/ML injection pen  Generic drug: insulin glargine  INJECT 30 UNITS IN THE MORNING AND 20 UNITS IN THE EVENING DAILY  What changed: See the new instructions.      metOLazone 2.5 MG tablet  Commonly known as: ZAROXOLYN  Take 1 tablet by mouth Twice a Week Tuesday and Friday  Start taking on: November 8, 2021  What changed:   · medication strength  · how much to take  · when to take this  · additional instructions        CONTINUE taking these medications    allopurinol 100 MG tablet  Commonly known as: ZYLOPRIM     amiodarone 200 MG tablet  Commonly known as: CORDARONE  Take 1 tablet by mouth daily     ascorbic acid 500 MG tablet  Commonly known as: VITAMIN C  Take 1 tablet by mouth daily     aspirin 81 MG chewable tablet  Take 1 tablet by mouth daily     calcitRIOL 0.25 MCG capsule  Commonly known as: ROCALTROL     carvedilol 12.5 MG tablet  Commonly known as: COREG  Take 1 tablet by mouth 2 times daily     clopidogrel 75 MG tablet  Commonly known as: PLAVIX  TAKE ONE TABLET BY MOUTH EVERY DAY     diphenhydrAMINE 25 MG capsule  Commonly known as: BENADRYL     Eliquis 5 MG Tabs tablet  Generic drug: apixaban  TAKE ONE TABLET BY MOUTH TWICE A DAY     isosorbide mononitrate 60 MG extended release tablet  Commonly known as: IMDUR  Take one tablet by mouth twice daily (needs appt for further refills)     levocetirizine 5 MG tablet  Commonly known as: XYZAL     minoxidil 2.5 MG tablet  Commonly known as: LONITEN  TAKE ONE TABLET BY MOUTH TWICE A DAY     montelukast 10 MG tablet  Commonly known as: SINGULAIR     nitroGLYCERIN 0.4 MG SL tablet  Commonly known as: NITROSTAT  up to max of 3 total doses. If no relief after 1 dose, call 911. NovoLOG FlexPen 100 UNIT/ML injection pen  Generic drug: insulin aspart     pantoprazole 40 MG tablet  Commonly known as: PROTONIX  Take 1 tablet by mouth 2 times daily (before meals)     potassium chloride 20 MEQ extended release tablet  Commonly known as: KLOR-CON M  Take 1 tablet by mouth daily (with breakfast)     rosuvastatin 40 MG tablet  Commonly known as: CRESTOR  Take 0.5 tablets by mouth daily     senna-docusate 8.6-50 MG per tablet  Commonly known as: PERICOLACE     UltiCare Mini Pen Needles 31G X 6 MM Misc  Generic drug: Insulin Pen Needle  FOR USE WITH LANTUS TWO TIMES A DAY     vitamin D 1.25 MG (48020 UT) Caps capsule  Commonly known as: ERGOCALCIFEROL           Where to Get Your Medications      These medications were sent to [facility], [address] [facility]  [address] [place] [address], [address]    Phone: [phone]   · bumetanide 1 MG tablet  · metOLazone 2.5 MG tablet         Discharge Instructions:    Follow up with

## 2021-11-15 ENCOUNTER — APPOINTMENT (OUTPATIENT)
Dept: CT IMAGING | Age: 66
End: 2021-11-15
Payer: COMMERCIAL

## 2021-11-15 ENCOUNTER — HOSPITAL ENCOUNTER (EMERGENCY)
Age: 66
Discharge: HOME | End: 2021-11-15
Attending: EMERGENCY MEDICINE
Payer: COMMERCIAL

## 2021-11-15 VITALS
RESPIRATION RATE: 15 BRPM | TEMPERATURE: 98.3 F | SYSTOLIC BLOOD PRESSURE: 151 MMHG | HEART RATE: 50 BPM | DIASTOLIC BLOOD PRESSURE: 79 MMHG | OXYGEN SATURATION: 96 %

## 2021-11-15 DIAGNOSIS — N18.32 STAGE 3B CHRONIC KIDNEY DISEASE (HCC): ICD-10-CM

## 2021-11-15 DIAGNOSIS — R11.2 NON-INTRACTABLE VOMITING WITH NAUSEA, UNSPECIFIED VOMITING TYPE: Primary | ICD-10-CM

## 2021-11-15 DIAGNOSIS — I50.22 CHRONIC SYSTOLIC CHF (CONGESTIVE HEART FAILURE) (HCC): ICD-10-CM

## 2021-11-15 DIAGNOSIS — E87.6 HYPOKALEMIA: ICD-10-CM

## 2021-11-15 LAB
ALBUMIN SERPL-MCNC: 3.9 G/DL (ref 3.5–5.2)
ALP BLD-CCNC: 89 U/L (ref 40–130)
ALT SERPL-CCNC: 22 U/L (ref 5–41)
ANION GAP SERPL CALCULATED.3IONS-SCNC: 13 MMOL/L (ref 7–19)
AST SERPL-CCNC: 25 U/L (ref 5–40)
BASOPHILS ABSOLUTE: 0.1 K/UL (ref 0–0.2)
BASOPHILS RELATIVE PERCENT: 0.6 % (ref 0–1)
BILIRUB SERPL-MCNC: 0.6 MG/DL (ref 0.2–1.2)
BUN BLDV-MCNC: 44 MG/DL (ref 8–23)
CALCIUM SERPL-MCNC: 9.4 MG/DL (ref 8.8–10.2)
CHLORIDE BLD-SCNC: 90 MMOL/L (ref 98–111)
CO2: 37 MMOL/L (ref 22–29)
CREAT SERPL-MCNC: 2.8 MG/DL (ref 0.5–1.2)
EOSINOPHILS ABSOLUTE: 0.1 K/UL (ref 0–0.6)
EOSINOPHILS RELATIVE PERCENT: 1.6 % (ref 0–5)
GFR AFRICAN AMERICAN: 28
GFR NON-AFRICAN AMERICAN: 23
GLUCOSE BLD-MCNC: 273 MG/DL (ref 74–109)
HCT VFR BLD CALC: 47.2 % (ref 42–52)
HEMOGLOBIN: 14.6 G/DL (ref 14–18)
IMMATURE GRANULOCYTES #: 0 K/UL
LIPASE: 25 U/L (ref 13–60)
LYMPHOCYTES ABSOLUTE: 1.1 K/UL (ref 1.1–4.5)
LYMPHOCYTES RELATIVE PERCENT: 13.4 % (ref 20–40)
MAGNESIUM: 1.9 MG/DL (ref 1.6–2.4)
MCH RBC QN AUTO: 27.9 PG (ref 27–31)
MCHC RBC AUTO-ENTMCNC: 30.9 G/DL (ref 33–37)
MCV RBC AUTO: 90.2 FL (ref 80–94)
MONOCYTES ABSOLUTE: 0.6 K/UL (ref 0–0.9)
MONOCYTES RELATIVE PERCENT: 7 % (ref 0–10)
NEUTROPHILS ABSOLUTE: 6.4 K/UL (ref 1.5–7.5)
NEUTROPHILS RELATIVE PERCENT: 77.2 % (ref 50–65)
PDW BLD-RTO: 14.5 % (ref 11.5–14.5)
PLATELET # BLD: 219 K/UL (ref 130–400)
PMV BLD AUTO: 10.9 FL (ref 9.4–12.4)
POTASSIUM REFLEX MAGNESIUM: 3 MMOL/L (ref 3.5–5)
RBC # BLD: 5.23 M/UL (ref 4.7–6.1)
SARS-COV-2, NAAT: NOT DETECTED
SODIUM BLD-SCNC: 140 MMOL/L (ref 136–145)
TOTAL PROTEIN: 7.1 G/DL (ref 6.6–8.7)
WBC # BLD: 8.3 K/UL (ref 4.8–10.8)

## 2021-11-15 PROCEDURE — 80053 COMPREHEN METABOLIC PANEL: CPT

## 2021-11-15 PROCEDURE — 96375 TX/PRO/DX INJ NEW DRUG ADDON: CPT

## 2021-11-15 PROCEDURE — 99283 EMERGENCY DEPT VISIT LOW MDM: CPT

## 2021-11-15 PROCEDURE — 2500000003 HC RX 250 WO HCPCS: Performed by: EMERGENCY MEDICINE

## 2021-11-15 PROCEDURE — 83690 ASSAY OF LIPASE: CPT

## 2021-11-15 PROCEDURE — 83735 ASSAY OF MAGNESIUM: CPT

## 2021-11-15 PROCEDURE — 74176 CT ABD & PELVIS W/O CONTRAST: CPT

## 2021-11-15 PROCEDURE — 36415 COLL VENOUS BLD VENIPUNCTURE: CPT

## 2021-11-15 PROCEDURE — 85025 COMPLETE CBC W/AUTO DIFF WBC: CPT

## 2021-11-15 PROCEDURE — 6360000002 HC RX W HCPCS: Performed by: EMERGENCY MEDICINE

## 2021-11-15 PROCEDURE — 6370000000 HC RX 637 (ALT 250 FOR IP): Performed by: EMERGENCY MEDICINE

## 2021-11-15 PROCEDURE — 96365 THER/PROPH/DIAG IV INF INIT: CPT

## 2021-11-15 PROCEDURE — 87635 SARS-COV-2 COVID-19 AMP PRB: CPT

## 2021-11-15 PROCEDURE — 93005 ELECTROCARDIOGRAM TRACING: CPT | Performed by: EMERGENCY MEDICINE

## 2021-11-15 PROCEDURE — 96376 TX/PRO/DX INJ SAME DRUG ADON: CPT

## 2021-11-15 RX ORDER — PROMETHAZINE HYDROCHLORIDE 12.5 MG/1
12.5 TABLET ORAL 3 TIMES DAILY PRN
Qty: 12 TABLET | Refills: 0 | Status: SHIPPED | OUTPATIENT
Start: 2021-11-15 | End: 2021-11-22

## 2021-11-15 RX ORDER — ONDANSETRON 2 MG/ML
4 INJECTION INTRAMUSCULAR; INTRAVENOUS ONCE
Status: COMPLETED | OUTPATIENT
Start: 2021-11-15 | End: 2021-11-15

## 2021-11-15 RX ORDER — ONDANSETRON 4 MG/1
4 TABLET, ORALLY DISINTEGRATING ORAL EVERY 8 HOURS PRN
Qty: 20 TABLET | Refills: 0 | Status: SHIPPED | OUTPATIENT
Start: 2021-11-15

## 2021-11-15 RX ORDER — CARVEDILOL 12.5 MG/1
12.5 TABLET ORAL ONCE
Status: DISCONTINUED | OUTPATIENT
Start: 2021-11-15 | End: 2021-11-15

## 2021-11-15 RX ORDER — POTASSIUM CHLORIDE 7.45 MG/ML
10 INJECTION INTRAVENOUS ONCE
Status: COMPLETED | OUTPATIENT
Start: 2021-11-15 | End: 2021-11-15

## 2021-11-15 RX ORDER — LABETALOL HYDROCHLORIDE 5 MG/ML
10 INJECTION, SOLUTION INTRAVENOUS ONCE
Status: COMPLETED | OUTPATIENT
Start: 2021-11-15 | End: 2021-11-15

## 2021-11-15 RX ORDER — ISOSORBIDE MONONITRATE 30 MG/1
30 TABLET, EXTENDED RELEASE ORAL ONCE
Status: COMPLETED | OUTPATIENT
Start: 2021-11-15 | End: 2021-11-15

## 2021-11-15 RX ADMIN — Medication: at 11:34

## 2021-11-15 RX ADMIN — POTASSIUM CHLORIDE 10 MEQ: 7.46 INJECTION, SOLUTION INTRAVENOUS at 08:30

## 2021-11-15 RX ADMIN — ONDANSETRON 4 MG: 2 INJECTION INTRAMUSCULAR; INTRAVENOUS at 08:30

## 2021-11-15 RX ADMIN — ONDANSETRON 4 MG: 2 INJECTION INTRAMUSCULAR; INTRAVENOUS at 10:27

## 2021-11-15 RX ADMIN — Medication 10 MG: at 11:34

## 2021-11-15 RX ADMIN — ISOSORBIDE MONONITRATE 30 MG: 30 TABLET, EXTENDED RELEASE ORAL at 12:05

## 2021-11-15 ASSESSMENT — ENCOUNTER SYMPTOMS
EYE REDNESS: 0
NAUSEA: 1
RHINORRHEA: 0
DIARRHEA: 0
EYE PAIN: 0
COUGH: 0
SHORTNESS OF BREATH: 0
VOMITING: 1
VOICE CHANGE: 0
ABDOMINAL PAIN: 0

## 2021-11-15 ASSESSMENT — PAIN SCALES - GENERAL: PAINLEVEL_OUTOF10: 5

## 2021-11-15 NOTE — ED NOTES
Patient passed PO challenge, no reports of nausea or emesis.       Broseley TARA Eddy  11/15/21 6910

## 2021-11-15 NOTE — ED PROVIDER NOTES
[facility] [place] EMERGENCY DEPT  EMERGENCY DEPARTMENT ENCOUNTER      Pt Name: Maryellen Ortiz  MRN: [id number]  [place] 1955  Date of evaluation: 11/15/2021  Provider: Yusuf Root MD    [address] [place] [address]       Chief Complaint   Patient presents with    Emesis     Started Saturday morning. Unable to keep down food since         HISTORY OF PRESENT ILLNESS   (Location/Symptom, Timing/Onset,Context/Setting, Quality, Duration, Modifying Factors, Severity)  Note limiting factors. Maryellen Ortiz is a 73 y.o. male who presents to the emergency department for evaluation after having persistent nausea and vomiting over the last 3 days. States he has not been able to keep anything down including water or his medications. States he has been constipated which he thought was just because he is dehydrated and has not kept anything down. Denies any abdominal pain or distention. No prior abdominal surgeries and no history of bowel obstruction. Has a history of coronary artery disease, diabetes, and CHF. Was recently admitted for CHF exacerbation and states his respiratory status is much better now than it had been. Has not had any worsening of fluid retention, shortness of breath, or other symptoms after he was discharged from the hospital 8 days ago. HPI    NursingNotes were reviewed. REVIEW OF SYSTEMS    (2-9 systems for level 4, 10 or more for level 5)     Review of Systems   Constitutional: Negative for fatigue and fever. HENT: Negative for congestion, rhinorrhea and voice change. Eyes: Negative for pain and redness. Respiratory: Negative for cough and shortness of breath. Cardiovascular: Negative for chest pain. Gastrointestinal: Positive for nausea and vomiting. Negative for abdominal pain and diarrhea. Endocrine: Negative. Genitourinary: Negative. Musculoskeletal: Negative for arthralgias and gait problem. Skin: Negative for rash and wound. Neurological: Negative for weakness and headaches. Hematological: Negative. Psychiatric/Behavioral: Negative. All other systems reviewed and are negative. A complete review of systems was performed and is negative except as noted above in the HPI.        PAST MEDICAL HISTORY     Past Medical History:   Diagnosis Date    Acute kidney failure, unspecified ([address]     Anxiety state, unspecified     Atrial septal aneurysm 01/09/2016    Blood circulation, collateral     Body mass index 30.0-30.9, adult     CAD (coronary artery disease) 01/10/2016    1/9/16  lexiscan  Positive for apical MI + myocardial ischemia, EF 42%, intermediate risk findings, AUC indication 16, AUC score 7 1/10/16  Cath  3 lesions in the LAD:  Mid: 70% 2.25x23, mid 90% 2.25 x 28, distal 100% 2.0x28, anterior lateral apical hypokinesis, EF 45%    Calculus of kidney     Carotid artery stenosis 06/26/2013    Cellulitis and abscess of hand, except fingers and thumb     Cerebral artery occlusion with cerebral infarction ([address]     15 years ago    Chronic airway obstruction, not elsewhere classified     Chronic kidney disease, unspecified     Congestive heart failure, unspecified     Diabetes mellitus type II     Diverticulosis of colon (without mention of hemorrhage)     Encounter for long-term (current) use of insulin (HCC)     Esophageal reflux     Family history of ischemic heart disease     Gastric ulcer, unspecified as acute or chronic, without mention of hemorrhage, perforation, or obstruction     Hyperlipemia     Hypertension     Internal hemorrhoids without mention of complication     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, or unspecified(403.00)     Obesity, unspecified     Other specified cardiac dysrhythmias(427.89)     Palliative care patient 12/21/2020    Paroxysmal atrial fibrillation (HCC)     Peripheral vascular disease ([address]     Solitary pulmonary nodule     Surgical or other procedure not carried out because of contraindication     Thoracic aneurysm without mention of rupture     Tobacco use disorder     Type II or unspecified type diabetes mellitus without mention of complication, not stated as uncontrolled          SURGICAL HISTORY       Past Surgical History:   Procedure Laterality Date    CARDIAC CATHETERIZATION      CHOLECYSTECTOMY      CORONARY ANGIOPLASTY WITH STENT PLACEMENT  1-16    2 JACQUELINE to LAD    DIAGNOSTIC CARDIAC CATH LAB PROCEDURE      URETER STENT PLACEMENT      VASCULAR SURGERY  03- [facility]    Aortogram with bilateral selective renal artery injections    VASCULAR SURGERY  6/14/13 S    Right carotid endarterectomy with Vascu-Guard patch repair. Right cervical carotid arteriograms after endarterectomy.  VASCULAR SURGERY  7/6/15 S    Percutaneous cannulation, right common femoral artery, with a5 [place] sheath and later 6 [place] [facility] sheath. Suprarenal abdomianl aortagram.  Selective right renal arteriogram.  Right renal artery balloon angioplasty;/stent (Express 6 mm x 18mmballoon-expandable stent. . Completion suprarenal abdominal aortogram and selective right remal arteriogram. Mynx closure, right common femoral artery punctur         CURRENT MEDICATIONS       Discharge Medication List as of 11/15/2021 11:35 AM      CONTINUE these medications which have NOT CHANGED    Details   bumetanide (BUMEX) 1 MG tablet Take 1 tablet by mouth 2 times daily, Disp-60 tablet, R-5Normal      metOLazone (ZAROXOLYN) 2.5 MG tablet Take 1 tablet by mouth once a week On Friday, Disp-30 tablet, R-0Normal      clopidogrel (PLAVIX) 75 MG tablet TAKE ONE TABLET BY MOUTH EVERY DAY, Disp-30 tablet, R-3Normal      minoxidil (LONITEN) 2.5 MG tablet TAKE ONE TABLET BY MOUTH TWICE A DAY, Disp-60 tablet, R-5Normal      isosorbide mononitrate (IMDUR) 60 MG extended release tablet Take one tablet by mouth twice daily (needs appt for further refills), Disp-60 tablet, R-5Normal      ELIQUIS 5 MG TABS tablet TAKE ONE TABLET BY MOUTH TWICE A DAY, Disp-60 tablet, R-5Normal      amiodarone (CORDARONE) 200 MG tablet Take 1 tablet by mouth daily, Disp-30 tablet, R-0Normal      rosuvastatin (CRESTOR) 40 MG tablet Take 0.5 tablets by mouth daily, Disp-30 tablet, R-0Normal      carvedilol (COREG) 12.5 MG tablet Take 1 tablet by mouth 2 times daily, Disp-60 tablet, R-3Normal      potassium chloride (KLOR-CON M) 20 MEQ extended release tablet Take 1 tablet by mouth daily (with breakfast), Disp-60 tablet, R-3Normal      pantoprazole (PROTONIX) 40 MG tablet Take 1 tablet by mouth 2 times daily (before meals), Disp-60 tablet, R-3Normal      ascorbic acid (VITAMIN C) 500 MG tablet Take 1 tablet by mouth daily, Disp-30 tablet, R-3Normal      calcitRIOL (ROCALTROL) 0.25 MCG capsule Take 0.25 mcg by mouth three times a weekHistorical Med      diphenhydrAMINE (BENADRYL) 25 MG capsule Take 25 mg by mouth every 6 hours as needed for ItchingHistorical Med      senna-docusate (PERICOLACE) 8.6-50 MG per tablet Take 1 tablet by mouth nightlyHistorical Med      allopurinol (ZYLOPRIM) 100 MG tablet Take 200 mg by mouth dailyHistorical Med      aspirin 81 MG chewable tablet Take 1 tablet by mouth daily, Disp-30 tablet, R-3Normal      vitamin D (ERGOCALCIFEROL) 1.25 MG (92313 UT) CAPS capsule Take 50,000 Units by mouth once a weekHistorical Med      nitroGLYCERIN (NITROSTAT) 0.4 MG SL tablet up to max of 3 total doses.  If no relief after 1 dose, call 911., Disp-25 tablet,R-0Normal      NOVOLOG FLEXPEN 100 UNIT/ML injection pen 5 Units 3 times daily (before meals) , DAWHistorical Med      montelukast (SINGULAIR) 10 MG tablet Take 10 mg by mouth dailyHistorical Med      levocetirizine (XYZAL) 5 MG tablet Take 5 mg by mouth nightlyHistorical Med      ULTICARE MINI PEN NEEDLES 31G X 6 MM MISC Disp-50 each, R-5, Normal      LANTUS SOLOSTAR 100 UNIT/ML injection pen INJECT 30 UNITS IN THE MORNING AND 20 UNITS IN THE EVENING DAILY, Disp-15 mL, R-5 ALLERGIES     Hydralazine and Morphine    FAMILY HISTORY       Family History   Problem Relation Age of Onset    High Blood Pressure Father     Cancer Father     High Blood Pressure Mother     High Blood Pressure Brother           SOCIAL HISTORY       Social History     Socioeconomic History    Marital status:      Spouse name: None    Number of children: None    Years of education: None    Highest education level: None   Occupational History    None   Tobacco Use    Smoking status: Former Smoker     Packs/day: 0.25    Smokeless tobacco: Never Used   Vaping Use    Vaping Use: Never used   Substance and Sexual Activity    Alcohol use: No    Drug use: No    Sexual activity: Yes   Other Topics Concern    None   Social History Narrative    None     Social Determinants of Health     Financial Resource Strain:     Difficulty of Paying Living Expenses: Not on file   Food Insecurity:     Worried About Running Out of Food in the Last Year: Not on file    [place] of Food in the Last Year: Not on file   Transportation Needs:     Lack of Transportation (Medical): Not on file    Lack of Transportation (Non-Medical):  Not on file   Physical Activity:     Days of Exercise per Week: Not on file    Minutes of Exercise per Session: Not on file   Stress:     Feeling of Stress : Not on file   Social Connections:     Frequency of Communication with Friends and Family: Not on file    Frequency of Social Gatherings with Friends and Family: Not on file    Attends Muslim Services: Not on file    Active Member of Clubs or Organizations: Not on file    Attends Club or Organization Meetings: Not on file    Marital Status: Not on file   Intimate Partner Violence:     Fear of Current or Ex-Partner: Not on file    Emotionally Abused: Not on file    Physically Abused: Not on file    Sexually Abused: Not on file   Housing Stability:     Unable to Pay for Housing in the Last Year: Not on file    Number of Places Lived in the Last Year: Not on file    Unstable Housing in the Last Year: Not on file       SCREENINGS             PHYSICAL EXAM    (up to 7 for level 4, 8 or more for level 5)     ED Triage Vitals [11/15/21 0707]   BP Temp Temp Source Pulse Resp SpO2 Height Weight   (!) 171/78 98.3 °F (36.8 °C) Axillary 62 22 98 % -- --       Physical Exam  Vitals and nursing note reviewed. Constitutional:       General: He is not in acute distress. Appearance: Normal appearance. He is well-developed. He is not diaphoretic. HENT:      Head: Normocephalic and atraumatic. Mouth/Throat:      Pharynx: No oropharyngeal exudate. Eyes:      General: No scleral icterus. Pupils: Pupils are equal, round, and reactive to light. Neck:      Trachea: No tracheal deviation. Cardiovascular:      Rate and Rhythm: Normal rate. Pulses: Normal pulses. Heart sounds: Normal heart sounds. Pulmonary:      Effort: Pulmonary effort is normal.      Breath sounds: Normal breath sounds. No stridor. No wheezing or rhonchi. Abdominal:      General: Abdomen is protuberant. There is no distension. Palpations: Abdomen is soft. Abdomen is not rigid. Tenderness: There is no abdominal tenderness. There is no guarding. Hernia: No hernia is present. Musculoskeletal:         General: No deformity. Cervical back: Normal range of motion. Skin:     General: Skin is warm and dry. Findings: No rash. Neurological:      Mental Status: He is alert and oriented to person, place, and time. Cranial Nerves: No cranial nerve deficit. Coordination: Coordination normal.   Psychiatric:         Behavior: Behavior normal.         DIAGNOSTIC RESULTS     EKG: All EKG's are interpreted by the Emergency Department Physician who either signs or Co-signs this chart in the absence of a cardiologist.    *Sinus rhythm with a rate of 58. QTc interval prolonged at 571.   No findings of acute ischemia or infarction. Evidence of LVH. RADIOLOGY:   Non-plain film images such as CT, Ultrasound and MRI are read by the radiologist. Cesar Chimera images are visualized and preliminarily interpreted by the emergency physician with the below findings:    *    Interpretation per the Radiologist below, if available at the time of this note:    CT ABDOMEN PELVIS WO CONTRAST Additional Contrast? None   Final Result   1. Moderate fecal stasis with no evidence for small bowel obstruction. No free air or abscess. 2. Hypodense renal lesions favoring cysts on nonenhanced imaging. 3. Moderate vascular calcification with no aneurysm. 4. Fatty containing umbilical and inguinal hernias. 5. Moderate vascular calcification with cardiomegaly. Signed by Dr Lexi Ramirez            ED BEDSIDE ULTRASOUND:   Performed by ED Physician - none    LABS:  Labs Reviewed   CBC WITH AUTO DIFFERENTIAL - Abnormal; Notable for the following components:       Result Value    MCHC 30.9 (*)     Neutrophils % 77.2 (*)     Lymphocytes % 13.4 (*)     All other components within normal limits   COMPREHENSIVE METABOLIC PANEL W/ REFLEX TO MG FOR LOW K - Abnormal; Notable for the following components:    Potassium reflex Magnesium 3.0 (*)     Chloride 90 (*)     CO2 37 (*)     Glucose 273 (*)     BUN 44 (*)     CREATININE 2.8 (*)     GFR Non- 23 (*)     GFR  28 (*)     All other components within normal limits   COVID-19, RAPID   LIPASE   MAGNESIUM       All other labs were within normal range or not returned as of this dictation.     Medications   ondansetron (ZOFRAN) injection 4 mg (4 mg IntraVENous Given 11/15/21 0830)   potassium chloride 10 mEq/100 mL IVPB (Peripheral Line) (0 mEq IntraVENous Stopped 11/15/21 0952)   ondansetron (ZOFRAN) injection 4 mg (4 mg IntraVENous Given 11/15/21 1027)   labetalol (NORMODYNE;TRANDATE) injection 10 mg (10 mg IntraVENous Given 11/15/21 1134)   isosorbide mononitrate (IMDUR) extended release tablet 30 mg (30 mg Oral Given 11/15/21 1205)   aluminum & magnesium hydroxide-simethicone (MAALOX) 30 mL, lidocaine viscous hcl (XYLOCAINE) 5 mL (GI COCKTAIL) ( Oral Given 11/15/21 1134)       EMERGENCY DEPARTMENT COURSE and DIFFERENTIALDIAGNOSIS/MDM:   Vitals:    Vitals:    11/15/21 1108 11/15/21 1132 11/15/21 1202 11/15/21 1208   BP: (!) 160/81 (!) 171/81 (!) 155/77 (!) 151/79   Pulse: 57 55 (!) 48 50   Resp: 13 15 17 15   Temp:       TempSrc:       SpO2: 99% 99% 95% 96%       MDM    ED Course as of 11/15/21 1522   Mon Nov 15, 2021   1111 Patient is hypertensive here, likely in part due to not being able to keep down his medications in the last 1 to 2 days. Laboratory evaluation shows slight decrease in GFR from recent baseline as well as worsening hypokalemia. CT shows no evidence of bowel obstruction. After treatment with antiemetics, patient had resolution of vomiting and is able to keep fluids and medications down. [JESSE]   1997 Discussed with patient and his wife. He reports symptoms have resolved. His main concern was not being able to keep medications down for the last couple days. He did not have any antiemetics at home. Sent in prescriptions for Zofran and Phenergan. Patient did have mildly prolonged QT interval on initial EKG likely due to hypokalemia which was corrected here. [JESSE]      ED Course User Index  [JESSE] Erlinda Lora., MD     Evaluation and work-up here revealed no signs of emergent or life-threatening pathology that would necessitate admission for further work-up or management at this time. Patient is felt to be stable for discharge home with return precautions for worsening of the condition or development of new concerning symptoms. Patient was encouraged to follow-up with their primary care doctor in the appropriate timeframe. Necessary prescriptions and information have been provided for treatment at home.   Patient voices understanding and agreement with the plan.      CONSULTS:  None    PROCEDURES:  Unless otherwise notedbelow, none     Procedures      FINAL IMPRESSION     1. Non-intractable vomiting with nausea, unspecified vomiting type    2. Stage 3b chronic kidney disease ([facility]    3. Chronic systolic CHF (congestive heart failure) ([facility]    4.  Hypokalemia          DISPOSITION/PLAN   DISPOSITION        PATIENT REFERRED TO:  [facility] EMERGENCY DEPT  Jeff Emily  [phone]    If symptoms worsen    Germania Mitchell MD  [address] [place] [address] [address]   [phone]      As needed      DISCHARGE MEDICATIONS:  Discharge Medication List as of 11/15/2021 11:35 AM      START taking these medications    Details   ondansetron (ZOFRAN ODT) 4 MG disintegrating tablet Take 1 tablet by mouth every 8 hours as needed for Nausea or Vomiting, Disp-20 tablet, R-0Normal      promethazine (PHENERGAN) 12.5 MG tablet Take 1 tablet by mouth 3 times daily as needed for Nausea, Disp-12 tablet, R-0Normal                (Please note that portions of this note were completed with a voice recognition program.  Efforts were made to edit the dictations butoccasionally words are mis-transcribed.)    Jordan Morillo MD (electronically signed)  AttendingEmergency Physician          Jordan Vargas MD  11/15/21 7660

## 2021-11-16 LAB
EKG P AXIS: -24 DEGREES
EKG P-R INTERVAL: 216 MS
EKG Q-T INTERVAL: 460 MS
EKG QRS DURATION: 110 MS
EKG QTC CALCULATION (BAZETT): 455 MS
EKG T AXIS: 30 DEGREES

## 2021-11-16 PROCEDURE — 93010 ELECTROCARDIOGRAM REPORT: CPT | Performed by: INTERNAL MEDICINE

## 2021-11-29 ENCOUNTER — OFFICE VISIT (OUTPATIENT)
Dept: CARDIOLOGY CLINIC | Age: 66
End: 2021-11-29
Payer: COMMERCIAL

## 2021-11-29 VITALS
DIASTOLIC BLOOD PRESSURE: 82 MMHG | SYSTOLIC BLOOD PRESSURE: 140 MMHG | BODY MASS INDEX: 33.88 KG/M2 | HEIGHT: 71 IN | HEART RATE: 57 BPM | WEIGHT: 242 LBS

## 2021-11-29 DIAGNOSIS — R06.02 SHORTNESS OF BREATH: ICD-10-CM

## 2021-11-29 DIAGNOSIS — I50.32 CHRONIC DIASTOLIC CONGESTIVE HEART FAILURE (HCC): ICD-10-CM

## 2021-11-29 DIAGNOSIS — I25.10 CORONARY ARTERY DISEASE INVOLVING NATIVE CORONARY ARTERY OF NATIVE HEART WITHOUT ANGINA PECTORIS: ICD-10-CM

## 2021-11-29 DIAGNOSIS — R94.31 ABNORMAL EKG: ICD-10-CM

## 2021-11-29 DIAGNOSIS — Z79.01 CHRONIC ANTICOAGULATION: ICD-10-CM

## 2021-11-29 DIAGNOSIS — E66.9 OBESITY (BMI 30-39.9): ICD-10-CM

## 2021-11-29 DIAGNOSIS — I21.4 NSTEMI (NON-ST ELEVATED MYOCARDIAL INFARCTION) (HCC): ICD-10-CM

## 2021-11-29 DIAGNOSIS — Z79.899 ON AMIODARONE THERAPY: ICD-10-CM

## 2021-11-29 DIAGNOSIS — E78.2 MIXED HYPERLIPIDEMIA: ICD-10-CM

## 2021-11-29 DIAGNOSIS — I48.0 PAF (PAROXYSMAL ATRIAL FIBRILLATION) (HCC): ICD-10-CM

## 2021-11-29 DIAGNOSIS — I25.10 CORONARY ARTERY DISEASE INVOLVING NATIVE CORONARY ARTERY OF NATIVE HEART WITHOUT ANGINA PECTORIS: Primary | ICD-10-CM

## 2021-11-29 LAB
ANION GAP SERPL CALCULATED.3IONS-SCNC: 16 MMOL/L (ref 7–19)
BUN BLDV-MCNC: 36 MG/DL (ref 8–23)
CALCIUM SERPL-MCNC: 9.4 MG/DL (ref 8.8–10.2)
CHLORIDE BLD-SCNC: 92 MMOL/L (ref 98–111)
CO2: 34 MMOL/L (ref 22–29)
CREAT SERPL-MCNC: 2.7 MG/DL (ref 0.5–1.2)
D DIMER: 0.38 UG/ML FEU (ref 0–0.48)
GFR AFRICAN AMERICAN: 29
GFR NON-AFRICAN AMERICAN: 24
GLUCOSE BLD-MCNC: 213 MG/DL (ref 74–109)
POTASSIUM SERPL-SCNC: 3.2 MMOL/L (ref 3.5–5)
PRO-BNP: 1338 PG/ML (ref 0–900)
SODIUM BLD-SCNC: 142 MMOL/L (ref 136–145)

## 2021-11-29 PROCEDURE — 99214 OFFICE O/P EST MOD 30 MIN: CPT | Performed by: INTERNAL MEDICINE

## 2021-11-29 ASSESSMENT — ENCOUNTER SYMPTOMS
GASTROINTESTINAL NEGATIVE: 1
VOMITING: 0
SHORTNESS OF BREATH: 0
NAUSEA: 0
RESPIRATORY NEGATIVE: 1
EYES NEGATIVE: 1
DIARRHEA: 0

## 2021-11-29 NOTE — PROGRESS NOTES
[facility] Progress Note                            Date:  11/29/2021  Patient: Rhett Angelucci  Age:  73 y.o., 1955      Reason for evaluation:         SUBJECTIVE:    Returns today for follow-up assessment followed for coronary artery disease paroxysmal atrial fibrillation hypertension chronic anticoagulation chronic antiarrhythmic therapy and chronic congestive heart failure. Denies anginal chest pain does report some shortness of breath since he broke his femur in February which is slowly gradually improving. No other complaints or issues reported. He did have some recent adjustment in his renal medications which seem to help. Denies bleeding issues. Review of Systems   Constitutional: Negative. Negative for chills, fever and unexpected weight change. HENT: Negative. Eyes: Negative. Respiratory: Negative. Negative for shortness of breath. Cardiovascular: Negative. Negative for chest pain. Gastrointestinal: Negative. Negative for diarrhea, nausea and vomiting. Endocrine: Negative. Genitourinary: Negative. Musculoskeletal: Negative. Skin: Negative. Neurological: Negative. All other systems reviewed and are negative. OBJECTIVE:     BP (!) 140/82   Pulse 57   Ht 5' 11\" (1.803 m)   Wt 242 lb (109.8 kg)   BMI 33.75 kg/m²     Labs:   CBC: No results for input(s): WBC, HGB, HCT, PLT in the last 72 hours. BMP:No results for input(s): NA, K, CO2, BUN, CREATININE, LABGLOM, GLUCOSE in the last 72 hours. BNP: No results for input(s): BNP in the last 72 hours. PT/INR: No results for input(s): PROTIME, INR in the last 72 hours. APTT:No results for input(s): APTT in the last 72 hours. CARDIAC ENZYMES:No results for input(s): CKTOTAL, CKMB, CKMBINDEX, TROPONINI in the last 72 hours.   FASTING LIPID PANEL:  Lab Results   Component Value Date    HDL 32 12/19/2020    LDLDIRECT 153 06/12/2013    LDLCALC 43 12/19/2020    TRIG 142 12/19/2020     LIVER PROFILE:No results for input(s): AST, ALT, LABALBU in the last 72 hours.         Past Medical History:   Diagnosis Date    Acute kidney failure, unspecified ([address]     Anxiety state, unspecified     Atrial septal aneurysm 01/09/2016    Blood circulation, collateral     Body mass index 30.0-30.9, adult     CAD (coronary artery disease) 01/10/2016    1/9/16  lexiscan  Positive for apical MI + myocardial ischemia, EF 42%, intermediate risk findings, AUC indication 16, AUC score 7 1/10/16  Cath  3 lesions in the LAD:  Mid: 70% 2.25x23, mid 90% 2.25 x 28, distal 100% 2.0x28, anterior lateral apical hypokinesis, EF 45%    Calculus of kidney     Carotid artery stenosis 06/26/2013    Cellulitis and abscess of hand, except fingers and thumb     Cerebral artery occlusion with cerebral infarction ([address]     15 years ago    Chronic airway obstruction, not elsewhere classified     Chronic kidney disease, unspecified     Congestive heart failure, unspecified     Diabetes mellitus type II     Diverticulosis of colon (without mention of hemorrhage)     Encounter for long-term (current) use of insulin (HCC)     Esophageal reflux     Family history of ischemic heart disease     Gastric ulcer, unspecified as acute or chronic, without mention of hemorrhage, perforation, or obstruction     Hyperlipemia     Hypertension     Internal hemorrhoids without mention of complication     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, or unspecified(403.00)     Obesity, unspecified     Other specified cardiac dysrhythmias(427.89)     Palliative care patient 12/21/2020    Paroxysmal atrial fibrillation (HCC)     Peripheral vascular disease ([address]     Solitary pulmonary nodule     Surgical or other procedure not carried out because of contraindication     Thoracic aneurysm without mention of rupture     Tobacco use disorder     Type II or unspecified type diabetes mellitus without mention of complication, not stated as uncontrolled      Past Surgical History:   Procedure Laterality Date    CARDIAC CATHETERIZATION      CHOLECYSTECTOMY      CORONARY ANGIOPLASTY WITH STENT PLACEMENT  1-16    2 JACQUELINE to LAD    DIAGNOSTIC CARDIAC CATH LAB PROCEDURE      URETER STENT PLACEMENT      VASCULAR SURGERY  03- [facility]    Aortogram with bilateral selective renal artery injections    VASCULAR SURGERY  6/14/13 [facility]    Right carotid endarterectomy with Vascu-Guard patch repair. Right cervical carotid arteriograms after endarterectomy.  VASCULAR SURGERY  7/6/15 [facility]    Percutaneous cannulation, right common femoral artery, with a5 french sheath and later 6 french [facility] sheath. Suprarenal abdomianl aortagram.  Selective right renal arteriogram.  Right renal artery balloon angioplasty;/stent (Express 6 mm x 18mmballoon-expandable stent. . Completion suprarenal abdominal aortogram and selective right remal arteriogram. Mynx closure, right common femoral artery punctur     Family History   Problem Relation Age of Onset    High Blood Pressure Father     Cancer Father     High Blood Pressure Mother     High Blood Pressure Brother      Allergies   Allergen Reactions    Hydralazine     Morphine      Current Outpatient Medications   Medication Sig Dispense Refill    ondansetron (ZOFRAN ODT) 4 MG disintegrating tablet Take 1 tablet by mouth every 8 hours as needed for Nausea or Vomiting 20 tablet 0    bumetanide (BUMEX) 1 MG tablet Take 1 tablet by mouth 2 times daily 60 tablet 5    metOLazone (ZAROXOLYN) 2.5 MG tablet Take 1 tablet by mouth once a week On Friday 30 tablet 0    clopidogrel (PLAVIX) 75 MG tablet TAKE ONE TABLET BY MOUTH EVERY DAY 30 tablet 3    minoxidil (LONITEN) 2.5 MG tablet TAKE ONE TABLET BY MOUTH TWICE A DAY 60 tablet 5    isosorbide mononitrate (IMDUR) 60 MG extended release tablet Take one tablet by mouth twice daily (needs appt for further refills) 60 tablet 5    ELIQUIS 5 MG TABS tablet TAKE ONE TABLET BY MOUTH TWICE A DAY 60 tablet 5    amiodarone (CORDARONE) 200 MG tablet Take 1 tablet by mouth daily 30 tablet 0    rosuvastatin (CRESTOR) 40 MG tablet Take 0.5 tablets by mouth daily 30 tablet 0    carvedilol (COREG) 12.5 MG tablet Take 1 tablet by mouth 2 times daily 60 tablet 3    potassium chloride (KLOR-CON M) 20 MEQ extended release tablet Take 1 tablet by mouth daily (with breakfast) 60 tablet 3    pantoprazole (PROTONIX) 40 MG tablet Take 1 tablet by mouth 2 times daily (before meals) 60 tablet 3    ascorbic acid (VITAMIN C) 500 MG tablet Take 1 tablet by mouth daily 30 tablet 3    calcitRIOL (ROCALTROL) 0.25 MCG capsule Take 0.25 mcg by mouth three times a week      diphenhydrAMINE (BENADRYL) 25 MG capsule Take 25 mg by mouth every 6 hours as needed for Itching      senna-docusate (PERICOLACE) 8.6-50 MG per tablet Take 1 tablet by mouth nightly      allopurinol (ZYLOPRIM) 100 MG tablet Take 200 mg by mouth daily      aspirin 81 MG chewable tablet Take 1 tablet by mouth daily 30 tablet 3    vitamin D (ERGOCALCIFEROL) 1.25 MG (23960 UT) CAPS capsule Take 50,000 Units by mouth once a week      nitroGLYCERIN (NITROSTAT) 0.4 MG SL tablet up to max of 3 total doses. If no relief after 1 dose, call 911. 25 tablet 0    NOVOLOG FLEXPEN 100 UNIT/ML injection pen 5 Units 3 times daily (before meals)       montelukast (SINGULAIR) 10 MG tablet Take 10 mg by mouth daily      levocetirizine (XYZAL) 5 MG tablet Take 5 mg by mouth nightly      ULTICARE MINI PEN NEEDLES 31G X 6 MM MISC FOR USE WITH LANTUS TWO TIMES A DAY 50 each 5    LANTUS SOLOSTAR 100 UNIT/ML injection pen INJECT 30 UNITS IN THE MORNING AND 20 UNITS IN THE EVENING DAILY (Patient taking differently: 40 Units nightly ) 15 mL 5     No current facility-administered medications for this visit.      Social History     Socioeconomic History    Marital status:      Spouse name: Not on file    Number of children: Not on file    Years of education: Not on file    Highest education level: Not on file   Occupational History    Not on file   Tobacco Use    Smoking status: Former Smoker     Packs/day: 0.25    Smokeless tobacco: Never Used   Vaping Use    Vaping Use: Never used   Substance and Sexual Activity    Alcohol use: No    Drug use: No    Sexual activity: Yes   Other Topics Concern    Not on file   Social History Narrative    Not on file     Social Determinants of Health     Financial Resource Strain:     Difficulty of Paying Living Expenses: Not on file   Food Insecurity:     Worried About Running Out of Food in the Last Year: Not on file    [place] of Food in the Last Year: Not on file   Transportation Needs:     Lack of Transportation (Medical): Not on file    Lack of Transportation (Non-Medical): Not on file   Physical Activity:     Days of Exercise per Week: Not on file    Minutes of Exercise per Session: Not on file   Stress:     Feeling of Stress : Not on file   Social Connections:     Frequency of Communication with Friends and Family: Not on file    Frequency of Social Gatherings with Friends and Family: Not on file    Attends Sikhism Services: Not on file    Active Member of [address] [place] [address] or Organizations: Not on file    Attends Club or Organization Meetings: Not on file    Marital Status: Not on file   Intimate Partner Violence:     Fear of Current or Ex-Partner: Not on file    Emotionally Abused: Not on file    Physically Abused: Not on file    Sexually Abused: Not on file   Housing Stability:     Unable to Pay for Housing in the Last Year: Not on file    Number of [place] in the Last Year: Not on file    Unstable Housing in the Last Year: Not on file       Physical Examination:  BP (!) 140/82   Pulse 57   Ht 5' 11\" (1.803 m)   Wt 242 lb (109.8 kg)   BMI 33.75 kg/m²   Physical Exam  Vitals reviewed. Constitutional:       Appearance: He is well-developed.    Neck: Vascular: No carotid bruit or JVD. Cardiovascular:      Rate and Rhythm: Normal rate and regular rhythm. Heart sounds: Normal heart sounds. No murmur heard. No friction rub. No gallop. Pulmonary:      Effort: Pulmonary effort is normal. No respiratory distress. Breath sounds: Normal breath sounds. No wheezing or rales. Abdominal:      General: There is no distension. Tenderness: There is no abdominal tenderness. Lymphadenopathy:      Cervical: No cervical adenopathy. Skin:     General: Skin is warm and dry. ASSESSMENT:     Diagnosis Orders   1. Coronary artery disease involving native coronary artery of native heart without angina pectoris     2. NSTEMI (non-ST elevated myocardial infarction) ([address]     3. Mixed hyperlipidemia     4. PAF (paroxysmal atrial fibrillation) ([facility])     5. Obesity (BMI 30-39.9)     6. Abnormal EKG     7. On amiodarone therapy     8. Shortness of breath     9. Chronic diastolic congestive heart failure ([address]     10. Chronic anticoagulation         PLAN:  No orders of the defined types were placed in this encounter. No orders of the defined types were placed in this encounter. 1. Continue present medications  2. Laboratory studies as ordered  3. Recommend follow-up assessment in 3 months    Return in about 3 months (around 2/28/2022) for return to Dr. Jyoti Johnston only. Yesica Weinstein MD 11/29/2021 2:49 PM [facility]      Thisdictation was generated by voice recognition computer software. Although all attempts are made to edit the dictation for accuracy, there may be errors in the transcription that are not intended.

## 2022-01-03 RX ORDER — APIXABAN 5 MG/1
TABLET, FILM COATED ORAL
Qty: 60 TABLET | Refills: 5 | Status: SHIPPED | OUTPATIENT
Start: 2022-01-03 | End: 2022-07-06

## 2022-01-11 NOTE — TELEPHONE ENCOUNTER
Rx Refill Note  Requested Prescriptions     Pending Prescriptions Disp Refills   • esomeprazole (nexIUM) 40 MG capsule [Pharmacy Med Name: ESOMEPRAZOLE MAGNESIUM 40MG CPDR] 60 capsule 5     Sig: TAKE ONE CAPSULE BY MOUTH TWICE A DAY      Last office visit with prescribing clinician: 10/28/2021      Next office visit with prescribing clinician: 1/28/2022    Last refill 7/13/21 quantity 60 with 5 refills    Mirtha Aguilar  01/11/22, 13:22 CST

## 2022-01-12 RX ORDER — ESOMEPRAZOLE MAGNESIUM 40 MG/1
CAPSULE, DELAYED RELEASE ORAL
Qty: 60 CAPSULE | Refills: 5 | Status: SHIPPED | OUTPATIENT
Start: 2022-01-12 | End: 2022-07-14

## 2022-02-11 ENCOUNTER — TELEPHONE (OUTPATIENT)
Dept: FAMILY MEDICINE CLINIC | Facility: CLINIC | Age: 67
End: 2022-02-11

## 2022-03-03 ENCOUNTER — OFFICE VISIT (OUTPATIENT)
Dept: CARDIOLOGY CLINIC | Age: 67
End: 2022-03-03
Payer: MEDICARE

## 2022-03-03 VITALS
BODY MASS INDEX: 33.74 KG/M2 | WEIGHT: 241 LBS | SYSTOLIC BLOOD PRESSURE: 136 MMHG | HEIGHT: 71 IN | DIASTOLIC BLOOD PRESSURE: 80 MMHG | HEART RATE: 58 BPM

## 2022-03-03 DIAGNOSIS — Z79.899 ON AMIODARONE THERAPY: ICD-10-CM

## 2022-03-03 DIAGNOSIS — E78.2 MIXED HYPERLIPIDEMIA: ICD-10-CM

## 2022-03-03 DIAGNOSIS — I73.9 PERIPHERAL VASCULAR DISEASE (HCC): ICD-10-CM

## 2022-03-03 DIAGNOSIS — E66.9 OBESITY (BMI 30-39.9): ICD-10-CM

## 2022-03-03 DIAGNOSIS — Z79.01 CHRONIC ANTICOAGULATION: ICD-10-CM

## 2022-03-03 DIAGNOSIS — I71.20 THORACIC AORTIC ANEURYSM WITHOUT RUPTURE: ICD-10-CM

## 2022-03-03 DIAGNOSIS — I48.0 PAF (PAROXYSMAL ATRIAL FIBRILLATION) (HCC): Primary | ICD-10-CM

## 2022-03-03 DIAGNOSIS — I25.10 CORONARY ARTERY DISEASE INVOLVING NATIVE CORONARY ARTERY OF NATIVE HEART WITHOUT ANGINA PECTORIS: ICD-10-CM

## 2022-03-03 PROCEDURE — 1036F TOBACCO NON-USER: CPT | Performed by: INTERNAL MEDICINE

## 2022-03-03 PROCEDURE — 1123F ACP DISCUSS/DSCN MKR DOCD: CPT | Performed by: INTERNAL MEDICINE

## 2022-03-03 PROCEDURE — 93000 ELECTROCARDIOGRAM COMPLETE: CPT | Performed by: INTERNAL MEDICINE

## 2022-03-03 PROCEDURE — G8427 DOCREV CUR MEDS BY ELIG CLIN: HCPCS | Performed by: INTERNAL MEDICINE

## 2022-03-03 PROCEDURE — 93246 EXT ECG>7D<15D RECORDING: CPT | Performed by: INTERNAL MEDICINE

## 2022-03-03 PROCEDURE — G8417 CALC BMI ABV UP PARAM F/U: HCPCS | Performed by: INTERNAL MEDICINE

## 2022-03-03 PROCEDURE — 99214 OFFICE O/P EST MOD 30 MIN: CPT | Performed by: INTERNAL MEDICINE

## 2022-03-03 PROCEDURE — 4040F PNEUMOC VAC/ADMIN/RCVD: CPT | Performed by: INTERNAL MEDICINE

## 2022-03-03 PROCEDURE — 3017F COLORECTAL CA SCREEN DOC REV: CPT | Performed by: INTERNAL MEDICINE

## 2022-03-03 PROCEDURE — G8484 FLU IMMUNIZE NO ADMIN: HCPCS | Performed by: INTERNAL MEDICINE

## 2022-03-03 ASSESSMENT — ENCOUNTER SYMPTOMS
NAUSEA: 0
RESPIRATORY NEGATIVE: 1
DIARRHEA: 0
SHORTNESS OF BREATH: 0
VOMITING: 0
GASTROINTESTINAL NEGATIVE: 1
EYES NEGATIVE: 1

## 2022-03-03 NOTE — PROGRESS NOTES
[facility] Progress Note                            Date:  3/3/2022  Patient: Jaime Priest  Age:  73 y.o., 1955      Reason for evaluation:         SUBJECTIVE:    Returns today follow-up assessment paroxysmal atrial fibrillation shortness of breath chronic heart failure hyperlipidemia cardiomyopathy coronary artery disease. Denies chest pain. Denies any bleeding issues. At times he has noticed his heart rate getting down as low as 45 he thinks. Has not had cardiac monitoring in quite some time. We will arrange for Zio patch. Heart rate today on EKG sinus bradycardia rate of 58 previous anterior infarct anterolateral ST-T abnormalities. No other complaints or issues reported. Blood pressure 136/80 heart 58. Review of Systems   Constitutional: Negative. Negative for chills, fever and unexpected weight change. HENT: Negative. Eyes: Negative. Respiratory: Negative. Negative for shortness of breath. Cardiovascular: Negative. Negative for chest pain. Gastrointestinal: Negative. Negative for diarrhea, nausea and vomiting. Endocrine: Negative. Genitourinary: Negative. Musculoskeletal: Negative. Skin: Negative. Neurological: Negative. All other systems reviewed and are negative. OBJECTIVE:     /80   Pulse 58   Ht 5' 11\" (1.803 m)   Wt 241 lb (109.3 kg)   BMI 33.61 kg/m²     Labs:   CBC: No results for input(s): WBC, HGB, HCT, PLT in the last 72 hours. BMP:No results for input(s): NA, K, CO2, BUN, CREATININE, LABGLOM, GLUCOSE in the last 72 hours. BNP: No results for input(s): BNP in the last 72 hours. PT/INR: No results for input(s): PROTIME, INR in the last 72 hours. APTT:No results for input(s): APTT in the last 72 hours. CARDIAC ENZYMES:No results for input(s): CKTOTAL, CKMB, CKMBINDEX, TROPONINI in the last 72 hours.   FASTING LIPID PANEL:  Lab Results   Component Value Date    HDL 32 12/19/2020    LDLDIRECT 153 06/12/2013    [facility]7 [facility] 43 12/19/2020    TRIG 142 12/19/2020     LIVER PROFILE:No results for input(s): AST, ALT, LABALBU in the last 72 hours.         Past Medical History:   Diagnosis Date    Acute kidney failure, unspecified ([address]     Anxiety state, unspecified     Atrial septal aneurysm 01/09/2016    Blood circulation, collateral     Body mass index 30.0-30.9, adult     CAD (coronary artery disease) 01/10/2016    1/9/16  lexiscan  Positive for apical MI + myocardial ischemia, EF 42%, intermediate risk findings, AUC indication 16, AUC score 7 1/10/16  Cath  3 lesions in the LAD:  Mid: 70% 2.25x23, mid 90% 2.25 x 28, distal 100% 2.0x28, anterior lateral apical hypokinesis, EF 45%    Calculus of kidney     Carotid artery stenosis 06/26/2013    Cellulitis and abscess of hand, except fingers and thumb     Cerebral artery occlusion with cerebral infarction ([address]     15 years ago    Chronic airway obstruction, not elsewhere classified     Chronic kidney disease, unspecified     Congestive heart failure, unspecified     Diabetes mellitus type II     Diverticulosis of colon (without mention of hemorrhage)     Encounter for long-term (current) use of insulin (HCC)     Esophageal reflux     Family history of ischemic heart disease     Gastric ulcer, unspecified as acute or chronic, without mention of hemorrhage, perforation, or obstruction     Hyperlipemia     Hypertension     Internal hemorrhoids without mention of complication     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, or unspecified(403.00)     Obesity, unspecified     Other specified cardiac dysrhythmias(427.89)     Palliative care patient 12/21/2020    Paroxysmal atrial fibrillation (HCC)     Peripheral vascular disease ([address]     Solitary pulmonary nodule     Surgical or other procedure not carried out because of contraindication     Thoracic aneurysm without mention of rupture     Tobacco use disorder     Type II or unspecified type diabetes mellitus without mention of complication, not stated as uncontrolled      Past Surgical History:   Procedure Laterality Date    CARDIAC CATHETERIZATION      CHOLECYSTECTOMY      CORONARY ANGIOPLASTY WITH STENT PLACEMENT  1-16    2 JACQUELINE to LAD    DIAGNOSTIC CARDIAC CATH LAB PROCEDURE      URETER STENT PLACEMENT      VASCULAR SURGERY  03- [facility]    Aortogram with bilateral selective renal artery injections    VASC[facility] SURGERY  6/14/13 [facility]    Right carotid endarterectomy with Vascu-Guard patch repair. Right cervical carotid arteriograms after endarterectomy.  VASCULAR SURGERY  7/6/15 [facility]    Percutaneous cannulation, right common femoral artery, with a5 french sheath and later 6 french [facility] sheath. Suprarenal abdomianl aortagram.  Selective right renal arteriogram.  Right renal artery balloon angioplasty;/stent (Express 6 mm x 18mmballoon-expandable stent. . Completion suprarenal abdominal aortogram and selective right remal arteriogram. Mynx closure, right common femoral artery punctur     Family History   Problem Relation Age of Onset    High Blood Pressure Father     Cancer Father     High Blood Pressure Mother     High Blood Pressure Brother      Allergies   Allergen Reactions    Hydralazine     Morphine      Current Outpatient Medications   Medication Sig Dispense Refill    ELIQUIS 5 MG TABS tablet TAKE ONE TABLET BY MOUTH TWICE A DAY 60 tablet 5    ondansetron (ZOFRAN ODT) 4 MG disintegrating tablet Take 1 tablet by mouth every 8 hours as needed for Nausea or Vomiting 20 tablet 0    bumetanide (BUMEX) 1 MG tablet Take 1 tablet by mouth 2 times daily 60 tablet 5    metOLazone (ZAROXOLYN) 2.5 MG tablet Take 1 tablet by mouth once a week On Friday 30 tablet 0    clopidogrel (PLAVIX) 75 MG tablet TAKE ONE TABLET BY MOUTH EVERY DAY 30 tablet 3    minoxidil (LONITEN) 2.5 MG tablet TAKE ONE TABLET BY MOUTH TWICE A DAY 60 tablet 5    isosorbide mononitrate (IMDUR) 60 MG extended release tablet Take one tablet by mouth twice daily (needs appt for further refills) 60 tablet 5    rosuvastatin (CRESTOR) 40 MG tablet Take 0.5 tablets by mouth daily 30 tablet 0    carvedilol (COREG) 12.5 MG tablet Take 1 tablet by mouth 2 times daily 60 tablet 3    potassium chloride (KLOR-CON M) 20 MEQ extended release tablet Take 1 tablet by mouth daily (with breakfast) 60 tablet 3    pantoprazole (PROTONIX) 40 MG tablet Take 1 tablet by mouth 2 times daily (before meals) 60 tablet 3    ascorbic acid (VITAMIN C) 500 MG tablet Take 1 tablet by mouth daily 30 tablet 3    calcitRIOL (ROCALTROL) 0.25 MCG capsule Take 0.25 mcg by mouth three times a week      diphenhydrAMINE (BENADRYL) 25 MG capsule Take 25 mg by mouth every 6 hours as needed for Itching      senna-docusate (PERICOLACE) 8.6-50 MG per tablet Take 1 tablet by mouth nightly      allopurinol (ZYLOPRIM) 100 MG tablet Take 200 mg by mouth daily      aspirin 81 MG chewable tablet Take 1 tablet by mouth daily 30 tablet 3    vitamin D (ERGOCALCIFEROL) 1.25 MG (40485 UT) CAPS capsule Take 50,000 Units by mouth once a week      nitroGLYCERIN (NITROSTAT) 0.4 MG SL tablet up to max of 3 total doses. If no relief after 1 dose, call 911. 25 tablet 0    NOVOLOG FLEXPEN 100 UNIT/ML injection pen 5 Units 3 times daily (before meals)       montelukast (SINGULAIR) 10 MG tablet Take 10 mg by mouth daily      levocetirizine (XYZAL) 5 MG tablet Take 5 mg by mouth nightly      ULTICARE MINI PEN NEEDLES 31G X 6 MM MISC FOR USE WITH LANTUS TWO TIMES A DAY 50 each 5    LANTUS SOLOSTAR 100 UNIT/ML injection pen INJECT 30 UNITS IN THE MORNING AND 20 UNITS IN THE EVENING DAILY (Patient taking differently: 30 Units nightly ) 15 mL 5    amiodarone (CORDARONE) 200 MG tablet Take 1 tablet by mouth daily 30 tablet 0     No current facility-administered medications for this visit.      Social History     Socioeconomic History    Marital status:      Spouse name: Not on file    Number of children: Not on file    Years of education: Not on file    Highest education level: Not on file   Occupational History    Not on file   Tobacco Use    Smoking status: Former Smoker     Packs/day: 0.25    Smokeless tobacco: Never Used   Vaping Use    Vaping Use: Never used   Substance and Sexual Activity    Alcohol use: No    Drug use: No    Sexual activity: Yes   Other Topics Concern    Not on file   Social History Narrative    Not on file     Social Determinants of Health     Financial Resource Strain:     Difficulty of Paying Living Expenses: Not on file   Food Insecurity:     Worried About [address] [employer] in the Last Year: Not on file    [place]elia of Food in the Last Year: Not on file   Transportation Needs:     Lack of Transportation (Medical): Not on file    Lack of Transportation (Non-Medical): Not on file   Physical Activity:     Days of Exercise per Week: Not on file    Minutes of Exercise per Session: Not on file   Stress:     Feeling of Stress : Not on file   Social Connections:     Frequency of Communication with Friends and Family: Not on file    Frequency of Social Gatherings with Friends and Family: Not on file    Attends Taoism Services: Not on file    Active Member of [address] [place] [address] [employer] or Organizations: Not on file    Attends Club or Organization Meetings: Not on file    Marital Status: Not on file   Intimate Partner Violence:     Fear of Current or Ex-Partner: Not on file    Emotionally Abused: Not on file    Physically Abused: Not on file    Sexually Abused: Not on file   Housing Stability:     Unable to Pay for Housing in the Last Year: Not on file    Number of [place] in the Last Year: Not on file    Unstable Housing in the Last Year: Not on file       Physical Examination:  /80   Pulse 58   Ht 5' 11\" (1.803 m)   Wt 241 lb (109.3 kg)   BMI 33.61 kg/m²   Physical Exam  Vitals reviewed.    Constitutional: Appearance: He is well-developed. Neck:      Vascular: No carotid bruit or JVD. Cardiovascular:      Rate and Rhythm: Normal rate and regular rhythm. Heart sounds: Normal heart sounds. No murmur heard. No friction rub. No gallop. Pulmonary:      Effort: Pulmonary effort is normal. No respiratory distress. Breath sounds: Normal breath sounds. No wheezing or rales. Abdominal:      General: There is no distension. Tenderness: There is no abdominal tenderness. Lymphadenopathy:      Cervical: No cervical adenopathy. Skin:     General: Skin is warm and dry. ASSESSMENT:     Diagnosis Orders   1. Coronary artery disease involving native coronary artery of native heart without angina pectoris  EKG 12 lead   2. On amiodarone therapy  EKG 12 lead   3. Thoracic aortic aneurysm without rupture ([address]     4. PAF (paroxysmal atrial fibrillation) ([address]     5. Mixed hyperlipidemia     6. Peripheral vascular disease (HCC)     7. Obesity (BMI 30-39.9)     8. Chronic anticoagulation         PLAN:  Orders Placed This Encounter   Procedures    EKG 12 lead     No orders of the defined types were placed in this encounter. 1. Continue present medications  2. Recommend cardiac event monitor or Zio patch  3. Recommend follow-up assessment in 3 months    Return in about 6 months (around 9/3/2022) for return to Dr. Devaughn Walker only. Casa Sparks MD 3/3/2022 1:30 PM [facility]      Thisdictation was generated by voice recognition computer software. Although all attempts are made to edit the dictation for accuracy, there may be errors in the transcription that are not intended.

## 2022-03-23 ENCOUNTER — APPOINTMENT (OUTPATIENT)
Dept: CT IMAGING | Age: 67
End: 2022-03-23
Payer: MEDICARE

## 2022-03-23 ENCOUNTER — APPOINTMENT (OUTPATIENT)
Dept: GENERAL RADIOLOGY | Age: 67
End: 2022-03-23
Payer: MEDICARE

## 2022-03-23 ENCOUNTER — HOSPITAL ENCOUNTER (EMERGENCY)
Age: 67
Discharge: HOME | End: 2022-03-23
Attending: EMERGENCY MEDICINE
Payer: MEDICARE

## 2022-03-23 VITALS
RESPIRATION RATE: 19 BRPM | TEMPERATURE: 98.2 F | BODY MASS INDEX: 33.8 KG/M2 | HEIGHT: 71 IN | OXYGEN SATURATION: 95 % | HEART RATE: 57 BPM | WEIGHT: 241.4 LBS | DIASTOLIC BLOOD PRESSURE: 97 MMHG | SYSTOLIC BLOOD PRESSURE: 178 MMHG

## 2022-03-23 DIAGNOSIS — N18.9 CHRONIC KIDNEY DISEASE, UNSPECIFIED CKD STAGE: ICD-10-CM

## 2022-03-23 DIAGNOSIS — J06.9 ACUTE UPPER RESPIRATORY INFECTION: Primary | ICD-10-CM

## 2022-03-23 LAB
ADENOVIRUS BY PCR: NOT DETECTED
ALBUMIN SERPL-MCNC: 4.2 G/DL (ref 3.5–5.2)
ALP BLD-CCNC: 84 U/L (ref 40–130)
ALT SERPL-CCNC: 25 U/L (ref 5–41)
ANION GAP SERPL CALCULATED.3IONS-SCNC: 12 MMOL/L (ref 7–19)
APTT: 29 SEC (ref 26–36.2)
AST SERPL-CCNC: 18 U/L (ref 5–40)
BASOPHILS ABSOLUTE: 0 K/UL (ref 0–0.2)
BASOPHILS RELATIVE PERCENT: 0.3 % (ref 0–1)
BILIRUB SERPL-MCNC: 0.4 MG/DL (ref 0.2–1.2)
BORDETELLA PARAPERTUSSIS BY PCR: NOT DETECTED
BORDETELLA PERTUSSIS BY PCR: NOT DETECTED
BUN BLDV-MCNC: 51 MG/DL (ref 8–23)
CALCIUM SERPL-MCNC: 9.7 MG/DL (ref 8.8–10.2)
CHLAMYDOPHILIA PNEUMONIAE BY PCR: NOT DETECTED
CHLORIDE BLD-SCNC: 88 MMOL/L (ref 98–111)
CO2: 37 MMOL/L (ref 22–29)
CORONAVIRUS 229E BY PCR: NOT DETECTED
CORONAVIRUS HKU1 BY PCR: NOT DETECTED
CORONAVIRUS NL63 BY PCR: NOT DETECTED
CORONAVIRUS OC43 BY PCR: NOT DETECTED
CREAT SERPL-MCNC: 3 MG/DL (ref 0.5–1.2)
EOSINOPHILS ABSOLUTE: 0.1 K/UL (ref 0–0.6)
EOSINOPHILS RELATIVE PERCENT: 0.9 % (ref 0–5)
GFR AFRICAN AMERICAN: 25
GFR NON-AFRICAN AMERICAN: 21
GLUCOSE BLD-MCNC: 306 MG/DL (ref 74–109)
HCT VFR BLD CALC: 48.8 % (ref 42–52)
HEMOGLOBIN: 14.6 G/DL (ref 14–18)
HUMAN METAPNEUMOVIRUS BY PCR: NOT DETECTED
HUMAN RHINOVIRUS/ENTEROVIRUS BY PCR: NOT DETECTED
IMMATURE GRANULOCYTES #: 0 K/UL
INFLUENZA A BY PCR: NOT DETECTED
INFLUENZA B BY PCR: NOT DETECTED
INR BLD: 1.16 (ref 0.88–1.18)
LYMPHOCYTES ABSOLUTE: 1.7 K/UL (ref 1.1–4.5)
LYMPHOCYTES RELATIVE PERCENT: 12.3 % (ref 20–40)
MCH RBC QN AUTO: 26.2 PG (ref 27–31)
MCHC RBC AUTO-ENTMCNC: 29.9 G/DL (ref 33–37)
MCV RBC AUTO: 87.5 FL (ref 80–94)
MONOCYTES ABSOLUTE: 1 K/UL (ref 0–0.9)
MONOCYTES RELATIVE PERCENT: 6.9 % (ref 0–10)
MYCOPLASMA PNEUMONIAE BY PCR: NOT DETECTED
NEUTROPHILS ABSOLUTE: 11.2 K/UL (ref 1.5–7.5)
NEUTROPHILS RELATIVE PERCENT: 79.3 % (ref 50–65)
PARAINFLUENZA VIRUS 1 BY PCR: NOT DETECTED
PARAINFLUENZA VIRUS 2 BY PCR: NOT DETECTED
PARAINFLUENZA VIRUS 3 BY PCR: NOT DETECTED
PARAINFLUENZA VIRUS 4 BY PCR: NOT DETECTED
PDW BLD-RTO: 16.2 % (ref 11.5–14.5)
PLATELET # BLD: 208 K/UL (ref 130–400)
PMV BLD AUTO: 10.8 FL (ref 9.4–12.4)
POTASSIUM SERPL-SCNC: 3.1 MMOL/L (ref 3.5–5)
PRO-BNP: 2239 PG/ML (ref 0–900)
PROTHROMBIN TIME: 15 SEC (ref 12–14.6)
RBC # BLD: 5.58 M/UL (ref 4.7–6.1)
RESPIRATORY SYNCYTIAL VIRUS BY PCR: NOT DETECTED
SARS-COV-2, PCR: NOT DETECTED
SODIUM BLD-SCNC: 137 MMOL/L (ref 136–145)
TOTAL PROTEIN: 6.6 G/DL (ref 6.6–8.7)
TROPONIN: 0.04 NG/ML (ref 0–0.03)
TROPONIN: 0.04 NG/ML (ref 0–0.03)
WBC # BLD: 14.1 K/UL (ref 4.8–10.8)

## 2022-03-23 PROCEDURE — 99284 EMERGENCY DEPT VISIT MOD MDM: CPT

## 2022-03-23 PROCEDURE — 85730 THROMBOPLASTIN TIME PARTIAL: CPT

## 2022-03-23 PROCEDURE — 0202U NFCT DS 22 TRGT SARS-COV-2: CPT

## 2022-03-23 PROCEDURE — 80053 COMPREHEN METABOLIC PANEL: CPT

## 2022-03-23 PROCEDURE — 84484 ASSAY OF TROPONIN QUANT: CPT

## 2022-03-23 PROCEDURE — 93005 ELECTROCARDIOGRAM TRACING: CPT | Performed by: EMERGENCY MEDICINE

## 2022-03-23 PROCEDURE — 96374 THER/PROPH/DIAG INJ IV PUSH: CPT

## 2022-03-23 PROCEDURE — 85610 PROTHROMBIN TIME: CPT

## 2022-03-23 PROCEDURE — 83880 ASSAY OF NATRIURETIC PEPTIDE: CPT

## 2022-03-23 PROCEDURE — 85025 COMPLETE CBC W/AUTO DIFF WBC: CPT

## 2022-03-23 PROCEDURE — 6360000002 HC RX W HCPCS: Performed by: EMERGENCY MEDICINE

## 2022-03-23 PROCEDURE — 71250 CT THORAX DX C-: CPT

## 2022-03-23 PROCEDURE — 71045 X-RAY EXAM CHEST 1 VIEW: CPT

## 2022-03-23 PROCEDURE — 36415 COLL VENOUS BLD VENIPUNCTURE: CPT

## 2022-03-23 RX ORDER — HYDROMORPHONE HYDROCHLORIDE 1 MG/ML
1 INJECTION, SOLUTION INTRAMUSCULAR; INTRAVENOUS; SUBCUTANEOUS ONCE
Status: COMPLETED | OUTPATIENT
Start: 2022-03-23 | End: 2022-03-23

## 2022-03-23 RX ADMIN — HYDROMORPHONE HYDROCHLORIDE 1 MG: 1 INJECTION, SOLUTION INTRAMUSCULAR; INTRAVENOUS; SUBCUTANEOUS at 15:40

## 2022-03-23 ASSESSMENT — ENCOUNTER SYMPTOMS
BACK PAIN: 0
SHORTNESS OF BREATH: 1
DIARRHEA: 0
VOMITING: 0
RHINORRHEA: 0
NAUSEA: 0
COUGH: 1
ABDOMINAL PAIN: 0
SORE THROAT: 0

## 2022-03-23 ASSESSMENT — PAIN SCALES - GENERAL: PAINLEVEL_OUTOF10: 2

## 2022-03-23 NOTE — ED PROVIDER NOTES
[facility] EMERGENCY DEPT  eMERGENCY dEPARTMENT eNCOUnter      Pt Name: Paul Graham  MRN: [id number]  [place] 1955  Date of evaluation: 3/23/2022  Provider: Mariama Cardona MD    [address] [place] [address]       Chief Complaint   Patient presents with    URI    Abnormal Test Results     had CXR and told his thoracic aneurysm is worse          HISTORY OF PRESENT ILLNESS   (Location/Symptom, Timing/Onset,Context/Setting, Quality, Duration, Modifying Factors, Severity)  Note limiting factors. Paul Graham is a 73 y.o. male who presents to the emergency department with cough fatigue and chest pain. Patient states that he felt like he had a bad cold. He went to his primary care doctor's office today because he was not feeling any better with his inhalers and decongestants. He has been having some burning right-sided chest pain. He had chest xray at the doctor's office and they thought his thoracic aneurysm had possibly enlarged and he possibly had pneumonia. HPI    NursingNotes were reviewed. REVIEW OF SYSTEMS    (2-9 systems for level 4, 10 or more for level 5)     Review of Systems   Constitutional: Positive for activity change, appetite change, chills and fatigue. Negative for fever. HENT: Positive for congestion. Negative for rhinorrhea and sore throat. Respiratory: Positive for cough and shortness of breath. Cardiovascular: Positive for chest pain. Negative for leg swelling. Gastrointestinal: Negative for abdominal pain, diarrhea, nausea and vomiting. Genitourinary: Negative for difficulty urinating. Musculoskeletal: Negative for back pain and neck pain. Skin: Negative for rash. Neurological: Negative for weakness and headaches. Psychiatric/Behavioral: Negative for confusion. A complete review of systems was performed and is negative except as noted above in the HPI.        PAST MEDICAL HISTORY     Past Medical History:   Diagnosis Date    Acute kidney failure, unspecified ([address]      CHOLECYSTECTOMY      CORONARY ANGIOPLASTY WITH STENT PLACEMENT  1-16    2 JACQUELINE to LAD    DIAGNOSTIC CARDIAC CATH LAB PROCEDURE      URETER STENT PLACEMENT      VASCULAR SURGERY  03- [facility]    Aortogram with bilateral selective renal artery injections    VASCULAR SURGERY  6/14/13 [facility]    Right carotid endarterectomy with Vascu-Guard patch repair. Right cervical carotid arteriograms after endarterectomy.  VASCULAR SURGERY  7/6/15 [facility]    Percutaneous cannulation, right common femoral artery, with a5 french sheath and later 6 french [facility] sheath. Suprarenal abdomianl aortagram.  Selective right renal arteriogram.  Right renal artery balloon angioplasty;/stent (Express 6 mm x 18mmballoon-expandable stent. . Completion suprarenal abdominal aortogram and selective right remal arteriogram. Mynx closure, right common femoral artery punctur         CURRENT MEDICATIONS       Previous Medications    ALLOPURINOL (ZYLOPRIM) 100 MG TABLET    Take 200 mg by mouth daily    AMIODARONE (CORDARONE) 200 MG TABLET    Take 1 tablet by mouth daily    ASCORBIC ACID (VITAMIN C) 500 MG TABLET    Take 1 tablet by mouth daily    ASPIRIN 81 MG CHEWABLE TABLET    Take 1 tablet by mouth daily    BUMETANIDE (BUMEX) 1 MG TABLET    Take 1 tablet by mouth 2 times daily    CALCITRIOL (ROCALTROL) 0.25 MCG CAPSULE    Take 0.25 mcg by mouth three times a week    CARVEDILOL (COREG) 12.5 MG TABLET    Take 1 tablet by mouth 2 times daily    CLOPIDOGREL (PLAVIX) 75 MG TABLET    TAKE ONE TABLET BY MOUTH EVERY DAY    DIPHENHYDRAMINE (BENADRYL) 25 MG CAPSULE    Take 25 mg by mouth every 6 hours as needed for Itching    ELIQUIS 5 MG TABS TABLET    TAKE ONE TABLET BY MOUTH TWICE A DAY    ISOSORBIDE MONONITRATE (IMDUR) 60 MG EXTENDED RELEASE TABLET    Take one tablet by mouth twice daily (needs appt for further refills)    LANTUS SOLOSTAR 100 UNIT/ML INJECTION PEN    INJECT 30 UNITS IN THE MORNING AND 20 UNITS IN THE EVENING DAILY    LEVOCETIRIZINE (XYZAL) 5 MG TABLET    Take 5 mg by mouth nightly    METOLAZONE (ZAROXOLYN) 2.5 MG TABLET    Take 1 tablet by mouth once a week On Friday    MINOXIDIL (LONITEN) 2.5 MG TABLET    TAKE ONE TABLET BY MOUTH TWICE A DAY    MONTELUKAST (SINGULAIR) 10 MG TABLET    Take 10 mg by mouth daily    NITROGLYCERIN (NITROSTAT) 0.4 MG SL TABLET    up to max of 3 total doses. If no relief after 1 dose, call 911.     NOVOLOG FLEXPEN 100 UNIT/ML INJECTION PEN    5 Units 3 times daily (before meals)     ONDANSETRON (ZOFRAN ODT) 4 MG DISINTEGRATING TABLET    Take 1 tablet by mouth every 8 hours as needed for Nausea or Vomiting    PANTOPRAZOLE (PROTONIX) 40 MG TABLET    Take 1 tablet by mouth 2 times daily (before meals)    POTASSIUM CHLORIDE (KLOR-CON M) 20 MEQ EXTENDED RELEASE TABLET    Take 1 tablet by mouth daily (with breakfast)    ROSUVASTATIN (CRESTOR) 40 MG TABLET    Take 0.5 tablets by mouth daily    SENNA-DOCUSATE (PERICOLACE) 8.6-50 MG PER TABLET    Take 1 tablet by mouth nightly    ULTICARE MINI PEN NEEDLES 31G X 6 MM MISC    FOR USE WITH LANTUS TWO TIMES A DAY    VITAMIN D (ERGOCALCIFEROL) 1.25 MG (35937 UT) CAPS CAPSULE    Take 50,000 Units by mouth once a week       ALLERGIES     Hydralazine and Morphine    FAMILY HISTORY       Family History   Problem Relation Age of Onset    High Blood Pressure Father     Cancer Father     High Blood Pressure Mother     High Blood Pressure Brother           SOCIAL HISTORY       Social History     Socioeconomic History    Marital status:      Spouse name: None    Number of children: None    Years of education: None    Highest education level: None   Occupational History    None   Tobacco Use    Smoking status: Former Smoker     Packs/day: 0.25    Smokeless tobacco: Never Used   Vaping Use    Vaping Use: Never used   Substance and Sexual Activity    Alcohol use: No    Drug use: No    Sexual activity: Yes   Other Topics Concern    None   Social History Narrative    None     Social Determinants of Health     Financial Resource Strain:     Difficulty of Paying Living Expenses: Not on file   Food Insecurity:     Worried About Running Out of Food in the Last Year: Not on file    [place] of Food in the Last Year: Not on file   Transportation Needs:     Lack of Transportation (Medical): Not on file    Lack of Transportation (Non-Medical): Not on file   Physical Activity:     Days of Exercise per Week: Not on file    Minutes of Exercise per Session: Not on file   Stress:     Feeling of Stress : Not on file   Social Connections:     Frequency of Communication with Friends and Family: Not on file    Frequency of Social Gatherings with Friends and Family: Not on file    Attends Worship Services: Not on file    Active Member of [address] [place] [address] [employer] or Organizations: Not on file    Attends Club or Organization Meetings: Not on file    Marital Status: Not on file   Intimate Partner Violence:     Fear of Current or Ex-Partner: Not on file    Emotionally Abused: Not on file    Physically Abused: Not on file    Sexually Abused: Not on file   Housing Stability:     Unable to Pay for Housing in the Last Year: Not on file    Number of [place] in the Last Year: Not on file    Unstable Housing in the Last Year: Not on file       SCREENINGS    [place]am Coma Scale  Eye Opening: Spontaneous  Best Verbal Response: Oriented  Best Motor Response: Obeys commands  [place] Coma Scale Score: 15        PHYSICAL EXAM    (up to 7 for level 4, 8 or more for level 5)     ED Triage Vitals [03/23/22 1333]   BP Temp Temp Source Pulse Resp SpO2 Height Weight   (!) 191/101 98.2 °F (36.8 °C) Oral 62 18 92 % 5' 11\" (1.803 m) 241 lb 6.4 oz (109.5 kg)       Physical Exam  Vitals and nursing note reviewed. Constitutional:       General: He is not in acute distress. Appearance: He is well-developed. He is not diaphoretic. HENT:      Head: Normocephalic and atraumatic.    Eyes:      Pupils: Pupils are equal, round, and reactive to light. Cardiovascular:      Rate and Rhythm: Normal rate and regular rhythm. Heart sounds: Normal heart sounds. Pulmonary:      Effort: Pulmonary effort is normal. No respiratory distress. Breath sounds: Normal breath sounds. Abdominal:      General: Bowel sounds are normal. There is no distension. Palpations: Abdomen is soft. Tenderness: There is no abdominal tenderness. Musculoskeletal:         General: Normal range of motion. Cervical back: Normal range of motion and neck supple. Skin:     General: Skin is warm and dry. Findings: No rash. Neurological:      Mental Status: He is alert and oriented to person, place, and time. Cranial Nerves: No cranial nerve deficit. Motor: No abnormal muscle tone. Coordination: Coordination normal.   Psychiatric:         Behavior: Behavior normal.         DIAGNOSTIC RESULTS     EKG: All EKG's are interpreted by the Emergency Department Physician who either signs or Co-signs this chart in the absence of a cardiologist.    Sinus rhythm rate 61 nonspecific ST waves and EKG appears similar to normal  Sinus rhythm rate 57 artifact present nonspecific ST waves unchanged from prior    RADIOLOGY:   Non-plain film images such as CT, Ultrasound and MRI are read by the radiologist. [facility] images are visualized and preliminarily interpreted by the emergency physician with the below findings:      Interpretation per the Radiologist below, if available at the time of this note:    CT CHEST WO CONTRAST   Final Result   1. No acute findings. 2.  Heavy atherosclerotic change in the descending thoracic aorta with   aneurysmal dilatation up to 4.2 cm. Appearance is similar to a CT from   2013. Lack of IV contrast limits evaluation of the aorta.    3.  Multiple small bilateral pulmonary nodules, some of which are new   from the 2013 exam. If patient has risk factors for pulmonary   malignancy, recommend a follow-up chest CT in one year based on   Fleischner criteria. 4.  The main pulmonary artery is dilated, which can indicate pulmonary   hypertension. 5.  2 cm RIGHT thyroid nodule. Recommend further characterization with   thyroid ultrasound. Signed by Dr Lee Price   Final Result   1. No acute appearing infiltrate or effusion. 2. Cardiomegaly. Signed by Dr Isbell Half            ED BEDSIDE ULTRASOUND:   Performed by ED Physician - none    LABS:  Labs Reviewed   BRAIN NATRIURETIC PEPTIDE - Abnormal; Notable for the following components:       Result Value    Pro-BNP 2,239 (*)     All other components within normal limits   CBC WITH AUTO DIFFERENTIAL - Abnormal; Notable for the following components:    WBC 14.1 (*)     MCH 26.2 (*)     MCHC 29.9 (*)     RDW 16.2 (*)     Neutrophils % 79.3 (*)     Lymphocytes % 12.3 (*)     Neutrophils Absolute 11.2 (*)     Monocytes Absolute 1.00 (*)     All other components within normal limits   COMPREHENSIVE METABOLIC PANEL - Abnormal; Notable for the following components:    Potassium 3.1 (*)     Chloride 88 (*)     CO2 37 (*)     Glucose 306 (*)     BUN 51 (*)     CREATININE 3.0 (*)     GFR Non- 21 (*)     GFR  25 (*)     All other components within normal limits   TROPONIN - Abnormal; Notable for the following components:    Troponin 0.04 (*)     All other components within normal limits   PROTIME-INR - Abnormal; Notable for the following components:    Protime 15.0 (*)     All other components within normal limits   TROPONIN - Abnormal; Notable for the following components:    Troponin 0.04 (*)     All other components within normal limits   RESPIRATORY PANEL, MOLECULAR, WITH COVID-19   APTT       All other labs were within normal range or not returned as of this dictation.     EMERGENCY DEPARTMENT COURSE and DIFFERENTIALDIAGNOSIS/MDM:   Vitals:    Vitals:    03/23/22 1333   BP: (!) 191/101

## 2022-03-24 LAB
EKG P AXIS: 35 DEGREES
EKG P AXIS: 38 DEGREES
EKG P-R INTERVAL: 172 MS
EKG P-R INTERVAL: 182 MS
EKG Q-T INTERVAL: 458 MS
EKG Q-T INTERVAL: 496 MS
EKG QRS DURATION: 104 MS
EKG QRS DURATION: 106 MS
EKG QTC CALCULATION (BAZETT): 451 MS
EKG QTC CALCULATION (BAZETT): 496 MS
EKG T AXIS: 47 DEGREES
EKG T AXIS: 55 DEGREES

## 2022-03-28 ENCOUNTER — APPOINTMENT (OUTPATIENT)
Dept: GENERAL RADIOLOGY | Age: 67
End: 2022-03-28
Payer: MEDICARE

## 2022-03-28 ENCOUNTER — HOSPITAL ENCOUNTER (EMERGENCY)
Age: 67
Discharge: HOME | End: 2022-03-28
Attending: EMERGENCY MEDICINE
Payer: MEDICARE

## 2022-03-28 VITALS
DIASTOLIC BLOOD PRESSURE: 90 MMHG | HEART RATE: 56 BPM | SYSTOLIC BLOOD PRESSURE: 155 MMHG | OXYGEN SATURATION: 93 % | RESPIRATION RATE: 18 BRPM | TEMPERATURE: 97.8 F

## 2022-03-28 DIAGNOSIS — J18.9 PNEUMONIA OF RIGHT LOWER LOBE DUE TO INFECTIOUS ORGANISM: Primary | ICD-10-CM

## 2022-03-28 LAB
ADENOVIRUS BY PCR: NOT DETECTED
ALBUMIN SERPL-MCNC: 4 G/DL (ref 3.5–5.2)
ALP BLD-CCNC: 86 U/L (ref 40–130)
ALT SERPL-CCNC: 25 U/L (ref 5–41)
ANION GAP SERPL CALCULATED.3IONS-SCNC: 12 MMOL/L (ref 7–19)
AST SERPL-CCNC: 28 U/L (ref 5–40)
BASOPHILS ABSOLUTE: 0.1 K/UL (ref 0–0.2)
BASOPHILS RELATIVE PERCENT: 0.4 % (ref 0–1)
BILIRUB SERPL-MCNC: 0.5 MG/DL (ref 0.2–1.2)
BORDETELLA PARAPERTUSSIS BY PCR: NOT DETECTED
BORDETELLA PERTUSSIS BY PCR: NOT DETECTED
BUN BLDV-MCNC: 51 MG/DL (ref 8–23)
CALCIUM SERPL-MCNC: 9.3 MG/DL (ref 8.8–10.2)
CHLAMYDOPHILIA PNEUMONIAE BY PCR: NOT DETECTED
CHLORIDE BLD-SCNC: 94 MMOL/L (ref 98–111)
CO2: 35 MMOL/L (ref 22–29)
CORONAVIRUS 229E BY PCR: NOT DETECTED
CORONAVIRUS HKU1 BY PCR: NOT DETECTED
CORONAVIRUS NL63 BY PCR: NOT DETECTED
CORONAVIRUS OC43 BY PCR: NOT DETECTED
CREAT SERPL-MCNC: 2.4 MG/DL (ref 0.5–1.2)
EOSINOPHILS ABSOLUTE: 0.3 K/UL (ref 0–0.6)
EOSINOPHILS RELATIVE PERCENT: 2.1 % (ref 0–5)
GFR AFRICAN AMERICAN: 33
GFR NON-AFRICAN AMERICAN: 27
GLUCOSE BLD-MCNC: 321 MG/DL (ref 74–109)
HCT VFR BLD CALC: 45.9 % (ref 42–52)
HEMOGLOBIN: 13.8 G/DL (ref 14–18)
HUMAN METAPNEUMOVIRUS BY PCR: NOT DETECTED
HUMAN RHINOVIRUS/ENTEROVIRUS BY PCR: NOT DETECTED
IMMATURE GRANULOCYTES #: 0.1 K/UL
INFLUENZA A BY PCR: NOT DETECTED
INFLUENZA B BY PCR: NOT DETECTED
LYMPHOCYTES ABSOLUTE: 1.3 K/UL (ref 1.1–4.5)
LYMPHOCYTES RELATIVE PERCENT: 9.8 % (ref 20–40)
MCH RBC QN AUTO: 26.4 PG (ref 27–31)
MCHC RBC AUTO-ENTMCNC: 30.1 G/DL (ref 33–37)
MCV RBC AUTO: 87.9 FL (ref 80–94)
MONOCYTES ABSOLUTE: 1.1 K/UL (ref 0–0.9)
MONOCYTES RELATIVE PERCENT: 8.3 % (ref 0–10)
MYCOPLASMA PNEUMONIAE BY PCR: NOT DETECTED
NEUTROPHILS ABSOLUTE: 10.8 K/UL (ref 1.5–7.5)
NEUTROPHILS RELATIVE PERCENT: 79 % (ref 50–65)
PARAINFLUENZA VIRUS 1 BY PCR: NOT DETECTED
PARAINFLUENZA VIRUS 2 BY PCR: NOT DETECTED
PARAINFLUENZA VIRUS 3 BY PCR: NOT DETECTED
PARAINFLUENZA VIRUS 4 BY PCR: NOT DETECTED
PDW BLD-RTO: 16.6 % (ref 11.5–14.5)
PLATELET # BLD: 183 K/UL (ref 130–400)
PMV BLD AUTO: 11.2 FL (ref 9.4–12.4)
POTASSIUM SERPL-SCNC: 3.6 MMOL/L (ref 3.5–5)
RBC # BLD: 5.22 M/UL (ref 4.7–6.1)
RESPIRATORY SYNCYTIAL VIRUS BY PCR: NOT DETECTED
SARS-COV-2, PCR: NOT DETECTED
SODIUM BLD-SCNC: 141 MMOL/L (ref 136–145)
TOTAL PROTEIN: 6.3 G/DL (ref 6.6–8.7)
WBC # BLD: 13.6 K/UL (ref 4.8–10.8)

## 2022-03-28 PROCEDURE — 80053 COMPREHEN METABOLIC PANEL: CPT

## 2022-03-28 PROCEDURE — 99284 EMERGENCY DEPT VISIT MOD MDM: CPT

## 2022-03-28 PROCEDURE — 85025 COMPLETE CBC W/AUTO DIFF WBC: CPT

## 2022-03-28 PROCEDURE — 6360000002 HC RX W HCPCS: Performed by: EMERGENCY MEDICINE

## 2022-03-28 PROCEDURE — 0202U NFCT DS 22 TRGT SARS-COV-2: CPT

## 2022-03-28 PROCEDURE — 36415 COLL VENOUS BLD VENIPUNCTURE: CPT

## 2022-03-28 PROCEDURE — 71045 X-RAY EXAM CHEST 1 VIEW: CPT

## 2022-03-28 PROCEDURE — 94640 AIRWAY INHALATION TREATMENT: CPT

## 2022-03-28 RX ORDER — ALBUTEROL SULFATE 2.5 MG/3ML
2.5 SOLUTION RESPIRATORY (INHALATION) ONCE
Status: COMPLETED | OUTPATIENT
Start: 2022-03-28 | End: 2022-03-28

## 2022-03-28 RX ORDER — CEFDINIR 300 MG/1
300 CAPSULE ORAL 2 TIMES DAILY
Qty: 20 CAPSULE | Refills: 0 | Status: SHIPPED | OUTPATIENT
Start: 2022-03-28 | End: 2022-04-07

## 2022-03-28 RX ADMIN — ALBUTEROL SULFATE 2.5 MG: 2.5 SOLUTION RESPIRATORY (INHALATION) at 07:46

## 2022-03-28 RX ADMIN — IPRATROPIUM BROMIDE 0.5 MG: 0.5 SOLUTION RESPIRATORY (INHALATION) at 07:46

## 2022-03-28 NOTE — ED PROVIDER NOTES
[facility]  eMERGENCY dEPARTMENT eNCOUnter      Pt Name: Toni Costello  MRN: [id number]  [place] 1955  Date of evaluation: 3/28/2022  Provider: Kecia Stratton MD    CHIEF COMPLAINT       Chief Complaint   Patient presents with    Cough    Constipation     pt states he mjust be dehydrated because he is constipated too         HISTORY OF PRESENT ILLNESS   (Location/Symptom, Timing/Onset,Context/Setting, Quality, Duration, Modifying Factors, Severity)  Note limiting factors. Toni Costello is a 73 y.o. male who presents to the emergency department for evaluation regarding cough, congestion. He also states that he is feeling somewhat constipated. He tells me since been ongoing for the past couple of days. He has had some mild body aches. Denies any fevers or chills. He is not had any episodes of nausea or vomiting. No prior history of COPD or chronic lung disease. He does have a prior history of coronary artery disease however has not been experiencing any acute chest pain. The cough itself has been nonproductive in nature. He describes some very mild shortness of breath that is a little bit worse with exertion. Denies palpitations or syncopal events. There have really been no relieving factors for the symptoms. HPI    NursingNotes were reviewed. REVIEW OF SYSTEMS    (2-9 systems for level 4, 10 or more for level 5)     Review of Systems   Constitutional: Positive for appetite change. Negative for chills and fever. HENT: Positive for congestion. Respiratory: Positive for cough and shortness of breath. Cardiovascular: Negative for chest pain, palpitations and leg swelling. Gastrointestinal: Positive for constipation. Negative for abdominal pain, diarrhea and vomiting. Neurological: Negative for dizziness and syncope. All other systems reviewed and are negative.            PAST MEDICALHISTORY     Past Medical History:   Diagnosis Date    Acute kidney failure, unspecified ([facility] [address]  Anxiety state, unspecified     Atrial septal aneurysm 01/09/2016    Blood circulation, collateral     Body mass index 30.0-30.9, adult     CAD (coronary artery disease) 01/10/2016    1/9/16  lexiscan  Positive for apical MI + myocardial ischemia, EF 42%, intermediate risk findings, AUC indication 16, AUC score 7 1/10/16  Cath  3 lesions in the LAD:  Mid: 70% 2.25x23, mid 90% 2.25 x 28, distal 100% 2.0x28, anterior lateral apical hypokinesis, EF 45%    Calculus of kidney     Carotid artery stenosis 06/26/2013    Cellulitis and abscess of hand, except fingers and thumb     Cerebral artery occlusion with cerebral infarction ([facility] [address]     15 years ago    Chronic airway obstruction, not elsewhere classified     Chronic kidney disease, unspecified     Congestive heart failure, unspecified     Diabetes mellitus type II     Diverticulosis of colon (without mention of hemorrhage)     Encounter for long-term (current) use of insulin (HCC)     Esophageal reflux     Family history of ischemic heart disease     Gastric ulcer, unspecified as acute or chronic, without mention of hemorrhage, perforation, or obstruction     Hyperlipemia     Hypertension     Internal hemorrhoids without mention of complication     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, or unspecified(403.00)     Obesity, unspecified     Other specified cardiac dysrhythmias(427.89)     Palliative care patient 12/21/2020    Paroxysmal atrial fibrillation (HCC)     Peripheral vascular disease ([facility] [address]     Solitary pulmonary nodule     Surgical or other procedure not carried out because of contraindication     Thoracic aneurysm without mention of rupture     Tobacco use disorder     Type II or unspecified type diabetes mellitus without mention of complication, not stated as uncontrolled          SURGICAL HISTORY       Past Surgical History:   Procedure Laterality Date    CARDIAC CATHETERIZATION      CHOLECYSTECTOMY      CORONARY ANGIOPLASTY WITH STENT PLACEMENT  1-16    2 JACQUELINE to LAD    DIAGNOSTIC CARDIAC CATH LAB PROCEDURE      URETER STENT PLACEMENT      VASCULAR SURGERY  03- [facility]    Aortogram with bilateral selective renal artery injections    VASCULAR SURGERY  6/14/13 [facility]    Right carotid endarterectomy with Vascu-Guard patch repair. Right cervical carotid arteriograms after endarterectomy.  VASCULAR SURGERY  7/6/15 [facility]    Percutaneous cannulation, right common femoral artery, with a5 french sheath and later 6 french [facility] sheath. Suprarenal abdomianl aortagram.  Selective right renal arteriogram.  Right renal artery balloon angioplasty;/stent (Express 6 mm x 18mmballoon-expandable stent. . Completion suprarenal abdominal aortogram and selective right remal arteriogram. Mynx closure, right common femoral artery punctur         CURRENT MEDICATIONS     Discharge Medication List as of 3/28/2022  7:52 AM      CONTINUE these medications which have NOT CHANGED    Details   ELIQUIS 5 MG TABS tablet TAKE ONE TABLET BY MOUTH TWICE A DAY, Disp-60 tablet, R-5Normal      ondansetron (ZOFRAN ODT) 4 MG disintegrating tablet Take 1 tablet by mouth every 8 hours as needed for Nausea or Vomiting, Disp-20 tablet, R-0Normal      bumetanide (BUMEX) 1 MG tablet Take 1 tablet by mouth 2 times daily, Disp-60 tablet, R-5Normal      metOLazone (ZAROXOLYN) 2.5 MG tablet Take 1 tablet by mouth once a week On Friday, Disp-30 tablet, R-0Normal      clopidogrel (PLAVIX) 75 MG tablet TAKE ONE TABLET BY MOUTH EVERY DAY, Disp-30 tablet, R-3Normal      minoxidil (LONITEN) 2.5 MG tablet TAKE ONE TABLET BY MOUTH TWICE A DAY, Disp-60 tablet, R-5Normal      isosorbide mononitrate (IMDUR) 60 MG extended release tablet Take one tablet by mouth twice daily (needs appt for further refills), Disp-60 tablet, R-5Normal      amiodarone (CORDARONE) 200 MG tablet Take 1 tablet by mouth daily, Disp-30 tablet, R-0Normal rosuvastatin (CRESTOR) 40 MG tablet Take 0.5 tablets by mouth daily, Disp-30 tablet, R-0Normal      carvedilol (COREG) 12.5 MG tablet Take 1 tablet by mouth 2 times daily, Disp-60 tablet, R-3Normal      potassium chloride (KLOR-CON M) 20 MEQ extended release tablet Take 1 tablet by mouth daily (with breakfast), Disp-60 tablet, R-3Normal      pantoprazole (PROTONIX) 40 MG tablet Take 1 tablet by mouth 2 times daily (before meals), Disp-60 tablet, R-3Normal      ascorbic acid (VITAMIN C) 500 MG tablet Take 1 tablet by mouth daily, Disp-30 tablet, R-3Normal      calcitRIOL (ROCALTROL) 0.25 MCG capsule Take 0.25 mcg by mouth three times a weekHistorical Med      diphenhydrAMINE (BENADRYL) 25 MG capsule Take 25 mg by mouth every 6 hours as needed for ItchingHistorical Med      senna-docusate (PERICOLACE) 8.6-50 MG per tablet Take 1 tablet by mouth nightlyHistorical Med      allopurinol (ZYLOPRIM) 100 MG tablet Take 200 mg by mouth dailyHistorical Med      aspirin 81 MG chewable tablet Take 1 tablet by mouth daily, Disp-30 tablet, R-3Normal      vitamin D (ERGOCALCIFEROL) 1.25 MG (53344 UT) CAPS capsule Take 50,000 Units by mouth once a weekHistorical Med      nitroGLYCERIN (NITROSTAT) 0.4 MG SL tablet up to max of 3 total doses.  If no relief after 1 dose, call 911., Disp-25 tablet,R-0Normal      NOVOLOG FLEXPEN 100 UNIT/ML injection pen 5 Units 3 times daily (before meals) , DAWHistorical Med      montelukast (SINGULAIR) 10 MG tablet Take 10 mg by mouth dailyHistorical Med      levocetirizine (XYZAL) 5 MG tablet Take 5 mg by mouth nightlyHistorical Med      ULTICARE MINI PEN NEEDLES 31G X 6 MM MISC Disp-50 each, R-5, Normal      LANTUS SOLOSTAR 100 UNIT/ML injection pen INJECT 30 UNITS IN THE MORNING AND 20 UNITS IN THE EVENING DAILY, Disp-15 mL, R-5             ALLERGIES     Hydralazine and Morphine    FAMILY HISTORY       Family History   Problem Relation Age of Onset    High Blood Pressure Father     Cancer Father  High Blood Pressure Mother     High Blood Pressure Brother           SOCIAL HISTORY       Social History     Socioeconomic History    Marital status:      Spouse name: None    Number of children: None    Years of education: None    Highest education level: None   Occupational History    None   Tobacco Use    Smoking status: Former Smoker     Packs/day: 0.25    Smokeless tobacco: Never Used   Vaping Use    Vaping Use: Never used   Substance and Sexual Activity    Alcohol use: No    Drug use: No    Sexual activity: Yes   Other Topics Concern    None   Social History Narrative    None     Social Determinants of Health     Financial Resource Strain:     Difficulty of Paying Living Expenses: Not on file   Food Insecurity:     Worried About Running Out of Food in the Last Year: Not on file    [place] of Food in the Last Year: Not on file   Transportation Needs:     Lack of Transportation (Medical): Not on file    Lack of Transportation (Non-Medical):  Not on file   Physical Activity:     Days of Exercise per Week: Not on file    Minutes of Exercise per Session: Not on file   Stress:     Feeling of Stress : Not on file   Social Connections:     Frequency of Communication with Friends and Family: Not on file    Frequency of Social Gatherings with Friends and Family: Not on file    Attends Zoroastrian Services: Not on file    Active Member of [address] [place] [address] or Organizations: Not on file    Attends Club or Organization Meetings: Not on file    Marital Status: Not on file   Intimate Partner Violence:     Fear of Current or Ex-Partner: Not on file    Emotionally Abused: Not on file    Physically Abused: Not on file    Sexually Abused: Not on file   Housing Stability:     Unable to Pay for Housing in the Last Year: Not on file    Number of [place] in the Last Year: Not on file    Unstable Housing in the Last Year: Not on file       SCREENINGS    [place] Coma Scale  Eye Opening: Spontaneous  Best Verbal Response: Oriented  Best Motor Response: Obeys commands  [place] Coma Scale Score: 15        PHYSICAL EXAM    (up to 7 for level 4, 8 or more for level 5)     ED Triage Vitals [03/28/22 0351]   BP Temp Temp Source Pulse Resp SpO2 Height Weight   (!) 128/109 97.8 °F (36.6 °C) Infrared 58 17 96 % -- --       Physical Exam  Vitals and nursing note reviewed. HENT:      Head: Atraumatic. Mouth/Throat:      Mouth: Mucous membranes are moist. Mucous membranes are not dry. Eyes:      General: No scleral icterus. Pupils: Pupils are equal, round, and reactive to light. Neck:      Trachea: No tracheal deviation. Cardiovascular:      Rate and Rhythm: Normal rate and regular rhythm. Pulses: Normal pulses. Heart sounds: Normal heart sounds. No murmur heard. Pulmonary:      Effort: Pulmonary effort is normal. No respiratory distress. Breath sounds: Normal breath sounds. No stridor. Abdominal:      General: There is no distension. Palpations: Abdomen is soft. Tenderness: There is no abdominal tenderness. There is no guarding. Musculoskeletal:      Right lower leg: No edema. Left lower leg: No edema. Skin:     Capillary Refill: Capillary refill takes less than 2 seconds. Coloration: Skin is not pale. Findings: No rash. Neurological:      General: No focal deficit present. Mental Status: He is alert and oriented to person, place, and time. Psychiatric:         Behavior: Behavior is cooperative. DIAGNOSTIC RESULTS       RADIOLOGY:  Non-plain film images such as CT, Ultrasound and MRI are read by the radiologist. Plain radiographic images are visualized and preliminarily interpreted bythe emergency physician with the below findings:      XR CHEST PORTABLE   Final Result   1. Right lower lung infiltrate representing an acute   inflammatory/infectious process. 2. A moderate cardiomegaly.    Signed by Dr Olympia Lesches LABS:  Labs Reviewed   CBC WITH AUTO DIFFERENTIAL - Abnormal; Notable for the following components:       Result Value    WBC 13.6 (*)     Hemoglobin 13.8 (*)     MCH 26.4 (*)     MCHC 30.1 (*)     RDW 16.6 (*)     Neutrophils % 79.0 (*)     Lymphocytes % 9.8 (*)     Neutrophils Absolute 10.8 (*)     Monocytes Absolute 1.10 (*)     All other components within normal limits   COMPREHENSIVE METABOLIC PANEL - Abnormal; Notable for the following components:    Chloride 94 (*)     CO2 35 (*)     Glucose 321 (*)     BUN 51 (*)     CREATININE 2.4 (*)     GFR Non- 27 (*)     GFR  33 (*)     Total Protein 6.3 (*)     All other components within normal limits   RESPIRATORY PANEL, MOLECULAR, WITH COVID-19       All other labs were within normal range or not returned as of this dictation. EMERGENCY DEPARTMENT COURSE and DIFFERENTIAL DIAGNOSIS/MDM:   Vitals:    Vitals:    03/28/22 0520 03/28/22 0630 03/28/22 0746 03/28/22 0809   BP: (!) 179/78 (!) 126/97  (!) 155/90   Pulse: 56 55  56   Resp: 22 17  18   Temp:       TempSrc:       SpO2: 97% 94% 95% 93%       MDM    Reassessment    Patient serum creatinine is slightly elevated at 2.4 however this is about his baseline based on review of his previous laboratory studies. His oxygenation is running 95 to 97% on room air. Chest radiograph reveals evidence of a right lower lobe infiltrate. His viral panel is negative for any viral infection. We will plan to start him on an outpatient course of oral antibiotics. Return precautions were reviewed and all questions were answered. PROCEDURES:  Unless otherwise noted below, none     Procedures    FINAL IMPRESSION      1.  Pneumonia of right lower lobe due to infectious organism          DISPOSITION/PLAN   DISPOSITION Decision To Discharge 03/28/2022 07:28:04 AM      PATIENT REFERRED TO:  Vivi Mauricio MD  [address] [place] [address] [address] [address] [facility] [address]  [address] [place] [address] MEDICATIONS:  Discharge Medication List as of 3/28/2022  7:52 AM      START taking these medications    Details   cefdinir (OMNICEF) 300 MG capsule Take 1 capsule by mouth 2 times daily for 10 days, Disp-20 capsule, R-0Normal                (Please note that portions of this note were completed with a voice recognition program.  Efforts were made to edit thedictations but occasionally words are mis-transcribed.)    Amberly Enriquez MD (electronically signed)  Attending Emergency Physician          Amberly Enriquez MD  04/16/22 [id number]

## 2022-04-07 RX ORDER — MINOXIDIL 2.5 MG/1
TABLET ORAL
Qty: 60 TABLET | Refills: 5 | Status: SHIPPED | OUTPATIENT
Start: 2022-04-07 | End: 2022-10-11

## 2022-04-14 RX ORDER — ISOSORBIDE MONONITRATE 60 MG/1
TABLET, EXTENDED RELEASE ORAL
Qty: 60 TABLET | Refills: 5 | Status: SHIPPED | OUTPATIENT
Start: 2022-04-14 | End: 2022-10-11

## 2022-04-16 ASSESSMENT — ENCOUNTER SYMPTOMS
ABDOMINAL PAIN: 0
CONSTIPATION: 1
DIARRHEA: 0
VOMITING: 0
SHORTNESS OF BREATH: 1
COUGH: 1

## 2022-05-07 DIAGNOSIS — I10 ESSENTIAL HYPERTENSION: ICD-10-CM

## 2022-05-09 RX ORDER — CARVEDILOL 12.5 MG/1
TABLET ORAL
Qty: 60 TABLET | Refills: 5 | Status: SHIPPED | OUTPATIENT
Start: 2022-05-09 | End: 2022-11-11

## 2022-05-09 NOTE — TELEPHONE ENCOUNTER
Rx Refill Note  Requested Prescriptions     Pending Prescriptions Disp Refills   • carvedilol (COREG) 12.5 MG tablet [Pharmacy Med Name: CARVEDILOL 12.5MG TABS] 60 tablet 5     Sig: TAKE ONE TABLET BY MOUTH TWICE A DAY WITH MEALS      Last office visit with prescribing clinician: 10/28/2021      Next office visit with prescribing clinician: Visit date not found    Last refill: 10/28/2021     Gail Lal MA  05/09/22, 09:28 CDT

## 2022-05-19 ENCOUNTER — TELEPHONE (OUTPATIENT)
Dept: FAMILY MEDICINE CLINIC | Facility: CLINIC | Age: 67
End: 2022-05-19

## 2022-05-19 NOTE — TELEPHONE ENCOUNTER
Message left for patient to return phone call. Attempted to call to schedule chronic OV and physical.

## 2022-05-23 ENCOUNTER — HOSPITAL ENCOUNTER (EMERGENCY)
Age: 67
Discharge: HOME | End: 2022-05-23
Attending: EMERGENCY MEDICINE
Payer: MEDICARE

## 2022-05-23 ENCOUNTER — APPOINTMENT (OUTPATIENT)
Dept: GENERAL RADIOLOGY | Age: 67
End: 2022-05-23
Payer: MEDICARE

## 2022-05-23 VITALS
WEIGHT: 252 LBS | HEART RATE: 57 BPM | RESPIRATION RATE: 14 BRPM | TEMPERATURE: 97.9 F | DIASTOLIC BLOOD PRESSURE: 86 MMHG | OXYGEN SATURATION: 91 % | SYSTOLIC BLOOD PRESSURE: 171 MMHG | BODY MASS INDEX: 35.15 KG/M2

## 2022-05-23 DIAGNOSIS — N18.9 CHRONIC KIDNEY DISEASE, UNSPECIFIED CKD STAGE: Primary | ICD-10-CM

## 2022-05-23 DIAGNOSIS — R60.0 FLUID RETENTION IN LEGS: ICD-10-CM

## 2022-05-23 LAB
ALBUMIN SERPL-MCNC: 3.9 G/DL (ref 3.5–5.2)
ALP BLD-CCNC: 75 U/L (ref 40–130)
ALT SERPL-CCNC: 21 U/L (ref 5–41)
ANION GAP SERPL CALCULATED.3IONS-SCNC: 9 MMOL/L (ref 7–19)
AST SERPL-CCNC: 18 U/L (ref 5–40)
BASOPHILS ABSOLUTE: 0.1 K/UL (ref 0–0.2)
BASOPHILS RELATIVE PERCENT: 0.9 % (ref 0–1)
BILIRUB SERPL-MCNC: 0.5 MG/DL (ref 0.2–1.2)
BUN BLDV-MCNC: 37 MG/DL (ref 8–23)
CALCIUM SERPL-MCNC: 9.1 MG/DL (ref 8.8–10.2)
CHLORIDE BLD-SCNC: 92 MMOL/L (ref 98–111)
CO2: 39 MMOL/L (ref 22–29)
CREAT SERPL-MCNC: 2.2 MG/DL (ref 0.5–1.2)
EOSINOPHILS ABSOLUTE: 0.3 K/UL (ref 0–0.6)
EOSINOPHILS RELATIVE PERCENT: 2.9 % (ref 0–5)
GFR AFRICAN AMERICAN: 36
GFR NON-AFRICAN AMERICAN: 30
GLUCOSE BLD-MCNC: 121 MG/DL (ref 70–99)
GLUCOSE BLD-MCNC: 94 MG/DL (ref 74–109)
HCT VFR BLD CALC: 46.6 % (ref 42–52)
HEMOGLOBIN: 13.7 G/DL (ref 14–18)
IMMATURE GRANULOCYTES #: 0 K/UL
LYMPHOCYTES ABSOLUTE: 1.9 K/UL (ref 1.1–4.5)
LYMPHOCYTES RELATIVE PERCENT: 21.9 % (ref 20–40)
MCH RBC QN AUTO: 27.1 PG (ref 27–31)
MCHC RBC AUTO-ENTMCNC: 29.4 G/DL (ref 33–37)
MCV RBC AUTO: 92.1 FL (ref 80–94)
MONOCYTES ABSOLUTE: 0.8 K/UL (ref 0–0.9)
MONOCYTES RELATIVE PERCENT: 9.3 % (ref 0–10)
NEUTROPHILS ABSOLUTE: 5.5 K/UL (ref 1.5–7.5)
NEUTROPHILS RELATIVE PERCENT: 64.8 % (ref 50–65)
PDW BLD-RTO: 16.5 % (ref 11.5–14.5)
PERFORMED ON: ABNORMAL
PLATELET # BLD: 202 K/UL (ref 130–400)
PMV BLD AUTO: 10.8 FL (ref 9.4–12.4)
POTASSIUM SERPL-SCNC: 3.3 MMOL/L (ref 3.5–5)
PRO-BNP: 1128 PG/ML (ref 0–900)
RBC # BLD: 5.06 M/UL (ref 4.7–6.1)
SARS-COV-2, NAAT: NOT DETECTED
SODIUM BLD-SCNC: 140 MMOL/L (ref 136–145)
TOTAL PROTEIN: 6.6 G/DL (ref 6.6–8.7)
TROPONIN: 0.04 NG/ML (ref 0–0.03)
WBC # BLD: 8.5 K/UL (ref 4.8–10.8)

## 2022-05-23 PROCEDURE — 2500000003 HC RX 250 WO HCPCS: Performed by: EMERGENCY MEDICINE

## 2022-05-23 PROCEDURE — 84484 ASSAY OF TROPONIN QUANT: CPT

## 2022-05-23 PROCEDURE — 36415 COLL VENOUS BLD VENIPUNCTURE: CPT

## 2022-05-23 PROCEDURE — 82947 ASSAY GLUCOSE BLOOD QUANT: CPT

## 2022-05-23 PROCEDURE — 96374 THER/PROPH/DIAG INJ IV PUSH: CPT

## 2022-05-23 PROCEDURE — 80053 COMPREHEN METABOLIC PANEL: CPT

## 2022-05-23 PROCEDURE — 99285 EMERGENCY DEPT VISIT HI MDM: CPT

## 2022-05-23 PROCEDURE — 85025 COMPLETE CBC W/AUTO DIFF WBC: CPT

## 2022-05-23 PROCEDURE — 87635 SARS-COV-2 COVID-19 AMP PRB: CPT

## 2022-05-23 PROCEDURE — 93005 ELECTROCARDIOGRAM TRACING: CPT | Performed by: EMERGENCY MEDICINE

## 2022-05-23 PROCEDURE — 71045 X-RAY EXAM CHEST 1 VIEW: CPT

## 2022-05-23 PROCEDURE — 83880 ASSAY OF NATRIURETIC PEPTIDE: CPT

## 2022-05-23 RX ORDER — BUMETANIDE 0.25 MG/ML
1 INJECTION, SOLUTION INTRAMUSCULAR; INTRAVENOUS ONCE
Status: COMPLETED | OUTPATIENT
Start: 2022-05-23 | End: 2022-05-23

## 2022-05-23 RX ADMIN — BUMETANIDE 1 MG: 0.25 INJECTION, SOLUTION INTRAMUSCULAR; INTRAVENOUS at 16:09

## 2022-05-23 NOTE — ED NOTES
Pt requesting update from physician. Dr. Calvin Hawkins notified.      Carlos Alexander, RN  05/23/22 [id number]

## 2022-05-23 NOTE — ED NOTES
Called Dr Johanna Santos with Nephrology @ [phone] @ [phone]     Nara Burns  05/23/22 1524

## 2022-05-23 NOTE — ED TRIAGE NOTES
Pt states increasing shortness of breath over the past 2 months. Pt states he takes bumex daily. Pt states he feels like he might have a blockage. Pt states he has had several stents placed in the past. Pt states he has gained several pounds recently. Pt states history of thoracic aneurysm.

## 2022-05-23 NOTE — ED PROVIDER NOTES
[facility] EMERGENCY DEPT  eMERGENCY dEPARTMENT eNCOUnter      Pt Name: Herb Hernández  MRN: [id number]  [place] 1955  Date of evaluation: 5/23/2022  Provider: Author Dona MD    CHIEF COMPLAINT       Chief Complaint   Patient presents with    Shortness of Breath         HISTORY OF PRESENT ILLNESS   (Location/Symptom, Timing/Onset,Context/Setting, Quality, Duration, Modifying Factors, Severity)  Note limiting factors. Herb Hernández is a 73 y.o. male who presents to the emergency department for evaluation regarding increasing shortness of breath along with lower extremity swelling. Patient states the symptoms been ongoing for about the past 1 to 2 months. Feels like they are getting progressively worse. He denies any chest pain or palpitations. He does have a prior history of chronic kidney disease and follows with nephrology. Recent outpatient diuretic adjustment due to worsening of his GFR. Has not had any fevers or chills. Patient denies vomiting or diarrhea episodes. HPI    NursingNotes were reviewed. REVIEW OF SYSTEMS    (2-9 systems for level 4, 10 or more for level 5)     Review of Systems   Constitutional: Negative for chills and fever. HENT: Negative for congestion and sore throat. Respiratory: Positive for shortness of breath. Negative for wheezing. Cardiovascular: Positive for leg swelling. Negative for chest pain. Gastrointestinal: Negative for abdominal pain, diarrhea, nausea and vomiting. Neurological: Negative for dizziness, syncope and speech difficulty. All other systems reviewed and are negative.            PAST MEDICALHISTORY     Past Medical History:   Diagnosis Date    Acute kidney failure, unspecified ([address]     Anxiety state, unspecified     Atrial septal aneurysm 01/09/2016    Blood circulation, collateral     Body mass index 30.0-30.9, adult     CAD (coronary artery disease) 01/10/2016    1/9/16  lexiscan  Positive for apical MI + myocardial ischemia, EF 42%, intermediate risk findings, AUC indication 16, AUC score 7 1/10/16  Cath  3 lesions in the LAD:  Mid: 70% 2.25x23, mid 90% 2.25 x 28, distal 100% 2.0x28, anterior lateral apical hypokinesis, EF 45%    Calculus of kidney     Carotid artery stenosis 06/26/2013    Cellulitis and abscess of hand, except fingers and thumb     Cerebral artery occlusion with cerebral infarction ([address]     15 years ago    Chronic airway obstruction, not elsewhere classified     Chronic kidney disease, unspecified     Congestive heart failure, unspecified     Diabetes mellitus type II     Diverticulosis of colon (without mention of hemorrhage)     Encounter for long-term (current) use of insulin (HCC)     Esophageal reflux     Family history of ischemic heart disease     Gastric ulcer, unspecified as acute or chronic, without mention of hemorrhage, perforation, or obstruction     Hyperlipemia     Hypertension     Internal hemorrhoids without mention of complication     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, or unspecified(403.00)     Obesity, unspecified     Other specified cardiac dysrhythmias(427.89)     Palliative care patient 12/21/2020    Paroxysmal atrial fibrillation (HCC)     Peripheral vascular disease ([address]     Solitary pulmonary nodule     Surgical or other procedure not carried out because of contraindication     Thoracic aneurysm without mention of rupture     Tobacco use disorder     Type II or unspecified type diabetes mellitus without mention of complication, not stated as uncontrolled          SURGICAL HISTORY       Past Surgical History:   Procedure Laterality Date    CARDIAC CATHETERIZATION      CHOLECYSTECTOMY      CORONARY ANGIOPLASTY WITH STENT PLACEMENT  1-16    2 JACQUELINE to LAD    DIAGNOSTIC CARDIAC CATH LAB PROCEDURE      URETER STENT PLACEMENT      VASCULAR SURGERY  03- [facility]    Aortogram with bilateral selective renal artery injections    VASCULAR SURGERY  6/14/13 [facility]    Right carotid endarterectomy with Vascu-Guard patch repair. Right cervical carotid arteriograms after endarterectomy.  VASCULAR SURGERY  7/6/15 [facility]    Percutaneous cannulation, right common femoral artery, with a5 french sheath and later 6 french [facility] sheath. Suprarenal abdomianl aortagram.  Selective right renal arteriogram.  Right renal artery balloon angioplasty;/stent (Express 6 mm x 18mmballoon-expandable stent. . Completion suprarenal abdominal aortogram and selective right remal arteriogram. Mynx closure, right common femoral artery punctur         CURRENT MEDICATIONS     Discharge Medication List as of 5/23/2022  4:11 PM      CONTINUE these medications which have NOT CHANGED    Details   isosorbide mononitrate (IMDUR) 60 MG extended release tablet TAKE ONE TABLET BY MOUTH TWICE A DAY, Disp-60 tablet, R-5Normal      minoxidil (LONITEN) 2.5 MG tablet TAKE ONE TABLET BY MOUTH TWICE A DAY, Disp-60 tablet, R-5Normal      ELIQUIS 5 MG TABS tablet TAKE ONE TABLET BY MOUTH TWICE A DAY, Disp-60 tablet, R-5Normal      ondansetron (ZOFRAN ODT) 4 MG disintegrating tablet Take 1 tablet by mouth every 8 hours as needed for Nausea or Vomiting, Disp-20 tablet, R-0Normal      bumetanide (BUMEX) 1 MG tablet Take 1 tablet by mouth 2 times daily, Disp-60 tablet, R-5Normal      metOLazone (ZAROXOLYN) 2.5 MG tablet Take 1 tablet by mouth once a week On Friday, Disp-30 tablet, R-0Normal      clopidogrel (PLAVIX) 75 MG tablet TAKE ONE TABLET BY MOUTH EVERY DAY, Disp-30 tablet, R-3Normal      amiodarone (CORDARONE) 200 MG tablet Take 1 tablet by mouth daily, Disp-30 tablet, R-0Normal      rosuvastatin (CRESTOR) 40 MG tablet Take 0.5 tablets by mouth daily, Disp-30 tablet, R-0Normal      carvedilol (COREG) 12.5 MG tablet Take 1 tablet by mouth 2 times daily, Disp-60 tablet, R-3Normal      potassium chloride (KLOR-CON M) 20 MEQ extended release tablet Take 1 tablet by mouth daily (with breakfast), Disp-60 tablet, R-3Normal      pantoprazole (PROTONIX) 40 MG tablet Take 1 tablet by mouth 2 times daily (before meals), Disp-60 tablet, R-3Normal      ascorbic acid (VITAMIN C) 500 MG tablet Take 1 tablet by mouth daily, Disp-30 tablet, R-3Normal      calcitRIOL (ROCALTROL) 0.25 MCG capsule Take 0.25 mcg by mouth three times a weekHistorical Med      diphenhydrAMINE (BENADRYL) 25 MG capsule Take 25 mg by mouth every 6 hours as needed for ItchingHistorical Med      senna-docusate (PERICOLACE) 8.6-50 MG per tablet Take 1 tablet by mouth nightlyHistorical Med      allopurinol (ZYLOPRIM) 100 MG tablet Take 200 mg by mouth dailyHistorical Med      aspirin 81 MG chewable tablet Take 1 tablet by mouth daily, Disp-30 tablet, R-3Normal      vitamin D (ERGOCALCIFEROL) 1.25 MG (88111 UT) CAPS capsule Take 50,000 Units by mouth once a weekHistorical Med      nitroGLYCERIN (NITROSTAT) 0.4 MG SL tablet up to max of 3 total doses.  If no relief after 1 dose, call 911., Disp-25 tablet,R-0Normal      NOVOLOG FLEXPEN 100 UNIT/ML injection pen 5 Units 3 times daily (before meals) , DAWHistorical Med      montelukast (SINGULAIR) 10 MG tablet Take 10 mg by mouth dailyHistorical Med      levocetirizine (XYZAL) 5 MG tablet Take 5 mg by mouth nightlyHistorical Med      [employer] MINI PEN NEEDLES 31G X 6 MM MISC Disp-50 each, R-5, Normal      LANTUS SOLOSTAR 100 UNIT/ML injection pen INJECT 30 UNITS IN THE MORNING AND 20 UNITS IN THE EVENING DAILY, Disp-15 mL, R-5             ALLERGIES     Hydralazine and Morphine    FAMILY HISTORY       Family History   Problem Relation Age of Onset    High Blood Pressure Father     Cancer Father     High Blood Pressure Mother     High Blood Pressure Brother           SOCIAL HISTORY       Social History     Socioeconomic History    Marital status:      Spouse name: Not on file    Number of children: Not on file    Years of education: Not on file    Highest education level: Not on file   Occupational History    Not on file   Tobacco Use    Smoking status: Former Smoker     Packs/day: 0.25    Smokeless tobacco: Never Used   Vaping Use    Vaping Use: Never used   Substance and Sexual Activity    Alcohol use: No    Drug use: No    Sexual activity: Yes   Other Topics Concern    Not on file   Social History Narrative    Not on file     Social Determinants of Health     Financial Resource Strain:     Difficulty of Paying Living Expenses: Not on file   Food Insecurity:     Worried About Running Out of Food in the Last Year: Not on file    [place] of Food in the Last Year: Not on file   Transportation Needs:     Lack of Transportation (Medical): Not on file    Lack of Transportation (Non-Medical):  Not on file   Physical Activity:     Days of Exercise per Week: Not on file    Minutes of Exercise per Session: Not on file   Stress:     Feeling of Stress : Not on file   Social Connections:     Frequency of Communication with Friends and Family: Not on file    Frequency of Social Gatherings with Friends and Family: Not on file    Attends Buddhist Services: Not on file    Active Member of [address] [place] [address] or Organizations: Not on file    Attends Club or Organization Meetings: Not on file    Marital Status: Not on file   Intimate Partner Violence:     Fear of Current or Ex-Partner: Not on file    Emotionally Abused: Not on file    Physically Abused: Not on file    Sexually Abused: Not on file   Housing Stability:     Unable to Pay for Housing in the Last Year: Not on file    Number of [place] in the Last Year: Not on file    Unstable Housing in the Last Year: Not on file       SCREENINGS    Mariam Coma Scale  Eye Opening: Spontaneous  Best Verbal Response: Oriented  Best Motor Response: Obeys commands  [place] Coma Scale Score: 15        PHYSICAL EXAM    (up to 7 for level 4, 8 or more for level 5)     ED Triage Vitals   BP Temp Temp Source Pulse Resp SpO2 Height Weight   05/23/22 9113 05/23/22 0536 05/23/22 0536 05/23/22 0536 05/23/22 0536 05/23/22 0536 -- 05/23/22 0536   (!) 198/96 97.9 °F (36.6 °C) Oral 58 20 96 %  252 lb (114.3 kg)       Physical Exam  Vitals and nursing note reviewed. HENT:      Head: Atraumatic. Mouth/Throat:      Mouth: Mucous membranes are moist. Mucous membranes are not dry. Pharynx: No posterior oropharyngeal erythema. Eyes:      General: No scleral icterus. Pupils: Pupils are equal, round, and reactive to light. Neck:      Trachea: No tracheal deviation. Cardiovascular:      Rate and Rhythm: Normal rate and regular rhythm. Pulses: Normal pulses. Heart sounds: Normal heart sounds. No murmur heard. Pulmonary:      Effort: Pulmonary effort is normal. No respiratory distress. Breath sounds: Normal breath sounds. No stridor. No rales. Abdominal:      General: There is no distension. Palpations: Abdomen is soft. Tenderness: There is no abdominal tenderness. There is no guarding. Musculoskeletal:      Right lower leg: Edema present. Left lower leg: Edema present. Skin:     Capillary Refill: Capillary refill takes less than 2 seconds. Coloration: Skin is not pale. Findings: No rash. Neurological:      Mental Status: He is alert and oriented to person, place, and time. Psychiatric:         Behavior: Behavior is cooperative. DIAGNOSTIC RESULTS     EKG: All EKG's areinterpreted by the Emergency Department Physician who either signs or Co-signs this chart in the absence of a cardiologist.    7316: Normal sinus rhythm at a rate of 54, there is no evidence of acute ST elevation identified. QTc: 504 MS. RADIOLOGY:  Non-plain film images such as CT, Ultrasound and MRI are read by the radiologist. Plain radiographic images are visualized and preliminarily interpreted bythe emergency physician with the below findings:        XR CHEST PORTABLE   Final Result   1. Poor lung expansion.  Atelectatic changes, more in the right lower   lung. 2. Moderate cardiomegaly. 3. Left-sided cervical rib. Signed by Dr Jayden Laird:  Labs Reviewed   CBC WITH AUTO DIFFERENTIAL - Abnormal; Notable for the following components:       Result Value    Hemoglobin 13.7 (*)     MCHC 29.4 (*)     RDW 16.5 (*)     All other components within normal limits   COMPREHENSIVE METABOLIC PANEL - Abnormal; Notable for the following components:    Potassium 3.3 (*)     Chloride 92 (*)     CO2 39 (*)     BUN 37 (*)     CREATININE 2.2 (*)     GFR Non- 30 (*)     GFR  36 (*)     All other components within normal limits   BRAIN NATRIURETIC PEPTIDE - Abnormal; Notable for the following components:    Pro-BNP 1,128 (*)     All other components within normal limits   TROPONIN - Abnormal; Notable for the following components:    Troponin 0.04 (*)     All other components within normal limits   POCT GLUCOSE - Abnormal; Notable for the following components:    POC Glucose 121 (*)     All other components within normal limits   COVID-19, RAPID       All other labs were within normal range or not returned as of this dictation. EMERGENCY DEPARTMENT COURSE and DIFFERENTIAL DIAGNOSIS/MDM:   Vitals:    Vitals:    05/23/22 1430 05/23/22 1500 05/23/22 1530 05/23/22 1600   BP: (!) 221/106 (!) 222/107 (!) 205/91 (!) 171/86   Pulse: 61 59 58 57   Resp: 18 14 13 14   Temp:       TempSrc:       SpO2: 93% 93% 92% 91%   Weight:           MDM    Reassessment    Patient's BNP is slightly elevated at 1128. There is no evidence of significant pulmonary edema or volume overload noted on chest radiograph. He is maintaining an oxygen saturation of about 93 to 94% on room air. CONSULTS:    Case was discussed with Dr. Saleem Vernon regarding nephrology consultation. We will give patient a dose of IV Bumex here and resume his usual home Bumex dose.   He will be following up with him in the office and we will recheck his laboratory studies. Patient is agreeable with this plan of care and all questions been answered. Return precautions discussed. PROCEDURES:  Unless otherwise noted below, none     Procedures    FINAL IMPRESSION      1. Chronic kidney disease, unspecified CKD stage    2.  Fluid retention in legs          DISPOSITION/PLAN   DISPOSITION Decision To Discharge 05/23/2022 04:16:36 PM      PATIENT REFERRED TO:  Bronson Favre, MD  [address] [address] [phone]            DISCHARGE MEDICATIONS:  Discharge Medication List as of 5/23/2022  4:11 PM             (Please note that portions of this note were completed with a voice recognition program.  Efforts were made to edit thedictations but occasionally words are mis-transcribed.)    Kortney Nichols MD (electronically signed)  Attending Emergency Physician          Kortney Nichols MD  05/31/22 0685

## 2022-05-24 LAB
EKG P AXIS: -43 DEGREES
EKG P-R INTERVAL: 198 MS
EKG Q-T INTERVAL: 514 MS
EKG QRS DURATION: 102 MS
EKG QTC CALCULATION (BAZETT): 504 MS
EKG T AXIS: 49 DEGREES

## 2022-05-24 PROCEDURE — 93010 ELECTROCARDIOGRAM REPORT: CPT | Performed by: INTERNAL MEDICINE

## 2022-05-31 ASSESSMENT — ENCOUNTER SYMPTOMS
SORE THROAT: 0
SHORTNESS OF BREATH: 1
WHEEZING: 0
DIARRHEA: 0
NAUSEA: 0
ABDOMINAL PAIN: 0
VOMITING: 0

## 2022-06-22 RX ORDER — POTASSIUM CHLORIDE 20 MEQ/1
TABLET, EXTENDED RELEASE ORAL
Qty: 60 TABLET | Refills: 3 | OUTPATIENT
Start: 2022-06-22

## 2022-07-06 RX ORDER — APIXABAN 5 MG/1
TABLET, FILM COATED ORAL
Qty: 60 TABLET | Refills: 5 | Status: SHIPPED | OUTPATIENT
Start: 2022-07-06

## 2022-07-13 NOTE — TELEPHONE ENCOUNTER
Rx Refill Note  Requested Prescriptions     Pending Prescriptions Disp Refills   • esomeprazole (nexIUM) 40 MG capsule [Pharmacy Med Name: ESOMEPRAZOLE MAGNESIUM 40MG CPDR] 60 capsule 5     Sig: TAKE ONE CAPSULE BY MOUTH TWICE A DAY      Last office visit with prescribing clinician: 10/28/2021      Next office visit with prescribing clinician: Visit date not found            Sandee Kay MA  07/13/22, 08:13 CDT

## 2022-07-14 RX ORDER — ESOMEPRAZOLE MAGNESIUM 40 MG/1
CAPSULE, DELAYED RELEASE ORAL
Qty: 60 CAPSULE | Refills: 5 | Status: SHIPPED | OUTPATIENT
Start: 2022-07-14

## 2022-08-16 ENCOUNTER — APPOINTMENT (OUTPATIENT)
Dept: CT IMAGING | Age: 67
DRG: 313 | End: 2022-08-16
Payer: MEDICARE

## 2022-08-16 ENCOUNTER — HOSPITAL ENCOUNTER (INPATIENT)
Age: 67
LOS: 1 days | Discharge: HOME | DRG: 313 | End: 2022-08-19
Attending: EMERGENCY MEDICINE | Admitting: HOSPITALIST
Payer: MEDICARE

## 2022-08-16 ENCOUNTER — APPOINTMENT (OUTPATIENT)
Dept: GENERAL RADIOLOGY | Age: 67
DRG: 313 | End: 2022-08-16
Payer: MEDICARE

## 2022-08-16 DIAGNOSIS — N18.9 ACUTE KIDNEY INJURY SUPERIMPOSED ON CHRONIC KIDNEY DISEASE (HCC): ICD-10-CM

## 2022-08-16 DIAGNOSIS — R93.89 ABNORMAL CT SCAN: ICD-10-CM

## 2022-08-16 DIAGNOSIS — R07.9 CHEST PAIN, UNSPECIFIED TYPE: ICD-10-CM

## 2022-08-16 DIAGNOSIS — M48.061 NEURAL FORAMINAL STENOSIS OF LUMBAR SPINE: Primary | ICD-10-CM

## 2022-08-16 DIAGNOSIS — R77.8 ELEVATED TROPONIN: ICD-10-CM

## 2022-08-16 DIAGNOSIS — E87.6 HYPOKALEMIA: ICD-10-CM

## 2022-08-16 DIAGNOSIS — N17.9 ACUTE KIDNEY INJURY SUPERIMPOSED ON CHRONIC KIDNEY DISEASE (HCC): ICD-10-CM

## 2022-08-16 DIAGNOSIS — R10.9 RIGHT FLANK PAIN: ICD-10-CM

## 2022-08-16 PROBLEM — I71.019 DISSECTION OF THORACIC AORTA (HCC): Status: ACTIVE | Noted: 2022-08-16

## 2022-08-16 LAB
ADENOVIRUS BY PCR: NOT DETECTED
ALBUMIN SERPL-MCNC: 4.2 G/DL (ref 3.5–5.2)
ALP BLD-CCNC: 73 U/L (ref 40–130)
ALT SERPL-CCNC: 14 U/L (ref 5–41)
ANION GAP SERPL CALCULATED.3IONS-SCNC: 12 MMOL/L (ref 7–19)
AST SERPL-CCNC: 20 U/L (ref 5–40)
BACTERIA: NEGATIVE /HPF
BASOPHILS ABSOLUTE: 0.1 K/UL (ref 0–0.2)
BASOPHILS RELATIVE PERCENT: 0.9 % (ref 0–1)
BILIRUB SERPL-MCNC: 0.7 MG/DL (ref 0.2–1.2)
BILIRUBIN URINE: NEGATIVE
BLOOD, URINE: ABNORMAL
BORDETELLA PARAPERTUSSIS BY PCR: NOT DETECTED
BORDETELLA PERTUSSIS BY PCR: NOT DETECTED
BUN BLDV-MCNC: 52 MG/DL (ref 8–23)
CALCIUM SERPL-MCNC: 9.6 MG/DL (ref 8.8–10.2)
CHLAMYDOPHILIA PNEUMONIAE BY PCR: NOT DETECTED
CHLORIDE BLD-SCNC: 87 MMOL/L (ref 98–111)
CHOLESTEROL, TOTAL: 124 MG/DL (ref 160–199)
CLARITY: ABNORMAL
CO2: 38 MMOL/L (ref 22–29)
COLOR: YELLOW
CORONAVIRUS 229E BY PCR: NOT DETECTED
CORONAVIRUS HKU1 BY PCR: NOT DETECTED
CORONAVIRUS NL63 BY PCR: NOT DETECTED
CORONAVIRUS OC43 BY PCR: NOT DETECTED
CREAT SERPL-MCNC: 3.7 MG/DL (ref 0.5–1.2)
CREATININE URINE: 98 MG/DL (ref 4.2–622)
CRYSTALS, UA: ABNORMAL /HPF
EOSINOPHILS ABSOLUTE: 0.3 K/UL (ref 0–0.6)
EOSINOPHILS RELATIVE PERCENT: 2.6 % (ref 0–5)
EPITHELIAL CELLS, UA: 1 /HPF (ref 0–5)
GFR AFRICAN AMERICAN: 20
GFR NON-AFRICAN AMERICAN: 17
GLUCOSE BLD-MCNC: 157 MG/DL (ref 70–99)
GLUCOSE BLD-MCNC: 175 MG/DL (ref 74–109)
GLUCOSE BLD-MCNC: 185 MG/DL (ref 70–99)
GLUCOSE URINE: NEGATIVE MG/DL
HBA1C MFR BLD: 8.6 % (ref 4–6)
HCT VFR BLD CALC: 50.1 % (ref 42–52)
HDLC SERPL-MCNC: 40 MG/DL (ref 55–121)
HEMOGLOBIN: 14.7 G/DL (ref 14–18)
HUMAN METAPNEUMOVIRUS BY PCR: NOT DETECTED
HUMAN RHINOVIRUS/ENTEROVIRUS BY PCR: NOT DETECTED
HYALINE CASTS: 14 /HPF (ref 0–8)
IMMATURE GRANULOCYTES #: 0 K/UL
INFLUENZA A BY PCR: NOT DETECTED
INFLUENZA B BY PCR: NOT DETECTED
KETONES, URINE: NEGATIVE MG/DL
LDL CHOLESTEROL CALCULATED: 54 MG/DL
LEUKOCYTE ESTERASE, URINE: NEGATIVE
LV EF: 58 %
LVEF MODALITY: NORMAL
LYMPHOCYTES ABSOLUTE: 2.4 K/UL (ref 1.1–4.5)
LYMPHOCYTES RELATIVE PERCENT: 23.4 % (ref 20–40)
MAGNESIUM: 2 MG/DL (ref 1.6–2.4)
MAGNESIUM: 2.1 MG/DL (ref 1.6–2.4)
MCH RBC QN AUTO: 25.1 PG (ref 27–31)
MCHC RBC AUTO-ENTMCNC: 29.3 G/DL (ref 33–37)
MCV RBC AUTO: 85.6 FL (ref 80–94)
MONOCYTES ABSOLUTE: 1.1 K/UL (ref 0–0.9)
MONOCYTES RELATIVE PERCENT: 10.7 % (ref 0–10)
MYCOPLASMA PNEUMONIAE BY PCR: NOT DETECTED
NEUTROPHILS ABSOLUTE: 6.4 K/UL (ref 1.5–7.5)
NEUTROPHILS RELATIVE PERCENT: 62.3 % (ref 50–65)
NITRITE, URINE: NEGATIVE
PARAINFLUENZA VIRUS 1 BY PCR: NOT DETECTED
PARAINFLUENZA VIRUS 2 BY PCR: NOT DETECTED
PARAINFLUENZA VIRUS 3 BY PCR: NOT DETECTED
PARAINFLUENZA VIRUS 4 BY PCR: NOT DETECTED
PDW BLD-RTO: 17.7 % (ref 11.5–14.5)
PERFORMED ON: ABNORMAL
PERFORMED ON: ABNORMAL
PH UA: 5 (ref 5–8)
PHOSPHORUS: 3.3 MG/DL (ref 2.5–4.5)
PLATELET # BLD: 239 K/UL (ref 130–400)
PMV BLD AUTO: 10.4 FL (ref 9.4–12.4)
POTASSIUM SERPL-SCNC: 2.6 MMOL/L (ref 3.5–5)
POTASSIUM SERPL-SCNC: 2.7 MMOL/L (ref 3.5–5)
POTASSIUM SERPL-SCNC: 3.1 MMOL/L (ref 3.5–5)
PRO-BNP: 679 PG/ML (ref 0–900)
PRO-BNP: 727 PG/ML (ref 0–900)
PROTEIN PROTEIN: 108 MG/DL (ref 15–45)
PROTEIN UA: 300 MG/DL
RBC # BLD: 5.85 M/UL (ref 4.7–6.1)
RBC UA: 5 /HPF (ref 0–4)
RESPIRATORY SYNCYTIAL VIRUS BY PCR: NOT DETECTED
SARS-COV-2, PCR: NOT DETECTED
SODIUM BLD-SCNC: 137 MMOL/L (ref 136–145)
SODIUM URINE: 27 MMOL/L
SPECIFIC GRAVITY UA: 1.01 (ref 1–1.03)
TOTAL PROTEIN: 7.2 G/DL (ref 6.6–8.7)
TRIGL SERPL-MCNC: 149 MG/DL (ref 0–149)
TROPONIN: 0.05 NG/ML (ref 0–0.03)
TROPONIN: 0.05 NG/ML (ref 0–0.03)
TROPONIN: 0.06 NG/ML (ref 0–0.03)
TROPONIN: 0.06 NG/ML (ref 0–0.03)
TROPONIN: 0.07 NG/ML (ref 0–0.03)
TROPONIN: 0.1 NG/ML (ref 0–0.03)
TSH REFLEX FT4: 2.41 UIU/ML (ref 0.35–5.5)
UREA NITROGEN, UR: 112 MG/DL
UROBILINOGEN, URINE: 1 E.U./DL
WBC # BLD: 10.2 K/UL (ref 4.8–10.8)
WBC UA: 1 /HPF (ref 0–5)

## 2022-08-16 PROCEDURE — 2580000003 HC RX 258: Performed by: HOSPITALIST

## 2022-08-16 PROCEDURE — 84100 ASSAY OF PHOSPHORUS: CPT

## 2022-08-16 PROCEDURE — 99223 1ST HOSP IP/OBS HIGH 75: CPT | Performed by: SURGERY

## 2022-08-16 PROCEDURE — 71250 CT THORAX DX C-: CPT | Performed by: RADIOLOGY

## 2022-08-16 PROCEDURE — 0202U NFCT DS 22 TRGT SARS-COV-2: CPT

## 2022-08-16 PROCEDURE — 83735 ASSAY OF MAGNESIUM: CPT

## 2022-08-16 PROCEDURE — 85025 COMPLETE CBC W/AUTO DIFF WBC: CPT

## 2022-08-16 PROCEDURE — 6370000000 HC RX 637 (ALT 250 FOR IP): Performed by: EMERGENCY MEDICINE

## 2022-08-16 PROCEDURE — 84484 ASSAY OF TROPONIN QUANT: CPT

## 2022-08-16 PROCEDURE — 83036 HEMOGLOBIN GLYCOSYLATED A1C: CPT

## 2022-08-16 PROCEDURE — 96365 THER/PROPH/DIAG IV INF INIT: CPT

## 2022-08-16 PROCEDURE — 80053 COMPREHEN METABOLIC PANEL: CPT

## 2022-08-16 PROCEDURE — C8929 TTE W OR WO FOL WCON,DOPPLER: HCPCS

## 2022-08-16 PROCEDURE — 82570 ASSAY OF URINE CREATININE: CPT

## 2022-08-16 PROCEDURE — 6360000002 HC RX W HCPCS: Performed by: EMERGENCY MEDICINE

## 2022-08-16 PROCEDURE — 74176 CT ABD & PELVIS W/O CONTRAST: CPT

## 2022-08-16 PROCEDURE — 36415 COLL VENOUS BLD VENIPUNCTURE: CPT

## 2022-08-16 PROCEDURE — 2580000003 HC RX 258: Performed by: EMERGENCY MEDICINE

## 2022-08-16 PROCEDURE — 84300 ASSAY OF URINE SODIUM: CPT

## 2022-08-16 PROCEDURE — 84540 ASSAY OF URINE/UREA-N: CPT

## 2022-08-16 PROCEDURE — 83880 ASSAY OF NATRIURETIC PEPTIDE: CPT

## 2022-08-16 PROCEDURE — G0378 HOSPITAL OBSERVATION PER HR: HCPCS

## 2022-08-16 PROCEDURE — 71045 X-RAY EXAM CHEST 1 VIEW: CPT | Performed by: RADIOLOGY

## 2022-08-16 PROCEDURE — 99285 EMERGENCY DEPT VISIT HI MDM: CPT

## 2022-08-16 PROCEDURE — 6360000004 HC RX CONTRAST MEDICATION: Performed by: HOSPITALIST

## 2022-08-16 PROCEDURE — 74176 CT ABD & PELVIS W/O CONTRAST: CPT | Performed by: RADIOLOGY

## 2022-08-16 PROCEDURE — 82947 ASSAY GLUCOSE BLOOD QUANT: CPT

## 2022-08-16 PROCEDURE — 81001 URINALYSIS AUTO W/SCOPE: CPT

## 2022-08-16 PROCEDURE — 84156 ASSAY OF PROTEIN URINE: CPT

## 2022-08-16 PROCEDURE — 93005 ELECTROCARDIOGRAM TRACING: CPT | Performed by: HOSPITALIST

## 2022-08-16 PROCEDURE — 71250 CT THORAX DX C-: CPT

## 2022-08-16 PROCEDURE — 96366 THER/PROPH/DIAG IV INF ADDON: CPT

## 2022-08-16 PROCEDURE — 71045 X-RAY EXAM CHEST 1 VIEW: CPT

## 2022-08-16 PROCEDURE — 80061 LIPID PANEL: CPT

## 2022-08-16 PROCEDURE — 6370000000 HC RX 637 (ALT 250 FOR IP): Performed by: HOSPITALIST

## 2022-08-16 PROCEDURE — 84132 ASSAY OF SERUM POTASSIUM: CPT

## 2022-08-16 PROCEDURE — 6360000002 HC RX W HCPCS: Performed by: HOSPITALIST

## 2022-08-16 PROCEDURE — 84443 ASSAY THYROID STIM HORMONE: CPT

## 2022-08-16 RX ORDER — POTASSIUM CHLORIDE 7.45 MG/ML
10 INJECTION INTRAVENOUS ONCE
Status: COMPLETED | OUTPATIENT
Start: 2022-08-16 | End: 2022-08-16

## 2022-08-16 RX ORDER — ERGOCALCIFEROL 1.25 MG/1
50000 CAPSULE ORAL WEEKLY
Status: DISCONTINUED | OUTPATIENT
Start: 2022-08-23 | End: 2022-08-19 | Stop reason: HOSPADM

## 2022-08-16 RX ORDER — INSULIN GLARGINE 100 [IU]/ML
30 INJECTION, SOLUTION SUBCUTANEOUS NIGHTLY
Status: DISCONTINUED | OUTPATIENT
Start: 2022-08-16 | End: 2022-08-17

## 2022-08-16 RX ORDER — BUMETANIDE 1 MG/1
1 TABLET ORAL 2 TIMES DAILY
Status: DISCONTINUED | OUTPATIENT
Start: 2022-08-16 | End: 2022-08-19 | Stop reason: HOSPADM

## 2022-08-16 RX ORDER — ASCORBIC ACID 500 MG
500 TABLET ORAL DAILY
Status: DISCONTINUED | OUTPATIENT
Start: 2022-08-16 | End: 2022-08-19 | Stop reason: HOSPADM

## 2022-08-16 RX ORDER — CARVEDILOL 12.5 MG/1
12.5 TABLET ORAL 2 TIMES DAILY WITH MEALS
Status: DISCONTINUED | OUTPATIENT
Start: 2022-08-16 | End: 2022-08-18

## 2022-08-16 RX ORDER — ROSUVASTATIN CALCIUM 20 MG/1
40 TABLET, COATED ORAL NIGHTLY
Status: DISCONTINUED | OUTPATIENT
Start: 2022-08-16 | End: 2022-08-19 | Stop reason: HOSPADM

## 2022-08-16 RX ORDER — ISOSORBIDE MONONITRATE 60 MG/1
60 TABLET, EXTENDED RELEASE ORAL 2 TIMES DAILY
Status: DISCONTINUED | OUTPATIENT
Start: 2022-08-16 | End: 2022-08-19 | Stop reason: HOSPADM

## 2022-08-16 RX ORDER — POTASSIUM CHLORIDE 20 MEQ/1
40 TABLET, EXTENDED RELEASE ORAL ONCE
Status: COMPLETED | OUTPATIENT
Start: 2022-08-16 | End: 2022-08-16

## 2022-08-16 RX ORDER — NITROGLYCERIN 0.4 MG/1
0.4 TABLET SUBLINGUAL EVERY 5 MIN PRN
Status: DISCONTINUED | OUTPATIENT
Start: 2022-08-16 | End: 2022-08-16 | Stop reason: SDUPTHER

## 2022-08-16 RX ORDER — SODIUM CHLORIDE 9 MG/ML
INJECTION, SOLUTION INTRAVENOUS CONTINUOUS
Status: DISCONTINUED | OUTPATIENT
Start: 2022-08-16 | End: 2022-08-19 | Stop reason: HOSPADM

## 2022-08-16 RX ORDER — NITROGLYCERIN 0.4 MG/1
0.4 TABLET SUBLINGUAL EVERY 5 MIN PRN
Status: DISCONTINUED | OUTPATIENT
Start: 2022-08-16 | End: 2022-08-19 | Stop reason: HOSPADM

## 2022-08-16 RX ORDER — SENNA AND DOCUSATE SODIUM 50; 8.6 MG/1; MG/1
1 TABLET, FILM COATED ORAL NIGHTLY
Status: DISCONTINUED | OUTPATIENT
Start: 2022-08-16 | End: 2022-08-19 | Stop reason: HOSPADM

## 2022-08-16 RX ORDER — ONDANSETRON 4 MG/1
4 TABLET, ORALLY DISINTEGRATING ORAL EVERY 8 HOURS PRN
Status: DISCONTINUED | OUTPATIENT
Start: 2022-08-16 | End: 2022-08-19 | Stop reason: HOSPADM

## 2022-08-16 RX ORDER — POTASSIUM CHLORIDE 7.45 MG/ML
10 INJECTION INTRAVENOUS PRN
Status: DISCONTINUED | OUTPATIENT
Start: 2022-08-16 | End: 2022-08-19 | Stop reason: HOSPADM

## 2022-08-16 RX ORDER — SODIUM CHLORIDE 9 MG/ML
INJECTION, SOLUTION INTRAVENOUS PRN
Status: DISCONTINUED | OUTPATIENT
Start: 2022-08-16 | End: 2022-08-19 | Stop reason: HOSPADM

## 2022-08-16 RX ORDER — MINOXIDIL 2.5 MG/1
2.5 TABLET ORAL 2 TIMES DAILY
Status: DISCONTINUED | OUTPATIENT
Start: 2022-08-16 | End: 2022-08-19 | Stop reason: HOSPADM

## 2022-08-16 RX ORDER — CALCITRIOL 0.25 UG/1
0.25 CAPSULE, LIQUID FILLED ORAL
Status: DISCONTINUED | OUTPATIENT
Start: 2022-08-17 | End: 2022-08-19 | Stop reason: HOSPADM

## 2022-08-16 RX ORDER — AMIODARONE HYDROCHLORIDE 200 MG/1
200 TABLET ORAL DAILY
Status: DISCONTINUED | OUTPATIENT
Start: 2022-08-16 | End: 2022-08-19 | Stop reason: HOSPADM

## 2022-08-16 RX ORDER — SODIUM CHLORIDE 0.9 % (FLUSH) 0.9 %
5-40 SYRINGE (ML) INJECTION EVERY 12 HOURS SCHEDULED
Status: DISCONTINUED | OUTPATIENT
Start: 2022-08-16 | End: 2022-08-19 | Stop reason: HOSPADM

## 2022-08-16 RX ORDER — MONTELUKAST SODIUM 10 MG/1
10 TABLET ORAL DAILY
Status: DISCONTINUED | OUTPATIENT
Start: 2022-08-16 | End: 2022-08-19 | Stop reason: HOSPADM

## 2022-08-16 RX ORDER — PANTOPRAZOLE SODIUM 40 MG/1
40 TABLET, DELAYED RELEASE ORAL
Status: DISCONTINUED | OUTPATIENT
Start: 2022-08-16 | End: 2022-08-19 | Stop reason: HOSPADM

## 2022-08-16 RX ORDER — SODIUM CHLORIDE 0.9 % (FLUSH) 0.9 %
5-40 SYRINGE (ML) INJECTION PRN
Status: DISCONTINUED | OUTPATIENT
Start: 2022-08-16 | End: 2022-08-19 | Stop reason: HOSPADM

## 2022-08-16 RX ORDER — ACETAMINOPHEN 325 MG/1
650 TABLET ORAL EVERY 6 HOURS PRN
Status: DISCONTINUED | OUTPATIENT
Start: 2022-08-16 | End: 2022-08-19 | Stop reason: HOSPADM

## 2022-08-16 RX ORDER — ACETAMINOPHEN 650 MG/1
650 SUPPOSITORY RECTAL EVERY 6 HOURS PRN
Status: DISCONTINUED | OUTPATIENT
Start: 2022-08-16 | End: 2022-08-19 | Stop reason: HOSPADM

## 2022-08-16 RX ORDER — DIPHENHYDRAMINE HCL 25 MG
25 TABLET ORAL EVERY 6 HOURS PRN
Status: DISCONTINUED | OUTPATIENT
Start: 2022-08-16 | End: 2022-08-19 | Stop reason: HOSPADM

## 2022-08-16 RX ORDER — ONDANSETRON 2 MG/ML
4 INJECTION INTRAMUSCULAR; INTRAVENOUS EVERY 6 HOURS PRN
Status: DISCONTINUED | OUTPATIENT
Start: 2022-08-16 | End: 2022-08-19 | Stop reason: HOSPADM

## 2022-08-16 RX ORDER — ALLOPURINOL 100 MG/1
200 TABLET ORAL DAILY
Status: DISCONTINUED | OUTPATIENT
Start: 2022-08-16 | End: 2022-08-19 | Stop reason: HOSPADM

## 2022-08-16 RX ORDER — POTASSIUM CHLORIDE 20 MEQ/1
20 TABLET, EXTENDED RELEASE ORAL
Status: DISCONTINUED | OUTPATIENT
Start: 2022-08-17 | End: 2022-08-19 | Stop reason: HOSPADM

## 2022-08-16 RX ADMIN — POTASSIUM CHLORIDE 40 MEQ: 1500 TABLET, EXTENDED RELEASE ORAL at 04:36

## 2022-08-16 RX ADMIN — AMIODARONE HYDROCHLORIDE 200 MG: 200 TABLET ORAL at 10:57

## 2022-08-16 RX ADMIN — CARVEDILOL 12.5 MG: 12.5 TABLET, FILM COATED ORAL at 16:02

## 2022-08-16 RX ADMIN — MONTELUKAST 10 MG: 10 TABLET, FILM COATED ORAL at 12:35

## 2022-08-16 RX ADMIN — PERFLUTREN 1.5 ML: 6.52 INJECTION, SUSPENSION INTRAVENOUS at 10:03

## 2022-08-16 RX ADMIN — ROSUVASTATIN 40 MG: 20 TABLET, FILM COATED ORAL at 21:18

## 2022-08-16 RX ADMIN — ONDANSETRON 4 MG: 4 TABLET, ORALLY DISINTEGRATING ORAL at 12:22

## 2022-08-16 RX ADMIN — OXYCODONE HYDROCHLORIDE AND ACETAMINOPHEN 500 MG: 500 TABLET ORAL at 10:57

## 2022-08-16 RX ADMIN — ISOSORBIDE MONONITRATE 60 MG: 60 TABLET, EXTENDED RELEASE ORAL at 21:18

## 2022-08-16 RX ADMIN — CARVEDILOL 12.5 MG: 12.5 TABLET, FILM COATED ORAL at 10:57

## 2022-08-16 RX ADMIN — MINOXIDIL 2.5 MG: 2.5 TABLET ORAL at 21:17

## 2022-08-16 RX ADMIN — POTASSIUM CHLORIDE 10 MEQ: 10 INJECTION, SOLUTION INTRAVENOUS at 12:15

## 2022-08-16 RX ADMIN — Medication 10 ML: at 11:01

## 2022-08-16 RX ADMIN — POTASSIUM CHLORIDE 10 MEQ: 10 INJECTION, SOLUTION INTRAVENOUS at 19:21

## 2022-08-16 RX ADMIN — SENNOSIDES AND DOCUSATE SODIUM 1 TABLET: 50; 8.6 TABLET ORAL at 21:18

## 2022-08-16 RX ADMIN — PANTOPRAZOLE SODIUM 40 MG: 40 TABLET, DELAYED RELEASE ORAL at 10:57

## 2022-08-16 RX ADMIN — POTASSIUM CHLORIDE 10 MEQ: 10 INJECTION, SOLUTION INTRAVENOUS at 11:00

## 2022-08-16 RX ADMIN — MINOXIDIL 2.5 MG: 2.5 TABLET ORAL at 12:35

## 2022-08-16 RX ADMIN — ALLOPURINOL 200 MG: 100 TABLET ORAL at 10:57

## 2022-08-16 RX ADMIN — POTASSIUM CHLORIDE 10 MEQ: 10 INJECTION, SOLUTION INTRAVENOUS at 16:05

## 2022-08-16 RX ADMIN — POTASSIUM CHLORIDE 10 MEQ: 10 INJECTION, SOLUTION INTRAVENOUS at 13:58

## 2022-08-16 RX ADMIN — SODIUM CHLORIDE: 9 INJECTION, SOLUTION INTRAVENOUS at 04:37

## 2022-08-16 RX ADMIN — POTASSIUM CHLORIDE 10 MEQ: 7.46 INJECTION, SOLUTION INTRAVENOUS at 04:36

## 2022-08-16 RX ADMIN — INSULIN GLARGINE 30 UNITS: 100 INJECTION, SOLUTION SUBCUTANEOUS at 21:18

## 2022-08-16 RX ADMIN — PANTOPRAZOLE SODIUM 40 MG: 40 TABLET, DELAYED RELEASE ORAL at 16:02

## 2022-08-16 RX ADMIN — ISOSORBIDE MONONITRATE 60 MG: 60 TABLET, EXTENDED RELEASE ORAL at 10:57

## 2022-08-16 ASSESSMENT — PAIN DESCRIPTION - DESCRIPTORS: DESCRIPTORS: SHARP

## 2022-08-16 ASSESSMENT — ENCOUNTER SYMPTOMS
VOMITING: 1
VOMITING: 0
ABDOMINAL PAIN: 0
DIARRHEA: 0
COUGH: 0
BACK PAIN: 0
EYE PAIN: 0
CHEST TIGHTNESS: 0
COUGH: 1
SHORTNESS OF BREATH: 1
SHORTNESS OF BREATH: 0
CONSTIPATION: 0
BACK PAIN: 1
NAUSEA: 1
NAUSEA: 0

## 2022-08-16 ASSESSMENT — PAIN DESCRIPTION - LOCATION: LOCATION: CHEST

## 2022-08-16 ASSESSMENT — PAIN SCALES - GENERAL
PAINLEVEL_OUTOF10: 0
PAINLEVEL_OUTOF10: 6
PAINLEVEL_OUTOF10: 6

## 2022-08-16 ASSESSMENT — PAIN - FUNCTIONAL ASSESSMENT: PAIN_FUNCTIONAL_ASSESSMENT: 0-10

## 2022-08-16 ASSESSMENT — PAIN DESCRIPTION - ORIENTATION: ORIENTATION: LEFT

## 2022-08-16 NOTE — PLAN OF CARE
Nutrition Problem #1: Inadequate oral intake  Intervention: Food and/or Nutrient Delivery: Continue NPO

## 2022-08-16 NOTE — CONSULTS
Inpatient consult to Cardiothoracic Surgery  Consult performed by: Giovanni Thakkar MD  Consult ordered by: Ashlyn Isaac MD      Department of Cardiothoracic Surgery    CHIEF COMPLAINT:    Chief Complaint   Patient presents with    Shortness of Breath     Chest pain x 2 days            HISTORY OF PRESENT ILLNESS:      The patient is a 78 y.o. diabetic male with significant past medical history of known systemic atherosclerosis (PVD, CAD) and multiple prior admissions for CHF who presents with chest/back pain and SOB and an incidental finding of a descending thoracic dissection (known to be chronic) which prompted thoracic surgery consultation. He presented to the ED today with chest pain and positive troponin. Cardiology consult pending.     Past Medical History:   Diagnosis Date    Acute kidney failure, unspecified ([address]     Anxiety state, unspecified     Atrial septal aneurysm 01/09/2016    Blood circulation, collateral     Body mass index 30.0-30.9, adult     CAD (coronary artery disease) 01/10/2016    1/9/16  lexiscan  Positive for apical MI + myocardial ischemia, EF 42%, intermediate risk findings, AUC indication 16, AUC score 7 1/10/16  Cath  3 lesions in the LAD:  Mid: 70% 2.25x23, mid 90% 2.25 x 28, distal 100% 2.0x28, anterior lateral apical hypokinesis, EF 45%    Calculus of kidney     Carotid artery stenosis 06/26/2013    Cellulitis and abscess of hand, except fingers and thumb     Cerebral artery occlusion with cerebral infarction (HCC)     15 years ago    Chronic airway obstruction, not elsewhere classified     Chronic kidney disease, unspecified     Congestive heart failure, unspecified     Diabetes mellitus type II     Diverticulosis of colon (without mention of hemorrhage)     Encounter for long-term (current) use of insulin (HCC)     Esophageal reflux     Family history of ischemic heart disease     Gastric ulcer, unspecified as acute or chronic, without mention of hemorrhage, perforation, or obstruction     Hyperlipemia     Hypertension     Internal hemorrhoids without mention of complication     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, or unspecified(403.00)     Obesity, unspecified     Other specified cardiac dysrhythmias(427.89)     Palliative care patient 12/21/2020    Paroxysmal atrial fibrillation (HCC)     Peripheral vascular disease (HCC)     Solitary pulmonary nodule     Surgical or other procedure not carried out because of contraindication     Thoracic aneurysm without mention of rupture     Tobacco use disorder     Type II or unspecified type diabetes mellitus without mention of complication, not stated as uncontrolled        Social History     Socioeconomic History    Marital status:      Spouse name: Not on file    Number of children: Not on file    Years of education: Not on file    Highest education level: Not on file   Occupational History    Not on file   Tobacco Use    Smoking status: Former     Packs/day: 0.25     Types: Cigarettes    Smokeless tobacco: Never   Vaping Use    Vaping Use: Never used   Substance and Sexual Activity    Alcohol use: No    Drug use: No    Sexual activity: Yes   Other Topics Concern    Not on file   Social History Narrative    Not on file     Social Determinants of Health     Financial Resource Strain: Not on file   Food Insecurity: Not on file   Transportation Needs: Not on file   Physical Activity: Not on file   Stress: Not on file   Social Connections: Not on file   Intimate Partner Violence: Not on file   Housing Stability: Not on file       Past Surgical History:   Procedure Laterality Date    [address]  1-16    2 JACQUELINE to LAD    DIAGNOSTIC CARDIAC CATH  [facility]      VASCULAR SURGERY  03- [facility]    Aortogram with bilateral selective renal artery injections    VASCULAR SURGERY 6/14/13 [facility]    Right carotid endarterectomy with Vascu-Guard patch repair. Right cervical carotid arteriograms after endarterectomy. VASCULAR SURGERY  7/6/15 [facility]    Percutaneous cannulation, right common femoral artery, with a5 [place] sheath and later 6 [place] [facility] sheath. Suprarenal abdomianl aortagram.  Selective right renal arteriogram.  Right renal artery balloon angioplasty;/stent (Express 6 mm x 18mmballoon-expandable stent. . Completion suprarenal abdominal aortogram and selective right remal arteriogram. Mynx closure, right common femoral artery punctur       Allergies   Allergen Reactions    Morphine Shortness Of Breath     Was INTUBATED for this    Hydralazine      \"They said I am, I really don't know\"         Current Facility-Administered Medications:     0.9 % sodium chloride infusion, , IntraVENous, Continuous, Alex Peterson MD, Last Rate: 100 mL/hr at 08/16/22 0437, New Bag at 08/16/22 0437    allopurinol (ZYLOPRIM) tablet 200 mg, 200 mg, Oral, Daily, David Du MD    amiodarone (CORDARONE) tablet 200 mg, 200 mg, Oral, Daily, David Du MD    ascorbic acid (VITAMIN C) tablet 500 mg, 500 mg, Oral, Daily, David Du MD    [Held by provider] bumetanide (BUMEX) tablet 1 mg, 1 mg, Oral, BID, David Du MD    calcitRIOL (ROCALTROL) capsule 0.25 mcg, 0.25 mcg, Oral, Once per day on Mon Wed Fri, David Du MD    carvedilol (COREG) tablet 12.5 mg, 12.5 mg, Oral, BID, David Du MD    diphenhydrAMINE (BENADRYL) capsule 25 mg, 25 mg, Oral, Q6H PRN, David Du MD    isosorbide mononitrate (IMDUR) extended release tablet 60 mg, 1 tablet, Oral, BID, David Du MD    insulin glargine (LANTUS;BASAGLAR) injection pen 30 Units, 30 Units, SubCUTAneous, Nightly, David Du MD    minoxidil (LONITEN) tablet 2.5 mg, 1 tablet, Oral, BID, David Du MD    montelukast (SINGULAIR) tablet 10 mg, 10 mg, Oral, Daily, David Du MD    nitroGLYCERIN (NITROSTAT) SL tablet 0.4 mg, 0.4 mg, SubLINGual, Q5 Min PRN, Artis Castellanos MD    pantoprazole (PROTONIX) tablet 40 mg, 40 mg, Oral, BID AC, Artis Castellanos MD    [START ON 8/17/2022] potassium chloride (KLOR-CON M) extended release tablet 20 mEq, 20 mEq, Oral, Daily with breakfast, Artis Castellanos MD    rosuvastatin (CRESTOR) tablet 40 mg, 40 mg, Oral, Nightly, Artis Castellanos MD    senna-docusate (PERICOLACE) 8.6-50 MG per tablet 1 tablet, 1 tablet, Oral, Nightly, MD Rinku Vivar ON 8/23/2022] vitamin D (ERGOCALCIFEROL) capsule 50,000 Units, 50,000 Units, Oral, Weekly, Artis Castellanos MD    sodium chloride flush 0.9 % injection 5-40 mL, 5-40 mL, IntraVENous, 2 times per day, Artis Castellanos MD    sodium chloride flush 0.9 % injection 5-40 mL, 5-40 mL, IntraVENous, PRN, Artis Castellanos MD    0.9 % sodium chloride infusion, , IntraVENous, PRN, Artis Castellanos MD    ondansetron (ZOFRAN-ODT) disintegrating tablet 4 mg, 4 mg, Oral, Q8H PRN **OR** ondansetron (ZOFRAN) injection 4 mg, 4 mg, IntraVENous, Q6H PRN, Artis Castellanos MD    acetaminophen (TYLENOL) tablet 650 mg, 650 mg, Oral, Q6H PRN **OR** acetaminophen (TYLENOL) suppository 650 mg, 650 mg, Rectal, Q6H PRN, Artis Castellanos MD    nitroGLYCERIN (NITROSTAT) SL tablet 0.4 mg, 0.4 mg, SubLINGual, Q5 Min PRN, Artis Castellanos MD    Current Outpatient Medications:     ELIQUIS 5 MG TABS tablet, TAKE ONE TABLET BY MOUTH TWICE A DAY, Disp: 60 tablet, Rfl: 5    isosorbide mononitrate (IMDUR) 60 MG extended release tablet, TAKE ONE TABLET BY MOUTH TWICE A DAY, Disp: 60 tablet, Rfl: 5    minoxidil (LONITEN) 2.5 MG tablet, TAKE ONE TABLET BY MOUTH TWICE A DAY, Disp: 60 tablet, Rfl: 5    ondansetron (ZOFRAN ODT) 4 MG disintegrating tablet, Take 1 tablet by mouth every 8 hours as needed for Nausea or Vomiting, Disp: 20 tablet, Rfl: 0    bumetanide (BUMEX) 1 MG tablet, Take 1 tablet by mouth 2 times daily, Disp: 60 tablet, Rfl: 5    metOLazone (ZAROXOLYN) 2.5 MG tablet, Take 1 tablet by mouth once a week On Friday, Disp: 30 tablet, Rfl: 0    clopidogrel (PLAVIX) 75 MG tablet, TAKE ONE TABLET BY MOUTH EVERY DAY, Disp: 30 tablet, Rfl: 3    amiodarone (CORDARONE) 200 MG tablet, Take 1 tablet by mouth daily, Disp: 30 tablet, Rfl: 0    rosuvastatin (CRESTOR) 40 MG tablet, Take 0.5 tablets by mouth daily, Disp: 30 tablet, Rfl: 0    carvedilol (COREG) 12.5 MG tablet, Take 1 tablet by mouth 2 times daily, Disp: 60 tablet, Rfl: 3    potassium chloride (KLOR-CON M) 20 MEQ extended release tablet, Take 1 tablet by mouth daily (with breakfast), Disp: 60 tablet, Rfl: 3    pantoprazole (PROTONIX) 40 MG tablet, Take 1 tablet by mouth 2 times daily (before meals), Disp: 60 tablet, Rfl: 3    ascorbic acid (VITAMIN C) 500 MG tablet, Take 1 tablet by mouth daily, Disp: 30 tablet, Rfl: 3    calcitRIOL (ROCALTROL) 0.25 MCG capsule, Take 0.25 mcg by mouth three times a week, Disp: , Rfl:     diphenhydrAMINE (BENADRYL) 25 MG capsule, Take 25 mg by mouth every 6 hours as needed for Itching, Disp: , Rfl:     senna-docusate (PERICOLACE) 8.6-50 MG per tablet, Take 1 tablet by mouth nightly, Disp: , Rfl:     allopurinol (ZYLOPRIM) 100 MG tablet, Take 200 mg by mouth daily, Disp: , Rfl:     aspirin 81 MG chewable tablet, Take 1 tablet by mouth daily, Disp: 30 tablet, Rfl: 3    vitamin D (ERGOCALCIFEROL) 1.25 MG (93878 UT) CAPS capsule, Take 50,000 Units by mouth once a week, Disp: , Rfl:     nitroGLYCERIN (NITROSTAT) 0.4 MG SL tablet, up to max of 3 total doses.  If no relief after 1 dose, call 911., Disp: 25 tablet, Rfl: 0    NOVOLOG FLEXPEN 100 UNIT/ML injection pen, 5 Units 3 times daily (before meals) , Disp: , Rfl:     montelukast (SINGULAIR) 10 MG tablet, Take 10 mg by mouth daily, Disp: , Rfl:     levocetirizine (XYZAL) 5 MG tablet, Take 5 mg by mouth nightly, Disp: , Rfl:     ULTICARE MINI PEN NEEDLES 31G X 6 MM MISC, FOR USE WITH LANTUS TWO TIMES A DAY, Disp: 50 each, Rfl: 5    LANTUS SOLOSTAR 100 UNIT/ML injection pen, INJECT 30 UNITS IN THE MORNING AND 20 UNITS IN THE EVENING DAILY (Patient taking differently: 30 Units nightly), Disp: 15 mL, Rfl: 5    Family History   Problem Relation Age of Onset    High Blood Pressure Father     Cancer Father     High Blood Pressure Mother     High Blood Pressure Brother           Current Facility-Administered Medications:     0.9 % sodium chloride infusion, , IntraVENous, Continuous, Giana Agrawal MD, Last Rate: 100 mL/hr at 08/16/22 0437, New Bag at 08/16/22 0437    allopurinol (ZYLOPRIM) tablet 200 mg, 200 mg, Oral, Daily, Sarmad Dean MD    amiodarone (CORDARONE) tablet 200 mg, 200 mg, Oral, Daily, Sarmad Dean MD    ascorbic acid (VITAMIN C) tablet 500 mg, 500 mg, Oral, Daily, Sarmad Dean MD    [Held by provider] bumetanide (BUMEX) tablet 1 mg, 1 mg, Oral, BID, Sarmad Dean MD    calcitRIOL (ROCALTROL) capsule 0.25 mcg, 0.25 mcg, Oral, Once per day on Mon Wed Fri, Sarmad Dean MD    carvedilol (COREG) tablet 12.5 mg, 12.5 mg, Oral, BID, Sarmad Dean MD    diphenhydrAMINE (BENADRYL) capsule 25 mg, 25 mg, Oral, Q6H PRN, Sarmad Dean MD    isosorbide mononitrate (IMDUR) extended release tablet 60 mg, 1 tablet, Oral, BID, Sarmad Dean MD    insulin glargine (LANTUS;BASAGLAR) injection pen 30 Units, 30 Units, SubCUTAneous, Nightly, Sarmad Dean MD    minoxidil (LONITEN) tablet 2.5 mg, 1 tablet, Oral, BID, Sarmad Dean MD    montelukast (SINGULAIR) tablet 10 mg, 10 mg, Oral, Daily, Sarmad Dean MD    nitroGLYCERIN (NITROSTAT) SL tablet 0.4 mg, 0.4 mg, SubLINGual, Q5 Min PRN, Sarmad Dean MD    pantoprazole (PROTONIX) tablet 40 mg, 40 mg, Oral, BID AC, Sarmad Dean MD    [START ON 8/17/2022] potassium chloride (KLOR-CON M) extended release tablet 20 mEq, 20 mEq, Oral, Daily with breakfast, Sarmad Dean MD    rosuvastatin (CRESTOR) tablet 40 mg, 40 mg, Oral, Nightly, Sarmad Dean MD    senna-docusate (PERICOLACE) 8.6-50 MG per tablet 1 tablet, 1 tablet, Oral, Nightly, Doug Shin MD Laya Martin ON 8/23/2022] vitamin D (ERGOCALCIFEROL) capsule 50,000 Units, 50,000 Units, Oral, Weekly, Dominique Vargas MD    sodium chloride flush 0.9 % injection 5-40 mL, 5-40 mL, IntraVENous, 2 times per day, Dominique Vargas MD    sodium chloride flush 0.9 % injection 5-40 mL, 5-40 mL, IntraVENous, PRN, Dominique Vargas MD    0.9 % sodium chloride infusion, , IntraVENous, PRN, Dominique Vargas MD    ondansetron (ZOFRAN-ODT) disintegrating tablet 4 mg, 4 mg, Oral, Q8H PRN **OR** ondansetron (ZOFRAN) injection 4 mg, 4 mg, IntraVENous, Q6H PRN, Dominique Vargas MD    acetaminophen (TYLENOL) tablet 650 mg, 650 mg, Oral, Q6H PRN **OR** acetaminophen (TYLENOL) suppository 650 mg, 650 mg, Rectal, Q6H PRN, Dominique Vargas MD    nitroGLYCERIN (NITROSTAT) SL tablet 0.4 mg, 0.4 mg, SubLINGual, Q5 Min PRN, Dominique Vargas MD    Current Outpatient Medications:     ELIQUIS 5 MG TABS tablet, TAKE ONE TABLET BY MOUTH TWICE A DAY, Disp: 60 tablet, Rfl: 5    isosorbide mononitrate (IMDUR) 60 MG extended release tablet, TAKE ONE TABLET BY MOUTH TWICE A DAY, Disp: 60 tablet, Rfl: 5    minoxidil (LONITEN) 2.5 MG tablet, TAKE ONE TABLET BY MOUTH TWICE A DAY, Disp: 60 tablet, Rfl: 5    ondansetron (ZOFRAN ODT) 4 MG disintegrating tablet, Take 1 tablet by mouth every 8 hours as needed for Nausea or Vomiting, Disp: 20 tablet, Rfl: 0    bumetanide (BUMEX) 1 MG tablet, Take 1 tablet by mouth 2 times daily, Disp: 60 tablet, Rfl: 5    metOLazone (ZAROXOLYN) 2.5 MG tablet, Take 1 tablet by mouth once a week On Friday, Disp: 30 tablet, Rfl: 0    clopidogrel (PLAVIX) 75 MG tablet, TAKE ONE TABLET BY MOUTH EVERY DAY, Disp: 30 tablet, Rfl: 3    amiodarone (CORDARONE) 200 MG tablet, Take 1 tablet by mouth daily, Disp: 30 tablet, Rfl: 0    rosuvastatin (CRESTOR) 40 MG tablet, Take 0.5 tablets by mouth daily, Disp: 30 tablet, Rfl: 0    carvedilol (COREG) 12.5 MG tablet, Take 1 tablet by mouth 2 times daily, Disp: 60 tablet, Rfl: 3    potassium chloride (KLOR-CON M) 20 MEQ extended release tablet, Take 1 tablet by mouth daily (with breakfast), Disp: 60 tablet, Rfl: 3    pantoprazole (PROTONIX) 40 MG tablet, Take 1 tablet by mouth 2 times daily (before meals), Disp: 60 tablet, Rfl: 3    ascorbic acid (VITAMIN C) 500 MG tablet, Take 1 tablet by mouth daily, Disp: 30 tablet, Rfl: 3    calcitRIOL (ROCALTROL) 0.25 MCG capsule, Take 0.25 mcg by mouth three times a week, Disp: , Rfl:     diphenhydrAMINE (BENADRYL) 25 MG capsule, Take 25 mg by mouth every 6 hours as needed for Itching, Disp: , Rfl:     senna-docusate (PERICOLACE) 8.6-50 MG per tablet, Take 1 tablet by mouth nightly, Disp: , Rfl:     allopurinol (ZYLOPRIM) 100 MG tablet, Take 200 mg by mouth daily, Disp: , Rfl:     aspirin 81 MG chewable tablet, Take 1 tablet by mouth daily, Disp: 30 tablet, Rfl: 3    vitamin D (ERGOCALCIFEROL) 1.25 MG (04156 UT) CAPS capsule, Take 50,000 Units by mouth once a week, Disp: , Rfl:     nitroGLYCERIN (NITROSTAT) 0.4 MG SL tablet, up to max of 3 total doses. If no relief after 1 dose, call 911., Disp: 25 tablet, Rfl: 0    NOVOLOG FLEXPEN 100 UNIT/ML injection pen, 5 Units 3 times daily (before meals) , Disp: , Rfl:     montelukast (SINGULAIR) 10 MG tablet, Take 10 mg by mouth daily, Disp: , Rfl:     levocetirizine (XYZAL) 5 MG tablet, Take 5 mg by mouth nightly, Disp: , Rfl:     ULTICARE MINI PEN NEEDLES 31G X 6 MM MISC, FOR USE WITH LANTUS TWO TIMES A DAY, Disp: 50 each, Rfl: 5    LANTUS SOLOSTAR 100 UNIT/ML injection pen, INJECT 30 UNITS IN THE MORNING AND 20 UNITS IN THE EVENING DAILY (Patient taking differently: 30 Units nightly), Disp: 15 mL, Rfl: 5     REVIEW OF SYSTEMS:  Review of Systems   Constitutional:  Negative for activity change, appetite change, chills, diaphoresis, fatigue, fever and unexpected weight change. HENT:  Negative for dental problem. Eyes:  Negative for visual disturbance.    Respiratory:  Negative for cough, chest tightness and shortness of breath. No hoarseness. No hemoptysis. Cardiovascular:  Positive for chest pain. Negative for palpitations and leg swelling. No orthopnea or PND. Gastrointestinal:  Negative for abdominal pain, constipation, diarrhea, nausea and vomiting. Genitourinary:  Negative for dysuria, frequency, hematuria and urgency. Musculoskeletal:  Negative for back pain and joint swelling. Skin: Negative. Neurological:  Negative for dizziness, seizures, syncope, light-headedness and headaches. Psychiatric/Behavioral:  Negative for confusion and hallucinations. The patient is not nervous/anxious. PHYSICAL EXAM:    Physical Exam  Vitals and nursing note reviewed. Constitutional:       General: He is not in acute distress. Appearance: He is well-developed. HENT:      Head: Normocephalic. Eyes:      Conjunctiva/sclera: Conjunctivae normal.      Pupils: Pupils are equal, round, and reactive to light. Neck:      Thyroid: No thyromegaly. Vascular: No JVD. Trachea: No tracheal deviation. Cardiovascular:      Rate and Rhythm: Regular rhythm. Heart sounds: Normal heart sounds. No murmur heard. No friction rub. No gallop. Pulmonary:      Effort: Pulmonary effort is normal. No respiratory distress. Breath sounds: Normal breath sounds. No wheezing or rales. Chest:      Chest wall: No tenderness. Abdominal:      General: Bowel sounds are normal. There is no distension. Palpations: Abdomen is soft. There is no mass. Tenderness: There is no abdominal tenderness. Musculoskeletal:         General: No tenderness. Normal range of motion. Cervical back: Normal range of motion and neck supple. Lymphadenopathy:      Cervical: No cervical adenopathy. Skin:     General: Skin is warm and dry. Capillary Refill: Capillary refill takes less than 2 seconds. Neurological:      Mental Status: He is alert and oriented to person, place, and time.       Cranial Nerves: No cranial nerve deficit. Deep Tendon Reflexes: Reflexes are normal and symmetric. Psychiatric:         Behavior: Behavior normal.         Thought Content: Thought content normal.         Judgment: Judgment normal.        DATA:  Wt Readings from Last 3 Encounters:   08/16/22 230 lb (104.3 kg)   05/23/22 252 lb (114.3 kg)   03/23/22 241 lb 6.4 oz (109.5 kg)     Temp Readings from Last 3 Encounters:   08/16/22 97.9 °F (36.6 °C) (Infrared)   05/23/22 97.9 °F (36.6 °C) (Oral)   03/28/22 97.8 °F (36.6 °C) (Infrared)     BP Readings from Last 3 Encounters:   08/16/22 (!) 171/88   05/23/22 (!) 171/86   03/28/22 (!) 155/90     Pulse Readings from Last 3 Encounters:   08/16/22 66   05/23/22 57   03/28/22 56      Chest CT scan reviewed by me. There is evidence of a descending aortic dissection that is visible without IV contrast because of calcification of the false lumen. This suggests chronic dissection - not acute. ASSESSMENT AND PLAN:      77 yo man with chest/back pain in the setting of an extensive vascular disease history and +troponin (abnormal renal function may distort this value). Ddx of his symptoms include cardiac ischemia, sciatic/lumbar disc herniation, renal insufficiency causing fluid overload. The least likely cause of his symptoms would be the chronic descending aortic dissection. He describes that this was found 10 yrs ago and the diameter was 5 cm at that time. This is the same as now. His pain is not c/w acute dissection but it cannot be excluded without a conventional contrast enhanced CT angio of the aorta. I do not think the risk/benefit of this imaging study is favorable compared to the low chance that he has a  symptomatic dissection given his borderline renal function. I would recommend aggressive control of his SBP and HR to medically manage the slight chance that he has a worsening type B acute dissection on top of a chronic issue.     Ideally, he would undergo a back MRI to confirm that his back pain is due to ortho issues rather than aortic. I defer to the primary team on whether that is feasible financially.         Brenda Aguilera MD

## 2022-08-16 NOTE — H&P
[facility] History and Physical    Patient Information:  Patient: Basilia Sharma  MRN: [id number]   Acct: [de-identified]  YOB: 1955  Admit Date: 8/16/2022      Date of Service: 8/16/2022   Primary Care Physician: Eva Fofana MD  Advance Directive: Full Code  Health Care Proxy: Patient refused to answer        SUBJECTIVE:    Chief Complaint   Patient presents with    Shortness of Breath     Chest pain x 2 days       EP Sign Out:  Dr Li Madrid of CT Sx to see this MA \"stated no ASA\" and hold off on any AC  Needs CP rule out  Has hypokalemia    HPI:  Mr. Amanda Cross is a generally pleasant 78year old gentleman who refused to answer some questions but answered most of them. He presented here with chest pain,. He does have a known thoracic descending aneurysm and this was thought with his presentation to be a likely cause of his chest pain and CT Sx was involved. They have instructed us not to use ASA or anticoagulants in him. He was also referred to use for hypokalemia and a general chest pain work up. He states that he has had feet like basket balls but feels that he has lost 20 pounds of water over the last week. He asks to be referred to Nephrology a well, and wants us to now that he was supposed to se Dr. Mendel Bouquet on Friday. Review of Systems:   Review of Systems   Constitutional:  Negative for chills, diaphoresis, fatigue and fever. HENT:  Positive for sneezing. Respiratory:  Positive for shortness of breath. Cardiovascular:  Positive for chest pain. Gastrointestinal:  Positive for nausea and vomiting. Negative for diarrhea. Genitourinary:  Positive for frequency. Negative for dysuria and urgency. Neurological:  Positive for light-headedness. Negative for syncope and weakness. Psychiatric/Behavioral:  Positive for confusion.       Past Medical History:   Diagnosis Date    Acute kidney failure, unspecified ([address]     Anxiety state, unspecified     Atrial septal aneurysm 01/09/2016    Blood circulation, collateral     Body mass index 30.0-30.9, adult     CAD (coronary artery disease) 01/10/2016    1/9/16  lexiscan  Positive for apical MI + myocardial ischemia, EF 42%, intermediate risk findings, AUC indication 16, AUC score 7 1/10/16  Cath  3 lesions in the LAD:  Mid: 70% 2.25x23, mid 90% 2.25 x 28, distal 100% 2.0x28, anterior lateral apical hypokinesis, EF 45%    Calculus of kidney     Carotid artery stenosis 06/26/2013    Cellulitis and abscess of hand, except fingers and thumb     Cerebral artery occlusion with cerebral infarction (HCC)     15 years ago    Chronic airway obstruction, not elsewhere classified     Chronic kidney disease, unspecified     Congestive heart failure, unspecified     Diabetes mellitus type II     Diverticulosis of colon (without mention of hemorrhage)     Encounter for long-term (current) use of insulin (HCC)     Esophageal reflux     Family history of ischemic heart disease     Gastric ulcer, unspecified as acute or chronic, without mention of hemorrhage, perforation, or obstruction     Hyperlipemia     Hypertension     Internal hemorrhoids without mention of complication     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, or unspecified(403.00)     Obesity, unspecified     Other specified cardiac dysrhythmias(427.89)     Palliative care patient 12/21/2020    Paroxysmal atrial fibrillation (HCC)     Peripheral vascular disease ([facility] [address]     Solitary pulmonary nodule     Surgical or other procedure not carried out because of contraindication     Thoracic aneurysm without mention of rupture     Tobacco use disorder     Type II or unspecified type diabetes mellitus without mention of complication, not stated as uncontrolled      Past Psychiatric History:  As per above    Past Surgical History:   Procedure Laterality Date    [facility] I am, I really don't know\"     Home Medications:  Prior to Admission medications    Medication Sig Start Date End Date Taking?  Authorizing Provider   ELIQUIS 5 MG TABS tablet TAKE ONE TABLET BY MOUTH TWICE A DAY 7/6/22   DWAYNE Flores NP   isosorbide mononitrate (IMDUR) 60 MG extended release tablet TAKE ONE TABLET BY MOUTH TWICE A DAY 4/14/22   DWAYNE Hays   minoxidil (LONITEN) 2.5 MG tablet TAKE ONE TABLET BY MOUTH TWICE A DAY 4/7/22   DWAYNE Cedeno   ondansetron (ZOFRAN ODT) 4 MG disintegrating tablet Take 1 tablet by mouth every 8 hours as needed for Nausea or Vomiting 11/15/21   Meli Louis MD   bumetanide (BUMEX) 1 MG tablet Take 1 tablet by mouth 2 times daily 11/7/21   Freeman King MD   metOLazone (ZAROXOLYN) 2.5 MG tablet Take 1 tablet by mouth once a week On Friday 11/7/21   Freeman King MD   clopidogrel (PLAVIX) 75 MG tablet TAKE ONE TABLET BY MOUTH EVERY DAY 10/13/21   Mirian Caba MD   amiodarone (CORDARONE) 200 MG tablet Take 1 tablet by mouth daily 5/14/21 8/16/22  Mirian Caba MD   rosuvastatin (CRESTOR) 40 MG tablet Take 0.5 tablets by mouth daily 5/14/21   Mirian Caba MD   carvedilol (COREG) 12.5 MG tablet Take 1 tablet by mouth 2 times daily 5/14/21   Mirian Caba MD   potassium chloride (KLOR-CON M) 20 MEQ extended release tablet Take 1 tablet by mouth daily (with breakfast) 5/14/21   Mirian Caba MD   pantoprazole (PROTONIX) 40 MG tablet Take 1 tablet by mouth 2 times daily (before meals) 5/14/21   Mirian Caba MD   ascorbic acid (VITAMIN C) 500 MG tablet Take 1 tablet by mouth daily 5/14/21   Mirian Caba MD   calcitRIOL (ROCALTROL) 0.25 MCG capsule Take 0.25 mcg by mouth three times a week 5/3/21   Historical Provider, MD   diphenhydrAMINE (BENADRYL) 25 MG capsule Take 25 mg by mouth every 6 hours as needed for Itching    Historical Provider, MD chambers ([employer]) 8.6-50 MG per tablet Take 1 tablet by mouth nightly    Historical Provider, MD   allopurinol (ZYLOPRIM) 100 MG tablet Take 200 mg by mouth daily    Historical Provider, MD   aspirin 81 MG chewable tablet Take 1 tablet by mouth daily 12/24/20   Judah Hogan MD   vitamin D (ERGOCALCIFEROL) 1.25 MG (87165 UT) CAPS capsule Take 50,000 Units by mouth once a week 12/21/20   Historical Provider, MD   nitroGLYCERIN (NITROSTAT) 0.4 MG SL tablet up to max of 3 total doses. If no relief after 1 dose, call 911. 10/20/20   Jodie Wen Kemp, DO   NOVOLOG FLEXPEN 100 UNIT/ML injection pen 5 Units 3 times daily (before meals)  6/1/20   Historical Provider, MD   montelukast (SINGULAIR) 10 MG tablet Take 10 mg by mouth daily    Historical Provider, MD   levocetirizine (XYZAL) 5 MG tablet Take 5 mg by mouth nightly    Historical Provider, MD   ULTICARE MINI PEN NEEDLES 31G X 6 MM MISC FOR USE WITH LANTUS TWO TIMES A DAY 7/11/14   DWAYNE Trammell CNP   LANTUS SOLOSTAR 100 UNIT/ML injection pen INJECT 30 UNITS IN THE MORNING AND 20 UNITS IN THE EVENING DAILY  Patient taking differently: 30 Units nightly 7/1/14   DWAYNE Trammell CNP         OBJECTIVE:    Vitals:    08/16/22 0745   BP: (!) 165/94   Pulse: 62   Resp: 17   Temp:    SpO2: 93%   Breathing room air    BP (!) 165/94   Pulse 62   Temp 97.9 °F (36.6 °C) (Infrared)   Resp 17   Ht 5' 11\" (1.803 m)   Wt 230 lb (104.3 kg)   SpO2 93%   BMI 32.08 kg/m²     No intake or output data in the 24 hours ending 08/16/22 0752    Physical Exam  Vitals reviewed. Constitutional:       General: He is not in acute distress. Appearance: Normal appearance. He is obese. He is not ill-appearing or toxic-appearing. HENT:      Head: Normocephalic and atraumatic. Nose: No congestion or rhinorrhea. Eyes:      General:         Right eye: No discharge. Left eye: No discharge. Neck:      Comments: Supple, trachea appears midline  Cardiovascular:      Rate and Rhythm: Normal rate and regular rhythm.       Heart sounds: No murmur heard.    No friction rub. No gallop. Pulmonary:      Effort: Pulmonary effort is normal. No respiratory distress. Breath sounds: No stridor. No wheezing, rhonchi or rales. Chest:      Chest wall: No tenderness. Abdominal:      Palpations: Abdomen is soft. Tenderness: There is no abdominal tenderness. There is no guarding or rebound. Musculoskeletal:      Comments: Has intact muscle stores and slightly increased fat stores   Skin:     General: Skin is warm. Comments: nondiaphoretic   Neurological:      Mental Status: He is alert. Cranial Nerves: No dysarthria. Motor: No tremor or seizure activity. Psychiatric:         Mood and Affect: Mood normal.         Behavior: Behavior normal.        LABORATORY DATA:    CBC:   Recent Labs     08/16/22 0229   WBC 10.2   HGB 14.7   HCT 50.1        BMP:   Recent Labs     08/16/22 0229      K 2.6*   CL 87*   CO2 38*   BUN 52*   CREATININE 3.7*   CALCIUM 9.6   PHOS 3.3     Hepatic Profile:   Recent Labs     08/16/22 0229   AST 20   ALT 14   BILITOT 0.7   ALKPHOS 73     Coag Panel: No results for input(s): INR, PROTIME, APTT in the last 72 hours. Cardiac Enzymes:   Recent Labs     08/16/22 0229   TROPONINI 0.10*     Pro-BNP:   Recent Labs     08/16/22 0229   PROBNP 679  727     A1C:   Recent Labs     08/16/22 0229   LABA1C 8.6*     TSH: Invalid input(s): LABTSH  ABG: No results for input(s): PHART, ZRR9AWW, PO2ART, ZVH0GKS, BEART, HGBAE, L4AGTMTT, CARBOXHGBART in the last 72 hours. Urinalysis:   Lab Results   Component Value Date/Time    NITRU Negative 08/16/2022 03:24 AM    WBCUA 1 08/16/2022 03:24 AM    BACTERIA NEGATIVE 08/16/2022 03:24 AM    RBCUA 5 08/16/2022 03:24 AM    BLOODU MODERATE 08/16/2022 03:24 AM    SPECGRAV 1.015 08/16/2022 03:24 AM    GLUCOSEU Negative 08/16/2022 03:24 AM       IMAGING:  CT ABDOMEN PELVIS WO CONTRAST Additional Contrast? None  Result Date: 8/16/2022  1.  Exophytic bilateral renal cortical cyst. 2. Non-specific bilateral perinephric fat stranding. 3. Incidentally noted a small umbilical hernia with omental fat as its content. The defect measuring 8mm. 4.  Small nodule in the right lung base, recommend nonemergent dedicated chest CT to further evaluate Recommendation: Follow up as clinically indicated. All CT scans at this facility utilize dose modulation, iterative reconstruction, and/or weight based dosing when appropriate to reduce radiation dose to as low as reasonably achievable. Electronically Signed by Scottie Yarbrough DO at 16-Aug-2022 05:05:26 AM           CT CHEST WO CONTRAST  Result Date: 8/16/2022   IMPRESSION: 1. Mild emphysematous changes in the lung parenchyma. A 11 mm calcified granuloma in the superior segment of right lower lobe. No acute infiltrates, mass lesion or effusion. 2.  Mild cardiomegaly. Coronary artery calcifications are identified. 3. Scattered atherosclerotic calcification of the aortic arch and descending thoracic aorta. 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 Suggest further evaluation with CT aortogram. 4.  Post cholecystectomy. Atherosclerotic calcification of the abdominal aorta. Stent in the right renal artery. An exophytic cortical cyst from the left kidney upper pole. 5. Mildly enlarged left lobe of the thyroid gland with ill-defined hypodense lesions in the probable changes of multinodular goiter. Suggest further evaluation with ultrasound. Recommendation: Follow up as clinically indicated. All CT scans at this facility utilize dose modulation, iterative reconstruction, and/or weight based dosing when appropriate to reduce radiation dose to as low as reasonably achievable. Electronically Signed by Scottie Yarbrough DO at 16-Aug-2022 05:20:13 AM           XR CHEST PORTABLE  Result Date: 8/16/2022  Mildly prominent right hilum with right hilar congestion. No acute infiltrates or pleural effusion. Recommendation: Follow up as clinically indicated. Electronically Signed by Geovanni Rojo DO at 16-Aug-2022 04:23:36 AM                 ASESSMENTS & PLANS:    Chest Pain:  Descending Thoracic Aortic Dissection - Chronic?:  Tele  Admit to cardiac walker as OBSERVATION status patient  Cardio consult in AM  Cardiothoracic Sx already on case  Trend TnI  Mag, Phos, TSH, Lipid Panel, HbA1c - will run as add ons to ED labs if able  CBC and BMP with Reflex for following AM  Dr Yohan Dia of CT Sx stated to NOT give any ASA/Plavix/Anticoagulants etcetra  Statin as per protocol (High intensity Statin, if already on high intensity Stain of Simvasttain 80 continue it, if Lipitor 40+ then Lipitor 80 (if less than 40 just double so long as >=40), if Rosuvastatin 20mg+ then Rosuvastatin 40mg PO QHS (if less than 20 just double so long as >=20mg)  NG SL PRN CP  TTE in AM (already orered by EP)    ANGELO (Cr baseline 2.2 now at 3.7) on CKD stage 4 (baseline GFR 30) with hypOkalemia (Potassium 2.6 PoA):   Admit to [address] walker, Nephrology area preferred if available  Strict Is and Os  Daily Weights  Added on to UA Urine OSM/EOS/Na/Cr  Avoid Nephrotoxins as able: NSAIDs/ACEi/ARB/Diuretic/Aminoglycosides/IodinatedContrast etc  Renal US in AM   Nephrology Consultation in AM  IVF with NS, to run at 100 cc/h  Elevate HoB & Legs (Legs to prevent sliding down in bed)    Chronic Medical Problems:  Continue home regimen as indicated   allopurinol  200 mg Oral Daily    amiodarone  200 mg Oral Daily    ascorbic acid  500 mg Oral Daily    bumetanide  1 mg Oral BID    calcitRIOL  0.25 mcg Oral Once per day on Mon Wed Fri    carvedilol  12.5 mg Oral BID    isosorbide mononitrate  1 tablet Oral BID    insulin glargine  30 Units SubCUTAneous Nightly    minoxidil  1 tablet Oral BID    montelukast  10 mg Oral Daily    pantoprazole  40 mg Oral BID AC    [START ON 8/17/2022] potassium chloride  20 mEq Oral Daily with breakfast    rosuvastatin  40 mg Oral Nightly    senna-docusate  1 tablet Oral Nightly    [START ON 8/23/2022] vitamin D  50,000 Units Oral Weekly     Supoportive and Prophylactic Txx:  DVT Prophylaxis: SCDs  GI (PUD) Ppx: not indicated  PT consult for evalutation and Txx as indicated: defer for now  Diet NPO  diphenhydrAMINE, nitroGLYCERIN, sodium chloride flush, sodium chloride, ondansetron **OR** ondansetron, acetaminophen **OR** acetaminophen, nitroGLYCERIN      Care time of >50 minutes  Pt seen/examined and admitted to OBServation status. Inpatient status is used for patients with an expected LOS extending past two midnights due to medical therapy and or critical care needs, otherwise patients are placed to OBServation status. Signed:  Electronically signed by Sonya Diane MD on 8/16/22 at 8:07 AM CDT.

## 2022-08-16 NOTE — ED NOTES
Patient placed on cardiac monitor, continuous pulse oximeter, and NIBP monitor. Monitor alarms on.         Gustabo Santana RN  08/16/22 1059

## 2022-08-16 NOTE — CONSULTS
Nephrology ([facility]) Consult Note      Patient:  Gurwinder Carrion  YOB: 1955  Date of Service: 8/16/2022  MRN: [id number]   Acct: [de-identified]   Primary Care Physician: Todd Baca MD  Advance Directive: Full Code  Admit Date: 8/16/2022       Hospital Day: 0  Referring Provider: Alyson Bautista MD    Patient independently seen and examined, Chart, Consults, Notes, Operative notes, Labs, Cardiology, and Radiology studies reviewed as available. Subjective:  Gurwinder Carrion is a 78 y.o. male for whom we were consulted for evaluation and treatment of acute renal failure with baseline chronic kidney disease stage IV. He notes that he has upcoming appointment with Dr. Galo Dai. He had recently had Bumex dose decreased to 1 mg twice daily then developed some fluid gain and so had taken back to the prior dose of 2 mg twice daily and developed significant fluid losses from that. He reported about a 20 pound loss over the past 10 days or so. He also had developed chest pain and complained of low back pain over the sacrum/right buttock. He denied hematuria, dysuria, hemoptysis. He had some shortness of breath. Notes that his edema had essentially resolved with the diuretics.     Allergies:  Morphine and Hydralazine    Medicines:  Current Facility-Administered Medications   Medication Dose Route Frequency Provider Last Rate Last Admin    0.9 % sodium chloride infusion   IntraVENous Continuous Milla Morrison  mL/hr at 08/16/22 0437 New Bag at 08/16/22 0437    allopurinol (ZYLOPRIM) tablet 200 mg  200 mg Oral Daily Yunior Blanton MD        amiodarone (CORDARONE) tablet 200 mg  200 mg Oral Daily Yunior Blanton MD        ascorbic acid (VITAMIN C) tablet 500 mg  500 mg Oral Daily Yunior Blanton MD        [facility] bumetanide (BUMEX) tablet 1 mg  1 mg Oral BID Yunior Blanton MD        [START ON 8/17/2022] calcitRIOL (ROCALTROL) capsule 0.25 mcg  0.25 mcg Oral Once per day on Mon Wed Fri Dorinda Roy MD        carvedilol (COREG) tablet 12.5 mg  12.5 mg Oral BID WC Dorinda Roy MD        diphenhydrAMINE (BENADRYL) tablet 25 mg  25 mg Oral Q6H PRN Dorinda Roy MD        isosorbide mononitrate (IMDUR) extended release tablet 60 mg  60 mg Oral BID Dorinda Roy MD        insulin glargine (LANTUS) injection vial 30 Units  30 Units SubCUTAneous Nightly Dorinda Roy MD        minoxidil (LONITEN) tablet 2.5 mg  2.5 mg Oral BID Dorinda Roy MD        montelukast (SINGULAIR) tablet 10 mg  10 mg Oral Daily Dorinda Roy MD        pantoprazole (PROTONIX) tablet 40 mg  40 mg Oral BID AC Dorinda Roy MD        [START ON 8/17/2022] potassium chloride (KLOR-CON M) extended release tablet 20 mEq  20 mEq Oral Daily with breakfast Dorinda Roy MD        rosuvastatin (CRESTOR) tablet 40 mg  40 mg Oral Nightly Dorinda Roy MD        sennosides-docusate sodium (SENOKOT-S) 8.6-50 MG tablet 1 tablet  1 tablet Oral Nightly Dorinda Roy MD        [place] [facility] 8/23/2022] vitamin D (ERGOCALCIFEROL) capsule 50,000 Units  50,000 Units Oral Weekly Dorinda Roy MD        sodium chloride flush 0.9 % injection 5-40 mL  5-40 mL IntraVENous 2 times per day Dorinda Roy MD        sodium chloride flush 0.9 % injection 5-40 mL  5-40 mL IntraVENous PRN Dorinda Roy MD        0.9 % sodium chloride infusion   IntraVENous PRN Dorinda Roy MD        ondansetron (ZOFRAN-ODT) disintegrating tablet 4 mg  4 mg Oral Q8H PRN Dorinda Roy MD        Or    ondansetron [facility]) injection 4 mg  4 mg IntraVENous Q6H PRN Dorinda Roy MD        acetaminophen (TYLENOL) tablet 650 mg  650 mg Oral Q6H PRN Dorinda Roy MD        Or    acetaminophen (TYLENOL) suppository 650 mg  650 mg Rectal Q6H PRN Dorinda Roy MD        nitroGLYCERIN (NITROSTAT) SL tablet 0.4 mg  0.4 mg SubLINGual Q5 Min PRN Dorinda Roy MD        potassium chloride 10 mEq/100 mL IVPB (Peripheral Line)  10 mEq IntraVENous PRN Norman Solorzano MD Joceline        perflutren lipid microspheres (DEFINITY) injection 1.65 mg  1.5 mL IntraVENous ONCE PRN Rosa Talavera MD   1.5 mL at 08/16/22 1003       Past Medical History:  Past Medical History:   Diagnosis Date    Acute kidney failure, unspecified ([facility] [address]     Anxiety state, unspecified     Atrial septal aneurysm 01/09/2016    Blood circulation, collateral     Body mass index 30.0-30.9, adult     CAD (coronary artery disease) 01/10/2016    1/9/16  lexiscan  Positive for apical MI + myocardial ischemia, EF 42%, intermediate risk findings, AUC indication 16, AUC score 7 1/10/16  Cath  3 lesions in the LAD:  Mid: 70% 2.25x23, mid 90% 2.25 x 28, distal 100% 2.0x28, anterior lateral apical hypokinesis, EF 45%    Calculus of kidney     Carotid artery stenosis 06/26/2013    Cellulitis and abscess of hand, except fingers and thumb     Cerebral artery occlusion with cerebral infarction (HCC)     15 years ago    Chronic airway obstruction, not elsewhere classified     Chronic kidney disease, unspecified     Congestive heart failure, unspecified     Diabetes mellitus type II     Diverticulosis of colon (without mention of hemorrhage)     Encounter for long-term (current) use of insulin (HCC)     Esophageal reflux     Family history of ischemic heart disease     Gastric ulcer, unspecified as acute or chronic, without mention of hemorrhage, perforation, or obstruction     Hyperlipemia     Hypertension     Internal hemorrhoids without mention of complication     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, or unspecified(403.00)     Obesity, unspecified     Other specified cardiac dysrhythmias(427.89)     Palliative care patient 12/21/2020    Paroxysmal atrial fibrillation (HCC)     Peripheral vascular disease ([facility] [address]     Solitary pulmonary nodule     Surgical or other procedure not carried out because of contraindication     Thoracic aneurysm without mention of rupture Tobacco use disorder     Type II or unspecified type diabetes mellitus without mention of complication, not stated as uncontrolled        Past Surgical History:  Past Surgical History:   Procedure Laterality Date    CARDIAC CATHETERIZATION      CHOLECYSTECTOMY      CORONARY ANGIOPLASTY WITH STENT PLACEMENT  2016    2 JACQUELINE to LAD    DIAGNOSTIC CARDIAC CATH LAB PROCEDURE      URETER STENT PLACEMENT      VASCULAR SURGERY  2013 [facility]    Aortogram with bilateral selective renal artery injections    VASCULAR SURGERY  13 [address]    Right carotid endarterectomy with Vascu-Guard patch repair. Right cervical carotid arteriograms after endarterectomy. VASCULAR SURGERY  7/6/15 [facility]    Percutaneous cannulation, right common femoral artery, with a5 Nigerian sheath and later 6 Nigerian [facility] sheath. Suprarenal abdomianl aortagram.  Selective right renal arteriogram.  Right renal artery balloon angioplasty;/stent (Express 6 mm x 18mmballoon-expandable stent. . Completion suprarenal abdominal aortogram and selective right remal arteriogram. Mynx closure, right common femoral artery punctur       Family History  Family History   Problem Relation Age of Onset    High Blood Pressure Mother     Other Mother         COVID    High Blood Pressure Father     Cancer Father     High Blood Pressure Brother     No Known Problems Son     No Known Problems Daughter        Social History  Social History     Socioeconomic History    Marital status:      Spouse name: patient refused to answer    Number of children: 2    Years of education: Not on file    Highest education level: Not on file   Occupational History    Occupation: patient refused to answer   Tobacco Use    Smoking status: Former     Packs/day: 0.25     Years: 7.00     Pack years: 1.75     Types: Cigarettes     Start date: 66     Quit date:      Years since quittin.6    Smokeless tobacco: Current     Types: Chew    Tobacco comments:     Used both for 5 years Vaping Use    Vaping Use: Never used   Substance and Sexual Activity    Alcohol use: Not Currently     Comment: \"when i was tyoung i did, if i wanted to drink ten beers i drank ten beers\"    Drug use: Never    Sexual activity: Yes     Comment: has 2 children   Other Topics Concern    Not on file   Social History Narrative    CODE STATUS: DNR    HEALTH CARE PROXY: patient refused to answer    AMBULATES: patient refused to answer    DOMICILED: patient refused to answer     Social Determinants of Health     Financial Resource Strain: Not on file   Food Insecurity: Not on file   Transportation Needs: Not on file   Physical Activity: Not on file   Stress: Not on file   Social Connections: Not on file   Intimate Partner Violence: Not on file   Housing Stability: Not on file         Review of Systems:  History obtained from chart review and the patient  General ROS: No fever or chills  Respiratory ROS: No cough, +shortness of breath  Cardiovascular ROS: +chest pain or palpitations  Gastrointestinal ROS: No abdominal pain or melena  Genito-Urinary ROS: No dysuria or hematuria  Musculoskeletal ROS: No joint pain or swelling   14 point ROS reviewed with the patient and negative except as noted above and in the HPI unless unable to obtain.     Objective:  Patient Vitals for the past 24 hrs:   BP Temp Temp src Pulse Resp SpO2 Height Weight   08/16/22 0745 (!) 165/94 -- -- 62 17 93 % -- --   08/16/22 0718 (!) 171/88 -- -- 66 18 -- -- --   08/16/22 0715 -- -- -- -- -- 94 % -- --   08/16/22 0551 (!) 156/75 -- -- 59 10 94 % -- --   08/16/22 0504 (!) 148/79 -- -- 63 19 94 % -- --   08/16/22 0338 139/77 -- -- 61 15 94 % -- --   08/16/22 0314 -- -- -- 55 23 96 % -- --   08/16/22 0302 128/77 -- -- -- -- -- -- --   08/16/22 0233 -- -- -- 56 -- -- -- --   08/16/22 0223 107/63 97.9 °F (36.6 °C) Infrared 56 16 95 % 5' 11\" (1.803 m) 230 lb (104.3 kg)     No intake or output data in the 24 hours ending 08/16/22 1039  General: awake/alert Chest:  clear to auscultation bilaterally  CVS: regular rate and rhythm  Abdominal: soft, nontender, normal bowel sounds  Extremities: no cyanosis or edema  Skin: warm and dry without rash      Labs:  BMP:   Recent Labs     08/16/22 0229 08/16/22 0756     --    K 2.6* 2.7*   CL 87*  --    CO2 38*  --    PHOS 3.3  --    BUN 52*  --    CREATININE 3.7*  --    CALCIUM 9.6  --      CBC:   Recent Labs     08/16/22 0229   WBC 10.2   HGB 14.7   HCT 50.1   MCV 85.6        LIVER PROFILE:   Recent Labs     08/16/22 0229   AST 20   ALT 14   BILITOT 0.7   ALKPHOS 73     PT/INR: No results for input(s): PROTIME, INR in the last 72 hours. APTT: No results for input(s): APTT in the last 72 hours. BNP:  No results for input(s): BNP in the last 72 hours. Ionized Calcium:No results for input(s): IONCA in the last 72 hours. Magnesium:  Recent Labs     08/16/22 0229   MG 2.0  2.1     Phosphorus:  Recent Labs     08/16/22 0229   PHOS 3.3     HgbA1C:   Recent Labs     08/16/22 0229   LABA1C 8.6*     Hepatic:   Recent Labs     08/16/22 0229   ALKPHOS 73   ALT 14   AST 20   PROT 7.2   BILITOT 0.7   LABALBU 4.2     Lactic Acid: No results for input(s): LACTA in the last 72 hours. Troponin: No results for input(s): CKTOTAL, CKMB, TROPONINT in the last 72 hours. ABGs: No results for input(s): PH, PCO2, PO2, HCO3, O2SAT in the last 72 hours. CRP:  No results for input(s): CRP in the last 72 hours. Sed Rate:  No results for input(s): SEDRATE in the last 72 hours. Cultures:   No results for input(s): CULTURE in the last 72 hours. No results for input(s): BC, Jerman Puff in the last 72 hours. No results for input(s): CXSURG in the last 72 hours. Radiology reports as per the Radiologist  Radiology: CT ABDOMEN PELVIS WO CONTRAST Additional Contrast? None    Result Date: 8/16/2022  NO PRIOR REPORT AVAILABLE Exam: CT OF THE ABDOMEN/PELVIS WITHOUT CONTRAST Clinical data: Right flank pain.  Technique: Direct contiguous axial CT images were acquired through the abdomen and pelvis without contrast using soft tissue and bone algorithms. Reformatted/MPR images were performed. Radiation dose: CTDIvol =24.54 mGy, DLP =1782 mGy x cm. Limitations: Lack of intravenous contrast limits evaluation of solid viscera. Lack of oral contrast limits evaluation of the bowel loops. Prior Studies: No prior studies submitted. Findings: Lung bases: Grossly clear. Nodule in the right lung base measuring 1.3 x 1.4 cm. Liver:Unremarkable size, contour, and density. No evidence of mass. No evidence of dilated ducts. Gallbladder Fossa :Cholecystectomy clips noted. Spleen: Grossly unremarkable. Pancreas/adrenal glands: Grossly unremarkable size, contour and density. Kidneys: In anatomic position. Grossly unremarkablerenal size, contour and density. No renal or ureteral calculi. No hydronephrosis. There exophytic cysts are seen arising from both kidneys, largest measuring 3.9 x 3.4 cm arising from upper pole of left kidney. Non-specific bilateral perinephric fat stranding. Retroperitoneum: No retroperitoneal lymphadenopathy. Circumferential calcific atherosclerotic changes noted in the abdominal aorta. The IVC is grossly unremarkable. Peritoneal cavity: No evidence of free air or ascites. Gastrointestinal tract: Nondistended small bowel and colon. No evidence of obstruction. Appendix: Unremarkable. Pelvis: Solid and hollow viscera grossly unremarkable. Bladder is moderately distended. Incidentally noted a small umbilical hernia with omental fat as its content. The defect measuring 8mm. Osseous structures: No acute or destructive bony process identified. Mild degenerative changes noted in lumbar spine. 1. Exophytic bilateral renal cortical cyst. 2. Non-specific bilateral perinephric fat stranding. 3. Incidentally noted a small umbilical hernia with omental fat as its content. The defect measuring 8mm.  4.  Small nodule in the right lung base, recommend nonemergent dedicated chest CT to further evaluate Recommendation: Follow up as clinically indicated. All CT scans at this facility utilize dose modulation, iterative reconstruction, and/or weight based dosing when appropriate to reduce radiation dose to as low as reasonably achievable. Electronically Signed by Ashkan Renteria DO at 16-Aug-2022 05:05:26 AM             CT CHEST WO CONTRAST    Result Date: 8/16/2022  NO PRIOR REPORT AVAILABLE Exam: CT OF THE CHEST WITHOUT CONTRAST Clinical data: Chest pain. Technique: Axial CT images through the lungs were acquired without contrast and imaged using soft tissue and lung algorithms. Reformatted/MPR images were performed. Radiation Dose: CTDIvol =24.54 mGy, DLP =1782 mGy x cm. Limitations: Lack of intravenous contrast limits evaluation of the soft tissues and vascularity. Prior studies: Radiograph of the chest done on same day. Findings: Lungs: Mild emphysematous changes in the lung parenchyma. A 11 mm calcified granuloma in the superior segment of right lower lobe. No pulmonary infiltrate identified. No pulmonary mass identified. No pleural effusions identified. No pneumothorax. The airway is clear. Soft Tissues: No mediastinal, axillary or supraclavicular adenopathy identified. Mildly enlarged left lobe of the thyroid gland with ill-defined hypodense lesions in the probable changes of multinodular goiter. Vascular: Scattered atherosclerotic calcification of the aortic arch and descending thoracic aorta. 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 Unopacified pulmonary vasculature appears unremarkable. Mild cardiomegaly. Diffuse atherosclerotic calcification of the coronary arteries. Bony structures: No acute or destructive abnormality Upper Abdomen: Postcholecystectomy status. An exophytic cortical cyst from the left kidney upper pole 3.6 x 4.0 cm.   Scattered diverticulosis of the distal transverse colon without evidence of diverticulitis. Atherosclerotic calcification of the abdominal aorta, visceral arteries. Probable stent at the origin of the right renal artery. IMPRESSION: 1. Mild emphysematous changes in the lung parenchyma. A 11 mm calcified granuloma in the superior segment of right lower lobe. No acute infiltrates, mass lesion or effusion. 2.  Mild cardiomegaly. Coronary artery calcifications are identified. 3. Scattered atherosclerotic calcification of the aortic arch and descending thoracic aorta. 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 Suggest further evaluation with CT aortogram. 4.  Post cholecystectomy. Atherosclerotic calcification of the abdominal aorta. Stent in the right renal artery. An exophytic cortical cyst from the left kidney upper pole. 5. Mildly enlarged left lobe of the thyroid gland with ill-defined hypodense lesions in the probable changes of multinodular goiter. Suggest further evaluation with ultrasound. Recommendation: Follow up as clinically indicated. All CT scans at this facility utilize dose modulation, iterative reconstruction, and/or weight based dosing when appropriate to reduce radiation dose to as low as reasonably achievable. Electronically Signed by Ace Kumar DO at 16-Aug-2022 05:20:13 AM             XR CHEST PORTABLE    Result Date: 8/16/2022  NO PRIOR REPORT AVAILABLE Exam: X-RAY OF [facility] Clinical data:Chest pain. Technique:Single view of the chest. Prior studies: No prior studies submitted. Findings:Mildly prominent right hilum with right hilar congestion. No acute infiltrates, pleural effusion or pneumothorax. No acute osseous abnormality is detected. Atherosclerotic calcification of the aortic arch and descending thoracic aorta. Mildly prominent right hilum with right hilar congestion. No acute infiltrates or pleural effusion.  Recommendation: Follow up as clinically indicated. Electronically Signed by Flower Reid DO at 16-Aug-2022 04:23:36 AM                Assessment   Acute kidney injury  Chronic kidney disease stage IV  Hypokalemia  Descending thoracic aortic dissection  Secondary hyperparathyroidism  Vitamin D deficiency    Plan:  Discussed with patient, nursing  Work-up ordered ongoing  Monitor labs  Reasonable to hold diuretics and give fluid trial at this point  Awaiting cardiothoracic evaluation of aortic dissection  Reassess the morning  Continue vitamin D and calcitriol for the moment  Replace potassium and continue to recheck for further replacement needs    Thank you for the consult, we appreciate the opportunity to provide care to your patients. Feel free to contact me if I can be of any further assistance.       Dayron Penn MD  08/16/22  10:39 AM

## 2022-08-16 NOTE — PROGRESS NOTES
Comprehensive Nutrition Assessment    Type and Reason for Visit:  Initial, Positive Nutrition Screen    Nutrition Recommendations/Plan:   Continue POC. Monitor for diet progression. Malnutrition Assessment:  Malnutrition Status: At risk for malnutrition (Comment) (08/16/22 1611)    Context:  Acute Illness     Findings of the 6 clinical characteristics of malnutrition:  Energy Intake:  Mild decrease in energy intake (Comment)  Weight Loss:  Greater than 7.5% over 3 months     Body Fat Loss:  No significant body fat loss     Muscle Mass Loss:  No significant muscle mass loss    Fluid Accumulation:  No significant fluid accumulation     Strength:  Not Performed    Nutrition Assessment:    Pt is nutritionally compromised AEB inadequate nutritional intake and NPO. Pt has had a significant wt loss of 8.73% over the past 3 months. Wt has stabilized over the past month. Will monitor for nutrition progression and implement nutrition intervention as needed. Current Nutrition Intake & Therapies:    Average Meal Intake: NPO     Diet NPO    Anthropometric Measures:  Height: 5' 11\" (180.3 cm)  Ideal Body Weight (IBW): 172 lbs (78 kg)       Current Body Weight: 230 lb (104.3 kg)  Current BMI (kg/m2): 32.1                          BMI Categories: Obese Class 1 (BMI 30.0-34. 9)    Estimated Daily Nutrient Needs:  Energy Requirements Based On: Kcal/kg (15-18)  Weight Used for Energy Requirements: Current  Energy (kcal/day): 7375-5487 kcals/day  Weight Used for Protein Requirements: Ideal (1.2-2.0)  Protein (g/day):  g/protein/day  Method Used for Fluid Requirements: 1 ml/kcal  Fluid (ml/day): 4616-9669 mL/day    Nutrition Diagnosis:   Inadequate oral intake related to acute injury/trauma as evidenced by NPO or clear liquid status due to medical condition    Nutrition Interventions:   Food and/or Nutrient Delivery: Continue NPO     Coordination of Nutrition Care: Continue to monitor while inpatient       Goals: Goals: Initiate PO diet       Nutrition Monitoring and Evaluation:      Food/Nutrient Intake Outcomes: Diet Advancement/Tolerance  Physical Signs/Symptoms Outcomes: Biochemical Data, Nutrition Focused Physical Findings, Weight      Brent Carter MS, RD, LD  Contact: [phone]

## 2022-08-16 NOTE — ED PROVIDER NOTES
[facility] 3 DONTA/VAS/MED  eMERGENCY dEPARTMENT eNCOUnter      Pt Name: Gennaro Mccord  MRN: [id number]  [place] 1955  Date of evaluation: 8/16/2022  Provider: Bethany Bateman MD    CHIEF COMPLAINT       Chief Complaint   Patient presents with    Shortness of Breath     Chest pain x 2 days           HISTORY OF PRESENT ILLNESS   (Location/Symptom, Timing/Onset,Context/Setting, Quality, Duration, Modifying Factors, Severity)  Note limiting factors. Gennaro Mccord is a 78 y.o. male who presents to the emergency department with multiple complaints. Patient said over the past 5 days he has had a mild cough and felt a little short of breath and also had some chest discomfort. Has also had pain in the right low back. No midline back pain. No injuries of any kind. No bowel or bladder incontinence or retention. No numbness. No weakness. Does have some chronic numbness in his feet from neuropathy but nothing new from baseline. No fevers of any kind. No exacerbating alleviating factors related to any of his symptoms. Chest pain is not pleuritic. No hemoptysis. No unilateral leg swelling or pain. No history of DVT or PE. Is on Eliquis and has a history of A. fib. Has not missed any doses of his Eliquis. Also has a history of chronic kidney disease. HPI    NursingNotes were reviewed. REVIEW OF SYSTEMS    (2-9 systems for level 4, 10 or more for level 5)     Review of Systems   Constitutional:  Negative for fever. Eyes:  Negative for pain. Respiratory:  Positive for cough and shortness of breath. Cardiovascular:  Positive for chest pain. Negative for palpitations and leg swelling. Gastrointestinal:  Negative for abdominal pain, diarrhea and vomiting. Genitourinary:  Positive for decreased urine volume and flank pain (right). Negative for dysuria and hematuria. Musculoskeletal:  Positive for back pain (low on right. no midline pain). Negative for neck pain. Skin:  Negative for rash. Neurological:  Negative for weakness, numbness and headaches. All other systems reviewed and are negative. A complete review of systems was performed and is negative except as noted above in the HPI.        PAST MEDICAL HISTORY     Past Medical History:   Diagnosis Date    Acute kidney failure, unspecified ([facility] [address]     Anxiety state, unspecified     Atrial septal aneurysm 01/09/2016    Blood circulation, collateral     Body mass index 30.0-30.9, adult     CAD (coronary artery disease) 01/10/2016    1/9/16  lexiscan  Positive for apical MI + myocardial ischemia, EF 42%, intermediate risk findings, AUC indication 16, AUC score 7 1/10/16  Cath  3 lesions in the LAD:  Mid: 70% 2.25x23, mid 90% 2.25 x 28, distal 100% 2.0x28, anterior lateral apical hypokinesis, EF 45%    Calculus of kidney     Carotid artery stenosis 06/26/2013    Cellulitis and abscess of hand, except fingers and thumb     Cerebral artery occlusion with cerebral infarction (HCC)     15 years ago    Chronic airway obstruction, not elsewhere classified     Chronic kidney disease, unspecified     Congestive heart failure, unspecified     Diabetes mellitus type II     Diverticulosis of colon (without mention of hemorrhage)     Encounter for long-term (current) use of insulin (HCC)     Esophageal reflux     Family history of ischemic heart disease     Gastric ulcer, unspecified as acute or chronic, without mention of hemorrhage, perforation, or obstruction     Hyperlipemia     Hypertension     Internal hemorrhoids without mention of complication     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 [place] [address]     Solitary pulmonary nodule     Surgical or other procedure not carried out because of contraindication Thoracic aneurysm without mention of rupture     Tobacco use disorder     Type II or unspecified type diabetes mellitus without mention of complication, not stated as uncontrolled          SURGICAL HISTORY       Past Surgical History:   Procedure Laterality Date    CARDIAC CATHETERIZATION      CHOLECYSTECTOMY  2000    CORONARY ANGIOPLASTY WITH STENT PLACEMENT  01/2016    2 JACQUELINE to LAD    DIAGNOSTIC CARDIAC CATH LAB PROCEDURE      URETER STENT PLACEMENT      VASCULAR SURGERY  03- [facility]    Aortogram with bilateral selective renal artery injections    VASCULAR SURGERY  6/14/13 S    Right carotid endarterectomy with Vascu-Guard patch repair. Right cervical carotid arteriograms after endarterectomy. VASCULAR SURGERY  7/6/15 S    Percutaneous cannulation, right common femoral artery, with a5 french sheath and later 6 french [facility] sheath. Suprarenal abdomianl aortagram.  Selective right renal arteriogram.  Right renal artery balloon angioplasty;/stent (Express 6 mm x 18mmballoon-expandable stent. . Completion suprarenal abdominal aortogram and selective right remal arteriogram. Mynx closure, right common femoral artery punctur         CURRENT MEDICATIONS       Current Discharge Medication List        CONTINUE these medications which have NOT CHANGED    Details   ELIQUIS 5 MG TABS tablet TAKE ONE TABLET BY MOUTH TWICE A DAY  Qty: 60 tablet, Refills: 5      isosorbide mononitrate (IMDUR) 60 MG extended release tablet TAKE ONE TABLET BY MOUTH TWICE A DAY  Qty: 60 tablet, Refills: 5      minoxidil (LONITEN) 2.5 MG tablet TAKE ONE TABLET BY MOUTH TWICE A DAY  Qty: 60 tablet, Refills: 5      ondansetron (ZOFRAN ODT) 4 MG disintegrating tablet Take 1 tablet by mouth every 8 hours as needed for Nausea or Vomiting  Qty: 20 tablet, Refills: 0      bumetanide (BUMEX) 1 MG tablet Take 1 tablet by mouth 2 times daily  Qty: 60 tablet, Refills: 5      metOLazone (ZAROXOLYN) 2.5 MG tablet Take 1 tablet by mouth once a week On Friday  Qty: 30 tablet, Refills: 0      clopidogrel (PLAVIX) 75 MG tablet TAKE ONE TABLET BY MOUTH EVERY DAY  Qty: 30 tablet, Refills: 3      amiodarone (CORDARONE) 200 MG tablet Take 1 tablet by mouth daily  Qty: 30 tablet, Refills: 0      rosuvastatin (CRESTOR) 40 MG tablet Take 0.5 tablets by mouth daily  Qty: 30 tablet, Refills: 0      carvedilol (COREG) 12.5 MG tablet Take 1 tablet by mouth 2 times daily  Qty: 60 tablet, Refills: 3      potassium chloride (KLOR-CON M) 20 MEQ extended release tablet Take 1 tablet by mouth daily (with breakfast)  Qty: 60 tablet, Refills: 3      pantoprazole (PROTONIX) 40 MG tablet Take 1 tablet by mouth 2 times daily (before meals)  Qty: 60 tablet, Refills: 3      ascorbic acid (VITAMIN C) 500 MG tablet Take 1 tablet by mouth daily  Qty: 30 tablet, Refills: 3      calcitRIOL (ROCALTROL) 0.25 MCG capsule Take 0.25 mcg by mouth three times a week      diphenhydrAMINE (BENADRYL) 25 MG capsule Take 25 mg by mouth every 6 hours as needed for Itching      senna-docusate (PERICOLACE) 8.6-50 MG per tablet Take 1 tablet by mouth nightly      allopurinol (ZYLOPRIM) 100 MG tablet Take 200 mg by mouth daily      aspirin 81 MG chewable tablet Take 1 tablet by mouth daily  Qty: 30 tablet, Refills: 3      vitamin D (ERGOCALCIFEROL) 1.25 MG (41750 UT) CAPS capsule Take 50,000 Units by mouth once a week      nitroGLYCERIN (NITROSTAT) 0.4 MG SL tablet up to max of 3 total doses. If no relief after 1 dose, call 911.   Qty: 25 tablet, Refills: 0      NOVOLOG FLEXPEN 100 UNIT/ML injection pen 5 Units 3 times daily (before meals)       montelukast (SINGULAIR) 10 MG tablet Take 10 mg by mouth daily      levocetirizine (XYZAL) 5 MG tablet Take 5 mg by mouth nightly      ULTICARE MINI PEN NEEDLES 31G X 6 MM MISC FOR USE WITH LANTUS TWO TIMES A DAY  Qty: 50 each, Refills: 5      LANTUS SOLOSTAR 100 UNIT/ML injection pen INJECT 30 UNITS IN THE MORNING AND 20 UNITS IN THE EVENING DAILY  Qty: 15 mL, Refills: Mouth: Mucous membranes are moist.      Pharynx: Oropharynx is clear. No oropharyngeal exudate or posterior oropharyngeal erythema. Eyes:      General: No scleral icterus. Right eye: No discharge. Left eye: No discharge. Conjunctiva/sclera: Conjunctivae normal.      Pupils: Pupils are equal, round, and reactive to light. Neck:      Vascular: No JVD. Cardiovascular:      Rate and Rhythm: Normal rate and regular rhythm. Pulses: Normal pulses. Heart sounds: Normal heart sounds. Pulmonary:      Effort: Pulmonary effort is normal. No respiratory distress. Breath sounds: Normal breath sounds. Abdominal:      General: There is no distension. Palpations: Abdomen is soft. Tenderness: There is no abdominal tenderness. There is right CVA tenderness. There is no left CVA tenderness, guarding or rebound. Musculoskeletal:         General: No tenderness. Normal range of motion. Cervical back: Normal range of motion and neck supple. No rigidity. Right lower leg: No edema. Left lower leg: No edema. Lymphadenopathy:      Cervical: No cervical adenopathy. Skin:     General: Skin is warm and dry. Capillary Refill: Capillary refill takes less than 2 seconds. Neurological:      General: No focal deficit present. Mental Status: He is alert and oriented to person, place, and time. Sensory: Sensation is intact. Motor: Motor function is intact. Psychiatric:         Mood and Affect: Mood normal.         Behavior: Behavior normal.       DIAGNOSTIC RESULTS     EKG: All EKG's are interpreted by the Emergency Department Physician who either signs or Co-signs this chart in the absence of a cardiologist.    Normal sinus rhythm. MO slightly prolonged. QT slightly prolonged. Probable LVH. Borderline ST changes.     RADIOLOGY:   Non-plain film images such as CT, Ultrasound and MRI are read by the radiologist. Plainradiographic images are visualized and preliminarily interpreted by the emergency physician with the below findings:        Interpretation per the Radiologist below, if available at the time of this note:    CT CHEST WO CONTRAST   Final Result    IMPRESSION:    1. Mild emphysematous changes in the lung parenchyma. A 11 mm calcified granuloma in the superior segment of right lower lobe. No acute infiltrates, mass lesion or effusion. 2.  Mild cardiomegaly. Coronary artery calcifications are identified. 3. Scattered atherosclerotic calcification of the aortic arch and descending thoracic aorta. 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 Suggest further evaluation with CT aortogram.   4.  Post cholecystectomy. Atherosclerotic calcification of the abdominal aorta. Stent in the right renal artery. An exophytic cortical cyst from the left kidney upper pole. 5. Mildly enlarged left lobe of the thyroid gland with ill-defined hypodense lesions in the probable changes of multinodular goiter. Suggest further evaluation with ultrasound. Recommendation:   Follow up as clinically indicated. All CT scans at this facility utilize dose modulation, iterative reconstruction, and/or weight based dosing when appropriate to reduce radiation dose to as low as reasonably achievable. Electronically Signed by Jay Ardon DO at 16-Aug-2022 05:20:13 AM               CT ABDOMEN PELVIS WO CONTRAST Additional Contrast? None   Final Result   1. Exophytic bilateral renal cortical cyst.   2. Non-specific bilateral perinephric fat stranding. 3. Incidentally noted a small umbilical hernia with omental fat as its content. The defect measuring 8mm. 4.  Small nodule in the right lung base, recommend nonemergent dedicated chest CT to further evaluate   Recommendation: Follow up as clinically indicated.    All CT scans at this facility utilize dose modulation, iterative reconstruction, and/or (!) 165/94 (!) 165/94 133/64   Pulse: 62 62 62 63   Resp: 17  17 16   Temp:   97.9 °F (36.6 °C) 97.9 °F (36.6 °C)   TempSrc:   Temporal Oral   SpO2: 93%   (!) 88%   Weight:       Height:           MDM    Patient has evidence of worsening renal failure. Troponin slightly elevated which could be related to the worsening kidney failure. Question of chronic appearing dissection of the descending thoracic aorta seen on CT. Because of kidney failure cannot safely do contrasted scan. Patient's case discussed with on-call CT surgeon, Dr. Adela Collins, who will see in consult. Said to hold Eliquis/Aspirin for now. Patient resting comfortably no distress. Patient updated about plan. Patient's case discussed with hospitalist, Dr. Cheryl Nissen, who will admit. CONSULTS:  Esme Hanks CONSULT TO NEPHROLOGY    PROCEDURES:  Unless otherwise notedbelow, none     Procedures    FINAL IMPRESSION     1. Right flank pain    2. Chest pain, unspecified type    3. Acute kidney injury superimposed on chronic kidney disease ([address]    4. Hypokalemia    5. Elevated troponin    6.  Abnormal CT scan          DISPOSITION/PLAN   DISPOSITION Admitted 08/16/2022 06:34:25 AM      PATIENT REFERRED TO:  @FUP@    DISCHARGE MEDICATIONS:  Current Discharge Medication List             (Please note that portions of this note were completed with a voice recognition program.  Efforts were made to edit the dictations butoccasionally words are mis-transcribed.)    Armond Peters MD (electronically signed)  AttendingEmergency Physician          Armond Peters MD  08/16/22 2014

## 2022-08-17 ENCOUNTER — APPOINTMENT (OUTPATIENT)
Dept: CT IMAGING | Age: 67
DRG: 313 | End: 2022-08-17
Payer: MEDICARE

## 2022-08-17 LAB
ANION GAP SERPL CALCULATED.3IONS-SCNC: 11 MMOL/L (ref 7–19)
BUN BLDV-MCNC: 43 MG/DL (ref 8–23)
CALCIUM SERPL-MCNC: 9 MG/DL (ref 8.8–10.2)
CHLORIDE BLD-SCNC: 96 MMOL/L (ref 98–111)
CO2: 33 MMOL/L (ref 22–29)
CREAT SERPL-MCNC: 2.9 MG/DL (ref 0.5–1.2)
EKG P AXIS: 61 DEGREES
EKG P-R INTERVAL: 218 MS
EKG Q-T INTERVAL: 492 MS
EKG QRS DURATION: 112 MS
EKG QTC CALCULATION (BAZETT): 495 MS
EKG T AXIS: 85 DEGREES
GFR AFRICAN AMERICAN: 26
GFR NON-AFRICAN AMERICAN: 22
GLUCOSE BLD-MCNC: 123 MG/DL (ref 70–99)
GLUCOSE BLD-MCNC: 141 MG/DL (ref 74–109)
GLUCOSE BLD-MCNC: 174 MG/DL (ref 70–99)
GLUCOSE BLD-MCNC: 251 MG/DL (ref 70–99)
GLUCOSE BLD-MCNC: 262 MG/DL (ref 70–99)
HCT VFR BLD CALC: 47.6 % (ref 42–52)
HEMOGLOBIN: 14 G/DL (ref 14–18)
MAGNESIUM: 2.1 MG/DL (ref 1.6–2.4)
MCH RBC QN AUTO: 26 PG (ref 27–31)
MCHC RBC AUTO-ENTMCNC: 29.4 G/DL (ref 33–37)
MCV RBC AUTO: 88.3 FL (ref 80–94)
PARATHYROID HORMONE INTACT: 130.6 PG/ML (ref 15–65)
PDW BLD-RTO: 17.2 % (ref 11.5–14.5)
PERFORMED ON: ABNORMAL
PHOSPHORUS: 3.1 MG/DL (ref 2.5–4.5)
PLATELET # BLD: 217 K/UL (ref 130–400)
PMV BLD AUTO: 10.6 FL (ref 9.4–12.4)
POTASSIUM REFLEX MAGNESIUM: 3.1 MMOL/L (ref 3.5–5)
RBC # BLD: 5.39 M/UL (ref 4.7–6.1)
SODIUM BLD-SCNC: 140 MMOL/L (ref 136–145)
TROPONIN: 0.05 NG/ML (ref 0–0.03)
TROPONIN: 0.06 NG/ML (ref 0–0.03)
VITAMIN D 25-HYDROXY: 34.3 NG/ML
WBC # BLD: 7.8 K/UL (ref 4.8–10.8)

## 2022-08-17 PROCEDURE — 85027 COMPLETE CBC AUTOMATED: CPT

## 2022-08-17 PROCEDURE — 82306 VITAMIN D 25 HYDROXY: CPT

## 2022-08-17 PROCEDURE — 83735 ASSAY OF MAGNESIUM: CPT

## 2022-08-17 PROCEDURE — 84100 ASSAY OF PHOSPHORUS: CPT

## 2022-08-17 PROCEDURE — 73700 CT LOWER EXTREMITY W/O DYE: CPT

## 2022-08-17 PROCEDURE — 72131 CT LUMBAR SPINE W/O DYE: CPT

## 2022-08-17 PROCEDURE — 6370000000 HC RX 637 (ALT 250 FOR IP): Performed by: NURSE PRACTITIONER

## 2022-08-17 PROCEDURE — 2580000003 HC RX 258: Performed by: INTERNAL MEDICINE

## 2022-08-17 PROCEDURE — 84484 ASSAY OF TROPONIN QUANT: CPT

## 2022-08-17 PROCEDURE — 2580000003 HC RX 258: Performed by: EMERGENCY MEDICINE

## 2022-08-17 PROCEDURE — 6370000000 HC RX 637 (ALT 250 FOR IP): Performed by: HOSPITALIST

## 2022-08-17 PROCEDURE — 82947 ASSAY GLUCOSE BLOOD QUANT: CPT

## 2022-08-17 PROCEDURE — 6360000002 HC RX W HCPCS: Performed by: HOSPITALIST

## 2022-08-17 PROCEDURE — 73700 CT LOWER EXTREMITY W/O DYE: CPT | Performed by: RADIOLOGY

## 2022-08-17 PROCEDURE — 93005 ELECTROCARDIOGRAM TRACING: CPT | Performed by: EMERGENCY MEDICINE

## 2022-08-17 PROCEDURE — G0378 HOSPITAL OBSERVATION PER HR: HCPCS

## 2022-08-17 PROCEDURE — 2580000003 HC RX 258: Performed by: HOSPITALIST

## 2022-08-17 PROCEDURE — 36415 COLL VENOUS BLD VENIPUNCTURE: CPT

## 2022-08-17 PROCEDURE — 72131 CT LUMBAR SPINE W/O DYE: CPT | Performed by: RADIOLOGY

## 2022-08-17 PROCEDURE — 83970 ASSAY OF PARATHORMONE: CPT

## 2022-08-17 PROCEDURE — 80048 BASIC METABOLIC PNL TOTAL CA: CPT

## 2022-08-17 PROCEDURE — 93010 ELECTROCARDIOGRAM REPORT: CPT | Performed by: INTERNAL MEDICINE

## 2022-08-17 RX ORDER — DEXTROSE MONOHYDRATE 100 MG/ML
INJECTION, SOLUTION INTRAVENOUS CONTINUOUS PRN
Status: DISCONTINUED | OUTPATIENT
Start: 2022-08-17 | End: 2022-08-19 | Stop reason: HOSPADM

## 2022-08-17 RX ORDER — INSULIN GLARGINE 100 [IU]/ML
15 INJECTION, SOLUTION SUBCUTANEOUS 2 TIMES DAILY
Status: DISCONTINUED | OUTPATIENT
Start: 2022-08-17 | End: 2022-08-19 | Stop reason: HOSPADM

## 2022-08-17 RX ORDER — POTASSIUM CHLORIDE 20 MEQ/1
40 TABLET, EXTENDED RELEASE ORAL ONCE
Status: COMPLETED | OUTPATIENT
Start: 2022-08-17 | End: 2022-08-17

## 2022-08-17 RX ORDER — INSULIN LISPRO 100 [IU]/ML
6 INJECTION, SOLUTION INTRAVENOUS; SUBCUTANEOUS
Status: DISCONTINUED | OUTPATIENT
Start: 2022-08-17 | End: 2022-08-19 | Stop reason: HOSPADM

## 2022-08-17 RX ADMIN — POTASSIUM CHLORIDE 10 MEQ: 10 INJECTION, SOLUTION INTRAVENOUS at 08:09

## 2022-08-17 RX ADMIN — INSULIN GLARGINE 15 UNITS: 100 INJECTION, SOLUTION SUBCUTANEOUS at 20:24

## 2022-08-17 RX ADMIN — AMIODARONE HYDROCHLORIDE 200 MG: 200 TABLET ORAL at 08:05

## 2022-08-17 RX ADMIN — ROSUVASTATIN 40 MG: 20 TABLET, FILM COATED ORAL at 20:25

## 2022-08-17 RX ADMIN — ALLOPURINOL 200 MG: 100 TABLET ORAL at 08:06

## 2022-08-17 RX ADMIN — SENNOSIDES AND DOCUSATE SODIUM 1 TABLET: 50; 8.6 TABLET ORAL at 20:23

## 2022-08-17 RX ADMIN — POTASSIUM CHLORIDE 10 MEQ: 10 INJECTION, SOLUTION INTRAVENOUS at 12:16

## 2022-08-17 RX ADMIN — CALCITRIOL CAPSULES 0.25 MCG 0.25 MCG: 0.25 CAPSULE ORAL at 08:06

## 2022-08-17 RX ADMIN — OXYCODONE HYDROCHLORIDE AND ACETAMINOPHEN 500 MG: 500 TABLET ORAL at 08:05

## 2022-08-17 RX ADMIN — MONTELUKAST 10 MG: 10 TABLET, FILM COATED ORAL at 08:06

## 2022-08-17 RX ADMIN — SODIUM CHLORIDE: 9 INJECTION, SOLUTION INTRAVENOUS at 04:24

## 2022-08-17 RX ADMIN — MINOXIDIL 2.5 MG: 2.5 TABLET ORAL at 20:23

## 2022-08-17 RX ADMIN — POTASSIUM CHLORIDE 10 MEQ: 10 INJECTION, SOLUTION INTRAVENOUS at 09:26

## 2022-08-17 RX ADMIN — POTASSIUM CHLORIDE 10 MEQ: 10 INJECTION, SOLUTION INTRAVENOUS at 10:51

## 2022-08-17 RX ADMIN — MINOXIDIL 2.5 MG: 2.5 TABLET ORAL at 08:05

## 2022-08-17 RX ADMIN — SODIUM CHLORIDE: 9 INJECTION, SOLUTION INTRAVENOUS at 16:07

## 2022-08-17 RX ADMIN — Medication 10 ML: at 08:06

## 2022-08-17 RX ADMIN — PANTOPRAZOLE SODIUM 40 MG: 40 TABLET, DELAYED RELEASE ORAL at 05:51

## 2022-08-17 RX ADMIN — POTASSIUM CHLORIDE 40 MEQ: 1500 TABLET, EXTENDED RELEASE ORAL at 08:05

## 2022-08-17 RX ADMIN — PANTOPRAZOLE SODIUM 40 MG: 40 TABLET, DELAYED RELEASE ORAL at 16:09

## 2022-08-17 RX ADMIN — ISOSORBIDE MONONITRATE 60 MG: 60 TABLET, EXTENDED RELEASE ORAL at 08:05

## 2022-08-17 RX ADMIN — CARVEDILOL 12.5 MG: 12.5 TABLET, FILM COATED ORAL at 08:05

## 2022-08-17 ASSESSMENT — ENCOUNTER SYMPTOMS
COLOR CHANGE: 0
VOMITING: 0
CHEST TIGHTNESS: 0
CONSTIPATION: 0
DIARRHEA: 0
SHORTNESS OF BREATH: 0
ABDOMINAL PAIN: 0
NAUSEA: 0
BACK PAIN: 1

## 2022-08-17 NOTE — PROGRESS NOTES
Patient came into clinic and dropped of 2 forms to be completed by Deborah Gerard. Patient states he needs them by 05/09/17. Please call patient at [phone] when completed. Forms given to Nicolasa TERRAZAS   Zulma Blunt, Palliative care nurse, asked this  to visit pt and assist him in completing an AD/LW. Pt had just finished lunch and welcomed the visit. Pt chose his wife Jefferson Rao to be his primary decision maker and his brother My Boykin to be his alternate decision maker. Pt also made advance care planning decisions. Pt initialed and signed the document and this  witnessed and notarized the document. The original and copies were given to pt, copy was placed in his soft chart, and a copy was emailed to Amy Davidson to be scanned into his EMR. Also provided spiritual care with sustaining presence, nurtured hope, and prayer. Pt expressed gratitude for spiritual care.      Electronically signed by Herb Myles on 8/17/2022 at 2:32 PM

## 2022-08-17 NOTE — PROGRESS NOTES
[facility] Hospitalists      Patient:  Maryellen Ortiz  YOB: 1955  Date of Service: 8/17/2022  MRN: [id number]   Acct: [de-identified]   Primary Care Physician: Flo Hidalgo MD  Advance Directive: DNR  Admit Date: 8/16/2022       Hospital Day: 0  Portions of this note have been copied forward, however, changed to reflect the most current clinical status of this patient. CHIEF COMPLAINT chest pain and shortness of breath    SUBJECTIVE:  Patient denies chest pain and shortness of breath today. He reports continued low back and right hip pain. He reports the pain radiates from the right low back into his buttocks and down the back of his leg. CUMULATIVE HOSPITAL COURSE:  Patient is a 70-year-old man with past medical history of atrial septal aneurysm, CAD, CKD, carotid artery stenosis, DM, hyperlipidemia, hypertension, paroxysmal A. fib, and chronic descending thoracic aneurysm who presented to [facility] ED with the complaints of chest pain and shortness of breath. Patient presented to the ED with a history of 5-day cough, shortness of breath, and chest discomfort. Patient also indicated low back pain. Patient reports he has had recent adjustments in his diuretics. Patient reports he is on Eliquis and has had no missed doses. ED work-up indicated potassium of 2.6, creatinine 3.7, troponin 0.10. Chest x-ray indicated mildly prominent right hilum with hilar congestion. CT abdomen and pelvis indicates exophytic bilateral renal cortical cysts, nonspecific bilateral perinephric stranding, and incidental umbilical hernia measuring 8 mm.   CT chest indicated mild emphysematous changes, 11 mm calcified granuloma in the superior segment of the right lower lobe, mild cardiomegaly, scattered atherosclerotic calcifications of the aortic arch and descending aorta, probable chronic dissection of the descending thoracic aorta and calcification in the false lumen, mildly enlarged left lobe of the thyroid with ill-defined hypodense lesions. CT surgery was consulted and reviewed the chest CT and again indicates chronic dissection not acute of the descending thoracic aorta. Nephrology was consulted as patient had ANGELO on CKD. Patient was treated with gentle IV hydration and nephrology recommends tapering hydration at this point. Cardiology consulted for elevated troponins upon arrival with chest pain. Patient continues to have low back pain, CT lumbar spine and right hip obtained reports pending. Review of Systems:   Review of Systems   Constitutional:  Negative for appetite change, chills, fatigue and fever. Respiratory:  Negative for chest tightness and shortness of breath. Cardiovascular:  Negative for chest pain, palpitations and leg swelling. Gastrointestinal:  Negative for abdominal pain, constipation, diarrhea, nausea and vomiting. Genitourinary:  Positive for flank pain. Negative for frequency, hematuria, penile pain, testicular pain and urgency. Musculoskeletal:  Positive for back pain and gait problem. Negative for myalgias. Skin:  Negative for color change and wound. Neurological:  Negative for dizziness, speech difficulty, weakness and headaches. Psychiatric/Behavioral:  Negative for agitation and confusion. 14 point review of systems is negative except as specifically addressed above. Objective:   VITALS:  /73   Pulse 53   Temp 97.2 °F (36.2 °C) (Temporal)   Resp 18   Ht 5' 11\" (1.803 m)   Wt 230 lb (104.3 kg)   SpO2 93%   BMI 32.08 kg/m²   24HR INTAKE/OUTPUT:    Intake/Output Summary (Last 24 hours) at 8/17/2022 1402  Last data filed at 8/17/2022 1013  Gross per 24 hour   Intake 3060.36 ml   Output --   Net 3060.36 ml       Physical Exam  Vitals reviewed. Constitutional:       Appearance: He is not ill-appearing. Cardiovascular:      Rate and Rhythm: Normal rate and regular rhythm. Pulses: Normal pulses. Heart sounds: Normal heart sounds.    Pulmonary:      Effort: Pulmonary effort is normal.      Breath sounds: Normal breath sounds. Abdominal:      General: Bowel sounds are normal. There is no distension. Palpations: Abdomen is soft. Tenderness: There is no abdominal tenderness. There is no guarding. Musculoskeletal:         General: Tenderness present. Right lower leg: No edema. Left lower leg: No edema. Legs:       Comments: Tenderness noted just right of the spine and low back   Skin:     General: Skin is warm and dry. Capillary Refill: Capillary refill takes less than 2 seconds. Neurological:      General: No focal deficit present. Mental Status: He is alert and oriented to person, place, and time. Medications:      sodium chloride 40 mL/hr at 08/17/22 1052    sodium chloride        allopurinol  200 mg Oral Daily    amiodarone  200 mg Oral Daily    ascorbic acid  500 mg Oral Daily    [Held by provider] bumetanide  1 mg Oral BID    calcitRIOL  0.25 mcg Oral Once per day on Mon Wed Fri    [Held by provider] carvedilol  12.5 mg Oral BID WC    [Held by provider] isosorbide mononitrate  60 mg Oral BID    insulin glargine  30 Units SubCUTAneous Nightly    minoxidil  2.5 mg Oral BID    montelukast  10 mg Oral Daily    pantoprazole  40 mg Oral BID AC    potassium chloride  20 mEq Oral Daily with breakfast    rosuvastatin  40 mg Oral Nightly    sennosides-docusate sodium  1 tablet Oral Nightly    [START ON 8/23/2022] vitamin D  50,000 Units Oral Weekly    sodium chloride flush  5-40 mL IntraVENous 2 times per day     diphenhydrAMINE, sodium chloride flush, sodium chloride, ondansetron **OR** ondansetron, acetaminophen **OR** acetaminophen, nitroGLYCERIN, potassium chloride  ADULT DIET; Regular; 4 carb choices (60 gm/meal); Low Fat/Low Chol/High Fiber/2 gm Na; Low Potassium (Less than 3000 mg/day); Low Phosphorus (Less than 1000 mg);  No Caffeine  Diet NPO     Lab and other Data:     Recent Labs     08/16/22  0229 08/17/22  0241 WBC 10.2 7.8   HGB 14.7 14.0    217     Recent Labs     08/16/22 0229 08/16/22 0756 08/16/22 2235 08/17/22 0241     --   --  140   K 2.6* 2.7* 3.1* 3.1*   CL 87*  --   --  96*   CO2 38*  --   --  33*   BUN 52*  --   --  43*   CREATININE 3.7*  --   --  2.9*   GLUCOSE 175*  --   --  141*     Recent Labs     08/16/22 0229   AST 20   ALT 14   BILITOT 0.7   ALKPHOS 73     Troponin T:   Recent Labs     08/16/22 2235 08/17/22 0241 08/17/22  0641   TROPONINI 0.06* 0.06* 0.05*     Pro-BNP: No results for input(s): BNP in the last 72 hours. INR: No results for input(s): INR in the last 72 hours. UA:  Recent Labs     08/16/22  0324   COLORU YELLOW   PHUR 5.0   WBCUA 1   RBCUA 5*   BACTERIA NEGATIVE*   CLARITYU CLOUDY*   SPECGRAV 1.015   LEUKOCYTESUR Negative   UROBILINOGEN 1.0   BILIRUBINUR Negative   BLOODU MODERATE*   GLUCOSEU Negative     A1C:   Recent Labs     08/16/22 0229   LABA1C 8.6*     ABG:No results for input(s): PHART, IEB9CZT, PO2ART, NML8IHH, BEART, HGBAE, K4NGNSRL, CARBOXHGBART in the last 72 hours. RAD:   CT ABDOMEN PELVIS WO CONTRAST Additional Contrast? None    Result Date: 8/16/2022  1. Exophytic bilateral renal cortical cyst. 2. Non-specific bilateral perinephric fat stranding. 3. Incidentally noted a small umbilical hernia with omental fat as its content. The defect measuring 8mm. 4.  Small nodule in the right lung base, recommend nonemergent dedicated chest CT to further evaluate Recommendation: Follow up as clinically indicated. All CT scans at this facility utilize dose modulation, iterative reconstruction, and/or weight based dosing when appropriate to reduce radiation dose to as low as reasonably achievable. Electronically Signed by Dorian Velasco DO at 16-Aug-2022 05:05:26 AM             CT CHEST WO CONTRAST    Result Date: 8/16/2022   IMPRESSION: 1. Mild emphysematous changes in the lung parenchyma. A 11 mm calcified granuloma in the superior segment of right lower lobe. No acute infiltrates, mass lesion or effusion. 2.  Mild cardiomegaly. Coronary artery calcifications are identified. 3. Scattered atherosclerotic calcification of the aortic arch and descending thoracic aorta. 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 Suggest further evaluation with CT aortogram. 4.  Post cholecystectomy. Atherosclerotic calcification of the abdominal aorta. Stent in the right renal artery. An exophytic cortical cyst from the left kidney upper pole. 5. Mildly enlarged left lobe of the thyroid gland with ill-defined hypodense lesions in the probable changes of multinodular goiter. Suggest further evaluation with ultrasound. Recommendation: Follow up as clinically indicated. All CT scans at this facility utilize dose modulation, iterative reconstruction, and/or weight based dosing when appropriate to reduce radiation dose to as low as reasonably achievable. Electronically Signed by Mile Mcneil DO at 16-Aug-2022 05:20:13 AM             XR CHEST PORTABLE    Result Date: 8/16/2022  Mildly prominent right hilum with right hilar congestion. No acute infiltrates or pleural effusion. Recommendation: Follow up as clinically indicated.  Electronically Signed by Mile Mcneil DO at 16-Aug-2022 04:23:36 AM                Assessment/Plan   Principal Problem:    Chest pain   -cardiology consulted   -CT surgery consulted from ED as known descending thoracic    -CT chest reviewed by CT surgery and indicates chronic dissection, not acute   -troponin trended 0.05, 0.06, 0.06, 0.05   -monitor on tele   -serial EKG    Active Problems:    Hypokalemia   -replace per protocol    -BMP in AM      Dissection of thoracic aorta (HCC) - DESCENDING   -ct surgery on board    Back pain   -CT lumbar spine and right hip   -PT/OT    DVT Prophylaxis:held per CT surgery request      DWAYNE Jones - JAMES, 8/17/2022 2:02 PM

## 2022-08-17 NOTE — ACP (ADVANCE CARE PLANNING)
Advance Care Planning     Advance Care Planning Activator (Inpatient)  Conversation Note      Date of ACP Conversation: 8/17/2022     [employer] with: Pt with decision making capacity. ACP Activator: Samir Bertrand RN      Health Care Decision Maker:     Current Designated Health Care Decision Maker:     Primary Decision Maker: Eliane Prashanth - [phone]      Care Preferences    Ventilation: \"If you were in your present state of health and suddenly became very ill and were unable to breathe on your own, what would your preference be about the use of a ventilator (breathing machine) if it were available to you? \"      Would the patient desire the use of ventilator (breathing machine)?: No        Resuscitation  \"CPR works best to restart the heart when there is a sudden event, like a heart attack, in someone who is otherwise healthy. Unfortunately, CPR does not typically restart the heart for people who have serious health conditions or who are very sick. \"    \"In the event your heart stopped as a result of an underlying serious health condition, would you want attempts to be made to restart your heart (answer \"yes\" for attempt to resuscitate) or would you prefer a natural death (answer \"no\" for do not attempt to resuscitate)? \" No         Conversation Outcomes:  [x] ACP discussion completed  [] Existing advance directive reviewed with patient; no changes to patient's previously recorded wishes  [] New Advance Directive completed  [] Portable Do Not Rescitate prepared for Provider review and signature  [] POLST/POST/MOLST/MOST prepared for Provider review and signature      Follow-up plan:    [x] Schedule follow-up conversation to continue planning  [] Referred individual to Provider for additional questions/concerns   [] Advised patient/agent/surrogate to review completed ACP document and update if needed with changes in condition, patient preferences or care setting    [] This note routed to one or more involved healthcare providers     Electronically signed by Segundo Grissom RN on 8/17/2022 at 12:55 PM

## 2022-08-17 NOTE — CONSULTS
Palliative Care:   Pt presents to ED with c/o SOA, cough and chest discomfort. Found to have hypokalemia, ANGELO, elevated troponin, abn CT. Pt is known to palliative care team. Palliative initiated for support, goc, ACP.        Past Medical History:        Past Medical History:   Diagnosis Date    Acute kidney failure, unspecified ([facility] [address]     Anxiety state, unspecified     Atrial septal aneurysm 01/09/2016    Blood circulation, collateral     Body mass index 30.0-30.9, adult     CAD (coronary artery disease) 01/10/2016    1/9/16  lexiscan  Positive for apical MI + myocardial ischemia, EF 42%, intermediate risk findings, AUC indication 16, AUC score 7 1/10/16  Cath  3 lesions in the LAD:  Mid: 70% 2.25x23, mid 90% 2.25 x 28, distal 100% 2.0x28, anterior lateral apical hypokinesis, EF 45%    Calculus of kidney     Carotid artery stenosis 06/26/2013    Cellulitis and abscess of hand, except fingers and thumb     Cerebral artery occlusion with cerebral infarction (HCC)     15 years ago    Chronic airway obstruction, not elsewhere classified     Chronic kidney disease, unspecified     Congestive heart failure, unspecified     Diabetes mellitus type II     Diverticulosis of colon (without mention of hemorrhage)     Encounter for long-term (current) use of insulin (HCC)     Esophageal reflux     Family history of ischemic heart disease     Gastric ulcer, unspecified as acute or chronic, without mention of hemorrhage, perforation, or obstruction     Hyperlipemia     Hypertension     Internal hemorrhoids without mention of complication     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 PC  will follow up with AD paperwork per pt request.      Pain/Other Symptoms:   Pt states no chest discomfort, however, c/I sciatic pain at rest\"2-3, standing/walking increases to \"6-7\". Activity:  As eva           Psychological/Spiritual:   Pt states he attends [employer] and has good family support. Plan:  Cardiology consult,  IVF, medical management    Patient/family discussion r/t goals:   Pt talks with this nurse about his recent health issues. States he was playing golf recently, sciatic pain caused him to fall to the ground. Tells me he has not been sleeping well x 3 weeks saying \"I've had a lot on my mind\". His wife recently had knee surgery and he wanted to be able to care for her. He admits, that he realized he couldn't take care of her as he should d/t his own health issues. This nurse provided emotional support and listening presence. He plans to [place] [address] Able to manage his own ADL's. Pt does not voice any needs at this time. Palliative will follow for support.                     Electronically signed by Delora Litten, RN on 8/17/2022 at 10:52 AM

## 2022-08-17 NOTE — PROGRESS NOTES
Nephrology ([address] [facility]) Consult Note      Patient:  Yonis Garay  YOB: 1955  Date of Service: 8/17/2022  MRN: [id number]   Acct: [de-identified]   Primary Care Physician: Maureen Dumont MD  Advance Directive: Full Code  Admit Date: 8/16/2022       Hospital Day: 0  Referring Provider: Mis Blankenship MD    Patient independently seen and examined, Chart, Consults, Notes, Operative notes, Labs, Cardiology, and Radiology studies reviewed as available. Subjective:  Yonis Garay is a 78 y.o. male for whom we were consulted for evaluation and treatment of acute renal failure with baseline chronic kidney disease stage IV. He notes that he has upcoming appointment with Dr. Pepe Jacobs. He had recently had Bumex dose decreased to 1 mg twice daily then developed some fluid gain and so had taken back to the prior dose of 2 mg twice daily and developed significant fluid losses from that. He reported about a 20 pound loss over the past 10 days or so. He also had developed chest pain and complained of low back pain over the sacrum/right buttock. He denied hematuria, dysuria, hemoptysis. He had some shortness of breath. Notes that his edema had essentially resolved with the diuretics. Today, no overnight events. He continues to complain of back pain which he feels is more related to sciatica than the aneurysm. He denied chest pain, shortness of air at rest, nausea or vomiting.     Allergies:  Morphine and Hydralazine    Medicines:  Current Facility-Administered Medications   Medication Dose Route Frequency Provider Last Rate Last Admin    0.9 % sodium chloride infusion   IntraVENous Continuous Lazaro Sherman  mL/hr at 08/17/22 0556 Rate Verify at 08/17/22 0556    allopurinol (ZYLOPRIM) tablet 200 mg  200 mg Oral Daily Tony Silverio MD   200 mg at 08/17/22 2089    amiodarone (CORDARONE) tablet 200 mg  200 mg Oral Daily Tony Silverio MD   200 mg at 08/17/22 0805    ascorbic acid (VITAMIN C) tablet 500 mg  500 mg Oral Daily Benitez Mcguire MD   500 mg at 08/17/22 0805    [Held by provider] bumetanide (BUMEX) tablet 1 mg  1 mg Oral BID Benitez Mcguire MD        calcitRIOL (ROCALTROL) capsule 0.25 mcg  0.25 mcg Oral Once per day on Mon Wed Fri Benitez Mcguire MD   0.25 mcg at 08/17/22 0806    carvedilol (COREG) tablet 12.5 mg  12.5 mg Oral BID  Benitez Mcguire MD   12.5 mg at 08/17/22 0805    diphenhydrAMINE (BENADRYL) tablet 25 mg  25 mg Oral Q6H PRN Benitez Mcguire MD        isosorbide mononitrate (IMDUR) extended release tablet 60 mg  60 mg Oral BID Benitez Mcguire MD   60 mg at 08/17/22 0805    insulin glargine (LANTUS) injection vial 30 Units  30 Units SubCUTAneous Nightly Benitez Mcguire MD   30 Units at 08/16/22 2118    minoxidil (LONITEN) tablet 2.5 mg  2.5 mg Oral BID Benitez Mcguire MD   2.5 mg at 08/17/22 0805    montelukast (SINGULAIR) tablet 10 mg  10 mg Oral Daily Benitez Mcguire MD   10 mg at 08/17/22 0806    pantoprazole (PROTONIX) tablet 40 mg  40 mg Oral BID AC Benitez Mcguire MD   40 mg at 08/17/22 0551    [Held by provider] potassium chloride (KLOR-CON M) extended release tablet 20 mEq  20 mEq Oral Daily with breakfast Benitez Mcguire MD        rosuvastatin (CRESTOR) tablet 40 mg  40 mg Oral Nightly Benitez Mcguire MD   40 mg at 08/16/22 2118    sennosides-docusate sodium (SENOKOT-S) 8.6-50 MG tablet 1 tablet  1 tablet Oral Nightly Benitez Mcguire MD   1 tablet at 08/16/22 2118    [START ON 8/23/2022] vitamin D (ERGOCALCIFEROL) capsule 50,000 Units  50,000 Units Oral Weekly Benitez Mcguire MD        sodium chloride flush 0.9 % injection 5-40 mL  5-40 mL IntraVENous 2 times per day Benitez Mcguire MD   10 mL at 08/17/22 0806    sodium chloride flush 0.9 % injection 5-40 mL  5-40 mL IntraVENous PRN Benitez Mcguire MD        0.9 % sodium chloride infusion   IntraVENous PRN Benitez Mcguire MD        ondansetron (ZOFRAN-ODT) disintegrating tablet 4 mg  4 mg Oral Q8H PRN Benitez Mcguire MD 4 mg at 08/16/22 1222    Or    ondansetron (ZOFRAN) injection 4 mg  4 mg IntraVENous Q6H PRN Andrew Rivera MD        acetaminophen (TYLENOL) tablet 650 mg  650 mg Oral Q6H PRN Andrew Rivera MD        Or    acetaminophen (TYLENOL) suppository 650 mg  650 mg Rectal Q6H PRN Andrew Rivera MD        nitroGLYCERIN (NITROSTAT) SL tablet 0.4 mg  0.4 mg SubLINGual Q5 Min PRN Andrew Rivera MD        potassium chloride 10 mEq/100 mL IVPB (Peripheral Line)  10 mEq IntraVENous PRN Rebeka Membreno  mL/hr at 08/17/22 0926 10 mEq at 08/17/22 0946       Past Medical History:  Past Medical History:   Diagnosis Date    Acute kidney failure, unspecified ([facility] [address]     Anxiety state, unspecified     Atrial septal aneurysm 01/09/2016    Blood circulation, collateral     Body mass index 30.0-30.9, adult     CAD (coronary artery disease) 01/10/2016    1/9/16  lexiscan  Positive for apical MI + myocardial ischemia, EF 42%, intermediate risk findings, AUC indication 16, AUC score 7 1/10/16  Cath  3 lesions in the LAD:  Mid: 70% 2.25x23, mid 90% 2.25 x 28, distal 100% 2.0x28, anterior lateral apical hypokinesis, EF 45%    Calculus of kidney     Carotid artery stenosis 06/26/2013    Cellulitis and abscess of hand, except fingers and thumb     Cerebral artery occlusion with cerebral infarction (HCC)     15 years ago    Chronic airway obstruction, not elsewhere classified     Chronic kidney disease, unspecified     Congestive heart failure, unspecified     Diabetes mellitus type II     Diverticulosis of colon (without mention of hemorrhage)     Encounter for long-term (current) use of insulin (HCC)     Esophageal reflux     Family history of ischemic heart disease     Gastric ulcer, unspecified as acute or chronic, without mention of hemorrhage, perforation, or obstruction     Hyperlipemia     Hypertension     Internal hemorrhoids without mention of complication     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, or unspecified(403.00)     Obesity, unspecified     Other specified cardiac dysrhythmias(427.89)     Palliative care patient 12/21/2020    Paroxysmal atrial fibrillation (HCC)     Peripheral vascular disease ([facility] [address]     Solitary pulmonary nodule     Surgical or other procedure not carried out because of contraindication     Thoracic aneurysm without mention of rupture     Tobacco use disorder     Type II or unspecified type diabetes mellitus without mention of complication, not stated as uncontrolled        Past Surgical History:  Past Surgical History:   Procedure Laterality Date    CARDIAC CATHETERIZATION      CHOLECYSTECTOMY  2000    CORONARY ANGIOPLASTY WITH STENT PLACEMENT  01/2016    2 JACQUELINE to LAD    DIAGNOSTIC CARDIAC CATH LAB PROCEDURE      URETER STENT PLACEMENT      VASCULAR SURGERY  03- [facility]    Aortogram with bilateral selective renal artery injections    VASCULAR SURGERY  6/14/13 [facility]    Right carotid endarterectomy with Vascu-Guard patch repair. Right cervical carotid arteriograms after endarterectomy. VASCULAR SURGERY  7/6/15 [facility]    Percutaneous cannulation, right common femoral artery, with a5 Haitian sheath and later 6 Haitian [facility] sheath. Suprarenal abdomianl aortagram.  Selective right renal arteriogram.  Right renal artery balloon angioplasty;/stent (Express 6 mm x 18mmballoon-expandable stent. . Completion suprarenal abdominal aortogram and selective right remal arteriogram. Mynx closure, right common femoral artery punctur       Family History  Family History   Problem Relation Age of Onset    High Blood Pressure Mother     Other Mother         COVID    High Blood Pressure Father     Cancer Father     High Blood Pressure Brother     No Known Problems Son     No Known Problems Daughter        Social History  Social History     Socioeconomic History    Marital status:      Spouse name: patient refused to answer    Number of children: 2    Years of education: Not on file Highest education level: Not on file   Occupational History    Occupation: patient refused to answer   Tobacco Use    Smoking status: Former     Packs/day: 0.25     Years: 7.00     Pack years: 1.75     Types: Cigarettes     Start date: 66     Quit date: 0     Years since quittin.6    Smokeless tobacco: Current     Types: Chew    Tobacco comments:     Used both for 5 years   Vaping Use    Vaping Use: Never used   Substance and Sexual Activity    Alcohol use: Not Currently     Comment: \"when i was tyoung i did, if i wanted to drink ten beers i drank ten beers\"    Drug use: Never    Sexual activity: Yes     Comment: has 2 children   Other Topics Concern    Not on file   Social History Narrative    CODE STATUS: DNR    HEALTH CARE PROXY: patient refused to answer    AMBULATES: patient refused to answer    DOMICILED: patient refused to answer     Social Determinants of Health     Financial Resource Strain: Not on file   Food Insecurity: Not on file   Transportation Needs: Not on file   Physical Activity: Not on file   Stress: Not on file   Social Connections: Not on file   Intimate Partner Violence: Not on file   Housing Stability: Not on file         Review of Systems:  History obtained from chart review and the patient  General ROS: No fever or chills  Respiratory ROS: No cough, +shortness of breath  Cardiovascular ROS: +chest pain or palpitations  Gastrointestinal ROS: No abdominal pain or melena  Genito-Urinary ROS: No dysuria or hematuria  Musculoskeletal ROS: No joint pain or swelling   14 point ROS reviewed with the patient and negative except as noted above and in the HPI unless unable to obtain.     Objective:  Patient Vitals for the past 24 hrs:   BP Temp Temp src Pulse Resp SpO2   22 0609 131/66 97.5 °F (36.4 °C) Oral 60 18 96 %   22 0002 133/73 97.9 °F (36.6 °C) Temporal 62 18 91 %   22 2117 133/64 -- -- -- -- --   22 1655 133/64 97.9 °F (36.6 °C) Oral 63 16 (!) 88 % 08/16/22 1513 (!) 165/94 97.9 °F (36.6 °C) Temporal 62 17 --   08/16/22 1459 (!) 165/94 -- -- 62 -- --       Intake/Output Summary (Last 24 hours) at 8/17/2022 1005  Last data filed at 8/17/2022 0556  Gross per 24 hour   Intake 2850.36 ml   Output 225 ml   Net 2625.36 ml     General: awake/alert   Chest:  clear to auscultation bilaterally  CVS: regular rate and rhythm  Abdominal: soft, nontender, normal bowel sounds  Extremities: no cyanosis or edema  Skin: warm and dry without rash      Labs:  BMP:   Recent Labs     08/16/22 0229 08/16/22 0756 08/16/22 2235 08/17/22 0241     --   --  140   K 2.6* 2.7* 3.1* 3.1*   CL 87*  --   --  96*   CO2 38*  --   --  33*   PHOS 3.3  --   --  3.1   BUN 52*  --   --  43*   CREATININE 3.7*  --   --  2.9*   CALCIUM 9.6  --   --  9.0     CBC:   Recent Labs     08/16/22 0229 08/17/22 0241   WBC 10.2 7.8   HGB 14.7 14.0   HCT 50.1 47.6   MCV 85.6 88.3    217     LIVER PROFILE:   Recent Labs     08/16/22 0229   AST 20   ALT 14   BILITOT 0.7   ALKPHOS 73     PT/INR: No results for input(s): PROTIME, INR in the last 72 hours. APTT: No results for input(s): APTT in the last 72 hours. BNP:  No results for input(s): BNP in the last 72 hours. Ionized Calcium:No results for input(s): IONCA in the last 72 hours. Magnesium:  Recent Labs     08/16/22 0229 08/17/22 0241   MG 2.0  2.1 2.1     Phosphorus:  Recent Labs     08/16/22 0229 08/17/22 0241   PHOS 3.3 3.1     HgbA1C:   Recent Labs     08/16/22 0229   LABA1C 8.6*     Hepatic:   Recent Labs     08/16/22 0229   ALKPHOS 73   ALT 14   AST 20   PROT 7.2   BILITOT 0.7   LABALBU 4.2     Lactic Acid: No results for input(s): LACTA in the last 72 hours. Troponin: No results for input(s): CKTOTAL, CKMB, TROPONINT in the last 72 hours. ABGs: No results for input(s): PH, PCO2, PO2, HCO3, O2SAT in the last 72 hours. CRP:  No results for input(s): CRP in the last 72 hours.   Sed Rate:  No results for input(s): SEDRATE in the last 72 hours. Cultures:   No results for input(s): CULTURE in the last 72 hours. No results for input(s): BC, Atha Lorena in the last 72 hours. No results for input(s): CXSURG in the last 72 hours. Radiology reports as per the Radiologist  Radiology: CT ABDOMEN PELVIS WO CONTRAST Additional Contrast? None    Result Date: 8/16/2022  NO PRIOR REPORT AVAILABLE Exam: CT OF THE ABDOMEN/PELVIS WITHOUT CONTRAST Clinical data: Right flank pain. Technique: Direct contiguous axial CT images were acquired through the abdomen and pelvis without contrast using soft tissue and bone algorithms. Reformatted/MPR images were performed. Radiation dose: CTDIvol =24.54 mGy, DLP =1782 mGy x cm. Limitations: Lack of intravenous contrast limits evaluation of solid viscera. Lack of oral contrast limits evaluation of the bowel loops. Prior Studies: No prior studies submitted. Findings: Lung bases: Grossly clear. Nodule in the right lung base measuring 1.3 x 1.4 cm. Liver:Unremarkable size, contour, and density. No evidence of mass. No evidence of dilated ducts. Gallbladder Fossa :Cholecystectomy clips noted. Spleen: Grossly unremarkable. Pancreas/adrenal glands: Grossly unremarkable size, contour and density. Kidneys: In anatomic position. Grossly unremarkablerenal size, contour and density. No renal or ureteral calculi. No hydronephrosis. There exophytic cysts are seen arising from both kidneys, largest measuring 3.9 x 3.4 cm arising from upper pole of left kidney. Non-specific bilateral perinephric fat stranding. Retroperitoneum: No retroperitoneal lymphadenopathy. Circumferential calcific atherosclerotic changes noted in the abdominal aorta. The IVC is grossly unremarkable. Peritoneal cavity: No evidence of free air or ascites. Gastrointestinal tract: Nondistended small bowel and colon. No evidence of obstruction. Appendix: Unremarkable. Pelvis: Solid and hollow viscera grossly unremarkable. Bladder is moderately distended. Incidentally noted a small umbilical hernia with omental fat as its content. The defect measuring 8mm. Osseous structures: No acute or destructive bony process identified. Mild degenerative changes noted in lumbar spine. 1. Exophytic bilateral renal cortical cyst. 2. Non-specific bilateral perinephric fat stranding. 3. Incidentally noted a small umbilical hernia with omental fat as its content. The defect measuring 8mm. 4.  Small nodule in the right lung base, recommend nonemergent dedicated chest CT to further evaluate Recommendation: Follow up as clinically indicated. All CT scans at this facility utilize dose modulation, iterative reconstruction, and/or weight based dosing when appropriate to reduce radiation dose to as low as reasonably achievable. Electronically Signed by Geovanni Rojo DO at 16-Aug-2022 05:05:26 AM             CT CHEST WO CONTRAST    Result Date: 8/16/2022  NO PRIOR REPORT AVAILABLE Exam: CT OF THE CHEST WITHOUT CONTRAST Clinical data: Chest pain. Technique: Axial CT images through the lungs were acquired without contrast and imaged using soft tissue and lung algorithms. Reformatted/MPR images were performed. Radiation Dose: CTDIvol =24.54 mGy, DLP =1782 mGy x cm. Limitations: Lack of intravenous contrast limits evaluation of the soft tissues and vascularity. Prior studies: Radiograph of the chest done on same day. Findings: Lungs: Mild emphysematous changes in the lung parenchyma. A 11 mm calcified granuloma in the superior segment of right lower lobe. No pulmonary infiltrate identified. No pulmonary mass identified. No pleural effusions identified. No pneumothorax. The airway is clear. Soft Tissues: No mediastinal, axillary or supraclavicular adenopathy identified. Mildly enlarged left lobe of the thyroid gland with ill-defined hypodense lesions in the probable changes of multinodular goiter.  Vascular: Scattered atherosclerotic calcification of the aortic arch and descending thoracic aorta.  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 Unopacified pulmonary vasculature appears unremarkable. Mild cardiomegaly. Diffuse atherosclerotic calcification of the coronary arteries. Bony structures: No acute or destructive abnormality Upper Abdomen: Postcholecystectomy status. An exophytic cortical cyst from the left kidney upper pole 3.6 x 4.0 cm. Scattered diverticulosis of the distal transverse colon without evidence of diverticulitis. Atherosclerotic calcification of the abdominal aorta, visceral arteries. Probable stent at the origin of the right renal artery. IMPRESSION: 1. Mild emphysematous changes in the lung parenchyma. A 11 mm calcified granuloma in the superior segment of right lower lobe. No acute infiltrates, mass lesion or effusion. 2.  Mild cardiomegaly. Coronary artery calcifications are identified. 3. Scattered atherosclerotic calcification of the aortic arch and descending thoracic aorta. 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 Suggest further evaluation with CT aortogram. 4.  Post cholecystectomy. Atherosclerotic calcification of the abdominal aorta. Stent in the right renal artery. An exophytic cortical cyst from the left kidney upper pole. 5. Mildly enlarged left lobe of the thyroid gland with ill-defined hypodense lesions in the probable changes of multinodular goiter. Suggest further evaluation with ultrasound. Recommendation: Follow up as clinically indicated. All CT scans at this facility utilize dose modulation, iterative reconstruction, and/or weight based dosing when appropriate to reduce radiation dose to as low as reasonably achievable. Electronically Signed by Silke Sanchez DO at 16-Aug-2022 05:20:13 AM             XR CHEST PORTABLE    Result Date: 8/16/2022  NO PRIOR REPORT AVAILABLE Exam: X-RAY OF [facility] Clinical data:Chest pain. Technique:Single view of the chest. Prior studies: No prior studies submitted. Findings:Mildly prominent right hilum with right hilar congestion. No acute infiltrates, pleural effusion or pneumothorax. No acute osseous abnormality is detected. Atherosclerotic calcification of the aortic arch and descending thoracic aorta. Mildly prominent right hilum with right hilar congestion. No acute infiltrates or pleural effusion. Recommendation: Follow up as clinically indicated. Electronically Signed by Anu Hancock DO at 16-Aug-2022 04:23:36 AM                Assessment   Acute kidney injury  Chronic kidney disease stage IV  Hypokalemia  Descending thoracic aortic dissection  Secondary hyperparathyroidism  Vitamin D deficiency    Plan:  Discussed with patient, nursing  Work-up ordered ongoing  Monitor labs  Reviewed cardiothoracic evaluation of aortic dissection  Continue vitamin D and calcitriol for the moment  Replace potassium again today and continue to recheck for further replacement needs  Would wean IV fluids at this point unless contrasted CT required    Thank you for the consult, we appreciate the opportunity to provide care to your patients. Feel free to contact me if I can be of any further assistance.       Alen Patel MD  08/17/22  10:05 AM

## 2022-08-18 ENCOUNTER — APPOINTMENT (OUTPATIENT)
Dept: NUCLEAR MEDICINE | Age: 67
DRG: 313 | End: 2022-08-18
Payer: MEDICARE

## 2022-08-18 PROBLEM — R10.9 RIGHT FLANK PAIN: Status: ACTIVE | Noted: 2022-08-18

## 2022-08-18 PROBLEM — M48.061 NEURAL FORAMINAL STENOSIS OF LUMBAR SPINE: Status: ACTIVE | Noted: 2022-08-18

## 2022-08-18 LAB
ANION GAP SERPL CALCULATED.3IONS-SCNC: 10 MMOL/L (ref 7–19)
BUN BLDV-MCNC: 37 MG/DL (ref 8–23)
CALCIUM SERPL-MCNC: 9 MG/DL (ref 8.8–10.2)
CHLORIDE BLD-SCNC: 100 MMOL/L (ref 98–111)
CO2: 30 MMOL/L (ref 22–29)
CREAT SERPL-MCNC: 2.9 MG/DL (ref 0.5–1.2)
EKG P AXIS: -36 DEGREES
EKG P-R INTERVAL: 218 MS
EKG Q-T INTERVAL: 518 MS
EKG QRS DURATION: 110 MS
EKG QTC CALCULATION (BAZETT): 510 MS
EKG T AXIS: 54 DEGREES
GFR AFRICAN AMERICAN: 26
GFR NON-AFRICAN AMERICAN: 22
GLUCOSE BLD-MCNC: 107 MG/DL (ref 70–99)
GLUCOSE BLD-MCNC: 208 MG/DL (ref 70–99)
GLUCOSE BLD-MCNC: 218 MG/DL (ref 70–99)
GLUCOSE BLD-MCNC: 228 MG/DL (ref 70–99)
GLUCOSE BLD-MCNC: 309 MG/DL (ref 74–109)
PERFORMED ON: ABNORMAL
POTASSIUM REFLEX MAGNESIUM: 3.9 MMOL/L (ref 3.5–5)
SODIUM BLD-SCNC: 140 MMOL/L (ref 136–145)

## 2022-08-18 PROCEDURE — 6370000000 HC RX 637 (ALT 250 FOR IP): Performed by: INTERNAL MEDICINE

## 2022-08-18 PROCEDURE — 2580000003 HC RX 258: Performed by: NURSE PRACTITIONER

## 2022-08-18 PROCEDURE — 93016 CV STRESS TEST SUPVJ ONLY: CPT | Performed by: INTERNAL MEDICINE

## 2022-08-18 PROCEDURE — 3430000000 HC RX DIAGNOSTIC RADIOPHARMACEUTICAL: Performed by: INTERNAL MEDICINE

## 2022-08-18 PROCEDURE — 93018 CV STRESS TEST I&R ONLY: CPT | Performed by: INTERNAL MEDICINE

## 2022-08-18 PROCEDURE — 6370000000 HC RX 637 (ALT 250 FOR IP): Performed by: HOSPITALIST

## 2022-08-18 PROCEDURE — 6370000000 HC RX 637 (ALT 250 FOR IP): Performed by: NURSE PRACTITIONER

## 2022-08-18 PROCEDURE — 1210000000 HC MED SURG R&B

## 2022-08-18 PROCEDURE — 93017 CV STRESS TEST TRACING ONLY: CPT

## 2022-08-18 PROCEDURE — 6360000002 HC RX W HCPCS: Performed by: INTERNAL MEDICINE

## 2022-08-18 PROCEDURE — A9502 TC99M TETROFOSMIN: HCPCS | Performed by: INTERNAL MEDICINE

## 2022-08-18 PROCEDURE — 78452 HT MUSCLE IMAGE SPECT MULT: CPT | Performed by: INTERNAL MEDICINE

## 2022-08-18 PROCEDURE — 99223 1ST HOSP IP/OBS HIGH 75: CPT | Performed by: INTERNAL MEDICINE

## 2022-08-18 PROCEDURE — 36415 COLL VENOUS BLD VENIPUNCTURE: CPT

## 2022-08-18 PROCEDURE — 80048 BASIC METABOLIC PNL TOTAL CA: CPT

## 2022-08-18 PROCEDURE — 82947 ASSAY GLUCOSE BLOOD QUANT: CPT

## 2022-08-18 PROCEDURE — 2580000003 HC RX 258: Performed by: HOSPITALIST

## 2022-08-18 RX ORDER — CLOPIDOGREL BISULFATE 75 MG/1
75 TABLET ORAL DAILY
Status: DISCONTINUED | OUTPATIENT
Start: 2022-08-18 | End: 2022-08-19 | Stop reason: HOSPADM

## 2022-08-18 RX ORDER — NIFEDIPINE 60 MG/1
60 TABLET, EXTENDED RELEASE ORAL DAILY
Status: DISCONTINUED | OUTPATIENT
Start: 2022-08-18 | End: 2022-08-19 | Stop reason: HOSPADM

## 2022-08-18 RX ORDER — GABAPENTIN 100 MG/1
100 CAPSULE ORAL 2 TIMES DAILY
Status: DISCONTINUED | OUTPATIENT
Start: 2022-08-18 | End: 2022-08-19 | Stop reason: HOSPADM

## 2022-08-18 RX ORDER — ASPIRIN 81 MG/1
81 TABLET, CHEWABLE ORAL DAILY
Status: DISCONTINUED | OUTPATIENT
Start: 2022-08-18 | End: 2022-08-19 | Stop reason: HOSPADM

## 2022-08-18 RX ORDER — CARVEDILOL 12.5 MG/1
25 TABLET ORAL 2 TIMES DAILY WITH MEALS
Status: DISCONTINUED | OUTPATIENT
Start: 2022-08-18 | End: 2022-08-19 | Stop reason: HOSPADM

## 2022-08-18 RX ADMIN — ALLOPURINOL 200 MG: 100 TABLET ORAL at 09:21

## 2022-08-18 RX ADMIN — GABAPENTIN 100 MG: 100 CAPSULE ORAL at 22:01

## 2022-08-18 RX ADMIN — TETROFOSMIN 8 MILLICURIE: 1.38 INJECTION, POWDER, LYOPHILIZED, FOR SOLUTION INTRAVENOUS at 07:38

## 2022-08-18 RX ADMIN — ACETAMINOPHEN 650 MG: 325 TABLET, FILM COATED ORAL at 03:40

## 2022-08-18 RX ADMIN — ROSUVASTATIN 40 MG: 20 TABLET, FILM COATED ORAL at 22:02

## 2022-08-18 RX ADMIN — INSULIN LISPRO 6 UNITS: 100 INJECTION, SOLUTION INTRAVENOUS; SUBCUTANEOUS at 12:40

## 2022-08-18 RX ADMIN — CLOPIDOGREL BISULFATE 75 MG: 75 TABLET ORAL at 16:25

## 2022-08-18 RX ADMIN — REGADENOSON 0.4 MG: 0.08 INJECTION, SOLUTION INTRAVENOUS at 12:12

## 2022-08-18 RX ADMIN — POTASSIUM CHLORIDE 20 MEQ: 1500 TABLET, EXTENDED RELEASE ORAL at 09:21

## 2022-08-18 RX ADMIN — ACETAMINOPHEN 650 MG: 325 TABLET, FILM COATED ORAL at 16:23

## 2022-08-18 RX ADMIN — MINOXIDIL 2.5 MG: 2.5 TABLET ORAL at 09:21

## 2022-08-18 RX ADMIN — PANTOPRAZOLE SODIUM 40 MG: 40 TABLET, DELAYED RELEASE ORAL at 16:23

## 2022-08-18 RX ADMIN — INSULIN GLARGINE 15 UNITS: 100 INJECTION, SOLUTION SUBCUTANEOUS at 22:02

## 2022-08-18 RX ADMIN — INSULIN GLARGINE 15 UNITS: 100 INJECTION, SOLUTION SUBCUTANEOUS at 12:40

## 2022-08-18 RX ADMIN — APIXABAN 5 MG: 5 TABLET, FILM COATED ORAL at 16:23

## 2022-08-18 RX ADMIN — ISOSORBIDE MONONITRATE 60 MG: 60 TABLET, EXTENDED RELEASE ORAL at 22:02

## 2022-08-18 RX ADMIN — SODIUM CHLORIDE: 9 INJECTION, SOLUTION INTRAVENOUS at 21:42

## 2022-08-18 RX ADMIN — SENNOSIDES AND DOCUSATE SODIUM 1 TABLET: 50; 8.6 TABLET ORAL at 22:02

## 2022-08-18 RX ADMIN — Medication 10 ML: at 09:21

## 2022-08-18 RX ADMIN — OXYCODONE HYDROCHLORIDE AND ACETAMINOPHEN 500 MG: 500 TABLET ORAL at 09:21

## 2022-08-18 RX ADMIN — AMIODARONE HYDROCHLORIDE 200 MG: 200 TABLET ORAL at 09:21

## 2022-08-18 RX ADMIN — TETROFOSMIN 24 MILLICURIE: 1.38 INJECTION, POWDER, LYOPHILIZED, FOR SOLUTION INTRAVENOUS at 11:05

## 2022-08-18 RX ADMIN — PANTOPRAZOLE SODIUM 40 MG: 40 TABLET, DELAYED RELEASE ORAL at 05:32

## 2022-08-18 RX ADMIN — CARVEDILOL 12.5 MG: 12.5 TABLET, FILM COATED ORAL at 13:00

## 2022-08-18 RX ADMIN — MONTELUKAST 10 MG: 10 TABLET, FILM COATED ORAL at 09:21

## 2022-08-18 RX ADMIN — ISOSORBIDE MONONITRATE 60 MG: 60 TABLET, EXTENDED RELEASE ORAL at 13:00

## 2022-08-18 RX ADMIN — MINOXIDIL 2.5 MG: 2.5 TABLET ORAL at 22:04

## 2022-08-18 RX ADMIN — GABAPENTIN 100 MG: 100 CAPSULE ORAL at 12:40

## 2022-08-18 RX ADMIN — CARVEDILOL 25 MG: 12.5 TABLET, FILM COATED ORAL at 16:23

## 2022-08-18 RX ADMIN — NIFEDIPINE 60 MG: 60 TABLET, EXTENDED RELEASE ORAL at 16:23

## 2022-08-18 RX ADMIN — INSULIN LISPRO 6 UNITS: 100 INJECTION, SOLUTION INTRAVENOUS; SUBCUTANEOUS at 17:10

## 2022-08-18 RX ADMIN — ASPIRIN 81 MG: 81 TABLET, CHEWABLE ORAL at 16:23

## 2022-08-18 ASSESSMENT — ENCOUNTER SYMPTOMS
GASTROINTESTINAL NEGATIVE: 1
CHEST TIGHTNESS: 0
SHORTNESS OF BREATH: 0
VOMITING: 0
NAUSEA: 0
EYES NEGATIVE: 1
ABDOMINAL PAIN: 0
CONSTIPATION: 0
DIARRHEA: 0
RESPIRATORY NEGATIVE: 1
BACK PAIN: 1
COLOR CHANGE: 0

## 2022-08-18 ASSESSMENT — PAIN SCALES - GENERAL: PAINLEVEL_OUTOF10: 6

## 2022-08-18 NOTE — CONSULTS
[facility]  Cardiology Consult      Requesting MD:  Sabas Goel MD   Admit Status:         History obtained from:   [] Patient  [] Other (specify):     Patient:  Presley Winston  [id number]     Chief Complaint:   Chief Complaint   Patient presents with    Shortness of Breath     Chest pain x 2 days           HPI: Mr. Paula Machado is a 78 y.o. male with a history of coronary artery disease several stents 10+ years ago complains of some dull aching in his chest the past few months intermittently also shortness of breath admitted 8/16/2022 for further assessment. All troponins mildly elevated ranging between 0.05 and 0.10 relatively flat. Creatinine 2.9.  proBNP 679. Malcom Frieze ordered for today. Further comments to follow. Echocardiogram 8/16/2022 normal left ventricular function EF 55 to 60% mild concentric LVH impaired LV relaxation    Review of Systems:  Review of Systems   Constitutional: Negative. Negative for chills, fever and unexpected weight change. HENT: Negative. Eyes: Negative. Respiratory: Negative. Negative for shortness of breath. Cardiovascular: Negative. Negative for chest pain. Gastrointestinal: Negative. Negative for diarrhea, nausea and vomiting. Endocrine: Negative. Genitourinary: Negative. Musculoskeletal: Negative. Skin: Negative. Neurological: Negative. All other systems reviewed and are negative.     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        Aneurysm of thoracic aorta (HCC)        Renal artery stenosis (HCC)        Carotid artery stenosis        Peripheral vascular disease (HCC)        Atrial septal aneurysm        Malignant hypertension        Apical myocardial infarction (HCC)        Acute on chronic diastolic CHF (congestive heart failure) (HCC)        Chronic congestive heart failure (HCC)        PAF (paroxysmal atrial fibrillation) (HCC)        Left ventricular dysfunction        Mixed hyperlipidemia        Acute on chronic combined systolic and diastolic congestive heart failure (HCC)        Systolic and diastolic CHF, acute on chronic (HCC)        NSTEMI (non-ST elevated myocardial infarction) [facility])           Past Medical History:  Past Medical History:   Diagnosis Date    Acute kidney failure, unspecified (HCC)     Anxiety state, unspecified     Atrial septal aneurysm 01/09/2016    Blood circulation, collateral     Body mass index 30.0-30.9, adult     CAD (coronary artery disease) 01/10/2016    1/9/16  lexiscan  Positive for apical MI + myocardial ischemia, EF 42%, intermediate risk findings, AUC indication 16, AUC score 7 1/10/16  Cath  3 lesions in the LAD:  Mid: 70% 2.25x23, mid 90% 2.25 x 28, distal 100% 2.0x28, anterior lateral apical hypokinesis, EF 45%    Calculus of kidney     Carotid artery stenosis 06/26/2013    Cellulitis and abscess of hand, except fingers and thumb     Cerebral artery occlusion with cerebral infarction (HCC)     15 years ago    Chronic airway obstruction, not elsewhere classified     Chronic kidney disease, unspecified     Congestive heart failure, unspecified     Diabetes mellitus type II     Diverticulosis of colon (without mention of hemorrhage)     Encounter for long-term (current) use of insulin (HCC)     Esophageal reflux     Family history of ischemic heart disease     Gastric ulcer, unspecified as acute or chronic, without mention of hemorrhage, perforation, or obstruction     Hyperlipemia     Hypertension     Internal hemorrhoids without mention of complication     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, or unspecified(403.00)     Obesity, unspecified     Other specified cardiac dysrhythmias(427.89)     Palliative care patient 12/21/2020    Paroxysmal atrial fibrillation ([facility] [address]     Peripheral vascular disease ([address]     Solitary pulmonary nodule     Surgical or other procedure not carried out because of contraindication     Thoracic aneurysm without mention of rupture     Tobacco use disorder     Type II or unspecified type diabetes mellitus without mention of complication, not stated as uncontrolled         Past Surgical History:  Past Surgical History:   Procedure Laterality Date    CARDIAC CATHETERIZATION      CHOLECYSTECTOMY  2000    CORONARY ANGIOPLASTY WITH STENT PLACEMENT  01/2016    2 JACQUELINE to LAD    DIAGNOSTIC CARDIAC CATH LAB PROCEDURE      URETER STENT PLACEMENT      VASCULAR SURGERY  03- [facility]    Aortogram with bilateral selective renal artery injections    VASCULAR SURGERY  6/14/13 [facility]    Right carotid endarterectomy with Vascu-Guard patch repair. Right cervical carotid arteriograms after endarterectomy. VASCULAR SURGERY  7/6/15 [facility]    Percutaneous cannulation, right common femoral artery, with a5 Greek sheath and later 6 Greek [facility] sheath. Suprarenal abdomianl aortagram.  Selective right renal arteriogram.  Right renal artery balloon angioplasty;/stent (Express 6 mm x 18mmballoon-expandable stent. . Completion suprarenal abdominal aortogram and selective right remal arteriogram. Mynx closure, right common femoral artery punctur       Past Family History:  Family History   Problem Relation Age of Onset    High Blood Pressure Mother     Other Mother         COVID    High Blood Pressure Father     Cancer Father     High Blood Pressure Brother     No Known Problems Son     No Known Problems Daughter        Past Social History:  Social History     Socioeconomic History    Marital status:      Spouse name: patient refused to answer    Number of children: 2    Years of education: Not on file    Highest education level: Not on file   Occupational History    Occupation: patient refused to answer   Tobacco Use    Smoking status: Former     Packs/day: 0.25     Years: 7.00     Pack years: 1.75     Types: Cigarettes     Start date: 66     Quit date: 0     Years since quittin.6    Smokeless tobacco: Current     Types: Chew    Tobacco comments:     Used both for 5 years   Vaping Use    Vaping Use: Never used   Substance and Sexual Activity    Alcohol use: Not Currently     Comment: \"when i was tyoung i did, if i wanted to drink ten beers i drank ten beers\"    Drug use: Never    Sexual activity: Yes     Comment: has 2 children   Other Topics Concern    Not on file   Social History Narrative    CODE STATUS: DNR    HEALTH CARE PROXY: patient refused to answer    AMBULATES: patient refused to answer    DOMICILED: patient refused to answer     Social Determinants of Health     Financial Resource Strain: Not on file   Food Insecurity: Not on file   Transportation Needs: Not on file   Physical Activity: Not on file   Stress: Not on file   Social Connections: Not on file   Intimate Partner Violence: Not on file   Housing Stability: Not on file       Allergies: Allergies   Allergen Reactions    Morphine Shortness Of Breath     Was INTUBATED for this    Hydralazine      \"They said I am, I really don't know\"       Home Meds:  Prior to Admission medications    Medication Sig Start Date End Date Taking?  Authorizing Provider   ELIQUIS 5 MG TABS tablet TAKE ONE TABLET BY MOUTH TWICE A DAY 22   DWAYNE Montilla NP   isosorbide mononitrate (IMDUR) 60 MG extended release tablet TAKE ONE TABLET BY MOUTH TWICE A DAY 22   DWAYNE Bateman   minoxidil (LONITEN) 2.5 MG tablet TAKE ONE TABLET BY MOUTH TWICE A DAY 22   DWAYNE Molina   ondansetron (ZOFRAN ODT) 4 MG disintegrating tablet Take 1 tablet by mouth every 8 hours as needed for Nausea or Vomiting 11/15/21   Michelle Zambrano MD   bumetanide (BUMEX) 1 MG tablet Take 1 tablet by mouth 2 times daily 21   Akilah Pedraza MD   metOLazone (ZAROXOLYN) 2.5 MG tablet Take 1 tablet by mouth once a week On 21   Gus Mcmillan MD Zully   clopidogrel (PLAVIX) 75 MG tablet TAKE ONE TABLET BY MOUTH EVERY DAY 10/13/21   Virginia Sherman MD   amiodarone (CORDARONE) 200 MG tablet Take 1 tablet by mouth daily 5/14/21 8/16/22  Virginia Sherman MD   rosuvastatin (CRESTOR) 40 MG tablet Take 0.5 tablets by mouth daily 5/14/21   Virginia Sherman MD   carvedilol (COREG) 12.5 MG tablet Take 1 tablet by mouth 2 times daily 5/14/21   Virginia Sherman MD   potassium chloride (KLOR-CON M) 20 MEQ extended release tablet Take 1 tablet by mouth daily (with breakfast) 5/14/21   Virginia Sherman MD   pantoprazole (PROTONIX) 40 MG tablet Take 1 tablet by mouth 2 times daily (before meals) 5/14/21   Virginia Sherman MD   ascorbic acid (VITAMIN C) 500 MG tablet Take 1 tablet by mouth daily 5/14/21   Virginia Sherman MD   calcitRIOL (ROCALTROL) 0.25 MCG capsule Take 0.25 mcg by mouth three times a week 5/3/21   Historical Provider, MD   diphenhydrAMINE (BENADRYL) 25 MG capsule Take 25 mg by mouth every 6 hours as needed for Itching    Historical Provider, MD   senna-docusate (Candice Abigail) 8.6-50 MG per tablet Take 1 tablet by mouth nightly    Historical Provider, MD   allopurinol (ZYLOPRIM) 100 MG tablet Take 200 mg by mouth daily    Historical Provider, MD   aspirin 81 MG chewable tablet Take 1 tablet by mouth daily 12/24/20   Pedro Cunningham MD   vitamin D (ERGOCALCIFEROL) 1.25 MG (00662 UT) CAPS capsule Take 50,000 Units by mouth once a week 12/21/20   Historical Provider, MD   nitroGLYCERIN (NITROSTAT) 0.4 MG SL tablet up to max of 3 total doses.  If no relief after 1 dose, call 911. 10/20/20   [facility], DO   NOVOLOG FLEXPEN 100 UNIT/ML injection pen 5 Units 3 times daily (before meals)  6/1/20   Historical Provider, MD   montelukast (SINGULAIR) 10 MG tablet Take 10 mg by mouth daily    Historical Provider, MD   levocetirizine (XYZAL) 5 MG tablet Take 5 mg by mouth nightly    Historical Provider, MD   ULTICARE MINI PEN NEEDLES 31G X 6 MM MISC FOR USE WITH LANTUS TWO TIMES A DAY 7/11/14   DWAYNE Beltrán CNP   LANTUS SOLOSTAR 100 UNIT/ML injection pen INJECT 30 UNITS IN THE MORNING AND 20 UNITS IN THE EVENING DAILY  Patient taking differently: 30 Units nightly 7/1/14   DWAYNE Beltrán CNP       Current Meds:   gabapentin  100 mg Oral BID    insulin glargine  15 Units SubCUTAneous BID    insulin lispro  6 Units SubCUTAneous 4x Daily AC & HS    allopurinol  200 mg Oral Daily    amiodarone  200 mg Oral Daily    ascorbic acid  500 mg Oral Daily    [Held by provider] bumetanide  1 mg Oral BID    calcitRIOL  0.25 mcg Oral Once per day on Mon Wed Fri    [Held by provider] carvedilol  12.5 mg Oral BID WC    [Held by provider] isosorbide mononitrate  60 mg Oral BID    minoxidil  2.5 mg Oral BID    montelukast  10 mg Oral Daily    pantoprazole  40 mg Oral BID AC    potassium chloride  20 mEq Oral Daily with breakfast    rosuvastatin  40 mg Oral Nightly    sennosides-docusate sodium  1 tablet Oral Nightly    [START ON 8/23/2022] vitamin D  50,000 Units Oral Weekly    sodium chloride flush  5-40 mL IntraVENous 2 times per day       Current Infused Meds:   dextrose      sodium chloride 40 mL/hr at 08/17/22 1607    sodium chloride         Physical Exam:  Vitals:    08/18/22 0900   BP:    Pulse: 64   Resp:    Temp:    SpO2:        Intake/Output Summary (Last 24 hours) at 8/18/2022 1205  Last data filed at 8/17/2022 1443  Gross per 24 hour   Intake 240 ml   Output --   Net 240 ml     Estimated body mass index is 34.66 kg/m² as calculated from the following:    Height as of this encounter: 5' 11\" (1.803 m). Weight as of this encounter: 248 lb 8 oz (112.7 kg). Physical Exam  Vitals reviewed. Constitutional:       General: He is not in acute distress. Appearance: Normal appearance. He is well-developed and normal weight. He is not ill-appearing, toxic-appearing or diaphoretic. HENT:      Head: Normocephalic and atraumatic.       Nose: Nose normal. Mouth/Throat:      Mouth: Mucous membranes are moist.      Pharynx: Oropharynx is clear. Eyes:      General: No scleral icterus. Extraocular Movements: Extraocular movements intact. Pupils: Pupils are equal, round, and reactive to light. Neck:      Vascular: No carotid bruit or JVD. Cardiovascular:      Rate and Rhythm: Normal rate and regular rhythm. Heart sounds: Normal heart sounds. No murmur heard. No friction rub. No gallop. Pulmonary:      Effort: Pulmonary effort is normal. No respiratory distress. Breath sounds: Normal breath sounds. No stridor. No wheezing, rhonchi or rales. Chest:      Chest wall: No tenderness. Abdominal:      General: Abdomen is flat. Bowel sounds are normal. There is no distension. Palpations: Abdomen is soft. There is no mass. Tenderness: There is no abdominal tenderness. There is no right CVA tenderness, left CVA tenderness, guarding or rebound. Hernia: No hernia is present. Musculoskeletal:         General: No swelling, tenderness, deformity or signs of injury. Cervical back: Normal range of motion and neck supple. No rigidity or tenderness. Right lower leg: No edema. Left lower leg: No edema. Lymphadenopathy:      Cervical: No cervical adenopathy. Skin:     General: Skin is warm and dry. Neurological:      General: No focal deficit present. Mental Status: He is alert and oriented to person, place, and time. Mental status is at baseline. Cranial Nerves: No cranial nerve deficit. Sensory: No sensory deficit. Motor: No weakness. Coordination: Coordination normal.   Psychiatric:         Mood and Affect: Mood normal.         Behavior: Behavior normal.         Thought Content:  Thought content normal.         Judgment: Judgment normal.       Labs:  Recent Labs     08/16/22 0229 08/17/22  0241   WBC 10.2 7.8   HGB 14.7 14.0    217       Recent Labs     08/16/22  0229 08/16/22  0756 08/16/22  2235 08/17/22  0241 08/18/22  0141     --   --  140 140   K 2.6*   < > 3.1* 3.1* 3.9   CL 87*  --   --  96* 100   CO2 38*  --   --  33* 30*   BUN 52*  --   --  43* 37*   CREATININE 3.7*  --   --  2.9* 2.9*   LABGLOM 17*  --   --  22* 22*   MG 2.0  2.1  --   --  2.1  --    CALCIUM 9.6  --   --  9.0 9.0   PHOS 3.3  --   --  3.1  --     < > = values in this interval not displayed. CK, CKMB, Troponin: @LABRCNT (CKTOTAL:3, CKMB:3, TROPONINI:3)@    Last 3 BNP:  No results for input(s): BNP in the last 72 hours. IMAGING:  CT ABDOMEN PELVIS WO CONTRAST Additional Contrast? None    Result Date: 8/16/2022  NO PRIOR REPORT AVAILABLE Exam: CT OF THE ABDOMEN/PELVIS WITHOUT CONTRAST Clinical data: Right flank pain. Technique: Direct contiguous axial CT images were acquired through the abdomen and pelvis without contrast using soft tissue and bone algorithms. Reformatted/MPR images were performed. Radiation dose: CTDIvol =24.54 mGy, DLP =1782 mGy x cm. Limitations: Lack of intravenous contrast limits evaluation of solid viscera. Lack of oral contrast limits evaluation of the bowel loops. Prior Studies: No prior studies submitted. Findings: Lung bases: Grossly clear. Nodule in the right lung base measuring 1.3 x 1.4 cm. Liver:Unremarkable size, contour, and density. No evidence of mass. No evidence of dilated ducts. Gallbladder Fossa :Cholecystectomy clips noted. Spleen: Grossly unremarkable. Pancreas/adrenal glands: Grossly unremarkable size, contour and density. Kidneys: In anatomic position. Grossly unremarkablerenal size, contour and density. No renal or ureteral calculi. No hydronephrosis. There exophytic cysts are seen arising from both kidneys, largest measuring 3.9 x 3.4 cm arising from upper pole of left kidney. Non-specific bilateral perinephric fat stranding. Retroperitoneum: No retroperitoneal lymphadenopathy. Circumferential calcific atherosclerotic changes noted in the abdominal aorta.  The IVC is grossly unremarkable. Peritoneal cavity: No evidence of free air or ascites. Gastrointestinal tract: Nondistended small bowel and colon. No evidence of obstruction. Appendix: Unremarkable. Pelvis: Solid and hollow viscera grossly unremarkable. Bladder is moderately distended. Incidentally noted a small umbilical hernia with omental fat as its content. The defect measuring 8mm. Osseous structures: No acute or destructive bony process identified. Mild degenerative changes noted in lumbar spine. 1. Exophytic bilateral renal cortical cyst. 2. Non-specific bilateral perinephric fat stranding. 3. Incidentally noted a small umbilical hernia with omental fat as its content. The defect measuring 8mm. 4.  Small nodule in the right lung base, recommend nonemergent dedicated chest CT to further evaluate Recommendation: Follow up as clinically indicated. All CT scans at this facility utilize dose modulation, iterative reconstruction, and/or weight based dosing when appropriate to reduce radiation dose to as low as reasonably achievable. Electronically Signed by Erica Soto DO at 16-Aug-2022 05:05:26 AM             CT CHEST WO CONTRAST    Result Date: 8/16/2022  NO PRIOR REPORT AVAILABLE Exam: CT OF THE CHEST WITHOUT CONTRAST Clinical data: Chest pain. Technique: Axial CT images through the lungs were acquired without contrast and imaged using soft tissue and lung algorithms. Reformatted/MPR images were performed. Radiation Dose: CTDIvol =24.54 mGy, DLP =1782 mGy x cm. Limitations: Lack of intravenous contrast limits evaluation of the soft tissues and vascularity. Prior studies: Radiograph of the chest done on same day. Findings: Lungs: Mild emphysematous changes in the lung parenchyma. A 11 mm calcified granuloma in the superior segment of right lower lobe. No pulmonary infiltrate identified. No pulmonary mass identified. No pleural effusions identified. No pneumothorax. The airway is clear.  Soft Tissues: No when appropriate to reduce radiation dose to as low as reasonably achievable. Electronically Signed by Ace Kumar DO at 16-Aug-2022 05:20:13 AM             CT LUMBAR SPINE WO CONTRAST    Result Date: 8/17/2022  NO PRIOR REPORT AVAILABLE Exam: CT OF THE LUMBAR SPINE WITHOUT INTRAVENOUS CONTRAST Clinical data: Low back/right hip pain. Technique: Spiral axial CT images through the lumbar spine were acquired without contrast, reconstructed in axial and sagittal projections and imaged using soft tissue and bone algorithms. Reformatted/MPR images were performed. Radiation dose: CTDIvol =67.25 mGy, DLP =2433.62 mGy x cm. Limitations: None. Prior studies: No prior studies submitted. Findings: There isgrossly unremarkablealignment without acute fracture or subluxation. Bone mineralization is grossly unremarkable. Vertebral body heights are maintained. L5 anterior spondylolisthesis is seen. Marginal osteophytes at multiple levels. Posterior elements are intact. Inter-vertebral disc spaces: Vacuum phenomenon noted at L3-L4 and L5- S1. Mild right paracentral bulge at L2 causing mild neural foramina narrowing. Asymmetric disc bulge causing severe bilateral neural foramina narrowing from L3-L4 to L5-S1. Atherosclerotic wall calcifications in abdominal aorta and common iliac arteries. 1. Lumbar spondylosis. 2. Mild right paracentral bulge at L2 causing mild neural foramina narrowing. 3. Asymmetric disc bulge causing severe bilateral neural foramina narrowing from L3-L4 to L5-S1. 4. L5 anterior spondylolisthesis Recommendation: Follow up as clinically indicated. All CT scans at this facility utilize dose modulation, iterative reconstruction, and/or weight based dosing when appropriate to reduce radiation dose to as low as reasonably achievable.  Electronically Signed by Bob Kiran MD at 17-Aug-2022 05:55:19 PM             XR CHEST PORTABLE    Result Date: 8/16/2022  NO PRIOR REPORT AVAILABLE Exam: X-RAY OF [facility] Clinical data:Chest pain. Technique:Single view of the chest. Prior studies: No prior studies submitted. Findings:Mildly prominent right hilum with right hilar congestion. No acute infiltrates, pleural effusion or pneumothorax. No acute osseous abnormality is detected. Atherosclerotic calcification of the aortic arch and descending thoracic aorta. Mildly prominent right hilum with right hilar congestion. No acute infiltrates or pleural effusion. Recommendation: Follow up as clinically indicated. Electronically Signed by Deon Antonio DO at 16-Aug-2022 04:23:36 AM             CT HIP RIGHT WO CONTRAST    Result Date: 8/18/2022  NO PRIOR REPORT AVAILABLE Exam: CT OF THERIGHT HIP WITHOUT CONTRAST Clinical data: Low back/ right hip pain. Technique: Spiral axial CT images through the hip were acquired without contrast. Reformatted/MPR images were performed. Radiation dose: CTDIvol = 67.25 mGy, DLP = 2433.62 mGy x cm. Limitations: None. Prior studies: No prior studies submitted. Findings: Osseous structures: No acute fractures or dislocations appreciated. Mild DJD right hip, with chondrocalcinosis. Mild-to-moderate arthrosis of the right AC joint. Mild calcific tendinopathy abutting the greater trochanter superiorly. Partially visualized lower lumbar spondylosis. Small dystrophic calcification in the distal right iliopsoas muscle. Mild nonspecific subcutaneous soft tissue stranding at the anterior hip. Soft tissues: No CT evidence of soft tissue mass. Limited CT evaluation of the muscles and tendons are otherwise grossly unremarkable. 1. No acute fractures or dislocations appreciated. 2. Scattered DJD, as described. Recommendation: Follow up as clinically indicated. All CT scans at this facility utilize dose modulation, iterative reconstruction, and/or weight based dosing when appropriate to reduce radiation dose to as low as reasonably achievable.  Electronically Signed by Michelle Zavala MD at 18-Aug-2022 63:09:35 AM ECHO 2D WO COLOR DOPPLER COMPLETE    Result Date: 8/16/2022  Transthoracic Echocardiography Report (TTE)  Demographics   Patient Name   Roseline Ware   Date of Study           08/16/2022   MRN            [id number]         Gender                  Male   Date of Birth  1955     Room Number             [facility]-200   Age            78 year(s)   Height:        71 inches      Referring Physician     Shellie Friedman   Weight:        230.01 pounds  Sonographer             Deja Hooper LUIS Rodríguez   BSA:           2.24 m^2       Interpreting Physician  Allison Payan MD   BMI:           32.08 kg/m^2  Procedure Type of Study   TTE procedure:ECHO 2D W/DOPPLER/COLOR/CONTRAST. Study Location: Echo Lab Technical Quality: Adequate visualization Patient Status: Inpatient Contrast Medium: Definity. Amount - 4 ml Rhythm: Within normal limits HR: 57 bpm BP: 165/94 mmHg Indications:Cardiomegaly. Conclusions   Summary  Mitral valve leaflets are mildly thickened with preserved leaflet  mobility. Mildly thickened aortic valve leaflets with preserved leaflet mobility. Tricuspid valve is structurally normal.  Normal size left atrium. Normal left ventricular size with preserved LV function and an estimated  ejection fraction of approximately 55-60%. Mild concentric left ventricular hypertrophy. No regional wall motion abnormalities. Impaired relaxation compatible with diastolic dysfunction. ( reversed E/A  ratio)   Signature   ----------------------------------------------------------------  Electronically signed by Allison Payan MD(Interpreting  physician) on 08/16/2022 11:16 AM  ----------------------------------------------------------------   Findings   Mitral Valve  Mitral valve leaflets are mildly thickened with preserved leaflet  mobility. Aortic Valve  Mildly thickened aortic valve leaflets with preserved leaflet mobility.    Tricuspid Valve  Tricuspid valve is structurally normal.   Pulmonic Valve  The pulmonic valve was not well visualized . Left Atrium  Normal size left atrium. Left Ventricle  Normal left ventricular size with preserved LV function and an estimated  ejection fraction of approximately 55-60%. Mild concentric left ventricular hypertrophy. No regional wall motion abnormalities. Impaired relaxation compatible with diastolic dysfunction. ( reversed E/A  ratio)   Right Atrium  Normal right atrial dimension with no evidence of thrombus or mass noted. Right Ventricle  Normal right ventricular size with preserved RV function. Pericardial Effusion  No evidence of significant pericardial effusion is noted. Pleural Effusion  No evidence of pleural effusion. Miscellaneous  Definity utilized  Allergies   - Hydralazine. - [employer]ine. 2D Measurements and Calculations(cm)   LVIDd: 5.12 cm                      LVIDs: 3.44 cm  IVSd: 1.43 cm  LVPWd: 1.21 cm                      AO Root Dimension: 2.1 cm  Rt. Vent.  Dimension: 3.18 cm        LA Dimension: 3.8 cm  % Ejection Fraction: 64 %           LA Area: 24.3 cm^2  LA Volume: 74.4 ml                  LV Systolic dimension: 0.57ND  LA Volume Index: 33 ml/m^2          LV PW diastolic: 0.16AC  LV dimension: 5.12 cm               LVOT diameter: 2.1 cm                                      RV Diastolic dimension: 6.36PW  LVEDV: 160 ml                       LVEDVI: 71 ml/m^2  LVESV:58.4 ml                       LVESVI: 26 ml/m^2  Cardic Output (CO): 4.95l/min  Ascending Aorta: 3.1 cm  Doppler Measurements and Calculations   AV Peak Velocity:137 cm/s              MV Peak E-Wave: 79.3 cm/s  AV Mean Velocity:101 cm/s              MV Peak A-Wave: 106 cm/s  AV Peak Gradient: 7.51 mmHg            MV E/A Ratio: 0.75 %  AV Mean Gradient: 4 mmHg               MV Mean velocity: 77.8cm/s  AV Area (Continuity):2.73 cm^2         MV Peak Gradient: 2.52 mmHg  AV VTI:31.8 cm/s                       MV Mean gradient: 3 mmHg   MV E' septal velocity: 3.59cm/s  MV E' lateral velocity:5.55 cm/s Assessment:  Complaints of dull chest discomfort intermittently for several days admitted 8/16/2022  Complaints of shortness of breath  Minimally elevated troponin levels between 0.05 and 0.1 all relatively flat  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  Coronary artery disease  Diabetes mellitus  Gastroesophageal reflux disease  Renal artery stenosis  Carotid artery stenosis  Peripheral vascular disease  Hypertension  Atrial septal aneurysm  Chronic congestive heart failure  Paroxysmal atrial fibrillation  Chronic kidney disease  Hyperlipidemia  Amiodarone therapy  Hypertension  Chest CT 8/16/2022 mild emphysematous changes in the lung parenchyma 11 mm calcified granuloma superior segment right lower lobe mild cardiomegaly scattered atherosclerotic calcifications aortic arch and descending thoracic aorta probable chronic dissection of the descending thoracic aorta and calcification of the false lumen mildly enlarged left lobe of the thyroid gland ill-defined hypodense lesions in the probable changes of multinodular goiter ultrasound suggested  CT abdomen pelvis 8/16/2022 exophytic bilateral renal cortical cyst nonspecific bilateral perinephric fat stranding incidentally noted small umbilical hernia with omental fat as its content defect measuring 8 mm small nodule right lung base recommend nonemergent dedicated chest CT to further evaluate  Echocardiogram 8/16/2022 normal left ventricular function mild concentric LVH impaired LV relaxation  proBNP 679 on 8/16/2022      Recommendations:  Yesika Reading ordered await findings further comments to follow

## 2022-08-18 NOTE — PROGRESS NOTES
Physician Progress Note      PATIENT:               Kristan Minaya  CSN #:                  [id number]  :                       1955  ADMIT DATE:       2022 2:20 AM  DISCH DATE:  RESPONDING  PROVIDER #:        Colin Sifuentes          QUERY TEXT:    Pt admitted with chest pain and has history of CHF documented. Pro-,   679. If possible, please document in progress notes and discharge summary   further specificity regarding the type and acuity of CHF:    The medical record reflects the following:  Risk Factors: History of CHF, DM, HTN, CAD, PAF  Clinical Indicators: ECHO, \" Normal left ventricular size with preserved LV   function and an estimated ejection fraction of approximately 55-60%. Impaired   relaxation compatible with diastolic dysfunction. ( reversed E/A ratio)\". Pro-, 679. Patient reports 20 lb. weight loss over week before   admission. Treatment: Coreg 12.5 mg BID, Bumex 1 mg BID, Imdur 60 mg BID, ECHO. Options provided:  -- Chronic Systolic CHF/HFrEF  -- Chronic Diastolic CHF/HFpEF  -- Chronic Systolic and Diastolic CHF  -- Acute on Chronic Systolic CHF/HFrEF  -- Acute on Chronic Diastolic CHF/HFpEF  -- Acute on Chronic Systolic and Diastolic CHF  -- Other - I will add my own diagnosis  -- Disagree - Not applicable / Not valid  -- Disagree - Clinically unable to determine / Unknown  -- Refer to Clinical Documentation Reviewer    PROVIDER RESPONSE TEXT:    This patient has chronic diastolic CHF/HFpEF.     Query created by: Marvin Gastelum on 2022 12:58 PM      Electronically signed by:  Colin Sifuentes 2022 1:51 PM

## 2022-08-18 NOTE — PROGRESS NOTES
Nephrology ([address] [facility]) Consult Note      Patient:  Agustina Apodaca  YOB: 1955  Date of Service: 8/18/2022  MRN: [id number]   Acct: [de-identified]   Primary Care Physician: Messi Starr MD  Advance Directive: DNR  Admit Date: 8/16/2022       Hospital Day: 0  Referring Provider: Pola Mckinney MD    Patient independently seen and examined, Chart, Consults, Notes, Operative notes, Labs, Cardiology, and Radiology studies reviewed as available. Subjective:  Agustina Apodaca is a 78 y.o. male for whom we were consulted for evaluation and treatment of acute renal failure with baseline chronic kidney disease stage IV. He notes that he has upcoming appointment with Dr. Louann Loaiza. He had recently had Bumex dose decreased to 1 mg twice daily then developed some fluid gain and so had taken back to the prior dose of 2 mg twice daily and developed significant fluid losses from that. He reported about a 20 pound loss over the past 10 days or so. He also had developed chest pain and complained of low back pain over the sacrum/right buttock. He denied hematuria, dysuria, hemoptysis. He had some shortness of breath. Notes that his edema had essentially resolved with the diuretics. Today, no overnight events. He continues to complain of back pain. He denied chest pain, shortness of air at rest, nausea or vomiting. CT findings reviewed with patient.     Allergies:  Morphine and Hydralazine    Medicines:  Current Facility-Administered Medications   Medication Dose Route Frequency Provider Last Rate Last Admin    regadenoson (LEXISCAN) injection 0.4 mg  0.4 mg IntraVENous ONCE PRN José Miguel Strickland MD        gabapentin (NEURONTIN) capsule 100 mg  100 mg Oral BID DWAYNE Lenz - CNP        technetium tetrofosmin (Tc-MYOVIEW) injection 8 millicurie  8 millicurie IntraVENous ONCE PRN José Miguel Strickland MD        technetium tetrofosmin (Tc-MYOVIEW) injection 24 millicurie  24 millicurie IntraVENous ONCE PRN Kari Monzon MD        insulin glargine (LANTUS) injection vial 15 Units  15 Units SubCUTAneous BID Morgan Phalen, MD   15 Units at 08/17/22 2024    insulin lispro (HUMALOG) injection vial 6 Units  6 Units SubCUTAneous 4x Daily AC & HS Morgan Phalen, MD        glucose chewable tablet 16 g  4 tablet Oral PRN Morgan Phalen, MD        dextrose bolus 10% 125 mL  125 mL IntraVENous PRN Morgan Phalen, MD        Or    dextrose bolus 10% 250 mL  250 mL IntraVENous PRN Morgan Phalen, MD        glucagon (rDNA) injection 1 mg  1 mg SubCUTAneous PRN Morgan Phalen, MD        dextrose 10 % infusion   IntraVENous Continuous PRN Morgan Phalen, MD        0.9 % sodium chloride infusion   IntraVENous Continuous Royal Frank MD 40 mL/hr at 08/17/22 1607 New Bag at 08/17/22 1607    allopurinol (ZYLOPRIM) tablet 200 mg  200 mg Oral Daily Nicolas Butler MD   200 mg at 08/18/22 1345    amiodarone (CORDARONE) tablet 200 mg  200 mg Oral Daily Nicolas Butler MD   200 mg at 08/18/22 6910    ascorbic acid (VITAMIN C) tablet 500 mg  500 mg Oral Daily Nicolas Butler MD   500 mg at 08/18/22 0475    [Held by provider] bumetanide (BUMEX) tablet 1 mg  1 mg Oral BID Nicolas Butler MD        calcitRIOL (ROCALTROL) capsule 0.25 mcg  0.25 mcg Oral Once per day on Mon Wed Fri Nicolas Butler MD   0.25 mcg at 08/17/22 6099    [Held by provider] carvedilol (COREG) tablet 12.5 mg  12.5 mg Oral BID  Nicolas Butler MD   12.5 mg at 08/17/22 0805    diphenhydrAMINE (BENADRYL) tablet 25 mg  25 mg Oral Q6H PRN Nicolas Butler MD        [facility] [place] by provider] isosorbide mononitrate (IMDUR) extended release tablet 60 mg  60 mg Oral BID Nicolas Butler MD   60 mg at 08/17/22 0805    minoxidil (LONITEN) tablet 2.5 mg  2.5 mg Oral BID Nicolas Butler MD   2.5 mg at 08/18/22 0921    montelukast (SINGULAIR) tablet 10 mg  10 mg Oral Daily Nicolas Butler MD   10 mg at 08/18/22 0921    pantoprazole (PROTONIX) tablet 40 mg  40 mg Oral BID AC Lianne Liriano MD   40 mg at 08/18/22 0532    potassium chloride (KLOR-CON M) extended release tablet 20 mEq  20 mEq Oral Daily with breakfast Lianne Liriano MD   20 mEq at 08/18/22 6529    rosuvastatin (CRESTOR) tablet 40 mg  40 mg Oral Nightly Lianne Liriano MD   40 mg at 08/17/22 2025    sennosides-docusate sodium (SENOKOT-S) 8.6-50 MG tablet 1 tablet  1 tablet Oral Nightly Lianne Liriano MD   1 tablet at 08/17/22 2023    [START ON 8/23/2022] vitamin D (ERGOCALCIFEROL) capsule 50,000 Units  50,000 Units Oral Weekly Lianne Liriano MD        sodium chloride flush 0.9 % injection 5-40 mL  5-40 mL IntraVENous 2 times per day Lianne Liriano MD   10 mL at 08/18/22 0921    sodium chloride flush 0.9 % injection 5-40 mL  5-40 mL IntraVENous PRN Lianne Liriano MD        0.9 % sodium chloride infusion   IntraVENous PRN Lianne Liriano MD        ondansetron (ZOFRAN-ODT) disintegrating tablet 4 mg  4 mg Oral Q8H PRN Lianne Liriano MD   4 mg at 08/16/22 1222    Or    ondansetron (ZOFRAN) injection 4 mg  4 mg IntraVENous Q6H PRN Lianne Liriano MD        acetaminophen (TYLENOL) tablet 650 mg  650 mg Oral Q6H PRN Lianne Liriano MD   650 mg at 08/18/22 0340    Or    acetaminophen (TYLENOL) suppository 650 mg  650 mg Rectal Q6H PRN Lianne Liriano MD        nitroGLYCERIN (NITROSTAT) SL tablet 0.4 mg  0.4 mg SubLINGual Q5 Min PRN Lianne Liriano MD        potassium chloride 10 mEq/100 mL IVPB (Peripheral Line)  10 mEq IntraVENous PRN Bernice Muhammad  mL/hr at 08/17/22 1216 10 mEq at 08/17/22 1216     Facility-Administered Medications Ordered in Other Encounters   Medication Dose Route Frequency Provider Last Rate Last Admin    technetium tetrofosmin (Tc-MYOVIEW) injection 24 millicurie  24 millicurie IntraVENous ONCE PRN Clay Hoskins MD        technetium tetrofosmin (Tc-MYOVIEW) injection 8 millicurie  8 millicurie IntraVENous ONCE PRN Clay Hoskins MD           Past Medical History:  Past Medical History:   Diagnosis Date    Acute kidney failure, unspecified ([facility] [address]     Anxiety state, unspecified     Atrial septal aneurysm 01/09/2016    Blood circulation, collateral     Body mass index 30.0-30.9, adult     CAD (coronary artery disease) 01/10/2016    1/9/16  lexiscan  Positive for apical MI + myocardial ischemia, EF 42%, intermediate risk findings, AUC indication 16, AUC score 7 1/10/16  Cath  3 lesions in the LAD:  Mid: 70% 2.25x23, mid 90% 2.25 x 28, distal 100% 2.0x28, anterior lateral apical hypokinesis, EF 45%    Calculus of kidney     Carotid artery stenosis 06/26/2013    Cellulitis and abscess of hand, except fingers and thumb     Cerebral artery occlusion with cerebral infarction (HCC)     15 years ago    Chronic airway obstruction, not elsewhere classified     Chronic kidney disease, unspecified     Congestive heart failure, unspecified     Diabetes mellitus type II     Diverticulosis of colon (without mention of hemorrhage)     Encounter for long-term (current) use of insulin (HCC)     Esophageal reflux     Family history of ischemic heart disease     Gastric ulcer, unspecified as acute or chronic, without mention of hemorrhage, perforation, or obstruction     Hyperlipemia     Hypertension     Internal hemorrhoids without mention of complication     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, or unspecified(403.00)     Obesity, unspecified     Other specified cardiac dysrhythmias(427.89)     Palliative care patient 12/21/2020    Paroxysmal atrial fibrillation (HCC)     Peripheral vascular disease ([facility] [address]     Solitary pulmonary nodule     Surgical or other procedure not carried out because of contraindication     Thoracic aneurysm without mention of rupture     Tobacco use disorder     Type II or unspecified type diabetes mellitus without mention of complication, not stated as uncontrolled        Past Surgical History:  Past Surgical History: Procedure Laterality Date    CARDIAC CATHETERIZATION      CHOLECYSTECTOMY      CORONARY ANGIOPLASTY WITH STENT PLACEMENT  2016    2 JACQUELINE to LAD    DIAGNOSTIC CARDIAC CATH LAB PROCEDURE      URETER STENT PLACEMENT      VASCULAR SURGERY  2013 [facility]    Aortogram with bilateral selective renal artery injections    VASCULAR SURGERY  13 [address]    Right carotid endarterectomy with Vascu-Guard patch repair. Right cervical carotid arteriograms after endarterectomy. VASCULAR SURGERY  7/6/15 [facility]    Percutaneous cannulation, right common femoral artery, with a5 [place] sheath and later 6 [place] [facility] sheath. Suprarenal abdomianl aortagram.  Selective right renal arteriogram.  Right renal artery balloon angioplasty;/stent (Express 6 mm x 18mmballoon-expandable stent. . Completion suprarenal abdominal aortogram and selective right remal arteriogram. Mynx closure, right common femoral artery punctur       Family History  Family History   Problem Relation Age of Onset    High Blood Pressure Mother     Other Mother         COVID    High Blood Pressure Father     Cancer Father     High Blood Pressure Brother     No Known Problems Son     No Known Problems Daughter        Social History  Social History     Socioeconomic History    Marital status:      Spouse name: patient refused to answer    Number of children: 2    Years of education: Not on file    Highest education level: Not on file   Occupational History    Occupation: patient refused to answer   Tobacco Use    Smoking status: Former     Packs/day: 0.25     Years: 7.00     Pack years: 1.75     Types: Cigarettes     Start date:      Quit date:      Years since quittin.6    Smokeless tobacco: Current     Types: Chew    Tobacco comments:     Used both for 5 years   Vaping Use    Vaping Use: Never used   Substance and Sexual Activity    Alcohol use: Not Currently     Comment: \"when i was tyoung i did, if i wanted to drink ten beers i drank ten beers\" Drug use: Never    Sexual activity: Yes     Comment: has 2 children   Other Topics Concern    Not on file   Social History Narrative    CODE STATUS: DNR    HEALTH CARE PROXY: patient refused to answer    AMBULATES: patient refused to answer    DOMICILED: patient refused to answer     Social Determinants of Health     Financial Resource Strain: Not on file   Food Insecurity: Not on file   Transportation Needs: Not on file   Physical Activity: Not on file   Stress: Not on file   Social Connections: Not on file   Intimate Partner Violence: Not on file   Housing Stability: Not on file         Review of Systems:  History obtained from chart review and the patient  General ROS: No fever or chills  Respiratory ROS: No cough, +shortness of breath  Cardiovascular ROS: +chest pain or palpitations  Gastrointestinal ROS: No abdominal pain or melena  Genito-Urinary ROS: No dysuria or hematuria  Musculoskeletal ROS: No joint pain or swelling   14 point ROS reviewed with the patient and negative except as noted above and in the HPI unless unable to obtain.     Objective:  Patient Vitals for the past 24 hrs:   BP Temp Temp src Pulse Resp SpO2 Weight   08/18/22 0900 -- -- -- 64 -- -- --   08/18/22 0552 (!) 175/77 98.1 °F (36.7 °C) Oral 59 18 93 % --   08/18/22 0338 -- -- -- -- -- -- 248 lb 8 oz (112.7 kg)   08/18/22 0019 139/62 98.1 °F (36.7 °C) Oral 63 18 90 % --   08/17/22 1734 136/67 (!) 96.4 °F (35.8 °C) Temporal 60 16 93 % --   08/17/22 1443 -- -- -- -- -- -- 123 lb (55.8 kg)   08/17/22 1153 113/73 97.2 °F (36.2 °C) Temporal 53 18 93 % --       Intake/Output Summary (Last 24 hours) at 8/18/2022 1106  Last data filed at 8/17/2022 1443  Gross per 24 hour   Intake 240 ml   Output --   Net 240 ml     General: awake/alert   Chest:  clear to auscultation bilaterally  CVS: regular rate and rhythm  Abdominal: soft, nontender, normal bowel sounds  Extremities: no cyanosis or edema  Skin: warm and dry without rash      Labs:  BMP: Recent Labs     08/16/22 0229 08/16/22  0756 08/16/22 2235 08/17/22  0241 08/18/22  0141     --   --  140 140   K 2.6*   < > 3.1* 3.1* 3.9   CL 87*  --   --  96* 100   CO2 38*  --   --  33* 30*   PHOS 3.3  --   --  3.1  --    BUN 52*  --   --  43* 37*   CREATININE 3.7*  --   --  2.9* 2.9*   CALCIUM 9.6  --   --  9.0 9.0    < > = values in this interval not displayed. CBC:   Recent Labs     08/16/22 0229 08/17/22 0241   WBC 10.2 7.8   HGB 14.7 14.0   HCT 50.1 47.6   MCV 85.6 88.3    217     LIVER PROFILE:   Recent Labs     08/16/22 0229   AST 20   ALT 14   BILITOT 0.7   ALKPHOS 73     PT/INR: No results for input(s): PROTIME, INR in the last 72 hours. APTT: No results for input(s): APTT in the last 72 hours. BNP:  No results for input(s): BNP in the last 72 hours. Ionized Calcium:No results for input(s): IONCA in the last 72 hours. Magnesium:  Recent Labs     08/16/22 0229 08/17/22 0241   MG 2.0  2.1 2.1     Phosphorus:  Recent Labs     08/16/22 0229 08/17/22 0241   PHOS 3.3 3.1     HgbA1C:   Recent Labs     08/16/22 0229   LABA1C 8.6*     Hepatic:   Recent Labs     08/16/22 0229   ALKPHOS 73   ALT 14   AST 20   PROT 7.2   BILITOT 0.7   LABALBU 4.2     Lactic Acid: No results for input(s): LACTA in the last 72 hours. Troponin: No results for input(s): CKTOTAL, CKMB, TROPONINT in the last 72 hours. ABGs: No results for input(s): PH, PCO2, PO2, HCO3, O2SAT in the last 72 hours. CRP:  No results for input(s): CRP in the last 72 hours. Sed Rate:  No results for input(s): SEDRATE in the last 72 hours. Cultures:   No results for input(s): CULTURE in the last 72 hours. No results for input(s): BC, Derryl Drum in the last 72 hours. No results for input(s): CXSURG in the last 72 hours.     Radiology reports as per the Radiologist  Radiology: CT ABDOMEN PELVIS WO CONTRAST Additional Contrast? None    Result Date: 8/16/2022  NO PRIOR REPORT AVAILABLE Exam: CT OF THE ABDOMEN/PELVIS WITHOUT CONTRAST Clinical data: Right flank pain. Technique: Direct contiguous axial CT images were acquired through the abdomen and pelvis without contrast using soft tissue and bone algorithms. Reformatted/MPR images were performed. Radiation dose: CTDIvol =24.54 mGy, DLP =1782 mGy x cm. Limitations: Lack of intravenous contrast limits evaluation of solid viscera. Lack of oral contrast limits evaluation of the bowel loops. Prior Studies: No prior studies submitted. Findings: Lung bases: Grossly clear. Nodule in the right lung base measuring 1.3 x 1.4 cm. Liver:Unremarkable size, contour, and density. No evidence of mass. No evidence of dilated ducts. Gallbladder Fossa :Cholecystectomy clips noted. Spleen: Grossly unremarkable. Pancreas/adrenal glands: Grossly unremarkable size, contour and density. Kidneys: In anatomic position. Grossly unremarkablerenal size, contour and density. No renal or ureteral calculi. No hydronephrosis. There exophytic cysts are seen arising from both kidneys, largest measuring 3.9 x 3.4 cm arising from upper pole of left kidney. Non-specific bilateral perinephric fat stranding. Retroperitoneum: No retroperitoneal lymphadenopathy. Circumferential calcific atherosclerotic changes noted in the abdominal aorta. The IVC is grossly unremarkable. Peritoneal cavity: No evidence of free air or ascites. Gastrointestinal tract: Nondistended small bowel and colon. No evidence of obstruction. Appendix: Unremarkable. Pelvis: Solid and hollow viscera grossly unremarkable. Bladder is moderately distended. Incidentally noted a small umbilical hernia with omental fat as its content. The defect measuring 8mm. Osseous structures: No acute or destructive bony process identified. Mild degenerative changes noted in lumbar spine. 1. Exophytic bilateral renal cortical cyst. 2. Non-specific bilateral perinephric fat stranding. 3. Incidentally noted a small umbilical hernia with omental fat as its content.  The defect measuring 8mm. 4.  Small nodule in the right lung base, recommend nonemergent dedicated chest CT to further evaluate Recommendation: Follow up as clinically indicated. All CT scans at this facility utilize dose modulation, iterative reconstruction, and/or weight based dosing when appropriate to reduce radiation dose to as low as reasonably achievable. Electronically Signed by Ashkan Renteria DO at 16-Aug-2022 05:05:26 AM             CT CHEST WO CONTRAST    Result Date: 8/16/2022  NO PRIOR REPORT AVAILABLE Exam: CT OF THE CHEST WITHOUT CONTRAST Clinical data: Chest pain. Technique: Axial CT images through the lungs were acquired without contrast and imaged using soft tissue and lung algorithms. Reformatted/MPR images were performed. Radiation Dose: CTDIvol =24.54 mGy, DLP =1782 mGy x cm. Limitations: Lack of intravenous contrast limits evaluation of the soft tissues and vascularity. Prior studies: Radiograph of the chest done on same day. Findings: Lungs: Mild emphysematous changes in the lung parenchyma. A 11 mm calcified granuloma in the superior segment of right lower lobe. No pulmonary infiltrate identified. No pulmonary mass identified. No pleural effusions identified. No pneumothorax. The airway is clear. Soft Tissues: No mediastinal, axillary or supraclavicular adenopathy identified. Mildly enlarged left lobe of the thyroid gland with ill-defined hypodense lesions in the probable changes of multinodular goiter. Vascular: Scattered atherosclerotic calcification of the aortic arch and descending thoracic aorta. 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 Unopacified pulmonary vasculature appears unremarkable. Mild cardiomegaly. Diffuse atherosclerotic calcification of the coronary arteries. Bony structures: No acute or destructive abnormality Upper Abdomen: Postcholecystectomy status.   An exophytic cortical cyst from the left kidney upper pole 3.6 x 4.0 cm. Scattered diverticulosis of the distal transverse colon without evidence of diverticulitis. Atherosclerotic calcification of the abdominal aorta, visceral arteries. Probable stent at the origin of the right renal artery. IMPRESSION: 1. Mild emphysematous changes in the lung parenchyma. A 11 mm calcified granuloma in the superior segment of right lower lobe. No acute infiltrates, mass lesion or effusion. 2.  Mild cardiomegaly. Coronary artery calcifications are identified. 3. Scattered atherosclerotic calcification of the aortic arch and descending thoracic aorta. 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 Suggest further evaluation with CT aortogram. 4.  Post cholecystectomy. Atherosclerotic calcification of the abdominal aorta. Stent in the right renal artery. An exophytic cortical cyst from the left kidney upper pole. 5. Mildly enlarged left lobe of the thyroid gland with ill-defined hypodense lesions in the probable changes of multinodular goiter. Suggest further evaluation with ultrasound. Recommendation: Follow up as clinically indicated. All CT scans at this facility utilize dose modulation, iterative reconstruction, and/or weight based dosing when appropriate to reduce radiation dose to as low as reasonably achievable. Electronically Signed by Geovanni Rojo DO at 16-Aug-2022 05:20:13 AM             XR CHEST PORTABLE    Result Date: 8/16/2022  NO PRIOR REPORT AVAILABLE Exam: X-RAY OF [facility] Clinical data:Chest pain. Technique:Single view of the chest. Prior studies: No prior studies submitted. Findings:Mildly prominent right hilum with right hilar congestion. No acute infiltrates, pleural effusion or pneumothorax. No acute osseous abnormality is detected. Atherosclerotic calcification of the aortic arch and descending thoracic aorta. Mildly prominent right hilum with right hilar congestion.   No acute infiltrates or pleural effusion. Recommendation: Follow up as clinically indicated. Electronically Signed by Geovanni Rojo DO at 16-Aug-2022 04:23:36 AM                Assessment   Acute kidney injury  Chronic kidney disease stage IV  Hypokalemia  Descending thoracic aortic dissection  Secondary hyperparathyroidism  Vitamin D deficiency    Plan:  Discussed with patient, nursing  Work-up reviewed to date  Monitor labs  Reviewed cardiothoracic evaluation of aortic dissection  Continue vitamin D and calcitriol for the moment  Replace potassium again as needed  Would wean IV fluids at this point unless contrasted CT required    Thank you for the consult, we appreciate the opportunity to provide care to your patients. Feel free to contact me if I can be of any further assistance.       Jayna Clark MD  08/18/22  11:06 AM

## 2022-08-18 NOTE — CARE COORDINATION
Pt lives at home with spouse, plans same upon dc. Normally independent and  still drives. Has dme at home he would need but does not use it. Has steps in th home but does not have to go up stairs for main living area. No needs are noted at this time but will follow for any changes. 08/18/22 1617   Service Assessment   Patient Orientation Alert and Oriented;Person;Place;Situation   Cognition Alert   History Provided By Patient   Primary Caregiver Self   Accompanied By/Relationship no one in room   Support Systems Spouse/Significant Other   PCP Verified by CM Yes   Prior Functional Level Independent in ADLs/IADLs   Current Functional Level Independent in ADLs/IADLs   Can patient return to prior living arrangement Yes   Ability to make needs known: Good   Family able to assist with home care needs: Yes   Would you like for me to discuss the discharge plan with any other family members/significant others, and if so, who? No   Social/Functional History   Lives With Spouse   Type of Home House   Home Layout Multi-level   Home Access Level entry   Bathroom Shower/Tub Walk-in shower   Bathroom Toilet Standard   [address] Cane;Walker, rolling; [address] Help From Family   ADL Assistance Independent   Homemaking Assistance Independent   Ambulation Assistance Independent   Transfer Assistance Independent   Active  Yes   Mode of Transportation Truck; Car   Occupation Retired   Discharge Planning   Type of [address] Prior To Admission None   Potential Assistance Needed N/A   DME Ordered? No   Potential Assistance Purchasing Medications No     Patient Contact Information:    [facility] [address]  [phone] (home)   Telephone Information:   Mobile [phone]     Above information verified?     [x]   Yes  []   No      Emergency Contacts:    Extended Emergency Contact

## 2022-08-18 NOTE — PROGRESS NOTES
[facility] Hospital[facility]      Patient:  Jonny De La Fuente  YOB: 1955  Date of Service: 8/18/2022  MRN: [id number]   Acct: [de-identified]   Primary Care Physician: Miranda Miller MD  Advance Directive: DNR  Admit Date: 8/16/2022       Hospital Day: 0  Portions of this note have been copied forward, however, changed to reflect the most current clinical status of this patient. CHIEF COMPLAINT chest pain and shortness of breath    SUBJECTIVE:  Patient reports low back pain today. Reports he had to take tylenol last night and had difficulty sleeping because of the pain. Patient denies chest pain and shortness of breath today. CUMULATIVE HOSPITAL COURSE:  Patient is a 71-year-old man with past medical history of atrial septal aneurysm, CAD, CKD, carotid artery stenosis, DM, hyperlipidemia, hypertension, paroxysmal A. fib, and chronic descending thoracic aneurysm who presented to [facility] ED with the complaints of chest pain and shortness of breath. Patient presented to the ED with a history of 5-day cough, shortness of breath, and chest discomfort. Patient also indicated low back pain. Patient reports he has had recent adjustments in his diuretics. Patient reports he is on Eliquis and has had no missed doses. ED work-up indicated potassium of 2.6, creatinine 3.7, troponin 0.10. Chest x-ray indicated mildly prominent right hilum with hilar congestion. CT abdomen and pelvis indicates exophytic bilateral renal cortical cysts, nonspecific bilateral perinephric stranding, and incidental umbilical hernia measuring 8 mm. CT chest indicated mild emphysematous changes, 11 mm calcified granuloma in the superior segment of the right lower lobe, mild cardiomegaly, scattered atherosclerotic calcifications of the aortic arch and descending aorta, probable chronic dissection of the descending thoracic aorta and calcification in the false lumen, mildly enlarged left lobe of the thyroid with ill-defined hypodense lesions.   CT surgery was consulted and reviewed the chest CT and again indicates chronic dissection not acute of the descending thoracic aorta. Nephrology was consulted as patient had ANGELO on CKD. Patient was treated with gentle IV hydration and nephrology recommends tapering hydration at this point. Cardiology consulted for elevated troponins upon arrival with chest pain and recommended Lexiscan. Migel Rowe completed this morning, awaiting results. Patient continued to have low back pain, CT lumbar spine and right hip obtained. CT right hip no acute fractures or dislocation noted. CT lumbar spine indicates lumbar spondylosis, mild right paracentral bulge at L2 causing mild neural foraminal narrowing, asymmetric disc bulge causing severe bilateral neural foraminal narrowing at L3-L4 to L5/S1, L5 anterior spondylolisthesis. PT/OT consulted. Begin gabapentin. Consider neurosurgery consult if no improvement. Review of Systems:   Review of Systems   Constitutional:  Negative for appetite change, chills, fatigue and fever. Respiratory:  Negative for chest tightness and shortness of breath. Cardiovascular:  Negative for chest pain, palpitations and leg swelling. Gastrointestinal:  Negative for abdominal pain, constipation, diarrhea, nausea and vomiting. Genitourinary:  Negative for flank pain, frequency, hematuria, penile pain, testicular pain and urgency. Musculoskeletal:  Positive for arthralgias (right hip), back pain and gait problem. Negative for myalgias. Skin:  Negative for color change and wound. Neurological:  Negative for dizziness, speech difficulty, weakness and headaches. Psychiatric/Behavioral:  Negative for agitation and confusion. 14 point review of systems is negative except as specifically addressed above.       Objective:   VITALS:  BP (!) 190/83   Pulse 66   Temp 97.7 °F (36.5 °C) (Oral)   Resp 18   Ht 5' 11\" (1.803 m)   Wt 248 lb 8 oz (112.7 kg)   SpO2 91%   BMI 34.66 kg/m²   24HR INTAKE/OUTPUT:    Intake/Output Summary (Last 24 hours) at 8/18/2022 1307  Last data filed at 8/17/2022 1443  Gross per 24 hour   Intake 240 ml   Output --   Net 240 ml       Physical Exam  Vitals reviewed. Constitutional:       Appearance: He is obese. He is not ill-appearing. Cardiovascular:      Rate and Rhythm: Normal rate and regular rhythm. Pulses: Normal pulses. Heart sounds: Normal heart sounds. Pulmonary:      Effort: Pulmonary effort is normal.      Breath sounds: Normal breath sounds. Abdominal:      General: Bowel sounds are normal. There is no distension. Palpations: Abdomen is soft. Tenderness: There is no abdominal tenderness. There is no guarding. Musculoskeletal:         General: Tenderness present. Right lower leg: No edema. Left lower leg: No edema. Legs:       Comments: Tenderness noted just right of the spine and low back   Skin:     General: Skin is warm and dry. Capillary Refill: Capillary refill takes less than 2 seconds. Neurological:      General: No focal deficit present. Mental Status: He is alert and oriented to person, place, and time.            Medications:      dextrose      sodium chloride 40 mL/hr at 08/17/22 1607    sodium chloride        gabapentin  100 mg Oral BID    carvedilol  25 mg Oral BID WC    insulin glargine  15 Units SubCUTAneous BID    insulin lispro  6 Units SubCUTAneous 4x Daily AC & HS    allopurinol  200 mg Oral Daily    amiodarone  200 mg Oral Daily    ascorbic acid  500 mg Oral Daily    [Held by provider] bumetanide  1 mg Oral BID    calcitRIOL  0.25 mcg Oral Once per day on Mon Wed Fri    isosorbide mononitrate  60 mg Oral BID    minoxidil  2.5 mg Oral BID    montelukast  10 mg Oral Daily    pantoprazole  40 mg Oral BID AC    potassium chloride  20 mEq Oral Daily with breakfast    rosuvastatin  40 mg Oral Nightly    sennosides-docusate sodium  1 tablet Oral Nightly    [START ON 8/23/2022] vitamin D 50,000 Units Oral Weekly    sodium chloride flush  5-40 mL IntraVENous 2 times per day     glucose, dextrose bolus **OR** dextrose bolus, glucagon (rDNA), dextrose, diphenhydrAMINE, sodium chloride flush, sodium chloride, ondansetron **OR** ondansetron, acetaminophen **OR** acetaminophen, nitroGLYCERIN, potassium chloride  ADULT DIET; Regular; 4 carb choices (60 gm/meal); Low Fat/Low Chol/High Fiber/2 gm Na; Low Potassium (Less than 3000 mg/day); Low Phosphorus (Less than 1000 mg); No Caffeine     Lab and other Data:     Recent Labs     08/16/22 0229 08/17/22 0241   WBC 10.2 7.8   HGB 14.7 14.0    217     Recent Labs     08/16/22 0229 08/16/22 0756 08/16/22 2235 08/17/22 0241 08/18/22  0141     --   --  140 140   K 2.6*   < > 3.1* 3.1* 3.9   CL 87*  --   --  96* 100   CO2 38*  --   --  33* 30*   BUN 52*  --   --  43* 37*   CREATININE 3.7*  --   --  2.9* 2.9*   GLUCOSE 175*  --   --  141* 309*    < > = values in this interval not displayed. Recent Labs     08/16/22 0229   AST 20   ALT 14   BILITOT 0.7   ALKPHOS 73     Troponin T:   Recent Labs     08/16/22 2235 08/17/22 0241 08/17/22  0641   TROPONINI 0.06* 0.06* 0.05*     Pro-BNP: No results for input(s): BNP in the last 72 hours. INR: No results for input(s): INR in the last 72 hours. UA:  Recent Labs     08/16/22  0324   COLORU YELLOW   PHUR 5.0   WBCUA 1   RBCUA 5*   BACTERIA NEGATIVE*   CLARITYU CLOUDY*   SPECGRAV 1.015   LEUKOCYTESUR Negative   UROBILINOGEN 1.0   BILIRUBINUR Negative   BLOODU MODERATE*   GLUCOSEU Negative     A1C:   Recent Labs     08/16/22 0229   LABA1C 8.6*     ABG:No results for input(s): PHART, UGP5BHL, PO2ART, OHR7GKT, BEART, HGBAE, E3TZDDXV, CARBOXHGBART in the last 72 hours. RAD:   CT ABDOMEN PELVIS WO CONTRAST Additional Contrast? None    Result Date: 8/16/2022  1. Exophytic bilateral renal cortical cyst. 2. Non-specific bilateral perinephric fat stranding.  3. Incidentally noted a small umbilical hernia with omental fat as its content. The defect measuring 8mm. 4.  Small nodule in the right lung base, recommend nonemergent dedicated chest CT to further evaluate Recommendation: Follow up as clinically indicated. All CT scans at this facility utilize dose modulation, iterative reconstruction, and/or weight based dosing when appropriate to reduce radiation dose to as low as reasonably achievable. Electronically Signed by Deon Antonio DO at 16-Aug-2022 05:05:26 AM             CT CHEST WO CONTRAST    Result Date: 8/16/2022   IMPRESSION: 1. Mild emphysematous changes in the lung parenchyma. A 11 mm calcified granuloma in the superior segment of right lower lobe. No acute infiltrates, mass lesion or effusion. 2.  Mild cardiomegaly. Coronary artery calcifications are identified. 3. Scattered atherosclerotic calcification of the aortic arch and descending thoracic aorta. 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 Suggest further evaluation with CT aortogram. 4.  Post cholecystectomy. Atherosclerotic calcification of the abdominal aorta. Stent in the right renal artery. An exophytic cortical cyst from the left kidney upper pole. 5. Mildly enlarged left lobe of the thyroid gland with ill-defined hypodense lesions in the probable changes of multinodular goiter. Suggest further evaluation with ultrasound. Recommendation: Follow up as clinically indicated. All CT scans at this facility utilize dose modulation, iterative reconstruction, and/or weight based dosing when appropriate to reduce radiation dose to as low as reasonably achievable. Electronically Signed by Deon Antonio DO at 16-Aug-2022 05:20:13 AM             CT LUMBAR SPINE WO CONTRAST    Result Date: 8/17/2022  1. Lumbar spondylosis. 2. Mild right paracentral bulge at L2 causing mild neural foramina narrowing.  3. Asymmetric disc bulge causing severe bilateral neural foramina narrowing from L3-L4 to L5-S1. 4. L5 anterior spondylolisthesis Recommendation: Follow up as clinically indicated. All CT scans at this facility utilize dose modulation, iterative reconstruction, and/or weight based dosing when appropriate to reduce radiation dose to as low as reasonably achievable. Electronically Signed by Homer Reeves MD at 17-Aug-2022 05:55:19 PM             XR CHEST PORTABLE    Result Date: 8/16/2022  Mildly prominent right hilum with right hilar congestion. No acute infiltrates or pleural effusion. Recommendation: Follow up as clinically indicated. Electronically Signed by Luis Tony DO at 16-Aug-2022 04:23:36 AM             CT HIP RIGHT WO CONTRAST    Result Date: 8/18/2022  1. No acute fractures or dislocations appreciated. 2. Scattered DJD, as described. Recommendation: Follow up as clinically indicated. All CT scans at this facility utilize dose modulation, iterative reconstruction, and/or weight based dosing when appropriate to reduce radiation dose to as low as reasonably achievable. Electronically Signed by Fidencio Ross MD at 18-Aug-2022 12:98:75 AM                Echo:   Summary   Mitral valve leaflets are mildly thickened with preserved leaflet   mobility. Mildly thickened aortic valve leaflets with preserved leaflet mobility. Tricuspid valve is structurally normal.   Normal size left atrium. Normal left ventricular size with preserved LV function and an estimated   ejection fraction of approximately 55-60%. Mild concentric left ventricular hypertrophy. No regional wall motion abnormalities. Impaired relaxation compatible with diastolic dysfunction.  ( reversed E/A   ratio)    Assessment/Plan   Principal Problem:    Chest pain              -cardiology on board    -Echo completed    -Amrik Cantu completed, awaiting results              -CT surgery consulted from ED as known descending thoracic, no intervention at this time              -CT chest reviewed by CT surgery and indicates chronic dissection, not acute              -troponin trended 0.10, 0.06, 0.06, 0.05              -monitor on tele              -serial EKG   -resume Eliquis and Plavix     Active Problems:    Hypokalemia              -replace per protocol                 -BMP in AM       Dissection of thoracic aorta (HCC) - DESCENDING, chronic              -assessed by CT surgery does not appear to be acute dissection   -resume Eliquis and Plavix     Back pain r/t neural foramina narrowing              -CT lumbar spine and right hip complted   -gabapentin 100 mg BID              -PT/OT     DVT Prophylaxis: Alfredo Dougherty Pi, APRN - CNP, 8/18/2022 1:07 PM

## 2022-08-18 NOTE — PROGRESS NOTES
Palliative care follow up visit. Pt is up on side of bed. He talks with me about upcoming stress test today. Has concerns should he need heart cath d/t \"poor kidney func\". States \"If I have to have heart cath it will probably kill my kidneys\". He talks with me about wanting to enjoy life again, be able to play with his grandchildren, take care of his wife(recently had surgery). He is hopeful after the test he will have answers that will lead to medicine management rather than having to make a decision for invasive tx. He tells me his problems began when he had COVID. States \"I have not been the same since\". Reports he played golf a few weeks ago and was only able to play \"3 holes\", adds \"That's never happened before\". He voices appreciation for PC visits. PC following for support.     Electronically signed by Cale Sanchez RN on 8/18/2022 at 10:25 AM

## 2022-08-19 VITALS
OXYGEN SATURATION: 96 % | RESPIRATION RATE: 16 BRPM | HEART RATE: 72 BPM | SYSTOLIC BLOOD PRESSURE: 111 MMHG | WEIGHT: 248.9 LBS | HEIGHT: 71 IN | DIASTOLIC BLOOD PRESSURE: 64 MMHG | BODY MASS INDEX: 34.85 KG/M2 | TEMPERATURE: 97.4 F

## 2022-08-19 LAB
ANION GAP SERPL CALCULATED.3IONS-SCNC: 10 MMOL/L (ref 7–19)
BUN BLDV-MCNC: 30 MG/DL (ref 8–23)
CALCIUM SERPL-MCNC: 9.3 MG/DL (ref 8.8–10.2)
CHLORIDE BLD-SCNC: 102 MMOL/L (ref 98–111)
CO2: 30 MMOL/L (ref 22–29)
CREAT SERPL-MCNC: 2.7 MG/DL (ref 0.5–1.2)
GFR AFRICAN AMERICAN: 29
GFR NON-AFRICAN AMERICAN: 24
GLUCOSE BLD-MCNC: 103 MG/DL (ref 70–99)
GLUCOSE BLD-MCNC: 134 MG/DL (ref 74–109)
GLUCOSE BLD-MCNC: 248 MG/DL (ref 70–99)
LV EF: 44 %
LVEF MODALITY: NORMAL
MAGNESIUM: 1.9 MG/DL (ref 1.6–2.4)
PERFORMED ON: ABNORMAL
PERFORMED ON: ABNORMAL
POTASSIUM REFLEX MAGNESIUM: 3.4 MMOL/L (ref 3.5–5)
SODIUM BLD-SCNC: 142 MMOL/L (ref 136–145)

## 2022-08-19 PROCEDURE — 6370000000 HC RX 637 (ALT 250 FOR IP): Performed by: NURSE PRACTITIONER

## 2022-08-19 PROCEDURE — 82947 ASSAY GLUCOSE BLOOD QUANT: CPT

## 2022-08-19 PROCEDURE — 6370000000 HC RX 637 (ALT 250 FOR IP): Performed by: HOSPITALIST

## 2022-08-19 PROCEDURE — 36415 COLL VENOUS BLD VENIPUNCTURE: CPT

## 2022-08-19 PROCEDURE — 83735 ASSAY OF MAGNESIUM: CPT

## 2022-08-19 PROCEDURE — 94762 N-INVAS EAR/PLS OXIMTRY CONT: CPT

## 2022-08-19 PROCEDURE — 80048 BASIC METABOLIC PNL TOTAL CA: CPT

## 2022-08-19 PROCEDURE — 6370000000 HC RX 637 (ALT 250 FOR IP): Performed by: INTERNAL MEDICINE

## 2022-08-19 RX ORDER — OXYCODONE HYDROCHLORIDE 5 MG/1
5 TABLET ORAL ONCE
Status: COMPLETED | OUTPATIENT
Start: 2022-08-19 | End: 2022-08-19

## 2022-08-19 RX ORDER — NIFEDIPINE 60 MG/1
60 TABLET, EXTENDED RELEASE ORAL DAILY
Qty: 30 TABLET | Refills: 3 | Status: SHIPPED | OUTPATIENT
Start: 2022-08-20

## 2022-08-19 RX ORDER — GABAPENTIN 100 MG/1
100 CAPSULE ORAL 2 TIMES DAILY
Qty: 14 CAPSULE | Refills: 0 | Status: SHIPPED | OUTPATIENT
Start: 2022-08-19 | End: 2022-08-26

## 2022-08-19 RX ORDER — ROSUVASTATIN CALCIUM 40 MG/1
40 TABLET, COATED ORAL DAILY
Qty: 30 TABLET | Refills: 0 | Status: SHIPPED | OUTPATIENT
Start: 2022-08-19 | End: 2022-10-21 | Stop reason: SDUPTHER

## 2022-08-19 RX ADMIN — MINOXIDIL 2.5 MG: 2.5 TABLET ORAL at 08:19

## 2022-08-19 RX ADMIN — CLOPIDOGREL BISULFATE 75 MG: 75 TABLET ORAL at 08:19

## 2022-08-19 RX ADMIN — INSULIN LISPRO 6 UNITS: 100 INJECTION, SOLUTION INTRAVENOUS; SUBCUTANEOUS at 12:23

## 2022-08-19 RX ADMIN — ASPIRIN 81 MG: 81 TABLET, CHEWABLE ORAL at 08:18

## 2022-08-19 RX ADMIN — ALLOPURINOL 200 MG: 100 TABLET ORAL at 08:19

## 2022-08-19 RX ADMIN — MONTELUKAST 10 MG: 10 TABLET, FILM COATED ORAL at 08:19

## 2022-08-19 RX ADMIN — CALCITRIOL CAPSULES 0.25 MCG 0.25 MCG: 0.25 CAPSULE ORAL at 08:19

## 2022-08-19 RX ADMIN — ISOSORBIDE MONONITRATE 60 MG: 60 TABLET, EXTENDED RELEASE ORAL at 08:19

## 2022-08-19 RX ADMIN — CARVEDILOL 25 MG: 12.5 TABLET, FILM COATED ORAL at 08:19

## 2022-08-19 RX ADMIN — AMIODARONE HYDROCHLORIDE 200 MG: 200 TABLET ORAL at 08:19

## 2022-08-19 RX ADMIN — GABAPENTIN 100 MG: 100 CAPSULE ORAL at 08:19

## 2022-08-19 RX ADMIN — APIXABAN 5 MG: 5 TABLET, FILM COATED ORAL at 08:19

## 2022-08-19 RX ADMIN — PANTOPRAZOLE SODIUM 40 MG: 40 TABLET, DELAYED RELEASE ORAL at 05:35

## 2022-08-19 RX ADMIN — NIFEDIPINE 60 MG: 60 TABLET, EXTENDED RELEASE ORAL at 08:19

## 2022-08-19 RX ADMIN — INSULIN GLARGINE 15 UNITS: 100 INJECTION, SOLUTION SUBCUTANEOUS at 08:23

## 2022-08-19 RX ADMIN — OXYCODONE 5 MG: 5 TABLET ORAL at 01:26

## 2022-08-19 RX ADMIN — OXYCODONE HYDROCHLORIDE AND ACETAMINOPHEN 500 MG: 500 TABLET ORAL at 08:19

## 2022-08-19 RX ADMIN — POTASSIUM CHLORIDE 20 MEQ: 1500 TABLET, EXTENDED RELEASE ORAL at 08:19

## 2022-08-19 ASSESSMENT — PAIN DESCRIPTION - DESCRIPTORS: DESCRIPTORS: DULL;THROBBING

## 2022-08-19 ASSESSMENT — PAIN SCALES - GENERAL: PAINLEVEL_OUTOF10: 5

## 2022-08-19 ASSESSMENT — PAIN DESCRIPTION - LOCATION: LOCATION: BACK

## 2022-08-19 NOTE — PROGRESS NOTES
Per Dr. Angy Garrett stress test looked better than last time so he is ok to dc from that standpoint. Notified DWAYNE Burgess. Patient has cardio follow up appointment made.      Electronically signed by Robert Foster RN on 8/19/22 at 2:55 PM CDT

## 2022-08-19 NOTE — PROGRESS NOTES
Nephrology ([facility]) Consult Note      Patient:  Rhett Angelucci  YOB: 1955  Date of Service: 8/19/2022  MRN: [id number]   Acct: [de-identified]   Primary Care Physician: Karl Ferreira MD  Advance Directive: DNR  Admit Date: 8/16/2022       Hospital Day: 1  Referring Provider: Chandrika Barrios MD    Patient independently seen and examined, Chart, Consults, Notes, Operative notes, Labs, Cardiology, and Radiology studies reviewed as available. Subjective:  Rhett Angelucci is a 78 y.o. male for whom we were consulted for evaluation and treatment of acute renal failure with baseline chronic kidney disease stage IV. He notes that he has upcoming appointment with Dr. Michelle Bartlett. He had recently had Bumex dose decreased to 1 mg twice daily then developed some fluid gain and so had taken back to the prior dose of 2 mg twice daily and developed significant fluid losses from that. He reported about a 20 pound loss over the past 10 days or so. He also had developed chest pain and complained of low back pain over the sacrum/right buttock. He denied hematuria, dysuria, hemoptysis. He had some shortness of breath. Notes that his edema had essentially resolved with the diuretics. Today, no overnight events. He continues to complain of back pain. He denied chest pain, shortness of air at rest, nausea or vomiting. Patient was awaiting Lexiscan results this morning.     Allergies:  Morphine and Hydralazine    Medicines:  Current Facility-Administered Medications   Medication Dose Route Frequency Provider Last Rate Last Admin    gabapentin (NEURONTIN) capsule 100 mg  100 mg Oral BID DWAYNE Larry CNP   100 mg at 08/19/22 0819    carvedilol (COREG) tablet 25 mg  25 mg Oral BID JUD Limon MD   25 mg at 08/19/22 0819    aspirin chewable tablet 81 mg  81 mg Oral Daily DWAYNE Lenz CNP   81 mg at 08/19/22 0818    clopidogrel (PLAVIX) tablet 75 mg  75 mg Oral Daily Narvis Cheese, APRN - CNP   75 mg at 08/19/22 0819    apixaban (ELIQUIS) tablet 5 mg  5 mg Oral BID Narvis Cheese, APRN - CNP   5 mg at 08/19/22 0819    NIFEdipine (PROCARDIA XL) extended release tablet 60 mg  60 mg Oral Daily Nathalia Roa MD   60 mg at 08/19/22 0819    insulin glargine (LANTUS) injection vial 15 Units  15 Units SubCUTAneous BID Nathalia Roa MD   15 Units at 08/19/22 0823    insulin lispro (HUMALOG) injection vial 6 Units  6 Units SubCUTAneous 4x Daily AC & HS Nathalia Roa MD   6 Units at 08/18/22 1710    glucose chewable tablet 16 g  4 tablet Oral PRN Nathalia Roa MD        dextrose bolus 10% 125 mL  125 mL IntraVENous PRN Nathalia Roa MD        Or    dextrose bolus 10% 250 mL  250 mL IntraVENous PRN Nathalia Roa MD        glucagon (rDNA) injection 1 mg  1 mg SubCUTAneous PRN Nathalia Roa MD        dextrose 10 % infusion   IntraVENous Continuous PRN Nathalia Roa MD        0.9 % sodium chloride infusion   IntraVENous Continuous Narvis Cheese, APRN - CNP 15 mL/hr at 08/18/22 2142 New Bag at 08/18/22 2142    allopurinol (ZYLOPRIM) tablet 200 mg  200 mg Oral Daily Rosaura Bee MD   200 mg at 08/19/22 7834    amiodarone (CORDARONE) tablet 200 mg  200 mg Oral Daily Rosaura Bee MD   200 mg at 08/19/22 2846    ascorbic acid (VITAMIN C) tablet 500 mg  500 mg Oral Daily Rosaura Bee MD   500 mg at 08/19/22 6238    [Held by provider] bumetanide (BUMEX) tablet 1 mg  1 mg Oral BID Rosaura Bee MD        calcitRIOL (ROCALTROL) capsule 0.25 mcg  0.25 mcg Oral Once per day on Mon Wed Fri Rosaura Bee MD   0.25 mcg at 08/19/22 6697    diphenhydrAMINE (BENADRYL) tablet 25 mg  25 mg Oral Q6H PRN Rosaura Bee MD        isosorbide mononitrate (IMDUR) extended release tablet 60 mg  60 mg Oral BID Rosaura Bee MD   60 mg at 08/19/22 0819    minoxidil (LONITEN) tablet 2.5 mg  2.5 mg Oral BID Rosaura Bee MD   2.5 mg at 08/19/22 0819    montelukast (SINGULAIR) tablet 10 mg  10 mg Oral Daily Nicolas Butler MD   10 mg at 08/19/22 0819    pantoprazole (PROTONIX) tablet 40 mg  40 mg Oral BID AC Nicolas Butler MD   40 mg at 08/19/22 0535    potassium chloride (KLOR-CON M) extended release tablet 20 mEq  20 mEq Oral Daily with breakfast Nicolas Butler MD   20 mEq at 08/19/22 0819    rosuvastatin (CRESTOR) tablet 40 mg  40 mg Oral Nightly Nicolas Butler MD   40 mg at 08/18/22 2202    sennosides-docusate sodium (SENOKOT-S) 8.6-50 MG tablet 1 tablet  1 tablet Oral Nightly Nicolas Butler MD   1 tablet at 08/18/22 2202    [START ON 8/23/2022] vitamin D (ERGOCALCIFEROL) capsule 50,000 Units  50,000 Units Oral Weekly Nicolas Butler MD        sodium chloride flush 0.9 % injection 5-40 mL  5-40 mL IntraVENous 2 times per day Nicolas Butler MD   10 mL at 08/18/22 0921    sodium chloride flush 0.9 % injection 5-40 mL  5-40 mL IntraVENous PRN Nicolas Butler MD        0.9 % sodium chloride infusion   IntraVENous PRN Nicolas Butler MD        ondansetron (ZOFRAN-ODT) disintegrating tablet 4 mg  4 mg Oral Q8H PRN Nicolas Butler MD   4 mg at 08/16/22 1222    Or    ondansetron (ZOFRAN) injection 4 mg  4 mg IntraVENous Q6H PRN Nicolas Butler MD        acetaminophen (TYLENOL) tablet 650 mg  650 mg Oral Q6H PRN Nicolas Butler MD   650 mg at 08/18/22 1623    Or    acetaminophen (TYLENOL) suppository 650 mg  650 mg Rectal Q6H PRN Nicolas Butler MD        nitroGLYCERIN (NITROSTAT) SL tablet 0.4 mg  0.4 mg SubLINGual Q5 Min PRN Nicolas Butler MD        potassium chloride 10 mEq/100 mL IVPB (Peripheral Line)  10 mEq IntraVENous PRN Morgan Phalen,  mL/hr at 08/17/22 1216 10 mEq at 08/17/22 1216     Facility-Administered Medications Ordered in Other Encounters   Medication Dose Route Frequency Provider Last Rate Last Admin    technetium tetrofosmin (Tc-MYOVIEW) injection 24 millicurie  24 millicurie IntraVENous ONCE PRN Kari Monzon MD        technetium tetrofosmin (Tc-MYOVIEW) injection 8 millicurie  8 millicurie IntraVENous ONCE PRN Ginger Roger MD           Past Medical History:  Past Medical History:   Diagnosis Date    Acute kidney failure, unspecified (HCC)     Anxiety state, unspecified     Atrial septal aneurysm 01/09/2016    Blood circulation, collateral     Body mass index 30.0-30.9, adult     CAD (coronary artery disease) 01/10/2016    1/9/16  lexiscan  Positive for apical MI + myocardial ischemia, EF 42%, intermediate risk findings, AUC indication 16, AUC score 7 1/10/16  Cath  3 lesions in the LAD:  Mid: 70% 2.25x23, mid 90% 2.25 x 28, distal 100% 2.0x28, anterior lateral apical hypokinesis, EF 45%    Calculus of kidney     Carotid artery stenosis 06/26/2013    Cellulitis and abscess of hand, except fingers and thumb     Cerebral artery occlusion with cerebral infarction (HCC)     15 years ago    Chronic airway obstruction, not elsewhere classified     Chronic kidney disease, unspecified     Congestive heart failure, unspecified     Diabetes mellitus type II     Diverticulosis of colon (without mention of hemorrhage)     Encounter for long-term (current) use of insulin (HCC)     Esophageal reflux     Family history of ischemic heart disease     Gastric ulcer, unspecified as acute or chronic, without mention of hemorrhage, perforation, or obstruction     Hyperlipemia     Hypertension     Internal hemorrhoids without mention of complication     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 [address]     Solitary pulmonary nodule     Surgical or other procedure not carried out because of contraindication     Thoracic aneurysm without mention of rupture     Tobacco use disorder     Type II or unspecified type diabetes mellitus without mention of complication, not stated as uncontrolled        Past Surgical History:  Past Surgical History:   Procedure Laterality Date    CARDIAC CATHETERIZATION      CHOLECYSTECTOMY      CORONARY ANGIOPLASTY WITH STENT PLACEMENT  2016    2 JACQUELINE to LAD    DIAGNOSTIC CARDIAC CATH LAB PROCEDURE      URETER STENT PLACEMENT      VASCULAR SURGERY  2013 [facility]    Aortogram with bilateral selective renal artery injections    VASCULAR SURGERY  13 [facility]    Right carotid endarterectomy with Vascu-Guard patch repair. Right cervical carotid arteriograms after endarterectomy. VASCULAR SURGERY  7/6/15 [facility]    Percutaneous cannulation, right common femoral artery, with a5 [place] sheath and later 6 [place]ian [facility] sheath. Suprarenal abdomianl aortagram.  Selective right renal arteriogram.  Right renal artery balloon angioplasty;/stent (Express 6 mm x 18mmballoon-expandable stent. . Completion suprarenal abdominal aortogram and selective right remal arteriogram. Mynx closure, right common femoral artery punctur       Family History  Family History   Problem Relation Age of Onset    High Blood Pressure Mother     Other Mother         COVID    High Blood Pressure Father     Cancer Father     High Blood Pressure Brother     No Known Problems Son     No Known Problems Daughter        Social History  Social History     Socioeconomic History    Marital status:      Spouse name: patient refused to answer    Number of children: 2    Years of education: Not on file    Highest education level: Not on file   Occupational History    Occupation: patient refused to answer   Tobacco Use    Smoking status: Former     Packs/day: 0.25     Years: 7.00     Pack years: 1.75     Types: Cigarettes     Start date:      Quit date:      Years since quittin.6    Smokeless tobacco: Current     Types: Chew    Tobacco comments:     Used both for 5 years   Vaping Use    Vaping Use: Never used   Substance and Sexual Activity Alcohol use: Not Currently     Comment: \"when i was tyoung i did, if i wanted to drink ten beers i drank ten beers\"    Drug use: Never    Sexual activity: Yes     Comment: has 2 children   Other Topics Concern    Not on file   Social History Narrative    CODE STATUS: DNR    HEALTH CARE PROXY: patient refused to answer    AMBULATES: patient refused to answer    DOMICILED: patient refused to answer     Social Determinants of Health     Financial Resource Strain: Not on file   Food Insecurity: Not on file   Transportation Needs: Not on file   Physical Activity: Not on file   Stress: Not on file   Social Connections: Not on file   Intimate Partner Violence: Not on file   Housing Stability: Not on file         Review of Systems:  History obtained from chart review and the patient  General ROS: No fever or chills  Respiratory ROS: No cough, +shortness of breath  Cardiovascular ROS: +chest pain or palpitations  Gastrointestinal ROS: No abdominal pain or melena  Genito-Urinary ROS: No dysuria or hematuria  Musculoskeletal ROS: No joint pain or swelling   14 point ROS reviewed with the patient and negative except as noted above and in the HPI unless unable to obtain.     Objective:  Patient Vitals for the past 24 hrs:   BP Temp Temp src Pulse Resp SpO2 Weight   08/19/22 0600 125/65 98 °F (36.7 °C) -- 60 18 91 % 248 lb 14.4 oz (112.9 kg)   08/19/22 0156 -- -- -- -- 18 -- --   08/19/22 0053 133/64 98.2 °F (36.8 °C) Oral 54 18 (!) 89 % --   08/18/22 2204 126/65 -- -- -- -- -- --   08/18/22 1451 (!) 176/83 -- -- -- -- -- --   08/18/22 1405 (!) 193/98 -- -- 63 -- -- --   08/18/22 1225 (!) 190/83 97.7 °F (36.5 °C) Oral 66 18 91 % --       Intake/Output Summary (Last 24 hours) at 8/19/2022 1125  Last data filed at 8/18/2022 2138  Gross per 24 hour   Intake 480 ml   Output --   Net 480 ml     General: awake/alert   Chest:  clear to auscultation bilaterally  CVS: regular rate and rhythm  Abdominal: soft, nontender, normal bowel sounds  Extremities: no cyanosis or edema  Skin: warm and dry without rash      Labs:  BMP:   Recent Labs     08/17/22  0241 08/18/22  0141 08/19/22  0210    140 142   K 3.1* 3.9 3.4*   CL 96* 100 102   CO2 33* 30* 30*   PHOS 3.1  --   --    BUN 43* 37* 30*   CREATININE 2.9* 2.9* 2.7*   CALCIUM 9.0 9.0 9.3     CBC:   Recent Labs     08/17/22 0241   WBC 7.8   HGB 14.0   HCT 47.6   MCV 88.3        LIVER PROFILE:   No results for input(s): AST, ALT, LIPASE, BILIDIR, BILITOT, ALKPHOS in the last 72 hours. Invalid input(s): AMYLASE,  ALB    PT/INR: No results for input(s): PROTIME, INR in the last 72 hours. APTT: No results for input(s): APTT in the last 72 hours. BNP:  No results for input(s): BNP in the last 72 hours. Ionized Calcium:No results for input(s): IONCA in the last 72 hours. Magnesium:  Recent Labs     08/17/22  0241 08/19/22  0210   MG 2.1 1.9     Phosphorus:  Recent Labs     08/17/22 0241   PHOS 3.1     HgbA1C:   No results for input(s): LABA1C in the last 72 hours. Hepatic:   No results for input(s): ALKPHOS, ALT, AST, PROT, BILITOT, BILIDIR, LABALBU in the last 72 hours. Lactic Acid: No results for input(s): LACTA in the last 72 hours. Troponin: No results for input(s): CKTOTAL, CKMB, TROPONINT in the last 72 hours. ABGs: No results for input(s): PH, PCO2, PO2, HCO3, O2SAT in the last 72 hours. CRP:  No results for input(s): CRP in the last 72 hours. Sed Rate:  No results for input(s): SEDRATE in the last 72 hours. Cultures:   No results for input(s): CULTURE in the last 72 hours. No results for input(s): BC, Volanda Gin in the last 72 hours. No results for input(s): CXSURG in the last 72 hours. Radiology reports as per the Radiologist  Radiology: CT ABDOMEN PELVIS WO CONTRAST Additional Contrast? None    Result Date: 8/16/2022  NO PRIOR REPORT AVAILABLE Exam: CT OF THE ABDOMEN/PELVIS WITHOUT CONTRAST Clinical data: Right flank pain.  Technique: Direct contiguous nonemergent dedicated chest CT to further evaluate Recommendation: Follow up as clinically indicated. All CT scans at this facility utilize dose modulation, iterative reconstruction, and/or weight based dosing when appropriate to reduce radiation dose to as low as reasonably achievable. Electronically Signed by Venancio Ramos DO at 16-Aug-2022 05:05:26 AM             CT CHEST WO CONTRAST    Result Date: 8/16/2022  NO PRIOR REPORT AVAILABLE Exam: CT OF THE CHEST WITHOUT CONTRAST Clinical data: Chest pain. Technique: Axial CT images through the lungs were acquired without contrast and imaged using soft tissue and lung algorithms. Reformatted/MPR images were performed. Radiation Dose: CTDIvol =24.54 mGy, DLP =1782 mGy x cm. Limitations: Lack of intravenous contrast limits evaluation of the soft tissues and vascularity. Prior studies: Radiograph of the chest done on same day. Findings: Lungs: Mild emphysematous changes in the lung parenchyma. A 11 mm calcified granuloma in the superior segment of right lower lobe. No pulmonary infiltrate identified. No pulmonary mass identified. No pleural effusions identified. No pneumothorax. The airway is clear. Soft Tissues: No mediastinal, axillary or supraclavicular adenopathy identified. Mildly enlarged left lobe of the thyroid gland with ill-defined hypodense lesions in the probable changes of multinodular goiter. Vascular: Scattered atherosclerotic calcification of the aortic arch and descending thoracic aorta. 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 Unopacified pulmonary vasculature appears unremarkable. Mild cardiomegaly. Diffuse atherosclerotic calcification of the coronary arteries. Bony structures: No acute or destructive abnormality Upper Abdomen: Postcholecystectomy status. An exophytic cortical cyst from the left kidney upper pole 3.6 x 4.0 cm.   Scattered diverticulosis of the distal transverse colon without evidence of diverticulitis. Atherosclerotic calcification of the abdominal aorta, visceral arteries. Probable stent at the origin of the right renal artery. IMPRESSION: 1. Mild emphysematous changes in the lung parenchyma. A 11 mm calcified granuloma in the superior segment of right lower lobe. No acute infiltrates, mass lesion or effusion. 2.  Mild cardiomegaly. Coronary artery calcifications are identified. 3. Scattered atherosclerotic calcification of the aortic arch and descending thoracic aorta. 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 Suggest further evaluation with CT aortogram. 4.  Post cholecystectomy. Atherosclerotic calcification of the abdominal aorta. Stent in the right renal artery. An exophytic cortical cyst from the left kidney upper pole. 5. Mildly enlarged left lobe of the thyroid gland with ill-defined hypodense lesions in the probable changes of multinodular goiter. Suggest further evaluation with ultrasound. Recommendation: Follow up as clinically indicated. All CT scans at this facility utilize dose modulation, iterative reconstruction, and/or weight based dosing when appropriate to reduce radiation dose to as low as reasonably achievable. Electronically Signed by Zhou Mendez DO at 16-Aug-2022 05:20:13 AM             XR CHEST PORTABLE    Result Date: 8/16/2022  NO PRIOR REPORT AVAILABLE Exam: X-RAY OF [facility] Clinical data:Chest pain. Technique:Single view of the chest. Prior studies: No prior studies submitted. Findings:Mildly prominent right hilum with right hilar congestion. No acute infiltrates, pleural effusion or pneumothorax. No acute osseous abnormality is detected. Atherosclerotic calcification of the aortic arch and descending thoracic aorta. Mildly prominent right hilum with right hilar congestion. No acute infiltrates or pleural effusion. Recommendation: Follow up as clinically indicated. Electronically Signed by Janel Cummings DO at 16-Aug-2022 04:23:36 AM                Assessment   Acute kidney injury  Chronic kidney disease stage IV  Hypokalemia  Descending thoracic aortic dissection  Secondary hyperparathyroidism  Vitamin D deficiency    Plan:  Discussed with patient, nursing  Work-up reviewed to date  Monitor labs  Reviewed cardiothoracic evaluation of aortic dissection  Continue vitamin D and calcitriol for the moment  Replace potassium again as needed  Would stay off IV fluids at this point unless contrasted CT required    Thank you for the consult, we appreciate the opportunity to provide care to your patients. Feel free to contact me if I can be of any further assistance.       Yi Mercado MD  08/19/22  11:25 AM

## 2022-08-19 NOTE — DISCHARGE SUMMARY
Rossy Duran  :  1955  MRN:  [id number]    Admit date:  2022  Discharge date: 2022     Discharging Physician:  Dr. Jaye Salgado Directive: DNR    Consults: Gloria Tovar  IP CONSULT TO CARDIOLOGY     Primary Care Physician:  Mamadou May MD    Discharge Diagnoses:  Principal Problem:    Chest pain  Active Problems:    Right flank pain    Hypokalemia    Dissection of thoracic aorta ([address] - DESCENDING    Neural foraminal stenosis of lumbar spine  Resolved Problems:    * No resolved hospital problems. *      Portions of this note have been copied forward, however, changed to reflect the most current clinical status of this patient. Hospital Course:   Patient is a 72-year-old man with past medical history of atrial septal aneurysm, CAD, CKD, carotid artery stenosis, DM, hyperlipidemia, hypertension, paroxysmal A. fib, and chronic descending thoracic aneurysm who presented to [facility] ED with the complaints of chest pain and shortness of breath. Patient presented to the ED with a history of 5-day cough, shortness of breath, and chest discomfort. Patient also indicated low back pain. Patient reports he has had recent adjustments in his diuretics. Patient reports he is on Eliquis and has had no missed doses. ED work-up indicated potassium of 2.6, creatinine 3.7, troponin 0.10. Chest x-ray indicated mildly prominent right hilum with hilar congestion. CT abdomen and pelvis indicates exophytic bilateral renal cortical cysts, nonspecific bilateral perinephric stranding, and incidental umbilical hernia measuring 8 mm.   CT chest indicated mild emphysematous changes, 11 mm calcified granuloma in the superior segment of the right lower lobe, mild cardiomegaly, scattered atherosclerotic calcifications of the aortic arch and descending aorta, probable chronic dissection of the descending thoracic aorta and calcification in the false lumen, mildly enlarged left lobe of the thyroid with ill-defined hypodense lesions. CT surgery was consulted and reviewed the chest CT and again indicates chronic dissection not acute of the descending thoracic aorta. Nephrology was consulted as patient had ANGELO on CKD. Patient was treated with gentle IV hydration. Functions remained stable and with cessation of IV hydration. Patient was noted to have ongoing hypertension and added Procardia to her regimen. Cardiology consulted for elevated troponins upon arrival with chest pain and recommended Lexiscan. Amrik Cantu completed and indicated mildly reduced EF of 44% with previous anterior septal apical infarct moderate to large in size, no reversible ischemia appreciated. Patient continued to have low back pain, CT lumbar spine and right hip obtained. CT right hip no acute fractures or dislocation noted. CT lumbar spine indicates lumbar spondylosis, mild right paracentral bulge at L2 causing mild neural foraminal narrowing, asymmetric disc bulge causing severe bilateral neural foraminal narrowing at L3-L4 to L5/S1, L5 anterior spondylolisthesis. Begin low-dose gabapentin while inpatient and patient tolerated well with improvement of pain. Patient was able to ambulate in the hallway without difficulty. Outpatient PT referral placed. Significant Diagnostic Studies:   CT ABDOMEN PELVIS WO CONTRAST Additional Contrast? None    Result Date: 8/16/2022  NO PRIOR REPORT AVAILABLE Exam: CT OF THE ABDOMEN/PELVIS WITHOUT CONTRAST Clinical data: Right flank pain. Technique: Direct contiguous axial CT images were acquired through the abdomen and pelvis without contrast using soft tissue and bone algorithms. Reformatted/MPR images were performed. Radiation dose: CTDIvol =24.54 mGy, DLP =1782 mGy x cm. Limitations: Lack of intravenous contrast limits evaluation of solid viscera. Lack of oral contrast limits evaluation of the bowel loops.  Prior Studies: No prior studies CONTRAST    Result Date: 8/16/2022  NO PRIOR REPORT AVAILABLE Exam: CT OF THE CHEST WITHOUT CONTRAST Clinical data: Chest pain. Technique: Axial CT images through the lungs were acquired without contrast and imaged using soft tissue and lung algorithms. Reformatted/MPR images were performed. Radiation Dose: CTDIvol =24.54 mGy, DLP =1782 mGy x cm. Limitations: Lack of intravenous contrast limits evaluation of the soft tissues and vascularity. Prior studies: Radiograph of the chest done on same day. Findings: Lungs: Mild emphysematous changes in the lung parenchyma. A 11 mm calcified granuloma in the superior segment of right lower lobe. No pulmonary infiltrate identified. No pulmonary mass identified. No pleural effusions identified. No pneumothorax. The airway is clear. Soft Tissues: No mediastinal, axillary or supraclavicular adenopathy identified. Mildly enlarged left lobe of the thyroid gland with ill-defined hypodense lesions in the probable changes of multinodular goiter. Vascular: Scattered atherosclerotic calcification of the aortic arch and descending thoracic aorta. 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 Unopacified pulmonary vasculature appears unremarkable. Mild cardiomegaly. Diffuse atherosclerotic calcification of the coronary arteries. Bony structures: No acute or destructive abnormality Upper Abdomen: Postcholecystectomy status. An exophytic cortical cyst from the left kidney upper pole 3.6 x 4.0 cm. Scattered diverticulosis of the distal transverse colon without evidence of diverticulitis. Atherosclerotic calcification of the abdominal aorta, visceral arteries. Probable stent at the origin of the right renal artery. IMPRESSION: 1. Mild emphysematous changes in the lung parenchyma. A 11 mm calcified granuloma in the superior segment of right lower lobe. No acute infiltrates, mass lesion or effusion. 2.  Mild cardiomegaly. Coronary artery calcifications are identified. 3. Scattered atherosclerotic calcification of the aortic arch and descending thoracic aorta. 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 Suggest further evaluation with CT aortogram. 4.  Post cholecystectomy. Atherosclerotic calcification of the abdominal aorta. Stent in the right renal artery. An exophytic cortical cyst from the left kidney upper pole. 5. Mildly enlarged left lobe of the thyroid gland with ill-defined hypodense lesions in the probable changes of multinodular goiter. Suggest further evaluation with ultrasound. Recommendation: Follow up as clinically indicated. All CT scans at this facility utilize dose modulation, iterative reconstruction, and/or weight based dosing when appropriate to reduce radiation dose to as low as reasonably achievable. Electronically Signed by Maude Richardson DO at 16-Aug-2022 05:20:13 AM             CT LUMBAR SPINE WO CONTRAST    Result Date: 8/17/2022  NO PRIOR REPORT AVAILABLE Exam: CT OF THE LUMBAR SPINE WITHOUT INTRAVENOUS CONTRAST Clinical data: Low back/right hip pain. Technique: Spiral axial CT images through the lumbar spine were acquired without contrast, reconstructed in axial and sagittal projections and imaged using soft tissue and bone algorithms. Reformatted/MPR images were performed. Radiation dose: CTDIvol =67.25 mGy, DLP =2433.62 mGy x cm. Limitations: None. Prior studies: No prior studies submitted. Findings: There isgrossly unremarkablealignment without acute fracture or subluxation. Bone mineralization is grossly unremarkable. Vertebral body heights are maintained. L5 anterior spondylolisthesis is seen. Marginal osteophytes at multiple levels. Posterior elements are intact. Inter-vertebral disc spaces: Vacuum phenomenon noted at L3-L4 and L5- S1. Mild right paracentral bulge at L2 causing mild neural foramina narrowing.  Asymmetric disc bulge causing severe bilateral neural foramina narrowing from L3-L4 to L5-S1. Atherosclerotic wall calcifications in abdominal aorta and common iliac arteries. 1. Lumbar spondylosis. 2. Mild right paracentral bulge at L2 causing mild neural foramina narrowing. 3. Asymmetric disc bulge causing severe bilateral neural foramina narrowing from L3-L4 to L5-S1. 4. L5 anterior spondylolisthesis Recommendation: Follow up as clinically indicated. All CT scans at this facility utilize dose modulation, iterative reconstruction, and/or weight based dosing when appropriate to reduce radiation dose to as low as reasonably achievable. Electronically Signed by Alis Cates MD at 17-Aug-2022 05:55:19 PM             XR CHEST PORTABLE    Result Date: 8/16/2022  NO PRIOR REPORT AVAILABLE [facility] Clinical data:Chest pain. Technique:Single view of the chest. Prior studies: No prior studies submitted. Findings:Mildly prominent right hilum with right hilar congestion. No acute infiltrates, pleural effusion or pneumothorax. No acute osseous abnormality is detected. Atherosclerotic calcification of the aortic arch and descending thoracic aorta. Mildly prominent right hilum with right hilar congestion. No acute infiltrates or pleural effusion. Recommendation: Follow up as clinically indicated. Electronically Signed by Jones Lee DO at 16-Aug-2022 04:23:36 AM             CT HIP RIGHT WO CONTRAST    Result Date: 8/18/2022  NO PRIOR REPORT AVAILABLE Exam: CT OF THERIGHT HIP WITHOUT CONTRAST Clinical data: Low back/ right hip pain. Technique: Spiral axial CT images through the hip were acquired without contrast. Reformatted/MPR images were performed. Radiation dose: CTDIvol = 67.25 mGy, DLP = 2433.62 mGy x cm. Limitations: None. Prior studies: No prior studies submitted. Findings: Osseous structures: No acute fractures or dislocations appreciated. Mild DJD right hip, with chondrocalcinosis.  Mild-to-moderate arthrosis of the right [facility] joint. Mild calcific tendinopathy abutting the greater trochanter superiorly. Partially visualized lower lumbar spondylosis. Small dystrophic calcification in the distal right iliopsoas muscle. Mild nonspecific subcutaneous soft tissue stranding at the anterior hip. Soft tissues: No CT evidence of soft tissue mass. Limited CT evaluation of the muscles and tendons are otherwise grossly unremarkable. 1. No acute fractures or dislocations appreciated. 2. Scattered DJD, as described. Recommendation: Follow up as clinically indicated. All CT scans at this facility utilize dose modulation, iterative reconstruction, and/or weight based dosing when appropriate to reduce radiation dose to as low as reasonably achievable. Electronically Signed by Graciela Zurita MD at 18-Aug-2022 04:28:23 AM             ECHO 2D WO COLOR DOPPLER COMPLETE    Result Date: 8/16/2022  Transthoracic Echocardiography Report (TTE)  Demographics   Patient Name   Palmira Plunkett   Date of Study           08/16/2022   MRN            [id number]         Gender                  Male   Date of Birth  1955     Room Number             [facility]-200   Age            78 year(s)   Height:        71 inches      Referring Physician     Trista Nava   Weight:        230.01 pounds  Sonographer             LUIS Ventura   BSA:           2.24 m^2       Interpreting Physician  Malachi Vieira MD   BMI:           32.08 kg/m^2  Procedure Type of Study   TTE procedure:ECHO 2D W/DOPPLER/COLOR/CONTRAST. Study Location: Echo Lab Technical Quality: Adequate visualization Patient Status: Inpatient Contrast Medium: Definity. Amount - 4 ml Rhythm: Within normal limits HR: 57 bpm BP: 165/94 mmHg Indications:Cardiomegaly. Conclusions   Summary  Mitral valve leaflets are mildly thickened with preserved leaflet  mobility. Mildly thickened aortic valve leaflets with preserved leaflet mobility. Tricuspid valve is structurally normal.  Normal size left atrium.   Normal left ventricular size with preserved LV function and an estimated  ejection fraction of approximately 55-60%. Mild concentric left ventricular hypertrophy. No regional wall motion abnormalities. Impaired relaxation compatible with diastolic dysfunction. ( reversed E/A  ratio)   Signature   ----------------------------------------------------------------  Electronically signed by Pricila Pérez MD(Interpreting  physician) on 08/16/2022 11:16 AM  ----------------------------------------------------------------   Findings   Mitral Valve  Mitral valve leaflets are mildly thickened with preserved leaflet  mobility. Aortic Valve  Mildly thickened aortic valve leaflets with preserved leaflet mobility. Tricuspid Valve  Tricuspid valve is structurally normal.   Pulmonic Valve  The pulmonic valve was not well visualized . Left Atrium  Normal size left atrium. Left Ventricle  Normal left ventricular size with preserved LV function and an estimated  ejection fraction of approximately 55-60%. Mild concentric left ventricular hypertrophy. No regional wall motion abnormalities. Impaired relaxation compatible with diastolic dysfunction. ( reversed E/A  ratio)   Right Atrium  Normal right atrial dimension with no evidence of thrombus or mass noted. Right Ventricle  Normal right ventricular size with preserved RV function. Pericardial Effusion  No evidence of significant pericardial effusion is noted. Pleural Effusion  No evidence of pleural effusion. Miscellaneous  Definity utilized  Allergies   - Hydralazine. - Morphine. 2D Measurements and Calculations(cm)   LVIDd: 5.12 cm                      LVIDs: 3.44 cm  IVSd: 1.43 cm  LVPWd: 1.21 cm                      AO Root Dimension: 2.1 cm  Rt. Vent.  Dimension: 3.18 cm        LA Dimension: 3.8 cm  % Ejection Fraction: 64 %           LA Area: 24.3 cm^2  LA Volume: 74.4 ml                  LV Systolic dimension: 5.30FR  LA Volume Index: 33 ml/m^2          LV PW Right lower leg: No edema. Left lower leg: No edema. Legs:       Comments: Tenderness noted just right of the spine and low back   Skin:     General: Skin is warm and dry. Capillary Refill: Capillary refill takes less than 2 seconds. Neurological:      General: No focal deficit present. Mental Status: He is alert and oriented to person, place, and time. Discharge Medications:         Medication List        START taking these medications      gabapentin 100 MG capsule  Commonly known as: NEURONTIN  Take 1 capsule by mouth 2 times daily for 7 days. NIFEdipine 60 MG extended release tablet  Commonly known as: PROCARDIA XL  Take 1 tablet by mouth daily  Start taking on: August 20, 2022            CHANGE how you take these medications      Lantus SoloStar 100 UNIT/ML injection pen  Generic drug: insulin glargine  INJECT 30 UNITS IN THE MORNING AND 20 UNITS IN THE EVENING DAILY  What changed: See the new instructions.             CONTINUE taking these medications      allopurinol 100 MG tablet  Commonly known as: ZYLOPRIM     amiodarone 200 MG tablet  Commonly known as: CORDARONE  Take 1 tablet by mouth daily     ascorbic acid 500 MG tablet  Commonly known as: VITAMIN C  Take 1 tablet by mouth daily     aspirin 81 MG chewable tablet  Take 1 tablet by mouth daily     bumetanide 1 MG tablet  Commonly known as: BUMEX  Take 1 tablet by mouth 2 times daily     calcitRIOL 0.25 MCG capsule  Commonly known as: ROCALTROL     carvedilol 12.5 MG tablet  Commonly known as: COREG  Take 1 tablet by mouth 2 times daily     clopidogrel 75 MG tablet  Commonly known as: PLAVIX  TAKE ONE TABLET BY MOUTH EVERY DAY     diphenhydrAMINE 25 MG capsule  Commonly known as: BENADRYL     Eliquis 5 MG Tabs tablet  Generic drug: apixaban  TAKE ONE TABLET BY MOUTH TWICE A DAY     isosorbide mononitrate 60 MG extended release tablet  Commonly known as: IMDUR  TAKE ONE TABLET BY MOUTH TWICE A DAY     levocetirizine 5 MG tablet  Commonly known as: XYZAL     metOLazone 2.5 MG tablet  Commonly known as: ZAROXOLYN  Take 1 tablet by mouth once a week On Friday     minoxidil 2.5 MG tablet  Commonly known as: LONITEN  TAKE ONE TABLET BY MOUTH TWICE A DAY     montelukast 10 MG tablet  Commonly known as: SINGULAIR     nitroGLYCERIN 0.4 MG SL tablet  Commonly known as: NITROSTAT  up to max of 3 total doses. If no relief after 1 dose, call 911. NovoLOG FlexPen 100 UNIT/ML injection pen  Generic drug: insulin aspart     ondansetron 4 MG disintegrating tablet  Commonly known as: Zofran ODT  Take 1 tablet by mouth every 8 hours as needed for Nausea or Vomiting     pantoprazole 40 MG tablet  Commonly known as: PROTONIX  Take 1 tablet by mouth 2 times daily (before meals)     potassium chloride 20 MEQ extended release tablet  Commonly known as: KLOR-CON M  Take 1 tablet by mouth daily (with breakfast)     rosuvastatin 40 MG tablet  Commonly known as: CRESTOR  Take 1 tablet by mouth daily Take 1 tablet by mouth daily     senna-docusate 8.6-50 MG per tablet  Commonly known as: PERICOLACE     UltiCare Mini Pen Needles 31G X 6 MM Misc  Generic drug: Insulin Pen Needle  FOR USE WITH LANTUS TWO TIMES A DAY     vitamin D 1.25 MG (43810 UT) Caps capsule  Commonly known as: ERGOCALCIFEROL               Where to Get Your Medications        These medications were sent to [facility], [address] [place] [address]  [address], [address] [place] [address]      Phone: [phone]   gabapentin 100 MG capsule  NIFEdipine 60 MG extended release tablet  rosuvastatin 40 MG tablet         Discharge Instructions: Follow up with Kathie Howard MD in 5-7 days. Take medications as directed. Resume activity as tolerated. Diet: ADULT DIET; Regular; 4 carb choices (60 gm/meal); Low Fat/Low Chol/High Fiber/CARA; No Caffeine     Disposition: Patient is Stableand will be discharged to Home with Outpatient Therapy.     Time spent on discharge 32 minutes spent in assessing patient, reviewing medications, discussion with nursing, confirming safe discharge plan and preparation of discharge summary.     Signed:  DWAYNE Mendez CNP, 8/19/2022 3:03 PM

## 2022-08-19 NOTE — PLAN OF CARE
Problem: Discharge Planning  Goal: Discharge to home or other facility with appropriate resources  Outcome: Completed     Problem: Pain  Goal: Verbalizes/displays adequate comfort level or baseline comfort level  Outcome: Completed     Problem: Safety - Adult  Goal: Free from fall injury  Outcome: Completed     Problem: ABCDS Injury Assessment  Goal: Absence of physical injury  Outcome: Completed     Problem: Nutrition Deficit:  Goal: Optimize nutritional status  Outcome: Completed     Problem: Chronic Conditions and Co-morbidities  Goal: Patient's chronic conditions and co-morbidity symptoms are monitored and maintained or improved  Outcome: Completed

## 2022-08-19 NOTE — PROGRESS NOTES
CLINICAL PHARMACY NOTE: MEDS TO BEDS    Total # of Prescriptions Filled: 2   The following medications were delivered to the patient:  Nifedipine ER 60mg  Gabapentin 100mg    Additional Documentation:   The patient paid with cash and was handed the medications at his bedside.

## 2022-08-19 NOTE — PROGRESS NOTES
Nutrition Assessment     Type and Reason for Visit: Reassess    Nutrition Recommendations/Plan:   Modify diet, remove potassium and phosphorus restriction. Malnutrition Assessment:  Malnutrition Status: No malnutrition    Nutrition Assessment:  Pt improving from a nutritional standpoint with PO intake >50% msot meals. Significant wt increase documented with standing scale wt, will monitor. Low potassium, low phosphorus diet restrictions removed to increase options available to pt. Will continue to follow. Estimated Daily Nutrient Needs:  Energy (kcal):  3218-5713 kcals/day Weight Used for Energy Requirements: Current     Protein (g):   g/protein/day Weight Used for Protein Requirements: Ideal (1.2-2.0)        Fluid (ml/day):  6713-2919 mL/day Method Used for Fluid Requirements: 1 ml/kcal    Current Nutrition Therapies:    ADULT DIET; Regular; 4 carb choices (60 gm/meal);  Low Fat/Low Chol/High Fiber/CARA; No Caffeine    Anthropometric Measures:  Height: 5' 11\" (180.3 cm)  Current Body Wt: 248 lb 14.4 oz (112.9 kg)   BMI: 34.7    Nutrition Diagnosis:   Inadequate oral intake related to acute injury/trauma as evidenced by NPO or clear liquid status due to medical condition    Nutrition Interventions:   Food and/or Nutrient Delivery: Modify Current Diet  Nutrition Education/Counseling: No recommendation at this time  Coordination of Nutrition Care: Continue to monitor while inpatient       Goals:  Previous Goal Met: Progressing toward Goal(s)  Goals: PO intake 50% or greater       Nutrition Monitoring and Evaluation:      Food/Nutrient Intake Outcomes: Food and Nutrient Intake  Physical Signs/Symptoms Outcomes: Biochemical Data, Nutrition Focused Physical Findings, Weight    Discharge Planning:    Continue current diet     Marielos Pérez RD, LD  Contact: [phone]

## 2022-08-23 NOTE — PROGRESS NOTES
Physician Progress Note      PATIENT:               Gina Higginbotham  CSN #:                  [id number]  :                       1955  ADMIT DATE:       2022 2:20 AM  DISCH DATE:        2022 4:01 PM  RESPONDING  PROVIDER #:        Segundo Carranza MD          QUERY TEXT:    Pt admitted with chest pain. Cardiology consult note reflects, \"Minimally   elevated troponin levels between 0.05 and 0.1 all relatively flat. \"  If   possible, please document in progress notes and discharge summary if you are   evaluating and/or treating any of the following: The medical record reflects the following:  Risk Factors: HTN, CAD, CHF, CKD, PAF, DM  Clinical Indicators: CT Chest, \"Scattered atherosclerotic calcification of the   aortic arch and descending thoracic aorta. Probable chronic dissection of the   descending thoracic aorta and calcification of the false lumen. \" /88   165/94, on 22 /83 193/98 176/83. Troponins 0.10 0.07 0.06 0.05 0.05   0.06 0.05. Holly Zhu study mildly reduced ejection fraction 44%   evidence previous anteroseptal apical infarct moderate to large in size no   reversible ischemia appreciated. \"  Treatment: CT Chest, CTS Consult, Cardiology profile, Alena Patel. Addition of   Procardia XL 60 mg daily  Options provided:  -- Chest pain due to chronic dissection of the descending thoracic aorta  -- Chest pain due to hypertension  -- Chest pain due to unknown etiology  -- Other - I will add my own diagnosis  -- Disagree - Not applicable / Not valid  -- Disagree - Clinically unable to determine / Unknown  -- Refer to Clinical Documentation Reviewer    PROVIDER RESPONSE TEXT:    This patient has chest pain due to unknown etiology.     Query created by: Keanu Marti on 2022 11:06 AM      Electronically signed by:  Segundo Carranza MD 2022 6:10 PM

## 2022-09-16 ENCOUNTER — TELEPHONE (OUTPATIENT)
Dept: FAMILY MEDICINE CLINIC | Facility: CLINIC | Age: 67
End: 2022-09-16

## 2022-09-16 NOTE — TELEPHONE ENCOUNTER
Message left for patient to return phone call. Patient is due a chronic visit and a yearly physical.

## 2022-10-04 ENCOUNTER — TELEPHONE (OUTPATIENT)
Dept: CARDIOLOGY CLINIC | Age: 67
End: 2022-10-04

## 2022-10-06 NOTE — ADDENDUM NOTE
Addended by: SHIRLEY CISNEROS on: 4/20/2021 01:48 PM     Modules accepted: Level of Service     Spoke to PT gave instructions NPO after midnight night before surgery. Per , NO bowel prep is needed.

## 2022-10-11 RX ORDER — ISOSORBIDE MONONITRATE 60 MG/1
TABLET, EXTENDED RELEASE ORAL
Qty: 60 TABLET | Refills: 5 | Status: SHIPPED | OUTPATIENT
Start: 2022-10-11

## 2022-10-11 RX ORDER — MINOXIDIL 2.5 MG/1
TABLET ORAL
Qty: 60 TABLET | Refills: 5 | Status: SHIPPED | OUTPATIENT
Start: 2022-10-11

## 2022-10-12 ENCOUNTER — TELEPHONE (OUTPATIENT)
Dept: FAMILY MEDICINE CLINIC | Facility: CLINIC | Age: 67
End: 2022-10-12

## 2022-10-21 ENCOUNTER — OFFICE VISIT (OUTPATIENT)
Dept: CARDIOLOGY CLINIC | Age: 67
End: 2022-10-21
Payer: MEDICARE

## 2022-10-21 VITALS
WEIGHT: 246 LBS | DIASTOLIC BLOOD PRESSURE: 60 MMHG | HEIGHT: 71 IN | HEART RATE: 55 BPM | BODY MASS INDEX: 34.44 KG/M2 | SYSTOLIC BLOOD PRESSURE: 100 MMHG

## 2022-10-21 DIAGNOSIS — Z79.899 ON AMIODARONE THERAPY: ICD-10-CM

## 2022-10-21 DIAGNOSIS — Z79.01 CHRONIC ANTICOAGULATION: ICD-10-CM

## 2022-10-21 DIAGNOSIS — R06.02 SHORTNESS OF BREATH: ICD-10-CM

## 2022-10-21 DIAGNOSIS — I25.10 CORONARY ARTERY DISEASE INVOLVING NATIVE CORONARY ARTERY OF NATIVE HEART WITHOUT ANGINA PECTORIS: ICD-10-CM

## 2022-10-21 DIAGNOSIS — I48.0 PAF (PAROXYSMAL ATRIAL FIBRILLATION) (HCC): Primary | ICD-10-CM

## 2022-10-21 DIAGNOSIS — E78.2 MIXED HYPERLIPIDEMIA: ICD-10-CM

## 2022-10-21 DIAGNOSIS — E66.9 OBESITY (BMI 30-39.9): ICD-10-CM

## 2022-10-21 PROCEDURE — G8427 DOCREV CUR MEDS BY ELIG CLIN: HCPCS | Performed by: INTERNAL MEDICINE

## 2022-10-21 PROCEDURE — 1123F ACP DISCUSS/DSCN MKR DOCD: CPT | Performed by: INTERNAL MEDICINE

## 2022-10-21 PROCEDURE — 4004F PT TOBACCO SCREEN RCVD TLK: CPT | Performed by: INTERNAL MEDICINE

## 2022-10-21 PROCEDURE — G8417 CALC BMI ABV UP PARAM F/U: HCPCS | Performed by: INTERNAL MEDICINE

## 2022-10-21 PROCEDURE — 93000 ELECTROCARDIOGRAM COMPLETE: CPT | Performed by: INTERNAL MEDICINE

## 2022-10-21 PROCEDURE — 99213 OFFICE O/P EST LOW 20 MIN: CPT | Performed by: INTERNAL MEDICINE

## 2022-10-21 PROCEDURE — G8484 FLU IMMUNIZE NO ADMIN: HCPCS | Performed by: INTERNAL MEDICINE

## 2022-10-21 PROCEDURE — 3017F COLORECTAL CA SCREEN DOC REV: CPT | Performed by: INTERNAL MEDICINE

## 2022-10-21 RX ORDER — SACUBITRIL AND VALSARTAN 49; 51 MG/1; MG/1
1 TABLET, FILM COATED ORAL 2 TIMES DAILY
COMMUNITY

## 2022-10-21 RX ORDER — ROSUVASTATIN CALCIUM 40 MG/1
40 TABLET, COATED ORAL DAILY
Qty: 90 TABLET | Refills: 3 | Status: SHIPPED | OUTPATIENT
Start: 2022-10-21 | End: 2022-11-20

## 2022-10-21 RX ORDER — AMIODARONE HYDROCHLORIDE 200 MG/1
200 TABLET ORAL DAILY
Qty: 90 TABLET | Refills: 3 | Status: SHIPPED | OUTPATIENT
Start: 2022-10-21 | End: 2022-11-20

## 2022-10-21 RX ORDER — ACETAMINOPHEN 160 MG
TABLET,DISINTEGRATING ORAL DAILY
COMMUNITY

## 2022-10-21 RX ORDER — ESOMEPRAZOLE MAGNESIUM 40 MG/1
40 FOR SUSPENSION ORAL DAILY
COMMUNITY

## 2022-10-21 ASSESSMENT — ENCOUNTER SYMPTOMS
NAUSEA: 0
GASTROINTESTINAL NEGATIVE: 1
SHORTNESS OF BREATH: 0
DIARRHEA: 0
EYES NEGATIVE: 1
RESPIRATORY NEGATIVE: 1
VOMITING: 0

## 2022-10-21 NOTE — PROGRESS NOTES
[facility] Progress Note                            Date:  10/21/2022  Patient: Rosie Pickard  Age:  78 y.o., 1955      Reason for evaluation:         SUBJECTIVE:    Returns today follow-up assessment for coronary artery disease hyperlipidemia shortness of breath chronic anticoagulation chronic antiarrhythmic therapy for paroxysmal atrial fibrillation overall doing reasonably well. Presently in rehab. Denies palpitations denies bleeding denies chest discomfort or excessive shortness of breath. Some intermittent dizziness from time to time. No other complaints or issues reported. Blood pressure today 100/60 heart 55. Review of Systems   Constitutional: Negative. Negative for chills, fever and unexpected weight change. HENT: Negative. Eyes: Negative. Respiratory: Negative. Negative for shortness of breath. Cardiovascular: Negative. Negative for chest pain. Gastrointestinal: Negative. Negative for diarrhea, nausea and vomiting. Endocrine: Negative. Genitourinary: Negative. Musculoskeletal: Negative. Skin: Negative. Neurological: Negative. All other systems reviewed and are negative. OBJECTIVE:     /60   Pulse 55   Ht 5' 11\" (1.803 m)   Wt 246 lb (111.6 kg)   BMI 34.31 kg/m²     Labs:   CBC: No results for input(s): WBC, HGB, HCT, PLT in the last 72 hours. BMP:No results for input(s): NA, K, CO2, BUN, CREATININE, LABGLOM, GLUCOSE in the last 72 hours. BNP: No results for input(s): BNP in the last 72 hours. PT/INR: No results for input(s): PROTIME, INR in the last 72 hours. APTT:No results for input(s): APTT in the last 72 hours. CARDIAC ENZYMES:No results for input(s): CKTOTAL, CKMB, CKMBINDEX, TROPONINI in the last 72 hours.   FASTING LIPID PANEL:  Lab Results   Component Value Date/Time    HDL 40 08/16/2022 02:29 AM    LDLDIRECT 153 06/12/2013 02:54 AM    LDLCALC 54 08/16/2022 02:29 AM    TRIG 149 08/16/2022 02:29 AM     LIVER PROFILE:No results for input(s): AST, ALT, LABALBU in the last 72 hours.         Past Medical History:   Diagnosis Date    Acute kidney failure, unspecified ([address]     Anxiety state, unspecified     Atrial septal aneurysm 01/09/2016    Blood circulation, collateral     Body mass index 30.0-30.9, adult     CAD (coronary artery disease) 01/10/2016    1/9/16  lexiscan  Positive for apical MI + myocardial ischemia, EF 42%, intermediate risk findings, AUC indication 16, AUC score 7 1/10/16  Cath  3 lesions in the LAD:  Mid: 70% 2.25x23, mid 90% 2.25 x 28, distal 100% 2.0x28, anterior lateral apical hypokinesis, EF 45%    Calculus of kidney     Carotid artery stenosis 06/26/2013    Cellulitis and abscess of hand, except fingers and thumb     Cerebral artery occlusion with cerebral infarction (HCC)     15 years ago    Chronic airway obstruction, not elsewhere classified     Chronic kidney disease, unspecified     Congestive heart failure, unspecified     Diabetes mellitus type II     Diverticulosis of colon (without mention of hemorrhage)     Encounter for long-term (current) use of insulin (HCC)     Esophageal reflux     Family history of ischemic heart disease     Gastric ulcer, unspecified as acute or chronic, without mention of hemorrhage, perforation, or obstruction     Hyperlipemia     Hypertension     Internal hemorrhoids without mention of complication     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, or unspecified(403.00)     Obesity, unspecified     Other specified cardiac dysrhythmias(427.89)     Palliative care patient 12/21/2020    Paroxysmal atrial fibrillation (HCC)     Peripheral vascular disease ([facility] [address]     Solitary pulmonary nodule     Surgical or other procedure not carried out because of contraindication     Thoracic aneurysm without mention of rupture     Tobacco use disorder     Type II or unspecified type diabetes mellitus without mention of complication, not stated as uncontrolled      Past Surgical History:   Procedure Laterality Date    CARDIAC CATHETERIZATION      CHOLECYSTECTOMY  2000    CORONARY ANGIOPLASTY WITH STENT PLACEMENT  01/2016    2 JACQUELINE to LAD    DIAGNOSTIC CARDIAC CATH LAB PROCEDURE      URETER STENT PLACEMENT      VASCULAR SURGERY  03- [facility]    Aortogram with bilateral selective renal artery injections    VASCULAR SURGERY  6/14/13 S    Right carotid endarterectomy with Vascu-Guard patch repair. Right cervical carotid arteriograms after endarterectomy. VASCULAR SURGERY  7/6/15 [facility]    Percutaneous cannulation, right common femoral artery, with a5 french sheath and later 6 french [facility] sheath. Suprarenal abdomianl aortagram.  Selective right renal arteriogram.  Right renal artery balloon angioplasty;/stent (Express 6 mm x 18mmballoon-expandable stent. . Completion suprarenal abdominal aortogram and selective right remal arteriogram. Mynx closure, right common femoral artery punctur     Family History   Problem Relation Age of Onset    High Blood Pressure Mother     Other Mother         COVID    High Blood Pressure Father     Cancer Father     High Blood Pressure Brother     No Known Problems Son     No Known Problems Daughter      Allergies   Allergen Reactions    Morphine Shortness Of Breath     Was INTUBATED for this    Hydralazine      \"They said I am, I really don't know\"     Current Outpatient Medications   Medication Sig Dispense Refill    albuterol sulfate (PROAIR RESPICLICK) 930 (90 Base) MCG/ACT aerosol powder inhalation Inhale into the lungs every 4 hours as needed for Wheezing or Shortness of Breath      esomeprazole Magnesium (NEXIUM) 40 MG PACK Take 40 mg by mouth daily      Cholecalciferol (VITAMIN D3) 50 MCG (2000 UT) CAPS Take by mouth daily      sacubitril-valsartan (ENTRESTO) 49-51 MG per tablet Take 1 tablet by mouth 2 times daily      rosuvastatin (CRESTOR) 40 MG tablet Take 1 tablet by mouth daily Take 1 tablet by mouth daily 90 tablet 3 amiodarone (CORDARONE) 200 MG tablet Take 1 tablet by mouth daily 90 tablet 3    isosorbide mononitrate (IMDUR) 60 MG extended release tablet TAKE ONE TABLET BY MOUTH TWICE A DAY 60 tablet 5    minoxidil (LONITEN) 2.5 MG tablet TAKE ONE TABLET BY MOUTH TWICE A DAY 60 tablet 5    NIFEdipine (PROCARDIA XL) 60 MG extended release tablet Take 1 tablet by mouth daily 30 tablet 3    ELIQUIS 5 MG TABS tablet TAKE ONE TABLET BY MOUTH TWICE A DAY 60 tablet 5    ondansetron (ZOFRAN ODT) 4 MG disintegrating tablet Take 1 tablet by mouth every 8 hours as needed for Nausea or Vomiting 20 tablet 0    bumetanide (BUMEX) 1 MG tablet Take 1 tablet by mouth 2 times daily 60 tablet 5    clopidogrel (PLAVIX) 75 MG tablet TAKE ONE TABLET BY MOUTH EVERY DAY 30 tablet 3    carvedilol (COREG) 12.5 MG tablet Take 1 tablet by mouth 2 times daily 60 tablet 3    potassium chloride (KLOR-CON M) 20 MEQ extended release tablet Take 1 tablet by mouth daily (with breakfast) (Patient taking differently: Take 20 mEq by mouth 2 times daily) 60 tablet 3    pantoprazole (PROTONIX) 40 MG tablet Take 1 tablet by mouth 2 times daily (before meals) 60 tablet 3    ascorbic acid (VITAMIN C) 500 MG tablet Take 1 tablet by mouth daily 30 tablet 3    calcitRIOL (ROCALTROL) 0.25 MCG capsule Take 0.25 mcg by mouth daily      senna-docusate (PERICOLACE) 8.6-50 MG per tablet Take 1 tablet by mouth nightly      allopurinol (ZYLOPRIM) 100 MG tablet Take 200 mg by mouth daily      aspirin 81 MG chewable tablet Take 1 tablet by mouth daily 30 tablet 3    nitroGLYCERIN (NITROSTAT) 0.4 MG SL tablet up to max of 3 total doses.  If no relief after 1 dose, call 911. 25 tablet 0    NOVOLOG FLEXPEN 100 UNIT/ML injection pen 5 Units 3 times daily (before meals)       montelukast (SINGULAIR) 10 MG tablet Take 10 mg by mouth daily      levocetirizine (XYZAL) 5 MG tablet Take 5 mg by mouth nightly      ULTIC[employer] MINI PEN NEEDLES 31G X 6 MM MISC FOR USE WITH LANTUS TWO TIMES A DAY 50 each 5    LANTUS SOLOSTAR 100 UNIT/ML injection pen INJECT 30 UNITS IN THE MORNING AND 20 UNITS IN THE EVENING DAILY 15 mL 5    gabapentin (NEURONTIN) 100 MG capsule Take 1 capsule by mouth 2 times daily for 7 days. 14 capsule 0     No current facility-administered medications for this visit. Social History     Socioeconomic History    Marital status:      Spouse name: patient refused to answer    Number of children: 2    Years of education: Not on file    Highest education level: Not on file   Occupational History    Occupation: patient refused to answer   Tobacco Use    Smoking status: Former     Packs/day: 0.25     Years: 7.00     Pack years: 1.75     Types: Cigarettes     Start date:      Quit date:      Years since quittin.8    Smokeless tobacco: Current     Types: Chew    Tobacco comments:     Used both for 5 years   Vaping Use    Vaping Use: Never used   Substance and Sexual Activity    Alcohol use: Not Currently     Comment: \"when i was tyoung i did, if i wanted to drink ten beers i drank ten beers\"    Drug use: Never    Sexual activity: Yes     Comment: has 2 children   Other Topics Concern    Not on file   Social History Narrative    CODE STATUS: DNR    HEALTH CARE PROXY: patient refused to answer    AMBULATES: patient refused to answer    DOMICILED: patient refused to answer     Social Determinants of Health     Financial Resource Strain: Not on file   Food Insecurity: Not on file   Transportation Needs: Not on file   Physical Activity: Not on file   Stress: Not on file   Social Connections: Not on file   Intimate Partner Violence: Not on file   Housing Stability: Not on file       Physical Examination:  /60   Pulse 55   Ht 5' 11\" (1.803 m)   Wt 246 lb (111.6 kg)   BMI 34.31 kg/m²   Physical Exam  Vitals reviewed. Constitutional:       Appearance: He is well-developed. Neck:      Vascular: No carotid bruit or JVD.    Cardiovascular:      Rate and Rhythm: Normal rate and regular rhythm. Heart sounds: Normal heart sounds. No murmur heard. No friction rub. No gallop. Pulmonary:      Effort: Pulmonary effort is normal. No respiratory distress. Breath sounds: Normal breath sounds. No wheezing or rales. Abdominal:      General: There is no distension. Tenderness: There is no abdominal tenderness. Lymphadenopathy:      Cervical: No cervical adenopathy. Skin:     General: Skin is warm and dry. ASSESSMENT:     Diagnosis Orders   1. PAF (paroxysmal atrial fibrillation) (HCC)  EKG 12 lead    amiodarone (CORDARONE) 200 MG tablet      2. Mixed hyperlipidemia  rosuvastatin (CRESTOR) 40 MG tablet      3. On amiodarone therapy        4. Coronary artery disease involving native coronary artery of native heart without angina pectoris        5. Obesity (BMI 30-39.9)        6. Shortness of breath        7. Chronic anticoagulation            PLAN:  Orders Placed This Encounter   Procedures    EKG 12 lead     Orders Placed This Encounter   Medications    rosuvastatin (CRESTOR) 40 MG tablet     Sig: Take 1 tablet by mouth daily Take 1 tablet by mouth daily     Dispense:  90 tablet     Refill:  3    amiodarone (CORDARONE) 200 MG tablet     Sig: Take 1 tablet by mouth daily     Dispense:  90 tablet     Refill:  3         Continue present medications  Recommend follow-up assessment in 6 months    Return in about 6 months (around 4/21/2023) for return to Dr. Jacquelin Ferrera only. Riley Enriquez MD 10/21/2022 11:40 AM CDT    [facility]      Thisdictation was generated by voice recognition computer software. Although all attempts are made to edit the dictation for accuracy, there may be errors in the transcription that are not intended.

## 2022-11-11 DIAGNOSIS — I10 ESSENTIAL HYPERTENSION: ICD-10-CM

## 2022-11-11 RX ORDER — CARVEDILOL 12.5 MG/1
TABLET ORAL
Qty: 14 TABLET | Refills: 0 | Status: SHIPPED | OUTPATIENT
Start: 2022-11-11

## 2022-11-11 NOTE — TELEPHONE ENCOUNTER
Rx Refill Note  Requested Prescriptions     Pending Prescriptions Disp Refills   • carvedilol (COREG) 12.5 MG tablet [Pharmacy Med Name: CARVEDILOL 12.5MG TABS] 60 tablet 5     Sig: TAKE ONE TABLET BY MOUTH TWICE A DAY WITH MEALS      Last office visit with prescribing clinician: 10/28/2021      Next office visit with prescribing clinician: Visit date not found    Last refill: 5/9/2022     Gail Lal MA  11/11/22, 10:38 CST

## 2022-11-21 ENCOUNTER — HOSPITAL ENCOUNTER (INPATIENT)
Age: 67
LOS: 2 days | Discharge: HOME | DRG: 291 | End: 2022-11-23
Attending: EMERGENCY MEDICINE | Admitting: STUDENT IN AN ORGANIZED HEALTH CARE EDUCATION/TRAINING PROGRAM
Payer: MEDICARE

## 2022-11-21 ENCOUNTER — APPOINTMENT (OUTPATIENT)
Dept: GENERAL RADIOLOGY | Age: 67
DRG: 291 | End: 2022-11-21
Payer: MEDICARE

## 2022-11-21 DIAGNOSIS — J96.01 ACUTE RESPIRATORY FAILURE WITH HYPOXIA (HCC): Primary | ICD-10-CM

## 2022-11-21 DIAGNOSIS — N18.4 STAGE 4 CHRONIC KIDNEY DISEASE (HCC): ICD-10-CM

## 2022-11-21 DIAGNOSIS — I50.43 ACUTE ON CHRONIC COMBINED SYSTOLIC AND DIASTOLIC CHF (CONGESTIVE HEART FAILURE) (HCC): ICD-10-CM

## 2022-11-21 DIAGNOSIS — J98.4 CHRONIC LUNG DISEASE: ICD-10-CM

## 2022-11-21 LAB
ADENOVIRUS BY PCR: NOT DETECTED
ALBUMIN SERPL-MCNC: 4.4 G/DL (ref 3.5–5.2)
ALLENS TEST: ABNORMAL
ALP BLD-CCNC: 82 U/L (ref 40–130)
ALT SERPL-CCNC: 13 U/L (ref 5–41)
ANION GAP SERPL CALCULATED.3IONS-SCNC: 10 MMOL/L (ref 7–19)
AST SERPL-CCNC: 10 U/L (ref 5–40)
BASE EXCESS ARTERIAL: 6.1 MMOL/L (ref -2–2)
BASOPHILS ABSOLUTE: 0.1 K/UL (ref 0–0.2)
BASOPHILS RELATIVE PERCENT: 1.2 % (ref 0–1)
BILIRUB SERPL-MCNC: 0.6 MG/DL (ref 0.2–1.2)
BORDETELLA PARAPERTUSSIS BY PCR: NOT DETECTED
BORDETELLA PERTUSSIS BY PCR: NOT DETECTED
BUN BLDV-MCNC: 28 MG/DL (ref 8–23)
CALCIUM SERPL-MCNC: 9.5 MG/DL (ref 8.8–10.2)
CARBOXYHEMOGLOBIN ARTERIAL: 2.4 % (ref 0–5)
CHLAMYDOPHILIA PNEUMONIAE BY PCR: NOT DETECTED
CHLORIDE BLD-SCNC: 101 MMOL/L (ref 98–111)
CHOLESTEROL, TOTAL: 93 MG/DL (ref 160–199)
CO2: 30 MMOL/L (ref 22–29)
CORONAVIRUS 229E BY PCR: NOT DETECTED
CORONAVIRUS HKU1 BY PCR: NOT DETECTED
CORONAVIRUS NL63 BY PCR: NOT DETECTED
CORONAVIRUS OC43 BY PCR: NOT DETECTED
CREAT SERPL-MCNC: 2.6 MG/DL (ref 0.5–1.2)
EKG P AXIS: NORMAL DEGREES
EKG P-R INTERVAL: NORMAL MS
EKG Q-T INTERVAL: 462 MS
EKG QRS DURATION: 116 MS
EKG QTC CALCULATION (BAZETT): 462 MS
EKG T AXIS: -178 DEGREES
EOSINOPHILS ABSOLUTE: 0.2 K/UL (ref 0–0.6)
EOSINOPHILS RELATIVE PERCENT: 3 % (ref 0–5)
GFR SERPL CREATININE-BSD FRML MDRD: 26 ML/MIN/{1.73_M2}
GLUCOSE BLD-MCNC: 101 MG/DL (ref 70–99)
GLUCOSE BLD-MCNC: 120 MG/DL (ref 74–109)
GLUCOSE BLD-MCNC: 136 MG/DL (ref 70–99)
GLUCOSE BLD-MCNC: 162 MG/DL (ref 70–99)
GLUCOSE BLD-MCNC: 198 MG/DL (ref 70–99)
HBA1C MFR BLD: 7 % (ref 4–6)
HCO3 ARTERIAL: 31.2 MMOL/L (ref 22–26)
HCT VFR BLD CALC: 42.9 % (ref 42–52)
HDLC SERPL-MCNC: 39 MG/DL (ref 55–121)
HEMOGLOBIN, ART, EXTENDED: 12.8 G/DL (ref 14–18)
HEMOGLOBIN: 12.7 G/DL (ref 14–18)
HUMAN METAPNEUMOVIRUS BY PCR: NOT DETECTED
HUMAN RHINOVIRUS/ENTEROVIRUS BY PCR: NOT DETECTED
IMMATURE GRANULOCYTES #: 0 K/UL
INFLUENZA A BY PCR: NOT DETECTED
INFLUENZA B BY PCR: NOT DETECTED
LDL CHOLESTEROL CALCULATED: 40 MG/DL
LYMPHOCYTES ABSOLUTE: 1.3 K/UL (ref 1.1–4.5)
LYMPHOCYTES RELATIVE PERCENT: 19.4 % (ref 20–40)
MAGNESIUM: 1.9 MG/DL (ref 1.6–2.4)
MCH RBC QN AUTO: 26.1 PG (ref 27–31)
MCHC RBC AUTO-ENTMCNC: 29.6 G/DL (ref 33–37)
MCV RBC AUTO: 88.1 FL (ref 80–94)
MECHANICAL RATE IN BPM: 22
METHEMOGLOBIN ARTERIAL: 0.5 %
MONOCYTES ABSOLUTE: 0.5 K/UL (ref 0–0.9)
MONOCYTES RELATIVE PERCENT: 6.9 % (ref 0–10)
MYCOPLASMA PNEUMONIAE BY PCR: NOT DETECTED
NEUTROPHILS ABSOLUTE: 4.8 K/UL (ref 1.5–7.5)
NEUTROPHILS RELATIVE PERCENT: 69.5 % (ref 50–65)
O2 CONTENT ARTERIAL: 15.6 ML/DL
O2 DELIVERY DEVICE: ABNORMAL
O2 SAT, ARTERIAL: 86.7 %
O2 THERAPY: ABNORMAL
OXYGEN FLOW: 2
PARAINFLUENZA VIRUS 1 BY PCR: NOT DETECTED
PARAINFLUENZA VIRUS 2 BY PCR: NOT DETECTED
PARAINFLUENZA VIRUS 3 BY PCR: NOT DETECTED
PARAINFLUENZA VIRUS 4 BY PCR: NOT DETECTED
PCO2 ARTERIAL: 46 MMHG (ref 35–45)
PDW BLD-RTO: 20.1 % (ref 11.5–14.5)
PERFORMED ON: ABNORMAL
PH ARTERIAL: 7.44 (ref 7.35–7.45)
PHOSPHORUS: 3.7 MG/DL (ref 2.5–4.5)
PLATELET # BLD: 180 K/UL (ref 130–400)
PMV BLD AUTO: 10.1 FL (ref 9.4–12.4)
PO2 ARTERIAL: 51 MMHG (ref 80–100)
POTASSIUM SERPL-SCNC: 4.1 MMOL/L (ref 3.5–5)
POTASSIUM, WHOLE BLOOD: 3.7
PRO-BNP: 1901 PG/ML (ref 0–900)
RBC # BLD: 4.87 M/UL (ref 4.7–6.1)
REASON FOR REJECTION: NORMAL
REJECTED TEST: NORMAL
RESPIRATORY SYNCYTIAL VIRUS BY PCR: NOT DETECTED
SAMPLE SOURCE: ABNORMAL
SARS-COV-2, PCR: NOT DETECTED
SODIUM BLD-SCNC: 141 MMOL/L (ref 136–145)
TOTAL PROTEIN: 6.5 G/DL (ref 6.6–8.7)
TRIGL SERPL-MCNC: 69 MG/DL (ref 0–149)
TROPONIN: 0.03 NG/ML (ref 0–0.03)
TROPONIN: 0.04 NG/ML (ref 0–0.03)
TSH REFLEX FT4: 2.58 UIU/ML (ref 0.35–5.5)
WBC # BLD: 6.9 K/UL (ref 4.8–10.8)

## 2022-11-21 PROCEDURE — 94760 N-INVAS EAR/PLS OXIMETRY 1: CPT

## 2022-11-21 PROCEDURE — 82803 BLOOD GASES ANY COMBINATION: CPT

## 2022-11-21 PROCEDURE — 36415 COLL VENOUS BLD VENIPUNCTURE: CPT

## 2022-11-21 PROCEDURE — 71045 X-RAY EXAM CHEST 1 VIEW: CPT

## 2022-11-21 PROCEDURE — 80061 LIPID PANEL: CPT

## 2022-11-21 PROCEDURE — 6370000000 HC RX 637 (ALT 250 FOR IP): Performed by: STUDENT IN AN ORGANIZED HEALTH CARE EDUCATION/TRAINING PROGRAM

## 2022-11-21 PROCEDURE — 94640 AIRWAY INHALATION TREATMENT: CPT

## 2022-11-21 PROCEDURE — 94150 VITAL CAPACITY TEST: CPT

## 2022-11-21 PROCEDURE — 83880 ASSAY OF NATRIURETIC PEPTIDE: CPT

## 2022-11-21 PROCEDURE — 2580000003 HC RX 258: Performed by: HOSPITALIST

## 2022-11-21 PROCEDURE — 93010 ELECTROCARDIOGRAM REPORT: CPT | Performed by: INTERNAL MEDICINE

## 2022-11-21 PROCEDURE — 84100 ASSAY OF PHOSPHORUS: CPT

## 2022-11-21 PROCEDURE — 99285 EMERGENCY DEPT VISIT HI MDM: CPT

## 2022-11-21 PROCEDURE — 83036 HEMOGLOBIN GLYCOSYLATED A1C: CPT

## 2022-11-21 PROCEDURE — 2500000003 HC RX 250 WO HCPCS: Performed by: STUDENT IN AN ORGANIZED HEALTH CARE EDUCATION/TRAINING PROGRAM

## 2022-11-21 PROCEDURE — 84443 ASSAY THYROID STIM HORMONE: CPT

## 2022-11-21 PROCEDURE — 84484 ASSAY OF TROPONIN QUANT: CPT

## 2022-11-21 PROCEDURE — 6370000000 HC RX 637 (ALT 250 FOR IP): Performed by: HOSPITALIST

## 2022-11-21 PROCEDURE — 83735 ASSAY OF MAGNESIUM: CPT

## 2022-11-21 PROCEDURE — 36600 WITHDRAWAL OF ARTERIAL BLOOD: CPT

## 2022-11-21 PROCEDURE — 80053 COMPREHEN METABOLIC PANEL: CPT

## 2022-11-21 PROCEDURE — 6360000002 HC RX W HCPCS: Performed by: STUDENT IN AN ORGANIZED HEALTH CARE EDUCATION/TRAINING PROGRAM

## 2022-11-21 PROCEDURE — 0202U NFCT DS 22 TRGT SARS-COV-2: CPT

## 2022-11-21 PROCEDURE — 85025 COMPLETE CBC W/AUTO DIFF WBC: CPT

## 2022-11-21 PROCEDURE — 82947 ASSAY GLUCOSE BLOOD QUANT: CPT

## 2022-11-21 PROCEDURE — 2700000000 HC OXYGEN THERAPY PER DAY

## 2022-11-21 PROCEDURE — 93005 ELECTROCARDIOGRAM TRACING: CPT | Performed by: EMERGENCY MEDICINE

## 2022-11-21 PROCEDURE — 2140000000 HC CCU INTERMEDIATE R&B

## 2022-11-21 RX ORDER — ASPIRIN 81 MG/1
81 TABLET, CHEWABLE ORAL DAILY
Status: DISCONTINUED | OUTPATIENT
Start: 2022-11-22 | End: 2022-11-23 | Stop reason: HOSPADM

## 2022-11-21 RX ORDER — SENNA AND DOCUSATE SODIUM 50; 8.6 MG/1; MG/1
1 TABLET, FILM COATED ORAL NIGHTLY
Status: DISCONTINUED | OUTPATIENT
Start: 2022-11-21 | End: 2022-11-23 | Stop reason: HOSPADM

## 2022-11-21 RX ORDER — POTASSIUM CHLORIDE 7.45 MG/ML
10 INJECTION INTRAVENOUS PRN
Status: DISCONTINUED | OUTPATIENT
Start: 2022-11-21 | End: 2022-11-23 | Stop reason: HOSPADM

## 2022-11-21 RX ORDER — BUMETANIDE 0.25 MG/ML
1 INJECTION, SOLUTION INTRAMUSCULAR; INTRAVENOUS 2 TIMES DAILY
Status: DISCONTINUED | OUTPATIENT
Start: 2022-11-21 | End: 2022-11-21

## 2022-11-21 RX ORDER — CETIRIZINE HYDROCHLORIDE 10 MG/1
10 TABLET ORAL DAILY
Status: DISCONTINUED | OUTPATIENT
Start: 2022-11-21 | End: 2022-11-23 | Stop reason: HOSPADM

## 2022-11-21 RX ORDER — BUDESONIDE 0.5 MG/2ML
0.5 INHALANT ORAL 2 TIMES DAILY
Status: DISCONTINUED | OUTPATIENT
Start: 2022-11-21 | End: 2022-11-23 | Stop reason: HOSPADM

## 2022-11-21 RX ORDER — CARVEDILOL 12.5 MG/1
12.5 TABLET ORAL 2 TIMES DAILY
Status: DISCONTINUED | OUTPATIENT
Start: 2022-11-21 | End: 2022-11-23 | Stop reason: HOSPADM

## 2022-11-21 RX ORDER — INSULIN LISPRO 100 [IU]/ML
0-8 INJECTION, SOLUTION INTRAVENOUS; SUBCUTANEOUS
Status: DISCONTINUED | OUTPATIENT
Start: 2022-11-21 | End: 2022-11-23 | Stop reason: HOSPADM

## 2022-11-21 RX ORDER — AMIODARONE HYDROCHLORIDE 200 MG/1
200 TABLET ORAL DAILY
Status: DISCONTINUED | OUTPATIENT
Start: 2022-11-21 | End: 2022-11-23 | Stop reason: HOSPADM

## 2022-11-21 RX ORDER — SODIUM CHLORIDE 0.9 % (FLUSH) 0.9 %
5-40 SYRINGE (ML) INJECTION PRN
Status: DISCONTINUED | OUTPATIENT
Start: 2022-11-21 | End: 2022-11-23 | Stop reason: HOSPADM

## 2022-11-21 RX ORDER — NITROGLYCERIN 0.4 MG/1
0.4 TABLET SUBLINGUAL EVERY 5 MIN PRN
Status: DISCONTINUED | OUTPATIENT
Start: 2022-11-21 | End: 2022-11-23 | Stop reason: HOSPADM

## 2022-11-21 RX ORDER — ACETAMINOPHEN 325 MG/1
650 TABLET ORAL EVERY 4 HOURS PRN
Status: DISCONTINUED | OUTPATIENT
Start: 2022-11-21 | End: 2022-11-23 | Stop reason: HOSPADM

## 2022-11-21 RX ORDER — CALCITRIOL 0.25 UG/1
0.25 CAPSULE, LIQUID FILLED ORAL DAILY
Status: DISCONTINUED | OUTPATIENT
Start: 2022-11-22 | End: 2022-11-23 | Stop reason: HOSPADM

## 2022-11-21 RX ORDER — ONDANSETRON 4 MG/1
4 TABLET, ORALLY DISINTEGRATING ORAL EVERY 8 HOURS PRN
Status: DISCONTINUED | OUTPATIENT
Start: 2022-11-21 | End: 2022-11-23 | Stop reason: HOSPADM

## 2022-11-21 RX ORDER — MINOXIDIL 2.5 MG/1
2.5 TABLET ORAL 2 TIMES DAILY
Status: DISCONTINUED | OUTPATIENT
Start: 2022-11-21 | End: 2022-11-21

## 2022-11-21 RX ORDER — INSULIN GLARGINE 100 [IU]/ML
20 INJECTION, SOLUTION SUBCUTANEOUS DAILY
Status: DISCONTINUED | OUTPATIENT
Start: 2022-11-21 | End: 2022-11-23 | Stop reason: HOSPADM

## 2022-11-21 RX ORDER — MINOXIDIL 2.5 MG/1
2.5 TABLET ORAL 2 TIMES DAILY
Status: DISCONTINUED | OUTPATIENT
Start: 2022-11-21 | End: 2022-11-23 | Stop reason: HOSPADM

## 2022-11-21 RX ORDER — ALBUTEROL SULFATE 2.5 MG/3ML
2.5 SOLUTION RESPIRATORY (INHALATION) EVERY 4 HOURS PRN
Status: DISCONTINUED | OUTPATIENT
Start: 2022-11-21 | End: 2022-11-23 | Stop reason: HOSPADM

## 2022-11-21 RX ORDER — ACETAMINOPHEN 650 MG/1
650 SUPPOSITORY RECTAL EVERY 4 HOURS PRN
Status: DISCONTINUED | OUTPATIENT
Start: 2022-11-21 | End: 2022-11-23 | Stop reason: HOSPADM

## 2022-11-21 RX ORDER — POTASSIUM CHLORIDE 20 MEQ/1
40 TABLET, EXTENDED RELEASE ORAL PRN
Status: DISCONTINUED | OUTPATIENT
Start: 2022-11-21 | End: 2022-11-23 | Stop reason: HOSPADM

## 2022-11-21 RX ORDER — SODIUM CHLORIDE 9 MG/ML
INJECTION, SOLUTION INTRAVENOUS PRN
Status: DISCONTINUED | OUTPATIENT
Start: 2022-11-21 | End: 2022-11-23 | Stop reason: HOSPADM

## 2022-11-21 RX ORDER — ASPIRIN 81 MG/1
81 TABLET, CHEWABLE ORAL DAILY
Status: DISCONTINUED | OUTPATIENT
Start: 2022-11-21 | End: 2022-11-21

## 2022-11-21 RX ORDER — INSULIN LISPRO 100 [IU]/ML
0-4 INJECTION, SOLUTION INTRAVENOUS; SUBCUTANEOUS NIGHTLY
Status: DISCONTINUED | OUTPATIENT
Start: 2022-11-21 | End: 2022-11-23 | Stop reason: HOSPADM

## 2022-11-21 RX ORDER — MULTIVIT WITH MINERALS/LUTEIN
500 TABLET ORAL DAILY
Status: DISCONTINUED | OUTPATIENT
Start: 2022-11-21 | End: 2022-11-23 | Stop reason: HOSPADM

## 2022-11-21 RX ORDER — ONDANSETRON 2 MG/ML
4 INJECTION INTRAMUSCULAR; INTRAVENOUS EVERY 6 HOURS PRN
Status: DISCONTINUED | OUTPATIENT
Start: 2022-11-21 | End: 2022-11-23 | Stop reason: HOSPADM

## 2022-11-21 RX ORDER — ROSUVASTATIN CALCIUM 20 MG/1
40 TABLET, COATED ORAL NIGHTLY
Status: DISCONTINUED | OUTPATIENT
Start: 2022-11-21 | End: 2022-11-23 | Stop reason: HOSPADM

## 2022-11-21 RX ORDER — CLOPIDOGREL BISULFATE 75 MG/1
75 TABLET ORAL DAILY
Status: DISCONTINUED | OUTPATIENT
Start: 2022-11-22 | End: 2022-11-23 | Stop reason: HOSPADM

## 2022-11-21 RX ORDER — BUMETANIDE 0.25 MG/ML
1 INJECTION, SOLUTION INTRAMUSCULAR; INTRAVENOUS 2 TIMES DAILY
Status: COMPLETED | OUTPATIENT
Start: 2022-11-21 | End: 2022-11-22

## 2022-11-21 RX ORDER — INSULIN GLARGINE 100 [IU]/ML
30 INJECTION, SOLUTION SUBCUTANEOUS EVERY MORNING
Status: DISCONTINUED | OUTPATIENT
Start: 2022-11-22 | End: 2022-11-21

## 2022-11-21 RX ORDER — SODIUM CHLORIDE 0.9 % (FLUSH) 0.9 %
5-40 SYRINGE (ML) INJECTION EVERY 12 HOURS SCHEDULED
Status: DISCONTINUED | OUTPATIENT
Start: 2022-11-21 | End: 2022-11-23 | Stop reason: HOSPADM

## 2022-11-21 RX ORDER — NIFEDIPINE 60 MG/1
60 TABLET, EXTENDED RELEASE ORAL DAILY
Status: DISCONTINUED | OUTPATIENT
Start: 2022-11-22 | End: 2022-11-23 | Stop reason: HOSPADM

## 2022-11-21 RX ORDER — ALLOPURINOL 100 MG/1
200 TABLET ORAL DAILY
Status: DISCONTINUED | OUTPATIENT
Start: 2022-11-21 | End: 2022-11-23 | Stop reason: HOSPADM

## 2022-11-21 RX ORDER — PANTOPRAZOLE SODIUM 40 MG/1
40 TABLET, DELAYED RELEASE ORAL
Status: DISCONTINUED | OUTPATIENT
Start: 2022-11-21 | End: 2022-11-23 | Stop reason: HOSPADM

## 2022-11-21 RX ORDER — DEXTROSE MONOHYDRATE 100 MG/ML
INJECTION, SOLUTION INTRAVENOUS CONTINUOUS PRN
Status: DISCONTINUED | OUTPATIENT
Start: 2022-11-21 | End: 2022-11-23 | Stop reason: HOSPADM

## 2022-11-21 RX ORDER — MAGNESIUM SULFATE 1 G/100ML
1000 INJECTION INTRAVENOUS PRN
Status: DISCONTINUED | OUTPATIENT
Start: 2022-11-21 | End: 2022-11-23 | Stop reason: HOSPADM

## 2022-11-21 RX ORDER — ARFORMOTEROL TARTRATE 15 UG/2ML
15 SOLUTION RESPIRATORY (INHALATION) 2 TIMES DAILY
Status: DISCONTINUED | OUTPATIENT
Start: 2022-11-21 | End: 2022-11-23 | Stop reason: HOSPADM

## 2022-11-21 RX ORDER — GABAPENTIN 100 MG/1
100 CAPSULE ORAL 2 TIMES DAILY
Status: DISCONTINUED | OUTPATIENT
Start: 2022-11-21 | End: 2022-11-22

## 2022-11-21 RX ORDER — VITAMIN B COMPLEX
2000 TABLET ORAL DAILY
Status: DISCONTINUED | OUTPATIENT
Start: 2022-11-22 | End: 2022-11-23 | Stop reason: HOSPADM

## 2022-11-21 RX ORDER — INSULIN GLARGINE 100 [IU]/ML
20 INJECTION, SOLUTION SUBCUTANEOUS NIGHTLY
Status: DISCONTINUED | OUTPATIENT
Start: 2022-11-21 | End: 2022-11-21

## 2022-11-21 RX ORDER — ISOSORBIDE MONONITRATE 60 MG/1
60 TABLET, EXTENDED RELEASE ORAL 2 TIMES DAILY
Status: DISCONTINUED | OUTPATIENT
Start: 2022-11-21 | End: 2022-11-23 | Stop reason: HOSPADM

## 2022-11-21 RX ORDER — BUMETANIDE 1 MG/1
1 TABLET ORAL 2 TIMES DAILY
Status: DISCONTINUED | OUTPATIENT
Start: 2022-11-22 | End: 2022-11-21

## 2022-11-21 RX ADMIN — CARVEDILOL 12.5 MG: 12.5 TABLET, FILM COATED ORAL at 20:34

## 2022-11-21 RX ADMIN — SODIUM CHLORIDE, PRESERVATIVE FREE 10 ML: 5 INJECTION INTRAVENOUS at 08:58

## 2022-11-21 RX ADMIN — IPRATROPIUM BROMIDE 0.5 MG: 0.5 SOLUTION RESPIRATORY (INHALATION) at 14:31

## 2022-11-21 RX ADMIN — GABAPENTIN 100 MG: 100 CAPSULE ORAL at 20:34

## 2022-11-21 RX ADMIN — BUMETANIDE 1 MG: 0.25 INJECTION, SOLUTION INTRAMUSCULAR; INTRAVENOUS at 08:57

## 2022-11-21 RX ADMIN — MINOXIDIL 2.5 MG: 2.5 TABLET ORAL at 20:34

## 2022-11-21 RX ADMIN — IPRATROPIUM BROMIDE 0.5 MG: 0.5 SOLUTION RESPIRATORY (INHALATION) at 19:19

## 2022-11-21 RX ADMIN — APIXABAN 5 MG: 5 TABLET, FILM COATED ORAL at 20:34

## 2022-11-21 RX ADMIN — BUDESONIDE INHALATION SUSPENSION 500 MCG: 0.5 SUSPENSION RESPIRATORY (INHALATION) at 14:31

## 2022-11-21 RX ADMIN — ROSUVASTATIN CALCIUM 40 MG: 20 TABLET, COATED ORAL at 20:34

## 2022-11-21 RX ADMIN — ALLOPURINOL 200 MG: 100 TABLET ORAL at 08:57

## 2022-11-21 RX ADMIN — INSULIN GLARGINE 20 UNITS: 100 INJECTION, SOLUTION SUBCUTANEOUS at 14:19

## 2022-11-21 RX ADMIN — Medication 500 MG: at 08:57

## 2022-11-21 RX ADMIN — ISOSORBIDE MONONITRATE 60 MG: 60 TABLET, EXTENDED RELEASE ORAL at 20:34

## 2022-11-21 RX ADMIN — PANTOPRAZOLE SODIUM 40 MG: 40 TABLET, DELAYED RELEASE ORAL at 17:32

## 2022-11-21 RX ADMIN — SENNOSIDES AND DOCUSATE SODIUM 1 TABLET: 50; 8.6 TABLET ORAL at 20:34

## 2022-11-21 RX ADMIN — ARFORMOTEROL TARTRATE 15 MCG: 15 SOLUTION RESPIRATORY (INHALATION) at 19:19

## 2022-11-21 RX ADMIN — SODIUM CHLORIDE, PRESERVATIVE FREE 10 ML: 5 INJECTION INTRAVENOUS at 20:34

## 2022-11-21 RX ADMIN — GABAPENTIN 100 MG: 100 CAPSULE ORAL at 08:57

## 2022-11-21 RX ADMIN — ARFORMOTEROL TARTRATE 15 MCG: 15 SOLUTION RESPIRATORY (INHALATION) at 14:31

## 2022-11-21 RX ADMIN — CETIRIZINE HYDROCHLORIDE 10 MG: 10 TABLET, FILM COATED ORAL at 08:57

## 2022-11-21 RX ADMIN — AMIODARONE HYDROCHLORIDE 200 MG: 200 TABLET ORAL at 08:56

## 2022-11-21 RX ADMIN — BUDESONIDE INHALATION SUSPENSION 500 MCG: 0.5 SUSPENSION RESPIRATORY (INHALATION) at 19:19

## 2022-11-21 RX ADMIN — SACUBITRIL AND VALSARTAN 1 TABLET: 49; 51 TABLET, FILM COATED ORAL at 20:34

## 2022-11-21 RX ADMIN — BUMETANIDE 1 MG: 0.25 INJECTION INTRAMUSCULAR; INTRAVENOUS at 17:32

## 2022-11-21 ASSESSMENT — ENCOUNTER SYMPTOMS
VOICE CHANGE: 0
SHORTNESS OF BREATH: 1
VOMITING: 0
ABDOMINAL PAIN: 0
DIARRHEA: 0
COUGH: 1
EYE PAIN: 0
RHINORRHEA: 0
EYE REDNESS: 0

## 2022-11-21 NOTE — ED PROVIDER NOTES
[address] [facility] [place] EMERGENCY DEPT  EMERGENCY DEPARTMENT ENCOUNTER      Pt Name: Rhiannon Kumar  MRN: [id number]  [place] 1955  Date of evaluation: 11/21/2022  Provider: Rinku Lundberg MD    CHIEF COMPLAINT       Chief Complaint   Patient presents with    Shortness of Breath     Patient reports he has gained 8 lbs over night. Patient states he was short of breath for most of the day         HISTORY OF PRESENT ILLNESS   (Location/Symptom, Timing/Onset,Context/Setting, Quality, Duration, Modifying Factors, Severity)  Note limiting factors. Rhiannon Kumar is a 78 y.o. male who presents to the emergency department with complaint of shortness of breath associated with cough. Has been gradually worsening over the last week or so. Has gained almost 10 pounds during that time which she believes is due to fluid retention. Takes Bumex daily but increased his dose to twice a day over the last couple of days but has continued to worsen. Denies any cough or congestion. Says that he has COPD and has been using his inhalers but they have not seem to help much lately. Has had similar issues in the past when retaining fluid. Followed by Dr. Annalise Thornton with nephrology. HPI    NursingNotes were reviewed. REVIEW OF SYSTEMS    (2-9 systems for level 4, 10 or more for level 5)     Review of Systems   Constitutional:  Positive for unexpected weight change. Negative for fever. HENT:  Negative for congestion, rhinorrhea and voice change. Eyes:  Negative for pain and redness. Respiratory:  Positive for cough and shortness of breath. Cardiovascular:  Positive for leg swelling. Negative for chest pain. Gastrointestinal:  Negative for abdominal pain, diarrhea and vomiting. Endocrine: Negative. Genitourinary: Negative. Musculoskeletal:  Negative for arthralgias and gait problem. Skin:  Negative for rash and wound. Neurological:  Negative for weakness and headaches. Hematological: Negative.     Psychiatric/Behavioral: Negative. All other systems reviewed and are negative. A complete review of systems was performed and is negative except as noted above in the HPI.        PAST MEDICAL HISTORY     Past Medical History:   Diagnosis Date    Acute kidney failure, unspecified ([facility] [address]     Anxiety state, unspecified     Atrial septal aneurysm 01/09/2016    Blood circulation, collateral     Body mass index 30.0-30.9, adult     CAD (coronary artery disease) 01/10/2016    1/9/16  lexiscan  Positive for apical MI + myocardial ischemia, EF 42%, intermediate risk findings, AUC indication 16, AUC score 7 1/10/16  Cath  3 lesions in the LAD:  Mid: 70% 2.25x23, mid 90% 2.25 x 28, distal 100% 2.0x28, anterior lateral apical hypokinesis, EF 45%    Calculus of kidney     Carotid artery stenosis 06/26/2013    Cellulitis and abscess of hand, except fingers and thumb     Cerebral artery occlusion with cerebral infarction (HCC)     15 years ago    Chronic airway obstruction, not elsewhere classified     Chronic kidney disease, unspecified     Congestive heart failure, unspecified     Diabetes mellitus type II     Diverticulosis of colon (without mention of hemorrhage)     Encounter for long-term (current) use of insulin (HCC)     Esophageal reflux     Family history of ischemic heart disease     Gastric ulcer, unspecified as acute or chronic, without mention of hemorrhage, perforation, or obstruction     Hyperlipemia     Hypertension     Internal hemorrhoids without mention of complication     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, or unspecified(403.00)     Obesity, unspecified     Other specified cardiac dysrhythmias(427.89)     Palliative care patient 12/21/2020    Paroxysmal atrial fibrillation (HCC)     Peripheral vascular disease ([facility] [address]     Solitary pulmonary nodule     Surgical or other procedure not carried out because of contraindication     Thoracic aneurysm without mention of rupture Tobacco use disorder     Type II or unspecified type diabetes mellitus without mention of complication, not stated as uncontrolled          SURGICAL HISTORY       Past Surgical History:   Procedure Laterality Date    CARDIAC CATHETERIZATION      CHOLECYSTECTOMY  2000    CORONARY ANGIOPLASTY WITH STENT PLACEMENT  01/2016    2 JACQUELINE to LAD    DIAGNOSTIC CARDIAC CATH LAB PROCEDURE      URETER STENT PLACEMENT      VASCULAR SURGERY  03- [facility]    Aortogram with bilateral selective renal artery injections    VASCULAR SURGERY  6/14/13 [facility]    Right carotid endarterectomy with Vascu-Guard patch repair. Right cervical carotid arteriograms after endarterectomy. VASCULAR SURGERY  7/6/15 [facility]    Percutaneous cannulation, right common femoral artery, with a5 french sheath and later 6 french [facility] sheath. Suprarenal abdomianl aortagram.  Selective right renal arteriogram.  Right renal artery balloon angioplasty;/stent (Express 6 mm x 18mmballoon-expandable stent. . Completion suprarenal abdominal aortogram and selective right remal arteriogram. Mynx closure, right common femoral artery punctur         CURRENT MEDICATIONS       Previous Medications    ALBUTEROL SULFATE (PROAIR RESPICLICK) 895 (90 BASE) MCG/ACT AEROSOL POWDER INHALATION    Inhale into the lungs every 4 hours as needed for Wheezing or Shortness of Breath    ALLOPURINOL (ZYLOPRIM) 100 MG TABLET    Take 200 mg by mouth daily    AMIODARONE (CORDARONE) 200 MG TABLET    Take 1 tablet by mouth daily    ASCORBIC ACID (VITAMIN C) 500 MG TABLET    Take 1 tablet by mouth daily    ASPIRIN 81 MG CHEWABLE TABLET    Take 1 tablet by mouth daily    BUMETANIDE (BUMEX) 1 MG TABLET    Take 1 tablet by mouth 2 times daily    CALCITRIOL (ROCALTROL) 0.25 MCG CAPSULE    Take 0.25 mcg by mouth daily    CARVEDILOL (COREG) 12.5 MG TABLET    Take 1 tablet by mouth 2 times daily    CHOLECALCIFEROL (VITAMIN D3) 50 MCG (2000 UT) CAPS    Take by mouth daily    CLOPIDOGREL (PLAVIX) 75 MG TABLET TAKE ONE TABLET BY MOUTH EVERY DAY    ELIQUIS 5 MG TABS TABLET    TAKE ONE TABLET BY MOUTH TWICE A DAY    ESOMEPRAZOLE MAGNESIUM (NEXIUM) 40 MG PACK    Take 40 mg by mouth daily    GABAPENTIN (NEURONTIN) 100 MG CAPSULE    Take 1 capsule by mouth 2 times daily for 7 days. ISOSORBIDE MONONITRATE (IMDUR) 60 MG EXTENDED RELEASE TABLET    TAKE ONE TABLET BY MOUTH TWICE A DAY    LANTUS SOLOSTAR 100 UNIT/ML INJECTION PEN    INJECT 30 UNITS IN THE MORNING AND 20 UNITS IN THE EVENING DAILY    LEVOCETIRIZINE (XYZAL) 5 MG TABLET    Take 5 mg by mouth nightly    MINOXIDIL (LONITEN) 2.5 MG TABLET    TAKE ONE TABLET BY MOUTH TWICE A DAY    MONTELUKAST (SINGULAIR) 10 MG TABLET    Take 10 mg by mouth daily    NIFEDIPINE (PROCARDIA XL) 60 MG EXTENDED RELEASE TABLET    Take 1 tablet by mouth daily    NITROGLYCERIN (NITROSTAT) 0.4 MG SL TABLET    up to max of 3 total doses. If no relief after 1 dose, call 911.     NOVOLOG FLEXPEN 100 UNIT/ML INJECTION PEN    5 Units 3 times daily (before meals)     ONDANSETRON (ZOFRAN ODT) 4 MG DISINTEGRATING TABLET    Take 1 tablet by mouth every 8 hours as needed for Nausea or Vomiting    PANTOPRAZOLE (PROTONIX) 40 MG TABLET    Take 1 tablet by mouth 2 times daily (before meals)    POTASSIUM CHLORIDE (KLOR-CON M) 20 MEQ EXTENDED RELEASE TABLET    Take 1 tablet by mouth daily (with breakfast)    ROSUVASTATIN (CRESTOR) 40 MG TABLET    Take 1 tablet by mouth daily Take 1 tablet by mouth daily    SACUBITRIL-VALSARTAN (ENTRESTO) 49-51 MG PER TABLET    Take 1 tablet by mouth 2 times daily    SENNA-DOCUSATE (PERICOLACE) 8.6-50 MG PER TABLET    Take 1 tablet by mouth nightly    ULTICARE MINI PEN NEEDLES 31G X 6 MM MISC    FOR USE WITH LANTUS TWO TIMES A DAY       ALLERGIES     Morphine and Hydralazine    FAMILY HISTORY       Family History   Problem Relation Age of Onset    High Blood Pressure Mother     Other Mother         COVID    High Blood Pressure Father     Cancer Father     High Blood Pressure Brother     No Known Problems Son     No Known Problems Daughter           SOCIAL HISTORY       Social History     Socioeconomic History    Marital status:      Spouse name: patient refused to answer    Number of children: 2    Years of education: None    Highest education level: None   Occupational History    Occupation: patient refused to answer   Tobacco Use    Smoking status: Former     Packs/day: 0.25     Years: 7.00     Pack years: 1.75     Types: Cigarettes     Start date:      Quit date:      Years since quittin.9    Smokeless tobacco: Current     Types: Chew    Tobacco comments:     Used both for 5 years   Vaping Use    Vaping Use: Never used   Substance and Sexual Activity    Alcohol use: Not Currently     Comment: \"when i was tyoung i did, if i wanted to drink ten beers i drank ten beers\"    Drug use: Never    Sexual activity: Yes     Comment: has 2 children   Social History Narrative    CODE STATUS: DNR    HEALTH CARE PROXY: patient refused to answer    AMBULATES: patient refused to answer    DOMICILED: patient refused to answer       SCREENINGS    Mariam Coma Scale  Eye Opening: Spontaneous  Best Verbal Response: Oriented  Best Motor Response: Obeys commands  Mariam Coma Scale Score: 15        PHYSICAL EXAM    (up to 7 for level 4, 8 or more for level 5)     ED Triage Vitals [22 0413]   BP Temp Temp Source Heart Rate Resp SpO2 Height Weight   -- 97.8 °F (36.6 °C) Oral 62 14 (!) 75 % 5' 11\" (1.803 m) 250 lb (113.4 kg)       Physical Exam  Vitals and nursing note reviewed. Constitutional:       General: He is not in acute distress. Appearance: He is well-developed. He is not diaphoretic. HENT:      Head: Normocephalic and atraumatic. Eyes:      General: No scleral icterus. Neck:      Vascular: No JVD. Cardiovascular:      Rate and Rhythm: Normal rate and regular rhythm. Pulses:           Radial pulses are 2+ on the right side and 2+ on the left side. Dorsalis pedis pulses are 2+ on the right side and 2+ on the left side. Heart sounds: Normal heart sounds. No murmur heard. No friction rub. No gallop. Pulmonary:      Effort: Tachypnea and accessory muscle usage present. No respiratory distress. Breath sounds: Normal breath sounds. No stridor. No decreased breath sounds, wheezing or rhonchi. Chest:      Chest wall: No tenderness. Abdominal:      General: There is no distension. Palpations: Abdomen is soft. Tenderness: There is no abdominal tenderness. There is no guarding or rebound. Musculoskeletal:         General: No deformity. Normal range of motion. Right lower leg: No edema. Left lower leg: No edema. Skin:     General: Skin is warm and dry. Coloration: Skin is not pale. Findings: No erythema. Neurological:      Mental Status: He is alert and oriented to person, place, and time. GCS: GCS eye subscore is 4. GCS verbal subscore is 5. GCS motor subscore is 6. Cranial Nerves: No cranial nerve deficit. Motor: No abnormal muscle tone.       Coordination: Coordination normal.   Psychiatric:         Behavior: Behavior normal.         Judgment: Judgment normal.       DIAGNOSTIC RESULTS     EKG: All EKG's are interpreted by the Emergency Department Physician who either signs or Co-signs this chart in the absence of a cardiologist.        RADIOLOGY:   Non-plain film images such as CT, Ultrasound and MRI are read by the radiologist. Berta Mray images are visualized and preliminarily interpreted by the emergency physician with the below findings:        Interpretation per the Radiologist below, if available at the time of this note:    XR CHEST PORTABLE    (Results Pending)         ED BEDSIDE ULTRASOUND:   Performed by ED Physician - none    LABS:  Labs Reviewed   CBC WITH AUTO DIFFERENTIAL - Abnormal; Notable for the following components:       Result Value    Hemoglobin 12.7 (*)     MCH 26.1 (*) MCHC 29.6 (*)     RDW 20.1 (*)     Neutrophils % 69.5 (*)     Lymphocytes % 19.4 (*)     Basophils % 1.2 (*)     All other components within normal limits   BLOOD GAS, ARTERIAL - Abnormal; Notable for the following components:    pCO2, Arterial 46.0 (*)     pO2, Arterial 51.0 (*)     HCO3, Arterial 31.2 (*)     Base Excess, Arterial 6.1 (*)     Hemoglobin, Art, Extended 12.8 (*)     O2 Sat, Arterial 86.7 (*)     All other components within normal limits    Narrative:     CALL  [facility] tel. , brian hill rn, 11/21/2022 04:23, by [facility]   COMPREHENSIVE METABOLIC PANEL - Abnormal; Notable for the following components:    CO2 30 (*)     Glucose 120 (*)     BUN 28 (*)     Creatinine 2.6 (*)     Est, Glom Filt Rate 26 (*)     Total Protein 6.5 (*)     All other components within normal limits    Narrative:     Called for redraw on chemistry tubes. Spoke with Valeria Calhoun RN in ED at [address].    11/21/22  LUDA   BRAIN NATRIURETIC PEPTIDE - Abnormal; Notable for the following components:    Pro-BNP 1,901 (*)     All other components within normal limits    Narrative:     Called for redraw on chemistry tubes. Spoke with Valeria Calhoun RN in ED at 0448.    11/21/22  LUDA   RESPIRATORY PANEL, MOLECULAR, WITH COVID-19   COVID-19, RAPID   SPECIMEN REJECTION       All other labs were within normal range or not returned as of this dictation. Medications - No data to display    [address] and DIFFERENTIALDIAGNOSIS/MDM:   Vitals:    Vitals:    11/21/22 0413 11/21/22 0502 11/21/22 0510   BP: 90/64 (!) 147/82 126/73   Pulse: 62  58   Resp: 14  21   Temp: 97.8 °F (36.6 °C)     TempSrc: Oral     SpO2: (!) 75%  93%   Weight: 250 lb (113.4 kg)     Height: 5' 11\" (1.803 m)         MDM      ED Course as of 11/21/22 0634   Mon Nov 21, 2022   0437 EKG shows sinus rhythm with a rate of 82. Significant amount of baseline wander and artifact present. Evidence of LVH. No obvious findings of acute ischemia or infarction. Intervals are interpretable due to underlying artifact. QRS prolonged at 187 with nonspecific interventricular conduction delay. QTc mildly prolonged at 505. [JESSE]   7127 On arrival, oxygen saturation 70s on room air. 2 L nasal cannula initiated with improvement of oxygen saturation up to upper 80s/low 90s [JESSE]   0456 SpO2(!): 75 %  EKG shows atrial fibrillation with ventricular rate of 157. Lateral ST depression, likely rate related ischemic changes. Normal QRS and Qtc intervals. [JESSE]   0536 Pro-BNP(!): 1,901 [JESSE]      ED Course User Index  [JESSE] Alphonso Sandy MD     Based on the evaluation and work-up here patient is felt to require further monitoring, work-up, or treatment that is available in the emergency department. Case was discussed with hospitalist who agrees for observation or admission for further management. Treatment and stabilization as necessary were provided in the emergency department prior to transfer of care to the medicine service. CONSULTS:  None    PROCEDURES:  Unless otherwise notedbelow, none     Procedures      FINAL IMPRESSION     1. Acute respiratory failure with hypoxia (HCC)    2. Stage 4 chronic kidney disease ([address]    3. Acute on chronic combined systolic and diastolic CHF (congestive heart failure) ([facility] [address]          DISPOSITION/PLAN   DISPOSITION Decision To Admit 11/21/2022 05:36:48 AM      No notes of EC Admission Criteria type on file. PATIENT REFERRED TO:  No follow-up provider specified.     DISCHARGE MEDICATIONS:  New Prescriptions    No medications on file          (Please note that portions of this note were completed with a voice recognition program.  Efforts were made to edit the dictations butoccasionally words are mis-transcribed.)    Alphonso Martinez MD (electronically signed)  AttendingEmergency Physician          Alphonso Sandy MD  11/21/22 3820

## 2022-11-21 NOTE — ED NOTES
Patient placed on cardiac monitor, continuous pulse oximeter, and NIBP monitor. Monitor alarms on.         Bang Jones RN  11/21/22 7868

## 2022-11-21 NOTE — CONSULTS
Comprehensive Nutrition Assessment    Type and Reason for Visit:  Initial, Consult    Nutrition Recommendations/Plan:   Continue current POC     Malnutrition Assessment:  Malnutrition Status:  No malnutrition (11/21/22 1414)    Context:  Acute Illness     Findings of the 6 clinical characteristics of malnutrition:  Energy Intake:  No significant decrease in energy intake  Weight Loss:  No significant weight loss     Body Fat Loss:  No significant body fat loss     Muscle Mass Loss:  No significant muscle mass loss    Fluid Accumulation:  Mild Generalized   Strength:  Not Performed    Nutrition Assessment:    Received consult for CHF education. Pt is already following CHF dietary practices, with avoiding salt shaker, no high sodium foods (My wife doesn't bring any into the house.)    Nutrition Related Findings:    pro-BNP 1901 Wound Type: None       Current Nutrition Intake & Therapies:    Average Meal Intake: %  Average Supplements Intake: None Ordered  ADULT DIET; Regular; 4 carb choices (60 gm/meal); Low Fat/Low Chol/High Fiber/2 gm Na; Low Sodium (2 gm)    Anthropometric Measures:  Height: 5' 11\" (180.3 cm)  Ideal Body Weight (IBW): 172 lbs (78 kg)    Admission Body Weight: 250 lb (113.4 kg)  Current Body Weight: 246 lb 8 oz (111.8 kg), 143.3 % IBW. Current BMI (kg/m2): 34.4  Usual Body Weight: 248 lb 14.4 oz (112.9 kg) (8/2022)  % Weight Change (Calculated): -1  BMI Categories: Obese Class 1 (BMI 30.0-34. 9)    Nutrition Diagnosis:   Altered nutrition-related lab values related to cardiac dysfunction as evidenced by lab values    Nutrition Interventions:   Food and/or Nutrient Delivery: Continue Current Diet  Nutrition Education/Counseling: Education not indicated  Coordination of Nutrition Care: Continue to monitor while inpatient  Plan of Care discussed with: pt and wife    Goals:     Goals: Meet at least 75% of estimated needs, PO intake 50% or greater       Nutrition Monitoring and Evaluation: Behavioral-Environmental Outcomes: None Identified  Food/Nutrient Intake Outcomes: Food and Nutrient Intake  Physical Signs/Symptoms Outcomes: Biochemical Data, Fluid Status or Edema, Weight    Discharge Planning:    Continue current diet     Melanie Sandhu MS, RD, LD  Contact: [phone]

## 2022-11-21 NOTE — PLAN OF CARE
EP Sign Out:    Acute on Chronic Combined CHF and a history of CKD 4. The labs appear generally normal. The patient presented with dyspnea of a week's duration and leg swelling. The had a saturation of 70%s on room air on arrival, they do not use home O2. They are saturating 92% on 2 LPM currently. The are status post 1mg Bumex IV in the ED. They are supposed ot be on 1mg PO Daily, but as per swelling had been taking it BID for the last days.       Preliminary Plans: (ordered)    Acute Hypoxemic Respiratory Failure due to Acute on Chronic Combined CHF: will need ACS R/O as well  Tele  Admit to cardiac walker as inpatient status patient  Cardio consult in AM  Trend TnI  Mag, Phos, TSH, Lipid Panel, HbA1c - will run as add ons to ED labs if able  CBC and BMP with Reflex for following AM  ASA as per protocol (324-325mg chewed STAT unless on 81ASA daily in which case 162mg chewed STAT if not yet given, THEN from following AM 81mg Daily.)  Statin as per protocol (High intensity Statin, if already on high intensity Stain of Simvasttain 80 continue it, if Lipitor 40+ then Lipitor 80 (if less than 40 just double so long as >=40), if Rosuvastatin 20mg+ then Rosuvastatin 40mg PO QHS (if less than 20 just double so long as >=20mg)  NG SL PRN CP  TTE deferred to cardio as per recent study  Strict Is and Os  Daily Weights  Has Entresto already which will be continued  Bumex 1mg IVP BID in place of home 1mg PO QDay   Continue other antihypertensives as per home, all antihypertensives will have holding paramaters    Chronic medical problems:  Continue home regimen as indicated    Supportive and Prophylactic Txx:  DVT PPx: Apixaban as per home  GI (PUD) PPx: not indicated as such but more than covered, is on protonix 40mg PO BID AC as per home regimen (also has a listing for nexium 40mg PO Qday, went with stronger regimen as uncertain which is current)  PT: contraindicated at this time as has an ongoing ACS R/O

## 2022-11-21 NOTE — H&P
Hospitalist: History and Physical    Date: 2022 Time: 1:51 PM    Name: Colette Madrigal : 1955 MRN: [id number]    Code Status: DNR No additional code details    PCP: Gordo Callahan MD    Patient's Chief Complaint Is:    Shortness of breath    HPI: Patient is a 78 y.o. male with CAD with prior stenting, PAD, diabetes mellitus, heart failure with preserved EF on echo earlier this year, CKD stage IV, emphysema, history of COVID-pneumonia, A. fib maintained on amiodarone for rhythm control, presented to the ED due to progressive worsening dyspnea over the past week with some associated orthopnea, PND and increased abdominal and peripheral edema despite daily compliance with oral Bumex. Noted acute hypoxic respiratory failure with initial SPO2 75% on room air, improved on 2 L nasal cannula. Hypoxemia noted with PO2 51. Respiratory PCR was negative. Admitted with decompensated heart failure with volume overload, signs of pulmonary vascular congestion/pulmonary edema on chest x-ray and elevated proBNP. Received IV Bumex 1 mg with good response, already feeling better with less shortness of breath during my evaluation today.       Past Medical History:   Diagnosis Date    Acute kidney failure, unspecified ([address]     Anxiety state, unspecified     Atrial septal aneurysm 2016    Blood circulation, collateral     Body mass index 30.0-30.9, adult     CAD (coronary artery disease) 01/10/2016    1/9/16  lexiscan  Positive for apical MI + myocardial ischemia, EF 42%, intermediate risk findings, AUC indication 16, AUC score 7 1/10/16  Cath  3 lesions in the LAD:  Mid: 70% 2.25x23, mid 90% 2.25 x 28, distal 100% 2.0x28, anterior lateral apical hypokinesis, EF 45%    Calculus of kidney     Carotid artery stenosis 2013    Cellulitis and abscess of hand, except fingers and thumb     Cerebral artery occlusion with cerebral infarction (HCC)     15 years ago    Chronic airway obstruction, not elsewhere classified Chronic kidney disease, unspecified     Congestive heart failure, unspecified     Diabetes mellitus type II     Diverticulosis of colon (without mention of hemorrhage)     Encounter for long-term (current) use of insulin (HCC)     Esophageal reflux     Family history of ischemic heart disease     Gastric ulcer, unspecified as acute or chronic, without mention of hemorrhage, perforation, or obstruction     Hyperlipemia     Hypertension     Internal hemorrhoids without mention of complication     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, or unspecified(403.00)     Obesity, unspecified     Other specified cardiac dysrhythmias(427.89)     Palliative care patient 12/21/2020    Paroxysmal atrial fibrillation (HCC)     Peripheral vascular disease (HCC)     Solitary pulmonary nodule     Surgical or other procedure not carried out because of contraindication     Thoracic aneurysm without mention of rupture     Tobacco use disorder     Type II or unspecified type diabetes mellitus without mention of complication, not stated as uncontrolled      Past Surgical History:   Procedure Laterality Date    CARDIAC CATHETERIZATION      CHOLECYSTECTOMY  2000    CORONARY ANGIOPLASTY WITH STENT PLACEMENT  01/2016    2 JACQUELINE to LAD    DIAGNOSTIC CARDIAC CATH LAB PROCEDURE      URETER STENT PLACEMENT      VASCULAR SURGERY  03- [facility]    Aortogram with bilateral selective renal artery injections    VASCULAR SURGERY  6/14/13 [facility]    Right carotid endarterectomy with Vascu-Guard patch repair. Right cervical carotid arteriograms after endarterectomy. VASCULAR SURGERY  7/6/15 [facility]    Percutaneous cannulation, right common femoral artery, with a5 [place]gas[place] sheath and later 6 Monegasque [facility] sheath. Suprarenal abdomianl aortagram.  Selective right renal arteriogram.  Right renal artery balloon angioplasty;/stent (Express 6 mm x 18mmballoon-expandable stent. . Completion suprarenal abdominal aortogram and selective right remal arteriogram. Mynx closure, right common femoral artery punctur     Family History   Problem Relation Age of Onset    High Blood Pressure Mother     Other Mother         COVID    High Blood Pressure Father     Cancer Father     High Blood Pressure Brother     No Known Problems Son     No Known Problems Daughter      Social History     Socioeconomic History    Marital status:      Spouse name: patient refused to answer    Number of children: 2    Years of education: Not on file    Highest education level: Not on file   Occupational History    Occupation: patient refused to answer   Tobacco Use    Smoking status: Former     Packs/day: 0.25     Years: 7.00     Pack years: 1.75     Types: Cigarettes     Start date: 66     Quit date:      Years since quittin.9    Smokeless tobacco: Current     Types: Chew    Tobacco comments:     Used both for 5 years   Vaping Use    Vaping Use: Never used   Substance and Sexual Activity    Alcohol use: Not Currently     Comment: \"when i was tyoung i did, if i wanted to drink ten beers i drank ten beers\"    Drug use: Never    Sexual activity: Yes     Comment: has 2 children   Other Topics Concern    Not on file   Social History Narrative    CODE STATUS: DNR    HEALTH CARE PROXY: patient refused to answer    AMBULATES: patient refused to answer    DOMICILED: patient refused to answer     Social Determinants of Health     Financial Resource Strain: Not on file   Food Insecurity: Not on file   Transportation Needs: Not on file   Physical Activity: Not on file   Stress: Not on file   Social Connections: Not on file   Intimate Partner Violence: Not on file   Housing Stability: Not on file     Allergies   Allergen Reactions    Morphine Shortness Of Breath     Was INTUBATED for this    Hydralazine      \"They said I am, I really don't know\"     Prior to Admission medications    Medication Sig Start Date End Date Taking?  Authorizing Provider   albuterol sulfate (PROAIR RESPICLICK) 240 (90 Base) MCG/ACT aerosol powder inhalation Inhale into the lungs every 4 hours as needed for Wheezing or Shortness of Breath    Historical Provider, MD   esomeprazole Magnesium (NEXIUM) 40 MG PACK Take 40 mg by mouth daily    Historical Provider, MD   Cholecalciferol (VITAMIN D3) 50 MCG (2000 UT) CAPS Take by mouth daily    Historical Provider, MD   sacubitril-valsartan (ENTRESTO) 49-51 MG per tablet Take 1 tablet by mouth 2 times daily    Historical Provider, MD   rosuvastatin (CRESTOR) 40 MG tablet Take 1 tablet by mouth daily Take 1 tablet by mouth daily 10/21/22 11/21/22  Jennifer Clay MD   amiodarone (CORDARONE) 200 MG tablet Take 1 tablet by mouth daily 10/21/22 11/21/22  Jennifer Clay MD   isosorbide mononitrate (IMDUR) 60 MG extended release tablet TAKE ONE TABLET BY MOUTH TWICE A DAY 10/11/22   Barrera Morley Formica, APRN   minoxidil (LONITEN) 2.5 MG tablet TAKE ONE TABLET BY MOUTH TWICE A DAY 10/11/22   Columbia Pellet, APRN   gabapentin (NEURONTIN) 100 MG capsule Take 1 capsule by mouth 2 times daily for 7 days.  8/19/22 11/21/22  Alex Larkin MD   NIFEdipine (PROCARDIA XL) 60 MG extended release tablet Take 1 tablet by mouth daily 8/20/22   Alex Larkin MD   ELIQUIS 5 MG TABS tablet TAKE ONE TABLET BY MOUTH TWICE A DAY 7/6/22   Wally Mohan, APRN - NP   ondansetron (ZOFRAN ODT) 4 MG disintegrating tablet Take 1 tablet by mouth every 8 hours as needed for Nausea or Vomiting 11/15/21   Hernandez Anderson MD   bumetanide (BUMEX) 1 MG tablet Take 1 tablet by mouth 2 times daily 11/7/21   Lala Tee MD   clopidogrel (PLAVIX) 75 MG tablet TAKE ONE TABLET BY MOUTH EVERY DAY 10/13/21   Federico Santillan MD   carvedilol (COREG) 12.5 MG tablet Take 1 tablet by mouth 2 times daily 5/14/21   Federico Santillan MD   potassium chloride (KLOR-CON M) 20 MEQ extended release tablet Take 1 tablet by mouth daily (with breakfast)  Patient taking differently: Take 20 mEq by mouth 2 times daily 5/14/21   Peter Alfredo MD   pantoprazole (PROTONIX) 40 MG tablet Take 1 tablet by mouth 2 times daily (before meals) 5/14/21   Peter Alfredo MD   ascorbic acid (VITAMIN C) 500 MG tablet Take 1 tablet by mouth daily 5/14/21   Peter Alfredo MD   calcitRIOL (ROCALTROL) 0.25 MCG capsule Take 0.25 mcg by mouth daily 5/3/21   Historical Provider, MD   senna-docusate (Janora Harish) 8.6-50 MG per tablet Take 1 tablet by mouth nightly    Historical Provider, MD   allopurinol (ZYLOPRIM) 100 MG tablet Take 200 mg by mouth daily    Historical Provider, MD   aspirin 81 MG chewable tablet Take 1 tablet by mouth daily 12/24/20   Gay Guzman MD   nitroGLYCERIN (NITROSTAT) 0.4 MG SL tablet up to max of 3 total doses. If no relief after 1 dose, call 911. 10/20/20   Sharyle Centers Ward, DO   NOVOLOG FLEXPEN 100 UNIT/ML injection pen 5 Units 3 times daily (before meals)  6/1/20   Historical Provider, MD   montelukast (SINGULAIR) 10 MG tablet Take 10 mg by mouth daily  Patient not taking: No sig reported    Historical Provider, MD   levocetirizine (XYZAL) 5 MG tablet Take 5 mg by mouth nightly    Historical Provider, MD   ULTICARE MINI PEN NEEDLES 31G X 6 MM MISC FOR USE WITH LANTUS TWO TIMES A DAY 7/11/14   DWAYNE Talamantes CNP   LANTUS SOLOSTAR 100 UNIT/ML injection pen INJECT 30 UNITS IN THE MORNING AND 20 UNITS IN THE EVENING DAILY 7/1/14   DWAYNE Talamantes CNP       I have reviewed all pertinent history. Prior medical records and laboratory evaluation reviewed. Imaging independently reviewed. Review of Systems:   As per HPI, otherwise all other ROS performed and found to be negative at this time. Physical Exam:  Vitals:    11/21/22 1136   BP: (!) 148/70   Pulse: 56   Resp: 18   Temp: (!) 96.6 °F (35.9 °C)   SpO2: 91%     CONSTITUTIONAL: Resting in no acute distress  PSYCH: Judgement and insight are normal. Mental status alert and oriented to person, place and time.  Mood and affect are normal  EYES: Symmetrical.  No scleral icterus  ENT: No deformities  NECK: Trachea is midline. Head: NC/AT  LUNGS: Diminished breath sounds bilaterally with faint bilateral crackles, no wheezes, no rhonchi, no accessory muscle use  CARDIOVASCULAR: Normal rate with regular rhythm, S1-S2 present  GI/ABDOMEN: Distended abdomen, nontender, bowel sounds present  SKIN: Warm and dry. No rashes  NEUROLOGICAL: No focal deficits.   Normal speech  EXTREMITIES: Significant bilateral lower extremity pitting edema    Labs:   Recent Results (from the past 72 hour(s))   Respiratory Panel, Molecular, with COVID-19 (Restricted: peds pts or suitable admitted adults)    Collection Time: 11/21/22  4:16 AM    Specimen: Nasopharyngeal   Result Value Ref Range    Adenovirus by PCR Not Detected Not Detected    Bordetella parapertussis by PCR Not Detected Not Detected    Bordetella pertussis by PCR Not Detected Not Detected    Chlamydophilia pneumoniae by PCR Not Detected Not Detected    Coronavirus 229E by PCR Not Detected Not Detected    Coronavirus HKU1 by PCR Not Detected Not Detected    Coronavirus NL63 by PCR Not Detected Not Detected    Coronavirus OC43 by PCR Not Detected Not Detected    SARS-CoV-2, PCR Not Detected Not Detected    Human Metapneumovirus by PCR Not Detected Not Detected    Human Rhinovirus/Enterovirus by PCR Not Detected Not Detected    Influenza A by PCR Not Detected Not Detected    Influenza B by PCR Not Detected Not Detected    Mycoplasma pneumoniae by PCR Not Detected Not Detected    Parainfluenza Virus 1 by PCR Not Detected Not Detected    Parainfluenza Virus 2 by PCR Not Detected Not Detected    Parainfluenza Virus 3 by PCR Not Detected Not Detected    Parainfluenza Virus 4 by PCR Not Detected Not Detected    Respiratory Syncytial Virus by PCR Not Detected Not Detected   CBC with Auto Differential    Collection Time: 11/21/22  4:17 AM   Result Value Ref Range    WBC 6.9 4.8 - 10.8 K/uL    RBC 4.87 4.70 - 6.10 M/uL    Hemoglobin 12.7 (L) 14.0 - 18.0 g/dL    Hematocrit 42.9 42.0 - 52.0 %    MCV 88.1 80.0 - 94.0 fL    MCH 26.1 (L) 27.0 - 31.0 pg    MCHC 29.6 (L) 33.0 - 37.0 g/dL    RDW 20.1 (H) 11.5 - 14.5 %    Platelets 607 127 - 073 K/uL    MPV 10.1 9.4 - 12.4 fL    Neutrophils % 69.5 (H) 50.0 - 65.0 %    Lymphocytes % 19.4 (L) 20.0 - 40.0 %    Monocytes % 6.9 0.0 - 10.0 %    Eosinophils % 3.0 0.0 - 5.0 %    Basophils % 1.2 (H) 0.0 - 1.0 %    Neutrophils Absolute 4.8 1.5 - 7.5 K/uL    Immature Granulocytes # 0.0 K/uL    Lymphocytes Absolute 1.3 1.1 - 4.5 K/uL    Monocytes Absolute 0.50 0.00 - 0.90 K/uL    Eosinophils Absolute 0.20 0.00 - 0.60 K/uL    Basophils Absolute 0.10 0.00 - 0.20 K/uL   Hemoglobin A1C    Collection Time: 11/21/22  4:17 AM   Result Value Ref Range    Hemoglobin A1C 7.0 (H) 4.0 - 6.0 %   BLOOD GAS, ARTERIAL    Collection Time: 11/21/22  4:21 AM   Result Value Ref Range    pH, Arterial 7.440 7.350 - 7.450    pCO2, Arterial 46.0 (H) 35.0 - 45.0 mmHg    pO2, Arterial 51.0 (L) 80.0 - 100.0 mmHg    HCO3, Arterial 31.2 (H) 22.0 - 26.0 mmol/L    Base Excess, Arterial 6.1 (H) -2.0 - 2.0 mmol/L    Hemoglobin, Art, Extended 12.8 (L) 14.0 - 18.0 g/dL    O2 Sat, Arterial 86.7 (LL) >92 %    Carboxyhgb, Arterial 2.4 0.0 - 5.0 %    Methemoglobin, Arterial 0.5 <1.5 %    O2 Content, Arterial 15.6 Not Established mL/dL    O2 Therapy Unknown     Oxygen Flow 2.0     O2 Delivery Device Cannula     Zane Test POS     Sample Source LR     Mechanical Rate in bpm 22     Potassium, Whole Blood 3.7    SPECIMEN REJECTION    Collection Time: 11/21/22  4:47 AM   Result Value Ref Range    Rejected Test cmp bnp     Reason for Rejection see below    Comprehensive Metabolic Panel    Collection Time: 11/21/22  4:53 AM   Result Value Ref Range    Sodium 141 136 - 145 mmol/L    Potassium 4.1 3.5 - 5.0 mmol/L    Chloride 101 98 - 111 mmol/L    CO2 30 (H) 22 - 29 mmol/L    Anion Gap 10 7 - 19 mmol/L    Glucose 120 (H) 74 - 109 mg/dL    BUN 28 (H) 8 - 23 mg/dL    Creatinine 2.6 (H) 0.5 - 1.2 mg/dL    Est, Glom Filt Rate 26 (A) >60    Calcium 9.5 8.8 - 10.2 mg/dL    Total Protein 6.5 (L) 6.6 - 8.7 g/dL    Albumin 4.4 3.5 - 5.2 g/dL    Total Bilirubin 0.6 0.2 - 1.2 mg/dL    Alkaline Phosphatase 82 40 - 130 U/L    ALT 13 5 - 41 U/L    AST 10 5 - 40 U/L   Brain Natriuretic Peptide    Collection Time: 11/21/22  4:53 AM   Result Value Ref Range    Pro-BNP 1,901 (H) 0 - 900 pg/mL   TSH with Reflex to FT4    Collection Time: 11/21/22  4:53 AM   Result Value Ref Range    TSH Reflex FT4 2.58 0.35 - 5.50 uIU/mL   Lipid Panel    Collection Time: 11/21/22  4:53 AM   Result Value Ref Range    Cholesterol, Total 93 (L) 160 - 199 mg/dL    Triglycerides 69 0 - 149 mg/dL    HDL 39 (L) 55 - 121 mg/dL    LDL Calculated 40 <100 mg/dL   Magnesium    Collection Time: 11/21/22  4:53 AM   Result Value Ref Range    Magnesium 1.9 1.6 - 2.4 mg/dL   Phosphorus    Collection Time: 11/21/22  4:53 AM   Result Value Ref Range    Phosphorus 3.7 2.5 - 4.5 mg/dL   Troponin    Collection Time: 11/21/22  4:53 AM   Result Value Ref Range    Troponin 0.04 (H) 0.00 - 0.03 ng/mL   POCT Glucose    Collection Time: 11/21/22  8:01 AM   Result Value Ref Range    POC Glucose 101 (H) 70 - 99 mg/dl    Performed on AccuChek    Troponin    Collection Time: 11/21/22  8:09 AM   Result Value Ref Range    Troponin 0.03 0.00 - 0.03 ng/mL   POCT Glucose    Collection Time: 11/21/22 10:58 AM   Result Value Ref Range    POC Glucose 136 (H) 70 - 99 mg/dl    Performed on [employer]k        Imaging: XR CHEST PORTABLE    Result Date: 11/21/2022  Patient: Mercedes Malcolm  Time Out: 06:59Exam(s): FILM CXR 1 VIEW EXAM:  XR Chest, 1 ViewCLINICAL HISTORY: soa. TECHNIQUE:  Frontal view of the chest.COMPARISON:  8/16/22FINDINGS:  Lungs:  Mild diffuse interstitial opacities throughout both lungs. Pleural space:  Unremarkable. No pneumothorax. Heart:  Cardiomegaly. Mediastinum:  Unremarkable. Bones/joints:  Unremarkable. Vasculature:  Calcified aorta. Mild CHF. Electronically signed by Arturo Tripathi M.D. on 11-21-22 at [id number]      Assessment/Plan:     Principal Problem:    Acute on chronic diastolic CHF (congestive heart failure) ([address]  Active Problems:    CKD (chronic kidney disease) stage 4, GFR 15-29 ml/min ([facility])  Resolved Problems:    * No resolved hospital problems.  *       Acute on chronic heart failure, preserved EF on last echo this year  Monitor telemetry  Serial troponin without significant uptrend  Continue IV Bumex at 1 mg twice daily as long as having excellent urine output, need to continue IV diuretics given significant abdominal/peripheral edema  Continue to follow clinically, when ready to transition to oral will likely need to increase back to 2 mg of Bumex  Continue heart failure regimen otherwise on Coreg and Entresto    CKD stage IV  Avoid nephrotoxins, monitor renal function with management as above    ACharles spaulding, currently rate controlled  Has been maintained on amiodarone for rhythm control long-term however given his uncontrolled emphysema, possible fibrosis consideration could be given to alternative, defer to cardiology  Anticoagulated on Eliquis    Emphysema, history of COVID-pneumonia, progressive worsening lung disease since COVID-pneumonia over a year ago  Not adequately controlled with frequent use of as needed albuterol  Add maintenance bronchodilators with Atrovent, Brovana and Pulmicort while inpatient  Plan to step up therapy with maintenance inhaler at discharge  Needs outpatient PFTs and referral to pulmonology    Hypertension  Continue home antihypertensive regimen, monitor BP with further adjustment as needed pending clinical course    Diabetes mellitus  Goal blood glucose 100-180, continue basal insulin and sliding scale correction as warranted  Avoid hypoglycemia    CAD with prior stenting  PAD  Continue home antiplatelet therapy, statin      Anticoagulated on Eliquis        Allyson Luther, MD  11/21/2022 1:51 PM

## 2022-11-21 NOTE — PROGRESS NOTES
4 Eyes Skin Assessment    Pascual Mojica is being assessed upon: Admission    I agree that Evangelina Campa, RN, along with Jammie Sarmiento RN (either 2 RN's or 1 LPN and 1 RN) have performed a thorough Head to Toe Skin Assessment on the patient. ALL assessment sites listed below have been assessed. Areas assessed by both nurses:     [x]   Head, Face, and Ears   [x]   Shoulders, Back, and Chest  [x]   Arms, Elbows, and Hands   [x]   Coccyx, Sacrum, and Ischium  [x]   Legs, Feet, and Heels    Does the Patient have Skin Breakdown?  No    Jose C Prevention initiated: No  Wound Care Orders initiated: No    WOC nurse consulted for Pressure Injury (Stage 3,4, Unstageable, DTI, NWPT, and Complex wounds) and New or Established Ostomies: No        Primary Nurse eSignature: Elfego Austin RN on 11/21/2022 at 12:04 PM      Co-Signer eSignature: Electronically signed by Jammie Sarmiento RN on 11/21/22 at 1:48 PM CST

## 2022-11-22 LAB
ANION GAP SERPL CALCULATED.3IONS-SCNC: 11 MMOL/L (ref 7–19)
BASOPHILS ABSOLUTE: 0.1 K/UL (ref 0–0.2)
BASOPHILS RELATIVE PERCENT: 1.1 % (ref 0–1)
BUN BLDV-MCNC: 24 MG/DL (ref 8–23)
CALCIUM SERPL-MCNC: 8.9 MG/DL (ref 8.8–10.2)
CHLORIDE BLD-SCNC: 102 MMOL/L (ref 98–111)
CO2: 30 MMOL/L (ref 22–29)
CREAT SERPL-MCNC: 2.5 MG/DL (ref 0.5–1.2)
EOSINOPHILS ABSOLUTE: 0.2 K/UL (ref 0–0.6)
EOSINOPHILS RELATIVE PERCENT: 3.5 % (ref 0–5)
GFR SERPL CREATININE-BSD FRML MDRD: 28 ML/MIN/{1.73_M2}
GLUCOSE BLD-MCNC: 123 MG/DL (ref 70–99)
GLUCOSE BLD-MCNC: 144 MG/DL (ref 74–109)
GLUCOSE BLD-MCNC: 163 MG/DL (ref 70–99)
GLUCOSE BLD-MCNC: 187 MG/DL (ref 70–99)
GLUCOSE BLD-MCNC: 200 MG/DL (ref 70–99)
GLUCOSE BLD-MCNC: 214 MG/DL (ref 70–99)
HCT VFR BLD CALC: 40.6 % (ref 42–52)
HEMOGLOBIN: 12 G/DL (ref 14–18)
IMMATURE GRANULOCYTES #: 0 K/UL
LYMPHOCYTES ABSOLUTE: 1 K/UL (ref 1.1–4.5)
LYMPHOCYTES RELATIVE PERCENT: 15.5 % (ref 20–40)
MAGNESIUM: 2 MG/DL (ref 1.6–2.4)
MCH RBC QN AUTO: 26.8 PG (ref 27–31)
MCHC RBC AUTO-ENTMCNC: 29.6 G/DL (ref 33–37)
MCV RBC AUTO: 90.8 FL (ref 80–94)
MONOCYTES ABSOLUTE: 0.5 K/UL (ref 0–0.9)
MONOCYTES RELATIVE PERCENT: 7.5 % (ref 0–10)
NEUTROPHILS ABSOLUTE: 4.8 K/UL (ref 1.5–7.5)
NEUTROPHILS RELATIVE PERCENT: 72.1 % (ref 50–65)
PDW BLD-RTO: 20 % (ref 11.5–14.5)
PERFORMED ON: ABNORMAL
PLATELET # BLD: 191 K/UL (ref 130–400)
PMV BLD AUTO: 10.8 FL (ref 9.4–12.4)
POTASSIUM SERPL-SCNC: 4 MMOL/L (ref 3.5–5)
RBC # BLD: 4.47 M/UL (ref 4.7–6.1)
SODIUM BLD-SCNC: 143 MMOL/L (ref 136–145)
WBC # BLD: 6.7 K/UL (ref 4.8–10.8)

## 2022-11-22 PROCEDURE — 94760 N-INVAS EAR/PLS OXIMETRY 1: CPT

## 2022-11-22 PROCEDURE — 94640 AIRWAY INHALATION TREATMENT: CPT

## 2022-11-22 PROCEDURE — 2580000003 HC RX 258: Performed by: HOSPITALIST

## 2022-11-22 PROCEDURE — 6360000002 HC RX W HCPCS: Performed by: STUDENT IN AN ORGANIZED HEALTH CARE EDUCATION/TRAINING PROGRAM

## 2022-11-22 PROCEDURE — 94618 PULMONARY STRESS TESTING: CPT

## 2022-11-22 PROCEDURE — 36415 COLL VENOUS BLD VENIPUNCTURE: CPT

## 2022-11-22 PROCEDURE — 6370000000 HC RX 637 (ALT 250 FOR IP): Performed by: STUDENT IN AN ORGANIZED HEALTH CARE EDUCATION/TRAINING PROGRAM

## 2022-11-22 PROCEDURE — 80048 BASIC METABOLIC PNL TOTAL CA: CPT

## 2022-11-22 PROCEDURE — 2500000003 HC RX 250 WO HCPCS: Performed by: STUDENT IN AN ORGANIZED HEALTH CARE EDUCATION/TRAINING PROGRAM

## 2022-11-22 PROCEDURE — 99223 1ST HOSP IP/OBS HIGH 75: CPT | Performed by: INTERNAL MEDICINE

## 2022-11-22 PROCEDURE — 2140000000 HC CCU INTERMEDIATE R&B

## 2022-11-22 PROCEDURE — 85025 COMPLETE CBC W/AUTO DIFF WBC: CPT

## 2022-11-22 PROCEDURE — 82947 ASSAY GLUCOSE BLOOD QUANT: CPT

## 2022-11-22 PROCEDURE — 2700000000 HC OXYGEN THERAPY PER DAY

## 2022-11-22 PROCEDURE — 83735 ASSAY OF MAGNESIUM: CPT

## 2022-11-22 PROCEDURE — 6370000000 HC RX 637 (ALT 250 FOR IP): Performed by: HOSPITALIST

## 2022-11-22 RX ORDER — BUMETANIDE 1 MG/1
2 TABLET ORAL 2 TIMES DAILY
Status: DISCONTINUED | OUTPATIENT
Start: 2022-11-23 | End: 2022-11-23 | Stop reason: HOSPADM

## 2022-11-22 RX ADMIN — APIXABAN 5 MG: 5 TABLET, FILM COATED ORAL at 21:27

## 2022-11-22 RX ADMIN — SODIUM CHLORIDE, PRESERVATIVE FREE 10 ML: 5 INJECTION INTRAVENOUS at 08:31

## 2022-11-22 RX ADMIN — APIXABAN 5 MG: 5 TABLET, FILM COATED ORAL at 08:28

## 2022-11-22 RX ADMIN — PANTOPRAZOLE SODIUM 40 MG: 40 TABLET, DELAYED RELEASE ORAL at 06:00

## 2022-11-22 RX ADMIN — BUMETANIDE 1 MG: 0.25 INJECTION INTRAMUSCULAR; INTRAVENOUS at 08:31

## 2022-11-22 RX ADMIN — PANTOPRAZOLE SODIUM 40 MG: 40 TABLET, DELAYED RELEASE ORAL at 16:01

## 2022-11-22 RX ADMIN — ALLOPURINOL 200 MG: 100 TABLET ORAL at 08:28

## 2022-11-22 RX ADMIN — NIFEDIPINE 60 MG: 60 TABLET, EXTENDED RELEASE ORAL at 08:29

## 2022-11-22 RX ADMIN — CARVEDILOL 12.5 MG: 12.5 TABLET, FILM COATED ORAL at 21:27

## 2022-11-22 RX ADMIN — MINOXIDIL 2.5 MG: 2.5 TABLET ORAL at 08:28

## 2022-11-22 RX ADMIN — IPRATROPIUM BROMIDE 0.5 MG: 0.5 SOLUTION RESPIRATORY (INHALATION) at 06:41

## 2022-11-22 RX ADMIN — BUMETANIDE 1 MG: 0.25 INJECTION INTRAMUSCULAR; INTRAVENOUS at 18:01

## 2022-11-22 RX ADMIN — CARVEDILOL 12.5 MG: 12.5 TABLET, FILM COATED ORAL at 08:29

## 2022-11-22 RX ADMIN — CLOPIDOGREL BISULFATE 75 MG: 75 TABLET ORAL at 08:29

## 2022-11-22 RX ADMIN — ASPIRIN 81 MG 81 MG: 81 TABLET ORAL at 08:29

## 2022-11-22 RX ADMIN — CETIRIZINE HYDROCHLORIDE 10 MG: 10 TABLET, FILM COATED ORAL at 08:29

## 2022-11-22 RX ADMIN — SACUBITRIL AND VALSARTAN 1 TABLET: 49; 51 TABLET, FILM COATED ORAL at 08:31

## 2022-11-22 RX ADMIN — IPRATROPIUM BROMIDE 0.5 MG: 0.5 SOLUTION RESPIRATORY (INHALATION) at 18:29

## 2022-11-22 RX ADMIN — CALCITRIOL CAPSULES 0.25 MCG 0.25 MCG: 0.25 CAPSULE ORAL at 08:29

## 2022-11-22 RX ADMIN — AMIODARONE HYDROCHLORIDE 200 MG: 200 TABLET ORAL at 08:29

## 2022-11-22 RX ADMIN — ISOSORBIDE MONONITRATE 60 MG: 60 TABLET, EXTENDED RELEASE ORAL at 08:29

## 2022-11-22 RX ADMIN — ARFORMOTEROL TARTRATE 15 MCG: 15 SOLUTION RESPIRATORY (INHALATION) at 18:29

## 2022-11-22 RX ADMIN — Medication 500 MG: at 08:28

## 2022-11-22 RX ADMIN — SODIUM CHLORIDE, PRESERVATIVE FREE 10 ML: 5 INJECTION INTRAVENOUS at 21:27

## 2022-11-22 RX ADMIN — Medication 2000 UNITS: at 08:28

## 2022-11-22 RX ADMIN — BUDESONIDE INHALATION SUSPENSION 500 MCG: 0.5 SUSPENSION RESPIRATORY (INHALATION) at 18:29

## 2022-11-22 RX ADMIN — ISOSORBIDE MONONITRATE 60 MG: 60 TABLET, EXTENDED RELEASE ORAL at 21:27

## 2022-11-22 RX ADMIN — BUDESONIDE INHALATION SUSPENSION 500 MCG: 0.5 SUSPENSION RESPIRATORY (INHALATION) at 06:41

## 2022-11-22 RX ADMIN — MINOXIDIL 2.5 MG: 2.5 TABLET ORAL at 21:27

## 2022-11-22 RX ADMIN — ARFORMOTEROL TARTRATE 15 MCG: 15 SOLUTION RESPIRATORY (INHALATION) at 06:41

## 2022-11-22 RX ADMIN — SENNOSIDES AND DOCUSATE SODIUM 1 TABLET: 50; 8.6 TABLET ORAL at 21:26

## 2022-11-22 RX ADMIN — ROSUVASTATIN CALCIUM 40 MG: 20 TABLET, COATED ORAL at 21:27

## 2022-11-22 RX ADMIN — INSULIN LISPRO 2 UNITS: 100 INJECTION, SOLUTION INTRAVENOUS; SUBCUTANEOUS at 18:01

## 2022-11-22 RX ADMIN — IPRATROPIUM BROMIDE 0.5 MG: 0.5 SOLUTION RESPIRATORY (INHALATION) at 14:36

## 2022-11-22 RX ADMIN — SACUBITRIL AND VALSARTAN 1 TABLET: 49; 51 TABLET, FILM COATED ORAL at 21:26

## 2022-11-22 ASSESSMENT — ENCOUNTER SYMPTOMS
DIARRHEA: 0
VOMITING: 0
GASTROINTESTINAL NEGATIVE: 1
SHORTNESS OF BREATH: 0
RESPIRATORY NEGATIVE: 1
EYES NEGATIVE: 1
NAUSEA: 0

## 2022-11-22 NOTE — PROGRESS NOTES
Physician Progress Note      PATIENT:               Noah Krishnamurthy  CSN #:                  [id number]  :                       1955  ADMIT DATE:       2022 4:05 AM  DISCH DATE:  RESPONDING  PROVIDER #:        Elan Kate MD          QUERY TEXT:    Patient admitted with acute on chronic CHF, noted to have paroxysmal atrial   fibrillation and is maintained on Eliquis. If possible, please document in   progress notes and discharge summary if you are evaluating and/or treating any   of the following: The medical record reflects the following:  Risk Factors: HTN, CHF, DM, PVD, PAF  Clinical Indicators: HTN, CHF, DM, PVD, PAF  Treatment: Eliquis 5 mg BID  Options provided:  -- Secondary hypercoagulable state in a patient with atrial fibrillation  -- Other - I will add my own diagnosis  -- Disagree - Not applicable / Not valid  -- Disagree - Clinically unable to determine / Unknown  -- Refer to Clinical Documentation Reviewer    PROVIDER RESPONSE TEXT:    This patient has secondary hypercoagulable state in a patient with atrial   fibrillation.     Query created by: Samy Renteria on 2022 12:41 PM      Electronically signed by:  Elan Kate MD 2022 3:01 PM

## 2022-11-22 NOTE — PROGRESS NOTES
Daily Progress Note    Date:2022  Patient: Luis Eduardo Ramírez  : 1955  [facility] [place]:[id number]  CODE:DNR No additional code details  Veronika Mccoy MD    Admit Date: 2022  4:05 AM   LOS: 1 day       Subjective:   No acute events noted overnight. He feels better today, less short of breath. Some improvement in peripheral edema. Has had excellent urine output on current diuretic regimen. No chest pain or palpitations. Summary: 71-year-old male with CAD with prior stenting, PAD, diabetes mellitus, heart failure with preserved EF on echo earlier this year, CKD stage IV, emphysema, history of COVID-pneumonia, A. fib maintained on amiodarone for rhythm control, admitted with recurrent episode of decompensated heart failure after presenting with progressive worsening dyspnea and increased abdominal and peripheral edema for over a week despite daily compliance on current oral Bumex regimen (taking 1 mg twice daily). Noted pulmonary vascular congestion/pulmonary edema on chest x-ray and elevated proBNP. Noted to have hypoxic respiratory failure with SPO2 75% on room air, improved on 2 L nasal cannula. Hypoxemia with PO2 51. Respiratory PCR negative. He clinically improved over hospital course on IV Bumex.         Review of Systems  14 point review of systems completed and is negative except as otherwise noted      Objective:      Vital signs in last 24 hours:  Patient Vitals for the past 24 hrs:   BP Temp Temp src Pulse Resp SpO2   22 1200 (!) 158/76 97.3 °F (36.3 °C) Temporal 55 18 90 %   22 0830 -- -- -- 62 -- --   22 0641 -- -- -- -- -- 96 %   22 0549 133/65 98.4 °F (36.9 °C) Temporal 50 18 91 %   22 0009 (!) 145/81 97.8 °F (36.6 °C) Temporal 56 18 92 %   22 2018 (!) 145/71 97.9 °F (36.6 °C) Temporal 63 18 --   22 1919 -- -- -- -- -- 92 %   22 1708 (!) 144/68 96.8 °F (36 °C) Temporal 60 18 91 %     Physical exam    General: No acute distress  HEENT: NC/AT, normal sclera  Cardiovascular: Normal rate, S1-S2 present  Respiratory: Diminished but no wheezes or rhonchi, no increased work of breathing or use of accessory muscles  Abdomen: Mildly distended, nontender, bowel sounds present  Neurologic: Alert, oriented, normal speech  Musculoskeletal: Normal tone  Extremities: Bilateral lower extremity edema present, no clubbing or cyanosis  Skin: Warm and dry, well perfused      Lab Review   Recent Results (from the past 24 hour(s))   POCT Glucose    Collection Time: 11/21/22  4:55 PM   Result Value Ref Range    POC Glucose 162 (H) 70 - 99 mg/dl    Performed on AccuChek    POCT Glucose    Collection Time: 11/21/22  8:17 PM   Result Value Ref Range    POC Glucose 198 (H) 70 - 99 mg/dl    Performed on AccuChek    Basic Metabolic Panel    Collection Time: 11/22/22  1:58 AM   Result Value Ref Range    Sodium 143 136 - 145 mmol/L    Potassium 4.0 3.5 - 5.0 mmol/L    Chloride 102 98 - 111 mmol/L    CO2 30 (H) 22 - 29 mmol/L    Anion Gap 11 7 - 19 mmol/L    Glucose 144 (H) 74 - 109 mg/dL    BUN 24 (H) 8 - 23 mg/dL    Creatinine 2.5 (H) 0.5 - 1.2 mg/dL    Est, Glom Filt Rate 28 (A) >60    Calcium 8.9 8.8 - 10.2 mg/dL   Magnesium    Collection Time: 11/22/22  1:58 AM   Result Value Ref Range    Magnesium 2.0 1.6 - 2.4 mg/dL   CBC with Auto Differential    Collection Time: 11/22/22  1:58 AM   Result Value Ref Range    WBC 6.7 4.8 - 10.8 K/uL    RBC 4.47 (L) 4.70 - 6.10 M/uL    Hemoglobin 12.0 (L) 14.0 - 18.0 g/dL    Hematocrit 40.6 (L) 42.0 - 52.0 %    MCV 90.8 80.0 - 94.0 fL    MCH 26.8 (L) 27.0 - 31.0 pg    MCHC 29.6 (L) 33.0 - 37.0 g/dL    RDW 20.0 (H) 11.5 - 14.5 %    Platelets 995 174 - 676 K/uL    MPV 10.8 9.4 - 12.4 fL    Neutrophils % 72.1 (H) 50.0 - 65.0 %    Lymphocytes % 15.5 (L) 20.0 - 40.0 %    Monocytes % 7.5 0.0 - 10.0 %    Eosinophils % 3.5 0.0 - 5.0 %    Basophils % 1.1 (H) 0.0 - 1.0 %    Neutrophils Absolute 4.8 1.5 - 7.5 K/uL    Immature Granulocytes # 0.0 K/uL Lymphocytes Absolute 1.0 (L) 1.1 - 4.5 K/uL    Monocytes Absolute 0.50 0.00 - 0.90 K/uL    Eosinophils Absolute 0.20 0.00 - 0.60 K/uL    Basophils Absolute 0.10 0.00 - 0.20 K/uL   POCT Glucose    Collection Time: 11/22/22  7:26 AM   Result Value Ref Range    POC Glucose 123 (H) 70 - 99 mg/dl    Performed on AccuChek    POCT Glucose    Collection Time: 11/22/22 12:22 PM   Result Value Ref Range    POC Glucose 163 (H) 70 - 99 mg/dl    Performed on AccuChek        I/O last 3 completed shifts: In: 720 [P.O.:720]  Out: 1825 [OTVFV:[id number]]  I/O this shift:   In: 720 [P.O.:720]  Out: 750 [Urine:750]      Current Facility-Administered Medications:     [START ON 11/23/2022] bumetanide (BUMEX) tablet 2 mg, 2 mg, Oral, BID, Daryl Santana MD    albuterol (PROVENTIL) nebulizer solution 2.5 mg, 2.5 mg, Nebulization, Q4H PRN, Radha Dyson MD    allopurinol (ZYLOPRIM) tablet 200 mg, 200 mg, Oral, Daily, Radha Dyson MD, 200 mg at 11/22/22 9516    amiodarone (CORDARONE) tablet 200 mg, 200 mg, Oral, Daily, Radha Dyson MD, 200 mg at 11/22/22 5891    vitamin C tablet 500 mg, 500 mg, Oral, Daily, Radha Dyson MD, 500 mg at 11/22/22 2830    calcitRIOL (ROCALTROL) capsule 0.25 mcg, 0.25 mcg, Oral, Daily, Radha Dyson MD, 0.25 mcg at 11/22/22 8809    carvedilol (COREG) tablet 12.5 mg, 12.5 mg, Oral, BID, Radha Dyson MD, 12.5 mg at 11/22/22 7729    Vitamin D (CHOLECALCIFEROL) tablet 2,000 Units, 2,000 Units, Oral, Daily, Radha Dyson MD, 2,000 Units at 11/22/22 1331    clopidogrel (PLAVIX) tablet 75 mg, 75 mg, Oral, Daily, Radha Dyson MD, 75 mg at 11/22/22 3130    apixaban (ELIQUIS) tablet 5 mg, 5 mg, Oral, BID, Radha Dyson MD, 5 mg at 11/22/22 5789    isosorbide mononitrate (IMDUR) extended release tablet 60 mg, 60 mg, Oral, BID, Radha Dyson MD, 60 mg at 11/22/22 0829    glucose chewable tablet 16 g, 4 tablet, Oral, PRN, Radha Dyson MD    dextrose bolus 10% 125 mL, 125 mL, IntraVENous, PRN **OR** dextrose bolus 10% 250 mL, 250 mL, IntraVENous, PRN, Medina Mallory MD    glucagon (rDNA) injection 1 mg, 1 mg, SubCUTAneous, PRN, Medina Mallory MD    dextrose 10 % infusion, , IntraVENous, Continuous PRN, Medina Mallory MD    cetirizine (ZYRTEC) tablet 10 mg, 10 mg, Oral, Daily, Medina Mallory MD, 10 mg at 11/22/22 0829    NIFEdipine (PROCARDIA XL) extended release tablet 60 mg, 60 mg, Oral, Daily, Medina Mallory MD, 60 mg at 11/22/22 0829    nitroGLYCERIN (NITROSTAT) SL tablet 0.4 mg, 0.4 mg, SubLINGual, Q5 Min PRN, Medina Mallory MD    rosuvastatin (CRESTOR) tablet 40 mg, 40 mg, Oral, Nightly, Medina Mallory MD, 40 mg at 11/21/22 2034    sacubitril-valsartan (ENTRESTO) 49-51 MG per tablet 1 tablet, 1 tablet, Oral, BID, Medina Mallory MD, 1 tablet at 11/22/22 0831    sennosides-docusate sodium (SENOKOT-S) 8.6-50 MG tablet 1 tablet, 1 tablet, Oral, Nightly, Medina Mallory MD, 1 tablet at 11/21/22 2034    pantoprazole (PROTONIX) tablet 40 mg, 40 mg, Oral, BID AC, Medina Mallory MD, 40 mg at 11/22/22 1601    magnesium sulfate 1000 mg in dextrose 5% 100 mL IVPB, 1,000 mg, IntraVENous, PRN, Medina Mallory MD    potassium chloride (KLOR-CON M) extended release tablet 40 mEq, 40 mEq, Oral, PRN **OR** potassium bicarb-citric acid (EFFER-K) effervescent tablet 40 mEq, 40 mEq, Oral, PRN **OR** potassium chloride 10 mEq/100 mL IVPB (Peripheral Line), 10 mEq, IntraVENous, PRN, Medina Mallory MD    sodium chloride flush 0.9 % injection 5-40 mL, 5-40 mL, IntraVENous, 2 times per day, Medina Mallory MD, 10 mL at 11/22/22 0831    sodium chloride flush 0.9 % injection 5-40 mL, 5-40 mL, IntraVENous, PRN, Medina Mallory MD    0.9 % sodium chloride infusion, , IntraVENous, PRN, Medina Mallory MD    ondansetron (ZOFRAN-ODT) disintegrating tablet 4 mg, 4 mg, Oral, Q8H PRN **OR** ondansetron (ZOFRAN) injection 4 mg, 4 mg, IntraVENous, Q6H PRN, Medina Mallory MD    acetaminophen (TYLENOL) tablet 650 mg, 650 mg, Oral, Q4H PRN **OR** acetaminophen (TYLENOL) suppository 650 mg, 650 mg, Rectal, Q4H PRN, Sara Booker MD    nitroGLYCERIN (NITROSTAT) SL tablet 0.4 mg, 0.4 mg, SubLINGual, Q5 Min PRN, Sara Booker MD    insulin lispro (HUMALOG) injection vial 0-8 Units, 0-8 Units, SubCUTAneous, TID WC, Sara Booker MD    insulin lispro (HUMALOG) injection vial 0-4 Units, 0-4 Units, SubCUTAneous, Nightly, Sara Booker MD    minoxidil (LONITEN) tablet 2.5 mg, 2.5 mg, Oral, BID, Nico Romano MD, 2.5 mg at 11/22/22 7193    aspirin chewable tablet 81 mg, 81 mg, Oral, Daily, Nico Romano MD, 81 mg at 11/22/22 0829    bumetanide (BUMEX) injection 1 mg, 1 mg, IntraVENous, BID, Nico Romano MD, 1 mg at 11/22/22 0831    ipratropium (ATROVENT) 0.02 % nebulizer solution 0.5 mg, 0.5 mg, Nebulization, TID, Nico Romano MD, 0.5 mg at 11/22/22 1436    insulin glargine (LANTUS) injection vial 20 Units, 20 Units, SubCUTAneous, Daily, Nico Romano MD, 20 Units at 11/21/22 1419    Arformoterol Tartrate (BROVANA) nebulizer solution 15 mcg, 15 mcg, Nebulization, BID, Nico Romano MD, 15 mcg at 11/22/22 0641    budesonide (PULMICORT) nebulizer suspension 500 mcg, 0.5 mg, Nebulization, BID, Nico Romano MD, 500 mcg at 11/22/22 2019          Assessment/plan  Principal Problem:    Acute on chronic diastolic CHF (congestive heart failure) (Formerly [facility])  Active Problems:    CKD (chronic kidney disease) stage 4, GFR 15-29 ml/min (Formerly [facility])  Resolved Problems:    * No resolved hospital problems.  *       Acute on chronic heart failure with preserved EF on last echo earlier this year  Volume overload  -Good response to current regimen, continue IV Bumex x2 doses today then anticipate transition to oral Bumex 2 mg twice daily tomorrow  -- Follow I's and O's, daily weights  - Continue current heart failure regimen including Coreg and Entresto    Acute hypoxic respiratory failure, secondary to heart failure and chronic lung disease  - Continue supplemental oxygen as needed to maintain adequate gas exchange  - Monitor pulse ox with ambulation  - Home O2 assessment    Emphysema, history of COVID-pneumonia, progressive worsening lung disease since COVID-pneumonia over a year ago  Not adequately controlled with frequent use of as needed albuterol  Continue maintenance bronchodilators  Plan to step up therapy with maintenance inhaler discharge  Arrange for outpatient PFTs and referral to pulmonology      CKD stage IV  Avoiding nephrotoxins, renal function near baseline, monitor urine output and labs    A. fib  Monitor telemetry  On amiodarone for rhythm control, has been on this long-term however given his uncontrolled emphysema, worsening chronic lung disease consideration could be given to alternative therapy but would defer this to cardiology  Anticoagulate on Eliquis    Hypertension  Continue home antihypertensive regimen, monitor BP with further adjustment as needed pending clinical course    Diabetes mellitus  Controlled on current regimen, continue basal and sliding scale correction as warranted  Avoid hypoglycemia    CAD with prior stenting  PAD  Continue antiplatelet therapy, statin    Anticoagulated on Eliquis      Possible discharge home tomorrow with continued improvement        Allyson Luther MD 11/22/2022 4:05 PM

## 2022-11-22 NOTE — CONSULTS
fibrillation) ([facility] [address]        Left ventricular dysfunction        Mixed hyperlipidemia        Acute on chronic combined systolic and diastolic congestive heart failure (HCC)        Systolic and diastolic CHF, acute on chronic (HCC)        NSTEMI (non-ST elevated myocardial infarction) [facility])           Past Medical History:  Past Medical History:   Diagnosis Date    Acute kidney failure, unspecified (HCC)     Anxiety state, unspecified     Atrial septal aneurysm 01/09/2016    Blood circulation, collateral     Body mass index 30.0-30.9, adult     CAD (coronary artery disease) 01/10/2016    1/9/16  lexiscan  Positive for apical MI + myocardial ischemia, EF 42%, intermediate risk findings, AUC indication 16, AUC score 7 1/10/16  Cath  3 lesions in the LAD:  Mid: 70% 2.25x23, mid 90% 2.25 x 28, distal 100% 2.0x28, anterior lateral apical hypokinesis, EF 45%    Calculus of kidney     Carotid artery stenosis 06/26/2013    Cellulitis and abscess of hand, except fingers and thumb     Cerebral artery occlusion with cerebral infarction (HCC)     15 years ago    Chronic airway obstruction, not elsewhere classified     Chronic kidney disease, unspecified     Congestive heart failure, unspecified     Diabetes mellitus type II     Diverticulosis of colon (without mention of hemorrhage)     Encounter for long-term (current) use of insulin (HCC)     Esophageal reflux     Family history of ischemic heart disease     Gastric ulcer, unspecified as acute or chronic, without mention of hemorrhage, perforation, or obstruction     Hyperlipemia     Hypertension     Internal hemorrhoids without mention of complication     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, or unspecified(403.00)     Obesity, unspecified     Other specified cardiac dysrhythmias(427.89)     Palliative care patient 12/21/2020    Paroxysmal atrial fibrillation ([facility] [address]     Peripheral vascular disease (HCC)     Solitary pulmonary nodule     Surgical or other procedure not carried out because of contraindication     Thoracic aneurysm without mention of rupture     Tobacco use disorder     Type II or unspecified type diabetes mellitus without mention of complication, not stated as uncontrolled         Past Surgical History:  Past Surgical History:   Procedure Laterality Date    CARDIAC CATHETERIZATION      CHOLECYSTECTOMY  2000    CORONARY ANGIOPLASTY WITH STENT PLACEMENT  01/2016    2 JACQUELINE to LAD    DIAGNOSTIC CARDIAC CATH LAB PROCEDURE      URETER STENT PLACEMENT      VASCULAR SURGERY  03- [facility]    Aortogram with bilateral selective renal artery injections    VASCULAR SURGERY  6/14/13 [facility]    Right carotid endarterectomy with Vascu-Guard patch repair. Right cervical carotid arteriograms after endarterectomy. VASCULAR SURGERY  7/6/15 [facility]    Percutaneous cannulation, right common femoral artery, with a5 [place] sheath and later 6 [place] [facility] sheath. Suprarenal abdomianl aortagram.  Selective right renal arteriogram.  Right renal artery balloon angioplasty;/stent (Express 6 mm x 18mmballoon-expandable stent. . Completion suprarenal abdominal aortogram and selective right remal arteriogram. Mynx closure, right common femoral artery punctur       Past Family History:  Family History   Problem Relation Age of Onset    High Blood Pressure Mother     Other Mother         COVID    High Blood Pressure Father     Cancer Father     High Blood Pressure Brother     No Known Problems Son     No Known Problems Daughter        Past Social History:  Social History     Socioeconomic History    Marital status:      Spouse name: patient refused to answer    Number of children: 2    Years of education: Not on file    Highest education level: Not on file   Occupational History    Occupation: patient refused to answer   Tobacco Use    Smoking status: Former     Packs/day: 0.25     Years: 7.00     Pack years: 1.75     Types: Cigarettes     Start date: 1974 Quit date: 0     Years since quittin.9    Smokeless tobacco: Current     Types: Chew    Tobacco comments:     Used both for 5 years   Vaping Use    Vaping Use: Never used   Substance and Sexual Activity    Alcohol use: Not Currently     Comment: \"when i was tyoung i did, if i wanted to drink ten beers i drank ten beers\"    Drug use: Never    Sexual activity: Yes     Comment: has 2 children   Other Topics Concern    Not on file   Social History Narrative    CODE STATUS: DNR    HEALTH CARE PROXY: patient refused to answer    AMBULATES: patient refused to answer    DOMICILED: patient refused to answer     Social Determinants of Health     Financial Resource Strain: Not on file   Food Insecurity: Not on file   Transportation Needs: Not on file   Physical Activity: Not on file   Stress: Not on file   Social Connections: Not on file   Intimate Partner Violence: Not on file   Housing Stability: Not on file       Allergies: Allergies   Allergen Reactions    Morphine Shortness Of Breath     Was INTUBATED for this    Hydralazine      \"They said I am, I really don't know\"       Home Meds:  Prior to Admission medications    Medication Sig Start Date End Date Taking?  Authorizing Provider   albuterol sulfate (PROAIR RESPICLICK) 099 (90 Base) MCG/ACT aerosol powder inhalation Inhale into the lungs every 4 hours as needed for Wheezing or Shortness of Breath    Historical Provider, MD   esomeprazole Magnesium (NEXIUM) 40 MG PACK Take 40 mg by mouth daily    Historical Provider, MD   Cholecalciferol (VITAMIN D3) 50 MCG (2000 UT) CAPS Take by mouth daily    Historical Provider, MD   sacubitril-valsartan (ENTRESTO) 49-51 MG per tablet Take 1 tablet by mouth 2 times daily    Historical Provider, MD   rosuvastatin (CRESTOR) 40 MG tablet Take 1 tablet by mouth daily Take 1 tablet by mouth daily 10/21/22 11/21/22  Shaw Keen MD   amiodarone (CORDARONE) 200 MG tablet Take 1 tablet by mouth daily 10/21/22 11/21/22 Sudha Pacheco MD   isosorbide mononitrate (IMDUR) 60 MG extended release tablet TAKE ONE TABLET BY MOUTH TWICE A DAY 10/11/22   DWAYNE Kinsey   minoxidil (LONITEN) 2.5 MG tablet TAKE ONE TABLET BY MOUTH TWICE A DAY 10/11/22   DWAYNE Brown   gabapentin (NEURONTIN) 100 MG capsule Take 1 capsule by mouth 2 times daily for 7 days. 8/19/22 11/21/22  Kristin Rose MD   NIFEdipine (PROCARDIA XL) 60 MG extended release tablet Take 1 tablet by mouth daily 8/20/22   Kristin Rose MD   ELIQUIS 5 MG TABS tablet TAKE ONE TABLET BY MOUTH TWICE A DAY 7/6/22   DWAYNE Carson - NP   ondansetron (ZOFRAN ODT) 4 MG disintegrating tablet Take 1 tablet by mouth every 8 hours as needed for Nausea or Vomiting 11/15/21   Alisa Chandler MD   bumetanide (BUMEX) 1 MG tablet Take 1 tablet by mouth 2 times daily 11/7/21   Reg Lama MD   clopidogrel (PLAVIX) 75 MG tablet TAKE ONE TABLET BY MOUTH EVERY DAY 10/13/21   Adriane Wellington MD   carvedilol (COREG) 12.5 MG tablet Take 1 tablet by mouth 2 times daily 5/14/21   Adriane Wellington MD   potassium chloride (KLOR-CON M) 20 MEQ extended release tablet Take 1 tablet by mouth daily (with breakfast)  Patient taking differently: Take 20 mEq by mouth 2 times daily 5/14/21   Adriane Wellington MD   pantoprazole (PROTONIX) 40 MG tablet Take 1 tablet by mouth 2 times daily (before meals) 5/14/21   Adriane Wellington MD   ascorbic acid (VITAMIN C) 500 MG tablet Take 1 tablet by mouth daily 5/14/21   Adriane Wellington MD   calcitRIOL (ROCALTROL) 0.25 MCG capsule Take 0.25 mcg by mouth daily 5/3/21   Historical Provider, MD   senna-docusate ([place]joycee Peabody) 8.6-50 MG per tablet Take 1 tablet by mouth nightly    Historical Provider, MD   allopurinol (ZYLOPRIM) 100 MG tablet Take 200 mg by mouth daily    Historical Provider, MD   aspirin 81 MG chewable tablet Take 1 tablet by mouth daily 12/24/20   Lavonne Vega MD   nitroGLYCERIN (NITROSTAT) 0.4 MG SL tablet up to max of 3 total doses. If no relief after 1 dose, call 911. 10/20/20   [facility] Kemp, DO   NOVOLOG FLEXPEN 100 UNIT/ML injection pen 5 Units 3 times daily (before meals)  6/1/20   Historical Provider, MD   montelukast (SINGULAIR) 10 MG tablet Take 10 mg by mouth daily  Patient not taking: No sig reported    Historical Provider, MD   levocetirizine (XYZAL) 5 MG tablet Take 5 mg by mouth nightly    Historical Provider, MD   ULTICARE MINI PEN NEEDLES 31G X 6 MM MISC FOR USE WITH LANTUS TWO TIMES A DAY 7/11/14   DWAYNE Sawant - CNP   LANTUS SOLOSTAR 100 UNIT/ML injection pen INJECT 30 UNITS IN THE MORNING AND 20 UNITS IN THE EVENING DAILY 7/1/14   DWAYNE Sawant - CNP       Current Meds:   [START ON 11/23/2022] bumetanide  2 mg Oral BID    allopurinol  200 mg Oral Daily    amiodarone  200 mg Oral Daily    ascorbic acid  500 mg Oral Daily    calcitRIOL  0.25 mcg Oral Daily    carvedilol  12.5 mg Oral BID    Vitamin D  2,000 Units Oral Daily    clopidogrel  75 mg Oral Daily    apixaban  5 mg Oral BID    isosorbide mononitrate  60 mg Oral BID    cetirizine  10 mg Oral Daily    NIFEdipine  60 mg Oral Daily    rosuvastatin  40 mg Oral Nightly    sacubitril-valsartan  1 tablet Oral BID    sennosides-docusate sodium  1 tablet Oral Nightly    pantoprazole  40 mg Oral BID AC    sodium chloride flush  5-40 mL IntraVENous 2 times per day    insulin lispro  0-8 Units SubCUTAneous TID WC    insulin lispro  0-4 Units SubCUTAneous Nightly    minoxidil  2.5 mg Oral BID    aspirin  81 mg Oral Daily    bumetanide  1 mg IntraVENous BID    ipratropium  0.5 mg Nebulization TID    insulin glargine  20 Units SubCUTAneous Daily    Arformoterol Tartrate  15 mcg Nebulization BID    budesonide  0.5 mg Nebulization BID       Current Infused Meds:   dextrose      sodium chloride         Physical Exam:  Vitals:    11/22/22 1200   BP: (!) 158/76   Pulse: 55   Resp: 18   Temp: 97.3 °F (36.3 °C)   SpO2: 90%       Intake/Output Summary (Last 24 hours) at 11/22/2022 1413  Last data filed at 11/22/2022 1200  Gross per 24 hour   Intake 600 ml   Output 1600 ml   Net -1000 ml     Estimated body mass index is 34.38 kg/m² as calculated from the following:    Height as of this encounter: 5' 11\" (1.803 m). Weight as of this encounter: 246 lb 8 oz (111.8 kg). Physical Exam  Vitals reviewed. Constitutional:       General: He is not in acute distress. Appearance: Normal appearance. He is well-developed. He is obese. He is not ill-appearing, toxic-appearing or diaphoretic. HENT:      Head: Normocephalic and atraumatic. Nose: Nose normal.      Mouth/Throat:      Mouth: Mucous membranes are moist.      Pharynx: Oropharynx is clear. Eyes:      General: No scleral icterus. Extraocular Movements: Extraocular movements intact. Pupils: Pupils are equal, round, and reactive to light. Neck:      Vascular: No carotid bruit or JVD. Cardiovascular:      Rate and Rhythm: Normal rate and regular rhythm. Heart sounds: Normal heart sounds. No murmur heard. No friction rub. No gallop. Pulmonary:      Effort: Pulmonary effort is normal. No respiratory distress. Breath sounds: Normal breath sounds. No wheezing or rales. Abdominal:      General: Abdomen is flat. Bowel sounds are normal. There is no distension. Palpations: Abdomen is soft. There is no mass. Tenderness: There is no abdominal tenderness. There is no right CVA tenderness, left CVA tenderness, guarding or rebound. Hernia: No hernia is present. Musculoskeletal:         General: No swelling, tenderness, deformity or signs of injury. Cervical back: Normal range of motion and neck supple. No rigidity or tenderness. Lymphadenopathy:      Cervical: No cervical adenopathy. Skin:     General: Skin is warm and dry. Neurological:      General: No focal deficit present. Mental Status: He is alert and oriented to person, place, and time.  Mental status is at baseline. Cranial Nerves: No cranial nerve deficit. Sensory: No sensory deficit. Motor: No weakness. Coordination: Coordination normal.   Psychiatric:         Mood and Affect: Mood normal.         Behavior: Behavior normal.         Thought Content: Thought content normal.         Judgment: Judgment normal.       Labs:  Recent Labs     11/21/22 0417 11/22/22  0158   WBC 6.9 6.7   HGB 12.7* 12.0*    191       Recent Labs     11/21/22  0421 11/21/22  0453 11/22/22 0158   NA  --  141 143   K 3.7 4.1 4.0   CL  --  101 102   CO2  --  30* 30*   BUN  --  28* 24*   CREATININE  --  2.6* 2.5*   LABGLOM  --  26* 28*   MG  --  1.9 2.0   CALCIUM  --  9.5 8.9   PHOS  --  3.7  --        CK, CKMB, Troponin: @LABRCNT (CKTOTAL:3, CKMB:3, TROPONINI:3)@    Last 3 BNP:  No results for input(s): BNP in the last 72 hours. IMAGING:  XR CHEST PORTABLE    Result Date: 11/21/2022  Patient: Alexis Frias  Time Out: 06:59Exam(s): FILM CXR 1 VIEW EXAM:  XR Chest, 1 ViewCLINICAL HISTORY: soa. TECHNIQUE:  Frontal view of the chest.COMPARISON:  8/16/22FINDINGS:  Lungs:  Mild diffuse interstitial opacities throughout both lungs. Pleural space:  Unremarkable. No pneumothorax. Heart:  Cardiomegaly. Mediastinum:  Unremarkable. Bones/joints:  Unremarkable. Vasculature:  Calcified aorta. Mild CHF. Electronically signed by Abdifatah Wilkinson M.D. on 11-21-22 at 2080      Assessment:  Admission 11/21/2022 worsening shortness of breath the past week some orthopnea PND and increased edema edema with elevated proBNP 1901 prior level sick 79 on 8/16/2022 suggestive of mildly decompensated congestive heart failure now clinically improved  Obesity  Acute on chronic systolic and diastolic congestive heart failure  Chronic kidney disease  Coronary artery disease  History of thoracic aortic dissection  Diabetes mellitus type 2  Gastroesophageal reflux disease  Renal artery stenosis  Carotid artery stenosis  Atrial septal aneurysm  Paroxysmal atrial fibrillation  History of pneumonia  Chronic antiarrhythmic therapy with amiodarone  Chronic anticoagulation with Eliquis        Recommendations:  Continue current medical management  Recheck proBNP in the a.m.   Probable discharge tomorrow depending on various factors

## 2022-11-22 NOTE — PROGRESS NOTES
Pulse ox on2lpm at rest 92%  Pulse ox on room air at rest 75%  Increased to 3 to get to 92%  Pulse ox on 3lpm with walk 90%

## 2022-11-22 NOTE — PROGRESS NOTES
Palliative Care/Spiritual Care: Met with pt to initiate palliative care. Pt says his oxygen has been too low, in the seventies and eighties. Pt says he had been doing out patient therapy and they checked his oxygen and wanted  his wife to come and pick him up. Pt also says his heart is working at about 30%. He was also told he may need dialysis and he may be having extended symptoms of Covid. Pt has a list of diagnoses in past medical history. Pt is known to palliative care. Advance Directives: Pt has a AD/LW and has his wife Judy Jarvis listed as primary decision maker. His brother Wendy Meyer is secondary decision maker. Pt is DNR and does not want CPR or Ventilator. Pt's code status has not changed and he has an ACP note. He is a DNR. SEE ACP NOTE. Pain/other symptoms: Pt says he is not having pain. Social/Spiritual: Pt attended [employer] [facility] [employer] a [employer]. Pt/family discussion r/t goals: Pt lives at home with his wife, and he also has a brother. He performed daily living skills and was able to care for himself at home, including ambulating without assistance. Pt's goal is to be return home and \"be normal.\"     Provided spiritual care with sustaining presence, nurtured hope, and prayer. Pt expressed gratitude for spiritual care.      Electronically signed by Ailyn Kaur on 11/22/2022 at 10:37 AM

## 2022-11-23 VITALS
HEART RATE: 56 BPM | WEIGHT: 246.5 LBS | SYSTOLIC BLOOD PRESSURE: 139 MMHG | HEIGHT: 71 IN | TEMPERATURE: 97.6 F | RESPIRATION RATE: 20 BRPM | OXYGEN SATURATION: 90 % | DIASTOLIC BLOOD PRESSURE: 68 MMHG | BODY MASS INDEX: 34.51 KG/M2

## 2022-11-23 LAB
ANION GAP SERPL CALCULATED.3IONS-SCNC: 9 MMOL/L (ref 7–19)
BUN BLDV-MCNC: 26 MG/DL (ref 8–23)
CALCIUM SERPL-MCNC: 8.8 MG/DL (ref 8.8–10.2)
CHLORIDE BLD-SCNC: 100 MMOL/L (ref 98–111)
CO2: 32 MMOL/L (ref 22–29)
CREAT SERPL-MCNC: 2.3 MG/DL (ref 0.5–1.2)
GFR SERPL CREATININE-BSD FRML MDRD: 30 ML/MIN/{1.73_M2}
GLUCOSE BLD-MCNC: 155 MG/DL (ref 70–99)
GLUCOSE BLD-MCNC: 197 MG/DL (ref 74–109)
PERFORMED ON: ABNORMAL
POTASSIUM SERPL-SCNC: 3.7 MMOL/L (ref 3.5–5)
PRO-BNP: 1484 PG/ML (ref 0–900)
SODIUM BLD-SCNC: 141 MMOL/L (ref 136–145)

## 2022-11-23 PROCEDURE — 83880 ASSAY OF NATRIURETIC PEPTIDE: CPT

## 2022-11-23 PROCEDURE — 94640 AIRWAY INHALATION TREATMENT: CPT

## 2022-11-23 PROCEDURE — 6370000000 HC RX 637 (ALT 250 FOR IP): Performed by: HOSPITALIST

## 2022-11-23 PROCEDURE — 2580000003 HC RX 258: Performed by: HOSPITALIST

## 2022-11-23 PROCEDURE — 80048 BASIC METABOLIC PNL TOTAL CA: CPT

## 2022-11-23 PROCEDURE — 82947 ASSAY GLUCOSE BLOOD QUANT: CPT

## 2022-11-23 PROCEDURE — 36415 COLL VENOUS BLD VENIPUNCTURE: CPT

## 2022-11-23 PROCEDURE — 2700000000 HC OXYGEN THERAPY PER DAY

## 2022-11-23 PROCEDURE — 6370000000 HC RX 637 (ALT 250 FOR IP): Performed by: STUDENT IN AN ORGANIZED HEALTH CARE EDUCATION/TRAINING PROGRAM

## 2022-11-23 PROCEDURE — 99232 SBSQ HOSP IP/OBS MODERATE 35: CPT | Performed by: INTERNAL MEDICINE

## 2022-11-23 PROCEDURE — 94150 VITAL CAPACITY TEST: CPT

## 2022-11-23 PROCEDURE — 94760 N-INVAS EAR/PLS OXIMETRY 1: CPT

## 2022-11-23 PROCEDURE — 6360000002 HC RX W HCPCS: Performed by: STUDENT IN AN ORGANIZED HEALTH CARE EDUCATION/TRAINING PROGRAM

## 2022-11-23 RX ORDER — BUMETANIDE 1 MG/1
2 TABLET ORAL 2 TIMES DAILY
Qty: 60 TABLET | Refills: 5 | Status: SHIPPED
Start: 2022-11-23

## 2022-11-23 RX ORDER — POTASSIUM CHLORIDE 20 MEQ/1
20 TABLET, EXTENDED RELEASE ORAL 2 TIMES DAILY
Qty: 60 TABLET | Refills: 0 | Status: SHIPPED
Start: 2022-11-23

## 2022-11-23 RX ORDER — TIOTROPIUM BROMIDE 18 UG/1
18 CAPSULE ORAL; RESPIRATORY (INHALATION) DAILY
Qty: 30 CAPSULE | Refills: 0 | Status: SHIPPED | OUTPATIENT
Start: 2022-11-23

## 2022-11-23 RX ORDER — POTASSIUM CHLORIDE 20 MEQ/1
20 TABLET, EXTENDED RELEASE ORAL ONCE
Status: COMPLETED | OUTPATIENT
Start: 2022-11-23 | End: 2022-11-23

## 2022-11-23 RX ADMIN — INSULIN LISPRO 2 UNITS: 100 INJECTION, SOLUTION INTRAVENOUS; SUBCUTANEOUS at 11:36

## 2022-11-23 RX ADMIN — SODIUM CHLORIDE, PRESERVATIVE FREE 10 ML: 5 INJECTION INTRAVENOUS at 10:09

## 2022-11-23 RX ADMIN — INSULIN GLARGINE 20 UNITS: 100 INJECTION, SOLUTION SUBCUTANEOUS at 11:35

## 2022-11-23 RX ADMIN — CLOPIDOGREL BISULFATE 75 MG: 75 TABLET ORAL at 10:08

## 2022-11-23 RX ADMIN — ASPIRIN 81 MG 81 MG: 81 TABLET ORAL at 10:09

## 2022-11-23 RX ADMIN — BUMETANIDE 2 MG: 1 TABLET ORAL at 10:09

## 2022-11-23 RX ADMIN — MINOXIDIL 2.5 MG: 2.5 TABLET ORAL at 11:35

## 2022-11-23 RX ADMIN — SACUBITRIL AND VALSARTAN 1 TABLET: 49; 51 TABLET, FILM COATED ORAL at 10:09

## 2022-11-23 RX ADMIN — CETIRIZINE HYDROCHLORIDE 10 MG: 10 TABLET, FILM COATED ORAL at 10:09

## 2022-11-23 RX ADMIN — ALLOPURINOL 200 MG: 100 TABLET ORAL at 10:09

## 2022-11-23 RX ADMIN — AMIODARONE HYDROCHLORIDE 200 MG: 200 TABLET ORAL at 10:09

## 2022-11-23 RX ADMIN — CALCITRIOL CAPSULES 0.25 MCG 0.25 MCG: 0.25 CAPSULE ORAL at 10:09

## 2022-11-23 RX ADMIN — PANTOPRAZOLE SODIUM 40 MG: 40 TABLET, DELAYED RELEASE ORAL at 05:44

## 2022-11-23 RX ADMIN — IPRATROPIUM BROMIDE 0.5 MG: 0.5 SOLUTION RESPIRATORY (INHALATION) at 06:30

## 2022-11-23 RX ADMIN — BUDESONIDE INHALATION SUSPENSION 500 MCG: 0.5 SUSPENSION RESPIRATORY (INHALATION) at 06:30

## 2022-11-23 RX ADMIN — ISOSORBIDE MONONITRATE 60 MG: 60 TABLET, EXTENDED RELEASE ORAL at 10:09

## 2022-11-23 RX ADMIN — ARFORMOTEROL TARTRATE 15 MCG: 15 SOLUTION RESPIRATORY (INHALATION) at 06:30

## 2022-11-23 RX ADMIN — Medication 500 MG: at 11:35

## 2022-11-23 RX ADMIN — Medication 2000 UNITS: at 10:08

## 2022-11-23 RX ADMIN — POTASSIUM CHLORIDE 20 MEQ: 1500 TABLET, EXTENDED RELEASE ORAL at 10:08

## 2022-11-23 RX ADMIN — CARVEDILOL 12.5 MG: 12.5 TABLET, FILM COATED ORAL at 10:09

## 2022-11-23 RX ADMIN — APIXABAN 5 MG: 5 TABLET, FILM COATED ORAL at 10:09

## 2022-11-23 RX ADMIN — NIFEDIPINE 60 MG: 60 TABLET, EXTENDED RELEASE ORAL at 10:09

## 2022-11-23 NOTE — DISCHARGE SUMMARY
Discharge Summary    NAME: Colette Madrigal  :  1955  MRN:  [id number]    Admit date:  2022  Discharge date:    2022    Advance Directive: DNR    Consults: cardiology    Primary Care Physician:  Gordo Callahan MD    Discharge Diagnoses:  Principal Problem:    Acute on chronic diastolic CHF (congestive heart failure) ([facility] [address]  Active Problems:    CKD (chronic kidney disease) stage 4, GFR 15-29 ml/min ([facility] [address]  CAD  PAD  Atrial fibrillation  Hypertension  Diabetes mellitus  Chronic lung disease  Emphysema  History of COVID-pneumonia        Significant Diagnostic Studies:   XR CHEST PORTABLE    Result Date: 2022  Patient: Mercedes Malcolm  Time Out: 06:59Exam(s): FILM CXR 1 VIEW EXAM:  XR Chest, 1 ViewCLINICAL HISTORY: soa. TECHNIQUE:  Frontal view of the chest.COMPARISON:  22FINDINGS:  Lungs:  Mild diffuse interstitial opacities throughout both lungs. Pleural space:  Unremarkable. No pneumothorax. Heart:  Cardiomegaly. Mediastinum:  Unremarkable. Bones/joints:  Unremarkable. Vasculature:  Calcified aorta. Mild CHF. Electronically signed by Joanne Min M.D. on 22 at [address] [place] [address]:   CBC:   Recent Labs     22   WBC 6.9 6.7   HGB 12.7* 12.0*    191     BMP:    Recent Labs     228 22  0131    143 141   K 4.1 4.0 3.7    102 100   CO2 30* 30* 32*   BUN 28* 24* 26*   CREATININE 2.6* 2.5* 2.3*   GLUCOSE 120* 144* 197*     INR: No results for input(s): INR in the last 72 hours.   Lipids:   Recent Labs     22   CHOL 93*   HDL 39*     ABGs:  Recent Labs     22   PHART 7.440   DHE3MQH 46.0*   PO2ART 51.0*   FJM2RPV 31.2*   BEART 6.1*   HGBAE 12.8*   [id number] 86.7*   CARBOXHGBART 2.4   02THERAPY Unknown     HgBA1c:    Recent Labs     22   LABA1C 7.0*             Hospital Course:  72-year-old male with CAD with prior stenting, PAD, diabetes mellitus, heart failure with preserved EF on echo earlier this year, CKD stage IV, emphysema, history of COVID-pneumonia, A. fib maintained on amiodarone for rhythm control, admitted with recurrent episode of decompensated heart failure after presenting with progressive worsening dyspnea and increased abdominal and peripheral edema for over a week despite daily compliance on current oral Bumex regimen (taking 1 mg twice daily). Also reported increased albuterol use recently. Noted pulmonary vascular congestion/pulmonary edema on chest x-ray and elevated proBNP. Noted to have hypoxic respiratory failure with SPO2 75% on room air, improved on 2 L nasal cannula. Hypoxemia with PO2 51. Respiratory PCR negative. Maintenance bronchodilators were added given his uncontrolled lung disease. He clinically improved over hospital course on IV Bumex with excellent urine output. Transitioned to oral Bumex 2 mg twice daily. He did qualify for home supplemental oxygen on home O2 assessment, 2 L at rest and 3 L with exertion. Medically stable for discharge home with home oxygen arranged, Bumex regimen adjusted to 2 mg twice daily with outpatient follow-up with PCP, needs follow-up BMP outpatient this week. He also has upcoming follow-up with nephrology and cardiology. Given his uncontrolled chronic lung disease, add Spiriva to regimen, set up for outpatient PFTs and referral to pulmonology. Physical Exam:  Vital Signs: BP 92/68   Pulse 55   Temp 98.1 °F (36.7 °C) (Temporal)   Resp 16   Ht 5' 11\" (1.803 m)   Wt 246 lb 8 oz (111.8 kg)   SpO2 91%   BMI 34.38 kg/m²   General appearance:. Alert and Cooperative   HEENT: Normocephalic. Chest: No wheezes rales or rhonchi  Cardiac: Normal rate, S1-S2 present  Abdomen:soft, non-tender; normal bowel sounds  Extremities: No clubbing or cyanosis. BLE edema present but improved  Neurologic: Grossly intact.     Discharge Medications:         Medication List        START taking these medications Spiriva HandiHaler 18 MCG inhalation capsule  Generic drug: tiotropium  Inhale 1 capsule into the lungs daily            CHANGE how you take these medications      bumetanide 1 MG tablet  Commonly known as: BUMEX  Take 2 tablets by mouth 2 times daily  What changed: how much to take            CONTINUE taking these medications      albuterol sulfate 108 (90 Base) MCG/ACT aerosol powder inhalation  Commonly known as: PROAIR RESPICLICK     allopurinol 100 MG tablet  Commonly known as: ZYLOPRIM     amiodarone 200 MG tablet  Commonly known as: CORDARONE  Take 1 tablet by mouth daily     ascorbic acid 500 MG tablet  Commonly known as: VITAMIN C  Take 1 tablet by mouth daily     aspirin 81 MG chewable tablet  Take 1 tablet by mouth daily     calcitRIOL 0.25 MCG capsule  Commonly known as: ROCALTROL     carvedilol 12.5 MG tablet  Commonly known as: COREG  Take 1 tablet by mouth 2 times daily     clopidogrel 75 MG tablet  Commonly known as: PLAVIX  TAKE ONE TABLET BY MOUTH EVERY DAY     Eliquis 5 MG Tabs tablet  Generic drug: apixaban  TAKE ONE TABLET BY MOUTH TWICE A DAY     Entresto 49-51 MG per tablet  Generic drug: sacubitril-valsartan     esomeprazole Magnesium 40 MG Pack  Commonly known as: NEXIUM     isosorbide mononitrate 60 MG extended release tablet  Commonly known as: IMDUR  TAKE ONE TABLET BY MOUTH TWICE A DAY     Lantus SoloStar 100 UNIT/ML injection pen  Generic drug: insulin glargine  INJECT 30 UNITS IN THE MORNING AND 20 UNITS IN THE EVENING DAILY     levocetirizine 5 MG tablet  Commonly known as: XYZAL     minoxidil 2.5 MG tablet  Commonly known as: LONITEN  TAKE ONE TABLET BY MOUTH TWICE A DAY     NIFEdipine 60 MG extended release tablet  Commonly known as: PROCARDIA XL  Take 1 tablet by mouth daily     nitroGLYCERIN 0.4 MG SL tablet  Commonly known as: NITROSTAT  up to max of 3 total doses. If no relief after 1 dose, call 911.      NovoLOG FlexPen 100 UNIT/ML injection pen  Generic drug: insulin aspart     ondansetron 4 MG disintegrating tablet  Commonly known as: Zofran ODT  Take 1 tablet by mouth every 8 hours as needed for Nausea or Vomiting     pantoprazole 40 MG tablet  Commonly known as: PROTONIX  Take 1 tablet by mouth 2 times daily (before meals)     potassium chloride 20 MEQ extended release tablet  Commonly known as: KLOR-CON M  Take 1 tablet by mouth 2 times daily     rosuvastatin 40 MG tablet  Commonly known as: CRESTOR  Take 1 tablet by mouth daily Take 1 tablet by mouth daily     senna-docusate 8.6-50 MG per tablet  Commonly known as: PERICOLACE     UltiCare Mini Pen Needles 31G X 6 MM Misc  Generic drug: Insulin Pen Needle  FOR USE WITH LANTUS TWO TIMES A DAY     Vitamin D3 50 MCG (2000 UT) Caps            STOP taking these medications      gabapentin 100 MG capsule  Commonly known as: NEURONTIN     montelukast 10 MG tablet  Commonly known as: SINGULAIR               Where to Get Your Medications        These medications were sent to [address] [place] [address], [address] [place] [address], [address]      Phone: [phone]   Spiriva HandiHaler 18 MCG inhalation capsule       Information about where to get these medications is not yet available    Ask your nurse or doctor about these medications  bumetanide 1 MG tablet  potassium chloride 20 MEQ extended release tablet         Discharge Instructions: Follow up with Elia Rodgers MD within 1 week. Take medications as directed. Resume activity as tolerated. Diet: ADULT DIET; Regular; 4 carb choices (60 gm/meal); Low Fat/Low Chol/High Fiber/2 gm Na; Low Sodium (2 gm)     Disposition: Patient is medically stable and will be discharged home.     Time spent on discharge 38 minutes    Signed:  Miah Oquendo MD  11/23/2022 8:26 AM

## 2022-11-23 NOTE — CARE COORDINATION
Jamar Fernandez #[id number] ([id number]) ([id number] 78 y.o. M) (Adm: 11/21/22)  [facility] [id number]  PCP    Maurice Ayala  44.  Demographics  Comment        Address   [address]    Home Phone   [phone]    Work Phone       Mobile Phone   [phone]             Social Security Number       [address]    Marital Status       Church   None               [place] Status   No [002]     Insurance Payors as of 11/23/2022    MEDICARE    Plan: MEDICARE PART A AND B Member: [id number] Effective from: 7/1/2021   Subscriber: MARYANA GARNER Subscriber ID: [id number] Guarantor: MARYANA GARNER     Documents Filed to Patient    Power of  Living Will Clinical Unknown Study Attachment Consent Form ABN Waiver After Visit Summary Lab Result Scan Code Status MyChart Status Advance Care Planning    Not on File  Filed on 08/22/22  Not on File  Not on [address]  DNR [Updated on 11/21/22 2325]  Active Jump to the Activity      Auth/Cert Information    Create auth/cert for hospital account [de-identified]      Patient Contacts    Name Relation Home Work Marybeth Spouse [phone]  [address] Brother/Sister [phone]       Admission Information    Current Information    Attending Provider Admitting Provider Admission Type Admission Status   Sheldon Villaseñor MD  Emergency Confirmed Admission          Admission Date/Time Discharge Date Hospital Service Auth/Cert Status   23/28/66  04:05 AM  Medical [facility] Room/Bed    [facility] Account    Name Acct ID Class Status Primary Coverage   Jamar Fernandez [id number] Inpatient Open MEDICARE - MEDICARE PART A AND B            Guarantor Account (for Hospital Account [de-identified])    Name Relation to Pt Service Area Active?  Acct Type   Jaamr Fernandez [facility] Yes Personal/Family   Address Phone     [address], [address] [facility] [phone](O)              Coverage Information (for Hospital Account [de-identified])    F/O Payor/Plan Precert #   MEDICARE/MEDICARE PART A AND B    Subscriber Subscriber #   Salvador Sandoval [facility]   Address Phone   [address] [facility] [address] [phone]        CSN                                    Req/Control # [Problem retrieving Specimen ID]                                   Order Date:  2022  [id number]                                          Patient Information      Name:  Colette Madrigal  :  1955  Age:  78 y.o. Address:  [place] [address]  PCP: Gordo Callahan MD Sex:  Constance [place]: [id number]  Home Phone: [phone]  Work Phone:    Patient MRN:  [id number]    Alt Patient ID:  [id number]  PCP Phone: [phone]       Authorizing Provider Information       AUTHORIZING PROVIDER: Humberto Brown MD  Physician ID: [id number]  NPI:  [id number]  Site:   Address: [place] [address]  Phone: [phone]  Fax: [phone]             EQUIPMENT ORDERED  DME Order for Home Oxygen as OP [PKI000] (ORD   #:   [id number]) Priority  Routine Class  Hospital Performed        Associated Diagnosis:          Comments: You must complete the order parameters below and add the medical necessity documentation for this DME in a separate note.      Stationary Oxygen Concentrator at 2 lpm via Nasal Cannula     Stationary Prescribed at:  Continuous     Portable Oxygen Concentrator at 3 lpm via Nasal Cannula     Non Applicable     Diagnosis: acute hypoxic respiratory failure, chronic lung disease  Length of need: Lifetime            Scheduling Instructions:                                 Specimen Source             Collection Date    Collection Time    Order Status    Expected Date                 Electronically Signed By  Zully Wahl Helena Whitmore MD  NPI:  [id number] Date  Nov 23, 2022  11:18 AM              Responsible Party Blayne Voss Information     Guar-ActID   Relationship Account Type Home Phone   AARON SHI - [phone] [address] [place], [address] Self P/F [phone]   Employer   Work Phone   [address] Paige Information     Primary Insurance  Insurance/Subscriber ID:  [id number]  [facility] [address] Name:  AARON SHI              Relationship to Patient: SelfSigned ABN: N    Payor Name:  MEDICARE   Plan:  MEDICARE PART A AND B   Group:   Worker's Comp Date of Injury:

## 2022-11-23 NOTE — PROGRESS NOTES
Cardiology Daily Note Anthony Huff MD      Patient:  [place] Corona  [id number]    Patient Active Problem List    Diagnosis Date Noted    Right flank pain 08/18/2022    Abnormal nuclear cardiac imaging test     CAD (coronary artery disease) 01/10/2016    CKD (chronic kidney disease) stage 4, GFR 15-29 ml/min ([address] 11/21/2022    Neural foraminal stenosis of lumbar spine 08/18/2022    Chest pain 08/16/2022    Hypokalemia 08/16/2022    Dissection of thoracic aorta ([address] - DESCENDING 08/16/2022    Closed left hip fracture, initial encounter ([facility] 05/02/2021    Severe malnutrition ([address] 01/04/2021    Acute renal failure (ARF) ([address] 01/03/2021    Palliative care patient 12/21/2020    Pneumonia due to COVID-19 virus 12/19/2020    NSTEMI (non-ST elevated myocardial infarction) ([address] 88/73/8712    Systolic and diastolic CHF, acute on chronic ([address] 10/18/2020    Obesity (BMI 30-39.9) 10/18/2020    Acute on chronic combined systolic and diastolic congestive heart failure ([address] 08/24/2020    On amiodarone therapy 08/24/2020    At risk for obstructive sleep apnea 08/18/2020    Mixed hyperlipidemia 07/09/2020    Left ventricular dysfunction 07/09/2020    Somnolence, daytime 07/09/2020    Edema 07/09/2020    Stage 3b chronic kidney disease (HCC)     PAF (paroxysmal atrial fibrillation) ([address] 02/12/2020    Acute on chronic diastolic CHF (congestive heart failure) ([facility] 01/11/2016    Chronic congestive heart failure (HCC)     Apical myocardial infarction ([address]     Atrial septal aneurysm 01/09/2016    Malignant hypertension     Abnormal EKG     Diabetes mellitus type I ([address] 01/07/2016    Acute respiratory failure with hypoxia ([address]     Peripheral vascular disease ([address]     Carotid artery stenosis 06/26/2013    Renal artery stenosis ([address] 03/08/2013    Aneurysm of thoracic aorta 02/21/2013    DM (diabetes mellitus) ([address] 02/04/2013    GERD (gastroesophageal reflux disease) 02/04/2013       Admit Date:  11/21/2022    Admission Problem List: Present on Admission:   CKD (chronic kidney disease) stage 4, GFR 15-29 ml/min ([facility])   Acute on chronic diastolic CHF (congestive heart failure) ([facility]      Cardiac Specific Data:  Specialty Problems          Cardiology Problems    CAD (coronary artery disease)        Chest pain        Dissection of thoracic aorta ([facility]) - DESCENDING        Aneurysm of thoracic aorta        Renal artery stenosis ([facility])        Carotid artery stenosis        Peripheral vascular disease ([facility])        Atrial septal aneurysm        Malignant hypertension        Apical myocardial infarction ([facility])        * (Principal) Acute on chronic diastolic CHF (congestive heart failure) ([facility])        Chronic congestive heart failure ([facility])        PAF (paroxysmal atrial fibrillation) ([facility])        Left ventricular dysfunction        Mixed hyperlipidemia        Acute on chronic combined systolic and diastolic congestive heart failure ([facility])        Systolic and diastolic CHF, acute on chronic ([facility])        NSTEMI (non-ST elevated myocardial infarction) ([facility]           Subjective:  Mr. Bertha Trinh seen today resting comfortably dyspnea stable denies chest pain or shortness of breath.  proBNP decreased from 1901 to 1484. Blood pressure 92/68 heart 55. Objective:   BP 92/68   Pulse 55   Temp 98.1 °F (36.7 °C) (Temporal)   Resp 16   Ht 5' 11\" (1.803 m)   Wt 246 lb 8 oz (111.8 kg)   SpO2 91%   BMI 34.38 kg/m²       Intake/Output Summary (Last 24 hours) at 11/23/2022 1030  Last data filed at 11/23/2022 1024  Gross per 24 hour   Intake 690 ml   Output 770 ml   Net -80 ml       Prior to Admission medications    Medication Sig Start Date End Date Taking?  Authorizing Provider   bumetanide (BUMEX) 1 MG tablet Take 2 tablets by mouth 2 times daily 11/23/22  Yes Ayad Colunga MD   potassium chloride (KLOR-CON M) 20 MEQ extended release tablet Take 1 tablet by mouth 2 times daily 11/23/22  Yes Ayad Colunga MD   tiotropium ([place]) 18 MCG inhalation capsule Inhale 1 capsule into the lungs daily 11/23/22  Yes Nico Romano MD   albuterol sulfate (PROAIR RESPICLICK) 631 (90 Base) MCG/ACT aerosol powder inhalation Inhale into the lungs every 4 hours as needed for Wheezing or Shortness of Breath    Historical Provider, MD   esomeprazole Magnesium (NEXIUM) 40 MG PACK Take 40 mg by mouth daily    Historical Provider, MD   Cholecalciferol (VITAMIN D3) 50 MCG (2000 UT) CAPS Take by mouth daily    Historical Provider, MD   sacubitril-valsartan (ENTRESTO) 49-51 MG per tablet Take 1 tablet by mouth 2 times daily    Historical Provider, MD   rosuvastatin (CRESTOR) 40 MG tablet Take 1 tablet by mouth daily Take 1 tablet by mouth daily 10/21/22 11/21/22  Naida Odom MD   amiodarone (CORDARONE) 200 MG tablet Take 1 tablet by mouth daily 10/21/22 11/21/22  Naida Odom MD   isosorbide mononitrate (IMDUR) 60 MG extended release tablet TAKE ONE TABLET BY MOUTH TWICE A DAY 10/11/22   DWAYNE Monahan   minoxidil (LONITEN) 2.5 MG tablet TAKE ONE TABLET BY MOUTH TWICE A DAY 10/11/22   DWAYNE Rosales   NIFEdipine (PROCARDIA XL) 60 MG extended release tablet Take 1 tablet by mouth daily 8/20/22   Selvin Peterson MD   ELIQUIS 5 MG TABS tablet TAKE ONE TABLET BY MOUTH TWICE A DAY 7/6/22   DWAYNE Chase - NP   ondansetron (ZOFRAN ODT) 4 MG disintegrating tablet Take 1 tablet by mouth every 8 hours as needed for Nausea or Vomiting 11/15/21   Tesha Alarcon MD   clopidogrel (PLAVIX) 75 MG tablet TAKE ONE TABLET BY MOUTH EVERY DAY 10/13/21   Subhash Win MD   carvedilol (COREG) 12.5 MG tablet Take 1 tablet by mouth 2 times daily 5/14/21   Subhash Win MD   pantoprazole (PROTONIX) 40 MG tablet Take 1 tablet by mouth 2 times daily (before meals) 5/14/21   Subhash Win MD   ascorbic acid (VITAMIN C) 500 MG tablet Take 1 tablet by mouth daily 5/14/21   Subhash Win MD   calcitRIOL (ROCALTROL) 0.25 MCG capsule Take 0.25 mcg by mouth daily 5/3/21   Historical Provider, MD   senna-docusate (PERICOLACE) 8.6-50 MG per tablet Take 1 tablet by mouth nightly    Historical Provider, MD   allopurinol (ZYLOPRIM) 100 MG tablet Take 200 mg by mouth daily    Historical Provider, MD   aspirin 81 MG chewable tablet Take 1 tablet by mouth daily 12/24/20   Inocencio Rushing MD   nitroGLYCERIN (NITROSTAT) 0.4 MG SL tablet up to max of 3 total doses.  If no relief after 1 dose, call 911. 10/20/20   Francisca Land Kemp, DO   NOVOLOG FLEXPEN 100 UNIT/ML injection pen 5 Units 3 times daily (before meals)  6/1/20   Historical Provider, MD   levocetirizine (XYZAL) 5 MG tablet Take 5 mg by mouth nightly    Historical Provider, MD   ULTICARE MINI PEN NEEDLES 31G X 6 MM MISC FOR USE WITH LANTUS TWO TIMES A DAY 7/11/14   DWAYNE Avitia - CNP   LANTUS SOLOSTAR 100 UNIT/ML injection pen INJECT 30 UNITS IN THE MORNING AND 20 UNITS IN THE EVENING DAILY 7/1/14   DWAYNE Avitia - CNP        bumetanide  2 mg Oral BID    allopurinol  200 mg Oral Daily    amiodarone  200 mg Oral Daily    ascorbic acid  500 mg Oral Daily    calcitRIOL  0.25 mcg Oral Daily    carvedilol  12.5 mg Oral BID    Vitamin D  2,000 Units Oral Daily    clopidogrel  75 mg Oral Daily    apixaban  5 mg Oral BID    isosorbide mononitrate  60 mg Oral BID    cetirizine  10 mg Oral Daily    NIFEdipine  60 mg Oral Daily    rosuvastatin  40 mg Oral Nightly    sacubitril-valsartan  1 tablet Oral BID    sennosides-docusate sodium  1 tablet Oral Nightly    pantoprazole  40 mg Oral BID AC    sodium chloride flush  5-40 mL IntraVENous 2 times per day    insulin lispro  0-8 Units SubCUTAneous TID WC    insulin lispro  0-4 Units SubCUTAneous Nightly    minoxidil  2.5 mg Oral BID    aspirin  81 mg Oral Daily    ipratropium  0.5 mg Nebulization TID    insulin glargine  20 Units SubCUTAneous Daily    Arformoterol Tartrate  15 mcg Nebulization BID    budesonide  0.5 mg Nebulization BID       TELEMETRY: Sinus     Physical Exam:      Physical Exam      General:  Awake, alert, NAD  Skin:  Warm and dry  Neck:  no jvd , no carotid bruits  Chest:  Clear to auscultation, no wheezing or rales  Cardiovascular:  RRR S1D6 no murmurs, clicks, gallups, or rubs  Abdomen:  Soft nontender, nondistended, bowel sounds present  Extremities:  Edema: none        Lab Data:  CBC:   Recent Labs     11/21/22  0417 11/22/22  0158   WBC 6.9 6.7   HGB 12.7* 12.0*   HCT 42.9 40.6*   MCV 88.1 90.8    191     BMP:   Recent Labs     11/21/22  0453 11/22/22  0158 11/23/22  0131    143 141   K 4.1 4.0 3.7    102 100   CO2 30* 30* 32*   PHOS 3.7  --   --    BUN 28* 24* 26*   CREATININE 2.6* 2.5* 2.3*     LIVER PROFILE:   Recent Labs     11/21/22  0453   AST 10   ALT 13   BILITOT 0.6   ALKPHOS 82     PT/INR: No results for input(s): PROTIME, INR in the last 72 hours. APTT: No results for input(s): APTT in the last 72 hours. BNP:  No results for input(s): BNP in the last 72 hours. CK, CKMB, Troponin: @LABRCNT (CKTOTAL:3, CKMB:3, TROPONINI:3)@    IMAGING:  XR CHEST PORTABLE    Result Date: 11/21/2022  Patient: Terry Addison  Time Out: 06:59Exam(s): FILM CXR 1 VIEW EXAM:  XR Chest, 1 ViewCLINICAL HISTORY: soa. TECHNIQUE:  Frontal view of the chest.COMPARISON:  8/16/22FINDINGS:  Lungs:  Mild diffuse interstitial opacities throughout both lungs. Pleural space:  Unremarkable. No pneumothorax. Heart:  Cardiomegaly. Mediastinum:  Unremarkable. Bones/joints:  Unremarkable. Vasculature:  Calcified aorta. Mild CHF. Electronically signed by Yumiko Baeza M.D. on 11-21-22 at 1288        Assessment:  Admission 11/21/2022 worsening shortness of breath the past week some orthopnea PND and increased edema edema with elevated proBNP 1901 prior level sick 79 on 8/16/2022 suggestive of mildly decompensated congestive heart failure now clinically improved  Obesity  Acute on chronic systolic and diastolic congestive heart failure  Chronic kidney disease  Coronary artery disease  History of thoracic aortic dissection  Diabetes mellitus type 2  Gastroesophageal reflux disease  Renal artery stenosis  Carotid artery stenosis  Atrial septal aneurysm  Paroxysmal atrial fibrillation  History of pneumonia  Chronic antiarrhythmic therapy with amiodarone  Chronic anticoagulation with Eliquis    Plan:  Continue current treatment stable from a cardiovascular standpoint can be discharged follow-up in the office    Radhames Walker MD, MD 11/23/2022 10:30 AM

## 2022-12-06 ENCOUNTER — OFFICE VISIT (OUTPATIENT)
Dept: CARDIOLOGY CLINIC | Age: 67
End: 2022-12-06
Payer: MEDICARE

## 2022-12-06 VITALS
HEART RATE: 55 BPM | HEIGHT: 71 IN | WEIGHT: 246 LBS | BODY MASS INDEX: 34.44 KG/M2 | DIASTOLIC BLOOD PRESSURE: 70 MMHG | SYSTOLIC BLOOD PRESSURE: 120 MMHG | OXYGEN SATURATION: 90 %

## 2022-12-06 DIAGNOSIS — I51.89 DIASTOLIC DYSFUNCTION: ICD-10-CM

## 2022-12-06 DIAGNOSIS — Z79.899 ON AMIODARONE THERAPY: ICD-10-CM

## 2022-12-06 DIAGNOSIS — I71.20 THORACIC AORTIC ANEURYSM WITHOUT RUPTURE, UNSPECIFIED PART: ICD-10-CM

## 2022-12-06 DIAGNOSIS — I48.0 PAF (PAROXYSMAL ATRIAL FIBRILLATION) (HCC): Primary | ICD-10-CM

## 2022-12-06 DIAGNOSIS — I25.10 CORONARY ARTERY DISEASE INVOLVING NATIVE CORONARY ARTERY OF NATIVE HEART WITHOUT ANGINA PECTORIS: ICD-10-CM

## 2022-12-06 DIAGNOSIS — Z79.01 CHRONIC ANTICOAGULATION: ICD-10-CM

## 2022-12-06 DIAGNOSIS — E78.2 MIXED HYPERLIPIDEMIA: ICD-10-CM

## 2022-12-06 DIAGNOSIS — I10 ESSENTIAL HYPERTENSION: ICD-10-CM

## 2022-12-06 PROCEDURE — G8427 DOCREV CUR MEDS BY ELIG CLIN: HCPCS | Performed by: NURSE PRACTITIONER

## 2022-12-06 PROCEDURE — 93000 ELECTROCARDIOGRAM COMPLETE: CPT | Performed by: NURSE PRACTITIONER

## 2022-12-06 PROCEDURE — 99214 OFFICE O/P EST MOD 30 MIN: CPT | Performed by: NURSE PRACTITIONER

## 2022-12-06 PROCEDURE — 1111F DSCHRG MED/CURRENT MED MERGE: CPT | Performed by: NURSE PRACTITIONER

## 2022-12-06 PROCEDURE — G8417 CALC BMI ABV UP PARAM F/U: HCPCS | Performed by: NURSE PRACTITIONER

## 2022-12-06 PROCEDURE — 3017F COLORECTAL CA SCREEN DOC REV: CPT | Performed by: NURSE PRACTITIONER

## 2022-12-06 PROCEDURE — G8484 FLU IMMUNIZE NO ADMIN: HCPCS | Performed by: NURSE PRACTITIONER

## 2022-12-06 PROCEDURE — 1123F ACP DISCUSS/DSCN MKR DOCD: CPT | Performed by: NURSE PRACTITIONER

## 2022-12-06 PROCEDURE — 4004F PT TOBACCO SCREEN RCVD TLK: CPT | Performed by: NURSE PRACTITIONER

## 2022-12-06 PROCEDURE — 3078F DIAST BP <80 MM HG: CPT | Performed by: NURSE PRACTITIONER

## 2022-12-06 PROCEDURE — 3074F SYST BP LT 130 MM HG: CPT | Performed by: NURSE PRACTITIONER

## 2022-12-06 NOTE — PROGRESS NOTES
Dear Dr. Miguel Deleon MD,    Thank you for allowing me to participate in the care of Mr. Yvette Martin. He presents today at the [address] [place] [address]. As you know, Mr. Arminda Rogel is a 78 y.o. male with history of hypertension, hyperlipidemia, diabetes, PAF, amiodarone therapy, chronic anticoagulation, CKD, carotid artery stenosis, PVD, thoracic aneurysm, and CAD who presents with the chief complaint of hospital follow-up. He is a patient of Dr. Ruben Weinberg. He presented to [facility] ER on 11/21 with complaints of shortness of breath and gaining 8 pounds overnight. He was admitted and seen by Dr. Ruben Weinberg in consultation. He clinically improved with IV Bumex with excellent urine output. He was transitioned to oral Bumex 2 mg twice a day and discharged on oxygen. He is also scheduled for PFTs this month and a new patient appointment with our pulmonologist.  Linda Betancur since discharge. Weights have been stable on the Bumex 2 mg twice a day. He did see PCP the following Monday after discharge and was found to have double pneumonia. He was started on prednisone and antibiotics and is just now feeling better. He is on oxygen at home. He still has shortness of breath but he states overall is better. He denies any chest pain. He denies any fast or slow heart rhythms. He denies any bleeding issues on the Eliquis. he is sleeping on 1 pillow at night. he is not waking gasping for air. he denies any swelling. he has been compliant with his medications. his BP has been controlled. PCP follows labs and lipids. He otherwise denies chest pain, PND, orthopnea, syncope, or near syncope. He has no other complaints. Review of Systems   Constitutional: Negative for fever, chills, diaphoresis, activity change, appetite change, fatigue and unexpected weight change. Eyes: Negative for photophobia, pain, redness and visual disturbance. Respiratory: Positive for shortness of breath (slightly improved).  Negative for apnea, cough, chest tightness, shortness of breath, wheezing and stridor. Cardiovascular: Negative for chest pain, palpitations and leg swelling. Gastrointestinal: Negative for abdominal distention. Genitourinary: Negative for dysuria, urgency and frequency. Musculoskeletal: Negative for myalgias, arthralgias and gait problem. Skin: Negative for color change, pallor, rash and wound. Neurological: Negative for dizziness, tremors, speech difficulty, weakness and numbness. Hematological: Does not bruise/bleed easily. Psychiatric/Behavioral: Negative.         Past Medical History:   Diagnosis Date    Acute kidney failure, unspecified ([facility] [address]     Anxiety state, unspecified     Atrial septal aneurysm 01/09/2016    Blood circulation, collateral     Body mass index 30.0-30.9, adult     CAD (coronary artery disease) 01/10/2016    1/9/16  lexiscan  Positive for apical MI + myocardial ischemia, EF 42%, intermediate risk findings, AUC indication 16, AUC score 7 1/10/16  Cath  3 lesions in the LAD:  Mid: 70% 2.25x23, mid 90% 2.25 x 28, distal 100% 2.0x28, anterior lateral apical hypokinesis, EF 45%    Calculus of kidney     Carotid artery stenosis 06/26/2013    Cellulitis and abscess of hand, except fingers and thumb     Cerebral artery occlusion with cerebral infarction (HCC)     15 years ago    Chronic airway obstruction, not elsewhere classified     Chronic kidney disease, unspecified     Congestive heart failure, unspecified     Diabetes mellitus type II     Diverticulosis of colon (without mention of hemorrhage)     Encounter for long-term (current) use of insulin (HCC)     Esophageal reflux     Family history of ischemic heart disease     Gastric ulcer, unspecified as acute or chronic, without mention of hemorrhage, perforation, or obstruction     Hyperlipemia     Hypertension     Internal hemorrhoids without mention of complication     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, or unspecified(403.00)     Obesity, unspecified     Other specified cardiac dysrhythmias(427.89)     Palliative care patient 12/21/2020    Paroxysmal atrial fibrillation (HCC)     Peripheral vascular disease ([facility] [address]     Solitary pulmonary nodule     Surgical or other procedure not carried out because of contraindication     Thoracic aneurysm without mention of rupture     Tobacco use disorder     Type II or unspecified type diabetes mellitus without mention of complication, not stated as uncontrolled        Past Surgical History:   Procedure Laterality Date    CARDIAC CATHETERIZATION      CHOLECYSTECTOMY  2000    CORONARY ANGIOPLASTY WITH STENT PLACEMENT  01/2016    2 JACQUELINE to LAD    DIAGNOSTIC CARDIAC CATH LAB PROCEDURE      URETER STENT PLACEMENT      VASCULAR SURGERY  03- [facility]    Aortogram with bilateral selective renal artery injections    VASCULAR SURGERY  6/14/13 S    Right carotid endarterectomy with Vascu-Guard patch repair. Right cervical carotid arteriograms after endarterectomy. VASCULAR SURGERY  7/6/15 [facility]    Percutaneous cannulation, right common femoral artery, with a5 Argentine sheath and later 6 Argentine [facility] sheath. Suprarenal abdomianl aortagram.  Selective right renal arteriogram.  Right renal artery balloon angioplasty;/stent (Express 6 mm x 18mmballoon-expandable stent. . Completion suprarenal abdominal aortogram and selective right remal arteriogram. Mynx closure, right common femoral artery punctur       Family History   Problem Relation Age of Onset    High Blood Pressure Mother     Other Mother         COVID    High Blood Pressure Father     Cancer Father     High Blood Pressure Brother     No Known Problems Son     No Known Problems Daughter        Social History     Socioeconomic History    Marital status:      Spouse name: patient refused to answer    Number of children: 2    Years of education: Not on file    Highest education level: Not on file   Occupational History    Occupation: patient refused to answer   Tobacco Use    Smoking status: Former     Packs/day: 0.25     Years: 7.00     Pack years: 1.75     Types: Cigarettes     Start date: 66     Quit date: 1981     Years since quittin.9    Smokeless tobacco: Current     Types: Chew    Tobacco comments:     Used both for 5 years   Vaping Use    Vaping Use: Never used   Substance and Sexual Activity    Alcohol use: Not Currently     Comment: \"when i was tyoung i did, if i wanted to drink ten beers i drank ten beers\"    Drug use: Never    Sexual activity: Yes     Comment: has 2 children   Other Topics Concern    Not on file   Social History Narrative    CODE STATUS: DNR    HEALTH CARE PROXY: patient refused to answer    AMBULATES: patient refused to answer    DOMICILED: patient refused to answer     Social Determinants of Health     Financial Resource Strain: Not on file   Food Insecurity: Not on file   Transportation Needs: Not on file   Physical Activity: Not on file   Stress: Not on file   Social Connections: Not on file   Intimate Partner Violence: Not on file   Housing Stability: Not on file       Allergies   Allergen Reactions    Morphine Shortness Of Breath     Was INTUBATED for this    Hydralazine      \"They said I am, I really don't know\"         Current Outpatient Medications:     bumetanide (BUMEX) 1 MG tablet, Take 2 tablets by mouth 2 times daily, Disp: 60 tablet, Rfl: 5    potassium chloride (KLOR-CON M) 20 MEQ extended release tablet, Take 1 tablet by mouth 2 times daily, Disp: 60 tablet, Rfl: 0    tiotropium (Elieser Brunt) 18 MCG inhalation capsule, Inhale 1 capsule into the lungs daily, Disp: 30 capsule, Rfl: 0    albuterol sulfate (PROAIR RESPICLICK) 756 (90 Base) MCG/ACT aerosol powder inhalation, Inhale into the lungs every 4 hours as needed for Wheezing or Shortness of Breath, Disp: , Rfl:     esomeprazole Magnesium (NEXIUM) 40 MG PACK, Take 40 mg by mouth daily, Disp: , Rfl: Cholecalciferol (VITAMIN D3) 50 MCG (2000 UT) CAPS, Take by mouth daily, Disp: , Rfl:     sacubitril-valsartan (ENTRESTO) 49-51 MG per tablet, Take 1 tablet by mouth 2 times daily, Disp: , Rfl:     isosorbide mononitrate (IMDUR) 60 MG extended release tablet, TAKE ONE TABLET BY MOUTH TWICE A DAY, Disp: 60 tablet, Rfl: 5    minoxidil (LONITEN) 2.5 MG tablet, TAKE ONE TABLET BY MOUTH TWICE A DAY, Disp: 60 tablet, Rfl: 5    NIFEdipine (PROCARDIA XL) 60 MG extended release tablet, Take 1 tablet by mouth daily, Disp: 30 tablet, Rfl: 3    ELIQUIS 5 MG TABS tablet, TAKE ONE TABLET BY MOUTH TWICE A DAY, Disp: 60 tablet, Rfl: 5    ondansetron (ZOFRAN ODT) 4 MG disintegrating tablet, Take 1 tablet by mouth every 8 hours as needed for Nausea or Vomiting, Disp: 20 tablet, Rfl: 0    clopidogrel (PLAVIX) 75 MG tablet, TAKE ONE TABLET BY MOUTH EVERY DAY, Disp: 30 tablet, Rfl: 3    carvedilol (COREG) 12.5 MG tablet, Take 1 tablet by mouth 2 times daily, Disp: 60 tablet, Rfl: 3    pantoprazole (PROTONIX) 40 MG tablet, Take 1 tablet by mouth 2 times daily (before meals), Disp: 60 tablet, Rfl: 3    ascorbic acid (VITAMIN C) 500 MG tablet, Take 1 tablet by mouth daily, Disp: 30 tablet, Rfl: 3    calcitRIOL (ROCALTROL) 0.25 MCG capsule, Take 0.25 mcg by mouth daily, Disp: , Rfl:     senna-docusate (PERICOLACE) 8.6-50 MG per tablet, Take 1 tablet by mouth nightly, Disp: , Rfl:     allopurinol (ZYLOPRIM) 100 MG tablet, Take 200 mg by mouth daily, Disp: , Rfl:     aspirin 81 MG chewable tablet, Take 1 tablet by mouth daily, Disp: 30 tablet, Rfl: 3    nitroGLYCERIN (NITROSTAT) 0.4 MG SL tablet, up to max of 3 total doses.  If no relief after 1 dose, call 911., Disp: 25 tablet, Rfl: 0    NOVOLOG FLEXPEN 100 UNIT/ML injection pen, 5 Units 3 times daily (before meals) , Disp: , Rfl:     levocetirizine (XYZAL) 5 MG tablet, Take 5 mg by mouth nightly, Disp: , Rfl:     ULTICARE MINI PEN NEEDLES 31G X 6 MM MISC, FOR USE WITH LANTUS TWO TIMES A DAY, Disp: 50 each, Rfl: 5    LANTUS SOLOSTAR 100 UNIT/ML injection pen, INJECT 30 UNITS IN THE MORNING AND 20 UNITS IN THE EVENING DAILY, Disp: 15 mL, Rfl: 5    rosuvastatin (CRESTOR) 40 MG tablet, Take 1 tablet by mouth daily Take 1 tablet by mouth daily, Disp: 90 tablet, Rfl: 3    amiodarone (CORDARONE) 200 MG tablet, Take 1 tablet by mouth daily, Disp: 90 tablet, Rfl: 3    PE:  Vitals:    12/06/22 0939   BP: 120/70   Pulse: 55   SpO2: 90%       Estimated body mass index is 34.31 kg/m² as calculated from the following:    Height as of this encounter: 5' 11\" (1.803 m). Weight as of this encounter: 246 lb (111.6 kg). Constitutional: He is oriented to person, place, and time. He appears well-developed and well-nourished in no acute distress. Neck:  Neck supple without JVD present. No trachea deviation present. No thyromegaly present. Eyes:Conjunctivae and EOM are normal. Pupils equal and reactive to light. ENT:Hearing appears normal.conjunctiva and lids are normal, ears and nose appear normal.  Cardiovascular: Normal rate, Normal rhythm, normal heart sounds. 1/6 murmur ascultated. No gallop and no friction rub. No carotid bruits. Trace peripheral edema. Pulmonary/Chest:  Lungs clear to auscultation bilaterally without evidence of respiratory distress. He without wheezes. He without rales or ronchi. Musculoskeletal: Normal range of motion. Gait is normal.  Head is normocephalic and atraumatic. Skin: Skin is warm and dry without rash or pallor. Psychiatric:He is alert and oriented to person, place, and time. He has a normal mood and affect.  His behavior is normal. Thought content normal.       Lab Results   Component Value Date/Time    CREATININE 2.3 11/23/2022 01:31 AM    CREATININE 2.5 11/22/2022 01:58 AM    CREATININE 2.6 11/21/2022 04:53 AM    HGB 12.0 11/22/2022 01:58 AM    HGB 12.7 11/21/2022 04:17 AM    HGB 14.0 08/17/2022 02:41 AM    PROBNP 1,484 11/23/2022 01:31 AM    PROBNP 1,901 11/21/2022 04:53 AM    PROBNP 727 08/16/2022 02:29 AM    PROBNP 679 08/16/2022 02:29 AM         ECG 12/06/22  Sinus bradycardia, left axis-anterior fascicular block,  msec, HR 55 bpm       Assessment, Recommendations, & Plan:  78 y.o. male with CAD, HTN, HLD, PAF, Chronic anticoagulation, On Amiodarone Therapy, & Diastolic Dysfunction    CAD-controlled on ASA, Coreg, Plavix, Toprol, Crestor, & Entresto. Continue current regimen    HTN-controlled on Bumex, Coreg, Toprol, nifedipine, & Entresto. Continue current regimen    HLD-controlled on Crestor. Continue current regimen    PAF/Chronic anticoagulation/On Amiodarone Therapy-normal sinus rhythm on amiodarone. Anticoagulated on Eliquis without bleeding issues. Continue current regimen    DD-no signs of fluid overload today on Bumex, Toprol, & Entresto. He is weighing daily at home. Continue current regimen      Disposition - RTC in 2 months or sooner if needed      Please do not hesitate to contact me for any questions or concerns.     Sincerely yours,    DWAYNE Marrero

## 2022-12-06 NOTE — PATIENT INSTRUCTIONS
Coronary Artery Disease   (CAD; Coronary Atherosclerosis; Silent MI; Coronary Heart Disease; Ischemic Heart Disease; Atherosclerosis of the Coronary Arteries)     Definition   Coronary arteries bring oxygen rich blood to the heart muscle. Coronary artery disease (CAD) is blockage of these arteries. If the blockage is complete, areas of the heart muscle may be damaged. In severe case the heart muscle dies. This can lead to a heart attack, also known as a myocardial infarction (MI). Coronary artery disease is the most common form of heart disease. It is the leading cause of death worldwide. Causes include: Thickening of the walls of the arteries feeding the heart muscle   Accumulation of fatty plaques within the coronary arteries   Sudden spasm of a coronary artery   Narrowing of the coronary arteries   Inflammation within the coronary arteries   Development of a blood clot within the coronary arteries that blocks blood flow      Major risk factors include:   Sex: male (men have a greater risk of heart attack than women)   Age: 39 and older for men, 54 and older for women   Heredity: strong family history of heart disease   Obesity and being overweight   Smoking   High blood pressure   Sedentary lifestylePoor fitness can also increase your risk of CAD and premature death. High cholesterol (specifically, high LDL cholesterol, and low HDL cholesterol)   Diabetes   Metabolic syndrome (combination of high blood pressure, abdominal obesity, and insulin resistance)     Other risk factors may include:   Sleep Apnea  Stress   Excessive alcohol use   Depression   A diet that is high in saturated fat, trans fat, cholesterol, and/or caloriesDrinking sugary beverages on a regular basis may increase your risk of CAD. Symptoms   CAD may progress without any symptoms. Angina is chest pain that comes and goes. It often has a squeezing or pressure-like quality. It may radiate into the shoulder(s), arm(s), or jaw. Angina usually lasts for about 2-10 minutes. It is often relieved with rest. Angina can be triggered by:   Exercise or exertion   Emotional stress   Cold weather   A large meal   Chest pain may indicate more serious unstable angina or a heart attack if: It is unrelieved by rest or nitroglycerin   Severe angina   Angina that begins at rest (with no activity)   Angina that lasts more than 15 minutes   Accompanying symptoms may include:   Shortness of breath   Sweating   Nausea   Weakness   Immediate medical attention is needed for unstable angina. CAD in women may cause less classic chest pain. It is likely to start with shortness of breath and fatigue. Diagnosis   If you go to the emergency room with chest pain, some tests will be done right away. The tests will attempt to see if you are having angina or a heart attack. If you have a stable pattern of angina, other tests may be done to determine the severity of your disease. The doctor will ask about your symptoms and medical history. A physical exam will be done.    Tests may include:   Blood tests to look for certain substances in the blood called troponins which help the doctor determine if you are having a heart attack   Electrocardiogram (ECG, EKG) records the heart's activity by measuring electrical currents through the heart muscle, and can reveal evidence of past heart attacks, acute heart attacks, and heart rhythm problems   Echocardiogram uses high-frequency sound waves (ultrasound) to examine the size, shape, and motion of the heart, giving information about the structure and function of the heart   Exercise stress test records the heart's electrical activity during increased physical activity   Nuclear stress test the heart is observed while exercising and radioactive material highlights impaired blood flow to help locate problem areas   Coronary calcium scoring a type of x-ray called a CAT scan that uses a computer to look for the presence of calcium in the heart arteries   Coronary angiography x-rays taken after a dye is injected into the arteries to allows the doctor to look for abnormalities in the arteries   Treatment   Treatment may include:   Nitroglycerin   This medicine is usually given during an attack of angina. It can be given as a tablet that dissolves under the tongue or as a spray. Longer-lasting types can be used to prevent angina before an activity known to cause it. These may be given as pills or applied as patches or ointments. Blood-Thinning Medications   A small, daily dose of aspirin has been shown to decrease the risk of heart attack. Ask your doctor before taking aspirin daily. Warfarin (Coumadin)   Ticlopidine (Ticlid)   Clopidogrel (Plavix)   Beta-Blockers, Calcium-Channel Blockers, and ACE-Inhibitors   These may help prevent angina. In some cases, they may lower the risk of heart attack. Medications to Lower Cholesterol   Medicines, like statins, are often prescribed to people who have CAD. Statins (eg, atorvastatin [Lipitor]) lower cholesterol levels, which can help to prevent CAD events. Revascularization   Patients with severe blockages in their coronary arteries may benefit from procedures to immediately improve blood flow to the heart muscle:   Percutaneous coronary interventions (PCI)such as balloon angioplasty , in some cases, a wire mesh stent is placed to hold the artery open   Coronary artery bypass grafting (CABG) segments of vessels are taken from other areas of the body and are sewn into the heart arteries to reroute blood flow around blockages   Some studies have shown that CABG may be more effective than PCI. Lifestyle changes and intensive medicine may also be just as effective as PCI.    Options for Refractory Angina   For patients who are not candidates for revascularization procedures but have continued angina despite medicine, options include:   Enhanced external counterpulsation (EECP) large air bags are inflated around the legs in tune with the heart beat. The patient receives 5 one-hour treatments per week for seven weeks. This has been shown to reduce angina and may improve symptom-free exercise duration. Transmyocardial revascularization (TMR) surgical procedure done with laser to reduce chest pain. Researchers are also studying gene therapy as a possible treatment. Prevention   To reduce your risk of getting coronary artery disease:   Maintain a healthy weight. Eat a heart healthy diet that is low in saturated fat , red meat and processed meats, and rich in whole grain , fruits, and vegetables . Begin a safe exercise program with the advice of your doctor. If you smoke, quit . Treat your high blood pressure and/or diabetes. Treat high cholesterol or triglycerides. Ask your doctor about taking a low-dose aspirin every day. In certain patients, taking rosuvastatin (Crestor) may be another option. Talk to your doctor. Hypertension  (High Blood Pressure)  Most people with high blood pressure (hypertension) have no symptoms. High blood pressure can be a dangerous problem. Hypertension is dangerous because you may have it and not know it. High blood pressure may mean that your heart needs to work harder to pump blood. Your blood pressure is measured with 2 numbers. The first number is when your heart flexes (contracts), and the second number is when your heart relaxes. The higher the numbers are, the more you are at risk for problems. Write down your blood pressure today. The best blood pressure for adults is 120/80 (mmHg) or lower. It is likely that your blood pressure was recorded at least 2 times today. It is important for you to give these numbers to your doctor. If you do not have a doctor, try to get follow-up care at a hospital or community clinic. You may need to start high blood pressure medicine. You may also need to adjust your medicines as told by your doctor.  Even mild high blood pressure increases long-term health risks. One high reading does not mean you have hypertension. Your blood pressure should be taken when you are relaxed. It is also important to sit for about 10 minutes before being tested. Things that can increase your blood pressure are:  Injury, Illness, Stress, Caffeine, and some medicines (like decongestants). High blood pressure does not usually need emergency treatment. HOME CARE  Lifestyle and medicine changes may be needed, including:   Weight loss. Exercise. Limit the use of salt. Stop smoking. If using decongestants or birth control pills, talk to your doctor. These medicines might make blood pressure higher. Do not use street drugs. Females should not drink more than 1 alcoholic drink per day. Males should not drink more than 2 alcoholic drinks per day. Take your blood pressure medicine. You will need to take it every day. If you do not get treated, there are risks, including:   Heart disease. Stroke. Kidney failure. See your doctor as told. GET HELP RIGHT AWAY IF:  You get a very bad headache. You get blurred or changing vision. You feel confused. You feel weak, numb, or faint. You get chest or belly (abdominal) pain. You throw up (vomit). You cannot breathe very well. If you have a blood pressure reading with a top number of 180 or higher, you need to see your doctor right away. This is especially true if you are having any of the problems listed above. Hyperlipidemia   (Dyslipidemia; High Triglycerides; Triglycerides, High)     Definition   Hyperlipidemia is a high level of fats in the blood. These fats, called lipids, include cholesterol and triglycerides. There are five types of hyperlipidemia. The type depends on which lipid in the blood is high.    Causes   Causes may include:   A family history of hyperlipidemia   A diet high in total fat, saturated fat, or cholesterol   Obesity   Excess alcohol intake Certain conditions, including:   Diabetes   Low thyroid   Kidney problems   Liver disease   Cushing's syndrome   Certain drugs, such as:   Hormones or birth control pills   Beta-blockers   Some diuretics   Cortisone drugs   Isotretinoin (for acne )   Some anti- HIV drugs   Risk Factors   These factors increase your chance of developing this condition. Tell your doctor if you have any of these:   Advancing age   Sex: male   Postmenopause   Lack of exercise   Smoking   Stress   Overuse of alcohol   Symptoms   Hyperlipidemia usually does not cause symptoms. Very high levels of lipids or triglycerides can cause:   Fat deposits in the skin or tendons ( xanthomas )   Pain, enlargement, or swelling of organs such as the liver, spleen, or pancreas ( pancreatitis )   Obstruction of blood vessels in heart and brain   Hyperlipidemia can increase your risk of atherosclerosis . This is a dangerous hardening of the arteries. It can end up blocking blood flow. In some cases, this may result in:   Angina   Heart attack   Stroke   Other serious complications   Blood Vessel with Atherosclerosis       [address] [place] [address].   Diagnosis   This condition is diagnosed with blood tests. These tests measure the levels of lipids in the blood. The National Cholesterol Education Program advises that you have your lipids checked at least once every five years, starting at age 21. Also, the American Academy of Pediatrics recommends lipid screening for children at risk (eg, a family history of hyperlipidemia).    Testing may consist of a fasting blood test for:   Total cholesterol   LDL (bad cholesterol)   HDL (good cholesterol)   Triglycerides   Your doctor may recommend more frequent or earlier testing if you have:   Family history of hyperlipidemia   Risk factor or disease that may cause hyperlipidemia   Complication that may result from hyperlipidemia   Treatment   Treatment is not only aimed at correcting your cholesterol levels, but also at lowering your overall risk for heart disease and strokes. Diet Changes   Eat a diet low in total fat, saturated fat, and cholesterol . Reduce or eliminate the amount of alcohol you drink. Eat more high-fiber foods. Lifestyle Changes   If you are overweight, lose weight . If you smoke, quit . Exercise regularly . Talk to you doctor before starting an exercise program. Jonathan Deng may already have hardening of the arteries or heart disease. These conditions increase your risk of having a heart attack while exercising. Make sure other medical conditions such as high blood pressure and diabetes are being treated and controlled. Medications   There are a number of drugs available, such as statins , to treat this condition and help lower your risk for heart disease. Talk to your doctor. Statins have been shown to reduce mortality (death), heart attacks , and stroke. These medicines are best used as additions to diet and exercise and should not replace healthy lifestyle changes. Prevention   To help reduce your chance of getting hyperlipidemia, take the following steps:   Starting at age 21, get cholesterol tests. Eat a diet low in total fat, saturated fat, and cholesterol. If you smoke, quit. Drink alcohol in moderation (two drinks per day for men, one drink per day for women). If you are overweight, lose weight. Exercise regularly. Talk with your doctor first.   If you have diabetes , control your blood sugar. Talk to your doctor about medications you are taking. They may have side effects that cause hyperlipidemia.      Last Reviewed: September 2010 Prateek John DO   Updated: 11/9/2010

## 2022-12-13 LAB — SARS-COV-2, PCR: NOT DETECTED

## 2022-12-19 ENCOUNTER — OFFICE VISIT (OUTPATIENT)
Dept: PULMONOLOGY | Age: 67
End: 2022-12-19
Payer: MEDICARE

## 2022-12-19 VITALS
WEIGHT: 243.4 LBS | HEIGHT: 71 IN | TEMPERATURE: 98.2 F | SYSTOLIC BLOOD PRESSURE: 136 MMHG | DIASTOLIC BLOOD PRESSURE: 70 MMHG | OXYGEN SATURATION: 92 % | HEART RATE: 57 BPM | BODY MASS INDEX: 34.07 KG/M2

## 2022-12-19 DIAGNOSIS — E66.9 OBESITY (BMI 30-39.9): ICD-10-CM

## 2022-12-19 DIAGNOSIS — R06.83 SNORING: ICD-10-CM

## 2022-12-19 DIAGNOSIS — J96.11 CHRONIC RESPIRATORY FAILURE WITH HYPOXIA (HCC): ICD-10-CM

## 2022-12-19 DIAGNOSIS — I50.32 CHRONIC HEART FAILURE WITH PRESERVED EJECTION FRACTION (HCC): ICD-10-CM

## 2022-12-19 DIAGNOSIS — G47.34 SLEEP RELATED HYPOXIA: Primary | ICD-10-CM

## 2022-12-19 DIAGNOSIS — G47.8 NON-RESTORATIVE SLEEP: ICD-10-CM

## 2022-12-19 DIAGNOSIS — G47.33 OSA (OBSTRUCTIVE SLEEP APNEA): ICD-10-CM

## 2022-12-19 DIAGNOSIS — J98.11 ATELECTASIS: ICD-10-CM

## 2022-12-19 DIAGNOSIS — R06.09 DYSPNEA ON EXERTION: ICD-10-CM

## 2022-12-19 PROCEDURE — G8417 CALC BMI ABV UP PARAM F/U: HCPCS | Performed by: INTERNAL MEDICINE

## 2022-12-19 PROCEDURE — 1123F ACP DISCUSS/DSCN MKR DOCD: CPT | Performed by: INTERNAL MEDICINE

## 2022-12-19 PROCEDURE — G8484 FLU IMMUNIZE NO ADMIN: HCPCS | Performed by: INTERNAL MEDICINE

## 2022-12-19 PROCEDURE — 3078F DIAST BP <80 MM HG: CPT | Performed by: INTERNAL MEDICINE

## 2022-12-19 PROCEDURE — 99204 OFFICE O/P NEW MOD 45 MIN: CPT | Performed by: INTERNAL MEDICINE

## 2022-12-19 PROCEDURE — 3017F COLORECTAL CA SCREEN DOC REV: CPT | Performed by: INTERNAL MEDICINE

## 2022-12-19 PROCEDURE — 1111F DSCHRG MED/CURRENT MED MERGE: CPT | Performed by: INTERNAL MEDICINE

## 2022-12-19 PROCEDURE — 3074F SYST BP LT 130 MM HG: CPT | Performed by: INTERNAL MEDICINE

## 2022-12-19 PROCEDURE — 4004F PT TOBACCO SCREEN RCVD TLK: CPT | Performed by: INTERNAL MEDICINE

## 2022-12-19 PROCEDURE — G8427 DOCREV CUR MEDS BY ELIG CLIN: HCPCS | Performed by: INTERNAL MEDICINE

## 2022-12-19 ASSESSMENT — ENCOUNTER SYMPTOMS
ABDOMINAL PAIN: 0
ANAL BLEEDING: 0
APNEA: 0
ABDOMINAL DISTENTION: 0
COUGH: 1
SHORTNESS OF BREATH: 1
WHEEZING: 0
CHEST TIGHTNESS: 0
RHINORRHEA: 0
BACK PAIN: 0

## 2022-12-19 NOTE — PROGRESS NOTES
Pulmonary and Sleep Medicine    Kelly Tobin (:  1955) is a 78 y.o. male,New patient, here for evaluation of the following chief complaint(s):  New Patient (Referred by Abelardo Mercado- SOB, Hypoxia, worse since COVID- Dec 2020)      Referring physician:  Steve Dowd - Cnp  [address],  [address]     ASSESSMENT/PLAN:  1. Sleep related hypoxia  2. Chronic heart failure with preserved ejection fraction (HCC)  3. Obesity (BMI 30-39.9)  4. Dyspnea on exertion  -     Ambulatory referral to Sleep Medicine  -     Sleep Study with PAP Titration; Future  5. Snoring  -     Ambulatory referral to Sleep Medicine  -     Sleep Study with PAP Titration; Future  6. Non-restorative sleep  -     Ambulatory referral to Sleep Medicine  -     Sleep Study with PAP Titration; Future  7. Chronic respiratory failure with hypoxia (HCC)  -     Full PFT Study With Bronchodilator; Future  8. SAMMY (obstructive sleep apnea)  -     Ambulatory referral to Sleep Medicine  -     Sleep Study with PAP Titration; Future  9. Atelectasis  -     CT CHEST WO CONTRAST; Future      We will get a PFT. Doubt primary lung disease however. Rule out SAMMY, he does have significant risk factors for obstructive sleep apnea. Repeat CT of the chest.  Follow-up on atelectatic changes in the right middle lobe and the pleural effusion. Alejandrina Webb MD, [facility], [facility]    No follow-ups on file. SUBJECTIVE/OBJECTIVE:  Patient is here for evaluation of chronic respiratory failure and shortness of breath and hypoxia. The patient was recently hospitalized in November with hypoxia and hypercapnia. He is known to have afib with HFPEF. He is obese. He smoked previously as a teen ager. He snores occasionally. He had covid in . I was asked to see him for the above. He had a CT of the chest done on 2022 at an outside facility and showed pleural effusion and atelectasis of the right middle lobe.   I was asked to see him regarding the above. Currently he is on continuous oxygen at 4 L/min. We took him off the oxygen during the office visit and his oxygen saturation was in the mid 80s. Prior to Visit Medications    Medication Sig Taking?  Authorizing Provider   bumetanide (BUMEX) 1 MG tablet Take 2 tablets by mouth 2 times daily Yes Sheldon Villaseñor MD   potassium chloride (KLOR-CON M) 20 MEQ extended release tablet Take 1 tablet by mouth 2 times daily Yes Sheldon Villaseñor MD   tiotropium ([place]) 18 MCG inhalation capsule Inhale 1 capsule into the lungs daily Yes Sheldon Villaseñor MD   albuterol sulfate (PROAIR RESPICLICK) 598 (90 Base) MCG/ACT aerosol powder inhalation Inhale into the lungs every 4 hours as needed for Wheezing or Shortness of Breath Yes Historical Provider, MD   esomeprazole Magnesium (NEXIUM) 40 MG PACK Take 40 mg by mouth daily Yes Historical Provider, MD   Cholecalciferol (VITAMIN D3) 50 MCG (2000 UT) CAPS Take by mouth daily Yes Historical Provider, MD   sacubitril-valsartan (ENTRESTO) 49-51 MG per tablet Take 1 tablet by mouth 2 times daily Yes Historical Provider, MD   rosuvastatin (CRESTOR) 40 MG tablet Take 1 tablet by mouth daily Take 1 tablet by mouth daily Yes Yvonne Abraham MD   amiodarone (CORDARONE) 200 MG tablet Take 1 tablet by mouth daily Yes Yvonne Abraham MD   isosorbide mononitrate (IMDUR) 60 MG extended release tablet TAKE ONE TABLET BY MOUTH TWICE A DAY Yes DWAYNE Alvarez   minoxidil (LONITEN) 2.5 MG tablet TAKE ONE TABLET BY MOUTH TWICE A DAY Yes DWAYNE Zurita   NIFEdipine (PROCARDIA XL) 60 MG extended release tablet Take 1 tablet by mouth daily Yes Lelo Campbell MD   ELIQUIS 5 MG TABS tablet TAKE ONE TABLET BY MOUTH TWICE A DAY Yes DWAYNE Beckett - NP   ondansetron (ZOFRAN ODT) 4 MG disintegrating tablet Take 1 tablet by mouth every 8 hours as needed for Nausea or Vomiting Yes Shell Stanley MD   clopidogrel (PLAVIX) 75 MG tablet TAKE ONE TABLET BY MOUTH EVERY DAY Yes Raymond Becerra MD   carvedilol (COREG) 12.5 MG tablet Take 1 tablet by mouth 2 times daily Yes Raymond Becerra MD   pantoprazole (PROTONIX) 40 MG tablet Take 1 tablet by mouth 2 times daily (before meals) Yes Raymond Becerra MD   ascorbic acid (VITAMIN C) 500 MG tablet Take 1 tablet by mouth daily Yes Raymond Becerra MD   calcitRIOL (ROCALTROL) 0.25 MCG capsule Take 0.25 mcg by mouth daily Yes Historical Provider, MD   senna-docusate (Maria E Nephew) 8.6-50 MG per tablet Take 1 tablet by mouth nightly Yes Historical Provider, MD   allopurinol (ZYLOPRIM) 100 MG tablet Take 200 mg by mouth daily Yes Historical Provider, MD   aspirin 81 MG chewable tablet Take 1 tablet by mouth daily Yes Britton Shahid MD   nitroGLYCERIN (NITROSTAT) 0.4 MG SL tablet up to max of 3 total doses. If no relief after 1 dose, call 911. Yes Shane Kemp,    NOVOLOG FLEXPEN 100 UNIT/ML injection pen 5 Units 3 times daily (before meals)  Yes Historical Provider, MD   levocetirizine (XYZAL) 5 MG tablet Take 5 mg by mouth nightly Yes Historical Provider, MD   ULTICARE MINI PEN NEEDLES 31G X 6 MM MISC FOR USE WITH LANTUS TWO TIMES A DAY Yes DWAYNE Santa CNP   LANTUS SOLOSTAR 100 UNIT/ML injection pen INJECT 30 UNITS IN THE MORNING AND 20 UNITS IN THE EVENING DAILY Yes DWAYNE Santa CNP        Review of Systems   Constitutional:  Negative for activity change, appetite change, chills, diaphoresis and fatigue. HENT:  Negative for congestion, dental problem, drooling, ear discharge, postnasal drip and rhinorrhea. Eyes:  Negative for visual disturbance. Respiratory:  Positive for cough and shortness of breath. Negative for apnea, chest tightness and wheezing. Gastrointestinal:  Negative for abdominal distention, abdominal pain and anal bleeding. Endocrine: Negative for cold intolerance, heat intolerance and polydipsia.    Genitourinary:  Negative for difficulty urinating, dysuria, enuresis and flank pain. Musculoskeletal:  Negative for arthralgias, back pain and gait problem. Allergic/Immunologic: Negative for environmental allergies. Neurological:  Negative for dizziness, facial asymmetry, light-headedness and headaches. Vitals:    12/19/22 1122   BP: 136/70   Pulse: 57   Temp: 98.2 °F (36.8 °C)   SpO2: 92%     BMI Readings from Last 1 Encounters:   12/19/22 33.95 kg/m²         Physical Exam  Vitals reviewed. Constitutional:       Appearance: Normal appearance. HENT:      Head: Normocephalic and atraumatic. Nose: Nose normal.   Eyes:      Extraocular Movements: Extraocular movements intact. Conjunctiva/sclera: Conjunctivae normal.   Cardiovascular:      Rate and Rhythm: Normal rate and regular rhythm. Heart sounds: No murmur heard. No friction rub. Pulmonary:      Effort: Pulmonary effort is normal. No respiratory distress. Breath sounds: Normal breath sounds. No stridor. No wheezing, rhonchi or rales. Abdominal:      General: There is no distension. Palpations: There is no mass. Tenderness: There is no abdominal tenderness. There is no guarding or rebound. Musculoskeletal:      Cervical back: Normal range of motion and neck supple. Right lower leg: Edema present. Left lower leg: Edema present. Neurological:      Mental Status: He is alert and oriented to person, place, and time. This note was generated using a voice recognition software. Errors in voice recognition may have occurred. An electronic signature was used to authenticate this note.     --Shazia Liu MD

## 2023-01-11 LAB
SARS-COV-2, PCR: NOT DETECTED
SARS-COV-2: NEGATIVE

## 2023-01-12 ENCOUNTER — HOSPITAL ENCOUNTER (OUTPATIENT)
Dept: CT IMAGING | Age: 68
Discharge: HOME | End: 2023-01-12
Payer: MEDICARE

## 2023-01-12 ENCOUNTER — HOSPITAL ENCOUNTER (OUTPATIENT)
Dept: PULMONOLOGY | Age: 68
Discharge: HOME | End: 2023-01-12
Payer: MEDICARE

## 2023-01-12 VITALS — WEIGHT: 229 LBS | BODY MASS INDEX: 32.06 KG/M2 | OXYGEN SATURATION: 96 % | HEIGHT: 71 IN

## 2023-01-12 DIAGNOSIS — J98.11 ATELECTASIS: ICD-10-CM

## 2023-01-12 DIAGNOSIS — J96.11 CHRONIC RESPIRATORY FAILURE WITH HYPOXIA (HCC): ICD-10-CM

## 2023-01-12 PROCEDURE — 71250 CT THORAX DX C-: CPT | Performed by: RADIOLOGY

## 2023-01-12 PROCEDURE — 71250 CT THORAX DX C-: CPT

## 2023-01-12 PROCEDURE — 94729 DIFFUSING CAPACITY: CPT

## 2023-01-12 PROCEDURE — 94060 EVALUATION OF WHEEZING: CPT

## 2023-01-12 PROCEDURE — 6360000002 HC RX W HCPCS

## 2023-01-12 PROCEDURE — 94726 PLETHYSMOGRAPHY LUNG VOLUMES: CPT

## 2023-01-12 RX ORDER — ALBUTEROL SULFATE 2.5 MG/3ML
2.5 SOLUTION RESPIRATORY (INHALATION) EVERY 6 HOURS PRN
Status: DISCONTINUED | OUTPATIENT
Start: 2023-01-12 | End: 2023-01-14 | Stop reason: HOSPADM

## 2023-01-12 RX ADMIN — ALBUTEROL SULFATE 2.5 MG: 2.5 SOLUTION RESPIRATORY (INHALATION) at 09:10

## 2023-01-12 NOTE — PROCEDURES
Media Information    Pulmonary Function Study    Interpretation:    The FVC is severely reduced. FEV1 is moderately reduced. FEV1/FVC ratio is Normal. After bronchodilator therapy there was no significant improvement in FEV1. Total lung capacity is moderately reduced. Residual volume is Normal.      Diffusing lung capacity when corrected for alveolar volume is mildly reduced. Impression:    Moderate restrictive lung disease.          Alejandrina Webb MD, FCCP, [facility]

## 2023-01-16 RX ORDER — APIXABAN 5 MG/1
TABLET, FILM COATED ORAL
Qty: 60 TABLET | Refills: 5 | Status: SHIPPED | OUTPATIENT
Start: 2023-01-16

## 2023-01-17 ENCOUNTER — HOSPITAL ENCOUNTER (OUTPATIENT)
Dept: SLEEP CENTER | Age: 68
Discharge: HOME | End: 2023-01-19
Payer: MEDICARE

## 2023-01-17 PROCEDURE — 95810 POLYSOM 6/> YRS 4/> PARAM: CPT

## 2023-01-18 NOTE — PROGRESS NOTES
[facility]  [address], [address]  Phone [phone] Fax [phone]     Sleep Study Technician Review    Patient Name:  Toni Costello  :   1955  Referring Provider: Suze Roman *    Brief History:  Toni Costello is a 78 y.o. male with a history of Afib, CHF, Snoring, possible SAMMY, CKD stage 4, Obesity, Diabetes, Hypertension and GERDS who has been referred for a sleep study. he is sleeping on 1 pillow at night. he is not waking gasping for air. He is on oxygen at home. He still has shortness of breath but he states overall is better. Currently he is on continuous oxygen at 4 L/min. Height: 5' 11\"  Weight: 243 lbs  BMI: 33.9  Neck Circ: 17.5\"  Mallampati  Type 4  ESS:     Type of Study:  PSG  Time Stage Position Snore Hypopnea Obs Apnea Julien Apnea PAP O2   2200 Awake Supine No No No No  3lpm   2300 Awake Left No No No No  3 lpm   2400 Awake Right No No No No  3 lpm   0100 Awake Right No   No No No  3 lpm   0200 Awake Right No No No No  3 lpm   0300 Awake Right No No No No  3 lpm   0400 Awake Right No No No No  3 lpm                           Summary: Pt stated he is on oxygen at home at 3.5 to 4 lpm. Pt stated that he has had COVID twice and he feels that he cant breathe as well now. Pt stated that he does snore. Pt stated that he feels tired all the time and does take naps. Pts study was started on room air. Oxygen was added at 2lpm when pts oxygen saturations dropped to the low 70's. Tech in at 0945 to increase oxygen to 3 lpm due to saturation in the low 80's. Pt had a hard time getting to sleep. DME: Agustín Byers     The study was reviewed briefly with Toni Costello. He will be notified of the formal results and recommendations after the study is scored and interpreted. The report will be sent to his referring provider.     Technician: GRAHAM Akbar

## 2023-01-19 NOTE — PROGRESS NOTES
[facility]  [address], [address]  Phone [phone] Fax [phone]        Polysomnography Report  Patient Name Xavier Bosworth Account Number [de-identified]    1955 Referring Provider Alejandrina Webb M.D.,HILDA SEGOVIA     Age/ Gender 78 years/M Interpreting physician Alejandrina Webb M.D.,[facility]   Neck circumference/  Mallampati classification 17.5 in/class 4 Night Technician Agata Higgins RRT, RPSGT   [place] score 16/24 Scoring Technician Agata Higgins RRT, RPSGT   Height 71.0 in Indications for the test excessive daytime somnolence   Weight 243.0 lbs Test Diagnostic Polysomnogram   BMI 33.9 Date of test 2023     Procedure  A Diagnostic Polysomnogram was conducted on the night of 2023. The study was performed and scored per AASM guidelines. The following were monitored: frontal, central, and occipital EEG, electrooculogram (EOG), submentalis EMG, nasal and oral airflow, intranasal pressure, thoracic plethysmography, abdominal plethysmography, anterior tibialis EMG, electrocardiogram, body position, and positive airway pressure (PAP). Arterial oxygen saturation was monitored with a pulse oximeter. The study was scored utilizing 30 second epochs. Hypopneas were scored using per AASM definition VIII, D, 1B.     Sleep Scoring Data  Lights out 8:52:49 PM Sleep latency 15.0 min Time in N1 118.5 min N1% 97.5%   Lights on 4:18:25 AM WASO 309.1 min Time in N2 3.0 min N2% 2.5%   TIB/.6 min Sleep efficiency 30.5% Time in N3 0.0 min N3% 0.0%   .5 min REM latency N/A min Time in R 0.0 min R% 0.0%     Respiratory Events Summary   NREM REM Total   Hypopnea index 4.0    4.0   Apnea index 1.5    1.5   RERA index 0.0    0.0   AHI 5.4    5.4   RDI (AHI + RERA index) 5.4    5.4     Respiratory Events by Sleep Stage   Obstructive Apneas OA Index Central Apneas CA Index Mixed Apneas MA Index   Hypopneas H Index   RERAs   R Index   NREM 1 0.5 2 1.0 0 0.0 8 4.0 0 0.0   REM                                 Total 1 0.5 2 1.0 0 0.0 8 4.0 0 0.0     Respiratory Events by Body Position   Time (min) Obstructive Apneas OA Index Central Apneas CA Index Mixed Apneas MA Index   Hypopneas H Index   RERAs RERA  Index Total  AHI Total RDI   Supine 60.9  0 0.0 0 0.0 0 0.0 2 4.8 0 0.0 4.8 4.8   R/Supine                                           L/Supine                                           Right 293.1  1 0.7 2 1.4 0 0.0 6 4.3 0 0.0 6.4 6.4   Left 30.2  0 0.0 0 0.0 0 0.0 0 0.0 0 0.0 0.0 0.0   Prone 6.3  0 0.0 0 0.0 0 0.0 0 0.0 0 0.0 0.0 0.0   R/Prone                                           L/Prone                                           Up 0.5  0 0.0 0 0.0 0 0.0 0 0.0 0 0.0 0.0 0.0   Snoring  Snoring Rating (loudness 0-4) 1   Snoring Amount (% of total sleep time with snoring) 0%     Oximetry Data              Wake NREM REM TST   Average Saturation  88% 87%   % 88%   Desaturation Index (#/hour)  4.0    4.5   Desaturation Max Duration (sec)  102.0 N/A 102.0   Minimum O2 Saturation    66%     Oximetry Distribution              WaKe REM NREM TOTAL %TIB   <90% (min) 183.2 0.0 76.8 260.0 58.3%   <85% (min) 30.1 0.0 18.1 48.2 10.8%   <80% (min) 11.4 0.0 8.7 20.1 4.5%   <75% (min) 7.8 0.0 7.3 15.1 3.4%   <70% (min) 1.7 0.0 2.5 4.2 0.9%   <60% (min) 0.0 0.0 0.0 0.0 0.0%   <50% (min) 0.0 0.0 0.0 0.0 0.0%   Fail    (min) 9.9 0.0 0.9 10.8      Respiratory Pattern   Present Absent Duration   Hypoventilation  ü N/A   Cheyne Stapleton Respirations  ü N/A   Periodic Breathing  ü N/A     Heart Rate   Mean heart rate (bmp) Maximum heart rate (bmp)   During recording       73   During sleep 56.9 64     Heart Rhythm Summary  Normal sinus rhythm     Heart Rhythm Events  Type of events Present Absent Rate-Duration/Total Duration   Bradycardia  P N/A   Asystole  ü N/A   Sinus Tachycardia During Sleep  ü N/A   Narrow Complex Tachycardia  ü N/A   Wide Complex Tachycardia  ü N/A  Atrial Fibrillation  ü N/A   Other Arrhythmias  P N/A     Arousals   NREM REM Total Arousal Index   Spontaneous 52 0 71 35.1   Respiratory 6 0 7 3.5   Snore 1 0 1 0.5   Leg movement 0 0 0 0.0   Total 59 0 79 39.0     Periodic Limb Movement Data (legs unless otherwise noted)   Leg Movements PLMS PLMS with arousal   # of events 0 0 0   Index (#/hr TST) 0 0 0   Note:   The interpreting physician named above, reviewed this record in its entirety, including sleep staging, EMG activity, EEG, EKG, breathing parameters, oxygen saturation, body position, and behavior unless otherwise noted. The interpretation is based on this information in addition to available clinical history and physical examination data. Interpretation:     Mild obstructive sleep apnea. Obesity. Subjective hypersomnia. Hypoxia during sleep. Recommendations:    PAP titration. Weight loss and exercise. Avoid risky activity such as driving if sleepy. Discuss sleep hygiene with the patient.         Alejandrina Webb MD, [facility], [facility]

## 2023-01-22 DIAGNOSIS — G47.33 OSA (OBSTRUCTIVE SLEEP APNEA): Primary | ICD-10-CM

## 2023-01-24 RX ORDER — ESOMEPRAZOLE MAGNESIUM 40 MG/1
CAPSULE, DELAYED RELEASE ORAL
Qty: 60 CAPSULE | Refills: 5 | OUTPATIENT
Start: 2023-01-24

## 2023-01-24 NOTE — TELEPHONE ENCOUNTER
Rx Refill Note  Requested Prescriptions     Pending Prescriptions Disp Refills   • esomeprazole (nexIUM) 40 MG capsule [Pharmacy Med Name: ESOMEPRAZOLE MAGNESIUM 40MG CPDR] 60 capsule 5     Sig: TAKE ONE CAPSULE BY MOUTH TWICE A DAY      Last office visit with prescribing clinician: 10/28/2021   Next office visit with prescribing clinician: Visit date not found                         Would you like a call back once the refill request has been completed: [] Yes [] No    If the office needs to give you a call back, can they leave a voicemail: [] Yes [] No    Sandee Kay MA  01/24/23, 08:21 CST

## 2023-01-25 ENCOUNTER — HOSPITAL ENCOUNTER (OUTPATIENT)
Dept: SLEEP CENTER | Age: 68
Discharge: HOME | End: 2023-01-27
Payer: MEDICARE

## 2023-01-25 ENCOUNTER — OFFICE VISIT (OUTPATIENT)
Dept: PULMONOLOGY | Age: 68
End: 2023-01-25
Payer: MEDICARE

## 2023-01-25 VITALS
HEIGHT: 71 IN | BODY MASS INDEX: 33.1 KG/M2 | DIASTOLIC BLOOD PRESSURE: 80 MMHG | HEART RATE: 58 BPM | WEIGHT: 236.4 LBS | TEMPERATURE: 97.8 F | OXYGEN SATURATION: 87 % | SYSTOLIC BLOOD PRESSURE: 137 MMHG

## 2023-01-25 DIAGNOSIS — G47.34 SLEEP RELATED HYPOXIA: ICD-10-CM

## 2023-01-25 DIAGNOSIS — J98.4 RESTRICTIVE LUNG DISEASE: ICD-10-CM

## 2023-01-25 DIAGNOSIS — G47.33 OSA (OBSTRUCTIVE SLEEP APNEA): Primary | ICD-10-CM

## 2023-01-25 DIAGNOSIS — J96.11 CHRONIC RESPIRATORY FAILURE WITH HYPOXIA (HCC): ICD-10-CM

## 2023-01-25 DIAGNOSIS — I50.32 CHRONIC HEART FAILURE WITH PRESERVED EJECTION FRACTION (HCC): ICD-10-CM

## 2023-01-25 DIAGNOSIS — E66.9 OBESITY (BMI 30-39.9): ICD-10-CM

## 2023-01-25 LAB
IGG: 792 MG/DL (ref 700–1600)
IGM: 104 MG/DL (ref 40–230)

## 2023-01-25 PROCEDURE — 3079F DIAST BP 80-89 MM HG: CPT | Performed by: INTERNAL MEDICINE

## 2023-01-25 PROCEDURE — 4004F PT TOBACCO SCREEN RCVD TLK: CPT | Performed by: INTERNAL MEDICINE

## 2023-01-25 PROCEDURE — 99214 OFFICE O/P EST MOD 30 MIN: CPT | Performed by: INTERNAL MEDICINE

## 2023-01-25 PROCEDURE — 3075F SYST BP GE 130 - 139MM HG: CPT | Performed by: INTERNAL MEDICINE

## 2023-01-25 PROCEDURE — G8427 DOCREV CUR MEDS BY ELIG CLIN: HCPCS | Performed by: INTERNAL MEDICINE

## 2023-01-25 PROCEDURE — G8417 CALC BMI ABV UP PARAM F/U: HCPCS | Performed by: INTERNAL MEDICINE

## 2023-01-25 PROCEDURE — 95811 POLYSOM 6/>YRS CPAP 4/> PARM: CPT

## 2023-01-25 PROCEDURE — 3017F COLORECTAL CA SCREEN DOC REV: CPT | Performed by: INTERNAL MEDICINE

## 2023-01-25 PROCEDURE — G8484 FLU IMMUNIZE NO ADMIN: HCPCS | Performed by: INTERNAL MEDICINE

## 2023-01-25 PROCEDURE — 1123F ACP DISCUSS/DSCN MKR DOCD: CPT | Performed by: INTERNAL MEDICINE

## 2023-01-25 RX ORDER — ESOMEPRAZOLE MAGNESIUM 40 MG/1
CAPSULE, DELAYED RELEASE ORAL
Qty: 60 CAPSULE | Refills: 5 | OUTPATIENT
Start: 2023-01-25

## 2023-01-25 ASSESSMENT — ENCOUNTER SYMPTOMS
WHEEZING: 0
SHORTNESS OF BREATH: 1
RHINORRHEA: 0
BACK PAIN: 0
ANAL BLEEDING: 0
ABDOMINAL PAIN: 0
ABDOMINAL DISTENTION: 0
COUGH: 1
APNEA: 0
CHEST TIGHTNESS: 0

## 2023-01-25 NOTE — TELEPHONE ENCOUNTER
Duplicate medication request.     Rx Refill Note  Requested Prescriptions     Pending Prescriptions Disp Refills   • esomeprazole (nexIUM) 40 MG capsule [Pharmacy Med Name: ESOMEPRAZOLE MAGNESIUM 40MG CPDR] 60 capsule 5     Sig: TAKE ONE CAPSULE BY MOUTH TWICE A DAY      Last office visit with prescribing clinician: 10/28/2021  Next office visit with prescribing clinician: Visit date not found    Gail Lal MA  01/25/23, 12:25 CST

## 2023-01-25 NOTE — PROGRESS NOTES
Pulmonary and Sleep Medicine    Christa Johnson (:  1955) is a 78 y.o. male,Established patient, here for evaluation of the following chief complaint(s):  Follow-up (Follow up- CT, PFT )      Referring physician:  No referring provider defined for this encounter. ASSESSMENT/PLAN:  1. SAMMY (obstructive sleep apnea)  2. Sleep related hypoxia  3. Chronic heart failure with preserved ejection fraction (HCC)  4. Obesity (BMI 30-39.9)  5. Chronic respiratory failure with hypoxia (HCC)  6. Restrictive lung disease  -     OBDULIO Screen with Reflex; Future  -     IgG; Future  -     IgM; Future  -     IgE; Future      We will reevaluate after CPAP titration. We will obtain autoimmune and connective tissue disease work-up today to better assess the etiology of his restriction although there does not appear to be interstitial process in his lung    Will refer to [facility] for further evaluation of the chronic aortic dissection. Discussed with Dr. Sacha Calderón and he recommended tertiary care center referral.       Leti Acuña MD, [facility], [facility]    Return in about 2 weeks (around 2023). SUBJECTIVE/OBJECTIVE:  The patient is here for follow up on obstructive sleep apnea. He had a sleep study that showed mild obstructive sleep apnea however his sleep efficiency was 30%. He is scheduled to have CPAP titration tonight. CT of the chest showed no evidence of interstitial lung disease. 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 Pulmonary function study showed mild to moderate restriction. Continues to be on oxygen. He is hypoxic on room air. His portable concentrator seems to be malfunctioning. Prior to Visit Medications    Medication Sig Taking?  Authorizing Provider   ELIQUIS 5 MG TABS tablet TAKE ONE TABLET BY MOUTH TWICE A DAY Yes DWAYNE Robertson   OXYGEN Inhale 4 L/min into the lungs continuous Yes Historical Provider, MD   bumetanide (BUMEX) 1 MG tablet Take 2 tablets by mouth 2 times daily Yes Ayah More MD   potassium chloride (KLOR-CON M) 20 MEQ extended release tablet Take 1 tablet by mouth 2 times daily Yes Ayah More MD   tiotropium (Dalia Divers) 18 MCG inhalation capsule Inhale 1 capsule into the lungs daily Yes Ayah More MD   albuterol sulfate (PROAIR RESPICLICK) 889 (90 Base) MCG/ACT aerosol powder inhalation Inhale 2 puffs into the lungs every 4 hours as needed for Wheezing or Shortness of Breath Yes Historical Provider, MD   esomeprazole Magnesium (NEXIUM) 40 MG PACK Take 40 mg by mouth daily Yes Historical Provider, MD   Cholecalciferol (VITAMIN D3) 50 MCG (2000 UT) CAPS Take by mouth daily Yes Historical Provider, MD   sacubitril-valsartan (ENTRESTO) 49-51 MG per tablet Take 1 tablet by mouth 2 times daily Yes Historical Provider, MD   amiodarone (CORDARONE) 200 MG tablet Take 1 tablet by mouth daily Yes Keon Villanueva MD   isosorbide mononitrate (IMDUR) 60 MG extended release tablet TAKE ONE TABLET BY MOUTH TWICE A DAY Yes DWAYNE Mir   minoxidil (LONITEN) 2.5 MG tablet TAKE ONE TABLET BY MOUTH TWICE A DAY Yes DWAYNE Allen   NIFEdipine (PROCARDIA XL) 60 MG extended release tablet Take 1 tablet by mouth daily Yes Dieter Fajardo MD   ondansetron (ZOFRAN ODT) 4 MG disintegrating tablet Take 1 tablet by mouth every 8 hours as needed for Nausea or Vomiting Yes Talha Mares MD   clopidogrel (PLAVIX) 75 MG tablet TAKE ONE TABLET BY MOUTH EVERY DAY Yes Brielle Salomon MD   carvedilol (COREG) 12.5 MG tablet Take 1 tablet by mouth 2 times daily Yes Brielle Salomon MD   pantoprazole (PROTONIX) 40 MG tablet Take 1 tablet by mouth 2 times daily (before meals) Yes Brielle Salomon MD   ascorbic acid (VITAMIN C) 500 MG tablet Take 1 tablet by mouth daily Yes Brielle Salomon MD   calcitRIOL (ROCALTROL) 0.25 MCG capsule Take 0.25 mcg by mouth daily Yes Historical Provider, MD   senna-docusate (PERICOLACE) 8.6-50 MG per tablet Take 1 tablet by mouth nightly Yes Historical Provider, MD   allopurinol (ZYLOPRIM) 100 MG tablet Take 200 mg by mouth daily Yes Historical Provider, MD   aspirin 81 MG chewable tablet Take 1 tablet by mouth daily Yes Lee Ann Briggs MD   nitroGLYCERIN (NITROSTAT) 0.4 MG SL tablet up to max of 3 total doses. If no relief after 1 dose, call 911. Yes Shane Kemp,    NOVOLOG FLEXPEN 100 UNIT/ML injection pen 5 Units 3 times daily (before meals)  Yes Historical Provider, MD   levocetirizine (XYZAL) 5 MG tablet Take 5 mg by mouth nightly Yes Historical Provider, MD   ULTICARE MINI PEN NEEDLES 31G X 6 MM MISC FOR USE WITH LANTUS TWO TIMES A DAY Yes DWAYNE Rogers CNP   LANTUS SOLOSTAR 100 UNIT/ML injection pen INJECT 30 UNITS IN THE MORNING AND 20 UNITS IN THE EVENING DAILY Yes DWAYNE Rogers CNP   rosuvastatin (CRESTOR) 40 MG tablet Take 1 tablet by mouth daily Take 1 tablet by mouth daily  Shahnaz Limon MD        Review of Systems   Constitutional:  Negative for activity change, appetite change, chills, diaphoresis and fatigue. HENT:  Negative for congestion, dental problem, drooling, ear discharge, postnasal drip and rhinorrhea. Eyes:  Negative for visual disturbance. Respiratory:  Positive for cough and shortness of breath. Negative for apnea, chest tightness and wheezing. Gastrointestinal:  Negative for abdominal distention, abdominal pain and anal bleeding. Endocrine: Negative for cold intolerance, heat intolerance and polydipsia. Genitourinary:  Negative for difficulty urinating, dysuria, enuresis and flank pain. Musculoskeletal:  Negative for arthralgias, back pain and gait problem. Allergic/Immunologic: Negative for environmental allergies. Neurological:  Negative for dizziness, facial asymmetry, light-headedness and headaches.      Vitals:    01/25/23 0952   BP: 137/80   Pulse: 58   Temp: 97.8 °F (36.6 °C)   SpO2: (!) 87%     BMI Readings from Last 1 Encounters: 01/25/23 32.97 kg/m²         Physical Exam  Vitals reviewed. Constitutional:       Appearance: Normal appearance. He is obese. HENT:      Head: Normocephalic and atraumatic. Nose: Nose normal.   Eyes:      Extraocular Movements: Extraocular movements intact. Conjunctiva/sclera: Conjunctivae normal.   Cardiovascular:      Rate and Rhythm: Normal rate and regular rhythm. Heart sounds: No murmur heard. No friction rub. Pulmonary:      Effort: Pulmonary effort is normal. No respiratory distress. Breath sounds: Normal breath sounds. No stridor. No wheezing, rhonchi or rales. Abdominal:      General: There is no distension. Palpations: There is no mass. Tenderness: There is no abdominal tenderness. There is no guarding or rebound. Musculoskeletal:      Cervical back: Normal range of motion and neck supple. Neurological:      Mental Status: He is alert and oriented to person, place, and time. This note was generated using a voice recognition software. Errors in voice recognition may have occurred. An electronic signature was used to authenticate this note.     --Maciel Loredo MD

## 2023-01-26 ENCOUNTER — TELEPHONE (OUTPATIENT)
Dept: FAMILY MEDICINE CLINIC | Facility: CLINIC | Age: 68
End: 2023-01-26
Payer: COMMERCIAL

## 2023-01-26 LAB — IGE: 53 KU/L

## 2023-01-26 NOTE — PROGRESS NOTES
[facility] [address] [address]  Phone [phone] Fax [phone]     Sleep Study Technician Review    Patient Name:  Maggy Radford  :   1955  Referring Provider: Pearl Echeverria *    Brief History:  Maggy Radford is a 78 y.o. male with a history of Afib, CHF, Snoring, possible SAMMY, CKD stage 4, Obesity, Diabetes, Hypertension and GERDS who has been referred for a sleep study. he is sleeping on 1 pillow at night. he is not waking gasping for air. He is on oxygen at home. He still has shortness of breath but he states overall is better. Currently he is on continuous oxygen at 4 L/min. Height: 5' 11\"  Weight: 243 lbs  BMI:    33.9  Neck Circ:       17.5\"  Mallampati  Type 4  ESS:          Type of Study:  Titration sleep study  Time Stage Position Snore Hypopnea Obs Apnea Julien Apnea PAP O2   2200 awake supine no yes no no +11/+7 RA   2300 awake supine no yes no no +13/+9 RA   2400 awake supine yes yes no yes +13/+9 RA   0100 2 supine no yes no no +16/+12 RA   0200 awake Left side no no no no +16/+12 2lpm   0300 rem Right sdie no no no no +16/+12 2lpm   0400 rem Right side no no no no +16/+12 2lpm     Summary: Patient had a hard time sleeping, when he did sleep I was able to titrate to pressures of +16/+12, he was unable to   Tolerate cpap. Patient continued to have oxygen desaturations. Added oxygen at 3.5 lpm with bipap. Patient really only wants to use  His oxygen, I educated him on the purpose of bipap. DME: [facility]   Final PAP settings: +16/+12 bilevel with oxygen at 3.5 lpm  Mask Type: Full face  Mask: Vitera   Mask Size: large    The study was reviewed briefly with Maggy Radford. He will be notified of the formal results and recommendations after the study is scored and interpreted. The report will be sent to his referring provider.     Technician:  Douglas Smith, RRT, RPSGT

## 2023-01-27 LAB — ANA IGG, ELISA: NORMAL

## 2023-01-27 NOTE — PROGRESS NOTES
[facility]  [address], [address]  Phone [phone] Fax [phone]        Polysomnography Report  Patient Name Carolina Villa Account Number [de-identified]    1955 Referring Provider Nancie Urbano M.D., [facility], [facility]   Age/ Gender 78 years/M Interpreting physician Nancie Urbano M.D., [facility], [facility]   Neck circumference/  Mallampati classification 17.5 in/class 4 Night Technician Wesley Grijalva RRT, RPSGT   [place] score 16/24 Scoring Technician Candy Kayser, CRT, RPSGT   Height 71.0 in Indications for the test Obstructive Sleep Apnea   Weight 243.0 lbs Test Titration Polysomnogram   BMI 33.9 Date of test 2023     Procedure  A Titration Polysomnogram was conducted on the night of 2023. The study was performed and scored per AASM guidelines. The following were monitored: frontal, central, and occipital EEG, electrooculogram (EOG), submentalis EMG, nasal and oral airflow, intranasal pressure, thoracic plethysmography, abdominal plethysmography, anterior tibialis EMG, electrocardiogram, body position, and positive airway pressure (PAP). Arterial oxygen saturation was monitored with a pulse oximeter. The study was scored utilizing 30 second epochs. Hypopneas were scored using per AASM definition VIII, D, 1B.     Sleep Scoring Data  Lights out 8:34:11 PM Sleep latency 0.2 min Time in N1 229.0 min N1% 70.0%   Lights on 4:13:05 AM WASO 131.5 min Time in N2 69.9 min N2% 21.3%   TIB/.9 min Sleep efficiency 71.3% Time in N3 6.5 min N3% 2.0%   .2 min REM latency 430.0 min Time in R 22.0 min R% 6.7%     Respiratory Events Summary   NREM REM Total   Hypopnea index 8.1 0.0 7.5   Apnea index 2.6 0.0 2.4   RERA index 0.0 0.0 0.0   AHI 10.6 0.0 9.9   RDI (AHI + RERA index) 10.6 0.0 9.9     Respiratory Events by Sleep Stage   Obstructive Apneas OA Index Central Apneas CA Index Mixed Apneas MA Index   Hypopneas H Index   RERAs RERA Index NREM 1 0.2 9 1.8 3 0.6 41 8.1 0 0.0   REM 0 0.0 0 0.0 0 0.0 0 0.0 0 0.0   Total 1 0.2 9 1.7 3 0.6 41 7.5 0 0.0     Respiratory Events by Body Position   Time (min) Obstructive Apneas OA Index Central Apneas CA Index Mixed Apneas MA Index   Hypopneas H Index   RERAs RERA  Index Total  AHI Total RDI   Supine 276.7  1 0.3 7 2.0 2 0.6 25 7.2 0 0.0 10.1 10.1   R/Supine                                           L/Supine                                           Right 117.4  0 0.0 2 1.4 1 0.7 12 8.3 0 0.0 10.4 10.4   Left 62.4  0 0.0 0 0.0 0 0.0 4 7.4 0 0.0 7.4 7.4   Prone 1.5  0       0.0 0 0.0 0 0.0 0 0.0 0 0.0 0.0 0.0     R/Prone                                               L/Prone                                             Up 0.7  0 0.0 0 0.0 0 0.0 0 0.0 0 0.0 0.0 0.0     Snoring  Snoring Rating (loudness 0-4) 1   Snoring Amount (% of total sleep time with snoring) 0%     Oximetry Data              Wake NREM REM TST   Average Saturation  88% 86% 90% 87%   Desaturation Index (#/hour)  7.7 0.0 7.2   Desaturation Max Duration (sec)  68.0 N/A 68.0   Minimum O2 Saturation    71%     Oximetry Distribution              WaKe REM NREM TOTAL %TIB   <90% (min) 71.1 9.6 239.2 319.9 69.7%   <85% (min) 19.4 0.0 119.0 138.4 30.2%   <80% (min) 2.8 0.0 17.8 20.6 4.5%   <75% (min) 0.0 0.0 4.1 4.1 0.9%   <70% (min) 0.0 0.0 0.0 0.0 0.0%   <60% (min) 0.0 0.0 0.0 0.0 0.0%   <50% (min) 0.0 0.0 0.0 0.0 0.0%   Fail    (min) 17.0 0.0 9.2 26.2      Respiratory Pattern   Present Absent Duration   Hypoventilation  ü N/A   Cheyne Stapleton Respirations  ü N/A   Periodic Breathing  ü N/A     Heart Rate   Mean heart rate (bmp) Maximum heart rate (bmp)   During recording       179   During sleep 55.1 136     Heart Rhythm Summary  Normal sinus rhythm     Heart Rhythm Events   Type of events Present Absent Rate-Duration/Total Duration   Bradycardia  P N/A   Asystole  ü N/A     Sinus Tachycardia During Sleep  ü N/A   Narrow Complex Tachycardia  ü N/A Wide Complex Tachycardia  ü N/A   Atrial Fibrillation  ü N/A   Other Arrhythmias  P N/A     Arousals   NREM REM Total Arousal Index   Spontaneous 21 0 33 6.1   Respiratory 48 0 51 9.4   Snore 0 0 0 0.0   Leg movement 15 0 18 3.3   Total 84 0 106 19.4     Periodic Limb Movement Data (legs unless otherwise noted)   Leg Movements PLMS PLMS with arousal   # of events 255 255 18   Index (#/hr TST) 46.8 46.8 3.3     Therapy Titration  IPAP EPAP TIB Sleep REM Apneas Hypopneas RERAs   Min     (min) (min) (min) CA# OA# MA# Index # Index # Index AHI RDI SpO2    6 6 17.8 15.8 0.0 0 0 1 3.8 3 11.4 0 0.0 15.2 15.2 75    8 8 8.1 7.6 0.0 0 0 1 7.9 1 7.9 0 0.0 15.8 15.8 76    9 9 21.3 16.8 0.0 0 0 0 0.0 2 7.1 0 0.0 7.1 7.1 71    10 10 29.3 22.8 0.0 0 1 0 2.6 7 18.4 0 0.0 21.1 21.1 78    11 7 43.0 32.0 0.0 4 0 0 7.5 8 15.0 0 0.0 22.5 22.5 79    12 8 15.8 15.8 0.0 1 0 0 3.8 1 3.8 0 0.0 7.6 7.6 80    13 9 90.1 65.1 0.0 4 0 1 4.6 15 13.8 0 0.0 18.4 18.4 77    14 10 10.6 10.6 0.0 0 0 0 0.0 1 5.7 0 0.0 5.7 5.7 81    15 11 8.3 8.3 0.0 0 0 0 0.0 3 21.7 0 0.0 21.7 21.7 81    16 12 211.3 129.3 22.0 0 0 0 0.0 0 0.0 0 0.0 0.0 0.0 80      Note:   The interpreting physician named above, reviewed this record in its entirety, including sleep staging, EMG activity, EEG, EKG, breathing parameters, oxygen saturation, body position, and behavior unless otherwise noted. The interpretation is based on this information in addition to available clinical history and physical examination data. Interpretation:     Obstructive sleep apnea. Hypoxia during sleep. Adequate PAP titration. Recommendations:    BIPAP setting: IPAP 16 cm water pressure. EPAP 12 cm water pressure. Oxygen at 3.5 LPM  Weight loss and exercise. Avoid risky activity such as driving if sleepy. Discuss sleep hygiene with the patient. Monitor PAP use and effectiveness.        Alejandrina Webb MD, [facility], [facility]

## 2023-01-29 DIAGNOSIS — G47.33 OSA (OBSTRUCTIVE SLEEP APNEA): Primary | ICD-10-CM

## 2023-02-06 ENCOUNTER — HOSPITAL ENCOUNTER (EMERGENCY)
Age: 68
Discharge: HOME | End: 2023-02-07
Attending: EMERGENCY MEDICINE
Payer: MEDICARE

## 2023-02-06 ENCOUNTER — TELEPHONE (OUTPATIENT)
Dept: PULMONOLOGY | Age: 68
End: 2023-02-06

## 2023-02-06 ENCOUNTER — APPOINTMENT (OUTPATIENT)
Dept: GENERAL RADIOLOGY | Age: 68
End: 2023-02-06
Payer: MEDICARE

## 2023-02-06 ENCOUNTER — APPOINTMENT (OUTPATIENT)
Dept: CT IMAGING | Age: 68
End: 2023-02-06
Payer: MEDICARE

## 2023-02-06 DIAGNOSIS — I71.019 DISSECTION OF THORACIC AORTA, UNSPECIFIED PART: Primary | ICD-10-CM

## 2023-02-06 DIAGNOSIS — R06.02 SHORTNESS OF BREATH: Primary | ICD-10-CM

## 2023-02-06 LAB
ALBUMIN SERPL-MCNC: 4.1 G/DL (ref 3.5–5.2)
ALP BLD-CCNC: 81 U/L (ref 40–130)
ALT SERPL-CCNC: 22 U/L (ref 5–41)
ANION GAP SERPL CALCULATED.3IONS-SCNC: 8 MMOL/L (ref 7–19)
APTT: 40.9 SEC (ref 26–36.2)
AST SERPL-CCNC: 21 U/L (ref 5–40)
BASOPHILS ABSOLUTE: 0.1 K/UL (ref 0–0.2)
BASOPHILS RELATIVE PERCENT: 1.1 % (ref 0–1)
BILIRUB SERPL-MCNC: 0.5 MG/DL (ref 0.2–1.2)
BUN BLDV-MCNC: 30 MG/DL (ref 8–23)
CALCIUM SERPL-MCNC: 9.3 MG/DL (ref 8.8–10.2)
CHLORIDE BLD-SCNC: 103 MMOL/L (ref 98–111)
CO2: 30 MMOL/L (ref 22–29)
CREAT SERPL-MCNC: 2.4 MG/DL (ref 0.5–1.2)
EOSINOPHILS ABSOLUTE: 0.3 K/UL (ref 0–0.6)
EOSINOPHILS RELATIVE PERCENT: 4.5 % (ref 0–5)
GFR SERPL CREATININE-BSD FRML MDRD: 29 ML/MIN/{1.73_M2}
GLUCOSE BLD-MCNC: 126 MG/DL (ref 70–99)
GLUCOSE BLD-MCNC: 156 MG/DL (ref 74–109)
HCT VFR BLD CALC: 42.7 % (ref 42–52)
HEMOGLOBIN: 12.9 G/DL (ref 14–18)
IMMATURE GRANULOCYTES #: 0 K/UL
INR BLD: 1.44 (ref 0.88–1.18)
LYMPHOCYTES ABSOLUTE: 1.3 K/UL (ref 1.1–4.5)
LYMPHOCYTES RELATIVE PERCENT: 19.1 % (ref 20–40)
MCH RBC QN AUTO: 28.2 PG (ref 27–31)
MCHC RBC AUTO-ENTMCNC: 30.2 G/DL (ref 33–37)
MCV RBC AUTO: 93.2 FL (ref 80–94)
MONOCYTES ABSOLUTE: 0.5 K/UL (ref 0–0.9)
MONOCYTES RELATIVE PERCENT: 7 % (ref 0–10)
NEUTROPHILS ABSOLUTE: 4.5 K/UL (ref 1.5–7.5)
NEUTROPHILS RELATIVE PERCENT: 68.1 % (ref 50–65)
PDW BLD-RTO: 16.6 % (ref 11.5–14.5)
PERFORMED ON: ABNORMAL
PLATELET # BLD: 181 K/UL (ref 130–400)
PMV BLD AUTO: 11.4 FL (ref 9.4–12.4)
POTASSIUM SERPL-SCNC: 3.7 MMOL/L (ref 3.5–5)
PRO-BNP: 1424 PG/ML (ref 0–900)
PROTHROMBIN TIME: 17.6 SEC (ref 12–14.6)
RBC # BLD: 4.58 M/UL (ref 4.7–6.1)
SARS-COV-2, NAAT: NOT DETECTED
SODIUM BLD-SCNC: 141 MMOL/L (ref 136–145)
TOTAL PROTEIN: 7 G/DL (ref 6.6–8.7)
WBC # BLD: 6.6 K/UL (ref 4.8–10.8)

## 2023-02-06 PROCEDURE — 99285 EMERGENCY DEPT VISIT HI MDM: CPT

## 2023-02-06 PROCEDURE — 87635 SARS-COV-2 COVID-19 AMP PRB: CPT

## 2023-02-06 PROCEDURE — 71250 CT THORAX DX C-: CPT

## 2023-02-06 PROCEDURE — 85610 PROTHROMBIN TIME: CPT

## 2023-02-06 PROCEDURE — 71045 X-RAY EXAM CHEST 1 VIEW: CPT

## 2023-02-06 PROCEDURE — 93005 ELECTROCARDIOGRAM TRACING: CPT | Performed by: EMERGENCY MEDICINE

## 2023-02-06 PROCEDURE — 71250 CT THORAX DX C-: CPT | Performed by: RADIOLOGY

## 2023-02-06 PROCEDURE — 71045 X-RAY EXAM CHEST 1 VIEW: CPT | Performed by: RADIOLOGY

## 2023-02-06 PROCEDURE — 36415 COLL VENOUS BLD VENIPUNCTURE: CPT

## 2023-02-06 PROCEDURE — 83880 ASSAY OF NATRIURETIC PEPTIDE: CPT

## 2023-02-06 PROCEDURE — 85730 THROMBOPLASTIN TIME PARTIAL: CPT

## 2023-02-06 PROCEDURE — 85025 COMPLETE CBC W/AUTO DIFF WBC: CPT

## 2023-02-06 PROCEDURE — 74176 CT ABD & PELVIS W/O CONTRAST: CPT

## 2023-02-06 PROCEDURE — 80053 COMPREHEN METABOLIC PANEL: CPT

## 2023-02-06 PROCEDURE — 74176 CT ABD & PELVIS W/O CONTRAST: CPT | Performed by: RADIOLOGY

## 2023-02-06 ASSESSMENT — ENCOUNTER SYMPTOMS
BACK PAIN: 1
COUGH: 1
SHORTNESS OF BREATH: 1

## 2023-02-06 NOTE — TELEPHONE ENCOUNTER
Rec'd msg from Zari Castillo w/ [facility], stating pt on phone. Was supposed to be referred to [facility] for aneurysm. Now having back pain. Talked w/ Dr. Aamir Love. He said pt should go to the ED. Will probably need another CT done & referred to [facility]. Notified pt that he should go to the ED. Let him know that I will f/u tomorrow to see how the ED addresses his condition today. They may arrange the referral.  If not, I will f/u & get appt scheduled & contact pt. Pt v/u.

## 2023-02-06 NOTE — TELEPHONE ENCOUNTER
Patient is calling   for status of referral to [place]. Pt. Is calling to check on because his back is hurting and he hasn't heard anything. Shima Wong said she would speak with Dr. Star Samuel and get back with pt. Please return call to update Patient on status. The best time to call is   by end of day. I told him if he didn't hear anything by 3:30 to call back. Thank you!

## 2023-02-07 VITALS
HEART RATE: 67 BPM | DIASTOLIC BLOOD PRESSURE: 70 MMHG | TEMPERATURE: 98.4 F | BODY MASS INDEX: 33.04 KG/M2 | HEIGHT: 71 IN | WEIGHT: 236 LBS | RESPIRATION RATE: 17 BRPM | SYSTOLIC BLOOD PRESSURE: 128 MMHG | OXYGEN SATURATION: 95 %

## 2023-02-07 LAB
EKG P AXIS: 12 DEGREES
EKG P-R INTERVAL: 216 MS
EKG Q-T INTERVAL: 480 MS
EKG QRS DURATION: 112 MS
EKG QTC CALCULATION (BAZETT): 481 MS
EKG T AXIS: 32 DEGREES

## 2023-02-07 PROCEDURE — 82962 GLUCOSE BLOOD TEST: CPT

## 2023-02-07 PROCEDURE — 93010 ELECTROCARDIOGRAM REPORT: CPT | Performed by: INTERNAL MEDICINE

## 2023-02-07 NOTE — ED PROVIDER NOTES
[facility] EMERGENCY DEPT  EMERGENCY DEPARTMENT ENCOUNTER      Pt Name: Angel Chavez  MRN: [id number]  [place] 1955  Date of evaluation: 2/6/2023  Provider: Lyric Deluca MD    CHIEF COMPLAINT       Chief Complaint   Patient presents with    Shortness of Breath     Wears home O2, 84% on arrival with O2. Placed on O2 tank of hospital's at 4L and 94%         HISTORY OF PRESENT ILLNESS   (Location/Symptom, Timing/Onset, Context/Setting, Quality, Duration, Modifying Factors, Severity)  Note limiting factors. Angel Chavez is a 78 y.o. male who presents to the emergency department     The history is provided by the patient. Patient is a 50-year-old male with history of CAD, dissection of thoracic aorta, diabetes, MI, CKD, COPD O2 dependent,  who presents to the ED with complaints of worsening dyspnea for several days. Patient recently had a CTA of the chest completed by his pulmonologist which notified him that his thoracic aneurysm had increased in size. Now, patient states that when he ambulates he has lower back pain, and his shortness of breath worsens spite being on oxygen. Patient endorses a mild dry cough. Denies fever/nausea/vomiting      Nursing Notes were reviewed. REVIEW OF SYSTEMS    (2-9 systems for level 4, 10 or more for level 5)     Review of Systems   Respiratory:  Positive for cough and shortness of breath. Musculoskeletal:  Positive for back pain. Except as noted above the remainder of the review of systems was reviewed and negative.        PAST MEDICAL HISTORY     Past Medical History:   Diagnosis Date    Acute kidney failure, unspecified ([address]     Anxiety state, unspecified     Atrial septal aneurysm 01/09/2016    Blood circulation, collateral     Body mass index 30.0-30.9, adult     CAD (coronary artery disease) 01/10/2016    1/9/16  lexiscan  Positive for apical MI + myocardial ischemia, EF 42%, intermediate risk findings, AUC indication 16, AUC score 7 1/10/16  Cath  3 lesions in the LAD:  Mid: 70% 2.25x23, mid 90% 2.25 x 28, distal 100% 2.0x28, anterior lateral apical hypokinesis, EF 45%    Calculus of kidney     Carotid artery stenosis 06/26/2013    Cellulitis and abscess of hand, except fingers and thumb     Cerebral artery occlusion with cerebral infarction (HCC)     15 years ago    Chronic airway obstruction, not elsewhere classified     Chronic kidney disease, unspecified     Congestive heart failure, unspecified     COVID-19 10/01/2020    Diabetes mellitus type II     Diverticulosis of colon (without mention of hemorrhage)     Encounter for long-term (current) use of insulin (HCC)     Esophageal reflux     Family history of ischemic heart disease     Gastric ulcer, unspecified as acute or chronic, without mention of hemorrhage, perforation, or obstruction     Hyperlipemia     Hypertension     Internal hemorrhoids without mention of complication     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, or unspecified(403.00)     Obesity, unspecified     Other specified cardiac dysrhythmias(427.89)     Palliative care patient 12/21/2020    Paroxysmal atrial fibrillation (HCC)     Peripheral vascular disease ([address]     Solitary pulmonary nodule     Surgical or other procedure not carried out because of contraindication     Thoracic aneurysm without mention of rupture     Tobacco use disorder     Type II or unspecified type diabetes mellitus without mention of complication, not stated as uncontrolled          SURGICAL HISTORY       Past Surgical History:   Procedure Laterality Date    CARDIAC CATHETERIZATION      CHOLECYSTECTOMY  2000    CORONARY ANGIOPLASTY WITH STENT PLACEMENT  01/2016    2 JACQUELINE to LAD    DIAGNOSTIC CARDIAC CATH LAB PROCEDURE      URETER STENT PLACEMENT      VASCULAR SURGERY  03- [facility]    Aortogram with bilateral selective renal artery injections    VASCULAR SURGERY  6/14/13 [facility]    Right carotid endarterectomy with Vascu-Guard patch repair. Right cervical carotid arteriograms after endarterectomy. VASCULAR SURGERY  7/6/15 [facility]    Percutaneous cannulation, right common femoral artery, with a5 french sheath and later 6 french [facility] sheath. Suprarenal abdomianl aortagram.  Selective right renal arteriogram.  Right renal artery balloon angioplasty;/stent (Express 6 mm x 18mmballoon-expandable stent. . Completion suprarenal abdominal aortogram and selective right remal arteriogram. Mynx closure, right common femoral artery punctur         CURRENT MEDICATIONS       Discharge Medication List as of 2/7/2023  1:02 AM        CONTINUE these medications which have NOT CHANGED    Details   ELIQUIS 5 MG TABS tablet TAKE ONE TABLET BY MOUTH TWICE A DAY, Disp-60 tablet, R-5Normal      OXYGEN Inhale 4 L/min into the lungs continuousHistorical Med      bumetanide (BUMEX) 1 MG tablet Take 2 tablets by mouth 2 times daily, Disp-60 tablet, R-5Adjust Sig      potassium chloride (KLOR-CON M) 20 MEQ extended release tablet Take 1 tablet by mouth 2 times daily, Disp-60 tablet, R-0Adjust Sig      tiotropium (SPIRIVA HANDIHALER) 18 MCG inhalation capsule Inhale 1 capsule into the lungs daily, Disp-30 capsule, R-0Normal      albuterol sulfate (PROAIR RESPICLICK) 526 (90 Base) MCG/ACT aerosol powder inhalation Inhale 2 puffs into the lungs every 4 hours as needed for Wheezing or Shortness of BreathHistorical Med      esomeprazole Magnesium (NEXIUM) 40 MG PACK Take 40 mg by mouth dailyHistorical Med      Cholecalciferol (VITAMIN D3) 50 MCG (2000 UT) CAPS Take by mouth dailyHistorical Med      sacubitril-valsartan (ENTRESTO) 49-51 MG per tablet Take 1 tablet by mouth 2 times dailyHistorical Med      rosuvastatin (CRESTOR) 40 MG tablet Take 1 tablet by mouth daily Take 1 tablet by mouth daily, Disp-90 tablet, R-3Normal      amiodarone (CORDARONE) 200 MG tablet Take 1 tablet by mouth daily, Disp-90 tablet, R-3Normal      isosorbide mononitrate (IMDUR) 60 MG extended release tablet TAKE ONE TABLET BY MOUTH TWICE A DAY, Disp-60 tablet, R-5Normal      minoxidil (LONITEN) 2.5 MG tablet TAKE ONE TABLET BY MOUTH TWICE A DAY, Disp-60 tablet, R-5Normal      NIFEdipine (PROCARDIA XL) 60 MG extended release tablet Take 1 tablet by mouth daily, Disp-30 tablet, R-3Normal      ondansetron (ZOFRAN ODT) 4 MG disintegrating tablet Take 1 tablet by mouth every 8 hours as needed for Nausea or Vomiting, Disp-20 tablet, R-0Normal      clopidogrel (PLAVIX) 75 MG tablet TAKE ONE TABLET BY MOUTH EVERY DAY, Disp-30 tablet, R-3Normal      carvedilol (COREG) 12.5 MG tablet Take 1 tablet by mouth 2 times daily, Disp-60 tablet, R-3Normal      pantoprazole (PROTONIX) 40 MG tablet Take 1 tablet by mouth 2 times daily (before meals), Disp-60 tablet, R-3Normal      ascorbic acid (VITAMIN C) 500 MG tablet Take 1 tablet by mouth daily, Disp-30 tablet, R-3Normal      calcitRIOL (ROCALTROL) 0.25 MCG capsule Take 0.25 mcg by mouth dailyHistorical Med      senna-docusate (PERICOLACE) 8.6-50 MG per tablet Take 1 tablet by mouth nightlyHistorical Med      allopurinol (ZYLOPRIM) 100 MG tablet Take 200 mg by mouth dailyHistorical Med      aspirin 81 MG chewable tablet Take 1 tablet by mouth daily, Disp-30 tablet, R-3Normal      nitroGLYCERIN (NITROSTAT) 0.4 MG SL tablet up to max of 3 total doses.  If no relief after 1 dose, call 911., Disp-25 tablet,R-0Normal      NOVOLOG FLEXPEN 100 UNIT/ML injection pen 5 Units 3 times daily (before meals) , DAWHistorical Med      levocetirizine (XYZAL) 5 MG tablet Take 5 mg by mouth nightlyHistorical Med      ULTICARE MINI PEN NEEDLES 31G X 6 MM MISC Disp-50 each, R-5, Normal      LANTUS SOLOSTAR 100 UNIT/ML injection pen INJECT 30 UNITS IN THE MORNING AND 20 UNITS IN THE EVENING DAILY, Disp-15 mL, R-5             ALLERGIES     Morphine and Hydralazine    FAMILY HISTORY       Family History   Problem Relation Age of Onset    High Blood Pressure Mother     Other Mother         Dylon Garcias High Blood Pressure Father     Cancer Father     High Blood Pressure Brother     No Known Problems Son     No Known Problems Daughter           SOCIAL HISTORY       Social History     Socioeconomic History    Marital status:      Spouse name: patient refused to answer    Number of children: 2   Occupational History    Occupation: patient refused to answer   Tobacco Use    Smoking status: Former     Packs/day: 0.25     Years: 9.00     Pack years: 2.25     Types: Cigarettes     Start date:      Quit date:      Years since quittin.1    Smokeless tobacco: Current     Types: Chew    Tobacco comments:     Used both for 9 years   Vaping Use    Vaping Use: Never used   Substance and Sexual Activity    Alcohol use: Not Currently     Comment: \"when i was tyoung i did, if i wanted to drink ten beers i drank ten beers\"    Drug use: Never    Sexual activity: Yes     Comment: has 2 children   Social History Narrative    CODE STATUS: DNR    HEALTH CARE PROXY: patient refused to answer    AMBULATES: patient refused to answer    DOMICILED: patient refused to answer       SCREENINGS         Mariam Coma Scale  Eye Opening: Spontaneous  Best Verbal Response: Oriented  Best Motor Response: Obeys commands  Kendrick Coma Scale Score: 15                     CIWA Assessment  BP: 128/70  Heart Rate: 67                 PHYSICAL EXAM    (up to 7 for level 4, 8 or more for level 5)     ED Triage Vitals [23 1714]   BP Temp Temp src Heart Rate Resp SpO2 Height Weight   109/64 98.6 °F (37 °C) -- 59 18 94 % 5' 11\" (1.803 m) 236 lb (107 kg)       Physical Exam  Vitals and nursing note reviewed. Constitutional:       General: He is in acute distress. Appearance: Normal appearance. He is well-developed. He is obese. Eyes:      Extraocular Movements: Extraocular movements intact. Cardiovascular:      Rate and Rhythm: Normal rate.    Pulmonary:      Effort: Pulmonary effort is normal.   Abdominal:      General: Abdomen is flat. Bowel sounds are normal. There is no distension. Musculoskeletal:         General: Normal range of motion. Cervical back: Normal range of motion. Comments: Paraspinal tenderness, lumbar region bilaterally   Skin:     General: Skin is warm. Neurological:      General: No focal deficit present. Mental Status: He is alert. Psychiatric:         Mood and Affect: Mood normal.       DIAGNOSTIC RESULTS     EKG: All EKG's are interpreted by the Emergency Department Physician who either signs or Co-signs this chart in the absence of a cardiologist.        RADIOLOGY:   Non-plain film images such as CT, Ultrasound and MRI are read by the radiologist. Plain radiographic images are visualized and preliminarily interpreted by the emergency physician with the below findings:        Interpretation per the Radiologist below, if available at the time of this note:    CT ABDOMEN PELVIS WO CONTRAST Additional Contrast? None   Final Result   Impression:   No evidence of nephrolithiasis or obstructive uropathy. There is mild bilateral   perinephric stranding which appears slightly more prominent when compared to   prior study. Correlate for pyelonephritis. Findings compatible with mild to moderate constipation. Additional findings provided to report. CT CHEST WO CONTRAST   Final Result   1. Again seen is a short segment presumed [place] type B dissection of the descending thoracic aorta with coarse calcification versus. This is grossly unchanged compared to the prior. Findings are limited without IV contrast.2. Smooth intralobular septal thickening with subpleural reticular and groundglass opacities within the mid to lower lungs. Findings likely represent early pulmonary vascular congestion and atelectasis/scarring. Infection is felt to be less    likely. Communications:02/07/23 01:16 Call From [address]  on 02/07 01:09 (-06:00)Electronically signed by Diana Anton MD on 02-06-23 at 2320      XR CHEST PORTABLE   Final Result   Impression:   Findings compatible with moderate CHF. ED BEDSIDE ULTRASOUND:   Performed by ED Physician - none    LABS:  Labs Reviewed   BRAIN NATRIURETIC PEPTIDE - Abnormal; Notable for the following components:       Result Value    Pro-BNP 1,424 (*)     All other components within normal limits   CBC WITH AUTO DIFFERENTIAL - Abnormal; Notable for the following components:    RBC 4.58 (*)     Hemoglobin 12.9 (*)     MCHC 30.2 (*)     RDW 16.6 (*)     Neutrophils % 68.1 (*)     Lymphocytes % 19.1 (*)     Basophils % 1.1 (*)     All other components within normal limits   COMPREHENSIVE METABOLIC PANEL - Abnormal; Notable for the following components:    CO2 30 (*)     Glucose 156 (*)     BUN 30 (*)     Creatinine 2.4 (*)     Est, Glom Filt Rate 29 (*)     All other components within normal limits   PROTIME-INR - Abnormal; Notable for the following components:    Protime 17.6 (*)     INR 1.44 (*)     All other components within normal limits   APTT - Abnormal; Notable for the following components:    aPTT 40.9 (*)     All other components within normal limits   POCT GLUCOSE - Abnormal; Notable for the following components:    POC Glucose 126 (*)     All other components within normal limits   COVID-19, RAPID   POCT GLUCOSE       All other labs were within normal range or not returned as of this dictation. EMERGENCY DEPARTMENT COURSE and DIFFERENTIAL DIAGNOSIS/MDM:   Vitals:    Vitals:    02/06/23 1714 02/06/23 2131 02/07/23 0116   BP: 109/64  128/70   Pulse: 59 62 67   Resp: 18 17 17   Temp: 98.6 °F (37 °C)  98.4 °F (36.9 °C)   SpO2: 94% 93% 95%   Weight: 236 lb (107 kg)     Height: 5' 11\" (1.803 m)             MDM  Number of Diagnoses or Management Options  Shortness of breath: new and requires workup  Diagnosis management comments: Signed pt out to Dr Cayla Tafoya who will follow up on CTA and further disposition.         Amount and/or Complexity of Data Reviewed  Clinical lab tests: ordered and reviewed  Tests in the radiology section of CPT®: ordered and reviewed  Decide to obtain previous medical records or to obtain history from someone other than the patient: yes    Patient Progress  Patient progress: stable        REASSESSMENT          CRITICAL CARE TIME   Total Critical Care time was  minutes, excluding separately reportable procedures. There was a high probability of clinically significant/life threatening deterioration in the patient's condition which required my urgent intervention. CONSULTS:  None    PROCEDURES:  Unless otherwise noted below, none     Procedures        FINAL IMPRESSION      1. Shortness of breath          DISPOSITION/PLAN   DISPOSITION Decision To Discharge 02/07/2023 12:54:54 AM      PATIENT REFERRED TO:  Nestor Cordova MD  [address] [phone]    In 1 day      DISCHARGE MEDICATIONS:  Discharge Medication List as of 2/7/2023  1:02 AM        Controlled Substances Monitoring:     RX Monitoring 6/12/2016   Attestation The Prescription Monitoring Report for this patient was reviewed today. Periodic Controlled Substance Monitoring No signs of potential drug abuse or diversion identified. (Please note that portions of this note were completed with a voice recognition program.  Efforts were made to edit the dictations but occasionally words are mis-transcribed. )    Gurdeep Hernandez MD (electronically signed)  Attending Emergency Physician           Candy Ramos MD  02/07/23 [id number]

## 2023-02-08 ENCOUNTER — OFFICE VISIT (OUTPATIENT)
Dept: PULMONOLOGY | Age: 68
End: 2023-02-08
Payer: MEDICARE

## 2023-02-08 ENCOUNTER — OFFICE VISIT (OUTPATIENT)
Dept: CARDIOLOGY CLINIC | Age: 68
End: 2023-02-08
Payer: MEDICARE

## 2023-02-08 VITALS
BODY MASS INDEX: 33.23 KG/M2 | WEIGHT: 237.4 LBS | DIASTOLIC BLOOD PRESSURE: 64 MMHG | HEIGHT: 71 IN | TEMPERATURE: 98 F | OXYGEN SATURATION: 86 % | HEART RATE: 56 BPM | SYSTOLIC BLOOD PRESSURE: 118 MMHG

## 2023-02-08 VITALS
SYSTOLIC BLOOD PRESSURE: 110 MMHG | BODY MASS INDEX: 33.04 KG/M2 | DIASTOLIC BLOOD PRESSURE: 50 MMHG | WEIGHT: 236 LBS | OXYGEN SATURATION: 92 % | HEIGHT: 71 IN | HEART RATE: 53 BPM

## 2023-02-08 DIAGNOSIS — G47.34 SLEEP RELATED HYPOXIA: ICD-10-CM

## 2023-02-08 DIAGNOSIS — J96.11 CHRONIC RESPIRATORY FAILURE WITH HYPOXIA (HCC): ICD-10-CM

## 2023-02-08 DIAGNOSIS — J98.4 RESTRICTIVE LUNG DISEASE: ICD-10-CM

## 2023-02-08 DIAGNOSIS — I48.0 PAF (PAROXYSMAL ATRIAL FIBRILLATION) (HCC): Primary | ICD-10-CM

## 2023-02-08 DIAGNOSIS — E66.9 OBESITY (BMI 30-39.9): ICD-10-CM

## 2023-02-08 DIAGNOSIS — G47.33 OSA (OBSTRUCTIVE SLEEP APNEA): Primary | ICD-10-CM

## 2023-02-08 DIAGNOSIS — I25.10 CORONARY ARTERY DISEASE INVOLVING NATIVE CORONARY ARTERY OF NATIVE HEART WITHOUT ANGINA PECTORIS: ICD-10-CM

## 2023-02-08 DIAGNOSIS — I10 ESSENTIAL HYPERTENSION: ICD-10-CM

## 2023-02-08 DIAGNOSIS — I51.89 DIASTOLIC DYSFUNCTION: ICD-10-CM

## 2023-02-08 DIAGNOSIS — E78.2 MIXED HYPERLIPIDEMIA: ICD-10-CM

## 2023-02-08 DIAGNOSIS — Z79.01 CHRONIC ANTICOAGULATION: ICD-10-CM

## 2023-02-08 DIAGNOSIS — I50.32 CHRONIC HEART FAILURE WITH PRESERVED EJECTION FRACTION (HCC): ICD-10-CM

## 2023-02-08 PROCEDURE — 3017F COLORECTAL CA SCREEN DOC REV: CPT | Performed by: INTERNAL MEDICINE

## 2023-02-08 PROCEDURE — 93000 ELECTROCARDIOGRAM COMPLETE: CPT | Performed by: NURSE PRACTITIONER

## 2023-02-08 PROCEDURE — 4004F PT TOBACCO SCREEN RCVD TLK: CPT | Performed by: INTERNAL MEDICINE

## 2023-02-08 PROCEDURE — 3017F COLORECTAL CA SCREEN DOC REV: CPT | Performed by: NURSE PRACTITIONER

## 2023-02-08 PROCEDURE — G8484 FLU IMMUNIZE NO ADMIN: HCPCS | Performed by: NURSE PRACTITIONER

## 2023-02-08 PROCEDURE — G8484 FLU IMMUNIZE NO ADMIN: HCPCS | Performed by: INTERNAL MEDICINE

## 2023-02-08 PROCEDURE — 1123F ACP DISCUSS/DSCN MKR DOCD: CPT | Performed by: NURSE PRACTITIONER

## 2023-02-08 PROCEDURE — 4004F PT TOBACCO SCREEN RCVD TLK: CPT | Performed by: NURSE PRACTITIONER

## 2023-02-08 PROCEDURE — 1123F ACP DISCUSS/DSCN MKR DOCD: CPT | Performed by: INTERNAL MEDICINE

## 2023-02-08 PROCEDURE — 99214 OFFICE O/P EST MOD 30 MIN: CPT | Performed by: NURSE PRACTITIONER

## 2023-02-08 PROCEDURE — G8427 DOCREV CUR MEDS BY ELIG CLIN: HCPCS | Performed by: INTERNAL MEDICINE

## 2023-02-08 PROCEDURE — G8417 CALC BMI ABV UP PARAM F/U: HCPCS | Performed by: INTERNAL MEDICINE

## 2023-02-08 PROCEDURE — 3074F SYST BP LT 130 MM HG: CPT | Performed by: NURSE PRACTITIONER

## 2023-02-08 PROCEDURE — G8417 CALC BMI ABV UP PARAM F/U: HCPCS | Performed by: NURSE PRACTITIONER

## 2023-02-08 PROCEDURE — 3078F DIAST BP <80 MM HG: CPT | Performed by: NURSE PRACTITIONER

## 2023-02-08 PROCEDURE — 3078F DIAST BP <80 MM HG: CPT | Performed by: INTERNAL MEDICINE

## 2023-02-08 PROCEDURE — 3074F SYST BP LT 130 MM HG: CPT | Performed by: INTERNAL MEDICINE

## 2023-02-08 PROCEDURE — G8427 DOCREV CUR MEDS BY ELIG CLIN: HCPCS | Performed by: NURSE PRACTITIONER

## 2023-02-08 PROCEDURE — 99214 OFFICE O/P EST MOD 30 MIN: CPT | Performed by: INTERNAL MEDICINE

## 2023-02-08 RX ORDER — METOLAZONE 5 MG/1
TABLET ORAL
Qty: 15 TABLET | Refills: 3 | Status: SHIPPED | OUTPATIENT
Start: 2023-02-08

## 2023-02-08 ASSESSMENT — ENCOUNTER SYMPTOMS
BACK PAIN: 0
COUGH: 1
RHINORRHEA: 0
ANAL BLEEDING: 0
APNEA: 1
WHEEZING: 0
ABDOMINAL PAIN: 0
CHEST TIGHTNESS: 0
ABDOMINAL DISTENTION: 0
SHORTNESS OF BREATH: 1

## 2023-02-08 NOTE — PROGRESS NOTES
Dear Dr. Claritza Daugherty MD,    Thank you for allowing me to participate in the care of Mr. Jamin Gorman. He presents today at the [address] [place] [address]. As you know, Mr. Carmen Coles is a 78 y.o. male with history of hypertension, hyperlipidemia, diabetes, PAF, amiodarone therapy, chronic anticoagulation, CKD, carotid artery stenosis, PVD, thoracic aneurysm, and CAD who presents with the chief complaint of routine  follow-up. He is a patient of Dr. Eden Dinero. Presented to the ER on 2/6 with complaints of increasing shortness of breath and back pain. He was told by pulmonology to go to the ER for the back pain due to findings on CT scan. He was discharged early in the morning on 2/7. He states the ER called last night and told him he had some evidence of fluid on board and to take an extra Bumex. He took an extra 2 mg Bumex last night. He took his regular 2 mg Bumex this morning. He is weighing daily at home and weights have been between 231 and 234. He is has noticed some swelling at home. He has short of breath and on home oxygen. He has had a sleep apnea test and is supposed to be receiving a BiPAP. He is not sleeping at all. He states he just does not feel good. He is only able to lay on his left side. He feels like he did not have any more urine output from the extra Bumex last night. He sees Dr. Shey Koenig for nephrology. An appointment with [facility] this month regarding a chronic dissection of the thoracic aorta. Denies any chest pain. He denies any fast or slow heart rhythms. he is sleeping on 1 pillow at night. he is not waking gasping for air. he denies any swelling. he has been compliant with his medications. his BP has been controlled. PCP follows labs and lipids. He otherwise denies chest pain, PND, orthopnea, syncope, or near syncope. He has no other complaints.     Review of Systems   Constitutional: Negative for fever, chills, diaphoresis, activity change, appetite change, fatigue and unexpected weight change. Eyes: Negative for photophobia, pain, redness and visual disturbance. Respiratory: Positive for shortness of breath (on oxygen). Negative for apnea, cough, chest tightness, shortness of breath, wheezing and stridor. Cardiovascular: Negative for chest pain, palpitations and leg swelling. Gastrointestinal: Negative for abdominal distention. Genitourinary: Negative for dysuria, urgency and frequency. Musculoskeletal: Negative for myalgias, arthralgias and gait problem. Skin: Negative for color change, pallor, rash and wound. Neurological: Negative for dizziness, tremors, speech difficulty, weakness and numbness. Hematological: Does not bruise/bleed easily. Psychiatric/Behavioral: Negative.         Past Medical History:   Diagnosis Date    Acute kidney failure, unspecified ([facility] [address]     Anxiety state, unspecified     Atrial septal aneurysm 01/09/2016    Blood circulation, collateral     Body mass index 30.0-30.9, adult     CAD (coronary artery disease) 01/10/2016    1/9/16  lexiscan  Positive for apical MI + myocardial ischemia, EF 42%, intermediate risk findings, AUC indication 16, AUC score 7 1/10/16  Cath  3 lesions in the LAD:  Mid: 70% 2.25x23, mid 90% 2.25 x 28, distal 100% 2.0x28, anterior lateral apical hypokinesis, EF 45%    Calculus of kidney     Carotid artery stenosis 06/26/2013    Cellulitis and abscess of hand, except fingers and thumb     Cerebral artery occlusion with cerebral infarction (HCC)     15 years ago    Chronic airway obstruction, not elsewhere classified     Chronic kidney disease, unspecified     Congestive heart failure, unspecified     COVID-19 10/01/2020    Diabetes mellitus type II     Diverticulosis of colon (without mention of hemorrhage)     Encounter for long-term (current) use of insulin (HCC)     Esophageal reflux     Family history of ischemic heart disease     Gastric ulcer, unspecified as acute or chronic, without mention of hemorrhage, perforation, or obstruction     Hyperlipemia     Hypertension     Internal hemorrhoids without mention of complication     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, or unspecified(403.00)     Obesity, unspecified     Other specified cardiac dysrhythmias(427.89)     Palliative care patient 12/21/2020    Paroxysmal atrial fibrillation (HCC)     Peripheral vascular disease ([facility] [address]     Solitary pulmonary nodule     Surgical or other procedure not carried out because of contraindication     Thoracic aneurysm without mention of rupture     Tobacco use disorder     Type II or unspecified type diabetes mellitus without mention of complication, not stated as uncontrolled        Past Surgical History:   Procedure Laterality Date    CARDIAC CATHETERIZATION      CHOLECYSTECTOMY  2000    CORONARY ANGIOPLASTY WITH STENT PLACEMENT  01/2016    2 JACQUELINE to LAD    DIAGNOSTIC CARDIAC CATH LAB PROCEDURE      URETER STENT PLACEMENT      VASCULAR SURGERY  03- [facility]    Aortogram with bilateral selective renal artery injections    VASCULAR SURGERY  6/14/13 [facility]    Right carotid endarterectomy with Vascu-Guard patch repair. Right cervical carotid arteriograms after endarterectomy. VASCULAR SURGERY  7/6/15 [facility]    Percutaneous cannulation, right common femoral artery, with a5 [place]amese sheath and later 6 [place]amese [facility] sheath. Suprarenal abdomianl aortagram.  Selective right renal arteriogram.  Right renal artery balloon angioplasty;/stent (Express 6 mm x 18mmballoon-expandable stent. . Completion suprarenal abdominal aortogram and selective right remal arteriogram. Mynx closure, right common femoral artery punctur       Family History   Problem Relation Age of Onset    High Blood Pressure Mother     Other Mother         COVID    High Blood Pressure Father     Cancer Father     High Blood Pressure Brother     No Known Problems Son     No Known Problems Daughter        Social History Socioeconomic History    Marital status:      Spouse name: patient refused to answer    Number of children: 2    Years of education: Not on file    Highest education level: Not on file   Occupational History    Occupation: patient refused to answer   Tobacco Use    Smoking status: Former     Packs/day: 0.25     Years: 9.00     Pack years: 2.25     Types: Cigarettes     Start date: 66     Quit date:      Years since quittin.1    Smokeless tobacco: Current     Types: Chew    Tobacco comments:     Used both for 9 years   Vaping Use    Vaping Use: Never used   Substance and Sexual Activity    Alcohol use: Not Currently     Comment: \"when i was tyoung i did, if i wanted to drink ten beers i drank ten beers\"    Drug use: Never    Sexual activity: Yes     Comment: has 2 children   Other Topics Concern    Not on file   Social History Narrative    CODE STATUS: DNR    HEALTH CARE PROXY: patient refused to answer    AMBULATES: patient refused to answer    DOMICILED: patient refused to answer     Social Determinants of Health     Financial Resource Strain: Not on file   Food Insecurity: Not on file   Transportation Needs: Not on file   Physical Activity: Not on file   Stress: Not on file   Social Connections: Not on file   Intimate Partner Violence: Not on file   Housing Stability: Not on file       Allergies   Allergen Reactions    Morphine Shortness Of Breath and Other (See Comments)     Was INTUBATED for this    Hydralazine      \"They said I am, I really don't know\"         Current Outpatient Medications:     ELIQUIS 5 MG TABS tablet, TAKE ONE TABLET BY MOUTH TWICE A DAY, Disp: 60 tablet, Rfl: 5    OXYGEN, Inhale 4 L/min into the lungs continuous, Disp: , Rfl:     bumetanide (BUMEX) 1 MG tablet, Take 2 tablets by mouth 2 times daily, Disp: 60 tablet, Rfl: 5    potassium chloride (KLOR-CON M) 20 MEQ extended release tablet, Take 1 tablet by mouth 2 times daily, Disp: 60 tablet, Rfl: 0    tiotropium (Ruthy Chamber) 18 MCG inhalation capsule, Inhale 1 capsule into the lungs daily, Disp: 30 capsule, Rfl: 0    albuterol sulfate (PROAIR RESPICLICK) 834 (90 Base) MCG/ACT aerosol powder inhalation, Inhale 2 puffs into the lungs every 4 hours as needed for Wheezing or Shortness of Breath, Disp: , Rfl:     esomeprazole Magnesium (NEXIUM) 40 MG PACK, Take 40 mg by mouth daily, Disp: , Rfl:     Cholecalciferol (VITAMIN D3) 50 MCG (2000 UT) CAPS, Take by mouth daily, Disp: , Rfl:     sacubitril-valsartan (ENTRESTO) 49-51 MG per tablet, Take 1 tablet by mouth 2 times daily, Disp: , Rfl:     rosuvastatin (CRESTOR) 40 MG tablet, Take 1 tablet by mouth daily Take 1 tablet by mouth daily, Disp: 90 tablet, Rfl: 3    amiodarone (CORDARONE) 200 MG tablet, Take 1 tablet by mouth daily, Disp: 90 tablet, Rfl: 3    isosorbide mononitrate (IMDUR) 60 MG extended release tablet, TAKE ONE TABLET BY MOUTH TWICE A DAY, Disp: 60 tablet, Rfl: 5    minoxidil (LONITEN) 2.5 MG tablet, TAKE ONE TABLET BY MOUTH TWICE A DAY, Disp: 60 tablet, Rfl: 5    NIFEdipine (PROCARDIA XL) 60 MG extended release tablet, Take 1 tablet by mouth daily, Disp: 30 tablet, Rfl: 3    ondansetron (ZOFRAN ODT) 4 MG disintegrating tablet, Take 1 tablet by mouth every 8 hours as needed for Nausea or Vomiting, Disp: 20 tablet, Rfl: 0    clopidogrel (PLAVIX) 75 MG tablet, TAKE ONE TABLET BY MOUTH EVERY DAY, Disp: 30 tablet, Rfl: 3    carvedilol (COREG) 12.5 MG tablet, Take 1 tablet by mouth 2 times daily, Disp: 60 tablet, Rfl: 3    pantoprazole (PROTONIX) 40 MG tablet, Take 1 tablet by mouth 2 times daily (before meals), Disp: 60 tablet, Rfl: 3    ascorbic acid (VITAMIN C) 500 MG tablet, Take 1 tablet by mouth daily, Disp: 30 tablet, Rfl: 3    calcitRIOL (ROCALTROL) 0.25 MCG capsule, Take 0.25 mcg by mouth daily, Disp: , Rfl:     senna-docusate (PERICOLACE) 8.6-50 MG per tablet, Take 1 tablet by mouth nightly, Disp: , Rfl:     allopurinol (ZYLOPRIM) 100 MG tablet, Take 200 mg by mouth daily, Disp: , Rfl:     aspirin 81 MG chewable tablet, Take 1 tablet by mouth daily, Disp: 30 tablet, Rfl: 3    nitroGLYCERIN (NITROSTAT) 0.4 MG SL tablet, up to max of 3 total doses. If no relief after 1 dose, call 911., Disp: 25 tablet, Rfl: 0    NOVOLOG FLEXPEN 100 UNIT/ML injection pen, 5 Units 3 times daily (before meals) , Disp: , Rfl:     levocetirizine (XYZAL) 5 MG tablet, Take 5 mg by mouth nightly, Disp: , Rfl:     ULTICARE MINI PEN NEEDLES 31G X 6 MM MISC, FOR USE WITH LANTUS TWO TIMES A DAY, Disp: 50 each, Rfl: 5    LANTUS SOLOSTAR 100 UNIT/ML injection pen, INJECT 30 UNITS IN THE MORNING AND 20 UNITS IN THE EVENING DAILY, Disp: 15 mL, Rfl: 5    PE:  Vitals:    02/08/23 0916   BP: (!) 110/50   Pulse: 53   SpO2: 92%       Estimated body mass index is 32.92 kg/m² as calculated from the following:    Height as of this encounter: 5' 11\" (1.803 m). Weight as of this encounter: 236 lb (107 kg). Constitutional: He is oriented to person, place, and time. He appears well-developed and well-nourished in no acute distress. Neck:  Neck supple without JVD present. No trachea deviation present. No thyromegaly present. Eyes:Conjunctivae and EOM are normal. Pupils equal and reactive to light. ENT:Hearing appears normal.conjunctiva and lids are normal, ears and nose appear normal.  Cardiovascular: Normal rate, Normal rhythm, normal heart sounds. 1/6 murmur ascultated. No gallop and no friction rub. No carotid bruits. 1+ peripheral edema. Pulmonary/Chest:  Lungs clear to auscultation bilaterally without evidence of respiratory distress. He without wheezes. He without rales or ronchi. Musculoskeletal: Normal range of motion. Gait is normal.  Head is normocephalic and atraumatic. Skin: Skin is warm and dry without rash or pallor. Psychiatric:He is alert and oriented to person, place, and time. He has a normal mood and affect.  His behavior is normal. Thought content normal.       Lab Results   Component Value Date/Time    CREATININE 2.4 02/06/2023 07:35 PM    CREATININE 2.3 11/23/2022 01:31 AM    CREATININE 2.5 11/22/2022 01:58 AM    HGB 12.9 02/06/2023 07:35 PM    HGB 12.0 11/22/2022 01:58 AM    HGB 12.7 11/21/2022 04:17 AM    PROBNP 1,424 02/06/2023 07:35 PM    PROBNP 1,484 11/23/2022 01:31 AM    PROBNP 1,901 11/21/2022 04:53 AM         ECG 02/08/23  Sinus bradycardia, 1st degree AV block, QTc 484 msec, HR 54 bpm       Assessment, Recommendations, & Plan:  78 y.o. male with CAD, HTN, HLD, PAF, Chronic anticoagulation, On Amiodarone Therapy, & Diastolic Dysfunction    CAD-controlled on ASA, Coreg, Plavix, Toprol, Crestor, & Entresto. Continue current regimen    HTN-controlled on Bumex, Coreg, Toprol, nifedipine, & Entresto. Continue current regimen    HLD-controlled on Crestor. Continue current regimen    PAF/Chronic anticoagulation/On Amiodarone Therapy-normal sinus rhythm on amiodarone. Anticoagulated on Eliquis without bleeding issues. Continue current regimen    DD-no signs of fluid overload today on Bumex, Toprol, & Entresto. Seen in the ER on Monday night and chest x-ray showed some possible pulmonary congestion. ER called last night to tell him to take an extra Bumex which she took an extra 2 mg in addition to his regular dosing. He is weighing daily at home and weights have been between 231-234. Recheck a BMP and BNP today. We will call down to nephrology office to get guidance about diuretics. We will notify patient with results and recommendations. Disposition - RTC in 6 weeks with Dr. Silvano Ortiz or sooner if needed      Please do not hesitate to contact me for any questions or concerns.     Sincerely yours,    DWAYNE Guillermo

## 2023-02-08 NOTE — PROGRESS NOTES
Pulmonary and Sleep Medicine    Katerina Robles (:  1955) is a 78 y.o. male,Established patient, here for evaluation of the following chief complaint(s):  Follow-up (Follow up- SOB)      Referring physician:  No referring provider defined for this encounter. ASSESSMENT/PLAN:  1. SAMMY (obstructive sleep apnea)  2. Sleep related hypoxia  3. Chronic heart failure with preserved ejection fraction ([address] Ejection fraction 40%  4. Obesity (BMI 30-39.9)  5. Chronic respiratory failure with hypoxia (HCC)  6. Restrictive lung disease      Restrictive lung disease pattern seen on pulmonary function testing without evidence of autoimmune disorder likely secondary to persistent pulmonary edema. His cardiac stress test was reviewed from 2022. At that time he did have what looked like significant perfusion defect with an EF of 40%. He follows with Dr. Safia Johnston. He is going to [facility] for thoracic surgery evaluation regarding his chronic aortic dissection. We will attempt evaluation by pulmonary at that time. Doubt intrinsic lung disease. Likely etiology of his perpetual hypoxia and the restrictive pattern is chronic pulmonary edema. .       Julien Jaramillo MD, [facility], [facility]    Return in about 4 weeks (around 3/8/2023). SUBJECTIVE/OBJECTIVE:  Patient is here for follow up regarding chronic respiratory failure and sleep apnea. He is awaiting BiPAP. He was in the emergency room 2 days ago. At that time he had a BNP of 1400. CT of the chest did not show any significant intrinsic pathology had work-up for autoimmune disorder that was negative. Namely OBDULIO was negative IgG was negative IgE was negative. He continues to require 4 L/min oxygen supplementation. Prior to Visit Medications    Medication Sig Taking?  Authorizing Provider   ELIQUIS 5 MG TABS tablet TAKE ONE TABLET BY MOUTH TWICE A DAY Yes DWAYNE Verde   OXYGEN Inhale 4 L/min into the lungs continuous Yes Historical Provider, MD   bumetanide (BUMEX) 1 MG tablet Take 2 tablets by mouth 2 times daily Yes Treva Dumont MD   potassium chloride (KLOR-CON M) 20 MEQ extended release tablet Take 1 tablet by mouth 2 times daily Yes Treva Dumont MD   tiotropium (Karyna Mantel) 18 MCG inhalation capsule Inhale 1 capsule into the lungs daily Yes Treva Dumont MD   albuterol sulfate (PROAIR RESPICLICK) 873 (90 Base) MCG/ACT aerosol powder inhalation Inhale 2 puffs into the lungs every 4 hours as needed for Wheezing or Shortness of Breath Yes Historical Provider, MD   esomeprazole Magnesium (NEXIUM) 40 MG PACK Take 40 mg by mouth daily Yes Historical Provider, MD   Cholecalciferol (VITAMIN D3) 50 MCG (2000 UT) CAPS Take by mouth daily Yes Historical Provider, MD   sacubitril-valsartan (ENTRESTO) 49-51 MG per tablet Take 1 tablet by mouth 2 times daily Yes Historical Provider, MD   rosuvastatin (CRESTOR) 40 MG tablet Take 1 tablet by mouth daily Take 1 tablet by mouth daily Yes Benjy Kolb MD   amiodarone (CORDARONE) 200 MG tablet Take 1 tablet by mouth daily Yes Benjy Kolb MD   isosorbide mononitrate (IMDUR) 60 MG extended release tablet TAKE ONE TABLET BY MOUTH TWICE A DAY Yes DWAYNE Arteaga   minoxidil (LONITEN) 2.5 MG tablet TAKE ONE TABLET BY MOUTH TWICE A DAY Yes DWAYNE May   NIFEdipine (PROCARDIA XL) 60 MG extended release tablet Take 1 tablet by mouth daily Yes Tristan Garcia MD   ondansetron (ZOFRAN ODT) 4 MG disintegrating tablet Take 1 tablet by mouth every 8 hours as needed for Nausea or Vomiting Yes Tevin Miles MD   clopidogrel (PLAVIX) 75 MG tablet TAKE ONE TABLET BY MOUTH EVERY DAY Yes Spencer Vanegas MD   carvedilol (COREG) 12.5 MG tablet Take 1 tablet by mouth 2 times daily Yes Spencer Vanegas MD   pantoprazole (PROTONIX) 40 MG tablet Take 1 tablet by mouth 2 times daily (before meals) Yes Spencer Vanegas MD   ascorbic acid (VITAMIN C) 500 MG tablet Take 1 tablet by mouth daily Yes Govind Real MD   calcitRIOL (ROCALTROL) 0.25 MCG capsule Take 0.25 mcg by mouth daily Yes Historical Provider, MD   senna-docusate (Abhishek Ky) 8.6-50 MG per tablet Take 1 tablet by mouth nightly Yes Historical Provider, MD   allopurinol (ZYLOPRIM) 100 MG tablet Take 200 mg by mouth daily Yes Historical Provider, MD   aspirin 81 MG chewable tablet Take 1 tablet by mouth daily Yes Marlon Guerrero MD   nitroGLYCERIN (NITROSTAT) 0.4 MG SL tablet up to max of 3 total doses. If no relief after 1 dose, call 911. Yes Shane Kemp, DO   NOVOLOG FLEXPEN 100 UNIT/ML injection pen 5 Units 3 times daily (before meals)  Yes Historical Provider, MD   levocetirizine (XYZAL) 5 MG tablet Take 5 mg by mouth nightly Yes Historical Provider, MD   ULTICARE MINI PEN NEEDLES 31G X 6 MM MISC FOR USE WITH LANTUS TWO TIMES A DAY Yes DWAYNE Rodas CNP   LANTUS SOLOSTAR 100 UNIT/ML injection pen INJECT 30 UNITS IN THE MORNING AND 20 UNITS IN THE EVENING DAILY Yes DWAYNE Rodas CNP        Review of Systems   Constitutional:  Negative for activity change, appetite change, chills, diaphoresis and fatigue. HENT:  Negative for congestion, dental problem, drooling, ear discharge, postnasal drip and rhinorrhea. Eyes:  Negative for visual disturbance. Respiratory:  Positive for apnea, cough and shortness of breath. Negative for chest tightness and wheezing. Gastrointestinal:  Negative for abdominal distention, abdominal pain and anal bleeding. Endocrine: Negative for cold intolerance, heat intolerance and polydipsia. Genitourinary:  Negative for difficulty urinating, dysuria, enuresis and flank pain. Musculoskeletal:  Negative for arthralgias, back pain and gait problem. Allergic/Immunologic: Negative for environmental allergies. Neurological:  Negative for dizziness, facial asymmetry, light-headedness and headaches.      Vitals:    02/08/23 1005   BP: 118/64   Pulse: 56   Temp: 98 °F (36.7 °C)   SpO2: (!) 86%     BMI Readings from Last 1 Encounters:   02/08/23 33.11 kg/m²         Physical Exam  Vitals reviewed. Constitutional:       Appearance: Normal appearance. HENT:      Head: Normocephalic and atraumatic. Nose: Nose normal.   Eyes:      Extraocular Movements: Extraocular movements intact. Conjunctiva/sclera: Conjunctivae normal.   Cardiovascular:      Rate and Rhythm: Normal rate and regular rhythm. Heart sounds: No murmur heard. No friction rub. Pulmonary:      Effort: Pulmonary effort is normal. No respiratory distress. Breath sounds: Normal breath sounds. No stridor. No wheezing, rhonchi or rales. Abdominal:      General: There is no distension. Palpations: There is no mass. Tenderness: There is no abdominal tenderness. There is no guarding or rebound. Musculoskeletal:      Cervical back: Normal range of motion and neck supple. Neurological:      Mental Status: He is alert and oriented to person, place, and time. This note was generated using a voice recognition software. Errors in voice recognition may have occurred. An electronic signature was used to authenticate this note.     --Colonel Alexandr MD

## 2023-02-08 NOTE — PROGRESS NOTES
I spoke with Dr. Lexi Gongora regarding patient's creatinine and needing to increase diuretics. We are going to add Zaroxolyn 5 mg 3 times a week as needed for fluid retention. Patient has been instructed that he needs to make sure that he is taking his potassium supplements as prescribed as well.

## 2023-02-09 ENCOUNTER — OFFICE VISIT (OUTPATIENT)
Dept: CARDIOLOGY CLINIC | Age: 68
End: 2023-02-09
Payer: MEDICARE

## 2023-02-09 VITALS
SYSTOLIC BLOOD PRESSURE: 134 MMHG | DIASTOLIC BLOOD PRESSURE: 68 MMHG | BODY MASS INDEX: 33.04 KG/M2 | HEIGHT: 71 IN | HEART RATE: 62 BPM | WEIGHT: 236 LBS

## 2023-02-09 DIAGNOSIS — I71.20 THORACIC AORTIC ANEURYSM WITHOUT RUPTURE, UNSPECIFIED PART: ICD-10-CM

## 2023-02-09 DIAGNOSIS — R06.02 SHORTNESS OF BREATH: ICD-10-CM

## 2023-02-09 DIAGNOSIS — I48.0 PAF (PAROXYSMAL ATRIAL FIBRILLATION) (HCC): Primary | ICD-10-CM

## 2023-02-09 DIAGNOSIS — I51.89 DIASTOLIC DYSFUNCTION: ICD-10-CM

## 2023-02-09 DIAGNOSIS — I25.10 CORONARY ARTERY DISEASE INVOLVING NATIVE CORONARY ARTERY OF NATIVE HEART WITHOUT ANGINA PECTORIS: ICD-10-CM

## 2023-02-09 DIAGNOSIS — I48.91 ATRIAL FIBRILLATION STATUS POST CARDIOVERSION (HCC): ICD-10-CM

## 2023-02-09 DIAGNOSIS — E78.2 MIXED HYPERLIPIDEMIA: ICD-10-CM

## 2023-02-09 DIAGNOSIS — Z95.5 HX OF HEART ARTERY STENT: ICD-10-CM

## 2023-02-09 DIAGNOSIS — Z79.899 ON AMIODARONE THERAPY: ICD-10-CM

## 2023-02-09 DIAGNOSIS — I10 ESSENTIAL HYPERTENSION: ICD-10-CM

## 2023-02-09 DIAGNOSIS — Z79.01 CHRONIC ANTICOAGULATION: ICD-10-CM

## 2023-02-09 PROCEDURE — 3078F DIAST BP <80 MM HG: CPT | Performed by: INTERNAL MEDICINE

## 2023-02-09 PROCEDURE — 4004F PT TOBACCO SCREEN RCVD TLK: CPT | Performed by: INTERNAL MEDICINE

## 2023-02-09 PROCEDURE — 1123F ACP DISCUSS/DSCN MKR DOCD: CPT | Performed by: INTERNAL MEDICINE

## 2023-02-09 PROCEDURE — G8417 CALC BMI ABV UP PARAM F/U: HCPCS | Performed by: INTERNAL MEDICINE

## 2023-02-09 PROCEDURE — 99214 OFFICE O/P EST MOD 30 MIN: CPT | Performed by: INTERNAL MEDICINE

## 2023-02-09 PROCEDURE — G8484 FLU IMMUNIZE NO ADMIN: HCPCS | Performed by: INTERNAL MEDICINE

## 2023-02-09 PROCEDURE — G8427 DOCREV CUR MEDS BY ELIG CLIN: HCPCS | Performed by: INTERNAL MEDICINE

## 2023-02-09 PROCEDURE — 3075F SYST BP GE 130 - 139MM HG: CPT | Performed by: INTERNAL MEDICINE

## 2023-02-09 PROCEDURE — 3017F COLORECTAL CA SCREEN DOC REV: CPT | Performed by: INTERNAL MEDICINE

## 2023-02-09 RX ORDER — AMIODARONE HYDROCHLORIDE 100 MG/1
100 TABLET ORAL DAILY
Qty: 90 TABLET | Refills: 3 | Status: SHIPPED | OUTPATIENT
Start: 2023-02-09 | End: 2023-03-11

## 2023-02-09 ASSESSMENT — ENCOUNTER SYMPTOMS
RESPIRATORY NEGATIVE: 1
VOMITING: 0
GASTROINTESTINAL NEGATIVE: 1
NAUSEA: 0
EYES NEGATIVE: 1
DIARRHEA: 0
SHORTNESS OF BREATH: 0

## 2023-02-09 NOTE — PROGRESS NOTES
[facility] Progress Note                            Date:  2/9/2023  Patient: Byron Florez  Age:  78 y.o., 1955      Reason for evaluation:         SUBJECTIVE:    Returns today for follow-up assessment has multiple medical issues including coronary artery disease previous stents obstructive sleep apnea chronically hypoxemic on supplemental oxygen chronic anticoagulation with Eliquis. Has been on oxygen for 3 to 4 months. Denies any bleeding issues. Denies chest discomfort. Saw our nurse practitioner yesterday. He was apparently in the emergency department just a few days ago. proBNP 1638 on 2/8/2023. BUN 30 creatinine 2.4 on 2/6/2023. Hemoglobin 12.9. Last Lexiscan 8/17/2022 ejection fraction 44% large anteroapical defect no appreciable ischemia. Echocardiogram 8/16/2022. Ejection fraction reported at 55 to 60% mild concentric LVH diastolic dysfunction. Impaired LV relaxation no significant wall motion abnormalities noted. Patient reports he gets short of breath just walking across the room. Cardiac catheterization 2/24/2020 shows 100% distal LAD at the stent otherwise no luminal irregularities slight apical and mild diaphragmatic hypokinesis ejection fraction 45%. Notation made of recent CT abdomen pelvis and CT of the chest on 2/6/2023 showing short segment presumed [place] type B dissection of the descending thoracic aorta with coarse calcification grossly unchanged to previous study. Findings limited without IV contrast.  Abdominal aorta reported to be unremarkable. Review of Systems   Constitutional: Negative. Negative for chills, fever and unexpected weight change. HENT: Negative. Eyes: Negative. Respiratory: Negative. Negative for shortness of breath. Cardiovascular: Negative. Negative for chest pain. Gastrointestinal: Negative. Negative for diarrhea, nausea and vomiting. Endocrine: Negative. Genitourinary: Negative.     Musculoskeletal: Negative. Skin: Negative. Neurological: Negative. All other systems reviewed and are negative. OBJECTIVE:     /68   Pulse 62   Ht 5' 11\" (1.803 m)   Wt 236 lb (107 kg)   BMI 32.92 kg/m²     Labs:   CBC:   Recent Labs     02/06/23 1935   WBC 6.6   HGB 12.9*   HCT 42.7        BMP:  Recent Labs     02/06/23 1935 02/08/23  0912    144   K 3.7 4.2   CO2 30* 32*   BUN 30* 28*   CREATININE 2.4* 2.6*   LABGLOM 29* 26*   GLUCOSE 156* 172*     BNP: No results for input(s): BNP in the last 72 hours. PT/INR:   Recent Labs     02/06/23 1935   PROTIME 17.6*   INR 1.44*     APTT:  Recent Labs     02/06/23 1935   APTT 40.9*     CARDIAC ENZYMES:No results for input(s): CKTOTAL, CKMB, CKMBINDEX, TROPONINI in the last 72 hours.   FASTING LIPID PANEL:  Lab Results   Component Value Date/Time    HDL 39 11/21/2022 04:53 AM    LDLDIRECT 153 06/12/2013 02:54 AM    LDLCALC 40 11/21/2022 04:53 AM    TRIG 69 11/21/2022 04:53 AM     LIVER PROFILE:  Recent Labs     02/06/23 1935   AST 21   ALT 22   LABALBU 4.1           Past Medical History:   Diagnosis Date    Acute kidney failure, unspecified ([address]     Anxiety state, unspecified     Atrial septal aneurysm 01/09/2016    Blood circulation, collateral     Body mass index 30.0-30.9, adult     CAD (coronary artery disease) 01/10/2016    1/9/16  lexiscan  Positive for apical MI + myocardial ischemia, EF 42%, intermediate risk findings, AUC indication 16, AUC score 7 1/10/16  Cath  3 lesions in the LAD:  Mid: 70% 2.25x23, mid 90% 2.25 x 28, distal 100% 2.0x28, anterior lateral apical hypokinesis, EF 45%    Calculus of kidney     Carotid artery stenosis 06/26/2013    Cellulitis and abscess of hand, except fingers and thumb     Cerebral artery occlusion with cerebral infarction (HCC)     15 years ago    Chronic airway obstruction, not elsewhere classified     Chronic kidney disease, unspecified     Congestive heart failure, unspecified     COVID-19 10/01/2020 Diabetes mellitus type II     Diverticulosis of colon (without mention of hemorrhage)     Encounter for long-term (current) use of insulin (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, or unspecified(403.00)     Obesity, unspecified     Other specified cardiac dysrhythmias(427.89)     Palliative care patient 12/21/2020    Paroxysmal atrial fibrillation (HCC)     Peripheral vascular disease (HCC)     Solitary pulmonary nodule     Surgical or other procedure not carried out because of contraindication     Thoracic aneurysm without mention of rupture     Tobacco use disorder     Type II or unspecified type diabetes mellitus without mention of complication, not stated as uncontrolled      Past Surgical History:   Procedure Laterality Date    CARDIAC CATHETERIZATION      CHOLECYSTECTOMY  2000    CORONARY ANGIOPLASTY WITH STENT PLACEMENT  01/2016    2 JACQUELINE to LAD    DIAGNOSTIC CARDIAC CATH LAB PROCEDURE      URETER STENT PLACEMENT      VASCULAR SURGERY  03- [facility]    Aortogram with bilateral selective renal artery injections    VASCULAR SURGERY  6/14/13 [facility] [place]    Right carotid endarterectomy with Vascu-Guard patch repair. Right cervical carotid arteriograms after endarterectomy. VASCULAR SURGERY  7/6/15 [facility] [place]    Percutaneous cannulation, right common femoral artery, with a5 [place]ian sheath and later 6 [place]ian [facility] sheath. Suprarenal abdomianl aortagram.  Selective right renal arteriogram.  Right renal artery balloon angioplasty;/stent (Express 6 mm x 18mmballoon-expandable stent. . Completion suprarenal abdominal aortogram and selective right remal arteriogram. Mynx closure, right common femoral artery bolivar     Family History   Problem Relation Age of Onset    High Blood Pressure Mother     Other Mother         COVID    High Blood Pressure Father     Cancer Father     High Blood Pressure Brother     No Known Problems Son     No Known Problems Daughter      Allergies   Allergen Reactions    Morphine Shortness Of Breath and Other (See Comments)     Was INTUBATED for this    Hydralazine      \"They said I am, I really don't know\"     Current Outpatient Medications   Medication Sig Dispense Refill    amiodarone (PACERONE) 100 MG tablet Take 1 tablet by mouth daily 90 tablet 3    metOLazone (ZAROXOLYN) 5 MG tablet Take 1 tablet three times a week 30 minutes before Bumex as needed for fluid retention 15 tablet 3    ELIQUIS 5 MG TABS tablet TAKE ONE TABLET BY MOUTH TWICE A DAY 60 tablet 5    OXYGEN Inhale 4 L/min into the lungs continuous      bumetanide (BUMEX) 1 MG tablet Take 2 tablets by mouth 2 times daily 60 tablet 5    potassium chloride (KLOR-CON M) 20 MEQ extended release tablet Take 1 tablet by mouth 2 times daily 60 tablet 0    tiotropium (SPIRIVA HANDIHALER) 18 MCG inhalation capsule Inhale 1 capsule into the lungs daily 30 capsule 0    albuterol sulfate (PROAIR RESPICLICK) 787 (90 Base) MCG/ACT aerosol powder inhalation Inhale 2 puffs into the lungs every 4 hours as needed for Wheezing or Shortness of Breath      esomeprazole Magnesium (NEXIUM) 40 MG PACK Take 40 mg by mouth daily      Cholecalciferol (VITAMIN D3) 50 MCG (2000 UT) CAPS Take by mouth daily      sacubitril-valsartan (ENTRESTO) 49-51 MG per tablet Take 1 tablet by mouth 2 times daily      isosorbide mononitrate (IMDUR) 60 MG extended release tablet TAKE ONE TABLET BY MOUTH TWICE A DAY 60 tablet 5    minoxidil (LONITEN) 2.5 MG tablet TAKE ONE TABLET BY MOUTH TWICE A DAY 60 tablet 5    NIFEdipine (PROCARDIA XL) 60 MG extended release tablet Take 1 tablet by mouth daily 30 tablet 3    ondansetron (ZOFRAN ODT) 4 MG disintegrating tablet Take 1 tablet by mouth every 8 hours as needed for Nausea or Vomiting 20 tablet 0    clopidogrel (PLAVIX) 75 MG tablet TAKE ONE TABLET BY MOUTH EVERY DAY 30 tablet 3    carvedilol (COREG) 12.5 MG tablet Take 1 tablet by mouth 2 times daily 60 tablet 3    pantoprazole (PROTONIX) 40 MG tablet Take 1 tablet by mouth 2 times daily (before meals) 60 tablet 3    ascorbic acid (VITAMIN C) 500 MG tablet Take 1 tablet by mouth daily 30 tablet 3    calcitRIOL (ROCALTROL) 0.25 MCG capsule Take 0.25 mcg by mouth daily      senna-docusate (PERICOLACE) 8.6-50 MG per tablet Take 1 tablet by mouth nightly      allopurinol (ZYLOPRIM) 100 MG tablet Take 200 mg by mouth daily      aspirin 81 MG chewable tablet Take 1 tablet by mouth daily 30 tablet 3    nitroGLYCERIN (NITROSTAT) 0.4 MG SL tablet up to max of 3 total doses. If no relief after 1 dose, call 911. 25 tablet 0    NOVOLOG FLEXPEN 100 UNIT/ML injection pen 5 Units 3 times daily (before meals)       levocetirizine (XYZAL) 5 MG tablet Take 5 mg by mouth nightly      ULTICARE MINI PEN NEEDLES 31G X 6 MM MISC FOR USE WITH LANTUS TWO TIMES A DAY 50 each 5    LANTUS SOLOSTAR 100 UNIT/ML injection pen INJECT 30 UNITS IN THE MORNING AND 20 UNITS IN THE EVENING DAILY 15 mL 5    rosuvastatin (CRESTOR) 40 MG tablet Take 1 tablet by mouth daily Take 1 tablet by mouth daily 90 tablet 3     No current facility-administered medications for this visit.      Social History     Socioeconomic History    Marital status:      Spouse name: patient refused to answer    Number of children: 2    Years of education: Not on file    Highest education level: Not on file   Occupational History    Occupation: patient refused to answer   Tobacco Use    Smoking status: Former     Packs/day: 0.25     Years: 9.00     Pack years: 2.25     Types: Cigarettes     Start date: 66     Quit date:      Years since quittin.1    Smokeless tobacco: Current     Types: Chew    Tobacco comments:     Used both for 9 years   Vaping Use    Vaping Use: Never used   Substance and Sexual Activity    Alcohol use: Not Currently     Comment: \"when i was tyoung i did, if i wanted to drink ten beers i drank ten beers\"    Drug use: Never    Sexual activity: Yes     Comment: has 2 children   Other Topics Concern    Not on file   Social History Narrative    CODE STATUS: DNR    HEALTH CARE PROXY: patient refused to answer    AMBULATES: patient refused to answer    DOMICILED: patient refused to answer     Social Determinants of Health     Financial Resource Strain: Not on file   Food Insecurity: Not on file   Transportation Needs: Not on file   Physical Activity: Not on file   Stress: Not on file   Social Connections: Not on file   Intimate Partner Violence: Not on file   Housing Stability: Not on file       Physical Examination:  /68   Pulse 62   Ht 5' 11\" (1.803 m)   Wt 236 lb (107 kg)   BMI 32.92 kg/m²   Physical Exam  Vitals reviewed. Constitutional:       Appearance: He is well-developed. Neck:      Vascular: No carotid bruit or JVD. Cardiovascular:      Rate and Rhythm: Normal rate and regular rhythm. Heart sounds: Normal heart sounds. No murmur heard. No friction rub. No gallop. Pulmonary:      Effort: Pulmonary effort is normal. No respiratory distress. Breath sounds: Normal breath sounds. No wheezing or rales. Abdominal:      General: There is no distension. Tenderness: There is no abdominal tenderness. Lymphadenopathy:      Cervical: No cervical adenopathy. Skin:     General: Skin is warm and dry. ASSESSMENT:     Diagnosis Orders   1. PAF (paroxysmal atrial fibrillation) (HCC)  amiodarone (PACERONE) 100 MG tablet      2. Coronary artery disease involving native coronary artery of native heart without angina pectoris        3. Essential hypertension        4. Thoracic aortic aneurysm without rupture, unspecified part        5. Diastolic dysfunction        6.  Mixed hyperlipidemia 7. Chronic anticoagulation        8. On amiodarone therapy        9. Shortness of breath        10. Hx of heart artery stent        11. Atrial fibrillation status post cardioversion [facility])            PLAN:  No orders of the defined types were placed in this encounter. Orders Placed This Encounter   Medications    amiodarone (PACERONE) 100 MG tablet     Sig: Take 1 tablet by mouth daily     Dispense:  90 tablet     Refill:  3           Continue present medications  Recommend decreasing amiodarone to 1/2 tablet daily or 100 mg p.o. daily  Recommend follow-up assessment in 6 weeks  Cardiac catheterization is an option if no other explanation available. Return in about 6 weeks (around 3/23/2023) for return to Dr. Jakub Stuart only. Amarjit Crouch MD 2/9/2023 10:29 AM CST    [facility]      Thisdictation was generated by voice recognition computer software. Although all attempts are made to edit the dictation for accuracy, there may be errors in the transcription that are not intended.

## 2023-03-15 ENCOUNTER — OFFICE VISIT (OUTPATIENT)
Dept: GASTROENTEROLOGY | Age: 68
End: 2023-03-15
Payer: MEDICARE

## 2023-03-15 ENCOUNTER — TELEPHONE (OUTPATIENT)
Dept: GASTROENTEROLOGY | Age: 68
End: 2023-03-15

## 2023-03-15 ENCOUNTER — HOSPITAL ENCOUNTER (OUTPATIENT)
Dept: WOUND CARE | Age: 68
Discharge: HOME | End: 2023-03-15
Payer: MEDICARE

## 2023-03-15 VITALS
DIASTOLIC BLOOD PRESSURE: 80 MMHG | OXYGEN SATURATION: 94 % | BODY MASS INDEX: 31.36 KG/M2 | WEIGHT: 224 LBS | SYSTOLIC BLOOD PRESSURE: 139 MMHG | HEART RATE: 57 BPM | HEIGHT: 71 IN

## 2023-03-15 VITALS
TEMPERATURE: 98.8 F | HEIGHT: 71 IN | WEIGHT: 224 LBS | BODY MASS INDEX: 31.36 KG/M2 | HEART RATE: 55 BPM | DIASTOLIC BLOOD PRESSURE: 66 MMHG | RESPIRATION RATE: 18 BRPM | SYSTOLIC BLOOD PRESSURE: 126 MMHG

## 2023-03-15 DIAGNOSIS — E11.621 TYPE 2 DIABETES MELLITUS WITH FOOT ULCER, WITHOUT LONG-TERM CURRENT USE OF INSULIN (HCC): Chronic | ICD-10-CM

## 2023-03-15 DIAGNOSIS — L97.509 TYPE 2 DIABETES MELLITUS WITH FOOT ULCER, WITHOUT LONG-TERM CURRENT USE OF INSULIN (HCC): Chronic | ICD-10-CM

## 2023-03-15 DIAGNOSIS — R97.8 ELEVATED CA 19-9 LEVEL: Primary | ICD-10-CM

## 2023-03-15 DIAGNOSIS — L97.522 SKIN ULCER OF PLANTAR ASPECT OF FOOT, LEFT, WITH FAT LAYER EXPOSED (HCC): Primary | Chronic | ICD-10-CM

## 2023-03-15 DIAGNOSIS — R63.4 WEIGHT LOSS: ICD-10-CM

## 2023-03-15 PROCEDURE — G8484 FLU IMMUNIZE NO ADMIN: HCPCS | Performed by: NURSE PRACTITIONER

## 2023-03-15 PROCEDURE — 99204 OFFICE O/P NEW MOD 45 MIN: CPT | Performed by: SURGERY

## 2023-03-15 PROCEDURE — 3074F SYST BP LT 130 MM HG: CPT | Performed by: NURSE PRACTITIONER

## 2023-03-15 PROCEDURE — G8427 DOCREV CUR MEDS BY ELIG CLIN: HCPCS | Performed by: NURSE PRACTITIONER

## 2023-03-15 PROCEDURE — 99214 OFFICE O/P EST MOD 30 MIN: CPT

## 2023-03-15 PROCEDURE — 3078F DIAST BP <80 MM HG: CPT | Performed by: NURSE PRACTITIONER

## 2023-03-15 PROCEDURE — G8417 CALC BMI ABV UP PARAM F/U: HCPCS | Performed by: NURSE PRACTITIONER

## 2023-03-15 PROCEDURE — 97597 DBRDMT OPN WND 1ST 20 CM/<: CPT | Performed by: SURGERY

## 2023-03-15 PROCEDURE — 1036F TOBACCO NON-USER: CPT | Performed by: NURSE PRACTITIONER

## 2023-03-15 PROCEDURE — 3017F COLORECTAL CA SCREEN DOC REV: CPT | Performed by: NURSE PRACTITIONER

## 2023-03-15 PROCEDURE — 99204 OFFICE O/P NEW MOD 45 MIN: CPT | Performed by: NURSE PRACTITIONER

## 2023-03-15 PROCEDURE — 1123F ACP DISCUSS/DSCN MKR DOCD: CPT | Performed by: NURSE PRACTITIONER

## 2023-03-15 PROCEDURE — 97597 DBRDMT OPN WND 1ST 20 CM/<: CPT

## 2023-03-15 ASSESSMENT — ENCOUNTER SYMPTOMS
CONSTIPATION: 0
NAUSEA: 0
BLOOD IN STOOL: 0
DIARRHEA: 0
SHORTNESS OF BREATH: 1
CHOKING: 0
ABDOMINAL PAIN: 0
RECTAL PAIN: 0
VOMITING: 0
ABDOMINAL DISTENTION: 0
COUGH: 0
ANAL BLEEDING: 0
TROUBLE SWALLOWING: 0

## 2023-03-15 NOTE — DISCHARGE INSTRUCTIONS
[address] [facility]   Physician Orders and Discharge Instructions  [address]  Telephone: [phone]    NAME:  Tisha Antunez OF BIRTH:  1955  MEDICAL RECORD NUMBER:  [id number]  DATE:  3/15/2023    Discharge condition: Stable    Discharge to: Home    Left via:Private automobile    Accompanied by: Family    ECF/HHA: None    Dressing Orders: Left Plantar Foot  Soap and water wash  Xeroform to open area, cover with dry 2x2 gauze, secure with medipore tape, change daily   Metatarsal pad with cutout to off load the wound  EDVIN on 3/20 [place] in [address] Test will be done in [address] [place] [address],[address] at 2:00  [address] [facility]. Appointment to follow test at 2:30    [address] [place] [address],[address] follow up visit _____________1 week________________  (Please note your next appointment above and if you are unable to keep, kindly give a 24 hour notice. Thank you.)    If you experience any of the following, please call the [facility] during business hours:    * Increase in Pain  * Temperature over 101  * Increase in drainage from your wound  * Drainage with a foul odor  * Bleeding  * Increase in swelling  * Need for compression bandage changes due to slippage, breakthrough drainage. If you need medical attention outside of the business hours of the [facility] please contact your PCP or go to the nearest emergency room.

## 2023-03-15 NOTE — TELEPHONE ENCOUNTER
----- Message from DWAYNE Gil - NP sent at 3/15/2023  1:42 PM CDT -----  Please contact Dr Gideon Ruiz [facility] [phone]) regarding pt's need for MRI with contrast       Plan:   - Discussed MRI with MRCP with contrast vs EUS  - Pt refuses Colonoscopy, recommended discussing CRCS with PCP  - Pt requests to contact Nephrology, Dr. Gideon Ruiz, to discuss use of contrast due to CKD  - If ok for contrast will proceed with MRI/MRCP abdomen with contrast, otherwise plan for EGD with EUS  - Will need pulmonary clearance prior to sedation    03-15-23 Called Dr Sam Hirsch for her to contact the office       03-16-23 Called [place] for [facility].

## 2023-03-15 NOTE — PROGRESS NOTES
WOUND CARE HISTORY AND PHYSICAL    Patient Care Team:  Des Lyman MD as PCP - General (Family Medicine)  DWAYNE Garay as Nurse Practitioner (Family Nurse Practitioner)     CC:     Von Rothman is a 78 y.o. male who presents today for wound evaluation. Wound Type: traumatic  Wound Location: left foot/feet: medial, plantar  Modifying factors: diabetes    Patient Active Problem List   Diagnosis Code    DM (diabetes mellitus) ([facility] E11.9    GERD (gastroesophageal reflux disease) K21.9    Aneurysm of thoracic aorta I71.20    Renal artery stenosis ([facility]) I70.1    Carotid artery stenosis I65.29    Peripheral vascular disease ([facility]) I73.9    Diabetes mellitus type I ([facility]) E10.9    Chronic respiratory failure with hypoxia ([facility]) J96.11    Acute respiratory failure with hypoxia ([facility]) J96.01    Malignant hypertension I10    Atrial septal aneurysm I25.3    Abnormal EKG R94.31    Apical myocardial infarction ([facility]) I21.29    CAD (coronary artery disease) I25.10    Acute on chronic diastolic CHF (congestive heart failure) ([facility]) I50.33    Chronic congestive heart failure ([facility]) I50.9    PAF (paroxysmal atrial fibrillation) ([facility]) I48.0    Stage 3b chronic kidney disease ([facility]) N18.32    Abnormal nuclear cardiac imaging test R93.1    Mixed hyperlipidemia E78.2    Left ventricular dysfunction I51.9    Somnolence, daytime R40.0    Edema R60.9    At risk for obstructive sleep apnea Z91.89    Acute on chronic combined systolic and diastolic congestive heart failure ([facility]) I50.43    On amiodarone therapy A44.229    Systolic and diastolic CHF, acute on chronic ([facility]) I50.43    Obesity (BMI 30-39. 9) E66.9    NSTEMI (non-ST elevated myocardial infarction) ([facility] I21.4    Pneumonia due to COVID-19 virus U07.1, J12.82    Palliative care patient Z51.5    Acute renal failure (ARF) ([facility]) N17.9    Severe malnutrition ([facility] E43    Closed left hip fracture, initial encounter ([facility] S72.002A    Chest pain R07.9    Hypokalemia E87.6 Dissection of thoracic aorta ([facility]) - DESCENDING I71.019    Right flank pain R10.9    Neural foraminal stenosis of lumbar spine M48.061    CKD (chronic kidney disease) stage 4, GFR 15-29 ml/min ([facility]) N18.4    Sleep related hypoxia G47.34    Chronic heart failure with preserved ejection fraction ([facility]) I50.32    Dyspnea on exertion R06.09    Snoring R06.83    Non-restorative sleep G47.8    SAMMY (obstructive sleep apnea) G47.33    Restrictive lung disease J98.4    Skin ulcer of plantar aspect of foot, left, with fat layer exposed ([address] L97.522       HPI:  He reports he developed a wound on left foot. This started 2 year(s) ago. He believes this is  healing. He has been applying nothing. He has not had  fever or chills. He has/with diabetes mellitus.     Reese Osborn is a 78 y.o. male with the following history reviewed and recorded in [facility]:  Patient Active Problem List    Diagnosis Date Noted    Right flank pain 08/18/2022     Priority: High    Abnormal nuclear cardiac imaging test      Priority: High    CAD (coronary artery disease) 01/10/2016     Priority: High     1/9/16  lexiscan  Positive for apical MI + myocardial ischemia, EF 42%, intermediate risk findings, AUC indication 16, AUC score 7  1/10/16  Cath  3 lesions in the LAD:  Mid: 70% 2.25x23, mid 90% 2.25 x 28, distal 100% 2.0x28, anterior lateral apical hypokinesis  2/14/20  lexiscan Positive for apical MI with minimal  myocardial ischemia, EF 36%, -1% ischemic myocardium on stress, AUC indication 21, AUC score 7   2/14/2020  Echo  EF 45%  2/24/20  Cath  100% distal LAD at the stent, o/w luminals, slight apical and mild diaphragmatic hypokinesis, EF 45%      Skin ulcer of plantar aspect of foot, left, with fat layer exposed ([address] 03/15/2023     Priority: Medium    SAMMY (obstructive sleep apnea) 01/25/2023     Priority: Medium    Restrictive lung disease 01/25/2023     Priority: Medium    Sleep related hypoxia 12/19/2022     Priority: Medium    Chronic heart failure with preserved ejection fraction [facility] 12/19/2022     Priority: Medium    Dyspnea on exertion 12/19/2022     Priority: Medium    Snoring 12/19/2022     Priority: Medium    Non-restorative sleep 12/19/2022     Priority: Medium    CKD (chronic kidney disease) stage 4, GFR 15-29 ml/min ([facility]) 11/21/2022     Priority: Medium    Neural foraminal stenosis of lumbar spine 08/18/2022     Priority: Medium    Chest pain 08/16/2022     Priority: Medium    Hypokalemia 08/16/2022     Priority: Medium    Dissection of thoracic aorta ([facility] - DESCENDING 08/16/2022     Priority: Medium    Closed left hip fracture, initial encounter ([facility] 05/02/2021    Severe malnutrition ([facility] 01/04/2021    Acute renal failure (ARF) ([facility] 01/03/2021    Palliative care patient 12/21/2020    Pneumonia due to COVID-19 virus 12/19/2020    NSTEMI (non-ST elevated myocardial infarction) ([facility] 20/20/1154    Systolic and diastolic CHF, acute on chronic ([facility] 10/18/2020    Obesity (BMI 30-39.9) 10/18/2020    Acute on chronic combined systolic and diastolic congestive heart failure ([facility] 08/24/2020    On amiodarone therapy 08/24/2020    At risk for obstructive sleep apnea 08/18/2020    Mixed hyperlipidemia 07/09/2020    Left ventricular dysfunction 07/09/2020     EF 45%      Somnolence, daytime 07/09/2020    Edema 07/09/2020    Stage 3b chronic kidney disease (HCC)     PAF (paroxysmal atrial fibrillation) ([facility] 02/12/2020 2/12/20  Newly discovered AF      Acute on chronic diastolic CHF (congestive heart failure) ([facility] 01/11/2016    Chronic congestive heart failure (HCC)     Apical myocardial infarction [facility])     Atrial septal aneurysm 01/09/2016    Malignant hypertension     Abnormal EKG      1/9/16  lexiscan  Positive for apical MI + myocardial ischemia, EF 42%, intermediate risk findings, AUC indication 16, AUC score 7        Diabetes mellitus type I ([facility] 01/07/2016    Chronic respiratory failure with hypoxia ([facility] 01/07/2016    Acute respiratory failure with hypoxia (HCC)     Peripheral vascular disease ([facility]     Carotid artery stenosis 06/26/2013    Renal artery stenosis (HCC) 03/08/2013    Aneurysm of thoracic aorta 02/21/2013     replace inactive diagnosis      DM (diabetes mellitus) ([facility] 02/04/2013    GERD (gastroesophageal reflux disease) 02/04/2013     Current Outpatient Medications   Medication Sig Dispense Refill    amiodarone (PACERONE) 100 MG tablet Take 1 tablet by mouth daily 90 tablet 3    metOLazone (ZAROXOLYN) 5 MG tablet Take 1 tablet three times a week 30 minutes before Bumex as needed for fluid retention 15 tablet 3    ELIQUIS 5 MG TABS tablet TAKE ONE TABLET BY MOUTH TWICE A DAY 60 tablet 5    OXYGEN Inhale 4 L/min into the lungs continuous      bumetanide (BUMEX) 1 MG tablet Take 2 tablets by mouth 2 times daily 60 tablet 5    potassium chloride (KLOR-CON M) 20 MEQ extended release tablet Take 1 tablet by mouth 2 times daily (Patient taking differently: Take 20 mEq by mouth daily) 60 tablet 0    tiotropium (SPIRIVA HANDIHALER) 18 MCG inhalation capsule Inhale 1 capsule into the lungs daily 30 capsule 0    albuterol sulfate (PROAIR RESPICLICK) 094 (90 Base) MCG/ACT aerosol powder inhalation Inhale 2 puffs into the lungs every 4 hours as needed for Wheezing or Shortness of Breath      esomeprazole Magnesium (NEXIUM) 40 MG PACK Take 40 mg by mouth daily      Cholecalciferol (VITAMIN D3) 50 MCG (2000 UT) CAPS Take by mouth daily      sacubitril-valsartan (ENTRESTO) 49-51 MG per tablet Take 1 tablet by mouth 2 times daily      rosuvastatin (CRESTOR) 40 MG tablet Take 1 tablet by mouth daily Take 1 tablet by mouth daily 90 tablet 3    isosorbide mononitrate (IMDUR) 60 MG extended release tablet TAKE ONE TABLET BY MOUTH TWICE A DAY 60 tablet 5    minoxidil (LONITEN) 2.5 MG tablet TAKE ONE TABLET BY MOUTH TWICE A DAY 60 tablet 5    NIFEdipine (PROCARDIA XL) 60 MG extended release tablet Take 1 tablet by mouth daily 30 tablet 3 ondansetron (ZOFRAN ODT) 4 MG disintegrating tablet Take 1 tablet by mouth every 8 hours as needed for Nausea or Vomiting 20 tablet 0    clopidogrel (PLAVIX) 75 MG tablet TAKE ONE TABLET BY MOUTH EVERY DAY (Patient not taking: Reported on 3/15/2023) 30 tablet 3    carvedilol (COREG) 12.5 MG tablet Take 1 tablet by mouth 2 times daily 60 tablet 3    ascorbic acid (VITAMIN C) 500 MG tablet Take 1 tablet by mouth daily 30 tablet 3    calcitRIOL (ROCALTROL) 0.25 MCG capsule Take 0.25 mcg by mouth daily      senna-docusate (PERICOLACE) 8.6-50 MG per tablet Take 1 tablet by mouth nightly      allopurinol (ZYLOPRIM) 100 MG tablet Take 200 mg by mouth daily      aspirin 81 MG chewable tablet Take 1 tablet by mouth daily 30 tablet 3    nitroGLYCERIN (NITROSTAT) 0.4 MG SL tablet up to max of 3 total doses. If no relief after 1 dose, call 911. 25 tablet 0    NOVOLOG FLEXPEN 100 UNIT/ML injection pen 5 Units 3 times daily (before meals)       levocetirizine (XYZAL) 5 MG tablet Take 5 mg by mouth nightly      ULTICARE MINI PEN NEEDLES 31G X 6 MM MISC FOR USE WITH LANTUS TWO TIMES A DAY 50 each 5    LANTUS SOLOSTAR 100 UNIT/ML injection pen INJECT 30 UNITS IN THE MORNING AND 20 UNITS IN THE EVENING DAILY 15 mL 5     No current facility-administered medications for this encounter.      Allergies: Morphine and Hydralazine  Past Medical History:   Diagnosis Date    AAA (abdominal aortic aneurysm)     Acute kidney failure, unspecified (HCC)     Anxiety state, unspecified     Atrial septal aneurysm 01/09/2016    Blood circulation, collateral     Body mass index 30.0-30.9, adult     CAD (coronary artery disease) 01/10/2016    1/9/16  lexiscan  Positive for apical MI + myocardial ischemia, EF 42%, intermediate risk findings, AUC indication 16, AUC score 7 1/10/16  Cath  3 lesions in the LAD:  Mid: 70% 2.25x23, mid 90% 2.25 x 28, distal 100% 2.0x28, anterior lateral apical hypokinesis, EF 45%    Calculus of kidney     Carotid artery stenosis 06/26/2013    Cellulitis and abscess of hand, except fingers and thumb     Cerebral artery occlusion with cerebral infarction ([facility]     15 years ago    Chronic airway obstruction, not elsewhere classified     Chronic kidney disease, unspecified     stage 3    Congestive heart failure, unspecified     COVID-19 10/01/2020    Diabetes mellitus type II     Diverticulosis of colon (without mention of hemorrhage)     Elevated CA 19-9 level     Encounter for long-term (current) use of insulin (HCC)     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, or unspecified(403.00)     Myocardial infarction (HCC)     Neuropathy     Obesity, unspecified     SAMMY on CPAP     Other specified cardiac dysrhythmias(427.89)     Palliative care patient 12/21/2020    Paroxysmal atrial fibrillation (HCC)     Peripheral vascular disease ([facility]     Solitary pulmonary nodule     Surgical or other procedure not carried out because of contraindication     Thoracic aneurysm without mention of rupture     Thyroid nodule     Tobacco use disorder     Type II or unspecified type diabetes mellitus without mention of complication, not stated as uncontrolled     Weight loss        Past Surgical History:   Procedure Laterality Date    CARDIAC CATHETERIZATION      CHOLECYSTECTOMY  2000    CORONARY ANGIOPLASTY WITH STENT PLACEMENT  01/2016    2 JACQUELINE to LAD    DIAGNOSTIC CARDIAC CATH LAB PROCEDURE      UPPER GASTROINTESTINAL ENDOSCOPY  2015    URETER STENT PLACEMENT      VASCULAR SURGERY  03- [facility]    Aortogram with bilateral selective renal artery injections    VASCULAR SURGERY  13 [facility]    Right carotid endarterectomy with Vascu-Guard patch repair. Right cervical carotid arteriograms after endarterectomy. VASCULAR SURGERY  7/6/15 [facility]    Percutaneous cannulation, right common femoral artery, with a5 [place]gasque sheath and later 6 Monegasque [facility] sheath. Suprarenal abdomianl aortagram.  Selective right renal arteriogram.  Right renal artery balloon angioplasty;/stent (Express 6 mm x 18mmballoon-expandable stent. . Completion suprarenal abdominal aortogram and selective right remal arteriogram. Mynx closure, right common femoral artery punctur     Family History   Problem Relation Age of Onset    Cancer Mother         SKIN    Diabetes Mother     Hypertension Mother     High Blood Pressure Mother     Other Mother         COVID    Osteoporosis Mother     Diabetes Father     High Blood Pressure Father     Stomach Cancer Father 79    High Blood Pressure Brother     No Known Problems Daughter     No Known Problems Son     Colon Polyps Neg Hx     Colon Cancer Neg Hx      Social History     Tobacco Use    Smoking status: Former     Packs/day: 0.25     Years: 9.00     Pack years: 2.25     Types: Cigarettes     Start date:      Quit date:      Years since quittin.2    Smokeless tobacco: Former     Types: Chew   Substance Use Topics    Alcohol use: Not Currently     Comment: \"when i was tyoung i did, if i wanted to drink ten beers i drank ten beers\"         Review of Systems    Constitutional - no significant activity change, appetite change, or unexpected weight change. No fever or chills. No diaphoresis or significant fatigue. HENT - no significant rhinorrhea or epistaxis. No tinnitus or significant hearing loss. Eyes - no sudden vision change or amaurosis. Respiratory - no significant shortness of breath, wheezing, or stridor. No apnea, cough, or chest tightness associated with shortness of breath. Cardiovascular - no chest pain, syncope, or significant dizziness.   No palpitations. Patient reports no claudication. Gastrointestinal - no abdominal swelling or pain. No blood in stool. No severe constipation, diarrhea, nausea, or vomiting. Genitourinary - No difficulty urinating, dysuria, frequency, or urgency. No flank pain or hematuria. Musculoskeletal - no back pain, gait disturbance, or myalgia. Skin - no color change, rash, pallor. Neurologic - no dizziness, facial asymmetry, or light headedness. No seizures. No speech difficulty or lateralizing weakness. Hematologic - no easy bruising or excessive bleeding. Psychiatric - no severe anxiety or nervousness. No confusion. All other review of systems are negative. Physical Exam    /66   Pulse 55   Temp 98.8 °F (37.1 °C) (Temporal)   Resp 18   Ht 5' 11\" (1.803 m)   Wt 224 lb (101.6 kg)   BMI 31.24 kg/m²     Constitutional - well developed, well nourished. No diaphoresis or acute distress. HENT - head normocephalic. Right external ear canal appears normal.  Left external ear canal appears normal.  Septum appears midline. Lips,teeth,gums normal  Eyes - conjunctiva normal.  EOMS normal.  No exudate. No icterus. PERRLA  Neck- ROM appears normal, no tracheal deviation. Cardiovascular - Regular rate and rhythm. Heart sounds are normal.  No murmur, rub, or gallop. Carotid pulses are 2+ to palpation bilaterally without bruit. Extremities - Radial and brachial pulses are 2+ to palpation bilaterally. Femoral pulses: present 2+bilaterally;Popliteal pulses: present 2+bilaterally Right DP: present 2+; Right PT present 1+; Left DP: present 1+; Left PT: present 1+  No cyanosis, clubbing. no edema. No signs atheroembolic event. Pulmonary - effort appears normal.  No respiratory distress. Lungs - Breath sounds normal. No wheezes or rales. GI - Abdomen - soft, non tender, bowel sounds X 4 quadrants. No guarding or rebound tenderness. No distension or palpable mass.   Genitourinary - deferred. Musculoskeletal - ROM appears normal. no edema. Skin: warm,dry  Neurologic - alert and oriented X 3. Sensation normal  Psychiatric - mood, affect, and behavior appear normal.  Judgment and thought processes appear normal.  Wound - left plantar foot wound      Wound Picture:      Assessment     Left plantar foot wound      1. Skin ulcer of plantar aspect of foot, left, with fat layer exposed ([address]    2. Type 2 diabetes mellitus with foot ulcer, without long-term current use of insulin ([facility])          Procedure Note:    Debridement: After risks benefits and expected outcomes were discussed with the patient an Non-excisional Debridement was performed on 1 . Anesthetic: lidocaine gel    Using curette the wound was sharply debrided    down through and including the removal of viable and non-viable epidermis and dermis. Devitalized Tissue Debrided:  fibrin, biofilm, slough, necrotic/eschar, exudate, and callusand epidermis and dermis.     Percent of Wound Debrided: 100%    Total Surface Area Debrided:  0.18 sq cm       Post Debridement Measurements and Assessment:  Wound 03/15/23 Left;Plantar #1 Left plantar mcguire 1 (Active)   Wound Image   03/15/23 1410   Wound Etiology Diabetic Mcguire 1 03/15/23 1410   Dressing Status Dry 03/15/23 1410   Wound Cleansed Not Cleansed 03/15/23 1410   Offloading for Diabetic Foot Ulcers Offloading ordered 03/15/23 1410   Wound Length (cm) 0.9 cm 03/15/23 1410   Wound Width (cm) 0.2 cm 03/15/23 1410   Wound Depth (cm) 0.1 cm 03/15/23 1410   Wound Surface Area (cm^2) 0.18 cm^2 03/15/23 1410   Wound Volume (cm^3) 0.018 cm^3 03/15/23 1410   Post-Procedure Length (cm) 0.9 cm 03/15/23 1432   Post-Procedure Width (cm) 0.2 cm 03/15/23 1432   Post-Procedure Depth (cm) 0.1 cm 03/15/23 1432   Post-Procedure Surface Area (cm^2) 0.18 cm^2 03/15/23 1432   Post-Procedure Volume (cm^3) 0.018 cm^3 03/15/23 1432   Wound Assessment Dry;Purple/maroon 03/15/23 1410   Drainage Amount None 03/15/23 1410   Odor None 03/15/23 1410   Una-wound Assessment Hyperkeratosis (callous) 03/15/23 1410   Wound Thickness Description not for Pressure Injury Full thickness 03/15/23 1410   Number of days: 0          The patients pain is   0. Please refer to nursing measurements and assessment regarding wound pre and post debridement. Bleeding: Minimal    Hemostasis:   by pressure    Response to treatment:  Well tolerated by patient. Plan for wound - Dress per physician order    Treatment:     Compression : No   Offloading : Yes   Dressing : SEE AVS   Additional Therapy : EDVIN DM shoes when healed   Discussed appropriate home care of this wound. Wound redressed. Patient instructions were given. Follow up: 1 week. Recommend no smoking  Offloading instructions given    Discharge Instructions           [address] and [facility] O[facility] Therapy   Physician Orders and Discharge Instructions  [address] [facility]  [place], [address]  Telephone: [phone]    NAME:  Eitan Lay OF BIRTH:  1955  MEDICAL RECORD NUMBER:  [id number]  DATE:  3/15/2023    Discharge condition: Stable    Discharge to: Home    Left via:Private automobile    Accompanied by: Family    ECF/HHA:     Dressing Orders: Left Plantar Foot  Soap and water wash  Xeroform to open area, cover with dry 2x2 gauze, secure with medipore tape, change daily   Metatarsal pad with cutout to off load the wound  X-ray today in [address]  EDVIN on [address] in [facility] will be done in [facility] Appointment to follow test at        [facility] follow up visit _____________1 week________________  (Please note your next appointment above and if you are unable to keep, kindly give a 24 hour notice.  Thank you.)    If you experience any of the following, please call the [address] during business hours:    * Increase in Pain  * Temperature over 101  * Increase in drainage from your wound  * Drainage with a foul odor  * Bleeding  * Increase in swelling  * Need for compression bandage changes due to slippage, breakthrough drainage. If you need medical attention outside of the business hours of the [address] [place] [address] Road please contact your PCP or go to the nearest emergency room.          Electronically signed by Marialuisa Lopez MD on 3/15/23 at 2:34 PM CDT

## 2023-03-15 NOTE — PROGRESS NOTES
Subjective:     Patient ID: Maty Childress is a 78 y.o. male  PCP: Liana Prado MD  Referring Provider: FABIANA Merlos  Patient presents to the office today with the following complaints: Follow-up      Pt referred for elevated CA 19-9 (57 on 2/21/2023), for possible ERCP. Transaminases are WNL. Pt had c/o not feeling well and weight loss. He reports weight loss of 40 lbs in the last couple of months. Pt feels this is related to \"new fluid pill. \"  Denies any abdominal pain. C/o chronic back pain. He reports good appetite. He reports vomiting in am about 3 months ago, this has resolved. He reports changes in bowel habits, with more constipation. Denies any blood in stool. Last EGD 2015   He has never had Colonoscopy - pt states he was unable to get cleaned out. Family hx stomach cancer - Father    Hx CKD   Hx CHF On daily O2    Assessment:     1. Elevated CA 19-9 level    2. Weight loss            Plan:   - Discussed MRI with MRCP with contrast vs EUS  - Pt refuses Colonoscopy, recommended discussing CRCS with PCP  - Pt requests to contact Nephrology, Dr. Jose Daniel Patiño, to discuss use of contrast due to CKD  - If ok for contrast will proceed with MRI/MRCP abdomen with contrast, otherwise plan for EGD with EUS  - Will need pulmonary clearance prior to sedation      Orders  No orders of the defined types were placed in this encounter. Medications  No orders of the defined types were placed in this encounter.         Patient History:     Past Medical History:   Diagnosis Date    AAA (abdominal aortic aneurysm)     Acute kidney failure, unspecified (HCC)     Anxiety state, unspecified     Atrial septal aneurysm 01/09/2016    Blood circulation, collateral     Body mass index 30.0-30.9, adult     CAD (coronary artery disease) 01/10/2016    1/9/16  lexiscan  Positive for apical MI + myocardial ischemia, EF 42%, intermediate risk findings, AUC indication 16, AUC score 7 1/10/16  Cath  3 lesions in the LAD:  Mid: 70% 2.25x23, mid 90% 2.25 x 28, distal 100% 2.0x28, anterior lateral apical hypokinesis, EF 45%    Calculus of kidney     Carotid artery stenosis 06/26/2013    Cellulitis and abscess of hand, except fingers and thumb     Cerebral artery occlusion with cerebral infarction (HCC)     15 years ago    Chronic airway obstruction, not elsewhere classified     Chronic kidney disease, unspecified     stage 3    Congestive heart failure, unspecified     COVID-19 10/01/2020    Diabetes mellitus type II     Diverticulosis of colon (without mention of hemorrhage)     Elevated CA 19-9 level     Encounter for long-term (current) use of insulin (HCC)     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, or unspecified(403.00)     Myocardial infarction ([address]     Neuropathy     Obesity, unspecified     SAMMY on CPAP     Other specified cardiac dysrhythmias(427.89)     Palliative care patient 12/21/2020    Paroxysmal atrial fibrillation (HCC)     Peripheral vascular disease ([address]     Solitary pulmonary nodule     Surgical or other procedure not carried out because of contraindication     Thoracic aneurysm without mention of rupture     Thyroid nodule     Tobacco use disorder     Type II or unspecified type diabetes mellitus without mention of complication, not stated as uncontrolled     Weight loss        Past Surgical History:   Procedure Laterality Date    CARDIAC CATHETERIZATION      CHOLECYSTECTOMY  2000    CORONARY ANGIOPLASTY WITH STENT PLACEMENT  01/2016    2 JACQUELINE to LAD    DIAGNOSTIC CARDIAC CATH LAB PROCEDURE      UPPER GASTROINTESTINAL ENDOSCOPY  2015    URETER STENT PLACEMENT      VASCULAR SURGERY  2013 [facility]    Aortogram with bilateral selective renal artery injections    VASCULAR SURGERY  13 [facility]    Right carotid endarterectomy with Vascu-Guard patch repair. Right cervical carotid arteriograms after endarterectomy. VASCULAR SURGERY  7/6/15 [facility]    Percutaneous cannulation, right common femoral artery, with a5 Burmese sheath and later 6 Burmese [facility] sheath. Suprarenal abdomianl aortagram.  Selective right renal arteriogram.  Right renal artery balloon angioplasty;/stent (Express 6 mm x 18mmballoon-expandable stent. . Completion suprarenal abdominal aortogram and selective right remal arteriogram. Mynx closure, right common femoral artery punctur       Family History   Problem Relation Age of Onset    Cancer Mother         SKIN    Diabetes Mother     Hypertension Mother     High Blood Pressure Mother     Other Mother         COVID    Osteoporosis Mother     Diabetes Father     High Blood Pressure Father     Stomach Cancer Father 79    High Blood Pressure Brother     No Known Problems Daughter     No Known Problems Son     Colon Polyps Neg Hx     Colon Cancer Neg Hx        Social History     Socioeconomic History    Marital status:      Spouse name: patient refused to answer    Number of children: 2   Occupational History    Occupation: patient refused to answer   Tobacco Use    Smoking status: Former     Packs/day: 0.25     Years: 9.00     Pack years: 2.25     Types: Cigarettes     Start date:      Quit date:      Years since quittin.2    Smokeless tobacco: Former     Types: Chew   Vaping Use    Vaping Use: Never used   Substance and Sexual Activity    Alcohol use: Not Currently     Comment: \"when i was tyoung i did, if i wanted to drink ten beers i drank ten beers\"    Drug use: Never    Sexual activity: Yes     Comment: has 2 children   Social History Narrative    CODE STATUS: DNR    HEALTH CARE PROXY: patient refused to answer    AMBULATES: patient refused to answer    DOMICILED: patient refused to answer       Current Outpatient Medications   Medication Sig Dispense Refill    amiodarone (PACERONE) 100 MG tablet Take 1 tablet by mouth daily 90 tablet 3    metOLazone (ZAROXOLYN) 5 MG tablet Take 1 tablet three times a week 30 minutes before Bumex as needed for fluid retention 15 tablet 3    ELIQUIS 5 MG TABS tablet TAKE ONE TABLET BY MOUTH TWICE A DAY 60 tablet 5    OXYGEN Inhale 4 L/min into the lungs continuous      bumetanide (BUMEX) 1 MG tablet Take 2 tablets by mouth 2 times daily 60 tablet 5    potassium chloride (KLOR-CON M) 20 MEQ extended release tablet Take 1 tablet by mouth 2 times daily (Patient taking differently: Take 20 mEq by mouth daily) 60 tablet 0    tiotropium (SPIRIVA HANDIHALER) 18 MCG inhalation capsule Inhale 1 capsule into the lungs daily 30 capsule 0    albuterol sulfate (PROAIR RESPICLICK) 860 (90 Base) MCG/ACT aerosol powder inhalation Inhale 2 puffs into the lungs every 4 hours as needed for Wheezing or Shortness of Breath      esomeprazole Magnesium (NEXIUM) 40 MG PACK Take 40 mg by mouth daily      Cholecalciferol (VITAMIN D3) 50 MCG (2000 UT) CAPS Take by mouth daily      sacubitril-valsartan (ENTRESTO) 49-51 MG per tablet Take 1 tablet by mouth 2 times daily      rosuvastatin (CRESTOR) 40 MG tablet Take 1 tablet by mouth daily Take 1 tablet by mouth daily 90 tablet 3    isosorbide mononitrate (IMDUR) 60 MG extended release tablet TAKE ONE TABLET BY MOUTH TWICE A DAY 60 tablet 5    minoxidil (LONITEN) 2.5 MG tablet TAKE ONE TABLET BY MOUTH TWICE A DAY 60 tablet 5    NIFEdipine (PROCARDIA XL) 60 MG extended release tablet Take 1 tablet by mouth daily 30 tablet 3    ondansetron (ZOFRAN ODT) 4 MG disintegrating tablet Take 1 tablet by mouth every 8 hours as needed for Nausea or Vomiting 20 tablet 0    carvedilol (COREG) 12.5 MG tablet Take 1 tablet by mouth 2 times daily 60 tablet 3    ascorbic acid (VITAMIN C) 500 MG tablet Take 1 tablet by mouth daily 30 tablet 3    calcitRIOL (ROCALTROL) 0.25 MCG capsule Take 0.25 mcg by mouth daily      senna-docusate (PERICOLACE) 8.6-50 MG per tablet Take 1 tablet by mouth nightly      allopurinol (ZYLOPRIM) 100 MG tablet Take 200 mg by mouth daily      aspirin 81 MG chewable tablet Take 1 tablet by mouth daily 30 tablet 3    nitroGLYCERIN (NITROSTAT) 0.4 MG SL tablet up to max of 3 total doses. If no relief after 1 dose, call 911. 25 tablet 0    NOVOLOG FLEXPEN 100 UNIT/ML injection pen 5 Units 3 times daily (before meals)       levocetirizine (XYZAL) 5 MG tablet Take 5 mg by mouth nightly      ULTICARE MINI PEN NEEDLES 31G X 6 MM MISC FOR USE WITH LANTUS TWO TIMES A DAY 50 each 5    LANTUS SOLOSTAR 100 UNIT/ML injection pen INJECT 30 UNITS IN THE MORNING AND 20 UNITS IN THE EVENING DAILY 15 mL 5    clopidogrel (PLAVIX) 75 MG tablet TAKE ONE TABLET BY MOUTH EVERY DAY (Patient not taking: Reported on 3/15/2023) 30 tablet 3     No current facility-administered medications for this visit. Allergies   Allergen Reactions    Morphine Shortness Of Breath and Other (See Comments)     Was INTUBATED for this    Hydralazine      \"They said I am, I really don't know\"       Review of Systems   Constitutional:  Positive for unexpected weight change. Negative for activity change, appetite change, fatigue and fever. HENT:  Negative for trouble swallowing. Respiratory:  Positive for shortness of breath. Negative for cough and choking. Cardiovascular:  Negative for chest pain. Gastrointestinal:  Negative for abdominal distention, abdominal pain, anal bleeding, blood in stool, constipation, diarrhea, nausea, rectal pain and vomiting. Skin:  Positive for wound (bottom of foot). Allergic/Immunologic: Negative for food allergies. All other systems reviewed and are negative.       Objective:     /80 (Site: Left Upper Arm)   Pulse 57   Ht 5' 11\" (1.803 m)   Wt 224 lb (101.6 kg)   SpO2 94%   BMI 31.24 kg/m²     Physical Exam  Vitals reviewed. Constitutional:       General: He is not in acute distress. Appearance: He is well-developed. HENT:      Head: Normocephalic and atraumatic. Right Ear: External ear normal.      Left Ear: External ear normal.      Nose: Nose normal.   Eyes:      Conjunctiva/sclera: Conjunctivae normal.      Pupils: Pupils are equal, round, and reactive to light. Cardiovascular:      Rate and Rhythm: Normal rate and regular rhythm. Heart sounds: Normal heart sounds. No murmur heard. No friction rub. No gallop. Pulmonary:      Effort: Pulmonary effort is normal. No respiratory distress. Breath sounds: Normal breath sounds. Comments: O2 per NC  Abdominal:      General: Bowel sounds are normal. There is no distension. Palpations: Abdomen is soft. There is no mass. Tenderness: There is no abdominal tenderness. There is no guarding or rebound. Musculoskeletal:         General: Normal range of motion. Cervical back: Normal range of motion and neck supple. Feet:      Left foot:      Skin integrity: Callus present. Skin:     General: Skin is warm and dry. Findings: Rash (back) present. Nails: There is no clubbing. Neurological:      Mental Status: He is alert and oriented to person, place, and time. Gait: Gait normal.   Psychiatric:         Behavior: Behavior normal.         Thought Content:  Thought content normal.

## 2023-03-16 DIAGNOSIS — R63.4 WEIGHT LOSS: ICD-10-CM

## 2023-03-16 DIAGNOSIS — R97.8 ELEVATED CA 19-9 LEVEL: Primary | ICD-10-CM

## 2023-03-16 NOTE — TELEPHONE ENCOUNTER
03-16-23 Leatha Quinton called from Dr Elaina Thompson     She stated that he oked for the patient to have the MRI MRCP with Contract       Routed to [facility] [employer]

## 2023-03-16 NOTE — TELEPHONE ENCOUNTER
Jeannie Welch from Dr Gian Delaney office called back and left a VM for Carlos Alberto Ann to call her back in regards to the MRI. Her phone number is [phone].

## 2023-03-16 NOTE — TELEPHONE ENCOUNTER
03-16-23 Rose   Notified of Plan as per [address]     She stated that she will ck with Dr Holly Tafoya and get back to our office.

## 2023-03-17 NOTE — TELEPHONE ENCOUNTER
03-17-23 Called and notified Pepper Hernández (Spouse) that it was ok per Dr Rogelio Perla to proceed with the MRI MRCP w Contrast.     Transferred to scheduling       MRI scheduled 03-30-23

## 2023-03-20 ENCOUNTER — HOSPITAL ENCOUNTER (OUTPATIENT)
Dept: WOUND CARE | Age: 68
Discharge: HOME | End: 2023-03-20
Payer: MEDICARE

## 2023-03-20 ENCOUNTER — HOSPITAL ENCOUNTER (OUTPATIENT)
Dept: NON INVASIVE DIAGNOSTICS | Age: 68
Discharge: HOME | End: 2023-03-20
Payer: MEDICARE

## 2023-03-20 VITALS
TEMPERATURE: 97.9 F | RESPIRATION RATE: 18 BRPM | DIASTOLIC BLOOD PRESSURE: 70 MMHG | WEIGHT: 224 LBS | HEART RATE: 57 BPM | HEIGHT: 71 IN | SYSTOLIC BLOOD PRESSURE: 123 MMHG | BODY MASS INDEX: 31.36 KG/M2

## 2023-03-20 DIAGNOSIS — G56.12: ICD-10-CM

## 2023-03-20 DIAGNOSIS — L97.522 SKIN ULCER OF PLANTAR ASPECT OF FOOT, LEFT, WITH FAT LAYER EXPOSED (HCC): Chronic | ICD-10-CM

## 2023-03-20 DIAGNOSIS — L98.492 HAND ULCERATION WITH FAT LAYER EXPOSED (HCC): ICD-10-CM

## 2023-03-20 DIAGNOSIS — L97.509 TYPE 2 DIABETES MELLITUS WITH FOOT ULCER, WITHOUT LONG-TERM CURRENT USE OF INSULIN (HCC): Chronic | ICD-10-CM

## 2023-03-20 DIAGNOSIS — L97.522 SKIN ULCER OF PLANTAR ASPECT OF FOOT, LEFT, WITH FAT LAYER EXPOSED (HCC): Primary | ICD-10-CM

## 2023-03-20 DIAGNOSIS — E11.621 TYPE 2 DIABETES MELLITUS WITH FOOT ULCER, WITHOUT LONG-TERM CURRENT USE OF INSULIN (HCC): Chronic | ICD-10-CM

## 2023-03-20 PROCEDURE — 97597 DBRDMT OPN WND 1ST 20 CM/<: CPT | Performed by: NURSE PRACTITIONER

## 2023-03-20 PROCEDURE — 99213 OFFICE O/P EST LOW 20 MIN: CPT | Performed by: NURSE PRACTITIONER

## 2023-03-20 PROCEDURE — 99214 OFFICE O/P EST MOD 30 MIN: CPT

## 2023-03-20 PROCEDURE — 88305 TISSUE EXAM BY PATHOLOGIST: CPT

## 2023-03-20 PROCEDURE — 93923 UPR/LXTR ART STDY 3+ LVLS: CPT

## 2023-03-20 PROCEDURE — 97597 DBRDMT OPN WND 1ST 20 CM/<: CPT

## 2023-03-20 RX ORDER — LIDOCAINE 40 MG/G
CREAM TOPICAL ONCE
OUTPATIENT
Start: 2023-03-20 | End: 2023-03-20

## 2023-03-20 RX ORDER — GINSENG 100 MG
CAPSULE ORAL ONCE
OUTPATIENT
Start: 2023-03-20 | End: 2023-03-20

## 2023-03-20 RX ORDER — LIDOCAINE 50 MG/G
OINTMENT TOPICAL ONCE
OUTPATIENT
Start: 2023-03-20 | End: 2023-03-20

## 2023-03-20 RX ORDER — GENTAMICIN SULFATE 1 MG/G
OINTMENT TOPICAL ONCE
OUTPATIENT
Start: 2023-03-20 | End: 2023-03-20

## 2023-03-20 RX ORDER — ISOSORBIDE MONONITRATE 60 MG/1
TABLET, EXTENDED RELEASE ORAL
Qty: 60 TABLET | Refills: 5 | Status: SHIPPED | OUTPATIENT
Start: 2023-03-20

## 2023-03-20 RX ORDER — CLOBETASOL PROPIONATE 0.5 MG/G
OINTMENT TOPICAL ONCE
OUTPATIENT
Start: 2023-03-20 | End: 2023-03-20

## 2023-03-20 RX ORDER — LIDOCAINE HYDROCHLORIDE 20 MG/ML
JELLY TOPICAL ONCE
OUTPATIENT
Start: 2023-03-20 | End: 2023-03-20

## 2023-03-20 RX ORDER — LIDOCAINE HYDROCHLORIDE 40 MG/ML
SOLUTION TOPICAL ONCE
OUTPATIENT
Start: 2023-03-20 | End: 2023-03-20

## 2023-03-20 RX ORDER — BACITRACIN ZINC AND POLYMYXIN B SULFATE 500; 1000 [USP'U]/G; [USP'U]/G
OINTMENT TOPICAL ONCE
OUTPATIENT
Start: 2023-03-20 | End: 2023-03-20

## 2023-03-20 RX ORDER — BACITRACIN, NEOMYCIN, POLYMYXIN B 400; 3.5; 5 [USP'U]/G; MG/G; [USP'U]/G
OINTMENT TOPICAL ONCE
OUTPATIENT
Start: 2023-03-20 | End: 2023-03-20

## 2023-03-20 RX ORDER — BETAMETHASONE DIPROPIONATE 0.05 %
OINTMENT (GRAM) TOPICAL ONCE
OUTPATIENT
Start: 2023-03-20 | End: 2023-03-20

## 2023-03-20 NOTE — PROGRESS NOTES
Tunneling Position ___ O'Clock 0 03/20/23 1520   Undermining Starts ___ O'Clock 0 03/20/23 1520   Undermining Ends___ O'Clock 0 03/20/23 1520   Undermining Maxium Distance (cm) 0 03/20/23 1520   Wound Assessment Pink/red 03/20/23 1520   Drainage Amount Moderate 03/20/23 1520   Drainage Description Serosanguinous 03/20/23 1520   Odor None 03/20/23 1520   Una-wound Assessment Ecchymosis 03/20/23 1520   Margins Attached edges 03/20/23 1520   Wound Thickness Description not for Pressure Injury Full thickness 03/20/23 1520   Number of days: 0           Procedure Note  Indications:  Based on my examination of this patient's wound(s)/ulcer(s) today, debridement is required to promote healing and evaluate the wound base. Performed by: DWAYNE Oliveira CNP    Consent obtained:  Yes    Time out taken:  Yes    Pain Control: Anesthetic  Anesthetic: None       Debridement:Non-excisional Debridement    Using #15 blade scalpel the wound(s)/ulcer(s) was/were sharply debrided down through and including the removal of epidermis and dermis. Devitalized Tissue Debrided:  fibrin, biofilm, slough, exudate, and callus    Pre Debridement Measurements:  Are located in the Wound/Ulcer Documentation Flow Sheet    Wound/Ulcer #: 1    Post Debridement Measurements:  Wound/Ulcer Descriptions are Pre Debridement except measurements:      Percent of Wound/Ulcer Debrided: 100%    Total Surface Area Debrided:  0.3 sq cm     Estimated Blood Loss:  Minimal    Hemostasis Achieved:  by pressure    Procedural Pain:  0  / 10     Post Procedural Pain:  0 / 10     Response to treatment:  Well tolerated by patient.          Diabetic/Pressure/Non Pressure Ulcers only:  Ulcer: Diabetic ulcer, fat layer exposed            Plan:     Problem List Items Addressed This Visit          Other    * (Principal) Skin ulcer of plantar aspect of foot, left, with fat layer exposed ([address] - Primary (Chronic)    Hand ulceration with fat layer exposed ([address]

## 2023-03-20 NOTE — DISCHARGE INSTRUCTIONS
[address] [facility]   Physician Orders and Discharge Instructions  [address]  Telephone: [phone]    NAME:  Ángela Amaro OF BIRTH:  1955  MEDICAL RECORD NUMBER:  [id number]  DATE:  3/20/2023    Discharge condition: Stable    Discharge to: Home    Left via:Private automobile    Accompanied by: Family     ECF/HHA: None     Dressing Orders: Right Hand  Xeroform to open area, Cover with dry gauze roll gauze and tape, leave in place until next appointment      Dressing Orders: Left Plantar Foot  Soap and water wash  Xeroform to open area, cover with dry 2x2 gauze, secure with medipore tape, change daily   Metatarsal pad with cutout to off load the wound  Follow up with Dr Yumiko Shahid for Diabetic shoes     [address] [place] [address],[address] follow up visit _____________1 week________________  (Please note your next appointment above and if you are unable to keep, kindly give a 24 hour notice. Thank you.)    If you experience any of the following, please call the [facility] [address] during business hours:    * Increase in Pain  * Temperature over 101  * Increase in drainage from your wound  * Drainage with a foul odor  * Bleeding  * Increase in swelling  * Need for compression bandage changes due to slippage, breakthrough drainage. If you need medical attention outside of the business hours of the [facility] [address] please contact your PCP or go to the nearest emergency room.

## 2023-03-21 ENCOUNTER — OFFICE VISIT (OUTPATIENT)
Dept: PULMONOLOGY | Age: 68
End: 2023-03-21
Payer: MEDICARE

## 2023-03-21 VITALS
BODY MASS INDEX: 31.61 KG/M2 | OXYGEN SATURATION: 96 % | SYSTOLIC BLOOD PRESSURE: 118 MMHG | TEMPERATURE: 97.5 F | WEIGHT: 225.8 LBS | HEART RATE: 62 BPM | DIASTOLIC BLOOD PRESSURE: 70 MMHG | HEIGHT: 71 IN

## 2023-03-21 DIAGNOSIS — I50.32 CHRONIC HEART FAILURE WITH PRESERVED EJECTION FRACTION (HCC): ICD-10-CM

## 2023-03-21 DIAGNOSIS — E66.9 OBESITY (BMI 30-39.9): ICD-10-CM

## 2023-03-21 DIAGNOSIS — G47.34 SLEEP RELATED HYPOXIA: ICD-10-CM

## 2023-03-21 DIAGNOSIS — J96.11 CHRONIC RESPIRATORY FAILURE WITH HYPOXIA (HCC): ICD-10-CM

## 2023-03-21 DIAGNOSIS — G47.33 OSA (OBSTRUCTIVE SLEEP APNEA): Primary | ICD-10-CM

## 2023-03-21 PROCEDURE — 1123F ACP DISCUSS/DSCN MKR DOCD: CPT | Performed by: INTERNAL MEDICINE

## 2023-03-21 PROCEDURE — 99214 OFFICE O/P EST MOD 30 MIN: CPT | Performed by: INTERNAL MEDICINE

## 2023-03-21 PROCEDURE — 3017F COLORECTAL CA SCREEN DOC REV: CPT | Performed by: INTERNAL MEDICINE

## 2023-03-21 PROCEDURE — 3078F DIAST BP <80 MM HG: CPT | Performed by: INTERNAL MEDICINE

## 2023-03-21 PROCEDURE — 3074F SYST BP LT 130 MM HG: CPT | Performed by: INTERNAL MEDICINE

## 2023-03-21 PROCEDURE — G8417 CALC BMI ABV UP PARAM F/U: HCPCS | Performed by: INTERNAL MEDICINE

## 2023-03-21 PROCEDURE — G8427 DOCREV CUR MEDS BY ELIG CLIN: HCPCS | Performed by: INTERNAL MEDICINE

## 2023-03-21 PROCEDURE — G8484 FLU IMMUNIZE NO ADMIN: HCPCS | Performed by: INTERNAL MEDICINE

## 2023-03-21 PROCEDURE — 1036F TOBACCO NON-USER: CPT | Performed by: INTERNAL MEDICINE

## 2023-03-21 ASSESSMENT — ENCOUNTER SYMPTOMS
WHEEZING: 0
ABDOMINAL DISTENTION: 0
ABDOMINAL PAIN: 0
ANAL BLEEDING: 0
SHORTNESS OF BREATH: 1
COUGH: 1
CHEST TIGHTNESS: 0
RHINORRHEA: 0
APNEA: 1
BACK PAIN: 0

## 2023-03-21 NOTE — PROGRESS NOTES
Pulmonary and Sleep Medicine    Byron Florez (:  1955) is a 78 y.o. male,Established patient, here for evaluation of the following chief complaint(s):  Follow-up (Follow up- SAMMY-using bipap and feels \"so much better\" )      Referring physician:  No referring provider defined for this encounter. ASSESSMENT/PLAN:  1. SAMMY (obstructive sleep apnea)  2. Chronic respiratory failure with hypoxia (HCC)  3. Sleep related hypoxia  4. Chronic heart failure with preserved ejection fraction ([address] Ejection fraction 40%  5. Obesity (BMI 30-39. 9)      Will continue with the current management with the BIPAP, he is compliant with the BIPAP and feels the BiPAP is helping him. Continue with the supplemental oxygen  [facility] vascular eval noted. Donivan Bence, MD, [facility], [facility]    Return in about 6 months (around 2023). SUBJECTIVE/OBJECTIVE:  He is here for follow up on sleep apnea. He is using the BiPAP and is compliant with the BIPAP. He feels the BiPAP is helping him. He is also on supplemental oxygen ATC and feels it is helping him. Prior to Visit Medications    Medication Sig Taking?  Authorizing Provider   isosorbide mononitrate (IMDUR) 60 MG extended release tablet TAKE ONE TABLET BY MOUTH TWICE A DAY Yes DWAYNE Vanegas   metOLazone (ZAROXOLYN) 5 MG tablet Take 1 tablet three times a week 30 minutes before Bumex as needed for fluid retention Yes DWAYNE Smith   ELIQUIS 5 MG TABS tablet TAKE ONE TABLET BY MOUTH TWICE A DAY Yes DWAYNE Ewing   OXYGEN Inhale 4 L/min into the lungs continuous Yes Historical Provider, MD   bumetanide (BUMEX) 1 MG tablet Take 2 tablets by mouth 2 times daily Yes Sandra Renee MD   potassium chloride (KLOR-CON M) 20 MEQ extended release tablet Take 1 tablet by mouth 2 times daily  Patient taking differently: Take 20 mEq by mouth daily Yes Sandra Renee MD   tiotropium (SPIRIVA HANDIHALER) 18 MCG inhalation capsule Inhale 1 capsule

## 2023-03-23 ENCOUNTER — OFFICE VISIT (OUTPATIENT)
Dept: CARDIOLOGY CLINIC | Age: 68
End: 2023-03-23
Payer: MEDICARE

## 2023-03-23 VITALS
SYSTOLIC BLOOD PRESSURE: 104 MMHG | HEIGHT: 71 IN | DIASTOLIC BLOOD PRESSURE: 62 MMHG | WEIGHT: 227 LBS | HEART RATE: 60 BPM | BODY MASS INDEX: 31.78 KG/M2

## 2023-03-23 DIAGNOSIS — R06.02 SHORTNESS OF BREATH: ICD-10-CM

## 2023-03-23 DIAGNOSIS — Z79.01 CHRONIC ANTICOAGULATION: ICD-10-CM

## 2023-03-23 DIAGNOSIS — Z79.899 ON AMIODARONE THERAPY: ICD-10-CM

## 2023-03-23 DIAGNOSIS — I25.10 CORONARY ARTERY DISEASE INVOLVING NATIVE CORONARY ARTERY OF NATIVE HEART WITHOUT ANGINA PECTORIS: ICD-10-CM

## 2023-03-23 DIAGNOSIS — I10 ESSENTIAL HYPERTENSION: ICD-10-CM

## 2023-03-23 DIAGNOSIS — I71.20 THORACIC AORTIC ANEURYSM WITHOUT RUPTURE, UNSPECIFIED PART (HCC): ICD-10-CM

## 2023-03-23 DIAGNOSIS — Z95.5 HX OF HEART ARTERY STENT: ICD-10-CM

## 2023-03-23 DIAGNOSIS — I48.0 PAF (PAROXYSMAL ATRIAL FIBRILLATION) (HCC): Primary | ICD-10-CM

## 2023-03-23 PROCEDURE — 93000 ELECTROCARDIOGRAM COMPLETE: CPT | Performed by: INTERNAL MEDICINE

## 2023-03-23 PROCEDURE — 99213 OFFICE O/P EST LOW 20 MIN: CPT | Performed by: INTERNAL MEDICINE

## 2023-03-23 PROCEDURE — G8427 DOCREV CUR MEDS BY ELIG CLIN: HCPCS | Performed by: INTERNAL MEDICINE

## 2023-03-23 PROCEDURE — 3017F COLORECTAL CA SCREEN DOC REV: CPT | Performed by: INTERNAL MEDICINE

## 2023-03-23 PROCEDURE — G8484 FLU IMMUNIZE NO ADMIN: HCPCS | Performed by: INTERNAL MEDICINE

## 2023-03-23 PROCEDURE — 1036F TOBACCO NON-USER: CPT | Performed by: INTERNAL MEDICINE

## 2023-03-23 PROCEDURE — 3074F SYST BP LT 130 MM HG: CPT | Performed by: INTERNAL MEDICINE

## 2023-03-23 PROCEDURE — G8417 CALC BMI ABV UP PARAM F/U: HCPCS | Performed by: INTERNAL MEDICINE

## 2023-03-23 PROCEDURE — 3078F DIAST BP <80 MM HG: CPT | Performed by: INTERNAL MEDICINE

## 2023-03-23 PROCEDURE — 1123F ACP DISCUSS/DSCN MKR DOCD: CPT | Performed by: INTERNAL MEDICINE

## 2023-03-23 ASSESSMENT — ENCOUNTER SYMPTOMS
VOMITING: 0
RESPIRATORY NEGATIVE: 1
EYES NEGATIVE: 1
DIARRHEA: 0
NAUSEA: 0
SHORTNESS OF BREATH: 0
GASTROINTESTINAL NEGATIVE: 1

## 2023-03-23 NOTE — PROGRESS NOTES
about 6 months (around 9/23/2023) for return to Dr. Worthington only.      Andrade Worthington MD 3/23/2023 1:40 PM CDT    [facility]      Thisdictation was generated by voice recognition computer software.  Although all attempts are made to edit the dictation for accuracy, there may be errors in the transcription that are not intended.

## 2023-03-27 ENCOUNTER — HOSPITAL ENCOUNTER (OUTPATIENT)
Dept: WOUND CARE | Age: 68
Discharge: HOME | End: 2023-03-27
Payer: MEDICARE

## 2023-03-27 VITALS
SYSTOLIC BLOOD PRESSURE: 126 MMHG | DIASTOLIC BLOOD PRESSURE: 64 MMHG | HEART RATE: 64 BPM | HEIGHT: 71 IN | BODY MASS INDEX: 31.78 KG/M2 | RESPIRATION RATE: 18 BRPM | TEMPERATURE: 97.7 F | WEIGHT: 227 LBS

## 2023-03-27 DIAGNOSIS — L97.509 TYPE 2 DIABETES MELLITUS WITH FOOT ULCER, WITHOUT LONG-TERM CURRENT USE OF INSULIN (HCC): Chronic | ICD-10-CM

## 2023-03-27 DIAGNOSIS — L97.522 SKIN ULCER OF PLANTAR ASPECT OF FOOT, LEFT, WITH FAT LAYER EXPOSED (HCC): ICD-10-CM

## 2023-03-27 DIAGNOSIS — G56.12: ICD-10-CM

## 2023-03-27 DIAGNOSIS — L98.492 HAND ULCERATION WITH FAT LAYER EXPOSED (HCC): Primary | ICD-10-CM

## 2023-03-27 DIAGNOSIS — E11.621 TYPE 2 DIABETES MELLITUS WITH FOOT ULCER, WITHOUT LONG-TERM CURRENT USE OF INSULIN (HCC): Chronic | ICD-10-CM

## 2023-03-27 PROCEDURE — 99213 OFFICE O/P EST LOW 20 MIN: CPT

## 2023-03-27 PROCEDURE — 99212 OFFICE O/P EST SF 10 MIN: CPT | Performed by: SURGERY

## 2023-03-27 RX ORDER — GINSENG 100 MG
CAPSULE ORAL ONCE
OUTPATIENT
Start: 2023-03-27 | End: 2023-03-27

## 2023-03-27 RX ORDER — GENTAMICIN SULFATE 1 MG/G
OINTMENT TOPICAL ONCE
OUTPATIENT
Start: 2023-03-27 | End: 2023-03-27

## 2023-03-27 RX ORDER — BACITRACIN ZINC AND POLYMYXIN B SULFATE 500; 1000 [USP'U]/G; [USP'U]/G
OINTMENT TOPICAL ONCE
OUTPATIENT
Start: 2023-03-27 | End: 2023-03-27

## 2023-03-27 RX ORDER — CLOBETASOL PROPIONATE 0.5 MG/G
OINTMENT TOPICAL ONCE
OUTPATIENT
Start: 2023-03-27 | End: 2023-03-27

## 2023-03-27 RX ORDER — BACITRACIN, NEOMYCIN, POLYMYXIN B 400; 3.5; 5 [USP'U]/G; MG/G; [USP'U]/G
OINTMENT TOPICAL ONCE
OUTPATIENT
Start: 2023-03-27 | End: 2023-03-27

## 2023-03-27 RX ORDER — LIDOCAINE 50 MG/G
OINTMENT TOPICAL ONCE
OUTPATIENT
Start: 2023-03-27 | End: 2023-03-27

## 2023-03-27 RX ORDER — LIDOCAINE 40 MG/G
CREAM TOPICAL ONCE
OUTPATIENT
Start: 2023-03-27 | End: 2023-03-27

## 2023-03-27 RX ORDER — LIDOCAINE HYDROCHLORIDE 20 MG/ML
JELLY TOPICAL ONCE
OUTPATIENT
Start: 2023-03-27 | End: 2023-03-27

## 2023-03-27 RX ORDER — LIDOCAINE HYDROCHLORIDE 40 MG/ML
SOLUTION TOPICAL ONCE
OUTPATIENT
Start: 2023-03-27 | End: 2023-03-27

## 2023-03-27 RX ORDER — BETAMETHASONE DIPROPIONATE 0.05 %
OINTMENT (GRAM) TOPICAL ONCE
OUTPATIENT
Start: 2023-03-27 | End: 2023-03-27

## 2023-03-27 NOTE — DISCHARGE INSTRUCTIONS
[address] [facility]   Physician Orders and Discharge Instructions  [address]  Telephone: [phone]    NAME:  Carlos Billing OF BIRTH:  1955  MEDICAL RECORD NUMBER:  [id number]  DATE:  3/27/2023    Discharge condition: Stable    Discharge to: Home    Left via:Private automobile    Accompanied by: Self    Dressing Orders: Left Plantar Foot  Soap and water wash  Xeroform to open area, cover with dry 2x2 gauze, secure with medipore tape, change daily  May try Flexitol to prevent or reduce callus   Metatarsal pad with cutout to off load the wound  Follow up with Dr Earl Lake for Diabetic shoes    [facility] follow up visit _____________1 week________________  (Please note your next appointment above and if you are unable to keep, kindly give a 24 hour notice. Thank you.)    If you experience any of the following, please call the [facility] during business hours:    * Increase in Pain  * Temperature over 101  * Increase in drainage from your wound  * Drainage with a foul odor  * Bleeding  * Increase in swelling  * Need for compression bandage changes due to slippage, breakthrough drainage. If you need medical attention outside of the business hours of the [facility] please contact your PCP or go to the nearest emergency room.

## 2023-03-27 NOTE — PROGRESS NOTES
([facility] E11.9    GERD (gastroesophageal reflux disease) K21.9    Aneurysm of thoracic aorta I71.20    Renal artery stenosis ([facility]) I70.1    Carotid artery stenosis I65.29    Peripheral vascular disease ([facility]) I73.9    Diabetes mellitus type I ([facility]) E10.9    Chronic respiratory failure with hypoxia ([facility]) J96.11    Acute respiratory failure with hypoxia ([facility]) J96.01    Malignant hypertension I10    Atrial septal aneurysm I25.3    Abnormal EKG R94.31    Apical myocardial infarction ([facility]) I21.29    CAD (coronary artery disease) I25.10    Acute on chronic diastolic CHF (congestive heart failure) ([facility]) I50.33    Chronic congestive heart failure ([facility]) I50.9    PAF (paroxysmal atrial fibrillation) ([facility]) I48.0    Stage 3b chronic kidney disease ([facility]) N18.32    Abnormal nuclear cardiac imaging test R93.1    Mixed hyperlipidemia E78.2    Left ventricular dysfunction I51.9    Somnolence, daytime R40.0    Edema R60.9    At risk for obstructive sleep apnea Z91.89    Acute on chronic combined systolic and diastolic congestive heart failure ([facility]) I50.43    On amiodarone therapy P37.333    Systolic and diastolic CHF, acute on chronic ([facility]) I50.43    Obesity (BMI 30-39. 9) E66.9    NSTEMI (non-ST elevated myocardial infarction) ([facility] I21.4    Pneumonia due to COVID-19 virus U07.1, J12.82    Palliative care patient Z51.5    Acute renal failure (ARF) ([facility]) N17.9    Severe malnutrition ([facility]) E43    Closed left hip fracture, initial encounter ([facility] S72.002A    Chest pain R07.9    Hypokalemia E87.6    Dissection of thoracic aorta ([facility]) - DESCENDING I71.019    Right flank pain R10.9    Neural foraminal stenosis of lumbar spine M48.061    CKD (chronic kidney disease) stage 4, GFR 15-29 ml/min ([facility]) N18.4    Sleep related hypoxia G47.34    Chronic heart failure with preserved ejection fraction ([facility]) I50.32    Dyspnea on exertion R06.09    Snoring R06.83    Non-restorative sleep G47.8    SAMMY (obstructive sleep apnea) G47.33    Restrictive lung

## 2023-03-30 ENCOUNTER — HOSPITAL ENCOUNTER (OUTPATIENT)
Dept: MRI IMAGING | Age: 68
Discharge: HOME | End: 2023-03-30

## 2023-03-30 ENCOUNTER — TELEPHONE (OUTPATIENT)
Dept: GASTROENTEROLOGY | Age: 68
End: 2023-03-30

## 2023-03-30 DIAGNOSIS — R63.4 WEIGHT LOSS: ICD-10-CM

## 2023-03-30 DIAGNOSIS — R97.8 ELEVATED CA 19-9 LEVEL: ICD-10-CM

## 2023-03-30 NOTE — TELEPHONE ENCOUNTER
03- Patient called to let us know that his MRI was cancelled this am.      Questions as to what he needs to do? Told him that Swetha Hardwick is going to review his chart-just as soon as she sends me a message ill call and let him know.      Routed to Swetha Hardwick

## 2023-03-30 NOTE — TELEPHONE ENCOUNTER
[phone] and notified the patient of recommendations as per [address] per patient     Routed to [facility]

## 2023-03-30 NOTE — TELEPHONE ENCOUNTER
03- Umberto Ham called form MRI     Patient has a Creatine on 4.2 & GFR of 15. They usually done do contrast unless it is above 30     Questions what to do?     [address] APRN notified   Recommend to cancel for now.        [facility] [phone]  Notified as per Vipul Thornton to cancel the Mri for now       Routed to [address]   For Recommendations

## 2023-04-03 ENCOUNTER — HOSPITAL ENCOUNTER (OUTPATIENT)
Dept: WOUND CARE | Age: 68
Discharge: HOME | End: 2023-04-03
Payer: MEDICARE

## 2023-04-03 VITALS
DIASTOLIC BLOOD PRESSURE: 52 MMHG | WEIGHT: 227 LBS | HEIGHT: 71 IN | BODY MASS INDEX: 31.78 KG/M2 | RESPIRATION RATE: 18 BRPM | HEART RATE: 53 BPM | SYSTOLIC BLOOD PRESSURE: 89 MMHG | TEMPERATURE: 97.9 F

## 2023-04-03 DIAGNOSIS — L97.522 SKIN ULCER OF PLANTAR ASPECT OF FOOT, LEFT, WITH FAT LAYER EXPOSED (HCC): Chronic | ICD-10-CM

## 2023-04-03 DIAGNOSIS — L98.492 HAND ULCERATION WITH FAT LAYER EXPOSED (HCC): Primary | ICD-10-CM

## 2023-04-03 DIAGNOSIS — G56.12: ICD-10-CM

## 2023-04-03 PROCEDURE — 99213 OFFICE O/P EST LOW 20 MIN: CPT

## 2023-04-03 PROCEDURE — 99212 OFFICE O/P EST SF 10 MIN: CPT | Performed by: SURGERY

## 2023-04-03 NOTE — DISCHARGE INSTRUCTIONS
[address] [facility]   Physician Orders and Discharge Instructions  [address]  Telephone: [phone]    NAME:  Merlinda Florence OF BIRTH:  1955  MEDICAL RECORD NUMBER:  [id number]  DATE:  4/3/2023    Discharge condition: Stable    Discharge to: Home    Left via:Private automobile    Accompanied by: Self    Dressing Orders: Left Plantar Foot  Healed, Moisturize and Protect   May try Flexitol to prevent or reduce callus   Follow up with [facility] for Diabetic shoes    Diabetic Foot Care    Diabetes can lead to many different types of foot complications, including athlete's foot (a fungal infection), calluses, bunions and other foot deformities, or ulcers that can range from a surface wound to a deep infection. You will need to check your feet twice daily and give them special care and attention. 1) Wash with lukewarm water and a mild soap. Dry well between your toes. Do not soak your feet unless instructed to do so by a physician. Moisturize your feet with a good thick cream like Eucerin Cream, Aquaphor or Cocoa Butter (in a jar) at least twice daily. Do not apply moisturizer between toes. 2)  Protect your feet and never go barefoot. Wear slippers around the house. Treat even minor wounds and skin cracks as an urgent matter when you have diabetes. Remember early treatment is essential to avoid more advanced problems. 3) Check your feet daily or have someone else check them if your eyesight is limited. If you live alone try using a handheld mirror. Watch for a red spot that persists or callus formation. 4)  Look and feel inside your shoes before putting them on. Inspect the soles for nails or screws. 5)  If you form callus or thickened skin on your feet use Flexitol Heel Balm once daily alternating with your regular moisturizer.   Karina Manley is available at

## 2023-04-03 NOTE — PROGRESS NOTES
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, or unspecified(403.00)     Myocardial infarction (HCC)     Neuropathy     Obesity, unspecified     SAMMY on CPAP     Other specified cardiac dysrhythmias(427.89)     Palliative care patient 12/21/2020    Paroxysmal atrial fibrillation (HCC)     Peripheral vascular disease ([facility] [address]     Solitary pulmonary nodule     Surgical or other procedure not carried out because of contraindication     Thoracic aneurysm without mention of rupture     Thyroid nodule     Tobacco use disorder     Type II or unspecified type diabetes mellitus without mention of complication, not stated as uncontrolled     Weight loss        PAST SURGICAL HISTORY    Past Surgical History:   Procedure Laterality Date    CARDIAC CATHETERIZATION      CHOLECYSTECTOMY  2000    CORONARY ANGIOPLASTY WITH STENT PLACEMENT  01/2016    2 JACQUELINE to LAD    DIAGNOSTIC CARDIAC CATH LAB PROCEDURE      UPPER GASTROINTESTINAL ENDOSCOPY  2015    URETER STENT PLACEMENT      VASCULAR SURGERY  03- [facility]    Aortogram with bilateral selective renal artery injections    VASCULAR SURGERY  6/14/13 [facility]    Right carotid endarterectomy with Vascu-Guard patch repair. Right cervical carotid arteriograms after endarterectomy. VASCULAR SURGERY  7/6/15 [facility]    Percutaneous cannulation, right common femoral artery, with a5 Irish sheath and later 6 Irish [facility] sheath. Suprarenal abdomianl aortagram.  Selective right renal arteriogram.  Right renal artery balloon angioplasty;/stent (Express 6 mm x 18mmballoon-expandable stent. . Completion suprarenal abdominal aortogram and selective right remal arteriogram. Mynx closure, right common femoral artery punctur       FAMILY

## 2023-04-14 ENCOUNTER — HOSPITAL ENCOUNTER (OUTPATIENT)
Dept: WOUND CARE | Age: 68
Discharge: HOME | End: 2023-04-14

## 2023-04-17 ENCOUNTER — OFFICE VISIT (OUTPATIENT)
Dept: PULMONOLOGY | Age: 68
End: 2023-04-17
Payer: MEDICARE

## 2023-04-17 VITALS
HEART RATE: 52 BPM | HEIGHT: 71 IN | SYSTOLIC BLOOD PRESSURE: 132 MMHG | WEIGHT: 226 LBS | OXYGEN SATURATION: 97 % | TEMPERATURE: 97.2 F | BODY MASS INDEX: 31.64 KG/M2 | DIASTOLIC BLOOD PRESSURE: 70 MMHG

## 2023-04-17 DIAGNOSIS — E66.9 OBESITY (BMI 30-39.9): ICD-10-CM

## 2023-04-17 DIAGNOSIS — G47.33 OSA (OBSTRUCTIVE SLEEP APNEA): Primary | ICD-10-CM

## 2023-04-17 DIAGNOSIS — G47.34 SLEEP RELATED HYPOXIA: ICD-10-CM

## 2023-04-17 DIAGNOSIS — I50.32 CHRONIC HEART FAILURE WITH PRESERVED EJECTION FRACTION (HCC): ICD-10-CM

## 2023-04-17 DIAGNOSIS — J96.11 CHRONIC RESPIRATORY FAILURE WITH HYPOXIA (HCC): ICD-10-CM

## 2023-04-17 LAB
25(OH)D3 SERPL-MCNC: 45.2 NG/ML
ALBUMIN SERPL-MCNC: 4.4 G/DL (ref 3.5–5.2)
ALP SERPL-CCNC: 70 U/L (ref 40–130)
ALT SERPL-CCNC: 20 U/L (ref 5–41)
ANION GAP SERPL CALCULATED.3IONS-SCNC: 18 MMOL/L (ref 7–19)
AST SERPL-CCNC: 19 U/L (ref 5–40)
BASOPHILS # BLD: 0.1 K/UL (ref 0–0.2)
BASOPHILS NFR BLD: 1 % (ref 0–1)
BILIRUB SERPL-MCNC: 0.5 MG/DL (ref 0.2–1.2)
BILIRUB UR QL STRIP: NEGATIVE
BUN SERPL-MCNC: 59 MG/DL (ref 8–23)
CALCIUM SERPL-MCNC: 10.1 MG/DL (ref 8.8–10.2)
CHLORIDE SERPL-SCNC: 93 MMOL/L (ref 98–111)
CLARITY UR: CLEAR
CO2 SERPL-SCNC: 33 MMOL/L (ref 22–29)
COLOR UR: YELLOW
CREAT SERPL-MCNC: 3 MG/DL (ref 0.5–1.2)
EOSINOPHIL # BLD: 0.3 K/UL (ref 0–0.6)
EOSINOPHIL NFR BLD: 3.4 % (ref 0–5)
ERYTHROCYTE [DISTWIDTH] IN BLOOD BY AUTOMATED COUNT: 15 % (ref 11.5–14.5)
GLUCOSE SERPL-MCNC: 155 MG/DL (ref 74–109)
GLUCOSE UR STRIP.AUTO-MCNC: NEGATIVE MG/DL
HCT VFR BLD AUTO: 44.9 % (ref 42–52)
HGB BLD-MCNC: 13.5 G/DL (ref 14–18)
HGB UR STRIP.AUTO-MCNC: ABNORMAL MG/L
HYALINE CASTS #/AREA URNS LPF: NORMAL /LPF (ref 0–5)
IMM GRANULOCYTES # BLD: 0 K/UL
KETONES UR STRIP.AUTO-MCNC: NEGATIVE MG/DL
LEUKOCYTE ESTERASE UR QL STRIP.AUTO: NEGATIVE
LYMPHOCYTES # BLD: 1.8 K/UL (ref 1.1–4.5)
LYMPHOCYTES NFR BLD: 22.2 % (ref 20–40)
MAGNESIUM SERPL-MCNC: 2.3 MG/DL (ref 1.6–2.4)
MCH RBC QN AUTO: 28.5 PG (ref 27–31)
MCHC RBC AUTO-ENTMCNC: 30.1 G/DL (ref 33–37)
MCV RBC AUTO: 94.9 FL (ref 80–94)
MONOCYTES # BLD: 0.7 K/UL (ref 0–0.9)
MONOCYTES NFR BLD: 8.9 % (ref 0–10)
NEUTROPHILS # BLD: 5.3 K/UL (ref 1.5–7.5)
NEUTS SEG NFR BLD: 64.3 % (ref 50–65)
NITRITE UR QL STRIP.AUTO: NEGATIVE
PH UR STRIP.AUTO: 5.5 [PH] (ref 5–8)
PHOSPHATE SERPL-MCNC: 3.6 MG/DL (ref 2.5–4.5)
PLATELET # BLD AUTO: 197 K/UL (ref 130–400)
PMV BLD AUTO: 11.2 FL (ref 9.4–12.4)
POTASSIUM SERPL-SCNC: 3.2 MMOL/L (ref 3.5–5)
PROT SERPL-MCNC: 7.4 G/DL (ref 6.6–8.7)
PROT UR STRIP.AUTO-MCNC: 30 MG/DL
PTH-INTACT SERPL-MCNC: 118.4 PG/ML (ref 15–65)
RBC # BLD AUTO: 4.73 M/UL (ref 4.7–6.1)
RBC #/AREA URNS HPF: NORMAL /HPF (ref 0–2)
SODIUM SERPL-SCNC: 144 MMOL/L (ref 136–145)
SP GR UR STRIP.AUTO: 1.01 (ref 1–1.03)
URATE SERPL-MCNC: 7.9 MG/DL (ref 3.4–7)
UROBILINOGEN UR STRIP.AUTO-MCNC: 0.2 E.U./DL
WBC # BLD AUTO: 8.2 K/UL (ref 4.8–10.8)

## 2023-04-17 PROCEDURE — 3078F DIAST BP <80 MM HG: CPT | Performed by: INTERNAL MEDICINE

## 2023-04-17 PROCEDURE — 3017F COLORECTAL CA SCREEN DOC REV: CPT | Performed by: INTERNAL MEDICINE

## 2023-04-17 PROCEDURE — 3075F SYST BP GE 130 - 139MM HG: CPT | Performed by: INTERNAL MEDICINE

## 2023-04-17 PROCEDURE — 1123F ACP DISCUSS/DSCN MKR DOCD: CPT | Performed by: INTERNAL MEDICINE

## 2023-04-17 PROCEDURE — G8427 DOCREV CUR MEDS BY ELIG CLIN: HCPCS | Performed by: INTERNAL MEDICINE

## 2023-04-17 PROCEDURE — G8417 CALC BMI ABV UP PARAM F/U: HCPCS | Performed by: INTERNAL MEDICINE

## 2023-04-17 PROCEDURE — 1036F TOBACCO NON-USER: CPT | Performed by: INTERNAL MEDICINE

## 2023-04-17 PROCEDURE — 99214 OFFICE O/P EST MOD 30 MIN: CPT | Performed by: INTERNAL MEDICINE

## 2023-04-17 ASSESSMENT — ENCOUNTER SYMPTOMS
WHEEZING: 0
ABDOMINAL PAIN: 0
ANAL BLEEDING: 0
COUGH: 1
APNEA: 1
RHINORRHEA: 0
CHEST TIGHTNESS: 0
BACK PAIN: 0
SHORTNESS OF BREATH: 1
ABDOMINAL DISTENTION: 0

## 2023-04-17 NOTE — PROGRESS NOTES
Rate and Rhythm: Normal rate and regular rhythm. Heart sounds: No murmur heard. No friction rub. Pulmonary:      Effort: Pulmonary effort is normal. No respiratory distress. Breath sounds: Normal breath sounds. No stridor. No wheezing, rhonchi or rales. Abdominal:      General: There is no distension. Palpations: There is no mass. Tenderness: There is no abdominal tenderness. There is no guarding or rebound. Musculoskeletal:      Cervical back: Normal range of motion and neck supple. Neurological:      Mental Status: He is alert and oriented to person, place, and time. This note was generated using a voice recognition software. Errors in voice recognition may have occurred. An electronic signature was used to authenticate this note.     --Wyatt Frias MD

## 2023-04-19 ENCOUNTER — ANESTHESIA EVENT (OUTPATIENT)
Dept: ENDOSCOPY | Age: 68
End: 2023-04-19

## 2023-04-19 ENCOUNTER — ANESTHESIA (OUTPATIENT)
Dept: ENDOSCOPY | Age: 68
End: 2023-04-19

## 2023-04-19 RX ORDER — ISOSORBIDE MONONITRATE 60 MG/1
TABLET, EXTENDED RELEASE ORAL
Qty: 60 TABLET | Refills: 5 | Status: SHIPPED | OUTPATIENT
Start: 2023-04-19

## 2023-04-19 ASSESSMENT — ENCOUNTER SYMPTOMS: SHORTNESS OF BREATH: 1

## 2023-04-19 NOTE — ANESTHESIA PRE PROCEDURE
Lab Results   Component Value Date/Time    PROTIME 17.6 02/06/2023 07:35 PM    INR 1.44 02/06/2023 07:35 PM    APTT 40.9 02/06/2023 07:35 PM       HCG (If Applicable): No results found for: PREGTESTUR, PREGSERUM, HCG, HCGQUANT     ABGs:   Lab Results   Component Value Date/Time    PHART 7.440 11/21/2022 04:21 AM    PO2ART 51.0 11/21/2022 04:21 AM    LWU4FHU 46.0 11/21/2022 04:21 AM    DJX4SHL 31.2 11/21/2022 04:21 AM    BEART 6.1 11/21/2022 04:21 AM    C7WEJCLO 86.7 11/21/2022 04:21 AM        Type & Screen (If Applicable):  No results found for: LABABO, LABRH    Drug/Infectious Status (If Applicable):  No results found for: HIV, HEPCAB    COVID-19 Screening (If Applicable):   Lab Results   Component Value Date/Time    COVID19 Not Detected 02/06/2023 08:41 PM    COVID19 Not Detected 01/11/2023 09:35 AM           Anesthesia Evaluation  Patient summary reviewed no history of anesthetic complications:   Airway: Mallampati: III  TM distance: >3 FB   Neck ROM: full  Mouth opening: < 3 FB   Dental: normal exam         Pulmonary:normal exam    (+) COPD:  shortness of breath:  sleep apnea: on CPAP,                            ROS comment: Home O2           Cardiovascular:  Exercise tolerance: poor (<4 METS),   (+) hypertension:, past MI:, CAD:, CABG/stent:, dysrhythmias: atrial fibrillation, CHF:, CORTEZ:,       ECG reviewed        Stress test reviewed  Cleared by cardiology     Beta Blocker:  Dose within 24 Hrs      ROS comment: Summary impressions:  Abnormal study mildly reduced ejection fraction 44% evidence of  previous anteroseptal apical infarct moderate to large in size no  reversible ischemia appreciated  Signed by Dr Brayan Erwin            Neuro/Psych:   (+) CVA:,    (-) seizures           GI/Hepatic/Renal:   (+) GERD:, PUD, renal disease: CRI,           Endo/Other:    (+) DiabetesType II DM, , blood dyscrasia (Plavix): anticoagulation therapy:., .                 Abdominal:             Vascular:   + PVD, aortic

## 2023-04-24 ENCOUNTER — TELEPHONE (OUTPATIENT)
Dept: CARDIOLOGY CLINIC | Age: 68
End: 2023-04-24

## 2023-04-24 NOTE — TELEPHONE ENCOUNTER
Date: TBD    Cardiologist: Dr. Yan Klein    Procedure: Endoscopy and EUS     Surgeon: Dr. Lyric Monk     Last Office Visit: 3-23-23    Reason for office visit and medical concerns addressed at this office visit: PAF, HTN, SOA, AAA, CAD, CKD, CHF, DM     Testing Performed and Date of Service:  EKG 3-23-23  Does the patient have a stent? If so, what type? Not in last year     Current Medications: imdur, loniten, amiodarone, zaroxolyn, eliquis, bumex, K+, spiriva, albuterol, nexium, vit d3, entresto, crestor, procardia, zofran, plavix, coreg, vit C, calcitriol, pericolace, allopurinol, ASA, nitro, insulin, xyzal    Is the patient currently taking an anticoagulant? If so, what is the diagnosis the patient has been given to warrant the need for the anticoagulant?  Eliquis plavix ASA     Additional Notes: Med hold on Eliquis and Plavix and ASA

## 2023-04-25 ENCOUNTER — TELEPHONE (OUTPATIENT)
Dept: GASTROENTEROLOGY | Age: 68
End: 2023-04-25

## 2023-04-26 ENCOUNTER — TELEPHONE (OUTPATIENT)
Dept: GASTROENTEROLOGY | Age: 68
End: 2023-04-26

## 2023-04-26 NOTE — TELEPHONE ENCOUNTER
Patient: Maty Childress    YOB: 1955      Clearance was received on April 26, 2023. for Endoscopy / EUS scheduled for: 5/16/23    Patient may discontinue the use of PLAVIX & ASA for 5 AND ELIQUIS FOR 2 DAYS  days prior to the procedure. PT SAYS THAT HE IS NOT TAKING PLAVIX.     IS Lovenox required:  NO    PATIENT NOTIFIED ON:  4/26/23      Clearance scanned into media

## 2023-05-12 ENCOUNTER — TELEPHONE (OUTPATIENT)
Dept: GASTROENTEROLOGY | Age: 68
End: 2023-05-12

## 2023-05-12 NOTE — TELEPHONE ENCOUNTER
Called patient to remind them of their procedure with [place]. Geoff Conte  [place] [facility]  [place] to arrive at 10:00AM     NO ANS / LM

## 2023-05-15 ENCOUNTER — ANESTHESIA EVENT (OUTPATIENT)
Dept: ENDOSCOPY | Age: 68
End: 2023-05-15
Payer: MEDICARE

## 2023-05-15 ASSESSMENT — ENCOUNTER SYMPTOMS: SHORTNESS OF BREATH: 1

## 2023-05-15 NOTE — ANESTHESIA PRE PROCEDURE
[address]    Drug/Infectious Status (If Applicable):  No results found for: HIV, HEPCAB    COVID-19 Screening (If Applicable):   Lab Results   Component Value Date/Time    COVID19 Not Detected 02/06/2023 08:41 PM    COVID19 Not Detected 01/11/2023 09:35 AM           Anesthesia Evaluation  Patient summary reviewed no history of anesthetic complications:   Airway: Mallampati: III  TM distance: >3 FB   Neck ROM: full  Mouth opening: < 3 FB   Dental: normal exam         Pulmonary:normal exam    (+) COPD:  shortness of breath: no interval change,  sleep apnea: on CPAP,                            ROS comment: Home O2  Chronic respiratory failure   Cardiovascular:  Exercise tolerance: poor (<4 METS),   (+) hypertension:, past MI:, CAD:, CABG/stent:, dysrhythmias: atrial fibrillation, CHF:, CORTEZ:,       ECG reviewed        Stress test reviewed  Cleared by cardiology     Beta Blocker:  Dose within 24 Hrs      ROS comment: Summary impressions:  Abnormal study mildly reduced ejection fraction 44% evidence of  previous anteroseptal apical infarct moderate to large in size no  reversible ischemia appreciated  Signed by Dr Bladimir Pfeiffer            Neuro/Psych:   (+) CVA:,    (-) seizures           GI/Hepatic/Renal:   (+) GERD:, PUD, renal disease: CRI,           Endo/Other:    (+) DiabetesType II DM, , blood dyscrasia (Plavix): anticoagulation therapy:., .                 Abdominal:             Vascular:   + PVD, aortic or cerebral, .         ROS comment: 1.  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  This is grossly unchanged compared to the prior.  Findings are limited without IV contrast.2.    Smooth intralobular septal thickening with subpleural reticular and groundglass opacities within the mid to lower lungs.  Findings likely represent early pulmonary vascular congestion and atelectasis/scarring.  Infection is felt to be less

## 2023-05-16 ENCOUNTER — HOSPITAL ENCOUNTER (OUTPATIENT)
Age: 68
Setting detail: OUTPATIENT SURGERY
Discharge: HOME | End: 2023-05-16
Attending: INTERNAL MEDICINE | Admitting: INTERNAL MEDICINE
Payer: MEDICARE

## 2023-05-16 ENCOUNTER — ANESTHESIA (OUTPATIENT)
Dept: ENDOSCOPY | Age: 68
End: 2023-05-16
Payer: MEDICARE

## 2023-05-16 VITALS
HEART RATE: 53 BPM | DIASTOLIC BLOOD PRESSURE: 71 MMHG | BODY MASS INDEX: 31.64 KG/M2 | HEIGHT: 71 IN | WEIGHT: 226 LBS | RESPIRATION RATE: 18 BRPM | TEMPERATURE: 97.2 F | OXYGEN SATURATION: 94 % | SYSTOLIC BLOOD PRESSURE: 152 MMHG

## 2023-05-16 DIAGNOSIS — R63.4 WEIGHT LOSS: ICD-10-CM

## 2023-05-16 DIAGNOSIS — R97.8 ELEVATED CA 19-9 LEVEL: ICD-10-CM

## 2023-05-16 LAB
GLUCOSE BLD-MCNC: 179 MG/DL (ref 70–99)
PERFORMED ON: ABNORMAL

## 2023-05-16 PROCEDURE — 88342 IMHCHEM/IMCYTCHM 1ST ANTB: CPT

## 2023-05-16 PROCEDURE — 7100000011 HC PHASE II RECOVERY - ADDTL 15 MIN: Performed by: INTERNAL MEDICINE

## 2023-05-16 PROCEDURE — 82962 GLUCOSE BLOOD TEST: CPT

## 2023-05-16 PROCEDURE — 7100000010 HC PHASE II RECOVERY - FIRST 15 MIN: Performed by: INTERNAL MEDICINE

## 2023-05-16 PROCEDURE — 3700000001 HC ADD 15 MINUTES (ANESTHESIA): Performed by: INTERNAL MEDICINE

## 2023-05-16 PROCEDURE — 2709999900 HC NON-CHARGEABLE SUPPLY: Performed by: INTERNAL MEDICINE

## 2023-05-16 PROCEDURE — 43259 EGD US EXAM DUODENUM/JEJUNUM: CPT | Performed by: INTERNAL MEDICINE

## 2023-05-16 PROCEDURE — 2500000003 HC RX 250 WO HCPCS: Performed by: NURSE ANESTHETIST, CERTIFIED REGISTERED

## 2023-05-16 PROCEDURE — 43239 EGD BIOPSY SINGLE/MULTIPLE: CPT | Performed by: INTERNAL MEDICINE

## 2023-05-16 PROCEDURE — 88305 TISSUE EXAM BY PATHOLOGIST: CPT

## 2023-05-16 PROCEDURE — 3700000000 HC ANESTHESIA ATTENDED CARE: Performed by: INTERNAL MEDICINE

## 2023-05-16 PROCEDURE — 3609018500 HC EGD US SCOPE W/ADJACENT STRUCTURES: Performed by: INTERNAL MEDICINE

## 2023-05-16 PROCEDURE — 2580000003 HC RX 258: Performed by: INTERNAL MEDICINE

## 2023-05-16 PROCEDURE — 6360000002 HC RX W HCPCS: Performed by: NURSE ANESTHETIST, CERTIFIED REGISTERED

## 2023-05-16 RX ORDER — ONDANSETRON 2 MG/ML
INJECTION INTRAMUSCULAR; INTRAVENOUS PRN
Status: DISCONTINUED | OUTPATIENT
Start: 2023-05-16 | End: 2023-05-16 | Stop reason: SDUPTHER

## 2023-05-16 RX ORDER — PROPOFOL 10 MG/ML
INJECTION, EMULSION INTRAVENOUS CONTINUOUS PRN
Status: DISCONTINUED | OUTPATIENT
Start: 2023-05-16 | End: 2023-05-16 | Stop reason: SDUPTHER

## 2023-05-16 RX ORDER — FENTANYL CITRATE 50 UG/ML
INJECTION, SOLUTION INTRAMUSCULAR; INTRAVENOUS PRN
Status: DISCONTINUED | OUTPATIENT
Start: 2023-05-16 | End: 2023-05-16 | Stop reason: SDUPTHER

## 2023-05-16 RX ORDER — LIDOCAINE HYDROCHLORIDE 10 MG/ML
INJECTION, SOLUTION INFILTRATION; PERINEURAL PRN
Status: DISCONTINUED | OUTPATIENT
Start: 2023-05-16 | End: 2023-05-16 | Stop reason: SDUPTHER

## 2023-05-16 RX ORDER — SODIUM CHLORIDE, SODIUM LACTATE, POTASSIUM CHLORIDE, CALCIUM CHLORIDE 600; 310; 30; 20 MG/100ML; MG/100ML; MG/100ML; MG/100ML
INJECTION, SOLUTION INTRAVENOUS CONTINUOUS
Status: DISCONTINUED | OUTPATIENT
Start: 2023-05-16 | End: 2023-05-16 | Stop reason: HOSPADM

## 2023-05-16 RX ORDER — MIDAZOLAM HYDROCHLORIDE 1 MG/ML
INJECTION INTRAMUSCULAR; INTRAVENOUS PRN
Status: DISCONTINUED | OUTPATIENT
Start: 2023-05-16 | End: 2023-05-16 | Stop reason: SDUPTHER

## 2023-05-16 RX ADMIN — ONDANSETRON HYDROCHLORIDE 4 MG: 2 INJECTION, SOLUTION INTRAMUSCULAR; INTRAVENOUS at 11:06

## 2023-05-16 RX ADMIN — PROPOFOL 100 MCG/KG/MIN: 10 INJECTION, EMULSION INTRAVENOUS at 11:08

## 2023-05-16 RX ADMIN — LIDOCAINE HYDROCHLORIDE 40 MG: 10 INJECTION, SOLUTION EPIDURAL; INFILTRATION; INTRACAUDAL; PERINEURAL at 11:08

## 2023-05-16 RX ADMIN — SODIUM CHLORIDE, POTASSIUM CHLORIDE, SODIUM LACTATE AND CALCIUM CHLORIDE: 600; 310; 30; 20 INJECTION, SOLUTION INTRAVENOUS at 09:47

## 2023-05-16 RX ADMIN — FENTANYL CITRATE 50 MCG: 50 INJECTION INTRAMUSCULAR; INTRAVENOUS at 11:06

## 2023-05-16 RX ADMIN — MIDAZOLAM 2 MG: 1 INJECTION INTRAMUSCULAR; INTRAVENOUS at 11:06

## 2023-05-16 ASSESSMENT — PAIN SCALES - GENERAL
PAINLEVEL_OUTOF10: 0

## 2023-05-16 ASSESSMENT — PAIN - FUNCTIONAL ASSESSMENT: PAIN_FUNCTIONAL_ASSESSMENT: 0-10

## 2023-05-16 NOTE — ANESTHESIA POSTPROCEDURE EVALUATION
Department of Anesthesiology  Postprocedure Note    Patient: Barry Foster  MRN: [id number]  YOB: 1955  Date of evaluation: 5/16/2023      Procedure Summary     Date: 05/16/23 Room / Location: [address]    Anesthesia Start: 1104 Anesthesia Stop:     Procedure: EGD ESOPHAGOGASTRODUODENOSCOPY ULTRASOUND Diagnosis:       Weight loss      Elevated CA 19-9 level      (Weight loss [R63.4])      (Elevated CA 19-9 level [R97.8])    Surgeons: Lewis Moreno MD Responsible Provider: DWAYNE Claudio CRNA    Anesthesia Type: general, TIVA ASA Status: 4          Anesthesia Type: No value filed.     Nadiya Phase I: Nadiya Score: 10    Nadiya Phase II:        Anesthesia Post Evaluation    Patient location during evaluation: bedside  Patient participation: complete - patient participated  Level of consciousness: sleepy but conscious  Pain score: 0  Airway patency: patent  Nausea & Vomiting: no nausea and no vomiting  Complications: no  Cardiovascular status: hemodynamically stable and blood pressure returned to baseline  Respiratory status: acceptable and nasal cannula  Hydration status: stable

## 2023-05-16 NOTE — OP NOTE
minimal    IMPRESSION:    Gastritis - biopsied. Small pancreatic cyst    RECOMMENDATIONS:   - Repeat CT of the abdomen with and without contrast (pancreatic protocol) in 6 months   - Await pathology results. The results were discussed with the patient and family. A copy of the images obtained were given to the patient.      Yarely Weber MD  05/16/23  11:33 AM

## 2023-05-16 NOTE — DISCHARGE INSTRUCTIONS
- Repeat CT of the abdomen with and without contrast (pancreatic protocol) in 6 months   - Await pathology results. Endoscopic Ultrasound (Oral): What to Expect At Home  Your Recovery  After you have an endoscopic ultrasound--a test to look for problems in the stomach, liver, gallbladder, and other organs--you will stay at the hospital or clinic for 1 to 2 hours. This will allow the medicine to wear off. You will be able to go home after your doctor or nurse checks to make sure you are not having any problems. You may have a sore throat for a day or two after the test.  This care sheet gives you a general idea about what to expect after the test.  How can you care for yourself at home? Activity    Rest as much as you need to after you go home. You should be able to go back to your usual activities the day after the test.   Diet    Follow your doctor's directions for eating after the test.     Drink plenty of fluids (unless your doctor has told you not to). Medicines    If you have a sore throat the day after the test, use an over-the-counter spray to numb your throat. Other instructions    Ask your doctor when you can drive again. Do not sign legal documents or make major decisions until the medicine effects are gone and you can think clearly. The anesthesia medicine can make it hard for you to fully understand what you are agreeing to. Follow-up care is a key part of your treatment and safety. Be sure to make and go to all appointments, and call your doctor if you are having problems. It's also a good idea to know your test results and keep a list of the medicines you take. When should you call for help? Call 911 anytime you think you may need emergency care. For example, call if:    You passed out (lost consciousness). You have trouble breathing. Call your doctor now or seek immediate medical care if:    You are vomiting. You have new or worse belly pain. You have a fever.

## 2023-05-16 NOTE — H&P
Patient Name: Harriet Michael  : 1955  MRN: [id number]  DATE: 23    Allergies: Allergies   Allergen Reactions    Morphine Shortness Of Breath and Other (See Comments)     Was INTUBATED for this    Hydralazine      \"They said I am, I really don't know\"        ENDOSCOPY  History and Physical    Procedure:    [] Diagnostic Colonoscopy       [] Screening Colonoscopy  [x] EGD      [] ERCP      [x] EUS       [] Other    [x] Previous office notes/History and Physical reviewed from the patients chart. Please see EMR for further details of HPI. I have examined the patient's status immediately prior to the procedure and:      Indications/HPI:    [x]Abdominal Pain   []Barretts  []Screening/Surveillance   []History of Polyps  []Dysphagia            [] +Cologard/DNA testing  []Abnormal Imaging              []EOE Hx              [] Family Hx of CRC/Polyps  []Anemia                            []Food Impaction       []Recent Poor Prep  []GI Bleed             []Lymphadenopathy  []History of Polyps  []Change in bowel habits []Heartburn/Reflux  []Cancer- GI/Lung  []Chest Pain - Non Cardiac []Heme (+) Stool []Ulcers  []Constipation  []Hemoptysis  []Incontinence    []Diarrhea  []Hypoxemia  []Rectal Bleed (BRBPR)  []Nausea/Vomiting   [] Varices  []Crohns/Colitis  []Pancreatic Cyst   [] Cirrhosis   []Pancreatitis    []Abnormal MRCP  []Elevated LFT [] Stent Removal, Previous ERCP  [x]Other: weight loss, elevated CA 19-9    Anesthesia:   [x] MAC [] Moderate Sedation   [] General   [] None     ROS: 12 pt Review of Symptoms was negative unless mentioned above    Medications:   Prior to Admission medications    Medication Sig Start Date End Date Taking?  Authorizing Provider   isosorbide mononitrate (IMDUR) 60 MG extended release tablet TAKE ONE TABLET BY MOUTH TWICE A DAY 23   Rosellen Prader, APRN   minoxidil (LONITEN) 2.5 MG tablet TAKE ONE TABLET BY MOUTH TWICE A DAY 4/10/23   DWAYNE Rojo NP   amiodarone

## 2023-08-11 ENCOUNTER — HOSPITAL ENCOUNTER (OUTPATIENT)
Age: 68
Setting detail: OBSERVATION
LOS: 1 days | Discharge: HOME | End: 2023-08-12
Attending: STUDENT IN AN ORGANIZED HEALTH CARE EDUCATION/TRAINING PROGRAM | Admitting: INTERNAL MEDICINE
Payer: MEDICARE

## 2023-08-11 ENCOUNTER — APPOINTMENT (OUTPATIENT)
Dept: GENERAL RADIOLOGY | Age: 68
End: 2023-08-11
Attending: STUDENT IN AN ORGANIZED HEALTH CARE EDUCATION/TRAINING PROGRAM
Payer: MEDICARE

## 2023-08-11 PROBLEM — N18.9 ACUTE KIDNEY INJURY SUPERIMPOSED ON CHRONIC KIDNEY DISEASE (HCC): Status: ACTIVE | Noted: 2023-08-11

## 2023-08-11 PROBLEM — N17.9 ACUTE KIDNEY INJURY SUPERIMPOSED ON CHRONIC KIDNEY DISEASE (HCC): Status: ACTIVE | Noted: 2023-08-11

## 2023-08-11 PROBLEM — U07.1 COVID: Status: ACTIVE | Noted: 2023-08-11

## 2023-08-11 LAB
ALBUMIN SERPL-MCNC: 4.3 G/DL (ref 3.5–5.2)
ALP SERPL-CCNC: 73 U/L (ref 40–130)
ALT SERPL-CCNC: 10 U/L (ref 5–41)
ANION GAP SERPL CALCULATED.3IONS-SCNC: 11 MMOL/L (ref 7–19)
APTT PPP: 42.5 SEC (ref 26–36.2)
AST SERPL-CCNC: 14 U/L (ref 5–40)
B PARAP IS1001 DNA NPH QL NAA+NON-PROBE: NOT DETECTED
B PERT.PT PRMT NPH QL NAA+NON-PROBE: NOT DETECTED
BACTERIA URNS QL MICRO: NEGATIVE /HPF
BASOPHILS # BLD: 0 K/UL (ref 0–0.2)
BASOPHILS NFR BLD: 0.6 % (ref 0–1)
BILIRUB SERPL-MCNC: 0.4 MG/DL (ref 0.2–1.2)
BILIRUB UR QL STRIP: NEGATIVE
BNP BLD-MCNC: 2429 PG/ML (ref 0–124)
BUN SERPL-MCNC: 21 MG/DL (ref 8–23)
C PNEUM DNA NPH QL NAA+NON-PROBE: NOT DETECTED
CALCIUM SERPL-MCNC: 9 MG/DL (ref 8.8–10.2)
CHLORIDE SERPL-SCNC: 100 MMOL/L (ref 98–111)
CLARITY UR: CLEAR
CO2 SERPL-SCNC: 29 MMOL/L (ref 22–29)
COLOR UR: YELLOW
CREAT SERPL-MCNC: 2.2 MG/DL (ref 0.5–1.2)
CREAT UR-MCNC: 47.2 MG/DL (ref 39–259)
CRP SERPL HS-MCNC: 2.06 MG/DL (ref 0–0.5)
CRYSTALS URNS MICRO: ABNORMAL /HPF
D DIMER PPP FEU-MCNC: 0.38 UG/ML FEU (ref 0–0.48)
EOSINOPHIL # BLD: 0.1 K/UL (ref 0–0.6)
EOSINOPHIL NFR BLD: 1 % (ref 0–5)
EOSINOPHIL URNS QL MICRO: NORMAL
EPI CELLS #/AREA URNS AUTO: 0 /HPF (ref 0–5)
ERYTHROCYTE [DISTWIDTH] IN BLOOD BY AUTOMATED COUNT: 17.6 % (ref 11.5–14.5)
ERYTHROCYTE [SEDIMENTATION RATE] IN BLOOD BY WESTERGREN METHOD: 7 MM/HR (ref 0–15)
FLUAV RNA NPH QL NAA+NON-PROBE: NOT DETECTED
FLUBV RNA NPH QL NAA+NON-PROBE: NOT DETECTED
GLUCOSE BLD-MCNC: 214 MG/DL (ref 70–99)
GLUCOSE BLD-MCNC: 234 MG/DL (ref 70–99)
GLUCOSE SERPL-MCNC: 138 MG/DL (ref 74–109)
GLUCOSE UR STRIP.AUTO-MCNC: NEGATIVE MG/DL
HADV DNA NPH QL NAA+NON-PROBE: NOT DETECTED
HBA1C MFR BLD: 7 % (ref 4–6)
HCOV 229E RNA NPH QL NAA+NON-PROBE: NOT DETECTED
HCOV HKU1 RNA NPH QL NAA+NON-PROBE: NOT DETECTED
HCOV NL63 RNA NPH QL NAA+NON-PROBE: NOT DETECTED
HCOV OC43 RNA NPH QL NAA+NON-PROBE: NOT DETECTED
HCT VFR BLD AUTO: 40.7 % (ref 42–52)
HGB BLD-MCNC: 12 G/DL (ref 14–18)
HGB UR STRIP.AUTO-MCNC: ABNORMAL MG/L
HMPV RNA NPH QL NAA+NON-PROBE: NOT DETECTED
HPIV1 RNA NPH QL NAA+NON-PROBE: NOT DETECTED
HPIV2 RNA NPH QL NAA+NON-PROBE: NOT DETECTED
HPIV3 RNA NPH QL NAA+NON-PROBE: NOT DETECTED
HPIV4 RNA NPH QL NAA+NON-PROBE: NOT DETECTED
HYALINE CASTS #/AREA URNS AUTO: 1 /HPF (ref 0–8)
IMM GRANULOCYTES # BLD: 0 K/UL
INR PPP: 1.66 (ref 0.88–1.18)
KETONES UR STRIP.AUTO-MCNC: NEGATIVE MG/DL
LACTATE BLDV-SCNC: 1 MMOL/L (ref 0.5–1.9)
LEUKOCYTE ESTERASE UR QL STRIP.AUTO: NEGATIVE
LYMPHOCYTES # BLD: 1 K/UL (ref 1.1–4.5)
LYMPHOCYTES NFR BLD: 20.5 % (ref 20–40)
M PNEUMO DNA NPH QL NAA+NON-PROBE: NOT DETECTED
MAGNESIUM SERPL-MCNC: 1.8 MG/DL (ref 1.6–2.4)
MCH RBC QN AUTO: 25.2 PG (ref 27–31)
MCHC RBC AUTO-ENTMCNC: 29.5 G/DL (ref 33–37)
MCV RBC AUTO: 85.5 FL (ref 80–94)
MONOCYTES # BLD: 0.7 K/UL (ref 0–0.9)
MONOCYTES NFR BLD: 13.4 % (ref 0–10)
NEUTROPHILS # BLD: 3.3 K/UL (ref 1.5–7.5)
NEUTS SEG NFR BLD: 64.3 % (ref 50–65)
NITRITE UR QL STRIP.AUTO: NEGATIVE
PERFORMED ON: ABNORMAL
PERFORMED ON: ABNORMAL
PH UR STRIP.AUTO: 5.5 [PH] (ref 5–8)
PHOSPHATE SERPL-MCNC: 2.7 MG/DL (ref 2.5–4.5)
PLATELET # BLD AUTO: 118 K/UL (ref 130–400)
PMV BLD AUTO: 11.9 FL (ref 9.4–12.4)
POTASSIUM SERPL-SCNC: 3.6 MMOL/L (ref 3.5–5)
PROCALCITONIN: 0.05 NG/ML (ref 0–0.09)
PROT SERPL-MCNC: 6.8 G/DL (ref 6.6–8.7)
PROT UR STRIP.AUTO-MCNC: 100 MG/DL
PROTHROMBIN TIME: 19.2 SEC (ref 12–14.6)
PTH-INTACT SERPL-MCNC: 95 PG/ML (ref 15–65)
RBC # BLD AUTO: 4.76 M/UL (ref 4.7–6.1)
RBC #/AREA URNS AUTO: 3 /HPF (ref 0–4)
RSV RNA NPH QL NAA+NON-PROBE: NOT DETECTED
RV+EV RNA NPH QL NAA+NON-PROBE: NOT DETECTED
SARS-COV-2 RNA NPH QL NAA+NON-PROBE: DETECTED
SODIUM SERPL-SCNC: 140 MMOL/L (ref 136–145)
SODIUM UR-SCNC: 62 MMOL/L
SP GR UR STRIP.AUTO: 1.01 (ref 1–1.03)
TROPONIN T SERPL-MCNC: 0.04 NG/ML (ref 0–0.03)
UROBILINOGEN UR STRIP.AUTO-MCNC: 0.2 E.U./DL
UUN UR-MCNC: 249 MG/DL
WBC # BLD AUTO: 5.1 K/UL (ref 4.8–10.8)
WBC #/AREA URNS AUTO: 1 /HPF (ref 0–5)

## 2023-08-11 PROCEDURE — 84100 ASSAY OF PHOSPHORUS: CPT

## 2023-08-11 PROCEDURE — 71045 X-RAY EXAM CHEST 1 VIEW: CPT

## 2023-08-11 PROCEDURE — 83880 ASSAY OF NATRIURETIC PEPTIDE: CPT

## 2023-08-11 PROCEDURE — 84145 PROCALCITONIN (PCT): CPT

## 2023-08-11 PROCEDURE — 87205 SMEAR GRAM STAIN: CPT

## 2023-08-11 PROCEDURE — 0202U NFCT DS 22 TRGT SARS-COV-2: CPT

## 2023-08-11 PROCEDURE — 85025 COMPLETE CBC W/AUTO DIFF WBC: CPT

## 2023-08-11 PROCEDURE — 85379 FIBRIN DEGRADATION QUANT: CPT

## 2023-08-11 PROCEDURE — 81001 URINALYSIS AUTO W/SCOPE: CPT

## 2023-08-11 PROCEDURE — 85610 PROTHROMBIN TIME: CPT

## 2023-08-11 PROCEDURE — 6370000000 HC RX 637 (ALT 250 FOR IP)

## 2023-08-11 PROCEDURE — 83970 ASSAY OF PARATHORMONE: CPT

## 2023-08-11 PROCEDURE — 2580000003 HC RX 258

## 2023-08-11 PROCEDURE — 83605 ASSAY OF LACTIC ACID: CPT

## 2023-08-11 PROCEDURE — 85652 RBC SED RATE AUTOMATED: CPT

## 2023-08-11 PROCEDURE — 83036 HEMOGLOBIN GLYCOSYLATED A1C: CPT

## 2023-08-11 PROCEDURE — 87040 BLOOD CULTURE FOR BACTERIA: CPT

## 2023-08-11 PROCEDURE — 82962 GLUCOSE BLOOD TEST: CPT

## 2023-08-11 PROCEDURE — 83735 ASSAY OF MAGNESIUM: CPT

## 2023-08-11 PROCEDURE — 1210000000 HC MED SURG R&B

## 2023-08-11 PROCEDURE — 85730 THROMBOPLASTIN TIME PARTIAL: CPT

## 2023-08-11 PROCEDURE — 84484 ASSAY OF TROPONIN QUANT: CPT

## 2023-08-11 PROCEDURE — 86140 C-REACTIVE PROTEIN: CPT

## 2023-08-11 PROCEDURE — 84540 ASSAY OF URINE/UREA-N: CPT

## 2023-08-11 PROCEDURE — 36415 COLL VENOUS BLD VENIPUNCTURE: CPT

## 2023-08-11 PROCEDURE — 84300 ASSAY OF URINE SODIUM: CPT

## 2023-08-11 PROCEDURE — 2700000000 HC OXYGEN THERAPY PER DAY

## 2023-08-11 PROCEDURE — 80053 COMPREHEN METABOLIC PANEL: CPT

## 2023-08-11 PROCEDURE — 82570 ASSAY OF URINE CREATININE: CPT

## 2023-08-11 RX ORDER — ONDANSETRON 4 MG/1
4 TABLET, ORALLY DISINTEGRATING ORAL EVERY 8 HOURS PRN
Status: DISCONTINUED | OUTPATIENT
Start: 2023-08-11 | End: 2023-08-12 | Stop reason: HOSPADM

## 2023-08-11 RX ORDER — CARVEDILOL 12.5 MG/1
12.5 TABLET ORAL 2 TIMES DAILY
Status: DISCONTINUED | OUTPATIENT
Start: 2023-08-11 | End: 2023-08-12 | Stop reason: HOSPADM

## 2023-08-11 RX ORDER — ACETAMINOPHEN 650 MG/1
650 SUPPOSITORY RECTAL EVERY 6 HOURS PRN
Status: DISCONTINUED | OUTPATIENT
Start: 2023-08-11 | End: 2023-08-12 | Stop reason: HOSPADM

## 2023-08-11 RX ORDER — ROSUVASTATIN CALCIUM 20 MG/1
40 TABLET, COATED ORAL DAILY
Status: DISCONTINUED | OUTPATIENT
Start: 2023-08-12 | End: 2023-08-12 | Stop reason: HOSPADM

## 2023-08-11 RX ORDER — INSULIN LISPRO 100 [IU]/ML
0-4 INJECTION, SOLUTION INTRAVENOUS; SUBCUTANEOUS
Status: DISCONTINUED | OUTPATIENT
Start: 2023-08-11 | End: 2023-08-12 | Stop reason: HOSPADM

## 2023-08-11 RX ORDER — ALLOPURINOL 100 MG/1
200 TABLET ORAL DAILY
Status: DISCONTINUED | OUTPATIENT
Start: 2023-08-12 | End: 2023-08-12 | Stop reason: HOSPADM

## 2023-08-11 RX ORDER — CALCITRIOL 0.25 UG/1
0.25 CAPSULE, LIQUID FILLED ORAL DAILY
Status: DISCONTINUED | OUTPATIENT
Start: 2023-08-12 | End: 2023-08-12 | Stop reason: HOSPADM

## 2023-08-11 RX ORDER — SODIUM CHLORIDE 9 MG/ML
INJECTION, SOLUTION INTRAVENOUS CONTINUOUS
Status: DISCONTINUED | OUTPATIENT
Start: 2023-08-11 | End: 2023-08-11

## 2023-08-11 RX ORDER — ACETAMINOPHEN 325 MG/1
650 TABLET ORAL EVERY 6 HOURS PRN
Status: DISCONTINUED | OUTPATIENT
Start: 2023-08-11 | End: 2023-08-12 | Stop reason: HOSPADM

## 2023-08-11 RX ORDER — ONDANSETRON 2 MG/ML
4 INJECTION INTRAMUSCULAR; INTRAVENOUS EVERY 6 HOURS PRN
Status: DISCONTINUED | OUTPATIENT
Start: 2023-08-11 | End: 2023-08-12 | Stop reason: HOSPADM

## 2023-08-11 RX ORDER — INSULIN LISPRO 100 [IU]/ML
0-4 INJECTION, SOLUTION INTRAVENOUS; SUBCUTANEOUS NIGHTLY
Status: DISCONTINUED | OUTPATIENT
Start: 2023-08-11 | End: 2023-08-12 | Stop reason: HOSPADM

## 2023-08-11 RX ORDER — MINOXIDIL 2.5 MG/1
2.5 TABLET ORAL 2 TIMES DAILY
Status: DISCONTINUED | OUTPATIENT
Start: 2023-08-11 | End: 2023-08-12 | Stop reason: HOSPADM

## 2023-08-11 RX ORDER — BUMETANIDE 1 MG/1
2 TABLET ORAL 2 TIMES DAILY
Status: DISCONTINUED | OUTPATIENT
Start: 2023-08-11 | End: 2023-08-12 | Stop reason: HOSPADM

## 2023-08-11 RX ORDER — ASCORBIC ACID 500 MG
500 TABLET ORAL DAILY
Status: DISCONTINUED | OUTPATIENT
Start: 2023-08-12 | End: 2023-08-12 | Stop reason: HOSPADM

## 2023-08-11 RX ORDER — SODIUM CHLORIDE 0.9 % (FLUSH) 0.9 %
5-40 SYRINGE (ML) INJECTION EVERY 12 HOURS SCHEDULED
Status: DISCONTINUED | OUTPATIENT
Start: 2023-08-11 | End: 2023-08-12 | Stop reason: HOSPADM

## 2023-08-11 RX ORDER — ALBUTEROL SULFATE 90 UG/1
2 AEROSOL, METERED RESPIRATORY (INHALATION)
Status: DISCONTINUED | OUTPATIENT
Start: 2023-08-11 | End: 2023-08-12 | Stop reason: HOSPADM

## 2023-08-11 RX ORDER — POTASSIUM CHLORIDE 20 MEQ/1
20 TABLET, EXTENDED RELEASE ORAL 2 TIMES DAILY
Status: DISCONTINUED | OUTPATIENT
Start: 2023-08-11 | End: 2023-08-12 | Stop reason: HOSPADM

## 2023-08-11 RX ORDER — INSULIN GLARGINE 100 [IU]/ML
5 INJECTION, SOLUTION SUBCUTANEOUS 2 TIMES DAILY
Status: DISCONTINUED | OUTPATIENT
Start: 2023-08-11 | End: 2023-08-12 | Stop reason: HOSPADM

## 2023-08-11 RX ORDER — SODIUM CHLORIDE 0.9 % (FLUSH) 0.9 %
5-40 SYRINGE (ML) INJECTION PRN
Status: DISCONTINUED | OUTPATIENT
Start: 2023-08-11 | End: 2023-08-12 | Stop reason: HOSPADM

## 2023-08-11 RX ORDER — SODIUM CHLORIDE 9 MG/ML
INJECTION, SOLUTION INTRAVENOUS PRN
Status: DISCONTINUED | OUTPATIENT
Start: 2023-08-11 | End: 2023-08-12 | Stop reason: HOSPADM

## 2023-08-11 RX ORDER — NIFEDIPINE 60 MG/1
60 TABLET, EXTENDED RELEASE ORAL DAILY
Status: DISCONTINUED | OUTPATIENT
Start: 2023-08-12 | End: 2023-08-12 | Stop reason: HOSPADM

## 2023-08-11 RX ORDER — AMIODARONE HYDROCHLORIDE 100 MG/1
100 TABLET ORAL DAILY
Status: DISCONTINUED | OUTPATIENT
Start: 2023-08-12 | End: 2023-08-12 | Stop reason: HOSPADM

## 2023-08-11 RX ORDER — ASPIRIN 81 MG/1
81 TABLET, CHEWABLE ORAL DAILY
Status: DISCONTINUED | OUTPATIENT
Start: 2023-08-12 | End: 2023-08-12 | Stop reason: HOSPADM

## 2023-08-11 RX ORDER — DEXTROSE MONOHYDRATE 100 MG/ML
INJECTION, SOLUTION INTRAVENOUS CONTINUOUS PRN
Status: DISCONTINUED | OUTPATIENT
Start: 2023-08-11 | End: 2023-08-12 | Stop reason: HOSPADM

## 2023-08-11 RX ADMIN — APIXABAN 5 MG: 5 TABLET, FILM COATED ORAL at 20:17

## 2023-08-11 RX ADMIN — ACETAMINOPHEN 650 MG: 325 TABLET ORAL at 21:11

## 2023-08-11 RX ADMIN — SODIUM CHLORIDE, PRESERVATIVE FREE 10 ML: 5 INJECTION INTRAVENOUS at 20:18

## 2023-08-11 RX ADMIN — INSULIN GLARGINE 5 UNITS: 100 INJECTION, SOLUTION SUBCUTANEOUS at 20:17

## 2023-08-11 RX ADMIN — BUMETANIDE 2 MG: 1 TABLET ORAL at 18:08

## 2023-08-11 RX ADMIN — MINOXIDIL 2.5 MG: 2.5 TABLET ORAL at 21:12

## 2023-08-11 RX ADMIN — SACUBITRIL AND VALSARTAN 1 TABLET: 49; 51 TABLET, FILM COATED ORAL at 20:17

## 2023-08-11 RX ADMIN — CARVEDILOL 12.5 MG: 12.5 TABLET, FILM COATED ORAL at 20:17

## 2023-08-11 RX ADMIN — POTASSIUM CHLORIDE 20 MEQ: 1500 TABLET, EXTENDED RELEASE ORAL at 20:17

## 2023-08-11 SDOH — ECONOMIC STABILITY: INCOME INSECURITY: IN THE LAST 12 MONTHS, WAS THERE A TIME WHEN YOU WERE NOT ABLE TO PAY THE MORTGAGE OR RENT ON TIME?: NO

## 2023-08-11 SDOH — ECONOMIC STABILITY: FOOD INSECURITY: WITHIN THE PAST 12 MONTHS, YOU WORRIED THAT YOUR FOOD WOULD RUN OUT BEFORE YOU GOT MONEY TO BUY MORE.: NEVER TRUE

## 2023-08-11 SDOH — ECONOMIC STABILITY: FOOD INSECURITY: WITHIN THE PAST 12 MONTHS, THE FOOD YOU BOUGHT JUST DIDN'T LAST AND YOU DIDN'T HAVE MONEY TO GET MORE.: NEVER TRUE

## 2023-08-11 SDOH — ECONOMIC STABILITY: HOUSING INSECURITY
IN THE LAST 12 MONTHS, WAS THERE A TIME WHEN YOU DID NOT HAVE A STEADY PLACE TO SLEEP OR SLEPT IN A SHELTER (INCLUDING NOW)?: NO

## 2023-08-11 SDOH — ECONOMIC STABILITY: TRANSPORTATION INSECURITY
IN THE PAST 12 MONTHS, HAS LACK OF TRANSPORTATION KEPT YOU FROM MEETINGS, WORK, OR FROM GETTING THINGS NEEDED FOR DAILY LIVING?: NO

## 2023-08-11 SDOH — HEALTH STABILITY: PHYSICAL HEALTH: ON AVERAGE, HOW MANY DAYS PER WEEK DO YOU ENGAGE IN MODERATE TO STRENUOUS EXERCISE (LIKE A BRISK WALK)?: 3 DAYS

## 2023-08-11 SDOH — HEALTH STABILITY: PHYSICAL HEALTH: ON AVERAGE, HOW MANY MINUTES DO YOU ENGAGE IN EXERCISE AT THIS LEVEL?: 60 MIN

## 2023-08-11 SDOH — ECONOMIC STABILITY: TRANSPORTATION INSECURITY
IN THE PAST 12 MONTHS, HAS THE LACK OF TRANSPORTATION KEPT YOU FROM MEDICAL APPOINTMENTS OR FROM GETTING MEDICATIONS?: NO

## 2023-08-11 SDOH — ECONOMIC STABILITY: HOUSING INSECURITY: IN THE LAST 12 MONTHS, HOW MANY PLACES HAVE YOU LIVED?: 1

## 2023-08-11 ASSESSMENT — ENCOUNTER SYMPTOMS
CHEST TIGHTNESS: 0
CONSTIPATION: 0
ABDOMINAL DISTENTION: 0
WHEEZING: 0
VOMITING: 1
NAUSEA: 1
COLOR CHANGE: 0
SHORTNESS OF BREATH: 1
COUGH: 0
ABDOMINAL PAIN: 0

## 2023-08-11 ASSESSMENT — PATIENT HEALTH QUESTIONNAIRE - PHQ9
2. FEELING DOWN, DEPRESSED OR HOPELESS: NOT AT ALL
SUM OF ALL RESPONSES TO PHQ9 QUESTIONS 1 & 2: 0
1. LITTLE INTEREST OR PLEASURE IN DOING THINGS: NOT AT ALL

## 2023-08-11 ASSESSMENT — SOCIAL DETERMINANTS OF HEALTH (SDOH)
WITHIN THE LAST YEAR, HAVE TO BEEN RAPED OR FORCED TO HAVE ANY KIND OF SEXUAL ACTIVITY BY YOUR PARTNER OR EX-PARTNER?: NO
HOW OFTEN DO YOU GET TOGETHER WITH FRIENDS OR RELATIVES?: ONCE A WEEK
WITHIN THE LAST YEAR, HAVE YOU BEEN KICKED, HIT, SLAPPED, OR OTHERWISE PHYSICALLY HURT BY YOUR PARTNER OR EX-PARTNER?: NO
IN A TYPICAL WEEK, HOW MANY TIMES DO YOU TALK ON THE PHONE WITH FAMILY, FRIENDS, OR NEIGHBORS?: MORE THAN THREE TIMES A WEEK
HOW OFTEN DO YOU ATTENT MEETINGS OF THE CLUB OR ORGANIZATION YOU BELONG TO?: MORE THAN 4 TIMES PER YEAR
WITHIN THE LAST YEAR, HAVE YOU BEEN AFRAID OF YOUR PARTNER OR EX-PARTNER?: NO
HOW HARD IS IT FOR YOU TO PAY FOR THE VERY BASICS LIKE FOOD, HOUSING, MEDICAL CARE, AND HEATING?: NOT VERY HARD
HOW OFTEN DO YOU ATTEND CHURCH OR RELIGIOUS SERVICES?: MORE THAN 4 TIMES PER YEAR
DO YOU BELONG TO ANY CLUBS OR ORGANIZATIONS SUCH AS CHURCH GROUPS UNIONS, FRATERNAL OR ATHLETIC GROUPS, OR SCHOOL GROUPS?: YES
WITHIN THE LAST YEAR, HAVE YOU BEEN HUMILIATED OR EMOTIONALLY ABUSED IN OTHER WAYS BY YOUR PARTNER OR EX-PARTNER?: NO

## 2023-08-11 ASSESSMENT — PAIN DESCRIPTION - DESCRIPTORS: DESCRIPTORS: THROBBING

## 2023-08-11 ASSESSMENT — LIFESTYLE VARIABLES
HOW MANY STANDARD DRINKS CONTAINING ALCOHOL DO YOU HAVE ON A TYPICAL DAY: PATIENT DOES NOT DRINK
HOW OFTEN DO YOU HAVE A DRINK CONTAINING ALCOHOL: NEVER

## 2023-08-11 ASSESSMENT — PAIN DESCRIPTION - LOCATION: LOCATION: HEAD

## 2023-08-11 ASSESSMENT — PAIN SCALES - GENERAL: PAINLEVEL_OUTOF10: 6

## 2023-08-11 NOTE — CONSULTS
Nephrology ([facility]) Consult Note      Patient:  Becca Garza  YOB: 1955  Date of Service: 8/11/2023  MRN: [id number]   Acct: [de-identified]   Primary Care Physician: Nadeem Ayala MD  Advance Directive: Full Code  Admit Date: 8/11/2023       Hospital Day: 0  Referring Provider: Quinten Hull MD    Patient independently seen and examined, Chart, Consults, Notes, Operative notes, Labs, Cardiology, and Radiology studies reviewed as available. Chief complaint: Abnormal labs. Subjective:  Becca Garza is a 80 y.o. male for whom we were consulted for evaluation and treatment of stage IV chronic kidney disease. Patient has stage IV chronic kidney disease, follows Dr. Tay Bourne with estimated GFR baseline 25 mL. He has CKD related to type 2 diabetes/hypertension. He also has history of CVA, type 2 diabetes, GERD, hypertension, paroxysmal atrial fibrillation, obstructive sleep apnea, chronic obstructive pulmonary disease on home oxygen and abdominal aortic aneurysm. Presented with increasing shortness of breath, nonproductive cough, nausea and vomiting. He has decreased appetite, he denies any fever. Routine lab data indicated creatinine 2.3 mg which is close to his baseline. He denies any nausea vomiting or diarrhea. He denies any recent exposure to intravenous contrast or use of nonsteroidal agents. This afternoon he was seen at his room, feeling well.       Allergies:  Morphine and Hydralazine    Medicines:  Current Facility-Administered Medications   Medication Dose Route Frequency Provider Last Rate Last Admin    sodium chloride flush 0.9 % injection 5-40 mL  5-40 mL IntraVENous 2 times per day DWAYNE Heller CNP        sodium chloride flush 0.9 % injection 5-40 mL  5-40 mL IntraVENous PRN DWAYNE Heller - CNP        0.9 % sodium chloride infusion   IntraVENous PRN DWAYNE Heller CNP        ondansetron (ZOFRAN-ODT) disintegrating tablet contours appear enlarged. There is central pulmonary vascular congestion. Interstitial infiltrate extends throughout both lungs. This may relate to atelectasis, pulmonary edema and/or pneumonitis. No pulmonary consolidation. No pleural effusion or pneumothorax.       ______________________________________ Electronically signed by: Lex Steinberg M.D. Date:     08/11/2023 Time:    16:12        Assessment   1. Acute kidney injury/improved. 2.  Stage IV chronic kidney disease baseline. 3.  Type II diabetic nephropathy  4. Secondary hyperparathyroidism. 5.  Chronic systolic CHF. 6.  Paroxysmal atrial fibrillation. Plan:  1. Continue slow hydration. 2.  Urinary electrolytes. 3.  Baseline serum PTH, iron studies. 4.  I will continue to monitor renal function. Thank you for the consult, we appreciate the opportunity to provide care to your patients. Feel free to contact me if I can be of any further assistance.       Juan Weeks MD  08/11/23  5:43 PM

## 2023-08-11 NOTE — H&P
[facility]      [facility]ist - History & Physical      PCP: Shasha Murrieta MD    Date of Admission: 8/11/2023    Date of Service: 8/11/2023    Chief Complaint: Shortness of breath    History Of Present Illness: The patient is a 80 y.o. male who presented to [facility] direct admit [facility]) with PMH CVA, CKD stage IV, type II DM, GERD, HTN, hyperlipidemia, PAF on Eliquis, SAMMY on CPAP, chronic lung disease on 4.5L NC, heart failure, AAA, complaining of shortness of breath. Patient states he has had worsening shortness of breath, non-productive cough, nausea, vomiting generalized weakness and fatigue ongoing for one week. He has had decreased appetite and oral intake. He denies fever, chills, abdominal pain, and chest pain. Denies lower extremity edema, weight gain, or syncopal event. Was transferred to higher level of care due to worsening kidney function. However, upon arrival known history CKD IV currently improved from prior creat 2.2 BUN 21. Patient reports 1 month ago creatinine was 1.38 to OSH facility however upon further discussion with patient he states his creat baseline of 2.3. Upon arrival to OSH ER patient oxygenation saturation 85% on room air normally wears 4.5 L however left oxygen in the car. Improved 93% once reapplied. Currently patient is resting comfortably and in no acute distress. Work-up at OSH ER WBC 5.4, Hgb 12.8, HCT 42.1, K3.4, creatinine 2.4 BUN 24, COVID-positive. Has been vaccinated and received boosters. Patient is to be admitted to hospitalist service with consultation to nephrology.    Past Medical History:        Diagnosis Date    AAA (abdominal aortic aneurysm) ([address])     Acute kidney failure, unspecified (HCC)     Anxiety state, unspecified     Atrial septal aneurysm 01/09/2016    Blood circulation, collateral     Body mass index 30.0-30.9, adult     CAD (coronary artery disease) 01/10/2016    1/9/16  lexiscan  Positive for apical MI + myocardial ischemia, EF 42%,

## 2023-08-12 VITALS
WEIGHT: 226.13 LBS | RESPIRATION RATE: 18 BRPM | DIASTOLIC BLOOD PRESSURE: 63 MMHG | HEIGHT: 71 IN | OXYGEN SATURATION: 96 % | HEART RATE: 57 BPM | TEMPERATURE: 97.7 F | BODY MASS INDEX: 31.66 KG/M2 | SYSTOLIC BLOOD PRESSURE: 151 MMHG

## 2023-08-12 PROBLEM — N18.9 ACUTE KIDNEY INJURY SUPERIMPOSED ON CKD (HCC): Status: ACTIVE | Noted: 2023-08-12

## 2023-08-12 PROBLEM — N17.9 ACUTE KIDNEY INJURY SUPERIMPOSED ON CKD (HCC): Status: ACTIVE | Noted: 2023-08-12

## 2023-08-12 LAB
ALBUMIN SERPL-MCNC: 3.9 G/DL (ref 3.5–5.2)
ALP SERPL-CCNC: 72 U/L (ref 40–130)
ALT SERPL-CCNC: 11 U/L (ref 5–41)
ANION GAP SERPL CALCULATED.3IONS-SCNC: 11 MMOL/L (ref 7–19)
AST SERPL-CCNC: 20 U/L (ref 5–40)
BACTERIA BLD CULT ORG #2: NORMAL
BACTERIA BLD CULT: NORMAL
BILIRUB SERPL-MCNC: 0.3 MG/DL (ref 0.2–1.2)
BUN SERPL-MCNC: 20 MG/DL (ref 8–23)
CALCIUM SERPL-MCNC: 8.6 MG/DL (ref 8.8–10.2)
CHLORIDE SERPL-SCNC: 99 MMOL/L (ref 98–111)
CO2 SERPL-SCNC: 30 MMOL/L (ref 22–29)
CREAT SERPL-MCNC: 2.2 MG/DL (ref 0.5–1.2)
EKG P AXIS: 50 DEGREES
EKG P-R INTERVAL: 208 MS
EKG Q-T INTERVAL: 482 MS
EKG QRS DURATION: 114 MS
EKG QTC CALCULATION (BAZETT): 483 MS
EKG T AXIS: 31 DEGREES
ERYTHROCYTE [DISTWIDTH] IN BLOOD BY AUTOMATED COUNT: 17.4 % (ref 11.5–14.5)
GLUCOSE BLD-MCNC: 175 MG/DL (ref 70–99)
GLUCOSE BLD-MCNC: 180 MG/DL (ref 70–99)
GLUCOSE SERPL-MCNC: 162 MG/DL (ref 74–109)
HCT VFR BLD AUTO: 41.9 % (ref 42–52)
HGB BLD-MCNC: 12 G/DL (ref 14–18)
MCH RBC QN AUTO: 25.1 PG (ref 27–31)
MCHC RBC AUTO-ENTMCNC: 28.6 G/DL (ref 33–37)
MCV RBC AUTO: 87.5 FL (ref 80–94)
PERFORMED ON: ABNORMAL
PERFORMED ON: ABNORMAL
PLATELET # BLD AUTO: 111 K/UL (ref 130–400)
PMV BLD AUTO: 10.2 FL (ref 9.4–12.4)
POTASSIUM SERPL-SCNC: 3.6 MMOL/L (ref 3.5–5)
PROT SERPL-MCNC: 6.4 G/DL (ref 6.6–8.7)
RBC # BLD AUTO: 4.79 M/UL (ref 4.7–6.1)
SODIUM SERPL-SCNC: 140 MMOL/L (ref 136–145)
TROPONIN T SERPL-MCNC: 0.03 NG/ML (ref 0–0.03)
TROPONIN T SERPL-MCNC: 0.04 NG/ML (ref 0–0.03)
WBC # BLD AUTO: 5.3 K/UL (ref 4.8–10.8)

## 2023-08-12 PROCEDURE — 82962 GLUCOSE BLOOD TEST: CPT

## 2023-08-12 PROCEDURE — 94640 AIRWAY INHALATION TREATMENT: CPT

## 2023-08-12 PROCEDURE — 94760 N-INVAS EAR/PLS OXIMETRY 1: CPT

## 2023-08-12 PROCEDURE — 6370000000 HC RX 637 (ALT 250 FOR IP)

## 2023-08-12 PROCEDURE — 2580000003 HC RX 258

## 2023-08-12 PROCEDURE — 36415 COLL VENOUS BLD VENIPUNCTURE: CPT

## 2023-08-12 PROCEDURE — 80053 COMPREHEN METABOLIC PANEL: CPT

## 2023-08-12 PROCEDURE — 84484 ASSAY OF TROPONIN QUANT: CPT

## 2023-08-12 PROCEDURE — 2700000000 HC OXYGEN THERAPY PER DAY

## 2023-08-12 PROCEDURE — 93005 ELECTROCARDIOGRAM TRACING: CPT

## 2023-08-12 PROCEDURE — 85027 COMPLETE CBC AUTOMATED: CPT

## 2023-08-12 RX ORDER — ALBUTEROL SULFATE 90 UG/1
2 AEROSOL, METERED RESPIRATORY (INHALATION)
Qty: 18 G | Refills: 1 | Status: SHIPPED | OUTPATIENT
Start: 2023-08-12

## 2023-08-12 RX ADMIN — ALBUTEROL SULFATE 2 PUFF: 90 AEROSOL, METERED RESPIRATORY (INHALATION) at 06:57

## 2023-08-12 RX ADMIN — AMIODARONE HYDROCHLORIDE 100 MG: 100 TABLET ORAL at 10:10

## 2023-08-12 RX ADMIN — NIFEDIPINE 60 MG: 60 TABLET, EXTENDED RELEASE ORAL at 10:09

## 2023-08-12 RX ADMIN — ASPIRIN 81 MG: 81 TABLET, CHEWABLE ORAL at 10:10

## 2023-08-12 RX ADMIN — ALLOPURINOL 200 MG: 100 TABLET ORAL at 10:10

## 2023-08-12 RX ADMIN — POTASSIUM CHLORIDE 20 MEQ: 1500 TABLET, EXTENDED RELEASE ORAL at 10:10

## 2023-08-12 RX ADMIN — SODIUM CHLORIDE, PRESERVATIVE FREE 10 ML: 5 INJECTION INTRAVENOUS at 10:12

## 2023-08-12 RX ADMIN — APIXABAN 5 MG: 5 TABLET, FILM COATED ORAL at 10:08

## 2023-08-12 RX ADMIN — CALCITRIOL CAPSULES 0.25 MCG 0.25 MCG: 0.25 CAPSULE ORAL at 10:09

## 2023-08-12 RX ADMIN — ALBUTEROL SULFATE 2 PUFF: 90 AEROSOL, METERED RESPIRATORY (INHALATION) at 10:02

## 2023-08-12 RX ADMIN — SACUBITRIL AND VALSARTAN 1 TABLET: 49; 51 TABLET, FILM COATED ORAL at 10:09

## 2023-08-12 RX ADMIN — ALBUTEROL SULFATE 2 PUFF: 90 AEROSOL, METERED RESPIRATORY (INHALATION) at 14:19

## 2023-08-12 RX ADMIN — TIOTROPIUM BROMIDE INHALATION SPRAY 2 PUFF: 3.12 SPRAY, METERED RESPIRATORY (INHALATION) at 06:57

## 2023-08-12 RX ADMIN — OXYCODONE HYDROCHLORIDE AND ACETAMINOPHEN 500 MG: 500 TABLET ORAL at 10:09

## 2023-08-12 RX ADMIN — MINOXIDIL 2.5 MG: 2.5 TABLET ORAL at 10:09

## 2023-08-12 RX ADMIN — CARVEDILOL 12.5 MG: 12.5 TABLET, FILM COATED ORAL at 10:08

## 2023-08-12 RX ADMIN — ROSUVASTATIN CALCIUM 40 MG: 20 TABLET, COATED ORAL at 10:09

## 2023-08-12 RX ADMIN — INSULIN GLARGINE 5 UNITS: 100 INJECTION, SOLUTION SUBCUTANEOUS at 10:10

## 2023-08-12 RX ADMIN — BUMETANIDE 2 MG: 1 TABLET ORAL at 10:09

## 2023-08-12 NOTE — PLAN OF CARE
Problem: Safety - Adult  Goal: Free from fall injury  Outcome: Completed     Problem: ABCDS Injury Assessment  Goal: Absence of physical injury  Outcome: Completed

## 2023-08-12 NOTE — PLAN OF CARE
Problem: Safety - Adult  Goal: Free from fall injury  Outcome: Progressing     Problem: ABCDS Injury Assessment  Goal: Absence of physical injury  Outcome: Progressing  Flowsheets (Taken 8/11/2023 1426 by Debbie Quintana RN)  Absence of Physical Injury: Implement safety measures based on patient assessment

## 2023-08-12 NOTE — CARE COORDINATION
Update: Pt has already been D/C from the hospital. SW received UR message at 3:08 PM and Pt had been D/C approximately 5 minutes prior to that time per Pt. Nurse. This was not completed at this time due to the Pt being D/C prior.    Electronically signed by Ninfa Wyman on 8/12/2023 at 3:16 PM

## 2023-08-12 NOTE — DISCHARGE SUMMARY
[facility]    Discharge Summary      Gordo Manjarrez  :  1955  MRN:  [id number]    Admit date:  2023  Discharge date:    2023    Discharging Physician: Dr. Linsey Kaminski    Advance Directive: Full Code    Consults: nephrology    Primary Care Physician:  Epifanio Jean MD    Discharge Diagnoses:  Principal Problem:    Acute kidney injury superimposed on chronic kidney disease [facility])  Active Problems:    Chronic heart failure with preserved ejection fraction ([address])    DM (diabetes mellitus) ([address])    PAF (paroxysmal atrial fibrillation) ([address])    Obesity (BMI 30-39. 9)    COVID  Resolved Problems:    * No resolved hospital problems. *      Portions of this note have been copied forward, however, changed to reflect the most current clinical status of this patient. Hospital Course: The patient is a 50-year-old male with past medical history of CKD 4, CVA, T2DM, GERD, hypertension, hyperlipidemia, PAF on Eliquis, SAMMY on CPAP, and COPD on 4.5 L O2 per nasal cannula currently, CHF, and AAA who presented [facility]) with complaints of shortness of breath. Reported worsening shortness of breath, nonproductive cough, nausea, vomiting, generalized weakness, and fatigue ongoing for a week. Reported decreased appetite and oral intake. Denied fever, chills, abdominal pain, or chest pain. Denied lower extremity edema or weight gain. Patient was transferred here for worsening kidney function and nephrology evaluation. Work-up at OSH indicated hypoxia 85% on room air normally wears 4.5 L O2, improved to 93% on 4 L. Laboratory findings at OSH indicated WBC 5.4, Hgb 12.8, creatinine 2.4, BUN 24, and COVID-positive. Patient stated he has been vaccinated has received booster shots. Patient was admitted to hospital medicine with nephrology consultation. Patient was seen by nephrology with suggestion of CKD 4 at baseline, and recommendations of slow hydration.   Patient has been

## 2023-08-15 LAB
EKG P AXIS: 50 DEGREES
EKG P-R INTERVAL: 208 MS
EKG Q-T INTERVAL: 482 MS
EKG QRS DURATION: 114 MS
EKG QTC CALCULATION (BAZETT): 483 MS
EKG T AXIS: 31 DEGREES

## 2023-08-16 LAB
BACTERIA BLD CULT ORG #2: NORMAL
BACTERIA BLD CULT: NORMAL

## 2023-09-04 ENCOUNTER — APPOINTMENT (OUTPATIENT)
Dept: GENERAL RADIOLOGY | Age: 68
End: 2023-09-04
Payer: MEDICARE

## 2023-09-04 ENCOUNTER — APPOINTMENT (OUTPATIENT)
Dept: CT IMAGING | Age: 68
End: 2023-09-04
Payer: MEDICARE

## 2023-09-04 ENCOUNTER — HOSPITAL ENCOUNTER (OUTPATIENT)
Age: 68
Setting detail: OBSERVATION
Discharge: HOME | End: 2023-09-04
Attending: EMERGENCY MEDICINE | Admitting: HOSPITALIST
Payer: MEDICARE

## 2023-09-04 VITALS
TEMPERATURE: 97.2 F | HEART RATE: 59 BPM | BODY MASS INDEX: 32.25 KG/M2 | OXYGEN SATURATION: 92 % | WEIGHT: 231.2 LBS | DIASTOLIC BLOOD PRESSURE: 82 MMHG | SYSTOLIC BLOOD PRESSURE: 155 MMHG | RESPIRATION RATE: 16 BRPM

## 2023-09-04 DIAGNOSIS — J96.11 CHRONIC RESPIRATORY FAILURE WITH HYPOXIA (HCC): ICD-10-CM

## 2023-09-04 DIAGNOSIS — Z86.79 HISTORY OF CAD (CORONARY ARTERY DISEASE): ICD-10-CM

## 2023-09-04 DIAGNOSIS — R07.9 CHEST PAIN, UNSPECIFIED TYPE: Primary | ICD-10-CM

## 2023-09-04 DIAGNOSIS — Z86.16 HISTORY OF COVID-19: ICD-10-CM

## 2023-09-04 DIAGNOSIS — I71.23 ANEURYSM OF DESCENDING THORACIC AORTA WITHOUT RUPTURE (HCC): ICD-10-CM

## 2023-09-04 LAB
ALBUMIN SERPL-MCNC: 4.5 G/DL (ref 3.5–5.2)
ALP SERPL-CCNC: 91 U/L (ref 40–130)
ALT SERPL-CCNC: 10 U/L (ref 5–41)
ANION GAP SERPL CALCULATED.3IONS-SCNC: 15 MMOL/L (ref 7–19)
APTT PPP: 35.4 SEC (ref 26–36.2)
AST SERPL-CCNC: 11 U/L (ref 5–40)
BASOPHILS # BLD: 0.1 K/UL (ref 0–0.2)
BASOPHILS NFR BLD: 1 % (ref 0–1)
BILIRUB SERPL-MCNC: 0.4 MG/DL (ref 0.2–1.2)
BNP BLD-MCNC: 2711 PG/ML (ref 0–124)
BUN SERPL-MCNC: 36 MG/DL (ref 8–23)
CALCIUM SERPL-MCNC: 9.8 MG/DL (ref 8.8–10.2)
CHLORIDE SERPL-SCNC: 99 MMOL/L (ref 98–111)
CHOLEST SERPL-MCNC: 97 MG/DL (ref 160–199)
CO2 SERPL-SCNC: 26 MMOL/L (ref 22–29)
CREAT SERPL-MCNC: 2.7 MG/DL (ref 0.5–1.2)
D DIMER PPP FEU-MCNC: 0.53 UG/ML FEU (ref 0–0.48)
EOSINOPHIL # BLD: 0.3 K/UL (ref 0–0.6)
EOSINOPHIL NFR BLD: 4.5 % (ref 0–5)
ERYTHROCYTE [DISTWIDTH] IN BLOOD BY AUTOMATED COUNT: 18.1 % (ref 11.5–14.5)
GLUCOSE SERPL-MCNC: 196 MG/DL (ref 74–109)
HBA1C MFR BLD: 7 % (ref 4–6)
HCT VFR BLD AUTO: 40.2 % (ref 42–52)
HDLC SERPL-MCNC: 42 MG/DL (ref 55–121)
HGB BLD-MCNC: 12.3 G/DL (ref 14–18)
IMM GRANULOCYTES # BLD: 0 K/UL
INR PPP: 1.38 (ref 0.88–1.18)
LDLC SERPL CALC-MCNC: 37 MG/DL
LIPASE SERPL-CCNC: 28 U/L (ref 13–60)
LYMPHOCYTES # BLD: 1.6 K/UL (ref 1.1–4.5)
LYMPHOCYTES NFR BLD: 22.6 % (ref 20–40)
MAGNESIUM SERPL-MCNC: 2 MG/DL (ref 1.6–2.4)
MCH RBC QN AUTO: 25.5 PG (ref 27–31)
MCHC RBC AUTO-ENTMCNC: 30.6 G/DL (ref 33–37)
MCV RBC AUTO: 83.4 FL (ref 80–94)
MONOCYTES # BLD: 0.6 K/UL (ref 0–0.9)
MONOCYTES NFR BLD: 8.9 % (ref 0–10)
NEUTROPHILS # BLD: 4.3 K/UL (ref 1.5–7.5)
NEUTS SEG NFR BLD: 62.9 % (ref 50–65)
PLATELET # BLD AUTO: 160 K/UL (ref 130–400)
PMV BLD AUTO: 11.2 FL (ref 9.4–12.4)
POTASSIUM SERPL-SCNC: 4 MMOL/L (ref 3.5–5)
PROT SERPL-MCNC: 6.9 G/DL (ref 6.6–8.7)
PROTHROMBIN TIME: 16.6 SEC (ref 12–14.6)
RBC # BLD AUTO: 4.82 M/UL (ref 4.7–6.1)
SODIUM SERPL-SCNC: 140 MMOL/L (ref 136–145)
TRIGL SERPL-MCNC: 89 MG/DL (ref 0–149)
TROPONIN T SERPL-MCNC: 0.03 NG/ML (ref 0–0.03)
TROPONIN T SERPL-MCNC: 0.03 NG/ML (ref 0–0.03)
TROPONIN T SERPL-MCNC: 0.04 NG/ML (ref 0–0.03)
TSH SERPL DL<=0.005 MIU/L-ACNC: 2.75 UIU/ML (ref 0.35–5.5)
WBC # BLD AUTO: 6.9 K/UL (ref 4.8–10.8)

## 2023-09-04 PROCEDURE — 84443 ASSAY THYROID STIM HORMONE: CPT

## 2023-09-04 PROCEDURE — 2700000000 HC OXYGEN THERAPY PER DAY

## 2023-09-04 PROCEDURE — 83690 ASSAY OF LIPASE: CPT

## 2023-09-04 PROCEDURE — 6370000000 HC RX 637 (ALT 250 FOR IP): Performed by: HOSPITALIST

## 2023-09-04 PROCEDURE — 83735 ASSAY OF MAGNESIUM: CPT

## 2023-09-04 PROCEDURE — 94760 N-INVAS EAR/PLS OXIMETRY 1: CPT

## 2023-09-04 PROCEDURE — 94640 AIRWAY INHALATION TREATMENT: CPT

## 2023-09-04 PROCEDURE — G0378 HOSPITAL OBSERVATION PER HR: HCPCS

## 2023-09-04 PROCEDURE — 80053 COMPREHEN METABOLIC PANEL: CPT

## 2023-09-04 PROCEDURE — 85025 COMPLETE CBC W/AUTO DIFF WBC: CPT

## 2023-09-04 PROCEDURE — 6360000004 HC RX CONTRAST MEDICATION: Performed by: EMERGENCY MEDICINE

## 2023-09-04 PROCEDURE — 6370000000 HC RX 637 (ALT 250 FOR IP): Performed by: EMERGENCY MEDICINE

## 2023-09-04 PROCEDURE — 83880 ASSAY OF NATRIURETIC PEPTIDE: CPT

## 2023-09-04 PROCEDURE — 85379 FIBRIN DEGRADATION QUANT: CPT

## 2023-09-04 PROCEDURE — 36415 COLL VENOUS BLD VENIPUNCTURE: CPT

## 2023-09-04 PROCEDURE — 84484 ASSAY OF TROPONIN QUANT: CPT

## 2023-09-04 PROCEDURE — 6360000002 HC RX W HCPCS: Performed by: HOSPITALIST

## 2023-09-04 PROCEDURE — 71045 X-RAY EXAM CHEST 1 VIEW: CPT

## 2023-09-04 PROCEDURE — 2580000003 HC RX 258: Performed by: HOSPITALIST

## 2023-09-04 PROCEDURE — 85610 PROTHROMBIN TIME: CPT

## 2023-09-04 PROCEDURE — 80061 LIPID PANEL: CPT

## 2023-09-04 PROCEDURE — 85730 THROMBOPLASTIN TIME PARTIAL: CPT

## 2023-09-04 PROCEDURE — 83036 HEMOGLOBIN GLYCOSYLATED A1C: CPT

## 2023-09-04 PROCEDURE — 71275 CT ANGIOGRAPHY CHEST: CPT

## 2023-09-04 PROCEDURE — 99285 EMERGENCY DEPT VISIT HI MDM: CPT

## 2023-09-04 RX ORDER — ONDANSETRON 2 MG/ML
4 INJECTION INTRAMUSCULAR; INTRAVENOUS EVERY 6 HOURS PRN
Status: DISCONTINUED | OUTPATIENT
Start: 2023-09-04 | End: 2023-09-04 | Stop reason: HOSPADM

## 2023-09-04 RX ORDER — INSULIN LISPRO 100 [IU]/ML
0-8 INJECTION, SOLUTION INTRAVENOUS; SUBCUTANEOUS
Status: DISCONTINUED | OUTPATIENT
Start: 2023-09-04 | End: 2023-09-04 | Stop reason: HOSPADM

## 2023-09-04 RX ORDER — ESOMEPRAZOLE MAGNESIUM 40 MG/1
40 FOR SUSPENSION ORAL DAILY
Status: ON HOLD | COMMUNITY
End: 2023-09-04 | Stop reason: HOSPADM

## 2023-09-04 RX ORDER — SODIUM CHLORIDE 0.9 % (FLUSH) 0.9 %
5-40 SYRINGE (ML) INJECTION EVERY 12 HOURS SCHEDULED
Status: DISCONTINUED | OUTPATIENT
Start: 2023-09-04 | End: 2023-09-04 | Stop reason: HOSPADM

## 2023-09-04 RX ORDER — VITAMIN B COMPLEX
2000 TABLET ORAL DAILY
Status: DISCONTINUED | OUTPATIENT
Start: 2023-09-04 | End: 2023-09-04 | Stop reason: HOSPADM

## 2023-09-04 RX ORDER — ACETAMINOPHEN 650 MG/1
650 SUPPOSITORY RECTAL EVERY 4 HOURS PRN
Status: DISCONTINUED | OUTPATIENT
Start: 2023-09-04 | End: 2023-09-04 | Stop reason: HOSPADM

## 2023-09-04 RX ORDER — SODIUM CHLORIDE 9 MG/ML
INJECTION, SOLUTION INTRAVENOUS PRN
Status: DISCONTINUED | OUTPATIENT
Start: 2023-09-04 | End: 2023-09-04 | Stop reason: HOSPADM

## 2023-09-04 RX ORDER — METOLAZONE 2.5 MG/1
5 TABLET ORAL DAILY
Status: DISCONTINUED | OUTPATIENT
Start: 2023-09-04 | End: 2023-09-04 | Stop reason: HOSPADM

## 2023-09-04 RX ORDER — NIFEDIPINE 60 MG/1
60 TABLET, EXTENDED RELEASE ORAL NIGHTLY
Status: DISCONTINUED | OUTPATIENT
Start: 2023-09-04 | End: 2023-09-04 | Stop reason: HOSPADM

## 2023-09-04 RX ORDER — PANTOPRAZOLE SODIUM 40 MG/1
40 TABLET, DELAYED RELEASE ORAL
Status: DISCONTINUED | OUTPATIENT
Start: 2023-09-04 | End: 2023-09-04 | Stop reason: HOSPADM

## 2023-09-04 RX ORDER — ACETAMINOPHEN 325 MG/1
650 TABLET ORAL EVERY 4 HOURS PRN
Status: DISCONTINUED | OUTPATIENT
Start: 2023-09-04 | End: 2023-09-04 | Stop reason: HOSPADM

## 2023-09-04 RX ORDER — BUMETANIDE 1 MG/1
2 TABLET ORAL 2 TIMES DAILY
Status: DISCONTINUED | OUTPATIENT
Start: 2023-09-04 | End: 2023-09-04 | Stop reason: HOSPADM

## 2023-09-04 RX ORDER — CALCIUM CARBONATE 500 MG/1
500 TABLET, CHEWABLE ORAL 3 TIMES DAILY PRN
Status: DISCONTINUED | OUTPATIENT
Start: 2023-09-04 | End: 2023-09-04 | Stop reason: HOSPADM

## 2023-09-04 RX ORDER — NITROGLYCERIN 0.4 MG/1
0.4 TABLET SUBLINGUAL EVERY 5 MIN PRN
Status: DISCONTINUED | OUTPATIENT
Start: 2023-09-04 | End: 2023-09-04 | Stop reason: HOSPADM

## 2023-09-04 RX ORDER — MAGNESIUM SULFATE IN WATER 40 MG/ML
2000 INJECTION, SOLUTION INTRAVENOUS PRN
Status: DISCONTINUED | OUTPATIENT
Start: 2023-09-04 | End: 2023-09-04 | Stop reason: HOSPADM

## 2023-09-04 RX ORDER — ASPIRIN 81 MG/1
81 TABLET, CHEWABLE ORAL DAILY
Status: DISCONTINUED | OUTPATIENT
Start: 2023-09-04 | End: 2023-09-04 | Stop reason: HOSPADM

## 2023-09-04 RX ORDER — SODIUM CHLORIDE 0.9 % (FLUSH) 0.9 %
5-40 SYRINGE (ML) INJECTION PRN
Status: DISCONTINUED | OUTPATIENT
Start: 2023-09-04 | End: 2023-09-04 | Stop reason: HOSPADM

## 2023-09-04 RX ORDER — POTASSIUM CHLORIDE 20 MEQ/1
20 TABLET, EXTENDED RELEASE ORAL 2 TIMES DAILY
Status: DISCONTINUED | OUTPATIENT
Start: 2023-09-04 | End: 2023-09-04 | Stop reason: HOSPADM

## 2023-09-04 RX ORDER — INSULIN LISPRO 100 [IU]/ML
0-4 INJECTION, SOLUTION INTRAVENOUS; SUBCUTANEOUS NIGHTLY
Status: DISCONTINUED | OUTPATIENT
Start: 2023-09-04 | End: 2023-09-04 | Stop reason: HOSPADM

## 2023-09-04 RX ORDER — MULTIVIT WITH MINERALS/LUTEIN
500 TABLET ORAL DAILY
Status: DISCONTINUED | OUTPATIENT
Start: 2023-09-04 | End: 2023-09-04 | Stop reason: HOSPADM

## 2023-09-04 RX ORDER — AMIODARONE HYDROCHLORIDE 100 MG/1
100 TABLET ORAL DAILY
Status: DISCONTINUED | OUTPATIENT
Start: 2023-09-04 | End: 2023-09-04 | Stop reason: HOSPADM

## 2023-09-04 RX ORDER — METOLAZONE 5 MG/1
5 TABLET ORAL DAILY
COMMUNITY

## 2023-09-04 RX ORDER — LEVOCETIRIZINE DIHYDROCHLORIDE 5 MG/1
5 TABLET, FILM COATED ORAL NIGHTLY
COMMUNITY

## 2023-09-04 RX ORDER — MINOXIDIL 2.5 MG/1
2.5 TABLET ORAL 2 TIMES DAILY
Status: DISCONTINUED | OUTPATIENT
Start: 2023-09-04 | End: 2023-09-04 | Stop reason: HOSPADM

## 2023-09-04 RX ORDER — POTASSIUM CHLORIDE 7.45 MG/ML
10 INJECTION INTRAVENOUS PRN
Status: DISCONTINUED | OUTPATIENT
Start: 2023-09-04 | End: 2023-09-04 | Stop reason: HOSPADM

## 2023-09-04 RX ORDER — CETIRIZINE HYDROCHLORIDE 5 MG/1
5 TABLET ORAL DAILY
Status: DISCONTINUED | OUTPATIENT
Start: 2023-09-04 | End: 2023-09-04 | Stop reason: HOSPADM

## 2023-09-04 RX ORDER — ONDANSETRON 4 MG/1
4 TABLET, ORALLY DISINTEGRATING ORAL EVERY 8 HOURS PRN
Status: DISCONTINUED | OUTPATIENT
Start: 2023-09-04 | End: 2023-09-04 | Stop reason: HOSPADM

## 2023-09-04 RX ORDER — ARFORMOTEROL TARTRATE 15 UG/2ML
15 SOLUTION RESPIRATORY (INHALATION)
Status: DISCONTINUED | OUTPATIENT
Start: 2023-09-04 | End: 2023-09-04 | Stop reason: HOSPADM

## 2023-09-04 RX ORDER — ISOSORBIDE MONONITRATE 60 MG/1
60 TABLET, EXTENDED RELEASE ORAL DAILY
Status: DISCONTINUED | OUTPATIENT
Start: 2023-09-04 | End: 2023-09-04 | Stop reason: HOSPADM

## 2023-09-04 RX ORDER — ISOSORBIDE MONONITRATE 60 MG/1
60 TABLET, EXTENDED RELEASE ORAL DAILY
COMMUNITY

## 2023-09-04 RX ORDER — INSULIN GLARGINE 100 [IU]/ML
15 INJECTION, SOLUTION SUBCUTANEOUS EVERY MORNING
Status: DISCONTINUED | OUTPATIENT
Start: 2023-09-04 | End: 2023-09-04 | Stop reason: HOSPADM

## 2023-09-04 RX ORDER — POTASSIUM CHLORIDE 20 MEQ/1
40 TABLET, EXTENDED RELEASE ORAL PRN
Status: DISCONTINUED | OUTPATIENT
Start: 2023-09-04 | End: 2023-09-04 | Stop reason: HOSPADM

## 2023-09-04 RX ORDER — ROSUVASTATIN CALCIUM 10 MG/1
40 TABLET, COATED ORAL NIGHTLY
Status: DISCONTINUED | OUTPATIENT
Start: 2023-09-04 | End: 2023-09-04 | Stop reason: HOSPADM

## 2023-09-04 RX ORDER — ALBUTEROL SULFATE 2.5 MG/3ML
2.5 SOLUTION RESPIRATORY (INHALATION) EVERY 4 HOURS PRN
Status: DISCONTINUED | OUTPATIENT
Start: 2023-09-04 | End: 2023-09-04 | Stop reason: HOSPADM

## 2023-09-04 RX ORDER — NITROGLYCERIN 0.4 MG/1
0.4 TABLET SUBLINGUAL EVERY 5 MIN PRN
COMMUNITY

## 2023-09-04 RX ORDER — CALCITRIOL 0.25 UG/1
0.25 CAPSULE, LIQUID FILLED ORAL DAILY
Status: DISCONTINUED | OUTPATIENT
Start: 2023-09-04 | End: 2023-09-04 | Stop reason: HOSPADM

## 2023-09-04 RX ORDER — MECOBALAMIN 5000 MCG
5 TABLET,DISINTEGRATING ORAL NIGHTLY PRN
Status: DISCONTINUED | OUTPATIENT
Start: 2023-09-04 | End: 2023-09-04 | Stop reason: HOSPADM

## 2023-09-04 RX ORDER — POLYETHYLENE GLYCOL 3350 17 G/17G
17 POWDER, FOR SOLUTION ORAL DAILY PRN
Status: DISCONTINUED | OUTPATIENT
Start: 2023-09-04 | End: 2023-09-04 | Stop reason: HOSPADM

## 2023-09-04 RX ORDER — CYCLOBENZAPRINE HCL 10 MG
10 TABLET ORAL 2 TIMES DAILY PRN
Qty: 30 TABLET | Refills: 1 | Status: SHIPPED | OUTPATIENT
Start: 2023-09-04

## 2023-09-04 RX ORDER — ESOMEPRAZOLE MAGNESIUM 40 MG/1
40 FOR SUSPENSION ORAL DAILY
Status: DISCONTINUED | OUTPATIENT
Start: 2023-09-04 | End: 2023-09-04 | Stop reason: CLARIF

## 2023-09-04 RX ORDER — POTASSIUM CHLORIDE 20 MEQ/1
20 TABLET, EXTENDED RELEASE ORAL DAILY
Qty: 30 TABLET | Refills: 0 | Status: SHIPPED
Start: 2023-09-04

## 2023-09-04 RX ORDER — ALLOPURINOL 100 MG/1
200 TABLET ORAL DAILY
Status: DISCONTINUED | OUTPATIENT
Start: 2023-09-04 | End: 2023-09-04 | Stop reason: HOSPADM

## 2023-09-04 RX ORDER — CARVEDILOL 12.5 MG/1
12.5 TABLET ORAL 2 TIMES DAILY
Status: DISCONTINUED | OUTPATIENT
Start: 2023-09-04 | End: 2023-09-04 | Stop reason: HOSPADM

## 2023-09-04 RX ORDER — PANTOPRAZOLE SODIUM 40 MG/1
40 TABLET, DELAYED RELEASE ORAL
Qty: 90 TABLET | Refills: 1 | Status: SHIPPED | OUTPATIENT
Start: 2023-09-04

## 2023-09-04 RX ADMIN — Medication: at 05:19

## 2023-09-04 RX ADMIN — METOLAZONE 5 MG: 2.5 TABLET ORAL at 08:29

## 2023-09-04 RX ADMIN — ALLOPURINOL 200 MG: 100 TABLET ORAL at 08:24

## 2023-09-04 RX ADMIN — BUMETANIDE 2 MG: 1 TABLET ORAL at 08:24

## 2023-09-04 RX ADMIN — PANTOPRAZOLE SODIUM 40 MG: 40 TABLET, DELAYED RELEASE ORAL at 08:24

## 2023-09-04 RX ADMIN — IPRATROPIUM BROMIDE 0.5 MG: 0.5 SOLUTION RESPIRATORY (INHALATION) at 10:17

## 2023-09-04 RX ADMIN — CALCITRIOL CAPSULES 0.25 MCG 0.25 MCG: 0.25 CAPSULE ORAL at 08:24

## 2023-09-04 RX ADMIN — ASPIRIN 81 MG: 81 TABLET, CHEWABLE ORAL at 08:23

## 2023-09-04 RX ADMIN — SODIUM CHLORIDE, PRESERVATIVE FREE 10 ML: 5 INJECTION INTRAVENOUS at 08:24

## 2023-09-04 RX ADMIN — Medication 2000 UNITS: at 08:23

## 2023-09-04 RX ADMIN — AMIODARONE HYDROCHLORIDE 100 MG: 100 TABLET ORAL at 08:23

## 2023-09-04 RX ADMIN — IOPAMIDOL 70 ML: 755 INJECTION, SOLUTION INTRAVENOUS at 03:45

## 2023-09-04 RX ADMIN — ISOSORBIDE MONONITRATE 60 MG: 60 TABLET, EXTENDED RELEASE ORAL at 08:23

## 2023-09-04 RX ADMIN — SACUBITRIL AND VALSARTAN 1 TABLET: 49; 51 TABLET, FILM COATED ORAL at 08:23

## 2023-09-04 RX ADMIN — POTASSIUM CHLORIDE 20 MEQ: 1500 TABLET, EXTENDED RELEASE ORAL at 08:23

## 2023-09-04 RX ADMIN — CARVEDILOL 12.5 MG: 12.5 TABLET, FILM COATED ORAL at 08:24

## 2023-09-04 RX ADMIN — CETIRIZINE HYDROCHLORIDE 5 MG: 5 TABLET ORAL at 08:23

## 2023-09-04 RX ADMIN — Medication 500 MG: at 08:23

## 2023-09-04 ASSESSMENT — ENCOUNTER SYMPTOMS
NAUSEA: 0
GASTROINTESTINAL NEGATIVE: 1
EYES NEGATIVE: 1
VOMITING: 0
CHEST TIGHTNESS: 1
SHORTNESS OF BREATH: 1

## 2023-09-04 NOTE — DISCHARGE SUMMARY
Discharge Summary    NAME: Adam Goodwin  :  1955  MRN:  [id number]    Admit date:  2023  Discharge date:    2023    Admitting Physician:  Ho Best MD    Advance Directive: DNR      Primary Care Physician:  Tori Cruz MD    Discharge Diagnoses:    Atypical chest pain      Significant Diagnostic Studies:   CTA CHEST W WO CONTRAST    Result Date: 2023  EXAM:  CT ANGIOGRAM CHEST WITH AND WITHOUT IV CONTRAST. HISTORY:  Chest pain. Shortness of breath. Recent COVID-19. History of aortic dissection/aneurysm. PROCEDURE:  Contiguous axial CT images of the chest with and without IV contrast with multiplanar reformats, MIP images and 3-D reformats. COMPARISON:  CT chest of 2023. FINDINGS. Redemonstrated is stable aneurysmal dilatation of the proximal descending thoracic aorta measuring 4.2 x 3.4 cm with no evidence of aneurysm leak. Redemonstrated is a stable thrombosed dissection in the proximal descending thoracic aorta. The heart is enlarged. There are calcified granulomas. There are bilateral ground-glass opacities and minimal consolidation in the right middle lobe, suspicious for pneumonia. There is bibasilar dependent atelectasis. No acute findings in the visualized portion of the abdomen. There are degenerative changes in the spine. There are chronic anterior wedge compression deformities at multiple levels of the thoracic spine. Impression: Bilateral ground-glass opacities and minimal right middle lobe consolidation, suspicious for pneumonia. Stable thoracic aortic aneurysm and dissection as described. Cardiomegaly. All CT scans are performed using dose optimization techniques as appropriate to the performed exam and include at least one of the following: Automated exposure control, adjustment of the mA and/or kV according to size, and the use of iterative reconstruction technique.   ______________________________________ Electronically signed by: Holly Amor

## 2023-09-04 NOTE — ED PROVIDER NOTES
[facility] [address] [facility]      Pt Name: Ronnie Zeng  MRN: [id number]  [address] 1955  Date of evaluation: 9/4/2023  Provider: Lele Bee MD    CHIEF COMPLAINT       Chief Complaint   Patient presents with    Chest Pain     Cp for \"awhile\" since dx with covid 3 weeks ago per pt- on 3.5 L NC all the time at home per pt         HISTORY OF PRESENT ILLNESS   (Location/Symptom, Timing/Onset,Context/Setting, Quality, Duration, Modifying Factors, Severity)  Note limiting factors. Ronnie Zeng is a 80 y.o. male who presents to the emergency department with complaint of pain on right side of his chest. Says it has been present for a month or so. Had covid about 3 weeks ago and thought that may be the reason he was having the pain, but all other sxs had resolved except the pain. Worsens when he lays on his right side. Taking tums and nexium which do seem to help. Has also been taking nitro pretty frequently but hasn't noticed any improvement with that. Says he has been belching a lot as well. Pain comes in episodes of worsened severity and has soa worsened during these episodes, then feels weak all over afterwards. Has had worsened soa overall since he had covid. On oxygen chronically at 3L for the last year or so. Has h/o CAD, CKD, HFpEF as well as prior aortic dissection. Says the pain he is experiencing tonight is unlike what he has had in the past. Did not any notable pain when he was diagnosed with aortic dissection and says it was essentially an incidental diagnosis while he was being evaluated for heart failure. Was seen at [facility] about 6 months ago and they told him not to worry about it because it was so calcified that they didn't think that would be at risk of rupture. HPI    NursingNotes were reviewed. REVIEW OF SYSTEMS    (2-9 systems for level 4, 10 or more for level 5)     Review of Systems   Constitutional:  Positive for fatigue. HENT: Negative.

## 2023-09-04 NOTE — CARE COORDINATION
09/04/23 0702   Readmission Assessment   Number of Days since last admission? 1-7 days   Previous Disposition Home with Home Health   Who is being Interviewed Patient   What was the patient's/caregiver's perception as to why they think they needed to return back to the hospital? Did not realize care needs would be so extensive  (not enough time to go.)   Did you visit your Primary Care Physician after you left the hospital, before you returned this time? No   Why weren't you able to visit your PCP? Other (Comment)   Did you see a specialist, such as Cardiac, Pulmonary, Orthopedic Physician, etc. after you left the hospital? No   Who advised the patient to return to the hospital? Self-referral   Does the patient report anything that got in the way of taking their medications? No   In our efforts to provide the best possible care to you and others like you, can you think of anything that we could have done to help you after you left the hospital the first time, so that you might not have needed to return so soon? Arrange for more help when leaving the hospital;Teaching during hospitalization regarding your illness; Teach back instructions regarding management of illness     Electronically signed by WILLA Bennett on 9/4/2023 at 7:03 AM

## 2023-09-04 NOTE — H&P
[facility] History and Physical    Patient Information:  Patient: Jesus Canas  MRN: [id number]   Acct: [de-identified]  YOB: 1955  Admit Date: 9/4/2023      Date of Service: 9/4/2023   Primary Care Physician: Kay Khan MD  Advance Directive: DNR  Health Care Proxy: his wife, Mrs. Tao Shearer, [phone]         SUBJECTIVE:    Chief Complaint   Patient presents with    Chest Pain     Cp for \"awhile\" since dx with covid 3 weeks ago per pt- on 3.5 L NC all the time at home per pt     EP Sign Out:  Chest Pain  CT pending    HPI:  Mr. Jesus Canas is a pleasant 80year old  american gentleman from home. He has a past medical history of CAD. He has had chest pain for the last few weeks,. He had presented tonight with same and had a CTA done which is consistent with COVID which he just had a few weeks ago. He states that the chest pain was right sided. The pain radiated to the middle. The pain is exacerbated by laying on his right side, and by the exertion of getting himself up. The chest pain is alleviated by \"it takes a while but I use tums\" and he also states a pair of generic nexium with 6 tums calms it down. Her states that he is fine during the day but that around 6 or 7 at night \"it kicks in.\"    He states that he could play thirty holes of golf prior to COVID but that he can't play now that he has COVID. Review of Systems:   Review of Systems   Constitutional:  Positive for fatigue (he states that this has neen here ever since he had COVID). Negative for diaphoresis. Respiratory:  Positive for chest tightness and shortness of breath. Cardiovascular:  Positive for chest pain. HAS chest pressure   Gastrointestinal:  Negative for nausea and vomiting. Neurological:  Positive for weakness and light-headedness (sometimes). Negative for syncope. Psychiatric/Behavioral:  Negative for confusion.       Past Medical History:   Diagnosis Date    AAA

## 2023-09-14 ENCOUNTER — OFFICE VISIT (OUTPATIENT)
Dept: CARDIOLOGY CLINIC | Age: 68
End: 2023-09-14
Payer: MEDICARE

## 2023-09-14 VITALS
BODY MASS INDEX: 30.66 KG/M2 | HEART RATE: 64 BPM | WEIGHT: 219 LBS | HEIGHT: 71 IN | DIASTOLIC BLOOD PRESSURE: 74 MMHG | SYSTOLIC BLOOD PRESSURE: 132 MMHG

## 2023-09-14 DIAGNOSIS — Z79.01 CHRONIC ANTICOAGULATION: ICD-10-CM

## 2023-09-14 DIAGNOSIS — I25.10 CORONARY ARTERY DISEASE INVOLVING NATIVE CORONARY ARTERY OF NATIVE HEART WITHOUT ANGINA PECTORIS: ICD-10-CM

## 2023-09-14 DIAGNOSIS — I48.0 PAF (PAROXYSMAL ATRIAL FIBRILLATION) (HCC): Primary | ICD-10-CM

## 2023-09-14 DIAGNOSIS — I10 ESSENTIAL HYPERTENSION: ICD-10-CM

## 2023-09-14 DIAGNOSIS — I48.91 ATRIAL FIBRILLATION STATUS POST CARDIOVERSION (HCC): ICD-10-CM

## 2023-09-14 DIAGNOSIS — E78.2 MIXED HYPERLIPIDEMIA: ICD-10-CM

## 2023-09-14 DIAGNOSIS — I71.20 THORACIC AORTIC ANEURYSM WITHOUT RUPTURE, UNSPECIFIED PART (HCC): ICD-10-CM

## 2023-09-14 DIAGNOSIS — Z95.5 HX OF HEART ARTERY STENT: ICD-10-CM

## 2023-09-14 DIAGNOSIS — R06.02 SHORTNESS OF BREATH: ICD-10-CM

## 2023-09-14 DIAGNOSIS — Z79.899 ON AMIODARONE THERAPY: ICD-10-CM

## 2023-09-14 PROCEDURE — G8417 CALC BMI ABV UP PARAM F/U: HCPCS | Performed by: INTERNAL MEDICINE

## 2023-09-14 PROCEDURE — 3078F DIAST BP <80 MM HG: CPT | Performed by: INTERNAL MEDICINE

## 2023-09-14 PROCEDURE — 1123F ACP DISCUSS/DSCN MKR DOCD: CPT | Performed by: INTERNAL MEDICINE

## 2023-09-14 PROCEDURE — 3017F COLORECTAL CA SCREEN DOC REV: CPT | Performed by: INTERNAL MEDICINE

## 2023-09-14 PROCEDURE — G8427 DOCREV CUR MEDS BY ELIG CLIN: HCPCS | Performed by: INTERNAL MEDICINE

## 2023-09-14 PROCEDURE — 99213 OFFICE O/P EST LOW 20 MIN: CPT | Performed by: INTERNAL MEDICINE

## 2023-09-14 PROCEDURE — 93000 ELECTROCARDIOGRAM COMPLETE: CPT | Performed by: INTERNAL MEDICINE

## 2023-09-14 PROCEDURE — 3075F SYST BP GE 130 - 139MM HG: CPT | Performed by: INTERNAL MEDICINE

## 2023-09-14 PROCEDURE — 1036F TOBACCO NON-USER: CPT | Performed by: INTERNAL MEDICINE

## 2023-09-14 ASSESSMENT — ENCOUNTER SYMPTOMS
DIARRHEA: 0
RESPIRATORY NEGATIVE: 1
GASTROINTESTINAL NEGATIVE: 1
SHORTNESS OF BREATH: 0
VOMITING: 0
EYES NEGATIVE: 1
NAUSEA: 0

## 2023-09-21 RX ORDER — AMIODARONE HYDROCHLORIDE 200 MG/1
TABLET ORAL
COMMUNITY

## 2023-09-21 RX ORDER — MONTELUKAST SODIUM 10 MG/1
TABLET ORAL
COMMUNITY

## 2023-09-21 RX ORDER — ESOMEPRAZOLE MAGNESIUM 40 MG/1
CAPSULE, DELAYED RELEASE ORAL
COMMUNITY
Start: 2023-08-22

## 2023-09-21 RX ORDER — ROSUVASTATIN CALCIUM 20 MG/1
TABLET, COATED ORAL
COMMUNITY

## 2023-09-21 RX ORDER — CARVEDILOL 25 MG/1
TABLET, FILM COATED ORAL
COMMUNITY
Start: 2021-09-21

## 2023-09-21 RX ORDER — LEVOCETIRIZINE DIHYDROCHLORIDE 5 MG/1
TABLET, FILM COATED ORAL
COMMUNITY

## 2023-09-21 RX ORDER — INSULIN LISPRO 200 [IU]/ML
INJECTION, SOLUTION SUBCUTANEOUS
COMMUNITY
Start: 2022-12-21

## 2023-09-21 RX ORDER — ALBUTEROL SULFATE 90 UG/1
AEROSOL, METERED RESPIRATORY (INHALATION)
COMMUNITY

## 2023-09-21 RX ORDER — NIFEDIPINE 60 MG/1
TABLET, FILM COATED, EXTENDED RELEASE ORAL
COMMUNITY
Start: 2023-08-17

## 2023-09-21 RX ORDER — BUMETANIDE 2 MG/1
TABLET ORAL
COMMUNITY
Start: 2023-08-17

## 2023-09-21 RX ORDER — UREA 10 %
LOTION (ML) TOPICAL EVERY 8 HOURS
COMMUNITY

## 2023-09-21 RX ORDER — AMOXICILLIN 250 MG
2 CAPSULE ORAL DAILY PRN
COMMUNITY
Start: 2021-10-28

## 2023-09-25 ENCOUNTER — OFFICE VISIT (OUTPATIENT)
Dept: PULMONOLOGY | Age: 68
End: 2023-09-25
Payer: MEDICARE

## 2023-09-25 VITALS
OXYGEN SATURATION: 91 % | DIASTOLIC BLOOD PRESSURE: 64 MMHG | BODY MASS INDEX: 30.8 KG/M2 | SYSTOLIC BLOOD PRESSURE: 118 MMHG | HEART RATE: 51 BPM | HEIGHT: 71 IN | TEMPERATURE: 98 F | WEIGHT: 220 LBS

## 2023-09-25 DIAGNOSIS — E66.9 OBESITY (BMI 30-39.9): ICD-10-CM

## 2023-09-25 DIAGNOSIS — I50.32 CHRONIC HEART FAILURE WITH PRESERVED EJECTION FRACTION (HCC): ICD-10-CM

## 2023-09-25 DIAGNOSIS — J98.4 RESTRICTIVE LUNG DISEASE: ICD-10-CM

## 2023-09-25 DIAGNOSIS — G47.33 OSA (OBSTRUCTIVE SLEEP APNEA): Primary | ICD-10-CM

## 2023-09-25 DIAGNOSIS — R06.2 WHEEZING: ICD-10-CM

## 2023-09-25 DIAGNOSIS — G47.34 SLEEP RELATED HYPOXIA: ICD-10-CM

## 2023-09-25 DIAGNOSIS — J96.11 CHRONIC RESPIRATORY FAILURE WITH HYPOXIA (HCC): ICD-10-CM

## 2023-09-25 PROCEDURE — 3017F COLORECTAL CA SCREEN DOC REV: CPT | Performed by: INTERNAL MEDICINE

## 2023-09-25 PROCEDURE — 3078F DIAST BP <80 MM HG: CPT | Performed by: INTERNAL MEDICINE

## 2023-09-25 PROCEDURE — G8427 DOCREV CUR MEDS BY ELIG CLIN: HCPCS | Performed by: INTERNAL MEDICINE

## 2023-09-25 PROCEDURE — 99214 OFFICE O/P EST MOD 30 MIN: CPT | Performed by: INTERNAL MEDICINE

## 2023-09-25 PROCEDURE — G8417 CALC BMI ABV UP PARAM F/U: HCPCS | Performed by: INTERNAL MEDICINE

## 2023-09-25 PROCEDURE — 3074F SYST BP LT 130 MM HG: CPT | Performed by: INTERNAL MEDICINE

## 2023-09-25 PROCEDURE — 1036F TOBACCO NON-USER: CPT | Performed by: INTERNAL MEDICINE

## 2023-09-25 PROCEDURE — 1123F ACP DISCUSS/DSCN MKR DOCD: CPT | Performed by: INTERNAL MEDICINE

## 2023-09-25 ASSESSMENT — ENCOUNTER SYMPTOMS
BACK PAIN: 0
ABDOMINAL DISTENTION: 0
ANAL BLEEDING: 0
SHORTNESS OF BREATH: 1
WHEEZING: 0
ABDOMINAL PAIN: 0
RHINORRHEA: 0
CHEST TIGHTNESS: 0
COUGH: 1
APNEA: 1

## 2023-09-25 NOTE — PROGRESS NOTES
Pulmonary and Sleep Medicine    Elayne Wahl (:  1955) is a 80 y.o. male,Established patient, here for evaluation of the following chief complaint(s):  Follow-up (6 mo fu )      Referring physician:  No referring provider defined for this encounter. ASSESSMENT/PLAN:  1. SAMMY (obstructive sleep apnea)  2. Chronic respiratory failure with hypoxia (HCC)  3. Sleep related hypoxia  4. Chronic heart failure with preserved ejection fraction ([address]). Ejection fraction 40%  5. Obesity (BMI 30-39.9)  6. Restrictive lung disease  7. Wheezing  -     tiotropium-olodaterol (STIOLTO RESPIMAT) 2.5-2.5 MCG/ACT AERS; Inhale 2 puffs into the lungs daily, Disp-1 each, R-11Normal        Continue current management with the BiPAP. He is compliant with the BiPAP he feels the BiPAP helps. We will start on Stiolto inhaler. Continue supplemental oxygen as necessary to maintain adequate oxygenation. Arley Castro MD, [facility]    Return in about 3 months (around 2023). SUBJECTIVE/OBJECTIVE:  Is here for follow-up on shortness of breath and chronic respiratory failure with restrictive lung disease. He does wheeze occasionally. He does use an albuterol inhaler. He feels albuterol helps him. He is out of his Spiriva. He continues to use supplemental oxygen. He is using his BiPAP and is compliant with the BIPAP and feels the BIPAP helps him. Prior to Visit Medications    Medication Sig Taking?  Authorizing Provider   blood glucose test strips (CONTOUR TEST) strip TEST BLOOD SUGAR TWICE DAILY Yes Historical Provider, MD   insulin lispro (HUMALOG KWIKPEN) 200 UNIT/ML SOPN pen  Yes Historical Provider, MD   senna-docusate (Ardyth Butter) 8.6-50 MG per tablet Take 2 tablets by mouth daily as needed Yes Historical Provider, MD   amiodarone (CORDARONE) 200 MG tablet Take by mouth  Historical Provider, MD   esomeprazole ([place]) 40 MG delayed release capsule   Historical Provider, MD

## 2023-10-02 ENCOUNTER — TELEPHONE (OUTPATIENT)
Dept: GASTROENTEROLOGY | Age: 68
End: 2023-10-02

## 2023-10-02 DIAGNOSIS — R97.8 ELEVATED CA 19-9 LEVEL: Primary | ICD-10-CM

## 2023-10-02 DIAGNOSIS — K86.2 PANCREATIC CYST: ICD-10-CM

## 2023-10-02 NOTE — TELEPHONE ENCOUNTER
I called patient to see if he wanted to set up his Ct that was recommended by Dr Leia Diaz in either November or December. PT declined. PT advised to contact us if he changes his mind.      ----- Message -----   From: Leanna Madsen MD   Sent: 8/28/2023  11:18 PM CDT   To: Savita Bello MA     Yes, repeat imaging in Nov/Dec 2023

## 2023-10-19 RX ORDER — ISOSORBIDE MONONITRATE 60 MG/1
60 TABLET, EXTENDED RELEASE ORAL 2 TIMES DAILY
Qty: 60 TABLET | Refills: 5 | Status: SHIPPED | OUTPATIENT
Start: 2023-10-19

## 2023-11-28 RX ORDER — MINOXIDIL 2.5 MG/1
TABLET ORAL
Qty: 60 TABLET | Refills: 5 | Status: SHIPPED | OUTPATIENT
Start: 2023-11-28

## 2024-04-15 ENCOUNTER — APPOINTMENT (OUTPATIENT)
Dept: CT IMAGING | Age: 69
DRG: 281 | End: 2024-04-15
Payer: MEDICARE

## 2024-04-15 ENCOUNTER — HOSPITAL ENCOUNTER (INPATIENT)
Age: 69
LOS: 2 days | Discharge: HOME | DRG: 281 | End: 2024-04-17
Attending: EMERGENCY MEDICINE | Admitting: HOSPITALIST
Payer: MEDICARE

## 2024-04-15 ENCOUNTER — APPOINTMENT (OUTPATIENT)
Dept: GENERAL RADIOLOGY | Age: 69
DRG: 281 | End: 2024-04-15
Payer: MEDICARE

## 2024-04-15 DIAGNOSIS — R07.9 CHEST PAIN, UNSPECIFIED TYPE: Primary | ICD-10-CM

## 2024-04-15 DIAGNOSIS — I48.0 PAF (PAROXYSMAL ATRIAL FIBRILLATION) (HCC): ICD-10-CM

## 2024-04-15 PROBLEM — K59.00 CONSTIPATION: Status: ACTIVE | Noted: 2024-04-15

## 2024-04-15 LAB
ALBUMIN SERPL-MCNC: 4.3 G/DL (ref 3.5–5.2)
ALP SERPL-CCNC: 63 U/L (ref 40–130)
ALT SERPL-CCNC: 13 U/L (ref 5–41)
ANION GAP SERPL CALCULATED.3IONS-SCNC: 14 MMOL/L (ref 7–19)
ANTI-XA UNFRAC HEPARIN: >1.1 IU/ML (ref 0.3–0.7)
APTT PPP: 37.1 SEC (ref 26–36.2)
AST SERPL-CCNC: 15 U/L (ref 5–40)
BASOPHILS # BLD: 0.1 K/UL (ref 0–0.2)
BASOPHILS NFR BLD: 1 % (ref 0–1)
BILIRUB SERPL-MCNC: 0.5 MG/DL (ref 0.2–1.2)
BUN SERPL-MCNC: 59 MG/DL (ref 8–23)
CALCIUM SERPL-MCNC: 9.7 MG/DL (ref 8.8–10.2)
CHLORIDE SERPL-SCNC: 94 MMOL/L (ref 98–111)
CHOLEST SERPL-MCNC: 96 MG/DL (ref 160–199)
CO2 SERPL-SCNC: 33 MMOL/L (ref 22–29)
CREAT SERPL-MCNC: 3.1 MG/DL (ref 0.5–1.2)
D DIMER PPP FEU-MCNC: 0.37 UG/ML FEU (ref 0–0.48)
EOSINOPHIL # BLD: 0.3 K/UL (ref 0–0.6)
EOSINOPHIL NFR BLD: 4.6 % (ref 0–5)
ERYTHROCYTE [DISTWIDTH] IN BLOOD BY AUTOMATED COUNT: 17.2 % (ref 11.5–14.5)
GLUCOSE BLD-MCNC: 169 MG/DL (ref 70–99)
GLUCOSE SERPL-MCNC: 212 MG/DL (ref 74–109)
HBA1C MFR BLD: 6.8 % (ref 4–6)
HCT VFR BLD AUTO: 42.1 % (ref 42–52)
HDLC SERPL-MCNC: 43 MG/DL (ref 55–121)
HGB BLD-MCNC: 12.5 G/DL (ref 14–18)
IMM GRANULOCYTES # BLD: 0 K/UL
INR PPP: 1.43 (ref 0.88–1.18)
LDLC SERPL CALC-MCNC: 34 MG/DL
LIPASE SERPL-CCNC: 21 U/L (ref 13–60)
LYMPHOCYTES # BLD: 1.8 K/UL (ref 1.1–4.5)
LYMPHOCYTES NFR BLD: 24.3 % (ref 20–40)
MAGNESIUM SERPL-MCNC: 2.2 MG/DL (ref 1.6–2.4)
MAGNESIUM SERPL-MCNC: 2.2 MG/DL (ref 1.6–2.4)
MCH RBC QN AUTO: 24.9 PG (ref 27–31)
MCHC RBC AUTO-ENTMCNC: 29.7 G/DL (ref 33–37)
MCV RBC AUTO: 83.9 FL (ref 80–94)
MONOCYTES # BLD: 0.7 K/UL (ref 0–0.9)
MONOCYTES NFR BLD: 10.1 % (ref 0–10)
NEUTROPHILS # BLD: 4.4 K/UL (ref 1.5–7.5)
NEUTS SEG NFR BLD: 59.9 % (ref 50–65)
PERFORMED ON: ABNORMAL
PHOSPHATE SERPL-MCNC: 3.1 MG/DL (ref 2.5–4.5)
PLATELET # BLD AUTO: 170 K/UL (ref 130–400)
PMV BLD AUTO: 11.1 FL (ref 9.4–12.4)
POTASSIUM SERPL-SCNC: 3 MMOL/L (ref 3.5–5)
PROT SERPL-MCNC: 7.1 G/DL (ref 6.6–8.7)
PROTHROMBIN TIME: 17.1 SEC (ref 12–14.6)
RBC # BLD AUTO: 5.02 M/UL (ref 4.7–6.1)
SODIUM SERPL-SCNC: 141 MMOL/L (ref 136–145)
TRIGL SERPL-MCNC: 94 MG/DL (ref 0–149)
TROPONIN, HIGH SENSITIVITY: 87 NG/L (ref 0–22)
TROPONIN, HIGH SENSITIVITY: 99 NG/L (ref 0–22)
TSH SERPL DL<=0.005 MIU/L-ACNC: 1.36 UIU/ML (ref 0.27–4.2)
WBC # BLD AUTO: 7.3 K/UL (ref 4.8–10.8)

## 2024-04-15 PROCEDURE — 6360000002 HC RX W HCPCS: Performed by: HOSPITALIST

## 2024-04-15 PROCEDURE — 85379 FIBRIN DEGRADATION QUANT: CPT

## 2024-04-15 PROCEDURE — 83690 ASSAY OF LIPASE: CPT

## 2024-04-15 PROCEDURE — 2700000000 HC OXYGEN THERAPY PER DAY

## 2024-04-15 PROCEDURE — 83735 ASSAY OF MAGNESIUM: CPT

## 2024-04-15 PROCEDURE — 84484 ASSAY OF TROPONIN QUANT: CPT

## 2024-04-15 PROCEDURE — 99285 EMERGENCY DEPT VISIT HI MDM: CPT

## 2024-04-15 PROCEDURE — 85730 THROMBOPLASTIN TIME PARTIAL: CPT

## 2024-04-15 PROCEDURE — 80061 LIPID PANEL: CPT

## 2024-04-15 PROCEDURE — 80053 COMPREHEN METABOLIC PANEL: CPT

## 2024-04-15 PROCEDURE — 94760 N-INVAS EAR/PLS OXIMETRY 1: CPT

## 2024-04-15 PROCEDURE — 85025 COMPLETE CBC W/AUTO DIFF WBC: CPT

## 2024-04-15 PROCEDURE — 71045 X-RAY EXAM CHEST 1 VIEW: CPT

## 2024-04-15 PROCEDURE — 85520 HEPARIN ASSAY: CPT

## 2024-04-15 PROCEDURE — 6370000000 HC RX 637 (ALT 250 FOR IP): Performed by: EMERGENCY MEDICINE

## 2024-04-15 PROCEDURE — 94640 AIRWAY INHALATION TREATMENT: CPT

## 2024-04-15 PROCEDURE — 74176 CT ABD & PELVIS W/O CONTRAST: CPT

## 2024-04-15 PROCEDURE — 85610 PROTHROMBIN TIME: CPT

## 2024-04-15 PROCEDURE — 84443 ASSAY THYROID STIM HORMONE: CPT

## 2024-04-15 PROCEDURE — 2140000000 HC CCU INTERMEDIATE R&B

## 2024-04-15 PROCEDURE — 36415 COLL VENOUS BLD VENIPUNCTURE: CPT

## 2024-04-15 PROCEDURE — 83036 HEMOGLOBIN GLYCOSYLATED A1C: CPT

## 2024-04-15 PROCEDURE — 84100 ASSAY OF PHOSPHORUS: CPT

## 2024-04-15 RX ORDER — CALCITRIOL 0.25 UG/1
0.25 CAPSULE, LIQUID FILLED ORAL DAILY
Status: DISCONTINUED | OUTPATIENT
Start: 2024-04-16 | End: 2024-04-17 | Stop reason: HOSPADM

## 2024-04-15 RX ORDER — NIFEDIPINE 60 MG/1
60 TABLET, EXTENDED RELEASE ORAL DAILY
Status: DISCONTINUED | OUTPATIENT
Start: 2024-04-16 | End: 2024-04-17 | Stop reason: HOSPADM

## 2024-04-15 RX ORDER — DOCUSATE SODIUM 100 MG/1
100 CAPSULE, LIQUID FILLED ORAL 2 TIMES DAILY
Status: DISCONTINUED | OUTPATIENT
Start: 2024-04-15 | End: 2024-04-17 | Stop reason: HOSPADM

## 2024-04-15 RX ORDER — HEPARIN SODIUM 1000 [USP'U]/ML
2000 INJECTION, SOLUTION INTRAVENOUS; SUBCUTANEOUS PRN
Status: DISCONTINUED | OUTPATIENT
Start: 2024-04-15 | End: 2024-04-17

## 2024-04-15 RX ORDER — POLYETHYLENE GLYCOL 3350 17 G/17G
17 POWDER, FOR SOLUTION ORAL DAILY
Status: DISCONTINUED | OUTPATIENT
Start: 2024-04-16 | End: 2024-04-17 | Stop reason: HOSPADM

## 2024-04-15 RX ORDER — ASPIRIN 81 MG/1
324 TABLET, CHEWABLE ORAL ONCE
Status: COMPLETED | OUTPATIENT
Start: 2024-04-15 | End: 2024-04-15

## 2024-04-15 RX ORDER — MONTELUKAST SODIUM 10 MG/1
10 TABLET ORAL NIGHTLY
Status: DISCONTINUED | OUTPATIENT
Start: 2024-04-15 | End: 2024-04-17 | Stop reason: HOSPADM

## 2024-04-15 RX ORDER — CETIRIZINE HYDROCHLORIDE 10 MG/1
5 TABLET ORAL NIGHTLY
Status: DISCONTINUED | OUTPATIENT
Start: 2024-04-15 | End: 2024-04-17 | Stop reason: HOSPADM

## 2024-04-15 RX ORDER — ONDANSETRON 4 MG/1
4 TABLET, ORALLY DISINTEGRATING ORAL EVERY 8 HOURS PRN
Status: DISCONTINUED | OUTPATIENT
Start: 2024-04-15 | End: 2024-04-17 | Stop reason: HOSPADM

## 2024-04-15 RX ORDER — MAGNESIUM SULFATE IN WATER 40 MG/ML
2000 INJECTION, SOLUTION INTRAVENOUS PRN
Status: DISCONTINUED | OUTPATIENT
Start: 2024-04-15 | End: 2024-04-17 | Stop reason: HOSPADM

## 2024-04-15 RX ORDER — ASCORBIC ACID 500 MG
500 TABLET ORAL DAILY
Status: DISCONTINUED | OUTPATIENT
Start: 2024-04-16 | End: 2024-04-17 | Stop reason: HOSPADM

## 2024-04-15 RX ORDER — PANTOPRAZOLE SODIUM 40 MG/1
40 TABLET, DELAYED RELEASE ORAL
Status: DISCONTINUED | OUTPATIENT
Start: 2024-04-16 | End: 2024-04-16

## 2024-04-15 RX ORDER — VITAMIN B COMPLEX
2000 TABLET ORAL DAILY
Status: DISCONTINUED | OUTPATIENT
Start: 2024-04-16 | End: 2024-04-17 | Stop reason: HOSPADM

## 2024-04-15 RX ORDER — MINOXIDIL 2.5 MG/1
2.5 TABLET ORAL 2 TIMES DAILY
Status: DISCONTINUED | OUTPATIENT
Start: 2024-04-15 | End: 2024-04-17 | Stop reason: HOSPADM

## 2024-04-15 RX ORDER — NITROGLYCERIN 0.4 MG/1
0.4 TABLET SUBLINGUAL EVERY 5 MIN PRN
Status: DISCONTINUED | OUTPATIENT
Start: 2024-04-15 | End: 2024-04-17 | Stop reason: HOSPADM

## 2024-04-15 RX ORDER — INSULIN GLARGINE 100 [IU]/ML
30 INJECTION, SOLUTION SUBCUTANEOUS EVERY MORNING
Status: DISCONTINUED | OUTPATIENT
Start: 2024-04-16 | End: 2024-04-16

## 2024-04-15 RX ORDER — HEPARIN SODIUM 1000 [USP'U]/ML
2000 INJECTION, SOLUTION INTRAVENOUS; SUBCUTANEOUS PRN
Status: DISCONTINUED | OUTPATIENT
Start: 2024-04-15 | End: 2024-04-15 | Stop reason: SDUPTHER

## 2024-04-15 RX ORDER — CYCLOBENZAPRINE HCL 10 MG
10 TABLET ORAL 2 TIMES DAILY PRN
Status: DISCONTINUED | OUTPATIENT
Start: 2024-04-15 | End: 2024-04-17 | Stop reason: HOSPADM

## 2024-04-15 RX ORDER — CARVEDILOL 12.5 MG/1
12.5 TABLET ORAL 2 TIMES DAILY
Status: DISCONTINUED | OUTPATIENT
Start: 2024-04-15 | End: 2024-04-17 | Stop reason: HOSPADM

## 2024-04-15 RX ORDER — ACETAMINOPHEN 650 MG/1
650 SUPPOSITORY RECTAL EVERY 4 HOURS PRN
Status: DISCONTINUED | OUTPATIENT
Start: 2024-04-15 | End: 2024-04-17 | Stop reason: HOSPADM

## 2024-04-15 RX ORDER — BUMETANIDE 1 MG/1
2 TABLET ORAL 2 TIMES DAILY
Status: DISCONTINUED | OUTPATIENT
Start: 2024-04-15 | End: 2024-04-17 | Stop reason: HOSPADM

## 2024-04-15 RX ORDER — ATORVASTATIN CALCIUM 40 MG/1
80 TABLET, FILM COATED ORAL NIGHTLY
Status: DISCONTINUED | OUTPATIENT
Start: 2024-04-15 | End: 2024-04-17 | Stop reason: HOSPADM

## 2024-04-15 RX ORDER — HEPARIN SODIUM 10000 [USP'U]/100ML
5-30 INJECTION, SOLUTION INTRAVENOUS CONTINUOUS
Status: DISCONTINUED | OUTPATIENT
Start: 2024-04-15 | End: 2024-04-15 | Stop reason: SDUPTHER

## 2024-04-15 RX ORDER — ALBUTEROL SULFATE 2.5 MG/3ML
2.5 SOLUTION RESPIRATORY (INHALATION) EVERY 4 HOURS PRN
Status: DISCONTINUED | OUTPATIENT
Start: 2024-04-15 | End: 2024-04-17 | Stop reason: HOSPADM

## 2024-04-15 RX ORDER — HEPARIN SODIUM 1000 [USP'U]/ML
4000 INJECTION, SOLUTION INTRAVENOUS; SUBCUTANEOUS PRN
Status: DISCONTINUED | OUTPATIENT
Start: 2024-04-15 | End: 2024-04-15 | Stop reason: SDUPTHER

## 2024-04-15 RX ORDER — SODIUM CHLORIDE 0.9 % (FLUSH) 0.9 %
5-40 SYRINGE (ML) INJECTION EVERY 12 HOURS SCHEDULED
Status: DISCONTINUED | OUTPATIENT
Start: 2024-04-15 | End: 2024-04-17 | Stop reason: HOSPADM

## 2024-04-15 RX ORDER — HEPARIN SODIUM 1000 [USP'U]/ML
4000 INJECTION, SOLUTION INTRAVENOUS; SUBCUTANEOUS ONCE
Status: COMPLETED | OUTPATIENT
Start: 2024-04-15 | End: 2024-04-15

## 2024-04-15 RX ORDER — ACETAMINOPHEN 325 MG/1
650 TABLET ORAL EVERY 4 HOURS PRN
Status: DISCONTINUED | OUTPATIENT
Start: 2024-04-15 | End: 2024-04-17 | Stop reason: HOSPADM

## 2024-04-15 RX ORDER — HEPARIN SODIUM 1000 [USP'U]/ML
4000 INJECTION, SOLUTION INTRAVENOUS; SUBCUTANEOUS ONCE
Status: DISCONTINUED | OUTPATIENT
Start: 2024-04-15 | End: 2024-04-15 | Stop reason: SDUPTHER

## 2024-04-15 RX ORDER — POTASSIUM CHLORIDE 7.45 MG/ML
10 INJECTION INTRAVENOUS PRN
Status: DISCONTINUED | OUTPATIENT
Start: 2024-04-15 | End: 2024-04-17 | Stop reason: HOSPADM

## 2024-04-15 RX ORDER — POTASSIUM CHLORIDE 20 MEQ/1
40 TABLET, EXTENDED RELEASE ORAL PRN
Status: DISCONTINUED | OUTPATIENT
Start: 2024-04-15 | End: 2024-04-17 | Stop reason: HOSPADM

## 2024-04-15 RX ORDER — SODIUM CHLORIDE 0.9 % (FLUSH) 0.9 %
5-40 SYRINGE (ML) INJECTION PRN
Status: DISCONTINUED | OUTPATIENT
Start: 2024-04-15 | End: 2024-04-17 | Stop reason: HOSPADM

## 2024-04-15 RX ORDER — SODIUM CHLORIDE 9 MG/ML
INJECTION, SOLUTION INTRAVENOUS PRN
Status: DISCONTINUED | OUTPATIENT
Start: 2024-04-15 | End: 2024-04-17 | Stop reason: HOSPADM

## 2024-04-15 RX ORDER — POTASSIUM CHLORIDE 20 MEQ/1
20 TABLET, EXTENDED RELEASE ORAL
Status: DISCONTINUED | OUTPATIENT
Start: 2024-04-16 | End: 2024-04-17

## 2024-04-15 RX ORDER — HEPARIN SODIUM 10000 [USP'U]/100ML
5-30 INJECTION, SOLUTION INTRAVENOUS CONTINUOUS
Status: DISCONTINUED | OUTPATIENT
Start: 2024-04-15 | End: 2024-04-17

## 2024-04-15 RX ORDER — HEPARIN SODIUM 1000 [USP'U]/ML
4000 INJECTION, SOLUTION INTRAVENOUS; SUBCUTANEOUS PRN
Status: DISCONTINUED | OUTPATIENT
Start: 2024-04-15 | End: 2024-04-17

## 2024-04-15 RX ORDER — AMIODARONE HYDROCHLORIDE 200 MG/1
200 TABLET ORAL DAILY
Status: DISCONTINUED | OUTPATIENT
Start: 2024-04-16 | End: 2024-04-17 | Stop reason: HOSPADM

## 2024-04-15 RX ORDER — ASPIRIN 81 MG/1
81 TABLET, CHEWABLE ORAL DAILY
Status: DISCONTINUED | OUTPATIENT
Start: 2024-04-16 | End: 2024-04-17 | Stop reason: HOSPADM

## 2024-04-15 RX ORDER — METOLAZONE 2.5 MG/1
5 TABLET ORAL DAILY
Status: DISCONTINUED | OUTPATIENT
Start: 2024-04-16 | End: 2024-04-16

## 2024-04-15 RX ORDER — ONDANSETRON 2 MG/ML
4 INJECTION INTRAMUSCULAR; INTRAVENOUS EVERY 6 HOURS PRN
Status: DISCONTINUED | OUTPATIENT
Start: 2024-04-15 | End: 2024-04-17 | Stop reason: HOSPADM

## 2024-04-15 RX ORDER — ALLOPURINOL 100 MG/1
200 TABLET ORAL DAILY
Status: DISCONTINUED | OUTPATIENT
Start: 2024-04-16 | End: 2024-04-17 | Stop reason: HOSPADM

## 2024-04-15 RX ORDER — ISOSORBIDE MONONITRATE 60 MG/1
60 TABLET, EXTENDED RELEASE ORAL 2 TIMES DAILY
Status: DISCONTINUED | OUTPATIENT
Start: 2024-04-15 | End: 2024-04-17 | Stop reason: HOSPADM

## 2024-04-15 RX ORDER — INSULIN GLARGINE 100 [IU]/ML
20 INJECTION, SOLUTION SUBCUTANEOUS NIGHTLY
Status: DISCONTINUED | OUTPATIENT
Start: 2024-04-15 | End: 2024-04-16

## 2024-04-15 RX ORDER — POTASSIUM CHLORIDE 20 MEQ/1
20 TABLET, EXTENDED RELEASE ORAL ONCE
Status: COMPLETED | OUTPATIENT
Start: 2024-04-15 | End: 2024-04-15

## 2024-04-15 RX ADMIN — ASPIRIN 324 MG: 81 TABLET, CHEWABLE ORAL at 21:28

## 2024-04-15 RX ADMIN — HEPARIN SODIUM 9 UNITS/KG/HR: 10000 INJECTION, SOLUTION INTRAVENOUS at 21:37

## 2024-04-15 RX ADMIN — POTASSIUM CHLORIDE 20 MEQ: 1500 TABLET, EXTENDED RELEASE ORAL at 21:28

## 2024-04-15 RX ADMIN — IPRATROPIUM BROMIDE 0.5 MG: 0.5 SOLUTION RESPIRATORY (INHALATION) at 23:40

## 2024-04-15 RX ADMIN — HEPARIN SODIUM 4000 UNITS: 1000 INJECTION INTRAVENOUS; SUBCUTANEOUS at 21:32

## 2024-04-15 ASSESSMENT — ENCOUNTER SYMPTOMS
WHEEZING: 0
ABDOMINAL PAIN: 1
SHORTNESS OF BREATH: 1
CHEST TIGHTNESS: 1
ABDOMINAL DISTENTION: 1
CONSTIPATION: 0
COLOR CHANGE: 0
VOMITING: 0
COUGH: 0
NAUSEA: 0

## 2024-04-15 ASSESSMENT — PAIN SCALES - GENERAL: PAINLEVEL_OUTOF10: 2

## 2024-04-16 LAB
25(OH)D3 SERPL-MCNC: 31.3 NG/ML
ANION GAP SERPL CALCULATED.3IONS-SCNC: 13 MMOL/L (ref 7–19)
APTT PPP: 54.2 SEC (ref 26–36.2)
APTT PPP: 68.7 SEC (ref 26–36.2)
APTT PPP: 69.4 SEC (ref 26–36.2)
APTT PPP: 82.4 SEC (ref 26–36.2)
BASOPHILS # BLD: 0.1 K/UL (ref 0–0.2)
BASOPHILS NFR BLD: 0.9 % (ref 0–1)
BUN SERPL-MCNC: 54 MG/DL (ref 8–23)
CALCIUM SERPL-MCNC: 9.6 MG/DL (ref 8.8–10.2)
CHLORIDE SERPL-SCNC: 94 MMOL/L (ref 98–111)
CO2 SERPL-SCNC: 34 MMOL/L (ref 22–29)
CREAT SERPL-MCNC: 3 MG/DL (ref 0.5–1.2)
EOSINOPHIL # BLD: 0.3 K/UL (ref 0–0.6)
EOSINOPHIL NFR BLD: 4.9 % (ref 0–5)
ERYTHROCYTE [DISTWIDTH] IN BLOOD BY AUTOMATED COUNT: 17.4 % (ref 11.5–14.5)
GLUCOSE BLD-MCNC: 153 MG/DL (ref 70–99)
GLUCOSE BLD-MCNC: 168 MG/DL (ref 70–99)
GLUCOSE BLD-MCNC: 172 MG/DL (ref 70–99)
GLUCOSE BLD-MCNC: 179 MG/DL (ref 70–99)
GLUCOSE BLD-MCNC: 184 MG/DL (ref 70–99)
GLUCOSE BLD-MCNC: 187 MG/DL (ref 70–99)
GLUCOSE BLD-MCNC: 216 MG/DL (ref 70–99)
GLUCOSE BLD-MCNC: 44 MG/DL (ref 70–99)
GLUCOSE BLD-MCNC: 48 MG/DL (ref 70–99)
GLUCOSE BLD-MCNC: 78 MG/DL (ref 70–99)
GLUCOSE SERPL-MCNC: 198 MG/DL (ref 74–109)
HCT VFR BLD AUTO: 41.8 % (ref 42–52)
HGB BLD-MCNC: 12.4 G/DL (ref 14–18)
IMM GRANULOCYTES # BLD: 0 K/UL
LYMPHOCYTES # BLD: 1.6 K/UL (ref 1.1–4.5)
LYMPHOCYTES NFR BLD: 24.8 % (ref 20–40)
MAGNESIUM SERPL-MCNC: 2.2 MG/DL (ref 1.6–2.4)
MCH RBC QN AUTO: 25.6 PG (ref 27–31)
MCHC RBC AUTO-ENTMCNC: 29.7 G/DL (ref 33–37)
MCV RBC AUTO: 86.2 FL (ref 80–94)
MONOCYTES # BLD: 0.6 K/UL (ref 0–0.9)
MONOCYTES NFR BLD: 9.7 % (ref 0–10)
NEUTROPHILS # BLD: 3.9 K/UL (ref 1.5–7.5)
NEUTS SEG NFR BLD: 59.5 % (ref 50–65)
PERFORMED ON: ABNORMAL
PERFORMED ON: NORMAL
PLATELET # BLD AUTO: 156 K/UL (ref 130–400)
PMV BLD AUTO: 10.9 FL (ref 9.4–12.4)
POTASSIUM SERPL-SCNC: 2.8 MMOL/L (ref 3.5–5)
POTASSIUM SERPL-SCNC: 4 MMOL/L (ref 3.5–5)
PTH-INTACT SERPL-MCNC: 91.6 PG/ML (ref 15–65)
RBC # BLD AUTO: 4.85 M/UL (ref 4.7–6.1)
SODIUM SERPL-SCNC: 141 MMOL/L (ref 136–145)
TROPONIN, HIGH SENSITIVITY: 77 NG/L (ref 0–22)
WBC # BLD AUTO: 6.6 K/UL (ref 4.8–10.8)

## 2024-04-16 PROCEDURE — 2700000000 HC OXYGEN THERAPY PER DAY

## 2024-04-16 PROCEDURE — 83970 ASSAY OF PARATHORMONE: CPT

## 2024-04-16 PROCEDURE — 2140000000 HC CCU INTERMEDIATE R&B

## 2024-04-16 PROCEDURE — 6370000000 HC RX 637 (ALT 250 FOR IP)

## 2024-04-16 PROCEDURE — 2580000003 HC RX 258: Performed by: HOSPITALIST

## 2024-04-16 PROCEDURE — 84132 ASSAY OF SERUM POTASSIUM: CPT

## 2024-04-16 PROCEDURE — 36415 COLL VENOUS BLD VENIPUNCTURE: CPT

## 2024-04-16 PROCEDURE — 6370000000 HC RX 637 (ALT 250 FOR IP): Performed by: HOSPITALIST

## 2024-04-16 PROCEDURE — 85025 COMPLETE CBC W/AUTO DIFF WBC: CPT

## 2024-04-16 PROCEDURE — 94760 N-INVAS EAR/PLS OXIMETRY 1: CPT

## 2024-04-16 PROCEDURE — 6360000004 HC RX CONTRAST MEDICATION: Performed by: HOSPITALIST

## 2024-04-16 PROCEDURE — 82962 GLUCOSE BLOOD TEST: CPT

## 2024-04-16 PROCEDURE — 82306 VITAMIN D 25 HYDROXY: CPT

## 2024-04-16 PROCEDURE — 6360000002 HC RX W HCPCS: Performed by: HOSPITALIST

## 2024-04-16 PROCEDURE — 6370000000 HC RX 637 (ALT 250 FOR IP): Performed by: STUDENT IN AN ORGANIZED HEALTH CARE EDUCATION/TRAINING PROGRAM

## 2024-04-16 PROCEDURE — 94640 AIRWAY INHALATION TREATMENT: CPT

## 2024-04-16 PROCEDURE — C8929 TTE W OR WO FOL WCON,DOPPLER: HCPCS

## 2024-04-16 PROCEDURE — 80048 BASIC METABOLIC PNL TOTAL CA: CPT

## 2024-04-16 PROCEDURE — 99223 1ST HOSP IP/OBS HIGH 75: CPT | Performed by: INTERNAL MEDICINE

## 2024-04-16 PROCEDURE — 84484 ASSAY OF TROPONIN QUANT: CPT

## 2024-04-16 PROCEDURE — 2580000003 HC RX 258: Performed by: STUDENT IN AN ORGANIZED HEALTH CARE EDUCATION/TRAINING PROGRAM

## 2024-04-16 PROCEDURE — 83735 ASSAY OF MAGNESIUM: CPT

## 2024-04-16 PROCEDURE — 85730 THROMBOPLASTIN TIME PARTIAL: CPT

## 2024-04-16 RX ORDER — INSULIN LISPRO 100 [IU]/ML
0-8 INJECTION, SOLUTION INTRAVENOUS; SUBCUTANEOUS
Status: DISCONTINUED | OUTPATIENT
Start: 2024-04-16 | End: 2024-04-16

## 2024-04-16 RX ORDER — INSULIN LISPRO 100 [IU]/ML
0-4 INJECTION, SOLUTION INTRAVENOUS; SUBCUTANEOUS NIGHTLY
Status: DISCONTINUED | OUTPATIENT
Start: 2024-04-16 | End: 2024-04-16

## 2024-04-16 RX ORDER — PANTOPRAZOLE SODIUM 40 MG/1
40 TABLET, DELAYED RELEASE ORAL
Status: DISCONTINUED | OUTPATIENT
Start: 2024-04-16 | End: 2024-04-17 | Stop reason: HOSPADM

## 2024-04-16 RX ORDER — ISOSORBIDE MONONITRATE 60 MG/1
60 TABLET, EXTENDED RELEASE ORAL 2 TIMES DAILY
Qty: 60 TABLET | Refills: 5 | Status: ON HOLD | OUTPATIENT
Start: 2024-04-16

## 2024-04-16 RX ORDER — INSULIN LISPRO 100 [IU]/ML
0-4 INJECTION, SOLUTION INTRAVENOUS; SUBCUTANEOUS NIGHTLY
Status: DISCONTINUED | OUTPATIENT
Start: 2024-04-16 | End: 2024-04-17 | Stop reason: HOSPADM

## 2024-04-16 RX ORDER — SUCRALFATE 1 G/1
1 TABLET ORAL
Status: DISCONTINUED | OUTPATIENT
Start: 2024-04-16 | End: 2024-04-17 | Stop reason: HOSPADM

## 2024-04-16 RX ORDER — AMIODARONE HYDROCHLORIDE 100 MG/1
100 TABLET ORAL DAILY
Qty: 90 TABLET | Refills: 3 | Status: ON HOLD | OUTPATIENT
Start: 2024-04-16

## 2024-04-16 RX ORDER — INSULIN LISPRO 100 [IU]/ML
0-16 INJECTION, SOLUTION INTRAVENOUS; SUBCUTANEOUS
Status: DISCONTINUED | OUTPATIENT
Start: 2024-04-16 | End: 2024-04-17 | Stop reason: HOSPADM

## 2024-04-16 RX ORDER — INSULIN GLARGINE 100 [IU]/ML
10 INJECTION, SOLUTION SUBCUTANEOUS NIGHTLY
Status: DISCONTINUED | OUTPATIENT
Start: 2024-04-16 | End: 2024-04-17 | Stop reason: HOSPADM

## 2024-04-16 RX ORDER — POTASSIUM CHLORIDE 20 MEQ/1
40 TABLET, EXTENDED RELEASE ORAL ONCE
Status: COMPLETED | OUTPATIENT
Start: 2024-04-16 | End: 2024-04-16

## 2024-04-16 RX ORDER — RANOLAZINE 500 MG/1
500 TABLET, EXTENDED RELEASE ORAL 2 TIMES DAILY
Status: DISCONTINUED | OUTPATIENT
Start: 2024-04-16 | End: 2024-04-17 | Stop reason: HOSPADM

## 2024-04-16 RX ORDER — DEXTROSE MONOHYDRATE 100 MG/ML
INJECTION, SOLUTION INTRAVENOUS CONTINUOUS PRN
Status: DISCONTINUED | OUTPATIENT
Start: 2024-04-16 | End: 2024-04-17 | Stop reason: HOSPADM

## 2024-04-16 RX ADMIN — DOCUSATE SODIUM 100 MG: 100 CAPSULE, LIQUID FILLED ORAL at 00:03

## 2024-04-16 RX ADMIN — MINOXIDIL 2.5 MG: 2.5 TABLET ORAL at 08:27

## 2024-04-16 RX ADMIN — SODIUM CHLORIDE, PRESERVATIVE FREE 10 ML: 5 INJECTION INTRAVENOUS at 20:45

## 2024-04-16 RX ADMIN — INSULIN GLARGINE 30 UNITS: 100 INJECTION, SOLUTION SUBCUTANEOUS at 08:28

## 2024-04-16 RX ADMIN — ATORVASTATIN CALCIUM 80 MG: 40 TABLET, FILM COATED ORAL at 20:45

## 2024-04-16 RX ADMIN — MONTELUKAST 10 MG: 10 TABLET, FILM COATED ORAL at 20:45

## 2024-04-16 RX ADMIN — Medication 16 G: at 17:02

## 2024-04-16 RX ADMIN — CETIRIZINE HYDROCHLORIDE 5 MG: 10 TABLET, FILM COATED ORAL at 20:45

## 2024-04-16 RX ADMIN — IPRATROPIUM BROMIDE 0.5 MG: 0.5 SOLUTION RESPIRATORY (INHALATION) at 06:40

## 2024-04-16 RX ADMIN — MONTELUKAST 10 MG: 10 TABLET, FILM COATED ORAL at 00:03

## 2024-04-16 RX ADMIN — BUMETANIDE 2 MG: 1 TABLET ORAL at 00:09

## 2024-04-16 RX ADMIN — Medication 2000 UNITS: at 08:27

## 2024-04-16 RX ADMIN — INSULIN GLARGINE 10 UNITS: 100 INJECTION, SOLUTION SUBCUTANEOUS at 20:46

## 2024-04-16 RX ADMIN — SACUBITRIL AND VALSARTAN 1 TABLET: 49; 51 TABLET, FILM COATED ORAL at 00:09

## 2024-04-16 RX ADMIN — PERFLUTREN 1.5 ML: 6.52 INJECTION, SUSPENSION INTRAVENOUS at 11:08

## 2024-04-16 RX ADMIN — IPRATROPIUM BROMIDE 0.5 MG: 0.5 SOLUTION RESPIRATORY (INHALATION) at 14:25

## 2024-04-16 RX ADMIN — POLYETHYLENE GLYCOL 3350 17 G: 17 POWDER, FOR SOLUTION ORAL at 08:29

## 2024-04-16 RX ADMIN — ISOSORBIDE MONONITRATE 60 MG: 60 TABLET, EXTENDED RELEASE ORAL at 20:45

## 2024-04-16 RX ADMIN — METOLAZONE 5 MG: 2.5 TABLET ORAL at 08:26

## 2024-04-16 RX ADMIN — CARVEDILOL 12.5 MG: 12.5 TABLET, FILM COATED ORAL at 20:44

## 2024-04-16 RX ADMIN — MINOXIDIL 2.5 MG: 2.5 TABLET ORAL at 20:44

## 2024-04-16 RX ADMIN — ALLOPURINOL 200 MG: 100 TABLET ORAL at 08:27

## 2024-04-16 RX ADMIN — IPRATROPIUM BROMIDE 0.5 MG: 0.5 SOLUTION RESPIRATORY (INHALATION) at 10:08

## 2024-04-16 RX ADMIN — IPRATROPIUM BROMIDE 0.5 MG: 0.5 SOLUTION RESPIRATORY (INHALATION) at 19:12

## 2024-04-16 RX ADMIN — SUCRALFATE 1 G: 1 TABLET ORAL at 17:05

## 2024-04-16 RX ADMIN — RANOLAZINE 500 MG: 500 TABLET, EXTENDED RELEASE ORAL at 20:45

## 2024-04-16 RX ADMIN — AMIODARONE HYDROCHLORIDE 200 MG: 200 TABLET ORAL at 08:27

## 2024-04-16 RX ADMIN — ATORVASTATIN CALCIUM 80 MG: 40 TABLET, FILM COATED ORAL at 00:02

## 2024-04-16 RX ADMIN — CARVEDILOL 12.5 MG: 12.5 TABLET, FILM COATED ORAL at 08:27

## 2024-04-16 RX ADMIN — DOCUSATE SODIUM 100 MG: 100 CAPSULE, LIQUID FILLED ORAL at 20:44

## 2024-04-16 RX ADMIN — ASPIRIN 81 MG: 81 TABLET, CHEWABLE ORAL at 08:27

## 2024-04-16 RX ADMIN — BUMETANIDE 2 MG: 1 TABLET ORAL at 20:45

## 2024-04-16 RX ADMIN — PANTOPRAZOLE SODIUM 40 MG: 40 TABLET, DELAYED RELEASE ORAL at 06:17

## 2024-04-16 RX ADMIN — Medication 16 G: at 17:20

## 2024-04-16 RX ADMIN — ISOSORBIDE MONONITRATE 60 MG: 60 TABLET, EXTENDED RELEASE ORAL at 08:27

## 2024-04-16 RX ADMIN — CETIRIZINE HYDROCHLORIDE 5 MG: 10 TABLET, FILM COATED ORAL at 00:03

## 2024-04-16 RX ADMIN — OXYCODONE HYDROCHLORIDE AND ACETAMINOPHEN 500 MG: 500 TABLET ORAL at 08:27

## 2024-04-16 RX ADMIN — ISOSORBIDE MONONITRATE 60 MG: 60 TABLET, EXTENDED RELEASE ORAL at 00:03

## 2024-04-16 RX ADMIN — BUMETANIDE 2 MG: 1 TABLET ORAL at 08:27

## 2024-04-16 RX ADMIN — PANTOPRAZOLE SODIUM 40 MG: 40 TABLET, DELAYED RELEASE ORAL at 14:27

## 2024-04-16 RX ADMIN — CALCITRIOL CAPSULES 0.25 MCG 0.25 MCG: 0.25 CAPSULE ORAL at 08:27

## 2024-04-16 RX ADMIN — SUCRALFATE 1 G: 1 TABLET ORAL at 10:17

## 2024-04-16 RX ADMIN — POTASSIUM CHLORIDE 40 MEQ: 1500 TABLET, EXTENDED RELEASE ORAL at 06:18

## 2024-04-16 RX ADMIN — POTASSIUM CHLORIDE 20 MEQ: 1500 TABLET, EXTENDED RELEASE ORAL at 08:27

## 2024-04-16 RX ADMIN — INSULIN LISPRO 8 UNITS: 100 INJECTION, SOLUTION INTRAVENOUS; SUBCUTANEOUS at 14:26

## 2024-04-16 RX ADMIN — SACUBITRIL AND VALSARTAN 1 TABLET: 49; 51 TABLET, FILM COATED ORAL at 20:45

## 2024-04-16 RX ADMIN — SACUBITRIL AND VALSARTAN 1 TABLET: 49; 51 TABLET, FILM COATED ORAL at 08:36

## 2024-04-16 RX ADMIN — DOCUSATE SODIUM 100 MG: 100 CAPSULE, LIQUID FILLED ORAL at 08:28

## 2024-04-16 RX ADMIN — NIFEDIPINE 60 MG: 60 TABLET, EXTENDED RELEASE ORAL at 08:27

## 2024-04-16 RX ADMIN — DEXTROSE MONOHYDRATE 125 ML: 100 INJECTION, SOLUTION INTRAVENOUS at 17:30

## 2024-04-16 RX ADMIN — SODIUM CHLORIDE, PRESERVATIVE FREE 10 ML: 5 INJECTION INTRAVENOUS at 08:26

## 2024-04-16 RX ADMIN — INSULIN GLARGINE 20 UNITS: 100 INJECTION, SOLUTION SUBCUTANEOUS at 00:10

## 2024-04-16 RX ADMIN — MINOXIDIL 2.5 MG: 2.5 TABLET ORAL at 00:03

## 2024-04-16 RX ADMIN — HEPARIN SODIUM 2000 UNITS: 1000 INJECTION INTRAVENOUS; SUBCUTANEOUS at 10:17

## 2024-04-16 RX ADMIN — RANOLAZINE 500 MG: 500 TABLET, EXTENDED RELEASE ORAL at 14:27

## 2024-04-16 ASSESSMENT — PAIN SCALES - GENERAL: PAINLEVEL_OUTOF10: 0

## 2024-04-16 NOTE — ACP (ADVANCE CARE PLANNING)
Advance Care Planning     Advance Care Planning Inpatient Note  [facility]    Today's Date: 4/16/2024  Unit: [facility] PROGRESSIVE CARE    Received request from Other: palliative care .  Upon review of chart and communication with care team, patient's decision making abilities are not in question.. Patient and Spouse was/were present in the room during visit.    Goals of ACP Conversation:  Discuss advance care planning documents    Health Care Decision Makers:       Primary Decision Maker: Perri Espana - Spouse - [phone]    Secondary Decision Maker: Dg Espana - Brother/Sister - [phone]  Summary:  Verified Documents    Advance Care Planning Documents (Patient Wishes):  Living Will/Advance Directive     Assessment:  Pt is a 77 year old male with a history of heart disease and Covid. Pt says he has gone down hill since Covid. Pt has a wife who helps him but he can perform most daily living skills without assistance. Pt's goal is to have a heart cath and hopefully return home with previous daily living skills.     Interventions:  Confirmed pt's LW, decision makers, and code status.     Care Preferences Communicated:     Hospitalization:  If the patient's health worsens and it becomes clear that the chance of recovery is unlikely,     the patient wants hospitalization.    Ventilation:   If the patient, in their present state of health, suddenly became very ill and unable to breathe on their own,     the patient would desire the use of a ventilator (breathing machine).    If their health worsens and it becomes clear that the change of recovery is unlikely,     the patient would desire the use of a ventilator (breathing machine).    Resuscitation:  In the event the patient's heart stopped as a result of an underlying serious health condition, the patient communicates a preference for      resuscitative attempts (CPR).    Outcomes/Plan:  ACP Discussion: Completed    Electronically signed by Lenora

## 2024-04-16 NOTE — CONSULTS
Nephrology ([facility]) Consult Note      Patient:  Carlos Espana  YOB: 1955  Date of Service: 4/16/2024  MRN: [id number]   Acct: [id number]   Primary Care Physician: Alisa Rangel MD  Advance Directive: Full Code  Admit Date: 4/15/2024       Hospital Day: 1  Referring Provider: Omi Andrea MD    Patient independently seen and examined, Chart, Consults, Notes, Operative notes, Labs, Cardiology, and Radiology studies reviewed as available.    Chief complaint: Intermittent chest pain/abnormal labs.    Subjective:  Carlos Espana is a 68 y.o. male for whom we were consulted for evaluation and treatment of stage IV chronic kidney disease.  Patient also has history of type 2 diabetes, diabetic nephropathy, CVA, paroxysmal atrial fibrillation, systolic CHF, coronary artery disease, carotid occlusive disease and COPD.  He presented with substernal pressure and midepigastric heaviness.  He was thinking about possible reflux disease.  He has taken Mylanta, Nexium, Tums with no improvement.  Presented to the emergency room for further evaluation.  Initial evaluation suggested patient had non-STEMI with high troponin level.  He is now admitted for further evaluation of coronary artery disease.    This morning he is sitting up, denies any shortness of breath or chest pain.  His wife is at the bedside.  Cardiology has recommended cardiac catheterization but due to advanced renal disease there is risk of losing more renal function and potential dialysis.    Allergies:  Morphine and Hydralazine    Medicines:  Current Facility-Administered Medications   Medication Dose Route Frequency Provider Last Rate Last Admin    pantoprazole (PROTONIX) tablet 40 mg  40 mg Oral BID AC Omi Andrea MD        sucralfate (CARAFATE) tablet 1 g  1 g Oral TID WC Omi Andrea MD        albuterol (PROVENTIL) (2.5 MG/3ML) 0.083% nebulizer solution 2.5 mg  2.5 mg Nebulization Q4H PRN Seth Muñoz 
classified     Chronic kidney disease, unspecified     stage 3    Congestive heart failure, unspecified     COVID-19 10/01/2020    COVID-19 08/11/2023    Diabetes mellitus type II     Diverticulosis of colon (without mention of hemorrhage)     Elevated CA 19-9 level     Encounter for long-term (current) use of insulin (HCC)     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, or unspecified(403.00)     Myocardial infarction (HCC)     Neuropathy     Obesity, unspecified     SAMMY on CPAP     Other specified cardiac dysrhythmias(427.89)     Palliative care patient 12/21/2020    Paroxysmal atrial fibrillation (HCC)     Peripheral vascular disease (HCC)     Solitary pulmonary nodule     Surgical or other procedure not carried out because of contraindication     Thoracic aneurysm without mention of rupture     Thyroid nodule     Tobacco use disorder     Type II or unspecified type diabetes mellitus without mention of complication, not stated as uncontrolled     Weight loss        Past Surgical History:      Procedure Laterality Date    CARDIAC CATHETERIZATION      CHOLECYSTECTOMY  2000    CORONARY ANGIOPLASTY WITH STENT PLACEMENT  01/2016    2 JACQUELINE to LAD    DIAGNOSTIC CARDIAC CATH LAB PROCEDURE      UPPER GASTROINTESTINAL ENDOSCOPY  2015    UPPER GASTROINTESTINAL ENDOSCOPY N/A 05/16/2023    Dr RANCHO Owens-w/EUS-Moderate gastritis, small pancreatic cyst-Gastritis, no h pylori, CT in 6 months    URETER STENT PLACEMENT      VASCULAR SURGERY  03- [facility]    Aortogram with bilateral selective renal artery injections    VASCULAR SURGERY  6/14/13 [facility]

## 2024-04-16 NOTE — ED NOTES
ED TO INPATIENT SBAR HANDOFF    Patient Name: Carlos Espana   : 1955  68 y.o.   Family/Caregiver Present: Yes  Code Status Order: Prior    C-SSRS: Risk of Suicide: No Risk  Sitter No  Restraints:         Situation  Chief Complaint:   Chief Complaint   Patient presents with    Chest Pain     X1 week     Patient Diagnosis: NSTEMI (non-ST elevated myocardial infarction) (HCC) [I21.4]     Brief Description of Patient's Condition: Patient Diagnosis: NSTEMI (non-ST elevated myocardial infarction) (HCC) [I21.4]   Mental Status: oriented, alert, coherent, and logical  Arrived from: home    Imaging:   XR CHEST PORTABLE   Final Result       1. Some vascular distension and infrahilar density changes similar to previous   2. Cardiomegaly.                       ______________________________________    Electronically signed by: CRISTIANA ROSENTHAL M.D.   Date:     04/15/2024   Time:    20:39       CT ABDOMEN PELVIS WO CONTRAST Additional Contrast? None   Final Result       No acute CT findings in the abdomen or pelvis.       Moderate amount of stool in the colon.       Please see above description and additional findings.        All CT scans are performed using dose optimization techniques as appropriate to the performed exam and include    at least one of the following: Automated exposure control, adjustment of the mA and/or kV according to size, and the use of iterative reconstruction technique.        ______________________________________    Electronically signed by: BILLY REESE D.O.   Date:     04/15/2024   Time:    20:08         COVID-19 Results:   Internal Administration   First Dose COVID-19, J&J, (age 18y+), IM, 0.5 mL  2021   Second Dose COVID-19, J&J, (age 18y+), IM, 0.5 mL   2021       Last COVID Lab SARS-CoV-2 (no units)   Date Value   2023 Negative     SARS-CoV-2, PCR (no units)   Date Value   2023 DETECTED (A)     SARS-CoV-2, NAAT (no units)   Date Value   2023 Not Detected

## 2024-04-16 NOTE — PLAN OF CARE
Problem: Discharge Planning  Goal: Discharge to home or other facility with appropriate resources  Outcome: Progressing  Flowsheets (Taken 4/16/2024 0243)  Discharge to home or other facility with appropriate resources:   Identify barriers to discharge with patient and caregiver   Arrange for needed discharge resources and transportation as appropriate     Problem: Pain  Goal: Verbalizes/displays adequate comfort level or baseline comfort level  Outcome: Progressing     Problem: ABCDS Injury Assessment  Goal: Absence of physical injury  Outcome: Progressing     Problem: Safety - Adult  Goal: Free from fall injury  Outcome: Progressing

## 2024-04-16 NOTE — CARE COORDINATION
Case Management Assessment  Initial Evaluation    Date/Time of Evaluation: 4/16/2024 2:39 PM  Assessment Completed by: Elma Mckeon RN    If patient is discharged prior to next notation, then this note serves as note for discharge by case management.    Patient Name: Carlos Espana                   YOB: 1955  Diagnosis: NSTEMI (non-ST elevated myocardial infarction) (HCC) [I21.4]  Chest pain, unspecified type [R07.9]                   Date / Time: 4/15/2024  7:01 PM    Patient Admission Status: Inpatient   Readmission Risk (Low < 19, Mod (19-27), High > 27): Readmission Risk Score: 19.2    Current PCP: Alisa Rangel MD  PCP verified by CM? (P) Yes    Chart Reviewed: Yes      History Provided by: Patient, Significant Other  Patient Orientation: Alert and Oriented, Person, Place, Situation    Patient Cognition: Alert    Hospitalization in the last 30 days (Readmission):  No    If yes, Readmission Assessment in CM Navigator will be completed.    Advance Directives:      Code Status: Full Code   Patient's Primary Decision Maker is: (P) Named in Scanned ACP Document    Primary Decision Maker: Perri Espana - Spouse - [phone]    Secondary Decision Maker: Dg Espana - Brother/Sister - [phone]    Discharge Planning:    Patient lives with: (P) Spouse/Significant Other Type of Home: (P) House  Primary Care Giver: Self  Patient Support Systems include: Spouse/Significant Other   Current Financial resources: (P) Medicare  Current community resources: (P) None  Current services prior to admission: (P) Oxygen Therapy            Current DME:              Type of Home Care services:  (P) None    ADLS  Prior functional level: (P) Independent in ADLs/IADLs  Current functional level: (P) Independent in ADLs/IADLs    PT AM-PAC:   /24  OT AM-PAC:   /24    Family can provide assistance at DC: (P) Yes  Would you like Case Management to discuss the discharge plan with any other family members/significant

## 2024-04-16 NOTE — PLAN OF CARE
SUBJECTIVE:    ED:   Chest Pain, Tn 99      OBJECTIVE:    /82   Pulse 61   Temp 98.6 °F (37 °C)   Resp 15   Wt 102.1 kg (225 lb)   SpO2 95%   BMI 31.38 kg/m²       ASSESSMENTS & PLANS:    NSTEMI:  Tele  Admit to cardiac walker as inpatient status patient  Cardio consult in AM  NPO except sips with meds from [place]  Trend TnI  Mag, Phos, TSH with reflex T4, Lipid Panel, HbA1c - will run as add ons to ED labs if able  CBC and BMP with Reflex for following AM  K goal of WNL and >= 4.0  Mag goal of WNL and >= 2.0  ASA as per protocol (324-325mg chewed STAT unless on 81ASA daily in which case 162mg chewed STAT if not yet given, THEN from following AM 81mg Daily.)  Statin as per protocol (High intensity Statin, if already on high intensity Stain of Simvasttain 80 continue it, if Lipitor 40+ then Lipitor 80 (if less than 40 just double so long as >=40), if Rosuvastatin 20mg+ then Rosuvastatin 40mg PO QHS (if less than 20 just double so long as >=20mg)  NG SL PRN CP  TTE in AM  EKG already done, EKG PRN, and EKG in AM and EKG paired with timed Troponins  Cardiac Provocative and Invasive Testing will be deferred to Cardiology  Heparin GGT  HOLD NOAC - last dose was this AM (>12h ago)    Chronic Medical Problems:  Continue current Regimen as indicated    Supportive and Prophylactic Txx:  DVT PPx: Heparin GGT  GI (PUD) PPx: not indicated  PT: contraindicated as per ongoing ACS R/O      Case flagged by EP for consultation / anticipated admission  Chart reviewed   Orders entered by me with CPOE  Case d/w Hospitlaist NP

## 2024-04-16 NOTE — ED PROVIDER NOTES
mellitus without mention of complication, not stated as uncontrolled     Weight loss          SURGICAL HISTORY       Past Surgical History:   Procedure Laterality Date    CARDIAC CATHETERIZATION      CHOLECYSTECTOMY  2000    CORONARY ANGIOPLASTY WITH STENT PLACEMENT  01/2016    2 JACQUELINE to LAD    DIAGNOSTIC CARDIAC CATH LAB PROCEDURE      UPPER GASTROINTESTINAL ENDOSCOPY  2015    UPPER GASTROINTESTINAL ENDOSCOPY N/A 05/16/2023    Dr RANCHO Owens-w/EUS-Moderate gastritis, small pancreatic cyst-Gastritis, no h pylori, CT in 6 months    URETER STENT PLACEMENT      VASCULAR SURGERY  03- [facility]    Aortogram with bilateral selective renal artery injections    VASCULAR SURGERY  6/14/13 [facility]    Right carotid endarterectomy with Vascu-Guard patch repair. Right cervical carotid arteriograms after endarterectomy.    VASCULAR SURGERY  7/6/15 [facility]    Percutaneous cannulation, right common femoral artery, with a5 french sheath and later 6 french RDC sheath. Suprarenal abdomianl aortagram.  Selective right renal arteriogram.  Right renal artery balloon angioplasty;/stent (Express 6 mm x 18mmballoon-expandable stent.. Completion suprarenal abdominal aortogram and selective right remal arteriogram. Mynx closure, right common femoral artery punctur         CURRENT MEDICATIONS     Previous Medications    ALBUTEROL SULFATE HFA (PROVENTIL;VENTOLIN;PROAIR) 108 (90 BASE) MCG/ACT INHALER    Inhale 2 puffs into the lungs 4 times daily    ALBUTEROL SULFATE HFA (PROVENTIL;VENTOLIN;PROAIR) 108 (90 BASE) MCG/ACT INHALER    every six hours as needed    ALLOPURINOL (ZYLOPRIM) 100 MG TABLET    Take 2 tablets by mouth daily    AMIODARONE (CORDARONE) 200 MG TABLET    Take by mouth    AMIODARONE (PACERONE) 100 MG TABLET    Take 1 tablet by mouth daily    APIXABAN (ELIQUIS) 5 MG TABS TABLET    Take by mouth    ASCORBIC ACID (VITAMIN C) 500 MG TABLET    Take 1 tablet by mouth daily    ASPIRIN 81 MG CHEWABLE TABLET    Take 1 tablet by mouth daily    BLOOD

## 2024-04-17 ENCOUNTER — APPOINTMENT (OUTPATIENT)
Dept: CARDIAC CATH/INVASIVE PROCEDURES | Age: 69
DRG: 281 | End: 2024-04-17
Payer: MEDICARE

## 2024-04-17 VITALS
HEART RATE: 52 BPM | DIASTOLIC BLOOD PRESSURE: 55 MMHG | HEIGHT: 71 IN | WEIGHT: 226.2 LBS | TEMPERATURE: 97.5 F | BODY MASS INDEX: 31.67 KG/M2 | RESPIRATION RATE: 16 BRPM | SYSTOLIC BLOOD PRESSURE: 126 MMHG | OXYGEN SATURATION: 91 %

## 2024-04-17 LAB
ALBUMIN SERPL-MCNC: 4 G/DL (ref 3.5–5.2)
ALP SERPL-CCNC: 61 U/L (ref 40–130)
ALT SERPL-CCNC: 12 U/L (ref 5–41)
ANION GAP SERPL CALCULATED.3IONS-SCNC: 12 MMOL/L (ref 7–19)
APTT PPP: 140.7 SEC (ref 26–36.2)
APTT PPP: 65.9 SEC (ref 26–36.2)
AST SERPL-CCNC: 13 U/L (ref 5–40)
BASOPHILS # BLD: 0.1 K/UL (ref 0–0.2)
BASOPHILS NFR BLD: 0.9 % (ref 0–1)
BILIRUB SERPL-MCNC: 0.5 MG/DL (ref 0.2–1.2)
BILIRUB UR QL STRIP: NEGATIVE
BUN SERPL-MCNC: 48 MG/DL (ref 8–23)
CALCIUM SERPL-MCNC: 9.7 MG/DL (ref 8.8–10.2)
CHLORIDE SERPL-SCNC: 89 MMOL/L (ref 98–111)
CLARITY UR: CLEAR
CO2 SERPL-SCNC: 37 MMOL/L (ref 22–29)
COLOR UR: YELLOW
CREAT SERPL-MCNC: 2.7 MG/DL (ref 0.5–1.2)
CREAT UR-MCNC: 36 MG/DL (ref 39–259)
EOSINOPHIL # BLD: 0.3 K/UL (ref 0–0.6)
EOSINOPHIL NFR BLD: 4.2 % (ref 0–5)
ERYTHROCYTE [DISTWIDTH] IN BLOOD BY AUTOMATED COUNT: 16.7 % (ref 11.5–14.5)
GLUCOSE BLD-MCNC: 160 MG/DL (ref 70–99)
GLUCOSE BLD-MCNC: 163 MG/DL (ref 70–99)
GLUCOSE BLD-MCNC: 235 MG/DL (ref 70–99)
GLUCOSE SERPL-MCNC: 182 MG/DL (ref 74–109)
GLUCOSE UR STRIP.AUTO-MCNC: NEGATIVE MG/DL
HCT VFR BLD AUTO: 42.2 % (ref 42–52)
HGB BLD-MCNC: 12.5 G/DL (ref 14–18)
HGB UR STRIP.AUTO-MCNC: NEGATIVE MG/L
IMM GRANULOCYTES # BLD: 0 K/UL
KETONES UR STRIP.AUTO-MCNC: NEGATIVE MG/DL
LEUKOCYTE ESTERASE UR QL STRIP.AUTO: NEGATIVE
LYMPHOCYTES # BLD: 1.4 K/UL (ref 1.1–4.5)
LYMPHOCYTES NFR BLD: 20.7 % (ref 20–40)
MCH RBC QN AUTO: 25 PG (ref 27–31)
MCHC RBC AUTO-ENTMCNC: 29.6 G/DL (ref 33–37)
MCV RBC AUTO: 84.2 FL (ref 80–94)
MICROALBUMIN UR-MCNC: 6 MG/DL (ref 0–19)
MICROALBUMIN/CREAT UR-RTO: 166.7 MG/G
MONOCYTES # BLD: 0.6 K/UL (ref 0–0.9)
MONOCYTES NFR BLD: 9.3 % (ref 0–10)
NEUTROPHILS # BLD: 4.3 K/UL (ref 1.5–7.5)
NEUTS SEG NFR BLD: 64.8 % (ref 50–65)
NITRITE UR QL STRIP.AUTO: NEGATIVE
PERFORMED ON: ABNORMAL
PH UR STRIP.AUTO: 6.5 [PH] (ref 5–8)
PLATELET # BLD AUTO: 160 K/UL (ref 130–400)
PMV BLD AUTO: 10.8 FL (ref 9.4–12.4)
POC ACT LR: 223 SEC
POTASSIUM SERPL-SCNC: 3 MMOL/L (ref 3.5–5)
PROT SERPL-MCNC: 6.4 G/DL (ref 6.6–8.7)
PROT UR STRIP.AUTO-MCNC: ABNORMAL MG/DL
RBC # BLD AUTO: 5.01 M/UL (ref 4.7–6.1)
SODIUM SERPL-SCNC: 138 MMOL/L (ref 136–145)
SODIUM UR-SCNC: 94 MMOL/L
SP GR UR STRIP.AUTO: 1.02 (ref 1–1.03)
UROBILINOGEN UR STRIP.AUTO-MCNC: 0.2 E.U./DL
WBC # BLD AUTO: 6.7 K/UL (ref 4.8–10.8)

## 2024-04-17 PROCEDURE — 99153 MOD SED SAME PHYS/QHP EA: CPT

## 2024-04-17 PROCEDURE — 6370000000 HC RX 637 (ALT 250 FOR IP): Performed by: HOSPITALIST

## 2024-04-17 PROCEDURE — 85347 COAGULATION TIME ACTIVATED: CPT

## 2024-04-17 PROCEDURE — 82043 UR ALBUMIN QUANTITATIVE: CPT

## 2024-04-17 PROCEDURE — 85025 COMPLETE CBC W/AUTO DIFF WBC: CPT

## 2024-04-17 PROCEDURE — 94760 N-INVAS EAR/PLS OXIMETRY 1: CPT

## 2024-04-17 PROCEDURE — B2151ZZ FLUOROSCOPY OF LEFT HEART USING LOW OSMOLAR CONTRAST: ICD-10-PCS | Performed by: INTERNAL MEDICINE

## 2024-04-17 PROCEDURE — 84300 ASSAY OF URINE SODIUM: CPT

## 2024-04-17 PROCEDURE — 6360000004 HC RX CONTRAST MEDICATION: Performed by: INTERNAL MEDICINE

## 2024-04-17 PROCEDURE — 81003 URINALYSIS AUTO W/O SCOPE: CPT

## 2024-04-17 PROCEDURE — C1769 GUIDE WIRE: HCPCS

## 2024-04-17 PROCEDURE — 2709999900 HC NON-CHARGEABLE SUPPLY

## 2024-04-17 PROCEDURE — 82570 ASSAY OF URINE CREATININE: CPT

## 2024-04-17 PROCEDURE — 2700000000 HC OXYGEN THERAPY PER DAY

## 2024-04-17 PROCEDURE — 82962 GLUCOSE BLOOD TEST: CPT

## 2024-04-17 PROCEDURE — 94640 AIRWAY INHALATION TREATMENT: CPT

## 2024-04-17 PROCEDURE — 4A023N7 MEASUREMENT OF CARDIAC SAMPLING AND PRESSURE, LEFT HEART, PERCUTANEOUS APPROACH: ICD-10-PCS | Performed by: INTERNAL MEDICINE

## 2024-04-17 PROCEDURE — 93458 L HRT ARTERY/VENTRICLE ANGIO: CPT

## 2024-04-17 PROCEDURE — 99152 MOD SED SAME PHYS/QHP 5/>YRS: CPT

## 2024-04-17 PROCEDURE — 6360000002 HC RX W HCPCS: Performed by: HOSPITALIST

## 2024-04-17 PROCEDURE — 6370000000 HC RX 637 (ALT 250 FOR IP): Performed by: STUDENT IN AN ORGANIZED HEALTH CARE EDUCATION/TRAINING PROGRAM

## 2024-04-17 PROCEDURE — 6370000000 HC RX 637 (ALT 250 FOR IP)

## 2024-04-17 PROCEDURE — 6360000002 HC RX W HCPCS: Performed by: INTERNAL MEDICINE

## 2024-04-17 PROCEDURE — C1887 CATHETER, GUIDING: HCPCS

## 2024-04-17 PROCEDURE — 36415 COLL VENOUS BLD VENIPUNCTURE: CPT

## 2024-04-17 PROCEDURE — 85730 THROMBOPLASTIN TIME PARTIAL: CPT

## 2024-04-17 PROCEDURE — 2500000003 HC RX 250 WO HCPCS

## 2024-04-17 PROCEDURE — B2111ZZ FLUOROSCOPY OF MULTIPLE CORONARY ARTERIES USING LOW OSMOLAR CONTRAST: ICD-10-PCS | Performed by: INTERNAL MEDICINE

## 2024-04-17 PROCEDURE — C1894 INTRO/SHEATH, NON-LASER: HCPCS

## 2024-04-17 PROCEDURE — 80053 COMPREHEN METABOLIC PANEL: CPT

## 2024-04-17 PROCEDURE — 6360000002 HC RX W HCPCS

## 2024-04-17 RX ORDER — POTASSIUM CHLORIDE 20 MEQ/1
40 TABLET, EXTENDED RELEASE ORAL 2 TIMES DAILY
Status: DISCONTINUED | OUTPATIENT
Start: 2024-04-17 | End: 2024-04-17 | Stop reason: HOSPADM

## 2024-04-17 RX ORDER — POTASSIUM CHLORIDE 7.45 MG/ML
10 INJECTION INTRAVENOUS
Status: DISCONTINUED | OUTPATIENT
Start: 2024-04-17 | End: 2024-04-17

## 2024-04-17 RX ORDER — POTASSIUM CHLORIDE 7.45 MG/ML
10 INJECTION INTRAVENOUS
Status: COMPLETED | OUTPATIENT
Start: 2024-04-17 | End: 2024-04-17

## 2024-04-17 RX ORDER — BUMETANIDE 2 MG/1
2 TABLET ORAL 2 TIMES DAILY
Qty: 60 TABLET | Refills: 3 | Status: ON HOLD
Start: 2024-04-17

## 2024-04-17 RX ORDER — SODIUM CHLORIDE 9 MG/ML
INJECTION, SOLUTION INTRAVENOUS CONTINUOUS
Status: DISCONTINUED | OUTPATIENT
Start: 2024-04-17 | End: 2024-04-17

## 2024-04-17 RX ORDER — IODIXANOL 320 MG/ML
205 INJECTION, SOLUTION INTRAVASCULAR
Status: COMPLETED | OUTPATIENT
Start: 2024-04-17 | End: 2024-04-17

## 2024-04-17 RX ORDER — RANOLAZINE 500 MG/1
500 TABLET, EXTENDED RELEASE ORAL 2 TIMES DAILY
Qty: 60 TABLET | Refills: 1 | Status: ON HOLD | OUTPATIENT
Start: 2024-04-17

## 2024-04-17 RX ADMIN — POTASSIUM BICARBONATE 40 MEQ: 782 TABLET, EFFERVESCENT ORAL at 07:43

## 2024-04-17 RX ADMIN — CALCITRIOL CAPSULES 0.25 MCG 0.25 MCG: 0.25 CAPSULE ORAL at 10:42

## 2024-04-17 RX ADMIN — IPRATROPIUM BROMIDE 0.5 MG: 0.5 SOLUTION RESPIRATORY (INHALATION) at 06:09

## 2024-04-17 RX ADMIN — ISOSORBIDE MONONITRATE 60 MG: 60 TABLET, EXTENDED RELEASE ORAL at 10:40

## 2024-04-17 RX ADMIN — HEPARIN SODIUM 2000 UNITS: 1000 INJECTION INTRAVENOUS; SUBCUTANEOUS at 06:01

## 2024-04-17 RX ADMIN — SACUBITRIL AND VALSARTAN 1 TABLET: 49; 51 TABLET, FILM COATED ORAL at 10:49

## 2024-04-17 RX ADMIN — IPRATROPIUM BROMIDE 0.5 MG: 0.5 SOLUTION RESPIRATORY (INHALATION) at 10:26

## 2024-04-17 RX ADMIN — HEPARIN SODIUM 13 UNITS/KG/HR: 10000 INJECTION, SOLUTION INTRAVENOUS at 06:01

## 2024-04-17 RX ADMIN — IPRATROPIUM BROMIDE 0.5 MG: 0.5 SOLUTION RESPIRATORY (INHALATION) at 18:22

## 2024-04-17 RX ADMIN — RANOLAZINE 500 MG: 500 TABLET, EXTENDED RELEASE ORAL at 10:41

## 2024-04-17 RX ADMIN — ALLOPURINOL 200 MG: 100 TABLET ORAL at 10:42

## 2024-04-17 RX ADMIN — AMIODARONE HYDROCHLORIDE 200 MG: 200 TABLET ORAL at 10:42

## 2024-04-17 RX ADMIN — ASPIRIN 81 MG: 81 TABLET, CHEWABLE ORAL at 10:42

## 2024-04-17 RX ADMIN — POTASSIUM CHLORIDE 10 MEQ: 7.46 INJECTION, SOLUTION INTRAVENOUS at 09:54

## 2024-04-17 RX ADMIN — OXYCODONE HYDROCHLORIDE AND ACETAMINOPHEN 500 MG: 500 TABLET ORAL at 10:42

## 2024-04-17 RX ADMIN — CARVEDILOL 12.5 MG: 12.5 TABLET, FILM COATED ORAL at 10:42

## 2024-04-17 RX ADMIN — IPRATROPIUM BROMIDE 0.5 MG: 0.5 SOLUTION RESPIRATORY (INHALATION) at 14:55

## 2024-04-17 RX ADMIN — Medication 2000 UNITS: at 10:42

## 2024-04-17 RX ADMIN — POTASSIUM CHLORIDE 10 MEQ: 7.46 INJECTION, SOLUTION INTRAVENOUS at 10:45

## 2024-04-17 RX ADMIN — IODIXANOL 205 ML: 320 INJECTION, SOLUTION INTRAVASCULAR at 09:11

## 2024-04-17 RX ADMIN — NIFEDIPINE 60 MG: 60 TABLET, EXTENDED RELEASE ORAL at 10:42

## 2024-04-17 RX ADMIN — DOCUSATE SODIUM 100 MG: 100 CAPSULE, LIQUID FILLED ORAL at 10:42

## 2024-04-17 RX ADMIN — PANTOPRAZOLE SODIUM 40 MG: 40 TABLET, DELAYED RELEASE ORAL at 06:14

## 2024-04-17 RX ADMIN — SUCRALFATE 1 G: 1 TABLET ORAL at 10:41

## 2024-04-17 RX ADMIN — MINOXIDIL 2.5 MG: 2.5 TABLET ORAL at 10:40

## 2024-04-17 RX ADMIN — BUMETANIDE 2 MG: 1 TABLET ORAL at 10:41

## 2024-04-17 NOTE — PROGRESS NOTES
Daily Progress Note    Date:2024  Patient: Carlos Espana  : 1955  MRN:[id number]  CODE:Full Code No additional code details  PCP:Alisa Rangel MD    Admit Date: 4/15/2024  7:01 PM   LOS: 1 day       Subjective:     Denies any further chest or abdominal pain today.  Denies shortness of breath.      Summary: 68-year-old male with CAD with prior stenting, history of systolic heart failure now with preserved EF, A-fib on amiodarone and Eliquis, CKD stage IV, hypertension, diabetes, presented with chest pain described as substernal pressure, also with some epigastric pain, however symptoms not initially alleviated by GI cocktail, although chest pain did eventually improve without nitroglycerin.  EKG without any new ischemic changes.  Noted elevated troponins, but in setting of poor renal clearance and repeat troponin downtrending.  He was treated for possible NSTEMI on IV heparin drip, in addition to cardiovascular regimen of aspirin, statin, Imdur, carvedilol, Entresto.  Ranexa added for additional antianginal therapy.  Seen in consultation by cardiology and nephrology.      Objective:      Vital signs in last 24 hours:  Patient Vitals for the past 24 hrs:   BP Temp Temp src Pulse Resp SpO2 Height Weight   24 1552 110/75 97.5 °F (36.4 °C) Temporal (!) 48 -- 96 % -- --   24 1201 125/77 97.7 °F (36.5 °C) Temporal 51 18 95 % -- --   24 0720 (!) 151/70 97.2 °F (36.2 °C) Temporal 53 18 90 % -- --   24 0640 -- -- -- -- -- 92 % -- --   24 0547 122/76 97.6 °F (36.4 °C) Temporal 64 16 97 % -- --   24 0015 -- -- -- -- -- -- 1.803 m (5' 10.98\") 102.6 kg (226 lb 3.2 oz)   04/15/24 2355 124/83 97.5 °F (36.4 °C) Temporal 58 16 97 % -- --   04/15/24 2341 -- -- -- -- -- 97 % -- --   04/15/24 2340 -- -- -- -- -- (!) 86 % -- --   04/15/24 2232 137/65 -- -- 58 -- 94 % -- --   04/15/24 2139 138/67 -- -- 69 15 95 % -- --   04/15/24 2024 123/82 98.6 °F (37 °C) -- 61 15 95 % -- --   04/15/24 
4 Eyes Skin Assessment     NAME:  Carlos Espana  YOB: 1955  MEDICAL RECORD NUMBER:  [id number]    The patient is being assessed for  Admission    I agree that at least one RN has performed a thorough Head to Toe Skin Assessment on the patient. ALL assessment sites listed below have been assessed.      Areas assessed by both nurses:    Head, Face, Ears, Shoulders, Back, Chest, Arms, Elbows, Hands, Sacrum. Buttock, Coccyx, Ischium, Legs. Feet and Heels, and Under Medical Devices         Does the Patient have a Wound? No noted wound(s)       Jose C Prevention initiated by RN: Yes  Wound Care Orders initiated by RN: No    Pressure Injury (Stage 3,4, Unstageable, DTI, NWPT, and Complex wounds) if present, place Wound referral order by RN under : No    New Ostomies, if present place, Ostomy referral order under : No     Nurse 1 eSignature: Electronically signed by Gus Vargas RN on 4/16/24 at 12:52 AM CDT    **SHARE this note so that the co-signing nurse can place an eSignature**    Nurse 2 eSignature: Electronically signed by Jennifer Graham RN on 4/16/24 at 12:55 AM CDT  
Carlos Espana arrived to room # 720.   Presented with: NSTEMI  Mental Status: Patient is oriented, alert, coherent, logical, thought processes intact, and able to concentrate and follow conversation.   Vitals:    04/15/24 2355   BP: 124/83   Pulse: 58   Resp: 16   Temp: 97.5 °F (36.4 °C)   SpO2: 97%     Patient safety contract and falls prevention contract reviewed with patient Yes.  Oriented Patient to room.  Call light within reach. Yes.  Needs, issues or concerns expressed at this time: yes,   Social Determinants of Health     Tobacco Use: Medium Risk (4/15/2024)    Patient History     Smoking Tobacco Use: Former     Smokeless Tobacco Use: Former     Passive Exposure: Not on file   Alcohol Use: Not At Risk (8/11/2023)    AUDIT-C     Frequency of Alcohol Consumption: Never     Average Number of Drinks: Patient does not drink     Frequency of Binge Drinking: Never   Financial Resource Strain: Low Risk  (8/11/2023)    Overall Financial Resource Strain (CARDIA)     Difficulty of Paying Living Expenses: Not very hard   Food Insecurity: No Food Insecurity (4/15/2024)    Hunger Vital Sign     Worried About Running Out of Food in the Last Year: Never true     Ran Out of Food in the Last Year: Never true   Transportation Needs: No Transportation Needs (4/15/2024)    PRAPARE - Transportation     Lack of Transportation (Medical): No     Lack of Transportation (Non-Medical): No   Physical Activity: Sufficiently Active (8/11/2023)    Exercise Vital Sign     Days of Exercise per Week: 3 days     Minutes of Exercise per Session: 60 min   Stress: No Stress Concern Present (8/11/2023)    [place] [employer] - Occupational Stress Questionnaire     Feeling of Stress : Not at all   Social Connections: Socially Integrated (8/11/2023)    Social Connection and Isolation Panel [NHANES]     Frequency of Communication with Friends and Family: More than three times a week     Frequency of Social Gatherings with 
Heparin Infusion Monitoring Note:  Due to recent anticoagulant use, the heparin infusion will be monitoring using an aPTT-based algorithm.  Please refer to the MAR for adjustment details of the heparin infusion.    Details of prior anticoagulant use:    Name of anticoagulant: apixaban  Last dose: 04/15/24 0900    At 72 hours following the last anticoagulant administration, an anti-Xa based nomogram will be ordered to replace the aPTT nomogram.  This will occur on 04/18/24 0900.    Electronically signed by Marcello Dasilva RPH on 4/15/2024 at 9:17 PM        
Palliative Care/Spiritual Care: Met with pt to initiate palliative care. Pt says he was having chest pain and trouble breathing. Pt's wife Perri was present and she says blood work showed he had a heart attack. Pt has a history of CAD diagnosis and other diagnoses in past medical history. Pt is known to palliative care. Pt and his wife says pt is suppose to have a heart cath.         Advance Directives: Pt has a LW with his wife Perri Espana named as primary decision maker and his brother Dg Espana named as secondary decision maker. Pt is currently full code and wants CPR and Ventilator. SEE ACP NOTE.         Pain/other symptoms: Pt is not having pain currently but is having trouble breathing. Pt had a breathing treatment during visit.         Social/Spiritual: Pt is Buddhist pita.        Pt/family discussion r/t goals: Pt lives at home with his wife and she says he ambulates without assistance but moves slowly. Pt's wife says his goal is to have the heart cath and determine any further treatment with the ultimate goal of returning home with her and his cat.     Provided spiritual care with sustaining presence, nurtured hope, and prayer. Pt expressed gratitude for spiritual care.     Electronically signed by Mary Behrens on 4/16/2024 at 10:49 AM            
small pancreatic cyst-Gastritis, no h pylori, CT in 6 months    URETER STENT PLACEMENT      VASCULAR SURGERY  2013 [facility]    Aortogram with bilateral selective renal artery injections    VASCULAR SURGERY  13 [facility]    Right carotid endarterectomy with Vascu-Guard patch repair. Right cervical carotid arteriograms after endarterectomy.    VASCULAR SURGERY  7/6/15 [facility]    Percutaneous cannulation, right common femoral artery, with a5 [place] sheath and later 6 [place] RDC sheath. Suprarenal abdomianl aortagram.  Selective right renal arteriogram.  Right renal artery balloon angioplasty;/stent (Express 6 mm x 18mmballoon-expandable stent.. Completion suprarenal abdominal aortogram and selective right remal arteriogram. Mynx closure, right common femoral artery punctur       Family History  Family History   Problem Relation Age of Onset    Cancer Mother         SKIN    Diabetes Mother     Hypertension Mother     High Blood Pressure Mother     Other Mother         COVID    Osteoporosis Mother     Diabetes Father     High Blood Pressure Father     Stomach Cancer Father 70    High Blood Pressure Brother     No Known Problems Daughter     No Known Problems Son     Colon Polyps Neg Hx     Colon Cancer Neg Hx        Social History  Social History     Socioeconomic History    Marital status:      Spouse name: Mrs. Rayo Jovi    Number of children: 2    Years of education: Not on file    Highest education level: Not on file   Occupational History    Occupation:      Comment: Retired   Tobacco Use    Smoking status: Former     Current packs/day: 0.00     Average packs/day: 0.3 packs/day for 9.0 years (2.3 ttl pk-yrs)     Types: Cigarettes     Start date:      Quit date:      Years since quittin.3    Smokeless tobacco: Former     Types: Chew   Vaping Use    Vaping Use: Never used   Substance and Sexual Activity    Alcohol use: Not Currently     Comment: \"when i was tyoung i did, if i wanted 
point  Discussed with patient and patient wife at the bedside regarding the cardiac cath findings  Advised them the option of  procedure, risks and benefits were discussed and they are not interested in proceeding further.   Outpatient follow-up with cardiology                Lucy Diaz MD, MD 4/17/2024 4:58 PM

## 2024-04-17 NOTE — BRIEF OP NOTE
Brief Postoperative Note      Patient: Carlos Espana  YOB: 1955  MRN: [id number]    Date of Procedure: 4/17/2024    Post-Op Diagnosis:  Severe in stent restenosis with complete occlusion of mid LAD        Estimated Blood Loss (mL): Minimal      Findings:  Attempted PCI of the severe in-stent restenosis of complete occlusion of mid LAD  Unable to pass the wire even after escalating the wire from run-through to  200.     Electronically signed by Lucy Diaz MD on 4/17/2024 at 10:50 AM

## 2024-04-17 NOTE — DISCHARGE SUMMARY
Discharge Summary    NAME: Carlos Espana  :  1955  MRN:  [id number]    Admit date:  4/15/2024  Discharge date:  2024    Advance Directive: Full Code    Consults: cardiology and nephrology    Primary Care Physician:  Alisa Rangel MD    Discharge Diagnoses:  Principal Problem:    NSTEMI (non-ST elevated myocardial infarction) ([facility])  Active Problems:  Chest pain due to CAD  Chronic heart failure with mildly reduced ejection fraction    Hypokalemia    CKD (chronic kidney disease) stage 4, GFR 15-29 ml/min ([facility])    DM (diabetes mellitus) ([facility])    PAF (paroxysmal atrial fibrillation) ([facility])    Constipation  Resolved Problems:    * No resolved hospital problems. *      Significant Diagnostic Studies:   XR CHEST PORTABLE    Result Date: 4/15/2024  EXAM:  FRONTAL VIEW OF THE CHEST.  HISTORY:  Chest pain  COMPARISON:  2023  FINDINGS:   Cardiac silhouette is enlarged.  Infrahilar opacification and vascular distension is similar to previous.  May be chronic versus recurrent.  No visible effusion or pneumothorax.  No acute osseous abnormality.       1. Some vascular distension and infrahilar density changes similar to previous 2. Cardiomegaly.      ______________________________________ Electronically signed by: CRISTIANA ROSENTHAL M.D. Date:     04/15/2024 Time:    20:39     CT ABDOMEN PELVIS WO CONTRAST Additional Contrast? None    Result Date: 4/15/2024    EXAM: CT ABDOMEN PELVIS WITHOUT CONTRAST  HISTORY:  Upper abdominal pain  COMPARISON:  2023.  TECHNIQUE:  Serial axial images of the abdomen pelvis were performed from the lung bases through the inferior pelvis without contrast.  These were viewed in multiple planes.  FINDINGS:    Limited evaluation of the abdominal organs demonstrates the liver, spleen and pancreas are unremarkable.  No right or left hydronephrosis.  Simple renal cyst.  Nonspecific bilateral perinephric fat stranding.  No hydronephrosis.  No acute process involving the colon, small bowel or

## 2024-04-17 NOTE — PLAN OF CARE
Problem: Discharge Planning  Goal: Discharge to home or other facility with appropriate resources  4/17/2024 0341 by Evita Nickerson RN  Outcome: Progressing  4/16/2024 1849 by Siria Calderon RN  Outcome: Progressing  Flowsheets (Taken 4/16/2024 0844)  Discharge to home or other facility with appropriate resources:   Identify barriers to discharge with patient and caregiver   Arrange for needed discharge resources and transportation as appropriate     Problem: Pain  Goal: Verbalizes/displays adequate comfort level or baseline comfort level  4/17/2024 0341 by Evita Nickerson RN  Outcome: Progressing  4/16/2024 1849 by Siria Calderon RN  Outcome: Progressing     Problem: ABCDS Injury Assessment  Goal: Absence of physical injury  4/17/2024 0341 by Evita Nickerson RN  Outcome: Progressing  4/16/2024 1849 by Siria Calderon RN  Outcome: Progressing     Problem: Safety - Adult  Goal: Free from fall injury  4/17/2024 0341 by Evita Nickerson RN  Outcome: Progressing  4/16/2024 1849 by Siria Calderon RN  Outcome: Progressing

## 2024-04-18 ENCOUNTER — READMISSION MANAGEMENT (OUTPATIENT)
Dept: CALL CENTER | Facility: HOSPITAL | Age: 69
End: 2024-04-18
Payer: COMMERCIAL

## 2024-04-18 NOTE — OUTREACH NOTE
Prep Survey      Flowsheet Row Responses   [facility] facility patient discharged from? Non-[facility]   Is LACE score < 7 ? Non-BH Discharge   Eligibility [facility]   Date of Admission 04/15/24   Date of Discharge 04/17/24   Discharge Disposition Home or Self Care   Discharge diagnosis NSTEMI (non-ST elevated myocardial infarction) (HCC)   Chest pain, unspecified type   Does the patient have one of the following disease processes/diagnoses(primary or secondary)? Acute MI (STEMI,NSTEMI)   Prep survey completed? Yes            Zhane TANNER - Registered Nurse

## 2024-04-19 ENCOUNTER — TRANSITIONAL CARE MANAGEMENT TELEPHONE ENCOUNTER (OUTPATIENT)
Dept: CALL CENTER | Facility: HOSPITAL | Age: 69
End: 2024-04-19
Payer: COMMERCIAL

## 2024-04-19 NOTE — OUTREACH NOTE
Call Center TCM Note      Flowsheet Row Responses   [facility] facility patient discharged from? Non-[facility]   Does the patient have one of the following disease processes/diagnoses(primary or secondary)? Acute MI (STEMI,NSTEMI)   TCM attempt successful? No   Unsuccessful attempts Attempt 1            Ling Jaime LPN    4/19/2024, 11:30 CDT

## 2024-04-19 NOTE — OUTREACH NOTE
Call Center TCM Note      Flowsheet Row Responses   [facility] facility patient discharged from? Non-[facility]   Does the patient have one of the following disease processes/diagnoses(primary or secondary)? Acute MI (STEMI,NSTEMI)   TCM attempt successful? No   Unsuccessful attempts Attempt 2            Ling Jaime LPN    4/19/2024, 14:05 CDT

## 2024-04-20 ENCOUNTER — HOSPITAL ENCOUNTER (INPATIENT)
Age: 69
LOS: 5 days | Discharge: HOME | DRG: 674 | End: 2024-04-25
Attending: EMERGENCY MEDICINE | Admitting: STUDENT IN AN ORGANIZED HEALTH CARE EDUCATION/TRAINING PROGRAM
Payer: MEDICARE

## 2024-04-20 ENCOUNTER — APPOINTMENT (OUTPATIENT)
Dept: GENERAL RADIOLOGY | Age: 69
DRG: 674 | End: 2024-04-20
Attending: EMERGENCY MEDICINE
Payer: MEDICARE

## 2024-04-20 ENCOUNTER — APPOINTMENT (OUTPATIENT)
Dept: ULTRASOUND IMAGING | Age: 69
DRG: 674 | End: 2024-04-20
Payer: MEDICARE

## 2024-04-20 ENCOUNTER — APPOINTMENT (OUTPATIENT)
Dept: CT IMAGING | Age: 69
DRG: 674 | End: 2024-04-20
Payer: MEDICARE

## 2024-04-20 DIAGNOSIS — I25.2 HISTORY OF NON-ST ELEVATION MYOCARDIAL INFARCTION (NSTEMI): ICD-10-CM

## 2024-04-20 DIAGNOSIS — I50.42 CHRONIC COMBINED SYSTOLIC AND DIASTOLIC CHF (CONGESTIVE HEART FAILURE) (HCC): ICD-10-CM

## 2024-04-20 DIAGNOSIS — N17.9 ACUTE RENAL FAILURE SUPERIMPOSED ON STAGE 4 CHRONIC KIDNEY DISEASE, UNSPECIFIED ACUTE RENAL FAILURE TYPE (HCC): Primary | ICD-10-CM

## 2024-04-20 DIAGNOSIS — E87.1 HYPONATREMIA: ICD-10-CM

## 2024-04-20 DIAGNOSIS — N18.4 ACUTE RENAL FAILURE SUPERIMPOSED ON STAGE 4 CHRONIC KIDNEY DISEASE, UNSPECIFIED ACUTE RENAL FAILURE TYPE (HCC): Primary | ICD-10-CM

## 2024-04-20 PROBLEM — T50.8X5A CONTRAST-INDUCED NEPHROPATHY: Status: ACTIVE | Noted: 2024-04-20

## 2024-04-20 PROBLEM — N14.11 CONTRAST-INDUCED NEPHROPATHY: Status: ACTIVE | Noted: 2024-04-20

## 2024-04-20 LAB
25(OH)D3 SERPL-MCNC: 27.5 NG/ML
ALBUMIN SERPL-MCNC: 4.1 G/DL (ref 3.5–5.2)
ALP SERPL-CCNC: 75 U/L (ref 40–130)
ALT SERPL-CCNC: 13 U/L (ref 5–41)
ANION GAP SERPL CALCULATED.3IONS-SCNC: 15 MMOL/L (ref 7–19)
AST SERPL-CCNC: 12 U/L (ref 5–40)
BACTERIA #/AREA URNS HPF: NORMAL /HPF
BASOPHILS # BLD: 0 K/UL (ref 0–0.2)
BASOPHILS NFR BLD: 0.4 % (ref 0–1)
BILIRUB SERPL-MCNC: 0.5 MG/DL (ref 0.2–1.2)
BILIRUB UR STRIP.AUTO-MCNC: NEGATIVE MG/DL
BNP BLD-MCNC: 2355 PG/ML (ref 0–124)
BUN SERPL-MCNC: 61 MG/DL (ref 8–23)
CALCIUM SERPL-MCNC: 9.4 MG/DL (ref 8.8–10.2)
CHLORIDE SERPL-SCNC: 79 MMOL/L (ref 98–111)
CLARITY UR: CLEAR
CO2 SERPL-SCNC: 31 MMOL/L (ref 22–29)
COARSE GRAN CASTS #/AREA URNS LPF: NORMAL /LPF (ref 0–5)
COLOR UR: YELLOW
CREAT SERPL-MCNC: 6.8 MG/DL (ref 0.5–1.2)
CREAT UR-MCNC: 115.3 MG/DL (ref 39–259)
EOSINOPHIL # BLD: 0.2 K/UL (ref 0–0.6)
EOSINOPHIL NFR BLD: 3.6 % (ref 0–5)
ERYTHROCYTE [DISTWIDTH] IN BLOOD BY AUTOMATED COUNT: 16.9 % (ref 11.5–14.5)
GLUCOSE BLD-MCNC: 139 MG/DL (ref 70–99)
GLUCOSE BLD-MCNC: 140 MG/DL (ref 70–99)
GLUCOSE SERPL-MCNC: 156 MG/DL (ref 74–109)
GLUCOSE UR STRIP.AUTO-MCNC: NEGATIVE MG/DL
HCT VFR BLD AUTO: 41.4 % (ref 42–52)
HGB BLD-MCNC: 12.5 G/DL (ref 14–18)
HGB UR STRIP.AUTO-MCNC: NEGATIVE MG/L
HYALINE CASTS #/AREA URNS LPF: NORMAL /LPF (ref 0–5)
IMM GRANULOCYTES # BLD: 0 K/UL
KETONES UR STRIP.AUTO-MCNC: NEGATIVE MG/DL
LEUKOCYTE ESTERASE UR QL STRIP.AUTO: NEGATIVE
LIPASE SERPL-CCNC: 22 U/L (ref 13–60)
LYMPHOCYTES # BLD: 1.2 K/UL (ref 1.1–4.5)
LYMPHOCYTES NFR BLD: 17.6 % (ref 20–40)
MAGNESIUM SERPL-MCNC: 2.9 MG/DL (ref 1.6–2.4)
MCH RBC QN AUTO: 24.9 PG (ref 27–31)
MCHC RBC AUTO-ENTMCNC: 30.2 G/DL (ref 33–37)
MCV RBC AUTO: 82.3 FL (ref 80–94)
MONOCYTES # BLD: 0.5 K/UL (ref 0–0.9)
MONOCYTES NFR BLD: 7 % (ref 0–10)
NEUTROPHILS # BLD: 4.8 K/UL (ref 1.5–7.5)
NEUTS SEG NFR BLD: 71.3 % (ref 50–65)
NITRITE UR QL STRIP.AUTO: NEGATIVE
OSMOLALITY SERPL CALC.SUM OF ELEC: 297 MOSM/KG (ref 275–300)
OSMOLALITY URINE: 272 MOSM/KG (ref 250–1200)
PERFORMED ON: ABNORMAL
PERFORMED ON: ABNORMAL
PH UR STRIP.AUTO: 6 [PH] (ref 5–8)
PLATELET # BLD AUTO: 153 K/UL (ref 130–400)
PMV BLD AUTO: 9.8 FL (ref 9.4–12.4)
POTASSIUM SERPL-SCNC: 4.5 MMOL/L (ref 3.5–5)
PROT SERPL-MCNC: 6.9 G/DL (ref 6.6–8.7)
PROT UR STRIP.AUTO-MCNC: 30 MG/DL
PROT UR-MCNC: 62 MG/DL (ref 15–45)
PTH-INTACT SERPL-MCNC: 112.5 PG/ML (ref 15–65)
RBC # BLD AUTO: 5.03 M/UL (ref 4.7–6.1)
RBC #/AREA URNS HPF: NORMAL /HPF (ref 0–2)
SODIUM SERPL-SCNC: 125 MMOL/L (ref 136–145)
SODIUM UR-SCNC: <20 MMOL/L
SP GR UR STRIP.AUTO: 1.01 (ref 1–1.03)
SQUAMOUS #/AREA URNS HPF: NORMAL /HPF
URN SPEC COLLECT METH UR: ABNORMAL
UROBILINOGEN UR STRIP.AUTO-MCNC: 0.2 E.U./DL
UUN UR-MCNC: 174 MG/DL
WBC # BLD AUTO: 6.7 K/UL (ref 4.8–10.8)
WBC #/AREA URNS HPF: NORMAL /HPF (ref 0–5)

## 2024-04-20 PROCEDURE — 84540 ASSAY OF URINE/UREA-N: CPT

## 2024-04-20 PROCEDURE — 82570 ASSAY OF URINE CREATININE: CPT

## 2024-04-20 PROCEDURE — 83880 ASSAY OF NATRIURETIC PEPTIDE: CPT

## 2024-04-20 PROCEDURE — 51798 US URINE CAPACITY MEASURE: CPT

## 2024-04-20 PROCEDURE — 99285 EMERGENCY DEPT VISIT HI MDM: CPT

## 2024-04-20 PROCEDURE — 80053 COMPREHEN METABOLIC PANEL: CPT

## 2024-04-20 PROCEDURE — 81001 URINALYSIS AUTO W/SCOPE: CPT

## 2024-04-20 PROCEDURE — 83930 ASSAY OF BLOOD OSMOLALITY: CPT

## 2024-04-20 PROCEDURE — 94150 VITAL CAPACITY TEST: CPT

## 2024-04-20 PROCEDURE — 83690 ASSAY OF LIPASE: CPT

## 2024-04-20 PROCEDURE — 83970 ASSAY OF PARATHORMONE: CPT

## 2024-04-20 PROCEDURE — 74176 CT ABD & PELVIS W/O CONTRAST: CPT

## 2024-04-20 PROCEDURE — 83935 ASSAY OF URINE OSMOLALITY: CPT

## 2024-04-20 PROCEDURE — 6360000002 HC RX W HCPCS: Performed by: NURSE PRACTITIONER

## 2024-04-20 PROCEDURE — 6370000000 HC RX 637 (ALT 250 FOR IP): Performed by: STUDENT IN AN ORGANIZED HEALTH CARE EDUCATION/TRAINING PROGRAM

## 2024-04-20 PROCEDURE — 76770 US EXAM ABDO BACK WALL COMP: CPT

## 2024-04-20 PROCEDURE — 84156 ASSAY OF PROTEIN URINE: CPT

## 2024-04-20 PROCEDURE — 82306 VITAMIN D 25 HYDROXY: CPT

## 2024-04-20 PROCEDURE — 36415 COLL VENOUS BLD VENIPUNCTURE: CPT

## 2024-04-20 PROCEDURE — 1200000000 HC SEMI PRIVATE

## 2024-04-20 PROCEDURE — 94760 N-INVAS EAR/PLS OXIMETRY 1: CPT

## 2024-04-20 PROCEDURE — 85025 COMPLETE CBC W/AUTO DIFF WBC: CPT

## 2024-04-20 PROCEDURE — 2580000003 HC RX 258: Performed by: NURSE PRACTITIONER

## 2024-04-20 PROCEDURE — 2700000000 HC OXYGEN THERAPY PER DAY

## 2024-04-20 PROCEDURE — 94640 AIRWAY INHALATION TREATMENT: CPT

## 2024-04-20 PROCEDURE — 2580000003 HC RX 258: Performed by: INTERNAL MEDICINE

## 2024-04-20 PROCEDURE — 71045 X-RAY EXAM CHEST 1 VIEW: CPT

## 2024-04-20 PROCEDURE — 51702 INSERT TEMP BLADDER CATH: CPT

## 2024-04-20 PROCEDURE — 82962 GLUCOSE BLOOD TEST: CPT

## 2024-04-20 PROCEDURE — 84300 ASSAY OF URINE SODIUM: CPT

## 2024-04-20 PROCEDURE — 83735 ASSAY OF MAGNESIUM: CPT

## 2024-04-20 RX ORDER — SODIUM CHLORIDE 9 MG/ML
INJECTION, SOLUTION INTRAVENOUS CONTINUOUS
Status: DISCONTINUED | OUTPATIENT
Start: 2024-04-20 | End: 2024-04-21

## 2024-04-20 RX ORDER — MONTELUKAST SODIUM 10 MG/1
10 TABLET ORAL NIGHTLY
Status: DISCONTINUED | OUTPATIENT
Start: 2024-04-20 | End: 2024-04-25 | Stop reason: HOSPADM

## 2024-04-20 RX ORDER — POLYETHYLENE GLYCOL 3350 17 G/17G
17 POWDER, FOR SOLUTION ORAL DAILY PRN
Status: DISCONTINUED | OUTPATIENT
Start: 2024-04-20 | End: 2024-04-25 | Stop reason: HOSPADM

## 2024-04-20 RX ORDER — ACETAMINOPHEN 325 MG/1
650 TABLET ORAL EVERY 6 HOURS PRN
Status: DISCONTINUED | OUTPATIENT
Start: 2024-04-20 | End: 2024-04-25 | Stop reason: HOSPADM

## 2024-04-20 RX ORDER — CYCLOBENZAPRINE HCL 10 MG
10 TABLET ORAL 2 TIMES DAILY PRN
Status: DISCONTINUED | OUTPATIENT
Start: 2024-04-20 | End: 2024-04-25 | Stop reason: HOSPADM

## 2024-04-20 RX ORDER — ACETAMINOPHEN 650 MG/1
650 SUPPOSITORY RECTAL EVERY 6 HOURS PRN
Status: DISCONTINUED | OUTPATIENT
Start: 2024-04-20 | End: 2024-04-25 | Stop reason: HOSPADM

## 2024-04-20 RX ORDER — DEXTROSE MONOHYDRATE 100 MG/ML
INJECTION, SOLUTION INTRAVENOUS CONTINUOUS PRN
Status: DISCONTINUED | OUTPATIENT
Start: 2024-04-20 | End: 2024-04-25 | Stop reason: HOSPADM

## 2024-04-20 RX ORDER — SODIUM CHLORIDE 9 MG/ML
INJECTION, SOLUTION INTRAVENOUS PRN
Status: DISCONTINUED | OUTPATIENT
Start: 2024-04-20 | End: 2024-04-23

## 2024-04-20 RX ORDER — SODIUM CHLORIDE 0.9 % (FLUSH) 0.9 %
5-40 SYRINGE (ML) INJECTION PRN
Status: DISCONTINUED | OUTPATIENT
Start: 2024-04-20 | End: 2024-04-23

## 2024-04-20 RX ORDER — VITAMIN B COMPLEX
2000 TABLET ORAL DAILY
Status: DISCONTINUED | OUTPATIENT
Start: 2024-04-21 | End: 2024-04-25 | Stop reason: HOSPADM

## 2024-04-20 RX ORDER — HEPARIN SODIUM 5000 [USP'U]/ML
5000 INJECTION, SOLUTION INTRAVENOUS; SUBCUTANEOUS EVERY 8 HOURS SCHEDULED
Status: DISCONTINUED | OUTPATIENT
Start: 2024-04-20 | End: 2024-04-23

## 2024-04-20 RX ORDER — POLYETHYLENE GLYCOL 3350 17 G/17G
17 POWDER, FOR SOLUTION ORAL ONCE
Status: DISCONTINUED | OUTPATIENT
Start: 2024-04-20 | End: 2024-04-21

## 2024-04-20 RX ORDER — RANOLAZINE 500 MG/1
500 TABLET, EXTENDED RELEASE ORAL 2 TIMES DAILY
Status: DISCONTINUED | OUTPATIENT
Start: 2024-04-20 | End: 2024-04-25 | Stop reason: HOSPADM

## 2024-04-20 RX ORDER — ROSUVASTATIN CALCIUM 20 MG/1
40 TABLET, COATED ORAL DAILY
Status: DISCONTINUED | OUTPATIENT
Start: 2024-04-20 | End: 2024-04-21

## 2024-04-20 RX ORDER — NIFEDIPINE 60 MG/1
60 TABLET, EXTENDED RELEASE ORAL DAILY
Status: DISCONTINUED | OUTPATIENT
Start: 2024-04-21 | End: 2024-04-25 | Stop reason: HOSPADM

## 2024-04-20 RX ORDER — INSULIN GLARGINE 100 [IU]/ML
5 INJECTION, SOLUTION SUBCUTANEOUS NIGHTLY
Status: DISCONTINUED | OUTPATIENT
Start: 2024-04-20 | End: 2024-04-23

## 2024-04-20 RX ORDER — ONDANSETRON 4 MG/1
4 TABLET, ORALLY DISINTEGRATING ORAL EVERY 8 HOURS PRN
Status: DISCONTINUED | OUTPATIENT
Start: 2024-04-20 | End: 2024-04-25 | Stop reason: HOSPADM

## 2024-04-20 RX ORDER — ISOSORBIDE MONONITRATE 60 MG/1
60 TABLET, EXTENDED RELEASE ORAL 2 TIMES DAILY
Status: DISCONTINUED | OUTPATIENT
Start: 2024-04-20 | End: 2024-04-25 | Stop reason: HOSPADM

## 2024-04-20 RX ORDER — SODIUM CHLORIDE 0.9 % (FLUSH) 0.9 %
5-40 SYRINGE (ML) INJECTION EVERY 12 HOURS SCHEDULED
Status: DISCONTINUED | OUTPATIENT
Start: 2024-04-20 | End: 2024-04-23

## 2024-04-20 RX ORDER — NITROGLYCERIN 0.4 MG/1
0.4 TABLET SUBLINGUAL EVERY 5 MIN PRN
Status: DISCONTINUED | OUTPATIENT
Start: 2024-04-20 | End: 2024-04-25 | Stop reason: HOSPADM

## 2024-04-20 RX ORDER — AMIODARONE HYDROCHLORIDE 100 MG/1
100 TABLET ORAL DAILY
Status: DISCONTINUED | OUTPATIENT
Start: 2024-04-21 | End: 2024-04-25 | Stop reason: HOSPADM

## 2024-04-20 RX ORDER — CALCITRIOL 0.25 UG/1
0.25 CAPSULE, LIQUID FILLED ORAL DAILY
Status: DISCONTINUED | OUTPATIENT
Start: 2024-04-21 | End: 2024-04-25 | Stop reason: HOSPADM

## 2024-04-20 RX ORDER — ONDANSETRON 2 MG/ML
4 INJECTION INTRAMUSCULAR; INTRAVENOUS EVERY 6 HOURS PRN
Status: DISCONTINUED | OUTPATIENT
Start: 2024-04-20 | End: 2024-04-25 | Stop reason: HOSPADM

## 2024-04-20 RX ORDER — SENNA AND DOCUSATE SODIUM 50; 8.6 MG/1; MG/1
2 TABLET, FILM COATED ORAL DAILY
Status: DISCONTINUED | OUTPATIENT
Start: 2024-04-20 | End: 2024-04-24

## 2024-04-20 RX ORDER — CARVEDILOL 12.5 MG/1
12.5 TABLET ORAL 2 TIMES DAILY WITH MEALS
Status: DISCONTINUED | OUTPATIENT
Start: 2024-04-20 | End: 2024-04-25 | Stop reason: HOSPADM

## 2024-04-20 RX ORDER — PANTOPRAZOLE SODIUM 40 MG/1
40 TABLET, DELAYED RELEASE ORAL DAILY
Status: DISCONTINUED | OUTPATIENT
Start: 2024-04-20 | End: 2024-04-25 | Stop reason: HOSPADM

## 2024-04-20 RX ADMIN — INSULIN GLARGINE 5 UNITS: 100 INJECTION, SOLUTION SUBCUTANEOUS at 21:35

## 2024-04-20 RX ADMIN — RANOLAZINE 500 MG: 500 TABLET, EXTENDED RELEASE ORAL at 21:34

## 2024-04-20 RX ADMIN — TIOTROPIUM BROMIDE AND OLODATEROL 2 PUFF: 3.124; 2.736 SPRAY, METERED RESPIRATORY (INHALATION) at 14:47

## 2024-04-20 RX ADMIN — SODIUM CHLORIDE: 9 INJECTION, SOLUTION INTRAVENOUS at 13:05

## 2024-04-20 RX ADMIN — MONTELUKAST 10 MG: 10 TABLET, FILM COATED ORAL at 21:34

## 2024-04-20 RX ADMIN — ONDANSETRON 4 MG: 2 INJECTION INTRAMUSCULAR; INTRAVENOUS at 21:34

## 2024-04-20 RX ADMIN — ISOSORBIDE MONONITRATE 60 MG: 60 TABLET, EXTENDED RELEASE ORAL at 21:34

## 2024-04-20 RX ADMIN — CARVEDILOL 12.5 MG: 12.5 TABLET, FILM COATED ORAL at 17:11

## 2024-04-20 RX ADMIN — SODIUM CHLORIDE, PRESERVATIVE FREE 10 ML: 5 INJECTION INTRAVENOUS at 21:35

## 2024-04-20 RX ADMIN — HEPARIN SODIUM 5000 UNITS: 5000 INJECTION INTRAVENOUS; SUBCUTANEOUS at 21:34

## 2024-04-20 RX ADMIN — SODIUM CHLORIDE: 9 INJECTION, SOLUTION INTRAVENOUS at 21:38

## 2024-04-20 ASSESSMENT — LIFESTYLE VARIABLES: HOW OFTEN DO YOU HAVE A DRINK CONTAINING ALCOHOL: NEVER

## 2024-04-20 NOTE — CONSULTS
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, or unspecified(403.00)     Myocardial infarction (HCC)     Neuropathy     Obesity, unspecified     SAMMY on CPAP     Other specified cardiac dysrhythmias(427.89)     Palliative care patient 12/21/2020    Paroxysmal atrial fibrillation (HCC)     Peripheral vascular disease (HCC)     Solitary pulmonary nodule     Surgical or other procedure not carried out because of contraindication     Thoracic aneurysm without mention of rupture     Thyroid nodule     Tobacco use disorder     Type II or unspecified type diabetes mellitus without mention of complication, not stated as uncontrolled     Weight loss        Past Surgical History:  Past Surgical History:   Procedure Laterality Date    CARDIAC CATHETERIZATION      CHOLECYSTECTOMY  2000    CORONARY ANGIOPLASTY WITH STENT PLACEMENT  01/2016    2 JACQUELINE to LAD    DIAGNOSTIC CARDIAC CATH LAB PROCEDURE      UPPER GASTROINTESTINAL ENDOSCOPY  2015    UPPER GASTROINTESTINAL ENDOSCOPY N/A 05/16/2023    Dr RANCHO Owens-w/EUS-Moderate gastritis, small pancreatic cyst-Gastritis, no h pylori, CT in 6 months    URETER STENT PLACEMENT      VASCULAR SURGERY  03- [facility]    Aortogram with bilateral selective renal artery injections    VASCULAR SURGERY  6/14/13 [facility]    Right carotid endarterectomy with Vascu-Guard patch repair. Right cervical carotid arteriograms after endarterectomy.    VASCULAR SURGERY  7/6/15 [facility]    Percutaneous cannulation, right common femoral artery, with a5 [place] sheath and later 6 [place] RDC sheath. Suprarenal abdomianl aortagram.  Selective right  ----------------------------    1.  Bilateral renal cortical enhancement.  Correlate with timing of most recent contrast administration and renal function tests to exclude acute renal injury. 2.  Otherwise no acute abnormality within the abdomen or pelvis. 3.  Indeterminate left renal lesion, possibly a complicated cyst.  Non-emergent ultrasound correlation recommended.  Additional simple renal cysts. 4.  Diverticulosis. 5.  Atherosclerosis.  ---------------------------  All CT scans are performed using dose optimization techniques as appropriate to the performed exam and include at least one of the following: Automated exposure control, adjustment of the mA and/or kV according to size, and the use of iterative reconstruction technique.  ______________________________________ Electronically signed by: ALFA RASHID M.D. Date:     04/20/2024 Time:    10:21     XR CHEST PORTABLE    Result Date: 4/20/2024  EXAM: CHEST RADIOGRAPH (1 VIEW)  TECHNIQUE: Frontal Chest Radiograph.  HISTORY: Fullness of air, recent weight gain  COMPARISON: 04/15/2024.  FINDINGS:  Lines, Tubes, Devices:  None  Lungs and Pleura:  No focal consolidation.  No pleural effusion.  No pneumothorax. Prominent interstitial markings.  Cardiac silhouette: Enlarged.  Bones: No acute abnormality.        Findings suggesting congestive heart failure.    ______________________________________ Electronically signed by: BILLY REESE D.O. Date:     04/20/2024 Time:    09:59        Assessment   1.  Acute kidney injury/worsening.  2.  Acute tubular necrosis/contrast-induced nephropathy.  3.  Stage IV chronic kidney disease baseline.  4.  Type II diabetic nephropathy.  5.  Recent non-STEMI/cardiac cath.  6.  Type 2 diabetes with nephropathy.  7.  History of chronic systolic CHF as well.  8.  Paroxysmal atrial fibrillation.    Plan:  1.  Trial of slow hydration.  2.  Urinary electrolytes.  3.  Intermittent diuretics as needed.  4.  Likely he will need dialysis,

## 2024-04-20 NOTE — ED NOTES
Findings suggesting congestive heart failure.               ______________________________________    Electronically signed by: BILLY REESE D.O.   Date:     04/20/2024   Time:    09:59         COVID-19 Results:   Internal Administration   First Dose COVID-19, J&J, (age 18y+), IM, 0.5 mL  03/17/2021   Second Dose COVID-19, J&J, (age 18y+), IM, 0.5 mL   11/01/2021       Last COVID Lab SARS-CoV-2 (no units)   Date Value   01/11/2023 Negative     SARS-CoV-2, PCR (no units)   Date Value   08/11/2023 DETECTED (A)     SARS-CoV-2, NAAT (no units)   Date Value   02/06/2023 Not Detected     POC ACT LR (sec)   Date Value   04/17/2024 223     POC Glucose (mg/dl)   Date Value   04/17/2024 235 (H)     POC Troponin I (ng/mL)   Date Value   01/01/2018 0.03           Abnormal labs:   Abnormal Labs Reviewed   CBC WITH AUTO DIFFERENTIAL - Abnormal; Notable for the following components:       Result Value    Hemoglobin 12.5 (*)     Hematocrit 41.4 (*)     MCH 24.9 (*)     MCHC 30.2 (*)     RDW 16.9 (*)     Neutrophils % 71.3 (*)     Lymphocytes % 17.6 (*)     All other components within normal limits   URINALYSIS - Abnormal; Notable for the following components:    Protein, UA 30 (*)     All other components within normal limits   MAGNESIUM - Abnormal; Notable for the following components:    Magnesium 2.9 (*)     All other components within normal limits   BRAIN NATRIURETIC PEPTIDE - Abnormal; Notable for the following components:    Pro-BNP 2,355 (*)     All other components within normal limits   COMPREHENSIVE METABOLIC PANEL - Abnormal; Notable for the following components:    Sodium 125 (*)     Chloride 79 (*)     CO2 31 (*)     Glucose 156 (*)     BUN 61 (*)     Creatinine 6.8 (*)     Est, Glom Filt Rate 8 (*)     All other components within normal limits     Background  Allergies:   Allergies   Allergen Reactions    Morphine Shortness Of Breath and Other (See Comments)     Was INTUBATED for this    Hydralazine      \"They  pulmonary nodule     Surgical or other procedure not carried out because of contraindication     Thoracic aneurysm without mention of rupture     Thyroid nodule     Tobacco use disorder     Type II or unspecified type diabetes mellitus without mention of complication, not stated as uncontrolled     Weight loss        Assessment  Vitals: Level of Consciousness: Alert (0)   Vitals:    04/20/24 0919 04/20/24 1032 04/20/24 1112   BP: 106/64 (!) 115/59 116/61   Pulse: 54 55 59   Resp: 22 18 16   Temp: 98.4 °F (36.9 °C)     SpO2: 92% 94% 94%   Weight: 105.2 kg (232 lb)       Predictive Model Details          23 (Normal)  Factor Value    Calculated 4/20/2024 11:29 52% Age 68 years old    Deterioration Index Model 25% Sodium abnormal (125 mmol/L)     13% Potassium 4.5 mmol/L     4% BUN abnormal (61 mg/dL)     3% Pulse 59     2% Pulse oximetry 94 %     1% Systolic 116     1% Hematocrit abnormal (41.4 %)     0% Respiratory rate 16     0% WBC count 6.7 K/uL     0% Temperature 98.4 °F (36.9 °C)       NPO? No  O2 Flow Rate: O2 Device: None (Room air) O2 Flow Rate (L/min): 3 L/min  Cardiac Rhythm: NSR  NIH Score: NIH     Active LDA's:   Peripheral IV 04/20/24 Right Antecubital (Active)   Site Assessment Clean, dry & intact 04/20/24 0929   Line Status Blood return noted;Brisk blood return;Specimen collected;Normal saline locked 04/20/24 0929   Phlebitis Assessment No symptoms 04/20/24 0929   Infiltration Assessment 0 04/20/24 0929   Dressing Status New dressing applied;Clean, dry & intact 04/20/24 0929   Dressing Intervention New 04/20/24 0929     Pertinent or High Risk Medications/Drips: no   If Yes, please provide details:    Blood Product Administration: no  If Yes, please provide details:    Sepsis Risk Score Sepsis Risk Score: 2.3    Admitted with Sepsis? No    Recommendation  Incomplete orders: hospitalist orders  Patient Belongings: w pt/family  Additional Comments: none  If any further questions, please call Sending RN at

## 2024-04-20 NOTE — ED PROVIDER NOTES
[facility] ONCOLOGY UNIT  EMERGENCY DEPARTMENT ENCOUNTER      Pt Name: Carlos Espana  MRN: [id number]  Birthdate 1955  Date of evaluation: 4/20/2024  Provider: Dg Flowers Jr, MD    CHIEF COMPLAINT       Chief Complaint   Patient presents with    Urinary Retention     Decreased urination and constipation since discharge     Weight Gain     6lb in the last week         HISTORY OF PRESENT ILLNESS   (Location/Symptom, Timing/Onset,Context/Setting, Quality, Duration, Modifying Factors, Severity)  Note limiting factors.   Carlos Espana is a 68 y.o. male who presents to the emergency department for evaluation after having abdominal fullness with decreased urine output and constipation for the last several days.  Patient was hospitalized here with an NSTEMI recently and underwent heart catheterization with intervention.  Says after discharge he was able to urinate 2 or 3 times in the afternoon he got home but noticed the next day that he had not urinated by afternoon so he took a diuretic but did not notice any results with that.  Says this morning he had a minimal amount of urine output that was very dark.  Denies any significant bladder pain or fullness.  Has generalized abdominal fullness sensation.  Has been constipated.  No vomiting.  Denies any significant leg swelling.  Has chronic shortness of breath.  Normally wears 3 L oxygen at night but last few days has been wearing it some during the day as well    HPI    NursingNotes were reviewed.    REVIEW OF SYSTEMS    (2-9 systems for level 4, 10 or more for level 5)     Review of Systems   Constitutional: Negative.    HENT: Negative.     Eyes: Negative.    Respiratory: Negative.     Cardiovascular: Negative.    Gastrointestinal:  Positive for abdominal distention and constipation.   Genitourinary:  Positive for decreased urine volume.   Musculoskeletal: Negative.    Skin: Negative.    Neurological: Negative.    Hematological: Negative.    Psychiatric/Behavioral:  [NHANES]     Frequency of Communication with Friends and Family: More than three times a week     Frequency of Social Gatherings with Friends and Family: Once a week     Attends Baptism Services: More than 4 times per year     Active Member of Clubs or Organizations: Yes     Attends Club or Organization Meetings: More than 4 times per year     Marital Status:    Intimate Partner Violence: Not At Risk (8/11/2023)    Humiliation, Afraid, Rape, and Kick questionnaire     Fear of Current or Ex-Partner: No     Emotionally Abused: No     Physically Abused: No     Sexually Abused: No   Housing Stability: Low Risk  (4/20/2024)    Housing Stability Vital Sign     Unable to Pay for Housing in the Last Year: No     Number of Places Lived in the Last Year: 1     Unstable Housing in the Last Year: No       SCREENINGS    [place] Coma Scale  Eye Opening: Spontaneous  Best Verbal Response: Oriented  Best Motor Response: Obeys commands  [place] Coma Scale Score: 15        PHYSICAL EXAM    (up to 7 for level 4, 8 or more for level 5)     ED Triage Vitals [04/20/24 0919]   BP Temp Temp src Pulse Respirations SpO2 Height Weight - Scale   106/64 98.4 °F (36.9 °C) -- 54 22 92 % -- 105.2 kg (232 lb)       Physical Exam  Vitals and nursing note reviewed.   Constitutional:       General: He is not in acute distress.     Appearance: Normal appearance. He is well-developed. He is not diaphoretic.      Interventions: Nasal cannula in place.   HENT:      Head: Normocephalic and atraumatic.      Mouth/Throat:      Pharynx: No oropharyngeal exudate.   Eyes:      General: No scleral icterus.     Pupils: Pupils are equal, round, and reactive to light.   Neck:      Trachea: No tracheal deviation.   Cardiovascular:      Rate and Rhythm: Normal rate.      Pulses: Normal pulses.      Heart sounds: Normal heart sounds.   Pulmonary:      Effort: Pulmonary effort is normal.      Breath sounds: Normal breath sounds. No stridor. No wheezing or

## 2024-04-20 NOTE — H&P
Calculus of kidney     Carotid artery stenosis 06/26/2013    Cellulitis and abscess of hand, except fingers and thumb     Cerebral artery occlusion with cerebral infarction (HCC)     15 years ago    Chronic airway obstruction, not elsewhere classified     Chronic kidney disease, unspecified     stage 3    Congestive heart failure, unspecified     COVID-19 10/01/2020    COVID-19 08/11/2023    Diabetes mellitus type II     Diverticulosis of colon (without mention of hemorrhage)     Elevated CA 19-9 level     Encounter for long-term (current) use of insulin (HCC)     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, or unspecified(403.00)     Myocardial infarction (HCC)     Neuropathy     Obesity, unspecified     SAMMY on CPAP     Other specified cardiac dysrhythmias(427.89)     Palliative care patient 12/21/2020    Paroxysmal atrial fibrillation (HCC)     Peripheral vascular disease (HCC)     Solitary pulmonary nodule     Surgical or other procedure not carried out because of contraindication     Thoracic aneurysm without mention of rupture     Thyroid nodule     Tobacco use disorder     Type II or unspecified type diabetes mellitus without mention of complication, not stated as uncontrolled     Weight loss        Past Surgical History:        Procedure Laterality Date    CARDIAC CATHETERIZATION      CHOLECYSTECTOMY  2000    CORONARY ANGIOPLASTY WITH STENT PLACEMENT  01/2016    2 JACQUELINE to LAD    DIAGNOSTIC CARDIAC CATH LAB PROCEDURE      UPPER GASTROINTESTINAL ENDOSCOPY  2015    UPPER GASTROINTESTINAL ENDOSCOPY N/A 05/16/2023    Dr VICKERS  complicated cyst.  Non-emergent ultrasound correlation recommended.  Additional simple renal cysts. 4.  Diverticulosis. 5.  Atherosclerosis.  ---------------------------  All CT scans are performed using dose optimization techniques as appropriate to the performed exam and include at least one of the following: Automated exposure control, adjustment of the mA and/or kV according to size, and the use of iterative reconstruction technique.  ______________________________________ Electronically signed by: ALFA RASHID M.D. Date:     04/20/2024 Time:    10:21       XR CHEST PORTABLE  Result Date: 4/20/2024     Findings suggesting congestive heart failure.    ______________________________________ Electronically signed by: BILLY REESE D.O. Date:     04/20/2024 Time:    09:59       Assessment/Plan:   Principal Problem:    Contrast-induced nephropathy  Active Problems:    CAD (coronary artery disease)    CKD (chronic kidney disease) stage 4, GFR 15-29 ml/min ([facility])    DM (diabetes mellitus) ([facility])    PAF (paroxysmal atrial fibrillation) ([facility])    Acute kidney injury superimposed on CKD ([facility])  Resolved Problems:    * No resolved hospital problems. *     Principal Problem:    Contrast-induced nephropathy/ Acute kidney injury superimposed on CKD ([facility])/ CKD (chronic kidney disease) stage 4, GFR 15-29 ml/min ([facility])-   - Nephrology consulted               - Creatinine 6.8 today               - trial of IVFs    - may require Intermittent diuretics               - Monitor I's and O's closely              - Monitor labs closely              - Avoid hypotension              - Avoid nephrotoxic agents         Active Problems:    CAD (coronary artery disease)-   - Recent cardiac cath (4/17/24) severe in-stent restenosis of the mid LAD with complete occlusion, PCI attempted but unable to pass wire per cardiology  - Medical management and options of  procedure was recommended    - Resume home medications    - Monitor on telemetry

## 2024-04-21 ENCOUNTER — TRANSITIONAL CARE MANAGEMENT TELEPHONE ENCOUNTER (OUTPATIENT)
Dept: CALL CENTER | Facility: HOSPITAL | Age: 69
End: 2024-04-21
Payer: COMMERCIAL

## 2024-04-21 LAB
ALBUMIN SERPL-MCNC: 3.8 G/DL (ref 3.5–5.2)
ALP SERPL-CCNC: 74 U/L (ref 40–130)
ALT SERPL-CCNC: 13 U/L (ref 5–41)
ANION GAP SERPL CALCULATED.3IONS-SCNC: 14 MMOL/L (ref 7–19)
AST SERPL-CCNC: 12 U/L (ref 5–40)
BASOPHILS # BLD: 0 K/UL (ref 0–0.2)
BASOPHILS NFR BLD: 0.4 % (ref 0–1)
BILIRUB SERPL-MCNC: 0.5 MG/DL (ref 0.2–1.2)
BUN SERPL-MCNC: 66 MG/DL (ref 8–23)
CALCIUM SERPL-MCNC: 8.8 MG/DL (ref 8.8–10.2)
CHLORIDE SERPL-SCNC: 82 MMOL/L (ref 98–111)
CO2 SERPL-SCNC: 28 MMOL/L (ref 22–29)
CREAT SERPL-MCNC: 7.3 MG/DL (ref 0.5–1.2)
EOSINOPHIL # BLD: 0.3 K/UL (ref 0–0.6)
EOSINOPHIL NFR BLD: 4.4 % (ref 0–5)
ERYTHROCYTE [DISTWIDTH] IN BLOOD BY AUTOMATED COUNT: 16.7 % (ref 11.5–14.5)
GLUCOSE BLD-MCNC: 102 MG/DL (ref 70–99)
GLUCOSE BLD-MCNC: 105 MG/DL (ref 70–99)
GLUCOSE BLD-MCNC: 76 MG/DL (ref 70–99)
GLUCOSE BLD-MCNC: 81 MG/DL (ref 70–99)
GLUCOSE BLD-MCNC: 97 MG/DL (ref 70–99)
GLUCOSE SERPL-MCNC: 94 MG/DL (ref 74–109)
HCT VFR BLD AUTO: 40.1 % (ref 42–52)
HGB BLD-MCNC: 12 G/DL (ref 14–18)
IMM GRANULOCYTES # BLD: 0 K/UL
LYMPHOCYTES # BLD: 1.2 K/UL (ref 1.1–4.5)
LYMPHOCYTES NFR BLD: 16.6 % (ref 20–40)
MCH RBC QN AUTO: 25.3 PG (ref 27–31)
MCHC RBC AUTO-ENTMCNC: 29.9 G/DL (ref 33–37)
MCV RBC AUTO: 84.6 FL (ref 80–94)
MONOCYTES # BLD: 0.6 K/UL (ref 0–0.9)
MONOCYTES NFR BLD: 8.7 % (ref 0–10)
NEUTROPHILS # BLD: 4.9 K/UL (ref 1.5–7.5)
NEUTS SEG NFR BLD: 69.8 % (ref 50–65)
PERFORMED ON: ABNORMAL
PERFORMED ON: ABNORMAL
PERFORMED ON: NORMAL
PLATELET # BLD AUTO: 162 K/UL (ref 130–400)
PMV BLD AUTO: 11.5 FL (ref 9.4–12.4)
POTASSIUM SERPL-SCNC: 4 MMOL/L (ref 3.5–5)
PROT SERPL-MCNC: 6.3 G/DL (ref 6.6–8.7)
RBC # BLD AUTO: 4.74 M/UL (ref 4.7–6.1)
SODIUM SERPL-SCNC: 124 MMOL/L (ref 136–145)
WBC # BLD AUTO: 7 K/UL (ref 4.8–10.8)

## 2024-04-21 PROCEDURE — 2580000003 HC RX 258: Performed by: NURSE PRACTITIONER

## 2024-04-21 PROCEDURE — 51702 INSERT TEMP BLADDER CATH: CPT

## 2024-04-21 PROCEDURE — 6360000002 HC RX W HCPCS: Performed by: INTERNAL MEDICINE

## 2024-04-21 PROCEDURE — 85025 COMPLETE CBC W/AUTO DIFF WBC: CPT

## 2024-04-21 PROCEDURE — 2580000003 HC RX 258: Performed by: INTERNAL MEDICINE

## 2024-04-21 PROCEDURE — 94640 AIRWAY INHALATION TREATMENT: CPT

## 2024-04-21 PROCEDURE — 36415 COLL VENOUS BLD VENIPUNCTURE: CPT

## 2024-04-21 PROCEDURE — 6360000002 HC RX W HCPCS: Performed by: NURSE PRACTITIONER

## 2024-04-21 PROCEDURE — 94760 N-INVAS EAR/PLS OXIMETRY 1: CPT

## 2024-04-21 PROCEDURE — 80053 COMPREHEN METABOLIC PANEL: CPT

## 2024-04-21 PROCEDURE — 1200000000 HC SEMI PRIVATE

## 2024-04-21 PROCEDURE — 2700000000 HC OXYGEN THERAPY PER DAY

## 2024-04-21 PROCEDURE — 82962 GLUCOSE BLOOD TEST: CPT

## 2024-04-21 PROCEDURE — 6370000000 HC RX 637 (ALT 250 FOR IP): Performed by: STUDENT IN AN ORGANIZED HEALTH CARE EDUCATION/TRAINING PROGRAM

## 2024-04-21 RX ORDER — BUMETANIDE 0.25 MG/ML
1 INJECTION INTRAMUSCULAR; INTRAVENOUS DAILY
Status: DISCONTINUED | OUTPATIENT
Start: 2024-04-21 | End: 2024-04-25 | Stop reason: HOSPADM

## 2024-04-21 RX ORDER — ROSUVASTATIN CALCIUM 10 MG/1
10 TABLET, COATED ORAL DAILY
Status: DISCONTINUED | OUTPATIENT
Start: 2024-04-22 | End: 2024-04-25 | Stop reason: HOSPADM

## 2024-04-21 RX ADMIN — Medication: at 11:37

## 2024-04-21 RX ADMIN — BUMETANIDE 1 MG: 0.25 INJECTION INTRAMUSCULAR; INTRAVENOUS at 11:38

## 2024-04-21 RX ADMIN — CARVEDILOL 12.5 MG: 12.5 TABLET, FILM COATED ORAL at 16:56

## 2024-04-21 RX ADMIN — ROSUVASTATIN CALCIUM 40 MG: 20 TABLET, COATED ORAL at 11:40

## 2024-04-21 RX ADMIN — HEPARIN SODIUM 5000 UNITS: 5000 INJECTION INTRAVENOUS; SUBCUTANEOUS at 06:18

## 2024-04-21 RX ADMIN — HEPARIN SODIUM 5000 UNITS: 5000 INJECTION INTRAVENOUS; SUBCUTANEOUS at 20:53

## 2024-04-21 RX ADMIN — SODIUM CHLORIDE: 9 INJECTION, SOLUTION INTRAVENOUS at 07:52

## 2024-04-21 RX ADMIN — HEPARIN SODIUM 5000 UNITS: 5000 INJECTION INTRAVENOUS; SUBCUTANEOUS at 16:56

## 2024-04-21 RX ADMIN — NIFEDIPINE 60 MG: 60 TABLET, EXTENDED RELEASE ORAL at 11:41

## 2024-04-21 RX ADMIN — TIOTROPIUM BROMIDE AND OLODATEROL 2 PUFF: 3.124; 2.736 SPRAY, METERED RESPIRATORY (INHALATION) at 06:37

## 2024-04-21 RX ADMIN — ONDANSETRON 4 MG: 2 INJECTION INTRAMUSCULAR; INTRAVENOUS at 07:51

## 2024-04-21 RX ADMIN — ISOSORBIDE MONONITRATE 60 MG: 60 TABLET, EXTENDED RELEASE ORAL at 20:53

## 2024-04-21 RX ADMIN — SODIUM CHLORIDE, PRESERVATIVE FREE 10 ML: 5 INJECTION INTRAVENOUS at 07:51

## 2024-04-21 RX ADMIN — ONDANSETRON 4 MG: 2 INJECTION INTRAMUSCULAR; INTRAVENOUS at 20:53

## 2024-04-21 RX ADMIN — PANTOPRAZOLE SODIUM 40 MG: 40 TABLET, DELAYED RELEASE ORAL at 11:41

## 2024-04-21 RX ADMIN — MONTELUKAST 10 MG: 10 TABLET, FILM COATED ORAL at 20:53

## 2024-04-21 RX ADMIN — RANOLAZINE 500 MG: 500 TABLET, EXTENDED RELEASE ORAL at 20:53

## 2024-04-21 NOTE — PROGRESS NOTES
Pharmacy Adjustment per [facility] protocol    Carlos Espana is a 68 y.o. male. Pharmacy has adjusted medications per [facility] protocol.    Recent Labs     04/20/24  0927 04/21/24 0417   BUN 61* 66*       Recent Labs     04/20/24  0927 04/21/24 0417   CREATININE 6.8* 7.3*       Estimated Creatinine Clearance: 12 mL/min (A) (based on SCr of 7.3 mg/dL (H)).    Height:   Ht Readings from Last 1 Encounters:   04/16/24 1.803 m (5' 10.98\")     Weight:  Wt Readings from Last 1 Encounters:   04/20/24 105.2 kg (232 lb)         Plan: Adjust the following medications based on [facility] protocol:           Rosuvastatin to 10 mg daily    Electronically signed by Marcello Dasilva RPH on 4/21/2024 at 12:34 PM

## 2024-04-21 NOTE — OUTREACH NOTE
Call Center TCM Note      Flowsheet Row Responses   [facility] facility patient discharged from? Non-[facility]   Does the patient have one of the following disease processes/diagnoses(primary or secondary)? Other   TCM attempt successful? No   Unsuccessful attempts Attempt 3  [No answer.]            Yomaira Hurd RN    4/21/2024, 10:17 CDT

## 2024-04-21 NOTE — PROGRESS NOTES
Nephrology ([facility]) Progress Note      Patient:  Carlos Espana  YOB: 1955  Date of Service: 4/21/2024  MRN: [id number]   Acct: [id number]   Primary Care Physician: Alisa Rangel MD  Advance Directive: Full Code  Admit Date: 4/20/2024       Hospital Day: 1  Referring Provider: Omi Andrea MD    Patient independently seen and examined, Chart, Consults, Notes, Operative notes, Labs, Cardiology, and Radiology studies reviewed as available.    Chief complaint: Abnormal labs.    Subjective:  Carlos Espana is a 68 y.o. male for whom we were consulted for evaluation and treatment of acute kidney injury/chronic kidney disease.  Patient has history of stage IV chronic kidney disease baseline.  He also has history of type 2 diabetes, nephropathy, CVA, paroxysmal atrial fibrillation, chronic systolic CHF, carotid occlusive disease and coronary artery diseaase.  Earlier this week he presented with chest pain and was diagnosed with non-STEMI.  He underwent cardiac catheterization and was found to have occluded coronary artery but cardiologist was unable to proceed with angioplasty or stent.  Postprocedure his serum creatinine was stable/improved.  He was discharged.  Now presented to the emergency room with profound weakness, lack of energy, nausea but no shortness of breath.  He has not been drinking any fluid due to nausea.  Routine lab data indicated his serum creatinine is 6.8 mg, hyponatremia.  A Zafar's catheter was inserted as he was not urinating at all.  He has some urine output    This morning patient is feeling good.  He denies any shortness of breath.  His renal function has been worsening.  He agreed to proceed with dialysis    Allergies:  Morphine and Hydralazine    Medicines:  Current Facility-Administered Medications   Medication Dose Route Frequency Provider Last Rate Last Admin    sodium chloride flush 0.9 % injection 5-40 mL  5-40 mL IntraVENous 2 times per day  effusion.  No pneumothorax. Prominent interstitial markings.  Cardiac silhouette: Enlarged.  Bones: No acute abnormality.        Findings suggesting congestive heart failure.    ______________________________________ Electronically signed by: BILLY REESE D.O. Date:     04/20/2024 Time:    09:59        Assessment   1.  Acute kidney injury/worsening.  2.  Acute tubular necrosis/contrast-induced nephropathy.  3.  Stage IV chronic kidney disease baseline.  4.  Type II diabetic nephropathy.  5.  Recent non-STEMI/cardiac cath.  6.  Type 2 diabetes with nephropathy.  7.  History of chronic systolic CHF as well.  8.  Paroxysmal atrial fibrillation.  9.  Hyponatremia worsening.    Plan:  1.  Discontinue IV fluid.  2.  IV Bumex  3.  P.o. water restriction.  4.  Consultation with vascular surgery has been requested.  4.  Plan was discussed with the patient.    Naldo Angeles MD  04/21/24  10:48 AM

## 2024-04-21 NOTE — PROGRESS NOTES
Daily Progress Note    Date:2024  Patient: Carlos Espana  : 1955  MRN:[id number]  CODE:Full Code No additional code details  PCP:Alisa Rangel MD    Admit Date: 2024  9:14 AM   LOS: 1 day       Subjective:     Complains of dyspepsia, reflux this a.m.   has had over a liter of urine output since yesterday, however no improvement in renal function with BUN 66, creatinine 7.3 today.  Denies any shortness of breath.      Summary: 68-year-old male with CAD history of stenting, chronic heart failure with mildly reduced EF, paroxysmal A-fib, restrictive lung disease, CKD stage IV, GERD, recently hospitalized with cardiac chest pain underwent cardiac catheterization  with severe in-stent restenosis of mid LAD with complete occlusion with unsuccessful PCI, now presenting back on  due to lack of urine output past couple of days, feeling unwell with some abdominal bloating and no recent bowel movement, found to be in renal failure with creatinine 6.8 subsequently uptrending to 7.3 despite IV fluid trial, etiology most likely contrast nephropathy.  Seen in consultation by nephrology with plan for initiation of hemodialysis after permacath placement by vascular surgery.      Objective:      Vital signs in last 24 hours:  Patient Vitals for the past 24 hrs:   BP Temp Temp src Pulse Resp SpO2   24 0759 (!) 140/72 98 °F (36.7 °C) Temporal 58 16 93 %   24 0637 -- -- -- -- -- 94 %   24 2125 125/63 98.2 °F (36.8 °C) Temporal 58 16 91 %   24 1711 132/70 -- -- 57 -- --   24 1452 -- -- -- -- -- 94 %   24 1336 130/73 97.5 °F (36.4 °C) Temporal 56 18 93 %     Physical exam    General: No acute distress  Cardiac: Mildly bradycardic, regular rhythm  Respiratory: No wheezes or rhonchi  Abdomen: Mildly distended  Extremities: No edema  : Zafar in place    Lab Review   Recent Results (from the past 24 hour(s))   POCT Glucose    Collection Time: 24  5:12 PM   Result Value Ref  Range    POC Glucose 139 (H) 70 - 99 mg/dl    Performed on AccuChek    POCT Glucose    Collection Time: 04/20/24  9:24 PM   Result Value Ref Range    POC Glucose 140 (H) 70 - 99 mg/dl    Performed on AccuChek    CBC with Auto Differential    Collection Time: 04/21/24  4:17 AM   Result Value Ref Range    WBC 7.0 4.8 - 10.8 K/uL    RBC 4.74 4.70 - 6.10 M/uL    Hemoglobin 12.0 (L) 14.0 - 18.0 g/dL    Hematocrit 40.1 (L) 42.0 - 52.0 %    MCV 84.6 80.0 - 94.0 fL    MCH 25.3 (L) 27.0 - 31.0 pg    MCHC 29.9 (L) 33.0 - 37.0 g/dL    RDW 16.7 (H) 11.5 - 14.5 %    Platelets 162 130 - 400 K/uL    MPV 11.5 9.4 - 12.4 fL    Neutrophils % 69.8 (H) 50.0 - 65.0 %    Lymphocytes % 16.6 (L) 20.0 - 40.0 %    Monocytes % 8.7 0.0 - 10.0 %    Eosinophils % 4.4 0.0 - 5.0 %    Basophils % 0.4 0.0 - 1.0 %    Neutrophils Absolute 4.9 1.5 - 7.5 K/uL    Immature Granulocytes # 0.0 K/uL    Lymphocytes Absolute 1.2 1.1 - 4.5 K/uL    Monocytes Absolute 0.60 0.00 - 0.90 K/uL    Eosinophils Absolute 0.30 0.00 - 0.60 K/uL    Basophils Absolute 0.00 0.00 - 0.20 K/uL   Comprehensive Metabolic Panel    Collection Time: 04/21/24  4:17 AM   Result Value Ref Range    Sodium 124 (L) 136 - 145 mmol/L    Potassium 4.0 3.5 - 5.0 mmol/L    Chloride 82 (L) 98 - 111 mmol/L    CO2 28 22 - 29 mmol/L    Anion Gap 14 7 - 19 mmol/L    Glucose 94 74 - 109 mg/dL    BUN 66 (H) 8 - 23 mg/dL    Creatinine 7.3 (H) 0.5 - 1.2 mg/dL    Est, Glom Filt Rate 8 (A) >60    Calcium 8.8 8.8 - 10.2 mg/dL    Total Protein 6.3 (L) 6.6 - 8.7 g/dL    Albumin 3.8 3.5 - 5.2 g/dL    Total Bilirubin 0.5 0.2 - 1.2 mg/dL    Alkaline Phosphatase 74 40 - 130 U/L    ALT 13 5 - 41 U/L    AST 12 5 - 40 U/L   POCT Glucose    Collection Time: 04/21/24  8:49 AM   Result Value Ref Range    POC Glucose 105 (H) 70 - 99 mg/dl    Performed on AccuChek    POCT Glucose    Collection Time: 04/21/24 11:37 AM   Result Value Ref Range    POC Glucose 102 (H) 70 - 99 mg/dl    Performed on AccuChek        I/O last 3

## 2024-04-21 NOTE — PLAN OF CARE
Problem: Safety - Adult  Goal: Free from fall injury  Outcome: Progressing  Flowsheets (Taken 4/21/2024 0755)  Free From Fall Injury: Instruct family/caregiver on patient safety     Problem: ABCDS Injury Assessment  Goal: Absence of physical injury  Outcome: Progressing  Flowsheets (Taken 4/21/2024 0755)  Absence of Physical Injury: Implement safety measures based on patient assessment

## 2024-04-22 ENCOUNTER — ANESTHESIA EVENT (OUTPATIENT)
Dept: OPERATING ROOM | Age: 69
DRG: 674 | End: 2024-04-22
Payer: MEDICARE

## 2024-04-22 ENCOUNTER — ANESTHESIA (OUTPATIENT)
Dept: OPERATING ROOM | Age: 69
DRG: 674 | End: 2024-04-22
Payer: MEDICARE

## 2024-04-22 ENCOUNTER — APPOINTMENT (OUTPATIENT)
Dept: INTERVENTIONAL RADIOLOGY/VASCULAR | Age: 69
DRG: 674 | End: 2024-04-22
Payer: MEDICARE

## 2024-04-22 PROBLEM — N18.4 ACUTE RENAL FAILURE SUPERIMPOSED ON STAGE 4 CHRONIC KIDNEY DISEASE (HCC): Status: ACTIVE | Noted: 2023-08-12

## 2024-04-22 LAB
ALBUMIN SERPL-MCNC: 3.7 G/DL (ref 3.5–5.2)
ALP SERPL-CCNC: 86 U/L (ref 40–130)
ALT SERPL-CCNC: 10 U/L (ref 5–41)
ANION GAP SERPL CALCULATED.3IONS-SCNC: 19 MMOL/L (ref 7–19)
AST SERPL-CCNC: 10 U/L (ref 5–40)
BASOPHILS # BLD: 0.1 K/UL (ref 0–0.2)
BASOPHILS NFR BLD: 0.5 % (ref 0–1)
BILIRUB SERPL-MCNC: 0.4 MG/DL (ref 0.2–1.2)
BUN SERPL-MCNC: 64 MG/DL (ref 8–23)
CALCIUM SERPL-MCNC: 9.1 MG/DL (ref 8.8–10.2)
CHLORIDE SERPL-SCNC: 88 MMOL/L (ref 98–111)
CO2 SERPL-SCNC: 28 MMOL/L (ref 22–29)
CREAT SERPL-MCNC: 6.6 MG/DL (ref 0.5–1.2)
EOSINOPHIL # BLD: 0.3 K/UL (ref 0–0.6)
EOSINOPHIL NFR BLD: 3.1 % (ref 0–5)
ERYTHROCYTE [DISTWIDTH] IN BLOOD BY AUTOMATED COUNT: 16.6 % (ref 11.5–14.5)
GLUCOSE BLD-MCNC: 103 MG/DL (ref 70–99)
GLUCOSE BLD-MCNC: 117 MG/DL (ref 70–99)
GLUCOSE BLD-MCNC: 119 MG/DL (ref 70–99)
GLUCOSE BLD-MCNC: 138 MG/DL (ref 70–99)
GLUCOSE BLD-MCNC: 161 MG/DL (ref 70–99)
GLUCOSE SERPL-MCNC: 63 MG/DL (ref 74–109)
HAV IGM SERPL QL IA: NORMAL
HBV CORE IGM SERPL QL IA: NORMAL
HBV SURFACE AB SERPL IA-ACNC: NORMAL M[IU]/ML
HBV SURFACE AG SERPL QL IA: NORMAL
HCT VFR BLD AUTO: 42 % (ref 42–52)
HCV AB SERPL QL IA: NORMAL
HGB BLD-MCNC: 12.7 G/DL (ref 14–18)
IMM GRANULOCYTES # BLD: 0 K/UL
LYMPHOCYTES # BLD: 0.8 K/UL (ref 1.1–4.5)
LYMPHOCYTES NFR BLD: 8.9 % (ref 20–40)
MCH RBC QN AUTO: 24.6 PG (ref 27–31)
MCHC RBC AUTO-ENTMCNC: 30.2 G/DL (ref 33–37)
MCV RBC AUTO: 81.4 FL (ref 80–94)
MONOCYTES # BLD: 0.8 K/UL (ref 0–0.9)
MONOCYTES NFR BLD: 8.7 % (ref 0–10)
NEUTROPHILS # BLD: 7.2 K/UL (ref 1.5–7.5)
NEUTS SEG NFR BLD: 78.6 % (ref 50–65)
PERFORMED ON: ABNORMAL
PLATELET # BLD AUTO: 167 K/UL (ref 130–400)
PMV BLD AUTO: 10.4 FL (ref 9.4–12.4)
POTASSIUM SERPL-SCNC: 3.9 MMOL/L (ref 3.5–5)
PROT SERPL-MCNC: 6.5 G/DL (ref 6.6–8.7)
RBC # BLD AUTO: 5.16 M/UL (ref 4.7–6.1)
SODIUM SERPL-SCNC: 135 MMOL/L (ref 136–145)
WBC # BLD AUTO: 9.1 K/UL (ref 4.8–10.8)

## 2024-04-22 PROCEDURE — C1769 GUIDE WIRE: HCPCS | Performed by: SURGERY

## 2024-04-22 PROCEDURE — A4217 STERILE WATER/SALINE, 500 ML: HCPCS | Performed by: SURGERY

## 2024-04-22 PROCEDURE — C1750 CATH, HEMODIALYSIS,LONG-TERM: HCPCS | Performed by: SURGERY

## 2024-04-22 PROCEDURE — 6360000002 HC RX W HCPCS: Performed by: INTERNAL MEDICINE

## 2024-04-22 PROCEDURE — 3600000003 HC SURGERY LEVEL 3 BASE: Performed by: SURGERY

## 2024-04-22 PROCEDURE — 80053 COMPREHEN METABOLIC PANEL: CPT

## 2024-04-22 PROCEDURE — 94760 N-INVAS EAR/PLS OXIMETRY 1: CPT

## 2024-04-22 PROCEDURE — 77001 FLUOROGUIDE FOR VEIN DEVICE: CPT

## 2024-04-22 PROCEDURE — 2500000003 HC RX 250 WO HCPCS: Performed by: STUDENT IN AN ORGANIZED HEALTH CARE EDUCATION/TRAINING PROGRAM

## 2024-04-22 PROCEDURE — 2709999900 HC NON-CHARGEABLE SUPPLY: Performed by: SURGERY

## 2024-04-22 PROCEDURE — 3600000013 HC SURGERY LEVEL 3 ADDTL 15MIN: Performed by: SURGERY

## 2024-04-22 PROCEDURE — 36415 COLL VENOUS BLD VENIPUNCTURE: CPT

## 2024-04-22 PROCEDURE — 6360000002 HC RX W HCPCS: Performed by: NURSE PRACTITIONER

## 2024-04-22 PROCEDURE — 6360000002 HC RX W HCPCS: Performed by: SURGERY

## 2024-04-22 PROCEDURE — 1200000000 HC SEMI PRIVATE

## 2024-04-22 PROCEDURE — 97116 GAIT TRAINING THERAPY: CPT

## 2024-04-22 PROCEDURE — 97161 PT EVAL LOW COMPLEX 20 MIN: CPT

## 2024-04-22 PROCEDURE — 77001 FLUOROGUIDE FOR VEIN DEVICE: CPT | Performed by: SURGERY

## 2024-04-22 PROCEDURE — 6370000000 HC RX 637 (ALT 250 FOR IP): Performed by: STUDENT IN AN ORGANIZED HEALTH CARE EDUCATION/TRAINING PROGRAM

## 2024-04-22 PROCEDURE — 3700000000 HC ANESTHESIA ATTENDED CARE: Performed by: SURGERY

## 2024-04-22 PROCEDURE — 94640 AIRWAY INHALATION TREATMENT: CPT

## 2024-04-22 PROCEDURE — 3700000001 HC ADD 15 MINUTES (ANESTHESIA): Performed by: SURGERY

## 2024-04-22 PROCEDURE — 6370000000 HC RX 637 (ALT 250 FOR IP): Performed by: SURGERY

## 2024-04-22 PROCEDURE — 36558 INSERT TUNNELED CV CATH: CPT | Performed by: SURGERY

## 2024-04-22 PROCEDURE — 80074 ACUTE HEPATITIS PANEL: CPT

## 2024-04-22 PROCEDURE — 86706 HEP B SURFACE ANTIBODY: CPT

## 2024-04-22 PROCEDURE — 2500000003 HC RX 250 WO HCPCS: Performed by: SURGERY

## 2024-04-22 PROCEDURE — 6360000002 HC RX W HCPCS: Performed by: NURSE ANESTHETIST, CERTIFIED REGISTERED

## 2024-04-22 PROCEDURE — 76937 US GUIDE VASCULAR ACCESS: CPT

## 2024-04-22 PROCEDURE — 7100000001 HC PACU RECOVERY - ADDTL 15 MIN: Performed by: SURGERY

## 2024-04-22 PROCEDURE — 2580000003 HC RX 258: Performed by: STUDENT IN AN ORGANIZED HEALTH CARE EDUCATION/TRAINING PROGRAM

## 2024-04-22 PROCEDURE — 2580000003 HC RX 258: Performed by: SURGERY

## 2024-04-22 PROCEDURE — 82962 GLUCOSE BLOOD TEST: CPT

## 2024-04-22 PROCEDURE — 99222 1ST HOSP IP/OBS MODERATE 55: CPT | Performed by: PHYSICIAN ASSISTANT

## 2024-04-22 PROCEDURE — 7100000000 HC PACU RECOVERY - FIRST 15 MIN: Performed by: SURGERY

## 2024-04-22 PROCEDURE — 2580000003 HC RX 258: Performed by: ANESTHESIOLOGY

## 2024-04-22 PROCEDURE — 2580000003 HC RX 258: Performed by: NURSE PRACTITIONER

## 2024-04-22 PROCEDURE — 2700000000 HC OXYGEN THERAPY PER DAY

## 2024-04-22 PROCEDURE — 51702 INSERT TEMP BLADDER CATH: CPT

## 2024-04-22 PROCEDURE — 02HV33Z INSERTION OF INFUSION DEVICE INTO SUPERIOR VENA CAVA, PERCUTANEOUS APPROACH: ICD-10-PCS | Performed by: SURGERY

## 2024-04-22 PROCEDURE — 0JH63XZ INSERTION OF TUNNELED VASCULAR ACCESS DEVICE INTO CHEST SUBCUTANEOUS TISSUE AND FASCIA, PERCUTANEOUS APPROACH: ICD-10-PCS | Performed by: SURGERY

## 2024-04-22 PROCEDURE — 85025 COMPLETE CBC W/AUTO DIFF WBC: CPT

## 2024-04-22 RX ORDER — LIDOCAINE HYDROCHLORIDE AND EPINEPHRINE BITARTRATE 20; .01 MG/ML; MG/ML
INJECTION, SOLUTION SUBCUTANEOUS PRN
Status: DISCONTINUED | OUTPATIENT
Start: 2024-04-22 | End: 2024-04-22 | Stop reason: ALTCHOICE

## 2024-04-22 RX ORDER — FENTANYL CITRATE 50 UG/ML
INJECTION, SOLUTION INTRAMUSCULAR; INTRAVENOUS PRN
Status: DISCONTINUED | OUTPATIENT
Start: 2024-04-22 | End: 2024-04-22 | Stop reason: SDUPTHER

## 2024-04-22 RX ORDER — SODIUM CHLORIDE 0.9 % (FLUSH) 0.9 %
5-40 SYRINGE (ML) INJECTION EVERY 12 HOURS SCHEDULED
Status: DISCONTINUED | OUTPATIENT
Start: 2024-04-22 | End: 2024-04-22 | Stop reason: HOSPADM

## 2024-04-22 RX ORDER — SODIUM CHLORIDE 0.9 % (FLUSH) 0.9 %
5-40 SYRINGE (ML) INJECTION EVERY 12 HOURS SCHEDULED
Status: DISCONTINUED | OUTPATIENT
Start: 2024-04-22 | End: 2024-04-25 | Stop reason: HOSPADM

## 2024-04-22 RX ORDER — CEFAZOLIN SODIUM 1 G/50ML
INJECTION, SOLUTION INTRAVENOUS PRN
Status: DISCONTINUED | OUTPATIENT
Start: 2024-04-22 | End: 2024-04-22 | Stop reason: SDUPTHER

## 2024-04-22 RX ORDER — HYDROMORPHONE HYDROCHLORIDE 1 MG/ML
0.5 INJECTION, SOLUTION INTRAMUSCULAR; INTRAVENOUS; SUBCUTANEOUS EVERY 5 MIN PRN
Status: DISCONTINUED | OUTPATIENT
Start: 2024-04-22 | End: 2024-04-22 | Stop reason: HOSPADM

## 2024-04-22 RX ORDER — NALOXONE HYDROCHLORIDE 0.4 MG/ML
INJECTION, SOLUTION INTRAMUSCULAR; INTRAVENOUS; SUBCUTANEOUS PRN
Status: DISCONTINUED | OUTPATIENT
Start: 2024-04-22 | End: 2024-04-22 | Stop reason: HOSPADM

## 2024-04-22 RX ORDER — PROPOFOL 10 MG/ML
INJECTION, EMULSION INTRAVENOUS PRN
Status: DISCONTINUED | OUTPATIENT
Start: 2024-04-22 | End: 2024-04-22 | Stop reason: SDUPTHER

## 2024-04-22 RX ORDER — SODIUM CHLORIDE, SODIUM LACTATE, POTASSIUM CHLORIDE, CALCIUM CHLORIDE 600; 310; 30; 20 MG/100ML; MG/100ML; MG/100ML; MG/100ML
INJECTION, SOLUTION INTRAVENOUS CONTINUOUS
Status: DISCONTINUED | OUTPATIENT
Start: 2024-04-22 | End: 2024-04-22 | Stop reason: HOSPADM

## 2024-04-22 RX ORDER — MINOXIDIL 2.5 MG/1
2.5 TABLET ORAL 2 TIMES DAILY
Status: DISCONTINUED | OUTPATIENT
Start: 2024-04-22 | End: 2024-04-25 | Stop reason: HOSPADM

## 2024-04-22 RX ORDER — SODIUM CHLORIDE 9 MG/ML
INJECTION, SOLUTION INTRAVENOUS PRN
Status: DISCONTINUED | OUTPATIENT
Start: 2024-04-22 | End: 2024-04-22 | Stop reason: HOSPADM

## 2024-04-22 RX ORDER — ONDANSETRON 2 MG/ML
4 INJECTION INTRAMUSCULAR; INTRAVENOUS
Status: DISCONTINUED | OUTPATIENT
Start: 2024-04-22 | End: 2024-04-22 | Stop reason: HOSPADM

## 2024-04-22 RX ORDER — SODIUM CHLORIDE 0.9 % (FLUSH) 0.9 %
5-40 SYRINGE (ML) INJECTION PRN
Status: DISCONTINUED | OUTPATIENT
Start: 2024-04-22 | End: 2024-04-22 | Stop reason: HOSPADM

## 2024-04-22 RX ORDER — SODIUM CHLORIDE 0.9 % (FLUSH) 0.9 %
5-40 SYRINGE (ML) INJECTION PRN
Status: DISCONTINUED | OUTPATIENT
Start: 2024-04-22 | End: 2024-04-25 | Stop reason: HOSPADM

## 2024-04-22 RX ORDER — SODIUM CHLORIDE 9 MG/ML
INJECTION, SOLUTION INTRAVENOUS PRN
Status: DISCONTINUED | OUTPATIENT
Start: 2024-04-22 | End: 2024-04-25 | Stop reason: HOSPADM

## 2024-04-22 RX ORDER — HYDROMORPHONE HYDROCHLORIDE 1 MG/ML
0.25 INJECTION, SOLUTION INTRAMUSCULAR; INTRAVENOUS; SUBCUTANEOUS EVERY 5 MIN PRN
Status: DISCONTINUED | OUTPATIENT
Start: 2024-04-22 | End: 2024-04-22 | Stop reason: HOSPADM

## 2024-04-22 RX ORDER — SODIUM CHLORIDE 450 MG/100ML
INJECTION, SOLUTION INTRAVENOUS CONTINUOUS
Status: DISCONTINUED | OUTPATIENT
Start: 2024-04-22 | End: 2024-04-22 | Stop reason: HOSPADM

## 2024-04-22 RX ORDER — ONDANSETRON 2 MG/ML
INJECTION INTRAMUSCULAR; INTRAVENOUS PRN
Status: DISCONTINUED | OUTPATIENT
Start: 2024-04-22 | End: 2024-04-22 | Stop reason: SDUPTHER

## 2024-04-22 RX ADMIN — AMIODARONE HYDROCHLORIDE 100 MG: 100 TABLET ORAL at 10:00

## 2024-04-22 RX ADMIN — PROPOFOL 120 MCG/KG/MIN: 10 INJECTION, EMULSION INTRAVENOUS at 17:25

## 2024-04-22 RX ADMIN — HEPARIN SODIUM 5000 UNITS: 5000 INJECTION INTRAVENOUS; SUBCUTANEOUS at 20:31

## 2024-04-22 RX ADMIN — HEPARIN SODIUM 5000 UNITS: 5000 INJECTION INTRAVENOUS; SUBCUTANEOUS at 04:22

## 2024-04-22 RX ADMIN — Medication 2000 UNITS: at 10:00

## 2024-04-22 RX ADMIN — MINOXIDIL 2.5 MG: 2.5 TABLET ORAL at 20:31

## 2024-04-22 RX ADMIN — CEFAZOLIN SODIUM 2 G: 1 INJECTION, SOLUTION INTRAVENOUS at 17:28

## 2024-04-22 RX ADMIN — FENTANYL CITRATE 100 MCG: 0.05 INJECTION, SOLUTION INTRAMUSCULAR; INTRAVENOUS at 17:22

## 2024-04-22 RX ADMIN — ONDANSETRON 4 MG: 2 INJECTION INTRAMUSCULAR; INTRAVENOUS at 15:50

## 2024-04-22 RX ADMIN — CARVEDILOL 12.5 MG: 12.5 TABLET, FILM COATED ORAL at 10:00

## 2024-04-22 RX ADMIN — SODIUM CHLORIDE, PRESERVATIVE FREE 10 ML: 5 INJECTION INTRAVENOUS at 09:03

## 2024-04-22 RX ADMIN — NIFEDIPINE 60 MG: 60 TABLET, EXTENDED RELEASE ORAL at 10:00

## 2024-04-22 RX ADMIN — PHENYLEPHRINE HYDROCHLORIDE 200 MCG: 10 INJECTION INTRAVENOUS at 17:52

## 2024-04-22 RX ADMIN — ISOSORBIDE MONONITRATE 60 MG: 60 TABLET, EXTENDED RELEASE ORAL at 20:30

## 2024-04-22 RX ADMIN — RANOLAZINE 500 MG: 500 TABLET, EXTENDED RELEASE ORAL at 20:31

## 2024-04-22 RX ADMIN — SODIUM CHLORIDE, SODIUM LACTATE, POTASSIUM CHLORIDE, AND CALCIUM CHLORIDE: 600; 310; 30; 20 INJECTION, SOLUTION INTRAVENOUS at 17:12

## 2024-04-22 RX ADMIN — PANTOPRAZOLE SODIUM 40 MG: 40 TABLET, DELAYED RELEASE ORAL at 10:00

## 2024-04-22 RX ADMIN — POLYETHYLENE GLYCOL 3350 17 G: 17 POWDER, FOR SOLUTION ORAL at 10:00

## 2024-04-22 RX ADMIN — BUMETANIDE 1 MG: 0.25 INJECTION INTRAMUSCULAR; INTRAVENOUS at 04:22

## 2024-04-22 RX ADMIN — MINOXIDIL 2.5 MG: 2.5 TABLET ORAL at 10:03

## 2024-04-22 RX ADMIN — ONDANSETRON 4 MG: 2 INJECTION INTRAMUSCULAR; INTRAVENOUS at 17:52

## 2024-04-22 RX ADMIN — SODIUM CHLORIDE, PRESERVATIVE FREE 20 MG: 5 INJECTION INTRAVENOUS at 11:19

## 2024-04-22 RX ADMIN — ONDANSETRON 4 MG: 2 INJECTION INTRAMUSCULAR; INTRAVENOUS at 09:03

## 2024-04-22 RX ADMIN — TIOTROPIUM BROMIDE AND OLODATEROL 2 PUFF: 3.124; 2.736 SPRAY, METERED RESPIRATORY (INHALATION) at 07:28

## 2024-04-22 RX ADMIN — MONTELUKAST 10 MG: 10 TABLET, FILM COATED ORAL at 20:30

## 2024-04-22 RX ADMIN — ROSUVASTATIN 10 MG: 10 TABLET, FILM COATED ORAL at 10:00

## 2024-04-22 RX ADMIN — PROPOFOL 100 MG: 10 INJECTION, EMULSION INTRAVENOUS at 17:22

## 2024-04-22 RX ADMIN — SODIUM CHLORIDE, PRESERVATIVE FREE 10 ML: 5 INJECTION INTRAVENOUS at 20:32

## 2024-04-22 RX ADMIN — DEXTROSE MONOHYDRATE: 100 INJECTION, SOLUTION INTRAVENOUS at 04:28

## 2024-04-22 RX ADMIN — ISOSORBIDE MONONITRATE 60 MG: 60 TABLET, EXTENDED RELEASE ORAL at 10:00

## 2024-04-22 RX ADMIN — ONDANSETRON 4 MG: 2 INJECTION INTRAMUSCULAR; INTRAVENOUS at 18:47

## 2024-04-22 RX ADMIN — SENNOSIDES, DOCUSATE SODIUM 2 TABLET: 8.6; 5 TABLET ORAL at 10:00

## 2024-04-22 RX ADMIN — RANOLAZINE 500 MG: 500 TABLET, EXTENDED RELEASE ORAL at 10:00

## 2024-04-22 RX ADMIN — CALCITRIOL CAPSULES 0.25 MCG 0.25 MCG: 0.25 CAPSULE ORAL at 10:00

## 2024-04-22 ASSESSMENT — ENCOUNTER SYMPTOMS: SHORTNESS OF BREATH: 1

## 2024-04-22 NOTE — PROGRESS NOTES
Nephrology ([facility]) Progress Note    Patient:  Carlos Espana  YOB: 1955  Date of Service: 4/22/2024  MRN: [id number]   Acct: [id number]   Primary Care Physician: Alisa Rangel MD  Advance Directive: Full Code  Admit Date: 4/20/2024       Hospital Day: 2  Referring Provider: Omi Andrea MD    Patient independently seen and examined, Chart, Consults, Notes, Operative notes, Labs, Cardiology, and Radiology studies reviewed as available.    Subjective:  Carlos Espana is a 68 y.o. male for whom we were consulted for evaluation and treatment of acute kidney injury/chronic kidney disease. Patient has a history of stage IV chronic kidney disease baseline. He also has a history of type 2 diabetes, nephropathy, CVA, paroxysmal atrial fibrillation, chronic systolic CHF, carotid occlusive disease and coronary artery diseaase. Earlier this week, he presented with chest pain and was diagnosed with non-STEMI. He underwent cardiac catheterization and was found to have occluded coronary artery but the cardiologist was unable to proceed with angioplasty or stent. Postprocedure, his serum creatinine was stable/improved. He was discharged. Now, he presented to the emergency room with profound weakness, lack of energy, nausea but no shortness of breath. He had not been drinking any fluid due to nausea. Routine lab data indicated his serum creatinine is 6.8 mg, hyponatremia. A Zafar catheter was inserted as he was not urinating at all. He has some urine output thereafter.    Today, no overnight events.  Urine output has improved.  Denied current chest pain, shortness of air at rest, nausea or vomiting.  Noted plans for dialysis initiation later today.    Allergies:  Morphine and Hydralazine    Medicines:  Current Facility-Administered Medications   Medication Dose Route Frequency Provider Last Rate Last Admin    minoxidil (LONITEN) tablet 2.5 mg  2.5 mg Oral BID Omi Andrea MD    mildly enlarged.  No free fluid or free air identified.  Extensive aorto-iliac atherosclerotic calcifications present without abdominal aortic aneurysm  ----------------------------    1.  Bilateral renal cortical enhancement.  Correlate with timing of most recent contrast administration and renal function tests to exclude acute renal injury. 2.  Otherwise no acute abnormality within the abdomen or pelvis. 3.  Indeterminate left renal lesion, possibly a complicated cyst.  Non-emergent ultrasound correlation recommended.  Additional simple renal cysts. 4.  Diverticulosis. 5.  Atherosclerosis.  ---------------------------  All CT scans are performed using dose optimization techniques as appropriate to the performed exam and include at least one of the following: Automated exposure control, adjustment of the mA and/or kV according to size, and the use of iterative reconstruction technique.  ______________________________________ Electronically signed by: ALFA RASHID M.D. Date:     04/20/2024 Time:    10:21     XR CHEST PORTABLE    Result Date: 4/20/2024  EXAM: CHEST RADIOGRAPH (1 VIEW)  TECHNIQUE: Frontal Chest Radiograph.  HISTORY: Fullness of air, recent weight gain  COMPARISON: 04/15/2024.  FINDINGS:  Lines, Tubes, Devices:  None  Lungs and Pleura:  No focal consolidation.  No pleural effusion.  No pneumothorax. Prominent interstitial markings.  Cardiac silhouette: Enlarged.  Bones: No acute abnormality.        Findings suggesting congestive heart failure.    ______________________________________ Electronically signed by: BILLY REESE D.O. Date:     04/20/2024 Time:    09:59     XR CHEST PORTABLE    Result Date: 4/15/2024  EXAM:  FRONTAL VIEW OF THE CHEST.  HISTORY:  Chest pain  COMPARISON:  09/04/2023  FINDINGS:   Cardiac silhouette is enlarged.  Infrahilar opacification and vascular distension is similar to previous.  May be chronic versus recurrent.  No visible effusion or pneumothorax.  No acute osseous

## 2024-04-22 NOTE — CONSULTS
[facility]    CONSULT    Patient:  Carlos Espana  YOB: 1955  Date of Service: 4/22/2024  MRN: [id number]   Acct: [id number]   Primary Care Physician: Alisa Rangel MD    Reason for consult: needs perm cath placement    Requesting Physician: Dr. Angeles    History Obtained From: patient and chart review    HISTORY OF PRESENT ILLNESS:  Mr. Carlos Espana is a 68 y.o. male with PMH of CKD 4,  CAD with prior stenting, chronic systolic heart failure now with preserved EF, Afib on Eliquis, COPD on 3L O2 at all times, hypertension, diabetes, GERD who presented with complaints of decreased urinary output and constipation with nausea since discharge from last hospital stay. Recently discharged from [facility] on 4/17/2024 after admission on 4/15/2024 for NSTEMI. At that time pt cardiology attempted PCI but it was unsuccessful and they recommended medical management and offered  procedure but pt declined.  Workup in ED showed Na 125, Cr 6.8 ( was 2.7 at d/c), pro-BNP 2,355, Mg 2.9, and negative UA. CT abdomen WO contrast completed and showed no acute abnormalities. Patient was admitted to hospital medicine for further evaluation with nephrology consultation. Nephrology tried slow hydration and intermittent diuretics, pt only had small amount of urine production and worsening Cr to 7.3 and BUN 66, therefore Vascular surgery was consulted for perm placement to initiate HD.     Past Medical History:       Diagnosis Date    AAA (abdominal aortic aneurysm) ([facility])     Acute kidney failure, unspecified ([facility])     Anxiety state, unspecified     Atrial septal aneurysm 01/09/2016    Blood circulation, collateral     Body mass index 30.0-30.9, adult     CAD (coronary artery disease) 01/10/2016    1/9/16  lexiscan  Positive for apical MI + myocardial ischemia, EF 42%, intermediate risk findings, AUC indication 16, AUC score 7 1/10/16  Cath  3 lesions in the LAD:  Mid: 70% 2.25x23, mid 90% 2.25 x 28, distal 100%

## 2024-04-22 NOTE — PLAN OF CARE
Problem: Safety - Adult  Goal: Free from fall injury  4/21/2024 2312 by Eve Scott, RN  Outcome: Progressing  4/21/2024 1617 by Heather Dueñas RN  Outcome: Progressing  Flowsheets (Taken 4/21/2024 0755)  Free From Fall Injury: Instruct family/caregiver on patient safety     Problem: ABCDS Injury Assessment  Goal: Absence of physical injury  4/21/2024 2312 by Eve Scott RN  Outcome: Progressing  4/21/2024 1617 by Heather Dueñas RN  Outcome: Progressing  Flowsheets (Taken 4/21/2024 0755)  Absence of Physical Injury: Implement safety measures based on patient assessment

## 2024-04-22 NOTE — ANESTHESIA POSTPROCEDURE EVALUATION
Department of Anesthesiology  Postprocedure Note    Patient: Carlos Espana  MRN: [id number]  YOB: 1955  Date of evaluation: 4/22/2024    Procedure Summary       Date: 04/22/24 Room / Location: [address]    Anesthesia Start: 1713 Anesthesia Stop: 1807    Procedure: PERMCATH INSERTION (Chest) Diagnosis:       Renal failure, unspecified chronicity      (Renal failure, unspecified chronicity [N19])    Surgeons: Evon Argueta DO Responsible Provider: Yadira Nelson APRN - CRNA    Anesthesia Type: General ASA Status: 4 - Emergent            Anesthesia Type: General    Nadiya Phase I: Nadiya Score: 9    Nadiya Phase II:      Anesthesia Post Evaluation    Patient location during evaluation: PACU  Patient participation: complete - patient participated  Level of consciousness: awake and alert  Pain score: 0  Airway patency: patent  Nausea & Vomiting: no nausea and no vomiting  Cardiovascular status: blood pressure returned to baseline  Respiratory status: acceptable, spontaneous ventilation, oral airway and face mask  Hydration status: euvolemic  Comments: BP (!) 161/69   Pulse 63   Temp 98.1 °F (36.7 °C) (Temporal)   Resp 17   Wt 105.2 kg (232 lb)   SpO2 94%   BMI 32.37 kg/m²     Pain management: adequate    No notable events documented.

## 2024-04-22 NOTE — PROGRESS NOTES
Palliative Care/Spiritual Care: Met with pt to initiate palliative care. Pt says his kidneys shut down after a heart cath with contrast. Pt says he previously was diagnosed with CKD. Pt says he will begin dialysis after getting a port later today. He says the kidney issue is a possible risk of having the heart cath. ED note says acute renal failure superimposed on stage 4 CKD, hyponatremia, chronic CHF, and a history of non ST myocardial infarction. Pt is known to palliative care.         Advance Directives: Pt has a LW. His wife Perri Espana is named as primary decision maker, and his brother Dg Espana is named as secondary/alternate decision maker. Pt is full code and wants CPR and Ventilator. Pt has an ACP note on 4/16/2024, SEE ACP NOTE.         Pain/other symptoms: Pt says he is not having pain.          Social/Spiritual: [facility] [place].         Pt/family discussion r/t goals: Pt says he lives at home with his wife. He says he ambulates without assistance and performs daily living skills to care for himself at home. Pt will have a dialysis port this afternoon and he thinks he will then have dialysis. He hopes it is temporary, and his ultimate goal is to return home with previous daily living skills.     Provided spiritual care with sustaining presence, nurtured hope, and prayer. Pt expressed gratitude for spiritual care.     Electronically signed by Mary Behrens on 4/22/2024 at 1:31 PM

## 2024-04-22 NOTE — CARE COORDINATION
04/22/24 1031   Readmission Assessment   Number of Days since last admission? 8-30 days   Previous Disposition Home with Family   Who is being Interviewed Patient   What was the patient's/caregiver's perception as to why they think they needed to return back to the hospital? Other (Comment)  (\"I just didn't fee right.  I didn't have an appetite and was having chest pressure.\")   Did you visit your Primary Care Physician after you left the hospital, before you returned this time? Yes   Did you see a specialist, such as Cardiac, Pulmonary, Orthopedic Physician, etc. after you left the hospital? No   Who advised the patient to return to the hospital? Self-referral   Does the patient report anything that got in the way of taking their medications? No   In our efforts to provide the best possible care to you and others like you, can you think of anything that we could have done to help you after you left the hospital the first time, so that you might not have needed to return so soon? Other (Comment)  (\"no\")

## 2024-04-22 NOTE — PROGRESS NOTES
Daily Progress Note    Date:2024  Patient: Carlos Espana  : 1955  MRN:[id number]  CODE:Full Code No additional code details  PCP:Alisa Rangel MD    Admit Date: 2024  9:14 AM   LOS: 2 days       Subjective:     Still having some dyspepsia and bloating.  Still having good urine output but no improvement in creatinine.        Summary: 68-year-old male with CAD history of stenting, chronic heart failure with mildly reduced EF, paroxysmal A-fib, restrictive lung disease, CKD stage IV, GERD, recently hospitalized with cardiac chest pain underwent cardiac catheterization  with severe in-stent restenosis of mid LAD with complete occlusion with unsuccessful PCI, now presenting back on  due to lack of urine output past couple of days, feeling unwell with some abdominal bloating and no recent bowel movement, found to be in renal failure with creatinine 6.8 subsequently uptrending to 7.3 despite IV fluid trial, etiology most likely contrast nephropathy.  Seen in consultation by nephrology with plan for initiation of hemodialysis after permacath placement by vascular surgery.      Objective:      Vital signs in last 24 hours:  Patient Vitals for the past 24 hrs:   BP Temp Temp src Pulse Resp SpO2   24 0800 (!) 154/73 98.1 °F (36.7 °C) Temporal 61 20 93 %   24 0728 -- -- -- 60 15 94 %   24 2332 138/64 98.1 °F (36.7 °C) -- 56 14 (!) 88 %   24 2030 (!) 140/65 98.1 °F (36.7 °C) Temporal -- 22 94 %       Physical exam    General: No acute distress  Cardiac: Normal rate, regular rhythm  Respiratory: Normal effort, no use of accessory muscles  Abdomen: Mildly distended  Extremities: Trace lower extremity edema  : Zafar in place    Lab Review   Recent Results (from the past 24 hour(s))   POCT Glucose    Collection Time: 24  4:36 PM   Result Value Ref Range    POC Glucose 97 70 - 99 mg/dl    Performed on AccuChek    POCT Glucose    Collection Time: 24  9:30 PM   Result Value  Ref Range    POC Glucose 81 70 - 99 mg/dl    Performed on AccuChek    POCT Glucose    Collection Time: 04/21/24 11:37 PM   Result Value Ref Range    POC Glucose 76 70 - 99 mg/dl    Performed on AccuChek    CBC with Auto Differential    Collection Time: 04/22/24  2:52 AM   Result Value Ref Range    WBC 9.1 4.8 - 10.8 K/uL    RBC 5.16 4.70 - 6.10 M/uL    Hemoglobin 12.7 (L) 14.0 - 18.0 g/dL    Hematocrit 42.0 42.0 - 52.0 %    MCV 81.4 80.0 - 94.0 fL    MCH 24.6 (L) 27.0 - 31.0 pg    MCHC 30.2 (L) 33.0 - 37.0 g/dL    RDW 16.6 (H) 11.5 - 14.5 %    Platelets 167 130 - 400 K/uL    MPV 10.4 9.4 - 12.4 fL    Neutrophils % 78.6 (H) 50.0 - 65.0 %    Lymphocytes % 8.9 (L) 20.0 - 40.0 %    Monocytes % 8.7 0.0 - 10.0 %    Eosinophils % 3.1 0.0 - 5.0 %    Basophils % 0.5 0.0 - 1.0 %    Neutrophils Absolute 7.2 1.5 - 7.5 K/uL    Immature Granulocytes # 0.0 K/uL    Lymphocytes Absolute 0.8 (L) 1.1 - 4.5 K/uL    Monocytes Absolute 0.80 0.00 - 0.90 K/uL    Eosinophils Absolute 0.30 0.00 - 0.60 K/uL    Basophils Absolute 0.10 0.00 - 0.20 K/uL   Comprehensive Metabolic Panel    Collection Time: 04/22/24  2:52 AM   Result Value Ref Range    Sodium 135 (L) 136 - 145 mmol/L    Potassium 3.9 3.5 - 5.0 mmol/L    Chloride 88 (L) 98 - 111 mmol/L    CO2 28 22 - 29 mmol/L    Anion Gap 19 7 - 19 mmol/L    Glucose 63 (L) 74 - 109 mg/dL    BUN 64 (H) 8 - 23 mg/dL    Creatinine 6.6 (H) 0.5 - 1.2 mg/dL    Est, Glom Filt Rate 9 (A) >60    Calcium 9.1 8.8 - 10.2 mg/dL    Total Protein 6.5 (L) 6.6 - 8.7 g/dL    Albumin 3.7 3.5 - 5.2 g/dL    Total Bilirubin 0.4 0.2 - 1.2 mg/dL    Alkaline Phosphatase 86 40 - 130 U/L    ALT 10 5 - 41 U/L    AST 10 5 - 40 U/L   POCT Glucose    Collection Time: 04/22/24  5:43 AM   Result Value Ref Range    POC Glucose 161 (H) 70 - 99 mg/dl    Performed on AccuChek    POCT Glucose    Collection Time: 04/22/24  8:01 AM   Result Value Ref Range    POC Glucose 103 (H) 70 - 99 mg/dl    Performed on AccuChek    POCT Glucose

## 2024-04-22 NOTE — ANESTHESIA PRE PROCEDURE
Department of Anesthesiology  Preprocedure Note       Name:  Carlos Espana   Age:  68 y.o.  :  1955                                          MRN:  [id number]         Date:  2024      Surgeon: Surgeon(s):  Evon Argueta DO    Procedure: Procedure(s):  PERMCATH INSERTION    Medications prior to admission:   Prior to Admission medications    Medication Sig Start Date End Date Taking? Authorizing Provider   apixaban (ELIQUIS) 5 MG TABS tablet Take 0.5 tablets by mouth 2 times daily 24   Omi Andrea MD   bumetanide (BUMEX) 2 MG tablet Take 1 tablet by mouth 2 times daily 24   Omi Andrea MD   ranolazine (RANEXA) 500 MG extended release tablet Take 1 tablet by mouth 2 times daily 24   Omi Andrea MD   isosorbide mononitrate (IMDUR) 60 MG extended release tablet TAKE ONE TABLET BY MOUTH TWICE A DAY 24   Cady Arriaga APRN - NP   amiodarone (PACERONE) 100 MG tablet TAKE ONE TABLET BY MOUTH EVERY DAY 24   Shelbie Herrera APRN   rosuvastatin (CRESTOR) 40 MG tablet TAKE ONE TABLET BY MOUTH EVERY DAY 23   Shelbie Herrera APRN   minoxidil (LONITEN) 2.5 MG tablet TAKE ONE TABLET BY MOUTH TWICE A DAY 23   Cady Arriaga APRN - NP   tiotropium-olodaterol (STIOLTO RESPIMAT) 2.5-2.5 MCG/ACT AERS Inhale 2 puffs into the lungs daily  Patient not taking: Reported on 4/15/2024 9/25/23   Alejandrina Webb MD   blood glucose test strips (CONTOUR TEST) strip TEST BLOOD SUGAR TWICE DAILY 21   Bryson Gutierrez MD   insulin lispro (HUMALOG KWIKPEN) 200 UNIT/ML SOPN pen  22   Bryson Gutierrez MD   montelukast (SINGULAIR) 10 MG tablet Take by mouth    Bryson Gutierrez MD   senna-docusate (PERICOLACE) 8.6-50 MG per tablet Take 2 tablets by mouth daily as needed 10/28/21   Provider, MD Bryson   metOLazone (ZAROXOLYN) 5 MG tablet Take 1 tablet by mouth Taking as needed for fluid build up    Provider, MD Bryson   nitroGLYCERIN

## 2024-04-22 NOTE — OP NOTE
Operative Note      Patient: Carlos Espana  YOB: 1955  MRN: [id number]    Date of Procedure: 4/22/2024    Pre-Op Diagnosis Codes:     * Renal failure, unspecified chronicity [N19]    Post-Op Diagnosis: Same       Procedure(s):  PERMCATH INSERTION    Surgeon(s):  Evon Argueta DO    Assistant:   * No surgical staff found *    Anesthesia: General    Estimated Blood Loss (mL): less than 50     Complications: None    Specimens:   * No specimens in log *    Implants:  * No implants in log *      Drains:   [REMOVED] Urinary Catheter 04/20/24 Zafar (Removed)   $ Urethral catheter insertion $ Not inserted for procedure 04/22/24 0904   Catheter Indications Urinary retention (acute or chronic), continuous bladder irrigation or bladder outlet obstruction 04/22/24 0904   Site Assessment No urethral drainage 04/22/24 0904   Urine Color Yellow 04/22/24 0904   Urine Appearance Clear 04/21/24 2043   Collection Container Standard 04/22/24 0904   Securement Method Securing device (Describe) 04/22/24 0904   Catheter Care  Soap and water 04/22/24 0904   Catheter Best Practices  Drainage tube clipped to bed;Catheter secured to thigh;Tamper seal intact;Bag below bladder;Bag not on floor;Lack of dependent loop in tubing;Drainage bag less than half full 04/22/24 0904   Status Draining;Patent 04/22/24 0904   Output (mL) 1100 mL 04/22/24 0409   Discontinuation Reason Per provider order 04/22/24 1119         Findings:  1.  The right internal jugular vein is patent.  2.  The dialysis catheter tips are in the right atrium/superior vena cava junction.    Procedure in Detail:    After the patient was consented, and given intravenous antibiotics, the patient was brought to the operating room and placed on the table in the supine position.  Patient's Right neck and chest were prepped and draped in the usual sterile fashion.      Skin and subcutaneous tissues over the internal jugular vein were anesthetized with licocaine.  The

## 2024-04-22 NOTE — CARE COORDINATION
Case Management Assessment  Initial Evaluation    Date/Time of Evaluation: 4/22/2024 10:34 AM  Assessment Completed by: Carley Brown RN    If patient is discharged prior to next notation, then this note serves as note for discharge by case management.    Patient Name: Carlos Espana                   YOB: 1955  Diagnosis: Hyponatremia [E87.1]  History of non-ST elevation myocardial infarction (NSTEMI) [I25.2]  Chronic combined systolic and diastolic CHF (congestive heart failure) (HCC) [I50.42]  Acute renal failure superimposed on stage 4 chronic kidney disease, unspecified acute renal failure type (HCC) [N17.9, N18.4]  Contrast-induced nephropathy [N14.11, T50.8X5A]                   Date / Time: 4/20/2024  9:14 AM    Patient Admission Status: Inpatient   Readmission Risk (Low < 19, Mod (19-27), High > 27): Readmission Risk Score: 19.1    Current PCP: Alisa Rangel MD  PCP verified by CM? Yes    Chart Reviewed: Yes      History Provided by: Patient  Patient Orientation: Alert and Oriented, Person, Place, Situation    Patient Cognition: Alert    Hospitalization in the last 30 days (Readmission):  Yes    If yes, Readmission Assessment in CM Navigator will be completed.    Advance Directives:      Code Status: Full Code   Patient's Primary Decision Maker is:      Primary Decision Maker: JoviPerri - Spouse - [phone]    Secondary Decision Maker: JoviDg - Brother/Sister - [phone]    Discharge Planning:    Patient lives with: Spouse/Significant Other Type of Home: House  Primary Care Giver: Self  Patient Support Systems include: Spouse/Significant Other   Current Financial resources: Medicare  Current community resources: None  Current services prior to admission: Durable Medical Equipment, Oxygen Therapy            Current DME: Cane            Type of Home Care services:  None    ADLS  Prior functional level: Independent in ADLs/IADLs  Current functional level: Independent in

## 2024-04-22 NOTE — PROGRESS NOTES
Patient safely transported upstairs. RN at bedside. Electronically signed by Johnny Zarate RN on 4/22/2024 at 6:44 PM

## 2024-04-23 LAB
ALBUMIN SERPL-MCNC: 3.3 G/DL (ref 3.5–5.2)
ALP SERPL-CCNC: 80 U/L (ref 40–130)
ALT SERPL-CCNC: 7 U/L (ref 5–41)
ANION GAP SERPL CALCULATED.3IONS-SCNC: 18 MMOL/L (ref 7–19)
AST SERPL-CCNC: 10 U/L (ref 5–40)
BASOPHILS # BLD: 0 K/UL (ref 0–0.2)
BASOPHILS NFR BLD: 0.5 % (ref 0–1)
BILIRUB SERPL-MCNC: 0.3 MG/DL (ref 0.2–1.2)
BUN SERPL-MCNC: 66 MG/DL (ref 8–23)
CALCIUM SERPL-MCNC: 8.6 MG/DL (ref 8.8–10.2)
CHLORIDE SERPL-SCNC: 88 MMOL/L (ref 98–111)
CO2 SERPL-SCNC: 27 MMOL/L (ref 22–29)
CREAT SERPL-MCNC: 6.7 MG/DL (ref 0.5–1.2)
EOSINOPHIL # BLD: 0.1 K/UL (ref 0–0.6)
EOSINOPHIL NFR BLD: 0.9 % (ref 0–5)
ERYTHROCYTE [DISTWIDTH] IN BLOOD BY AUTOMATED COUNT: 17.2 % (ref 11.5–14.5)
GLUCOSE BLD-MCNC: 134 MG/DL (ref 70–99)
GLUCOSE BLD-MCNC: 183 MG/DL (ref 70–99)
GLUCOSE BLD-MCNC: 211 MG/DL (ref 70–99)
GLUCOSE SERPL-MCNC: 130 MG/DL (ref 74–109)
HCT VFR BLD AUTO: 43.3 % (ref 42–52)
HGB BLD-MCNC: 12.7 G/DL (ref 14–18)
IMM GRANULOCYTES # BLD: 0 K/UL
LYMPHOCYTES # BLD: 0.9 K/UL (ref 1.1–4.5)
LYMPHOCYTES NFR BLD: 10.5 % (ref 20–40)
MCH RBC QN AUTO: 25.5 PG (ref 27–31)
MCHC RBC AUTO-ENTMCNC: 29.3 G/DL (ref 33–37)
MCV RBC AUTO: 86.8 FL (ref 80–94)
MONOCYTES # BLD: 0.8 K/UL (ref 0–0.9)
MONOCYTES NFR BLD: 10.2 % (ref 0–10)
NEUTROPHILS # BLD: 6.4 K/UL (ref 1.5–7.5)
NEUTS SEG NFR BLD: 77.7 % (ref 50–65)
PERFORMED ON: ABNORMAL
PLATELET # BLD AUTO: 161 K/UL (ref 130–400)
PMV BLD AUTO: 11.1 FL (ref 9.4–12.4)
POTASSIUM SERPL-SCNC: 4.3 MMOL/L (ref 3.5–5)
PROT SERPL-MCNC: 6.1 G/DL (ref 6.6–8.7)
RBC # BLD AUTO: 4.99 M/UL (ref 4.7–6.1)
SODIUM SERPL-SCNC: 133 MMOL/L (ref 136–145)
WBC # BLD AUTO: 8.2 K/UL (ref 4.8–10.8)

## 2024-04-23 PROCEDURE — 85025 COMPLETE CBC W/AUTO DIFF WBC: CPT

## 2024-04-23 PROCEDURE — 80053 COMPREHEN METABOLIC PANEL: CPT

## 2024-04-23 PROCEDURE — 6370000000 HC RX 637 (ALT 250 FOR IP): Performed by: SURGERY

## 2024-04-23 PROCEDURE — 36415 COLL VENOUS BLD VENIPUNCTURE: CPT

## 2024-04-23 PROCEDURE — 8010000000 HC HEMODIALYSIS ACUTE INPT

## 2024-04-23 PROCEDURE — 97116 GAIT TRAINING THERAPY: CPT

## 2024-04-23 PROCEDURE — 51798 US URINE CAPACITY MEASURE: CPT

## 2024-04-23 PROCEDURE — 94640 AIRWAY INHALATION TREATMENT: CPT

## 2024-04-23 PROCEDURE — 2580000003 HC RX 258: Performed by: SURGERY

## 2024-04-23 PROCEDURE — 82962 GLUCOSE BLOOD TEST: CPT

## 2024-04-23 PROCEDURE — 1200000000 HC SEMI PRIVATE

## 2024-04-23 PROCEDURE — 5A1D70Z PERFORMANCE OF URINARY FILTRATION, INTERMITTENT, LESS THAN 6 HOURS PER DAY: ICD-10-PCS | Performed by: INTERNAL MEDICINE

## 2024-04-23 PROCEDURE — 2700000000 HC OXYGEN THERAPY PER DAY

## 2024-04-23 PROCEDURE — 94760 N-INVAS EAR/PLS OXIMETRY 1: CPT

## 2024-04-23 PROCEDURE — 6360000002 HC RX W HCPCS: Performed by: SURGERY

## 2024-04-23 PROCEDURE — 6370000000 HC RX 637 (ALT 250 FOR IP): Performed by: STUDENT IN AN ORGANIZED HEALTH CARE EDUCATION/TRAINING PROGRAM

## 2024-04-23 PROCEDURE — 93985 DUP-SCAN HEMO COMPL BI STD: CPT

## 2024-04-23 RX ORDER — SIMETHICONE 80 MG
160 TABLET,CHEWABLE ORAL ONCE
Status: COMPLETED | OUTPATIENT
Start: 2024-04-23 | End: 2024-04-23

## 2024-04-23 RX ORDER — TAMSULOSIN HYDROCHLORIDE 0.4 MG/1
0.4 CAPSULE ORAL ONCE
Status: COMPLETED | OUTPATIENT
Start: 2024-04-23 | End: 2024-04-23

## 2024-04-23 RX ORDER — INSULIN GLARGINE 100 [IU]/ML
10 INJECTION, SOLUTION SUBCUTANEOUS NIGHTLY
Status: DISCONTINUED | OUTPATIENT
Start: 2024-04-23 | End: 2024-04-24

## 2024-04-23 RX ORDER — LACTULOSE 10 G/15ML
30 SOLUTION ORAL ONCE
Status: COMPLETED | OUTPATIENT
Start: 2024-04-23 | End: 2024-04-23

## 2024-04-23 RX ORDER — TAMSULOSIN HYDROCHLORIDE 0.4 MG/1
0.4 CAPSULE ORAL 2 TIMES DAILY
Status: DISCONTINUED | OUTPATIENT
Start: 2024-04-24 | End: 2024-04-23

## 2024-04-23 RX ORDER — TAMSULOSIN HYDROCHLORIDE 0.4 MG/1
0.4 CAPSULE ORAL DAILY
Status: DISCONTINUED | OUTPATIENT
Start: 2024-04-24 | End: 2024-04-25 | Stop reason: HOSPADM

## 2024-04-23 RX ADMIN — LACTULOSE 30 G: 20 SOLUTION ORAL at 17:31

## 2024-04-23 RX ADMIN — ISOSORBIDE MONONITRATE 60 MG: 60 TABLET, EXTENDED RELEASE ORAL at 21:13

## 2024-04-23 RX ADMIN — SENNOSIDES, DOCUSATE SODIUM 2 TABLET: 8.6; 5 TABLET ORAL at 13:10

## 2024-04-23 RX ADMIN — SODIUM CHLORIDE, PRESERVATIVE FREE 10 ML: 5 INJECTION INTRAVENOUS at 13:13

## 2024-04-23 RX ADMIN — MINOXIDIL 2.5 MG: 2.5 TABLET ORAL at 21:13

## 2024-04-23 RX ADMIN — SIMETHICONE 160 MG: 80 TABLET, CHEWABLE ORAL at 13:11

## 2024-04-23 RX ADMIN — RANOLAZINE 500 MG: 500 TABLET, EXTENDED RELEASE ORAL at 21:13

## 2024-04-23 RX ADMIN — INSULIN GLARGINE 10 UNITS: 100 INJECTION, SOLUTION SUBCUTANEOUS at 21:13

## 2024-04-23 RX ADMIN — PANTOPRAZOLE SODIUM 40 MG: 40 TABLET, DELAYED RELEASE ORAL at 13:11

## 2024-04-23 RX ADMIN — MONTELUKAST 10 MG: 10 TABLET, FILM COATED ORAL at 21:13

## 2024-04-23 RX ADMIN — BUMETANIDE 1 MG: 0.25 INJECTION INTRAMUSCULAR; INTRAVENOUS at 05:20

## 2024-04-23 RX ADMIN — HEPARIN SODIUM 5000 UNITS: 5000 INJECTION INTRAVENOUS; SUBCUTANEOUS at 05:20

## 2024-04-23 RX ADMIN — CALCITRIOL CAPSULES 0.25 MCG 0.25 MCG: 0.25 CAPSULE ORAL at 13:11

## 2024-04-23 RX ADMIN — TIOTROPIUM BROMIDE AND OLODATEROL 2 PUFF: 3.124; 2.736 SPRAY, METERED RESPIRATORY (INHALATION) at 07:02

## 2024-04-23 RX ADMIN — ROSUVASTATIN 10 MG: 10 TABLET, FILM COATED ORAL at 13:11

## 2024-04-23 RX ADMIN — SODIUM CHLORIDE, PRESERVATIVE FREE 10 ML: 5 INJECTION INTRAVENOUS at 21:13

## 2024-04-23 RX ADMIN — TAMSULOSIN HYDROCHLORIDE 0.4 MG: 0.4 CAPSULE ORAL at 18:01

## 2024-04-23 RX ADMIN — ONDANSETRON 4 MG: 4 TABLET, ORALLY DISINTEGRATING ORAL at 15:07

## 2024-04-23 RX ADMIN — Medication 2000 UNITS: at 13:10

## 2024-04-23 NOTE — PLAN OF CARE
Problem: Safety - Adult  Goal: Free from fall injury  Outcome: Progressing     Problem: ABCDS Injury Assessment  Goal: Absence of physical injury  Outcome: Progressing     Problem: Chronic Conditions and Co-morbidities  Goal: Patient's chronic conditions and co-morbidity symptoms are monitored and maintained or improved  Outcome: Progressing  Flowsheets (Taken 4/22/2024 2027)  Care Plan - Patient's Chronic Conditions and Co-Morbidity Symptoms are Monitored and Maintained or Improved: Monitor and assess patient's chronic conditions and comorbid symptoms for stability, deterioration, or improvement     Problem: Discharge Planning  Goal: Discharge to home or other facility with appropriate resources  Outcome: Progressing  Flowsheets (Taken 4/22/2024 2027)  Discharge to home or other facility with appropriate resources: Identify barriers to discharge with patient and caregiver

## 2024-04-23 NOTE — PROGRESS NOTES
Physical Therapy Initial Assessment    Carlos Espana  [id number]       04/22/24 1428   Restrictions/Precautions   Restrictions/Precautions Fall Risk;Contact Precautions   General   Diagnosis NEPHROPATHY, ANGELO/CKD   Subjective   Subjective Pt WILLING TO PARTICIPATE. AWAITING PROCEDURE TODAY.   Social/Functional History   Lives With Spouse   Type of Home House   Home Layout Multi-level;Performs ADL's on one level;Able to Live on Main level with bedroom/bathroom   Entrance Stairs - Number of Steps 1 step   Bathroom Shower/Tub Walk-in shower   Bathroom Toilet Standard   Bathroom Equipment Grab bars in shower;Built-in shower seat   Bathroom Accessibility Accessible   Home Equipment Cane;Oxygen;Walker, rolling   Receives Help From Family   ADL Assistance Independent   Homemaking Assistance Independent   Ambulation Assistance Independent  (CANE OCCASIONALLY)   Transfer Assistance Independent   Active  Yes   Mode of Transportation Car   Occupation Retired   Vision   Vision WFL   Hearing   Hearing WFL   Vitals   O2 Device Nasal cannula   BP Location Left lower arm   Subjective   Pain DENIES   Orientation   Overall Orientation Status WNL   Cognition   Overall Cognitive Status WNL   Gross Assessment   AROM Within functional limits   Strength Generally decreased, functional   Bed mobility   Supine to Sit Modified independent   Transfers   Sit to Stand Minimal Assistance   Stand to Sit Minimal Assistance   Bed to Chair Minimal assistance   Ambulation   Device No Device   Assistance Minimal assistance   Quality of Gait LEs UNSTEADY BUT NO TRUE BUCKLING   Gait Deviations Slow Barbara;Decreased step length   Distance 12'   Comments NEEDS TO TRY RW INITIALLY   Balance   Sitting - Dynamic Good   Standing - Dynamic Fair;-   Short Term Goals   Time Frame for Short Term Goals 14 DAYS   Short Term Goal 1 TRANSFERS SUP-IND   Short Term Goal 2 ' AAD SBA   Activity Tolerance   Activity Tolerance Patient tolerated evaluation without

## 2024-04-23 NOTE — CARE COORDINATION
Request for Dialysis Chair Time submitted via [facility] portal.  Awaiting response.  Electronically signed by Carley Brown RN on 4/23/2024 at 9:17 AM    Patient's tentative dialysis chair time is MWF at 2:25 p.m.  Dialysis flowsheet information to be faxed to [facility] when available.  Electronically signed by Carley Brown RN on 4/23/2024 at 9:17 AM    Patient and his spouse provided Dialysis Schedule information.  Patient voiced understanding.  Electronically signed by Carley Brown RN on 4/23/2024 at 4:01 PM

## 2024-04-23 NOTE — PROGRESS NOTES
Vascular Preliminary Report  B/L UE veins and arteries measured for dialysis access planning. Final report pending.

## 2024-04-23 NOTE — PROGRESS NOTES
Vital signs before treatment: 102/51,56,20    Vital signs at end of treatment: 132/62,56,16    Amount of fluid removed: 500 cc    Patient's tolerance of treatment: Well, no complications noted during tx    Orientation level: Oriented to person, Oriented to place, Oriented to time , and Oriented to situation    Level of consciousness: Alert

## 2024-04-23 NOTE — PROGRESS NOTES
Vital signs before treatment:143-69,88,18    Vital signs at end of treatment: 160/79,78,16    Amount of fluid removed: 2000 cc    Patient's tolerance of treatment: Well, no complications noted during tx     Orientation level: Oriented to person, Oriented to place, Oriented to time , and Oriented to situation    Level of consciousness: Alert

## 2024-04-23 NOTE — PROGRESS NOTES
Comment: Retired   Tobacco Use    Smoking status: Former     Current packs/day: 0.00     Average packs/day: 0.3 packs/day for 9.0 years (2.3 ttl pk-yrs)     Types: Cigarettes     Start date:      Quit date:      Years since quittin.3    Smokeless tobacco: Former     Types: Chew   Vaping Use    Vaping Use: Never used   Substance and Sexual Activity    Alcohol use: Not Currently     Comment: \"when i was tyoung i did, if i wanted to drink ten beers i drank ten beers\"    Drug use: Never    Sexual activity: Yes     Comment: has 2 children   Other Topics Concern    Not on file   Social History Narrative    CODE STATUS: DNR    HEALTH CARE PROXY: his wife, Mrs. Perri Espana, [phone]    AMBULATES: independently    DOMICILED: has 50 stairs in the home but does not use them, has one step to enter home, lives with his wife and Mr. Kumar Parker the cat     Social Determinants of Health     Financial Resource Strain: Low Risk  (2023)    Overall Financial Resource Strain (CARDIA)     Difficulty of Paying Living Expenses: Not very hard   Food Insecurity: No Food Insecurity (2024)    Hunger Vital Sign     Worried About Running Out of Food in the Last Year: Never true     Ran Out of Food in the Last Year: Never true   Transportation Needs: No Transportation Needs (2024)    PRAPARE - Transportation     Lack of Transportation (Medical): No     Lack of Transportation (Non-Medical): No   Physical Activity: Sufficiently Active (2023)    Exercise Vital Sign     Days of Exercise per Week: 3 days     Minutes of Exercise per Session: 60 min   Stress: No Stress Concern Present (2023)    [employer] [place] [employer] - Occupational Stress Questionnaire     Feeling of Stress : Not at all   Social Connections: Socially Integrated (2023)    Social Connection and Isolation Panel [NHANES]     Frequency of Communication with Friends and Family: More than three times a week     Frequency of  evaluation of the abdominal organs demonstrates the liver, spleen and pancreas are unremarkable.  No right or left hydronephrosis.  Simple renal cyst.  Nonspecific bilateral perinephric fat stranding.  No hydronephrosis.  No acute process involving the colon, small bowel or appendix.  Moderate amount of stool in the colon.  Scattered diverticula.  No acute process in the pelvis.  ASVD. Degenerative changes to the lumbar spine, pelvis and hips.       No acute CT findings in the abdomen or pelvis.  Moderate amount of stool in the colon.  Please see above description and additional findings.  All CT scans are performed using dose optimization techniques as appropriate to the performed exam and include at least one of the following: Automated exposure control, adjustment of the mA and/or kV according to size, and the use of iterative reconstruction technique.  ______________________________________ Electronically signed by: BILLY REESE D.O. Date:     04/15/2024 Time:    20:08        Assessment   Acute kidney injury-contrast-induced nephropathy with possible obstructive component as well  Chronic kidney disease stage IV  CAD with recent non-ST elevation MI and cardiac catheterization  Diabetes type 2 with diabetic nephropathy  Chronic systolic congestive heart failure  Paroxysmal atrial fibrillation  Hyponatremia      Plan:  Discussed with patient, nursing, vascular  Workup reviewed today  Monitor labs  Encouraged by improving urine output but hopefully dialysis will be a short-term measure  Dialysis initiation begun on 4/23 and continues as needed    Saravanan Jason MD  04/23/24  5:22 PM

## 2024-04-23 NOTE — PROGRESS NOTES
Physical Therapy  Name: Carlos Espana  MRN:  [id number]  Date of service:  4/23/2024 04/23/24 1508   Restrictions/Precautions   Restrictions/Precautions Fall Risk;Contact Precautions   Subjective   Subjective Patient agreeable to therapy today.   Pain Assessment   Pain Assessment None - Denies Pain   Oxygen Therapy   O2 Device None (Room air)   Bed Mobility   Supine to Sit Stand by assistance   Transfers   Sit to Stand Contact guard assistance   Stand to Sit Contact guard assistance   Ambulation   Device Standard Walker   Assistance Contact guard assistance;Minimal assistance   Quality of Gait LEs UNSTEADY BUT NO TRUE BUCKLING   Gait Deviations Slow Barbara;Decreased step length   Distance 50 ft   Comments verbal cues for walker management   Short Term Goals   Time Frame for Short Term Goals 14 DAYS   Short Term Goal 1 TRANSFERS SUP-IND   Short Term Goal 2 ' AAD SBA   Conditions Requiring Skilled Therapeutic Intervention   Body Structures, Functions, Activity Limitations Requiring Skilled Therapeutic Intervention Decreased functional mobility ;Decreased ADL status;Decreased ROM;Decreased strength;Decreased endurance;Decreased balance;Decreased posture   Assessment Patient unsteady ambulating with standard walker and required verbal cues for walker management. Sat EOB after ambulation because of nausea. Nurse was notified.   Activity Tolerance   Activity Tolerance Patient limited by fatigue;Patient limited by endurance   Physical Therapy Plan   General Plan 5-7 times per week   Current Treatment Recommendations Strengthening;Balance training;Functional mobility training;Transfer training;Gait training;Safety education & training;Patient/Caregiver education & training   PT Plan of Care   Tuesday X   Safety Devices   Type of Devices Call light within reach;Left in bed;Nurse notified         Electronically signed by ABISAI Pastor on 4/23/2024 at 3:45 PM

## 2024-04-23 NOTE — PROGRESS NOTES
Patient post op placement of right IJ tunneled hemodialysis catheter per  on 4/22/24. Patient seen in dialysis, dressing dry and intact, small amount of bloody drainage around insertion site. Treatment running per new catheter with good flow rate noted. Arm mapping ordered for evaluation of veins, will have patient protect arm with best options in case he needs arm access at some point.

## 2024-04-23 NOTE — PROGRESS NOTES
Daily Progress Note    Date:2024  Patient: Carlos Espana  : 1955  MRN:[id number]  CODE:Full Code No additional code details  PCP:Alisa Rangel MD    Admit Date: 2024  9:14 AM   LOS: 3 days       Subjective:     He had some bleeding from PermCath site now resolved, hemostasis achieved.  He feels a bit better today.  Complains of no recent bowel movement.  He is seen and examined on dialysis.  Tolerating dialysis without issue.          Summary: 68-year-old male with CAD history of stenting, chronic heart failure with mildly reduced EF, paroxysmal A-fib, restrictive lung disease, CKD stage IV, GERD, recently hospitalized with cardiac chest pain underwent cardiac catheterization  with severe in-stent restenosis of mid LAD with complete occlusion with unsuccessful PCI, now presenting back on  due to lack of urine output past couple of days, feeling unwell with some abdominal bloating and no recent bowel movement, found to be in renal failure with creatinine 6.8 subsequently uptrending to 7.3 despite IV fluid trial, etiology most likely contrast nephropathy.  Seen in consultation by nephrology with plan for initiation of hemodialysis after permacath placement by vascular surgery.  Underwent first dialysis session on .  Social work assisting with outpatient hemodialysis set up.      Objective:      Vital signs in last 24 hours:  Patient Vitals for the past 24 hrs:   BP Temp Temp src Pulse Resp SpO2 Weight   24 1245 (!) 115/57 98 °F (36.7 °C) Temporal 58 18 91 % --   24 1218 132/62 -- -- 56 -- -- --   24 1200 126/65 -- -- 55 -- -- --   24 1130 (!) 107/59 -- -- 54 -- -- --   24 1100 (!) 106/57 -- -- 53 -- -- --   24 1030 (!) 105/58 -- -- 51 -- -- --   24 1000 (!) 90/55 -- -- (!) 47 -- -- --   24 0930 (!) 101/50 -- -- 55 -- -- --   24 0911 (!) 102/51 -- -- 56 -- -- --   24 0741 122/60 97.2 °F (36.2 °C) Temporal 55 20 94 % --   24  insulin correction  - Hypoglycemia protocol in place    Dyspepsia  GERD  - continue PPI    Restrictive lung disease  -Continue maintenance bronchodilator regimen      Status and plan of care discussed with nursing staff, social work, case management    Status and plan of care discussed with nephrology    Disposition: Likely discharge home later this week pending outpatient hemodialysis time set up      Omi Andrea MD 4/23/2024 3:42 PM

## 2024-04-24 LAB
ALBUMIN SERPL-MCNC: 3.7 G/DL (ref 3.5–5.2)
ALP SERPL-CCNC: 79 U/L (ref 40–130)
ALT SERPL-CCNC: 6 U/L (ref 5–41)
ANION GAP SERPL CALCULATED.3IONS-SCNC: 15 MMOL/L (ref 7–19)
AST SERPL-CCNC: 11 U/L (ref 5–40)
BASOPHILS # BLD: 0.1 K/UL (ref 0–0.2)
BASOPHILS NFR BLD: 1 % (ref 0–1)
BILIRUB SERPL-MCNC: 0.4 MG/DL (ref 0.2–1.2)
BUN SERPL-MCNC: 35 MG/DL (ref 8–23)
CALCIUM SERPL-MCNC: 8.7 MG/DL (ref 8.8–10.2)
CHLORIDE SERPL-SCNC: 94 MMOL/L (ref 98–111)
CO2 SERPL-SCNC: 27 MMOL/L (ref 22–29)
CREAT SERPL-MCNC: 4.5 MG/DL (ref 0.5–1.2)
EOSINOPHIL # BLD: 0.3 K/UL (ref 0–0.6)
EOSINOPHIL NFR BLD: 5 % (ref 0–5)
ERYTHROCYTE [DISTWIDTH] IN BLOOD BY AUTOMATED COUNT: 17.1 % (ref 11.5–14.5)
GLUCOSE BLD-MCNC: 116 MG/DL (ref 70–99)
GLUCOSE BLD-MCNC: 192 MG/DL (ref 70–99)
GLUCOSE BLD-MCNC: 196 MG/DL (ref 70–99)
GLUCOSE SERPL-MCNC: 175 MG/DL (ref 74–109)
HCT VFR BLD AUTO: 42.2 % (ref 42–52)
HGB BLD-MCNC: 12.4 G/DL (ref 14–18)
IMM GRANULOCYTES # BLD: 0 K/UL
LYMPHOCYTES # BLD: 1 K/UL (ref 1.1–4.5)
LYMPHOCYTES NFR BLD: 17.2 % (ref 20–40)
MCH RBC QN AUTO: 25.2 PG (ref 27–31)
MCHC RBC AUTO-ENTMCNC: 29.4 G/DL (ref 33–37)
MCV RBC AUTO: 85.6 FL (ref 80–94)
MONOCYTES # BLD: 0.7 K/UL (ref 0–0.9)
MONOCYTES NFR BLD: 11.2 % (ref 0–10)
NEUTROPHILS # BLD: 3.8 K/UL (ref 1.5–7.5)
NEUTS SEG NFR BLD: 65.4 % (ref 50–65)
PERFORMED ON: ABNORMAL
PLATELET # BLD AUTO: 171 K/UL (ref 130–400)
PMV BLD AUTO: 11.7 FL (ref 9.4–12.4)
POTASSIUM SERPL-SCNC: 3.9 MMOL/L (ref 3.5–5)
PROT SERPL-MCNC: 6 G/DL (ref 6.6–8.7)
RBC # BLD AUTO: 4.93 M/UL (ref 4.7–6.1)
SODIUM SERPL-SCNC: 136 MMOL/L (ref 136–145)
WBC # BLD AUTO: 5.8 K/UL (ref 4.8–10.8)

## 2024-04-24 PROCEDURE — 2580000003 HC RX 258: Performed by: SURGERY

## 2024-04-24 PROCEDURE — 97116 GAIT TRAINING THERAPY: CPT

## 2024-04-24 PROCEDURE — 6370000000 HC RX 637 (ALT 250 FOR IP): Performed by: SURGERY

## 2024-04-24 PROCEDURE — 36415 COLL VENOUS BLD VENIPUNCTURE: CPT

## 2024-04-24 PROCEDURE — 8010000000 HC HEMODIALYSIS ACUTE INPT

## 2024-04-24 PROCEDURE — 85025 COMPLETE CBC W/AUTO DIFF WBC: CPT

## 2024-04-24 PROCEDURE — 82962 GLUCOSE BLOOD TEST: CPT

## 2024-04-24 PROCEDURE — 1200000000 HC SEMI PRIVATE

## 2024-04-24 PROCEDURE — 6360000002 HC RX W HCPCS: Performed by: SURGERY

## 2024-04-24 PROCEDURE — 6360000002 HC RX W HCPCS: Performed by: INTERNAL MEDICINE

## 2024-04-24 PROCEDURE — 80053 COMPREHEN METABOLIC PANEL: CPT

## 2024-04-24 PROCEDURE — 2700000000 HC OXYGEN THERAPY PER DAY

## 2024-04-24 PROCEDURE — 6370000000 HC RX 637 (ALT 250 FOR IP): Performed by: STUDENT IN AN ORGANIZED HEALTH CARE EDUCATION/TRAINING PROGRAM

## 2024-04-24 PROCEDURE — 94640 AIRWAY INHALATION TREATMENT: CPT

## 2024-04-24 RX ORDER — INSULIN LISPRO 100 [IU]/ML
0-8 INJECTION, SOLUTION INTRAVENOUS; SUBCUTANEOUS
Status: DISCONTINUED | OUTPATIENT
Start: 2024-04-24 | End: 2024-04-25 | Stop reason: HOSPADM

## 2024-04-24 RX ORDER — INSULIN GLARGINE 100 [IU]/ML
15 INJECTION, SOLUTION SUBCUTANEOUS NIGHTLY
Status: DISCONTINUED | OUTPATIENT
Start: 2024-04-24 | End: 2024-04-25 | Stop reason: HOSPADM

## 2024-04-24 RX ORDER — INSULIN LISPRO 100 [IU]/ML
0-4 INJECTION, SOLUTION INTRAVENOUS; SUBCUTANEOUS NIGHTLY
Status: DISCONTINUED | OUTPATIENT
Start: 2024-04-24 | End: 2024-04-25 | Stop reason: HOSPADM

## 2024-04-24 RX ORDER — SENNA AND DOCUSATE SODIUM 50; 8.6 MG/1; MG/1
2 TABLET, FILM COATED ORAL 2 TIMES DAILY
Status: DISCONTINUED | OUTPATIENT
Start: 2024-04-24 | End: 2024-04-25 | Stop reason: HOSPADM

## 2024-04-24 RX ADMIN — NIFEDIPINE 60 MG: 60 TABLET, EXTENDED RELEASE ORAL at 11:18

## 2024-04-24 RX ADMIN — Medication 2000 UNITS: at 11:35

## 2024-04-24 RX ADMIN — ISOSORBIDE MONONITRATE 60 MG: 60 TABLET, EXTENDED RELEASE ORAL at 21:30

## 2024-04-24 RX ADMIN — SODIUM CHLORIDE, PRESERVATIVE FREE 10 ML: 5 INJECTION INTRAVENOUS at 21:30

## 2024-04-24 RX ADMIN — RANOLAZINE 500 MG: 500 TABLET, EXTENDED RELEASE ORAL at 21:30

## 2024-04-24 RX ADMIN — MONTELUKAST 10 MG: 10 TABLET, FILM COATED ORAL at 21:29

## 2024-04-24 RX ADMIN — MINOXIDIL 2.5 MG: 2.5 TABLET ORAL at 21:29

## 2024-04-24 RX ADMIN — ONDANSETRON 4 MG: 4 TABLET, ORALLY DISINTEGRATING ORAL at 07:44

## 2024-04-24 RX ADMIN — INSULIN GLARGINE 15 UNITS: 100 INJECTION, SOLUTION SUBCUTANEOUS at 21:30

## 2024-04-24 RX ADMIN — ROSUVASTATIN 10 MG: 10 TABLET, FILM COATED ORAL at 11:35

## 2024-04-24 RX ADMIN — CARVEDILOL 12.5 MG: 12.5 TABLET, FILM COATED ORAL at 18:15

## 2024-04-24 RX ADMIN — APIXABAN 2.5 MG: 2.5 TABLET, FILM COATED ORAL at 21:30

## 2024-04-24 RX ADMIN — CALCITRIOL CAPSULES 0.25 MCG 0.25 MCG: 0.25 CAPSULE ORAL at 11:35

## 2024-04-24 RX ADMIN — HEPARIN SODIUM 3600 UNITS: 1000 INJECTION INTRAVENOUS; SUBCUTANEOUS at 11:15

## 2024-04-24 RX ADMIN — BUMETANIDE 1 MG: 0.25 INJECTION INTRAMUSCULAR; INTRAVENOUS at 05:59

## 2024-04-24 RX ADMIN — PANTOPRAZOLE SODIUM 40 MG: 40 TABLET, DELAYED RELEASE ORAL at 11:35

## 2024-04-24 RX ADMIN — SENNOSIDES, DOCUSATE SODIUM 2 TABLET: 8.6; 5 TABLET ORAL at 21:28

## 2024-04-24 RX ADMIN — TAMSULOSIN HYDROCHLORIDE 0.4 MG: 0.4 CAPSULE ORAL at 11:35

## 2024-04-24 RX ADMIN — SENNOSIDES, DOCUSATE SODIUM 2 TABLET: 8.6; 5 TABLET ORAL at 11:35

## 2024-04-24 RX ADMIN — TIOTROPIUM BROMIDE AND OLODATEROL 2 PUFF: 3.124; 2.736 SPRAY, METERED RESPIRATORY (INHALATION) at 09:56

## 2024-04-24 RX ADMIN — SODIUM CHLORIDE, PRESERVATIVE FREE 10 ML: 5 INJECTION INTRAVENOUS at 11:39

## 2024-04-24 NOTE — CARE COORDINATION
Received message from [facility] stating they made a mistake about offering a chair time at their [facility].  They do not have any openings currently at [facility], but will place patient on their wait list.      Met with patient to provide above information.  He is agreeable to either the [facility] or [facility] clinic.  He prefers a chair time around 10:00 a.m.  Message sent to Mirian with [facility] to inform her of patient's request.  Awaiting response.  Electronically signed by Carley Brown RN on 4/24/2024 at 1:57 PM    Patient's new dialysis chair time is MWF at 11:50 a.m. at the [facility].  Patient informed.  Electronically signed by Carley Brown RN on 4/24/2024 at 3:54 PM

## 2024-04-24 NOTE — PROGRESS NOTES
Questionnaire     Feeling of Stress : Not at all   Social Connections: Socially Integrated (8/11/2023)    Social Connection and Isolation Panel [NHANES]     Frequency of Communication with Friends and Family: More than three times a week     Frequency of Social Gatherings with Friends and Family: Once a week     Attends Sabianist Services: More than 4 times per year     Active Member of Clubs or Organizations: Yes     Attends Club or Organization Meetings: More than 4 times per year     Marital Status:    Intimate Partner Violence: Not At Risk (8/11/2023)    Humiliation, Afraid, Rape, and Kick questionnaire     Fear of Current or Ex-Partner: No     Emotionally Abused: No     Physically Abused: No     Sexually Abused: No   Housing Stability: Low Risk  (4/20/2024)    Housing Stability Vital Sign     Unable to Pay for Housing in the Last Year: No     Number of Places Lived in the Last Year: 1     Unstable Housing in the Last Year: No         Review of Systems:  History obtained from chart review and the patient  General ROS: No fever or chills  Respiratory ROS: No cough, shortness of breath, wheezing  Cardiovascular ROS: No chest pain or palpitations  Gastrointestinal ROS: No abdominal pain or melena  Genito-Urinary ROS: No dysuria or hematuria  Musculoskeletal ROS: No joint pain or swelling         Objective:  Patient Vitals for the past 24 hrs:   BP Temp Temp src Pulse Resp SpO2 Weight   04/24/24 1130 (!) 157/71 -- -- 60 -- -- --   04/24/24 1115 (!) 155/78 97.6 °F (36.4 °C) -- 54 18 -- --   04/24/24 1100 (!) 144/78 -- -- 61 -- -- --   04/24/24 1030 (!) 142/80 -- -- 57 -- -- --   04/24/24 1000 (!) 147/77 -- -- 58 -- -- --   04/24/24 0930 (!) 154/82 -- -- 58 -- -- --   04/24/24 0900 (!) 150/80 -- -- 56 -- -- --   04/24/24 0830 (!) 148/78 -- -- 56 -- -- --   04/24/24 0815 (!) 163/82 -- -- 61 -- -- --   04/24/24 0800 (!) 152/79 -- -- 63 -- -- --   04/24/24 0439 139/65 97.3 °F (36.3 °C) Temporal 58 18 93 % --  Nonspecific bilateral perinephric fat stranding.  No hydronephrosis.  No acute process involving the colon, small bowel or appendix.  Moderate amount of stool in the colon.  Scattered diverticula.  No acute process in the pelvis.  ASVD. Degenerative changes to the lumbar spine, pelvis and hips.       No acute CT findings in the abdomen or pelvis.  Moderate amount of stool in the colon.  Please see above description and additional findings.  All CT scans are performed using dose optimization techniques as appropriate to the performed exam and include at least one of the following: Automated exposure control, adjustment of the mA and/or kV according to size, and the use of iterative reconstruction technique.  ______________________________________ Electronically signed by: BILLY REESE D.O. Date:     04/15/2024 Time:    20:08        Assessment   Acute kidney injury-contrast-induced nephropathy with possible obstructive component as well  Chronic kidney disease stage IV  CAD with recent non-ST elevation MI and cardiac catheterization  Diabetes type 2 with diabetic nephropathy  Chronic systolic congestive heart failure  Paroxysmal atrial fibrillation  Hyponatremia      Plan:  Discussed with patient, nursing  Workup reviewed today  Monitor labs  Encouraged by improving urine output and hopefully dialysis will be a short-term measure  Dialysis initiation begun on 4/23 and continues as needed, next treatment planned 4/26 as needed    Saravanan Jason MD  04/24/24  3:51 PM

## 2024-04-24 NOTE — PLAN OF CARE
Problem: Safety - Adult  Goal: Free from fall injury  4/24/2024 1156 by Bindu Payton RN  Outcome: Progressing  4/24/2024 0325 by Marvin Darby RN  Outcome: Progressing     Problem: ABCDS Injury Assessment  Goal: Absence of physical injury  4/24/2024 1156 by Bindu Payton RN  Outcome: Progressing  4/24/2024 0325 by Marvin Darby RN  Outcome: Progressing     Problem: Chronic Conditions and Co-morbidities  Goal: Patient's chronic conditions and co-morbidity symptoms are monitored and maintained or improved  Outcome: Progressing     Problem: Discharge Planning  Goal: Discharge to home or other facility with appropriate resources  Outcome: Progressing

## 2024-04-24 NOTE — PROGRESS NOTES
Daily Progress Note    Date:2024  Patient: Carlos Espana  : 1955  MRN:[id number]  CODE:Full Code No additional code details  PCP:Alisa Rangel MD    Admit Date: 2024  9:14 AM   LOS: 4 days       Subjective:     Seen and examined following dialysis.  Tolerated dialysis today without any issues.  No lightheadedness, dizziness, shortness of breath or chest pain.  Continues to have good urine output.        Summary: 68-year-old male with CAD history of stenting, chronic heart failure with mildly reduced EF, paroxysmal A-fib, restrictive lung disease, CKD stage IV, GERD, recently hospitalized with cardiac chest pain underwent cardiac catheterization  with severe in-stent restenosis of mid LAD with complete occlusion with unsuccessful PCI, now presenting back on  due to lack of urine output past couple of days, feeling unwell with some abdominal bloating and no recent bowel movement, found to be in renal failure with creatinine 6.8 subsequently uptrending to 7.3 despite IV fluid trial, etiology most likely contrast nephropathy.  Seen in consultation by nephrology with plan for initiation of hemodialysis after permacath placement by vascular surgery.  Underwent first dialysis session on  with repeat dialysis session .  Social work assisting with outpatient hemodialysis set up.      Objective:      Vital signs in last 24 hours:  Patient Vitals for the past 24 hrs:   BP Temp Temp src Pulse Resp SpO2 Weight   24 1130 (!) 157/71 -- -- 60 -- -- --   24 1115 (!) 155/78 97.6 °F (36.4 °C) -- 54 18 -- --   24 1100 (!) 144/78 -- -- 61 -- -- --   24 1030 (!) 142/80 -- -- 57 -- -- --   24 1000 (!) 147/77 -- -- 58 -- -- --   24 0930 (!) 154/82 -- -- 58 -- -- --   24 0900 (!) 150/80 -- -- 56 -- -- --   24 0830 (!) 148/78 -- -- 56 -- -- --   24 0815 (!) 163/82 -- -- 61 -- -- --   24 0800 (!) 152/79 -- -- 63 -- -- --   24 0439 139/65 97.3 °F  maintenance bronchodilator regimen    Constipation-improved with laxatives  - Maintain on senna/docusate    Status and plan of care discussed with case management    Status and plan of care discussed with nephrology    Disposition: Likely discharge home later this week pending outpatient hemodialysis chair time set up      Omi Andrea MD 4/24/2024 1:41 PM

## 2024-04-24 NOTE — DISCHARGE INSTRUCTIONS
After Dialysis Catheter (PermaCath) Placement    1.  Expect to be sore for a few days.  You may use acetaminophen (tylenol) for this temporary discomfort.  Please refer to your nephrologist for further questions about medications or dosing.  2.  A small amount of blood or drainage oozing from the insertion site is normal.  If the dressing becomes saturated with blood, hold firm and direct pressure on the insertion site and sit up at a 90 degree angle for 20 minutes.  If bleeding continues, call the office. If the office is closed, you may be directed to go to the nearest emergency department for evaluation.  3.  Prevent tugging or pulling the catheter.  If it is dislodged it may not work properly and may need to be replaced.  4.  Do NOT use ANY creams, lotions, soaps, or antibiotic ointment over the insertion area.  The dialysis center nurses will clean and dress your catheter each dialysis visit.  5.  Keep the catheter dry.  YOU MAY NOT SHOWER.  Sitting in a tub is permissible as long as the water level is below the catheter.  No swimming!  6.  Avoid lifting anything weighing more than 10 pounds for the next three days.  Ten pounds is about the weight of two phone books or a gallon of milk.  Lifting may put a strain on the incision before it has a chance to heal.  7.  Do NOT change the dressing yourself.  Permacath care should be provided by your dialysis until and must be done with a sterile technique to prevent infection.      PROBLEMS OR CONCERNS    Call the vascular surgery office at [phone] with any of the followin.  Continued bleeding after 20 minutes of firm, direct pressure and sitting upright.  If the office is closed, you may be directed to go to the nearest emergency department for evaluation.  2.  Fever or chills, redness, heat or sudden swelling around insertion site.  3.  Catheter position changes or catheter pulled out.       You had your an arm ultrasound, this was to see which arm  would be the best option if you ever needed to have a fistula or graft for long term Dialysis. We recommend you limit the lab and IV sticks in your LEFT ARM to protect those veins just in case they are needed for a hemodialysis access.

## 2024-04-24 NOTE — PROGRESS NOTES
04/24/24 1500   Subjective   Subjective Patient in bed agrees to therapy.  Patient request jogging pants to don ritu to gait.   Pain Assessment   Pain Assessment None - Denies Pain   Vitals   O2 Device Nasal cannula   Comment SATS PRE TX 2L NC 94% TX 2L  SATS down 85% RECOVERED on 3L NC back to 2L NC   Bed Mobility Training   Bed Mobility Training Yes   Supine to Sit Modified independent   Balance   Sitting Intact   Transfer Training   Transfer Training Yes   Sit to Stand Stand-by assistance   Stand to Sit Stand-by assistance   Gait Training   Gait Training Yes   Gait   Gait Training Yes   Overall Level of Assistance Stand-by assistance   Distance (ft) 175 Feet   Assistive Device Cane, straight   Speed/Barbara Slow   Patient Education   Education Given To Patient   Education Provided Role of Therapy;Plan of Care;Transfer Training;Fall Prevention Strategies   Education Provided Comments Use of call light ; staff assist.   Education Method Demonstration;Verbal   Barriers to Learning None   Education Outcome Demonstrated understanding   Other Specialty Interventions   Other Treatments/Modalities Patient sitting EOB post TX   Assessment   Activity Tolerance Patient tolerated treatment well   PT Plan of Care   Wednesday X       Electronically signed by Bryn Donaldson PTA on 4/24/2024 at 4:02 PM

## 2024-04-24 NOTE — PLAN OF CARE
Patient seen in Dialysis, treatment running well with good flow rates noted. Right IJ tunneled catheter dressing CDI, minimal bruising noted. Discussed arm mapping with patient, he needs to protect his left arm from any lab or IV sticks as much as possible. Explained arm map indicates left arm would be most suitable for an arm access for HD, he voices understanding. Patient has follow up with Vascular 5/21/24 at 8:15 am for post op visit.

## 2024-04-24 NOTE — PROGRESS NOTES
Vital signs before treatment: 0753:  142/71/  65/  18    Vital signs at end of treatment: 1115:  155/78/  54/  18/  97.6    Amount of fluid removed: 500    Patient's tolerance of treatment: very well    Orientation level: Oriented to person, Oriented to place, and Oriented to time     Level of consciousness: Alert

## 2024-04-24 NOTE — PROGRESS NOTES
Physician Progress Note      PATIENT:               MARYANA GARNER  CSN #:                  [id number]  :                       1955  ADMIT DATE:       2024 9:14 AM  DISCH DATE:  RESPONDING  PROVIDER #:        Omi Andrea MD          QUERY TEXT:    Pt admitted with ANGELO/CKD IV and has CHF documented. If possible, please   document in progress notes and discharge summary further specificity regarding   the type and acuity of CHF:    The medical record reflects the following:  Risk Factors: ANGELO, CKD IV, CHF, PAF , DMII  Clinical Indicators: EF 45%.  CXR - suggest CHF  Treatment: Labs, Bumex 1 mg IV daily, Coreg 12.5 mg b.i.d.    Thank you  Barbie Alvares RN, BSN, CDI  [phone]  Options provided:  -- Acute on Chronic Systolic CHF/HFrEF  -- Acute on Chronic Systolic and Diastolic CHF  -- Chronic Systolic CHF/HFrEF  -- Chronic Systolic and Diastolic CHF  -- Other - I will add my own diagnosis  -- Disagree - Not applicable / Not valid  -- Disagree - Clinically unable to determine / Unknown  -- Refer to Clinical Documentation Reviewer    PROVIDER RESPONSE TEXT:    This patient has chronic systolic and diastolic CHF.    Query created by: Barbie Alvares on 2024 8:06 AM      QUERY TEXT:    Patient admitted with ANGELO, noted to have atrial fibrillation and is maintained   on Eliquis. If possible, please document in progress notes and discharge   summary if you are evaluating and/or treating any of the following:    The medical record reflects the following:  Risk Factors: CKD, DMII, PAF  Clinical Indicators: CHF, HTN, DMII,  Treatment: Eliquis 2.5 mg b.i.d., Labs    Thank you  Barbie Alvares RN BSN, CDI  [phone]  Options provided:  -- Secondary hypercoagulable state in a patient with atrial fibrillation  -- Other - I will add my own diagnosis  -- Disagree - Not applicable / Not valid  -- Disagree - Clinically unable to determine / Unknown  -- Refer to Clinical Documentation Reviewer    PROVIDER RESPONSE

## 2024-04-25 ENCOUNTER — READMISSION MANAGEMENT (OUTPATIENT)
Dept: CALL CENTER | Facility: HOSPITAL | Age: 69
End: 2024-04-25
Payer: COMMERCIAL

## 2024-04-25 VITALS
SYSTOLIC BLOOD PRESSURE: 113 MMHG | OXYGEN SATURATION: 93 % | BODY MASS INDEX: 31.4 KG/M2 | TEMPERATURE: 97.2 F | HEART RATE: 58 BPM | DIASTOLIC BLOOD PRESSURE: 50 MMHG | WEIGHT: 225 LBS | RESPIRATION RATE: 20 BRPM

## 2024-04-25 LAB
ANION GAP SERPL CALCULATED.3IONS-SCNC: 12 MMOL/L (ref 7–19)
BUN SERPL-MCNC: 18 MG/DL (ref 8–23)
CALCIUM SERPL-MCNC: 9.3 MG/DL (ref 8.8–10.2)
CHLORIDE SERPL-SCNC: 96 MMOL/L (ref 98–111)
CO2 SERPL-SCNC: 29 MMOL/L (ref 22–29)
CREAT SERPL-MCNC: 3 MG/DL (ref 0.5–1.2)
GLUCOSE BLD-MCNC: 134 MG/DL (ref 70–99)
GLUCOSE BLD-MCNC: 174 MG/DL (ref 70–99)
GLUCOSE SERPL-MCNC: 159 MG/DL (ref 74–109)
PERFORMED ON: ABNORMAL
PERFORMED ON: ABNORMAL
POTASSIUM SERPL-SCNC: 3.7 MMOL/L (ref 3.5–5)
SODIUM SERPL-SCNC: 137 MMOL/L (ref 136–145)

## 2024-04-25 PROCEDURE — 2700000000 HC OXYGEN THERAPY PER DAY

## 2024-04-25 PROCEDURE — 6370000000 HC RX 637 (ALT 250 FOR IP): Performed by: SURGERY

## 2024-04-25 PROCEDURE — 6360000002 HC RX W HCPCS: Performed by: SURGERY

## 2024-04-25 PROCEDURE — 82962 GLUCOSE BLOOD TEST: CPT

## 2024-04-25 PROCEDURE — 97116 GAIT TRAINING THERAPY: CPT

## 2024-04-25 PROCEDURE — 2580000003 HC RX 258: Performed by: SURGERY

## 2024-04-25 PROCEDURE — 6370000000 HC RX 637 (ALT 250 FOR IP): Performed by: STUDENT IN AN ORGANIZED HEALTH CARE EDUCATION/TRAINING PROGRAM

## 2024-04-25 PROCEDURE — 97165 OT EVAL LOW COMPLEX 30 MIN: CPT

## 2024-04-25 PROCEDURE — 94640 AIRWAY INHALATION TREATMENT: CPT

## 2024-04-25 PROCEDURE — 80048 BASIC METABOLIC PNL TOTAL CA: CPT

## 2024-04-25 PROCEDURE — 36415 COLL VENOUS BLD VENIPUNCTURE: CPT

## 2024-04-25 PROCEDURE — 97110 THERAPEUTIC EXERCISES: CPT

## 2024-04-25 PROCEDURE — 97535 SELF CARE MNGMENT TRAINING: CPT

## 2024-04-25 PROCEDURE — 94760 N-INVAS EAR/PLS OXIMETRY 1: CPT

## 2024-04-25 RX ORDER — WHEAT DEXTRIN 3 G/3.8 G
4 POWDER (GRAM) ORAL
Qty: 500 G | Refills: 0 | Status: SHIPPED | OUTPATIENT
Start: 2024-04-25

## 2024-04-25 RX ORDER — TAMSULOSIN HYDROCHLORIDE 0.4 MG/1
0.4 CAPSULE ORAL DAILY
Qty: 30 CAPSULE | Refills: 3 | Status: SHIPPED | OUTPATIENT
Start: 2024-04-26

## 2024-04-25 RX ORDER — FAMOTIDINE 20 MG/1
10 TABLET, FILM COATED ORAL NIGHTLY PRN
Qty: 60 TABLET | Refills: 3 | Status: SHIPPED | OUTPATIENT
Start: 2024-04-25

## 2024-04-25 RX ORDER — POLYETHYLENE GLYCOL 3350 17 G/17G
17 POWDER, FOR SOLUTION ORAL DAILY PRN
Qty: 90 DEVICE | Refills: 0 | Status: SHIPPED | OUTPATIENT
Start: 2024-04-25 | End: 2024-07-24

## 2024-04-25 RX ORDER — BUMETANIDE 2 MG/1
2 TABLET ORAL DAILY
Qty: 60 TABLET | Refills: 3 | Status: SHIPPED | OUTPATIENT
Start: 2024-04-25

## 2024-04-25 RX ADMIN — APIXABAN 2.5 MG: 2.5 TABLET, FILM COATED ORAL at 09:09

## 2024-04-25 RX ADMIN — Medication 2000 UNITS: at 09:09

## 2024-04-25 RX ADMIN — ROSUVASTATIN 10 MG: 10 TABLET, FILM COATED ORAL at 09:09

## 2024-04-25 RX ADMIN — NIFEDIPINE 60 MG: 60 TABLET, EXTENDED RELEASE ORAL at 09:09

## 2024-04-25 RX ADMIN — AMIODARONE HYDROCHLORIDE 100 MG: 100 TABLET ORAL at 09:09

## 2024-04-25 RX ADMIN — MINOXIDIL 2.5 MG: 2.5 TABLET ORAL at 09:09

## 2024-04-25 RX ADMIN — BUMETANIDE 1 MG: 0.25 INJECTION INTRAMUSCULAR; INTRAVENOUS at 05:42

## 2024-04-25 RX ADMIN — PANTOPRAZOLE SODIUM 40 MG: 40 TABLET, DELAYED RELEASE ORAL at 09:09

## 2024-04-25 RX ADMIN — TAMSULOSIN HYDROCHLORIDE 0.4 MG: 0.4 CAPSULE ORAL at 09:09

## 2024-04-25 RX ADMIN — SODIUM CHLORIDE, PRESERVATIVE FREE 10 ML: 5 INJECTION INTRAVENOUS at 09:12

## 2024-04-25 RX ADMIN — ISOSORBIDE MONONITRATE 60 MG: 60 TABLET, EXTENDED RELEASE ORAL at 09:09

## 2024-04-25 RX ADMIN — RANOLAZINE 500 MG: 500 TABLET, EXTENDED RELEASE ORAL at 09:09

## 2024-04-25 RX ADMIN — SENNOSIDES, DOCUSATE SODIUM 2 TABLET: 8.6; 5 TABLET ORAL at 09:09

## 2024-04-25 RX ADMIN — TIOTROPIUM BROMIDE AND OLODATEROL 2 PUFF: 3.124; 2.736 SPRAY, METERED RESPIRATORY (INHALATION) at 07:24

## 2024-04-25 RX ADMIN — CALCITRIOL CAPSULES 0.25 MCG 0.25 MCG: 0.25 CAPSULE ORAL at 09:09

## 2024-04-25 RX ADMIN — CARVEDILOL 12.5 MG: 12.5 TABLET, FILM COATED ORAL at 09:09

## 2024-04-25 NOTE — PROGRESS NOTES
04/25/24 1100   Subjective   Subjective I am going home today.   Yeah I 'll walk   Pain Assessment   Pain Assessment None - Denies Pain   Vitals   O2 Device Nasal cannula   Bed Mobility Training   Supine to Sit Modified independent   Balance   Sitting Intact   Transfer Training   Sit to Stand Stand-by assistance   Stand to Sit Stand-by assistance   Gait   Overall Level of Assistance Stand-by assistance   Distance (ft) 350 Feet  (3L O2)   Assistive Device Cane, straight   Speed/Barbara Slow   PT Exercises   A/AROM Exercises BL LE EX sitting X 20 reps.   Patient Education   Education Given To Patient   Education Provided Role of Therapy;Plan of Care;Transfer Training;Fall Prevention Strategies   Education Provided Comments Use of call light ; staff assist.   Education Method Demonstration;Verbal   Barriers to Learning None   Education Outcome Demonstrated understanding   Other Specialty Interventions   Other Treatments/Modalities In chair needs call light needs in reach.   PT Plan of Care   Thursday X       Electronically signed by Bryn Donaldson PTA on 4/25/2024 at 11:15 AM

## 2024-04-25 NOTE — DISCHARGE SUMMARY
Discharge Summary    NAME: Carlos Espana  :  1955  MRN:  [id number]    Admit date:  2024  Discharge date:      Admitting Physician:  No admitting provider for patient encounter.    Advance Directive: Full Code    Consults: nephrology and vascular    Primary Care Physician:  Alisa Rangel MD    HOSPITAL COURSE:  68-year-old male with CAD history of stenting, chronic heart failure with mildly reduced EF, paroxysmal A-fib, restrictive lung disease, CKD stage IV, GERD, recently hospitalized with cardiac chest pain underwent cardiac catheterization  with severe in-stent restenosis of mid LAD with complete occlusion with unsuccessful PCI, now presenting back on  due to lack of urine output past couple of days, feeling unwell with some abdominal bloating and no recent bowel movement, found to be in renal failure with creatinine 6.8 subsequently uptrending to 7.3 despite IV fluid trial, etiology most likely contrast nephropathy.  Seen in consultation by nephrology with plan for initiation of hemodialysis after permacath placement by vascular surgery.  Underwent first dialysis session on  with repeat dialysis session .  Social work assisting with outpatient hemodialysis set up.     Tolerated dialysis without any issues. No lightheadedness, dizziness, shortness of breath or chest pain. Continues to have good urine output.    set up dialysis as outpatient discussed about new medication for BPH, constipation, importance of incentive spirometry, and acid suppression, he verbalizes understanding.  Presently discharged to home with stable vitals and follow-up with nephrology as outpatient.      DISCHARGE DIAGNOSES WITH COURSE OF MANAGEMENT :   Principal Problem:    Contrast-induced nephropathy  Active Problems:    CAD (coronary artery disease)    CKD (chronic kidney disease) stage 4, GFR 15-29 ml/min ([facility])    DM (diabetes mellitus) ([facility])    PAF (paroxysmal atrial fibrillation) ([facility])    Acute renal  Pen Needles 31G X 6 MM Misc  Generic drug: Insulin Pen Needle  FOR USE WITH LANTUS TWO TIMES A DAY            STOP taking these medications      ascorbic acid 500 MG tablet  Commonly known as: VITAMIN C     levocetirizine 5 MG tablet  Commonly known as: XYZAL     metOLazone 5 MG tablet  Commonly known as: ZAROXOLYN     pantoprazole 40 MG tablet  Commonly known as: PROTONIX     potassium chloride 20 MEQ extended release tablet  Commonly known as: KLOR-CON M     Vitamin D3 50 MCG (2000 UT) Caps capsule  Generic drug: vitamin D            ASK your doctor about these medications      nitroGLYCERIN 0.4 MG SL tablet  Commonly known as: NITROSTAT     Spiriva HandiHaler 18 MCG inhalation capsule  Generic drug: tiotropium  Inhale 1 capsule into the lungs daily     tiotropium-olodaterol 2.5-2.5 MCG/ACT Aers  Commonly known as: Stiolto Respimat  Inhale 2 puffs into the lungs daily               Where to Get Your Medications        These medications were sent to [facility] - [facility] [address] [facility] -  [phone] - F [phone] [address] [facility] [address]      Phone: [phone]   polyethylene glycol 17 g packet       These medications were sent to [facility] - [address] -  [phone] - F [phone]  [address] [place] [address]      Phone: [phone]   Benefiber Powd  bumetanide 2 MG tablet  famotidine 20 MG tablet  tamsulosin 0.4 MG capsule         Discharge Instructions:   Follow up with Alisa Rangel MD in 7 days.  Take medications as directed.  Resume activity as tolerated.    Diet: ADULT DIET; Regular; 3 carb choices (45 gm/meal); Low Fat/Low Chol/High Fiber/2 gm Na; Low Phosphorus (Less than 1000 mg)     Disposition: Patient is medically stable and will be discharged home.    Time spent on discharge 35 minutes.    Signed:  Deisi Bee MD  4/25/2024 10:00 AM

## 2024-04-25 NOTE — PROGRESS NOTES
This  visited with pt to provide spiritual care. Pt says he is better and he is discharging home. This  provided spiritual care with sustaining presence, nurtured hope, and prayer. Pt expressed gratitude for spiritual care.     Electronically signed by Mary Behrens on 4/25/2024 at 3:15 PM

## 2024-04-25 NOTE — PLAN OF CARE
Problem: Chronic Conditions and Co-morbidities  Goal: Patient's chronic conditions and co-morbidity symptoms are monitored and maintained or improved  Outcome: Adequate for Discharge  Flowsheets (Taken 4/25/2024 0915)  Care Plan - Patient's Chronic Conditions and Co-Morbidity Symptoms are Monitored and Maintained or Improved:   Monitor and assess patient's chronic conditions and comorbid symptoms for stability, deterioration, or improvement   Collaborate with multidisciplinary team to address chronic and comorbid conditions and prevent exacerbation or deterioration   Update acute care plan with appropriate goals if chronic or comorbid symptoms are exacerbated and prevent overall improvement and discharge     Problem: Safety - Adult  Goal: Free from fall injury  Outcome: Completed  Flowsheets (Taken 4/25/2024 0914)  Free From Fall Injury: Instruct family/caregiver on patient safety     Problem: ABCDS Injury Assessment  Goal: Absence of physical injury  Outcome: Completed  Flowsheets (Taken 4/25/2024 0914)  Absence of Physical Injury: Implement safety measures based on patient assessment     Problem: Discharge Planning  Goal: Discharge to home or other facility with appropriate resources  Outcome: Completed

## 2024-04-25 NOTE — OUTREACH NOTE
Prep Survey      Flowsheet Row Responses   Nondenominational facility patient discharged from? Non-BH   Is LACE score < 7 ? Non- Discharge   Eligibility [facility]   Date of Admission 04/20/24   Date of Discharge 04/25/24   Discharge Disposition Home or Self Care   Discharge diagnosis OLU on CKD   Does the patient have one of the following disease processes/diagnoses(primary or secondary)? Other   Does the patient have Home health ordered? No   Prep survey completed? Yes            Eunice DIEGO - Registered Nurse

## 2024-04-25 NOTE — PROGRESS NOTES
Occupational Therapy Initial Assessment  Date: 2024   Patient Name: Carlos Espana  MRN: [id number]     : 1955    Date of Service: 2024    Discharge Recommendations:  24 hour supervision or assist       Assessment   Assessment: OT evaluation completed and tx initiated. Pt may benefit from continued skilled services to address functional deficits. Pt will likely progress with further tx. Currently pt is at risk for falls/fall-related injuries. Therefore, OT recommends D/C location to have PRN supervision/assist.  REQUIRES OT FOLLOW-UP: Yes  Activity Tolerance  Activity Tolerance: Patient Tolerated treatment well              Patient Diagnosis(es): The primary encounter diagnosis was Acute renal failure superimposed on stage 4 chronic kidney disease, unspecified acute renal failure type (HCC). Diagnoses of Hyponatremia, Chronic combined systolic and diastolic CHF (congestive heart failure) ([facility]), and History of non-ST elevation myocardial infarction (NSTEMI) were also pertinent to this visit.    Past Medical History:   Past Medical History:   Diagnosis Date    AAA (abdominal aortic aneurysm) ([facility])     Acute kidney failure, unspecified ([facility])     Anxiety state, unspecified     Atrial septal aneurysm 2016    Blood circulation, collateral     Body mass index 30.0-30.9, adult     CAD (coronary artery disease) 01/10/2016    1/9/16  lexiscan  Positive for apical MI + myocardial ischemia, EF 42%, intermediate risk findings, AUC indication 16, AUC score 7 1/10/16  Cath  3 lesions in the LAD:  Mid: 70% 2.25x23, mid 90% 2.25 x 28, distal 100% 2.0x28, anterior lateral apical hypokinesis, EF 45%    Calculus of kidney     Carotid artery stenosis 2013    Cellulitis and abscess of hand, except fingers and thumb     Cerebral artery occlusion with cerebral infarction (HCC)     15 years ago    Chronic airway obstruction, not elsewhere classified     Chronic kidney disease, unspecified     stage 3    Congestive             Toilet Transfers  Toilet - Technique: Ambulating  Equipment Used: Raised toilet seat with rails  Toilet Transfer: Contact guard assistance  ADL  Feeding: Independent  Grooming: Independent  UE Bathing: Contact guard assistance  LE Bathing: Moderate assistance  UE Dressing: Contact guard assistance  LE Dressing: Moderate assistance  Toileting: Contact guard assistance  Functional Mobility: Contact guard assistance        Bed mobility  Supine to Sit: Unable to assess  Sit to Supine: Unable to assess  Bed Mobility Comments: up in recliner  Transfers  Stand Step Transfers: Contact guard assistance  Sit to stand: Contact guard assistance  Stand to sit: Contact guard assistance     Cognition  Overall Cognitive Status: WNL               Gross Assessment  AROM: Within functional limits  Strength: Within functional limits                       Included Treatment  Tx consisted of: pt education; transfer training; DME training; activity tolerance/balance challenges.   (Treatment time: 15 min)       Plan   Occupational Therapy Plan  Times Per Week: 3-5       Goals  Short Term Goals  Time Frame for Short Term Goals: 3-4 days  Short Term Goal 1: Transfer with sup and DME.  Short Term Goal 2: Toileting skills with SBA.  Short Term Goal 3: UBD and bathing with IND.  Short Term Goal 4: LBD and bathing with CGA and AE/DME.  Short Term Goal 5: Tolerate short, functional distances with SBA in order to meet household demands.  Additional Goals?: Yes  Long Term Goals  Time Frame for Long Term Goals : 1 week  Long Term Goal 1: Pt/family will verbalize/demo: AE/DME options; surgical/WB precautions; recommended therapeutic activities; homemaking/IADL strategies; energy conservation techniques; and fall prevention strategies.          ALENA Cheung/L  Electronically signed by Eliza GUZMAN on 4/25/2024 at 12:28 PM.   No

## 2024-04-25 NOTE — CARE COORDINATION
04/25/24 1030   IMM Letter   IMM Letter given to Patient/Family/Significant other/Guardian/POA/by: LIANE Sarmiento   IMM Letter date given: 04/25/24   IMM Letter time given: 1017     Second IMM given to patient and explained with patient verbalizing understanding.  All questions and concerns addressed   Patient declined waiting 4 hr period prior to discharge.   Signed letter placed in pt soft chart   Electronically signed by LIANE Sarmiento on 4/25/2024 at 10:30 AM

## 2024-04-25 NOTE — PROGRESS NOTES
None    Result Date: 4/15/2024    EXAM: CT ABDOMEN PELVIS WITHOUT CONTRAST  HISTORY:  Upper abdominal pain  COMPARISON:  02/06/2023.  TECHNIQUE:  Serial axial images of the abdomen pelvis were performed from the lung bases through the inferior pelvis without contrast.  These were viewed in multiple planes.  FINDINGS:    Limited evaluation of the abdominal organs demonstrates the liver, spleen and pancreas are unremarkable.  No right or left hydronephrosis.  Simple renal cyst.  Nonspecific bilateral perinephric fat stranding.  No hydronephrosis.  No acute process involving the colon, small bowel or appendix.  Moderate amount of stool in the colon.  Scattered diverticula.  No acute process in the pelvis.  ASVD. Degenerative changes to the lumbar spine, pelvis and hips.       No acute CT findings in the abdomen or pelvis.  Moderate amount of stool in the colon.  Please see above description and additional findings.  All CT scans are performed using dose optimization techniques as appropriate to the performed exam and include at least one of the following: Automated exposure control, adjustment of the mA and/or kV according to size, and the use of iterative reconstruction technique.  ______________________________________ Electronically signed by: BILLY REESE D.O. Date:     04/15/2024 Time:    20:08        Assessment   Acute kidney injury-contrast-induced nephropathy with possible obstructive component as well  Chronic kidney disease stage IV  CAD with recent non-ST elevation MI and cardiac catheterization  Diabetes type 2 with diabetic nephropathy  Chronic systolic congestive heart failure  Paroxysmal atrial fibrillation  Hyponatremia      Plan:  Discussed with patient, nursing  Workup reviewed today  Monitor labs  Encouraged by improving urine output and hopefully dialysis will be a short-term measure  Dialysis initiation begun on 4/23 and continues as needed, next treatment planned 4/26 as needed  Okay for  discharge and outpatient arrangement completed    Saravanan Jason MD  04/25/24  6:58 PM

## 2024-04-26 ENCOUNTER — TRANSITIONAL CARE MANAGEMENT TELEPHONE ENCOUNTER (OUTPATIENT)
Dept: CALL CENTER | Facility: HOSPITAL | Age: 69
End: 2024-04-26
Payer: COMMERCIAL

## 2024-04-26 NOTE — OUTREACH NOTE
Call Center TCM Note      Flowsheet Row Responses   [facility] patient discharged from? Non-  [[facility]]   Does the patient have one of the following disease processes/diagnoses(primary or secondary)? Other   TCM attempt successful? No   Unsuccessful attempts Attempt 1            Sari Brown RN    4/26/2024, 11:09 CDT

## 2024-04-26 NOTE — OUTREACH NOTE
Call Center TCM Note      Flowsheet Row Responses   [facility] patient discharged from? Non-  [[facility]]   Does the patient have one of the following disease processes/diagnoses(primary or secondary)? Other   TCM attempt successful? No   Unsuccessful attempts Attempt 2            Sari Brown RN    4/26/2024, 14:26 CDT

## 2024-04-28 ENCOUNTER — TRANSITIONAL CARE MANAGEMENT TELEPHONE ENCOUNTER (OUTPATIENT)
Dept: CALL CENTER | Facility: HOSPITAL | Age: 69
End: 2024-04-28
Payer: COMMERCIAL

## 2024-04-28 NOTE — OUTREACH NOTE
Call Center TCM Note      Flowsheet Row Responses   [facility] patient discharged from? Non-   Does the patient have one of the following disease processes/diagnoses(primary or secondary)? Other   TCM attempt successful? No   Unsuccessful attempts Attempt 3            Nelly Hartlye RN    4/28/2024, 09:20 EDT

## 2024-05-06 LAB
BUN SERPL-MCNC: 40 MG/DL (ref 8–23)
CREAT 24H UR-MRATE: 1.5 G/24HR (ref 1–2)
CREAT SERPL-MCNC: 2.6 MG/DL (ref 0.5–1.2)
SPECIMEN VOL 24H UR: 2400 ML

## 2024-05-21 ENCOUNTER — OFFICE VISIT (OUTPATIENT)
Dept: VASCULAR SURGERY | Age: 69
End: 2024-05-21
Payer: MEDICARE

## 2024-05-21 VITALS — OXYGEN SATURATION: 97 % | HEART RATE: 61 BPM | SYSTOLIC BLOOD PRESSURE: 123 MMHG | DIASTOLIC BLOOD PRESSURE: 54 MMHG

## 2024-05-21 DIAGNOSIS — N18.9 CHRONIC KIDNEY DISEASE, UNSPECIFIED CKD STAGE: Primary | ICD-10-CM

## 2024-05-21 PROCEDURE — 1123F ACP DISCUSS/DSCN MKR DOCD: CPT | Performed by: NURSE PRACTITIONER

## 2024-05-21 PROCEDURE — 99213 OFFICE O/P EST LOW 20 MIN: CPT | Performed by: NURSE PRACTITIONER

## 2024-05-21 PROCEDURE — 1036F TOBACCO NON-USER: CPT | Performed by: NURSE PRACTITIONER

## 2024-05-21 PROCEDURE — G8427 DOCREV CUR MEDS BY ELIG CLIN: HCPCS | Performed by: NURSE PRACTITIONER

## 2024-05-21 PROCEDURE — 3078F DIAST BP <80 MM HG: CPT | Performed by: NURSE PRACTITIONER

## 2024-05-21 PROCEDURE — 3017F COLORECTAL CA SCREEN DOC REV: CPT | Performed by: NURSE PRACTITIONER

## 2024-05-21 PROCEDURE — G8417 CALC BMI ABV UP PARAM F/U: HCPCS | Performed by: NURSE PRACTITIONER

## 2024-05-21 PROCEDURE — 3074F SYST BP LT 130 MM HG: CPT | Performed by: NURSE PRACTITIONER

## 2024-05-21 PROCEDURE — 1111F DSCHRG MED/CURRENT MED MERGE: CPT | Performed by: NURSE PRACTITIONER

## 2024-05-21 RX ORDER — LIDOCAINE HYDROCHLORIDE 10 MG/ML
20 INJECTION, SOLUTION EPIDURAL; INFILTRATION; INTRACAUDAL; PERINEURAL ONCE
Status: CANCELLED | OUTPATIENT
Start: 2024-05-21 | End: 2024-05-21

## 2024-05-21 NOTE — H&P (VIEW-ONLY)
Carlos Espana (:  1955) is a 68 y.o. male,Established patient, here for evaluation of the following chief complaint(s):  Follow-up (3 week follow up from hospital/Right chest perm. Patient has had map /)            SUBJECTIVE/OBJECTIVE:  He has a history of chronic kidney disease with acute renal failure.  He required dialysis but now he has reversed.  He comes in today to discuss cath removal.     I have personally reviewed the following: problem list, current meds, allergies, PMH, PSH, family hx, and social hx  Carlos Espana is a 68 y.o. male with the following history as recorded in [facility]:  Patient Active Problem List    Diagnosis Date Noted    Right flank pain 2022    Abnormal nuclear cardiac imaging test     CAD (coronary artery disease) 01/10/2016    Hand ulceration with fat layer exposed ([facility]) 2023    Median nerve lesion at left elbow 2023    Skin ulcer of plantar aspect of foot, left, with fat layer exposed ([facility]) 03/15/2023    SAMMY (obstructive sleep apnea) 2023    Restrictive lung disease 2023    Sleep related hypoxia 2022    Chronic heart failure with preserved ejection fraction ([facility]) 2022    Dyspnea on exertion 2022    Snoring 2022    Non-restorative sleep 2022    CKD (chronic kidney disease) stage 4, GFR 15-29 ml/min ([facility]) 2022    Neural foraminal stenosis of lumbar spine 2022    Chest pain 2022    Hypokalemia 2022    Dissection of thoracic aorta ([facility]) - DESCENDING 2022    Contrast-induced nephropathy 2024    Constipation 04/15/2024    Acute renal failure superimposed on stage 4 chronic kidney disease ([facility]) 2023    Acute kidney injury superimposed on chronic kidney disease ([facility]) 2023    COVID 2023    Closed left hip fracture, initial encounter ([facility]) 2021    Severe malnutrition ([facility]) 2021    Acute renal failure (ARF) ([facility]) 2021    Palliative care patient

## 2024-05-21 NOTE — H&P
INSERTION performed by Evon Argueta DO at [facility] OR     Family History   Problem Relation Age of Onset    Cancer Mother         SKIN    Diabetes Mother     Hypertension Mother     High Blood Pressure Mother     Other Mother         COVID    Osteoporosis Mother     Diabetes Father     High Blood Pressure Father     Stomach Cancer Father 70    High Blood Pressure Brother     No Known Problems Daughter     No Known Problems Son     Colon Polyps Neg Hx     Colon Cancer Neg Hx      Social History     Tobacco Use    Smoking status: Former     Current packs/day: 0.00     Average packs/day: 0.3 packs/day for 9.0 years (2.3 ttl pk-yrs)     Types: Cigarettes     Start date:      Quit date:      Years since quittin.4    Smokeless tobacco: Former     Types: Chew   Substance Use Topics    Alcohol use: Not Currently     Comment: \"when i was tyoung i did, if i wanted to drink ten beers i drank ten beers\"         ROS  Eyes - no sudden vision change or amaurosis.  Respiratory - no significant shortness of breath,  Cardiovascular - no chest pain or syncope.  No  significant leg swelling.  no claudication.  Musculoskeletal - no gait disturbance  Skin - no new wound.  Neurologic -  No speech difficulty or lateralizing weakness.  All other review of systems are negative.    Physical Exam    BP (!) 123/54 (Site: Left Upper Arm, Position: Sitting, Cuff Size: Medium Adult)   Pulse 61   SpO2 97%       Neck- carotid pulses 2+ to palpation with no bruit  Cardiovascular - Regular rate and rhythm.    Pulmonary - effort appears normal.  No respiratory distress.    Lungs - Breath sounds normal. No wheezes or rales.      Neurologic - alert and oriented X 3.  Physiologic.   Face symmetric.  Skin - warm, dry, and intact.  no wound  Psychiatric - mood, affect, and behavior appear normal.  Judgment and thought processes appear normal.    Risk factors for atherosclerosis of all vascular beds have been reviewed with the patient

## 2024-05-21 NOTE — PROGRESS NOTES
including:  Family history, tobacco abuse in all forms, elevated cholesterol, hyperlipidemia, and diabetes.    Options were discussed with the patient including continued medical management versus proceeding with permacath removal.  Patient has opted to proceed with permacath.  Risks have been discussed with the patient including but not limited to MI, death, CVA, bleed, nerve injury, infection,  pneumothorax, and need for further surgery.       ASSESSMENT/PLAN:  1. Chronic kidney disease, unspecified CKD stage    1. Chronic kidney disease, unspecified CKD stage    Proceed with permacath removal - he has reveresed            Call with any increased venous pressure, arterial pressure, infiltrations, decreased flow rate, or post procedure bleeding.  Let us know if you experience any hand pain or ulcers of that hand   An electronic signature was used to authenticate this note.    --Swetha Giles, APRN

## 2024-05-23 ENCOUNTER — OFFICE VISIT (OUTPATIENT)
Dept: CARDIOLOGY CLINIC | Age: 69
End: 2024-05-23
Payer: MEDICARE

## 2024-05-23 VITALS
DIASTOLIC BLOOD PRESSURE: 68 MMHG | HEIGHT: 71 IN | SYSTOLIC BLOOD PRESSURE: 126 MMHG | BODY MASS INDEX: 30.94 KG/M2 | HEART RATE: 56 BPM | WEIGHT: 221 LBS

## 2024-05-23 DIAGNOSIS — I50.42 CHRONIC COMBINED SYSTOLIC AND DIASTOLIC HEART FAILURE DUE TO VALVULAR DISEASE (HCC): ICD-10-CM

## 2024-05-23 DIAGNOSIS — I48.0 PAF (PAROXYSMAL ATRIAL FIBRILLATION) (HCC): ICD-10-CM

## 2024-05-23 DIAGNOSIS — Z79.899 ON AMIODARONE THERAPY: ICD-10-CM

## 2024-05-23 DIAGNOSIS — I38 CHRONIC COMBINED SYSTOLIC AND DIASTOLIC HEART FAILURE DUE TO VALVULAR DISEASE (HCC): ICD-10-CM

## 2024-05-23 DIAGNOSIS — I25.118 CORONARY ARTERY DISEASE OF NATIVE ARTERY OF NATIVE HEART WITH STABLE ANGINA PECTORIS (HCC): Primary | ICD-10-CM

## 2024-05-23 DIAGNOSIS — N18.4 CHRONIC KIDNEY DISEASE (CKD), STAGE IV (SEVERE) (HCC): ICD-10-CM

## 2024-05-23 DIAGNOSIS — E78.2 MIXED HYPERLIPIDEMIA: ICD-10-CM

## 2024-05-23 DIAGNOSIS — I10 ESSENTIAL HYPERTENSION: ICD-10-CM

## 2024-05-23 PROCEDURE — 3017F COLORECTAL CA SCREEN DOC REV: CPT | Performed by: CLINICAL NURSE SPECIALIST

## 2024-05-23 PROCEDURE — 99214 OFFICE O/P EST MOD 30 MIN: CPT | Performed by: CLINICAL NURSE SPECIALIST

## 2024-05-23 PROCEDURE — G8427 DOCREV CUR MEDS BY ELIG CLIN: HCPCS | Performed by: CLINICAL NURSE SPECIALIST

## 2024-05-23 PROCEDURE — 1036F TOBACCO NON-USER: CPT | Performed by: CLINICAL NURSE SPECIALIST

## 2024-05-23 PROCEDURE — 1111F DSCHRG MED/CURRENT MED MERGE: CPT | Performed by: CLINICAL NURSE SPECIALIST

## 2024-05-23 PROCEDURE — 93000 ELECTROCARDIOGRAM COMPLETE: CPT | Performed by: CLINICAL NURSE SPECIALIST

## 2024-05-23 PROCEDURE — 3078F DIAST BP <80 MM HG: CPT | Performed by: CLINICAL NURSE SPECIALIST

## 2024-05-23 PROCEDURE — G8417 CALC BMI ABV UP PARAM F/U: HCPCS | Performed by: CLINICAL NURSE SPECIALIST

## 2024-05-23 PROCEDURE — 1123F ACP DISCUSS/DSCN MKR DOCD: CPT | Performed by: CLINICAL NURSE SPECIALIST

## 2024-05-23 PROCEDURE — 3074F SYST BP LT 130 MM HG: CPT | Performed by: CLINICAL NURSE SPECIALIST

## 2024-05-23 RX ORDER — NITROGLYCERIN 0.4 MG/1
0.4 TABLET SUBLINGUAL EVERY 5 MIN PRN
Qty: 25 TABLET | Refills: 1 | Status: SHIPPED | OUTPATIENT
Start: 2024-05-23

## 2024-05-23 ASSESSMENT — ENCOUNTER SYMPTOMS
EYE REDNESS: 0
VOMITING: 0
FACIAL SWELLING: 0
ABDOMINAL PAIN: 0
COUGH: 0
CHEST TIGHTNESS: 1
NAUSEA: 0
WHEEZING: 0
SHORTNESS OF BREATH: 0

## 2024-05-23 NOTE — PATIENT INSTRUCTIONS
Counseled on daily weights, reporting weight gain of 3lbs in 24hrs or 5lbs in a week, low sodium diet, and fluid restrictions 6 cupsor 48oz daily.

## 2024-05-23 NOTE — PROGRESS NOTES
[facility] Cardiology  [address] [address]  Phone: [phone]  Fax: [phone]    OFFICE VISIT:  2024    Carlos Espana - : 1955    Reason For Visit:  Carlos is a 68 y.o. male who is here for Follow-Up from Hospital (Post [facility]. Patient states he has seldom chest pain) and PAF (paroxysmal atrial fibrillation)       Diagnosis Orders   1. Coronary artery disease of native artery of native heart with stable angina pectoris ([facility])  EKG 12 lead      2. PAF (paroxysmal atrial fibrillation) ([facility])  EKG 12 lead      3. Essential hypertension  EKG 12 lead      4. On amiodarone therapy        5. Chronic combined systolic and diastolic heart failure due to valvular disease ([facility])        6. Mixed hyperlipidemia        7. Chronic kidney disease (CKD), stage IV (severe) ([facility])              HPI  Patient is here for hospital follow-up after admission for non-STEMI on 4/15/2024.  Heart cath showed severe in-stent restenosis in the mid LAD with .  PCI attempted but unable to pass wire, medical treatment recommended.  Patient was readmitted on 24 for acute renal failure due to contrast nephropathy.  Permacath placed for dialysis.  He reports his kidney function has now improved and they are going to be removing the permacath.    Other history includes A-fib on amiodarone and Eliquis, stage IV CKD, hypertension, diabetes    He states he has had one episode of chest pain since hospital discharge, but otherwise has done well with Ranexa which was started during hospitalization.  He is also on isosorbide.  No unusual dyspnea, orthopnea, PND, edema or sudden weight gain.  He states they did pull a lot of fluid off with dialysis she has helped him to feel better       Alisa Rangel MD is PCP.  Carols Espana has the following history as recorded in [facility]:    Patient Active Problem List    Diagnosis Date Noted    Right flank pain 2022    Abnormal nuclear cardiac imaging test     CAD

## 2024-05-24 ENCOUNTER — HOSPITAL ENCOUNTER (OUTPATIENT)
Dept: INTERVENTIONAL RADIOLOGY/VASCULAR | Age: 69
Discharge: HOME | End: 2024-05-24
Payer: MEDICARE

## 2024-05-24 VITALS
HEART RATE: 55 BPM | OXYGEN SATURATION: 90 % | DIASTOLIC BLOOD PRESSURE: 70 MMHG | WEIGHT: 221 LBS | BODY MASS INDEX: 30.94 KG/M2 | RESPIRATION RATE: 16 BRPM | SYSTOLIC BLOOD PRESSURE: 125 MMHG | HEIGHT: 71 IN | TEMPERATURE: 97 F

## 2024-05-24 DIAGNOSIS — N17.9 ACUTE RENAL FAILURE, UNSPECIFIED ACUTE RENAL FAILURE TYPE (HCC): ICD-10-CM

## 2024-05-24 PROCEDURE — 2500000003 HC RX 250 WO HCPCS: Performed by: NURSE PRACTITIONER

## 2024-05-24 PROCEDURE — 36589 REMOVAL TUNNELED CV CATH: CPT

## 2024-05-24 RX ORDER — LIDOCAINE HYDROCHLORIDE 10 MG/ML
20 INJECTION, SOLUTION EPIDURAL; INFILTRATION; INTRACAUDAL; PERINEURAL ONCE
Status: COMPLETED | OUTPATIENT
Start: 2024-05-24 | End: 2024-05-24

## 2024-05-24 RX ADMIN — LIDOCAINE HYDROCHLORIDE 7 ML: 10 INJECTION, SOLUTION EPIDURAL; INFILTRATION; INTRACAUDAL; PERINEURAL at 08:14

## 2024-05-24 NOTE — PROGRESS NOTES
R IJ dressing cdi with no s/s of bleeding or hematoma present. Discharge instructions reviewed with patient and patient states understanding. Patient discharged from cath holding with all belongings and AVS.   Electronically signed by Marissa Javier RN on 5/24/2024 at 10:08 AM

## 2024-05-24 NOTE — INTERVAL H&P NOTE
Update History & Physical    The patient's History and Physical of May 21, 2024 was reviewed with the patient and I examined the patient. There was no change. The surgical site was confirmed by the patient and me.     Plan: The risks, benefits, expected outcome, and alternative to the recommended procedure have been discussed with the patient. Patient understands and wants to proceed with the procedure.     Electronically signed by Evon Argueta DO on 5/24/2024 at 8:06 AM

## 2024-05-24 NOTE — OP NOTE
Patient Name: Carlos Espana   YOB: 1955   MRN: [id number]     Procedure Date: 5/24/2024     Pre Op Diagnosis: acute kidney injury    Post Op Diagnosis: same    Procedure:  Removal of tunneled dialysis catheter right internal jugular vein    Surgeon: Evon Argueta DO    Anesthesia:  Local (1% lidocaine without epinephrine)    EBL:  Less than 50 ml    Findings:    The cuff and catheter were completely removed.  There were no signs of infection.    Procedure in Detail:      After the patient was consented, the right neck and chest were prepped and draped in the usual sterile fashion.  Skin and subcutaneous tissues around the exit site and over the cuff were anesthetized with lidocaine.  Using scissors, an incision was made around the exit site and the cuff is dissected away from the well incorporated subcutaneous tissues with sharp dissection.  The catheter and cuff were then completely removed and pressure was held at the base of the neck for hemostasis.     A sterile dressing was applied.  The patient tolerated the procedure well.      Evon Argueta DO  5/24/2024 8:08 AM

## 2024-05-24 NOTE — PROGRESS NOTES
Dr Argueta arrived to bedside to remove permacath. Permacath removed at 0805 and manual pressure held to site approx 5 minutes until hemostasis was achieved. Gauze and tape dressing applied to site. VSS. Pt denies any pain. Electronically signed by Marissa Javier RN on 5/24/2024 at 8:23 AM

## 2024-05-24 NOTE — DISCHARGE INSTRUCTIONS
After Dialysis Catheter (PermaCath) Removal    1.  Expect to be sore for a few days.  You may use acetaminophen (tylenol) for this temporary discomfort.  Please refer to your nephrologist for further questions about medications or dosing.  2.  A small amount of blood or drainage oozing from the wound is normal.  If the dressing becomes saturated with blood, hold firm and direct pressure on the insertion site and sit up at a 90 degree angle for 20 minutes.  If bleeding continues, call the office. If the office is closed, you may be directed to go to the nearest emergency department for evaluation.  3.  You can shower the day after the catheter has been removed. No swimming or submerging the wound in water for one week.  4.  You do not need to use antibiotic ointment over the wounds.  Just cover the wound(s) with a new bandage daily after shower/bath.    PROBLEMS OR CONCERNS    Call the vascular surgery office at [phone] with any of the followin.  Continued bleeding after 20 minutes of firm, direct pressure and sitting upright.  If the office is closed, you may be directed to go to the nearest emergency department for evaluation.  2.  Fever/chills, redness, heat, pus, or sudden swelling around your wound(s).

## 2024-06-04 RX ORDER — MINOXIDIL 2.5 MG/1
TABLET ORAL
Qty: 60 TABLET | Refills: 5 | Status: SHIPPED | OUTPATIENT
Start: 2024-06-04

## 2024-06-19 ENCOUNTER — TELEPHONE (OUTPATIENT)
Dept: CARDIOLOGY CLINIC | Age: 69
End: 2024-06-19

## 2024-06-19 NOTE — TELEPHONE ENCOUNTER
Pt will need to sign a release of information form or have doctor's office send the request on his behalf

## 2024-06-19 NOTE — TELEPHONE ENCOUNTER
Perri called to see if office will fax records to Dr. Vitale office in [facility]. Fax: [phone]. Please advise.

## 2024-06-24 RX ORDER — RANOLAZINE 500 MG/1
500 TABLET, EXTENDED RELEASE ORAL 2 TIMES DAILY
Qty: 60 TABLET | Refills: 5 | Status: SHIPPED | OUTPATIENT
Start: 2024-06-24

## 2024-07-21 ENCOUNTER — APPOINTMENT (OUTPATIENT)
Dept: CT IMAGING | Age: 69
End: 2024-07-21
Payer: MEDICARE

## 2024-07-21 ENCOUNTER — HOSPITAL ENCOUNTER (INPATIENT)
Age: 69
LOS: 5 days | Discharge: HOME | End: 2024-07-26
Attending: PEDIATRICS | Admitting: INTERNAL MEDICINE
Payer: MEDICARE

## 2024-07-21 ENCOUNTER — APPOINTMENT (OUTPATIENT)
Dept: GENERAL RADIOLOGY | Age: 69
End: 2024-07-21
Payer: MEDICARE

## 2024-07-21 DIAGNOSIS — J81.0 ACUTE PULMONARY EDEMA (HCC): Primary | ICD-10-CM

## 2024-07-21 DIAGNOSIS — N28.9 ACUTE ON CHRONIC RENAL INSUFFICIENCY: ICD-10-CM

## 2024-07-21 DIAGNOSIS — R33.9 URINARY RETENTION: ICD-10-CM

## 2024-07-21 DIAGNOSIS — N18.9 ACUTE ON CHRONIC RENAL INSUFFICIENCY: ICD-10-CM

## 2024-07-21 LAB
ALBUMIN SERPL-MCNC: 3.8 G/DL (ref 3.5–5.2)
ALP SERPL-CCNC: 76 U/L (ref 40–130)
ALT SERPL-CCNC: 11 U/L (ref 5–41)
ANION GAP SERPL CALCULATED.3IONS-SCNC: 13 MMOL/L (ref 7–19)
ANISOCYTOSIS BLD QL SMEAR: ABNORMAL
AST SERPL-CCNC: 11 U/L (ref 5–40)
BACTERIA #/AREA URNS HPF: ABNORMAL /HPF
BASOPHILS # BLD: 0 K/UL (ref 0–0.2)
BASOPHILS NFR BLD: 0.6 % (ref 0–1)
BILIRUB SERPL-MCNC: 0.6 MG/DL (ref 0.2–1.2)
BILIRUB UR QL STRIP: NEGATIVE
BNP BLD-MCNC: 3319 PG/ML (ref 0–124)
BUN SERPL-MCNC: 39 MG/DL (ref 8–23)
CALCIUM SERPL-MCNC: 9.3 MG/DL (ref 8.8–10.2)
CHLORIDE SERPL-SCNC: 97 MMOL/L (ref 98–111)
CLARITY UR: ABNORMAL
CO2 SERPL-SCNC: 23 MMOL/L (ref 22–29)
COLOR UR: YELLOW
CREAT SERPL-MCNC: 3.3 MG/DL (ref 0.5–1.2)
EOSINOPHIL # BLD: 0.2 K/UL (ref 0–0.6)
EOSINOPHIL NFR BLD: 2.2 % (ref 0–5)
ERYTHROCYTE [DISTWIDTH] IN BLOOD BY AUTOMATED COUNT: 18.2 % (ref 11.5–14.5)
GLUCOSE BLD-MCNC: 191 MG/DL (ref 70–99)
GLUCOSE SERPL-MCNC: 163 MG/DL (ref 74–109)
GLUCOSE UR STRIP.AUTO-MCNC: NEGATIVE MG/DL
HCT VFR BLD AUTO: 38.2 % (ref 42–52)
HGB BLD-MCNC: 11.1 G/DL (ref 14–18)
HGB UR STRIP.AUTO-MCNC: ABNORMAL MG/L
HYPOCHROMIA BLD QL SMEAR: ABNORMAL
IMM GRANULOCYTES # BLD: 0 K/UL
KETONES UR STRIP.AUTO-MCNC: NEGATIVE MG/DL
LEUKOCYTE ESTERASE UR QL STRIP.AUTO: ABNORMAL
LYMPHOCYTES # BLD: 1 K/UL (ref 1.1–4.5)
LYMPHOCYTES NFR BLD: 13.6 % (ref 20–40)
MCH RBC QN AUTO: 24.9 PG (ref 27–31)
MCHC RBC AUTO-ENTMCNC: 29.1 G/DL (ref 33–37)
MCV RBC AUTO: 85.8 FL (ref 80–94)
MICROCYTES BLD QL SMEAR: ABNORMAL
MONOCYTES # BLD: 0.7 K/UL (ref 0–0.9)
MONOCYTES NFR BLD: 9.6 % (ref 0–10)
MUCOUS THREADS URNS QL MICRO: ABNORMAL /LPF
NEUTROPHILS # BLD: 5.3 K/UL (ref 1.5–7.5)
NEUTS SEG NFR BLD: 73.7 % (ref 50–65)
NITRITE UR QL STRIP.AUTO: NEGATIVE
PERFORMED ON: ABNORMAL
PH UR STRIP.AUTO: 5 [PH] (ref 5–8)
PLATELET # BLD AUTO: 136 K/UL (ref 130–400)
PLATELET SLIDE REVIEW: ADEQUATE
PMV BLD AUTO: 10.8 FL (ref 9.4–12.4)
POTASSIUM SERPL-SCNC: 4.5 MMOL/L (ref 3.5–5)
PROT SERPL-MCNC: 6.8 G/DL (ref 6.6–8.7)
PROT UR STRIP.AUTO-MCNC: 100 MG/DL
PSA SERPL-MCNC: 0.45 NG/ML (ref 0–4)
RBC # BLD AUTO: 4.45 M/UL (ref 4.7–6.1)
RBC #/AREA URNS HPF: ABNORMAL /HPF (ref 0–2)
REASON FOR REJECTION: NORMAL
REJECTED TEST: NORMAL
SODIUM SERPL-SCNC: 133 MMOL/L (ref 136–145)
SP GR UR STRIP.AUTO: 1.01 (ref 1–1.03)
SQUAMOUS #/AREA URNS HPF: ABNORMAL /HPF
UROBILINOGEN UR STRIP.AUTO-MCNC: 0.2 E.U./DL
WBC # BLD AUTO: 7.1 K/UL (ref 4.8–10.8)
WBC #/AREA URNS HPF: ABNORMAL /HPF (ref 0–5)

## 2024-07-21 PROCEDURE — 99285 EMERGENCY DEPT VISIT HI MDM: CPT

## 2024-07-21 PROCEDURE — 36415 COLL VENOUS BLD VENIPUNCTURE: CPT

## 2024-07-21 PROCEDURE — 1200000000 HC SEMI PRIVATE

## 2024-07-21 PROCEDURE — 99223 1ST HOSP IP/OBS HIGH 75: CPT | Performed by: UROLOGY

## 2024-07-21 PROCEDURE — 83880 ASSAY OF NATRIURETIC PEPTIDE: CPT

## 2024-07-21 PROCEDURE — 80053 COMPREHEN METABOLIC PANEL: CPT

## 2024-07-21 PROCEDURE — 51702 INSERT TEMP BLADDER CATH: CPT

## 2024-07-21 PROCEDURE — 71045 X-RAY EXAM CHEST 1 VIEW: CPT

## 2024-07-21 PROCEDURE — 2580000003 HC RX 258

## 2024-07-21 PROCEDURE — 84153 ASSAY OF PSA TOTAL: CPT

## 2024-07-21 PROCEDURE — 85025 COMPLETE CBC W/AUTO DIFF WBC: CPT

## 2024-07-21 PROCEDURE — 93005 ELECTROCARDIOGRAM TRACING: CPT | Performed by: PEDIATRICS

## 2024-07-21 PROCEDURE — 82962 GLUCOSE BLOOD TEST: CPT

## 2024-07-21 PROCEDURE — 2700000000 HC OXYGEN THERAPY PER DAY

## 2024-07-21 PROCEDURE — 74176 CT ABD & PELVIS W/O CONTRAST: CPT

## 2024-07-21 PROCEDURE — 87086 URINE CULTURE/COLONY COUNT: CPT

## 2024-07-21 PROCEDURE — 6370000000 HC RX 637 (ALT 250 FOR IP)

## 2024-07-21 PROCEDURE — 81001 URINALYSIS AUTO W/SCOPE: CPT

## 2024-07-21 RX ORDER — ROSUVASTATIN CALCIUM 20 MG/1
40 TABLET, COATED ORAL DAILY
Status: DISCONTINUED | OUTPATIENT
Start: 2024-07-22 | End: 2024-07-26 | Stop reason: HOSPADM

## 2024-07-21 RX ORDER — INSULIN LISPRO 100 [IU]/ML
0-4 INJECTION, SOLUTION INTRAVENOUS; SUBCUTANEOUS NIGHTLY
Status: DISCONTINUED | OUTPATIENT
Start: 2024-07-21 | End: 2024-07-26 | Stop reason: HOSPADM

## 2024-07-21 RX ORDER — ALLOPURINOL 100 MG/1
200 TABLET ORAL DAILY
Status: DISCONTINUED | OUTPATIENT
Start: 2024-07-22 | End: 2024-07-26 | Stop reason: HOSPADM

## 2024-07-21 RX ORDER — MINOXIDIL 2.5 MG/1
2.5 TABLET ORAL 2 TIMES DAILY
Status: ON HOLD | COMMUNITY
End: 2024-07-26

## 2024-07-21 RX ORDER — NITROGLYCERIN 0.4 MG/1
0.4 TABLET SUBLINGUAL EVERY 5 MIN PRN
Status: DISCONTINUED | OUTPATIENT
Start: 2024-07-21 | End: 2024-07-26 | Stop reason: HOSPADM

## 2024-07-21 RX ORDER — ONDANSETRON 4 MG/1
4 TABLET, ORALLY DISINTEGRATING ORAL EVERY 8 HOURS PRN
Status: DISCONTINUED | OUTPATIENT
Start: 2024-07-21 | End: 2024-07-26 | Stop reason: HOSPADM

## 2024-07-21 RX ORDER — DEXTROSE MONOHYDRATE 100 MG/ML
INJECTION, SOLUTION INTRAVENOUS CONTINUOUS PRN
Status: DISCONTINUED | OUTPATIENT
Start: 2024-07-21 | End: 2024-07-26 | Stop reason: HOSPADM

## 2024-07-21 RX ORDER — RANOLAZINE 500 MG/1
1000 TABLET, EXTENDED RELEASE ORAL 2 TIMES DAILY
Status: DISCONTINUED | OUTPATIENT
Start: 2024-07-21 | End: 2024-07-26 | Stop reason: HOSPADM

## 2024-07-21 RX ORDER — TAMSULOSIN HYDROCHLORIDE 0.4 MG/1
0.4 CAPSULE ORAL DAILY
COMMUNITY

## 2024-07-21 RX ORDER — INSULIN LISPRO 100 [IU]/ML
0-8 INJECTION, SOLUTION INTRAVENOUS; SUBCUTANEOUS
Status: DISCONTINUED | OUTPATIENT
Start: 2024-07-21 | End: 2024-07-26 | Stop reason: HOSPADM

## 2024-07-21 RX ORDER — ISOSORBIDE MONONITRATE 60 MG/1
60 TABLET, EXTENDED RELEASE ORAL 2 TIMES DAILY
Status: DISCONTINUED | OUTPATIENT
Start: 2024-07-21 | End: 2024-07-26 | Stop reason: HOSPADM

## 2024-07-21 RX ORDER — SODIUM CHLORIDE 0.9 % (FLUSH) 0.9 %
5-40 SYRINGE (ML) INJECTION PRN
Status: DISCONTINUED | OUTPATIENT
Start: 2024-07-21 | End: 2024-07-26 | Stop reason: HOSPADM

## 2024-07-21 RX ORDER — FAMOTIDINE 20 MG/1
20 TABLET, FILM COATED ORAL 2 TIMES DAILY
Status: DISCONTINUED | OUTPATIENT
Start: 2024-07-21 | End: 2024-07-21 | Stop reason: DRUGHIGH

## 2024-07-21 RX ORDER — TAMSULOSIN HYDROCHLORIDE 0.4 MG/1
0.4 CAPSULE ORAL DAILY
Status: DISCONTINUED | OUTPATIENT
Start: 2024-07-22 | End: 2024-07-26 | Stop reason: HOSPADM

## 2024-07-21 RX ORDER — FAMOTIDINE 20 MG/1
20 TABLET, FILM COATED ORAL DAILY
Status: DISCONTINUED | OUTPATIENT
Start: 2024-07-21 | End: 2024-07-26 | Stop reason: HOSPADM

## 2024-07-21 RX ORDER — METOLAZONE 5 MG/1
5 TABLET ORAL DAILY
Status: ON HOLD | COMMUNITY
End: 2024-07-26

## 2024-07-21 RX ORDER — SODIUM CHLORIDE 9 MG/ML
INJECTION, SOLUTION INTRAVENOUS PRN
Status: DISCONTINUED | OUTPATIENT
Start: 2024-07-21 | End: 2024-07-26 | Stop reason: HOSPADM

## 2024-07-21 RX ORDER — POTASSIUM CHLORIDE 20 MEQ/1
40 TABLET, EXTENDED RELEASE ORAL PRN
Status: DISCONTINUED | OUTPATIENT
Start: 2024-07-21 | End: 2024-07-26 | Stop reason: HOSPADM

## 2024-07-21 RX ORDER — MINOXIDIL 2.5 MG/1
2.5 TABLET ORAL 2 TIMES DAILY
Status: DISCONTINUED | OUTPATIENT
Start: 2024-07-21 | End: 2024-07-26 | Stop reason: HOSPADM

## 2024-07-21 RX ORDER — CALCITRIOL 0.25 UG/1
0.25 CAPSULE, LIQUID FILLED ORAL DAILY
Status: DISCONTINUED | OUTPATIENT
Start: 2024-07-22 | End: 2024-07-26 | Stop reason: HOSPADM

## 2024-07-21 RX ORDER — FAMOTIDINE 20 MG/1
10 TABLET, FILM COATED ORAL NIGHTLY PRN
Status: DISCONTINUED | OUTPATIENT
Start: 2024-07-21 | End: 2024-07-21

## 2024-07-21 RX ORDER — METOLAZONE 5 MG/1
5 TABLET ORAL DAILY
Status: DISCONTINUED | OUTPATIENT
Start: 2024-07-22 | End: 2024-07-26 | Stop reason: HOSPADM

## 2024-07-21 RX ORDER — BUMETANIDE 1 MG/1
2 TABLET ORAL DAILY
Status: DISCONTINUED | OUTPATIENT
Start: 2024-07-22 | End: 2024-07-22

## 2024-07-21 RX ORDER — SENNOSIDES A AND B 8.6 MG/1
1 TABLET, FILM COATED ORAL NIGHTLY
Status: DISCONTINUED | OUTPATIENT
Start: 2024-07-21 | End: 2024-07-26 | Stop reason: HOSPADM

## 2024-07-21 RX ORDER — INSULIN ASPART 100 [IU]/ML
INJECTION, SOLUTION INTRAVENOUS; SUBCUTANEOUS PRN
COMMUNITY

## 2024-07-21 RX ORDER — ALBUTEROL SULFATE 90 UG/1
2 AEROSOL, METERED RESPIRATORY (INHALATION)
Status: DISCONTINUED | OUTPATIENT
Start: 2024-07-21 | End: 2024-07-21

## 2024-07-21 RX ORDER — CARVEDILOL 12.5 MG/1
12.5 TABLET ORAL 2 TIMES DAILY
Status: DISCONTINUED | OUTPATIENT
Start: 2024-07-21 | End: 2024-07-22

## 2024-07-21 RX ORDER — NIFEDIPINE 60 MG/1
60 TABLET, EXTENDED RELEASE ORAL DAILY
Status: DISCONTINUED | OUTPATIENT
Start: 2024-07-22 | End: 2024-07-26 | Stop reason: HOSPADM

## 2024-07-21 RX ORDER — INSULIN GLARGINE 100 [IU]/ML
10 INJECTION, SOLUTION SUBCUTANEOUS 2 TIMES DAILY
Status: DISCONTINUED | OUTPATIENT
Start: 2024-07-21 | End: 2024-07-21

## 2024-07-21 RX ORDER — POTASSIUM CHLORIDE 7.45 MG/ML
10 INJECTION INTRAVENOUS PRN
Status: DISCONTINUED | OUTPATIENT
Start: 2024-07-21 | End: 2024-07-26 | Stop reason: HOSPADM

## 2024-07-21 RX ORDER — POLYETHYLENE GLYCOL 3350 17 G/17G
17 POWDER, FOR SOLUTION ORAL DAILY PRN
Status: DISCONTINUED | OUTPATIENT
Start: 2024-07-21 | End: 2024-07-26 | Stop reason: HOSPADM

## 2024-07-21 RX ORDER — ONDANSETRON 2 MG/ML
4 INJECTION INTRAMUSCULAR; INTRAVENOUS EVERY 6 HOURS PRN
Status: DISCONTINUED | OUTPATIENT
Start: 2024-07-21 | End: 2024-07-26 | Stop reason: HOSPADM

## 2024-07-21 RX ORDER — ACETAMINOPHEN 325 MG/1
650 TABLET ORAL EVERY 6 HOURS PRN
Status: DISCONTINUED | OUTPATIENT
Start: 2024-07-21 | End: 2024-07-26 | Stop reason: HOSPADM

## 2024-07-21 RX ORDER — POLYETHYLENE GLYCOL 3350 17 G/17G
17 POWDER, FOR SOLUTION ORAL 2 TIMES DAILY
Status: DISCONTINUED | OUTPATIENT
Start: 2024-07-21 | End: 2024-07-26 | Stop reason: HOSPADM

## 2024-07-21 RX ORDER — SODIUM CHLORIDE 0.9 % (FLUSH) 0.9 %
5-40 SYRINGE (ML) INJECTION EVERY 12 HOURS SCHEDULED
Status: DISCONTINUED | OUTPATIENT
Start: 2024-07-21 | End: 2024-07-26 | Stop reason: HOSPADM

## 2024-07-21 RX ORDER — ASPIRIN 81 MG/1
81 TABLET, CHEWABLE ORAL DAILY
Status: DISCONTINUED | OUTPATIENT
Start: 2024-07-22 | End: 2024-07-26 | Stop reason: HOSPADM

## 2024-07-21 RX ADMIN — SACUBITRIL AND VALSARTAN 1 TABLET: 49; 51 TABLET, FILM COATED ORAL at 20:15

## 2024-07-21 RX ADMIN — CARVEDILOL 12.5 MG: 12.5 TABLET, FILM COATED ORAL at 20:14

## 2024-07-21 RX ADMIN — POLYETHYLENE GLYCOL 3350 17 G: 17 POWDER, FOR SOLUTION ORAL at 20:14

## 2024-07-21 RX ADMIN — RANOLAZINE 1000 MG: 500 TABLET, EXTENDED RELEASE ORAL at 20:15

## 2024-07-21 RX ADMIN — SODIUM CHLORIDE, PRESERVATIVE FREE 10 ML: 5 INJECTION INTRAVENOUS at 20:14

## 2024-07-21 RX ADMIN — FAMOTIDINE 20 MG: 20 TABLET ORAL at 16:10

## 2024-07-21 RX ADMIN — MINOXIDIL 2.5 MG: 2.5 TABLET ORAL at 20:15

## 2024-07-21 RX ADMIN — MAGNESIUM HYDROXIDE 30 ML: 400 SUSPENSION ORAL at 20:15

## 2024-07-21 RX ADMIN — SENNOSIDES 8.6 MG: 8.6 TABLET, FILM COATED ORAL at 20:15

## 2024-07-21 RX ADMIN — ISOSORBIDE MONONITRATE 60 MG: 60 TABLET, EXTENDED RELEASE ORAL at 20:15

## 2024-07-21 RX ADMIN — APIXABAN 2.5 MG: 2.5 TABLET, FILM COATED ORAL at 20:15

## 2024-07-21 ASSESSMENT — PAIN - FUNCTIONAL ASSESSMENT: PAIN_FUNCTIONAL_ASSESSMENT: NONE - DENIES PAIN

## 2024-07-21 NOTE — CONSULTS
[address] [place] [address]                              CONSULTATION      PATIENT NAME: MARYANA GARNER               : 1955  MED REC NO: [id number]                          ROOM: [id number]  ACCOUNT NO: [id number]                       ADMIT DATE: 2024  PROVIDER: Deep Huber MD      CONSULT DATE: 2024    ADDITIONAL ATTENDING PHYSICIAN:  Dr. Alisa Rangel.    REASON FOR CONSULTATION:  This is a 68-year-old white male with concerns for acute-on-chronic renal failure related to bladder outlet obstruction.    CLINICAL HISTORY:  This is a 68-year-old white male with a known history of chronic renal failure with creatinine in the range of 2.9 to 3, who was seen in [place] for nuclear medicine test that included PET-CT scan, who returned home yesterday and did not make urine or was unable to void for the last 15-20 hours.    The patient presented here to [facility] with inability to void.  On presentation, his kidney function - creatinine which runs around 2.93 was in the range of 3.3.  Bladder scan and eventually Zafar catheter drainage showed a volume of approximately 300 mL of retained urine.    The patient has other medical problems that include pulmonary edema and congestive heart failure that required in-hospital treatment and as such, the patient was admitted and because of the difficulty voiding, urological consultation was obtained.    The patient does not recall his last PSA.  His most recent hemoglobin A1c was 6.8.  His AUA symptom score:  3/35.  The patient is maintained on Flomax.  He denies gross hematuria.  Nonsmoker at present.    CT scan done in the emergency room showed no hydroureteronephrosis.  The bladder wall appeared to be thin wall.  Other findings on the CT scan include constipation.    MEDICATIONS:    1. Allopurinol.  2. Eliquis.  3. Bumex.  4. Imdur.  5. Zaroxolyn.  6. Minoxidil.  7. Nifedipine.  8.  Nitrostat.  9. Ranexa.  10. Crestor.  11. Entresto.  12. Flomax.  13. K-Shasha.  14. Insulin.  15. Senokot.    ALLERGIES:    1. MORPHINE.  2. HYDRALAZINE.      PAST MEDICAL HISTORY:    1. Abdominal aortic aneurysm.  2. Chronic renal failure.  3. Anxiety disorder.  4. Atrial septal aneurysm.  5. History of kidney stones.  6. History of renal cysts.  7. Peripheral vascular disease.  8. Congestive heart failure.  9. Diabetes mellitus.  10. Diverticulosis.  11. Malignant hypertension.    PAST SURGICAL HISTORY:    1. Cardiac catheterization.  2. Cholecystectomy.  3. Upper endoscopy.  4. Multiple vascular accidents (Perma-Caths).  5. Coronary angioplasty.    FAMILY HISTORY:  Reviewed but essentially, noncontributory.    SOCIAL HISTORY:  .  Admits to smoking in the past, quit in 1981.  Denies alcohol usage.  Denies use of street drugs.    REVIEW OF SYSTEMS:  GENERAL: The patient states fairly good health and is concerned about his health.  PULMONARY: He does have some shortness of breath.  CT scan today does show findings with pulmonary edema.  GI:  Symptoms of GERD.  CARDIAC: He has history of congestive heart failure.  : Mild lower urinary tract obstructive symptoms.  Does not recall his last PSA.  On Flomax.  NEURO: Denies any seizure disorders.  PSYCH: Denies any functional problems.  MUSCULOSKELETAL: Denies any physical limitations.    PHYSICAL EXAMINATION:  GENERAL: This is a mildly obese white male in no apparent distress.  VITAL SIGNS:  Blood pressure 114/73 with a pulse of 56, respiratory rate of 16, O2 saturation on room air 93%, temperature 97.1.  HEENT: Unremarkable.  LUNGS:  With good expansion, excursion of the chest wall.  Lung fields clear.  HEART: Regular.  ABDOMEN:  Obese, soft.  Good bowel sounds.  : Zafar catheter in position.  RECTAL: Deferred, pending repeat PSA.  NEUROLOGIC:  Alert and oriented.  PSYCH:  Appropriate.    LABORATORY DATA:  Includes a CBC which showed a white count of 7.1,

## 2024-07-21 NOTE — PROGRESS NOTES
Pharmacy Renal Adjustment    Carlos Espana is a 68 y.o. male. Pharmacy has renally adjusted medications per protocol.    Recent Labs     07/21/24  0938   BUN 39*       Recent Labs     07/21/24  0938   CREATININE 3.3*       Estimated Creatinine Clearance: 26 mL/min (A) (based on SCr of 3.3 mg/dL (H)).    Height:   Ht Readings from Last 1 Encounters:   07/21/24 1.803 m (5' 11\")     Weight:  Wt Readings from Last 1 Encounters:   07/21/24 100.8 kg (222 lb 3.6 oz)         Plan: Adjust the following medications based on renal function:           Pepcid to 20 mg po daily    Electronically signed by Patrizia Joyner RPh, BCPS, 7/21/2024,3:56 PM

## 2024-07-21 NOTE — ED NOTES
Called Dr. Jason's office (Nephrology), for Dr. Mcguire.  Desirae Black on call. Left message with answering service

## 2024-07-21 NOTE — ED PROVIDER NOTES
[facility] 4 ONCOLOGY UNIT  eMERGENCY dEPARTMENT eNCOUnter      Pt Name: Carlos Espana  MRN: [id number]  Birthdate 1955  Date of evaluation: 7/21/2024  Provider: Roseline Mcguire MD    CHIEF COMPLAINT       Chief Complaint   Patient presents with    Urinary Retention     Since 1800 last night after MRI at [facility]. Hx kidney problems         HISTORY OF PRESENT ILLNESS   (Location/Symptom, Timing/Onset,Context/Setting, Quality, Duration, Modifying Factors, Severity)  Note limiting factors.   Carlos Espana is a 68 y.o. male who presents to the emergency department because he has not been able to urinate since 4 PM yesterday.  Patient and wife give history.  Patient has a history of chronic kidney disease.  Patient has been on dialysis in the past and is concerned that he may \"lose my kidneys\" in the future.  Patient was recently at [place], [place].  Patient underwent a cardiac perfusion study and a myocardial MRI involving dye.  Patient is concerned that the dye may have further injured his kidneys. Wife states that patient's creatinine 2 days ago was 2.1.   Patient denies history of BPH or urinary retention. Patient denies chest pain, fever, cough, congestion, vomiting, burning with urination, lower extremity swelling or pain, diarrhea or black or bloody stools.  Patient has also been short of breath with exertion.  Patient is normally on 3 L oxygen per nasal cannula \"but I got really short of breath just going to the bathroom.\"      HPI    NursingNotes were reviewed.    REVIEW OF SYSTEMS    (2-9 systems for level 4, 10 or more for level 5)     Review of Systems   Constitutional:  Negative for fever.   HENT:  Negative for congestion and rhinorrhea.    Respiratory:  Negative for cough and shortness of breath.    Cardiovascular:  Negative for chest pain and leg swelling.   Gastrointestinal:  Negative for abdominal pain, nausea and vomiting.   Genitourinary:  Positive for difficulty

## 2024-07-21 NOTE — H&P
[facility] Hospitalists      Hospitalist - History & Physical      PCP: Alisa Rangel MD    Date of Admission: 7/21/2024    Date of Service: 7/21/2024    Chief Complaint:  Urinary retention    History Of Present Illness:   The patient is a 68 y.o. male who presented to [facility] ED complaining of urinary retention noted since 4pm prior evening.  Zafar placed on admission, noted clear yellow urine.  Denies any abdominal pain or discomfort.  Report issue with constipation over last few days.  Denies any illness currently or in the recent past.  Patient has a history of CKD, requiring dialysis for short period in April of this year. Permcath was placed 4/22/2024 and removed 5/24/2024.  Followed by Nephrology for CKD stage IV, consulted for this admission. Urology consulted for urinary retention. Patient recently, 2 days ago, at [facility] where he under went a cardiac perfusion study and a myocardial MRI involving dye.  Patient denies a history of BPH or urinary retention.  Noted history reviewed below.  CXR, Cardiomegaly with interstitial pulmonary edema. CT abd pelvis completed, pending read.      Patient admitted to the Hospital services for medical management.    Past Medical History:        Diagnosis Date    AAA (abdominal aortic aneurysm) ([facility])     Acute kidney failure, unspecified ([facility])     Anxiety state, unspecified     Atrial septal aneurysm 01/09/2016    Blood circulation, collateral     Body mass index 30.0-30.9, adult     CAD (coronary artery disease) 01/10/2016    1/9/16  lexiscan  Positive for apical MI + myocardial ischemia, EF 42%, intermediate risk findings, AUC indication 16, AUC score 7 1/10/16  Cath  3 lesions in the LAD:  Mid: 70% 2.25x23, mid 90% 2.25 x 28, distal 100% 2.0x28, anterior lateral apical hypokinesis, EF 45%    Calculus of kidney     Carotid artery stenosis 06/26/2013    Cellulitis and abscess of hand, except fingers and thumb     Cerebral artery occlusion with cerebral infarction ([facility])        Pulmonary:      Effort: Pulmonary effort is normal.      Breath sounds: Normal breath sounds.   Abdominal:      General: Bowel sounds are normal. There is distension.      Tenderness: There is no abdominal tenderness.   Musculoskeletal:      Right lower leg: No edema.      Left lower leg: No edema.   Skin:     General: Skin is warm and dry.   Neurological:      Mental Status: He is alert and oriented to person, place, and time.   Psychiatric:         Mood and Affect: Mood normal.         Behavior: Behavior normal.          Diagnostic Data:  CBC:  Recent Labs     07/21/24  0922   WBC 7.1   HGB 11.1*   HCT 38.2*        BMP:  Recent Labs     07/21/24 0938   *   K 4.5   CL 97*   CO2 23   BUN 39*   CREATININE 3.3*   CALCIUM 9.3     Recent Labs     07/21/24  0938   AST 11   ALT 11   BILITOT 0.6   ALKPHOS 76     Coag Panel: No results for input(s): \"INR\", \"PROTIME\", \"APTT\" in the last 72 hours.  Cardiac Enzymes: No results for input(s): \"CKTOTAL\", \"TROPONINI\" in the last 72 hours.  ABGs:  Lab Results   Component Value Date/Time    PHART 7.440 11/21/2022 04:21 AM    PO2ART 51.0 11/21/2022 04:21 AM    LTM2WUY 46.0 11/21/2022 04:21 AM     Urinalysis:  Lab Results   Component Value Date/Time    NITRU Negative 04/20/2024 10:30 AM    WBCUA 0-1 04/20/2024 10:30 AM    BACTERIA None Seen 04/20/2024 10:30 AM    RBCUA 0-1 04/20/2024 10:30 AM    BLOODU Negative 04/20/2024 10:30 AM    GLUCOSEU Negative 04/20/2024 10:30 AM     A1C: No results for input(s): \"LABA1C\" in the last 72 hours.  ABG:No results for input(s): \"PHART\", \"PDF0ADM\", \"PO2ART\", \"QXW7VFI\", \"BEART\", \"HGBAE\", \"D3JTDLOP\", \"CARBOXHGBART\" in the last 72 hours.    XR CHEST PORTABLE    Result Date: 7/21/2024  EXAM: CHEST RADIOGRAPH  TECHNIQUE: Single frontal chest radiograph.  HISTORY: Shortness of breath.  COMPARISON: 04/20/2024  FINDINGS: There is cardiomegaly with diffuse prominence of the pulmonary vascular markings.  No pleural effusion or pneumothorax.

## 2024-07-21 NOTE — CONSULTS
uniform attenuation.  Trace amount of ascites.  Cholecystectomy.  The stomach, pancreas and adrenal glands are normal.  The kidneys have a normal morphology.  There are stable benign appearing left renal cysts with mild bilateral perinephric stranding, nonspecific.  No calculi or hydronephrosis.  There are subcentimeter retroperitoneal and mesenteric lymph nodes.  Aorta has peripheral calcification and does not exceed 3 cm.  The small bowel and appendix are normal.  There are colonic diverticula but no inflammatory changes.  There is a small amount of free fluid in the pelvis, nonspecific.  A catheter has decompressed the bladder.  The prostate, rectum and inguinal regions are normal.      No acute findings in the abdomen or pelvis.  A catheter has decompressed the bladder.  Small amount of free fluid in the abdomen pelvis, nonspecific.  Colonic diverticulosis without diverticulitis.  Stable simple appearing renal cysts.  All CT scans are performed using dose optimization techniques as appropriate to the performed exam and include at least one of the following: Automated exposure control, adjustment of the mA and/or kV according to size, and the use of iterative reconstruction technique.  ______________________________________ Electronically signed by: FRANCY SINGLETON M.D. Date:     07/21/2024 Time:    15:11     XR CHEST PORTABLE    Result Date: 7/21/2024  EXAM: CHEST RADIOGRAPH  TECHNIQUE: Single frontal chest radiograph.  HISTORY: Shortness of breath.  COMPARISON: 04/20/2024  FINDINGS: There is cardiomegaly with diffuse prominence of the pulmonary vascular markings.  No pleural effusion or pneumothorax.  No focal consolidation.  No acute osseous abnormality.      1.  Cardiomegaly with interstitial pulmonary edema.    ______________________________________ Electronically signed by: REANNA GARCIA M.D. Date:     07/21/2024 Time:    11:25        Assessment   Acute kidney injury  Chronic kidney disease stage IV  Acute  urinary retention  Constipation  Chronic diastolic congestive heart failure  Diabetes type 2  Hypertension    Plan:  Discussed with patient, nursing  Bowel regimen per primary  Workup reviewed today  Monitor labs  Continue Zafar catheter for decompression and monitor response  Fluids held due to concerns of pulmonary edema and can monitor for now as he does have a diet overnight  Further testing ordered and ongoing  CT reviewed after Zafar catheter placement    Thank you for the consult, we appreciate the opportunity to provide care to your patients.  Feel free to contact me if I can be of any further assistance.      Saravanan Jason MD  07/21/24  4:31 PM

## 2024-07-21 NOTE — ED NOTES
ED TO INPATIENT SBAR HANDOFF    Patient Name: Carlos Espana   : 1955  68 y.o.   Family/Caregiver Present: No  Code Status Order: Prior    C-SSRS: Risk of Suicide: No Risk  Sitter No  Restraints:         Situation  Chief Complaint:   Chief Complaint   Patient presents with    Urinary Retention     Since 1800 last night after MRI at [facility]. Hx kidney problems     Patient Diagnosis: Acute kidney injury superimposed on chronic kidney disease (HCC) [N17.9, N18.9]     Brief Description of Patient's Condition: Carlos Espana is a 68 y.o. male who presents to the emergency department because he has not been able to urinate since 4 PM yesterday.  Patient has a history of chronic kidney disease.  Patient has been on dialysis in the past and is concerned that he may \"lose my kidneys\" in the future.  Patient was recently at [place], [place].  Patient underwent a cardiac perfusion study and a myocardial MRI involving dye.  Patient is concerned that the dye may have further injured his kidneys.  Patient denies history of BPH or urinary retention.  Patient denies recent burning with urination, fever, vomiting, chest pain, or difficulty breathing.  In ED pt has been resting comfortably. Pt is A&Ox4 and on 4L NC at baseline. He has a 18g in RAC and had a F/C Coude 16fr placed in ED for urinary retention of 280ml in bladder when he arrived, urine return was achieved with otero. Pt is currently not receiving dialysis nut has had dialysis in the past. Pt's kidney labs on intial draw was a crt. Of 3.3. BUN 39, and GFR <20. Pt also has a BNP of 3,319. No meds have been given in ED at this time. Pt has been slightly ann (50's) in the ED.     Mental Status: oriented, alert, coherent, and logical  Arrived from: home    Imaging:   XR CHEST PORTABLE   Final Result   1.  Cardiomegaly with interstitial pulmonary edema.               ______________________________________    Electronically signed by:  118/62  116/63   Pulse: 55 56 56 57   Resp: 17 18 19 21   Temp:       TempSrc:       SpO2: 93% 90%  91%   Weight:       Height:         Predictive Model Details          38 (Caution)  Factor Value    Calculated 7/21/2024 13:07 29% Supplemental oxygen Nasal cannula    Deterioration Index Model 28% Age 68 years old     17% Respiratory rate 21     7% Potassium 4.5 mmol/L     6% Pulse oximetry 91 %     6% Sodium abnormal (133 mmol/L)     2% BUN abnormal (39 mg/dL)     2% Hematocrit abnormal (38.2 %)     2% Pulse 57     1% Systolic 116     0% Temperature 98.2 °F (36.8 °C)     0% WBC count 7.1 K/uL       NPO? No  O2 Flow Rate: O2 Device: Nasal cannula O2 Flow Rate (L/min): 4 L/min  Cardiac Rhythm:   NIH Score: NIH     Active LDA's:   Peripheral IV 07/21/24 Distal;Right Cephalic (Active)   Site Assessment Clean, dry & intact 07/21/24 0939   Line Status Blood return noted;Flushed;Specimen collected 07/21/24 0939   Phlebitis Assessment No symptoms 07/21/24 0939   Infiltration Assessment 0 07/21/24 0939   Dressing Status New dressing applied 07/21/24 0939     Pertinent or High Risk Medications/Drips: no   If Yes, please provide details:   Blood Product Administration: no  If Yes, please provide details:   Sepsis Risk Score      Admitted with Sepsis? No    Recommendation  Incomplete orders:   Patient Belongings:   Additional Comments:   If any further questions, please call Sending RN at [phone]    Electronically signed by: Electronically signed by LITO SU RN on 7/21/2024 at 1:07 PM

## 2024-07-22 LAB
25(OH)D3 SERPL-MCNC: 29.6 NG/ML
ALBUMIN SERPL-MCNC: 3.6 G/DL (ref 3.5–5.2)
ALP SERPL-CCNC: 72 U/L (ref 40–130)
ALT SERPL-CCNC: 14 U/L (ref 5–41)
ANION GAP SERPL CALCULATED.3IONS-SCNC: 15 MMOL/L (ref 7–19)
AST SERPL-CCNC: 16 U/L (ref 5–40)
BASOPHILS # BLD: 0 K/UL (ref 0–0.2)
BASOPHILS NFR BLD: 0.4 % (ref 0–1)
BILIRUB SERPL-MCNC: 0.5 MG/DL (ref 0.2–1.2)
BUN SERPL-MCNC: 46 MG/DL (ref 8–23)
CALCIUM SERPL-MCNC: 9.2 MG/DL (ref 8.8–10.2)
CHLORIDE SERPL-SCNC: 97 MMOL/L (ref 98–111)
CO2 SERPL-SCNC: 23 MMOL/L (ref 22–29)
CREAT SERPL-MCNC: 3.6 MG/DL (ref 0.5–1.2)
EOSINOPHIL # BLD: 0.2 K/UL (ref 0–0.6)
EOSINOPHIL NFR BLD: 2.2 % (ref 0–5)
ERYTHROCYTE [DISTWIDTH] IN BLOOD BY AUTOMATED COUNT: 18.1 % (ref 11.5–14.5)
GLUCOSE BLD-MCNC: 175 MG/DL (ref 70–99)
GLUCOSE BLD-MCNC: 177 MG/DL (ref 70–99)
GLUCOSE BLD-MCNC: 192 MG/DL (ref 70–99)
GLUCOSE BLD-MCNC: 240 MG/DL (ref 70–99)
GLUCOSE SERPL-MCNC: 197 MG/DL (ref 74–109)
HCT VFR BLD AUTO: 36.9 % (ref 42–52)
HGB BLD-MCNC: 10.9 G/DL (ref 14–18)
IMM GRANULOCYTES # BLD: 0 K/UL
LYMPHOCYTES # BLD: 0.7 K/UL (ref 1.1–4.5)
LYMPHOCYTES NFR BLD: 8.5 % (ref 20–40)
MAGNESIUM SERPL-MCNC: 2.4 MG/DL (ref 1.6–2.4)
MCH RBC QN AUTO: 25 PG (ref 27–31)
MCHC RBC AUTO-ENTMCNC: 29.5 G/DL (ref 33–37)
MCV RBC AUTO: 84.6 FL (ref 80–94)
MONOCYTES # BLD: 0.8 K/UL (ref 0–0.9)
MONOCYTES NFR BLD: 10.1 % (ref 0–10)
NEUTROPHILS # BLD: 6.2 K/UL (ref 1.5–7.5)
NEUTS SEG NFR BLD: 78.4 % (ref 50–65)
PERFORMED ON: ABNORMAL
PHOSPHATE SERPL-MCNC: 3.7 MG/DL (ref 2.5–4.5)
PLATELET # BLD AUTO: 142 K/UL (ref 130–400)
PMV BLD AUTO: 11.2 FL (ref 9.4–12.4)
POTASSIUM SERPL-SCNC: 4.1 MMOL/L (ref 3.5–5)
PROT SERPL-MCNC: 6.5 G/DL (ref 6.6–8.7)
PTH-INTACT SERPL-MCNC: 45.2 PG/ML (ref 15–65)
RBC # BLD AUTO: 4.36 M/UL (ref 4.7–6.1)
SODIUM SERPL-SCNC: 135 MMOL/L (ref 136–145)
WBC # BLD AUTO: 7.9 K/UL (ref 4.8–10.8)

## 2024-07-22 PROCEDURE — 82306 VITAMIN D 25 HYDROXY: CPT

## 2024-07-22 PROCEDURE — 1200000000 HC SEMI PRIVATE

## 2024-07-22 PROCEDURE — 94640 AIRWAY INHALATION TREATMENT: CPT

## 2024-07-22 PROCEDURE — 80053 COMPREHEN METABOLIC PANEL: CPT

## 2024-07-22 PROCEDURE — 6360000002 HC RX W HCPCS

## 2024-07-22 PROCEDURE — 85025 COMPLETE CBC W/AUTO DIFF WBC: CPT

## 2024-07-22 PROCEDURE — 99232 SBSQ HOSP IP/OBS MODERATE 35: CPT | Performed by: UROLOGY

## 2024-07-22 PROCEDURE — 94760 N-INVAS EAR/PLS OXIMETRY 1: CPT

## 2024-07-22 PROCEDURE — 6370000000 HC RX 637 (ALT 250 FOR IP)

## 2024-07-22 PROCEDURE — 83735 ASSAY OF MAGNESIUM: CPT

## 2024-07-22 PROCEDURE — 82962 GLUCOSE BLOOD TEST: CPT

## 2024-07-22 PROCEDURE — 83970 ASSAY OF PARATHORMONE: CPT

## 2024-07-22 PROCEDURE — 84100 ASSAY OF PHOSPHORUS: CPT

## 2024-07-22 PROCEDURE — 36415 COLL VENOUS BLD VENIPUNCTURE: CPT

## 2024-07-22 PROCEDURE — 2580000003 HC RX 258

## 2024-07-22 PROCEDURE — 2700000000 HC OXYGEN THERAPY PER DAY

## 2024-07-22 PROCEDURE — 6370000000 HC RX 637 (ALT 250 FOR IP): Performed by: INTERNAL MEDICINE

## 2024-07-22 RX ORDER — METOLAZONE 5 MG/1
TABLET ORAL
Qty: 15 TABLET | Refills: 3 | OUTPATIENT
Start: 2024-07-22

## 2024-07-22 RX ORDER — ENEMA 19; 7 G/133ML; G/133ML
1 ENEMA RECTAL
Status: COMPLETED | OUTPATIENT
Start: 2024-07-22 | End: 2024-07-23

## 2024-07-22 RX ORDER — BUMETANIDE 0.25 MG/ML
2 INJECTION INTRAMUSCULAR; INTRAVENOUS ONCE
Status: COMPLETED | OUTPATIENT
Start: 2024-07-22 | End: 2024-07-22

## 2024-07-22 RX ORDER — BUMETANIDE 0.25 MG/ML
2 INJECTION INTRAMUSCULAR; INTRAVENOUS 2 TIMES DAILY
Status: DISCONTINUED | OUTPATIENT
Start: 2024-07-22 | End: 2024-07-25

## 2024-07-22 RX ORDER — CARVEDILOL 3.12 MG/1
3.12 TABLET ORAL 2 TIMES DAILY
Status: DISCONTINUED | OUTPATIENT
Start: 2024-07-22 | End: 2024-07-25

## 2024-07-22 RX ORDER — BUMETANIDE 0.25 MG/ML
2 INJECTION INTRAMUSCULAR; INTRAVENOUS ONCE
Status: DISCONTINUED | OUTPATIENT
Start: 2024-07-22 | End: 2024-07-22

## 2024-07-22 RX ORDER — IPRATROPIUM BROMIDE AND ALBUTEROL SULFATE 2.5; .5 MG/3ML; MG/3ML
1 SOLUTION RESPIRATORY (INHALATION)
Status: DISCONTINUED | OUTPATIENT
Start: 2024-07-22 | End: 2024-07-26 | Stop reason: HOSPADM

## 2024-07-22 RX ADMIN — CALCITRIOL CAPSULES 0.25 MCG 0.25 MCG: 0.25 CAPSULE ORAL at 09:07

## 2024-07-22 RX ADMIN — SODIUM CHLORIDE, PRESERVATIVE FREE 10 ML: 5 INJECTION INTRAVENOUS at 20:30

## 2024-07-22 RX ADMIN — APIXABAN 2.5 MG: 2.5 TABLET, FILM COATED ORAL at 09:08

## 2024-07-22 RX ADMIN — CARVEDILOL 3.12 MG: 3.12 TABLET, FILM COATED ORAL at 17:46

## 2024-07-22 RX ADMIN — RANOLAZINE 1000 MG: 500 TABLET, EXTENDED RELEASE ORAL at 20:29

## 2024-07-22 RX ADMIN — MAGNESIUM HYDROXIDE 30 ML: 400 SUSPENSION ORAL at 09:04

## 2024-07-22 RX ADMIN — POLYETHYLENE GLYCOL 3350 17 G: 17 POWDER, FOR SOLUTION ORAL at 20:24

## 2024-07-22 RX ADMIN — IPRATROPIUM BROMIDE AND ALBUTEROL SULFATE 1 DOSE: 2.5; .5 SOLUTION RESPIRATORY (INHALATION) at 18:14

## 2024-07-22 RX ADMIN — ASPIRIN 81 MG: 81 TABLET, CHEWABLE ORAL at 09:08

## 2024-07-22 RX ADMIN — ISOSORBIDE MONONITRATE 60 MG: 60 TABLET, EXTENDED RELEASE ORAL at 20:23

## 2024-07-22 RX ADMIN — FAMOTIDINE 20 MG: 20 TABLET ORAL at 09:06

## 2024-07-22 RX ADMIN — ONDANSETRON 4 MG: 2 INJECTION INTRAMUSCULAR; INTRAVENOUS at 06:54

## 2024-07-22 RX ADMIN — MINOXIDIL 2.5 MG: 2.5 TABLET ORAL at 20:23

## 2024-07-22 RX ADMIN — ALLOPURINOL 200 MG: 100 TABLET ORAL at 09:08

## 2024-07-22 RX ADMIN — ROSUVASTATIN CALCIUM 40 MG: 20 TABLET, COATED ORAL at 09:08

## 2024-07-22 RX ADMIN — POLYETHYLENE GLYCOL 3350 17 G: 17 POWDER, FOR SOLUTION ORAL at 09:04

## 2024-07-22 RX ADMIN — TAMSULOSIN HYDROCHLORIDE 0.4 MG: 0.4 CAPSULE ORAL at 09:07

## 2024-07-22 RX ADMIN — IPRATROPIUM BROMIDE AND ALBUTEROL SULFATE 1 DOSE: 2.5; .5 SOLUTION RESPIRATORY (INHALATION) at 15:19

## 2024-07-22 RX ADMIN — SACUBITRIL AND VALSARTAN 1 TABLET: 49; 51 TABLET, FILM COATED ORAL at 09:20

## 2024-07-22 RX ADMIN — BUMETANIDE 2 MG: 0.25 INJECTION INTRAMUSCULAR; INTRAVENOUS at 13:36

## 2024-07-22 RX ADMIN — ONDANSETRON 4 MG: 2 INJECTION INTRAMUSCULAR; INTRAVENOUS at 23:55

## 2024-07-22 RX ADMIN — SENNOSIDES 8.6 MG: 8.6 TABLET, FILM COATED ORAL at 20:23

## 2024-07-22 RX ADMIN — SODIUM CHLORIDE, PRESERVATIVE FREE 10 ML: 5 INJECTION INTRAVENOUS at 09:27

## 2024-07-22 RX ADMIN — RANOLAZINE 1000 MG: 500 TABLET, EXTENDED RELEASE ORAL at 09:06

## 2024-07-22 RX ADMIN — SACUBITRIL AND VALSARTAN 1 TABLET: 49; 51 TABLET, FILM COATED ORAL at 20:23

## 2024-07-22 RX ADMIN — NIFEDIPINE 60 MG: 60 TABLET, EXTENDED RELEASE ORAL at 09:05

## 2024-07-22 RX ADMIN — BUMETANIDE 2 MG: 1 TABLET ORAL at 09:27

## 2024-07-22 RX ADMIN — BUMETANIDE 2 MG: 0.25 INJECTION INTRAMUSCULAR; INTRAVENOUS at 20:25

## 2024-07-22 RX ADMIN — APIXABAN 2.5 MG: 2.5 TABLET, FILM COATED ORAL at 20:23

## 2024-07-22 NOTE — CARE COORDINATION
Case Management Assessment  Initial Evaluation    Date/Time of Evaluation: 7/22/2024 9:26 AM  Assessment Completed by: LIANE Sarmiento    If patient is discharged prior to next notation, then this note serves as note for discharge by case management.    Patient Name: Carlos Espana                   YOB: 1955  Diagnosis: Urinary retention [R33.9]  Acute pulmonary edema (HCC) [J81.0]  Acute on chronic renal insufficiency [N28.9, N18.9]  Acute kidney injury superimposed on chronic kidney disease (HCC) [N17.9, N18.9]                   Date / Time: 7/21/2024  9:10 AM    Patient Admission Status: Inpatient   Readmission Risk (Low < 19, Mod (19-27), High > 27): Readmission Risk Score: 20.6    Current PCP: Alisa Rangel MD  PCP verified by CM? Yes    Chart Reviewed: Yes      History Provided by: Patient  Patient Orientation: Alert and Oriented    Patient Cognition: Alert    Hospitalization in the last 30 days (Readmission):  No    If yes, Readmission Assessment in  Navigator will be completed.    Advance Directives:      Code Status: Full Code   Patient's Primary Decision Maker is: Legal Next of Kin    Primary Decision Maker (Active): Perri Espana - Spouse - [phone]    Secondary Decision Maker: Dg Espana - Brother/Sister - [phone]    Discharge Planning:    Patient lives with: Spouse/Significant Other Type of Home: House  Primary Care Giver: Self  Patient Support Systems include: Spouse/Significant Other, Family Members   Current Financial resources: Medicare  Current community resources: None  Current services prior to admission: Oxygen Therapy, Durable Medical Equipment            Current DME: Cane, Oxygen Therapy (Comment), Walker (oxygen supplied through [facility])            Type of Home Care services:  None    ADLS  Prior functional level: Independent in ADLs/IADLs  Current functional level: Independent in ADLs/IADLs    PT AM-PAC:   /24  OT AM-PAC:   /24    Family can provide assistance  at DC: Yes  Would you like Case Management to discuss the discharge plan with any other family members/significant others, and if so, who? Yes (spouse)  Plans to Return to Present Housing: Yes  Other Identified Issues/Barriers to RETURNING to current housing: NA  Potential Assistance needed at discharge: N/A            Potential DME: Other (Comment) (none)  Patient expects to discharge to: House  Plan for transportation at discharge: Family    Financial    Payor: HU[employer] MEDICARE / Plan: HU[employer]NA CHOICE-PPO MEDICARE / Product Type: *No Product type* /     Does insurance require precert for SNF: Yes    Potential assistance Purchasing Medications: No  Meds-to-Beds request: Yes      "2,10E+07" [facility] [address] [facility] [place] [phone] - F [phone]  [address] [facility] [address]  Phone: [phone] Fax: [phone]    [facility] - [address] - P [phone] - F [phone]  [address] [place] [address] [address] [place] [address]  Phone: [phone] Fax: [phone]      Notes:    Factors facilitating achievement of predicted outcomes: Family support, Motivated, Cooperative, Pleasant, and Has needed Durable Medical Equipment at home    Barriers to discharge: Long standing deficits    Additional Case Management Notes: SW met with pt discuss dc planning, pt lives with his wife, independent, and still drives. Pt does have DME (walker, cane, and BSC) if needed, oxygen is supplied through [facility], denies HH and denies any other needs at this time      The Plan for Transition of Care is related to the following treatment goals of Urinary retention [R33.9]  Acute pulmonary edema (HCC) [J81.0]  Acute on chronic renal insufficiency [N28.9, N18.9]  Acute kidney injury superimposed on chronic kidney disease (HCC) [N17.9, N18.9]    IF APPLICABLE: The Patient and/or patient representative Carlos and his family were provided with a choice of provider and agrees with the discharge

## 2024-07-22 NOTE — ACP (ADVANCE CARE PLANNING)
Advance Care Planning     Advance Care Planning Inpatient Note  [facility] Department    Today's Date: 7/22/2024  Unit: [facility] 4 ONCOLOGY UNIT    Received request from Other: palliative care .  Upon review of chart and communication with care team, patient's decision making abilities are not in question.. Patient was/were present in the room during visit.    Goals of ACP Conversation:  Discuss advance care planning documents    Health Care Decision Makers:       Primary Decision Maker (Active): Perri Espana - Spouse - [phone]    Secondary Decision Maker: Dg Espana - Brother/Sister - [phone]  Summary:  Verified Documents    Advance Care Planning Documents (Patient Wishes):  Living Will/Advance Directive     Assessment:  Pt is a 68 year old male with issues with his kidneys and his heart. Pt says he has a heart blockage. He lives at home with his wife and usually is able to care for himself at home. Pt has good support from his family and his [employer]. Pt's goal is to continue caring for himself at home.     Interventions:  Confirmed pt's document, decision makers, and code status.     Care Preferences Communicated:     Hospitalization:  If the patient's health worsens and it becomes clear that the chance of recovery is unlikely,     the patient wants hospitalization.    Ventilation:   If the patient, in their present state of health, suddenly became very ill and unable to breathe on their own,     the patient would desire the use of a ventilator (breathing machine).    If their health worsens and it becomes clear that the change of recovery is unlikely,     the patient would NOT desire the use of a ventilator (breathing machine).    Resuscitation:  In the event the patient's heart stopped as a result of an underlying serious health condition, the patient communicates a preference for      resuscitative attempts (CPR).    Outcomes/Plan:  ACP Discussion: Completed    Electronically signed by Mary Behrens,   on 7/22/2024 at 12:02 PM

## 2024-07-22 NOTE — PROGRESS NOTES
Progress Note  Date:2024       Room:[address] [place] [address]-  Patient Name:Carlos Espana     YOB: 1955     Age:68 y.o.        Subjective    Subjective: No new urological problems overnight.  Zafar catheter in position.  Marginal urine output.  Review of Systems: See admission history and physical  Objective         Vitals Last 24 Hours:  TEMPERATURE:  Temp  Av.4 °F (36.3 °C)  Min: 96.9 °F (36.1 °C)  Max: 98.2 °F (36.8 °C)  RESPIRATIONS RANGE: Resp  Av.7  Min: 15  Max: 21  PULSE OXIMETRY RANGE: SpO2  Av.1 %  Min: 76 %  Max: 96 %  PULSE RANGE: Pulse  Av.6  Min: 55  Max: 59  BLOOD PRESSURE RANGE: Systolic (24hrs), Av , Min:90 , Max:128   ; Diastolic (24hrs), Av, Min:51, Max:86    I/O (24Hr):    Intake/Output Summary (Last 24 hours) at 2024 0529  Last data filed at 2024 1248  Gross per 24 hour   Intake --   Output 300 ml   Net -300 ml     Objective  Lungs: Clear.  Heart: Regular rate and rhythm.  Abdomen soft.  Bowel sounds.  : Zafar cath in position.  Extremities: Full range of motion.  Neuro: Alert and oriented.  Psych: Appropriate  Labs/Imaging/Diagnostics    Labs:  CBC:  Recent Labs     24  0922 24  0247   WBC 7.1 7.9   RBC 4.45* 4.36*   HGB 11.1* 10.9*   HCT 38.2* 36.9*   MCV 85.8 84.6   RDW 18.2* 18.1*    142     CHEMISTRIES:  Recent Labs     24  0938 24  0247   * 135*   K 4.5 4.1   CL 97* 97*   CO2 23 23   BUN 39* 46*   CREATININE 3.3* 3.6*   GLUCOSE 163* 197*   PHOS  --  3.7   MG  --  2.4     PT/INR:No results for input(s): \"PROTIME\", \"INR\" in the last 72 hours.  APTT:No results for input(s): \"APTT\" in the last 72 hours.  LIVER PROFILE:  Recent Labs     24  0938 24  0247   AST 11 16   ALT 11 14   BILITOT 0.6 0.5   ALKPHOS 76 72       Imaging Last 24 Hours:  CT ABDOMEN PELVIS WO CONTRAST Additional Contrast? None    Result Date: 2024  EXAM: CT ABDOMEN AND PELVIS WITHOUT CONTRAST.  DATE: 2024  COMPARISON:  pulmonary vascular markings.  No pleural effusion or pneumothorax.  No focal consolidation.  No acute osseous abnormality.      1.  Cardiomegaly with interstitial pulmonary edema.    ______________________________________ Electronically signed by: REANNA GARCIA M.D. Date:     07/21/2024 Time:    11:25     Assessment//Plan           Hospital Problems             Last Modified POA    * (Principal) Acute kidney injury superimposed on chronic kidney disease ([facility]) 7/21/2024 Yes    DM (diabetes mellitus) ([facility]) (Chronic) 7/21/2024 Yes    Acute on chronic diastolic CHF (congestive heart failure) ([facility]) 7/21/2024 Yes    PAF (paroxysmal atrial fibrillation) ([facility]) 7/21/2024 Yes    Overview Signed 2/12/2020  2:31 PM by Bertrand Quinn MD     2/12/20  Newly discovered AF         Left ventricular dysfunction 7/21/2024 Yes    Overview Signed 7/9/2020  5:13 PM by Perri Rowland APRN     EF 45%         Constipation 7/21/2024 Yes    Urinary retention 7/21/2024 Yes     Assessment & Plan  Assessment  1.  Acute on chronic renal failure-doubt related to bladder outlet obstruction.  With Zafar catheter in position, no improvement of his renal function and in fact, exacerbation of renal function  2.  Intrinsic renal disease  3.  LUTS/BPH -PSA: 0.45 in July, 2024.    Plan:  1.  Continue with Flomax  2.  Voiding trial when up and ambulating  3.  May need urological evaluation for lower urinary tract obstructive symptoms in the form of uroflowmetry and bladder scan to check postvoid residual.-Can be done as an outpatient  4.  Nephrology already involved.    Electronically signed by Deep Huber MD on 7/22/24 at 5:29 AM CDT

## 2024-07-22 NOTE — PROGRESS NOTES
Nephrology ([facility]) progress Note      Patient:  Carlos Espana  YOB: 1955  Date of Service: 7/22/2024  MRN: [id number]   Acct: [id number]   Primary Care Physician: Alisa Rangel MD  Advance Directive: Full Code  Admit Date: 7/21/2024       Hospital Day: 1  Referring Provider: Deisi Bee MD    Patient independently seen and examined, Chart, Consults, Notes, Operative notes, Labs, Cardiology, and Radiology studies reviewed as available.      Subjective:  Carlos Espana is a 68 y.o. male for whom we were consulted for evaluation and treatment of acute kidney injury.  He has a history of chronic kidney disease stage IV and briefly required short-term dialysis for about a month after contrast-induced nephropathy/acute kidney injury.  He was recently at [facility] in [place] where he underwent cardiac testing including contrasted MRI and PET.  Since leaving that facility he recalled no urine output.  He has also been dealing with constipation for some time and notes that he has more difficulty voiding when he is having worsening bowel issues.  Recalled no dietary changes.  Denied current chest pain, shortness of air at rest, nausea or vomiting.  Zafar catheter placed on admission with dark yellow urine.  Chest x-ray did note some pulmonary edema. Today, he offered no new complaints. His urine output remains borderline.       Allergies:  Morphine and Hydralazine    Medicines:  Current Facility-Administered Medications   Medication Dose Route Frequency Provider Last Rate Last Admin    allopurinol (ZYLOPRIM) tablet 200 mg  200 mg Oral Daily Day, DWAYNE Vergara - CNP   200 mg at 07/22/24 0908    apixaban (ELIQUIS) tablet 2.5 mg  2.5 mg Oral BID Day, Kelechi AZEVEDO APRN - CNP   2.5 mg at 07/22/24 0908    aspirin chewable tablet 81 mg  81 mg Oral Daily Day, Kelechi AZEVEDO APRN - CNP   81 mg at 07/22/24 0908    bumetanide (BUMEX) tablet 2 mg  2 mg Oral Daily Day, DWAYNE Vergara - CNP   2 mg at  Attends Club or Organization Meetings: More than 4 times per year     Marital Status:    Intimate Partner Violence: Not At Risk (8/11/2023)    Humiliation, Afraid, Rape, and Kick questionnaire     Fear of Current or Ex-Partner: No     Emotionally Abused: No     Physically Abused: No     Sexually Abused: No   Housing Stability: Low Risk  (7/21/2024)    Housing Stability Vital Sign     Unable to Pay for Housing in the Last Year: No     Number of Places Lived in the Last Year: 1     Unstable Housing in the Last Year: No         Review of Systems:  History obtained from chart review and the patient  General ROS: No fever or chills  Respiratory ROS: No cough, shortness of breath, wheezing  Cardiovascular ROS: No chest pain or palpitations  Gastrointestinal ROS: No abdominal pain or melena  Genito-Urinary ROS: +dysuria,+ hematuria  Musculoskeletal ROS: No joint pain or swelling   14 point ROS reviewed with the patient and negative except as noted above and in the HPI unless unable to obtain.    Objective:  Patient Vitals for the past 24 hrs:   BP Temp Temp src Pulse Resp SpO2 Weight   07/22/24 1015 118/71 -- -- 55 -- -- --   07/22/24 0900 (!) 110/57 -- -- 55 -- -- --   07/22/24 0826 -- -- -- -- -- 94 % --   07/22/24 0434 90/62 -- -- 55 18 96 % 100.7 kg (222 lb)   07/21/24 1958 128/64 96.9 °F (36.1 °C) -- 58 18 93 % --   07/21/24 1530 114/73 97.1 °F (36.2 °C) Temporal 56 18 93 % --   07/21/24 1500 124/67 -- -- 57 19 90 % --   07/21/24 1300 116/63 -- -- 57 21 91 % --   07/21/24 1248 -- -- -- 56 19 -- --   07/21/24 1230 118/62 -- -- 56 18 90 % --   07/21/24 1200 116/62 -- -- 55 17 93 % --         Intake/Output Summary (Last 24 hours) at 7/22/2024 1121  Last data filed at 7/22/2024 0928  Gross per 24 hour   Intake --   Output 1250 ml   Net -1250 ml       General: awake/alert   Chest:  clear to auscultation bilaterally  CVS: regular rate and rhythm  Abdominal: Obese, distended, bowel sounds positive  Extremities: no

## 2024-07-22 NOTE — PROGRESS NOTES
Palliative Care/Spiritual Care: Met with pt to initiate palliative care. Pt says he is having issues with his heart and kidney. Pt says he has a heart blockage. Pt says he was retaining urine. ED note says acute pulmonary edema, acute on chronic renal insufficiency, and urinary retention. Pt has other diagnoses in past medical history, and he is known to palliative care.         Advance Directives: Pt has a LW. His wife Perri Espana is his primary decision maker and his brother Dg Espana is his secondary decision maker. Pt is full code and wants CPR and Ventilator. He says if he is on the ventilator and he worsens he does not want to remain on the ventilator. SEE ACP NOTE.         Pain/other symptoms: Pt is not having pain.         Social/Spiritual: Pt says he attends a [employer] and calls himself a [employer].         Pt/family discussion r/t goals: Pt says he lives at home with his wife. He says he ambulates without assistance and performs all daily living skills to care for himself at home. Pt says his goal is to return home and continue caring for himself.     Provided spiritual care with sustaining presence, nurtured hope, and prayer. Pt expressed gratitude for spiritual care.     Electronically signed by Mary Behrens on 7/22/2024 at 12:02 PM

## 2024-07-22 NOTE — PROGRESS NOTES
[facility]    Progress Note    Patient:  Carlos Espana  YOB: 1955  Date of Service: 7/22/2024  MRN: [id number]   Acct: [id number]   Primary Care Physician: Alisa Rangel MD  Advance Directive: Full Code  Admit Date: 7/21/2024       Hospital Day: 1    Portions of this note have been copied forward, however, updated to reflect the most current clinical status of this patient.     CHIEF COMPLAINT: urinary retention    CUMULATIVE HOSPITAL COURSE:     This patient is a 68-year-old male with PMH CKD stage IV, combined systolic and diastolic heart failure, T2DM, atrial fibrillation, CAD, thoracic aortic aneurysm, hyperlipidemia, hypertension, SAMMY who presented to [facility] ED on 7/21 for evaluation of urinary retention x 1 day and constipation. He also reported some increasing SOB with exertion despite his baseline 3L via NC. Patient has a history of CKD, requiring dialysis for short period in April of this year. Permcath was placed 4/22/2024 and removed 5/24/2024. Additionally, patient recently underwent a cardiac perfusion study and myocardial MRI with gadolinium 2 days prior to presentation.  He denies any history of BPH. He required Zafar catheter placement in ER for retention.  CT AP shows no acute findings and decompressed bladder secondary to catheter placement.  Lab work revealed creatinine 3.3, BNP 3319, normal LFTs, hemoglobin 11.1, and PSA 0.45.  Wife reports that his creatinine 2 days prior to presentation was 2.1.  Chest x-ray showed cardiomegaly with interstitial pulmonary edema.  He was noted to be hypoxic with SpO2 76%, requiring increase in baseline oxygen supplementation to 4L.  Patient admitted to hospitalist with consult to nephrology and urology.  Initially felt that ANGELO was cardiorenal and he is being diuresed with Bumex. Creatinine continuing to rise and currently 3.6.  Nephrology considering renal replacement therapy if renal function continues to decline.    Objective   VITALS:

## 2024-07-23 LAB
ALBUMIN SERPL-MCNC: 3.4 G/DL (ref 3.5–5.2)
ALP SERPL-CCNC: 72 U/L (ref 40–130)
ALT SERPL-CCNC: 15 U/L (ref 5–41)
ANION GAP SERPL CALCULATED.3IONS-SCNC: 15 MMOL/L (ref 7–19)
AST SERPL-CCNC: 18 U/L (ref 5–40)
BACTERIA UR CULT: NORMAL
BASOPHILS # BLD: 0 K/UL (ref 0–0.2)
BASOPHILS NFR BLD: 0.4 % (ref 0–1)
BILIRUB SERPL-MCNC: 0.5 MG/DL (ref 0.2–1.2)
BUN SERPL-MCNC: 54 MG/DL (ref 8–23)
CALCIUM SERPL-MCNC: 9 MG/DL (ref 8.8–10.2)
CHLORIDE SERPL-SCNC: 94 MMOL/L (ref 98–111)
CK SERPL-CCNC: 274 U/L (ref 39–308)
CO2 SERPL-SCNC: 24 MMOL/L (ref 22–29)
CREAT SERPL-MCNC: 4.1 MG/DL (ref 0.5–1.2)
EKG P AXIS: -9 DEGREES
EKG P-R INTERVAL: 210 MS
EKG Q-T INTERVAL: 490 MS
EKG QRS DURATION: 120 MS
EKG QTC CALCULATION (BAZETT): 482 MS
EKG T AXIS: 36 DEGREES
EOSINOPHIL # BLD: 0.2 K/UL (ref 0–0.6)
EOSINOPHIL NFR BLD: 3 % (ref 0–5)
ERYTHROCYTE [DISTWIDTH] IN BLOOD BY AUTOMATED COUNT: 18 % (ref 11.5–14.5)
GLUCOSE BLD-MCNC: 159 MG/DL (ref 70–99)
GLUCOSE BLD-MCNC: 162 MG/DL (ref 70–99)
GLUCOSE BLD-MCNC: 171 MG/DL (ref 70–99)
GLUCOSE BLD-MCNC: 217 MG/DL (ref 70–99)
GLUCOSE SERPL-MCNC: 182 MG/DL (ref 74–109)
HCT VFR BLD AUTO: 35.4 % (ref 42–52)
HGB BLD-MCNC: 10.6 G/DL (ref 14–18)
IMM GRANULOCYTES # BLD: 0 K/UL
LYMPHOCYTES # BLD: 0.8 K/UL (ref 1.1–4.5)
LYMPHOCYTES NFR BLD: 10.9 % (ref 20–40)
MCH RBC QN AUTO: 25 PG (ref 27–31)
MCHC RBC AUTO-ENTMCNC: 29.9 G/DL (ref 33–37)
MCV RBC AUTO: 83.5 FL (ref 80–94)
MONOCYTES # BLD: 0.8 K/UL (ref 0–0.9)
MONOCYTES NFR BLD: 10.9 % (ref 0–10)
NEUTROPHILS # BLD: 5.3 K/UL (ref 1.5–7.5)
NEUTS SEG NFR BLD: 74.5 % (ref 50–65)
PERFORMED ON: ABNORMAL
PLATELET # BLD AUTO: 164 K/UL (ref 130–400)
PMV BLD AUTO: 11.6 FL (ref 9.4–12.4)
POTASSIUM SERPL-SCNC: 4.5 MMOL/L (ref 3.5–5)
POTASSIUM SERPL-SCNC: 4.5 MMOL/L (ref 3.5–5)
PROT SERPL-MCNC: 6.4 G/DL (ref 6.6–8.7)
RBC # BLD AUTO: 4.24 M/UL (ref 4.7–6.1)
SODIUM SERPL-SCNC: 133 MMOL/L (ref 136–145)
WBC # BLD AUTO: 7.1 K/UL (ref 4.8–10.8)

## 2024-07-23 PROCEDURE — 94640 AIRWAY INHALATION TREATMENT: CPT

## 2024-07-23 PROCEDURE — 94760 N-INVAS EAR/PLS OXIMETRY 1: CPT

## 2024-07-23 PROCEDURE — 82962 GLUCOSE BLOOD TEST: CPT

## 2024-07-23 PROCEDURE — 82550 ASSAY OF CK (CPK): CPT

## 2024-07-23 PROCEDURE — 93010 ELECTROCARDIOGRAM REPORT: CPT | Performed by: INTERNAL MEDICINE

## 2024-07-23 PROCEDURE — 6370000000 HC RX 637 (ALT 250 FOR IP)

## 2024-07-23 PROCEDURE — 36415 COLL VENOUS BLD VENIPUNCTURE: CPT

## 2024-07-23 PROCEDURE — 1200000000 HC SEMI PRIVATE

## 2024-07-23 PROCEDURE — 85025 COMPLETE CBC W/AUTO DIFF WBC: CPT

## 2024-07-23 PROCEDURE — 99231 SBSQ HOSP IP/OBS SF/LOW 25: CPT | Performed by: NURSE PRACTITIONER

## 2024-07-23 PROCEDURE — 80053 COMPREHEN METABOLIC PANEL: CPT

## 2024-07-23 PROCEDURE — 2580000003 HC RX 258

## 2024-07-23 PROCEDURE — 2700000000 HC OXYGEN THERAPY PER DAY

## 2024-07-23 RX ADMIN — IPRATROPIUM BROMIDE AND ALBUTEROL SULFATE 1 DOSE: 2.5; .5 SOLUTION RESPIRATORY (INHALATION) at 06:05

## 2024-07-23 RX ADMIN — SODIUM CHLORIDE, PRESERVATIVE FREE 10 ML: 5 INJECTION INTRAVENOUS at 20:59

## 2024-07-23 RX ADMIN — INSULIN LISPRO 2 UNITS: 100 INJECTION, SOLUTION INTRAVENOUS; SUBCUTANEOUS at 17:26

## 2024-07-23 RX ADMIN — RANOLAZINE 1000 MG: 500 TABLET, EXTENDED RELEASE ORAL at 20:59

## 2024-07-23 RX ADMIN — SODIUM PHOSPHATE 1 ENEMA: 7; 19 ENEMA RECTAL at 00:47

## 2024-07-23 RX ADMIN — SODIUM CHLORIDE, PRESERVATIVE FREE 5 ML: 5 INJECTION INTRAVENOUS at 10:10

## 2024-07-23 RX ADMIN — SENNOSIDES 8.6 MG: 8.6 TABLET, FILM COATED ORAL at 20:59

## 2024-07-23 RX ADMIN — ISOSORBIDE MONONITRATE 60 MG: 60 TABLET, EXTENDED RELEASE ORAL at 20:59

## 2024-07-23 RX ADMIN — IPRATROPIUM BROMIDE AND ALBUTEROL SULFATE 1 DOSE: 2.5; .5 SOLUTION RESPIRATORY (INHALATION) at 14:45

## 2024-07-23 RX ADMIN — IPRATROPIUM BROMIDE AND ALBUTEROL SULFATE 1 DOSE: 2.5; .5 SOLUTION RESPIRATORY (INHALATION) at 10:01

## 2024-07-23 RX ADMIN — IPRATROPIUM BROMIDE AND ALBUTEROL SULFATE 1 DOSE: 2.5; .5 SOLUTION RESPIRATORY (INHALATION) at 19:31

## 2024-07-23 ASSESSMENT — ENCOUNTER SYMPTOMS
BACK PAIN: 0
ABDOMINAL DISTENTION: 0
ABDOMINAL PAIN: 0
CONSTIPATION: 1
VOMITING: 0
NAUSEA: 0

## 2024-07-23 NOTE — PROGRESS NOTES
[facility]    Progress Note    Patient:  Carlos Espana  YOB: 1955  Date of Service: 7/23/2024  MRN: [id number]   Acct: [id number]   Primary Care Physician: Alisa Rangel MD  Advance Directive: Full Code  Admit Date: 7/21/2024       Hospital Day: 2    Portions of this note have been copied forward, however, updated to reflect the most current clinical status of this patient.     CHIEF COMPLAINT: urinary retention    CUMULATIVE HOSPITAL COURSE:     This patient is a 68-year-old male with PMH CKD stage IV, combined systolic and diastolic heart failure, T2DM, atrial fibrillation, CAD, thoracic aortic aneurysm, hyperlipidemia, hypertension, SAMMY who presented to [facility] ED on 7/21 for evaluation of urinary retention x 1 day and constipation. He also reported some increasing SOB with exertion despite his baseline 3L via NC. Patient has a history of CKD, requiring dialysis for short period in April of this year. Permcath was placed 4/22/2024 and removed 5/24/2024. Additionally, patient recently underwent a cardiac perfusion study and myocardial MRI with gadolinium 2 days prior to presentation.  He denies any history of BPH. He required Zafar catheter placement in ER for retention.  CT AP shows no acute findings and decompressed bladder secondary to catheter placement.  Lab work revealed creatinine 3.3, BNP 3319, normal LFTs, hemoglobin 11.1, and PSA 0.45.  Wife reports that his creatinine 2 days prior to presentation was 2.1.  Chest x-ray showed cardiomegaly with interstitial pulmonary edema.  He was noted to be hypoxic with SpO2 76%, requiring increase in baseline oxygen supplementation to 4L.  Patient admitted to hospitalist with consult to nephrology and urology.  Initially felt that ANGELO was cardiorenal and he was diuresed with Bumex 2mg BID with good urine output although urine is brown/tea-colored. His oxygen has been weaned back downt o his baseline 3L. Creatinine continuing to rise and currently      Consults Made:   IP CONSULT TO NEPHROLOGY  IP CONSULT TO UROLOGY  IP CONSULT TO NEPHROLOGY  IP CONSULT TO SPIRITUAL SERVICES  PALLIATIVE CARE EVAL    Further Orders per Clinical course/attending.     EMR Dragon/Transcription disclaimer:   Much of this encounter note is an electronic transcription/translation of spoken language to printed text. The electronic translation of spoken language may permit erroneous, or at times, nonsensical words or phrases to be inadvertently transcribed; although attempts have made to review the note for such errors, some may still exist.

## 2024-07-23 NOTE — PROGRESS NOTES
Urology Progress Note    SUBJECTIVE:  No new  complaints.     OBJECTIVE:   Review of Systems   Constitutional:  Negative for chills and fever.   Gastrointestinal:  Positive for constipation. Negative for abdominal distention, abdominal pain, nausea and vomiting.   Genitourinary:  Positive for difficulty urinating and hematuria. Negative for dysuria, flank pain, frequency and urgency.   Musculoskeletal:  Positive for gait problem. Negative for back pain.   Neurological:  Positive for weakness.   Psychiatric/Behavioral:  Negative for agitation and confusion.         Physical  VITALS:  BP (!) 98/48   Pulse 52   Temp 97 °F (36.1 °C) (Temporal)   Resp 16   Ht 1.803 m (5' 11\")   Wt 100 kg (220 lb 8 oz)   SpO2 93%   BMI 30.75 kg/m²   TEMPERATURE:  Current - Temp: 97 °F (36.1 °C); Max - Temp  Av.3 °F (36.3 °C)  Min: 97 °F (36.1 °C)  Max: 97.5 °F (36.4 °C)   24 HR I&O   Intake/Output Summary (Last 24 hours) at 2024 0732  Last data filed at 2024 0432  Gross per 24 hour   Intake --   Output 1675 ml   Net -1675 ml       Physical Exam   BACK: inspection of back is normal and no tenderness in spine or flanks  ABDOMEN:  soft, non-distended, and non-tender  HEART:  normal rate and hypotensive   CHEST:  Normal respiratory effort   GENITAL/URINARY:  Zafar cath in place draining tea colored urine with mild hematuria, no clots     Data  CBC:   Recent Labs     24  0922 24  0247 24  0227   WBC 7.1 7.9 7.1   HGB 11.1* 10.9* 10.6*   HCT 38.2* 36.9* 35.4*    142 164     BMP:    Recent Labs     24  0938 24  0247 24  0227   * 135* 133*   K 4.5 4.1 4.5  4.5   CL 97* 97* 94*   CO2    BUN 39* 46* 54*   CREATININE 3.3* 3.6* 4.1*   GLUCOSE 163* 197* 182*       Recent Labs     24  1845   LABURIN No growth at 24 hours     No results for input(s): \"BC\" in the last 72 hours.  No results for input(s): \"BLOODCULT2\" in the last 72 hours.      U/A:    Lab Results

## 2024-07-23 NOTE — PROGRESS NOTES
0.9 % sodium chloride infusion   IntraVENous PRN Day, DWAYNE Vergara CNP        polyethylene glycol (GLYCOLAX) packet 17 g  17 g Oral Daily PRN Day, DWAYNE Vergara CNP        acetaminophen (TYLENOL) tablet 650 mg  650 mg Oral Q6H PRN Day, DWAYNE Vergara CNP        Or    acetaminophen (TYLENOL) suppository 650 mg  650 mg Rectal Q6H PRN Day, DWAYNE Vergara CNP        potassium chloride (KLOR-CON M) extended release tablet 40 mEq  40 mEq Oral PRN Day, DWAYNE Vergara CNP        Or    potassium bicarb-citric acid (EFFER-K) effervescent tablet 40 mEq  40 mEq Oral PRN Day, DWAYNE Vergara CNP        Or    potassium chloride 10 mEq/100 mL IVPB (Peripheral Line)  10 mEq IntraVENous PRN Day, DWAYNE Vergara CNP        ondansetron (ZOFRAN-ODT) disintegrating tablet 4 mg  4 mg Oral Q8H PRN Day, DWAYNE Vergara CNP        Or    ondansetron (ZOFRAN) injection 4 mg  4 mg IntraVENous Q6H PRN Day, DWAYNE Vergara CNP   4 mg at 07/22/24 2355    polyethylene glycol (GLYCOLAX) packet 17 g  17 g Oral BID Day, DWAYNE Vergara CNP   17 g at 07/22/24 2024    senna (SENOKOT) tablet 8.6 mg  1 tablet Oral Nightly Day, DWAYNE Vergara CNP   8.6 mg at 07/22/24 2023    insulin lispro (HUMALOG,ADMELOG) injection vial 0-8 Units  0-8 Units SubCUTAneous TID WC Day, DWAYNE Vergara CNP        insulin lispro (HUMALOG,ADMELOG) injection vial 0-4 Units  0-4 Units SubCUTAneous Nightly Day, DWAYNE Vergara CNP        glucose chewable tablet 16 g  4 tablet Oral PRN Day, DWAYNE Vergara CNP        dextrose bolus 10% 125 mL  125 mL IntraVENous PRN Day, DWAYNE Vergara CNP        Or    dextrose bolus 10% 250 mL  250 mL IntraVENous PRN Day, DWAYNE Vergara CNP        glucagon injection 1 mg  1 mg SubCUTAneous PRN Day, DWAYNE Vergara CNP        dextrose 10 % infusion   IntraVENous Continuous PRN Day, DWAYNE Vergara CNP        famotidine (PEPCID) tablet 20 mg  20 mg Oral Daily Day, DWAYNE Vergara CNP   20 mg at 07/22/24 0906       Past Medical  Automated exposure control, adjustment of the mA and/or kV according to size, and the use of iterative reconstruction technique.  ______________________________________ Electronically signed by: FRANCY SINGLETON M.D. Date:     07/21/2024 Time:    15:11     XR CHEST PORTABLE    Result Date: 7/21/2024  EXAM: CHEST RADIOGRAPH  TECHNIQUE: Single frontal chest radiograph.  HISTORY: Shortness of breath.  COMPARISON: 04/20/2024  FINDINGS: There is cardiomegaly with diffuse prominence of the pulmonary vascular markings.  No pleural effusion or pneumothorax.  No focal consolidation.  No acute osseous abnormality.      1.  Cardiomegaly with interstitial pulmonary edema.    ______________________________________ Electronically signed by: REANNA GARCIA M.D. Date:     07/21/2024 Time:    11:25        Assessment   Acute kidney injury- contrast induced nephropathy  Chronic kidney disease stage IV  Acute urinary retention/hematuria  Constipation  Chronic diastolic congestive heart failure  Diabetes type 2  Hypertension      Plan:  Discussed with patient , nursing  Bowel regimen per primary  Workup reviewed today  Monitor labs  Continue Zafar catheter for decompression and monitor response  IV fluids held due to concerns of pulmonary edema and can monitor for now as he does have a diet overnight  Further testing ordered and ongoing  May need to consider renal replacement therapy if the renal function continues to decline. Patient is agreeing with plan.       Huan Rivera MD  07/23/24  11:10 AM

## 2024-07-24 LAB
ALBUMIN SERPL-MCNC: 3.3 G/DL (ref 3.5–5.2)
ALP SERPL-CCNC: 77 U/L (ref 40–130)
ALT SERPL-CCNC: 12 U/L (ref 5–41)
ANION GAP SERPL CALCULATED.3IONS-SCNC: 13 MMOL/L (ref 7–19)
AST SERPL-CCNC: 9 U/L (ref 5–40)
BASOPHILS # BLD: 0 K/UL (ref 0–0.2)
BASOPHILS NFR BLD: 0.4 % (ref 0–1)
BILIRUB SERPL-MCNC: 0.4 MG/DL (ref 0.2–1.2)
BUN SERPL-MCNC: 50 MG/DL (ref 8–23)
CALCIUM SERPL-MCNC: 9.2 MG/DL (ref 8.8–10.2)
CHLORIDE SERPL-SCNC: 97 MMOL/L (ref 98–111)
CO2 SERPL-SCNC: 27 MMOL/L (ref 22–29)
CREAT SERPL-MCNC: 3.6 MG/DL (ref 0.5–1.2)
EOSINOPHIL # BLD: 0.4 K/UL (ref 0–0.6)
EOSINOPHIL NFR BLD: 5.5 % (ref 0–5)
ERYTHROCYTE [DISTWIDTH] IN BLOOD BY AUTOMATED COUNT: 18.1 % (ref 11.5–14.5)
GLUCOSE BLD-MCNC: 163 MG/DL (ref 70–99)
GLUCOSE BLD-MCNC: 180 MG/DL (ref 70–99)
GLUCOSE BLD-MCNC: 197 MG/DL (ref 70–99)
GLUCOSE BLD-MCNC: 215 MG/DL (ref 70–99)
GLUCOSE SERPL-MCNC: 173 MG/DL (ref 74–109)
HCT VFR BLD AUTO: 38.7 % (ref 42–52)
HGB BLD-MCNC: 11.7 G/DL (ref 14–18)
IMM GRANULOCYTES # BLD: 0 K/UL
LYMPHOCYTES # BLD: 0.8 K/UL (ref 1.1–4.5)
LYMPHOCYTES NFR BLD: 12.3 % (ref 20–40)
MCH RBC QN AUTO: 26.1 PG (ref 27–31)
MCHC RBC AUTO-ENTMCNC: 30.2 G/DL (ref 33–37)
MCV RBC AUTO: 86.2 FL (ref 80–94)
MONOCYTES # BLD: 0.6 K/UL (ref 0–0.9)
MONOCYTES NFR BLD: 9.4 % (ref 0–10)
NEUTROPHILS # BLD: 4.8 K/UL (ref 1.5–7.5)
NEUTS SEG NFR BLD: 72.3 % (ref 50–65)
PERFORMED ON: ABNORMAL
PLATELET # BLD AUTO: 186 K/UL (ref 130–400)
PMV BLD AUTO: 11.4 FL (ref 9.4–12.4)
POTASSIUM SERPL-SCNC: 4.1 MMOL/L (ref 3.5–5)
POTASSIUM SERPL-SCNC: 4.1 MMOL/L (ref 3.5–5)
PROT SERPL-MCNC: 6.2 G/DL (ref 6.6–8.7)
RBC # BLD AUTO: 4.49 M/UL (ref 4.7–6.1)
SODIUM SERPL-SCNC: 137 MMOL/L (ref 136–145)
WBC # BLD AUTO: 6.7 K/UL (ref 4.8–10.8)

## 2024-07-24 PROCEDURE — 80053 COMPREHEN METABOLIC PANEL: CPT

## 2024-07-24 PROCEDURE — 82962 GLUCOSE BLOOD TEST: CPT

## 2024-07-24 PROCEDURE — 6370000000 HC RX 637 (ALT 250 FOR IP)

## 2024-07-24 PROCEDURE — 2700000000 HC OXYGEN THERAPY PER DAY

## 2024-07-24 PROCEDURE — 99231 SBSQ HOSP IP/OBS SF/LOW 25: CPT | Performed by: NURSE PRACTITIONER

## 2024-07-24 PROCEDURE — 94640 AIRWAY INHALATION TREATMENT: CPT

## 2024-07-24 PROCEDURE — 6360000002 HC RX W HCPCS

## 2024-07-24 PROCEDURE — 1200000000 HC SEMI PRIVATE

## 2024-07-24 PROCEDURE — 94760 N-INVAS EAR/PLS OXIMETRY 1: CPT

## 2024-07-24 PROCEDURE — 36415 COLL VENOUS BLD VENIPUNCTURE: CPT

## 2024-07-24 PROCEDURE — 2580000003 HC RX 258

## 2024-07-24 PROCEDURE — 51702 INSERT TEMP BLADDER CATH: CPT

## 2024-07-24 PROCEDURE — 85025 COMPLETE CBC W/AUTO DIFF WBC: CPT

## 2024-07-24 RX ADMIN — SENNOSIDES 8.6 MG: 8.6 TABLET, FILM COATED ORAL at 20:45

## 2024-07-24 RX ADMIN — INSULIN LISPRO 2 UNITS: 100 INJECTION, SOLUTION INTRAVENOUS; SUBCUTANEOUS at 12:44

## 2024-07-24 RX ADMIN — TAMSULOSIN HYDROCHLORIDE 0.4 MG: 0.4 CAPSULE ORAL at 08:19

## 2024-07-24 RX ADMIN — IPRATROPIUM BROMIDE AND ALBUTEROL SULFATE 1 DOSE: 2.5; .5 SOLUTION RESPIRATORY (INHALATION) at 06:37

## 2024-07-24 RX ADMIN — SODIUM CHLORIDE, PRESERVATIVE FREE 10 ML: 5 INJECTION INTRAVENOUS at 08:19

## 2024-07-24 RX ADMIN — IPRATROPIUM BROMIDE AND ALBUTEROL SULFATE 1 DOSE: 2.5; .5 SOLUTION RESPIRATORY (INHALATION) at 14:07

## 2024-07-24 RX ADMIN — APIXABAN 2.5 MG: 2.5 TABLET, FILM COATED ORAL at 20:45

## 2024-07-24 RX ADMIN — CALCITRIOL CAPSULES 0.25 MCG 0.25 MCG: 0.25 CAPSULE ORAL at 08:19

## 2024-07-24 RX ADMIN — SODIUM CHLORIDE, PRESERVATIVE FREE 10 ML: 5 INJECTION INTRAVENOUS at 20:48

## 2024-07-24 RX ADMIN — FAMOTIDINE 20 MG: 20 TABLET ORAL at 08:19

## 2024-07-24 RX ADMIN — RANOLAZINE 1000 MG: 500 TABLET, EXTENDED RELEASE ORAL at 20:45

## 2024-07-24 RX ADMIN — ISOSORBIDE MONONITRATE 60 MG: 60 TABLET, EXTENDED RELEASE ORAL at 20:45

## 2024-07-24 RX ADMIN — ASPIRIN 81 MG: 81 TABLET, CHEWABLE ORAL at 08:19

## 2024-07-24 RX ADMIN — POLYETHYLENE GLYCOL 3350 17 G: 17 POWDER, FOR SOLUTION ORAL at 08:19

## 2024-07-24 RX ADMIN — IPRATROPIUM BROMIDE AND ALBUTEROL SULFATE 1 DOSE: 2.5; .5 SOLUTION RESPIRATORY (INHALATION) at 11:08

## 2024-07-24 RX ADMIN — ISOSORBIDE MONONITRATE 60 MG: 60 TABLET, EXTENDED RELEASE ORAL at 08:19

## 2024-07-24 RX ADMIN — ALLOPURINOL 200 MG: 100 TABLET ORAL at 08:19

## 2024-07-24 RX ADMIN — POLYETHYLENE GLYCOL 3350 17 G: 17 POWDER, FOR SOLUTION ORAL at 20:45

## 2024-07-24 RX ADMIN — IPRATROPIUM BROMIDE AND ALBUTEROL SULFATE 1 DOSE: 2.5; .5 SOLUTION RESPIRATORY (INHALATION) at 18:48

## 2024-07-24 RX ADMIN — ONDANSETRON 4 MG: 2 INJECTION INTRAMUSCULAR; INTRAVENOUS at 01:22

## 2024-07-24 RX ADMIN — ROSUVASTATIN CALCIUM 40 MG: 20 TABLET, COATED ORAL at 08:19

## 2024-07-24 RX ADMIN — RANOLAZINE 1000 MG: 500 TABLET, EXTENDED RELEASE ORAL at 08:18

## 2024-07-24 NOTE — PROGRESS NOTES
[facility]    Progress Note    Patient:  Carlos Espana  YOB: 1955  Date of Service: 7/24/2024  MRN: [id number]   Acct: [id number]   Primary Care Physician: Alisa Rangel MD  Advance Directive: Full Code  Admit Date: 7/21/2024       Hospital Day: 3    Portions of this note have been copied forward, however, updated to reflect the most current clinical status of this patient.     CHIEF COMPLAINT: urinary retention    CUMULATIVE HOSPITAL COURSE:     This patient is a 68-year-old male with PMH CKD stage IV, combined systolic and diastolic heart failure, T2DM, atrial fibrillation, CAD, thoracic aortic aneurysm, hyperlipidemia, hypertension, SAMMY who presented to [facility] ED on 7/21 for evaluation of urinary retention x 1 day and constipation. He also reported some increasing SOB with exertion despite his baseline 3L via NC and inability to void. Patient has a history of CKD, requiring dialysis for short period in April of this year. Permcath was placed 4/22/2024 and removed 5/24/2024. Additionally, patient recently underwent a cardiac perfusion study and myocardial MRI with gadolinium 2 days prior to presentation.  He denies any history of BPH. Patient states that initially, he thought he wasn't producing urine which led to weight gain, so he took a Metolazone prior to presentation (otherwise only takes 1-2 times per month for fluid retention.) He required Zafar catheter placement in ER for retention.  CT AP shows no acute findings and decompressed bladder secondary to catheter placement.  Lab work revealed creatinine 3.3, BNP 3319, normal LFTs, hemoglobin 11.1, and PSA 0.45.  Wife reports that his creatinine 2 days prior to presentation was 2.1.  Chest x-ray showed cardiomegaly with interstitial pulmonary edema.  He was noted to be hypoxic with SpO2 76%, requiring increase in baseline oxygen supplementation to 4L.  Patient admitted to hospitalist with consult to nephrology and urology.  Initially felt  input(s): \"LABA1C\" in the last 72 hours.    ABG:No results for input(s): \"PHART\", \"BVQ6LLG\", \"PO2ART\", \"XHX5PWX\", \"BEART\", \"HGBAE\", \"C9CADEII\", \"CARBOXHGBART\" in the last 72 hours.    Rad:   CT ABDOMEN PELVIS WO CONTRAST Additional Contrast? None    Result Date: 7/21/2024  EXAM: CT ABDOMEN AND PELVIS WITHOUT CONTRAST.  DATE: 07/21/2024  COMPARISON: 04/20/2024  HISTORY: Urinary retention  TECHNIQUE:  A helical scan of the abdomen and pelvis was performed  without IV contrast.  FINDINGS:  Lung bases: There is a small layering right pleural effusion with mild dependent atelectasis in the lung bases bilaterally.  Musculoskeletal: A left hip nailing is noted.  No acute osseous abnormality.  Soft tissue: The spleen and liver have a uniform attenuation.  Trace amount of ascites.  Cholecystectomy.  The stomach, pancreas and adrenal glands are normal.  The kidneys have a normal morphology.  There are stable benign appearing left renal cysts with mild bilateral perinephric stranding, nonspecific.  No calculi or hydronephrosis.  There are subcentimeter retroperitoneal and mesenteric lymph nodes.  Aorta has peripheral calcification and does not exceed 3 cm.  The small bowel and appendix are normal.  There are colonic diverticula but no inflammatory changes.  There is a small amount of free fluid in the pelvis, nonspecific.  A catheter has decompressed the bladder.  The prostate, rectum and inguinal regions are normal.      No acute findings in the abdomen or pelvis.  A catheter has decompressed the bladder.  Small amount of free fluid in the abdomen pelvis, nonspecific.  Colonic diverticulosis without diverticulitis.  Stable simple appearing renal cysts.  All CT scans are performed using dose optimization techniques as appropriate to the performed exam and include at least one of the following: Automated exposure control, adjustment of the mA and/or kV according to size, and the use of iterative reconstruction technique.

## 2024-07-24 NOTE — PROGRESS NOTES
Nephrology ([facility]) progress Note      Patient:  Carlos Espana  YOB: 1955  Date of Service: 7/24/2024  MRN: [id number]   Acct: [id number]   Primary Care Physician: Alisa Rangel MD  Advance Directive: Full Code  Admit Date: 7/21/2024       Hospital Day: 3  Referring Provider: Bry Cabrera MD    Patient independently seen and examined, Chart, Consults, Notes, Operative notes, Labs, Cardiology, and Radiology studies reviewed as available.      Subjective:  Carlos Espana is a 68 y.o. male for whom we were consulted for evaluation and treatment of acute kidney injury.  He has a history of chronic kidney disease stage IV and briefly required short-term dialysis for about a month after contrast-induced nephropathy/acute kidney injury.  He was recently at [facility] in [place] where he underwent cardiac testing including contrasted MRI and PET.  Since leaving that facility he recalled no urine output.  He has also been dealing with constipation for some time and notes that he has more difficulty voiding when he is having worsening bowel issues.  Recalled no dietary changes.  Denied current chest pain, shortness of air at rest, nausea or vomiting.  Chest x-ray did note some pulmonary edema. Zafar catheter placed on admission with dark yellow urine.  Patient is followed by urology for his bladder outlet obstruction.  Today, he offers no new complaints.  He denies any major shortness of breath.  His renal function has since improved and his serum creatinine is down to 3.6.  Patient is hydrating by mouth. His urine is clearing up.       Allergies:  Morphine and Hydralazine    Medicines:  Current Facility-Administered Medications   Medication Dose Route Frequency Provider Last Rate Last Admin    ipratropium 0.5 mg-albuterol 2.5 mg (DUONEB) nebulizer solution 1 Dose  1 Dose Inhalation Q4H WA Ava Mattson, APRN - CNP   1 Dose at 07/24/24 0637    [Held by provider]  reconstruction technique.  ______________________________________ Electronically signed by: FRANCY SINGLETON M.D. Date:     07/21/2024 Time:    15:11     XR CHEST PORTABLE    Result Date: 7/21/2024  EXAM: CHEST RADIOGRAPH  TECHNIQUE: Single frontal chest radiograph.  HISTORY: Shortness of breath.  COMPARISON: 04/20/2024  FINDINGS: There is cardiomegaly with diffuse prominence of the pulmonary vascular markings.  No pleural effusion or pneumothorax.  No focal consolidation.  No acute osseous abnormality.      1.  Cardiomegaly with interstitial pulmonary edema.    ______________________________________ Electronically signed by: REANNA GARCIA M.D. Date:     07/21/2024 Time:    11:25        Assessment   Acute kidney injury- contrast induced nephropathy  Chronic kidney disease stage IV  Acute urinary retention/ hematuria  Constipation  Chronic diastolic congestive heart failure  Diabetes type 2  Hypertension      Plan:  Discussed with patient , nursing  Bowel regimen per primary  Workup reviewed today  Monitor labs  Continue Zafar catheter for decompression and monitor response  IV fluids held due to concerns of pulmonary edema and can monitor for now as he does have a diet overnight  Further testing ordered and ongoing  Holding on renal replacement therapy and follow up labs.       Huan Rivera MD  07/24/24  10:14 AM

## 2024-07-24 NOTE — PROGRESS NOTES
Urology Progress Note      SUBJECTIVE: No new  complaints    OBJECTIVE:   Review of Systems     Physical  VITALS:  BP (!) 141/69   Pulse 57   Temp 97.3 °F (36.3 °C) (Temporal)   Resp 20   Ht 1.803 m (5' 11\")   Wt 100 kg (220 lb 8 oz)   SpO2 93%   BMI 30.75 kg/m²   TEMPERATURE:  Current - Temp: 97.3 °F (36.3 °C); Max - Temp  Av.5 °F (36.4 °C)  Min: 97.3 °F (36.3 °C)  Max: 97.7 °F (36.5 °C)   24 HR I&O   Intake/Output Summary (Last 24 hours) at 2024 0744  Last data filed at 2024 0434  Gross per 24 hour   Intake --   Output 2625 ml   Net -2625 ml       Physical Exam   BACK: no tenderness in spine or flanks  ABDOMEN:  soft, non-distended, and non-tender  HEART:  normal rate  CHEST: Normal respiratory effort  GENITAL/URINARY: Zafar catheter in place draining clear roman urine    Data  CBC:   Recent Labs     24  0247 24  0227 24  0503   WBC 7.9 7.1 6.7   HGB 10.9* 10.6* 11.7*   HCT 36.9* 35.4* 38.7*    164 186     BMP:    Recent Labs     24  0247 24  0227 24  0503   * 133* 137   K 4.1 4.5  4.5 4.1  4.1   CL 97* 94* 97*   CO2    BUN 46* 54* 50*   CREATININE 3.6* 4.1* 3.6*   GLUCOSE 197* 182* 173*       Recent Labs     24  1845   LABURIN No growth at 24 hours     No results for input(s): \"BC\" in the last 72 hours.  No results for input(s): \"BLOODCULT2\" in the last 72 hours.      U/A:    Lab Results   Component Value Date/Time    COLORU YELLOW 2024 06:45 PM    PHUR 5.0 2024 06:45 PM    PHUR 6.0 2024 10:30 AM    WBCUA 10-15 2024 06:45 PM    RBCUA 50-75 2024 06:45 PM    MUCUS 1+ 2024 06:45 PM    BACTERIA 1+ 2024 06:45 PM    CLARITYU TURBID 2024 06:45 PM    LEUKOCYTESUR TRACE 2024 06:45 PM    UROBILINOGEN 0.2 2024 06:45 PM    BILIRUBINUR Negative 2024 06:45 PM    BLOODU LARGE 2024 06:45 PM    GLUCOSEU Negative 2024 06:45 PM       Radiology:   CT ABDOMEN PELVIS WO

## 2024-07-25 LAB
ANION GAP SERPL CALCULATED.3IONS-SCNC: 10 MMOL/L (ref 7–19)
BASOPHILS # BLD: 0.1 K/UL (ref 0–0.2)
BASOPHILS NFR BLD: 0.7 % (ref 0–1)
BUN SERPL-MCNC: 41 MG/DL (ref 8–23)
CALCIUM SERPL-MCNC: 9.1 MG/DL (ref 8.8–10.2)
CHLORIDE SERPL-SCNC: 97 MMOL/L (ref 98–111)
CO2 SERPL-SCNC: 30 MMOL/L (ref 22–29)
CREAT SERPL-MCNC: 2.7 MG/DL (ref 0.5–1.2)
EOSINOPHIL # BLD: 0.4 K/UL (ref 0–0.6)
EOSINOPHIL NFR BLD: 5.5 % (ref 0–5)
ERYTHROCYTE [DISTWIDTH] IN BLOOD BY AUTOMATED COUNT: 17.9 % (ref 11.5–14.5)
GLUCOSE BLD-MCNC: 169 MG/DL (ref 70–99)
GLUCOSE BLD-MCNC: 179 MG/DL (ref 70–99)
GLUCOSE BLD-MCNC: 213 MG/DL (ref 70–99)
GLUCOSE BLD-MCNC: 222 MG/DL (ref 70–99)
GLUCOSE SERPL-MCNC: 215 MG/DL (ref 74–109)
HCT VFR BLD AUTO: 40.4 % (ref 42–52)
HGB BLD-MCNC: 11.9 G/DL (ref 14–18)
IMM GRANULOCYTES # BLD: 0 K/UL
LYMPHOCYTES # BLD: 1.1 K/UL (ref 1.1–4.5)
LYMPHOCYTES NFR BLD: 15.8 % (ref 20–40)
MCH RBC QN AUTO: 25.1 PG (ref 27–31)
MCHC RBC AUTO-ENTMCNC: 29.5 G/DL (ref 33–37)
MCV RBC AUTO: 85.2 FL (ref 80–94)
MONOCYTES # BLD: 0.7 K/UL (ref 0–0.9)
MONOCYTES NFR BLD: 10.5 % (ref 0–10)
NEUTROPHILS # BLD: 4.7 K/UL (ref 1.5–7.5)
NEUTS SEG NFR BLD: 67.4 % (ref 50–65)
PERFORMED ON: ABNORMAL
PLATELET # BLD AUTO: 182 K/UL (ref 130–400)
PMV BLD AUTO: 10.9 FL (ref 9.4–12.4)
POTASSIUM SERPL-SCNC: 4 MMOL/L (ref 3.5–5)
RBC # BLD AUTO: 4.74 M/UL (ref 4.7–6.1)
SODIUM SERPL-SCNC: 137 MMOL/L (ref 136–145)
WBC # BLD AUTO: 6.9 K/UL (ref 4.8–10.8)

## 2024-07-25 PROCEDURE — 6370000000 HC RX 637 (ALT 250 FOR IP)

## 2024-07-25 PROCEDURE — 36415 COLL VENOUS BLD VENIPUNCTURE: CPT

## 2024-07-25 PROCEDURE — 80048 BASIC METABOLIC PNL TOTAL CA: CPT

## 2024-07-25 PROCEDURE — 1200000000 HC SEMI PRIVATE

## 2024-07-25 PROCEDURE — 94760 N-INVAS EAR/PLS OXIMETRY 1: CPT

## 2024-07-25 PROCEDURE — 2700000000 HC OXYGEN THERAPY PER DAY

## 2024-07-25 PROCEDURE — 85025 COMPLETE CBC W/AUTO DIFF WBC: CPT

## 2024-07-25 PROCEDURE — 82962 GLUCOSE BLOOD TEST: CPT

## 2024-07-25 PROCEDURE — 94640 AIRWAY INHALATION TREATMENT: CPT

## 2024-07-25 PROCEDURE — 2580000003 HC RX 258

## 2024-07-25 RX ORDER — CARVEDILOL 12.5 MG/1
12.5 TABLET ORAL 2 TIMES DAILY
Status: DISCONTINUED | OUTPATIENT
Start: 2024-07-25 | End: 2024-07-26 | Stop reason: HOSPADM

## 2024-07-25 RX ORDER — BUMETANIDE 1 MG/1
2 TABLET ORAL DAILY
Status: DISCONTINUED | OUTPATIENT
Start: 2024-07-26 | End: 2024-07-26 | Stop reason: HOSPADM

## 2024-07-25 RX ADMIN — IPRATROPIUM BROMIDE AND ALBUTEROL SULFATE 1 DOSE: 2.5; .5 SOLUTION RESPIRATORY (INHALATION) at 20:06

## 2024-07-25 RX ADMIN — APIXABAN 2.5 MG: 2.5 TABLET, FILM COATED ORAL at 08:44

## 2024-07-25 RX ADMIN — IPRATROPIUM BROMIDE AND ALBUTEROL SULFATE 1 DOSE: 2.5; .5 SOLUTION RESPIRATORY (INHALATION) at 15:06

## 2024-07-25 RX ADMIN — ISOSORBIDE MONONITRATE 60 MG: 60 TABLET, EXTENDED RELEASE ORAL at 08:44

## 2024-07-25 RX ADMIN — INSULIN LISPRO 2 UNITS: 100 INJECTION, SOLUTION INTRAVENOUS; SUBCUTANEOUS at 17:12

## 2024-07-25 RX ADMIN — CALCITRIOL CAPSULES 0.25 MCG 0.25 MCG: 0.25 CAPSULE ORAL at 08:44

## 2024-07-25 RX ADMIN — ALLOPURINOL 200 MG: 100 TABLET ORAL at 08:44

## 2024-07-25 RX ADMIN — POLYETHYLENE GLYCOL 3350 17 G: 17 POWDER, FOR SOLUTION ORAL at 20:58

## 2024-07-25 RX ADMIN — ASPIRIN 81 MG: 81 TABLET, CHEWABLE ORAL at 08:44

## 2024-07-25 RX ADMIN — APIXABAN 2.5 MG: 2.5 TABLET, FILM COATED ORAL at 20:58

## 2024-07-25 RX ADMIN — ROSUVASTATIN CALCIUM 40 MG: 20 TABLET, COATED ORAL at 08:44

## 2024-07-25 RX ADMIN — RANOLAZINE 1000 MG: 500 TABLET, EXTENDED RELEASE ORAL at 20:58

## 2024-07-25 RX ADMIN — ISOSORBIDE MONONITRATE 60 MG: 60 TABLET, EXTENDED RELEASE ORAL at 20:58

## 2024-07-25 RX ADMIN — NIFEDIPINE 60 MG: 60 TABLET, EXTENDED RELEASE ORAL at 10:47

## 2024-07-25 RX ADMIN — FAMOTIDINE 20 MG: 20 TABLET ORAL at 08:43

## 2024-07-25 RX ADMIN — SACUBITRIL AND VALSARTAN 1 TABLET: 49; 51 TABLET, FILM COATED ORAL at 10:46

## 2024-07-25 RX ADMIN — TAMSULOSIN HYDROCHLORIDE 0.4 MG: 0.4 CAPSULE ORAL at 08:44

## 2024-07-25 RX ADMIN — IPRATROPIUM BROMIDE AND ALBUTEROL SULFATE 1 DOSE: 2.5; .5 SOLUTION RESPIRATORY (INHALATION) at 11:35

## 2024-07-25 RX ADMIN — SODIUM CHLORIDE, PRESERVATIVE FREE 10 ML: 5 INJECTION INTRAVENOUS at 20:59

## 2024-07-25 RX ADMIN — POLYETHYLENE GLYCOL 3350 17 G: 17 POWDER, FOR SOLUTION ORAL at 08:45

## 2024-07-25 RX ADMIN — SENNOSIDES 8.6 MG: 8.6 TABLET, FILM COATED ORAL at 20:58

## 2024-07-25 RX ADMIN — IPRATROPIUM BROMIDE AND ALBUTEROL SULFATE 1 DOSE: 2.5; .5 SOLUTION RESPIRATORY (INHALATION) at 06:54

## 2024-07-25 RX ADMIN — INSULIN LISPRO 2 UNITS: 100 INJECTION, SOLUTION INTRAVENOUS; SUBCUTANEOUS at 12:40

## 2024-07-25 RX ADMIN — SACUBITRIL AND VALSARTAN 1 TABLET: 49; 51 TABLET, FILM COATED ORAL at 20:58

## 2024-07-25 RX ADMIN — SODIUM CHLORIDE, PRESERVATIVE FREE 10 ML: 5 INJECTION INTRAVENOUS at 08:47

## 2024-07-25 RX ADMIN — RANOLAZINE 1000 MG: 500 TABLET, EXTENDED RELEASE ORAL at 08:43

## 2024-07-25 NOTE — PROGRESS NOTES
[facility]    Progress Note    Patient:  Carlos Espana  YOB: 1955  Date of Service: 7/25/2024  MRN: [id number]   Acct: [id number]   Primary Care Physician: Alisa Rangel MD  Advance Directive: Full Code  Admit Date: 7/21/2024       Hospital Day: 4    Portions of this note have been copied forward, however, updated to reflect the most current clinical status of this patient.     CHIEF COMPLAINT: urinary retention    CUMULATIVE HOSPITAL COURSE:     This patient is a 68-year-old male with PMH CKD stage IV, combined systolic and diastolic heart failure, T2DM, atrial fibrillation, CAD, thoracic aortic aneurysm, hyperlipidemia, hypertension, SAMMY who presented to [facility] ED on 7/21 for evaluation of urinary retention x 1 day and constipation. He also reported some increasing SOB with exertion despite his baseline 3L via NC and inability to void. Patient has a history of CKD, requiring dialysis for short period in April of this year. Permcath was placed 4/22/2024 and removed 5/24/2024. Additionally, patient recently underwent a cardiac perfusion study and myocardial MRI with gadolinium 2 days prior to presentation.  He denies any history of BPH. Patient states that initially, he thought he wasn't producing urine which led to weight gain, so he took a Metolazone prior to presentation (otherwise only takes 1-2 times per month for fluid retention.) He required Zafar catheter placement in ER for retention.  CT AP shows no acute findings and decompressed bladder secondary to catheter placement.  Lab work revealed creatinine 3.3, BNP 3319, normal LFTs, hemoglobin 11.1, and PSA 0.45.  Wife reports that his creatinine 2 days prior to presentation was 2.1.  Chest x-ray showed cardiomegaly with interstitial pulmonary edema.  He was noted to be hypoxic with SpO2 76%, requiring increase in baseline oxygen supplementation to 4L.  Patient admitted to hospitalist with consult to nephrology and urology.  Initially felt  \"LABA1C\" in the last 72 hours.    ABG:No results for input(s): \"PHART\", \"MYO9MNQ\", \"PO2ART\", \"JXQ1SMV\", \"BEART\", \"HGBAE\", \"M0WYEJGE\", \"CARBOXHGBART\" in the last 72 hours.    Rad:   CT ABDOMEN PELVIS WO CONTRAST Additional Contrast? None    Result Date: 7/21/2024  EXAM: CT ABDOMEN AND PELVIS WITHOUT CONTRAST.  DATE: 07/21/2024  COMPARISON: 04/20/2024  HISTORY: Urinary retention  TECHNIQUE:  A helical scan of the abdomen and pelvis was performed  without IV contrast.  FINDINGS:  Lung bases: There is a small layering right pleural effusion with mild dependent atelectasis in the lung bases bilaterally.  Musculoskeletal: A left hip nailing is noted.  No acute osseous abnormality.  Soft tissue: The spleen and liver have a uniform attenuation.  Trace amount of ascites.  Cholecystectomy.  The stomach, pancreas and adrenal glands are normal.  The kidneys have a normal morphology.  There are stable benign appearing left renal cysts with mild bilateral perinephric stranding, nonspecific.  No calculi or hydronephrosis.  There are subcentimeter retroperitoneal and mesenteric lymph nodes.  Aorta has peripheral calcification and does not exceed 3 cm.  The small bowel and appendix are normal.  There are colonic diverticula but no inflammatory changes.  There is a small amount of free fluid in the pelvis, nonspecific.  A catheter has decompressed the bladder.  The prostate, rectum and inguinal regions are normal.      No acute findings in the abdomen or pelvis.  A catheter has decompressed the bladder.  Small amount of free fluid in the abdomen pelvis, nonspecific.  Colonic diverticulosis without diverticulitis.  Stable simple appearing renal cysts.  All CT scans are performed using dose optimization techniques as appropriate to the performed exam and include at least one of the following: Automated exposure control, adjustment of the mA and/or kV according to size, and the use of iterative reconstruction technique.

## 2024-07-25 NOTE — PLAN OF CARE
Problem: ABCDS Injury Assessment  Goal: Absence of physical injury  Outcome: Progressing     Problem: Safety - Adult  Goal: Free from fall injury  Outcome: Progressing     Problem: Chronic Conditions and Co-morbidities  Goal: Patient's chronic conditions and co-morbidity symptoms are monitored and maintained or improved  Outcome: Progressing

## 2024-07-25 NOTE — PROGRESS NOTES
Zafar pulled per order & pt provided 3 bottles to initiate 3 bottle routine. Pt verbalizes understanding. PCA aware.

## 2024-07-25 NOTE — PROGRESS NOTES
Nephrology ([facility]) progress Note      Patient:  Carlos Espana  YOB: 1955  Date of Service: 7/25/2024  MRN: [id number]   Acct: [id number]   Primary Care Physician: Alisa Rangel MD  Advance Directive: Full Code  Admit Date: 7/21/2024       Hospital Day: 4  Referring Provider: Bry Cabrera MD    Patient independently seen and examined, Chart, Consults, Notes, Operative notes, Labs, Cardiology, and Radiology studies reviewed as available.      Subjective:  Carlos Espana is a 68 y.o. male for whom we were consulted for evaluation and treatment of acute kidney injury.  He has a history of chronic kidney disease stage IV and briefly required short-term dialysis for about a month after contrast-induced nephropathy/acute kidney injury.  He was recently at [facility] in [place] where he underwent cardiac testing including contrasted MRI and PET.  Since leaving that facility he recalled no urine output.  He has also been dealing with constipation for some time and notes that he has more difficulty voiding when he is having worsening bowel issues.  Recalled no dietary changes.  Denied current chest pain, shortness of air at rest, nausea or vomiting.  Chest x-ray did note some pulmonary edema. Zafar catheter placed on admission with dark yellow urine.  Patient is followed by urology for his bladder outlet obstruction.  Today, he offers no new complaints.  He denies any major shortness of breath.  His renal function has since improved and his serum creatinine is down to 2.7.  Patient is hydrating by mouth. His urine is more hematuric today after it starts to clear up.       Allergies:  Morphine and Hydralazine    Medicines:  Current Facility-Administered Medications   Medication Dose Route Frequency Provider Last Rate Last Admin    [START ON 7/26/2024] bumetanide (BUMEX) tablet 2 mg  2 mg Oral Daily Ava Strauss, APRN - CNP        carvedilol (COREG) tablet 12.5 mg  12.5 mg  Health - Occupational Stress Questionnaire     Feeling of Stress : Not at all   Social Connections: Socially Integrated (8/11/2023)    Social Connection and Isolation Panel [NHANES]     Frequency of Communication with Friends and Family: More than three times a week     Frequency of Social Gatherings with Friends and Family: Once a week     Attends [employer] Services: More than 4 times per year     Active Member of Clubs or Organizations: Yes     Attends Club or Organization Meetings: More than 4 times per year     Marital Status:    Intimate Partner Violence: Not At Risk (8/11/2023)    Humiliation, Afraid, Rape, and Kick questionnaire     Fear of Current or Ex-Partner: No     Emotionally Abused: No     Physically Abused: No     Sexually Abused: No   Housing Stability: Low Risk  (7/21/2024)    Housing Stability Vital Sign     Unable to Pay for Housing in the Last Year: No     Number of Places Lived in the Last Year: 1     Unstable Housing in the Last Year: No         Review of Systems:  History obtained from chart review and the patient  General ROS: No fever or chills  Respiratory ROS: No cough, shortness of breath, wheezing  Cardiovascular ROS: No chest pain or palpitations  Gastrointestinal ROS: No abdominal pain or melena  Genito-Urinary ROS: +dysuria , +hematuria  Musculoskeletal ROS: No joint pain or swelling   14 point ROS reviewed with the patient and negative except as noted above and in the HPI unless unable to obtain.    Objective:  Patient Vitals for the past 24 hrs:   BP Temp Temp src Pulse Resp SpO2   07/25/24 0811 (!) 174/73 97.7 °F (36.5 °C) Temporal 58 20 94 %   07/25/24 0655 -- -- -- -- -- 97 %   07/25/24 0454 (!) 161/76 97.1 °F (36.2 °C) Temporal 55 18 95 %   07/24/24 1920 (!) 156/61 97.8 °F (36.6 °C) Temporal 61 18 91 %   07/24/24 1918 -- 97.8 °F (36.6 °C) Temporal -- -- --   07/24/24 1650 (!) 152/78 97 °F (36.1 °C) Temporal 60 20 92 %   07/24/24 1336 -- 97.7 °F (36.5 °C) Tympanic 53 -- 95

## 2024-07-26 ENCOUNTER — READMISSION MANAGEMENT (OUTPATIENT)
Dept: CALL CENTER | Facility: HOSPITAL | Age: 69
End: 2024-07-26
Payer: COMMERCIAL

## 2024-07-26 VITALS
HEIGHT: 71 IN | SYSTOLIC BLOOD PRESSURE: 121 MMHG | WEIGHT: 220.5 LBS | OXYGEN SATURATION: 95 % | HEART RATE: 57 BPM | DIASTOLIC BLOOD PRESSURE: 64 MMHG | TEMPERATURE: 97 F | BODY MASS INDEX: 30.87 KG/M2 | RESPIRATION RATE: 20 BRPM

## 2024-07-26 LAB
ANION GAP SERPL CALCULATED.3IONS-SCNC: 9 MMOL/L (ref 7–19)
BASOPHILS # BLD: 0.1 K/UL (ref 0–0.2)
BASOPHILS NFR BLD: 0.8 % (ref 0–1)
BUN SERPL-MCNC: 34 MG/DL (ref 8–23)
CALCIUM SERPL-MCNC: 9.5 MG/DL (ref 8.8–10.2)
CHLORIDE SERPL-SCNC: 94 MMOL/L (ref 98–111)
CO2 SERPL-SCNC: 31 MMOL/L (ref 22–29)
CREAT SERPL-MCNC: 2.3 MG/DL (ref 0.5–1.2)
EOSINOPHIL # BLD: 0.4 K/UL (ref 0–0.6)
EOSINOPHIL NFR BLD: 5.5 % (ref 0–5)
ERYTHROCYTE [DISTWIDTH] IN BLOOD BY AUTOMATED COUNT: 17.8 % (ref 11.5–14.5)
GLUCOSE BLD-MCNC: 200 MG/DL (ref 70–99)
GLUCOSE BLD-MCNC: 259 MG/DL (ref 70–99)
GLUCOSE SERPL-MCNC: 205 MG/DL (ref 74–109)
HCT VFR BLD AUTO: 40.1 % (ref 42–52)
HGB BLD-MCNC: 12.3 G/DL (ref 14–18)
IMM GRANULOCYTES # BLD: 0 K/UL
LYMPHOCYTES # BLD: 1.2 K/UL (ref 1.1–4.5)
LYMPHOCYTES NFR BLD: 16.1 % (ref 20–40)
MCH RBC QN AUTO: 26.2 PG (ref 27–31)
MCHC RBC AUTO-ENTMCNC: 30.7 G/DL (ref 33–37)
MCV RBC AUTO: 85.5 FL (ref 80–94)
MONOCYTES # BLD: 0.7 K/UL (ref 0–0.9)
MONOCYTES NFR BLD: 9.7 % (ref 0–10)
NEUTROPHILS # BLD: 4.9 K/UL (ref 1.5–7.5)
NEUTS SEG NFR BLD: 67.8 % (ref 50–65)
PERFORMED ON: ABNORMAL
PERFORMED ON: ABNORMAL
PLATELET # BLD AUTO: 199 K/UL (ref 130–400)
PMV BLD AUTO: 11.3 FL (ref 9.4–12.4)
POTASSIUM SERPL-SCNC: 4.2 MMOL/L (ref 3.5–5)
RBC # BLD AUTO: 4.69 M/UL (ref 4.7–6.1)
SODIUM SERPL-SCNC: 134 MMOL/L (ref 136–145)
WBC # BLD AUTO: 7.2 K/UL (ref 4.8–10.8)

## 2024-07-26 PROCEDURE — 36415 COLL VENOUS BLD VENIPUNCTURE: CPT

## 2024-07-26 PROCEDURE — 80048 BASIC METABOLIC PNL TOTAL CA: CPT

## 2024-07-26 PROCEDURE — 2580000003 HC RX 258

## 2024-07-26 PROCEDURE — 85025 COMPLETE CBC W/AUTO DIFF WBC: CPT

## 2024-07-26 PROCEDURE — 94640 AIRWAY INHALATION TREATMENT: CPT

## 2024-07-26 PROCEDURE — 6370000000 HC RX 637 (ALT 250 FOR IP)

## 2024-07-26 PROCEDURE — 2700000000 HC OXYGEN THERAPY PER DAY

## 2024-07-26 PROCEDURE — 82962 GLUCOSE BLOOD TEST: CPT

## 2024-07-26 PROCEDURE — 94760 N-INVAS EAR/PLS OXIMETRY 1: CPT

## 2024-07-26 RX ORDER — METOLAZONE 5 MG/1
5 TABLET ORAL DAILY
Qty: 30 TABLET | Refills: 0
Start: 2024-07-26

## 2024-07-26 RX ORDER — MINOXIDIL 2.5 MG/1
2.5 TABLET ORAL 2 TIMES DAILY
Qty: 30 TABLET | Refills: 3
Start: 2024-07-26

## 2024-07-26 RX ORDER — POLYETHYLENE GLYCOL 3350 17 G/17G
17 POWDER, FOR SOLUTION ORAL DAILY PRN
Qty: 527 G | Refills: 0 | Status: SHIPPED | OUTPATIENT
Start: 2024-07-26 | End: 2024-08-25

## 2024-07-26 RX ORDER — RANOLAZINE 500 MG/1
1000 TABLET, EXTENDED RELEASE ORAL 2 TIMES DAILY
Qty: 60 TABLET | Refills: 0 | Status: SHIPPED | OUTPATIENT
Start: 2024-07-26

## 2024-07-26 RX ADMIN — POLYETHYLENE GLYCOL 3350 17 G: 17 POWDER, FOR SOLUTION ORAL at 08:52

## 2024-07-26 RX ADMIN — IPRATROPIUM BROMIDE AND ALBUTEROL SULFATE 1 DOSE: 2.5; .5 SOLUTION RESPIRATORY (INHALATION) at 06:22

## 2024-07-26 RX ADMIN — ALLOPURINOL 200 MG: 100 TABLET ORAL at 08:51

## 2024-07-26 RX ADMIN — SACUBITRIL AND VALSARTAN 1 TABLET: 49; 51 TABLET, FILM COATED ORAL at 08:51

## 2024-07-26 RX ADMIN — CARVEDILOL 12.5 MG: 12.5 TABLET, FILM COATED ORAL at 08:51

## 2024-07-26 RX ADMIN — TAMSULOSIN HYDROCHLORIDE 0.4 MG: 0.4 CAPSULE ORAL at 08:52

## 2024-07-26 RX ADMIN — ACETAMINOPHEN 650 MG: 325 TABLET ORAL at 09:02

## 2024-07-26 RX ADMIN — BUMETANIDE 2 MG: 1 TABLET ORAL at 08:52

## 2024-07-26 RX ADMIN — APIXABAN 2.5 MG: 2.5 TABLET, FILM COATED ORAL at 08:51

## 2024-07-26 RX ADMIN — IPRATROPIUM BROMIDE AND ALBUTEROL SULFATE 1 DOSE: 2.5; .5 SOLUTION RESPIRATORY (INHALATION) at 10:01

## 2024-07-26 RX ADMIN — ISOSORBIDE MONONITRATE 60 MG: 60 TABLET, EXTENDED RELEASE ORAL at 08:51

## 2024-07-26 RX ADMIN — ROSUVASTATIN CALCIUM 40 MG: 20 TABLET, COATED ORAL at 08:52

## 2024-07-26 RX ADMIN — ASPIRIN 81 MG: 81 TABLET, CHEWABLE ORAL at 08:52

## 2024-07-26 RX ADMIN — SODIUM CHLORIDE, PRESERVATIVE FREE 10 ML: 5 INJECTION INTRAVENOUS at 08:52

## 2024-07-26 RX ADMIN — CALCITRIOL CAPSULES 0.25 MCG 0.25 MCG: 0.25 CAPSULE ORAL at 08:51

## 2024-07-26 RX ADMIN — RANOLAZINE 1000 MG: 500 TABLET, EXTENDED RELEASE ORAL at 08:51

## 2024-07-26 RX ADMIN — NIFEDIPINE 60 MG: 60 TABLET, EXTENDED RELEASE ORAL at 08:51

## 2024-07-26 RX ADMIN — FAMOTIDINE 20 MG: 20 TABLET ORAL at 08:52

## 2024-07-26 ASSESSMENT — PAIN DESCRIPTION - LOCATION: LOCATION: HEAD;NECK

## 2024-07-26 ASSESSMENT — PAIN DESCRIPTION - DESCRIPTORS: DESCRIPTORS: ACHING;PRESSURE

## 2024-07-26 ASSESSMENT — PAIN SCALES - GENERAL: PAINLEVEL_OUTOF10: 4

## 2024-07-26 ASSESSMENT — PAIN DESCRIPTION - ORIENTATION: ORIENTATION: POSTERIOR

## 2024-07-26 NOTE — PLAN OF CARE
Problem: ABCDS Injury Assessment  Goal: Absence of physical injury  7/26/2024 0026 by Tonie Cisneros RN  Outcome: Progressing  7/25/2024 1807 by Bindu Payton RN  Outcome: Progressing     Problem: Safety - Adult  Goal: Free from fall injury  7/26/2024 0026 by Tonie Cisneros RN  Outcome: Progressing  7/25/2024 1807 by Bindu Payton RN  Outcome: Progressing     Problem: Chronic Conditions and Co-morbidities  Goal: Patient's chronic conditions and co-morbidity symptoms are monitored and maintained or improved  7/26/2024 0026 by Tonie Cisneros RN  Outcome: Progressing  7/25/2024 1807 by Bindu Payton RN  Outcome: Progressing

## 2024-07-26 NOTE — DISCHARGE INSTRUCTIONS
Hold Minoxidil until follow-up with PCP  Hold metolazone until follow-up with nephrology  Follow-up with urology within 1 week  Return to ER if you experience urinary retention or worsening abdominal pain

## 2024-07-26 NOTE — OUTREACH NOTE
Prep Survey      Flowsheet Row Responses   [facility]? Non-[facility]   Is LACE score < 7 ? Non-BH Discharge   Eligibility [facility]   Date of Admission 07/21/24   Date of Discharge 07/26/24   Discharge Disposition Home or Self Care   Discharge diagnosis Acute pulmonary edema (HCC) (Primary Dx),  Acute on chronic renal insufficiency,  Urinary retention   Does the patient have one of the following disease processes/diagnoses(primary or secondary)? Other   Does the patient have Home health ordered? No   Is there a DME ordered? No   Prep survey completed? Yes            Mulu NAVAS - Registered Nurse

## 2024-07-26 NOTE — DISCHARGE SUMMARY
[facility]    Discharge Summary      Carlos Espana  :  1955  MRN:  [id number]    Admit date:  2024  Discharge date:    2024    Discharging Physician:  Dr. Cabrera    Advance Directive: Full Code    Consults: nephrology and urology    Primary Care Physician:  Alisa Rangel MD    Discharge Diagnoses:  Principal Problem:    Acute kidney injury superimposed on chronic kidney disease (HCC)  Active Problems:    DM (diabetes mellitus) (HCC)    Acute on chronic diastolic CHF (congestive heart failure) (HCC)    PAF (paroxysmal atrial fibrillation) (HCC)    Left ventricular dysfunction    Constipation    Urinary retention  Resolved Problems:    * No resolved hospital problems. *      Portions of this note have been copied forward, however, changed to reflect the most current clinical status of this patient.    Hospital Course:    This patient is a 68-year-old male with PMH CKD stage IV, combined systolic and diastolic heart failure, T2DM, atrial fibrillation, CAD, thoracic aortic aneurysm, hyperlipidemia, hypertension, SAMMY who presented to Health system ED on  for evaluation of urinary retention x 1 day and constipation. He also reported some increasing SOB with exertion despite his baseline 3L via NC and inability to void. Patient has a history of CKD, requiring dialysis for short period in April of this year. Permcath was placed 2024 and removed 2024. Additionally, patient recently underwent a cardiac perfusion study and myocardial MRI with gadolinium 2 days prior to presentation.  He denies any history of BPH. Patient states that initially, he thought he wasn't producing urine which led to weight gain, so he took a Metolazone prior to presentation (otherwise only takes 1-2 times per month for fluid retention.) He required Zafar catheter placement in ER for retention.  CT AP shows no acute findings and decompressed bladder secondary to catheter placement.  Lab work revealed creatinine 3.3, BNP  07/25/24  0255 07/26/24  0315    137 134*   K 4.1  4.1 4.0 4.2   CL 97* 97* 94*   CO2 27 30* 31*   BUN 50* 41* 34*   CREATININE 3.6* 2.7* 2.3*   GLUCOSE 173* 215* 205*     INR: No results for input(s): \"INR\" in the last 72 hours.    Physical Exam:  Vital Signs: /64   Pulse 57   Temp 97 °F (36.1 °C) (Temporal)   Resp 20   Ht 1.803 m (5' 11\")   Wt 100 kg (220 lb 8 oz)   SpO2 95%   BMI 30.75 kg/m²   Constitutional:       Appearance: He is normal weight.   Cardiovascular:      Rate and Rhythm: Normal rate and regular rhythm.      Pulses: Normal pulses.   Pulmonary:      Effort: Pulmonary effort is normal.      Breath sounds: Rales present.      Comments: On 3L via NC, baseline  Abdominal:      General: Abdomen is flat.      Palpations: Abdomen is soft.   Genitourinary:     Comments: F/c in place  Musculoskeletal:      Right lower leg: No edema.      Left lower leg: No edema.   Skin:     General: Skin is warm and dry.      Capillary Refill: Capillary refill takes less than 2 seconds.   Neurological:      Mental Status: He is alert. Mental status is at baseline.     Discharge Medications:        Medication List        START taking these medications      polyethylene glycol 17 g packet  Commonly known as: GLYCOLAX  Take 1 packet by mouth daily as needed for Constipation            CHANGE how you take these medications      metOLazone 5 MG tablet  Commonly known as: ZAROXOLYN  Take 1 tablet by mouth daily 3 times a week  Hold until follow up with nephrology  What changed: additional instructions     minoxidil 2.5 MG tablet  Commonly known as: LONITEN  Take 1 tablet by mouth 2 times daily Hold until follow-up with PCP  What changed: additional instructions            CONTINUE taking these medications      allopurinol 100 MG tablet  Commonly known as: ZYLOPRIM     apixaban 5 MG Tabs tablet  Commonly known as: Eliquis  Take 0.5 tablets by mouth 2 times daily     aspirin 81 MG chewable tablet  Take 1 tablet

## 2024-07-26 NOTE — CARE COORDINATION
07/26/24 0958   IMM Letter   IMM Letter given to Patient/Family/Significant other/Guardian/POA/by: savi sheth   IMM Letter date given: 07/26/24   IMM Letter time given: 0938     Second IMM given to patient and explained with patient verbalizing understanding.  All questions and concerns addressed     Signed letter placed in pt soft chart   Patient declined waiting 4 hr period prior to discharge.   Electronically signed by Savi Aguilar on 7/26/2024 at 9:58 AM

## 2024-07-26 NOTE — PROGRESS NOTES
Nephrology ([facility]) progress Note      Patient:  Carlos Espana  YOB: 1955  Date of Service: 7/26/2024  MRN: [id number]   Acct: [id number]   Primary Care Physician: Alisa Rangel MD  Advance Directive: Full Code  Admit Date: 7/21/2024       Hospital Day: 5  Referring Provider: Bry Cabrera MD    Patient independently seen and examined, Chart, Consults, Notes, Operative notes, Labs, Cardiology, and Radiology studies reviewed as available.      Subjective:  Carlos Espana is a 68 y.o. male for whom we were consulted for evaluation and treatment of acute kidney injury.  He has a history of chronic kidney disease stage IV and briefly required short-term dialysis for about a month after contrast-induced nephropathy/acute kidney injury.  He was recently at [facility] in [place] where he underwent cardiac testing including contrasted MRI and PET.  Since leaving that facility he recalled no urine output.  He has also been dealing with constipation for some time and notes that he has more difficulty voiding when he is having worsening bowel issues.  Recalled no dietary changes.  Denied current chest pain, shortness of air at rest, nausea or vomiting.  Chest x-ray did note some pulmonary edema. Zafar catheter placed on admission with dark yellow urine.  Patient is followed by urology for his bladder outlet obstruction.  Today, he offers no new complaints.  He denies any major shortness of breath.  His renal function has since improved and his serum creatinine is down to 2.7.  Patient is hydrating well by mouth.  He is doing well today. His Zafar was removed and has been urinating but still having hematuria.       Allergies:  Morphine and Hydralazine    Medicines:  Current Facility-Administered Medications   Medication Dose Route Frequency Provider Last Rate Last Admin    bumetanide (BUMEX) tablet 2 mg  2 mg Oral Daily Ava Strauss, APRN - CNP   2 mg at 07/26/24 3568     72 hours.    Radiology reports as per the Radiologist  Radiology: CT ABDOMEN PELVIS WO CONTRAST Additional Contrast? None    Result Date: 7/21/2024  EXAM: CT ABDOMEN AND PELVIS WITHOUT CONTRAST.  DATE: 07/21/2024  COMPARISON: 04/20/2024  HISTORY: Urinary retention  TECHNIQUE:  A helical scan of the abdomen and pelvis was performed  without IV contrast.  FINDINGS:  Lung bases: There is a small layering right pleural effusion with mild dependent atelectasis in the lung bases bilaterally.  Musculoskeletal: A left hip nailing is noted.  No acute osseous abnormality.  Soft tissue: The spleen and liver have a uniform attenuation.  Trace amount of ascites.  Cholecystectomy.  The stomach, pancreas and adrenal glands are normal.  The kidneys have a normal morphology.  There are stable benign appearing left renal cysts with mild bilateral perinephric stranding, nonspecific.  No calculi or hydronephrosis.  There are subcentimeter retroperitoneal and mesenteric lymph nodes.  Aorta has peripheral calcification and does not exceed 3 cm.  The small bowel and appendix are normal.  There are colonic diverticula but no inflammatory changes.  There is a small amount of free fluid in the pelvis, nonspecific.  A catheter has decompressed the bladder.  The prostate, rectum and inguinal regions are normal.      No acute findings in the abdomen or pelvis.  A catheter has decompressed the bladder.  Small amount of free fluid in the abdomen pelvis, nonspecific.  Colonic diverticulosis without diverticulitis.  Stable simple appearing renal cysts.  All CT scans are performed using dose optimization techniques as appropriate to the performed exam and include at least one of the following: Automated exposure control, adjustment of the mA and/or kV according to size, and the use of iterative reconstruction technique.  ______________________________________ Electronically signed by: FRANCY SINGLETON M.D. Date:     07/21/2024 Time:    15:11     XR CHEST

## 2024-07-29 ENCOUNTER — TRANSITIONAL CARE MANAGEMENT TELEPHONE ENCOUNTER (OUTPATIENT)
Dept: CALL CENTER | Facility: HOSPITAL | Age: 69
End: 2024-07-29
Payer: COMMERCIAL

## 2024-07-29 NOTE — OUTREACH NOTE
Call Center TCM Note      Flowsheet Row Responses   [facility] patient discharged from? Non-BH   Does the patient have one of the following disease processes/diagnoses(primary or secondary)? Other   TCM attempt successful? No   Unsuccessful attempts Attempt 2            Ling Jaime LPN    7/29/2024, 14:59 CDT

## 2024-07-29 NOTE — OUTREACH NOTE
Call Center TCM Note      Flowsheet Row Responses   [facility] facility patient discharged from? Non-BH  [[facility]]   Does the patient have one of the following disease processes/diagnoses(primary or secondary)? Other   TCM attempt successful? No   Unsuccessful attempts Attempt 1            Ling Jaime LPN    7/29/2024, 14:09 CDT

## 2024-07-30 ENCOUNTER — TRANSITIONAL CARE MANAGEMENT TELEPHONE ENCOUNTER (OUTPATIENT)
Dept: CALL CENTER | Facility: HOSPITAL | Age: 69
End: 2024-07-30
Payer: COMMERCIAL

## 2024-07-30 NOTE — OUTREACH NOTE
Call Center TCM Note      Flowsheet Row Responses   [facility] patient discharged from? Non-BH   Does the patient have one of the following disease processes/diagnoses(primary or secondary)? Other   TCM attempt successful? No   Unsuccessful attempts Attempt 3            Chetna Neal LPN    7/30/2024, 11:44 CDT

## 2024-08-19 ENCOUNTER — PROCEDURE VISIT (OUTPATIENT)
Dept: UROLOGY | Age: 69
End: 2024-08-19
Payer: MEDICARE

## 2024-08-19 VITALS — WEIGHT: 220 LBS | BODY MASS INDEX: 30.8 KG/M2 | TEMPERATURE: 98.6 F | HEIGHT: 71 IN

## 2024-08-19 DIAGNOSIS — R33.9 URINARY RETENTION: Primary | ICD-10-CM

## 2024-08-19 DIAGNOSIS — R31.0 GROSS HEMATURIA: ICD-10-CM

## 2024-08-19 LAB
BACTERIA URINE, POC: ABNORMAL
BILIRUBIN URINE: 0 MG/DL
BLOOD, URINE: POSITIVE
CASTS URINE, POC: ABNORMAL
CLARITY, UA: CLEAR
COLOR, UA: YELLOW
CRYSTALS URINE, POC: ABNORMAL
EPI CELLS URINE, POC: ABNORMAL
GLUCOSE URINE: ABNORMAL
KETONES, URINE: NEGATIVE
LEUKOCYTE EST, POC: ABNORMAL
NITRITE, URINE: NEGATIVE
PH UA: 7 (ref 4.5–8)
PROTEIN UA: POSITIVE
RBC URINE, POC: ABNORMAL
SPECIFIC GRAVITY UA: 1.02 (ref 1–1.03)
UROBILINOGEN, URINE: ABNORMAL
WBC URINE, POC: ABNORMAL
YEAST URINE, POC: ABNORMAL

## 2024-08-19 PROCEDURE — 81001 URINALYSIS AUTO W/SCOPE: CPT | Performed by: UROLOGY

## 2024-08-19 PROCEDURE — 52000 CYSTOURETHROSCOPY: CPT | Performed by: UROLOGY

## 2024-08-19 PROCEDURE — 51798 US URINE CAPACITY MEASURE: CPT | Performed by: UROLOGY

## 2024-08-19 RX ORDER — TAMSULOSIN HYDROCHLORIDE 0.4 MG/1
CAPSULE ORAL
COMMUNITY

## 2024-08-19 RX ORDER — ESOMEPRAZOLE MAGNESIUM 40 MG/1
CAPSULE, DELAYED RELEASE ORAL
COMMUNITY

## 2024-08-19 RX ORDER — METOLAZONE 5 MG/1
TABLET ORAL
COMMUNITY

## 2024-08-19 RX ORDER — INSULIN ASPART 100 [IU]/ML
INJECTION, SOLUTION INTRAVENOUS; SUBCUTANEOUS
COMMUNITY

## 2024-08-19 RX ORDER — AMIODARONE HYDROCHLORIDE 100 MG/1
TABLET ORAL
COMMUNITY
Start: 2024-07-27

## 2024-08-19 RX ORDER — RANOLAZINE 1000 MG/1
TABLET, EXTENDED RELEASE ORAL
COMMUNITY
Start: 2024-07-22

## 2024-08-19 NOTE — PROGRESS NOTES
Hematuria:  Patient is here for cystoscopy for evaluation hematuria.  He was seen in the hospital in consultation back in July when he had urinary retention.  After Zafar catheter placement he had some gross hematuria.  Confounded by him being on anticoagulation for history of A-fib.  He has CKD and his creatinine was elevated at the time up to 4 this improved down to his baseline down to 2.3 GFR of 30 prior to dismissal.  He had a CT scan without contrast showed no hydronephrosis bilateral renal cysts.  An ultrasound back in April 20, 2024 showed bilateral renal cysts.  He normally has no lower urinary tract symptoms but at that time he was having problems constipation and could not void.  He eventually had resolution of the constipation Zafar catheter was removed and he passed a voiding trial in the hospital.  He states now he is voiding well without difficulty.  Residual today is 0.  He had no further episodes of gross hematuria.  He did have a PSA Done on 7/21/2024 this was 0.45.  Again he comes in today to complete his hematuria evaluation for cystoscopy.  He normally does annual exam and checks with his PCP      Cystoscopy Procedure Note    Indications: Diagnosis    Pre-operative Diagnosis: Hematuria    Post-operative Diagnosis: Hematuria    Surgeon: Ian Daugherty MD     Assistants: staff    Anesthesia: Local anesthesia topical 2% lidocaine gel    Procedure Details   The risks, benefits, complications, treatment options, and expected outcomes were discussed with the patient. The patient concurred with the proposed plan, giving informed consent.    Cystoscopy was performed today under local anesthesia, using sterile technique. The patient was placed in the supine position, prepped with Hibiclens, and draped in the usual sterile fashion. A 17 Urdu sheath flexible cystoscope was used to inspect both the urethra and bladder using the flexible scope.    Findings:  Anterior urethra: normal without strictures

## 2024-08-28 ENCOUNTER — TELEPHONE (OUTPATIENT)
Dept: CARDIOLOGY CLINIC | Age: 69
End: 2024-08-28

## 2024-08-28 NOTE — TELEPHONE ENCOUNTER
Called and spoke with patient, advised he did not keep his last two follow ups with the office and will need to come in to be seen to see about getting a referral.  Patient verbally understood and an appointment with Emily was made for tomorrow.

## 2024-08-29 ENCOUNTER — OFFICE VISIT (OUTPATIENT)
Dept: CARDIOLOGY CLINIC | Age: 69
End: 2024-08-29
Payer: MEDICARE

## 2024-08-29 ENCOUNTER — TELEPHONE (OUTPATIENT)
Dept: CARDIOLOGY CLINIC | Age: 69
End: 2024-08-29

## 2024-08-29 VITALS
DIASTOLIC BLOOD PRESSURE: 80 MMHG | HEART RATE: 57 BPM | BODY MASS INDEX: 30.1 KG/M2 | SYSTOLIC BLOOD PRESSURE: 124 MMHG | WEIGHT: 215 LBS | OXYGEN SATURATION: 91 % | HEIGHT: 71 IN

## 2024-08-29 DIAGNOSIS — I25.118 CORONARY ARTERY DISEASE OF NATIVE ARTERY OF NATIVE HEART WITH STABLE ANGINA PECTORIS (HCC): Primary | ICD-10-CM

## 2024-08-29 DIAGNOSIS — I50.42 CHRONIC COMBINED SYSTOLIC AND DIASTOLIC HEART FAILURE DUE TO VALVULAR DISEASE (HCC): ICD-10-CM

## 2024-08-29 DIAGNOSIS — I10 ESSENTIAL HYPERTENSION: ICD-10-CM

## 2024-08-29 DIAGNOSIS — N18.4 CHRONIC KIDNEY DISEASE (CKD), STAGE IV (SEVERE) (HCC): ICD-10-CM

## 2024-08-29 DIAGNOSIS — I38 CHRONIC COMBINED SYSTOLIC AND DIASTOLIC HEART FAILURE DUE TO VALVULAR DISEASE (HCC): ICD-10-CM

## 2024-08-29 DIAGNOSIS — I48.0 PAF (PAROXYSMAL ATRIAL FIBRILLATION) (HCC): ICD-10-CM

## 2024-08-29 PROCEDURE — 1123F ACP DISCUSS/DSCN MKR DOCD: CPT | Performed by: CLINICAL NURSE SPECIALIST

## 2024-08-29 PROCEDURE — 3074F SYST BP LT 130 MM HG: CPT | Performed by: CLINICAL NURSE SPECIALIST

## 2024-08-29 PROCEDURE — 3079F DIAST BP 80-89 MM HG: CPT | Performed by: CLINICAL NURSE SPECIALIST

## 2024-08-29 PROCEDURE — 1036F TOBACCO NON-USER: CPT | Performed by: CLINICAL NURSE SPECIALIST

## 2024-08-29 PROCEDURE — G8417 CALC BMI ABV UP PARAM F/U: HCPCS | Performed by: CLINICAL NURSE SPECIALIST

## 2024-08-29 PROCEDURE — 99214 OFFICE O/P EST MOD 30 MIN: CPT | Performed by: CLINICAL NURSE SPECIALIST

## 2024-08-29 PROCEDURE — 3017F COLORECTAL CA SCREEN DOC REV: CPT | Performed by: CLINICAL NURSE SPECIALIST

## 2024-08-29 PROCEDURE — G8427 DOCREV CUR MEDS BY ELIG CLIN: HCPCS | Performed by: CLINICAL NURSE SPECIALIST

## 2024-08-29 NOTE — TELEPHONE ENCOUNTER
Received records from [facility] regarding his workup they are for ongoing angina and chronic total occlusion of his LAD    Reviewed with Dr. Rico with CT surgery as well as with Dr. Salguero during cardiac conference today.    Elevated risk for revascularization due to his chronic kidney disease    Recommended maximizing medical therapy and reevaluate in a few months    Talked with patient and explained MRI did not show much viable myocardium.  Could offer appointment with CT surgery discussed but would be at elevated risk due to his decreased kidney function.  Patient states he does not wish to proceed with anything that would compromise kidney function at this time as he does not want to be on dialysis.  He is going to continue activity as tolerated.  He appears to be on maximally tolerated GDMT for heart failure, arrhythmia control and coronary disease.  His angina is fairly well-controlled.  He will let us know if he has an increased need in his nitrates.  We can further titrate that up as needed    Will make follow-up with Dr. Worthington in a few months to reevaluate

## 2024-08-29 NOTE — PROGRESS NOTES
[facility]  [address] [facility] [address] [place] [address]  [address]  Phone: [phone]  Fax: [phone]    OFFICE VISIT:  2024    Carlos Espana - : 1955    Reason For Visit:  Carlos is a 69 y.o. male who is here for Follow-up (Pt has had chest pain and wants to talk about seeing Dr Rico ), Coronary Artery Disease, Hypertension, and Atrial Fibrillation      HPI  Patient patient was evaluated in office after  admission for non-STEMI on 4/15/2024.  Heart cath showed severe in-stent restenosis in the mid LAD with .  PCI attempted but unable to pass wire, medical treatment recommended.  Patient was readmitted on 24 for acute renal failure due to contrast nephropathy.  Permacath placed for dialysis.      Other history includes A-fib on amiodarone and Eliquis, stage IV CKD, hypertension, diabetes    On 2024 patient had appointment with cardiology at [facility] with DR Vitale  Recommendation was cardiac MRI to assess for viability and CT referral.    Patient reports was hospitalized at [facility] in [place] for a week.  Underwent many days of testing.  He states on that Friday he was told he was not a surgical candidate.  They want to keep him over the weekend and he left the hospital.    On 2024 patient was admitted worsening shortness of breath and inability to void.  Urinary catheter placed.  Creatinine increased to 3.3 and noted to be hypoxic.  Patient was diuresed with Bumex and Zafar eventually removed.      He is here today for referral for CT surgery.  He reports that a friend of his wife states that Dr. Rico is the doctor he needs to see    He reports that he can do some normal activities but if he does more than normal or exerts himself that late he will have angina.  He will wake up with significant angina.  He is taking his medications regularly.  He has been taken nitro with relief usually 1-2 doses.  Remains on isosorbide 60 mg twice daily as well as Ranexa 1000 mg

## 2024-10-14 RX ORDER — METOLAZONE 5 MG/1
TABLET ORAL
Qty: 15 TABLET | Refills: 3 | Status: SHIPPED | OUTPATIENT
Start: 2024-10-14

## 2024-11-05 RX ORDER — ISOSORBIDE MONONITRATE 60 MG/1
60 TABLET, EXTENDED RELEASE ORAL 2 TIMES DAILY
Qty: 60 TABLET | Refills: 5 | Status: SHIPPED | OUTPATIENT
Start: 2024-11-05

## 2024-11-13 RX ORDER — METOLAZONE 5 MG/1
5 TABLET ORAL DAILY PRN
Qty: 15 TABLET | Refills: 3 | Status: SHIPPED | OUTPATIENT
Start: 2024-11-13

## 2024-11-21 ENCOUNTER — HOSPITAL ENCOUNTER (OUTPATIENT)
Dept: GENERAL RADIOLOGY | Age: 69
Discharge: HOME | End: 2024-11-21
Payer: MEDICARE

## 2024-11-21 ENCOUNTER — OFFICE VISIT (OUTPATIENT)
Dept: CARDIOLOGY CLINIC | Age: 69
End: 2024-11-21
Payer: MEDICARE

## 2024-11-21 VITALS
WEIGHT: 220 LBS | SYSTOLIC BLOOD PRESSURE: 132 MMHG | HEART RATE: 60 BPM | BODY MASS INDEX: 30.8 KG/M2 | DIASTOLIC BLOOD PRESSURE: 78 MMHG | HEIGHT: 71 IN

## 2024-11-21 DIAGNOSIS — I71.20 THORACIC AORTIC ANEURYSM WITHOUT RUPTURE, UNSPECIFIED PART (HCC): ICD-10-CM

## 2024-11-21 DIAGNOSIS — I50.42 CHRONIC COMBINED SYSTOLIC AND DIASTOLIC HEART FAILURE DUE TO VALVULAR DISEASE (HCC): ICD-10-CM

## 2024-11-21 DIAGNOSIS — Z79.01 CHRONIC ANTICOAGULATION: ICD-10-CM

## 2024-11-21 DIAGNOSIS — I25.10 CORONARY ARTERY DISEASE INVOLVING NATIVE CORONARY ARTERY OF NATIVE HEART WITHOUT ANGINA PECTORIS: ICD-10-CM

## 2024-11-21 DIAGNOSIS — I10 ESSENTIAL HYPERTENSION: ICD-10-CM

## 2024-11-21 DIAGNOSIS — I48.0 PAF (PAROXYSMAL ATRIAL FIBRILLATION) (HCC): ICD-10-CM

## 2024-11-21 DIAGNOSIS — E78.2 MIXED HYPERLIPIDEMIA: ICD-10-CM

## 2024-11-21 DIAGNOSIS — I25.118 CORONARY ARTERY DISEASE OF NATIVE ARTERY OF NATIVE HEART WITH STABLE ANGINA PECTORIS (HCC): ICD-10-CM

## 2024-11-21 DIAGNOSIS — I38 CHRONIC COMBINED SYSTOLIC AND DIASTOLIC HEART FAILURE DUE TO VALVULAR DISEASE (HCC): ICD-10-CM

## 2024-11-21 DIAGNOSIS — I48.0 PAF (PAROXYSMAL ATRIAL FIBRILLATION) (HCC): Primary | ICD-10-CM

## 2024-11-21 LAB
ALBUMIN SERPL-MCNC: 4 G/DL (ref 3.5–5.2)
ALP SERPL-CCNC: 82 U/L (ref 40–129)
ALT SERPL-CCNC: 10 U/L (ref 5–41)
ANION GAP SERPL CALCULATED.3IONS-SCNC: 12 MMOL/L (ref 7–19)
AST SERPL-CCNC: 10 U/L (ref 5–40)
BILIRUB SERPL-MCNC: 0.4 MG/DL (ref 0.2–1.2)
BUN SERPL-MCNC: 36 MG/DL (ref 8–23)
CALCIUM SERPL-MCNC: 9.4 MG/DL (ref 8.8–10.2)
CHLORIDE SERPL-SCNC: 100 MMOL/L (ref 98–111)
CO2 SERPL-SCNC: 29 MMOL/L (ref 22–29)
CREAT SERPL-MCNC: 2.2 MG/DL (ref 0.7–1.2)
ERYTHROCYTE [DISTWIDTH] IN BLOOD BY AUTOMATED COUNT: 18.4 % (ref 11.5–14.5)
GLUCOSE SERPL-MCNC: 178 MG/DL (ref 70–99)
HCT VFR BLD AUTO: 50.1 % (ref 42–52)
HGB BLD-MCNC: 14.7 G/DL (ref 14–18)
MCH RBC QN AUTO: 26.4 PG (ref 27–31)
MCHC RBC AUTO-ENTMCNC: 29.3 G/DL (ref 33–37)
MCV RBC AUTO: 90.1 FL (ref 80–94)
PLATELET # BLD AUTO: 203 K/UL (ref 130–400)
PMV BLD AUTO: 10.6 FL (ref 9.4–12.4)
POTASSIUM SERPL-SCNC: 3.9 MMOL/L (ref 3.5–5)
PROT SERPL-MCNC: 6.9 G/DL (ref 6.4–8.3)
RBC # BLD AUTO: 5.56 M/UL (ref 4.7–6.1)
SODIUM SERPL-SCNC: 141 MMOL/L (ref 136–145)
T3FREE SERPL-MCNC: 2.6 PG/ML (ref 2–4.4)
T4 FREE SERPL-MCNC: 1.38 NG/DL (ref 0.93–1.7)
TSH SERPL DL<=0.005 MIU/L-ACNC: 1.78 UIU/ML (ref 0.27–4.2)
WBC # BLD AUTO: 7.5 K/UL (ref 4.8–10.8)

## 2024-11-21 PROCEDURE — 1123F ACP DISCUSS/DSCN MKR DOCD: CPT | Performed by: INTERNAL MEDICINE

## 2024-11-21 PROCEDURE — G8428 CUR MEDS NOT DOCUMENT: HCPCS | Performed by: INTERNAL MEDICINE

## 2024-11-21 PROCEDURE — 71046 X-RAY EXAM CHEST 2 VIEWS: CPT

## 2024-11-21 PROCEDURE — 3017F COLORECTAL CA SCREEN DOC REV: CPT | Performed by: INTERNAL MEDICINE

## 2024-11-21 PROCEDURE — G8417 CALC BMI ABV UP PARAM F/U: HCPCS | Performed by: INTERNAL MEDICINE

## 2024-11-21 PROCEDURE — 1036F TOBACCO NON-USER: CPT | Performed by: INTERNAL MEDICINE

## 2024-11-21 PROCEDURE — 3075F SYST BP GE 130 - 139MM HG: CPT | Performed by: INTERNAL MEDICINE

## 2024-11-21 PROCEDURE — 93000 ELECTROCARDIOGRAM COMPLETE: CPT | Performed by: INTERNAL MEDICINE

## 2024-11-21 PROCEDURE — G8484 FLU IMMUNIZE NO ADMIN: HCPCS | Performed by: INTERNAL MEDICINE

## 2024-11-21 PROCEDURE — 99214 OFFICE O/P EST MOD 30 MIN: CPT | Performed by: INTERNAL MEDICINE

## 2024-11-21 PROCEDURE — 3078F DIAST BP <80 MM HG: CPT | Performed by: INTERNAL MEDICINE

## 2024-11-21 ASSESSMENT — ENCOUNTER SYMPTOMS
SHORTNESS OF BREATH: 0
NAUSEA: 0
DIARRHEA: 0
VOMITING: 0
RESPIRATORY NEGATIVE: 1
EYES NEGATIVE: 1
GASTROINTESTINAL NEGATIVE: 1

## 2024-11-21 NOTE — PROGRESS NOTES
[facility]                            Date:  11/21/2024  Patient: Carlos Espana  Age:  69 y.o., 1955      Reason for evaluation:         SUBJECTIVE:    Returns today follow-up assessment ischemic cardiomyopathy coronary artery disease ICD biventricular nonsustained ventricular tachycardia shortness of breath and atrial fibrillation also chronic anticoagulation.  Denies chest pain dyspnea is about the same denies palpitations denies any bleeding issues.  He does make mention that he has been more fatigued recently and off balance occasionally.  I suggested that he discuss this with his primary care physician or possibly a neurologist.  He plans on going to a fitness center and using a  in the near future and I think that is a good idea.  No other complaints or issues reported.    Review of Systems   Constitutional: Negative.  Negative for chills, fever and unexpected weight change.   HENT: Negative.     Eyes: Negative.    Respiratory: Negative.  Negative for shortness of breath.    Cardiovascular: Negative.  Negative for chest pain.   Gastrointestinal: Negative.  Negative for diarrhea, nausea and vomiting.   Endocrine: Negative.    Genitourinary: Negative.    Musculoskeletal: Negative.    Skin: Negative.    Neurological: Negative.    All other systems reviewed and are negative.        OBJECTIVE:    /78   Pulse 60   Ht 1.803 m (5' 11\")   Wt 99.8 kg (220 lb)   BMI 30.68 kg/m²     Labs:   CBC: No results for input(s): \"WBC\", \"HGB\", \"HCT\", \"PLT\" in the last 72 hours.  BMP:No results for input(s): \"NA\", \"K\", \"CO2\", \"BUN\", \"CREATININE\", \"LABGLOM\", \"GLUCOSE\" in the last 72 hours.  BNP: No results for input(s): \"BNP\" in the last 72 hours.  PT/INR: No results for input(s): \"PROTIME\", \"INR\" in the last 72 hours.  APTT:No results for input(s): \"APTT\" in the last 72 hours.  CARDIAC ENZYMES:No results for input(s): \"CKTOTAL\", \"CKMB\", \"CKMBINDEX\", \"TROPONINI\" in the

## 2024-12-05 NOTE — DISCHARGE INSTRUCTIONS
Continue with her current medications. Follow the primary care doctor for further treatment and evaluation. Return immediately to the emergency room for any new or worsening symptoms. Your thoracic aneurysm within the limits of the study was stable.
No.

## 2024-12-30 DIAGNOSIS — E78.2 MIXED HYPERLIPIDEMIA: ICD-10-CM

## 2024-12-30 RX ORDER — ROSUVASTATIN CALCIUM 40 MG/1
40 TABLET, COATED ORAL DAILY
Qty: 90 TABLET | Refills: 3 | Status: SHIPPED | OUTPATIENT
Start: 2024-12-30

## 2025-03-10 ENCOUNTER — TRANSCRIBE ORDERS (OUTPATIENT)
Dept: ADMINISTRATIVE | Age: 70
End: 2025-03-10

## 2025-03-10 DIAGNOSIS — R93.0 ABNORMAL CT OF THE HEAD: Primary | ICD-10-CM

## 2025-03-19 ENCOUNTER — HOSPITAL ENCOUNTER (OUTPATIENT)
Dept: PULMONOLOGY | Age: 70
Discharge: HOME | End: 2025-03-19
Attending: INTERNAL MEDICINE
Payer: MEDICARE

## 2025-03-19 ENCOUNTER — HOSPITAL ENCOUNTER (OUTPATIENT)
Dept: MRI IMAGING | Age: 70
Discharge: HOME | End: 2025-03-19
Attending: INTERNAL MEDICINE
Payer: MEDICARE

## 2025-03-19 VITALS
DIASTOLIC BLOOD PRESSURE: 60 MMHG | SYSTOLIC BLOOD PRESSURE: 128 MMHG | BODY MASS INDEX: 30.8 KG/M2 | HEART RATE: 53 BPM | OXYGEN SATURATION: 96 % | WEIGHT: 220 LBS | RESPIRATION RATE: 16 BRPM | HEIGHT: 71 IN

## 2025-03-19 DIAGNOSIS — I48.0 PAF (PAROXYSMAL ATRIAL FIBRILLATION) (HCC): ICD-10-CM

## 2025-03-19 DIAGNOSIS — I71.20 THORACIC AORTIC ANEURYSM WITHOUT RUPTURE, UNSPECIFIED PART: ICD-10-CM

## 2025-03-19 DIAGNOSIS — I25.118 CORONARY ARTERY DISEASE OF NATIVE ARTERY OF NATIVE HEART WITH STABLE ANGINA PECTORIS: ICD-10-CM

## 2025-03-19 DIAGNOSIS — R93.0 ABNORMAL CT OF THE HEAD: ICD-10-CM

## 2025-03-19 DIAGNOSIS — I25.10 CORONARY ARTERY DISEASE INVOLVING NATIVE CORONARY ARTERY OF NATIVE HEART WITHOUT ANGINA PECTORIS: ICD-10-CM

## 2025-03-19 DIAGNOSIS — I38 CHRONIC COMBINED SYSTOLIC AND DIASTOLIC HEART FAILURE DUE TO VALVULAR DISEASE (HCC): ICD-10-CM

## 2025-03-19 DIAGNOSIS — E78.2 MIXED HYPERLIPIDEMIA: ICD-10-CM

## 2025-03-19 DIAGNOSIS — I50.42 CHRONIC COMBINED SYSTOLIC AND DIASTOLIC HEART FAILURE DUE TO VALVULAR DISEASE (HCC): ICD-10-CM

## 2025-03-19 DIAGNOSIS — I10 ESSENTIAL HYPERTENSION: ICD-10-CM

## 2025-03-19 DIAGNOSIS — Z79.01 CHRONIC ANTICOAGULATION: ICD-10-CM

## 2025-03-19 PROCEDURE — 70551 MRI BRAIN STEM W/O DYE: CPT

## 2025-03-19 RX ORDER — ALBUTEROL SULFATE 0.83 MG/ML
2.5 SOLUTION RESPIRATORY (INHALATION) 2 TIMES DAILY PRN
Status: DISCONTINUED | OUTPATIENT
Start: 2025-03-19 | End: 2025-03-21 | Stop reason: HOSPADM

## 2025-03-19 RX ORDER — ALBUTEROL SULFATE 90 UG/1
2 INHALANT RESPIRATORY (INHALATION) EVERY 6 HOURS PRN
COMMUNITY

## 2025-04-10 ENCOUNTER — HOSPITAL ENCOUNTER (OUTPATIENT)
Dept: PULMONOLOGY | Age: 70
Discharge: HOME | End: 2025-04-10
Attending: INTERNAL MEDICINE
Payer: MEDICARE

## 2025-04-10 VITALS
BODY MASS INDEX: 29.68 KG/M2 | SYSTOLIC BLOOD PRESSURE: 114 MMHG | WEIGHT: 212 LBS | HEIGHT: 71 IN | HEART RATE: 55 BPM | OXYGEN SATURATION: 94 % | DIASTOLIC BLOOD PRESSURE: 68 MMHG | RESPIRATION RATE: 16 BRPM

## 2025-04-10 PROCEDURE — 6360000002 HC RX W HCPCS: Performed by: INTERNAL MEDICINE

## 2025-04-10 PROCEDURE — 94726 PLETHYSMOGRAPHY LUNG VOLUMES: CPT

## 2025-04-10 PROCEDURE — 94060 EVALUATION OF WHEEZING: CPT

## 2025-04-10 PROCEDURE — 94729 DIFFUSING CAPACITY: CPT

## 2025-04-10 RX ORDER — ALBUTEROL SULFATE 0.83 MG/ML
2.5 SOLUTION RESPIRATORY (INHALATION) 2 TIMES DAILY PRN
Status: DISCONTINUED | OUTPATIENT
Start: 2025-04-10 | End: 2025-04-12 | Stop reason: HOSPADM

## 2025-04-10 RX ADMIN — ALBUTEROL SULFATE 2.5 MG: 2.5 SOLUTION RESPIRATORY (INHALATION) at 09:04

## 2025-04-10 NOTE — PROCEDURES
Media Information        Pulmonary Function Study    Interpretation:    The FVC is moderately reduced. FEV1 is moderately reduced. FEV1/FVC ratio is Normal. After bronchodilator therapy there was no significant change in FEV1. Total lung capacity is moderately reduced. Residual volume is increased.      Diffusing lung capacity when corrected for alveolar volume is Normal.         Impression:    Moderate restrictive lung disease.         Alejandrina Webb MD, FCCP, [facility]

## 2025-04-13 PROBLEM — I50.42: Status: ACTIVE | Noted: 2020-10-18

## 2025-04-13 PROBLEM — Z79.01 CHRONIC ANTICOAGULATION: Status: ACTIVE | Noted: 2025-04-13

## 2025-04-13 PROBLEM — I38: Status: ACTIVE | Noted: 2020-10-18

## 2025-04-15 ENCOUNTER — RESULTS FOLLOW-UP (OUTPATIENT)
Dept: CARDIOLOGY CLINIC | Age: 70
End: 2025-04-15

## 2025-04-15 NOTE — TELEPHONE ENCOUNTER
Patient returned call for results. He sees Dr. Ruth. Advised he call him for follow up appt. He voiced understanding.

## 2025-05-01 RX ORDER — AMIODARONE HYDROCHLORIDE 100 MG/1
100 TABLET ORAL DAILY
Qty: 90 TABLET | Refills: 0 | Status: SHIPPED | OUTPATIENT
Start: 2025-05-01

## 2025-05-05 ENCOUNTER — APPOINTMENT (OUTPATIENT)
Dept: GENERAL RADIOLOGY | Age: 70
End: 2025-05-05
Attending: EMERGENCY MEDICINE
Payer: MEDICARE

## 2025-05-05 ENCOUNTER — HOSPITAL ENCOUNTER (EMERGENCY)
Age: 70
Discharge: LEFT BEFORE DISCHARGE | End: 2025-05-05
Attending: EMERGENCY MEDICINE
Payer: MEDICARE

## 2025-05-05 ENCOUNTER — OFFICE VISIT (OUTPATIENT)
Dept: CARDIOLOGY CLINIC | Age: 70
End: 2025-05-05
Payer: MEDICARE

## 2025-05-05 VITALS
HEART RATE: 64 BPM | SYSTOLIC BLOOD PRESSURE: 130 MMHG | DIASTOLIC BLOOD PRESSURE: 82 MMHG | BODY MASS INDEX: 30.38 KG/M2 | WEIGHT: 217 LBS | HEIGHT: 71 IN

## 2025-05-05 VITALS
RESPIRATION RATE: 20 BRPM | SYSTOLIC BLOOD PRESSURE: 145 MMHG | WEIGHT: 215 LBS | DIASTOLIC BLOOD PRESSURE: 71 MMHG | TEMPERATURE: 98 F | HEART RATE: 67 BPM | OXYGEN SATURATION: 94 % | BODY MASS INDEX: 29.97 KG/M2

## 2025-05-05 DIAGNOSIS — Z79.899 ON AMIODARONE THERAPY: Primary | ICD-10-CM

## 2025-05-05 DIAGNOSIS — I25.110 CORONARY ARTERY DISEASE INVOLVING NATIVE CORONARY ARTERY OF NATIVE HEART WITH UNSTABLE ANGINA PECTORIS (HCC): Primary | ICD-10-CM

## 2025-05-05 DIAGNOSIS — I48.0 PAF (PAROXYSMAL ATRIAL FIBRILLATION) (HCC): ICD-10-CM

## 2025-05-05 DIAGNOSIS — G89.29 CHRONIC MIDLINE LOW BACK PAIN WITHOUT SCIATICA: ICD-10-CM

## 2025-05-05 DIAGNOSIS — I10 ESSENTIAL HYPERTENSION: ICD-10-CM

## 2025-05-05 DIAGNOSIS — Z79.01 CHRONIC ANTICOAGULATION: ICD-10-CM

## 2025-05-05 DIAGNOSIS — I71.20 THORACIC AORTIC ANEURYSM WITHOUT RUPTURE, UNSPECIFIED PART: ICD-10-CM

## 2025-05-05 DIAGNOSIS — I38 CHRONIC COMBINED SYSTOLIC AND DIASTOLIC HEART FAILURE DUE TO VALVULAR DISEASE (HCC): ICD-10-CM

## 2025-05-05 DIAGNOSIS — I50.42 CHRONIC COMBINED SYSTOLIC AND DIASTOLIC HEART FAILURE DUE TO VALVULAR DISEASE (HCC): ICD-10-CM

## 2025-05-05 DIAGNOSIS — N18.4 STAGE 4 CHRONIC KIDNEY DISEASE (HCC): ICD-10-CM

## 2025-05-05 DIAGNOSIS — E78.2 MIXED HYPERLIPIDEMIA: ICD-10-CM

## 2025-05-05 DIAGNOSIS — M54.50 CHRONIC MIDLINE LOW BACK PAIN WITHOUT SCIATICA: ICD-10-CM

## 2025-05-05 DIAGNOSIS — I25.118 CORONARY ARTERY DISEASE OF NATIVE ARTERY OF NATIVE HEART WITH STABLE ANGINA PECTORIS: ICD-10-CM

## 2025-05-05 LAB
ALBUMIN SERPL-MCNC: 3.7 G/DL (ref 3.5–5.2)
ALP SERPL-CCNC: 74 U/L (ref 40–129)
ALT SERPL-CCNC: 13 U/L (ref 10–50)
ANION GAP SERPL CALCULATED.3IONS-SCNC: 13 MMOL/L (ref 8–16)
AST SERPL-CCNC: 24 U/L (ref 10–50)
BACTERIA URNS QL MICRO: NEGATIVE /HPF
BASOPHILS # BLD: 0.1 K/UL (ref 0–0.2)
BASOPHILS NFR BLD: 0.5 % (ref 0–1)
BILIRUB SERPL-MCNC: 0.5 MG/DL (ref 0.2–1.2)
BILIRUB UR QL STRIP: NEGATIVE
BNP BLD-MCNC: 1443 PG/ML (ref 0–124)
BUN SERPL-MCNC: 44 MG/DL (ref 8–23)
CALCIUM SERPL-MCNC: 8.7 MG/DL (ref 8.8–10.2)
CHLORIDE SERPL-SCNC: 95 MMOL/L (ref 98–107)
CLARITY UR: CLEAR
CO2 SERPL-SCNC: 28 MMOL/L (ref 22–29)
COLOR UR: YELLOW
CREAT SERPL-MCNC: 2.4 MG/DL (ref 0.7–1.2)
CRYSTALS URNS MICRO: NORMAL /HPF
EKG P AXIS: -79 DEGREES
EKG P-R INTERVAL: 240 MS
EKG Q-T INTERVAL: 486 MS
EKG QRS DURATION: 126 MS
EKG QTC CALCULATION (BAZETT): 485 MS
EKG T AXIS: 74 DEGREES
EOSINOPHIL # BLD: 0.4 K/UL (ref 0–0.6)
EOSINOPHIL NFR BLD: 4.6 % (ref 0–5)
EPI CELLS #/AREA URNS AUTO: 0 /HPF (ref 0–5)
ERYTHROCYTE [DISTWIDTH] IN BLOOD BY AUTOMATED COUNT: 16.8 % (ref 11.5–14.5)
GLUCOSE SERPL-MCNC: 220 MG/DL (ref 70–99)
GLUCOSE UR STRIP.AUTO-MCNC: 100 MG/DL
HCT VFR BLD AUTO: 49.5 % (ref 42–52)
HGB BLD-MCNC: 15.9 G/DL (ref 14–18)
HGB UR STRIP.AUTO-MCNC: ABNORMAL MG/L
HYALINE CASTS #/AREA URNS AUTO: 4 /HPF (ref 0–8)
IMM GRANULOCYTES # BLD: 0 K/UL
INR PPP: 1.27 (ref 0.88–1.18)
KETONES UR STRIP.AUTO-MCNC: NEGATIVE MG/DL
LEUKOCYTE ESTERASE UR QL STRIP.AUTO: NEGATIVE
LIPASE SERPL-CCNC: 26 U/L (ref 13–60)
LYMPHOCYTES # BLD: 1.5 K/UL (ref 1.1–4.5)
LYMPHOCYTES NFR BLD: 16.6 % (ref 20–40)
MCH RBC QN AUTO: 28.7 PG (ref 27–31)
MCHC RBC AUTO-ENTMCNC: 32.1 G/DL (ref 33–37)
MCV RBC AUTO: 89.4 FL (ref 80–94)
MONOCYTES # BLD: 0.8 K/UL (ref 0–0.9)
MONOCYTES NFR BLD: 8.1 % (ref 0–10)
NEUTROPHILS # BLD: 6.5 K/UL (ref 1.5–7.5)
NEUTS SEG NFR BLD: 69.9 % (ref 50–65)
NITRITE UR QL STRIP.AUTO: NEGATIVE
PH UR STRIP.AUTO: 5 [PH] (ref 5–8)
PLATELET # BLD AUTO: 176 K/UL (ref 130–400)
PMV BLD AUTO: 11 FL (ref 9.4–12.4)
POTASSIUM SERPL-SCNC: 3.4 MMOL/L (ref 3.5–5.1)
PROT SERPL-MCNC: 6.4 G/DL (ref 6.4–8.3)
PROT UR STRIP.AUTO-MCNC: 100 MG/DL
PROTHROMBIN TIME: 15.9 SEC (ref 12–14.6)
RBC # BLD AUTO: 5.54 M/UL (ref 4.7–6.1)
RBC #/AREA URNS AUTO: 1 /HPF (ref 0–4)
SODIUM SERPL-SCNC: 136 MMOL/L (ref 136–145)
SP GR UR STRIP.AUTO: 1.01 (ref 1–1.03)
T3FREE SERPL-MCNC: 2.4 PG/ML (ref 2–4.4)
T4 FREE SERPL-MCNC: 1.31 NG/DL (ref 0.93–1.7)
TROPONIN, HIGH SENSITIVITY: 57 NG/L (ref 0–22)
TROPONIN, HIGH SENSITIVITY: 61 NG/L (ref 0–22)
TSH SERPL DL<=0.005 MIU/L-ACNC: 1.78 UIU/ML (ref 0.27–4.2)
UROBILINOGEN UR STRIP.AUTO-MCNC: 0.2 E.U./DL
WBC # BLD AUTO: 9.3 K/UL (ref 4.8–10.8)
WBC #/AREA URNS AUTO: 0 /HPF (ref 0–5)

## 2025-05-05 PROCEDURE — 83880 ASSAY OF NATRIURETIC PEPTIDE: CPT

## 2025-05-05 PROCEDURE — G8417 CALC BMI ABV UP PARAM F/U: HCPCS | Performed by: INTERNAL MEDICINE

## 2025-05-05 PROCEDURE — G8427 DOCREV CUR MEDS BY ELIG CLIN: HCPCS | Performed by: INTERNAL MEDICINE

## 2025-05-05 PROCEDURE — 84481 FREE ASSAY (FT-3): CPT

## 2025-05-05 PROCEDURE — 84484 ASSAY OF TROPONIN QUANT: CPT

## 2025-05-05 PROCEDURE — 93010 ELECTROCARDIOGRAM REPORT: CPT | Performed by: INTERNAL MEDICINE

## 2025-05-05 PROCEDURE — 71045 X-RAY EXAM CHEST 1 VIEW: CPT

## 2025-05-05 PROCEDURE — 3017F COLORECTAL CA SCREEN DOC REV: CPT | Performed by: INTERNAL MEDICINE

## 2025-05-05 PROCEDURE — 85610 PROTHROMBIN TIME: CPT

## 2025-05-05 PROCEDURE — 99285 EMERGENCY DEPT VISIT HI MDM: CPT

## 2025-05-05 PROCEDURE — 3075F SYST BP GE 130 - 139MM HG: CPT | Performed by: INTERNAL MEDICINE

## 2025-05-05 PROCEDURE — 84443 ASSAY THYROID STIM HORMONE: CPT

## 2025-05-05 PROCEDURE — 1159F MED LIST DOCD IN RCRD: CPT | Performed by: INTERNAL MEDICINE

## 2025-05-05 PROCEDURE — 3079F DIAST BP 80-89 MM HG: CPT | Performed by: INTERNAL MEDICINE

## 2025-05-05 PROCEDURE — 81001 URINALYSIS AUTO W/SCOPE: CPT

## 2025-05-05 PROCEDURE — 84439 ASSAY OF FREE THYROXINE: CPT

## 2025-05-05 PROCEDURE — 99213 OFFICE O/P EST LOW 20 MIN: CPT | Performed by: INTERNAL MEDICINE

## 2025-05-05 PROCEDURE — 83690 ASSAY OF LIPASE: CPT

## 2025-05-05 PROCEDURE — 36415 COLL VENOUS BLD VENIPUNCTURE: CPT

## 2025-05-05 PROCEDURE — 85025 COMPLETE CBC W/AUTO DIFF WBC: CPT

## 2025-05-05 PROCEDURE — 80053 COMPREHEN METABOLIC PANEL: CPT

## 2025-05-05 PROCEDURE — 93005 ELECTROCARDIOGRAM TRACING: CPT | Performed by: EMERGENCY MEDICINE

## 2025-05-05 PROCEDURE — 1036F TOBACCO NON-USER: CPT | Performed by: INTERNAL MEDICINE

## 2025-05-05 PROCEDURE — 1123F ACP DISCUSS/DSCN MKR DOCD: CPT | Performed by: INTERNAL MEDICINE

## 2025-05-05 PROCEDURE — 93000 ELECTROCARDIOGRAM COMPLETE: CPT | Performed by: INTERNAL MEDICINE

## 2025-05-05 RX ORDER — ISOSORBIDE MONONITRATE 60 MG/1
60 TABLET, EXTENDED RELEASE ORAL 2 TIMES DAILY
Qty: 60 TABLET | Refills: 5 | Status: SHIPPED | OUTPATIENT
Start: 2025-05-05

## 2025-05-05 ASSESSMENT — ENCOUNTER SYMPTOMS
RESPIRATORY NEGATIVE: 1
SHORTNESS OF BREATH: 0
VOMITING: 0
EYES NEGATIVE: 1
VOMITING: 0
COUGH: 1
GASTROINTESTINAL NEGATIVE: 1
NAUSEA: 0
SHORTNESS OF BREATH: 1
NAUSEA: 0
BACK PAIN: 1
ABDOMINAL PAIN: 0
DIARRHEA: 0

## 2025-05-05 ASSESSMENT — PAIN - FUNCTIONAL ASSESSMENT
PAIN_FUNCTIONAL_ASSESSMENT: NONE - DENIES PAIN

## 2025-05-05 NOTE — DISCHARGE INSTRUCTIONS
You have refused to be admitted to the hospital today.  Please see your cardiologist today as scheduled at 3 PM.  Return to the ER anytime should you change your mind.  Discussed your medication regiment with your physician and any potential opportunities for further workup and evaluation with your cardiologist today.

## 2025-05-05 NOTE — ED PROVIDER NOTES
[facility] EMERGENCY DEPARTMENT  eMERGENCY dEPARTMENT eNCOUnter      Pt Name: Carlos Espana    MRN: [id number]  Birthdate 1955  Date of evaluation: 5/5/2025  Provider: Justen Palacio MD    CHIEF COMPLAINT       Chief Complaint   Patient presents with    Chest Pain     Intermittently x2 days; pain worsening ovwe     Back Pain         HISTORY OF PRESENT ILLNESS   (Location/Symptom, Timing/Onset,Context/Setting, Quality, Duration, Modifying Factors, Severity)  Note limiting factors.   Carlos Espana is a 69 y.o. male who presents to the emergency department with chest pressure.  The patient complains that he has had worsening chest pressure off and on over the last 2 days.  The patient states he did take 2 nitro last night.  He had not taken nitro up until this week since a month ago.  The patient has an extensive cardiac history.  He goes to Dr. Worthington as his cardiologist.  The patient states he has multiple stents.  Patient states that this morning he got up to go to cardiac rehab.  He was walking when he developed chest pressure rather severe.  The patient states that this started around 930 he took 3 sublingual nitro at home himself and summoned [facility].  The patient states his pain went from a 9 or 10 out of 10 down to a 0.  The patient's pain-free with no pressure or chest discomfort at time of arrival.  He chronically has COPD and shortness of breath.  He wears 3 L oxygen off and on he is currently at his baseline he states he is a chronic cough as well he does not smoke currently.  The patient does have a history of kidney injury.  The patient did receive 324 mg total of aspirin he took 1 baby 1 and then was given 3 more by the [facility].  The patient also complains of musculoskeletal lower back pain.  He states he chronically has some back pain.  The patient has an extensive cardiac history.  Patient has no fever no weakness in his arms or his legs.  He does state that the pain at

## 2025-05-05 NOTE — ED NOTES
Patient states that he was in the process of going to theraphy today and stated that he had chest pain. He has a appointment with  today at 3pm

## 2025-05-05 NOTE — PROGRESS NOTES
use them, has one step to enter home, lives with his wife and Mr. Kumar Parker the cat     Social Drivers of Health     Financial Resource Strain: Low Risk  (8/11/2023)    Overall Financial Resource Strain (CARDIA)     Difficulty of Paying Living Expenses: Not very hard   Food Insecurity: No Food Insecurity (7/21/2024)    Hunger Vital Sign     Worried About Running Out of Food in the Last Year: Never true     Ran Out of Food in the Last Year: Never true   Transportation Needs: No Transportation Needs (7/21/2024)    PRAPARE - Transportation     Lack of Transportation (Medical): No     Lack of Transportation (Non-Medical): No   Physical Activity: Sufficiently Active (8/11/2023)    Exercise Vital Sign     Days of Exercise per Week: 3 days     Minutes of Exercise per Session: 60 min   Stress: No Stress Concern Present (8/11/2023)    [facility] - Occupational Stress Questionnaire     Feeling of Stress : Not at all   Social Connections: Unknown (10/8/2023)    [facility] [place]    Family and Community Support     Help with Day-to-Day Activities: Not on file     Lonely or Isolated: Not on file   Intimate Partner Violence: Unknown (10/8/2023)    Received from [facility] [place]    Abuse Screen     Unsafe at Home or Work/School: Not on file     Feels Threatened by Someone?: Not on file     Does Anyone Keep You from Contacting Others or Doint Things Outside the Home?: Not on file     Physical Sign of Abuse Present: Not on file   Housing Stability: Low Risk  (7/21/2024)    Housing Stability Vital Sign     Unable to Pay for Housing in the Last Year: No     Number of Places Lived in the Last Year: 1     Unstable Housing in the Last Year: No       Physical Examination:  /82   Pulse 64   Ht 1.803 m (5' 11\")   Wt 98.4 kg (217 lb)   BMI 30.27 kg/m²   Physical Exam  Vitals reviewed.   Constitutional:

## 2025-05-05 NOTE — ED NOTES
Patient states that he wants to be discharged in order to go to his scheduled appointment with  at 3 pm. Patient states that if he had not gone to physical theraphy today he would'nt even be here.

## 2025-05-06 LAB
EKG P AXIS: -73 DEGREES
EKG P-R INTERVAL: 128 MS
EKG Q-T INTERVAL: 498 MS
EKG QRS DURATION: 120 MS
EKG QTC CALCULATION (BAZETT): 491 MS
EKG T AXIS: 72 DEGREES

## 2025-05-06 PROCEDURE — 93010 ELECTROCARDIOGRAM REPORT: CPT | Performed by: INTERNAL MEDICINE

## 2025-06-01 ENCOUNTER — APPOINTMENT (OUTPATIENT)
Dept: CT IMAGING | Age: 70
DRG: 552 | End: 2025-06-01
Payer: MEDICARE

## 2025-06-01 ENCOUNTER — APPOINTMENT (OUTPATIENT)
Dept: GENERAL RADIOLOGY | Age: 70
DRG: 552 | End: 2025-06-01
Payer: MEDICARE

## 2025-06-01 ENCOUNTER — HOSPITAL ENCOUNTER (EMERGENCY)
Age: 70
Discharge: HOME | DRG: 552 | End: 2025-06-01
Attending: STUDENT IN AN ORGANIZED HEALTH CARE EDUCATION/TRAINING PROGRAM
Payer: MEDICARE

## 2025-06-01 VITALS
DIASTOLIC BLOOD PRESSURE: 80 MMHG | RESPIRATION RATE: 19 BRPM | SYSTOLIC BLOOD PRESSURE: 165 MMHG | BODY MASS INDEX: 29.57 KG/M2 | OXYGEN SATURATION: 94 % | WEIGHT: 212 LBS | TEMPERATURE: 98.1 F | HEART RATE: 55 BPM

## 2025-06-01 DIAGNOSIS — K59.00 CONSTIPATION, UNSPECIFIED CONSTIPATION TYPE: Primary | ICD-10-CM

## 2025-06-01 DIAGNOSIS — R29.898 LEG WEAKNESS, BILATERAL: ICD-10-CM

## 2025-06-01 LAB
ALBUMIN SERPL-MCNC: 4 G/DL (ref 3.5–5.2)
ALP SERPL-CCNC: 73 U/L (ref 40–129)
ALT SERPL-CCNC: 16 U/L (ref 10–50)
ANION GAP SERPL CALCULATED.3IONS-SCNC: 11 MMOL/L (ref 8–16)
AST SERPL-CCNC: 25 U/L (ref 10–50)
BACTERIA URNS QL MICRO: NEGATIVE /HPF
BASOPHILS # BLD: 0.1 K/UL (ref 0–0.2)
BASOPHILS NFR BLD: 0.9 % (ref 0–1)
BILIRUB SERPL-MCNC: 0.4 MG/DL (ref 0.2–1.2)
BILIRUB UR QL STRIP: NEGATIVE
BNP BLD-MCNC: 1714 PG/ML (ref 0–124)
BUN SERPL-MCNC: 34 MG/DL (ref 8–23)
CALCIUM SERPL-MCNC: 9.4 MG/DL (ref 8.8–10.2)
CHLORIDE SERPL-SCNC: 98 MMOL/L (ref 98–107)
CLARITY UR: CLEAR
CO2 SERPL-SCNC: 30 MMOL/L (ref 22–29)
COLOR UR: YELLOW
CREAT SERPL-MCNC: 2.5 MG/DL (ref 0.7–1.2)
CRYSTALS URNS MICRO: NORMAL /HPF
EOSINOPHIL # BLD: 0.4 K/UL (ref 0–0.6)
EOSINOPHIL NFR BLD: 5 % (ref 0–5)
EPI CELLS #/AREA URNS AUTO: 1 /HPF (ref 0–5)
ERYTHROCYTE [DISTWIDTH] IN BLOOD BY AUTOMATED COUNT: 17 % (ref 11.5–14.5)
GLUCOSE SERPL-MCNC: 258 MG/DL (ref 70–99)
GLUCOSE UR STRIP.AUTO-MCNC: 250 MG/DL
HCT VFR BLD AUTO: 49.8 % (ref 42–52)
HGB BLD-MCNC: 15.3 G/DL (ref 14–18)
HGB UR STRIP.AUTO-MCNC: ABNORMAL MG/L
HYALINE CASTS #/AREA URNS AUTO: 5 /HPF (ref 0–8)
IMM GRANULOCYTES # BLD: 0 K/UL
KETONES UR STRIP.AUTO-MCNC: NEGATIVE MG/DL
LEUKOCYTE ESTERASE UR QL STRIP.AUTO: NEGATIVE
LYMPHOCYTES # BLD: 1.4 K/UL (ref 1.1–4.5)
LYMPHOCYTES NFR BLD: 18.1 % (ref 20–40)
MAGNESIUM SERPL-MCNC: 2.5 MG/DL (ref 1.6–2.4)
MCH RBC QN AUTO: 28.5 PG (ref 27–31)
MCHC RBC AUTO-ENTMCNC: 30.7 G/DL (ref 33–37)
MCV RBC AUTO: 92.7 FL (ref 80–94)
MONOCYTES # BLD: 0.5 K/UL (ref 0–0.9)
MONOCYTES NFR BLD: 6.9 % (ref 0–10)
NEUTROPHILS # BLD: 5.2 K/UL (ref 1.5–7.5)
NEUTS SEG NFR BLD: 68.8 % (ref 50–65)
NITRITE UR QL STRIP.AUTO: NEGATIVE
PH UR STRIP.AUTO: 6.5 [PH] (ref 5–8)
PLATELET # BLD AUTO: 178 K/UL (ref 130–400)
PMV BLD AUTO: 11.5 FL (ref 9.4–12.4)
POTASSIUM SERPL-SCNC: 3.8 MMOL/L (ref 3.5–5.1)
PROT SERPL-MCNC: 6.7 G/DL (ref 6.4–8.3)
PROT UR STRIP.AUTO-MCNC: 300 MG/DL
RBC # BLD AUTO: 5.37 M/UL (ref 4.7–6.1)
RBC #/AREA URNS AUTO: 1 /HPF (ref 0–4)
SODIUM SERPL-SCNC: 139 MMOL/L (ref 136–145)
SP GR UR STRIP.AUTO: 1.01 (ref 1–1.03)
UROBILINOGEN UR STRIP.AUTO-MCNC: 1 E.U./DL
WBC # BLD AUTO: 7.6 K/UL (ref 4.8–10.8)
WBC #/AREA URNS AUTO: 1 /HPF (ref 0–5)

## 2025-06-01 PROCEDURE — 74018 RADEX ABDOMEN 1 VIEW: CPT

## 2025-06-01 PROCEDURE — 72131 CT LUMBAR SPINE W/O DYE: CPT

## 2025-06-01 PROCEDURE — 70450 CT HEAD/BRAIN W/O DYE: CPT

## 2025-06-01 PROCEDURE — 83880 ASSAY OF NATRIURETIC PEPTIDE: CPT

## 2025-06-01 PROCEDURE — 83735 ASSAY OF MAGNESIUM: CPT

## 2025-06-01 PROCEDURE — 96361 HYDRATE IV INFUSION ADD-ON: CPT

## 2025-06-01 PROCEDURE — 93005 ELECTROCARDIOGRAM TRACING: CPT | Performed by: STUDENT IN AN ORGANIZED HEALTH CARE EDUCATION/TRAINING PROGRAM

## 2025-06-01 PROCEDURE — 80053 COMPREHEN METABOLIC PANEL: CPT

## 2025-06-01 PROCEDURE — 36415 COLL VENOUS BLD VENIPUNCTURE: CPT

## 2025-06-01 PROCEDURE — 99285 EMERGENCY DEPT VISIT HI MDM: CPT

## 2025-06-01 PROCEDURE — 81003 URINALYSIS AUTO W/O SCOPE: CPT

## 2025-06-01 PROCEDURE — 71045 X-RAY EXAM CHEST 1 VIEW: CPT

## 2025-06-01 PROCEDURE — 85025 COMPLETE CBC W/AUTO DIFF WBC: CPT

## 2025-06-01 PROCEDURE — 2580000003 HC RX 258: Performed by: STUDENT IN AN ORGANIZED HEALTH CARE EDUCATION/TRAINING PROGRAM

## 2025-06-01 RX ORDER — 0.9 % SODIUM CHLORIDE 0.9 %
500 INTRAVENOUS SOLUTION INTRAVENOUS ONCE
Status: COMPLETED | OUTPATIENT
Start: 2025-06-01 | End: 2025-06-01

## 2025-06-01 RX ADMIN — SODIUM CHLORIDE 500 ML: 0.9 INJECTION, SOLUTION INTRAVENOUS at 20:15

## 2025-06-01 NOTE — ED TRIAGE NOTES
RA sat 88% on arrival, states he wears oxygen around 3.5L at night but not usually during the day.  Placed on 3L via NC at this time

## 2025-06-02 NOTE — DISCHARGE INSTRUCTIONS
Concerning your constipation:    Supplement with fiber. Goal is age plus 10-15 grams of fiber/day until about age 16 at which time you are at the goal adult fiber intake of 25-30 grams/day.     Benefiber is a good choice as a supplement as it can be mixed in most beverages or sprinkled on food and does not have a flavor or much of a texture that it adds. Usually, this will need to be done twice a day at first and then decreased to once a day as bowel movements become more regular. However, this is a rough guideline as everyone is different.    Alternatives to benefiber such as metamucil or a generic alternative are acceptable as well depending upon personal preference.    Increasing the dietary intake of fiber is important as well. This includes high fiber cereals such as Raisin Bran, and breads that are whole grain. Look for bread that contains at least 2 grams of fiber per slice. Many makers of bread will list their fiber content as per every 2 slices, so be aware of the listed serving size.    Dried fruit such as apricots and prunes can be an excellent source of additional fiber. The juices of these fruits can be equally beneficial. Vegetables, such as corn also provide additional fiber.    Be sure to drink plenty of fluid and stay well hydrated.    Use a fleets enema or glycerin suppository to initiate bowel movement today.    Use miralax as directed.

## 2025-06-02 NOTE — ED PROVIDER NOTES
[facility] EMERGENCY DEPARTMENT  eMERGENCY dEPARTMENT eNCOUnter      Pt Name: Carlos Espana  MRN: [id number]  Birthdate 1955  Date of evaluation: 6/1/2025  Provider: Evita Marie MD    CHIEF COMPLAINT       Chief Complaint   Patient presents with    Extremity Weakness     Bilateral leg weakness progressive over last 2 weeks         HISTORY OF PRESENT ILLNESS   (Location/Symptom, Timing/Onset,Context/Setting, Quality, Duration, Modifying Factors, Severity)  Note limiting factors.     HPI    Carlos Espana is a 69 y.o. male with PMH of CKD 4, insulin-dependent type 2 diabetes, CAD, combined systolic and diastolic CHF, paroxysmal A-fib on Eliquis, GERD, SAMMY who presents to the emergency department with CC of bilateral leg weakness progressive over the last few weeks.  States that he normally walks with a cane but has had to use a walker over the last couple days.  He reports acute on chronic low back pain mainly on the right which sometimes radiates down the right leg but states he does have sciatica.  He also reports chronic constipation despite twice daily MiraLAX and states stool has been like mario.  He reports he thinks he could be dehydrated.  He has numbness in his feet but not up and down his legs.  No groin numbness, no urinary or stool incontinence.  Denies recent fevers, chills, chest pain, dyspnea, abdominal pain, nausea, vomiting, diarrhea.  Denies any changes in his medications.        NursingNotes were reviewed.    REVIEW OF SYSTEMS    (2-9 systems for level 4, 10 or more for level 5)     Review of Systems   Constitutional:  Negative for appetite change, chills, fatigue and fever.   HENT:  Negative for congestion and sore throat.    Eyes:  Negative for pain and redness.   Respiratory:  Negative for cough, chest tightness and shortness of breath.    Cardiovascular:  Negative for chest pain and leg swelling.   Gastrointestinal:  Positive for constipation. Negative for abdominal pain, blood in  more aggressive control of constipation including increasing MiraLAX and using enemas at home.  Patient would rather do an enema home than here.  He was given some fluids for the elevated magnesium.  Will have him follow-up with  If his symptoms do not improve after control of the constipation.        CONSULTS:  None    :  Unless otherwise noted below, none     Procedures    FINAL IMPRESSION      1. Constipation, unspecified constipation type    2. Leg weakness, bilateral          DISPOSITION/PLAN   DISPOSITION Discharge - Pending Orders Complete 06/01/2025 08:40:45 PM   DISPOSITION CONDITION Stable           PATIENT REFERRED TO:  Alisa Rangel MD  [address] [place] [id number]  [phone]    Schedule an appointment as soon as possible for a visit in 2 days  As needed      DISCHARGE MEDICATIONS:  New Prescriptions    No medications on file          (Please note that portions of this note were completed with a voice recognition program.  Efforts were made to edit thedictations but occasionally words are mis-transcribed.)    Evita Marie MD (electronically signed)Emergency Physician          Evita Marie MD  06/01/25 2052       Evita Marie MD  06/01/25 2053

## 2025-06-02 NOTE — DISCHARGE INSTR - COC
Continuity of Care Form    Patient Name: Carlos Espana   :  1955  MRN:  [id number]    Admit date:  2025  Discharge date:  ***    Code Status Order: Prior   Advance Directives:     Admitting Physician:  No admitting provider for patient encounter.  PCP: Alisa Rangel MD    Discharging Nurse: ***  Discharging Hospital Unit/Room#:   Discharging Unit Phone Number: ***    Emergency Contact:   Extended Emergency Contact Information  Primary Emergency Contact: Perri Espana  Address: [address] [place] [address]  Home Phone: [phone]  Mobile Phone: [phone]  Relation: Spouse  Secondary Emergency Contact: Dg Espana  Home Phone: [phone]  Relation: Brother/Sister    Past Surgical History:  Past Surgical History:   Procedure Laterality Date    CARDIAC CATHETERIZATION      CHOLECYSTECTOMY      CORONARY ANGIOPLASTY WITH STENT PLACEMENT  2016    2 JACQUELINE to LAD    DIAGNOSTIC CARDIAC CATH LAB PROCEDURE      UPPER GASTROINTESTINAL ENDOSCOPY      UPPER GASTROINTESTINAL ENDOSCOPY N/A 2023    Dr RANCHO Owens-w/EUS-Moderate gastritis, small pancreatic cyst-Gastritis, no h pylori, CT in 6 months    URETER STENT PLACEMENT      VASCULAR SURGERY  2013 [facility]    Aortogram with bilateral selective renal artery injections    VASCULAR SURGERY  13 [facility]    Right carotid endarterectomy with Vascu-Guard patch repair. Right cervical carotid arteriograms after endarterectomy.    VASCULAR SURGERY  7/6/15 [facility]    Percutaneous cannulation, right common femoral artery, with a5 Burmese sheath and later 6 Burmese RDC sheath. Suprarenal abdomianl aortagram.  Selective right renal arteriogram.  Right renal artery balloon angioplasty;/stent (Express 6 mm x 18mmballoon-expandable stent.. Completion suprarenal abdominal aortogram and selective right remal arteriogram. Mynx closure, right common femoral artery punctur    VASCULAR SURGERY N/A 2024    PERMCATH INSERTION

## 2025-06-04 ENCOUNTER — APPOINTMENT (OUTPATIENT)
Dept: MRI IMAGING | Age: 70
DRG: 552 | End: 2025-06-04
Payer: MEDICARE

## 2025-06-04 ENCOUNTER — APPOINTMENT (OUTPATIENT)
Dept: GENERAL RADIOLOGY | Age: 70
DRG: 552 | End: 2025-06-04
Payer: MEDICARE

## 2025-06-04 ENCOUNTER — HOSPITAL ENCOUNTER (INPATIENT)
Age: 70
LOS: 1 days | Discharge: HOME | DRG: 552 | End: 2025-06-05
Attending: EMERGENCY MEDICINE | Admitting: HOSPITALIST
Payer: MEDICARE

## 2025-06-04 DIAGNOSIS — M54.50 LUMBAR BACK PAIN: ICD-10-CM

## 2025-06-04 DIAGNOSIS — M25.552 LEFT HIP PAIN: ICD-10-CM

## 2025-06-04 DIAGNOSIS — R26.2 AMBULATORY DYSFUNCTION: Primary | ICD-10-CM

## 2025-06-04 DIAGNOSIS — N18.9 CHRONIC KIDNEY DISEASE, UNSPECIFIED CKD STAGE: ICD-10-CM

## 2025-06-04 DIAGNOSIS — I65.23 BILATERAL CAROTID ARTERY STENOSIS: ICD-10-CM

## 2025-06-04 PROBLEM — R29.898 WEAKNESS OF BOTH LOWER EXTREMITIES: Status: ACTIVE | Noted: 2025-06-04

## 2025-06-04 PROBLEM — R29.6 MULTIPLE FALLS: Status: ACTIVE | Noted: 2025-06-04

## 2025-06-04 LAB
ALBUMIN SERPL-MCNC: 4.1 G/DL (ref 3.5–5.2)
ALP SERPL-CCNC: 82 U/L (ref 40–129)
ALT SERPL-CCNC: 23 U/L (ref 10–50)
ANION GAP SERPL CALCULATED.3IONS-SCNC: 13 MMOL/L (ref 8–16)
APTT PPP: 39.4 SEC (ref 26–36.2)
AST SERPL-CCNC: 45 U/L (ref 10–50)
BACTERIA #/AREA URNS HPF: ABNORMAL /HPF
BASOPHILS # BLD: 0.1 K/UL (ref 0–0.2)
BASOPHILS NFR BLD: 0.6 % (ref 0–1)
BILIRUB SERPL-MCNC: 0.5 MG/DL (ref 0.2–1.2)
BILIRUB UR QL STRIP: NEGATIVE
BNP BLD-MCNC: 1794 PG/ML (ref 0–124)
BUN SERPL-MCNC: 27 MG/DL (ref 8–23)
CALCIUM SERPL-MCNC: 10.1 MG/DL (ref 8.8–10.2)
CHLORIDE SERPL-SCNC: 95 MMOL/L (ref 98–107)
CK SERPL-CCNC: 961 U/L (ref 39–308)
CLARITY UR: CLEAR
CO2 SERPL-SCNC: 28 MMOL/L (ref 22–29)
COLOR UR: YELLOW
CREAT SERPL-MCNC: 2.8 MG/DL (ref 0.7–1.2)
EKG P AXIS: -60 DEGREES
EKG P-R INTERVAL: 232 MS
EKG Q-T INTERVAL: 508 MS
EKG QRS DURATION: 126 MS
EKG QTC CALCULATION (BAZETT): 489 MS
EKG T AXIS: 48 DEGREES
EOSINOPHIL # BLD: 0.4 K/UL (ref 0–0.6)
EOSINOPHIL NFR BLD: 4.7 % (ref 0–5)
ERYTHROCYTE [DISTWIDTH] IN BLOOD BY AUTOMATED COUNT: 17.2 % (ref 11.5–14.5)
FATTY CASTS #/AREA URNS LPF: ABNORMAL /LPF
GLUCOSE BLD-MCNC: 372 MG/DL (ref 70–99)
GLUCOSE SERPL-MCNC: 204 MG/DL (ref 70–99)
GLUCOSE UR STRIP.AUTO-MCNC: 100 MG/DL
HCT VFR BLD AUTO: 48.2 % (ref 42–52)
HGB BLD-MCNC: 15.1 G/DL (ref 14–18)
HGB UR STRIP.AUTO-MCNC: ABNORMAL MG/L
IMM GRANULOCYTES # BLD: 0 K/UL
INR PPP: 1.35 (ref 0.88–1.18)
KETONES UR STRIP.AUTO-MCNC: ABNORMAL MG/DL
LEUKOCYTE ESTERASE UR QL STRIP.AUTO: NEGATIVE
LIPASE SERPL-CCNC: 21 U/L (ref 13–60)
LYMPHOCYTES # BLD: 1.2 K/UL (ref 1.1–4.5)
LYMPHOCYTES NFR BLD: 15.7 % (ref 20–40)
MAGNESIUM SERPL-MCNC: 2.7 MG/DL (ref 1.6–2.4)
MCH RBC QN AUTO: 28.7 PG (ref 27–31)
MCHC RBC AUTO-ENTMCNC: 31.3 G/DL (ref 33–37)
MCV RBC AUTO: 91.6 FL (ref 80–94)
MONOCYTES # BLD: 0.7 K/UL (ref 0–0.9)
MONOCYTES NFR BLD: 8.6 % (ref 0–10)
NEUTROPHILS # BLD: 5.5 K/UL (ref 1.5–7.5)
NEUTS SEG NFR BLD: 70.3 % (ref 50–65)
NITRITE UR QL STRIP.AUTO: NEGATIVE
PERFORMED ON: ABNORMAL
PH UR STRIP.AUTO: 5 [PH] (ref 5–8)
PLATELET # BLD AUTO: 177 K/UL (ref 130–400)
PMV BLD AUTO: 12.3 FL (ref 9.4–12.4)
POTASSIUM SERPL-SCNC: 4.2 MMOL/L (ref 3.5–5.1)
PROT SERPL-MCNC: 6.8 G/DL (ref 6.4–8.3)
PROT UR STRIP.AUTO-MCNC: 300 MG/DL
PROTHROMBIN TIME: 16.6 SEC (ref 12–14.6)
RBC # BLD AUTO: 5.26 M/UL (ref 4.7–6.1)
RBC #/AREA URNS HPF: ABNORMAL /HPF (ref 0–2)
SODIUM SERPL-SCNC: 136 MMOL/L (ref 136–145)
SP GR UR STRIP.AUTO: 1.02 (ref 1–1.03)
SQUAMOUS #/AREA URNS HPF: ABNORMAL /HPF
URN SPEC COLLECT METH UR: ABNORMAL
UROBILINOGEN UR STRIP.AUTO-MCNC: 0.2 E.U./DL
WBC # BLD AUTO: 7.8 K/UL (ref 4.8–10.8)
WBC #/AREA URNS HPF: ABNORMAL /HPF (ref 0–5)

## 2025-06-04 PROCEDURE — 83880 ASSAY OF NATRIURETIC PEPTIDE: CPT

## 2025-06-04 PROCEDURE — 36415 COLL VENOUS BLD VENIPUNCTURE: CPT

## 2025-06-04 PROCEDURE — 81001 URINALYSIS AUTO W/SCOPE: CPT

## 2025-06-04 PROCEDURE — 2500000003 HC RX 250 WO HCPCS: Performed by: NURSE PRACTITIONER

## 2025-06-04 PROCEDURE — 99285 EMERGENCY DEPT VISIT HI MDM: CPT

## 2025-06-04 PROCEDURE — 6370000000 HC RX 637 (ALT 250 FOR IP): Performed by: NURSE PRACTITIONER

## 2025-06-04 PROCEDURE — 83690 ASSAY OF LIPASE: CPT

## 2025-06-04 PROCEDURE — 72148 MRI LUMBAR SPINE W/O DYE: CPT

## 2025-06-04 PROCEDURE — 93010 ELECTROCARDIOGRAM REPORT: CPT | Performed by: INTERNAL MEDICINE

## 2025-06-04 PROCEDURE — 6360000002 HC RX W HCPCS: Performed by: EMERGENCY MEDICINE

## 2025-06-04 PROCEDURE — 99223 1ST HOSP IP/OBS HIGH 75: CPT | Performed by: PSYCHIATRY & NEUROLOGY

## 2025-06-04 PROCEDURE — G0378 HOSPITAL OBSERVATION PER HR: HCPCS

## 2025-06-04 PROCEDURE — 82962 GLUCOSE BLOOD TEST: CPT

## 2025-06-04 PROCEDURE — 73552 X-RAY EXAM OF FEMUR 2/>: CPT

## 2025-06-04 PROCEDURE — 73630 X-RAY EXAM OF FOOT: CPT

## 2025-06-04 PROCEDURE — 2500000003 HC RX 250 WO HCPCS: Performed by: EMERGENCY MEDICINE

## 2025-06-04 PROCEDURE — 83735 ASSAY OF MAGNESIUM: CPT

## 2025-06-04 PROCEDURE — 72170 X-RAY EXAM OF PELVIS: CPT

## 2025-06-04 PROCEDURE — 85025 COMPLETE CBC W/AUTO DIFF WBC: CPT

## 2025-06-04 PROCEDURE — 96375 TX/PRO/DX INJ NEW DRUG ADDON: CPT

## 2025-06-04 PROCEDURE — 1200000000 HC SEMI PRIVATE

## 2025-06-04 PROCEDURE — 80053 COMPREHEN METABOLIC PANEL: CPT

## 2025-06-04 PROCEDURE — 85730 THROMBOPLASTIN TIME PARTIAL: CPT

## 2025-06-04 PROCEDURE — 96374 THER/PROPH/DIAG INJ IV PUSH: CPT

## 2025-06-04 PROCEDURE — 70551 MRI BRAIN STEM W/O DYE: CPT

## 2025-06-04 PROCEDURE — 85610 PROTHROMBIN TIME: CPT

## 2025-06-04 PROCEDURE — 82550 ASSAY OF CK (CPK): CPT

## 2025-06-04 RX ORDER — ROSUVASTATIN CALCIUM 20 MG/1
40 TABLET, COATED ORAL DAILY
Status: DISCONTINUED | OUTPATIENT
Start: 2025-06-04 | End: 2025-06-05 | Stop reason: HOSPADM

## 2025-06-04 RX ORDER — TAMSULOSIN HYDROCHLORIDE 0.4 MG/1
0.4 CAPSULE ORAL DAILY
Status: DISCONTINUED | OUTPATIENT
Start: 2025-06-04 | End: 2025-06-05 | Stop reason: HOSPADM

## 2025-06-04 RX ORDER — RANOLAZINE 500 MG/1
1000 TABLET, EXTENDED RELEASE ORAL 2 TIMES DAILY
Status: DISCONTINUED | OUTPATIENT
Start: 2025-06-04 | End: 2025-06-05 | Stop reason: HOSPADM

## 2025-06-04 RX ORDER — ISOSORBIDE MONONITRATE 60 MG/1
60 TABLET, EXTENDED RELEASE ORAL 2 TIMES DAILY
Status: DISCONTINUED | OUTPATIENT
Start: 2025-06-04 | End: 2025-06-05 | Stop reason: HOSPADM

## 2025-06-04 RX ORDER — INSULIN LISPRO 100 [IU]/ML
0-4 INJECTION, SOLUTION INTRAVENOUS; SUBCUTANEOUS
Status: DISCONTINUED | OUTPATIENT
Start: 2025-06-04 | End: 2025-06-05 | Stop reason: HOSPADM

## 2025-06-04 RX ORDER — GLUCAGON 1 MG/ML
1 KIT INJECTION PRN
Status: DISCONTINUED | OUTPATIENT
Start: 2025-06-04 | End: 2025-06-05 | Stop reason: HOSPADM

## 2025-06-04 RX ORDER — ONDANSETRON 2 MG/ML
4 INJECTION INTRAMUSCULAR; INTRAVENOUS EVERY 6 HOURS PRN
Status: DISCONTINUED | OUTPATIENT
Start: 2025-06-04 | End: 2025-06-05 | Stop reason: HOSPADM

## 2025-06-04 RX ORDER — ORPHENADRINE CITRATE 30 MG/ML
60 INJECTION INTRAMUSCULAR; INTRAVENOUS ONCE
Status: COMPLETED | OUTPATIENT
Start: 2025-06-04 | End: 2025-06-04

## 2025-06-04 RX ORDER — FAMOTIDINE 20 MG/1
20 TABLET, FILM COATED ORAL NIGHTLY
Status: DISCONTINUED | OUTPATIENT
Start: 2025-06-04 | End: 2025-06-05 | Stop reason: HOSPADM

## 2025-06-04 RX ORDER — NIFEDIPINE 60 MG/1
60 TABLET, EXTENDED RELEASE ORAL DAILY
Status: DISCONTINUED | OUTPATIENT
Start: 2025-06-04 | End: 2025-06-05 | Stop reason: HOSPADM

## 2025-06-04 RX ORDER — DEXTROSE MONOHYDRATE 100 MG/ML
INJECTION, SOLUTION INTRAVENOUS CONTINUOUS PRN
Status: DISCONTINUED | OUTPATIENT
Start: 2025-06-04 | End: 2025-06-05 | Stop reason: HOSPADM

## 2025-06-04 RX ORDER — ACETAMINOPHEN 650 MG/1
650 SUPPOSITORY RECTAL EVERY 6 HOURS PRN
Status: DISCONTINUED | OUTPATIENT
Start: 2025-06-04 | End: 2025-06-05 | Stop reason: HOSPADM

## 2025-06-04 RX ORDER — ONDANSETRON 4 MG/1
4 TABLET, ORALLY DISINTEGRATING ORAL EVERY 8 HOURS PRN
Status: DISCONTINUED | OUTPATIENT
Start: 2025-06-04 | End: 2025-06-05 | Stop reason: HOSPADM

## 2025-06-04 RX ORDER — SODIUM CHLORIDE 0.9 % (FLUSH) 0.9 %
5-40 SYRINGE (ML) INJECTION PRN
Status: DISCONTINUED | OUTPATIENT
Start: 2025-06-04 | End: 2025-06-05 | Stop reason: HOSPADM

## 2025-06-04 RX ORDER — AMIODARONE HYDROCHLORIDE 200 MG/1
100 TABLET ORAL DAILY
Status: DISCONTINUED | OUTPATIENT
Start: 2025-06-04 | End: 2025-06-05 | Stop reason: HOSPADM

## 2025-06-04 RX ORDER — ALLOPURINOL 100 MG/1
200 TABLET ORAL DAILY
Status: DISCONTINUED | OUTPATIENT
Start: 2025-06-04 | End: 2025-06-05 | Stop reason: HOSPADM

## 2025-06-04 RX ORDER — ACETAMINOPHEN 325 MG/1
650 TABLET ORAL EVERY 6 HOURS PRN
Status: DISCONTINUED | OUTPATIENT
Start: 2025-06-04 | End: 2025-06-05 | Stop reason: HOSPADM

## 2025-06-04 RX ORDER — MAGNESIUM SULFATE IN WATER 40 MG/ML
2000 INJECTION, SOLUTION INTRAVENOUS PRN
Status: DISCONTINUED | OUTPATIENT
Start: 2025-06-04 | End: 2025-06-05 | Stop reason: HOSPADM

## 2025-06-04 RX ORDER — POLYETHYLENE GLYCOL 3350 17 G/17G
17 POWDER, FOR SOLUTION ORAL DAILY PRN
Status: DISCONTINUED | OUTPATIENT
Start: 2025-06-04 | End: 2025-06-05 | Stop reason: HOSPADM

## 2025-06-04 RX ORDER — POTASSIUM CHLORIDE 1500 MG/1
40 TABLET, EXTENDED RELEASE ORAL PRN
Status: DISCONTINUED | OUTPATIENT
Start: 2025-06-04 | End: 2025-06-05 | Stop reason: HOSPADM

## 2025-06-04 RX ORDER — ASPIRIN 81 MG/1
81 TABLET, CHEWABLE ORAL DAILY
Status: DISCONTINUED | OUTPATIENT
Start: 2025-06-04 | End: 2025-06-05 | Stop reason: HOSPADM

## 2025-06-04 RX ORDER — CALCITRIOL 0.25 UG/1
0.25 CAPSULE, LIQUID FILLED ORAL DAILY
Status: DISCONTINUED | OUTPATIENT
Start: 2025-06-04 | End: 2025-06-05 | Stop reason: HOSPADM

## 2025-06-04 RX ORDER — SODIUM CHLORIDE 0.9 % (FLUSH) 0.9 %
5-40 SYRINGE (ML) INJECTION EVERY 12 HOURS SCHEDULED
Status: DISCONTINUED | OUTPATIENT
Start: 2025-06-04 | End: 2025-06-05 | Stop reason: HOSPADM

## 2025-06-04 RX ORDER — CARVEDILOL 12.5 MG/1
12.5 TABLET ORAL 2 TIMES DAILY
Status: DISCONTINUED | OUTPATIENT
Start: 2025-06-04 | End: 2025-06-05 | Stop reason: HOSPADM

## 2025-06-04 RX ORDER — PANTOPRAZOLE SODIUM 40 MG/1
40 TABLET, DELAYED RELEASE ORAL
Status: DISCONTINUED | OUTPATIENT
Start: 2025-06-05 | End: 2025-06-05 | Stop reason: HOSPADM

## 2025-06-04 RX ORDER — POTASSIUM CHLORIDE 7.45 MG/ML
10 INJECTION INTRAVENOUS PRN
Status: DISCONTINUED | OUTPATIENT
Start: 2025-06-04 | End: 2025-06-05 | Stop reason: HOSPADM

## 2025-06-04 RX ORDER — SODIUM CHLORIDE 9 MG/ML
INJECTION, SOLUTION INTRAVENOUS PRN
Status: DISCONTINUED | OUTPATIENT
Start: 2025-06-04 | End: 2025-06-05 | Stop reason: HOSPADM

## 2025-06-04 RX ORDER — ALBUTEROL SULFATE 90 UG/1
2 INHALANT RESPIRATORY (INHALATION) EVERY 6 HOURS PRN
Status: DISCONTINUED | OUTPATIENT
Start: 2025-06-04 | End: 2025-06-05 | Stop reason: HOSPADM

## 2025-06-04 RX ADMIN — INSULIN LISPRO 4 UNITS: 100 INJECTION, SOLUTION INTRAVENOUS; SUBCUTANEOUS at 22:04

## 2025-06-04 RX ADMIN — APIXABAN 2.5 MG: 2.5 TABLET, FILM COATED ORAL at 15:43

## 2025-06-04 RX ADMIN — ROSUVASTATIN CALCIUM 40 MG: 20 TABLET, FILM COATED ORAL at 20:52

## 2025-06-04 RX ADMIN — ISOSORBIDE MONONITRATE 60 MG: 60 TABLET, EXTENDED RELEASE ORAL at 13:40

## 2025-06-04 RX ADMIN — CARVEDILOL 12.5 MG: 12.5 TABLET, FILM COATED ORAL at 20:50

## 2025-06-04 RX ADMIN — SODIUM CHLORIDE, PRESERVATIVE FREE 10 ML: 5 INJECTION INTRAVENOUS at 20:53

## 2025-06-04 RX ADMIN — AMIODARONE HYDROCHLORIDE 100 MG: 200 TABLET ORAL at 13:40

## 2025-06-04 RX ADMIN — ACETAMINOPHEN 650 MG: 325 TABLET ORAL at 20:52

## 2025-06-04 RX ADMIN — ASPIRIN 81 MG: 81 TABLET, CHEWABLE ORAL at 13:40

## 2025-06-04 RX ADMIN — NIFEDIPINE 60 MG: 60 TABLET, EXTENDED RELEASE ORAL at 20:52

## 2025-06-04 RX ADMIN — ISOSORBIDE MONONITRATE 60 MG: 60 TABLET, EXTENDED RELEASE ORAL at 20:52

## 2025-06-04 RX ADMIN — ALLOPURINOL 200 MG: 100 TABLET ORAL at 20:55

## 2025-06-04 RX ADMIN — CALCITRIOL CAPSULES 0.25 MCG 0.25 MCG: 0.25 CAPSULE ORAL at 20:55

## 2025-06-04 RX ADMIN — CARVEDILOL 12.5 MG: 12.5 TABLET, FILM COATED ORAL at 13:40

## 2025-06-04 RX ADMIN — RANOLAZINE 1000 MG: 500 TABLET, FILM COATED, EXTENDED RELEASE ORAL at 20:51

## 2025-06-04 RX ADMIN — FAMOTIDINE 20 MG: 20 TABLET, FILM COATED ORAL at 20:52

## 2025-06-04 RX ADMIN — APIXABAN 2.5 MG: 2.5 TABLET, FILM COATED ORAL at 20:51

## 2025-06-04 RX ADMIN — ORPHENADRINE CITRATE 60 MG: 60 INJECTION INTRAMUSCULAR; INTRAVENOUS at 08:14

## 2025-06-04 RX ADMIN — TAMSULOSIN HYDROCHLORIDE 0.4 MG: 0.4 CAPSULE ORAL at 13:39

## 2025-06-04 RX ADMIN — WATER 80 MG: 1 INJECTION INTRAMUSCULAR; INTRAVENOUS; SUBCUTANEOUS at 10:48

## 2025-06-04 ASSESSMENT — ENCOUNTER SYMPTOMS
SHORTNESS OF BREATH: 0
BACK PAIN: 1
VOMITING: 0
DIARRHEA: 0
ABDOMINAL PAIN: 0
NAUSEA: 0
COUGH: 0

## 2025-06-04 ASSESSMENT — PAIN DESCRIPTION - PAIN TYPE: TYPE: CHRONIC PAIN

## 2025-06-04 ASSESSMENT — PAIN DESCRIPTION - LOCATION: LOCATION: ARM;LEG

## 2025-06-04 ASSESSMENT — PAIN DESCRIPTION - DESCRIPTORS: DESCRIPTORS: SPASM

## 2025-06-04 ASSESSMENT — PAIN SCALES - GENERAL
PAINLEVEL_OUTOF10: 2
PAINLEVEL_OUTOF10: 2
PAINLEVEL_OUTOF10: 0

## 2025-06-04 ASSESSMENT — PAIN - FUNCTIONAL ASSESSMENT
PAIN_FUNCTIONAL_ASSESSMENT: PREVENTS OR INTERFERES SOME ACTIVE ACTIVITIES AND ADLS
PAIN_FUNCTIONAL_ASSESSMENT: 0-10

## 2025-06-04 ASSESSMENT — PAIN DESCRIPTION - ORIENTATION: ORIENTATION: RIGHT;LEFT

## 2025-06-04 ASSESSMENT — PAIN DESCRIPTION - FREQUENCY: FREQUENCY: CONTINUOUS

## 2025-06-04 ASSESSMENT — PAIN DESCRIPTION - ONSET: ONSET: ON-GOING

## 2025-06-04 NOTE — PROGRESS NOTES
4 Eyes Skin Assessment     NAME:  Carlos Espana  YOB: 1955  MEDICAL RECORD NUMBER:  [id number]    The patient is being assessed for  Admission    I agree that at least one RN has performed a thorough Head to Toe Skin Assessment on the patient. ALL assessment sites listed below have been assessed.      Areas assessed by both nurses:    Head, Face, Ears, Shoulders, Back, Chest, Arms, Elbows, Hands, Sacrum. Buttock, Coccyx, Ischium, and Legs. Feet and Heels        Does the Patient have a Wound? No noted wound(s)       Jose C Prevention initiated by RN: No  Wound Care Orders initiated by RN: No    Pressure Injury (Stage 3,4, Unstageable, DTI, NWPT, and Complex wounds) if present, place Wound referral order by RN under : No    New Ostomies, if present place, Ostomy referral order under : No     Nurse 1 eSignature: Electronically signed by Jillian Hudson RN on 6/4/25 at 5:23 PM CDT    **SHARE this note so that the co-signing nurse can place an eSignature**    Nurse 2 eSignature: Electronically signed by Kristina Rosado RN on 6/4/25 at 7:10 PM CDT

## 2025-06-04 NOTE — H&P
[facility]      Hospitalist - History & Physical      PCP: Alisa Rangel MD    Date of Admission: 6/4/2025    Date of Service: 6/4/2025    Chief Complaint:  leg pain, leg swelling    History Of Present Illness:   The patient is a 69 y.o. male with an extensive past medical history to include-CAD, CKD stage 4,CHF, arterial stenosis, diabetes, HTN, PAF on chronic anticoagulation, and chronic respiratory failure  who presented to [facility] ED on 6/4/2025 complaining of increased weakness, increased fall, lower extremity weakness, foot swelling, and increased difficulty walking. He states that he has had a increased difficulty in walking. He previously required a cane to assist with ambulation. Over the last 2-3 months, he has had left hip pain and increased lower extremity weakness requiring he to use a walker. He states that he has been running over his feet with the wheels of his walker causing swelling and bruising to bilateral feet. He states that he feels that his legs feel like they \"give out\" after short periods of walking. He has sustained several falls over the last few months, however, he denies hitting his head. He had started PT, experienced chest pain and was sent to the ED on 5/5 and was discharged. Over the past 3 days, he has been to the ED on 3 separate occasions. He was seen on 6/1 for similar complaints as today,  where his work-up was relatively unremarkable except for constipation-he was advise to treat his constipation. He was seen at [facility] for worsening constipation. Today, his constipation has resolved, however, he continues to experience back pain, weakness and difficulty ambulating. He denies chest pain, fever or chills. Denies palpitations. Reports occasional SOA, however, no more that usual.       Further ED work-up [id number], K-4.2, Cl-95 BUN-27 and creatinine-2.8( this is near his baseline). Glucose-204, BNP-1794. CBC unremarkable. UA not indicative of

## 2025-06-04 NOTE — CONSULTS
PROCEDURE      UPPER GASTROINTESTINAL ENDOSCOPY  2015    UPPER GASTROINTESTINAL ENDOSCOPY N/A 05/16/2023    Dr RANCHO Owens-w/EUS-Moderate gastritis, small pancreatic cyst-Gastritis, no h pylori, CT in 6 months    URETER STENT PLACEMENT      VASCULAR SURGERY  03- [facility]    Aortogram with bilateral selective renal artery injections    VASCULAR SURGERY  6/14/13 [facility]    Right carotid endarterectomy with Vascu-Guard patch repair. Right cervical carotid arteriograms after endarterectomy.    VASCULAR SURGERY  7/6/15 [facility]    Percutaneous cannulation, right common femoral artery, with a5 french sheath and later 6 french RDC sheath. Suprarenal abdomianl aortagram.  Selective right renal arteriogram.  Right renal artery balloon angioplasty;/stent (Express 6 mm x 18mmballoon-expandable stent.. Completion suprarenal abdominal aortogram and selective right remal arteriogram. Mynx closure, right common femoral artery punctur    VASCULAR SURGERY N/A 4/22/2024    PERMCATH INSERTION performed by Evon Argueta DO at [facility] OR       Medications Prior to Admission:    Prior to Admission medications    Medication Sig Start Date End Date Taking? Authorizing Provider   isosorbide mononitrate (IMDUR) 60 MG extended release tablet TAKE ONE TABLET BY MOUTH TWICE A DAY 5/5/25   Cady Arraiga APRN - NP   amiodarone (PACERONE) 100 MG tablet TAKE ONE TABLET BY MOUTH EVERY DAY 5/1/25   Shelbie Herrera APRN   albuterol sulfate HFA (VENTOLIN HFA) 108 (90 Base) MCG/ACT inhaler Inhale 2 puffs into the lungs every 6 hours as needed for Wheezing or Shortness of Breath Indications: Chronic Obstructive Lung Disease    Provider, MD Bryson   rosuvastatin (CRESTOR) 40 MG tablet TAKE ONE TABLET BY MOUTH EVERY DAY 12/30/24   Perri Rowland APRN   metOLazone (ZAROXOLYN) 5 MG tablet Take 1 tablet by mouth daily as needed (weight gain) 11/13/24   Shelbie Herrera APRN   NIFEdipine (ADALAT CC) 60 MG extended release tablet Take 1 tablet by mouth daily  (ZYLOPRIM) 100 MG tablet Take 2 tablets by mouth daily    Provider, MD Bryson   aspirin 81 MG chewable tablet Take 1 tablet by mouth daily 12/24/20   Deborah, Anand COLORADO MD   ULTICARE MINI PEN NEEDLES 31G X 6 MM MISC FOR USE WITH LANTUS TWO TIMES A DAY 7/11/14   Parul Velez, APRN - CNP       Current Medications:  Current Facility-Administered Medications: amiodarone (CORDARONE) tablet 100 mg, 100 mg, Oral, Daily  apixaban (ELIQUIS) tablet 2.5 mg, 2.5 mg, Oral, BID  aspirin chewable tablet 81 mg, 81 mg, Oral, Daily  carvedilol (COREG) tablet 12.5 mg, 12.5 mg, Oral, BID  [START ON 6/5/2025] pantoprazole (PROTONIX) tablet 40 mg, 40 mg, Oral, QAM AC  isosorbide mononitrate (IMDUR) extended release tablet 60 mg, 60 mg, Oral, BID  tamsulosin (FLOMAX) capsule 0.4 mg, 0.4 mg, Oral, Daily  sodium chloride flush 0.9 % injection 5-40 mL, 5-40 mL, IntraVENous, 2 times per day  sodium chloride flush 0.9 % injection 5-40 mL, 5-40 mL, IntraVENous, PRN  0.9 % sodium chloride infusion, , IntraVENous, PRN  potassium chloride (KLOR-CON M) extended release tablet 40 mEq, 40 mEq, Oral, PRN **OR** potassium bicarb-citric acid (EFFER-K) effervescent tablet 40 mEq, 40 mEq, Oral, PRN **OR** potassium chloride 10 mEq/100 mL IVPB (Peripheral Line), 10 mEq, IntraVENous, PRN  magnesium sulfate 2000 mg in 50 mL IVPB premix, 2,000 mg, IntraVENous, PRN  ondansetron (ZOFRAN-ODT) disintegrating tablet 4 mg, 4 mg, Oral, Q8H PRN **OR** ondansetron (ZOFRAN) injection 4 mg, 4 mg, IntraVENous, Q6H PRN  polyethylene glycol (GLYCOLAX) packet 17 g, 17 g, Oral, Daily PRN  acetaminophen (TYLENOL) tablet 650 mg, 650 mg, Oral, Q6H PRN **OR** acetaminophen (TYLENOL) suppository 650 mg, 650 mg, Rectal, Q6H PRN    Allergies:  Morphine, Morphine and codeine, Morpholine salicylate, and Hydralazine    Habits:  TOBACCO:   reports that he quit smoking about 44 years ago. His smoking use included cigarettes. He started smoking about 51 years ago. He has a

## 2025-06-04 NOTE — ED NOTES
Called pharmacy regarding eliquis that was ordered for 0900 and 2100, pt did not have morning dose.  They recommend giving dose now and re-timing next dose for 0600 tomorrow morning.  Messaging hospitalist to notify

## 2025-06-04 NOTE — CARE COORDINATION
Inpatient consult to Social Work (Order #[id number]) on 6/4/25   SW met with PT bedside. PT lives with spouse and can return upon discharge. Denies needs at this time due to not knowing why he has onset of exhaustion and stuttering over the past two weeks.

## 2025-06-04 NOTE — ED NOTES
Pt back from MRI and RA sats 87%.  Pt reports wearing 3.5L oxygen at night and feels he needs it after laying in MRI.  Placed on 3L NC at this time.  Dr. Carlson aware

## 2025-06-04 NOTE — ED TRIAGE NOTES
THE PATIENT PRESENTS TO THE ER FOR C/C OF LEFT LOWER EXTREMITY PAIN WITH BILATERAL LOWER EXTREMITY EDEMA.  THE PATIENT STATES \" I HAD A ARNEL PUT IN MY LEFT HIP OVER A YEAR AGO\" AND NOW REPORTS PAIN IN THE LEFT LEG.  PT STATES HE WAS RECENTLY EVALUATED BY THIS FACILITY 1 WEEK AGO FOR SIMILAR C/C.  PT REPORTS INCREASING NEED FOR A WALKER AND \"LEG GIVING OUT\".

## 2025-06-04 NOTE — ED NOTES
Pt ambulated to restroom with walker and oxygen with RN.  While in restroom pt became more weak and I had to hold him up while somebody brought a wheelchair.  Dr. Carlson notified

## 2025-06-04 NOTE — CARE COORDINATION
Case Management Assessment  Initial Evaluation    Date/Time of Evaluation: 6/4/2025 5:10 PM  Assessment Completed by: Sonali Lundberg    If patient is discharged prior to next notation, then this note serves as note for discharge by case management.    Patient Name: Carlos Espana                   YOB: 1955  Diagnosis: Left hip pain [M25.552]  Lumbar back pain [M54.50]  Ambulatory dysfunction [R26.2]  Chronic kidney disease, unspecified CKD stage [N18.9]                   Date / Time: 6/4/2025  7:31 AM    Patient Admission Status: Inpatient   Readmission Risk (Low < 19, Mod (19-27), High > 27): Readmission Risk Score: 19.5    Current PCP: Alisa Rangel MD  PCP verified by CM? Yes    Chart Reviewed: Yes      History Provided by: Patient  Patient Orientation: Alert and Oriented    Patient Cognition: Alert    Hospitalization in the last 30 days (Readmission):  No    If yes, Readmission Assessment in CM Navigator will be completed.    Advance Directives:      Code Status: Full Code   Patient's Primary Decision Maker is: Legal Next of Kin    Primary Decision Maker (Active): Perri Espana - Spouse - [phone]    Secondary Decision Maker: Dg Espana - Brother/Sister - [phone]    Discharge Planning:    Patient lives with: Spouse/Significant Other Type of Home: House  Primary Care Giver: Self  Patient Support Systems include: Spouse/Significant Other   Current Financial resources: Medicare  Current community resources: None  Current services prior to admission: C-pap, Oxygen Therapy, Other (Comment) (Outpatient PT)            Current DME:              Type of Home Care services:  OT, PT (follow for additional needs)    ADLS  Prior functional level: Independent in ADLs/IADLs  Current functional level: Independent in ADLs/IADLs    PT AM-PAC:   /24  OT AM-PAC:   /24    Family can provide assistance at DC: Yes  Would you like Case Management to discuss the discharge plan with any other family  members/significant others, and if so, who? Yes (spouse)  Plans to Return to Present Housing: Yes  Potential Assistance needed at discharge: Other (Comment) (follow for discharge)            Potential DME:    Patient expects to discharge to: House  Plan for transportation at discharge: Family    Financial    Payor: [employer] MEDICARE / Plan: HU[employer] CHOICE-PPO MEDICARE / Product Type: *No Product type* /     Does insurance require precert for SNF: Yes    Potential assistance Purchasing Medications: No  Meds-to-Beds request:        [facility] [address] P [phone] - F [phone]  [address] [address]  Phone: [phone] Fax: [phone]    [employer] [facility] [address] [phone] - F [phone]  [address] [address]  Phone: [phone] Fax: [phone]      Notes:    Factors facilitating achievement of predicted outcomes: Family support, Motivated, Cooperative, and Pleasant    Barriers to discharge: Medical complications    Additional Case Management Notes: SW met with PT bedside. PT lives with spouse and can return upon discharge. Denies needs at this time.     The Plan for Transition of Care is related to the following treatment goals of Left hip pain [M25.552]  Lumbar back pain [M54.50]  Ambulatory dysfunction [R26.2]  Chronic kidney disease, unspecified CKD stage [N18.9]    IF APPLICABLE: The Patient and/or patient representative Carlos and his family were provided with a choice of provider and agrees with the discharge plan. Freedom of choice list with basic dialogue that supports the patient's individualized plan of care/goals and shares the quality data associated with the providers was provided to: Patient   Patient Representative Name:       The Patient and/or Patient Representative Agree with the Discharge Plan? Yes    Sonali Lundberg  Case Management Department

## 2025-06-04 NOTE — ED PROVIDER NOTES
[facility] EMERGENCY DEPARTMENT  eMERGENCY dEPARTMENT eNCOUnter      Pt Name: Carlos Espana  MRN: [id number]  Birthdate 1955  Date of evaluation: 6/4/2025  Provider: RUSLAN LEE MD    CHIEF COMPLAINT       Chief Complaint   Patient presents with    LEFT LEG PAIN     Leg Swelling         HISTORY OF PRESENT ILLNESS   (Location/Symptom, Timing/Onset,Context/Setting, Quality, Duration, Modifying Factors, Severity)  Note limiting factors.   Carlos Espana is a 69 y.o. male who presents to the emergency department for bilateral leg pain and spasms, low back pain and left hip pain.  No falls since February.  Prior history of left hip replacement and femur repair in 2021.  Since last summer has intermittently been using a cane and for the past couple of months has been using a walker.  Since Sunday has not been able to ambulate even using his walker.  They noticed some swelling and bruising to both of his feet however he states when using his walker he occasionally will set the walker down on his feet and he is anticoagulated on Eliquis.  No history of DVT.  Does have known chronic kidney disease as well as CHF.  He denies any chest pain or shortness of breath.  He was seen in the emergency department 3 days ago with CT head CT lumbar spine chest x-ray and KUB x-ray done.  Patient does have lumbar spine disease noted and was told constipated as well.  Has been using laxatives and having bowel movements and denies any abdominal pain and states he did not know he was constipated prior to this more of an incidental type finding.  He denies any lower extremity weakness but states having spasms intermittently in both legs.  No hx of back surgery.  After being seen here also went to [place] [facility] and had CT abdomen and labs done there and told CT was stable.  Now here due to worsening and no improvement.    HPI    NursingNotes were reviewed.    REVIEW OF SYSTEMS    (2-9 systems for level 4, 10 or more for level 5)     Review  causing his pain but unsure if why legs are weaker at this point.  D/w Dr. Sparks recommends neurology eval as well could consider myelopathy etc.  Ckd stable and labs otherwise reasurring.  SILAS/w Tamia with hospitalist service for admission.      CONSULTS:  IP CONSULT TO NEUROSURGERY  IP CONSULT TO NEUROLOGY  IP CONSULT TO PALLIATIVE CARE  IP CONSULT TO SOCIAL WORK    :  Unless otherwise noted below, none     Procedures    FINAL IMPRESSION      1. Ambulatory dysfunction    2. Lumbar back pain    3. Left hip pain    4. Chronic kidney disease, unspecified CKD stage          DISPOSITION/PLAN   DISPOSITION Decision To Admit 06/04/2025 01:18:12 PM   DISPOSITION CONDITION Stable           PATIENT REFERRED TO:  No follow-up provider specified.    DISCHARGE MEDICATIONS:  New Prescriptions    No medications on file          (Please note that portions of this note were completed with a voice recognition program.  Efforts were made to edit thedictations but occasionally words are mis-transcribed.)    CRISTINO LEE MD (electronically signed)Emergency Physician        Cristino Lee MD  06/04/25 3513

## 2025-06-05 ENCOUNTER — APPOINTMENT (OUTPATIENT)
Dept: VASCULAR LAB | Age: 70
DRG: 552 | End: 2025-06-05
Attending: PSYCHIATRY & NEUROLOGY
Payer: MEDICARE

## 2025-06-05 ENCOUNTER — READMISSION MANAGEMENT (OUTPATIENT)
Dept: CALL CENTER | Facility: HOSPITAL | Age: 70
End: 2025-06-05
Payer: COMMERCIAL

## 2025-06-05 VITALS
TEMPERATURE: 97.3 F | OXYGEN SATURATION: 94 % | HEART RATE: 59 BPM | SYSTOLIC BLOOD PRESSURE: 153 MMHG | RESPIRATION RATE: 16 BRPM | HEIGHT: 71 IN | BODY MASS INDEX: 30.24 KG/M2 | WEIGHT: 216 LBS | DIASTOLIC BLOOD PRESSURE: 69 MMHG

## 2025-06-05 DIAGNOSIS — G11.9 CEREBELLAR ATAXIA (HCC): Primary | ICD-10-CM

## 2025-06-05 PROBLEM — R74.8 ELEVATED CK: Status: ACTIVE | Noted: 2025-06-05

## 2025-06-05 PROBLEM — R26.0 ATAXIC GAIT: Status: ACTIVE | Noted: 2025-06-05

## 2025-06-05 PROBLEM — M62.82 NON-TRAUMATIC RHABDOMYOLYSIS: Status: ACTIVE | Noted: 2025-06-05

## 2025-06-05 PROBLEM — M54.50 LUMBAR BACK PAIN: Status: ACTIVE | Noted: 2025-06-05

## 2025-06-05 PROBLEM — I50.84 END STAGE CONGESTIVE HEART FAILURE (HCC): Status: ACTIVE | Noted: 2025-06-05

## 2025-06-05 PROBLEM — R53.1 GENERALIZED WEAKNESS: Status: ACTIVE | Noted: 2025-06-05

## 2025-06-05 LAB
ANION GAP SERPL CALCULATED.3IONS-SCNC: 13 MMOL/L (ref 8–16)
BASOPHILS # BLD: 0 K/UL (ref 0–0.2)
BASOPHILS NFR BLD: 0 % (ref 0–1)
BUN SERPL-MCNC: 33 MG/DL (ref 8–23)
CALCIUM SERPL-MCNC: 9.6 MG/DL (ref 8.8–10.2)
CHLORIDE SERPL-SCNC: 93 MMOL/L (ref 98–107)
CK SERPL-CCNC: 613 U/L (ref 39–308)
CO2 SERPL-SCNC: 28 MMOL/L (ref 22–29)
CREAT SERPL-MCNC: 2.7 MG/DL (ref 0.7–1.2)
ECHO BSA: 2.22 M2
EOSINOPHIL # BLD: 0 K/UL (ref 0–0.6)
EOSINOPHIL NFR BLD: 0 % (ref 0–5)
ERYTHROCYTE [DISTWIDTH] IN BLOOD BY AUTOMATED COUNT: 16.9 % (ref 11.5–14.5)
FOLATE SERPL-MCNC: 9.7 NG/ML (ref 4.5–32.2)
GLUCOSE BLD-MCNC: 340 MG/DL (ref 70–99)
GLUCOSE BLD-MCNC: 362 MG/DL (ref 70–99)
GLUCOSE SERPL-MCNC: 382 MG/DL (ref 70–99)
HCT VFR BLD AUTO: 46 % (ref 42–52)
HGB BLD-MCNC: 14.7 G/DL (ref 14–18)
IMM GRANULOCYTES # BLD: 0 K/UL
LYMPHOCYTES # BLD: 0.5 K/UL (ref 1.1–4.5)
LYMPHOCYTES NFR BLD: 9.4 % (ref 20–40)
MCH RBC QN AUTO: 28.8 PG (ref 27–31)
MCHC RBC AUTO-ENTMCNC: 32 G/DL (ref 33–37)
MCV RBC AUTO: 90 FL (ref 80–94)
MONOCYTES # BLD: 0.2 K/UL (ref 0–0.9)
MONOCYTES NFR BLD: 2.8 % (ref 0–10)
NEUTROPHILS # BLD: 5 K/UL (ref 1.5–7.5)
NEUTS SEG NFR BLD: 87.6 % (ref 50–65)
PERFORMED ON: ABNORMAL
PERFORMED ON: ABNORMAL
PLATELET # BLD AUTO: 161 K/UL (ref 130–400)
PMV BLD AUTO: 11.3 FL (ref 9.4–12.4)
POTASSIUM SERPL-SCNC: 3.9 MMOL/L (ref 3.5–5)
RBC # BLD AUTO: 5.11 M/UL (ref 4.7–6.1)
SODIUM SERPL-SCNC: 134 MMOL/L (ref 136–145)
VAS LEFT ARM BP DIA: 66 MMHG
VAS LEFT ARM BP: 140 MMHG
VAS LEFT CCA MID EDV: 14.3 CM/S
VAS LEFT CCA MID PSV: 96.3 CM/S
VAS LEFT CCA PROX EDV: 12.3 CM/S
VAS LEFT CCA PROX PSV: 72.2 CM/S
VAS LEFT ECA PSV: 168 CM/S
VAS LEFT ICA DIST EDV: 18.7 CM/S
VAS LEFT ICA DIST PSV: 77.9 CM/S
VAS LEFT ICA MID EDV: 23.4 CM/S
VAS LEFT ICA MID PSV: 130 CM/S
VAS LEFT ICA PROX EDV: 24.3 CM/S
VAS LEFT ICA PROX PSV: 115 CM/S
VAS LEFT VERTEBRAL EDV: 9.88 CM/S
VAS LEFT VERTEBRAL PSV: 66.4 CM/S
VAS RIGHT ARM BP DIA: 70 MMHG
VAS RIGHT ARM BP: 126 MMHG
VAS RIGHT CCA MID EDV: 9.94 CM/S
VAS RIGHT CCA MID PSV: 78.3 CM/S
VAS RIGHT CCA PROX EDV: 15.5 CM/S
VAS RIGHT CCA PROX PSV: 88.2 CM/S
VAS RIGHT ECA PSV: 145 CM/S
VAS RIGHT ICA DIST EDV: 18.2 CM/S
VAS RIGHT ICA DIST PSV: 79.6 CM/S
VAS RIGHT ICA MID EDV: 16.2 CM/S
VAS RIGHT ICA MID PSV: 74.2 CM/S
VAS RIGHT ICA PROX EDV: 9.3 CM/S
VAS RIGHT ICA PROX PSV: 38 CM/S
VIT B12 SERPL-MCNC: 732 PG/ML (ref 232–1245)
WBC # BLD AUTO: 5.7 K/UL (ref 4.8–10.8)

## 2025-06-05 PROCEDURE — 6370000000 HC RX 637 (ALT 250 FOR IP): Performed by: NURSE PRACTITIONER

## 2025-06-05 PROCEDURE — 85025 COMPLETE CBC W/AUTO DIFF WBC: CPT

## 2025-06-05 PROCEDURE — 97161 PT EVAL LOW COMPLEX 20 MIN: CPT

## 2025-06-05 PROCEDURE — G0378 HOSPITAL OBSERVATION PER HR: HCPCS

## 2025-06-05 PROCEDURE — 82962 GLUCOSE BLOOD TEST: CPT

## 2025-06-05 PROCEDURE — 82550 ASSAY OF CK (CPK): CPT

## 2025-06-05 PROCEDURE — 93880 EXTRACRANIAL BILAT STUDY: CPT

## 2025-06-05 PROCEDURE — 97530 THERAPEUTIC ACTIVITIES: CPT

## 2025-06-05 PROCEDURE — 99223 1ST HOSP IP/OBS HIGH 75: CPT

## 2025-06-05 PROCEDURE — 94760 N-INVAS EAR/PLS OXIMETRY 1: CPT

## 2025-06-05 PROCEDURE — 99232 SBSQ HOSP IP/OBS MODERATE 35: CPT | Performed by: PSYCHIATRY & NEUROLOGY

## 2025-06-05 PROCEDURE — 36415 COLL VENOUS BLD VENIPUNCTURE: CPT

## 2025-06-05 PROCEDURE — 97116 GAIT TRAINING THERAPY: CPT

## 2025-06-05 PROCEDURE — 80048 BASIC METABOLIC PNL TOTAL CA: CPT

## 2025-06-05 PROCEDURE — 82607 VITAMIN B-12: CPT

## 2025-06-05 PROCEDURE — 82746 ASSAY OF FOLIC ACID SERUM: CPT

## 2025-06-05 PROCEDURE — 99223 1ST HOSP IP/OBS HIGH 75: CPT | Performed by: NEUROLOGICAL SURGERY

## 2025-06-05 RX ORDER — SENNA AND DOCUSATE SODIUM 50; 8.6 MG/1; MG/1
1 TABLET, FILM COATED ORAL DAILY
Qty: 30 TABLET | Refills: 0 | Status: SHIPPED | OUTPATIENT
Start: 2025-06-05

## 2025-06-05 RX ORDER — INSULIN GLARGINE 100 [IU]/ML
10 INJECTION, SOLUTION SUBCUTANEOUS 2 TIMES DAILY
Status: DISCONTINUED | OUTPATIENT
Start: 2025-06-05 | End: 2025-06-05 | Stop reason: HOSPADM

## 2025-06-05 RX ORDER — INSULIN GLARGINE 100 [IU]/ML
10 INJECTION, SOLUTION SUBCUTANEOUS 2 TIMES DAILY
COMMUNITY

## 2025-06-05 RX ORDER — INSULIN LISPRO 100 [IU]/ML
5 INJECTION, SOLUTION INTRAVENOUS; SUBCUTANEOUS
Status: DISCONTINUED | OUTPATIENT
Start: 2025-06-05 | End: 2025-06-05 | Stop reason: HOSPADM

## 2025-06-05 RX ADMIN — APIXABAN 2.5 MG: 2.5 TABLET, FILM COATED ORAL at 09:37

## 2025-06-05 RX ADMIN — PANTOPRAZOLE SODIUM 40 MG: 40 TABLET, DELAYED RELEASE ORAL at 10:29

## 2025-06-05 RX ADMIN — ISOSORBIDE MONONITRATE 60 MG: 60 TABLET, EXTENDED RELEASE ORAL at 09:37

## 2025-06-05 RX ADMIN — NIFEDIPINE 60 MG: 60 TABLET, EXTENDED RELEASE ORAL at 09:37

## 2025-06-05 RX ADMIN — INSULIN LISPRO 3 UNITS: 100 INJECTION, SOLUTION INTRAVENOUS; SUBCUTANEOUS at 11:37

## 2025-06-05 RX ADMIN — AMIODARONE HYDROCHLORIDE 100 MG: 200 TABLET ORAL at 09:37

## 2025-06-05 RX ADMIN — ASPIRIN 81 MG: 81 TABLET, CHEWABLE ORAL at 09:37

## 2025-06-05 RX ADMIN — RANOLAZINE 1000 MG: 500 TABLET, FILM COATED, EXTENDED RELEASE ORAL at 09:37

## 2025-06-05 RX ADMIN — INSULIN GLARGINE 10 UNITS: 100 INJECTION, SOLUTION SUBCUTANEOUS at 11:36

## 2025-06-05 RX ADMIN — ROSUVASTATIN CALCIUM 40 MG: 20 TABLET, FILM COATED ORAL at 09:37

## 2025-06-05 RX ADMIN — TAMSULOSIN HYDROCHLORIDE 0.4 MG: 0.4 CAPSULE ORAL at 09:37

## 2025-06-05 RX ADMIN — INSULIN LISPRO 3 UNITS: 100 INJECTION, SOLUTION INTRAVENOUS; SUBCUTANEOUS at 09:36

## 2025-06-05 RX ADMIN — INSULIN LISPRO 5 UNITS: 100 INJECTION, SOLUTION INTRAVENOUS; SUBCUTANEOUS at 11:36

## 2025-06-05 RX ADMIN — INSULIN LISPRO 5 UNITS: 100 INJECTION, SOLUTION INTRAVENOUS; SUBCUTANEOUS at 09:36

## 2025-06-05 RX ADMIN — CALCITRIOL CAPSULES 0.25 MCG 0.25 MCG: 0.25 CAPSULE ORAL at 09:37

## 2025-06-05 RX ADMIN — CARVEDILOL 12.5 MG: 12.5 TABLET, FILM COATED ORAL at 09:37

## 2025-06-05 RX ADMIN — ALLOPURINOL 200 MG: 100 TABLET ORAL at 09:37

## 2025-06-05 ASSESSMENT — ENCOUNTER SYMPTOMS
EYES NEGATIVE: 1
SHORTNESS OF BREATH: 1
PHOTOPHOBIA: 0
GASTROINTESTINAL NEGATIVE: 1
ALLERGIC/IMMUNOLOGIC NEGATIVE: 1
COUGH: 1
BACK PAIN: 1
NAUSEA: 0
VOMITING: 0

## 2025-06-05 NOTE — DISCHARGE SUMMARY
Carlos Espana  :  1955  MRN:  [id number]    Admit date:  2025  Discharge date:  2025    Discharging Physician:  Dr. Letty Car    Advance Directive: Full Code    Consults: IP CONSULT TO NEUROSURGERY  IP CONSULT TO NEUROLOGY  IP CONSULT TO PALLIATIVE CARE  IP CONSULT TO SOCIAL WORK  IP CONSULT TO HOME CARE NEEDS  IP CONSULT TO REHAB/TCU ADMISSION COORDINATOR     Primary Care Physician:  Alisa Rangel MD    Discharge Diagnoses:  Principal Problem:    Ambulatory dysfunction  Active Problems:    Neural foraminal stenosis of lumbar spine    CKD (chronic kidney disease) stage 4, GFR 15-29 ml/min ([facility])    DM (diabetes mellitus) ([facility])    PAF (paroxysmal atrial fibrillation) ([facility])    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 *      Portions of this note have been copied forward, however, changed to reflect the most current clinical status of this patient.    Hospital Course:   The patient is a 69 y.o. male with an extensive past medical history to include-CAD, CKD stage 4,CHF, arterial stenosis, diabetes, HTN, PAF on chronic anticoagulation, and chronic respiratory failure  who presented to [facility] ED on 2025 complaining of increased weakness, increased fall, lower extremity weakness, foot swelling, and increased difficulty walking. He states that he has had a increased difficulty in walking. He previously required a cane to assist with ambulation. Over the last 2-3 months, he has had left hip pain and increased lower extremity weakness requiring he to use a walker. He states that he has been running over his feet with the wheels of his walker causing swelling and bruising to bilateral feet. He states that he feels that his legs feel like they \"give out\" after short periods of walking.  was performed which showed no acute intracranial findings. He has been evaluated by PT/OT who recommend 24 hour supervision/assist. Preliminary carotid doppler  < 50% stenosis B/L ICA.  Left Vertebral artery antegrade. Right Vertebral artery not seen.     Neurosurgery has evaluated the patient due to degenerative changes with spondylolisthesis at L5/S1 and spinal stenosis at T11-T12, however, this did not explain his symptoms. He is additionally a poor surgical candidate with his CKD4 and CHF. Recommended PT/OT.    He did have mildly elevated CK level upon admission at 961. Repeat CK level is 613-this is elevated CK, however IS NOT non-traumatic rhabdomyolysis. He has been on statin therapy for several years.     Acute inpatient rehab was consulted, however, he does not have a diagnosis for acute inpatient rehab. SNF placement was discussed, however, he has adamantly refused to consider this option.     Other than hyperglycemia-likely exacerbated by steroids given in the ED, his chemistries are near his xinbblzf-ONH-10 and creatinine-2.7. His CBC remains unremarkable. His vital signs are stable. He is considered medically stable for discharge. He is agreeable to home health, therefore, appropriate referrals have been made. He is to follow-up with his PCP within 1 week of discharge for hospital follow-up. He will be discharged home in stable medical condition.     Significant Diagnostic Studies:   XR FOOT RIGHT (MIN 3 VIEWS)  Result Date: 6/5/2025  RIGHT FOOT RADIOGRAPHS  INDICATION: 69-year-old male patient with ecchymosis  COMPARISON: None  TECHNIQUE: Frontal oblique and lateral right foot radiographs. 3 view(s) total.  FINDINGS: Mild dorsal forefoot soft tissue swelling. Atheromatous calcifications. Small plantar calcaneal enthesophyte.No radiopaque foreign body. No fracture. No lytic or blastic lesion.       Mild dorsal forefoot soft tissue swelling with No acute osseous abnormality.

## 2025-06-05 NOTE — PROGRESS NOTES
[facility] Neurology  [address], [address]  [place] [address]  Phone [phone]     Neurology Progress Note  2025 5:15 PM  Information:   Patient Name: Carlos Espana  :   1955  Age:   69 y.o.  MRN:   [id number]  Account #:  [id number]  Admit Date:   2025  Today:  25     ADMIT DX:   Ambulatory dysfunction    Subjective:     Carlos Espana is a 69 y.o. year old man with diabetes, peripheral neuropathy, CAD, CHF, PVD, CKD who presented to the ER  due to weakness and inability to walk.      Interval History:   He feels some better but still cannot walk.  No vertigo.    Objective:     Past Medical History:  Past Medical History:   Diagnosis Date    AAA (abdominal aortic aneurysm)     Acute kidney failure, unspecified     Anxiety state, unspecified     Atrial septal aneurysm 2016    Blood circulation, collateral     Body mass index 30.0-30.9, adult     CAD (coronary artery disease) 01/10/2016    1/9/16  lexiscan  Positive for apical MI + myocardial ischemia, EF 42%, intermediate risk findings, AUC indication 16, AUC score 7 1/10/16  Cath  3 lesions in the LAD:  Mid: 70% 2.25x23, mid 90% 2.25 x 28, distal 100% 2.0x28, anterior lateral apical hypokinesis, EF 45%    Calculus of kidney     Carotid artery stenosis 2013    Cellulitis and abscess of hand, except fingers and thumb     Cerebral artery occlusion with cerebral infarction (HCC)     15 years ago    Chronic airway obstruction, not elsewhere classified     Chronic kidney disease, unspecified     stage 3    Congestive heart failure, unspecified     COVID-19 10/01/2020    COVID-19 2023    Diabetes mellitus type II     Diverticulosis of colon (without mention of hemorrhage)     Elevated CA 19-9 level     Encounter for long-term (current) use of insulin (HCC)     Esophageal reflux     Family history of ischemic heart disease     Gastric ulcer, unspecified as acute or chronic, without mention of hemorrhage, perforation, or

## 2025-06-05 NOTE — PROGRESS NOTES
Physical Therapy  Facility/Department: [facility] SURG SERVICES  Physical Therapy Initial Assessment    Name: Carlos Espana  : 1955  MRN: [id number]  Date of Service: 2025    Discharge Recommendations:  Home with assist PRN, Patient would benefit from continued therapy after discharge (pt AND WIFE STATE THEY ARE CONCERNED ABOUT DC HOME BUT WANT TO GIVE IT A TRY SINCE HE HAS IMPROVED SINCE ADMISSION. ADVICE HH PT AS Pt REMAINS AT RISK FOR A FALL.)          Patient Diagnosis(es): The primary encounter diagnosis was Ambulatory dysfunction. Diagnoses of Lumbar back pain, Left hip pain, Chronic kidney disease, unspecified CKD stage, and Bilateral carotid artery stenosis were also pertinent to this visit.  Past Medical History:  has a past medical history of AAA (abdominal aortic aneurysm), Acute kidney failure, unspecified, Anxiety state, unspecified, Atrial septal aneurysm, Blood circulation, collateral, Body mass index 30.0-30.9, adult, CAD (coronary artery disease), Calculus of kidney, Carotid artery stenosis, Cellulitis and abscess of hand, except fingers and thumb, Cerebral artery occlusion with cerebral infarction (HCC), Chronic airway obstruction, not elsewhere classified, Chronic kidney disease, unspecified, Congestive heart failure, unspecified, COVID-19, COVID-19, Diabetes mellitus type II, Diverticulosis of colon (without mention of hemorrhage), Elevated CA 19-9 level, Encounter for long-term (current) use of insulin (HCC), Esophageal reflux, Family history of ischemic heart disease, Gastric ulcer, unspecified as acute or chronic, without mention of hemorrhage, perforation, or obstruction, Generalized weakness, GERD (gastroesophageal reflux disease), Hyperlipemia, Hypertension, Internal hemorrhoids without mention of complication, 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, or unspecified(403.00), Myocardial infarction (HCC), Neuropathy, Obesity, unspecified,

## 2025-06-05 NOTE — CARE COORDINATION
Patient/spouse have requested referrals to all HH agencies in their area. Referrals sent to [facility], [facility], [facility], and [facility].    All home health agencies have denied.  Electronically signed by Berry Cohen RN, BSN on 6/5/2025 at 2:33 PM

## 2025-06-05 NOTE — CARE COORDINATION
Patient/spouse have requested a wheelchair to be ordered from [facility] in [place], [place]. Order sent.  [facility]   P   F  Electronically signed by Berry Cohen RN, BSN on 6/5/2025 at 2:32 PM

## 2025-06-05 NOTE — PROGRESS NOTES
Vascular Lab preliminary report  Carotid duplex exam shows < 50% stenosis B/L ICA.  Left Vertebral artery antegrade. Right Vertebral artery not seen. Final report pending.

## 2025-06-05 NOTE — OUTREACH NOTE
Prep Survey      Flowsheet Row Responses   [facility] facility patient discharged from? Non-BH   Is LACE score < 7 ? Non-BH Discharge   Eligibility Not Eligible   What are the reasons patient is not eligible? Other  [No recent PCP visit]   Does the patient have one of the following disease processes/diagnoses(primary or secondary)? Other   Prep survey completed? Yes            LISA DEY - Registered Nurse

## 2025-06-05 NOTE — PLAN OF CARE
Problem: Pain  Goal: Verbalizes/displays adequate comfort level or baseline comfort level  6/5/2025 1259 by Kristina Rosado RN  Outcome: Progressing  6/5/2025 0128 by Parul Saenz RN  Outcome: Progressing     Problem: Chronic Conditions and Co-morbidities  Goal: Patient's chronic conditions and co-morbidity symptoms are monitored and maintained or improved  6/5/2025 1259 by rKistina Rosado RN  Outcome: Progressing  6/5/2025 0128 by Parul Saenz RN  Outcome: Progressing  Flowsheets (Taken 6/4/2025 2050)  Care Plan - Patient's Chronic Conditions and Co-Morbidity Symptoms are Monitored and Maintained or Improved: Monitor and assess patient's chronic conditions and comorbid symptoms for stability, deterioration, or improvement     Problem: Discharge Planning  Goal: Discharge to home or other facility with appropriate resources  6/5/2025 1259 by Kristina Rosado RN  Outcome: Progressing  Flowsheets (Taken 6/5/2025 0941)  Discharge to home or other facility with appropriate resources: Identify barriers to discharge with patient and caregiver  6/5/2025 0128 by Parul Saenz RN  Outcome: Progressing  Flowsheets (Taken 6/4/2025 2050)  Discharge to home or other facility with appropriate resources:   Identify barriers to discharge with patient and caregiver   Arrange for needed discharge resources and transportation as appropriate     Problem: ABCDS Injury Assessment  Goal: Absence of physical injury  6/5/2025 1259 by Kristina Rosado RN  Outcome: Progressing  6/5/2025 0128 by Parul Saenz RN  Outcome: Progressing     Problem: Safety - Adult  Goal: Free from fall injury  6/5/2025 1259 by Kristina Rosado RN  Outcome: Progressing  6/5/2025 0128 by Parul Saenz RN  Outcome: Progressing

## 2025-06-05 NOTE — DISCHARGE INSTRUCTIONS
Keep follow up appointments  Take medications as directed  Return to ER or seek medical assistance for recurrent symptoms

## 2025-06-05 NOTE — PROGRESS NOTES
This  visited with pt and his wife to follow up and provide spiritual care. Pt currently doesn't have spiritual distress but spiritual need is for healing. Pt says he is a Confucianism and has a tonie community. Pt's wife says their tonie is very important. This  provided spiritual care with sustaining presence, support, and prayer. Pt and his wife expressed gratitude for spiritual care.       Spiritual Health History and Assessment/Progress Note  [facility]    Spiritual/Emotional Needs,  ,  ,      Name: Carlos Espana MRN: [id number]    Age: 69 y.o.     Sex: male   Language: English   Advent: None   Ambulatory dysfunction     Date: 6/5/2025            Total Time Calculated: 15 min              Spiritual Assessment began in [facility]        Referral/Consult From: Palliative Care   Encounter Overview/Reason: Spiritual/Emotional Needs  Service Provided For: Patient, Family    Tonie, Belief, Meaning:   Patient identifies as spiritual and is connected with a tonie tradition or spiritual practice  Family/Friends identify as spiritual and are connected with a tonie tradition or spiritual practice      Importance and Influence:  Patient has spiritual/personal beliefs that influence decisions regarding their health  Family/Friends have spiritual/personal beliefs that influence decisions regarding the patient's health    Community:  Patient is connected with a spiritual community  Family/Friends are connected with a spiritual community:    Assessment and Plan of Care:     Patient Interventions include: Affirmed coping skills/support systems and Provided sacramental/Taoism ritual  Family/Friends Interventions include: Affirmed coping skills/support systems and Provided sacramental/Taoism ritual    Patient Plan of Care: Spiritual Care available upon further referral  Family/Friends Plan of Care: Spiritual Care available upon further referral    Electronically signed by Mary Behrens,   on 6/5/2025 at 11:21 AM

## 2025-06-05 NOTE — CONSULTS
creatinine-2.8( this is near his baseline). Glucose-204, BNP-1794. CBC unremarkable. UA not indicative of infection. X-ray left femur with no acute abnormality and proximal left femoral orthopedic hardware without complication. X-ray pelvis with no acute osseous abnormality. X-ray left foot showing no recent appearing displaced fracture is appreciated.  MRI brain with no acute intracranial findings and noted mild to moderate cerebral atrophy, moderate chronic white matter microvascular ischemic changes, and chronic lacunar infarcts.  MRI lumbar spine noted moderate spinal canal stenosis at T11-T12, possible narrowing of the right lateral recess at L5-S1 impinging the conjoined L5-S1 nerve root, severe bilateral neural foraminal stenosis at L5-S1, severe bilateral neural foraminal stenosis at L5-S1, possible severe left and moderate right neural foraminal stenosis at T11-T12, possible moderate right neural foraminal stenosis at L2-L3, and possible mild left neural foraminal stenosis at L3-L4. Patient was admitted under hospitalist services with neurology and neurosurgery evaluations.  Bilateral carotid duplex 6/5/2025 with preliminary results showing less than 50% stenosis bilaterally.  He has been recommended ongoing PT/OT as patient is not a surgical candidate at this point due to severe underlying chronic comorbidities. He does not have a qualifying diagnosis for inpatient rehab. Palliative care is consulted for goals of care discussions.     Past Medical History:        Diagnosis Date    AAA (abdominal aortic aneurysm)     Acute kidney failure, unspecified     Anxiety state, unspecified     Atrial septal aneurysm 01/09/2016    Blood circulation, collateral     Body mass index 30.0-30.9, adult     CAD (coronary artery disease) 01/10/2016    1/9/16  lexiscan  Positive for apical MI + myocardial ischemia, EF 42%, intermediate risk findings, AUC indication 16, AUC score 7 1/10/16  Cath  3 lesions in the LAD:  Mid:  discussions, patient/family are not yet ready to transition care. Code status- FULL CODE  Ambulatory dysfunction- Neurology and NS following.  Imaging workup reviewed.  Patient is not a surgical candidate at this time for spinal stenosis or concerns.  Recommended ongoing PT/OT.  Pain control with Tylenol is effective per patient  End stage CAD- has bee evaluated by [facility] and [facility] CT surgery and not felt a surgical candidate.  Ongoing medical management.  Outpatient follow-up with [facility] cardiology.  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  Resume home medications as able.  Follows with Dr. Worthington  CKD IV- continue to monitor renal function.  Avoid offending agents as able.  A-fib- rate controlled. Home eliquis continued    Thank you for consulting palliative care and allowing us to participate in the care of the patient.      CounselingTopics: Goals of care, Code Status, Disease process education, pt/family support                                     Total Time Spent with patient assessment, interview of independent historian/HCS, workup/treatment review, discussion with medical team, review of current and home medications, review of care everywhere and placement of orders/preparation of this note: 78 minutes    Electronically signed by DWAYNE Fung CNP on 6/5/2025 at 8:33 AM    (Please note that portions of this note were completed with a voice recognition program.  Efforts were made to edit the dictations but occasionally words are mis-transcribed.)

## 2025-06-05 NOTE — CARE COORDINATION
[facility]  Notification of Admission Decision      [] [facility]       Please write discharge orders and summary prior to discharge.    [] Patient acceptance to Rehab pending the following :    [] Eval in progress       [x] Patient determined to be ineligible for services at [facility] because : Discussed with Hospitalist, patient doesn't have Rhabdo, therefore doesn't have a diagnosis for Rehab.       We recommend you consider : SNF if patient would agree, otherwise HH       Thank you for your referral, we appreciate you. If you have any questions, please feel   free to contact me at [phone].  Electronically Signed by Sharon Rosales, Admissions Coordinator 6/5/2025 12:09 PM

## 2025-06-05 NOTE — CONSULTS
explains his symptoms.  Regardless, his severe medical comorbidities would preclude any consideration of surgical intervention.  His weakness and gate issues are likely multi-factorial and I will defer to the medical team and other consultants for additional workup and treatment.  He should attempt to mobilize with PT/OT.  Please call with further questions or concerns.            Kristopher Sparks MD

## 2025-06-05 NOTE — ACP (ADVANCE CARE PLANNING)
Advance Care Planning      Palliative Medicine Provider (MD/NP)  Advance Care Planning (ACP) Conversation      Date of Conversation: 06/05/25  The patient and/or authorized decision maker consented to a voluntary Advance Care Planning conversation.   Individuals present for the conversation:   Patient with decision making capacity and Spouse at bedside    Legal Healthcare Agent(s):    Primary Decision Maker (Active): Perri Espana - Spouse - [phone]    Secondary Decision Maker: Dg Espana - Brother/Sister - [phone]    ACP documents available in EMR prior to discussion:  -Living Will    Primary Palliative Diagnosis(es):  Ambulatory dysfunction  End-stage CAD  HFrEF    Conversation Summary: Met with patient and his spouse, Perri, at bedside to discuss current status and plan of care.  We reviewed living will on file as current.  Patient's spouse does remain primary HCS decision maker as listed on this document with his brother as secondary.  Updated emergency contacts to include both of their children.  Reviewed code status and discussed Leydi \"full code\" versus \"DNR\" and typical process of aggressive resuscitation.  Patient is considering transition to DNR and I have encouraged them to discuss further amongst themselves in order to come to decision they are comfortable with.  He does remain a FULL CODE at this time. Have also introduced concept of hospice and left them a [facility] hosp[facility] informational pamphlet to review.  Patient plans on discharging home with his spouse and home health services at this time.    Resuscitation Status:    Code Status: Full Code    I spent 13 minutes providing ACP services with the patient and/or surrogate decision maker in a voluntary, in-person conversation discussing the patient's wishes and goals as detailed in the above note during consult visit.       Rosalva Wolfe, DWAYNE - CNP

## 2025-06-05 NOTE — DISCHARGE INSTR - DIET
Good nutrition is important when healing from an illness, injury, or surgery.  Follow any nutrition recommendations given to you during your hospital stay.   If you were given an oral nutrition supplement while in the hospital, continue to take this supplement at home.  You can take it with meals, in-between meals, and/or before bedtime. These supplements can be purchased at most local grocery stores, pharmacies, and chain [employer]stores.   If you have any questions about your diet or nutrition, call the hospital and ask for the dietitian.    ADULT DIET; regular 4 carb choices/meal low fat low cholesterol high fiber low sodium diet

## 2025-06-05 NOTE — PROGRESS NOTES
Spoke with pharmacy prior to administration of 0900 Eliquis due to close administration yesterday 6/4/25. Pharmacy states ok to give 0900 dose of Eliquis this morning per order.

## 2025-06-10 ENCOUNTER — RESULTS FOLLOW-UP (OUTPATIENT)
Dept: NEUROLOGY | Age: 70
End: 2025-06-10

## 2025-06-10 LAB
ECHO BSA: 2.22 M2
VAS LEFT ARM BP DIA: 66 MMHG
VAS LEFT ARM BP: 140 MMHG
VAS LEFT CCA MID EDV: 14.3 CM/S
VAS LEFT CCA MID PSV: 96.3 CM/S
VAS LEFT CCA PROX EDV: 12.3 CM/S
VAS LEFT CCA PROX PSV: 72.2 CM/S
VAS LEFT ECA PSV: 168 CM/S
VAS LEFT ICA DIST EDV: 18.7 CM/S
VAS LEFT ICA DIST PSV: 77.9 CM/S
VAS LEFT ICA MID EDV: 23.4 CM/S
VAS LEFT ICA MID PSV: 130 CM/S
VAS LEFT ICA PROX EDV: 24.3 CM/S
VAS LEFT ICA PROX PSV: 115 CM/S
VAS LEFT VERTEBRAL EDV: 9.88 CM/S
VAS LEFT VERTEBRAL PSV: 66.4 CM/S
VAS RIGHT ARM BP DIA: 70 MMHG
VAS RIGHT ARM BP: 126 MMHG
VAS RIGHT CCA MID EDV: 9.94 CM/S
VAS RIGHT CCA MID PSV: 78.3 CM/S
VAS RIGHT CCA PROX EDV: 15.5 CM/S
VAS RIGHT CCA PROX PSV: 88.2 CM/S
VAS RIGHT ECA PSV: 145 CM/S
VAS RIGHT ICA DIST EDV: 18.2 CM/S
VAS RIGHT ICA DIST PSV: 79.6 CM/S
VAS RIGHT ICA MID EDV: 16.2 CM/S
VAS RIGHT ICA MID PSV: 74.2 CM/S
VAS RIGHT ICA PROX EDV: 9.3 CM/S
VAS RIGHT ICA PROX PSV: 38 CM/S

## 2025-06-10 PROCEDURE — 93880 EXTRACRANIAL BILAT STUDY: CPT | Performed by: SURGERY

## 2025-08-14 ENCOUNTER — APPOINTMENT (OUTPATIENT)
Dept: GENERAL RADIOLOGY | Age: 70
End: 2025-08-14
Payer: MEDICARE

## 2025-08-14 ENCOUNTER — APPOINTMENT (OUTPATIENT)
Dept: CT IMAGING | Age: 70
End: 2025-08-14
Payer: MEDICARE

## 2025-08-14 ENCOUNTER — HOSPITAL ENCOUNTER (INPATIENT)
Age: 70
LOS: 15 days | Discharge: NURSING, REHAB OR LONG-TERM CARE | End: 2025-08-29
Attending: PEDIATRICS | Admitting: STUDENT IN AN ORGANIZED HEALTH CARE EDUCATION/TRAINING PROGRAM
Payer: MEDICARE

## 2025-08-14 DIAGNOSIS — S91.301A OPEN WOUND OF FOOT, RIGHT, INITIAL ENCOUNTER: ICD-10-CM

## 2025-08-14 DIAGNOSIS — R09.02 HYPOXEMIA: ICD-10-CM

## 2025-08-14 DIAGNOSIS — M48.061 NEURAL FORAMINAL STENOSIS OF LUMBAR SPINE: ICD-10-CM

## 2025-08-14 DIAGNOSIS — I50.9 CONGESTIVE HEART FAILURE, UNSPECIFIED HF CHRONICITY, UNSPECIFIED HEART FAILURE TYPE (HCC): Primary | ICD-10-CM

## 2025-08-14 PROBLEM — I50.43 ACUTE ON CHRONIC COMBINED SYSTOLIC (CONGESTIVE) AND DIASTOLIC (CONGESTIVE) HEART FAILURE (HCC): Status: ACTIVE | Noted: 2025-08-14

## 2025-08-14 LAB
ALBUMIN SERPL-MCNC: 3.7 G/DL (ref 3.5–5.2)
ALLENS TEST: ABNORMAL
ALP SERPL-CCNC: 78 U/L (ref 40–129)
ALT SERPL-CCNC: 7 U/L (ref 10–50)
ANION GAP SERPL CALCULATED.3IONS-SCNC: 10 MMOL/L (ref 8–16)
AST SERPL-CCNC: 14 U/L (ref 10–50)
B PARAP IS1001 DNA NPH QL NAA+NON-PROBE: NOT DETECTED
B PERT.PT PRMT NPH QL NAA+NON-PROBE: NOT DETECTED
BASE EXCESS ARTERIAL: 5.7 MMOL/L (ref -2–2)
BASOPHILS # BLD: 0.1 K/UL (ref 0–0.2)
BASOPHILS NFR BLD: 0.7 % (ref 0–1)
BILIRUB SERPL-MCNC: 0.5 MG/DL (ref 0.2–1.2)
BNP BLD-MCNC: 3493 PG/ML (ref 0–124)
BUN SERPL-MCNC: 25 MG/DL (ref 8–23)
C PNEUM DNA NPH QL NAA+NON-PROBE: NOT DETECTED
CALCIUM SERPL-MCNC: 9.4 MG/DL (ref 8.8–10.2)
CARBOXYHEMOGLOBIN ARTERIAL: 2.2 % (ref 0–5)
CHLORIDE SERPL-SCNC: 99 MMOL/L (ref 98–107)
CO2 SERPL-SCNC: 27 MMOL/L (ref 22–29)
CREAT SERPL-MCNC: 2.3 MG/DL (ref 0.7–1.2)
D DIMER PPP FEU-MCNC: 0.67 UG/ML FEU (ref 0–0.48)
EOSINOPHIL # BLD: 0.3 K/UL (ref 0–0.6)
EOSINOPHIL NFR BLD: 5 % (ref 0–5)
ERYTHROCYTE [DISTWIDTH] IN BLOOD BY AUTOMATED COUNT: 18.1 % (ref 11.5–14.5)
FLUAV RNA NPH QL NAA+NON-PROBE: NOT DETECTED
FLUBV RNA NPH QL NAA+NON-PROBE: NOT DETECTED
GLUCOSE BLD-MCNC: 193 MG/DL (ref 70–99)
GLUCOSE SERPL-MCNC: 146 MG/DL (ref 70–99)
HADV DNA NPH QL NAA+NON-PROBE: NOT DETECTED
HCO3 ARTERIAL: 32.5 MMOL/L (ref 22–26)
HCOV 229E RNA NPH QL NAA+NON-PROBE: NOT DETECTED
HCOV HKU1 RNA NPH QL NAA+NON-PROBE: NOT DETECTED
HCOV NL63 RNA NPH QL NAA+NON-PROBE: NOT DETECTED
HCOV OC43 RNA NPH QL NAA+NON-PROBE: NOT DETECTED
HCT VFR BLD AUTO: 41.1 % (ref 42–52)
HEMOGLOBIN, ART, EXTENDED: 14.1 G/DL (ref 14–18)
HGB BLD-MCNC: 13.3 G/DL (ref 14–18)
HMPV RNA NPH QL NAA+NON-PROBE: NOT DETECTED
HPIV1 RNA NPH QL NAA+NON-PROBE: NOT DETECTED
HPIV2 RNA NPH QL NAA+NON-PROBE: NOT DETECTED
HPIV3 RNA NPH QL NAA+NON-PROBE: NOT DETECTED
HPIV4 RNA NPH QL NAA+NON-PROBE: NOT DETECTED
IMM GRANULOCYTES # BLD: 0 K/UL
LYMPHOCYTES # BLD: 1 K/UL (ref 1.1–4.5)
LYMPHOCYTES NFR BLD: 15 % (ref 20–40)
M PNEUMO DNA NPH QL NAA+NON-PROBE: NOT DETECTED
MAGNESIUM SERPL-MCNC: 2.1 MG/DL (ref 1.6–2.4)
MCH RBC QN AUTO: 31.1 PG (ref 27–31)
MCHC RBC AUTO-ENTMCNC: 32.4 G/DL (ref 33–37)
MCV RBC AUTO: 96.3 FL (ref 80–94)
METHEMOGLOBIN ARTERIAL: 1 %
MONOCYTES # BLD: 0.5 K/UL (ref 0–0.9)
MONOCYTES NFR BLD: 7.7 % (ref 0–10)
NEUTROPHILS # BLD: 4.9 K/UL (ref 1.5–7.5)
NEUTS SEG NFR BLD: 71.3 % (ref 50–65)
O2 CONTENT ARTERIAL: 17.2 ML/DL
O2 DELIVERY DEVICE: ABNORMAL
O2 SAT, ARTERIAL: 87 %
O2 THERAPY: ABNORMAL
OXYGEN FLOW: 2
PCO2 ARTERIAL: 55 MMHG (ref 35–45)
PERFORMED ON: ABNORMAL
PH ARTERIAL: 7.38 (ref 7.35–7.45)
PLATELET # BLD AUTO: 154 K/UL (ref 130–400)
PMV BLD AUTO: 11 FL (ref 9.4–12.4)
PO2 ARTERIAL: 53 MMHG (ref 80–100)
POTASSIUM BLD-SCNC: 4.2 MMOL/L
POTASSIUM SERPL-SCNC: 4.4 MMOL/L (ref 3.5–5.1)
PROT SERPL-MCNC: 6.1 G/DL (ref 6.4–8.3)
RBC # BLD AUTO: 4.27 M/UL (ref 4.7–6.1)
RSV RNA NPH QL NAA+NON-PROBE: NOT DETECTED
RV+EV RNA NPH QL NAA+NON-PROBE: NOT DETECTED
SAMPLE SOURCE: ABNORMAL
SARS-COV-2 RNA NPH QL NAA+NON-PROBE: NOT DETECTED
SODIUM SERPL-SCNC: 136 MMOL/L (ref 136–145)
SPONT RATE(BPM): 24
TROPONIN, HIGH SENSITIVITY: 51 NG/L (ref 0–22)
TROPONIN, HIGH SENSITIVITY: 54 NG/L (ref 0–22)
TSH SERPL DL<=0.005 MIU/L-ACNC: 3.44 UIU/ML (ref 0.27–4.2)
WBC # BLD AUTO: 6.9 K/UL (ref 4.8–10.8)

## 2025-08-14 PROCEDURE — 6370000000 HC RX 637 (ALT 250 FOR IP): Performed by: PEDIATRICS

## 2025-08-14 PROCEDURE — 84484 ASSAY OF TROPONIN QUANT: CPT

## 2025-08-14 PROCEDURE — 87077 CULTURE AEROBIC IDENTIFY: CPT

## 2025-08-14 PROCEDURE — 36600 WITHDRAWAL OF ARTERIAL BLOOD: CPT

## 2025-08-14 PROCEDURE — 71045 X-RAY EXAM CHEST 1 VIEW: CPT

## 2025-08-14 PROCEDURE — 85379 FIBRIN DEGRADATION QUANT: CPT

## 2025-08-14 PROCEDURE — 94760 N-INVAS EAR/PLS OXIMETRY 1: CPT

## 2025-08-14 PROCEDURE — 87150 DNA/RNA AMPLIFIED PROBE: CPT

## 2025-08-14 PROCEDURE — 6370000000 HC RX 637 (ALT 250 FOR IP): Performed by: STUDENT IN AN ORGANIZED HEALTH CARE EDUCATION/TRAINING PROGRAM

## 2025-08-14 PROCEDURE — 72128 CT CHEST SPINE W/O DYE: CPT

## 2025-08-14 PROCEDURE — 85025 COMPLETE CBC W/AUTO DIFF WBC: CPT

## 2025-08-14 PROCEDURE — 83735 ASSAY OF MAGNESIUM: CPT

## 2025-08-14 PROCEDURE — 6370000000 HC RX 637 (ALT 250 FOR IP)

## 2025-08-14 PROCEDURE — 2500000003 HC RX 250 WO HCPCS

## 2025-08-14 PROCEDURE — 87040 BLOOD CULTURE FOR BACTERIA: CPT

## 2025-08-14 PROCEDURE — 72131 CT LUMBAR SPINE W/O DYE: CPT

## 2025-08-14 PROCEDURE — 99285 EMERGENCY DEPT VISIT HI MDM: CPT

## 2025-08-14 PROCEDURE — 73630 X-RAY EXAM OF FOOT: CPT

## 2025-08-14 PROCEDURE — 96374 THER/PROPH/DIAG INJ IV PUSH: CPT

## 2025-08-14 PROCEDURE — 83880 ASSAY OF NATRIURETIC PEPTIDE: CPT

## 2025-08-14 PROCEDURE — 93005 ELECTROCARDIOGRAM TRACING: CPT | Performed by: PEDIATRICS

## 2025-08-14 PROCEDURE — 6360000002 HC RX W HCPCS: Performed by: PEDIATRICS

## 2025-08-14 PROCEDURE — 87186 SC STD MICRODIL/AGAR DIL: CPT

## 2025-08-14 PROCEDURE — 84443 ASSAY THYROID STIM HORMONE: CPT

## 2025-08-14 PROCEDURE — 1200000000 HC SEMI PRIVATE

## 2025-08-14 PROCEDURE — 80053 COMPREHEN METABOLIC PANEL: CPT

## 2025-08-14 PROCEDURE — 2700000000 HC OXYGEN THERAPY PER DAY

## 2025-08-14 PROCEDURE — 0202U NFCT DS 22 TRGT SARS-COV-2: CPT

## 2025-08-14 PROCEDURE — 82962 GLUCOSE BLOOD TEST: CPT

## 2025-08-14 PROCEDURE — 36415 COLL VENOUS BLD VENIPUNCTURE: CPT

## 2025-08-14 PROCEDURE — 82803 BLOOD GASES ANY COMBINATION: CPT

## 2025-08-14 RX ORDER — CALCITRIOL 0.25 UG/1
0.25 CAPSULE, LIQUID FILLED ORAL DAILY
Status: DISCONTINUED | OUTPATIENT
Start: 2025-08-14 | End: 2025-08-29 | Stop reason: HOSPADM

## 2025-08-14 RX ORDER — AMIODARONE HYDROCHLORIDE 200 MG/1
100 TABLET ORAL DAILY
Status: DISCONTINUED | OUTPATIENT
Start: 2025-08-14 | End: 2025-08-29 | Stop reason: HOSPADM

## 2025-08-14 RX ORDER — TAMSULOSIN HYDROCHLORIDE 0.4 MG/1
0.4 CAPSULE ORAL DAILY
Status: DISCONTINUED | OUTPATIENT
Start: 2025-08-14 | End: 2025-08-29 | Stop reason: HOSPADM

## 2025-08-14 RX ORDER — ACETAMINOPHEN 325 MG/1
650 TABLET ORAL EVERY 6 HOURS PRN
Status: DISCONTINUED | OUTPATIENT
Start: 2025-08-14 | End: 2025-08-29 | Stop reason: HOSPADM

## 2025-08-14 RX ORDER — HYDROCODONE BITARTRATE AND ACETAMINOPHEN 10; 325 MG/1; MG/1
1 TABLET ORAL EVERY 6 HOURS PRN
Status: ON HOLD | COMMUNITY
Start: 2025-06-26 | End: 2025-08-29 | Stop reason: HOSPADM

## 2025-08-14 RX ORDER — NIFEDIPINE 60 MG/1
60 TABLET, EXTENDED RELEASE ORAL DAILY
Status: DISCONTINUED | OUTPATIENT
Start: 2025-08-14 | End: 2025-08-29 | Stop reason: HOSPADM

## 2025-08-14 RX ORDER — INSULIN GLARGINE 100 [IU]/ML
10 INJECTION, SOLUTION SUBCUTANEOUS 2 TIMES DAILY
Status: DISCONTINUED | OUTPATIENT
Start: 2025-08-14 | End: 2025-08-29 | Stop reason: HOSPADM

## 2025-08-14 RX ORDER — RANOLAZINE 500 MG/1
1000 TABLET, EXTENDED RELEASE ORAL 2 TIMES DAILY
Status: DISCONTINUED | OUTPATIENT
Start: 2025-08-14 | End: 2025-08-29 | Stop reason: HOSPADM

## 2025-08-14 RX ORDER — SODIUM CHLORIDE 0.9 % (FLUSH) 0.9 %
5-40 SYRINGE (ML) INJECTION PRN
Status: DISCONTINUED | OUTPATIENT
Start: 2025-08-14 | End: 2025-08-29 | Stop reason: HOSPADM

## 2025-08-14 RX ORDER — ALLOPURINOL 100 MG/1
200 TABLET ORAL DAILY
Status: DISCONTINUED | OUTPATIENT
Start: 2025-08-14 | End: 2025-08-29 | Stop reason: HOSPADM

## 2025-08-14 RX ORDER — ISOSORBIDE MONONITRATE 60 MG/1
60 TABLET, EXTENDED RELEASE ORAL 2 TIMES DAILY
Status: DISCONTINUED | OUTPATIENT
Start: 2025-08-14 | End: 2025-08-29 | Stop reason: HOSPADM

## 2025-08-14 RX ORDER — SODIUM CHLORIDE 0.9 % (FLUSH) 0.9 %
5-40 SYRINGE (ML) INJECTION EVERY 12 HOURS SCHEDULED
Status: DISCONTINUED | OUTPATIENT
Start: 2025-08-14 | End: 2025-08-29 | Stop reason: HOSPADM

## 2025-08-14 RX ORDER — SODIUM CHLORIDE 9 MG/ML
INJECTION, SOLUTION INTRAVENOUS PRN
Status: DISCONTINUED | OUTPATIENT
Start: 2025-08-14 | End: 2025-08-29 | Stop reason: HOSPADM

## 2025-08-14 RX ORDER — ACETAMINOPHEN 650 MG/1
650 SUPPOSITORY RECTAL EVERY 6 HOURS PRN
Status: DISCONTINUED | OUTPATIENT
Start: 2025-08-14 | End: 2025-08-29 | Stop reason: HOSPADM

## 2025-08-14 RX ORDER — ASPIRIN 81 MG/1
324 TABLET, CHEWABLE ORAL ONCE
Status: COMPLETED | OUTPATIENT
Start: 2025-08-14 | End: 2025-08-14

## 2025-08-14 RX ORDER — CARVEDILOL 12.5 MG/1
12.5 TABLET ORAL 2 TIMES DAILY
Status: DISCONTINUED | OUTPATIENT
Start: 2025-08-14 | End: 2025-08-29 | Stop reason: HOSPADM

## 2025-08-14 RX ORDER — MAGNESIUM SULFATE IN WATER 40 MG/ML
2000 INJECTION, SOLUTION INTRAVENOUS PRN
Status: DISCONTINUED | OUTPATIENT
Start: 2025-08-14 | End: 2025-08-29 | Stop reason: HOSPADM

## 2025-08-14 RX ORDER — METHOCARBAMOL 500 MG/1
500 TABLET, FILM COATED ORAL 3 TIMES DAILY
COMMUNITY
Start: 2025-06-26

## 2025-08-14 RX ORDER — ONDANSETRON 2 MG/ML
4 INJECTION INTRAMUSCULAR; INTRAVENOUS EVERY 6 HOURS PRN
Status: DISCONTINUED | OUTPATIENT
Start: 2025-08-14 | End: 2025-08-29 | Stop reason: HOSPADM

## 2025-08-14 RX ORDER — BUMETANIDE 0.25 MG/ML
1 INJECTION, SOLUTION INTRAMUSCULAR; INTRAVENOUS 2 TIMES DAILY
Status: DISCONTINUED | OUTPATIENT
Start: 2025-08-15 | End: 2025-08-15

## 2025-08-14 RX ORDER — BUMETANIDE 0.25 MG/ML
2 INJECTION, SOLUTION INTRAMUSCULAR; INTRAVENOUS ONCE
Status: COMPLETED | OUTPATIENT
Start: 2025-08-14 | End: 2025-08-14

## 2025-08-14 RX ORDER — HYDROCODONE BITARTRATE AND ACETAMINOPHEN 10; 325 MG/1; MG/1
1 TABLET ORAL EVERY 6 HOURS PRN
Refills: 0 | Status: DISCONTINUED | OUTPATIENT
Start: 2025-08-14 | End: 2025-08-20

## 2025-08-14 RX ORDER — GABAPENTIN 100 MG/1
100 CAPSULE ORAL
Status: ON HOLD | COMMUNITY
Start: 2025-06-26 | End: 2025-08-29 | Stop reason: HOSPADM

## 2025-08-14 RX ORDER — ASPIRIN 81 MG/1
81 TABLET, CHEWABLE ORAL DAILY
Status: DISCONTINUED | OUTPATIENT
Start: 2025-08-15 | End: 2025-08-29 | Stop reason: HOSPADM

## 2025-08-14 RX ORDER — ONDANSETRON 4 MG/1
4 TABLET, ORALLY DISINTEGRATING ORAL EVERY 8 HOURS PRN
Status: DISCONTINUED | OUTPATIENT
Start: 2025-08-14 | End: 2025-08-29 | Stop reason: HOSPADM

## 2025-08-14 RX ORDER — SACUBITRIL AND VALSARTAN 49; 51 MG/1; MG/1
1 TABLET ORAL 2 TIMES DAILY
Status: DISCONTINUED | OUTPATIENT
Start: 2025-08-14 | End: 2025-08-29 | Stop reason: HOSPADM

## 2025-08-14 RX ORDER — POLYETHYLENE GLYCOL 3350 17 G/17G
17 POWDER, FOR SOLUTION ORAL DAILY PRN
Status: DISCONTINUED | OUTPATIENT
Start: 2025-08-14 | End: 2025-08-16

## 2025-08-14 RX ORDER — HYDROCODONE BITARTRATE AND ACETAMINOPHEN 10; 325 MG/1; MG/1
1 TABLET ORAL EVERY 6 HOURS PRN
Refills: 0 | Status: DISCONTINUED | OUTPATIENT
Start: 2025-08-14 | End: 2025-08-14

## 2025-08-14 RX ADMIN — RANOLAZINE 1000 MG: 500 TABLET, EXTENDED RELEASE ORAL at 19:42

## 2025-08-14 RX ADMIN — ISOSORBIDE MONONITRATE 60 MG: 60 TABLET, EXTENDED RELEASE ORAL at 19:42

## 2025-08-14 RX ADMIN — CARVEDILOL 12.5 MG: 12.5 TABLET, FILM COATED ORAL at 19:41

## 2025-08-14 RX ADMIN — NIFEDIPINE 60 MG: 60 TABLET, EXTENDED RELEASE ORAL at 19:41

## 2025-08-14 RX ADMIN — INSULIN GLARGINE 10 UNITS: 100 INJECTION, SOLUTION SUBCUTANEOUS at 21:17

## 2025-08-14 RX ADMIN — AMIODARONE HYDROCHLORIDE 100 MG: 200 TABLET ORAL at 19:41

## 2025-08-14 RX ADMIN — HYDROCODONE BITARTRATE AND ACETAMINOPHEN 1 TABLET: 10; 325 TABLET ORAL at 20:02

## 2025-08-14 RX ADMIN — ASPIRIN 324 MG: 81 TABLET, CHEWABLE ORAL at 09:56

## 2025-08-14 RX ADMIN — ONDANSETRON 4 MG: 4 TABLET, ORALLY DISINTEGRATING ORAL at 19:41

## 2025-08-14 RX ADMIN — APIXABAN 2.5 MG: 5 TABLET, FILM COATED ORAL at 19:42

## 2025-08-14 RX ADMIN — BUMETANIDE 2 MG: 0.25 INJECTION INTRAMUSCULAR; INTRAVENOUS at 09:56

## 2025-08-14 RX ADMIN — TAMSULOSIN HYDROCHLORIDE 0.4 MG: 0.4 CAPSULE ORAL at 19:41

## 2025-08-14 RX ADMIN — SACUBITRIL AND VALSARTAN 1 TABLET: 49; 51 TABLET, FILM COATED ORAL at 19:42

## 2025-08-14 RX ADMIN — SODIUM CHLORIDE, PRESERVATIVE FREE 10 ML: 5 INJECTION INTRAVENOUS at 19:57

## 2025-08-14 RX ADMIN — ALLOPURINOL 200 MG: 100 TABLET ORAL at 19:57

## 2025-08-14 RX ADMIN — CALCITRIOL CAPSULES 0.25 MCG 0.25 MCG: 0.25 CAPSULE ORAL at 19:41

## 2025-08-14 ASSESSMENT — PAIN SCALES - GENERAL
PAINLEVEL_OUTOF10: 7
PAINLEVEL_OUTOF10: 4

## 2025-08-14 ASSESSMENT — PAIN DESCRIPTION - ORIENTATION
ORIENTATION: MID
ORIENTATION: MID

## 2025-08-14 ASSESSMENT — PAIN DESCRIPTION - PAIN TYPE: TYPE: CHRONIC PAIN

## 2025-08-14 ASSESSMENT — PAIN DESCRIPTION - LOCATION
LOCATION: BACK
LOCATION: BACK

## 2025-08-14 ASSESSMENT — PAIN DESCRIPTION - DESCRIPTORS
DESCRIPTORS: ACHING
DESCRIPTORS: ACHING

## 2025-08-14 ASSESSMENT — PAIN - FUNCTIONAL ASSESSMENT
PAIN_FUNCTIONAL_ASSESSMENT: 0-10
PAIN_FUNCTIONAL_ASSESSMENT: PREVENTS OR INTERFERES SOME ACTIVE ACTIVITIES AND ADLS

## 2025-08-14 ASSESSMENT — PAIN DESCRIPTION - FREQUENCY: FREQUENCY: INTERMITTENT

## 2025-08-14 ASSESSMENT — PAIN DESCRIPTION - ONSET: ONSET: ON-GOING

## 2025-08-15 ENCOUNTER — APPOINTMENT (OUTPATIENT)
Age: 70
End: 2025-08-15
Payer: MEDICARE

## 2025-08-15 ENCOUNTER — APPOINTMENT (OUTPATIENT)
Dept: MRI IMAGING | Age: 70
End: 2025-08-15
Payer: MEDICARE

## 2025-08-15 ENCOUNTER — APPOINTMENT (OUTPATIENT)
Dept: VASCULAR LAB | Age: 70
End: 2025-08-15
Payer: MEDICARE

## 2025-08-15 PROBLEM — R78.81 GRAM-NEGATIVE BACTEREMIA: Status: ACTIVE | Noted: 2025-08-15

## 2025-08-15 LAB
A BAUMANNII DNA BLD POS QL NAA+NON-PROBE: NOT DETECTED
ALBUMIN SERPL-MCNC: 3.9 G/DL (ref 3.5–5.2)
ALP SERPL-CCNC: 85 U/L (ref 40–129)
ALT SERPL-CCNC: 7 U/L (ref 10–50)
ANION GAP SERPL CALCULATED.3IONS-SCNC: 11 MMOL/L (ref 8–16)
AST SERPL-CCNC: 12 U/L (ref 10–50)
BACTERIA URNS QL MICRO: NEGATIVE /HPF
BASOPHILS # BLD: 0.1 K/UL (ref 0–0.2)
BASOPHILS NFR BLD: 0.8 % (ref 0–1)
BILIRUB DIRECT SERPL-MCNC: 0.3 MG/DL (ref 0–0.3)
BILIRUB INDIRECT SERPL-MCNC: 0.2 MG/DL (ref 0–1)
BILIRUB SERPL-MCNC: 0.5 MG/DL (ref 0.2–1.2)
BILIRUB UR QL STRIP: NEGATIVE
BUN SERPL-MCNC: 22 MG/DL (ref 8–23)
C ALBICANS DNA BLD POS QL NAA+NON-PROBE: NOT DETECTED
C AURIS DNA BLD POS QL NAA+PROBE: NOT DETECTED
C GLABRATA DNA BLD POS QL NAA+NON-PROBE: NOT DETECTED
C KRUSEI DNA BLD POS QL NAA+NON-PROBE: NOT DETECTED
C PARAP DNA BLD POS QL NAA+NON-PROBE: NOT DETECTED
C TROPICLS DNA BLD POS QL NAA+NON-PROBE: NOT DETECTED
CALCIUM SERPL-MCNC: 9.5 MG/DL (ref 8.8–10.2)
CHLORIDE SERPL-SCNC: 99 MMOL/L (ref 98–107)
CK SERPL-CCNC: 102 U/L (ref 39–308)
CLARITY UR: CLEAR
CO2 SERPL-SCNC: 27 MMOL/L (ref 22–29)
COLOR UR: YELLOW
CREAT SERPL-MCNC: 2.2 MG/DL (ref 0.7–1.2)
CRYPTOCOCCUS NEOFORMANS/GATTII BY PCR: NOT DETECTED
CRYSTALS URNS MICRO: NORMAL /HPF
E CLOAC COMP DNA BLD POS NAA+NON-PROBE: NOT DETECTED
E COLI DNA BLD POS QL NAA+NON-PROBE: NOT DETECTED
E FAECALIS DNA BLD POS QL NAA+PROBE: NOT DETECTED
E FAECIUM DNA BLD POS QL NAA+PROBE: NOT DETECTED
ECHO AO ASC DIAM: 3 CM
ECHO AO ASCENDING AORTA INDEX: 1.33 CM/M2
ECHO AO ROOT DIAM: 2.4 CM
ECHO AO ROOT INDEX: 1.07 CM/M2
ECHO AO SINUS VALSALVA DIAM: 3 CM
ECHO AO SINUS VALSALVA INDEX: 1.33 CM/M2
ECHO AO ST JNCT DIAM: 2.4 CM
ECHO AV AREA PEAK VELOCITY: 2.6 CM2
ECHO AV AREA VTI: 2.6 CM2
ECHO AV AREA/BSA PEAK VELOCITY: 1.2 CM2/M2
ECHO AV AREA/BSA VTI: 1.2 CM2/M2
ECHO AV MEAN GRADIENT: 4 MMHG
ECHO AV MEAN VELOCITY: 0.9 M/S
ECHO AV PEAK GRADIENT: 6 MMHG
ECHO AV PEAK VELOCITY: 1.2 M/S
ECHO AV VELOCITY RATIO: 0.67
ECHO AV VTI: 24.8 CM
ECHO BSA: 2.3 M2
ECHO EST RA PRESSURE: 15 MMHG
ECHO IVC PROX: 2.8 CM
ECHO LA AREA 2C: 25.9 CM2
ECHO LA AREA 4C: 26.3 CM2
ECHO LA DIAMETER INDEX: 1.96 CM/M2
ECHO LA DIAMETER: 4.4 CM
ECHO LA MAJOR AXIS: 6.9 CM
ECHO LA MINOR AXIS: 6.6 CM
ECHO LA TO AORTIC ROOT RATIO: 1.83
ECHO LA VOL BP: 84 ML (ref 18–58)
ECHO LA VOL MOD A2C: 85 ML (ref 18–58)
ECHO LA VOL MOD A4C: 80 ML (ref 18–58)
ECHO LA VOL/BSA BIPLANE: 37 ML/M2 (ref 16–34)
ECHO LA VOLUME INDEX MOD A2C: 38 ML/M2 (ref 16–34)
ECHO LA VOLUME INDEX MOD A4C: 36 ML/M2 (ref 16–34)
ECHO LV E' LATERAL VELOCITY: 11.7 CM/S
ECHO LV E' SEPTAL VELOCITY: 5.47 CM/S
ECHO LV EDV A2C: 149 ML
ECHO LV EDV A4C: 138 ML
ECHO LV EDV INDEX A4C: 61 ML/M2
ECHO LV EDV NDEX A2C: 66 ML/M2
ECHO LV EF PHYSICIAN: 35 %
ECHO LV EJECTION FRACTION A2C: 46 %
ECHO LV EJECTION FRACTION A4C: 32 %
ECHO LV EJECTION FRACTION BIPLANE: 39 % (ref 55–100)
ECHO LV ESV A2C: 81 ML
ECHO LV ESV A4C: 94 ML
ECHO LV ESV INDEX A2C: 36 ML/M2
ECHO LV ESV INDEX A4C: 42 ML/M2
ECHO LV FRACTIONAL SHORTENING: 24 % (ref 28–44)
ECHO LV INTERNAL DIMENSION DIASTOLE INDEX: 2.4 CM/M2
ECHO LV INTERNAL DIMENSION DIASTOLIC: 5.4 CM (ref 4.2–5.9)
ECHO LV INTERNAL DIMENSION SYSTOLIC INDEX: 1.82 CM/M2
ECHO LV INTERNAL DIMENSION SYSTOLIC: 4.1 CM
ECHO LV IVSD: 1.2 CM (ref 0.6–1)
ECHO LV MASS 2D: 279.8 G (ref 88–224)
ECHO LV MASS INDEX 2D: 124.4 G/M2 (ref 49–115)
ECHO LV POSTERIOR WALL DIASTOLIC: 1.3 CM (ref 0.6–1)
ECHO LV RELATIVE WALL THICKNESS RATIO: 0.48
ECHO LVOT AREA: 3.8 CM2
ECHO LVOT AV VTI INDEX: 0.68
ECHO LVOT DIAM: 2.2 CM
ECHO LVOT MEAN GRADIENT: 1 MMHG
ECHO LVOT MEAN GRADIENT: 1 MMHG
ECHO LVOT PEAK GRADIENT: 3 MMHG
ECHO LVOT PEAK VELOCITY: 0.8 M/S
ECHO LVOT STROKE VOLUME INDEX: 28.4 ML/M2
ECHO LVOT SV: 63.8 ML
ECHO LVOT VTI: 16.8 CM
ECHO MV A VELOCITY: 0.63 M/S
ECHO MV AREA VTI: 1.6 CM2
ECHO MV E DECELERATION TIME (DT): 150 MS
ECHO MV E VELOCITY: 1.43 M/S
ECHO MV E/A RATIO: 2.27
ECHO MV E/E' LATERAL: 12.22
ECHO MV E/E' RATIO (AVERAGED): 19.18
ECHO MV E/E' SEPTAL: 26.14
ECHO MV LVOT VTI INDEX: 2.42
ECHO MV MAX VELOCITY: 1.6 M/S
ECHO MV MEAN GRADIENT: 3 MMHG
ECHO MV MEAN VELOCITY: 0.8 M/S
ECHO MV PEAK GRADIENT: 11 MMHG
ECHO MV VTI: 40.6 CM
ECHO RA AREA 4C: 20.6 CM2
ECHO RA END SYSTOLIC VOLUME APICAL 4 CHAMBER INDEX BSA: 25 ML/M2
ECHO RA VOLUME: 57 ML
ECHO RIGHT VENTRICULAR SYSTOLIC PRESSURE (RVSP): 62 MMHG
ECHO RV BASAL DIMENSION: 3.6 CM
ECHO RV LONGITUDINAL DIMENSION: 7 CM
ECHO RV MID DIMENSION: 2.7 CM
ECHO RV TAPSE: 1.1 CM (ref 1.7–?)
ECHO TV REGURGITANT MAX VELOCITY: 3.43 M/S
ECHO TV REGURGITANT PEAK GRADIENT: 47 MMHG
EKG P AXIS: NORMAL DEGREES
EKG P AXIS: NORMAL DEGREES
EKG P-R INTERVAL: NORMAL MS
EKG P-R INTERVAL: NORMAL MS
EKG Q-T INTERVAL: 406 MS
EKG Q-T INTERVAL: 432 MS
EKG QRS DURATION: 126 MS
EKG QRS DURATION: 142 MS
EKG QTC CALCULATION (BAZETT): 458 MS
EKG QTC CALCULATION (BAZETT): 461 MS
EKG T AXIS: 112 DEGREES
EKG T AXIS: 66 DEGREES
ENTEROBACT DNA BLD POS QL NAA+NON-PROBE: NOT DETECTED
ENTEROCOC DNA BLD POS QL NAA+NON-PROBE: NOT DETECTED
EOSINOPHIL # BLD: 0.4 K/UL (ref 0–0.6)
EOSINOPHIL NFR BLD: 6.4 % (ref 0–5)
EPI CELLS #/AREA URNS AUTO: 0 /HPF (ref 0–5)
ERYTHROCYTE [DISTWIDTH] IN BLOOD BY AUTOMATED COUNT: 18.1 % (ref 11.5–14.5)
GLUCOSE BLD-MCNC: 117 MG/DL (ref 70–99)
GLUCOSE BLD-MCNC: 118 MG/DL (ref 70–99)
GLUCOSE SERPL-MCNC: 177 MG/DL (ref 70–99)
GLUCOSE UR STRIP.AUTO-MCNC: 100 MG/DL
GN BLD CULTURE PNL BLD POS NAA+PROBE: NOT DETECTED
GP B STREP DNA BLD POS QL NAA+NON-PROBE: NOT DETECTED
HCT VFR BLD AUTO: 43.2 % (ref 42–52)
HGB BLD-MCNC: 13.7 G/DL (ref 14–18)
HGB UR STRIP.AUTO-MCNC: NEGATIVE MG/L
HYALINE CASTS #/AREA URNS AUTO: 2 /HPF (ref 0–8)
IMM GRANULOCYTES # BLD: 0 K/UL
K OXYTOCA DNA BLD POS QL NAA+NON-PROBE: NOT DETECTED
K PNEUMON DNA SPEC QL NAA+PROBE: NOT DETECTED
K. AEROGENES DNA SPEC QL NAA+PROBE: NOT DETECTED
KETONES UR STRIP.AUTO-MCNC: NEGATIVE MG/DL
L MONOCYTOG DNA BLD POS QL NAA+NON-PROBE: NOT DETECTED
LEUKOCYTE ESTERASE UR QL STRIP.AUTO: ABNORMAL
LYMPHOCYTES # BLD: 0.9 K/UL (ref 1.1–4.5)
LYMPHOCYTES NFR BLD: 15.1 % (ref 20–40)
MAGNESIUM SERPL-MCNC: 2.2 MG/DL (ref 1.6–2.4)
MCH RBC QN AUTO: 30.7 PG (ref 27–31)
MCHC RBC AUTO-ENTMCNC: 31.7 G/DL (ref 33–37)
MCV RBC AUTO: 96.9 FL (ref 80–94)
MONOCYTES # BLD: 0.5 K/UL (ref 0–0.9)
MONOCYTES NFR BLD: 8.5 % (ref 0–10)
N MEN DNA BLD POS QL NAA+NON-PROBE: NOT DETECTED
NEUTROPHILS # BLD: 4.1 K/UL (ref 1.5–7.5)
NEUTS SEG NFR BLD: 69 % (ref 50–65)
NITRITE UR QL STRIP.AUTO: NEGATIVE
P AERUGINOSA DNA BLD POS NAA+NON-PROBE: NOT DETECTED
PERFORMED ON: ABNORMAL
PERFORMED ON: ABNORMAL
PH UR STRIP.AUTO: 6 [PH] (ref 5–8)
PLATELET # BLD AUTO: 165 K/UL (ref 130–400)
PMV BLD AUTO: 11.3 FL (ref 9.4–12.4)
POTASSIUM SERPL-SCNC: 4.1 MMOL/L (ref 3.5–5.1)
PROT SERPL-MCNC: 6.3 G/DL (ref 6.4–8.3)
PROT UR STRIP.AUTO-MCNC: 100 MG/DL
PROTEUS SP DNA BLD POS QL NAA+NON-PROBE: NOT DETECTED
RBC # BLD AUTO: 4.46 M/UL (ref 4.7–6.1)
RBC #/AREA URNS AUTO: 2 /HPF (ref 0–4)
S AUREUS DNA BLD POS QL NAA+NON-PROBE: NOT DETECTED
S AUREUS+CONS DNA BLD POS NAA+NON-PROBE: NOT DETECTED
S EPIDERMIDIS DNA BLD POS QL NAA+PROBE: NOT DETECTED
S LUGDUNENSIS DNA BLD POS QL NAA+PROBE: NOT DETECTED
S MALTOPH DNA BLD POS QL NAA+PROBE: NOT DETECTED
S MARCESCENS DNA BLD POS NAA+NON-PROBE: NOT DETECTED
S PNEUM DNA BLD POS QL NAA+NON-PROBE: NOT DETECTED
S PYO DNA BLD POS QL NAA+NON-PROBE: NOT DETECTED
SALMONELLA DNA BLD POS QL NAA+PROBE: NOT DETECTED
SODIUM SERPL-SCNC: 137 MMOL/L (ref 136–145)
SP GR UR STRIP.AUTO: 1.01 (ref 1–1.03)
STREPTOCOCCUS DNA BLD POS NAA+NON-PROBE: NOT DETECTED
UROBILINOGEN UR STRIP.AUTO-MCNC: 1 E.U./DL
WBC # BLD AUTO: 5.9 K/UL (ref 4.8–10.8)
WBC #/AREA URNS AUTO: 1 /HPF (ref 0–5)

## 2025-08-15 PROCEDURE — 81001 URINALYSIS AUTO W/SCOPE: CPT

## 2025-08-15 PROCEDURE — 6370000000 HC RX 637 (ALT 250 FOR IP): Performed by: STUDENT IN AN ORGANIZED HEALTH CARE EDUCATION/TRAINING PROGRAM

## 2025-08-15 PROCEDURE — 2500000003 HC RX 250 WO HCPCS

## 2025-08-15 PROCEDURE — 82962 GLUCOSE BLOOD TEST: CPT

## 2025-08-15 PROCEDURE — 85025 COMPLETE CBC W/AUTO DIFF WBC: CPT

## 2025-08-15 PROCEDURE — 93010 ELECTROCARDIOGRAM REPORT: CPT | Performed by: INTERNAL MEDICINE

## 2025-08-15 PROCEDURE — 2580000003 HC RX 258: Performed by: STUDENT IN AN ORGANIZED HEALTH CARE EDUCATION/TRAINING PROGRAM

## 2025-08-15 PROCEDURE — C8929 TTE W OR WO FOL WCON,DOPPLER: HCPCS

## 2025-08-15 PROCEDURE — 36415 COLL VENOUS BLD VENIPUNCTURE: CPT

## 2025-08-15 PROCEDURE — 6360000004 HC RX CONTRAST MEDICATION

## 2025-08-15 PROCEDURE — 1200000000 HC SEMI PRIVATE

## 2025-08-15 PROCEDURE — 97530 THERAPEUTIC ACTIVITIES: CPT

## 2025-08-15 PROCEDURE — 6360000002 HC RX W HCPCS

## 2025-08-15 PROCEDURE — 6370000000 HC RX 637 (ALT 250 FOR IP)

## 2025-08-15 PROCEDURE — 82550 ASSAY OF CK (CPK): CPT

## 2025-08-15 PROCEDURE — 2700000000 HC OXYGEN THERAPY PER DAY

## 2025-08-15 PROCEDURE — 94760 N-INVAS EAR/PLS OXIMETRY 1: CPT

## 2025-08-15 PROCEDURE — 93923 UPR/LXTR ART STDY 3+ LVLS: CPT

## 2025-08-15 PROCEDURE — 97165 OT EVAL LOW COMPLEX 30 MIN: CPT

## 2025-08-15 PROCEDURE — 99222 1ST HOSP IP/OBS MODERATE 55: CPT | Performed by: PHYSICIAN ASSISTANT

## 2025-08-15 PROCEDURE — 97161 PT EVAL LOW COMPLEX 20 MIN: CPT

## 2025-08-15 PROCEDURE — 6360000002 HC RX W HCPCS: Performed by: STUDENT IN AN ORGANIZED HEALTH CARE EDUCATION/TRAINING PROGRAM

## 2025-08-15 PROCEDURE — 83735 ASSAY OF MAGNESIUM: CPT

## 2025-08-15 PROCEDURE — 80076 HEPATIC FUNCTION PANEL: CPT

## 2025-08-15 PROCEDURE — 99223 1ST HOSP IP/OBS HIGH 75: CPT | Performed by: PSYCHIATRY & NEUROLOGY

## 2025-08-15 PROCEDURE — 80048 BASIC METABOLIC PNL TOTAL CA: CPT

## 2025-08-15 RX ORDER — BUMETANIDE 0.25 MG/ML
2 INJECTION, SOLUTION INTRAMUSCULAR; INTRAVENOUS 2 TIMES DAILY
Status: DISCONTINUED | OUTPATIENT
Start: 2025-08-15 | End: 2025-08-19

## 2025-08-15 RX ORDER — INSULIN LISPRO 100 [IU]/ML
0-8 INJECTION, SOLUTION INTRAVENOUS; SUBCUTANEOUS
Status: DISCONTINUED | OUTPATIENT
Start: 2025-08-15 | End: 2025-08-29 | Stop reason: HOSPADM

## 2025-08-15 RX ORDER — DEXTROSE MONOHYDRATE 100 MG/ML
INJECTION, SOLUTION INTRAVENOUS CONTINUOUS PRN
Status: DISCONTINUED | OUTPATIENT
Start: 2025-08-15 | End: 2025-08-29 | Stop reason: HOSPADM

## 2025-08-15 RX ORDER — HEPARIN SODIUM 5000 [USP'U]/ML
5000 INJECTION, SOLUTION INTRAVENOUS; SUBCUTANEOUS EVERY 8 HOURS SCHEDULED
Status: DISCONTINUED | OUTPATIENT
Start: 2025-08-15 | End: 2025-08-15

## 2025-08-15 RX ORDER — PREGABALIN 50 MG/1
50 CAPSULE ORAL 2 TIMES DAILY
Status: DISCONTINUED | OUTPATIENT
Start: 2025-08-15 | End: 2025-08-18

## 2025-08-15 RX ORDER — GLUCAGON 1 MG/ML
1 KIT INJECTION PRN
Status: DISCONTINUED | OUTPATIENT
Start: 2025-08-15 | End: 2025-08-29 | Stop reason: HOSPADM

## 2025-08-15 RX ORDER — GABAPENTIN 100 MG/1
100 CAPSULE ORAL DAILY
Status: DISCONTINUED | OUTPATIENT
Start: 2025-08-15 | End: 2025-08-15

## 2025-08-15 RX ORDER — LORAZEPAM 2 MG/ML
1 INJECTION INTRAMUSCULAR ONCE
Status: COMPLETED | OUTPATIENT
Start: 2025-08-15 | End: 2025-08-15

## 2025-08-15 RX ORDER — SODIUM HYPOCHLORITE 1.25 MG/ML
SOLUTION TOPICAL 2 TIMES DAILY
Status: DISCONTINUED | OUTPATIENT
Start: 2025-08-15 | End: 2025-08-29 | Stop reason: HOSPADM

## 2025-08-15 RX ORDER — FAMOTIDINE 20 MG/1
20 TABLET, FILM COATED ORAL NIGHTLY
Status: DISCONTINUED | OUTPATIENT
Start: 2025-08-15 | End: 2025-08-29 | Stop reason: HOSPADM

## 2025-08-15 RX ORDER — METHOCARBAMOL 750 MG/1
750 TABLET, FILM COATED ORAL 3 TIMES DAILY
Status: DISCONTINUED | OUTPATIENT
Start: 2025-08-15 | End: 2025-08-29 | Stop reason: HOSPADM

## 2025-08-15 RX ORDER — SENNA AND DOCUSATE SODIUM 50; 8.6 MG/1; MG/1
1 TABLET, FILM COATED ORAL DAILY
Status: DISCONTINUED | OUTPATIENT
Start: 2025-08-15 | End: 2025-08-16

## 2025-08-15 RX ORDER — ROSUVASTATIN CALCIUM 20 MG/1
40 TABLET, COATED ORAL DAILY
Status: DISCONTINUED | OUTPATIENT
Start: 2025-08-15 | End: 2025-08-15

## 2025-08-15 RX ADMIN — ISOSORBIDE MONONITRATE 60 MG: 60 TABLET, EXTENDED RELEASE ORAL at 09:32

## 2025-08-15 RX ADMIN — APIXABAN 2.5 MG: 5 TABLET, FILM COATED ORAL at 21:37

## 2025-08-15 RX ADMIN — METHOCARBAMOL 750 MG: 750 TABLET ORAL at 13:31

## 2025-08-15 RX ADMIN — TAMSULOSIN HYDROCHLORIDE 0.4 MG: 0.4 CAPSULE ORAL at 09:32

## 2025-08-15 RX ADMIN — SODIUM CHLORIDE, PRESERVATIVE FREE 10 ML: 5 INJECTION INTRAVENOUS at 10:09

## 2025-08-15 RX ADMIN — CARVEDILOL 12.5 MG: 12.5 TABLET, FILM COATED ORAL at 09:31

## 2025-08-15 RX ADMIN — ALLOPURINOL 200 MG: 100 TABLET ORAL at 09:31

## 2025-08-15 RX ADMIN — NIFEDIPINE 60 MG: 60 TABLET, EXTENDED RELEASE ORAL at 09:32

## 2025-08-15 RX ADMIN — DAKIN'S SOLUTION 0.125% (QUARTER STRENGTH): 0.12 SOLUTION at 21:36

## 2025-08-15 RX ADMIN — PREGABALIN 50 MG: 50 CAPSULE ORAL at 13:31

## 2025-08-15 RX ADMIN — SACUBITRIL AND VALSARTAN 1 TABLET: 49; 51 TABLET, FILM COATED ORAL at 09:32

## 2025-08-15 RX ADMIN — APIXABAN 2.5 MG: 5 TABLET, FILM COATED ORAL at 09:33

## 2025-08-15 RX ADMIN — ISOSORBIDE MONONITRATE 60 MG: 60 TABLET, EXTENDED RELEASE ORAL at 21:37

## 2025-08-15 RX ADMIN — PIPERACILLIN AND TAZOBACTAM 3375 MG: 3; .375 INJECTION, POWDER, FOR SOLUTION INTRAVENOUS at 17:53

## 2025-08-15 RX ADMIN — GABAPENTIN 100 MG: 100 CAPSULE ORAL at 09:32

## 2025-08-15 RX ADMIN — HYDROCODONE BITARTRATE AND ACETAMINOPHEN 1 TABLET: 10; 325 TABLET ORAL at 16:07

## 2025-08-15 RX ADMIN — ONDANSETRON 4 MG: 2 INJECTION, SOLUTION INTRAMUSCULAR; INTRAVENOUS at 16:08

## 2025-08-15 RX ADMIN — ASPIRIN 81 MG: 81 TABLET, CHEWABLE ORAL at 09:32

## 2025-08-15 RX ADMIN — CARVEDILOL 12.5 MG: 12.5 TABLET, FILM COATED ORAL at 21:37

## 2025-08-15 RX ADMIN — RANOLAZINE 1000 MG: 500 TABLET, EXTENDED RELEASE ORAL at 09:32

## 2025-08-15 RX ADMIN — SACUBITRIL AND VALSARTAN 1 TABLET: 49; 51 TABLET, FILM COATED ORAL at 21:37

## 2025-08-15 RX ADMIN — METHOCARBAMOL 750 MG: 750 TABLET ORAL at 21:37

## 2025-08-15 RX ADMIN — AMIODARONE HYDROCHLORIDE 100 MG: 200 TABLET ORAL at 09:32

## 2025-08-15 RX ADMIN — SENNOSIDES AND DOCUSATE SODIUM 1 TABLET: 8.6; 5 TABLET ORAL at 09:31

## 2025-08-15 RX ADMIN — Medication 1 MG: at 16:31

## 2025-08-15 RX ADMIN — DAKIN'S SOLUTION 0.125% (QUARTER STRENGTH): 0.12 SOLUTION at 13:32

## 2025-08-15 RX ADMIN — SULFUR HEXAFLUORIDE 2 ML: 60.7; .19; .19 INJECTION, POWDER, LYOPHILIZED, FOR SUSPENSION INTRAVENOUS; INTRAVESICAL at 08:24

## 2025-08-15 RX ADMIN — HEPARIN SODIUM 5000 UNITS: 5000 INJECTION, SOLUTION INTRAVENOUS; SUBCUTANEOUS at 13:32

## 2025-08-15 RX ADMIN — INSULIN GLARGINE 10 UNITS: 100 INJECTION, SOLUTION SUBCUTANEOUS at 21:36

## 2025-08-15 RX ADMIN — FAMOTIDINE 20 MG: 20 TABLET, FILM COATED ORAL at 21:37

## 2025-08-15 RX ADMIN — ROSUVASTATIN CALCIUM 40 MG: 20 TABLET, FILM COATED ORAL at 09:32

## 2025-08-15 RX ADMIN — BUMETANIDE 2 MG: 0.25 INJECTION INTRAMUSCULAR; INTRAVENOUS at 17:51

## 2025-08-15 RX ADMIN — INSULIN GLARGINE 10 UNITS: 100 INJECTION, SOLUTION SUBCUTANEOUS at 09:36

## 2025-08-15 RX ADMIN — RANOLAZINE 1000 MG: 500 TABLET, EXTENDED RELEASE ORAL at 21:37

## 2025-08-15 RX ADMIN — CALCITRIOL CAPSULES 0.25 MCG 0.25 MCG: 0.25 CAPSULE ORAL at 09:32

## 2025-08-15 RX ADMIN — PREGABALIN 50 MG: 50 CAPSULE ORAL at 21:37

## 2025-08-15 RX ADMIN — PIPERACILLIN SODIUM AND TAZOBACTAM SODIUM 4500 MG: 4; .5 INJECTION, POWDER, LYOPHILIZED, FOR SOLUTION INTRAVENOUS at 12:32

## 2025-08-15 RX ADMIN — BUMETANIDE 2 MG: 0.25 INJECTION INTRAMUSCULAR; INTRAVENOUS at 09:33

## 2025-08-15 ASSESSMENT — PAIN SCALES - GENERAL
PAINLEVEL_OUTOF10: 6
PAINLEVEL_OUTOF10: 0
PAINLEVEL_OUTOF10: 4
PAINLEVEL_OUTOF10: 0

## 2025-08-15 ASSESSMENT — PAIN - FUNCTIONAL ASSESSMENT
PAIN_FUNCTIONAL_ASSESSMENT: 0-10
PAIN_FUNCTIONAL_ASSESSMENT: 0-10
PAIN_FUNCTIONAL_ASSESSMENT: ACTIVITIES ARE NOT PREVENTED
PAIN_FUNCTIONAL_ASSESSMENT: 0-10

## 2025-08-15 ASSESSMENT — PAIN DESCRIPTION - DESCRIPTORS: DESCRIPTORS: ACHING;CRAMPING

## 2025-08-15 ASSESSMENT — PAIN DESCRIPTION - LOCATION: LOCATION: ABDOMEN

## 2025-08-15 ASSESSMENT — PAIN DESCRIPTION - ORIENTATION: ORIENTATION: RIGHT;LOWER

## 2025-08-16 ENCOUNTER — APPOINTMENT (OUTPATIENT)
Dept: MRI IMAGING | Age: 70
End: 2025-08-16
Payer: MEDICARE

## 2025-08-16 PROBLEM — I50.9 CONGESTIVE HEART FAILURE (HCC): Status: ACTIVE | Noted: 2025-06-05

## 2025-08-16 LAB
ANION GAP SERPL CALCULATED.3IONS-SCNC: 9 MMOL/L (ref 8–16)
BASOPHILS # BLD: 0.1 K/UL (ref 0–0.2)
BASOPHILS NFR BLD: 0.9 % (ref 0–1)
BUN SERPL-MCNC: 20 MG/DL (ref 8–23)
CALCIUM SERPL-MCNC: 9.5 MG/DL (ref 8.8–10.2)
CHLORIDE SERPL-SCNC: 101 MMOL/L (ref 98–107)
CO2 SERPL-SCNC: 29 MMOL/L (ref 22–29)
CREAT SERPL-MCNC: 2.2 MG/DL (ref 0.7–1.2)
ECHO BSA: 2.3 M2
EOSINOPHIL # BLD: 0.5 K/UL (ref 0–0.6)
EOSINOPHIL NFR BLD: 8.2 % (ref 0–5)
ERYTHROCYTE [DISTWIDTH] IN BLOOD BY AUTOMATED COUNT: 17.9 % (ref 11.5–14.5)
GLUCOSE BLD-MCNC: 115 MG/DL (ref 70–99)
GLUCOSE BLD-MCNC: 123 MG/DL (ref 70–99)
GLUCOSE BLD-MCNC: 155 MG/DL (ref 70–99)
GLUCOSE BLD-MCNC: 71 MG/DL (ref 70–99)
GLUCOSE SERPL-MCNC: 93 MG/DL (ref 70–99)
HCT VFR BLD AUTO: 42.2 % (ref 42–52)
HGB BLD-MCNC: 13.1 G/DL (ref 14–18)
IMM GRANULOCYTES # BLD: 0 K/UL
LYMPHOCYTES # BLD: 1.1 K/UL (ref 1.1–4.5)
LYMPHOCYTES NFR BLD: 19.5 % (ref 20–40)
MAGNESIUM SERPL-MCNC: 2.1 MG/DL (ref 1.6–2.4)
MCH RBC QN AUTO: 30.5 PG (ref 27–31)
MCHC RBC AUTO-ENTMCNC: 31 G/DL (ref 33–37)
MCV RBC AUTO: 98.1 FL (ref 80–94)
MONOCYTES # BLD: 0.6 K/UL (ref 0–0.9)
MONOCYTES NFR BLD: 10.1 % (ref 0–10)
NEUTROPHILS # BLD: 3.4 K/UL (ref 1.5–7.5)
NEUTS SEG NFR BLD: 60.9 % (ref 50–65)
PERFORMED ON: ABNORMAL
PERFORMED ON: NORMAL
PLATELET # BLD AUTO: 161 K/UL (ref 130–400)
PMV BLD AUTO: 10.7 FL (ref 9.4–12.4)
POTASSIUM SERPL-SCNC: 3.6 MMOL/L (ref 3.5–5.1)
RBC # BLD AUTO: 4.3 M/UL (ref 4.7–6.1)
SODIUM SERPL-SCNC: 139 MMOL/L (ref 136–145)
VAS LEFT ABI: 0.97
VAS LEFT ARM BP: 120 MMHG
VAS LEFT CALF PRESSURE: 123 MMHG
VAS LEFT DORSALIS PEDIS BP: 104 MMHG
VAS LEFT LOW THIGH PRESSURE: 184 MMHG
VAS LEFT PTA BP: 116 MMHG
VAS LEFT TBI: 0.73
VAS LEFT TOE PRESSURE: 88 MMHG
VAS RIGHT ABI: 0.83
VAS RIGHT ARM BP: 112 MMHG
VAS RIGHT CALF PRESSURE: 134 MMHG
VAS RIGHT DORSALIS PEDIS BP: 92 MMHG
VAS RIGHT LOW THIGH PRESSURE: 140 MMHG
VAS RIGHT PTA BP: 99 MMHG
VAS RIGHT TBI: 0.65
VAS RIGHT TOE PRESSURE: 78 MMHG
WBC # BLD AUTO: 5.6 K/UL (ref 4.8–10.8)

## 2025-08-16 PROCEDURE — 72146 MRI CHEST SPINE W/O DYE: CPT

## 2025-08-16 PROCEDURE — 85025 COMPLETE CBC W/AUTO DIFF WBC: CPT

## 2025-08-16 PROCEDURE — 72148 MRI LUMBAR SPINE W/O DYE: CPT

## 2025-08-16 PROCEDURE — 80048 BASIC METABOLIC PNL TOTAL CA: CPT

## 2025-08-16 PROCEDURE — 1200000000 HC SEMI PRIVATE

## 2025-08-16 PROCEDURE — 6370000000 HC RX 637 (ALT 250 FOR IP): Performed by: STUDENT IN AN ORGANIZED HEALTH CARE EDUCATION/TRAINING PROGRAM

## 2025-08-16 PROCEDURE — 99232 SBSQ HOSP IP/OBS MODERATE 35: CPT | Performed by: PSYCHIATRY & NEUROLOGY

## 2025-08-16 PROCEDURE — 72141 MRI NECK SPINE W/O DYE: CPT

## 2025-08-16 PROCEDURE — 82962 GLUCOSE BLOOD TEST: CPT

## 2025-08-16 PROCEDURE — 93923 UPR/LXTR ART STDY 3+ LVLS: CPT | Performed by: SURGERY

## 2025-08-16 PROCEDURE — 6360000002 HC RX W HCPCS: Performed by: STUDENT IN AN ORGANIZED HEALTH CARE EDUCATION/TRAINING PROGRAM

## 2025-08-16 PROCEDURE — 6370000000 HC RX 637 (ALT 250 FOR IP)

## 2025-08-16 PROCEDURE — 36415 COLL VENOUS BLD VENIPUNCTURE: CPT

## 2025-08-16 PROCEDURE — 94760 N-INVAS EAR/PLS OXIMETRY 1: CPT

## 2025-08-16 PROCEDURE — 2700000000 HC OXYGEN THERAPY PER DAY

## 2025-08-16 PROCEDURE — 83735 ASSAY OF MAGNESIUM: CPT

## 2025-08-16 PROCEDURE — 2500000003 HC RX 250 WO HCPCS

## 2025-08-16 PROCEDURE — 2580000003 HC RX 258: Performed by: STUDENT IN AN ORGANIZED HEALTH CARE EDUCATION/TRAINING PROGRAM

## 2025-08-16 RX ORDER — BACLOFEN 10 MG/1
10 TABLET ORAL ONCE
Status: COMPLETED | OUTPATIENT
Start: 2025-08-16 | End: 2025-08-16

## 2025-08-16 RX ORDER — SENNA AND DOCUSATE SODIUM 50; 8.6 MG/1; MG/1
2 TABLET, FILM COATED ORAL 2 TIMES DAILY
Status: DISCONTINUED | OUTPATIENT
Start: 2025-08-16 | End: 2025-08-29 | Stop reason: HOSPADM

## 2025-08-16 RX ORDER — POLYETHYLENE GLYCOL 3350 17 G/17G
17 POWDER, FOR SOLUTION ORAL DAILY
Status: DISCONTINUED | OUTPATIENT
Start: 2025-08-16 | End: 2025-08-19

## 2025-08-16 RX ORDER — BACLOFEN 10 MG/1
10 TABLET ORAL 3 TIMES DAILY PRN
Status: DISCONTINUED | OUTPATIENT
Start: 2025-08-16 | End: 2025-08-17

## 2025-08-16 RX ADMIN — CARVEDILOL 12.5 MG: 12.5 TABLET, FILM COATED ORAL at 20:10

## 2025-08-16 RX ADMIN — ISOSORBIDE MONONITRATE 60 MG: 60 TABLET, EXTENDED RELEASE ORAL at 09:08

## 2025-08-16 RX ADMIN — DOCUSATE SODIUM 50MG AND SENNOSIDES 8.6MG 2 TABLET: 8.6; 5 TABLET, FILM COATED ORAL at 20:10

## 2025-08-16 RX ADMIN — PREGABALIN 50 MG: 50 CAPSULE ORAL at 09:06

## 2025-08-16 RX ADMIN — PIPERACILLIN AND TAZOBACTAM 3375 MG: 3; .375 INJECTION, POWDER, FOR SOLUTION INTRAVENOUS at 09:50

## 2025-08-16 RX ADMIN — SACUBITRIL AND VALSARTAN 1 TABLET: 49; 51 TABLET, FILM COATED ORAL at 09:07

## 2025-08-16 RX ADMIN — ISOSORBIDE MONONITRATE 60 MG: 60 TABLET, EXTENDED RELEASE ORAL at 20:09

## 2025-08-16 RX ADMIN — ASPIRIN 81 MG: 81 TABLET, CHEWABLE ORAL at 09:13

## 2025-08-16 RX ADMIN — SODIUM CHLORIDE, PRESERVATIVE FREE 10 ML: 5 INJECTION INTRAVENOUS at 09:22

## 2025-08-16 RX ADMIN — BUMETANIDE 2 MG: 0.25 INJECTION INTRAMUSCULAR; INTRAVENOUS at 09:19

## 2025-08-16 RX ADMIN — BACLOFEN 10 MG: 10 TABLET ORAL at 20:09

## 2025-08-16 RX ADMIN — AMIODARONE HYDROCHLORIDE 100 MG: 200 TABLET ORAL at 09:09

## 2025-08-16 RX ADMIN — DAKIN'S SOLUTION 0.125% (QUARTER STRENGTH): 0.12 SOLUTION at 09:19

## 2025-08-16 RX ADMIN — FAMOTIDINE 20 MG: 20 TABLET, FILM COATED ORAL at 20:10

## 2025-08-16 RX ADMIN — TAMSULOSIN HYDROCHLORIDE 0.4 MG: 0.4 CAPSULE ORAL at 09:06

## 2025-08-16 RX ADMIN — RANOLAZINE 1000 MG: 500 TABLET, EXTENDED RELEASE ORAL at 09:05

## 2025-08-16 RX ADMIN — RANOLAZINE 1000 MG: 500 TABLET, EXTENDED RELEASE ORAL at 20:09

## 2025-08-16 RX ADMIN — POLYETHYLENE GLYCOL 3350 17 G: 17 POWDER, FOR SOLUTION ORAL at 16:09

## 2025-08-16 RX ADMIN — INSULIN GLARGINE 10 UNITS: 100 INJECTION, SOLUTION SUBCUTANEOUS at 09:11

## 2025-08-16 RX ADMIN — NIFEDIPINE 60 MG: 60 TABLET, EXTENDED RELEASE ORAL at 09:09

## 2025-08-16 RX ADMIN — BACLOFEN 10 MG: 10 TABLET ORAL at 11:24

## 2025-08-16 RX ADMIN — SACUBITRIL AND VALSARTAN 1 TABLET: 49; 51 TABLET, FILM COATED ORAL at 20:08

## 2025-08-16 RX ADMIN — BUMETANIDE 2 MG: 0.25 INJECTION INTRAMUSCULAR; INTRAVENOUS at 18:32

## 2025-08-16 RX ADMIN — ALLOPURINOL 200 MG: 100 TABLET ORAL at 09:06

## 2025-08-16 RX ADMIN — CALCITRIOL CAPSULES 0.25 MCG 0.25 MCG: 0.25 CAPSULE ORAL at 09:07

## 2025-08-16 RX ADMIN — METHOCARBAMOL 750 MG: 750 TABLET ORAL at 09:04

## 2025-08-16 RX ADMIN — PIPERACILLIN AND TAZOBACTAM 3375 MG: 3; .375 INJECTION, POWDER, FOR SOLUTION INTRAVENOUS at 01:46

## 2025-08-16 RX ADMIN — CARVEDILOL 12.5 MG: 12.5 TABLET, FILM COATED ORAL at 09:09

## 2025-08-16 RX ADMIN — INSULIN GLARGINE 10 UNITS: 100 INJECTION, SOLUTION SUBCUTANEOUS at 20:08

## 2025-08-16 RX ADMIN — METHOCARBAMOL 750 MG: 750 TABLET ORAL at 20:10

## 2025-08-16 RX ADMIN — HYDROCODONE BITARTRATE AND ACETAMINOPHEN 1 TABLET: 10; 325 TABLET ORAL at 18:35

## 2025-08-16 RX ADMIN — APIXABAN 2.5 MG: 5 TABLET, FILM COATED ORAL at 20:09

## 2025-08-16 RX ADMIN — SENNOSIDES AND DOCUSATE SODIUM 1 TABLET: 8.6; 5 TABLET ORAL at 09:14

## 2025-08-16 RX ADMIN — PREGABALIN 50 MG: 50 CAPSULE ORAL at 20:09

## 2025-08-16 RX ADMIN — APIXABAN 2.5 MG: 5 TABLET, FILM COATED ORAL at 09:30

## 2025-08-16 RX ADMIN — PIPERACILLIN AND TAZOBACTAM 3375 MG: 3; .375 INJECTION, POWDER, FOR SOLUTION INTRAVENOUS at 18:31

## 2025-08-16 RX ADMIN — DAKIN'S SOLUTION 0.125% (QUARTER STRENGTH): 0.12 SOLUTION at 20:11

## 2025-08-16 ASSESSMENT — PAIN SCALES - WONG BAKER: WONGBAKER_NUMERICALRESPONSE: NO HURT

## 2025-08-16 ASSESSMENT — PAIN - FUNCTIONAL ASSESSMENT
PAIN_FUNCTIONAL_ASSESSMENT: 0-10
PAIN_FUNCTIONAL_ASSESSMENT: WONG-BAKER FACES

## 2025-08-17 ENCOUNTER — APPOINTMENT (OUTPATIENT)
Dept: CT IMAGING | Age: 70
End: 2025-08-17
Payer: MEDICARE

## 2025-08-17 PROBLEM — E11.621 DIABETIC ULCER OF RIGHT MIDFOOT WITH FAT LAYER EXPOSED (HCC): Status: ACTIVE | Noted: 2025-08-17

## 2025-08-17 PROBLEM — L97.412 DIABETIC ULCER OF RIGHT MIDFOOT WITH FAT LAYER EXPOSED (HCC): Status: ACTIVE | Noted: 2025-08-17

## 2025-08-17 PROBLEM — G95.20 COMPRESSION OF SPINAL CORD WITH MYELOPATHY (HCC): Status: ACTIVE | Noted: 2025-08-17

## 2025-08-17 LAB
ANION GAP SERPL CALCULATED.3IONS-SCNC: 10 MMOL/L (ref 8–16)
BACTERIA BLD CULT ORG #2: ABNORMAL
BACTERIA BLD CULT ORG #2: ABNORMAL
BASOPHILS # BLD: 0.1 K/UL (ref 0–0.2)
BASOPHILS NFR BLD: 1 % (ref 0–1)
BUN SERPL-MCNC: 22 MG/DL (ref 8–23)
CALCIUM SERPL-MCNC: 9.6 MG/DL (ref 8.8–10.2)
CHLORIDE SERPL-SCNC: 100 MMOL/L (ref 98–107)
CO2 SERPL-SCNC: 31 MMOL/L (ref 22–29)
CREAT SERPL-MCNC: 2.3 MG/DL (ref 0.7–1.2)
EKG P AXIS: NORMAL DEGREES
EKG P-R INTERVAL: NORMAL MS
EKG Q-T INTERVAL: 452 MS
EKG QRS DURATION: 134 MS
EKG QTC CALCULATION (BAZETT): 469 MS
EKG T AXIS: 92 DEGREES
EOSINOPHIL # BLD: 0.4 K/UL (ref 0–0.6)
EOSINOPHIL NFR BLD: 5.8 % (ref 0–5)
ERYTHROCYTE [DISTWIDTH] IN BLOOD BY AUTOMATED COUNT: 18 % (ref 11.5–14.5)
GLUCOSE BLD-MCNC: 174 MG/DL (ref 70–99)
GLUCOSE BLD-MCNC: 176 MG/DL (ref 70–99)
GLUCOSE BLD-MCNC: 180 MG/DL (ref 70–99)
GLUCOSE BLD-MCNC: 200 MG/DL (ref 70–99)
GLUCOSE SERPL-MCNC: 191 MG/DL (ref 70–99)
HCT VFR BLD AUTO: 42.1 % (ref 42–52)
HGB BLD-MCNC: 13.3 G/DL (ref 14–18)
IMM GRANULOCYTES # BLD: 0 K/UL
LYMPHOCYTES # BLD: 0.8 K/UL (ref 1.1–4.5)
LYMPHOCYTES NFR BLD: 13.1 % (ref 20–40)
MAGNESIUM SERPL-MCNC: 2 MG/DL (ref 1.6–2.4)
MCH RBC QN AUTO: 31.1 PG (ref 27–31)
MCHC RBC AUTO-ENTMCNC: 31.6 G/DL (ref 33–37)
MCV RBC AUTO: 98.6 FL (ref 80–94)
MONOCYTES # BLD: 0.5 K/UL (ref 0–0.9)
MONOCYTES NFR BLD: 7.9 % (ref 0–10)
NEUTROPHILS # BLD: 4.5 K/UL (ref 1.5–7.5)
NEUTS SEG NFR BLD: 71.9 % (ref 50–65)
ORGANISM: ABNORMAL
PERFORMED ON: ABNORMAL
PLATELET # BLD AUTO: 166 K/UL (ref 130–400)
PMV BLD AUTO: 11.3 FL (ref 9.4–12.4)
POTASSIUM SERPL-SCNC: 3.9 MMOL/L (ref 3.5–5.1)
RBC # BLD AUTO: 4.27 M/UL (ref 4.7–6.1)
SODIUM SERPL-SCNC: 141 MMOL/L (ref 136–145)
WBC # BLD AUTO: 6.2 K/UL (ref 4.8–10.8)

## 2025-08-17 PROCEDURE — 6370000000 HC RX 637 (ALT 250 FOR IP): Performed by: STUDENT IN AN ORGANIZED HEALTH CARE EDUCATION/TRAINING PROGRAM

## 2025-08-17 PROCEDURE — 6370000000 HC RX 637 (ALT 250 FOR IP)

## 2025-08-17 PROCEDURE — 99232 SBSQ HOSP IP/OBS MODERATE 35: CPT | Performed by: PSYCHIATRY & NEUROLOGY

## 2025-08-17 PROCEDURE — 80048 BASIC METABOLIC PNL TOTAL CA: CPT

## 2025-08-17 PROCEDURE — 93005 ELECTROCARDIOGRAM TRACING: CPT | Performed by: STUDENT IN AN ORGANIZED HEALTH CARE EDUCATION/TRAINING PROGRAM

## 2025-08-17 PROCEDURE — 2500000003 HC RX 250 WO HCPCS

## 2025-08-17 PROCEDURE — 83735 ASSAY OF MAGNESIUM: CPT

## 2025-08-17 PROCEDURE — 2500000003 HC RX 250 WO HCPCS: Performed by: STUDENT IN AN ORGANIZED HEALTH CARE EDUCATION/TRAINING PROGRAM

## 2025-08-17 PROCEDURE — 2580000003 HC RX 258: Performed by: STUDENT IN AN ORGANIZED HEALTH CARE EDUCATION/TRAINING PROGRAM

## 2025-08-17 PROCEDURE — 93010 ELECTROCARDIOGRAM REPORT: CPT | Performed by: INTERNAL MEDICINE

## 2025-08-17 PROCEDURE — 85025 COMPLETE CBC W/AUTO DIFF WBC: CPT

## 2025-08-17 PROCEDURE — 6360000002 HC RX W HCPCS: Performed by: STUDENT IN AN ORGANIZED HEALTH CARE EDUCATION/TRAINING PROGRAM

## 2025-08-17 PROCEDURE — 2000000000 HC ICU R&B

## 2025-08-17 PROCEDURE — 2700000000 HC OXYGEN THERAPY PER DAY

## 2025-08-17 PROCEDURE — 82962 GLUCOSE BLOOD TEST: CPT

## 2025-08-17 PROCEDURE — 94760 N-INVAS EAR/PLS OXIMETRY 1: CPT

## 2025-08-17 PROCEDURE — 36415 COLL VENOUS BLD VENIPUNCTURE: CPT

## 2025-08-17 PROCEDURE — 70450 CT HEAD/BRAIN W/O DYE: CPT

## 2025-08-17 RX ADMIN — POLYETHYLENE GLYCOL 3350 17 G: 17 POWDER, FOR SOLUTION ORAL at 09:05

## 2025-08-17 RX ADMIN — FAMOTIDINE 20 MG: 20 TABLET, FILM COATED ORAL at 21:16

## 2025-08-17 RX ADMIN — SACUBITRIL AND VALSARTAN 1 TABLET: 49; 51 TABLET, FILM COATED ORAL at 21:15

## 2025-08-17 RX ADMIN — DAKIN'S SOLUTION 0.125% (QUARTER STRENGTH): 0.12 SOLUTION at 09:14

## 2025-08-17 RX ADMIN — Medication 12.5 MG: at 05:54

## 2025-08-17 RX ADMIN — DOCUSATE SODIUM 50MG AND SENNOSIDES 8.6MG 2 TABLET: 8.6; 5 TABLET, FILM COATED ORAL at 21:16

## 2025-08-17 RX ADMIN — INSULIN GLARGINE 10 UNITS: 100 INJECTION, SOLUTION SUBCUTANEOUS at 20:18

## 2025-08-17 RX ADMIN — ALLOPURINOL 200 MG: 100 TABLET ORAL at 09:05

## 2025-08-17 RX ADMIN — TAMSULOSIN HYDROCHLORIDE 0.4 MG: 0.4 CAPSULE ORAL at 09:06

## 2025-08-17 RX ADMIN — AMIODARONE HYDROCHLORIDE 100 MG: 200 TABLET ORAL at 09:06

## 2025-08-17 RX ADMIN — CALCITRIOL CAPSULES 0.25 MCG 0.25 MCG: 0.25 CAPSULE ORAL at 09:06

## 2025-08-17 RX ADMIN — PIPERACILLIN AND TAZOBACTAM 3375 MG: 3; .375 INJECTION, POWDER, FOR SOLUTION INTRAVENOUS at 09:12

## 2025-08-17 RX ADMIN — BUMETANIDE 2 MG: 0.25 INJECTION INTRAMUSCULAR; INTRAVENOUS at 09:06

## 2025-08-17 RX ADMIN — SACUBITRIL AND VALSARTAN 1 TABLET: 49; 51 TABLET, FILM COATED ORAL at 09:05

## 2025-08-17 RX ADMIN — INSULIN GLARGINE 10 UNITS: 100 INJECTION, SOLUTION SUBCUTANEOUS at 09:02

## 2025-08-17 RX ADMIN — CARVEDILOL 12.5 MG: 12.5 TABLET, FILM COATED ORAL at 21:15

## 2025-08-17 RX ADMIN — PIPERACILLIN AND TAZOBACTAM 3375 MG: 3; .375 INJECTION, POWDER, FOR SOLUTION INTRAVENOUS at 02:17

## 2025-08-17 RX ADMIN — ASPIRIN 81 MG: 81 TABLET, CHEWABLE ORAL at 09:06

## 2025-08-17 RX ADMIN — DOCUSATE SODIUM 50MG AND SENNOSIDES 8.6MG 2 TABLET: 8.6; 5 TABLET, FILM COATED ORAL at 09:05

## 2025-08-17 RX ADMIN — RANOLAZINE 1000 MG: 500 TABLET, EXTENDED RELEASE ORAL at 09:05

## 2025-08-17 RX ADMIN — BUMETANIDE 2 MG: 0.25 INJECTION INTRAMUSCULAR; INTRAVENOUS at 17:16

## 2025-08-17 RX ADMIN — RANOLAZINE 1000 MG: 500 TABLET, EXTENDED RELEASE ORAL at 21:17

## 2025-08-17 RX ADMIN — SODIUM CHLORIDE, PRESERVATIVE FREE 10 ML: 5 INJECTION INTRAVENOUS at 09:11

## 2025-08-17 RX ADMIN — ISOSORBIDE MONONITRATE 60 MG: 60 TABLET, EXTENDED RELEASE ORAL at 09:06

## 2025-08-17 RX ADMIN — HYDROCODONE BITARTRATE AND ACETAMINOPHEN 1 TABLET: 10; 325 TABLET ORAL at 03:58

## 2025-08-17 RX ADMIN — METHOCARBAMOL 750 MG: 750 TABLET ORAL at 09:06

## 2025-08-17 RX ADMIN — CARVEDILOL 12.5 MG: 12.5 TABLET, FILM COATED ORAL at 09:06

## 2025-08-17 RX ADMIN — ISOSORBIDE MONONITRATE 60 MG: 60 TABLET, EXTENDED RELEASE ORAL at 21:16

## 2025-08-17 RX ADMIN — NIFEDIPINE 60 MG: 60 TABLET, EXTENDED RELEASE ORAL at 09:06

## 2025-08-17 RX ADMIN — APIXABAN 2.5 MG: 5 TABLET, FILM COATED ORAL at 09:06

## 2025-08-17 RX ADMIN — APIXABAN 2.5 MG: 5 TABLET, FILM COATED ORAL at 21:16

## 2025-08-17 RX ADMIN — PREGABALIN 50 MG: 50 CAPSULE ORAL at 09:06

## 2025-08-17 RX ADMIN — WATER 2000 MG: 1 INJECTION INTRAMUSCULAR; INTRAVENOUS; SUBCUTANEOUS at 15:59

## 2025-08-17 RX ADMIN — INSULIN LISPRO 2 UNITS: 100 INJECTION, SOLUTION INTRAVENOUS; SUBCUTANEOUS at 17:16

## 2025-08-17 RX ADMIN — SODIUM CHLORIDE, PRESERVATIVE FREE 10 ML: 5 INJECTION INTRAVENOUS at 20:18

## 2025-08-17 ASSESSMENT — PAIN SCALES - WONG BAKER: WONGBAKER_NUMERICALRESPONSE: NO HURT

## 2025-08-17 ASSESSMENT — PAIN SCALES - GENERAL: PAINLEVEL_OUTOF10: 0

## 2025-08-17 ASSESSMENT — PAIN - FUNCTIONAL ASSESSMENT: PAIN_FUNCTIONAL_ASSESSMENT: WONG-BAKER FACES

## 2025-08-18 PROBLEM — R41.82 ALTERED MENTAL STATUS: Status: ACTIVE | Noted: 2025-08-18

## 2025-08-18 LAB
ANION GAP SERPL CALCULATED.3IONS-SCNC: 10 MMOL/L (ref 8–16)
BASOPHILS # BLD: 0.1 K/UL (ref 0–0.2)
BASOPHILS NFR BLD: 0.7 % (ref 0–1)
BUN SERPL-MCNC: 18 MG/DL (ref 8–23)
CALCIUM SERPL-MCNC: 9.5 MG/DL (ref 8.8–10.2)
CHLORIDE SERPL-SCNC: 101 MMOL/L (ref 98–107)
CO2 SERPL-SCNC: 30 MMOL/L (ref 22–29)
CREAT SERPL-MCNC: 2 MG/DL (ref 0.7–1.2)
EOSINOPHIL # BLD: 0.4 K/UL (ref 0–0.6)
EOSINOPHIL NFR BLD: 4.9 % (ref 0–5)
ERYTHROCYTE [DISTWIDTH] IN BLOOD BY AUTOMATED COUNT: 17.6 % (ref 11.5–14.5)
GLUCOSE BLD-MCNC: 119 MG/DL (ref 70–99)
GLUCOSE BLD-MCNC: 141 MG/DL (ref 70–99)
GLUCOSE BLD-MCNC: 173 MG/DL (ref 70–99)
GLUCOSE BLD-MCNC: 197 MG/DL (ref 70–99)
GLUCOSE SERPL-MCNC: 162 MG/DL (ref 70–99)
HCT VFR BLD AUTO: 41.4 % (ref 42–52)
HGB BLD-MCNC: 12.8 G/DL (ref 14–18)
IMM GRANULOCYTES # BLD: 0 K/UL
LYMPHOCYTES # BLD: 0.9 K/UL (ref 1.1–4.5)
LYMPHOCYTES NFR BLD: 12.4 % (ref 20–40)
MAGNESIUM SERPL-MCNC: 2 MG/DL (ref 1.6–2.4)
MCH RBC QN AUTO: 30.6 PG (ref 27–31)
MCHC RBC AUTO-ENTMCNC: 30.9 G/DL (ref 33–37)
MCV RBC AUTO: 99 FL (ref 80–94)
MONOCYTES # BLD: 0.6 K/UL (ref 0–0.9)
MONOCYTES NFR BLD: 8.2 % (ref 0–10)
NEUTROPHILS # BLD: 5.4 K/UL (ref 1.5–7.5)
NEUTS SEG NFR BLD: 73.4 % (ref 50–65)
PERFORMED ON: ABNORMAL
PLATELET # BLD AUTO: 152 K/UL (ref 130–400)
PMV BLD AUTO: 10.9 FL (ref 9.4–12.4)
POTASSIUM SERPL-SCNC: 3.7 MMOL/L (ref 3.5–5.1)
RBC # BLD AUTO: 4.18 M/UL (ref 4.7–6.1)
SODIUM SERPL-SCNC: 141 MMOL/L (ref 136–145)
WBC # BLD AUTO: 7.4 K/UL (ref 4.8–10.8)

## 2025-08-18 PROCEDURE — 1200000000 HC SEMI PRIVATE

## 2025-08-18 PROCEDURE — 6370000000 HC RX 637 (ALT 250 FOR IP): Performed by: STUDENT IN AN ORGANIZED HEALTH CARE EDUCATION/TRAINING PROGRAM

## 2025-08-18 PROCEDURE — 99232 SBSQ HOSP IP/OBS MODERATE 35: CPT | Performed by: PSYCHIATRY & NEUROLOGY

## 2025-08-18 PROCEDURE — 97530 THERAPEUTIC ACTIVITIES: CPT

## 2025-08-18 PROCEDURE — 36415 COLL VENOUS BLD VENIPUNCTURE: CPT

## 2025-08-18 PROCEDURE — 6360000002 HC RX W HCPCS: Performed by: STUDENT IN AN ORGANIZED HEALTH CARE EDUCATION/TRAINING PROGRAM

## 2025-08-18 PROCEDURE — 94760 N-INVAS EAR/PLS OXIMETRY 1: CPT

## 2025-08-18 PROCEDURE — 6370000000 HC RX 637 (ALT 250 FOR IP)

## 2025-08-18 PROCEDURE — 99223 1ST HOSP IP/OBS HIGH 75: CPT

## 2025-08-18 PROCEDURE — 82962 GLUCOSE BLOOD TEST: CPT

## 2025-08-18 PROCEDURE — 85025 COMPLETE CBC W/AUTO DIFF WBC: CPT

## 2025-08-18 PROCEDURE — 99223 1ST HOSP IP/OBS HIGH 75: CPT | Performed by: NEUROLOGICAL SURGERY

## 2025-08-18 PROCEDURE — 2500000003 HC RX 250 WO HCPCS

## 2025-08-18 PROCEDURE — 2700000000 HC OXYGEN THERAPY PER DAY

## 2025-08-18 PROCEDURE — 83735 ASSAY OF MAGNESIUM: CPT

## 2025-08-18 PROCEDURE — 80048 BASIC METABOLIC PNL TOTAL CA: CPT

## 2025-08-18 PROCEDURE — 2500000003 HC RX 250 WO HCPCS: Performed by: STUDENT IN AN ORGANIZED HEALTH CARE EDUCATION/TRAINING PROGRAM

## 2025-08-18 RX ORDER — HYDROMORPHONE HYDROCHLORIDE 1 MG/ML
0.5 INJECTION, SOLUTION INTRAMUSCULAR; INTRAVENOUS; SUBCUTANEOUS
Status: DISCONTINUED | OUTPATIENT
Start: 2025-08-18 | End: 2025-08-29 | Stop reason: HOSPADM

## 2025-08-18 RX ORDER — HYDROCODONE BITARTRATE AND ACETAMINOPHEN 10; 325 MG/1; MG/1
1 TABLET ORAL ONCE
Refills: 0 | Status: COMPLETED | OUTPATIENT
Start: 2025-08-18 | End: 2025-08-18

## 2025-08-18 RX ORDER — PREGABALIN 25 MG/1
25 CAPSULE ORAL DAILY
Status: DISCONTINUED | OUTPATIENT
Start: 2025-08-18 | End: 2025-08-29 | Stop reason: HOSPADM

## 2025-08-18 RX ORDER — LIDOCAINE 4 G/G
1 PATCH TOPICAL DAILY
Status: DISCONTINUED | OUTPATIENT
Start: 2025-08-18 | End: 2025-08-29 | Stop reason: HOSPADM

## 2025-08-18 RX ORDER — LORAZEPAM 1 MG/1
1 TABLET ORAL ONCE
Status: COMPLETED | OUTPATIENT
Start: 2025-08-18 | End: 2025-08-18

## 2025-08-18 RX ORDER — LACTULOSE 10 G/15ML
10 SOLUTION ORAL DAILY PRN
Status: DISCONTINUED | OUTPATIENT
Start: 2025-08-18 | End: 2025-08-29 | Stop reason: HOSPADM

## 2025-08-18 RX ADMIN — LACTULOSE 10 G: 20 SOLUTION ORAL at 11:40

## 2025-08-18 RX ADMIN — TAMSULOSIN HYDROCHLORIDE 0.4 MG: 0.4 CAPSULE ORAL at 08:51

## 2025-08-18 RX ADMIN — ACETAMINOPHEN 650 MG: 325 TABLET ORAL at 11:35

## 2025-08-18 RX ADMIN — APIXABAN 2.5 MG: 5 TABLET, FILM COATED ORAL at 22:16

## 2025-08-18 RX ADMIN — CALCITRIOL CAPSULES 0.25 MCG 0.25 MCG: 0.25 CAPSULE ORAL at 08:50

## 2025-08-18 RX ADMIN — INSULIN GLARGINE 10 UNITS: 100 INJECTION, SOLUTION SUBCUTANEOUS at 22:19

## 2025-08-18 RX ADMIN — PREGABALIN 25 MG: 25 CAPSULE ORAL at 16:52

## 2025-08-18 RX ADMIN — RANOLAZINE 1000 MG: 500 TABLET, EXTENDED RELEASE ORAL at 08:50

## 2025-08-18 RX ADMIN — ISOSORBIDE MONONITRATE 60 MG: 60 TABLET, EXTENDED RELEASE ORAL at 22:18

## 2025-08-18 RX ADMIN — DOCUSATE SODIUM 50MG AND SENNOSIDES 8.6MG 2 TABLET: 8.6; 5 TABLET, FILM COATED ORAL at 22:23

## 2025-08-18 RX ADMIN — CARVEDILOL 12.5 MG: 12.5 TABLET, FILM COATED ORAL at 22:18

## 2025-08-18 RX ADMIN — DAKIN'S SOLUTION 0.125% (QUARTER STRENGTH): 0.12 SOLUTION at 22:24

## 2025-08-18 RX ADMIN — RANOLAZINE 1000 MG: 500 TABLET, EXTENDED RELEASE ORAL at 22:16

## 2025-08-18 RX ADMIN — INSULIN LISPRO 2 UNITS: 100 INJECTION, SOLUTION INTRAVENOUS; SUBCUTANEOUS at 22:18

## 2025-08-18 RX ADMIN — METHOCARBAMOL 750 MG: 750 TABLET ORAL at 16:52

## 2025-08-18 RX ADMIN — ASPIRIN 81 MG: 81 TABLET, CHEWABLE ORAL at 08:51

## 2025-08-18 RX ADMIN — WATER 2000 MG: 1 INJECTION INTRAMUSCULAR; INTRAVENOUS; SUBCUTANEOUS at 16:51

## 2025-08-18 RX ADMIN — Medication 12.5 MG: at 08:51

## 2025-08-18 RX ADMIN — FAMOTIDINE 20 MG: 20 TABLET, FILM COATED ORAL at 22:17

## 2025-08-18 RX ADMIN — ALLOPURINOL 200 MG: 100 TABLET ORAL at 08:50

## 2025-08-18 RX ADMIN — APIXABAN 2.5 MG: 5 TABLET, FILM COATED ORAL at 08:50

## 2025-08-18 RX ADMIN — DAKIN'S SOLUTION 0.125% (QUARTER STRENGTH): 0.12 SOLUTION at 08:53

## 2025-08-18 RX ADMIN — HYDROCODONE BITARTRATE AND ACETAMINOPHEN 1 TABLET: 10; 325 TABLET ORAL at 01:40

## 2025-08-18 RX ADMIN — SACUBITRIL AND VALSARTAN 1 TABLET: 49; 51 TABLET, FILM COATED ORAL at 22:17

## 2025-08-18 RX ADMIN — SACUBITRIL AND VALSARTAN 1 TABLET: 49; 51 TABLET, FILM COATED ORAL at 08:49

## 2025-08-18 RX ADMIN — CARVEDILOL 12.5 MG: 12.5 TABLET, FILM COATED ORAL at 08:50

## 2025-08-18 RX ADMIN — POLYETHYLENE GLYCOL 3350 17 G: 17 POWDER, FOR SOLUTION ORAL at 08:51

## 2025-08-18 RX ADMIN — TRAZODONE HYDROCHLORIDE 150 MG: 100 TABLET ORAL at 22:17

## 2025-08-18 RX ADMIN — INSULIN GLARGINE 10 UNITS: 100 INJECTION, SOLUTION SUBCUTANEOUS at 08:59

## 2025-08-18 RX ADMIN — AMIODARONE HYDROCHLORIDE 100 MG: 200 TABLET ORAL at 08:50

## 2025-08-18 RX ADMIN — DOCUSATE SODIUM 50MG AND SENNOSIDES 8.6MG 2 TABLET: 8.6; 5 TABLET, FILM COATED ORAL at 08:50

## 2025-08-18 RX ADMIN — BUMETANIDE 2 MG: 0.25 INJECTION INTRAMUSCULAR; INTRAVENOUS at 16:51

## 2025-08-18 RX ADMIN — ISOSORBIDE MONONITRATE 60 MG: 60 TABLET, EXTENDED RELEASE ORAL at 08:50

## 2025-08-18 RX ADMIN — NIFEDIPINE 60 MG: 60 TABLET, EXTENDED RELEASE ORAL at 08:51

## 2025-08-18 RX ADMIN — SODIUM CHLORIDE, PRESERVATIVE FREE 10 ML: 5 INJECTION INTRAVENOUS at 08:52

## 2025-08-18 RX ADMIN — LORAZEPAM 1 MG: 1 TABLET ORAL at 22:17

## 2025-08-18 RX ADMIN — SODIUM CHLORIDE, PRESERVATIVE FREE 10 ML: 5 INJECTION INTRAVENOUS at 22:25

## 2025-08-18 RX ADMIN — METHOCARBAMOL 750 MG: 750 TABLET ORAL at 22:17

## 2025-08-18 RX ADMIN — BUMETANIDE 2 MG: 0.25 INJECTION INTRAMUSCULAR; INTRAVENOUS at 08:49

## 2025-08-18 ASSESSMENT — PAIN SCALES - GENERAL
PAINLEVEL_OUTOF10: 10
PAINLEVEL_OUTOF10: 10

## 2025-08-18 ASSESSMENT — PAIN DESCRIPTION - ORIENTATION: ORIENTATION: RIGHT

## 2025-08-18 ASSESSMENT — PAIN DESCRIPTION - LOCATION: LOCATION: HIP

## 2025-08-18 ASSESSMENT — PAIN DESCRIPTION - DESCRIPTORS: DESCRIPTORS: SHARP

## 2025-08-18 ASSESSMENT — PAIN - FUNCTIONAL ASSESSMENT
PAIN_FUNCTIONAL_ASSESSMENT: 0-10
PAIN_FUNCTIONAL_ASSESSMENT: PREVENTS OR INTERFERES SOME ACTIVE ACTIVITIES AND ADLS
PAIN_FUNCTIONAL_ASSESSMENT: 0-10

## 2025-08-19 PROBLEM — M47.14 THORACIC MYELOPATHY: Status: ACTIVE | Noted: 2025-08-19

## 2025-08-19 PROBLEM — M51.24 THORACIC DISC HERNIATION: Status: ACTIVE | Noted: 2025-08-19

## 2025-08-19 LAB
ANION GAP SERPL CALCULATED.3IONS-SCNC: 9 MMOL/L (ref 8–16)
BACTERIA BLD CULT: NORMAL
BASOPHILS # BLD: 0.1 K/UL (ref 0–0.2)
BASOPHILS NFR BLD: 0.6 % (ref 0–1)
BUN SERPL-MCNC: 17 MG/DL (ref 8–23)
CALCIUM SERPL-MCNC: 9.1 MG/DL (ref 8.8–10.2)
CHLORIDE SERPL-SCNC: 100 MMOL/L (ref 98–107)
CO2 SERPL-SCNC: 33 MMOL/L (ref 22–29)
CREAT SERPL-MCNC: 2.1 MG/DL (ref 0.7–1.2)
EOSINOPHIL # BLD: 0.3 K/UL (ref 0–0.6)
EOSINOPHIL NFR BLD: 4 % (ref 0–5)
ERYTHROCYTE [DISTWIDTH] IN BLOOD BY AUTOMATED COUNT: 17.8 % (ref 11.5–14.5)
GLUCOSE BLD-MCNC: 113 MG/DL (ref 70–99)
GLUCOSE BLD-MCNC: 155 MG/DL (ref 70–99)
GLUCOSE BLD-MCNC: 238 MG/DL (ref 70–99)
GLUCOSE BLD-MCNC: 89 MG/DL (ref 70–99)
GLUCOSE BLD-MCNC: 97 MG/DL (ref 70–99)
GLUCOSE SERPL-MCNC: 138 MG/DL (ref 70–99)
HCT VFR BLD AUTO: 40.2 % (ref 42–52)
HGB BLD-MCNC: 12.1 G/DL (ref 14–18)
IMM GRANULOCYTES # BLD: 0 K/UL
LYMPHOCYTES # BLD: 1.1 K/UL (ref 1.1–4.5)
LYMPHOCYTES NFR BLD: 14.4 % (ref 20–40)
MAGNESIUM SERPL-MCNC: 2 MG/DL (ref 1.6–2.4)
MCH RBC QN AUTO: 30.1 PG (ref 27–31)
MCHC RBC AUTO-ENTMCNC: 30.1 G/DL (ref 33–37)
MCV RBC AUTO: 100 FL (ref 80–94)
MONOCYTES # BLD: 0.7 K/UL (ref 0–0.9)
MONOCYTES NFR BLD: 9 % (ref 0–10)
NEUTROPHILS # BLD: 5.6 K/UL (ref 1.5–7.5)
NEUTS SEG NFR BLD: 71.6 % (ref 50–65)
PERFORMED ON: ABNORMAL
PERFORMED ON: NORMAL
PERFORMED ON: NORMAL
PLATELET # BLD AUTO: 176 K/UL (ref 130–400)
PMV BLD AUTO: 11.3 FL (ref 9.4–12.4)
POTASSIUM SERPL-SCNC: 3.8 MMOL/L (ref 3.5–5.1)
RBC # BLD AUTO: 4.02 M/UL (ref 4.7–6.1)
SODIUM SERPL-SCNC: 142 MMOL/L (ref 136–145)
WBC # BLD AUTO: 7.8 K/UL (ref 4.8–10.8)

## 2025-08-19 PROCEDURE — 6370000000 HC RX 637 (ALT 250 FOR IP): Performed by: STUDENT IN AN ORGANIZED HEALTH CARE EDUCATION/TRAINING PROGRAM

## 2025-08-19 PROCEDURE — 2500000003 HC RX 250 WO HCPCS: Performed by: STUDENT IN AN ORGANIZED HEALTH CARE EDUCATION/TRAINING PROGRAM

## 2025-08-19 PROCEDURE — 97535 SELF CARE MNGMENT TRAINING: CPT

## 2025-08-19 PROCEDURE — 99232 SBSQ HOSP IP/OBS MODERATE 35: CPT | Performed by: PSYCHIATRY & NEUROLOGY

## 2025-08-19 PROCEDURE — 83735 ASSAY OF MAGNESIUM: CPT

## 2025-08-19 PROCEDURE — 97530 THERAPEUTIC ACTIVITIES: CPT

## 2025-08-19 PROCEDURE — 85025 COMPLETE CBC W/AUTO DIFF WBC: CPT

## 2025-08-19 PROCEDURE — 36415 COLL VENOUS BLD VENIPUNCTURE: CPT

## 2025-08-19 PROCEDURE — 2700000000 HC OXYGEN THERAPY PER DAY

## 2025-08-19 PROCEDURE — 51798 US URINE CAPACITY MEASURE: CPT

## 2025-08-19 PROCEDURE — 99223 1ST HOSP IP/OBS HIGH 75: CPT | Performed by: NEUROLOGICAL SURGERY

## 2025-08-19 PROCEDURE — 99232 SBSQ HOSP IP/OBS MODERATE 35: CPT

## 2025-08-19 PROCEDURE — 51701 INSERT BLADDER CATHETER: CPT

## 2025-08-19 PROCEDURE — 82962 GLUCOSE BLOOD TEST: CPT

## 2025-08-19 PROCEDURE — 6360000002 HC RX W HCPCS: Performed by: STUDENT IN AN ORGANIZED HEALTH CARE EDUCATION/TRAINING PROGRAM

## 2025-08-19 PROCEDURE — 6370000000 HC RX 637 (ALT 250 FOR IP)

## 2025-08-19 PROCEDURE — 2500000003 HC RX 250 WO HCPCS

## 2025-08-19 PROCEDURE — 80048 BASIC METABOLIC PNL TOTAL CA: CPT

## 2025-08-19 PROCEDURE — 1200000000 HC SEMI PRIVATE

## 2025-08-19 RX ORDER — BUMETANIDE 0.25 MG/ML
2 INJECTION, SOLUTION INTRAMUSCULAR; INTRAVENOUS DAILY
Status: DISCONTINUED | OUTPATIENT
Start: 2025-08-20 | End: 2025-08-27

## 2025-08-19 RX ORDER — HEPARIN SODIUM 5000 [USP'U]/ML
5000 INJECTION, SOLUTION INTRAVENOUS; SUBCUTANEOUS EVERY 8 HOURS SCHEDULED
Status: DISCONTINUED | OUTPATIENT
Start: 2025-08-20 | End: 2025-08-24

## 2025-08-19 RX ORDER — LACTULOSE 10 G/15ML
30 SOLUTION ORAL DAILY
Status: DISCONTINUED | OUTPATIENT
Start: 2025-08-19 | End: 2025-08-29 | Stop reason: HOSPADM

## 2025-08-19 RX ADMIN — INSULIN LISPRO 2 UNITS: 100 INJECTION, SOLUTION INTRAVENOUS; SUBCUTANEOUS at 22:07

## 2025-08-19 RX ADMIN — SACUBITRIL AND VALSARTAN 1 TABLET: 49; 51 TABLET, FILM COATED ORAL at 22:07

## 2025-08-19 RX ADMIN — INSULIN GLARGINE 10 UNITS: 100 INJECTION, SOLUTION SUBCUTANEOUS at 22:08

## 2025-08-19 RX ADMIN — DAKIN'S SOLUTION 0.125% (QUARTER STRENGTH): 0.12 SOLUTION at 15:51

## 2025-08-19 RX ADMIN — DOCUSATE SODIUM 50MG AND SENNOSIDES 8.6MG 2 TABLET: 8.6; 5 TABLET, FILM COATED ORAL at 22:07

## 2025-08-19 RX ADMIN — Medication 12.5 MG: at 05:19

## 2025-08-19 RX ADMIN — APIXABAN 2.5 MG: 5 TABLET, FILM COATED ORAL at 09:16

## 2025-08-19 RX ADMIN — CARVEDILOL 12.5 MG: 12.5 TABLET, FILM COATED ORAL at 22:07

## 2025-08-19 RX ADMIN — DOCUSATE SODIUM 50MG AND SENNOSIDES 8.6MG 2 TABLET: 8.6; 5 TABLET, FILM COATED ORAL at 09:14

## 2025-08-19 RX ADMIN — LACTULOSE SOLUTION USP, 10 G/15 ML: 10 SOLUTION ORAL; RECTAL at 11:15

## 2025-08-19 RX ADMIN — AMIODARONE HYDROCHLORIDE 100 MG: 200 TABLET ORAL at 09:16

## 2025-08-19 RX ADMIN — ISOSORBIDE MONONITRATE 60 MG: 60 TABLET, EXTENDED RELEASE ORAL at 22:07

## 2025-08-19 RX ADMIN — LACTULOSE 30 G: 20 SOLUTION ORAL at 11:08

## 2025-08-19 RX ADMIN — SODIUM CHLORIDE, PRESERVATIVE FREE 10 ML: 5 INJECTION INTRAVENOUS at 22:08

## 2025-08-19 RX ADMIN — RANOLAZINE 1000 MG: 500 TABLET, EXTENDED RELEASE ORAL at 22:07

## 2025-08-19 RX ADMIN — METHOCARBAMOL 750 MG: 750 TABLET ORAL at 13:39

## 2025-08-19 RX ADMIN — BUMETANIDE 2 MG: 0.25 INJECTION INTRAMUSCULAR; INTRAVENOUS at 09:17

## 2025-08-19 RX ADMIN — DAKIN'S SOLUTION 0.125% (QUARTER STRENGTH): 0.12 SOLUTION at 22:12

## 2025-08-19 RX ADMIN — WATER 2000 MG: 1 INJECTION INTRAMUSCULAR; INTRAVENOUS; SUBCUTANEOUS at 13:39

## 2025-08-19 RX ADMIN — PREGABALIN 25 MG: 25 CAPSULE ORAL at 09:15

## 2025-08-19 RX ADMIN — RANOLAZINE 1000 MG: 500 TABLET, EXTENDED RELEASE ORAL at 11:40

## 2025-08-19 RX ADMIN — METHOCARBAMOL 750 MG: 750 TABLET ORAL at 22:07

## 2025-08-19 RX ADMIN — TAMSULOSIN HYDROCHLORIDE 0.4 MG: 0.4 CAPSULE ORAL at 09:14

## 2025-08-19 RX ADMIN — FAMOTIDINE 20 MG: 20 TABLET, FILM COATED ORAL at 22:07

## 2025-08-20 LAB
ANION GAP SERPL CALCULATED.3IONS-SCNC: 9 MMOL/L (ref 8–16)
BASOPHILS # BLD: 0 K/UL (ref 0–0.2)
BASOPHILS NFR BLD: 0.5 % (ref 0–1)
BUN SERPL-MCNC: 18 MG/DL (ref 8–23)
CALCIUM SERPL-MCNC: 9.3 MG/DL (ref 8.8–10.2)
CHLORIDE SERPL-SCNC: 97 MMOL/L (ref 98–107)
CO2 SERPL-SCNC: 32 MMOL/L (ref 22–29)
CREAT SERPL-MCNC: 2 MG/DL (ref 0.7–1.2)
EOSINOPHIL # BLD: 0.4 K/UL (ref 0–0.6)
EOSINOPHIL NFR BLD: 4.4 % (ref 0–5)
ERYTHROCYTE [DISTWIDTH] IN BLOOD BY AUTOMATED COUNT: 17.8 % (ref 11.5–14.5)
GLUCOSE BLD-MCNC: 139 MG/DL (ref 70–99)
GLUCOSE BLD-MCNC: 142 MG/DL (ref 70–99)
GLUCOSE BLD-MCNC: 208 MG/DL (ref 70–99)
GLUCOSE BLD-MCNC: 226 MG/DL (ref 70–99)
GLUCOSE SERPL-MCNC: 213 MG/DL (ref 70–99)
HCT VFR BLD AUTO: 40.2 % (ref 42–52)
HGB BLD-MCNC: 12.3 G/DL (ref 14–18)
IMM GRANULOCYTES # BLD: 0 K/UL
LYMPHOCYTES # BLD: 1 K/UL (ref 1.1–4.5)
LYMPHOCYTES NFR BLD: 13.2 % (ref 20–40)
MCH RBC QN AUTO: 30.4 PG (ref 27–31)
MCHC RBC AUTO-ENTMCNC: 30.6 G/DL (ref 33–37)
MCV RBC AUTO: 99.5 FL (ref 80–94)
MONOCYTES # BLD: 0.7 K/UL (ref 0–0.9)
MONOCYTES NFR BLD: 9.4 % (ref 0–10)
NEUTROPHILS # BLD: 5.7 K/UL (ref 1.5–7.5)
NEUTS SEG NFR BLD: 72.2 % (ref 50–65)
PERFORMED ON: ABNORMAL
PLATELET # BLD AUTO: 172 K/UL (ref 130–400)
PMV BLD AUTO: 11.5 FL (ref 9.4–12.4)
POTASSIUM SERPL-SCNC: 3.8 MMOL/L (ref 3.5–5)
RBC # BLD AUTO: 4.04 M/UL (ref 4.7–6.1)
SODIUM SERPL-SCNC: 138 MMOL/L (ref 136–145)
WBC # BLD AUTO: 7.9 K/UL (ref 4.8–10.8)

## 2025-08-20 PROCEDURE — 2700000000 HC OXYGEN THERAPY PER DAY

## 2025-08-20 PROCEDURE — 87070 CULTURE OTHR SPECIMN AEROBIC: CPT

## 2025-08-20 PROCEDURE — 6370000000 HC RX 637 (ALT 250 FOR IP): Performed by: STUDENT IN AN ORGANIZED HEALTH CARE EDUCATION/TRAINING PROGRAM

## 2025-08-20 PROCEDURE — 99233 SBSQ HOSP IP/OBS HIGH 50: CPT | Performed by: PSYCHIATRY & NEUROLOGY

## 2025-08-20 PROCEDURE — 97530 THERAPEUTIC ACTIVITIES: CPT

## 2025-08-20 PROCEDURE — 87077 CULTURE AEROBIC IDENTIFY: CPT

## 2025-08-20 PROCEDURE — 2500000003 HC RX 250 WO HCPCS: Performed by: STUDENT IN AN ORGANIZED HEALTH CARE EDUCATION/TRAINING PROGRAM

## 2025-08-20 PROCEDURE — 1200000000 HC SEMI PRIVATE

## 2025-08-20 PROCEDURE — 85025 COMPLETE CBC W/AUTO DIFF WBC: CPT

## 2025-08-20 PROCEDURE — 87040 BLOOD CULTURE FOR BACTERIA: CPT

## 2025-08-20 PROCEDURE — 6370000000 HC RX 637 (ALT 250 FOR IP)

## 2025-08-20 PROCEDURE — 80048 BASIC METABOLIC PNL TOTAL CA: CPT

## 2025-08-20 PROCEDURE — 99232 SBSQ HOSP IP/OBS MODERATE 35: CPT | Performed by: NURSE PRACTITIONER

## 2025-08-20 PROCEDURE — 2500000003 HC RX 250 WO HCPCS

## 2025-08-20 PROCEDURE — 82962 GLUCOSE BLOOD TEST: CPT

## 2025-08-20 PROCEDURE — 94760 N-INVAS EAR/PLS OXIMETRY 1: CPT

## 2025-08-20 PROCEDURE — 87075 CULTR BACTERIA EXCEPT BLOOD: CPT

## 2025-08-20 PROCEDURE — 36415 COLL VENOUS BLD VENIPUNCTURE: CPT

## 2025-08-20 PROCEDURE — 51798 US URINE CAPACITY MEASURE: CPT

## 2025-08-20 PROCEDURE — 6360000002 HC RX W HCPCS: Performed by: STUDENT IN AN ORGANIZED HEALTH CARE EDUCATION/TRAINING PROGRAM

## 2025-08-20 PROCEDURE — 51702 INSERT TEMP BLADDER CATH: CPT

## 2025-08-20 PROCEDURE — 51701 INSERT BLADDER CATHETER: CPT

## 2025-08-20 PROCEDURE — 87205 SMEAR GRAM STAIN: CPT

## 2025-08-20 RX ORDER — HYDROCODONE BITARTRATE AND ACETAMINOPHEN 10; 325 MG/1; MG/1
1 TABLET ORAL EVERY 6 HOURS PRN
Refills: 0 | Status: DISCONTINUED | OUTPATIENT
Start: 2025-08-20 | End: 2025-08-29 | Stop reason: HOSPADM

## 2025-08-20 RX ADMIN — HEPARIN SODIUM 5000 UNITS: 5000 INJECTION, SOLUTION INTRAVENOUS; SUBCUTANEOUS at 21:24

## 2025-08-20 RX ADMIN — METHOCARBAMOL 750 MG: 750 TABLET ORAL at 13:32

## 2025-08-20 RX ADMIN — SODIUM CHLORIDE, PRESERVATIVE FREE 10 ML: 5 INJECTION INTRAVENOUS at 21:25

## 2025-08-20 RX ADMIN — INSULIN LISPRO 2 UNITS: 100 INJECTION, SOLUTION INTRAVENOUS; SUBCUTANEOUS at 08:47

## 2025-08-20 RX ADMIN — ALLOPURINOL 200 MG: 100 TABLET ORAL at 08:50

## 2025-08-20 RX ADMIN — BUMETANIDE 2 MG: 0.25 INJECTION INTRAMUSCULAR; INTRAVENOUS at 08:50

## 2025-08-20 RX ADMIN — PREGABALIN 25 MG: 25 CAPSULE ORAL at 08:50

## 2025-08-20 RX ADMIN — ISOSORBIDE MONONITRATE 60 MG: 60 TABLET, EXTENDED RELEASE ORAL at 08:50

## 2025-08-20 RX ADMIN — DOCUSATE SODIUM 50MG AND SENNOSIDES 8.6MG 2 TABLET: 8.6; 5 TABLET, FILM COATED ORAL at 08:48

## 2025-08-20 RX ADMIN — ACETAMINOPHEN 650 MG: 325 TABLET ORAL at 01:42

## 2025-08-20 RX ADMIN — DAKIN'S SOLUTION 0.125% (QUARTER STRENGTH): 0.12 SOLUTION at 09:00

## 2025-08-20 RX ADMIN — CARVEDILOL 12.5 MG: 12.5 TABLET, FILM COATED ORAL at 21:25

## 2025-08-20 RX ADMIN — ISOSORBIDE MONONITRATE 60 MG: 60 TABLET, EXTENDED RELEASE ORAL at 21:25

## 2025-08-20 RX ADMIN — HEPARIN SODIUM 5000 UNITS: 5000 INJECTION, SOLUTION INTRAVENOUS; SUBCUTANEOUS at 05:40

## 2025-08-20 RX ADMIN — WATER 2000 MG: 1 INJECTION INTRAMUSCULAR; INTRAVENOUS; SUBCUTANEOUS at 13:32

## 2025-08-20 RX ADMIN — HEPARIN SODIUM 5000 UNITS: 5000 INJECTION, SOLUTION INTRAVENOUS; SUBCUTANEOUS at 13:32

## 2025-08-20 RX ADMIN — INSULIN LISPRO 2 UNITS: 100 INJECTION, SOLUTION INTRAVENOUS; SUBCUTANEOUS at 21:24

## 2025-08-20 RX ADMIN — ACETAMINOPHEN 650 MG: 325 TABLET ORAL at 08:46

## 2025-08-20 RX ADMIN — RANOLAZINE 1000 MG: 500 TABLET, EXTENDED RELEASE ORAL at 08:48

## 2025-08-20 RX ADMIN — RANOLAZINE 1000 MG: 500 TABLET, EXTENDED RELEASE ORAL at 21:24

## 2025-08-20 RX ADMIN — DAKIN'S SOLUTION 0.125% (QUARTER STRENGTH): 0.12 SOLUTION at 21:25

## 2025-08-20 RX ADMIN — SODIUM CHLORIDE, PRESERVATIVE FREE 10 ML: 5 INJECTION INTRAVENOUS at 08:54

## 2025-08-20 RX ADMIN — NIFEDIPINE 60 MG: 60 TABLET, EXTENDED RELEASE ORAL at 08:48

## 2025-08-20 RX ADMIN — SACUBITRIL AND VALSARTAN 1 TABLET: 49; 51 TABLET, FILM COATED ORAL at 08:50

## 2025-08-20 RX ADMIN — CALCITRIOL CAPSULES 0.25 MCG 0.25 MCG: 0.25 CAPSULE ORAL at 08:50

## 2025-08-20 RX ADMIN — Medication 12.5 MG: at 05:40

## 2025-08-20 RX ADMIN — METHOCARBAMOL 750 MG: 750 TABLET ORAL at 21:24

## 2025-08-20 RX ADMIN — METHOCARBAMOL 750 MG: 750 TABLET ORAL at 08:50

## 2025-08-20 RX ADMIN — CARVEDILOL 12.5 MG: 12.5 TABLET, FILM COATED ORAL at 08:50

## 2025-08-20 RX ADMIN — DOCUSATE SODIUM 50MG AND SENNOSIDES 8.6MG 2 TABLET: 8.6; 5 TABLET, FILM COATED ORAL at 21:25

## 2025-08-20 RX ADMIN — INSULIN GLARGINE 10 UNITS: 100 INJECTION, SOLUTION SUBCUTANEOUS at 08:47

## 2025-08-20 RX ADMIN — INSULIN GLARGINE 10 UNITS: 100 INJECTION, SOLUTION SUBCUTANEOUS at 21:24

## 2025-08-20 RX ADMIN — TAMSULOSIN HYDROCHLORIDE 0.4 MG: 0.4 CAPSULE ORAL at 08:50

## 2025-08-20 RX ADMIN — AMIODARONE HYDROCHLORIDE 100 MG: 200 TABLET ORAL at 08:51

## 2025-08-20 RX ADMIN — ACETAMINOPHEN 650 MG: 325 TABLET ORAL at 21:30

## 2025-08-20 RX ADMIN — FAMOTIDINE 20 MG: 20 TABLET, FILM COATED ORAL at 21:25

## 2025-08-20 RX ADMIN — SACUBITRIL AND VALSARTAN 1 TABLET: 49; 51 TABLET, FILM COATED ORAL at 21:32

## 2025-08-20 ASSESSMENT — PAIN SCALES - GENERAL
PAINLEVEL_OUTOF10: 7
PAINLEVEL_OUTOF10: 5
PAINLEVEL_OUTOF10: 0
PAINLEVEL_OUTOF10: 4

## 2025-08-20 ASSESSMENT — PAIN DESCRIPTION - LOCATION
LOCATION: LEG
LOCATION: HIP
LOCATION: HIP

## 2025-08-20 ASSESSMENT — PAIN - FUNCTIONAL ASSESSMENT
PAIN_FUNCTIONAL_ASSESSMENT: 0-10
PAIN_FUNCTIONAL_ASSESSMENT: ACTIVITIES ARE NOT PREVENTED
PAIN_FUNCTIONAL_ASSESSMENT: 0-10

## 2025-08-20 ASSESSMENT — PAIN DESCRIPTION - DESCRIPTORS
DESCRIPTORS: ACHING
DESCRIPTORS: ACHING;DISCOMFORT
DESCRIPTORS: ACHING

## 2025-08-20 ASSESSMENT — PAIN DESCRIPTION - ORIENTATION
ORIENTATION: RIGHT

## 2025-08-21 ENCOUNTER — ANESTHESIA EVENT (OUTPATIENT)
Dept: OPERATING ROOM | Age: 70
End: 2025-08-21
Payer: MEDICARE

## 2025-08-21 LAB
ABO/RH: NORMAL
ANION GAP SERPL CALCULATED.3IONS-SCNC: 8 MMOL/L (ref 8–16)
ANTIBODY SCREEN: NORMAL
BASOPHILS # BLD: 0.1 K/UL (ref 0–0.2)
BASOPHILS NFR BLD: 0.6 % (ref 0–1)
BNP BLD-MCNC: 4603 PG/ML (ref 0–124)
BUN SERPL-MCNC: 24 MG/DL (ref 8–23)
CALCIUM SERPL-MCNC: 9.3 MG/DL (ref 8.8–10.2)
CHLORIDE SERPL-SCNC: 99 MMOL/L (ref 98–107)
CO2 SERPL-SCNC: 31 MMOL/L (ref 22–29)
CREAT SERPL-MCNC: 1.9 MG/DL (ref 0.7–1.2)
EOSINOPHIL # BLD: 0.4 K/UL (ref 0–0.6)
EOSINOPHIL NFR BLD: 5.1 % (ref 0–5)
ERYTHROCYTE [DISTWIDTH] IN BLOOD BY AUTOMATED COUNT: 17.7 % (ref 11.5–14.5)
GLUCOSE BLD-MCNC: 169 MG/DL (ref 70–99)
GLUCOSE BLD-MCNC: 174 MG/DL (ref 70–99)
GLUCOSE BLD-MCNC: 193 MG/DL (ref 70–99)
GLUCOSE BLD-MCNC: 215 MG/DL (ref 70–99)
GLUCOSE SERPL-MCNC: 207 MG/DL (ref 70–99)
HCT VFR BLD AUTO: 37.2 % (ref 42–52)
HGB BLD-MCNC: 11.2 G/DL (ref 14–18)
IMM GRANULOCYTES # BLD: 0 K/UL
INR PPP: 1.31 (ref 0.88–1.18)
LYMPHOCYTES # BLD: 1.1 K/UL (ref 1.1–4.5)
LYMPHOCYTES NFR BLD: 13.9 % (ref 20–40)
MCH RBC QN AUTO: 30.3 PG (ref 27–31)
MCHC RBC AUTO-ENTMCNC: 30.1 G/DL (ref 33–37)
MCV RBC AUTO: 100.5 FL (ref 80–94)
MONOCYTES # BLD: 0.7 K/UL (ref 0–0.9)
MONOCYTES NFR BLD: 9.3 % (ref 0–10)
NEUTROPHILS # BLD: 5.6 K/UL (ref 1.5–7.5)
NEUTS SEG NFR BLD: 70.8 % (ref 50–65)
PERFORMED ON: ABNORMAL
PLATELET # BLD AUTO: 169 K/UL (ref 130–400)
PMV BLD AUTO: 11.7 FL (ref 9.4–12.4)
POTASSIUM SERPL-SCNC: 3.9 MMOL/L (ref 3.5–5)
PROTHROMBIN TIME: 16.1 SEC (ref 12–14.6)
RBC # BLD AUTO: 3.7 M/UL (ref 4.7–6.1)
SODIUM SERPL-SCNC: 138 MMOL/L (ref 136–145)
WBC # BLD AUTO: 7.9 K/UL (ref 4.8–10.8)

## 2025-08-21 PROCEDURE — 86850 RBC ANTIBODY SCREEN: CPT

## 2025-08-21 PROCEDURE — 99223 1ST HOSP IP/OBS HIGH 75: CPT | Performed by: INTERNAL MEDICINE

## 2025-08-21 PROCEDURE — 6360000002 HC RX W HCPCS: Performed by: STUDENT IN AN ORGANIZED HEALTH CARE EDUCATION/TRAINING PROGRAM

## 2025-08-21 PROCEDURE — 86901 BLOOD TYPING SEROLOGIC RH(D): CPT

## 2025-08-21 PROCEDURE — 85610 PROTHROMBIN TIME: CPT

## 2025-08-21 PROCEDURE — 94760 N-INVAS EAR/PLS OXIMETRY 1: CPT

## 2025-08-21 PROCEDURE — 6370000000 HC RX 637 (ALT 250 FOR IP): Performed by: NURSE PRACTITIONER

## 2025-08-21 PROCEDURE — 97530 THERAPEUTIC ACTIVITIES: CPT

## 2025-08-21 PROCEDURE — 99231 SBSQ HOSP IP/OBS SF/LOW 25: CPT

## 2025-08-21 PROCEDURE — 6370000000 HC RX 637 (ALT 250 FOR IP): Performed by: STUDENT IN AN ORGANIZED HEALTH CARE EDUCATION/TRAINING PROGRAM

## 2025-08-21 PROCEDURE — 83880 ASSAY OF NATRIURETIC PEPTIDE: CPT

## 2025-08-21 PROCEDURE — 99232 SBSQ HOSP IP/OBS MODERATE 35: CPT | Performed by: NEUROLOGICAL SURGERY

## 2025-08-21 PROCEDURE — 80048 BASIC METABOLIC PNL TOTAL CA: CPT

## 2025-08-21 PROCEDURE — 36415 COLL VENOUS BLD VENIPUNCTURE: CPT

## 2025-08-21 PROCEDURE — 82962 GLUCOSE BLOOD TEST: CPT

## 2025-08-21 PROCEDURE — 86900 BLOOD TYPING SEROLOGIC ABO: CPT

## 2025-08-21 PROCEDURE — 2500000003 HC RX 250 WO HCPCS: Performed by: STUDENT IN AN ORGANIZED HEALTH CARE EDUCATION/TRAINING PROGRAM

## 2025-08-21 PROCEDURE — 2700000000 HC OXYGEN THERAPY PER DAY

## 2025-08-21 PROCEDURE — 6370000000 HC RX 637 (ALT 250 FOR IP)

## 2025-08-21 PROCEDURE — 99222 1ST HOSP IP/OBS MODERATE 55: CPT | Performed by: NEUROLOGICAL SURGERY

## 2025-08-21 PROCEDURE — 85025 COMPLETE CBC W/AUTO DIFF WBC: CPT

## 2025-08-21 PROCEDURE — 1200000000 HC SEMI PRIVATE

## 2025-08-21 PROCEDURE — 2500000003 HC RX 250 WO HCPCS

## 2025-08-21 RX ORDER — SIMETHICONE 80 MG
80 TABLET,CHEWABLE ORAL EVERY 6 HOURS PRN
Status: DISCONTINUED | OUTPATIENT
Start: 2025-08-21 | End: 2025-08-27

## 2025-08-21 RX ADMIN — METHOCARBAMOL 750 MG: 750 TABLET ORAL at 19:56

## 2025-08-21 RX ADMIN — INSULIN LISPRO 2 UNITS: 100 INJECTION, SOLUTION INTRAVENOUS; SUBCUTANEOUS at 16:53

## 2025-08-21 RX ADMIN — HEPARIN SODIUM 5000 UNITS: 5000 INJECTION, SOLUTION INTRAVENOUS; SUBCUTANEOUS at 05:37

## 2025-08-21 RX ADMIN — SACUBITRIL AND VALSARTAN 1 TABLET: 49; 51 TABLET, FILM COATED ORAL at 08:47

## 2025-08-21 RX ADMIN — FAMOTIDINE 20 MG: 20 TABLET, FILM COATED ORAL at 19:56

## 2025-08-21 RX ADMIN — METHOCARBAMOL 750 MG: 750 TABLET ORAL at 08:47

## 2025-08-21 RX ADMIN — DOCUSATE SODIUM 50MG AND SENNOSIDES 8.6MG 2 TABLET: 8.6; 5 TABLET, FILM COATED ORAL at 19:56

## 2025-08-21 RX ADMIN — SODIUM CHLORIDE, PRESERVATIVE FREE 10 ML: 5 INJECTION INTRAVENOUS at 08:56

## 2025-08-21 RX ADMIN — CALCITRIOL CAPSULES 0.25 MCG 0.25 MCG: 0.25 CAPSULE ORAL at 08:56

## 2025-08-21 RX ADMIN — ISOSORBIDE MONONITRATE 60 MG: 60 TABLET, EXTENDED RELEASE ORAL at 08:47

## 2025-08-21 RX ADMIN — CARVEDILOL 12.5 MG: 12.5 TABLET, FILM COATED ORAL at 19:58

## 2025-08-21 RX ADMIN — SODIUM CHLORIDE, PRESERVATIVE FREE 10 ML: 5 INJECTION INTRAVENOUS at 19:57

## 2025-08-21 RX ADMIN — PREGABALIN 25 MG: 25 CAPSULE ORAL at 08:46

## 2025-08-21 RX ADMIN — DAKIN'S SOLUTION 0.125% (QUARTER STRENGTH): 0.12 SOLUTION at 08:57

## 2025-08-21 RX ADMIN — METHOCARBAMOL 750 MG: 750 TABLET ORAL at 14:12

## 2025-08-21 RX ADMIN — RANOLAZINE 1000 MG: 500 TABLET, EXTENDED RELEASE ORAL at 19:56

## 2025-08-21 RX ADMIN — RANOLAZINE 1000 MG: 500 TABLET, EXTENDED RELEASE ORAL at 08:47

## 2025-08-21 RX ADMIN — AMIODARONE HYDROCHLORIDE 100 MG: 200 TABLET ORAL at 08:47

## 2025-08-21 RX ADMIN — CARVEDILOL 12.5 MG: 12.5 TABLET, FILM COATED ORAL at 08:47

## 2025-08-21 RX ADMIN — HYDROCODONE BITARTRATE AND ACETAMINOPHEN 1 TABLET: 10; 325 TABLET ORAL at 01:42

## 2025-08-21 RX ADMIN — SIMETHICONE 80 MG: 80 TABLET, CHEWABLE ORAL at 10:51

## 2025-08-21 RX ADMIN — SACUBITRIL AND VALSARTAN 1 TABLET: 49; 51 TABLET, FILM COATED ORAL at 19:56

## 2025-08-21 RX ADMIN — NIFEDIPINE 60 MG: 60 TABLET, EXTENDED RELEASE ORAL at 08:48

## 2025-08-21 RX ADMIN — ALLOPURINOL 200 MG: 100 TABLET ORAL at 08:47

## 2025-08-21 RX ADMIN — TAMSULOSIN HYDROCHLORIDE 0.4 MG: 0.4 CAPSULE ORAL at 08:47

## 2025-08-21 RX ADMIN — DAKIN'S SOLUTION 0.125% (QUARTER STRENGTH): 0.12 SOLUTION at 19:58

## 2025-08-21 RX ADMIN — ISOSORBIDE MONONITRATE 60 MG: 60 TABLET, EXTENDED RELEASE ORAL at 19:56

## 2025-08-21 RX ADMIN — WATER 2000 MG: 1 INJECTION INTRAMUSCULAR; INTRAVENOUS; SUBCUTANEOUS at 14:12

## 2025-08-21 RX ADMIN — HEPARIN SODIUM 5000 UNITS: 5000 INJECTION, SOLUTION INTRAVENOUS; SUBCUTANEOUS at 14:12

## 2025-08-21 RX ADMIN — Medication 12.5 MG: at 05:37

## 2025-08-21 RX ADMIN — ACETAMINOPHEN 650 MG: 325 TABLET ORAL at 14:35

## 2025-08-21 RX ADMIN — INSULIN GLARGINE 10 UNITS: 100 INJECTION, SOLUTION SUBCUTANEOUS at 08:48

## 2025-08-21 RX ADMIN — BUMETANIDE 2 MG: 0.25 INJECTION INTRAMUSCULAR; INTRAVENOUS at 08:46

## 2025-08-21 RX ADMIN — TRAZODONE HYDROCHLORIDE 150 MG: 100 TABLET ORAL at 01:42

## 2025-08-21 ASSESSMENT — PAIN DESCRIPTION - FREQUENCY: FREQUENCY: INTERMITTENT

## 2025-08-21 ASSESSMENT — PAIN SCALES - GENERAL
PAINLEVEL_OUTOF10: 4
PAINLEVEL_OUTOF10: 0
PAINLEVEL_OUTOF10: 6
PAINLEVEL_OUTOF10: 5
PAINLEVEL_OUTOF10: 4

## 2025-08-21 ASSESSMENT — PAIN DESCRIPTION - ORIENTATION
ORIENTATION: MID
ORIENTATION: RIGHT
ORIENTATION: MID

## 2025-08-21 ASSESSMENT — PAIN DESCRIPTION - LOCATION
LOCATION: ABDOMEN
LOCATION: ABDOMEN
LOCATION: FOOT

## 2025-08-21 ASSESSMENT — PAIN - FUNCTIONAL ASSESSMENT
PAIN_FUNCTIONAL_ASSESSMENT: 0-10
PAIN_FUNCTIONAL_ASSESSMENT: PREVENTS OR INTERFERES SOME ACTIVE ACTIVITIES AND ADLS
PAIN_FUNCTIONAL_ASSESSMENT: 0-10
PAIN_FUNCTIONAL_ASSESSMENT: 0-10

## 2025-08-21 ASSESSMENT — PAIN DESCRIPTION - DESCRIPTORS
DESCRIPTORS: ACHING;CRAMPING
DESCRIPTORS: ACHING;DISCOMFORT
DESCRIPTORS: ACHING

## 2025-08-21 ASSESSMENT — PAIN DESCRIPTION - PAIN TYPE: TYPE: ACUTE PAIN

## 2025-08-21 ASSESSMENT — PAIN DESCRIPTION - ONSET: ONSET: ON-GOING

## 2025-08-22 ENCOUNTER — ANESTHESIA (OUTPATIENT)
Dept: OPERATING ROOM | Age: 70
End: 2025-08-22
Payer: MEDICARE

## 2025-08-22 ENCOUNTER — APPOINTMENT (OUTPATIENT)
Dept: GENERAL RADIOLOGY | Age: 70
End: 2025-08-22
Payer: MEDICARE

## 2025-08-22 LAB
ANION GAP SERPL CALCULATED.3IONS-SCNC: 9 MMOL/L (ref 8–16)
BASOPHILS # BLD: 0.1 K/UL (ref 0–0.2)
BASOPHILS NFR BLD: 0.9 % (ref 0–1)
BUN SERPL-MCNC: 27 MG/DL (ref 8–23)
CALCIUM SERPL-MCNC: 9.5 MG/DL (ref 8.8–10.2)
CHLORIDE SERPL-SCNC: 98 MMOL/L (ref 98–107)
CO2 SERPL-SCNC: 32 MMOL/L (ref 22–29)
CREAT SERPL-MCNC: 1.9 MG/DL (ref 0.7–1.2)
EOSINOPHIL # BLD: 0.5 K/UL (ref 0–0.6)
EOSINOPHIL NFR BLD: 6.4 % (ref 0–5)
ERYTHROCYTE [DISTWIDTH] IN BLOOD BY AUTOMATED COUNT: 17.6 % (ref 11.5–14.5)
GLUCOSE BLD-MCNC: 184 MG/DL (ref 70–99)
GLUCOSE BLD-MCNC: 196 MG/DL (ref 70–99)
GLUCOSE BLD-MCNC: 95 MG/DL (ref 70–99)
GLUCOSE SERPL-MCNC: 173 MG/DL (ref 70–99)
HCT VFR BLD AUTO: 37.4 % (ref 42–52)
HGB BLD-MCNC: 11.5 G/DL (ref 14–18)
IMM GRANULOCYTES # BLD: 0 K/UL
LYMPHOCYTES # BLD: 1.2 K/UL (ref 1.1–4.5)
LYMPHOCYTES NFR BLD: 16.6 % (ref 20–40)
MCH RBC QN AUTO: 30.7 PG (ref 27–31)
MCHC RBC AUTO-ENTMCNC: 30.7 G/DL (ref 33–37)
MCV RBC AUTO: 99.7 FL (ref 80–94)
MONOCYTES # BLD: 0.5 K/UL (ref 0–0.9)
MONOCYTES NFR BLD: 7.5 % (ref 0–10)
NEUTROPHILS # BLD: 4.8 K/UL (ref 1.5–7.5)
NEUTS SEG NFR BLD: 68.3 % (ref 50–65)
PERFORMED ON: ABNORMAL
PERFORMED ON: ABNORMAL
PERFORMED ON: NORMAL
PLATELET # BLD AUTO: 165 K/UL (ref 130–400)
PMV BLD AUTO: 11.7 FL (ref 9.4–12.4)
POTASSIUM SERPL-SCNC: 3.8 MMOL/L (ref 3.5–5)
RBC # BLD AUTO: 3.75 M/UL (ref 4.7–6.1)
SODIUM SERPL-SCNC: 139 MMOL/L (ref 136–145)
WBC # BLD AUTO: 7 K/UL (ref 4.8–10.8)

## 2025-08-22 PROCEDURE — 2580000003 HC RX 258

## 2025-08-22 PROCEDURE — 82962 GLUCOSE BLOOD TEST: CPT

## 2025-08-22 PROCEDURE — 2720000010 HC SURG SUPPLY STERILE: Performed by: NEUROLOGICAL SURGERY

## 2025-08-22 PROCEDURE — 99232 SBSQ HOSP IP/OBS MODERATE 35: CPT

## 2025-08-22 PROCEDURE — 2500000003 HC RX 250 WO HCPCS: Performed by: NEUROLOGICAL SURGERY

## 2025-08-22 PROCEDURE — 6370000000 HC RX 637 (ALT 250 FOR IP): Performed by: NEUROLOGICAL SURGERY

## 2025-08-22 PROCEDURE — 3600000014 HC SURGERY LEVEL 4 ADDTL 15MIN: Performed by: NEUROLOGICAL SURGERY

## 2025-08-22 PROCEDURE — 22842 INSERT SPINE FIXATION DEVICE: CPT | Performed by: NEUROLOGICAL SURGERY

## 2025-08-22 PROCEDURE — 2500000003 HC RX 250 WO HCPCS: Performed by: ANESTHESIOLOGY

## 2025-08-22 PROCEDURE — 2000000000 HC ICU R&B

## 2025-08-22 PROCEDURE — 6360000002 HC RX W HCPCS: Performed by: NEUROLOGICAL SURGERY

## 2025-08-22 PROCEDURE — 8E0WXBZ COMPUTER ASSISTED PROCEDURE OF TRUNK REGION: ICD-10-PCS | Performed by: NEUROLOGICAL SURGERY

## 2025-08-22 PROCEDURE — 22614 ARTHRD PST TQ 1NTRSPC EA ADD: CPT | Performed by: NEUROLOGICAL SURGERY

## 2025-08-22 PROCEDURE — 00NY0ZZ RELEASE LUMBAR SPINAL CORD, OPEN APPROACH: ICD-10-PCS | Performed by: NEUROLOGICAL SURGERY

## 2025-08-22 PROCEDURE — 61783 SCAN PROC SPINAL: CPT | Performed by: NEUROLOGICAL SURGERY

## 2025-08-22 PROCEDURE — 6370000000 HC RX 637 (ALT 250 FOR IP): Performed by: STUDENT IN AN ORGANIZED HEALTH CARE EDUCATION/TRAINING PROGRAM

## 2025-08-22 PROCEDURE — C1769 GUIDE WIRE: HCPCS | Performed by: NEUROLOGICAL SURGERY

## 2025-08-22 PROCEDURE — 2709999900 HC NON-CHARGEABLE SUPPLY: Performed by: NEUROLOGICAL SURGERY

## 2025-08-22 PROCEDURE — C1713 ANCHOR/SCREW BN/BN,TIS/BN: HCPCS | Performed by: NEUROLOGICAL SURGERY

## 2025-08-22 PROCEDURE — 0PH404Z INSERTION OF INTERNAL FIXATION DEVICE INTO THORACIC VERTEBRA, OPEN APPROACH: ICD-10-PCS | Performed by: NEUROLOGICAL SURGERY

## 2025-08-22 PROCEDURE — 3700000000 HC ANESTHESIA ATTENDED CARE: Performed by: NEUROLOGICAL SURGERY

## 2025-08-22 PROCEDURE — 2500000003 HC RX 250 WO HCPCS

## 2025-08-22 PROCEDURE — 01NB0ZZ RELEASE LUMBAR NERVE, OPEN APPROACH: ICD-10-PCS | Performed by: NEUROLOGICAL SURGERY

## 2025-08-22 PROCEDURE — 6360000002 HC RX W HCPCS: Performed by: ANESTHESIOLOGY

## 2025-08-22 PROCEDURE — 3600000004 HC SURGERY LEVEL 4 BASE: Performed by: NEUROLOGICAL SURGERY

## 2025-08-22 PROCEDURE — 3E0U0GB INTRODUCTION OF RECOMBINANT BONE MORPHOGENETIC PROTEIN INTO JOINTS, OPEN APPROACH: ICD-10-PCS | Performed by: NEUROLOGICAL SURGERY

## 2025-08-22 PROCEDURE — P9045 ALBUMIN (HUMAN), 5%, 250 ML: HCPCS

## 2025-08-22 PROCEDURE — 85025 COMPLETE CBC W/AUTO DIFF WBC: CPT

## 2025-08-22 PROCEDURE — 63055 DECOMPRESS SPINAL CORD THRC: CPT | Performed by: NEUROLOGICAL SURGERY

## 2025-08-22 PROCEDURE — 36415 COLL VENOUS BLD VENIPUNCTURE: CPT

## 2025-08-22 PROCEDURE — 22610 ARTHRD PST TQ 1NTRSPC THRC: CPT | Performed by: NEUROLOGICAL SURGERY

## 2025-08-22 PROCEDURE — 6360000002 HC RX W HCPCS

## 2025-08-22 PROCEDURE — 94760 N-INVAS EAR/PLS OXIMETRY 1: CPT

## 2025-08-22 PROCEDURE — 3700000001 HC ADD 15 MINUTES (ANESTHESIA): Performed by: NEUROLOGICAL SURGERY

## 2025-08-22 PROCEDURE — 80048 BASIC METABOLIC PNL TOTAL CA: CPT

## 2025-08-22 PROCEDURE — 7100000000 HC PACU RECOVERY - FIRST 15 MIN: Performed by: NEUROLOGICAL SURGERY

## 2025-08-22 PROCEDURE — 2700000000 HC OXYGEN THERAPY PER DAY

## 2025-08-22 PROCEDURE — 0RB90ZZ EXCISION OF THORACIC VERTEBRAL DISC, OPEN APPROACH: ICD-10-PCS | Performed by: NEUROLOGICAL SURGERY

## 2025-08-22 PROCEDURE — 0QH004Z INSERTION OF INTERNAL FIXATION DEVICE INTO LUMBAR VERTEBRA, OPEN APPROACH: ICD-10-PCS | Performed by: NEUROLOGICAL SURGERY

## 2025-08-22 PROCEDURE — 7100000001 HC PACU RECOVERY - ADDTL 15 MIN: Performed by: NEUROLOGICAL SURGERY

## 2025-08-22 PROCEDURE — 99232 SBSQ HOSP IP/OBS MODERATE 35: CPT | Performed by: INTERNAL MEDICINE

## 2025-08-22 DEVICE — IMPLANTABLE DEVICE: Type: IMPLANTABLE DEVICE | Status: FUNCTIONAL

## 2025-08-22 DEVICE — IMPLANTABLE DEVICE: Type: IMPLANTABLE DEVICE | Site: BACK | Status: FUNCTIONAL

## 2025-08-22 RX ORDER — SODIUM CHLORIDE 0.9 % (FLUSH) 0.9 %
5-40 SYRINGE (ML) INJECTION PRN
Status: DISCONTINUED | OUTPATIENT
Start: 2025-08-22 | End: 2025-08-22 | Stop reason: HOSPADM

## 2025-08-22 RX ORDER — SODIUM CHLORIDE, SODIUM LACTATE, POTASSIUM CHLORIDE, CALCIUM CHLORIDE 600; 310; 30; 20 MG/100ML; MG/100ML; MG/100ML; MG/100ML
INJECTION, SOLUTION INTRAVENOUS
Status: DISCONTINUED | OUTPATIENT
Start: 2025-08-22 | End: 2025-08-22 | Stop reason: SDUPTHER

## 2025-08-22 RX ORDER — LIDOCAINE HYDROCHLORIDE 10 MG/ML
1 INJECTION, SOLUTION EPIDURAL; INFILTRATION; INTRACAUDAL; PERINEURAL
Status: DISCONTINUED | OUTPATIENT
Start: 2025-08-22 | End: 2025-08-22 | Stop reason: HOSPADM

## 2025-08-22 RX ORDER — DEXAMETHASONE SODIUM PHOSPHATE 10 MG/ML
INJECTION, SOLUTION INTRAMUSCULAR; INTRAVENOUS
Status: DISCONTINUED | OUTPATIENT
Start: 2025-08-22 | End: 2025-08-22 | Stop reason: SDUPTHER

## 2025-08-22 RX ORDER — ONDANSETRON 2 MG/ML
4 INJECTION INTRAMUSCULAR; INTRAVENOUS
Status: COMPLETED | OUTPATIENT
Start: 2025-08-22 | End: 2025-08-22

## 2025-08-22 RX ORDER — SODIUM CHLORIDE 9 MG/ML
INJECTION, SOLUTION INTRAVENOUS
Status: DISCONTINUED | OUTPATIENT
Start: 2025-08-22 | End: 2025-08-22 | Stop reason: SDUPTHER

## 2025-08-22 RX ORDER — ROCURONIUM BROMIDE 10 MG/ML
INJECTION, SOLUTION INTRAVENOUS
Status: DISCONTINUED | OUTPATIENT
Start: 2025-08-22 | End: 2025-08-22 | Stop reason: SDUPTHER

## 2025-08-22 RX ORDER — HYDROMORPHONE HYDROCHLORIDE 1 MG/ML
0.25 INJECTION, SOLUTION INTRAMUSCULAR; INTRAVENOUS; SUBCUTANEOUS EVERY 5 MIN PRN
Status: DISCONTINUED | OUTPATIENT
Start: 2025-08-22 | End: 2025-08-22 | Stop reason: HOSPADM

## 2025-08-22 RX ORDER — CEFAZOLIN SODIUM 1 G/3ML
INJECTION, POWDER, FOR SOLUTION INTRAMUSCULAR; INTRAVENOUS
Status: DISCONTINUED | OUTPATIENT
Start: 2025-08-22 | End: 2025-08-22 | Stop reason: SDUPTHER

## 2025-08-22 RX ORDER — FENTANYL CITRATE 50 UG/ML
INJECTION, SOLUTION INTRAMUSCULAR; INTRAVENOUS
Status: DISCONTINUED | OUTPATIENT
Start: 2025-08-22 | End: 2025-08-22 | Stop reason: SDUPTHER

## 2025-08-22 RX ORDER — HYDROMORPHONE HYDROCHLORIDE 1 MG/ML
0.5 INJECTION, SOLUTION INTRAMUSCULAR; INTRAVENOUS; SUBCUTANEOUS EVERY 5 MIN PRN
Status: DISCONTINUED | OUTPATIENT
Start: 2025-08-22 | End: 2025-08-22 | Stop reason: HOSPADM

## 2025-08-22 RX ORDER — ALBUMIN HUMAN 50 G/1000ML
SOLUTION INTRAVENOUS
Status: DISCONTINUED | OUTPATIENT
Start: 2025-08-22 | End: 2025-08-22 | Stop reason: SDUPTHER

## 2025-08-22 RX ORDER — ONDANSETRON 2 MG/ML
INJECTION INTRAMUSCULAR; INTRAVENOUS
Status: DISCONTINUED | OUTPATIENT
Start: 2025-08-22 | End: 2025-08-22 | Stop reason: SDUPTHER

## 2025-08-22 RX ORDER — SODIUM CHLORIDE 9 MG/ML
INJECTION, SOLUTION INTRAVENOUS PRN
Status: DISCONTINUED | OUTPATIENT
Start: 2025-08-22 | End: 2025-08-22 | Stop reason: HOSPADM

## 2025-08-22 RX ORDER — SODIUM CHLORIDE 0.9 % (FLUSH) 0.9 %
5-40 SYRINGE (ML) INJECTION EVERY 12 HOURS SCHEDULED
Status: DISCONTINUED | OUTPATIENT
Start: 2025-08-22 | End: 2025-08-22 | Stop reason: HOSPADM

## 2025-08-22 RX ORDER — ETOMIDATE 2 MG/ML
INJECTION INTRAVENOUS
Status: DISCONTINUED | OUTPATIENT
Start: 2025-08-22 | End: 2025-08-22 | Stop reason: SDUPTHER

## 2025-08-22 RX ORDER — FENTANYL CITRATE 50 UG/ML
100 INJECTION, SOLUTION INTRAMUSCULAR; INTRAVENOUS
Refills: 0 | Status: COMPLETED | OUTPATIENT
Start: 2025-08-22 | End: 2025-08-22

## 2025-08-22 RX ORDER — LIDOCAINE HYDROCHLORIDE 10 MG/ML
INJECTION, SOLUTION INFILTRATION; PERINEURAL
Status: DISCONTINUED | OUTPATIENT
Start: 2025-08-22 | End: 2025-08-22 | Stop reason: SDUPTHER

## 2025-08-22 RX ADMIN — METHOCARBAMOL 750 MG: 750 TABLET ORAL at 20:54

## 2025-08-22 RX ADMIN — ROCURONIUM BROMIDE 20 MG: 10 INJECTION, SOLUTION INTRAVENOUS at 13:14

## 2025-08-22 RX ADMIN — ROCURONIUM BROMIDE 10 MG: 10 INJECTION, SOLUTION INTRAVENOUS at 15:13

## 2025-08-22 RX ADMIN — CARVEDILOL 12.5 MG: 12.5 TABLET, FILM COATED ORAL at 20:55

## 2025-08-22 RX ADMIN — ONDANSETRON 4 MG: 2 INJECTION, SOLUTION INTRAMUSCULAR; INTRAVENOUS at 18:45

## 2025-08-22 RX ADMIN — PHENYLEPHRINE HYDROCHLORIDE 100 MCG: 10 INJECTION INTRAVENOUS at 13:51

## 2025-08-22 RX ADMIN — HYDROMORPHONE HYDROCHLORIDE 0.5 MG: 1 INJECTION, SOLUTION INTRAMUSCULAR; INTRAVENOUS; SUBCUTANEOUS at 18:42

## 2025-08-22 RX ADMIN — FENTANYL CITRATE 50 MCG: 50 INJECTION INTRAMUSCULAR; INTRAVENOUS at 11:15

## 2025-08-22 RX ADMIN — ROCURONIUM BROMIDE 10 MG: 10 INJECTION, SOLUTION INTRAVENOUS at 14:21

## 2025-08-22 RX ADMIN — HYDROMORPHONE HYDROCHLORIDE 0.5 MG: 1 INJECTION, SOLUTION INTRAMUSCULAR; INTRAVENOUS; SUBCUTANEOUS at 18:50

## 2025-08-22 RX ADMIN — DEXAMETHASONE SODIUM PHOSPHATE 4 MG: 10 INJECTION, SOLUTION INTRAMUSCULAR; INTRAVENOUS at 14:41

## 2025-08-22 RX ADMIN — SODIUM CHLORIDE, PRESERVATIVE FREE 10 ML: 5 INJECTION INTRAVENOUS at 20:56

## 2025-08-22 RX ADMIN — ISOSORBIDE MONONITRATE 60 MG: 60 TABLET, EXTENDED RELEASE ORAL at 20:53

## 2025-08-22 RX ADMIN — HYDROMORPHONE HYDROCHLORIDE 0.5 MG: 1 INJECTION, SOLUTION INTRAMUSCULAR; INTRAVENOUS; SUBCUTANEOUS at 22:42

## 2025-08-22 RX ADMIN — FENTANYL CITRATE 50 MCG: 0.05 INJECTION, SOLUTION INTRAMUSCULAR; INTRAVENOUS at 12:35

## 2025-08-22 RX ADMIN — SODIUM CHLORIDE: 9 INJECTION, SOLUTION INTRAVENOUS at 12:10

## 2025-08-22 RX ADMIN — FENTANYL CITRATE 50 MCG: 0.05 INJECTION, SOLUTION INTRAMUSCULAR; INTRAVENOUS at 13:14

## 2025-08-22 RX ADMIN — PHENYLEPHRINE HYDROCHLORIDE 100 MCG: 10 INJECTION INTRAVENOUS at 14:51

## 2025-08-22 RX ADMIN — ROCURONIUM BROMIDE 10 MG: 10 INJECTION, SOLUTION INTRAVENOUS at 15:45

## 2025-08-22 RX ADMIN — TRAZODONE HYDROCHLORIDE 150 MG: 100 TABLET ORAL at 22:45

## 2025-08-22 RX ADMIN — CEFAZOLIN 2 G: 1 INJECTION, POWDER, FOR SOLUTION INTRAMUSCULAR; INTRAVENOUS at 12:16

## 2025-08-22 RX ADMIN — PHENYLEPHRINE HYDROCHLORIDE 100 MCG: 10 INJECTION INTRAVENOUS at 15:32

## 2025-08-22 RX ADMIN — HYDROMORPHONE HYDROCHLORIDE 1 MG: 1 INJECTION, SOLUTION INTRAMUSCULAR; INTRAVENOUS; SUBCUTANEOUS at 16:34

## 2025-08-22 RX ADMIN — SODIUM CHLORIDE, SODIUM LACTATE, POTASSIUM CHLORIDE, CALCIUM CHLORIDE: 600; 310; 30; 20 INJECTION, SOLUTION INTRAVENOUS at 12:10

## 2025-08-22 RX ADMIN — ONDANSETRON 4 MG: 2 INJECTION, SOLUTION INTRAMUSCULAR; INTRAVENOUS at 22:43

## 2025-08-22 RX ADMIN — Medication 12.5 MG: at 05:44

## 2025-08-22 RX ADMIN — SODIUM CHLORIDE, SODIUM LACTATE, POTASSIUM CHLORIDE, AND CALCIUM CHLORIDE: 600; 310; 30; 20 INJECTION, SOLUTION INTRAVENOUS at 11:33

## 2025-08-22 RX ADMIN — FENTANYL CITRATE 100 MCG: 0.05 INJECTION, SOLUTION INTRAMUSCULAR; INTRAVENOUS at 11:36

## 2025-08-22 RX ADMIN — SUGAMMADEX 500 MG: 100 INJECTION, SOLUTION INTRAVENOUS at 17:48

## 2025-08-22 RX ADMIN — LIDOCAINE HYDROCHLORIDE 100 MG: 10 INJECTION, SOLUTION INFILTRATION; PERINEURAL at 11:38

## 2025-08-22 RX ADMIN — INSULIN GLARGINE 10 UNITS: 100 INJECTION, SOLUTION SUBCUTANEOUS at 20:56

## 2025-08-22 RX ADMIN — ROCURONIUM BROMIDE 100 MG: 10 INJECTION, SOLUTION INTRAVENOUS at 11:40

## 2025-08-22 RX ADMIN — FENTANYL CITRATE 50 MCG: 0.05 INJECTION, SOLUTION INTRAMUSCULAR; INTRAVENOUS at 16:59

## 2025-08-22 RX ADMIN — RANOLAZINE 1000 MG: 500 TABLET, EXTENDED RELEASE ORAL at 20:54

## 2025-08-22 RX ADMIN — PHENYLEPHRINE HYDROCHLORIDE 100 MCG: 10 INJECTION INTRAVENOUS at 13:54

## 2025-08-22 RX ADMIN — CEFAZOLIN 2 G: 1 INJECTION, POWDER, FOR SOLUTION INTRAMUSCULAR; INTRAVENOUS at 16:16

## 2025-08-22 RX ADMIN — ONDANSETRON 4 MG: 2 INJECTION INTRAMUSCULAR; INTRAVENOUS at 15:13

## 2025-08-22 RX ADMIN — ETOMIDATE 10 MG: 2 INJECTION, SOLUTION INTRAVENOUS at 11:38

## 2025-08-22 RX ADMIN — Medication 2 MCG/MIN: at 12:14

## 2025-08-22 RX ADMIN — ALBUMIN (HUMAN) 12.5 G: 12.5 INJECTION, SOLUTION INTRAVENOUS at 13:58

## 2025-08-22 RX ADMIN — INSULIN LISPRO 2 UNITS: 100 INJECTION, SOLUTION INTRAVENOUS; SUBCUTANEOUS at 20:55

## 2025-08-22 RX ADMIN — FAMOTIDINE 20 MG: 20 TABLET, FILM COATED ORAL at 20:55

## 2025-08-22 RX ADMIN — ROCURONIUM BROMIDE 20 MG: 10 INJECTION, SOLUTION INTRAVENOUS at 12:26

## 2025-08-22 RX ADMIN — DOCUSATE SODIUM 50MG AND SENNOSIDES 8.6MG 2 TABLET: 8.6; 5 TABLET, FILM COATED ORAL at 20:55

## 2025-08-22 RX ADMIN — ROCURONIUM BROMIDE 10 MG: 10 INJECTION, SOLUTION INTRAVENOUS at 16:34

## 2025-08-22 RX ADMIN — SACUBITRIL AND VALSARTAN 1 TABLET: 49; 51 TABLET, FILM COATED ORAL at 20:55

## 2025-08-22 RX ADMIN — SIMETHICONE 80 MG: 80 TABLET, CHEWABLE ORAL at 22:44

## 2025-08-22 ASSESSMENT — PAIN SCALES - GENERAL
PAINLEVEL_OUTOF10: 9
PAINLEVEL_OUTOF10: 0
PAINLEVEL_OUTOF10: 5
PAINLEVEL_OUTOF10: 2
PAINLEVEL_OUTOF10: 2
PAINLEVEL_OUTOF10: 4
PAINLEVEL_OUTOF10: 2
PAINLEVEL_OUTOF10: 7

## 2025-08-22 ASSESSMENT — PAIN DESCRIPTION - LOCATION
LOCATION: ABDOMEN
LOCATION: BACK
LOCATION: BACK

## 2025-08-22 ASSESSMENT — PAIN - FUNCTIONAL ASSESSMENT
PAIN_FUNCTIONAL_ASSESSMENT: 0-10

## 2025-08-22 ASSESSMENT — PAIN SCALES - WONG BAKER: WONGBAKER_NUMERICALRESPONSE: NO HURT

## 2025-08-22 ASSESSMENT — ENCOUNTER SYMPTOMS: SHORTNESS OF BREATH: 1

## 2025-08-22 ASSESSMENT — PAIN DESCRIPTION - DESCRIPTORS
DESCRIPTORS: ACHING
DESCRIPTORS: ACHING

## 2025-08-23 LAB
ALBUMIN SERPL-MCNC: 3.4 G/DL (ref 3.5–5.2)
ALP SERPL-CCNC: 78 U/L (ref 40–129)
ALT SERPL-CCNC: 10 U/L (ref 10–50)
ANION GAP SERPL CALCULATED.3IONS-SCNC: 12 MMOL/L (ref 8–16)
AST SERPL-CCNC: 33 U/L (ref 10–50)
BASOPHILS # BLD: 0 K/UL (ref 0–0.2)
BASOPHILS NFR BLD: 0.3 % (ref 0–1)
BILIRUB DIRECT SERPL-MCNC: 0.2 MG/DL (ref 0–0.3)
BILIRUB INDIRECT SERPL-MCNC: 0.2 MG/DL (ref 0–1)
BILIRUB SERPL-MCNC: 0.4 MG/DL (ref 0.2–1.2)
BUN SERPL-MCNC: 29 MG/DL (ref 8–23)
CALCIUM SERPL-MCNC: 9.3 MG/DL (ref 8.8–10.2)
CHLORIDE SERPL-SCNC: 98 MMOL/L (ref 98–107)
CO2 SERPL-SCNC: 28 MMOL/L (ref 22–29)
CREAT SERPL-MCNC: 1.8 MG/DL (ref 0.7–1.2)
EOSINOPHIL # BLD: 0 K/UL (ref 0–0.6)
EOSINOPHIL NFR BLD: 0 % (ref 0–5)
ERYTHROCYTE [DISTWIDTH] IN BLOOD BY AUTOMATED COUNT: 17.6 % (ref 11.5–14.5)
GLUCOSE BLD-MCNC: 141 MG/DL (ref 70–99)
GLUCOSE BLD-MCNC: 145 MG/DL (ref 70–99)
GLUCOSE BLD-MCNC: 171 MG/DL (ref 70–99)
GLUCOSE SERPL-MCNC: 202 MG/DL (ref 70–99)
HCT VFR BLD AUTO: 36.5 % (ref 42–52)
HGB BLD-MCNC: 11.6 G/DL (ref 14–18)
IMM GRANULOCYTES # BLD: 0.1 K/UL
LYMPHOCYTES # BLD: 0.7 K/UL (ref 1.1–4.5)
LYMPHOCYTES NFR BLD: 5.8 % (ref 20–40)
MCH RBC QN AUTO: 30.9 PG (ref 27–31)
MCHC RBC AUTO-ENTMCNC: 31.8 G/DL (ref 33–37)
MCV RBC AUTO: 97.3 FL (ref 80–94)
MONOCYTES # BLD: 0.7 K/UL (ref 0–0.9)
MONOCYTES NFR BLD: 6.2 % (ref 0–10)
NEUTROPHILS # BLD: 9.9 K/UL (ref 1.5–7.5)
NEUTS SEG NFR BLD: 87.2 % (ref 50–65)
PERFORMED ON: ABNORMAL
PLATELET # BLD AUTO: 169 K/UL (ref 130–400)
PMV BLD AUTO: 10.9 FL (ref 9.4–12.4)
POTASSIUM SERPL-SCNC: 4.1 MMOL/L (ref 3.5–5)
PROT SERPL-MCNC: 5.9 G/DL (ref 6.4–8.3)
RBC # BLD AUTO: 3.75 M/UL (ref 4.7–6.1)
SODIUM SERPL-SCNC: 138 MMOL/L (ref 136–145)
WBC # BLD AUTO: 11.3 K/UL (ref 4.8–10.8)

## 2025-08-23 PROCEDURE — 36415 COLL VENOUS BLD VENIPUNCTURE: CPT

## 2025-08-23 PROCEDURE — 2700000000 HC OXYGEN THERAPY PER DAY

## 2025-08-23 PROCEDURE — 97530 THERAPEUTIC ACTIVITIES: CPT

## 2025-08-23 PROCEDURE — 6360000002 HC RX W HCPCS: Performed by: NEUROLOGICAL SURGERY

## 2025-08-23 PROCEDURE — 85025 COMPLETE CBC W/AUTO DIFF WBC: CPT

## 2025-08-23 PROCEDURE — 99024 POSTOP FOLLOW-UP VISIT: CPT | Performed by: NURSE PRACTITIONER

## 2025-08-23 PROCEDURE — 6370000000 HC RX 637 (ALT 250 FOR IP): Performed by: NEUROLOGICAL SURGERY

## 2025-08-23 PROCEDURE — 97161 PT EVAL LOW COMPLEX 20 MIN: CPT

## 2025-08-23 PROCEDURE — 80048 BASIC METABOLIC PNL TOTAL CA: CPT

## 2025-08-23 PROCEDURE — 1200000000 HC SEMI PRIVATE

## 2025-08-23 PROCEDURE — 6360000002 HC RX W HCPCS: Performed by: STUDENT IN AN ORGANIZED HEALTH CARE EDUCATION/TRAINING PROGRAM

## 2025-08-23 PROCEDURE — 94760 N-INVAS EAR/PLS OXIMETRY 1: CPT

## 2025-08-23 PROCEDURE — 80076 HEPATIC FUNCTION PANEL: CPT

## 2025-08-23 PROCEDURE — 82962 GLUCOSE BLOOD TEST: CPT

## 2025-08-23 PROCEDURE — 2500000003 HC RX 250 WO HCPCS: Performed by: NEUROLOGICAL SURGERY

## 2025-08-23 RX ORDER — PROCHLORPERAZINE EDISYLATE 5 MG/ML
5 INJECTION INTRAMUSCULAR; INTRAVENOUS EVERY 6 HOURS PRN
Status: DISCONTINUED | OUTPATIENT
Start: 2025-08-23 | End: 2025-08-29 | Stop reason: HOSPADM

## 2025-08-23 RX ADMIN — SACUBITRIL AND VALSARTAN 1 TABLET: 49; 51 TABLET, FILM COATED ORAL at 21:47

## 2025-08-23 RX ADMIN — DAKIN'S SOLUTION 0.125% (QUARTER STRENGTH): 0.12 SOLUTION at 15:35

## 2025-08-23 RX ADMIN — ACETAMINOPHEN 650 MG: 325 TABLET ORAL at 01:55

## 2025-08-23 RX ADMIN — RANOLAZINE 1000 MG: 500 TABLET, EXTENDED RELEASE ORAL at 10:48

## 2025-08-23 RX ADMIN — TAMSULOSIN HYDROCHLORIDE 0.4 MG: 0.4 CAPSULE ORAL at 10:48

## 2025-08-23 RX ADMIN — DOCUSATE SODIUM 50MG AND SENNOSIDES 8.6MG 2 TABLET: 8.6; 5 TABLET, FILM COATED ORAL at 10:48

## 2025-08-23 RX ADMIN — ISOSORBIDE MONONITRATE 60 MG: 60 TABLET, EXTENDED RELEASE ORAL at 11:20

## 2025-08-23 RX ADMIN — ALLOPURINOL 200 MG: 100 TABLET ORAL at 10:48

## 2025-08-23 RX ADMIN — NIFEDIPINE 60 MG: 60 TABLET, EXTENDED RELEASE ORAL at 09:15

## 2025-08-23 RX ADMIN — RANOLAZINE 1000 MG: 500 TABLET, EXTENDED RELEASE ORAL at 21:46

## 2025-08-23 RX ADMIN — CALCITRIOL CAPSULES 0.25 MCG 0.25 MCG: 0.25 CAPSULE ORAL at 10:49

## 2025-08-23 RX ADMIN — CARVEDILOL 12.5 MG: 12.5 TABLET, FILM COATED ORAL at 21:46

## 2025-08-23 RX ADMIN — METHOCARBAMOL 750 MG: 750 TABLET ORAL at 10:48

## 2025-08-23 RX ADMIN — AMIODARONE HYDROCHLORIDE 100 MG: 200 TABLET ORAL at 09:15

## 2025-08-23 RX ADMIN — SODIUM CHLORIDE, PRESERVATIVE FREE 10 ML: 5 INJECTION INTRAVENOUS at 21:47

## 2025-08-23 RX ADMIN — DAKIN'S SOLUTION 0.125% (QUARTER STRENGTH): 0.12 SOLUTION at 21:47

## 2025-08-23 RX ADMIN — PROCHLORPERAZINE EDISYLATE 5 MG: 5 INJECTION INTRAMUSCULAR; INTRAVENOUS at 08:55

## 2025-08-23 RX ADMIN — FAMOTIDINE 20 MG: 20 TABLET, FILM COATED ORAL at 21:47

## 2025-08-23 RX ADMIN — SACUBITRIL AND VALSARTAN 1 TABLET: 49; 51 TABLET, FILM COATED ORAL at 09:16

## 2025-08-23 RX ADMIN — INSULIN GLARGINE 10 UNITS: 100 INJECTION, SOLUTION SUBCUTANEOUS at 21:30

## 2025-08-23 RX ADMIN — METHOCARBAMOL 750 MG: 750 TABLET ORAL at 14:24

## 2025-08-23 RX ADMIN — INSULIN GLARGINE 10 UNITS: 100 INJECTION, SOLUTION SUBCUTANEOUS at 10:48

## 2025-08-23 RX ADMIN — ISOSORBIDE MONONITRATE 60 MG: 60 TABLET, EXTENDED RELEASE ORAL at 21:46

## 2025-08-23 RX ADMIN — CARVEDILOL 12.5 MG: 12.5 TABLET, FILM COATED ORAL at 09:15

## 2025-08-23 RX ADMIN — DOCUSATE SODIUM 50MG AND SENNOSIDES 8.6MG 2 TABLET: 8.6; 5 TABLET, FILM COATED ORAL at 21:46

## 2025-08-23 RX ADMIN — BUMETANIDE 2 MG: 0.25 INJECTION INTRAMUSCULAR; INTRAVENOUS at 09:15

## 2025-08-23 RX ADMIN — LACTULOSE 30 G: 20 SOLUTION ORAL at 11:20

## 2025-08-23 RX ADMIN — HYDROCODONE BITARTRATE AND ACETAMINOPHEN 1 TABLET: 10; 325 TABLET ORAL at 09:17

## 2025-08-23 RX ADMIN — HYDROCODONE BITARTRATE AND ACETAMINOPHEN 1 TABLET: 10; 325 TABLET ORAL at 18:34

## 2025-08-23 RX ADMIN — METHOCARBAMOL 750 MG: 750 TABLET ORAL at 21:46

## 2025-08-23 RX ADMIN — PREGABALIN 25 MG: 25 CAPSULE ORAL at 10:49

## 2025-08-23 RX ADMIN — ONDANSETRON 4 MG: 2 INJECTION, SOLUTION INTRAMUSCULAR; INTRAVENOUS at 04:58

## 2025-08-23 ASSESSMENT — PAIN - FUNCTIONAL ASSESSMENT
PAIN_FUNCTIONAL_ASSESSMENT: 0-10

## 2025-08-23 ASSESSMENT — PAIN SCALES - GENERAL
PAINLEVEL_OUTOF10: 0
PAINLEVEL_OUTOF10: 8
PAINLEVEL_OUTOF10: 6
PAINLEVEL_OUTOF10: 4

## 2025-08-23 ASSESSMENT — PAIN DESCRIPTION - LOCATION
LOCATION: HEAD
LOCATION: BACK

## 2025-08-23 ASSESSMENT — PAIN DESCRIPTION - DESCRIPTORS: DESCRIPTORS: BURNING

## 2025-08-23 ASSESSMENT — PAIN DESCRIPTION - ORIENTATION: ORIENTATION: MID

## 2025-08-24 LAB
ANION GAP SERPL CALCULATED.3IONS-SCNC: 10 MMOL/L (ref 8–16)
BASOPHILS # BLD: 0.1 K/UL (ref 0–0.2)
BASOPHILS NFR BLD: 0.5 % (ref 0–1)
BUN SERPL-MCNC: 32 MG/DL (ref 8–23)
CALCIUM SERPL-MCNC: 9.2 MG/DL (ref 8.8–10.2)
CHLORIDE SERPL-SCNC: 98 MMOL/L (ref 98–107)
CO2 SERPL-SCNC: 30 MMOL/L (ref 22–29)
CREAT SERPL-MCNC: 1.8 MG/DL (ref 0.7–1.2)
EOSINOPHIL # BLD: 0.2 K/UL (ref 0–0.6)
EOSINOPHIL NFR BLD: 1.7 % (ref 0–5)
ERYTHROCYTE [DISTWIDTH] IN BLOOD BY AUTOMATED COUNT: 17.8 % (ref 11.5–14.5)
GLUCOSE BLD-MCNC: 116 MG/DL (ref 70–99)
GLUCOSE BLD-MCNC: 147 MG/DL (ref 70–99)
GLUCOSE BLD-MCNC: 158 MG/DL (ref 70–99)
GLUCOSE BLD-MCNC: 191 MG/DL (ref 70–99)
GLUCOSE SERPL-MCNC: 139 MG/DL (ref 70–99)
HCT VFR BLD AUTO: 33.8 % (ref 42–52)
HGB BLD-MCNC: 10.4 G/DL (ref 14–18)
IMM GRANULOCYTES # BLD: 0 K/UL
LYMPHOCYTES # BLD: 1 K/UL (ref 1.1–4.5)
LYMPHOCYTES NFR BLD: 9 % (ref 20–40)
MCH RBC QN AUTO: 30.7 PG (ref 27–31)
MCHC RBC AUTO-ENTMCNC: 30.8 G/DL (ref 33–37)
MCV RBC AUTO: 99.7 FL (ref 80–94)
MONOCYTES # BLD: 0.9 K/UL (ref 0–0.9)
MONOCYTES NFR BLD: 8.5 % (ref 0–10)
NEUTROPHILS # BLD: 8.5 K/UL (ref 1.5–7.5)
NEUTS SEG NFR BLD: 79.9 % (ref 50–65)
PERFORMED ON: ABNORMAL
PLATELET # BLD AUTO: 170 K/UL (ref 130–400)
PMV BLD AUTO: 11.4 FL (ref 9.4–12.4)
POTASSIUM SERPL-SCNC: 4.2 MMOL/L (ref 3.5–5)
RBC # BLD AUTO: 3.39 M/UL (ref 4.7–6.1)
SODIUM SERPL-SCNC: 138 MMOL/L (ref 136–145)
WBC # BLD AUTO: 10.7 K/UL (ref 4.8–10.8)

## 2025-08-24 PROCEDURE — 6370000000 HC RX 637 (ALT 250 FOR IP): Performed by: NEUROLOGICAL SURGERY

## 2025-08-24 PROCEDURE — 6370000000 HC RX 637 (ALT 250 FOR IP): Performed by: NURSE PRACTITIONER

## 2025-08-24 PROCEDURE — 97530 THERAPEUTIC ACTIVITIES: CPT

## 2025-08-24 PROCEDURE — 2500000003 HC RX 250 WO HCPCS: Performed by: NEUROLOGICAL SURGERY

## 2025-08-24 PROCEDURE — 82962 GLUCOSE BLOOD TEST: CPT

## 2025-08-24 PROCEDURE — 99024 POSTOP FOLLOW-UP VISIT: CPT | Performed by: NURSE PRACTITIONER

## 2025-08-24 PROCEDURE — 94760 N-INVAS EAR/PLS OXIMETRY 1: CPT

## 2025-08-24 PROCEDURE — 6360000002 HC RX W HCPCS: Performed by: NEUROLOGICAL SURGERY

## 2025-08-24 PROCEDURE — 36415 COLL VENOUS BLD VENIPUNCTURE: CPT

## 2025-08-24 PROCEDURE — 85025 COMPLETE CBC W/AUTO DIFF WBC: CPT

## 2025-08-24 PROCEDURE — 97110 THERAPEUTIC EXERCISES: CPT

## 2025-08-24 PROCEDURE — 80048 BASIC METABOLIC PNL TOTAL CA: CPT

## 2025-08-24 PROCEDURE — 51798 US URINE CAPACITY MEASURE: CPT

## 2025-08-24 PROCEDURE — 1200000000 HC SEMI PRIVATE

## 2025-08-24 PROCEDURE — 2700000000 HC OXYGEN THERAPY PER DAY

## 2025-08-24 RX ADMIN — PREGABALIN 25 MG: 25 CAPSULE ORAL at 08:50

## 2025-08-24 RX ADMIN — DAKIN'S SOLUTION 0.125% (QUARTER STRENGTH): 0.12 SOLUTION at 17:40

## 2025-08-24 RX ADMIN — ONDANSETRON 4 MG: 2 INJECTION, SOLUTION INTRAMUSCULAR; INTRAVENOUS at 08:51

## 2025-08-24 RX ADMIN — SODIUM CHLORIDE, PRESERVATIVE FREE 10 ML: 5 INJECTION INTRAVENOUS at 21:26

## 2025-08-24 RX ADMIN — METHOCARBAMOL 750 MG: 750 TABLET ORAL at 21:27

## 2025-08-24 RX ADMIN — METHOCARBAMOL 750 MG: 750 TABLET ORAL at 15:03

## 2025-08-24 RX ADMIN — TRAZODONE HYDROCHLORIDE 150 MG: 100 TABLET ORAL at 21:33

## 2025-08-24 RX ADMIN — DOCUSATE SODIUM 50MG AND SENNOSIDES 8.6MG 2 TABLET: 8.6; 5 TABLET, FILM COATED ORAL at 08:50

## 2025-08-24 RX ADMIN — ALLOPURINOL 200 MG: 100 TABLET ORAL at 08:50

## 2025-08-24 RX ADMIN — INSULIN GLARGINE 10 UNITS: 100 INJECTION, SOLUTION SUBCUTANEOUS at 21:28

## 2025-08-24 RX ADMIN — RANOLAZINE 1000 MG: 500 TABLET, EXTENDED RELEASE ORAL at 08:50

## 2025-08-24 RX ADMIN — TAMSULOSIN HYDROCHLORIDE 0.4 MG: 0.4 CAPSULE ORAL at 08:50

## 2025-08-24 RX ADMIN — AMIODARONE HYDROCHLORIDE 100 MG: 200 TABLET ORAL at 08:51

## 2025-08-24 RX ADMIN — FAMOTIDINE 20 MG: 20 TABLET, FILM COATED ORAL at 21:27

## 2025-08-24 RX ADMIN — RANOLAZINE 1000 MG: 500 TABLET, EXTENDED RELEASE ORAL at 21:27

## 2025-08-24 RX ADMIN — DOCUSATE SODIUM 50MG AND SENNOSIDES 8.6MG 2 TABLET: 8.6; 5 TABLET, FILM COATED ORAL at 21:27

## 2025-08-24 RX ADMIN — NIFEDIPINE 60 MG: 60 TABLET, EXTENDED RELEASE ORAL at 08:50

## 2025-08-24 RX ADMIN — INSULIN LISPRO 2 UNITS: 100 INJECTION, SOLUTION INTRAVENOUS; SUBCUTANEOUS at 12:51

## 2025-08-24 RX ADMIN — BUMETANIDE 2 MG: 0.25 INJECTION INTRAMUSCULAR; INTRAVENOUS at 08:51

## 2025-08-24 RX ADMIN — APIXABAN 2.5 MG: 5 TABLET, FILM COATED ORAL at 21:27

## 2025-08-24 RX ADMIN — METHOCARBAMOL 750 MG: 750 TABLET ORAL at 08:51

## 2025-08-24 RX ADMIN — LACTULOSE 30 G: 20 SOLUTION ORAL at 08:51

## 2025-08-24 RX ADMIN — Medication 12.5 MG: at 05:42

## 2025-08-24 RX ADMIN — ISOSORBIDE MONONITRATE 60 MG: 60 TABLET, EXTENDED RELEASE ORAL at 21:27

## 2025-08-24 RX ADMIN — SACUBITRIL AND VALSARTAN 1 TABLET: 49; 51 TABLET, FILM COATED ORAL at 08:51

## 2025-08-24 RX ADMIN — SODIUM CHLORIDE, PRESERVATIVE FREE 10 ML: 5 INJECTION INTRAVENOUS at 08:52

## 2025-08-24 RX ADMIN — SACUBITRIL AND VALSARTAN 1 TABLET: 49; 51 TABLET, FILM COATED ORAL at 21:27

## 2025-08-24 RX ADMIN — CARVEDILOL 12.5 MG: 12.5 TABLET, FILM COATED ORAL at 21:27

## 2025-08-24 RX ADMIN — CALCITRIOL CAPSULES 0.25 MCG 0.25 MCG: 0.25 CAPSULE ORAL at 08:51

## 2025-08-24 RX ADMIN — INSULIN GLARGINE 10 UNITS: 100 INJECTION, SOLUTION SUBCUTANEOUS at 08:52

## 2025-08-24 RX ADMIN — ISOSORBIDE MONONITRATE 60 MG: 60 TABLET, EXTENDED RELEASE ORAL at 08:51

## 2025-08-24 RX ADMIN — CARVEDILOL 12.5 MG: 12.5 TABLET, FILM COATED ORAL at 08:50

## 2025-08-24 ASSESSMENT — PAIN SCALES - GENERAL
PAINLEVEL_OUTOF10: 0
PAINLEVEL_OUTOF10: 0
PAINLEVEL_OUTOF10: 2

## 2025-08-24 ASSESSMENT — PAIN - FUNCTIONAL ASSESSMENT: PAIN_FUNCTIONAL_ASSESSMENT: 0-10

## 2025-08-25 LAB
ANION GAP SERPL CALCULATED.3IONS-SCNC: 7 MMOL/L (ref 8–16)
BACTERIA BLD CULT ORG #2: NORMAL
BACTERIA BLD CULT: NORMAL
BACTERIA SPEC ANAEROBE CULT: ABNORMAL
BACTERIA SPEC ANAEROBE+AEROBE CULT: ABNORMAL
BACTERIA SPEC ANAEROBE+AEROBE CULT: ABNORMAL
BASOPHILS # BLD: 0 K/UL (ref 0–0.2)
BASOPHILS NFR BLD: 0.4 % (ref 0–1)
BNP BLD-MCNC: 3372 PG/ML (ref 0–124)
BUN SERPL-MCNC: 31 MG/DL (ref 8–23)
CALCIUM SERPL-MCNC: 9.2 MG/DL (ref 8.8–10.2)
CHLORIDE SERPL-SCNC: 99 MMOL/L (ref 98–107)
CO2 SERPL-SCNC: 32 MMOL/L (ref 22–29)
CREAT SERPL-MCNC: 1.8 MG/DL (ref 0.7–1.2)
EKG P AXIS: NORMAL DEGREES
EKG P AXIS: NORMAL DEGREES
EKG P-R INTERVAL: NORMAL MS
EKG P-R INTERVAL: NORMAL MS
EKG Q-T INTERVAL: 408 MS
EKG Q-T INTERVAL: 414 MS
EKG QRS DURATION: 124 MS
EKG QRS DURATION: 134 MS
EKG QTC CALCULATION (BAZETT): 459 MS
EKG QTC CALCULATION (BAZETT): 460 MS
EKG T AXIS: 39 DEGREES
EKG T AXIS: 41 DEGREES
EOSINOPHIL # BLD: 0.2 K/UL (ref 0–0.6)
EOSINOPHIL NFR BLD: 2.1 % (ref 0–5)
ERYTHROCYTE [DISTWIDTH] IN BLOOD BY AUTOMATED COUNT: 17.3 % (ref 11.5–14.5)
GLUCOSE BLD-MCNC: 143 MG/DL (ref 70–99)
GLUCOSE BLD-MCNC: 171 MG/DL (ref 70–99)
GLUCOSE BLD-MCNC: 191 MG/DL (ref 70–99)
GLUCOSE BLD-MCNC: 96 MG/DL (ref 70–99)
GLUCOSE SERPL-MCNC: 105 MG/DL (ref 70–99)
GRAM STN SPEC: ABNORMAL
HCT VFR BLD AUTO: 31.5 % (ref 42–52)
HGB BLD-MCNC: 9.8 G/DL (ref 14–18)
IMM GRANULOCYTES # BLD: 0 K/UL
LYMPHOCYTES # BLD: 1 K/UL (ref 1.1–4.5)
LYMPHOCYTES NFR BLD: 10.3 % (ref 20–40)
MCH RBC QN AUTO: 30.5 PG (ref 27–31)
MCHC RBC AUTO-ENTMCNC: 31.1 G/DL (ref 33–37)
MCV RBC AUTO: 98.1 FL (ref 80–94)
MONOCYTES # BLD: 0.9 K/UL (ref 0–0.9)
MONOCYTES NFR BLD: 9.4 % (ref 0–10)
NEUTROPHILS # BLD: 7.4 K/UL (ref 1.5–7.5)
NEUTS SEG NFR BLD: 77.6 % (ref 50–65)
ORGANISM: ABNORMAL
ORGANISM: ABNORMAL
PERFORMED ON: ABNORMAL
PERFORMED ON: NORMAL
PLATELET # BLD AUTO: 167 K/UL (ref 130–400)
PMV BLD AUTO: 11.9 FL (ref 9.4–12.4)
POTASSIUM SERPL-SCNC: 3.6 MMOL/L (ref 3.5–5)
RBC # BLD AUTO: 3.21 M/UL (ref 4.7–6.1)
SODIUM SERPL-SCNC: 138 MMOL/L (ref 136–145)
WBC # BLD AUTO: 9.6 K/UL (ref 4.8–10.8)

## 2025-08-25 PROCEDURE — 6370000000 HC RX 637 (ALT 250 FOR IP): Performed by: NEUROLOGICAL SURGERY

## 2025-08-25 PROCEDURE — 2500000003 HC RX 250 WO HCPCS: Performed by: NEUROLOGICAL SURGERY

## 2025-08-25 PROCEDURE — 83880 ASSAY OF NATRIURETIC PEPTIDE: CPT

## 2025-08-25 PROCEDURE — 6370000000 HC RX 637 (ALT 250 FOR IP): Performed by: NURSE PRACTITIONER

## 2025-08-25 PROCEDURE — 2700000000 HC OXYGEN THERAPY PER DAY

## 2025-08-25 PROCEDURE — 99231 SBSQ HOSP IP/OBS SF/LOW 25: CPT

## 2025-08-25 PROCEDURE — 51798 US URINE CAPACITY MEASURE: CPT

## 2025-08-25 PROCEDURE — 97535 SELF CARE MNGMENT TRAINING: CPT

## 2025-08-25 PROCEDURE — 99232 SBSQ HOSP IP/OBS MODERATE 35: CPT | Performed by: INTERNAL MEDICINE

## 2025-08-25 PROCEDURE — 99024 POSTOP FOLLOW-UP VISIT: CPT | Performed by: NURSE PRACTITIONER

## 2025-08-25 PROCEDURE — 97530 THERAPEUTIC ACTIVITIES: CPT

## 2025-08-25 PROCEDURE — 82962 GLUCOSE BLOOD TEST: CPT

## 2025-08-25 PROCEDURE — 94760 N-INVAS EAR/PLS OXIMETRY 1: CPT

## 2025-08-25 PROCEDURE — 51701 INSERT BLADDER CATHETER: CPT

## 2025-08-25 PROCEDURE — 36415 COLL VENOUS BLD VENIPUNCTURE: CPT

## 2025-08-25 PROCEDURE — 1200000000 HC SEMI PRIVATE

## 2025-08-25 PROCEDURE — 80048 BASIC METABOLIC PNL TOTAL CA: CPT

## 2025-08-25 PROCEDURE — 85025 COMPLETE CBC W/AUTO DIFF WBC: CPT

## 2025-08-25 PROCEDURE — 6360000002 HC RX W HCPCS: Performed by: NEUROLOGICAL SURGERY

## 2025-08-25 RX ORDER — ALLOPURINOL 100 MG/1
200 TABLET ORAL DAILY
Status: CANCELLED | OUTPATIENT
Start: 2025-08-26

## 2025-08-25 RX ADMIN — SACUBITRIL AND VALSARTAN 1 TABLET: 49; 51 TABLET, FILM COATED ORAL at 21:30

## 2025-08-25 RX ADMIN — SODIUM CHLORIDE, PRESERVATIVE FREE 10 ML: 5 INJECTION INTRAVENOUS at 09:13

## 2025-08-25 RX ADMIN — HYDROCODONE BITARTRATE AND ACETAMINOPHEN 1 TABLET: 10; 325 TABLET ORAL at 15:29

## 2025-08-25 RX ADMIN — TAMSULOSIN HYDROCHLORIDE 0.4 MG: 0.4 CAPSULE ORAL at 09:11

## 2025-08-25 RX ADMIN — DOCUSATE SODIUM 50MG AND SENNOSIDES 8.6MG 2 TABLET: 8.6; 5 TABLET, FILM COATED ORAL at 09:11

## 2025-08-25 RX ADMIN — ALLOPURINOL 200 MG: 100 TABLET ORAL at 09:10

## 2025-08-25 RX ADMIN — INSULIN GLARGINE 10 UNITS: 100 INJECTION, SOLUTION SUBCUTANEOUS at 09:11

## 2025-08-25 RX ADMIN — INSULIN LISPRO 2 UNITS: 100 INJECTION, SOLUTION INTRAVENOUS; SUBCUTANEOUS at 21:30

## 2025-08-25 RX ADMIN — ISOSORBIDE MONONITRATE 60 MG: 60 TABLET, EXTENDED RELEASE ORAL at 21:30

## 2025-08-25 RX ADMIN — INSULIN GLARGINE 10 UNITS: 100 INJECTION, SOLUTION SUBCUTANEOUS at 21:30

## 2025-08-25 RX ADMIN — APIXABAN 2.5 MG: 5 TABLET, FILM COATED ORAL at 21:30

## 2025-08-25 RX ADMIN — DAKIN'S SOLUTION 0.125% (QUARTER STRENGTH): 0.12 SOLUTION at 17:33

## 2025-08-25 RX ADMIN — RANOLAZINE 1000 MG: 500 TABLET, EXTENDED RELEASE ORAL at 09:10

## 2025-08-25 RX ADMIN — METHOCARBAMOL 750 MG: 750 TABLET ORAL at 09:10

## 2025-08-25 RX ADMIN — BUMETANIDE 2 MG: 0.25 INJECTION INTRAMUSCULAR; INTRAVENOUS at 09:11

## 2025-08-25 RX ADMIN — AMIODARONE HYDROCHLORIDE 100 MG: 200 TABLET ORAL at 09:10

## 2025-08-25 RX ADMIN — LACTULOSE 30 G: 20 SOLUTION ORAL at 09:11

## 2025-08-25 RX ADMIN — PREGABALIN 25 MG: 25 CAPSULE ORAL at 09:10

## 2025-08-25 RX ADMIN — NIFEDIPINE 60 MG: 60 TABLET, EXTENDED RELEASE ORAL at 09:11

## 2025-08-25 RX ADMIN — METHOCARBAMOL 750 MG: 750 TABLET ORAL at 21:30

## 2025-08-25 RX ADMIN — DAKIN'S SOLUTION 0.125% (QUARTER STRENGTH): 0.12 SOLUTION at 05:04

## 2025-08-25 RX ADMIN — TRAZODONE HYDROCHLORIDE 150 MG: 100 TABLET ORAL at 22:55

## 2025-08-25 RX ADMIN — SODIUM CHLORIDE, PRESERVATIVE FREE 10 ML: 5 INJECTION INTRAVENOUS at 21:31

## 2025-08-25 RX ADMIN — ISOSORBIDE MONONITRATE 60 MG: 60 TABLET, EXTENDED RELEASE ORAL at 09:11

## 2025-08-25 RX ADMIN — SACUBITRIL AND VALSARTAN 1 TABLET: 49; 51 TABLET, FILM COATED ORAL at 09:11

## 2025-08-25 RX ADMIN — APIXABAN 2.5 MG: 5 TABLET, FILM COATED ORAL at 09:10

## 2025-08-25 RX ADMIN — Medication 12.5 MG: at 05:04

## 2025-08-25 RX ADMIN — ASPIRIN 81 MG: 81 TABLET, CHEWABLE ORAL at 09:10

## 2025-08-25 RX ADMIN — FAMOTIDINE 20 MG: 20 TABLET, FILM COATED ORAL at 21:30

## 2025-08-25 RX ADMIN — CARVEDILOL 12.5 MG: 12.5 TABLET, FILM COATED ORAL at 09:10

## 2025-08-25 RX ADMIN — METHOCARBAMOL 750 MG: 750 TABLET ORAL at 15:29

## 2025-08-25 RX ADMIN — CARVEDILOL 12.5 MG: 12.5 TABLET, FILM COATED ORAL at 21:30

## 2025-08-25 RX ADMIN — RANOLAZINE 1000 MG: 500 TABLET, EXTENDED RELEASE ORAL at 21:30

## 2025-08-25 RX ADMIN — CALCITRIOL CAPSULES 0.25 MCG 0.25 MCG: 0.25 CAPSULE ORAL at 09:10

## 2025-08-25 RX ADMIN — HYDROCODONE BITARTRATE AND ACETAMINOPHEN 1 TABLET: 10; 325 TABLET ORAL at 21:33

## 2025-08-25 ASSESSMENT — PAIN DESCRIPTION - ORIENTATION: ORIENTATION: MID;LOWER

## 2025-08-25 ASSESSMENT — PAIN SCALES - GENERAL
PAINLEVEL_OUTOF10: 6
PAINLEVEL_OUTOF10: 5
PAINLEVEL_OUTOF10: 0

## 2025-08-25 ASSESSMENT — PAIN DESCRIPTION - LOCATION: LOCATION: BACK

## 2025-08-25 ASSESSMENT — PAIN DESCRIPTION - ONSET: ONSET: ON-GOING

## 2025-08-25 ASSESSMENT — PAIN DESCRIPTION - PAIN TYPE: TYPE: ACUTE PAIN;SURGICAL PAIN

## 2025-08-25 ASSESSMENT — PAIN DESCRIPTION - FREQUENCY: FREQUENCY: INTERMITTENT

## 2025-08-25 ASSESSMENT — PAIN DESCRIPTION - DESCRIPTORS: DESCRIPTORS: ACHING;DISCOMFORT

## 2025-08-26 LAB
ALBUMIN SERPL-MCNC: 2.7 G/DL (ref 3.5–5.2)
ALP SERPL-CCNC: 66 U/L (ref 40–129)
ALT SERPL-CCNC: 10 U/L (ref 10–50)
ANION GAP SERPL CALCULATED.3IONS-SCNC: 9 MMOL/L (ref 8–16)
AST SERPL-CCNC: 21 U/L (ref 10–50)
BASOPHILS # BLD: 0 K/UL (ref 0–0.2)
BASOPHILS NFR BLD: 0.4 % (ref 0–1)
BILIRUB DIRECT SERPL-MCNC: 0.2 MG/DL (ref 0–0.3)
BILIRUB INDIRECT SERPL-MCNC: 0.2 MG/DL (ref 0–1)
BILIRUB SERPL-MCNC: 0.4 MG/DL (ref 0.2–1.2)
BUN SERPL-MCNC: 27 MG/DL (ref 8–23)
CALCIUM SERPL-MCNC: 8.9 MG/DL (ref 8.8–10.2)
CHLORIDE SERPL-SCNC: 99 MMOL/L (ref 98–107)
CO2 SERPL-SCNC: 32 MMOL/L (ref 22–29)
CREAT SERPL-MCNC: 1.7 MG/DL (ref 0.7–1.2)
EOSINOPHIL # BLD: 0.3 K/UL (ref 0–0.6)
EOSINOPHIL NFR BLD: 3.9 % (ref 0–5)
ERYTHROCYTE [DISTWIDTH] IN BLOOD BY AUTOMATED COUNT: 17.4 % (ref 11.5–14.5)
GLUCOSE BLD-MCNC: 169 MG/DL (ref 70–99)
GLUCOSE BLD-MCNC: 186 MG/DL (ref 70–99)
GLUCOSE BLD-MCNC: 202 MG/DL (ref 70–99)
GLUCOSE BLD-MCNC: 255 MG/DL (ref 70–99)
GLUCOSE SERPL-MCNC: 154 MG/DL (ref 70–99)
HCT VFR BLD AUTO: 28.3 % (ref 42–52)
HGB BLD-MCNC: 8.9 G/DL (ref 14–18)
IMM GRANULOCYTES # BLD: 0 K/UL
LYMPHOCYTES # BLD: 0.9 K/UL (ref 1.1–4.5)
LYMPHOCYTES NFR BLD: 10.8 % (ref 20–40)
MAGNESIUM SERPL-MCNC: 1.9 MG/DL (ref 1.6–2.4)
MCH RBC QN AUTO: 31.1 PG (ref 27–31)
MCHC RBC AUTO-ENTMCNC: 31.4 G/DL (ref 33–37)
MCV RBC AUTO: 99 FL (ref 80–94)
MONOCYTES # BLD: 0.8 K/UL (ref 0–0.9)
MONOCYTES NFR BLD: 9.8 % (ref 0–10)
NEUTROPHILS # BLD: 6 K/UL (ref 1.5–7.5)
NEUTS SEG NFR BLD: 74.6 % (ref 50–65)
PERFORMED ON: ABNORMAL
PLATELET # BLD AUTO: 169 K/UL (ref 130–400)
PMV BLD AUTO: 11.3 FL (ref 9.4–12.4)
POTASSIUM SERPL-SCNC: 3.2 MMOL/L (ref 3.5–5)
PROT SERPL-MCNC: 5 G/DL (ref 6.4–8.3)
RBC # BLD AUTO: 2.86 M/UL (ref 4.7–6.1)
SODIUM SERPL-SCNC: 140 MMOL/L (ref 136–145)
WBC # BLD AUTO: 8 K/UL (ref 4.8–10.8)

## 2025-08-26 PROCEDURE — 6370000000 HC RX 637 (ALT 250 FOR IP): Performed by: NEUROLOGICAL SURGERY

## 2025-08-26 PROCEDURE — 6370000000 HC RX 637 (ALT 250 FOR IP): Performed by: NURSE PRACTITIONER

## 2025-08-26 PROCEDURE — 97535 SELF CARE MNGMENT TRAINING: CPT

## 2025-08-26 PROCEDURE — 6360000002 HC RX W HCPCS: Performed by: STUDENT IN AN ORGANIZED HEALTH CARE EDUCATION/TRAINING PROGRAM

## 2025-08-26 PROCEDURE — 6370000000 HC RX 637 (ALT 250 FOR IP): Performed by: HOSPITALIST

## 2025-08-26 PROCEDURE — 80048 BASIC METABOLIC PNL TOTAL CA: CPT

## 2025-08-26 PROCEDURE — 80076 HEPATIC FUNCTION PANEL: CPT

## 2025-08-26 PROCEDURE — 99231 SBSQ HOSP IP/OBS SF/LOW 25: CPT

## 2025-08-26 PROCEDURE — 36415 COLL VENOUS BLD VENIPUNCTURE: CPT

## 2025-08-26 PROCEDURE — 97530 THERAPEUTIC ACTIVITIES: CPT

## 2025-08-26 PROCEDURE — 6360000002 HC RX W HCPCS: Performed by: NEUROLOGICAL SURGERY

## 2025-08-26 PROCEDURE — 1200000000 HC SEMI PRIVATE

## 2025-08-26 PROCEDURE — 83735 ASSAY OF MAGNESIUM: CPT

## 2025-08-26 PROCEDURE — 99024 POSTOP FOLLOW-UP VISIT: CPT | Performed by: NURSE PRACTITIONER

## 2025-08-26 PROCEDURE — 85025 COMPLETE CBC W/AUTO DIFF WBC: CPT

## 2025-08-26 PROCEDURE — 2500000003 HC RX 250 WO HCPCS: Performed by: NEUROLOGICAL SURGERY

## 2025-08-26 PROCEDURE — 2700000000 HC OXYGEN THERAPY PER DAY

## 2025-08-26 PROCEDURE — 82962 GLUCOSE BLOOD TEST: CPT

## 2025-08-26 PROCEDURE — 94760 N-INVAS EAR/PLS OXIMETRY 1: CPT

## 2025-08-26 RX ORDER — POTASSIUM CHLORIDE 7.45 MG/ML
10 INJECTION INTRAVENOUS PRN
Status: DISCONTINUED | OUTPATIENT
Start: 2025-08-26 | End: 2025-08-29 | Stop reason: HOSPADM

## 2025-08-26 RX ORDER — POTASSIUM CHLORIDE 1500 MG/1
40 TABLET, EXTENDED RELEASE ORAL PRN
Status: DISCONTINUED | OUTPATIENT
Start: 2025-08-26 | End: 2025-08-29 | Stop reason: HOSPADM

## 2025-08-26 RX ADMIN — DAKIN'S SOLUTION 0.125% (QUARTER STRENGTH): 0.12 SOLUTION at 16:44

## 2025-08-26 RX ADMIN — CARVEDILOL 12.5 MG: 12.5 TABLET, FILM COATED ORAL at 21:28

## 2025-08-26 RX ADMIN — POTASSIUM CHLORIDE 40 MEQ: 1500 TABLET, EXTENDED RELEASE ORAL at 08:17

## 2025-08-26 RX ADMIN — FAMOTIDINE 20 MG: 20 TABLET, FILM COATED ORAL at 21:27

## 2025-08-26 RX ADMIN — HYDROMORPHONE HYDROCHLORIDE 0.5 MG: 1 INJECTION, SOLUTION INTRAMUSCULAR; INTRAVENOUS; SUBCUTANEOUS at 13:43

## 2025-08-26 RX ADMIN — SACUBITRIL AND VALSARTAN 1 TABLET: 49; 51 TABLET, FILM COATED ORAL at 21:27

## 2025-08-26 RX ADMIN — ISOSORBIDE MONONITRATE 60 MG: 60 TABLET, EXTENDED RELEASE ORAL at 21:26

## 2025-08-26 RX ADMIN — RANOLAZINE 1000 MG: 500 TABLET, EXTENDED RELEASE ORAL at 21:27

## 2025-08-26 RX ADMIN — METHOCARBAMOL 750 MG: 750 TABLET ORAL at 08:16

## 2025-08-26 RX ADMIN — INSULIN GLARGINE 10 UNITS: 100 INJECTION, SOLUTION SUBCUTANEOUS at 09:21

## 2025-08-26 RX ADMIN — ALLOPURINOL 200 MG: 100 TABLET ORAL at 08:17

## 2025-08-26 RX ADMIN — PREGABALIN 25 MG: 25 CAPSULE ORAL at 08:17

## 2025-08-26 RX ADMIN — ASPIRIN 81 MG: 81 TABLET, CHEWABLE ORAL at 08:17

## 2025-08-26 RX ADMIN — ONDANSETRON 4 MG: 2 INJECTION, SOLUTION INTRAMUSCULAR; INTRAVENOUS at 00:08

## 2025-08-26 RX ADMIN — TAMSULOSIN HYDROCHLORIDE 0.4 MG: 0.4 CAPSULE ORAL at 08:17

## 2025-08-26 RX ADMIN — METHOCARBAMOL 750 MG: 750 TABLET ORAL at 21:27

## 2025-08-26 RX ADMIN — HYDROMORPHONE HYDROCHLORIDE 0.5 MG: 1 INJECTION, SOLUTION INTRAMUSCULAR; INTRAVENOUS; SUBCUTANEOUS at 00:11

## 2025-08-26 RX ADMIN — RANOLAZINE 1000 MG: 500 TABLET, EXTENDED RELEASE ORAL at 08:17

## 2025-08-26 RX ADMIN — NIFEDIPINE 60 MG: 60 TABLET, EXTENDED RELEASE ORAL at 08:17

## 2025-08-26 RX ADMIN — SODIUM CHLORIDE, PRESERVATIVE FREE 10 ML: 5 INJECTION INTRAVENOUS at 08:18

## 2025-08-26 RX ADMIN — SODIUM CHLORIDE, PRESERVATIVE FREE 10 ML: 5 INJECTION INTRAVENOUS at 08:54

## 2025-08-26 RX ADMIN — PROCHLORPERAZINE EDISYLATE 5 MG: 5 INJECTION INTRAMUSCULAR; INTRAVENOUS at 01:48

## 2025-08-26 RX ADMIN — HYDROCODONE BITARTRATE AND ACETAMINOPHEN 1 TABLET: 10; 325 TABLET ORAL at 09:25

## 2025-08-26 RX ADMIN — SODIUM CHLORIDE, PRESERVATIVE FREE 10 ML: 5 INJECTION INTRAVENOUS at 21:28

## 2025-08-26 RX ADMIN — BUMETANIDE 2 MG: 0.25 INJECTION INTRAMUSCULAR; INTRAVENOUS at 08:18

## 2025-08-26 RX ADMIN — APIXABAN 2.5 MG: 5 TABLET, FILM COATED ORAL at 08:17

## 2025-08-26 RX ADMIN — ISOSORBIDE MONONITRATE 60 MG: 60 TABLET, EXTENDED RELEASE ORAL at 08:17

## 2025-08-26 RX ADMIN — INSULIN LISPRO 2 UNITS: 100 INJECTION, SOLUTION INTRAVENOUS; SUBCUTANEOUS at 13:52

## 2025-08-26 RX ADMIN — INSULIN GLARGINE 10 UNITS: 100 INJECTION, SOLUTION SUBCUTANEOUS at 21:27

## 2025-08-26 RX ADMIN — INSULIN LISPRO 4 UNITS: 100 INJECTION, SOLUTION INTRAVENOUS; SUBCUTANEOUS at 16:42

## 2025-08-26 RX ADMIN — INSULIN LISPRO 2 UNITS: 100 INJECTION, SOLUTION INTRAVENOUS; SUBCUTANEOUS at 21:27

## 2025-08-26 RX ADMIN — Medication 12.5 MG: at 08:17

## 2025-08-26 RX ADMIN — METHOCARBAMOL 750 MG: 750 TABLET ORAL at 13:43

## 2025-08-26 RX ADMIN — DOCUSATE SODIUM 50MG AND SENNOSIDES 8.6MG 2 TABLET: 8.6; 5 TABLET, FILM COATED ORAL at 21:27

## 2025-08-26 RX ADMIN — CALCITRIOL CAPSULES 0.25 MCG 0.25 MCG: 0.25 CAPSULE ORAL at 08:17

## 2025-08-26 RX ADMIN — SACUBITRIL AND VALSARTAN 1 TABLET: 49; 51 TABLET, FILM COATED ORAL at 08:16

## 2025-08-26 RX ADMIN — AMIODARONE HYDROCHLORIDE 100 MG: 200 TABLET ORAL at 08:16

## 2025-08-26 RX ADMIN — CARVEDILOL 12.5 MG: 12.5 TABLET, FILM COATED ORAL at 08:17

## 2025-08-26 RX ADMIN — DAKIN'S SOLUTION 0.125% (QUARTER STRENGTH): 0.12 SOLUTION at 05:34

## 2025-08-26 RX ADMIN — APIXABAN 2.5 MG: 5 TABLET, FILM COATED ORAL at 21:27

## 2025-08-26 ASSESSMENT — PAIN SCALES - GENERAL
PAINLEVEL_OUTOF10: 5
PAINLEVEL_OUTOF10: 8
PAINLEVEL_OUTOF10: 7
PAINLEVEL_OUTOF10: 7

## 2025-08-26 ASSESSMENT — PAIN - FUNCTIONAL ASSESSMENT
PAIN_FUNCTIONAL_ASSESSMENT: 0-10

## 2025-08-26 ASSESSMENT — PAIN DESCRIPTION - LOCATION: LOCATION: BACK

## 2025-08-27 ENCOUNTER — APPOINTMENT (OUTPATIENT)
Dept: GENERAL RADIOLOGY | Age: 70
End: 2025-08-27
Payer: MEDICARE

## 2025-08-27 ENCOUNTER — APPOINTMENT (OUTPATIENT)
Dept: CT IMAGING | Age: 70
End: 2025-08-27
Payer: MEDICARE

## 2025-08-27 LAB
ANION GAP SERPL CALCULATED.3IONS-SCNC: 9 MMOL/L (ref 8–16)
BUN SERPL-MCNC: 30 MG/DL (ref 8–23)
CALCIUM SERPL-MCNC: 9.1 MG/DL (ref 8.8–10.2)
CHLORIDE SERPL-SCNC: 96 MMOL/L (ref 98–107)
CO2 SERPL-SCNC: 32 MMOL/L (ref 22–29)
CREAT SERPL-MCNC: 1.9 MG/DL (ref 0.7–1.2)
GLUCOSE BLD-MCNC: 154 MG/DL (ref 70–99)
GLUCOSE BLD-MCNC: 192 MG/DL (ref 70–99)
GLUCOSE BLD-MCNC: 198 MG/DL (ref 70–99)
GLUCOSE BLD-MCNC: 199 MG/DL (ref 70–99)
GLUCOSE SERPL-MCNC: 197 MG/DL (ref 70–99)
PERFORMED ON: ABNORMAL
POTASSIUM SERPL-SCNC: 3.6 MMOL/L (ref 3.5–5)
SODIUM SERPL-SCNC: 137 MMOL/L (ref 136–145)

## 2025-08-27 PROCEDURE — 6360000002 HC RX W HCPCS: Performed by: NEUROLOGICAL SURGERY

## 2025-08-27 PROCEDURE — 99231 SBSQ HOSP IP/OBS SF/LOW 25: CPT

## 2025-08-27 PROCEDURE — 74176 CT ABD & PELVIS W/O CONTRAST: CPT

## 2025-08-27 PROCEDURE — 6370000000 HC RX 637 (ALT 250 FOR IP): Performed by: NURSE PRACTITIONER

## 2025-08-27 PROCEDURE — 6370000000 HC RX 637 (ALT 250 FOR IP): Performed by: NEUROLOGICAL SURGERY

## 2025-08-27 PROCEDURE — 1200000000 HC SEMI PRIVATE

## 2025-08-27 PROCEDURE — 99024 POSTOP FOLLOW-UP VISIT: CPT | Performed by: NEUROLOGICAL SURGERY

## 2025-08-27 PROCEDURE — 97530 THERAPEUTIC ACTIVITIES: CPT

## 2025-08-27 PROCEDURE — 80048 BASIC METABOLIC PNL TOTAL CA: CPT

## 2025-08-27 PROCEDURE — 94150 VITAL CAPACITY TEST: CPT

## 2025-08-27 PROCEDURE — 82962 GLUCOSE BLOOD TEST: CPT

## 2025-08-27 PROCEDURE — 2700000000 HC OXYGEN THERAPY PER DAY

## 2025-08-27 PROCEDURE — 2500000003 HC RX 250 WO HCPCS: Performed by: NEUROLOGICAL SURGERY

## 2025-08-27 PROCEDURE — 74018 RADEX ABDOMEN 1 VIEW: CPT

## 2025-08-27 PROCEDURE — 94760 N-INVAS EAR/PLS OXIMETRY 1: CPT

## 2025-08-27 PROCEDURE — 36415 COLL VENOUS BLD VENIPUNCTURE: CPT

## 2025-08-27 PROCEDURE — 97535 SELF CARE MNGMENT TRAINING: CPT

## 2025-08-27 RX ORDER — BUMETANIDE 1 MG/1
2 TABLET ORAL DAILY
Status: DISCONTINUED | OUTPATIENT
Start: 2025-08-27 | End: 2025-08-29 | Stop reason: HOSPADM

## 2025-08-27 RX ORDER — MAGNESIUM HYDROXIDE/ALUMINUM HYDROXICE/SIMETHICONE 120; 1200; 1200 MG/30ML; MG/30ML; MG/30ML
30 SUSPENSION ORAL EVERY 6 HOURS PRN
Status: DISCONTINUED | OUTPATIENT
Start: 2025-08-27 | End: 2025-08-29 | Stop reason: HOSPADM

## 2025-08-27 RX ADMIN — ISOSORBIDE MONONITRATE 60 MG: 60 TABLET, EXTENDED RELEASE ORAL at 21:27

## 2025-08-27 RX ADMIN — METHOCARBAMOL 750 MG: 750 TABLET ORAL at 08:15

## 2025-08-27 RX ADMIN — METHOCARBAMOL 750 MG: 750 TABLET ORAL at 21:27

## 2025-08-27 RX ADMIN — RANOLAZINE 1000 MG: 500 TABLET, EXTENDED RELEASE ORAL at 08:16

## 2025-08-27 RX ADMIN — DAKIN'S SOLUTION 0.125% (QUARTER STRENGTH): 0.12 SOLUTION at 18:15

## 2025-08-27 RX ADMIN — HYDROCODONE BITARTRATE AND ACETAMINOPHEN 1 TABLET: 10; 325 TABLET ORAL at 17:56

## 2025-08-27 RX ADMIN — TAMSULOSIN HYDROCHLORIDE 0.4 MG: 0.4 CAPSULE ORAL at 08:16

## 2025-08-27 RX ADMIN — NIFEDIPINE 60 MG: 60 TABLET, EXTENDED RELEASE ORAL at 08:16

## 2025-08-27 RX ADMIN — INSULIN GLARGINE 10 UNITS: 100 INJECTION, SOLUTION SUBCUTANEOUS at 09:19

## 2025-08-27 RX ADMIN — ALLOPURINOL 200 MG: 100 TABLET ORAL at 08:15

## 2025-08-27 RX ADMIN — SODIUM CHLORIDE, PRESERVATIVE FREE 10 ML: 5 INJECTION INTRAVENOUS at 21:38

## 2025-08-27 RX ADMIN — CALCITRIOL CAPSULES 0.25 MCG 0.25 MCG: 0.25 CAPSULE ORAL at 08:16

## 2025-08-27 RX ADMIN — INSULIN GLARGINE 10 UNITS: 100 INJECTION, SOLUTION SUBCUTANEOUS at 21:29

## 2025-08-27 RX ADMIN — ALUMINUM HYDROXIDE, MAGNESIUM HYDROXIDE, AND SIMETHICONE 30 ML: 1200; 120; 1200 SUSPENSION ORAL at 18:30

## 2025-08-27 RX ADMIN — METHOCARBAMOL 750 MG: 750 TABLET ORAL at 15:50

## 2025-08-27 RX ADMIN — CARVEDILOL 12.5 MG: 12.5 TABLET, FILM COATED ORAL at 08:16

## 2025-08-27 RX ADMIN — PREGABALIN 25 MG: 25 CAPSULE ORAL at 08:16

## 2025-08-27 RX ADMIN — HYDROCODONE BITARTRATE AND ACETAMINOPHEN 1 TABLET: 10; 325 TABLET ORAL at 00:00

## 2025-08-27 RX ADMIN — RANOLAZINE 1000 MG: 500 TABLET, EXTENDED RELEASE ORAL at 21:27

## 2025-08-27 RX ADMIN — APIXABAN 2.5 MG: 5 TABLET, FILM COATED ORAL at 08:16

## 2025-08-27 RX ADMIN — SACUBITRIL AND VALSARTAN 1 TABLET: 49; 51 TABLET, FILM COATED ORAL at 21:28

## 2025-08-27 RX ADMIN — INSULIN LISPRO 2 UNITS: 100 INJECTION, SOLUTION INTRAVENOUS; SUBCUTANEOUS at 08:15

## 2025-08-27 RX ADMIN — SODIUM CHLORIDE, PRESERVATIVE FREE 10 ML: 5 INJECTION INTRAVENOUS at 08:18

## 2025-08-27 RX ADMIN — AMIODARONE HYDROCHLORIDE 100 MG: 200 TABLET ORAL at 08:16

## 2025-08-27 RX ADMIN — SACUBITRIL AND VALSARTAN 1 TABLET: 49; 51 TABLET, FILM COATED ORAL at 08:17

## 2025-08-27 RX ADMIN — FAMOTIDINE 20 MG: 20 TABLET, FILM COATED ORAL at 21:27

## 2025-08-27 RX ADMIN — APIXABAN 2.5 MG: 5 TABLET, FILM COATED ORAL at 21:27

## 2025-08-27 RX ADMIN — BUMETANIDE 2 MG: 0.25 INJECTION INTRAMUSCULAR; INTRAVENOUS at 08:15

## 2025-08-27 RX ADMIN — INSULIN LISPRO 2 UNITS: 100 INJECTION, SOLUTION INTRAVENOUS; SUBCUTANEOUS at 13:14

## 2025-08-27 RX ADMIN — CARVEDILOL 12.5 MG: 12.5 TABLET, FILM COATED ORAL at 21:27

## 2025-08-27 RX ADMIN — Medication 12.5 MG: at 05:21

## 2025-08-27 RX ADMIN — ASPIRIN 81 MG: 81 TABLET, CHEWABLE ORAL at 08:16

## 2025-08-27 RX ADMIN — DOCUSATE SODIUM 50MG AND SENNOSIDES 8.6MG 2 TABLET: 8.6; 5 TABLET, FILM COATED ORAL at 21:27

## 2025-08-27 RX ADMIN — TRAZODONE HYDROCHLORIDE 150 MG: 100 TABLET ORAL at 21:31

## 2025-08-27 RX ADMIN — ISOSORBIDE MONONITRATE 60 MG: 60 TABLET, EXTENDED RELEASE ORAL at 08:16

## 2025-08-27 RX ADMIN — DAKIN'S SOLUTION 0.125% (QUARTER STRENGTH): 0.12 SOLUTION at 05:21

## 2025-08-27 RX ADMIN — INSULIN LISPRO 2 UNITS: 100 INJECTION, SOLUTION INTRAVENOUS; SUBCUTANEOUS at 17:56

## 2025-08-27 RX ADMIN — ONDANSETRON 4 MG: 2 INJECTION, SOLUTION INTRAMUSCULAR; INTRAVENOUS at 12:01

## 2025-08-27 ASSESSMENT — PAIN DESCRIPTION - ONSET: ONSET: ON-GOING

## 2025-08-27 ASSESSMENT — PAIN DESCRIPTION - DESCRIPTORS
DESCRIPTORS: ACHING;DISCOMFORT

## 2025-08-27 ASSESSMENT — PAIN - FUNCTIONAL ASSESSMENT
PAIN_FUNCTIONAL_ASSESSMENT: PREVENTS OR INTERFERES SOME ACTIVE ACTIVITIES AND ADLS
PAIN_FUNCTIONAL_ASSESSMENT: PREVENTS OR INTERFERES SOME ACTIVE ACTIVITIES AND ADLS
PAIN_FUNCTIONAL_ASSESSMENT: 0-10
PAIN_FUNCTIONAL_ASSESSMENT: 0-10
PAIN_FUNCTIONAL_ASSESSMENT: ACTIVITIES ARE NOT PREVENTED
PAIN_FUNCTIONAL_ASSESSMENT: 0-10
PAIN_FUNCTIONAL_ASSESSMENT: 0-10

## 2025-08-27 ASSESSMENT — PAIN DESCRIPTION - LOCATION
LOCATION: BACK
LOCATION: BACK
LOCATION: ABDOMEN

## 2025-08-27 ASSESSMENT — PAIN DESCRIPTION - ORIENTATION
ORIENTATION: LOWER
ORIENTATION: MID
ORIENTATION: MID;LOWER

## 2025-08-27 ASSESSMENT — PAIN SCALES - GENERAL
PAINLEVEL_OUTOF10: 6
PAINLEVEL_OUTOF10: 8
PAINLEVEL_OUTOF10: 5
PAINLEVEL_OUTOF10: 2
PAINLEVEL_OUTOF10: 6
PAINLEVEL_OUTOF10: 2
PAINLEVEL_OUTOF10: 8

## 2025-08-27 ASSESSMENT — PAIN DESCRIPTION - PAIN TYPE
TYPE: SURGICAL PAIN;ACUTE PAIN
TYPE: ACUTE PAIN;SURGICAL PAIN

## 2025-08-27 ASSESSMENT — PAIN DESCRIPTION - FREQUENCY
FREQUENCY: CONTINUOUS
FREQUENCY: CONTINUOUS

## 2025-08-28 LAB
ANION GAP SERPL CALCULATED.3IONS-SCNC: 6 MMOL/L (ref 8–16)
BASOPHILS # BLD: 0 K/UL (ref 0–0.2)
BASOPHILS NFR BLD: 0.4 % (ref 0–1)
BUN SERPL-MCNC: 32 MG/DL (ref 8–23)
CALCIUM SERPL-MCNC: 9.3 MG/DL (ref 8.8–10.2)
CHLORIDE SERPL-SCNC: 99 MMOL/L (ref 98–107)
CO2 SERPL-SCNC: 34 MMOL/L (ref 22–29)
CREAT SERPL-MCNC: 1.8 MG/DL (ref 0.7–1.2)
EOSINOPHIL # BLD: 0.5 K/UL (ref 0–0.6)
EOSINOPHIL NFR BLD: 6.6 % (ref 0–5)
ERYTHROCYTE [DISTWIDTH] IN BLOOD BY AUTOMATED COUNT: 17.5 % (ref 11.5–14.5)
GLUCOSE BLD-MCNC: 123 MG/DL (ref 70–99)
GLUCOSE BLD-MCNC: 165 MG/DL (ref 70–99)
GLUCOSE BLD-MCNC: 195 MG/DL (ref 70–99)
GLUCOSE BLD-MCNC: 225 MG/DL (ref 70–99)
GLUCOSE SERPL-MCNC: 129 MG/DL (ref 70–99)
HCT VFR BLD AUTO: 30.9 % (ref 42–52)
HGB BLD-MCNC: 9.5 G/DL (ref 14–18)
IMM GRANULOCYTES # BLD: 0.1 K/UL
LYMPHOCYTES # BLD: 1.3 K/UL (ref 1.1–4.5)
LYMPHOCYTES NFR BLD: 16.6 % (ref 20–40)
MCH RBC QN AUTO: 31.1 PG (ref 27–31)
MCHC RBC AUTO-ENTMCNC: 30.7 G/DL (ref 33–37)
MCV RBC AUTO: 101.3 FL (ref 80–94)
MONOCYTES # BLD: 0.8 K/UL (ref 0–0.9)
MONOCYTES NFR BLD: 9.4 % (ref 0–10)
NEUTROPHILS # BLD: 5.3 K/UL (ref 1.5–7.5)
NEUTS SEG NFR BLD: 66.4 % (ref 50–65)
PERFORMED ON: ABNORMAL
PLATELET # BLD AUTO: 224 K/UL (ref 130–400)
PMV BLD AUTO: 11.3 FL (ref 9.4–12.4)
POTASSIUM SERPL-SCNC: 3.8 MMOL/L (ref 3.5–5)
RBC # BLD AUTO: 3.05 M/UL (ref 4.7–6.1)
SODIUM SERPL-SCNC: 139 MMOL/L (ref 136–145)
WBC # BLD AUTO: 8 K/UL (ref 4.8–10.8)

## 2025-08-28 PROCEDURE — 6370000000 HC RX 637 (ALT 250 FOR IP): Performed by: NEUROLOGICAL SURGERY

## 2025-08-28 PROCEDURE — 2700000000 HC OXYGEN THERAPY PER DAY

## 2025-08-28 PROCEDURE — 97530 THERAPEUTIC ACTIVITIES: CPT

## 2025-08-28 PROCEDURE — 99232 SBSQ HOSP IP/OBS MODERATE 35: CPT

## 2025-08-28 PROCEDURE — 36415 COLL VENOUS BLD VENIPUNCTURE: CPT

## 2025-08-28 PROCEDURE — 85025 COMPLETE CBC W/AUTO DIFF WBC: CPT

## 2025-08-28 PROCEDURE — 99231 SBSQ HOSP IP/OBS SF/LOW 25: CPT | Performed by: INTERNAL MEDICINE

## 2025-08-28 PROCEDURE — 1200000000 HC SEMI PRIVATE

## 2025-08-28 PROCEDURE — 82962 GLUCOSE BLOOD TEST: CPT

## 2025-08-28 PROCEDURE — 6370000000 HC RX 637 (ALT 250 FOR IP): Performed by: NURSE PRACTITIONER

## 2025-08-28 PROCEDURE — 94760 N-INVAS EAR/PLS OXIMETRY 1: CPT

## 2025-08-28 PROCEDURE — 80048 BASIC METABOLIC PNL TOTAL CA: CPT

## 2025-08-28 PROCEDURE — 99024 POSTOP FOLLOW-UP VISIT: CPT | Performed by: NURSE PRACTITIONER

## 2025-08-28 PROCEDURE — 2500000003 HC RX 250 WO HCPCS: Performed by: NEUROLOGICAL SURGERY

## 2025-08-28 RX ORDER — SIMETHICONE 80 MG
80 TABLET,CHEWABLE ORAL EVERY 6 HOURS PRN
Status: DISCONTINUED | OUTPATIENT
Start: 2025-08-28 | End: 2025-08-29 | Stop reason: HOSPADM

## 2025-08-28 RX ORDER — HYOSCYAMINE SULFATE 0.12 MG/1
0.12 TABLET SUBLINGUAL EVERY 4 HOURS PRN
Status: DISCONTINUED | OUTPATIENT
Start: 2025-08-28 | End: 2025-08-29 | Stop reason: HOSPADM

## 2025-08-28 RX ADMIN — NIFEDIPINE 60 MG: 60 TABLET, EXTENDED RELEASE ORAL at 09:05

## 2025-08-28 RX ADMIN — CARVEDILOL 12.5 MG: 12.5 TABLET, FILM COATED ORAL at 09:05

## 2025-08-28 RX ADMIN — SODIUM CHLORIDE, PRESERVATIVE FREE 10 ML: 5 INJECTION INTRAVENOUS at 16:21

## 2025-08-28 RX ADMIN — BUMETANIDE 2 MG: 1 TABLET ORAL at 09:04

## 2025-08-28 RX ADMIN — ISOSORBIDE MONONITRATE 60 MG: 60 TABLET, EXTENDED RELEASE ORAL at 21:09

## 2025-08-28 RX ADMIN — METHOCARBAMOL 750 MG: 750 TABLET ORAL at 21:10

## 2025-08-28 RX ADMIN — DAKIN'S SOLUTION 0.125% (QUARTER STRENGTH): 0.12 SOLUTION at 16:39

## 2025-08-28 RX ADMIN — SODIUM CHLORIDE, PRESERVATIVE FREE 10 ML: 5 INJECTION INTRAVENOUS at 21:10

## 2025-08-28 RX ADMIN — CALCITRIOL CAPSULES 0.25 MCG 0.25 MCG: 0.25 CAPSULE ORAL at 09:04

## 2025-08-28 RX ADMIN — INSULIN GLARGINE 10 UNITS: 100 INJECTION, SOLUTION SUBCUTANEOUS at 09:06

## 2025-08-28 RX ADMIN — PREGABALIN 25 MG: 25 CAPSULE ORAL at 09:05

## 2025-08-28 RX ADMIN — RANOLAZINE 1000 MG: 500 TABLET, EXTENDED RELEASE ORAL at 09:05

## 2025-08-28 RX ADMIN — METHOCARBAMOL 750 MG: 750 TABLET ORAL at 15:28

## 2025-08-28 RX ADMIN — SIMETHICONE 80 MG: 80 TABLET, CHEWABLE ORAL at 09:42

## 2025-08-28 RX ADMIN — APIXABAN 2.5 MG: 5 TABLET, FILM COATED ORAL at 09:05

## 2025-08-28 RX ADMIN — AMIODARONE HYDROCHLORIDE 100 MG: 200 TABLET ORAL at 09:05

## 2025-08-28 RX ADMIN — TAMSULOSIN HYDROCHLORIDE 0.4 MG: 0.4 CAPSULE ORAL at 09:05

## 2025-08-28 RX ADMIN — ISOSORBIDE MONONITRATE 60 MG: 60 TABLET, EXTENDED RELEASE ORAL at 09:04

## 2025-08-28 RX ADMIN — FAMOTIDINE 20 MG: 20 TABLET, FILM COATED ORAL at 21:09

## 2025-08-28 RX ADMIN — INSULIN GLARGINE 10 UNITS: 100 INJECTION, SOLUTION SUBCUTANEOUS at 21:08

## 2025-08-28 RX ADMIN — ASPIRIN 81 MG: 81 TABLET, CHEWABLE ORAL at 09:05

## 2025-08-28 RX ADMIN — APIXABAN 2.5 MG: 5 TABLET, FILM COATED ORAL at 21:09

## 2025-08-28 RX ADMIN — SACUBITRIL AND VALSARTAN 1 TABLET: 49; 51 TABLET, FILM COATED ORAL at 09:04

## 2025-08-28 RX ADMIN — RANOLAZINE 1000 MG: 500 TABLET, EXTENDED RELEASE ORAL at 21:10

## 2025-08-28 RX ADMIN — ALLOPURINOL 200 MG: 100 TABLET ORAL at 09:05

## 2025-08-28 RX ADMIN — SACUBITRIL AND VALSARTAN 1 TABLET: 49; 51 TABLET, FILM COATED ORAL at 21:09

## 2025-08-28 RX ADMIN — METHOCARBAMOL 750 MG: 750 TABLET ORAL at 09:05

## 2025-08-28 RX ADMIN — DAKIN'S SOLUTION 0.125% (QUARTER STRENGTH): 0.12 SOLUTION at 06:27

## 2025-08-28 RX ADMIN — CARVEDILOL 12.5 MG: 12.5 TABLET, FILM COATED ORAL at 21:09

## 2025-08-28 RX ADMIN — INSULIN LISPRO 2 UNITS: 100 INJECTION, SOLUTION INTRAVENOUS; SUBCUTANEOUS at 21:08

## 2025-08-28 RX ADMIN — INSULIN LISPRO 2 UNITS: 100 INJECTION, SOLUTION INTRAVENOUS; SUBCUTANEOUS at 16:38

## 2025-08-28 ASSESSMENT — PAIN SCALES - GENERAL
PAINLEVEL_OUTOF10: 5
PAINLEVEL_OUTOF10: 4

## 2025-08-28 ASSESSMENT — PAIN DESCRIPTION - PAIN TYPE: TYPE: ACUTE PAIN

## 2025-08-28 ASSESSMENT — PAIN DESCRIPTION - LOCATION: LOCATION: ABDOMEN;BACK

## 2025-08-28 ASSESSMENT — PAIN DESCRIPTION - DESCRIPTORS: DESCRIPTORS: ACHING;DISCOMFORT

## 2025-08-28 ASSESSMENT — PAIN DESCRIPTION - FREQUENCY: FREQUENCY: CONTINUOUS

## 2025-08-29 ENCOUNTER — TELEPHONE (OUTPATIENT)
Dept: UROLOGY | Age: 70
End: 2025-08-29

## 2025-08-29 VITALS
SYSTOLIC BLOOD PRESSURE: 107 MMHG | DIASTOLIC BLOOD PRESSURE: 72 MMHG | BODY MASS INDEX: 32.62 KG/M2 | HEIGHT: 71 IN | OXYGEN SATURATION: 97 % | WEIGHT: 233 LBS | TEMPERATURE: 98.5 F | RESPIRATION RATE: 16 BRPM | HEART RATE: 67 BPM

## 2025-08-29 DIAGNOSIS — M43.25 FUSION OF SPINE OF THORACOLUMBAR REGION: Primary | ICD-10-CM

## 2025-08-29 LAB
ALBUMIN SERPL-MCNC: 3.1 G/DL (ref 3.5–5.2)
ALP SERPL-CCNC: 75 U/L (ref 40–129)
ALT SERPL-CCNC: 11 U/L (ref 10–50)
ANION GAP SERPL CALCULATED.3IONS-SCNC: 8 MMOL/L (ref 8–16)
AST SERPL-CCNC: 17 U/L (ref 10–50)
BASOPHILS # BLD: 0.1 K/UL (ref 0–0.2)
BASOPHILS NFR BLD: 0.5 % (ref 0–1)
BILIRUB DIRECT SERPL-MCNC: 0.2 MG/DL (ref 0–0.3)
BILIRUB INDIRECT SERPL-MCNC: 0.2 MG/DL (ref 0–1)
BILIRUB SERPL-MCNC: 0.4 MG/DL (ref 0.2–1.2)
BUN SERPL-MCNC: 27 MG/DL (ref 8–23)
CALCIUM SERPL-MCNC: 9.4 MG/DL (ref 8.8–10.2)
CHLORIDE SERPL-SCNC: 98 MMOL/L (ref 98–107)
CO2 SERPL-SCNC: 33 MMOL/L (ref 22–29)
CREAT SERPL-MCNC: 1.6 MG/DL (ref 0.7–1.2)
EOSINOPHIL # BLD: 0.4 K/UL (ref 0–0.6)
EOSINOPHIL NFR BLD: 4.8 % (ref 0–5)
ERYTHROCYTE [DISTWIDTH] IN BLOOD BY AUTOMATED COUNT: 17.2 % (ref 11.5–14.5)
GLUCOSE BLD-MCNC: 123 MG/DL (ref 70–99)
GLUCOSE BLD-MCNC: 136 MG/DL (ref 70–99)
GLUCOSE SERPL-MCNC: 122 MG/DL (ref 70–99)
HCT VFR BLD AUTO: 33.5 % (ref 42–52)
HGB BLD-MCNC: 10.3 G/DL (ref 14–18)
IMM GRANULOCYTES # BLD: 0.1 K/UL
LYMPHOCYTES # BLD: 1 K/UL (ref 1.1–4.5)
LYMPHOCYTES NFR BLD: 10.9 % (ref 20–40)
MCH RBC QN AUTO: 30.7 PG (ref 27–31)
MCHC RBC AUTO-ENTMCNC: 30.7 G/DL (ref 33–37)
MCV RBC AUTO: 100 FL (ref 80–94)
MONOCYTES # BLD: 0.6 K/UL (ref 0–0.9)
MONOCYTES NFR BLD: 7 % (ref 0–10)
NEUTROPHILS # BLD: 7 K/UL (ref 1.5–7.5)
NEUTS SEG NFR BLD: 76.1 % (ref 50–65)
PERFORMED ON: ABNORMAL
PERFORMED ON: ABNORMAL
PLATELET # BLD AUTO: 283 K/UL (ref 130–400)
PMV BLD AUTO: 11.1 FL (ref 9.4–12.4)
POTASSIUM SERPL-SCNC: 3.8 MMOL/L (ref 3.5–5)
PROT SERPL-MCNC: 5.6 G/DL (ref 6.4–8.3)
RBC # BLD AUTO: 3.35 M/UL (ref 4.7–6.1)
SODIUM SERPL-SCNC: 139 MMOL/L (ref 136–145)
WBC # BLD AUTO: 9.2 K/UL (ref 4.8–10.8)

## 2025-08-29 PROCEDURE — 85025 COMPLETE CBC W/AUTO DIFF WBC: CPT

## 2025-08-29 PROCEDURE — 99024 POSTOP FOLLOW-UP VISIT: CPT | Performed by: NURSE PRACTITIONER

## 2025-08-29 PROCEDURE — 6370000000 HC RX 637 (ALT 250 FOR IP): Performed by: NEUROLOGICAL SURGERY

## 2025-08-29 PROCEDURE — 80048 BASIC METABOLIC PNL TOTAL CA: CPT

## 2025-08-29 PROCEDURE — 97530 THERAPEUTIC ACTIVITIES: CPT

## 2025-08-29 PROCEDURE — 6370000000 HC RX 637 (ALT 250 FOR IP): Performed by: NURSE PRACTITIONER

## 2025-08-29 PROCEDURE — 36415 COLL VENOUS BLD VENIPUNCTURE: CPT

## 2025-08-29 PROCEDURE — 2700000000 HC OXYGEN THERAPY PER DAY

## 2025-08-29 PROCEDURE — 94760 N-INVAS EAR/PLS OXIMETRY 1: CPT

## 2025-08-29 PROCEDURE — 99231 SBSQ HOSP IP/OBS SF/LOW 25: CPT

## 2025-08-29 PROCEDURE — 80076 HEPATIC FUNCTION PANEL: CPT

## 2025-08-29 PROCEDURE — 82962 GLUCOSE BLOOD TEST: CPT

## 2025-08-29 RX ORDER — PREGABALIN 25 MG/1
25 CAPSULE ORAL DAILY
Qty: 3 CAPSULE | Refills: 0 | Status: SHIPPED | OUTPATIENT
Start: 2025-08-29 | End: 2025-09-01

## 2025-08-29 RX ORDER — FAMOTIDINE 20 MG/1
20 TABLET, FILM COATED ORAL NIGHTLY
Qty: 60 TABLET | Refills: 3 | Status: SHIPPED | OUTPATIENT
Start: 2025-08-29

## 2025-08-29 RX ORDER — SIMETHICONE 80 MG
80 TABLET,CHEWABLE ORAL EVERY 6 HOURS PRN
Qty: 180 TABLET | Refills: 0 | Status: SHIPPED | OUTPATIENT
Start: 2025-08-29

## 2025-08-29 RX ORDER — HYDROCODONE BITARTRATE AND ACETAMINOPHEN 10; 325 MG/1; MG/1
1 TABLET ORAL EVERY 6 HOURS PRN
Qty: 12 TABLET | Refills: 0 | Status: SHIPPED | OUTPATIENT
Start: 2025-08-29 | End: 2025-09-01

## 2025-08-29 RX ORDER — LIDOCAINE 4 G/G
1 PATCH TOPICAL DAILY
Qty: 30 EACH | Refills: 0 | Status: SHIPPED | OUTPATIENT
Start: 2025-08-29

## 2025-08-29 RX ORDER — SODIUM HYPOCHLORITE 1.25 MG/ML
SOLUTION TOPICAL 2 TIMES DAILY
Qty: 473 ML | Refills: 0 | Status: SHIPPED | OUTPATIENT
Start: 2025-08-29

## 2025-08-29 RX ORDER — LACTULOSE 10 G/15ML
30 SOLUTION ORAL DAILY
Qty: 946 ML | Refills: 0 | Status: SHIPPED | OUTPATIENT
Start: 2025-08-29

## 2025-08-29 RX ORDER — TRAZODONE HYDROCHLORIDE 150 MG/1
150 TABLET ORAL NIGHTLY PRN
Qty: 30 TABLET | Refills: 0 | Status: SHIPPED | OUTPATIENT
Start: 2025-08-29

## 2025-08-29 RX ADMIN — DOCUSATE SODIUM 50MG AND SENNOSIDES 8.6MG 2 TABLET: 8.6; 5 TABLET, FILM COATED ORAL at 09:46

## 2025-08-29 RX ADMIN — DAKIN'S SOLUTION 0.125% (QUARTER STRENGTH): 0.12 SOLUTION at 04:34

## 2025-08-29 RX ADMIN — CALCITRIOL CAPSULES 0.25 MCG 0.25 MCG: 0.25 CAPSULE ORAL at 09:46

## 2025-08-29 RX ADMIN — ALLOPURINOL 200 MG: 100 TABLET ORAL at 09:45

## 2025-08-29 RX ADMIN — METHOCARBAMOL 750 MG: 750 TABLET ORAL at 09:45

## 2025-08-29 RX ADMIN — CARVEDILOL 12.5 MG: 12.5 TABLET, FILM COATED ORAL at 09:47

## 2025-08-29 RX ADMIN — SACUBITRIL AND VALSARTAN 1 TABLET: 49; 51 TABLET, FILM COATED ORAL at 09:45

## 2025-08-29 RX ADMIN — APIXABAN 2.5 MG: 5 TABLET, FILM COATED ORAL at 09:47

## 2025-08-29 RX ADMIN — ISOSORBIDE MONONITRATE 60 MG: 60 TABLET, EXTENDED RELEASE ORAL at 09:46

## 2025-08-29 RX ADMIN — RANOLAZINE 1000 MG: 500 TABLET, EXTENDED RELEASE ORAL at 09:45

## 2025-08-29 RX ADMIN — TAMSULOSIN HYDROCHLORIDE 0.4 MG: 0.4 CAPSULE ORAL at 09:46

## 2025-08-29 RX ADMIN — INSULIN GLARGINE 10 UNITS: 100 INJECTION, SOLUTION SUBCUTANEOUS at 09:49

## 2025-08-29 RX ADMIN — AMIODARONE HYDROCHLORIDE 100 MG: 200 TABLET ORAL at 09:47

## 2025-08-29 RX ADMIN — NIFEDIPINE 60 MG: 60 TABLET, EXTENDED RELEASE ORAL at 09:46

## 2025-08-29 RX ADMIN — BUMETANIDE 2 MG: 1 TABLET ORAL at 09:46

## 2025-08-29 RX ADMIN — PREGABALIN 25 MG: 25 CAPSULE ORAL at 09:52

## 2025-08-29 RX ADMIN — ASPIRIN 81 MG: 81 TABLET, CHEWABLE ORAL at 09:47

## 2025-08-29 ASSESSMENT — PAIN - FUNCTIONAL ASSESSMENT: PAIN_FUNCTIONAL_ASSESSMENT: PREVENTS OR INTERFERES SOME ACTIVE ACTIVITIES AND ADLS

## 2025-08-29 ASSESSMENT — PAIN DESCRIPTION - DESCRIPTORS: DESCRIPTORS: ACHING

## 2025-08-29 ASSESSMENT — PAIN DESCRIPTION - PAIN TYPE: TYPE: ACUTE PAIN

## 2025-08-29 ASSESSMENT — PAIN DESCRIPTION - FREQUENCY: FREQUENCY: CONTINUOUS

## 2025-08-29 ASSESSMENT — ENCOUNTER SYMPTOMS: ABDOMINAL PAIN: 1

## 2025-08-29 ASSESSMENT — PAIN DESCRIPTION - LOCATION: LOCATION: BACK

## 2025-08-29 ASSESSMENT — PAIN SCALES - GENERAL: PAINLEVEL_OUTOF10: 5

## 2025-08-29 ASSESSMENT — PAIN DESCRIPTION - ORIENTATION: ORIENTATION: MID

## 2025-09-02 ENCOUNTER — TELEPHONE (OUTPATIENT)
Dept: NEUROSURGERY | Age: 70
End: 2025-09-02

## (undated) DEVICE — Device

## (undated) DEVICE — GLOVE SURG SZ 75 CRM LTX FREE POLYISOPRENE POLYMER BEAD ANTI

## (undated) DEVICE — BLADE LARYNSCP STERILE SZ 3 TI DISP SPECTRM DVM

## (undated) DEVICE — DRAPE,U/ SHT,SPLIT,PLAS,STERIL: Brand: MEDLINE

## (undated) DEVICE — BAG BND W36XL36IN TRNSPAR POLY GEN PURP W E BND CLSR TIDI

## (undated) DEVICE — GW FOR TROCH NAIL 3.2X400MM

## (undated) DEVICE — ELECTRODE BLDE MOD BAYNT EZ CLN

## (undated) DEVICE — PK HIP ORIF FX TABL 30

## (undated) DEVICE — 4-PORT MANIFOLD: Brand: NEPTUNE 2

## (undated) DEVICE — SOLUTION IV 250ML 0.9% SOD CHL PH 5 INJ USP VIAFLX PLAS

## (undated) DEVICE — DRAPE SHEET: Brand: UNBRANDED

## (undated) DEVICE — MASTISOL ADHESIVE LIQ 2/3ML

## (undated) DEVICE — AGENT HEMOSTATIC SURGIFLOW MATRIX KIT W/THROMBIN

## (undated) DEVICE — ELECTROSURGICAL PENCIL BUTTON SWITCH NON COATED BLADE ELECTRODE 10 FT (3 M) CORD HOLSTER: Brand: MEGADYNE

## (undated) DEVICE — AMBU AURA-I U SIZE 4, DISPOSABLE LARYNGEAL MASK: Brand: AURA-I

## (undated) DEVICE — CATHETER KIT 5 FR 21 GAX7 CM MICROINTRODUCER GUIDEWIRE STIFF

## (undated) DEVICE — SPHERE NAVIGATION 4 MRK PASS STLTH STN STRL

## (undated) DEVICE — C-ARM: Brand: UNBRANDED

## (undated) DEVICE — GLV SURG TRIUMPH PF LTX 7.5 STRL

## (undated) DEVICE — SUTURE ETHILON SZ 4-0 L18IN NONABSORBABLE BLK L19MM PS-2 3/8 1667H

## (undated) DEVICE — UNDERGLOVE SURG SZ 8 FNGR THK0.21MIL GRN LTX BEAD CUF

## (undated) DEVICE — ANTIBACTERIAL UNDYED BRAIDED (POLYGLACTIN 910), SYNTHETIC ABSORBABLE SUTURE: Brand: COATED VICRYL

## (undated) DEVICE — PENCL ES MEGADINE EZ/CLEAN BUTN W/HOLSTR 10FT

## (undated) DEVICE — ELECTRODE ES AD PED L2.5IN TEF INSUL MOD NONCORDED BLDE TIP

## (undated) DEVICE — SHEET,T,THYROID,STERILE: Brand: MEDLINE

## (undated) DEVICE — ENDO KIT,LOURDES HOSPITAL: Brand: MEDLINE INDUSTRIES, INC.

## (undated) DEVICE — SUTURE VICRYL SZ 3-0 L18IN ABSRB UD L26MM SH 1/2 CIR J864D

## (undated) DEVICE — DRESSING TRNSPAR W5XL4.5IN FLM SHT SEMIPERMEABLE WIND

## (undated) DEVICE — GOWN SURG AAMI LVL 4 LG HK LOOP CLOS BLU STRL ORBIS LF

## (undated) DEVICE — LIQUIBAND RAPID ADHESIVE 36/CS 0.8ML: Brand: MEDLINE

## (undated) DEVICE — FORCEPS BX L240CM JAW DIA2.4MM ORNG L CAP W/ NDL DISP RAD

## (undated) DEVICE — DRAPE SURG ST 3/4 SHEET W53XL77IN STD POLYPR 3 QTR DISP

## (undated) DEVICE — DRAPE MICSCP W54XL150IN W/ EXT MONOCULAR OR STEREO OBSERVER

## (undated) DEVICE — GLOVE SURG SZ 7 CRM LTX FREE POLYISOPRENE POLYMER BEAD ANTI

## (undated) DEVICE — MINOR CDS: Brand: MEDLINE INDUSTRIES, INC.

## (undated) DEVICE — TUBE ET 7.5MM NSL ORAL BASIC CUF INTMED MURPHY EYE RADPQ

## (undated) DEVICE — DRAPE LAP 77X122X106IN FEN ABSRB IMPERV REINF INSTR PD

## (undated) DEVICE — SUTURE MONOCRYL SZ 4-0 L18IN ABSRB UD L19MM PS-2 3/8 CIR PRIM Y496G

## (undated) DEVICE — SUTURE VICRYL + SZ 2-0 L27IN ABSRB VLT UR-6 5/8 CIR TAPR PNT VCP602H

## (undated) DEVICE — NEEDLE SPNL 22GA L3.5IN BLK HUB S STL REG WALL FIT STYL

## (undated) DEVICE — Z DISCONTINUED USE 2218132 SUTURE ETHILON SZ 3-0 L30IN NONABSORBABLE BLK L36MM FSLX 3/8 1673BH

## (undated) DEVICE — PK TURNOVER RM ADV

## (undated) DEVICE — TOWEL SURG W17XL27IN BLU COT DLX PREWASHED DELINTED 4 PER PK

## (undated) DEVICE — SPK10183 ORTHOPEDIC FRACTURE AND TRAUMA KIT: Brand: SPK10183 ORTHOPEDIC FRACTURE AND TRAUMA KIT

## (undated) DEVICE — STRIP SKIN CLSR W0.5XL4IN WHT NONWOVEN BK ADH REINF

## (undated) DEVICE — SUTURE VICRYL + SZ 3-0 L18IN ABSRB UD SH 1/2 CIR TAPERCUT NDL VCP864D

## (undated) DEVICE — PROXIMATE RH ROTATING HEAD SKIN STAPLERS (35 WIDE) CONTAINS 35 STAINLESS STEEL STAPLES: Brand: PROXIMATE

## (undated) DEVICE — ROYAL SILK SURGICAL GOWN, XXL: Brand: CONVERTORS

## (undated) DEVICE — KIT TABLE SPINAL JACKSON WITH PRONESAFE (MINIMUM ORDER QTY 2 CA)

## (undated) DEVICE — MASK VENTILATOR MED AD SUPERNOVA ET

## (undated) DEVICE — GLIDEPATH HEMODIALYSIS CATH, ST, DL, 14.5 FR. 23CM: Brand: GLIDEPATH LONG-TERM HEMODIALYSIS CATHETER WITH PRELOADED STYLET

## (undated) DEVICE — GLV SURG DERMASSURE GRN LF PF 8.0

## (undated) DEVICE — PENCIL SMK EVAC BLADE COAT 70 MM ROCKER-SWITCH NEPTUNE E-SEP

## (undated) DEVICE — DRSNG WND SIL OPTIFOAM GENTLE BRDR ADHS W/SA 4X4IN

## (undated) DEVICE — MARKER,SKIN,WI/RULER AND LABELS: Brand: MEDLINE

## (undated) DEVICE — BIT DRL 3FLUT QC CALIB 4.2X330X100MM

## (undated) DEVICE — TOWEL,OR,DSP,ST,BLUE,DLX,4/PK,20PK/CS: Brand: MEDLINE

## (undated) DEVICE — STANDARD HYPODERMIC NEEDLE,POLYPROPYLENE HUB: Brand: MONOJECT

## (undated) DEVICE — DRSNG BRDR MEPILEXLITE SLFADHR SIL 2X5

## (undated) DEVICE — PROVE COVER: Brand: UNBRANDED

## (undated) DEVICE — 3M™ STERI-DRAPE™ U-DRAPE 1015: Brand: STERI-DRAPE™

## (undated) DEVICE — CVR UNIV C/ARM

## (undated) DEVICE — STRIP SKIN CLSR W0.5XL4IN WHT SPUNBOUND FBR NYL STERIL HI ADH